# Patient Record
Sex: FEMALE | Race: WHITE | NOT HISPANIC OR LATINO | Employment: UNEMPLOYED | URBAN - METROPOLITAN AREA
[De-identification: names, ages, dates, MRNs, and addresses within clinical notes are randomized per-mention and may not be internally consistent; named-entity substitution may affect disease eponyms.]

---

## 2017-04-10 ENCOUNTER — ALLSCRIPTS OFFICE VISIT (OUTPATIENT)
Dept: OTHER | Facility: OTHER | Age: 57
End: 2017-04-10

## 2017-10-13 ENCOUNTER — HOSPITAL ENCOUNTER (EMERGENCY)
Facility: HOSPITAL | Age: 57
Discharge: HOME | End: 2017-10-13
Admitting: EMERGENCY MEDICINE
Payer: COMMERCIAL

## 2017-10-13 VITALS
RESPIRATION RATE: 18 BRPM | SYSTOLIC BLOOD PRESSURE: 127 MMHG | DIASTOLIC BLOOD PRESSURE: 88 MMHG | OXYGEN SATURATION: 99 % | HEART RATE: 69 BPM | WEIGHT: 245 LBS | TEMPERATURE: 98.4 F

## 2017-10-13 DIAGNOSIS — B02.9 SHINGLES OUTBREAK: Primary | ICD-10-CM

## 2017-10-13 PROCEDURE — 99283 EMERGENCY DEPT VISIT LOW MDM: CPT

## 2017-10-13 RX ORDER — MORPHINE SULFATE 15 MG/1
15 TABLET ORAL EVERY 6 HOURS PRN
Qty: 10 TABLET | Refills: 0 | Status: SHIPPED | OUTPATIENT
Start: 2017-10-13 | End: 2022-01-12

## 2017-10-13 RX ORDER — VALACYCLOVIR HYDROCHLORIDE 1 G/1
1000 TABLET, FILM COATED ORAL 3 TIMES DAILY
Qty: 21 TABLET | Refills: 0 | Status: SHIPPED | OUTPATIENT
Start: 2017-10-13 | End: 2022-01-12

## 2017-10-13 NOTE — DISCHARGE INSTRUCTIONS
Shingles   WHAT YOU NEED TO KNOW:   Shingles is a painful infection caused by the same virus that causes chickenpox (varicella-zoster virus)  After you get chickenpox, the virus stays in your body for several years without causing any symptoms  Shingles occurs when the virus becomes active again  Once active, the virus will travel along a nerve to your skin and cause a rash  DISCHARGE INSTRUCTIONS:   Return to the emergency department if:   · You have painful, red, warm skin around the blisters, or the blisters drain pus  · Your neck is stiff or you have trouble moving it  · You have trouble moving your arms, legs, or face  · You have a seizure  · You have weakness in an arm or leg  · You become confused, or have difficulty speaking  · You have dizziness, a severe headache, hearing or vision loss  Contact your healthcare provider if:   · You feel weak or have a headache  · You have a cough, chills, or a fever  · You have abdominal pain, nausea, or vomiting  · Your rash becomes more itchy or painful  · Your rash spreads to other parts of your body  · Your pain worsens and does not go away even after taking medicine  · You have questions or concerns about your condition or care  Medicines:   · Antiviral medicine  helps decrease symptoms and healing time  They may also decrease your risk of developing nerve pain  You will need to start taking them within 3 days of the start of symptoms to prevent nerve pain  · Pain medicine  may be prescribed or suggested by your healthcare provider  You may need NSAIDs, acetaminophen, or opioid medicine depending on how much pain you are in  Do not wait until the pain is severe before you take more pain medicine  · Topical anesthetics  are used to numb the skin and decrease pain  They can be a cream, gel, spray, or patch  · Anticonvulsants  decrease nerve pain and may help you sleep at night      · Antidepressants  may be used to decrease nerve pain  · Take your medicine as directed  Contact your healthcare provider if you think your medicine is not helping or if you have side effects  Tell him of her if you are allergic to any medicine  Keep a list of the medicines, vitamins, and herbs you take  Include the amounts, and when and why you take them  Bring the list or the pill bottles to follow-up visits  Carry your medicine list with you in case of an emergency  Follow up with your healthcare provider as directed:  Write down your questions so you remember to ask them during your visits  Self-care:  Keep your rash clean and dry  Cover your rash with a bandage or clothing  Do not use bandages that stick to your skin  The sticky part may irritate your skin and make your rash last longer  Prevent the spread of shingles: The virus can be passed to a person who has never had chickenpox  This person may get chickenpox, but not shingles  You may pass the virus to others as long as you have a rash  The virus is spread by direct contact with the fluid from the blisters  Usually, you cannot spread the virus once the blisters dry up  Prevent shingles or another shingles outbreak:  A vaccine may be given to help prevent shingles  Ask for more information about this vaccine  © 2017 [address] [employer] Information is for End User's use only and may not be sold, redistributed or otherwise used for commercial purposes  All illustrations and images included in CareNotes® are the copyrighted property of A D A M , Inc  or Rakesh Capone  The above information is an  only  It is not intended as medical advice for individual conditions or treatments  Talk to your doctor, nurse or pharmacist before following any medical regimen to see if it is safe and effective for you

## 2017-10-13 NOTE — ED PROVIDER NOTES
History  Chief Complaint   Patient presents with    Back Pain     pt  c/o back pain x 1 5 weeks  No recent trauma  Pt  also complains of rash on back, first noticed yesterday  This is a 61-year-old female patient who states that she has had left flank pain in her rib pain for approximately 1 and half weeks  She started with a blistered rash over this area over the last 2 days  She states this rash is a burning stinging type rash she has never had this before  No fever no chills no headache no blurred vision or double vision no cough congestion sore throat no nausea vomiting diarrhea abdominal pain no chest pain or shortness of breath  Nothing makes it better or worse she has taken nothing for the pain  She has been under lot of stress recently in nobody else has this rash  Based on the symptoms in the and the rash it does appear that she has a shingles outbreak this is her 1st outbreak        Back Pain       None       Past Medical History:   Diagnosis Date    Chronic pain     Hypertension        No past surgical history on file  No family history on file  I have reviewed and agree with the history as documented  Social History   Substance Use Topics    Smoking status: Never Smoker    Smokeless tobacco: Never Used    Alcohol use No        Review of Systems   Musculoskeletal: Positive for back pain  All other systems reviewed and are negative  Physical Exam  ED Triage Vitals [10/13/17 1643]   Temperature Pulse Respirations Blood Pressure SpO2   98 4 °F (36 9 °C) 69 18 127/88 99 %      Temp Source Heart Rate Source Patient Position - Orthostatic VS BP Location FiO2 (%)   Oral Monitor Sitting Left arm --      Pain Score       9           Physical Exam   Constitutional: She appears well-developed and well-nourished  HENT:   Head: Normocephalic and atraumatic     Right Ear: External ear normal    Left Ear: External ear normal    Nose: Nose normal    Mouth/Throat: Oropharynx is clear and moist    Eyes: Conjunctivae are normal  Pupils are equal, round, and reactive to light  Neck: Normal range of motion  Neck supple  Cardiovascular: Normal rate and regular rhythm  Pulmonary/Chest: Effort normal and breath sounds normal    Abdominal: Soft  Bowel sounds are normal  There is no tenderness  Neurological: She is alert  Skin: Skin is warm  Psychiatric: She has a normal mood and affect  Her behavior is normal    Nursing note and vitals reviewed  ED Medications  Medications - No data to display    Diagnostic Studies  Labs Reviewed - No data to display    No orders to display       Procedures  Procedures      Phone Contacts  ED Phone Contact    ED Course  ED Course                                MDM  CritCare Time    Disposition  Final diagnoses:   Shingles outbreak     ED Disposition     ED Disposition Condition Comment    Discharge  Abdirizak Chick discharge to home/self care  Condition at discharge: Good        Follow-up Information     Follow up With Specialties Details Why Contact Info    Susie Maguire MD Family Medicine Schedule an appointment as soon as possible for a visit  [address]  [address] [address]  [phone]          Patient's Medications   Discharge Prescriptions    MORPHINE (MSIR) 15 MG TABLET    Take 1 tablet by mouth every 6 (six) hours as needed for severe pain Max Daily Amount: 60 mg       Start Date: 10/13/2017End Date: --       Order Dose: 15 mg       Quantity: 10 tablet    Refills: 0    VALACYCLOVIR (VALTREX) 1,000 MG TABLET    Take 1 tablet by mouth 3 (three) times a day for 7 days       Start Date: 10/13/2017End Date: 10/20/2017       Order Dose: 1,000 mg       Quantity: 21 tablet    Refills: 0     No discharge procedures on file      ED Provider  Electronically Signed by       Felipa Granger PA-C  10/13/17 [address] Shant Cleary PA-C  10/13/17 [id number]

## 2017-10-13 NOTE — ED NOTES
Pt awake and alert, no distress noted, no other questions upon d/c     April ARNULFO Deluca, RN  10/13/17 [facility]

## 2017-10-15 ENCOUNTER — HOSPITAL ENCOUNTER (EMERGENCY)
Facility: HOSPITAL | Age: 57
Discharge: HOME | End: 2017-10-15
Attending: EMERGENCY MEDICINE | Admitting: EMERGENCY MEDICINE
Payer: COMMERCIAL

## 2017-10-15 VITALS
DIASTOLIC BLOOD PRESSURE: 85 MMHG | TEMPERATURE: 97.8 F | WEIGHT: 246.4 LBS | SYSTOLIC BLOOD PRESSURE: 144 MMHG | OXYGEN SATURATION: 99 % | RESPIRATION RATE: 16 BRPM | HEART RATE: 77 BPM

## 2017-10-15 DIAGNOSIS — T88.7XXA NON-DOSE-RELATED ADVERSE EFFECT OF MEDICATION, INITIAL ENCOUNTER: Primary | ICD-10-CM

## 2017-10-15 PROCEDURE — 99282 EMERGENCY DEPT VISIT SF MDM: CPT

## 2017-10-15 RX ORDER — OXYCODONE HYDROCHLORIDE 5 MG/1
5 CAPSULE ORAL EVERY 6 HOURS PRN
Qty: 20 CAPSULE | Refills: 0 | Status: SHIPPED | OUTPATIENT
Start: 2017-10-15 | End: 2022-01-12

## 2017-10-15 NOTE — ED NOTES
Pt's  standing outside of room, scowling at RN station  Pt's  came out asking when his wife will be seen by doctor  Made pt aware that was unknown  He asked if any pain meds could be given to wife, I stated ibuprofen or tylenol could be give w/o a doctors order, to which he decline     stated "this place has gone down hill!"       Rita Patel, RN  10/15/17 [address], W[place]  10/15/17 5766

## 2017-10-15 NOTE — DISCHARGE INSTRUCTIONS
Diagnosis rash- possible adverse medication reaction     - stop morphine-- for pain- can use oxycodone 1-2 tablets every 4-6 hrs as needed - can also try over the counter lidocaine patches over shingles area     - do not scratch areas- do not want a secondary bacterial infection     - continue valtrex    - please return to  The er fro any fevers-  Redness of whites of eyes/ lip cracking// any peeling of non shigles type rash or any new/ worsening/concerning symptoms to you

## 2018-01-11 NOTE — PROGRESS NOTES
Assessment    1  Osteoarthritis of knee (715 36) (M17 9)   2  Asthma (493 90) (J45 909)   3  Benign essential hypertension (401 1) (I10)   4  Depression with anxiety (300 4) (F41 8)   5  High risk medication use (V58 69) (Z79 899)   6  Impaired fasting glucose (790 21) (R73 01)   7  Seasonal allergies (477 9) (J30 2)   8  Acute bronchitis (466 0) (J20 9)   9  Breast calcification, left (793 89) (R92 1)    Plan  Acute bronchitis    · Azithromycin 250 MG Oral Tablet; TAKE 2 TABLETS ON DAY 1 THEN TAKE 1  TABLET A DAY FOR 4 DAYS   · * XR CHEST PA & LATERAL; Status:Active; Requested for:04Apr2016;    · Drink plenty of fluids ; Status:Complete;   Done: 96FRD3678  Asthma    · Advair Diskus 500-50 MCG/DOSE Inhalation Aerosol Powder Breath Activated;  INHALE 1 PUFF TWICE DAILY   · Zafirlukast 20 MG Oral Tablet; TAKE 1 TABLET TWICE DAILY  Asthma, Benign essential hypertension, Bipolar disorder, Breast calcification, left,  Depression with anxiety, Health Maintenance, High risk medication use, Impaired fasting  glucose, Obesity (BMI 30 0-34 9), Osteoarthritis of knee, Seasonal allergies    · 1 - Derian Mercer MD, Zay Oconnor  (Orthopedic Surgery) Physician Referral  Consult  Status: Active   Requested for: 96OMX0800  Care Summary provided  : Yes  Benign essential hypertension    · Triamterene-HCTZ 37 5-25 MG Oral Tablet; take 1 tablet by mouth every day   · Metoprolol Tartrate 50 MG Oral Tablet;  Take 1 tablet twice a day   · Continue with our present treatment plan ; Status:Complete;   Done: 06BAZ6380   · Eat a low fat and low cholesterol diet ; Status:Complete;   Done: 94XWT9782   · Keep a diary of when and what you eat ; Status:Complete;   Done: 63ULF2247   · Restrict the salt in your diet by avoiding highly salted foods ; Status:Complete;   Done:  70SET9139   · Restrict your sodium (salt) intake to 2 grams per day ; Status:Complete;   Done:  18ATA7603  Breast calcification, left    · MAMMO DIAGNOSTIC LEFT W CAD; Status:Hold For - Scheduling; Requested  for:04Apr2016;    · Follow-up visit in 1 week Evaluation and Treatment  Follow-up  Status: Hold For -  Scheduling  Requested for: 04Apr2016  Unlinked    · Diclofenac Sodium 75 MG Oral Tablet Delayed Release    Discussion/Summary    She has wheezing and rhonchi in the left lung advised to start antibiotic continue Advair and get a chest x-ray to rule out pneumonia,  She has bilateral arthritis, x-ray reviewed with her from last year and she has osteoarthritis,  Referral made to orthopedic,  She has hypertension, which is stable I rewrote her prescription,  Advised to have labs and then she should have follow-up next week for recheck her her bronchitis  Her labs reviewed which is only hemoglobin A1c 6 0 advised to try to lose weight eat low-calorie diet  She also wants to have mammogram done which is diagnostic for left breast which was advised 6 month ago  I reviewed her previous mammogram, and ordered a new prescription  Possible side effects of new medications were reviewed with the patient/guardian today  The treatment plan was reviewed with the patient/guardian  The patient/guardian understands and agrees with the treatment plan   The patient was counseled regarding diagnostic results, instructions for management, prognosis, impressions, risks and benefits of treatment options, importance of compliance with treatment  Chief Complaint  FOLLOW UP ON CHRONIC MEDICAL CONDITIONS, REVIEW LABS   ALSO COMPLAINS OF COUGH FOR 9 DAYS      History of Present Illness  She is here follow-up on hypertension and her chronic conditions and she says she needed refill on triamterene hydrochlorothiazide in the tablet form as that is cheaper,  sHe also has asthma, and use Advair regularly,  She complains of, cough for last one week and wheezing and has green phlegm and not been feeling well, she does not check her temperature but she feels feverish,  Her appetite is normal  She also complains of bilateral knee pain and she had x-ray last year which shows she has arthritis ,she wants to see a orthopedic,  She has constant discomfort more on walking, and this pain is there for years which is getting worse      Review of Systems    Constitutional: No fever, no chills, feels well, no tiredness, no recent weight gain or weight loss  Eyes: No complaints of eye pain, no red eyes, no eyesight problems, no discharge, no dry eyes, no itching of eyes  Cardiovascular: No complaints of slow heart rate, no fast heart rate, no chest pain, no palpitations, no leg claudication, no lower extremity edema  Respiratory: as noted in HPI  Gastrointestinal: No complaints of abdominal pain, no constipation, no nausea or vomiting, no diarrhea, no bloody stools  Musculoskeletal: No complaints of arthralgias, no myalgias, no joint swelling or stiffness, no limb pain or swelling  Integumentary: No complaints of skin rash or lesions, no itching, no skin wounds, no breast pain or lump  Neurological: No complaints of headache, no confusion, no convulsions, no numbness, no dizziness or fainting, no tingling, no limb weakness, no difficulty walking  Psychiatric: Not suicidal, no sleep disturbance, no anxiety or depression, no change in personality, no emotional problems  Endocrine: No complaints of proptosis, no hot flashes, no muscle weakness, no deepening of the voice, no feelings of weakness  Hematologic/Lymphatic: No complaints of swollen glands, no swollen glands in the neck, does not bleed easily, does not bruise easily  ROS reviewed  Active Problems    1  Asthma (493 90) (J45 909)   2  Benign essential hypertension (401 1) (I10)   3  Bipolar disorder (296 80) (F31 9)   4  Breast calcification, left (793 89) (R92 1)   5  Depression with anxiety (300 4) (F41 8)   6  High risk medication use (V58 69) (Z79 899)   7  Impaired fasting glucose (790 21) (R73 01)   8  Obesity (BMI 30 0-34 9) (278 00) (E66 9)   9  Osteoarthritis of knee (715 36) (M17 9)   10  Seasonal allergies (477 9) (J30 2)   11  Visit for screening mammogram (V76 12) (Z12 31)    Past Medical History    1  History of _ (278 0)    The active problems and past medical history were reviewed and updated today  Surgical History    1  History of Tubal Ligation    Family History    1  No pertinent family history    Social History    · Former smoker (F26 86) (F23 723)  The social history was reviewed and updated today  Current Meds   1  Advair Diskus 500-50 MCG/DOSE Inhalation Aerosol Powder Breath Activated; INHALE 1   PUFF TWICE DAILY; Therapy: 32ZYD7131 to (9872 9189)  Requested for: 48AMS7995; Last   Rx:55Jal6254 Ordered   2  ClonazePAM 0 5 MG Oral Tablet; Therapy: 35FOH1364 to (Last Daniele Pod)  Requested for: 21Jun2011 Ordered   3  Diclofenac Sodium 75 MG Oral Tablet Delayed Release; Therapy: 69CVC7263 to Recorded   4  Fluticasone Propionate 50 MCG/ACT Nasal Suspension; USE 1 SPRAY IN EACH   NOSTRIL TWICE DAILY; Therapy: 39QGJ0218 to ([facility])  Requested for: 30GJG4756; Last   Rx:25Jun2015 Ordered   5  LamoTRIgine 150 MG Oral Tablet; Therapy: 66OPW5466 to Recorded   6  Metoprolol Tartrate 50 MG Oral Tablet; TAKE 1 TABLET TWICE A DAY FOR BLOOD   PRESSURE; Therapy: 74DNN5313 to (Evaluate:41Qsa5921)  Requested for: 66LZC3864; Last   Rx:19Nov2015 Ordered   7  TraZODone HCl - 150 MG Oral Tablet; Therapy: 69RRP9504 to Recorded   8  TraZODone HCl - 50 MG Oral Tablet; Therapy: 07GBE7472 to Recorded   9  Triamterene-HCTZ 37 5-25 MG Oral Capsule; take 1 capsule daily; Therapy: 62YYD2405 to (DC[facility]afers)  Requested for: 01Apr2016; Last   Rx:30Mar2016; Status: ACTIVE - Renewal Denied Ordered   10  Venlafaxine HCl  MG Oral Capsule Extended Release 24 Hour; Therapy: 74QJS3038 to (Last Rx:99Gpn9627)  Requested for: 90Tvs5034 Ordered   11  Zafirlukast 20 MG Oral Tablet; TAKE 1 TABLET TWICE DAILY;     Therapy: 98ZOQ9195 to (Loreta Gomez)  Requested for: 01Apr2016; Last    Rx:30Mar2016; Status: ACTIVE - Renewal Denied Ordered   12  Zantac 150 MG Oral Tablet; Therapy: 45VSY2653 to Recorded    The medication list was reviewed and updated today  Allergies    1  ASPIRIN   2  Penicillins    Vitals  Vital Signs [Data Includes: Current Encounter]    Recorded: 04Apr2016 03:56PM   Temperature 97 2 F   Heart Rate 72   Respiration 16   Systolic 015   Diastolic 70   Height 5 ft 4 in   Weight 264 lb    BMI Calculated 45 32   BSA Calculated 2 21   O2 Saturation 92     Physical Exam    Constitutional   General appearance: No acute distress, well appearing and well nourished  Eyes   Conjunctiva and lids: No swelling, erythema or discharge  Ears, Nose, Mouth, and Throat   External inspection of ears and nose: Normal     Oropharynx: Normal with no erythema, edema, exudate or lesions  Pulmonary   Respiratory effort: No increased work of breathing or signs of respiratory distress  Wheezing and rhonchi in the left side  Cardiovascular   Palpation of heart: Normal PMI, no thrills  Auscultation of heart: Normal rate and rhythm, normal S1 and S2, without murmurs  Examination of extremities for edema and/or varicosities: Normal     Carotid pulses: Normal     Abdomen   Abdomen: Non-tender, no masses  Liver and spleen: No hepatomegaly or splenomegaly  Lymphatic   Palpation of lymph nodes in neck: No lymphadenopathy  Musculoskeletal   Gait and station: Normal     Inspection/palpation of joints, bones, and muscles: Normal     Skin   Skin and subcutaneous tissue: Normal without rashes or lesions  Neurologic   Cranial nerves: Cranial nerves 2-12 intact  Sensation: No sensory loss      Psychiatric   Orientation to person, place, and time: Normal     Mood and affect: Normal          Results/Data  Results   (1) HEMOGLOBIN A1C [id number] 07:38AM [facility] [place]   5 7-6 4% impaired fasting glucose  >=6 5% diagnosis of diabetes    Falsely low levels are seen in conditions linked to short RBC life span-  hemolytic anemia, and splenomegaly  Falsely elevated levels are seen in situations where there is an increased production of RBC- receipt of erythropoietin or blood transfusions  Adopted from ADA-Clinical Practice Recommendations     Test Name Result Flag Reference   HEMOGLOBIN A1C 6 0 % H 4 0-5 6   EST  AVG  GLUCOSE 126 mg/dl       * Knee Xray 3 View (non-injury) 42PWI3669 01:03PM [facility]     Test Name Result Flag Reference   XR Knee 3 View (Report)     [facility] [address];;[place];[address]   09/29/2015 1303   09/29/2015 1318   3 IMAGES     RIGHT KNEE     INDICATION- Anterior knee pain  Symptoms worse with walking  COMPARISON- None     VIEWS- 3& 4 images^ 4 images     FINDINGS-     There is no acute fracture or dislocation  There is no joint effusion  There is bicompartmental osteoarthritis affecting the medial   femorotibial joint space and patellofemoral joint  There is narrowing   of the medial joint space with marginal bone spur formation at the   femoral condyle  Patellofemoral joint also demonstrates some bone spur   formation at the margins  No lytic or blastic lesions are seen  Soft tissues are unremarkable  IMPRESSION-     Bicompartmental osteoarthritis affecting the medial femorotibial joint   space and patellofemoral joint         Transcribed on- [id number]     UMU Sewell MD   Reading Radiologist- UMU Vicente MD   Electronically Signed- UMU LINCOLN MD   Released Date Time- 09/29/15 1424   ------------------------------------------------------------------------------   82860^VIKKI BA   09247^VIKKI BA     * Knee Xray 3 View (non-injury) 54Bqn9080 01:02PM Ghada Milviaadamaris     Test Name Result Flag Reference   XR Knee 3 View (Report)     [facility] [address] [facility];;[place];[address]   09/29/2015 1309   09/29/2015 1316   2 IMAGES     LEFT KNEE     INDICATION- Anterior bilateral knee pain  Symptoms worse with walking     COMPARISON- None     VIEWS- 3& 4 images^ 4 images     FINDINGS-     There is no acute fracture or dislocation  There is no joint effusion  Bicompartmental osteoarthritis is noted medially at the femorotibial   joint and at the patellofemoral joint with joint narrowing and marginal   bone spur formation  There are some sclerosis of the articular surface   most evident at the femoral condyle and there may also be tiny   subcortical lucencies which could represent some developing geodes  No suspicious lytic or blastic bone lesions are seen  Soft tissues are unremarkable  IMPRESSION-     Bicompartmental osteoarthritis which is moderate at the medial   femorotibial joint space and probably mild to moderate at the   patellofemoral joint space  Transcribed on- [id number]     Mallie Prader, RAD MD   Reading Radiologist- UMU Vogel MD   Electronically Signed- UMU Vogel MD   Released Date Time- 09/29/15 1423   ------------------------------------------------------------------------------   [id number]^VIKKI KUNZ TREND   50691^VIKKI KUNZ TREND     Future Appointments    Date/Time Provider Specialty Site   04/12/2016 03:40 PM ARNULFO Espinosa   Family Medicine FAMILY [facility]   Electronically signed by : ARNULFO Macedo ; Apr 4 2016  5:15PM EST                       (Author)

## 2018-01-11 NOTE — RESULT NOTES
Message   Hyperlipidemia needs office visit to discuss     Verified Results  (1) CBC/PLT/DIFF [id number] 12:36PM Rafa Rummer     Test Name Result Flag Reference   WBC COUNT 7 76 Thousand/uL  4 31-10 16   RBC COUNT 4 75 Million/uL  3 81-5 12   HEMOGLOBIN 14 0 g/dL  11 5-15 4   HEMATOCRIT 41 9 %  34 8-46  1   MCV 88 fL  82-98   MCH 29 5 pg  26 8-34 3   MCHC 33 4 g/dL  31 4-37 4   RDW 14 3 %  11 6-15 1   MPV 9 3 fL  8 9-12 7   PLATELET COUNT 134 Thousands/uL  149-390   NEUTROPHILS RELATIVE PERCENT 56 %  43-75   LYMPHOCYTES RELATIVE PERCENT 32 %  14-44   MONOCYTES RELATIVE PERCENT 9 %  4-12   EOSINOPHILS RELATIVE PERCENT 2 %  0-6   BASOPHILS RELATIVE PERCENT 1 %  0-1   NEUTROPHILS ABSOLUTE COUNT 4 38 Thousands/?L  1 85-7 62   LYMPHOCYTES ABSOLUTE COUNT 2 50 Thousands/?L  0 60-4 47   MONOCYTES ABSOLUTE COUNT 0 68 Thousand/?L  0 17-1 22   EOSINOPHILS ABSOLUTE COUNT 0 14 Thousand/?L  0 00-0 61   BASOPHILS ABSOLUTE COUNT 0 06 Thousands/?L  0 00-0 10     (1) COMPREHENSIVE METABOLIC PANEL [id number] 99:23FD Rafa Rummer     Test Name Result Flag Reference   GLUCOSE,RANDM 113 mg/dL     If the patient is fasting, the ADA then defines impaired fasting glucose as > 100 mg/dL and diabetes as > or equal to 123 mg/dL     SODIUM 142 mmol/L  136-145   POTASSIUM 4 8 mmol/L  3 5-5 3   CHLORIDE 105 mmol/L  100-108   CARBON DIOXIDE 30 mmol/L  21-32   ANION GAP (CALC) 7 mmol/L  4-13   BLOOD UREA NITROGEN 17 mg/dL  5-25   CREATININE 0 89 mg/dL  0 60-1 30   Standardized to IDMS reference method   CALCIUM 9 6 mg/dL  8 3-10 1   BILI, TOTAL 0 40 mg/dL  0 20-1 00   ALK PHOSPHATAS 55 U/L     ALT (SGPT) 40 U/L  12-78   AST(SGOT) 23 U/L  5-45   ALBUMIN 3 8 g/dL  3 5-5 0   TOTAL PROTEIN 7 4 g/dL  6 4-8 2   eGFR Non-African American      >60 0 ml/min/1 73sq [facility] Disease Education Program recommendations are as follows:  GFR calculation is accurate only with a steady state creatinine  Chronic Kidney disease less than 60 ml/min/1 73 sq  meters  Kidney failure less than 15 ml/min/1 73 sq  meters  (1) LIPID PANEL, FASTING 28Jun2016 12:36PM [facility]     Test Name Result Flag Reference   CHOLESTEROL 216 mg/dL H    HDL,DIRECT 48 mg/dL  40-60   Specimen collection should occur prior to Metamizole administration due to the potential for falsely depressed results  LDL CHOLESTEROL CALCULATED 145 mg/dL H 0-100   Triglyceride:         Normal              <150 mg/dl       Borderline High    150-199 mg/dl       High               200-499 mg/dl       Very High          >499 mg/dl  Cholesterol:         Desirable        <200 mg/dl      Borderline High  200-239 mg/dl      High             >239 mg/dl  HDL Cholesterol:        High    >59 mg/dL      Low     <41 mg/dL  LDL CALCULATED:    This screening LDL is a calculated result  It does not have the accuracy of the Direct Measured LDL in the monitoring of patients with hyperlipidemia and/or statin therapy  Direct Measure LDL (GSW397) must be ordered separately in these patients  TRIGLYCERIDES 117 mg/dL  <=150   Specimen collection should occur prior to N-Acetylcysteine or Metamizole administration due to the potential for falsely depressed results

## 2018-01-12 VITALS
HEIGHT: 64 IN | RESPIRATION RATE: 16 BRPM | DIASTOLIC BLOOD PRESSURE: 78 MMHG | WEIGHT: 249 LBS | HEART RATE: 75 BPM | BODY MASS INDEX: 42.51 KG/M2 | SYSTOLIC BLOOD PRESSURE: 118 MMHG

## 2018-01-12 NOTE — RESULT NOTES
Verified Results  *US BREAST LEFT LIMITED (DIAGNOSTIC) [id number] 03:51PM Kayode Castillo Order Number: [id number]     Test Name Result Flag Reference   US BREAST LEFT LIMITED (Report)     Patient History:   Patient is postmenopausal    Family history of breast cancer in maternal aunt  Patient has never smoked  Patient's BMI is 46 1  Reason for exam: follow-up at short interval from prior study  Callback for asymmetric density in the upper outer left breast     Mammo Diagnostic Left W CAD: July 1, 2016 - Check In #: [de-identified]   CC, MLO, and ML view(s) were taken of the left breast      Technologist: FIDELINA Hicks (R)(M)   Prior study comparison: September 8, 2015, left breast unilateral   diagnostic mammogram performed at [address] [place] [address]  August 26, 2015, bilateral digital screening mammogram,    performed at [place] [address]  There are scattered fibroglandular densities  These images were    obtained using digital technique and with the assistance of    Computer Aided Detection  There are no dominant masses, foci of    architectural distortion or suspicious clusters of calcification    to suggest malignancy  The visualized skin appears normal  Stable   faint asymmetric density in the upper outer left breast      Targeted ultrasound demonstrates no evidence of suspicious    hypoechoic mass or architectural distortion to suggest    malignancy    US Breast Left Limited: July 1, 2016 - Check In #: [de-identified]   Technologist: RT Bubba (R), RDMS   IMPRESSION:    ASSESSMENT: BiRad:2 - Benign (Overall)     Recommendation:   Routine screening mammogram of both breasts in 3 months     Analyzed by CAD     Transcription Location: [address]   Signing Station: [id number]     Risk Value(s):   Tyrer-Cuzick 10 Year: 2 871%, Tyrer-Cuzick Lifetime: 7 851%,    Myriad Table: 1 5%, DARIN 5 Year: 1 1%, NCI Lifetime: 6 3%   Signed by:   Deena Oakley MD   7/1/16 MAMMO DIAGNOSTIC LEFT W CAD [id number] 03:34PM Adrian Castillo Na Order Number: [id number]     Test Name Result Flag Reference   MAMMO DIAGNOSTIC LEFT W CAD (Report)     Patient History:   Patient is postmenopausal    Family history of breast cancer in maternal aunt  Patient has never smoked  Patient's BMI is 46 1  Reason for exam: follow-up at short interval from prior study  Callback for asymmetric density in the upper outer left breast     Mammo Diagnostic Left W CAD: July 1, 2016 - Check In #: [de-identified]   CC, MLO, and ML view(s) were taken of the left breast      Technologist: FIDELINA Jennings (R)(M)   Prior study comparison: September 8, 2015, left breast unilateral   diagnostic mammogram performed at [address] [place] [address]  August 26, 2015, bilateral digital screening mammogram,    performed at [place] [address]  There are scattered fibroglandular densities  These images were    obtained using digital technique and with the assistance of    Computer Aided Detection  There are no dominant masses, foci of    architectural distortion or suspicious clusters of calcification    to suggest malignancy  The visualized skin appears normal  Stable   faint asymmetric density in the upper outer left breast      Targeted ultrasound demonstrates no evidence of suspicious    hypoechoic mass or architectural distortion to suggest    malignancy    US Breast Left Limited: July 1, 2016 - Check In #: [de-identified]   Technologist: RT Renae (R), MERRY   IMPRESSION:    ASSESSMENT: BiRad:2 - Benign (Overall)     Recommendation:   Routine screening mammogram of both breasts in 3 months     Analyzed by CAD     Transcription Location: [address]   Signing Station: [id number]     Risk Value(s):   Tyrer-Cuzick 10 Year: 2 871%, Tyrer-Cuzick Lifetime: 7 851%,    Myriad Table: 1 5%, DARIN 5 Year: 1 1%, NCI Lifetime: 6 3%   Signed by:   Lazara Godinez MD   7/1/16

## 2018-01-12 NOTE — RESULT NOTES
Verified Results  (1) HEMOGLOBIN A1C [id number] 07:38AM [facility]   5 7-6 4% impaired fasting glucose  >=6 5% diagnosis of diabetes    Falsely low levels are seen in conditions linked to short RBC life span-  hemolytic anemia, and splenomegaly  Falsely elevated levels are seen in situations where there is an increased production of RBC- receipt of erythropoietin or blood transfusions  Adopted from ADA-Clinical Practice Recommendations     Test Name Result Flag Reference   HEMOGLOBIN A1C 6 0 % H 4 0-5 6   EST  AVG   GLUCOSE 126 mg/dl

## 2018-01-16 NOTE — RESULT NOTES
Message   Normal chest x-ray     Verified Results  * XR CHEST PA & LATERAL 04Apr2016 05:00PM Helder Hathaway Order Number: [id number]     Test Name Result Flag Reference   XR CHEST PA & LATERAL (Report)     CHEST - DUAL ENERGY     INDICATION: Wheezing for 9 days  Asthma  Cough  Chest pain from coughing  Shortness of breath  COMPARISON: December 12, 2008     VIEWS: PA (including soft tissue/bone algorithms) and lateral projections; 4 images     FINDINGS:        Cardiomediastinal silhouette appears unremarkable  The lungs are clear  No pneumothorax or pleural effusion  Age-appropriate degenerative changes are noted in the spine  Osseous structures appear otherwise within normal limits  IMPRESSION:     No active pulmonary disease         Workstation performed: [id number]     Signed by:   Pepper Berger MD   4/5/16       Discussion/Summary   Normal chest x-ray

## 2018-01-17 NOTE — RESULT NOTES
Verified Results  (1) THIN PREP PAP WITH IMAGING [id number] 03:40PM Andrés Marquis Order Number: [id number]     Test Name Result Flag Reference   LAB AP CASE REPORT (Report)     Gynecologic Cytology Report            Case: [id number]                  Authorizing Provider: Cleo No MD  Collected:      09/28/2016 1540        First Screen:     JOSEF Evangelista    Received:      10/03/2016 9207        Specimen:  LIQUID-BASED PAP, SCREENING, Endocervical   HPV HIGH RISK RESULT (Report)     HPV, High Risk: HPV NEG, HPV16 NEG, HPV18 NEG      Other High Risk HPV Negative, HPV 16 Negative, HPV 18 Negative  HPV types: 16,18,31,33,35,39,45,51,52,56,58,59,66 and 68 DNA are undetectable or below the pre-set threshold  Roche?s FDA approved Bill 4800 is utilized with strict adherence to the ?s instruction  manual to test for the presence of High-Risk HPV DNA, as well as HPV 16 and HPV 18  This instrument  has been validated by our laboratory and/or by the   A negative result does not preclude the presence of HPV infection because results depend on adequate  specimen collection, absence of inhibitors and sufficient DNA to be detected  Additionally, HPV negative  results are not intended to prevent women from proceeding to colposcopy if clinically warranted  Positive HPV test results indicate the presence of any one or more of the high risk types, but since patients  are often co-infected with low-risk types it does not rule out the presence of low-risk types in patients  with mixed infections  LAB AP GYN PRIMARY INTERPRETATION      Negative for intraepithelial lesion or malignancy  Electronically signed by JOSEF Evangelista on 10/4/2016 at 3:07 PM   LAB AP GYN SPECIMEN ADEQUACY      Satisfactory for evaluation  Endocervical/transformation zone component present     LAB AP GYN ADDITIONAL INFORMATION (Report)     Hologic's FDA approved ,  and ThinPrep Imaging System are   utilized with strict adherence to the 's instruction manual to   prepare gynecologic and non-gynecologic cytology specimens for the   production of ThinPrep slides as well as for gynecologic ThinPrep imaging  These processes have been validated by our laboratory and/or by the     The Pap test is not a diagnostic procedure and should not be used as the   sole means to detect cervical cancer  It is only a screening procedure to   aid in the detection of cervical cancer and its precursors  Both   false-negative and false-positive results have been experienced  Your   patient's test result should be interpreted in this context together with   the history and clinical findings       (1) CHLAMYDIA/GC AMPLIFIED DNA, PCR [id number] 12:00AM [facility]     Test Name Result Flag Reference   CHLAMYDIA,AMPLIFIED DNA PROBE   C  trachomatis Amplified DNA Negative   C  trachomatis Amplified DNA Negative   N  GONORRHOEAE AMPLIFIED DNA   N  gonorrhoeae Amplified DNA Negative   N  gonorrhoeae Amplified DNA Negative

## 2020-04-27 ENCOUNTER — TELEPHONE (OUTPATIENT)
Dept: FAMILY MEDICINE CLINIC | Facility: CLINIC | Age: 60
End: 2020-04-27

## 2020-05-26 DIAGNOSIS — E78.5 DYSLIPIDEMIA: Primary | ICD-10-CM

## 2020-05-26 RX ORDER — ATORVASTATIN CALCIUM 20 MG/1
20 TABLET, FILM COATED ORAL DAILY
COMMUNITY
End: 2020-05-26 | Stop reason: SDUPTHER

## 2020-05-26 RX ORDER — ATORVASTATIN CALCIUM 20 MG/1
20 TABLET, FILM COATED ORAL DAILY
Qty: 30 TABLET | Refills: 6 | Status: SHIPPED | OUTPATIENT
Start: 2020-05-26 | End: 2021-01-06

## 2020-08-12 ENCOUNTER — TELEPHONE (OUTPATIENT)
Dept: FAMILY MEDICINE CLINIC | Facility: CLINIC | Age: 60
End: 2020-08-12

## 2020-08-13 DIAGNOSIS — I10 ESSENTIAL (PRIMARY) HYPERTENSION: Primary | ICD-10-CM

## 2020-08-13 RX ORDER — TRIAMTERENE AND HYDROCHLOROTHIAZIDE 37.5; 25 MG/1; MG/1
1 CAPSULE ORAL EVERY MORNING
COMMUNITY
End: 2020-08-13 | Stop reason: SDUPTHER

## 2020-08-13 RX ORDER — TRIAMTERENE AND HYDROCHLOROTHIAZIDE 37.5; 25 MG/1; MG/1
1 CAPSULE ORAL EVERY MORNING
Qty: 30 CAPSULE | Refills: 6 | Status: SHIPPED | OUTPATIENT
Start: 2020-08-13 | End: 2021-04-12 | Stop reason: SDUPTHER

## 2020-08-13 NOTE — TELEPHONE ENCOUNTER
Neeeds a new script w/refills for:    Triamterene 37 5mg/hydrochlorothiazide 25mg, 1x a day       Ph[facility] - [facility]     Patient ph # [phone]

## 2020-08-21 DIAGNOSIS — Z76.0 MEDICATION REFILL: Primary | ICD-10-CM

## 2020-08-21 RX ORDER — LORATADINE 10 MG/1
TABLET ORAL
Qty: 30 TABLET | Refills: 6 | Status: SHIPPED | OUTPATIENT
Start: 2020-08-21 | End: 2021-07-23

## 2020-08-28 ENCOUNTER — TELEPHONE (OUTPATIENT)
Dept: FAMILY MEDICINE CLINIC | Facility: CLINIC | Age: 60
End: 2020-08-28

## 2020-09-18 DIAGNOSIS — I10 ESSENTIAL (PRIMARY) HYPERTENSION: Primary | ICD-10-CM

## 2020-09-18 RX ORDER — METOPROLOL TARTRATE 50 MG/1
1 TABLET, FILM COATED ORAL 2 TIMES DAILY
COMMUNITY
Start: 2015-11-19 | End: 2020-09-18 | Stop reason: SDUPTHER

## 2020-09-18 RX ORDER — METOPROLOL TARTRATE 50 MG/1
50 TABLET, FILM COATED ORAL 2 TIMES DAILY
Qty: 180 TABLET | Refills: 2 | Status: SHIPPED | OUTPATIENT
Start: 2020-09-18 | End: 2022-01-12 | Stop reason: SDUPTHER

## 2020-09-18 NOTE — TELEPHONE ENCOUNTER
Pharmacy faxed a request for refill on metoprolol tartrate 50mg BID #[id number]    [facility]

## 2020-10-02 DIAGNOSIS — J45.40 MODERATE PERSISTENT ASTHMA WITHOUT COMPLICATION: Primary | ICD-10-CM

## 2020-10-05 ENCOUNTER — TELEPHONE (OUTPATIENT)
Dept: FAMILY MEDICINE CLINIC | Facility: CLINIC | Age: 60
End: 2020-10-05

## 2020-12-09 DIAGNOSIS — J45.40 MODERATE PERSISTENT ASTHMA WITHOUT COMPLICATION: Primary | ICD-10-CM

## 2020-12-10 ENCOUNTER — TELEPHONE (OUTPATIENT)
Dept: FAMILY MEDICINE CLINIC | Facility: CLINIC | Age: 60
End: 2020-12-10

## 2020-12-15 DIAGNOSIS — J45.909 ASTHMA DUE TO SEASONAL ALLERGIES: Primary | ICD-10-CM

## 2020-12-15 RX ORDER — MONTELUKAST SODIUM 10 MG/1
TABLET ORAL
Qty: 30 TABLET | Refills: 6 | Status: SHIPPED | OUTPATIENT
Start: 2020-12-15 | End: 2021-08-11

## 2021-01-06 DIAGNOSIS — E78.5 DYSLIPIDEMIA: ICD-10-CM

## 2021-01-06 RX ORDER — ATORVASTATIN CALCIUM 20 MG/1
TABLET, FILM COATED ORAL
Qty: 30 TABLET | Refills: 6 | Status: SHIPPED | OUTPATIENT
Start: 2021-01-06 | End: 2021-08-11

## 2021-04-12 DIAGNOSIS — I10 ESSENTIAL (PRIMARY) HYPERTENSION: ICD-10-CM

## 2021-04-12 RX ORDER — TRIAMTERENE AND HYDROCHLOROTHIAZIDE 37.5; 25 MG/1; MG/1
1 CAPSULE ORAL EVERY MORNING
Qty: 30 CAPSULE | Refills: 6 | Status: SHIPPED | OUTPATIENT
Start: 2021-04-12 | End: 2021-11-24

## 2021-07-23 DIAGNOSIS — Z76.0 MEDICATION REFILL: ICD-10-CM

## 2021-07-23 RX ORDER — LORATADINE 10 MG/1
TABLET ORAL
Qty: 30 TABLET | Refills: 6 | Status: SHIPPED | OUTPATIENT
Start: 2021-07-23 | End: 2022-03-17

## 2021-07-23 NOTE — TELEPHONE ENCOUNTER
Yours 1. I was told the name of the doctor(s) who took care of my child while in the hospital.    2. I have been told about any important findings on my child's physical exam and my child’s plan of care.    3. The doctor clearly explained my child's diagnosis and other possible diagnoses that were considered.    4. My child's doctor explained all the tests that were done and their results (if available). I understand that some of the test results may not be ready before we go home and I was told how I can get these results. I understand that a summary of my child's hospitalization and important test results will be shared with my child's outpatient doctor.    5. My child's doctor talked to me about what I need to do when we go home.    6. I understand what signs and symptoms to watch for. I understand what symptoms I would need to call my doctor for and/or return to the hospital.    7. I have the phone number to call the hospital for results and/or questions after I leave the hospital.

## 2021-08-11 DIAGNOSIS — J45.909 ASTHMA DUE TO SEASONAL ALLERGIES: ICD-10-CM

## 2021-08-11 DIAGNOSIS — E78.5 DYSLIPIDEMIA: ICD-10-CM

## 2021-08-11 RX ORDER — MONTELUKAST SODIUM 10 MG/1
TABLET ORAL
Qty: 30 TABLET | Refills: 6 | Status: SHIPPED | OUTPATIENT
Start: 2021-08-11 | End: 2022-01-12 | Stop reason: SDUPTHER

## 2021-08-11 RX ORDER — ATORVASTATIN CALCIUM 20 MG/1
TABLET, FILM COATED ORAL
Qty: 30 TABLET | Refills: 6 | Status: SHIPPED | OUTPATIENT
Start: 2021-08-11 | End: 2022-01-12 | Stop reason: SDUPTHER

## 2021-08-16 DIAGNOSIS — Z76.0 MEDICATION REFILL: Primary | ICD-10-CM

## 2021-08-16 RX ORDER — ALBUTEROL SULFATE 90 UG/1
AEROSOL, METERED RESPIRATORY (INHALATION)
Qty: 18 G | Refills: 5 | Status: SHIPPED | OUTPATIENT
Start: 2021-08-16 | End: 2022-01-12 | Stop reason: SDUPTHER

## 2021-12-08 ENCOUNTER — TELEPHONE (OUTPATIENT)
Dept: FAMILY MEDICINE CLINIC | Facility: CLINIC | Age: 61
End: 2021-12-08

## 2021-12-08 DIAGNOSIS — J45.909 MILD ASTHMA, UNSPECIFIED WHETHER COMPLICATED, UNSPECIFIED WHETHER PERSISTENT: Primary | ICD-10-CM

## 2021-12-08 RX ORDER — FLUTICASONE FUROATE AND VILANTEROL 100; 25 UG/1; UG/1
1 POWDER RESPIRATORY (INHALATION) DAILY
COMMUNITY
End: 2021-12-15

## 2021-12-15 ENCOUNTER — TELEPHONE (OUTPATIENT)
Dept: FAMILY MEDICINE CLINIC | Facility: CLINIC | Age: 61
End: 2021-12-15

## 2021-12-15 DIAGNOSIS — J44.9 CHRONIC OBSTRUCTIVE PULMONARY DISEASE, UNSPECIFIED COPD TYPE (HCC): Primary | ICD-10-CM

## 2022-01-12 ENCOUNTER — OFFICE VISIT (OUTPATIENT)
Dept: FAMILY MEDICINE CLINIC | Facility: CLINIC | Age: 62
End: 2022-01-12
Payer: COMMERCIAL

## 2022-01-12 VITALS
HEART RATE: 66 BPM | DIASTOLIC BLOOD PRESSURE: 88 MMHG | OXYGEN SATURATION: 98 % | RESPIRATION RATE: 16 BRPM | WEIGHT: 268 LBS | HEIGHT: 64 IN | TEMPERATURE: 97.1 F | SYSTOLIC BLOOD PRESSURE: 122 MMHG | BODY MASS INDEX: 45.75 KG/M2

## 2022-01-12 DIAGNOSIS — Z12.39 ENCOUNTER FOR SCREENING FOR MALIGNANT NEOPLASM OF BREAST, UNSPECIFIED SCREENING MODALITY: Primary | ICD-10-CM

## 2022-01-12 DIAGNOSIS — J44.9 CHRONIC OBSTRUCTIVE PULMONARY DISEASE, UNSPECIFIED COPD TYPE (HCC): ICD-10-CM

## 2022-01-12 DIAGNOSIS — Z00.00 ANNUAL PHYSICAL EXAM: ICD-10-CM

## 2022-01-12 DIAGNOSIS — Z23 IMMUNIZATION DUE: ICD-10-CM

## 2022-01-12 DIAGNOSIS — J45.40 MODERATE PERSISTENT ASTHMA WITHOUT COMPLICATION: ICD-10-CM

## 2022-01-12 DIAGNOSIS — J45.909 ASTHMA DUE TO SEASONAL ALLERGIES: ICD-10-CM

## 2022-01-12 DIAGNOSIS — I10 PRIMARY HYPERTENSION: ICD-10-CM

## 2022-01-12 DIAGNOSIS — Z76.0 MEDICATION REFILL: ICD-10-CM

## 2022-01-12 DIAGNOSIS — Z12.11 COLON CANCER SCREENING: ICD-10-CM

## 2022-01-12 DIAGNOSIS — F31.9 BIPOLAR 1 DISORDER (HCC): ICD-10-CM

## 2022-01-12 DIAGNOSIS — E66.01 CLASS 3 SEVERE OBESITY DUE TO EXCESS CALORIES WITH SERIOUS COMORBIDITY AND BODY MASS INDEX (BMI) OF 45.0 TO 49.9 IN ADULT (HCC): ICD-10-CM

## 2022-01-12 DIAGNOSIS — Z11.59 NEED FOR HEPATITIS C SCREENING TEST: ICD-10-CM

## 2022-01-12 DIAGNOSIS — E78.5 DYSLIPIDEMIA: ICD-10-CM

## 2022-01-12 DIAGNOSIS — I10 ESSENTIAL (PRIMARY) HYPERTENSION: ICD-10-CM

## 2022-01-12 PROBLEM — E66.813 CLASS 3 SEVERE OBESITY DUE TO EXCESS CALORIES WITH SERIOUS COMORBIDITY AND BODY MASS INDEX (BMI) OF 45.0 TO 49.9 IN ADULT (HCC): Status: ACTIVE | Noted: 2022-01-12

## 2022-01-12 PROCEDURE — 99396 PREV VISIT EST AGE 40-64: CPT | Performed by: FAMILY MEDICINE

## 2022-01-12 PROCEDURE — 1036F TOBACCO NON-USER: CPT | Performed by: FAMILY MEDICINE

## 2022-01-12 PROCEDURE — 3725F SCREEN DEPRESSION PERFORMED: CPT | Performed by: FAMILY MEDICINE

## 2022-01-12 PROCEDURE — 3008F BODY MASS INDEX DOCD: CPT | Performed by: FAMILY MEDICINE

## 2022-01-12 PROCEDURE — 90715 TDAP VACCINE 7 YRS/> IM: CPT | Performed by: FAMILY MEDICINE

## 2022-01-12 PROCEDURE — 90471 IMMUNIZATION ADMIN: CPT | Performed by: FAMILY MEDICINE

## 2022-01-12 RX ORDER — METOPROLOL TARTRATE 50 MG/1
50 TABLET, FILM COATED ORAL 2 TIMES DAILY
Qty: 180 TABLET | Refills: 2 | Status: SHIPPED | OUTPATIENT
Start: 2022-01-12

## 2022-01-12 RX ORDER — ALBUTEROL SULFATE 90 UG/1
2 AEROSOL, METERED RESPIRATORY (INHALATION) EVERY 4 HOURS PRN
Qty: 18 G | Refills: 5 | Status: SHIPPED | OUTPATIENT
Start: 2022-01-12

## 2022-01-12 RX ORDER — MONTELUKAST SODIUM 10 MG/1
10 TABLET ORAL DAILY
Qty: 90 TABLET | Refills: 3 | Status: SHIPPED | OUTPATIENT
Start: 2022-01-12

## 2022-01-12 RX ORDER — ATORVASTATIN CALCIUM 20 MG/1
20 TABLET, FILM COATED ORAL DAILY
Qty: 90 TABLET | Refills: 3 | Status: SHIPPED | OUTPATIENT
Start: 2022-01-12

## 2022-01-12 RX ORDER — TRIAMTERENE AND HYDROCHLOROTHIAZIDE 37.5; 25 MG/1; MG/1
1 CAPSULE ORAL DAILY
Qty: 90 CAPSULE | Refills: 3 | Status: SHIPPED | OUTPATIENT
Start: 2022-01-12

## 2022-01-12 NOTE — PROGRESS NOTES
Assessment/Plan:         Problem List Items Addressed This Visit        Respiratory    Moderate persistent asthma without complication       Well controlled on current therapy continue with current medications and will reassess next visit              Cardiovascular and Mediastinum    Primary hypertension     Ok today on meds cont present management follow up 4 mo            Other    Bipolar 1 disorder ([address] )    Annual physical exam     Check routine labs           Relevant Orders    CBC and differential    Comprehensive metabolic panel    Lipid panel    Urinalysis with microscopic    Dyslipidemia     On meds check labs         Class 3 severe obesity due to excess calories with serious comorbidity and body mass index (BMI) of 45 0 to 49 9 in adult [facility])      Other Visit Diagnoses     Encounter for screening for malignant neoplasm of breast, unspecified screening modality    -  Primary    Relevant Orders    Mammo screening bilateral w 3d & cad    Colon cancer screening        Relevant Orders    Ambulatory referral for colonoscopy    Immunization due        Relevant Orders    TDAP VACCINE GREATER THAN OR EQUAL TO 8YO IM            Subjective: pt here for annual physical and interval visit multiple medical problems     Patient ID: Judy Perez is a 59 y o  female  HPI    The following portions of the patient's history were reviewed and updated as appropriate:   Past Medical History:  She has a past medical history of Asthma, Chronic pain, and Hypertension  ,  _______________________________________________________________________  Medical Problems:  does not have any pertinent problems on file ,  _______________________________________________________________________  Past Surgical History:   has a past surgical history that includes Tubal ligation; Colonoscopy; and Back surgery  ,  _______________________________________________________________________  Family History:  family history includes Colon cancer in her father and mother; Diabetes in her mother; Hypertension in her mother and sister; Hypothyroidism in her maternal aunt; Lung cancer in her mother; Multiple sclerosis in her sister  ,  _______________________________________________________________________  Social History:   reports that she quit smoking about 34 years ago  Her smoking use included cigarettes  She has a 15 00 pack-year smoking history  She has never used smokeless tobacco  She reports that she does not drink alcohol and does not use drugs  ,  _______________________________________________________________________  Allergies:  is allergic to penicillins, amoxicillin, aspirin, ibuprofen, morphine, oxycodone, and valacyclovir     _______________________________________________________________________  Current Outpatient Medications   Medication Sig Dispense Refill    albuterol (PROVENTIL HFA,VENTOLIN HFA) 90 mcg/act inhaler INHALE 2 PUFFS BY MOUTH EVERY 4 HOURS AS NEEDED FOR SHORTNESS OF BREATH 18 g 5    atorvastatin (LIPITOR) 20 mg tablet TAKE 1 TABLET BY MOUTH EVERY DAY 30 tablet 6    fluticasone-salmeterol (Advair) 250-50 mcg/dose inhaler Inhale 1 puff 2 (two) times a day Rinse mouth after use  60 blister 6    loratadine (CLARITIN) 10 mg tablet TAKE 1 TABLET BY MOUTH EVERY DAY 30 tablet 6    metoprolol tartrate (LOPRESSOR) 50 mg tablet Take 1 tablet (50 mg total) by mouth 2 (two) times a day 180 tablet 2    montelukast (SINGULAIR) 10 mg tablet TAKE 1 TABLET BY MOUTH EVERY DAY 30 tablet 6    triamterene-hydrochlorothiazide (DYAZIDE) 37 5-25 mg per capsule TAKE 1 CAPSULE BY MOUTH EVERY DAY IN THE MORNING 30 capsule 0     No current facility-administered medications for this visit      _______________________________________________________________________  Review of Systems   Constitutional: Negative for appetite change, chills, fatigue and fever  Respiratory: Negative for cough, chest tightness and shortness of breath      Cardiovascular: Negative for chest pain, palpitations and leg swelling  Gastrointestinal: Negative for abdominal pain, constipation, diarrhea, nausea and vomiting  Genitourinary: Negative for difficulty urinating and frequency  Musculoskeletal: Negative for arthralgias, back pain and neck pain  Skin: Negative for rash  Neurological: Negative for dizziness, weakness, light-headedness, numbness and headaches  Hematological: Does not bruise/bleed easily  Psychiatric/Behavioral: Negative for dysphoric mood and sleep disturbance  The patient is not nervous/anxious  Objective:  Vitals:    01/12/22 1546   BP: 122/88   BP Location: Left arm   Patient Position: Sitting   Cuff Size: Standard   Pulse: 66   Resp: 16   Temp: (!) 97 1 °F (36 2 °C)   TempSrc: Temporal   SpO2: 98%   Weight: 122 kg (268 lb)   Height: 5' 4" (1 626 m)     Body mass index is 46 kg/m²  Physical Exam  Vitals reviewed  Constitutional:       General: She is not in acute distress  Appearance: Normal appearance  She is well-developed  She is obese  HENT:      Head: Normocephalic  Right Ear: Tympanic membrane, ear canal and external ear normal       Left Ear: Tympanic membrane, ear canal and external ear normal       Nose: Nose normal       Mouth/Throat:      Pharynx: No oropharyngeal exudate  Eyes:      General: Lids are normal       Extraocular Movements: Extraocular movements intact  Conjunctiva/sclera: Conjunctivae normal       Pupils: Pupils are equal, round, and reactive to light  Neck:      Thyroid: No thyromegaly  Vascular: No carotid bruit  Cardiovascular:      Rate and Rhythm: Normal rate and regular rhythm  Pulses: Normal pulses  Heart sounds: Normal heart sounds  No murmur heard  No friction rub  Pulmonary:      Effort: Pulmonary effort is normal  No respiratory distress  Breath sounds: Normal breath sounds  No stridor  No wheezing or rales     Chest:   Breasts: Breasts are symmetrical       Right: Normal  No swelling, bleeding, inverted nipple, mass, nipple discharge, skin change or tenderness  Left: Normal  No swelling, bleeding, inverted nipple, mass, nipple discharge, skin change or tenderness  Abdominal:      General: Bowel sounds are normal  There is no distension  Palpations: Abdomen is soft  There is no mass  Tenderness: There is no abdominal tenderness  There is no guarding  Hernia: No hernia is present  Musculoskeletal:         General: Normal range of motion  Cervical back: Full passive range of motion without pain, normal range of motion and neck supple  Lymphadenopathy:      Cervical: No cervical adenopathy  Skin:     General: Skin is warm and dry  Findings: No rash  Comments: Nl appearing moles   Neurological:      General: No focal deficit present  Mental Status: She is alert and oriented to person, place, and time  Mental status is at baseline  Cranial Nerves: No cranial nerve deficit  Sensory: No sensory deficit  Motor: No abnormal muscle tone  Coordination: Coordination normal       Gait: Gait normal       Deep Tendon Reflexes: Reflexes normal  Babinski sign absent on the right side  Psychiatric:         Mood and Affect: Mood normal          Speech: Speech normal          Behavior: Behavior normal          Thought Content: Thought content normal          Judgment: Judgment normal        BMI Counseling: Body mass index is 46 kg/m²  The BMI is above normal  Nutrition recommendations include 3-5 servings of fruits/vegetables daily, reducing fast food intake, consuming healthier snacks, decreasing soda and/or juice intake, moderation in carbohydrate intake and increasing intake of lean protein  Exercise recommendations include exercising 3-5 times per week and strength training exercises

## 2022-01-19 DIAGNOSIS — J45.40 MODERATE PERSISTENT ASTHMA WITHOUT COMPLICATION: Primary | ICD-10-CM

## 2022-01-19 RX ORDER — FLUTICASONE PROPIONATE 220 UG/1
1 AEROSOL, METERED RESPIRATORY (INHALATION) 2 TIMES DAILY
Qty: 12 G | Refills: 1 | Status: SHIPPED | OUTPATIENT
Start: 2022-01-19 | End: 2022-04-01

## 2022-01-19 NOTE — TELEPHONE ENCOUNTER
Patient says her plan will not cover the Wixela inhaler and wants to know if we could call in a replacement       [facility] [address]

## 2022-03-17 DIAGNOSIS — Z76.0 MEDICATION REFILL: ICD-10-CM

## 2022-03-17 RX ORDER — LORATADINE 10 MG/1
TABLET ORAL
Qty: 90 TABLET | Refills: 2 | Status: SHIPPED | OUTPATIENT
Start: 2022-03-17

## 2022-04-01 DIAGNOSIS — J45.40 MODERATE PERSISTENT ASTHMA WITHOUT COMPLICATION: ICD-10-CM

## 2022-04-01 RX ORDER — FLUTICASONE PROPIONATE 220 UG/1
AEROSOL, METERED RESPIRATORY (INHALATION)
Qty: 12 G | Refills: 1 | Status: SHIPPED | OUTPATIENT
Start: 2022-04-01 | End: 2022-06-22

## 2022-05-02 ENCOUNTER — VBI (OUTPATIENT)
Dept: ADMINISTRATIVE | Facility: OTHER | Age: 62
End: 2022-05-02

## 2022-05-05 ENCOUNTER — RA CDI HCC (OUTPATIENT)
Dept: OTHER | Facility: HOSPITAL | Age: 62
End: 2022-05-05

## 2022-05-05 NOTE — PROGRESS NOTES
[facility]  coding opportunities       Chart reviewed, no opportunity found: CHART REVIEWED, NO OPPORTUNITY FOUND        Patients Insurance        Commercial Insurance: Ninfa Crowder

## 2022-06-22 DIAGNOSIS — J45.40 MODERATE PERSISTENT ASTHMA WITHOUT COMPLICATION: ICD-10-CM

## 2022-06-22 RX ORDER — FLUTICASONE PROPIONATE 220 UG/1
AEROSOL, METERED RESPIRATORY (INHALATION)
Qty: 12 G | Refills: 6 | Status: SHIPPED | OUTPATIENT
Start: 2022-06-22

## 2022-09-01 ENCOUNTER — VBI (OUTPATIENT)
Dept: ADMINISTRATIVE | Facility: OTHER | Age: 62
End: 2022-09-01

## 2022-09-09 ENCOUNTER — TELEPHONE (OUTPATIENT)
Dept: PSYCHIATRY | Facility: CLINIC | Age: 62
End: 2022-09-09

## 2022-09-09 ENCOUNTER — TELEPHONE (OUTPATIENT)
Dept: FAMILY MEDICINE CLINIC | Facility: CLINIC | Age: 62
End: 2022-09-09

## 2022-09-09 NOTE — TELEPHONE ENCOUNTER
Pt called to be see as a new pt I did advise wait list and did add pt for talk therapy and med mgmt

## 2022-10-19 ENCOUNTER — TELEPHONE (OUTPATIENT)
Dept: FAMILY MEDICINE CLINIC | Facility: CLINIC | Age: 62
End: 2022-10-19

## 2022-10-19 NOTE — TELEPHONE ENCOUNTER
Only psychiatry gives meds  why is she no longer seeing ?and it will take at least 6 months to get another one  is she stable on this medication? What is her diagnosis?

## 2022-10-19 NOTE — TELEPHONE ENCOUNTER
There  is no evidence of this medication in her chart;   can check with her pharmacy ;is she having a bipolar event?   Should schedule visit with a

## 2022-10-19 NOTE — TELEPHONE ENCOUNTER
Patient is no longer seeing a therapist - she just stopped- no reason  Shes on it for Bipolar depression- been on this medication for a while without any complaints

## 2022-10-19 NOTE — TELEPHONE ENCOUNTER
Pt no longer is seeing her therapist  She is wondering if you can refill her medication until she get a new one         Lamotrigine 200mg

## 2022-11-11 DIAGNOSIS — Z76.0 MEDICATION REFILL: ICD-10-CM

## 2022-11-11 RX ORDER — ALBUTEROL SULFATE 90 UG/1
AEROSOL, METERED RESPIRATORY (INHALATION)
Qty: 18 G | Refills: 5 | Status: SHIPPED | OUTPATIENT
Start: 2022-11-11

## 2023-01-01 NOTE — ED PROVIDER NOTES
History  Chief Complaint   Patient presents with    Rash     Pt presents to ED from home after being dx w/ shingles here in ED two days ago  PT given morphine and valtrex, now pt has generalized rash  Pt believes rash is from one of those two meds  Pt (+) hx HTN, asthma  59 yr fermale with recent dx of shingles under left breast to back - placed on valtrex/ morphine-- presents with itchy rash  On bottuck/neck -- back-- no other new allergen contcts- no fever/systemci comps-- no mm invovlement        History provided by:  Patient   used: No    Rash       Prior to Admission Medications   Prescriptions Last Dose Informant Patient Reported? Taking? morphine (MSIR) 15 mg tablet   No No   Sig: Take 1 tablet by mouth every 6 (six) hours as needed for severe pain Max Daily Amount: 60 mg   valACYclovir (VALTREX) 1,000 mg tablet   No No   Sig: Take 1 tablet by mouth 3 (three) times a day for 7 days      Facility-Administered Medications: None       Past Medical History:   Diagnosis Date    Asthma     Chronic pain     Hypertension        History reviewed  No pertinent surgical history  History reviewed  No pertinent family history  I have reviewed and agree with the history as documented  Social History   Substance Use Topics    Smoking status: Former Smoker    Smokeless tobacco: Never Used    Alcohol use No        Review of Systems   Constitutional: Negative  HENT: Negative  Eyes: Negative  Respiratory: Negative  Cardiovascular: Negative  Gastrointestinal: Negative  Endocrine: Negative  Genitourinary: Negative  Musculoskeletal: Negative  Skin: Positive for rash  Negative for color change, pallor and wound  Allergic/Immunologic: Negative  Neurological: Negative  Hematological: Negative  Psychiatric/Behavioral: Negative          Physical Exam  ED Triage Vitals [10/15/17 1723]   Temperature Pulse Respirations Blood Pressure SpO2   97 8 °F (36 6 °C) 77 16 144/85 99 %      Temp Source Heart Rate Source Patient Position - Orthostatic VS BP Location FiO2 (%)   Oral Monitor Sitting Left arm --      Pain Score       No Pain           Physical Exam   Constitutional: She is oriented to person, place, and time  She appears well-developed and well-nourished  No distress  avss-- pulse ox 99 % on ra- intepretation is normal- no intervention    HENT:   Head: Normocephalic and atraumatic  Eyes: Conjunctivae and EOM are normal  Pupils are equal, round, and reactive to light  Right eye exhibits no discharge  Left eye exhibits no discharge  No scleral icterus  Mm pink   Neck: Normal range of motion  Neck supple  No JVD present  No tracheal deviation present  No thyromegaly present  Cardiovascular: Normal rate, regular rhythm, normal heart sounds and intact distal pulses  Exam reveals no gallop and no friction rub  No murmur heard  Pulmonary/Chest: Effort normal and breath sounds normal  No stridor  No respiratory distress  She has no wheezes  She has no rales  She exhibits no tenderness  Abdominal: Soft  Bowel sounds are normal  She exhibits no distension and no mass  There is no tenderness  There is no rebound and no guarding  No hernia  Musculoskeletal: Normal range of motion  She exhibits no edema, tenderness or deformity  Lymphadenopathy:     She has no cervical adenopathy  Neurological: She is alert and oriented to person, place, and time  No cranial nerve deficit or sensory deficit  She exhibits normal muscle tone  Coordination normal    Skin: Skin is warm  Capillary refill takes less than 2 seconds  Rash noted  She is not diaphoretic  There is erythema  No pallor  Pos patchy vesicualr rash under left breast to bck to midline-- pos areas of erythema to neck/ lower back/ buttock -- pos blanching- no plam/sole involvement   Psychiatric: She has a normal mood and affect   Her behavior is normal  Judgment and thought content normal    Nursing note and vitals reviewed  ED Medications  Medications - No data to display    Diagnostic Studies  Labs Reviewed - No data to display    No orders to display       Procedures  Procedures      Phone Contacts  ED Phone Contact    ED Course  ED Course                                MDM  CritCare Time    Disposition  Final diagnoses:   Non-dose-related adverse effect of medication, initial encounter     ED Disposition     ED Disposition Condition Comment    Discharge  Kayla Zamora discharge to home/self care  Condition at discharge: Good        Follow-up Information    None       Discharge Medication List as of 10/15/2017  8:02 PM      START taking these medications    Details   oxyCODONE (OXY-IR) 5 MG capsule Take 1 capsule by mouth every 6 (six) hours as needed for severe pain for up to 20 doses Max Daily Amount: 20 mg, Starting Sun 10/15/2017, Print         CONTINUE these medications which have NOT CHANGED    Details   morphine (MSIR) 15 mg tablet Take 1 tablet by mouth every 6 (six) hours as needed for severe pain Max Daily Amount: 60 mg, Starting Fri 10/13/2017, Print      valACYclovir (VALTREX) 1,000 mg tablet Take 1 tablet by mouth 3 (three) times a day for 7 days, Starting Fri 10/13/2017, Until Fri 10/20/2017, Print           No discharge procedures on file      ED Provider  Electronically Signed by       Kayla Yun MD  10/18/17 0003 (1) weak, irregular

## 2023-01-03 ENCOUNTER — HOSPITAL ENCOUNTER (EMERGENCY)
Facility: HOSPITAL | Age: 63
End: 2023-01-04
Attending: EMERGENCY MEDICINE

## 2023-01-03 DIAGNOSIS — R45.851 SUICIDAL IDEATIONS: Primary | ICD-10-CM

## 2023-01-03 LAB
ALBUMIN SERPL BCP-MCNC: 4.3 G/DL (ref 3.5–5)
ALP SERPL-CCNC: 74 U/L (ref 34–104)
ALT SERPL W P-5'-P-CCNC: 20 U/L (ref 7–52)
AMPHETAMINES SERPL QL SCN: NEGATIVE
ANION GAP SERPL CALCULATED.3IONS-SCNC: 10 MMOL/L (ref 4–13)
AST SERPL W P-5'-P-CCNC: 18 U/L (ref 13–39)
BACTERIA UR QL AUTO: ABNORMAL /HPF
BARBITURATES UR QL: NEGATIVE
BASOPHILS # BLD AUTO: 0.06 THOUSANDS/ÂΜL (ref 0–0.1)
BASOPHILS NFR BLD AUTO: 1 % (ref 0–1)
BENZODIAZ UR QL: NEGATIVE
BILIRUB SERPL-MCNC: 0.36 MG/DL (ref 0.2–1)
BILIRUB UR QL STRIP: NEGATIVE
BUN SERPL-MCNC: 25 MG/DL (ref 5–25)
CALCIUM SERPL-MCNC: 10.2 MG/DL (ref 8.4–10.2)
CAOX CRY URNS QL MICRO: ABNORMAL /HPF
CHLORIDE SERPL-SCNC: 105 MMOL/L (ref 96–108)
CLARITY UR: ABNORMAL
CO2 SERPL-SCNC: 24 MMOL/L (ref 21–32)
COCAINE UR QL: NEGATIVE
COLOR UR: YELLOW
CREAT SERPL-MCNC: 0.86 MG/DL (ref 0.6–1.3)
EOSINOPHIL # BLD AUTO: 0.18 THOUSAND/ÂΜL (ref 0–0.61)
EOSINOPHIL NFR BLD AUTO: 2 % (ref 0–6)
ERYTHROCYTE [DISTWIDTH] IN BLOOD BY AUTOMATED COUNT: 13.4 % (ref 11.6–15.1)
ETHANOL EXG-MCNC: 0 MG/DL
FLUAV RNA RESP QL NAA+PROBE: NEGATIVE
FLUBV RNA RESP QL NAA+PROBE: NEGATIVE
GFR SERPL CREATININE-BSD FRML MDRD: 71 ML/MIN/1.73SQ M
GLUCOSE SERPL-MCNC: 136 MG/DL (ref 65–140)
GLUCOSE UR STRIP-MCNC: NEGATIVE MG/DL
HCT VFR BLD AUTO: 44.9 % (ref 34.8–46.1)
HGB BLD-MCNC: 14.7 G/DL (ref 11.5–15.4)
HGB UR QL STRIP.AUTO: ABNORMAL
IMM GRANULOCYTES # BLD AUTO: 0.07 THOUSAND/UL (ref 0–0.2)
IMM GRANULOCYTES NFR BLD AUTO: 1 % (ref 0–2)
KETONES UR STRIP-MCNC: NEGATIVE MG/DL
LEUKOCYTE ESTERASE UR QL STRIP: NEGATIVE
LYMPHOCYTES # BLD AUTO: 2.97 THOUSANDS/ÂΜL (ref 0.6–4.47)
LYMPHOCYTES NFR BLD AUTO: 25 % (ref 14–44)
MCH RBC QN AUTO: 29.1 PG (ref 26.8–34.3)
MCHC RBC AUTO-ENTMCNC: 32.7 G/DL (ref 31.4–37.4)
MCV RBC AUTO: 89 FL (ref 82–98)
METHADONE UR QL: NEGATIVE
MONOCYTES # BLD AUTO: 0.89 THOUSAND/ÂΜL (ref 0.17–1.22)
MONOCYTES NFR BLD AUTO: 7 % (ref 4–12)
NEUTROPHILS # BLD AUTO: 7.95 THOUSANDS/ÂΜL (ref 1.85–7.62)
NEUTS SEG NFR BLD AUTO: 64 % (ref 43–75)
NITRITE UR QL STRIP: NEGATIVE
NON-SQ EPI CELLS URNS QL MICRO: ABNORMAL /HPF
NRBC BLD AUTO-RTO: 0 /100 WBCS
OPIATES UR QL SCN: NEGATIVE
OXYCODONE+OXYMORPHONE UR QL SCN: NEGATIVE
PCP UR QL: NEGATIVE
PH UR STRIP.AUTO: 5.5 [PH]
PLATELET # BLD AUTO: 298 THOUSANDS/UL (ref 149–390)
PMV BLD AUTO: 9.5 FL (ref 8.9–12.7)
POTASSIUM SERPL-SCNC: 3.5 MMOL/L (ref 3.5–5.3)
PROT SERPL-MCNC: 7.3 G/DL (ref 6.4–8.4)
PROT UR STRIP-MCNC: NEGATIVE MG/DL
RBC # BLD AUTO: 5.06 MILLION/UL (ref 3.81–5.12)
RBC #/AREA URNS AUTO: ABNORMAL /HPF
RSV RNA RESP QL NAA+PROBE: NEGATIVE
SARS-COV-2 RNA RESP QL NAA+PROBE: NEGATIVE
SODIUM SERPL-SCNC: 139 MMOL/L (ref 135–147)
SP GR UR STRIP.AUTO: >=1.03 (ref 1–1.03)
THC UR QL: NEGATIVE
TSH SERPL DL<=0.05 MIU/L-ACNC: 1.7 UIU/ML (ref 0.45–4.5)
UROBILINOGEN UR QL STRIP.AUTO: 0.2 E.U./DL
WBC # BLD AUTO: 12.12 THOUSAND/UL (ref 4.31–10.16)
WBC #/AREA URNS AUTO: ABNORMAL /HPF

## 2023-01-03 RX ORDER — ALBUTEROL SULFATE 90 UG/1
2 AEROSOL, METERED RESPIRATORY (INHALATION) EVERY 4 HOURS PRN
Status: DISCONTINUED | OUTPATIENT
Start: 2023-01-03 | End: 2023-01-04 | Stop reason: HOSPADM

## 2023-01-03 RX ORDER — METOPROLOL TARTRATE 50 MG/1
50 TABLET, FILM COATED ORAL EVERY 12 HOURS SCHEDULED
Status: DISCONTINUED | OUTPATIENT
Start: 2023-01-03 | End: 2023-01-04 | Stop reason: HOSPADM

## 2023-01-03 RX ORDER — LANOLIN ALCOHOL/MO/W.PET/CERES
6 CREAM (GRAM) TOPICAL
Status: DISCONTINUED | OUTPATIENT
Start: 2023-01-03 | End: 2023-01-04 | Stop reason: HOSPADM

## 2023-01-03 RX ORDER — DIPHENHYDRAMINE HCL 25 MG
50 TABLET ORAL ONCE
Status: COMPLETED | OUTPATIENT
Start: 2023-01-03 | End: 2023-01-03

## 2023-01-03 RX ORDER — FLUTICASONE PROPIONATE 110 UG/1
2 AEROSOL, METERED RESPIRATORY (INHALATION) EVERY 12 HOURS SCHEDULED
Status: DISCONTINUED | OUTPATIENT
Start: 2023-01-03 | End: 2023-01-04 | Stop reason: HOSPADM

## 2023-01-03 RX ORDER — LORATADINE 10 MG/1
10 TABLET ORAL DAILY
Status: DISCONTINUED | OUTPATIENT
Start: 2023-01-04 | End: 2023-01-04 | Stop reason: HOSPADM

## 2023-01-03 RX ORDER — TRIAMTERENE AND HYDROCHLOROTHIAZIDE 37.5; 25 MG/1; MG/1
1 TABLET ORAL DAILY
Status: DISCONTINUED | OUTPATIENT
Start: 2023-01-04 | End: 2023-01-04 | Stop reason: HOSPADM

## 2023-01-03 RX ORDER — ATORVASTATIN CALCIUM 20 MG/1
20 TABLET, FILM COATED ORAL
Status: DISCONTINUED | OUTPATIENT
Start: 2023-01-04 | End: 2023-01-04 | Stop reason: HOSPADM

## 2023-01-03 RX ADMIN — METOPROLOL TARTRATE 50 MG: 50 TABLET, FILM COATED ORAL at 21:12

## 2023-01-03 RX ADMIN — FLUTICASONE PROPIONATE 2 PUFF: 110 AEROSOL, METERED RESPIRATORY (INHALATION) at 21:12

## 2023-01-03 RX ADMIN — ALBUTEROL SULFATE 2 PUFF: 90 AEROSOL, METERED RESPIRATORY (INHALATION) at 21:27

## 2023-01-03 RX ADMIN — Medication 6 MG: at 22:42

## 2023-01-03 RX ADMIN — DIPHENHYDRAMINE HYDROCHLORIDE 50 MG: 25 TABLET ORAL at 23:42

## 2023-01-03 NOTE — ED PROVIDER NOTES
History  Chief Complaint   Patient presents with   • Psychiatric Evaluation     Pt presents via EMS stating "I don't want to be here anymore " Pt states she doesn't like her living situation, an increase in stressors at home and inability to access her psych medications  73-year-old female with history of bipolar disorder presents to the emergency department for a psychiatric evaluation  Per EMS the patient reportedly sent suicidal messages to her sister  On arrival the patient states that she does not want to be here anymore  Reports that she has had increased stress in her life that she believes is making her depression worse including a loved one with cancer  The patient has not been taking any of her psychiatric medications for a couple of years  The patient states that she does feel suicidal but does not have a plan  She states that she does not have any access to firearms  She denies HI and AVH  The patient would like to sign herself in for inpatient behavioral health treatment  She denies any other medical complaints at this time  Prior to Admission Medications   Prescriptions Last Dose Informant Patient Reported? Taking? Flovent  MCG/ACT inhaler   No Yes   Sig: INHALE 1 PUFF 2 TIMES A DAY RINSE MOUTH AFTER USE    albuterol (PROVENTIL HFA,VENTOLIN HFA) 90 mcg/act inhaler   No Yes   Sig: INHALE 2 PUFFS EVERY 4 HOURS AS NEEDED FOR SHORTNESS OF BREATH    atorvastatin (LIPITOR) 20 mg tablet 1/2/2023  No Yes   Sig: Take 1 tablet (20 mg total) by mouth daily   fluticasone-salmeterol (Advair) 250-50 mcg/dose inhaler Not Taking  No No   Sig: Inhale 1 puff 2 (two) times a day Rinse mouth after use     Patient not taking: Reported on 1/3/2023   loratadine (CLARITIN) 10 mg tablet   No Yes   Sig: TAKE 1 TABLET BY MOUTH EVERY DAY   metoprolol tartrate (LOPRESSOR) 50 mg tablet   No Yes   Sig: Take 1 tablet (50 mg total) by mouth 2 (two) times a day   montelukast (SINGULAIR) 10 mg tablet   No Yes   Sig: Take 1 tablet (10 mg total) by mouth daily   triamterene-hydrochlorothiazide (DYAZIDE) 37 5-25 mg per capsule   No Yes   Sig: Take 1 capsule by mouth daily      Facility-Administered Medications: None       Past Medical History:   Diagnosis Date   • Asthma    • Chronic pain    • Hypertension        Past Surgical History:   Procedure Laterality Date   • BACK SURGERY     • COLONOSCOPY     • TUBAL LIGATION         Family History   Problem Relation Age of Onset   • Diabetes Mother    • Hypertension Mother    • Lung cancer Mother    • Colon cancer Mother    • Colon cancer Father    • Hypertension Sister    • Multiple sclerosis Sister    • Hypothyroidism Maternal Aunt      I have reviewed and agree with the history as documented  E-Cigarette/Vaping   • E-Cigarette Use Never User      E-Cigarette/Vaping Substances   • Nicotine No    • THC No    • CBD No    • Flavoring No      Social History     Tobacco Use   • Smoking status: Former     Packs/day:  00     Years: 15 00     Pack years: 15 00     Types: Cigarettes     Quit date: 80     Years since quittin 0   • Smokeless tobacco: Never   Vaping Use   • Vaping Use: Never used   Substance Use Topics   • Alcohol use: No     Comment: hX:Occas  • Drug use: No       Review of Systems   Constitutional: Negative for chills and fever  HENT: Negative for ear pain and sore throat  Eyes: Negative for pain and visual disturbance  Respiratory: Negative for cough and shortness of breath  Cardiovascular: Negative for chest pain and palpitations  Gastrointestinal: Negative for abdominal pain and vomiting  Genitourinary: Negative for dysuria and hematuria  Musculoskeletal: Negative for arthralgias and back pain  Skin: Negative for color change and rash  Neurological: Negative for seizures and syncope  Psychiatric/Behavioral: Positive for dysphoric mood and suicidal ideas  All other systems reviewed and are negative        Physical Exam  Physical Exam  Vitals and nursing note reviewed  Constitutional:       General: She is not in acute distress  Appearance: She is well-developed  She is obese  HENT:      Head: Normocephalic and atraumatic  Eyes:      Conjunctiva/sclera: Conjunctivae normal    Cardiovascular:      Rate and Rhythm: Normal rate and regular rhythm  Heart sounds: No murmur heard  Pulmonary:      Effort: Pulmonary effort is normal  No respiratory distress  Breath sounds: Normal breath sounds  Abdominal:      Palpations: Abdomen is soft  Tenderness: There is no abdominal tenderness  Musculoskeletal:         General: No swelling  Cervical back: Neck supple  Skin:     General: Skin is warm and dry  Capillary Refill: Capillary refill takes less than 2 seconds  Neurological:      Mental Status: She is alert  Psychiatric:         Attention and Perception: She does not perceive auditory or visual hallucinations  Mood and Affect: Mood normal          Thought Content: Thought content includes suicidal ideation  Thought content does not include homicidal ideation  Thought content does not include homicidal or suicidal plan           Vital Signs  ED Triage Vitals   Temperature Pulse Respirations Blood Pressure SpO2   01/03/23 1735 01/03/23 1738 01/03/23 1735 01/03/23 1740 01/03/23 1735   99 2 °F (37 3 °C) (!) 115 22 165/99 100 %      Temp Source Heart Rate Source Patient Position - Orthostatic VS BP Location FiO2 (%)   01/03/23 1735 01/03/23 1735 01/04/23 0806 01/04/23 0806 --   Oral Monitor Sitting Right arm       Pain Score       --                  Vitals:    01/03/23 1738 01/03/23 1740 01/03/23 2112 01/04/23 0806   BP:  165/99 138/68 133/79   Pulse: (!) 115  88 70   Patient Position - Orthostatic VS:    Sitting         Visual Acuity      ED Medications  Medications   diphenhydrAMINE (BENADRYL) tablet 50 mg (50 mg Oral Given 1/3/23 1177)       Diagnostic Studies  Results Reviewed     Procedure Component Value Units Date/Time    Rapid drug screen, urine [[id number]]  (Normal) Collected: 01/03/23 1859    Lab Status: Final result Specimen: Urine, Clean Catch Updated: 01/03/23 1923     Amph/Meth UR Negative     Barbiturate Ur Negative     Benzodiazepine Urine Negative     Cocaine Urine Negative     Methadone Urine Negative     Opiate Urine Negative     PCP Ur Negative     THC Urine Negative     Oxycodone Urine Negative    Narrative:      FOR MEDICAL PURPOSES ONLY  IF CONFIRMATION NEEDED PLEASE CONTACT THE LAB WITHIN 5 DAYS      Drug Screen Cutoff Levels:  AMPHETAMINE/METHAMPHETAMINES  1000 ng/mL  BARBITURATES     200 ng/mL  BENZODIAZEPINES     200 ng/mL  COCAINE      300 ng/mL  METHADONE      300 ng/mL  OPIATES      300 ng/mL  PHENCYCLIDINE     25 ng/mL  THC       50 ng/mL  OXYCODONE      100 ng/mL    Urine Microscopic [[id number]]  (Abnormal) Collected: 01/03/23 1859    Lab Status: Final result Specimen: Urine, Clean Catch Updated: 01/03/23 1916     RBC, UA None Seen /hpf      WBC, UA None Seen /hpf      Epithelial Cells Occasional /hpf      Bacteria, UA Occasional /hpf      Ca Oxalate Joanne, UA Moderate /hpf     UA w Reflex to Microscopic w Reflex to Culture [[id number]]  (Abnormal) Collected: 01/03/23 1859    Lab Status: Final result Specimen: Urine, Clean Catch Updated: 01/03/23 1905     Color, UA Yellow     Clarity, UA Slightly Cloudy     Specific Gravity, UA >=1 030     pH, UA 5 5     Leukocytes, UA Negative     Nitrite, UA Negative     Protein, UA Negative mg/dl      Glucose, UA Negative mg/dl      Ketones, UA Negative mg/dl      Urobilinogen, UA 0 2 E U /dl      Bilirubin, UA Negative     Occult Blood, UA Trace-Intact    FLU/RSV/COVID - if FLU/RSV clinically relevant [[id number]]  (Normal) Collected: 01/03/23 1748    Lab Status: Final result Specimen: Nares from Nose Updated: 01/03/23 1830     SARS-CoV-2 Negative     INFLUENZA A PCR Negative     INFLUENZA B PCR Negative     RSV PCR Negative    Narrative:      FOR PEDIATRIC PATIENTS - copy/paste COVID Guidelines URL to browser: [email or web address]    SARS-CoV-2 assay is a Nucleic Acid Amplification assay intended for the  qualitative detection of nucleic acid from SARS-CoV-2 in nasopharyngeal  swabs  Results are for the presumptive identification of SARS-CoV-2 RNA  Positive results are indicative of infection with SARS-CoV-2, the virus  causing COVID-19, but do not rule out bacterial infection or co-infection  with other viruses  Laboratories within the [place] and its  territories are required to report all positive results to the appropriate  public health authorities  Negative results do not preclude SARS-CoV-2  infection and should not be used as the sole basis for treatment or other  patient management decisions  Negative results must be combined with  clinical observations, patient history, and epidemiological information  This test has not been FDA cleared or approved  This test has been authorized by FDA under an Emergency Use Authorization  (EUA)  This test is only authorized for the duration of time the  declaration that circumstances exist justifying the authorization of the  emergency use of an in vitro diagnostic tests for detection of SARS-CoV-2  virus and/or diagnosis of COVID-19 infection under section 564(b)(1) of  the Act, 21 U  S C  567MNR-3(V)(8), unless the authorization is terminated  or revoked sooner  The test has been validated but independent review by FDA  and CLIA is pending  Test performed using [employer] GeneXpert: This RT-PCR assay targets N2,  a region unique to SARS-CoV-2  A conserved region in the E-gene was chosen  for pan-Sarbecovirus detection which includes SARS-CoV-2  According to CMS-2020-01-R, this platform meets the definition of high-throughput technology      TSH, 3rd generation with Free T4 reflex [[id number]]  (Normal) Collected: 01/03/23 6714    Lab Status: Final result Specimen: Blood from Arm, Left Updated: 01/03/23 1828     TSH 3RD GENERATON 1 704 uIU/mL     Narrative:      Patients undergoing fluorescein dye angiography may retain small amounts of fluorescein in the body for 48-72 hours post procedure  Samples containing fluorescein can produce falsely depressed TSH values  If the patient had this procedure,a specimen should be resubmitted post fluorescein clearance        Comprehensive metabolic panel [[id number]] Collected: 01/03/23 1749    Lab Status: Final result Specimen: Blood from Arm, Left Updated: 01/03/23 1815     Sodium 139 mmol/L      Potassium 3 5 mmol/L      Chloride 105 mmol/L      CO2 24 mmol/L      ANION GAP 10 mmol/L      BUN 25 mg/dL      Creatinine 0 86 mg/dL      Glucose 136 mg/dL      Calcium 10 2 mg/dL      AST 18 U/L      ALT 20 U/L      Alkaline Phosphatase 74 U/L      Total Protein 7 3 g/dL      Albumin 4 3 g/dL      Total Bilirubin 0 36 mg/dL      eGFR 71 ml/min/1 73sq m     Narrative:      Josie guidelines for Chronic Kidney Disease (CKD):   •  Stage 1 with normal or high GFR (GFR > 90 mL/min/1 73 square meters)  •  Stage 2 Mild CKD (GFR = 60-89 mL/min/1 73 square meters)  •  Stage 3A Moderate CKD (GFR = 45-59 mL/min/1 73 square meters)  •  Stage 3B Moderate CKD (GFR = 30-44 mL/min/1 73 square meters)  •  Stage 4 Severe CKD (GFR = 15-29 mL/min/1 73 square meters)  •  Stage 5 End Stage CKD (GFR <15 mL/min/1 73 square meters)  Note: GFR calculation is accurate only with a steady state creatinine    CBC and differential [[id number]]  (Abnormal) Collected: 01/03/23 1749    Lab Status: Final result Specimen: Blood from Arm, Left Updated: 01/03/23 1754     WBC 12 12 Thousand/uL      RBC 5 06 Million/uL      Hemoglobin 14 7 g/dL      Hematocrit 44 9 %      MCV 89 fL      MCH 29 1 pg      MCHC 32 7 g/dL      RDW 13 4 %      MPV 9 5 fL      Platelets 236 Thousands/uL      nRBC 0 /100 WBCs      Neutrophils Relative 64 % Immat GRANS % 1 %      Lymphocytes Relative 25 %      Monocytes Relative 7 %      Eosinophils Relative 2 %      Basophils Relative 1 %      Neutrophils Absolute 7 95 Thousands/µL      Immature Grans Absolute 0 07 Thousand/uL      Lymphocytes Absolute 2 97 Thousands/µL      Monocytes Absolute 0 89 Thousand/µL      Eosinophils Absolute 0 18 Thousand/µL      Basophils Absolute 0 06 Thousands/µL     POCT alcohol breath test [[id number]]  (Normal) Resulted: 01/03/23 1744    Lab Status: Final result Updated: 01/03/23 1744     EXTBreath Alcohol 0 000                 No orders to display              Procedures  ECG 12 Lead Documentation Only    Date/Time: 1/3/2023 6:00 PM  Performed by: Reina Lamb MD  Authorized by: Reina Lamb MD     ECG reviewed by me, the ED Provider: yes    Patient location:  ED  Previous ECG:     Previous ECG:  Unavailable    Comparison to cardiac monitor: Yes    Interpretation:     Interpretation: normal    Rate:     ECG rate assessment: normal    Rhythm:     Rhythm: sinus rhythm    Ectopy:     Ectopy: none    QRS:     QRS axis:  Normal  Conduction:     Conduction: abnormal    ST segments:     ST segments:  Normal  T waves:     T waves: normal               ED Course  ED Course as of 01/04/23 1000 [facility] Jan 03, 2023   1900 Additional history provided by the patient's daughter Orlando Byers ([phone]) who reports that her mom lives with her and has been expressing suicidal ideation  States that she sent a message to the patient's sister expressing the same  States that she has also had erratic behavior and believes that she will harm herself if she is discharged  She will fill out a 302 if necessary     2801 The patient is medically cleared for crisis evaluation   2030 201 signed                               SBIRT 20yo+    Flowsheet Row Most Recent Value   SBIRT (23 yo +)    In order to provide better care to our patients, we are screening all of our patients for alcohol and drug use  Would it be okay to ask you these screening questions? No Filed at: 01/03/2023 1741                    Medical Decision Making  49-year-old female presented to the emergency department for a psychiatric evaluation  Patient was medically cleared and met criteria for inpatient behavioral health treatment  Patient voluntarily signed a 201 form  She was signed out to a different provider while she was pending placement  Home medications were ordered  Suicidal ideations: acute illness or injury  Amount and/or Complexity of Data Reviewed  Labs: ordered  ECG/medicine tests: ordered and independent interpretation performed  Risk  OTC drugs  Decision regarding hospitalization  Disposition  Final diagnoses:   Suicidal ideations     Time reflects when diagnosis was documented in both MDM as applicable and the Disposition within this note     Time User Action Codes Description Comment    1/3/2023  7:19 PM Damaris Rivera Add [Z86 [id number]] Suicidal ideations       ED Disposition     ED Disposition   Transfer to [address] [place] [address]   --    Date/Time   Tue Dale 3, 2023  7:19 PM    Comment   Judith Villar should be transferred out and has been medically cleared             MD Documentation    Grace Graham Most Recent Value   Patient Condition The patient has been stabilized such that within reasonable medical probability, no material deterioration of the patient condition or the condition of the unborn child(patricia) is likely to result from the transfer   Reason for Transfer Level of Care needed not available at this facility   Benefits of Transfer Specialized equipment and/or services available at the receiving facility (Include comment)________________________  [Psyc]   Risks of Transfer Potential for delay in receiving treatment, Potential deterioration of medical condition, Increased discomfort during transfer, Possible worsening of condition or death during transfer Accepting Physician Ning Stokes [address] Name, Ruben [address]    (Name & Tel number) [facility]   Sending MD [facility]   Provider Certification General risk, such as traffic hazards, adverse weather conditions, rough terrain or turbulence, possible failure of equipment (including vehicle or aircraft), or consequences of actions of persons outside the control of the transport personnel, Unanticipated needs of medical equipment and personnel during transport, Risk of worsening condition, The possibility of a transport vehicle being unavailable      RN Documentation    [address] Ning Gunderson Name, [facility] (Name & Tel number) [facility]      Follow-up Information    None         Discharge Medication List as of 1/4/2023 11:31 AM      CONTINUE these medications which have NOT CHANGED    Details   albuterol (PROVENTIL HFA,VENTOLIN HFA) 90 mcg/act inhaler INHALE 2 PUFFS EVERY 4 HOURS AS NEEDED FOR SHORTNESS OF BREATH , Normal      atorvastatin (LIPITOR) 20 mg tablet Take 1 tablet (20 mg total) by mouth daily, Starting Wed 1/12/2022, Normal      Flovent  MCG/ACT inhaler INHALE 1 PUFF 2 TIMES A DAY RINSE MOUTH AFTER USE , Normal      loratadine (CLARITIN) 10 mg tablet TAKE 1 TABLET BY MOUTH EVERY DAY, Normal      metoprolol tartrate (LOPRESSOR) 50 mg tablet Take 1 tablet (50 mg total) by mouth 2 (two) times a day, Starting Wed 1/12/2022, Normal      montelukast (SINGULAIR) 10 mg tablet Take 1 tablet (10 mg total) by mouth daily, Starting Wed 1/12/2022, Normal      triamterene-hydrochlorothiazide (DYAZIDE) 37 5-25 mg per capsule Take 1 capsule by mouth daily, Starting Wed 1/12/2022, Normal      fluticasone-salmeterol (Advair) 250-50 mcg/dose inhaler Inhale 1 puff 2 (two) times a day Rinse mouth after use , Starting Wed 1/12/2022, Normal             No discharge procedures on file      PDMP Review     None          ED Provider  Electronically Signed by           Tristan Dakin, MD  01/04/23 9477

## 2023-01-04 VITALS
TEMPERATURE: 99.2 F | OXYGEN SATURATION: 98 % | RESPIRATION RATE: 20 BRPM | SYSTOLIC BLOOD PRESSURE: 133 MMHG | DIASTOLIC BLOOD PRESSURE: 79 MMHG | HEART RATE: 70 BPM

## 2023-01-04 RX ORDER — MONTELUKAST SODIUM 10 MG/1
10 TABLET ORAL
Status: DISCONTINUED | OUTPATIENT
Start: 2023-01-04 | End: 2023-01-04 | Stop reason: HOSPADM

## 2023-01-04 RX ADMIN — LORATADINE 10 MG: 10 TABLET ORAL at 08:15

## 2023-01-04 RX ADMIN — MONTELUKAST 10 MG: 10 TABLET, FILM COATED ORAL at 09:34

## 2023-01-04 RX ADMIN — METOPROLOL TARTRATE 50 MG: 50 TABLET, FILM COATED ORAL at 08:15

## 2023-01-04 RX ADMIN — ALBUTEROL SULFATE 2 PUFF: 90 AEROSOL, METERED RESPIRATORY (INHALATION) at 08:14

## 2023-01-04 RX ADMIN — ATORVASTATIN CALCIUM 20 MG: 20 TABLET, FILM COATED ORAL at 09:34

## 2023-01-04 RX ADMIN — FLUTICASONE PROPIONATE 2 PUFF: 110 AEROSOL, METERED RESPIRATORY (INHALATION) at 08:14

## 2023-01-04 RX ADMIN — TRIAMTERENE AND HYDROCHLOROTHIAZIDE 1 TABLET: 37.5; 25 TABLET ORAL at 08:16

## 2023-01-04 NOTE — ED NOTES
Crisis updated pt on acceptance at [facility] and that transport would be in the morning  Pt in agreement

## 2023-01-04 NOTE — ED NOTES
Pt transported to [facility]  Changed into paper scrubs  Belongings bagged and given to CTS        Rasheeda Walker RN  01/04/23 4332

## 2023-01-04 NOTE — ED NOTES
Crisis intervention specialist (CIS) met with pt  CIS introduced self and explained role  Patient was alert, dysphoric, tearful, though cooperative  Update given - explained transfer to [facility] and the types of tx she will receive  Patient voiced support with transfer  Patient provided with [facility] behavioral health programming brochure  Patient provided CIS with a copy of [employer] card; copied and handed back to the patient  Patient stated this policy is through her ex  and is unsure if it covers mental health  Addressed and answered all questions  Patient signed EMTALA  CIS to complete insurance pre-cert and if necessary, COB

## 2023-01-04 NOTE — ED NOTES
Chart reviewed  CTS 11:00 PU to [facility]  CIS will complete pre cert and update facility

## 2023-01-04 NOTE — ED NOTES
[facility] contacted [facility] admissions, provided [place] with pre-cert/COB and also ETA       RN report is necessary prior to patient transfer to [phone]    Notified  Intake of external transfer

## 2023-01-04 NOTE — ED CARE HANDOFF
Emergency Department Sign Out Note        Sign out and transfer of care from Apoorva Lorenzo  See Separate Emergency Department note  The patient, Chris Sommers, was evaluated by the previous provider for psychiatric evaluation  Workup Completed:    Labs, crisis evaluation    ED Course / Workup Pending (followup): Case endorsed to me by Dr Apoorva Lorenzo pending disposition  No acute issues overnight  Patient resting comfortably  She has been accepted at an outside facility for transport at 11 AM today  Procedures  MDM        Disposition  Final diagnoses:   Suicidal ideations     Time reflects when diagnosis was documented in both MDM as applicable and the Disposition within this note     Time User Action Codes Description Comment    1/3/2023  7:19 PM Tom Fraga Add [Y33 767] Suicidal ideations       ED Disposition     ED Disposition   Transfer to [address] [place] [address]   --    Date/Time   Tue Dale 3, 2023  7:19 PM    Comment   Chris Sommers should be transferred out and has been medically cleared  MD Documentation    Tomy Branham Most Recent Value   Sending MD Dr Celine Richard    None       Patient's Medications   Discharge Prescriptions    No medications on file     No discharge procedures on file         ED Provider  Electronically Signed by     Yodit Tran MD  01/04/23 3682

## 2023-01-04 NOTE — EMTALA/ACUTE CARE TRANSFER
[facility] EMERGENCY DEPARTMENT  [address] [facility] [address]  Dept: [phone]      EMTALA TRANSFER CONSENT    NAME Michelle Mena                                         1960                              MRN [id number]    I have been informed of my rights regarding examination, treatment, and transfer   by Dr Ga Armstrong DO    Benefits: Specialized equipment and/or services available at the receiving facility (Include comment)________________________ (Psyc)    Risks: Potential for delay in receiving treatment, Potential deterioration of medical condition, Increased discomfort during transfer, Possible worsening of condition or death during transfer      Consent for Transfer:  I acknowledge that my medical condition has been evaluated and explained to me by the emergency department physician or other qualified medical person and/or my attending physician, who has recommended that I be transferred to the service of  Accepting Physician: Ivy Members at [address] Name, [address] : Mata Sheets  The above potential benefits of such transfer, the potential risks associated with such transfer, and the probable risks of not being transferred have been explained to me, and I fully understand them  The doctor has explained that, in my case, the benefits of transfer outweigh the risks  I agree to be transferred  I authorize the performance of emergency medical procedures and treatments upon me in both transit and upon arrival at the receiving facility  Additionally, I authorize the release of any and all medical records to the receiving facility and request they be transported with me, if possible  I understand that the safest mode of transportation during a medical emergency is an ambulance and that the Hospital advocates the use of this mode of transport   Risks of traveling to the receiving facility by car, including absence of medical control, life sustaining equipment, such as oxygen, and medical personnel has been explained to me and I fully understand them  (JEET CORRECT BOX BELOW)  [  ]  I consent to the stated transfer and to be transported by ambulance/helicopter  [  ]  I consent to the stated transfer, but refuse transportation by ambulance and accept full responsibility for my transportation by car  I understand the risks of non-ambulance transfers and I exonerate the Hospital and its staff from any deterioration in my condition that results from this refusal     X___________________________________________    DATE  23  TIME________  Signature of patient or legally responsible individual signing on patient behalf           RELATIONSHIP TO PATIENT_________________________          Provider Certification    NAME Sadiq Sanders                                         1960                              MRN [id number]    A medical screening exam was performed on the above named patient  Based on the examination:    Condition Necessitating Transfer The encounter diagnosis was Suicidal ideations      Patient Condition: The patient has been stabilized such that within reasonable medical probability, no material deterioration of the patient condition or the condition of the unborn child(patricia) is likely to result from the transfer    Reason for Transfer: Level of Care needed not available at this facility    Transfer Requirements: [address]   · Space available and qualified personnel available for treatment as acknowledged by [facility]  · Agreed to accept transfer and to provide appropriate medical treatment as acknowledged by       [facility]  · Appropriate medical records of the examination and treatment of the patient are provided at the time of transfer   [address] _______  · Transfer will be performed by qualified personnel from    and appropriate transfer equipment as required, including the use of necessary and appropriate life support measures  Provider Certification: I have examined the patient and explained the following risks and benefits of being transferred/refusing transfer to the patient/family:  General risk, such as traffic hazards, adverse weather conditions, rough terrain or turbulence, possible failure of equipment (including vehicle or aircraft), or consequences of actions of persons outside the control of the transport personnel, Unanticipated needs of medical equipment and personnel during transport, Risk of worsening condition, The possibility of a transport vehicle being unavailable      Based on these reasonable risks and benefits to the patient and/or the unborn child(patricia), and based upon the information available at the time of the patient’s examination, I certify that the medical benefits reasonably to be expected from the provision of appropriate medical treatments at another medical facility outweigh the increasing risks, if any, to the individual’s medical condition, and in the case of labor to the unborn child, from effecting the transfer      X____________________________________________ DATE 01/04/23        TIME_______      ORIGINAL - SEND TO MEDICAL RECORDS   COPY - SEND WITH PATIENT DURING TRANSFER

## 2023-01-04 NOTE — ED NOTES
Insurance Authorization for admission:   Phone call placed to [address] [place] [address]  Phone number: [phone]  Spoke to [address]      3 days approved (1/4/23 to 1/6/23)  Level of care: [facility]  Review on 1/6/23  Authorization # [id number] (Also the reference number)  Case assigned to Judson Yaritza  She can be reached at [phone] for continue review  EVS (Eligibility Verification System) called - [phone]  Automated system indicates: Eligible with MALA Serrano, managed by [facility]  COB completed by Adelso Solis with [facility] [phone]  Anna Marie Marx requesting a call upon admission and also discharge  -    Insurance Authorization for Transportation:    Not necessary with CTS

## 2023-01-04 NOTE — ED NOTES
Patient is accepted at [facility]  Patient is accepted by Dr Chelsi Lindo per [address]  [facility]#[id number]  UR: Rosalinda James  Phone number: [phone]    Transportation is arranged with TBD  Transportation is scheduled for TBD  Patient may go to the floor tomorrow 1/4/23 after 10:30am

## 2023-01-04 NOTE — ED NOTES
Crisis met with Pt who was extremely tearful and irritable  She presented in ED stating she doesn't "want to be here anymore "  Pt stated that she lives with her daughter and daughters partner who she states is not a nice person  Pt states her daughter does drugs and does not get along with pt  Pt states she has no one and feels all alone  She stated that it has been extremely stressful in the home  Pt states that for 4 years she has been trying to get a home so she can move out but no one is calling her  Pt states that she feels miserable and is diagnosed with bipolar but has not been on meds for months  Pt states she has increased depression and anxiety  She also stated that she has been on waiting lists for outpatient therapy and a psychiatrist for months  She also stated that [facility], but they will not take her back  Pt denies HI  Pt denies SI but nurse states Pt stated she wanted to Die  In addition, per Dr Charlie Omalley, pt's daughter spoke to Dr Royce Valencia that she was concerned and would 302 if Pt did not sign herself in due to Pt writing texts to kelly and other daughter that she wanted to commit suicide  Pt's daughter is Christopher [phone]  Pt  states that she is aggravated with her life  Pt states she feels hopeless and helpless  Pt does not have transportation  Pt is overweight and states that she only eats more and more when stressed  Pt was oriented X4, affect flat and tearful, speech soft, mood anxious and depressed  Pt denies hallucinations, judgement fair, insight fair, impulse control fair  Pt would like to sign a 201  Pt states that because she doesn't have transportation, partial program would be too far for her to get to  Reviewed 201 and rights with Pt  Pt & Dr  In agreement

## 2023-01-04 NOTE — ED NOTES
Bed Search:  Leoncio Martinez with Andreia Workman from answering service  Madelaine Cabrera answer when transferred to admissions  [facility] Leonel Sim no beds  Rigoberto jolly will review (201 & chart faxed)

## 2023-01-04 NOTE — ED NOTES
Patient sleeping soundly on stretcher, respirations even and unlabored, no obvious distress       Negra Lieberman RN  01/04/23 4818

## 2023-01-05 LAB
ATRIAL RATE: 108 BPM
P AXIS: 23 DEGREES
PR INTERVAL: 196 MS
QRS AXIS: -23 DEGREES
QRSD INTERVAL: 82 MS
QT INTERVAL: 336 MS
QTC INTERVAL: 450 MS
T WAVE AXIS: -21 DEGREES
VENTRICULAR RATE: 108 BPM

## 2023-01-16 ENCOUNTER — RA CDI HCC (OUTPATIENT)
Dept: OTHER | Facility: HOSPITAL | Age: 63
End: 2023-01-16

## 2023-01-16 NOTE — PROGRESS NOTES
[facility]  coding opportunities          Chart Reviewed number of suggestions sent to Provider: 3     Patients Insurance        Commercial Insurance: Ninfa [id number]     [id number]  [id number]  [id number]

## 2023-01-18 ENCOUNTER — HOSPITAL ENCOUNTER (EMERGENCY)
Facility: HOSPITAL | Age: 63
Discharge: HOME | End: 2023-01-18
Attending: EMERGENCY MEDICINE

## 2023-01-18 VITALS
SYSTOLIC BLOOD PRESSURE: 142 MMHG | OXYGEN SATURATION: 95 % | DIASTOLIC BLOOD PRESSURE: 88 MMHG | HEART RATE: 99 BPM | TEMPERATURE: 98.1 F | RESPIRATION RATE: 20 BRPM

## 2023-01-18 DIAGNOSIS — Z13.9 ENCOUNTER FOR MEDICAL SCREENING EXAMINATION: Primary | ICD-10-CM

## 2023-01-18 DIAGNOSIS — J45.909 ASTHMA DUE TO SEASONAL ALLERGIES: ICD-10-CM

## 2023-01-18 RX ORDER — HYDROCHLOROTHIAZIDE 25 MG/1
25 TABLET ORAL DAILY
Status: DISCONTINUED | OUTPATIENT
Start: 2023-01-18 | End: 2023-01-19 | Stop reason: HOSPADM

## 2023-01-18 RX ORDER — MONTELUKAST SODIUM 10 MG/1
TABLET ORAL
Qty: 90 TABLET | Refills: 3 | Status: SHIPPED | OUTPATIENT
Start: 2023-01-18

## 2023-01-18 RX ORDER — HYDROCHLOROTHIAZIDE 25 MG/1
25 TABLET ORAL DAILY
Status: DISCONTINUED | OUTPATIENT
Start: 2023-01-19 | End: 2023-01-18

## 2023-01-18 RX ORDER — ALBUTEROL SULFATE 90 UG/1
2 AEROSOL, METERED RESPIRATORY (INHALATION) ONCE
Status: COMPLETED | OUTPATIENT
Start: 2023-01-18 | End: 2023-01-18

## 2023-01-18 RX ORDER — METOPROLOL TARTRATE 50 MG/1
50 TABLET, FILM COATED ORAL ONCE
Status: COMPLETED | OUTPATIENT
Start: 2023-01-18 | End: 2023-01-18

## 2023-01-18 RX ADMIN — METOPROLOL TARTRATE 50 MG: 50 TABLET, FILM COATED ORAL at 23:21

## 2023-01-18 RX ADMIN — ALBUTEROL SULFATE 2 PUFF: 90 AEROSOL, METERED RESPIRATORY (INHALATION) at 23:22

## 2023-01-18 RX ADMIN — HYDROCHLOROTHIAZIDE 25 MG: 25 TABLET ORAL at 23:22

## 2023-01-19 NOTE — ED PROVIDER NOTES
History  Chief Complaint   Patient presents with   • Medical Problem     Pt was in a behavioral health facility and told to go to the homeless shelter  Pt left because she was not getting any of her medications  Pt is here to get her medication refilled      Patient is a 13-year-old female presenting to the emergency department requesting medications  Patient states she was unable to get [facility] today but has not taken any of her nighttime medications  She denies any headache, dizziness, tinnitus, vision change, nausea, vomiting, diarrhea, constipation, chest pain, shortness of breath, abdominal pain  Patient is just requesting a dose of her medications  Prior to Admission Medications   Prescriptions Last Dose Informant Patient Reported? Taking? Flovent  MCG/ACT inhaler   No No   Sig: INHALE 1 PUFF 2 TIMES A DAY RINSE MOUTH AFTER USE    albuterol (PROVENTIL HFA,VENTOLIN HFA) 90 mcg/act inhaler   No No   Sig: INHALE 2 PUFFS EVERY 4 HOURS AS NEEDED FOR SHORTNESS OF BREATH    atorvastatin (LIPITOR) 20 mg tablet   No No   Sig: Take 1 tablet (20 mg total) by mouth daily   fluticasone-salmeterol (Advair) 250-50 mcg/dose inhaler   No No   Sig: Inhale 1 puff 2 (two) times a day Rinse mouth after use     Patient not taking: Reported on 1/3/2023   loratadine (CLARITIN) 10 mg tablet   No No   Sig: TAKE 1 TABLET BY MOUTH EVERY DAY   metoprolol tartrate (LOPRESSOR) 50 mg tablet   No No   Sig: Take 1 tablet (50 mg total) by mouth 2 (two) times a day   montelukast (SINGULAIR) 10 mg tablet   No No   Sig: TAKE 1 TABLET BY MOUTH EVERY DAY   triamterene-hydrochlorothiazide (DYAZIDE) 37 5-25 mg per capsule   No No   Sig: Take 1 capsule by mouth daily      Facility-Administered Medications: None       Past Medical History:   Diagnosis Date   • Asthma    • Chronic pain    • Hypertension        Past Surgical History:   Procedure Laterality Date   • BACK SURGERY     • COLONOSCOPY     • TUBAL LIGATION         Family History Problem Relation Age of Onset   • Diabetes Mother    • Hypertension Mother    • Lung cancer Mother    • Colon cancer Mother    • Colon cancer Father    • Hypertension Sister    • Multiple sclerosis Sister    • Hypothyroidism Maternal Aunt      I have reviewed and agree with the history as documented  E-Cigarette/Vaping   • E-Cigarette Use Never User      E-Cigarette/Vaping Substances   • Nicotine No    • THC No    • CBD No    • Flavoring No      Social History     Tobacco Use   • Smoking status: Former     Packs/day: 1 00     Years: 15 00     Pack years: 15 00     Types: Cigarettes     Quit date: 80     Years since quittin 0   • Smokeless tobacco: Never   Vaping Use   • Vaping Use: Never used   Substance Use Topics   • Alcohol use: No     Comment: hX:Occas  • Drug use: No        Review of Systems   Constitutional: Negative for activity change, chills and fever  HENT: Negative for congestion, ear pain and sore throat  Eyes: Negative for pain and visual disturbance  Respiratory: Negative for cough, chest tightness and shortness of breath  Cardiovascular: Negative for chest pain and palpitations  Gastrointestinal: Negative for abdominal pain, constipation, diarrhea, nausea and vomiting  Genitourinary: Negative for dysuria and hematuria  Musculoskeletal: Negative for arthralgias and back pain  Skin: Negative for color change and rash  Neurological: Negative for dizziness, seizures, syncope, weakness, light-headedness, numbness and headaches  Psychiatric/Behavioral: Negative for agitation and behavioral problems  All other systems reviewed and are negative        Physical Exam  ED Triage Vitals   Temperature Pulse Respirations Blood Pressure SpO2   23   98 1 °F (36 7 °C) (!) 110 20 (!) 141/110 95 %      Temp Source Heart Rate Source Patient Position - Orthostatic VS BP Location FiO2 (%)   23 01/18/23 2124 01/18/23 2124 --   Oral Monitor Lying Right arm       Pain Score       --                    Orthostatic Vital Signs  Vitals:    01/18/23 2124 01/18/23 2218   BP: (!) 141/110 136/100   Pulse: (!) 110 89   Patient Position - Orthostatic VS: Lying        Physical Exam  Vitals and nursing note reviewed  Constitutional:       General: She is not in acute distress  Appearance: Normal appearance  She is well-developed  HENT:      Head: Normocephalic and atraumatic  Right Ear: External ear normal       Left Ear: External ear normal  There is no impacted cerumen  Nose: Nose normal       Mouth/Throat:      Mouth: Mucous membranes are moist    Eyes:      Extraocular Movements: Extraocular movements intact  Conjunctiva/sclera: Conjunctivae normal       Pupils: Pupils are equal, round, and reactive to light  Cardiovascular:      Rate and Rhythm: Normal rate and regular rhythm  Heart sounds: Normal heart sounds  No murmur heard  Pulmonary:      Effort: Pulmonary effort is normal  No respiratory distress  Breath sounds: Normal breath sounds  Abdominal:      General: Abdomen is flat  Palpations: Abdomen is soft  Tenderness: There is no abdominal tenderness  Musculoskeletal:         General: No swelling  Cervical back: Normal range of motion and neck supple  Skin:     General: Skin is warm and dry  Capillary Refill: Capillary refill takes less than 2 seconds  Neurological:      General: No focal deficit present  Mental Status: She is alert and oriented to person, place, and time     Psychiatric:         Mood and Affect: Mood normal          Behavior: Behavior normal          ED Medications  Medications   albuterol (PROVENTIL HFA,VENTOLIN HFA) inhaler 2 puff (has no administration in time range)   metoprolol tartrate (LOPRESSOR) tablet 50 mg (has no administration in time range)   hydrochlorothiazide (HYDRODIURIL) tablet 25 mg (has no administration in time range)       Diagnostic Studies  Results Reviewed     None                 No orders to display         Procedures  Procedures      ED Course  ED Course as of 01/18/23 2316 Wed Jan 18, 2023 2313 Blood Pressure: 136/100   2313 Temperature: 98 1 °F (36 7 °C)   2313 Temp Source: Oral   2313 Pulse: 89   2313 Respirations: 20   2313 SpO2: 95 %                                       Medical Decision Making  Patient is a 26-year-old homeless female presenting to the emergency department Requesting her medications  Patient states she was unable to get [facility] today because she did not have a ride she is requesting her nighttime medications  Provided patient with hydrochlorothiazide, metoprolol as well as albuterol inhaler  Patient was very thankful that we prescribed her medications  Patient states that she does have all of her prescriptions at [facility] but was unable to get there today  She came to the hospital for a dose of all of her medications  She denies any suicidal ideation any homicidal ideation any auditory visual hallucinations  Patient's heart sounds were regular rate and rhythm lungs were clear to auscultation bilaterally she had no focal neurologic deficits on exam   I advised her to go to [facility] tomorrow to  her medications and advised her to come back to the hospital she developed any new, worsening or concerning symptoms patient is aware she had no question concerns she stable for discharge  Encounter for medical screening examination: acute illness or injury  Risk  Prescription drug management              Disposition  Final diagnoses:   Encounter for medical screening examination     Time reflects when diagnosis was documented in both MDM as applicable and the Disposition within this note     Time User Action Codes Description Comment    1/18/2023 10:38 PM Anne-Marie Shepard Add [Z13 9] Encounter for medical screening examination       ED Disposition     ED Disposition   Discharge    Condition Stable    Date/Time   Wed Jan 18, 2023 10:38 PM    Comment   Magda Cristina discharge to home/self care  Follow-up Information     Follow up With Specialties Details Why Contact Info Additional Information    Ratna Lang MD Family Medicine Schedule an appointment as soon as possible for a visit  for follow up [address]  [facility] [address] Emergency Department Emergency Medicine Go to  As needed, If symptoms worsen [address] [id number]  [address] Emergency Department, [address], [address]          Patient's Medications   Discharge Prescriptions    No medications on file     No discharge procedures on file  PDMP Review     None           ED Provider  Attending physically available and evaluated Magda Cristina I managed the patient along with the ED Attending      Electronically Signed by         Muriel Dao DO  01/18/23 5194

## 2023-01-20 NOTE — ED ATTENDING ATTESTATION
1/18/2023  IKylee DO, saw and evaluated the patient  I have discussed the patient with the resident/non-physician practitioner and agree with the resident's/non-physician practitioner's findings, Plan of Care, and MDM as documented in the resident's/non-physician practitioner's note, except where noted  All available labs and Radiology studies were reviewed  I was present for key portions of any procedure(s) performed by the resident/non-physician practitioner and I was immediately available to provide assistance  At this point I agree with the current assessment done in the Emergency Department  I have conducted an independent evaluation of this patient a history and physical is as follows:    67-year-old female presents requesting medications  Patient was at behavioral health facility and told to go to a homeless shelter here  Patient states her medications were sent to [facility] pharmacy but she was unable to get them  Denies any physical complaints  Just requesting dose of her medications and shelter  On exam-no acute distress, heart regular, no respiratory distress, moves all extremities  Plan- give dose of medications and attempt to find shelter for patient as she is homeless      ED Course         Critical Care Time  Procedures

## 2023-01-31 ENCOUNTER — TELEPHONE (OUTPATIENT)
Dept: PSYCHIATRY | Facility: CLINIC | Age: 63
End: 2023-01-31

## 2023-01-31 NOTE — TELEPHONE ENCOUNTER
Writer contact pt in regards to a vm we received in which pt requested services  Writer informed pt that there is no opening available at this moment   Pt refused to be added to wait list

## 2023-02-01 ENCOUNTER — TELEPHONE (OUTPATIENT)
Dept: FAMILY MEDICINE CLINIC | Facility: CLINIC | Age: 63
End: 2023-02-01

## 2023-02-01 DIAGNOSIS — Z76.0 MEDICATION REFILL: ICD-10-CM

## 2023-02-01 DIAGNOSIS — I10 ESSENTIAL (PRIMARY) HYPERTENSION: ICD-10-CM

## 2023-02-01 RX ORDER — METOPROLOL TARTRATE 50 MG/1
TABLET, FILM COATED ORAL
Qty: 180 TABLET | Refills: 2 | Status: SHIPPED | OUTPATIENT
Start: 2023-02-01

## 2023-02-01 RX ORDER — LORATADINE 10 MG/1
TABLET ORAL
Qty: 90 TABLET | Refills: 2 | Status: SHIPPED | OUTPATIENT
Start: 2023-02-01

## 2023-02-14 DIAGNOSIS — J45.40 MODERATE PERSISTENT ASTHMA WITHOUT COMPLICATION: ICD-10-CM

## 2023-02-14 RX ORDER — FLUTICASONE PROPIONATE 220 UG/1
AEROSOL, METERED RESPIRATORY (INHALATION)
Qty: 12 G | Refills: 6 | Status: SHIPPED | OUTPATIENT
Start: 2023-02-14

## 2023-02-23 ENCOUNTER — OFFICE VISIT (OUTPATIENT)
Dept: FAMILY MEDICINE CLINIC | Facility: CLINIC | Age: 63
End: 2023-02-23

## 2023-02-23 VITALS
HEART RATE: 75 BPM | DIASTOLIC BLOOD PRESSURE: 86 MMHG | BODY MASS INDEX: 47.8 KG/M2 | WEIGHT: 280 LBS | OXYGEN SATURATION: 98 % | SYSTOLIC BLOOD PRESSURE: 124 MMHG | HEIGHT: 64 IN | TEMPERATURE: 98.6 F

## 2023-02-23 DIAGNOSIS — J44.9 CHRONIC OBSTRUCTIVE PULMONARY DISEASE, UNSPECIFIED COPD TYPE (HCC): ICD-10-CM

## 2023-02-23 DIAGNOSIS — F31.9 BIPOLAR 1 DISORDER (HCC): ICD-10-CM

## 2023-02-23 DIAGNOSIS — Z12.11 SCREENING FOR COLORECTAL CANCER: ICD-10-CM

## 2023-02-23 DIAGNOSIS — Z00.00 ANNUAL PHYSICAL EXAM: Primary | ICD-10-CM

## 2023-02-23 DIAGNOSIS — F20.0 PARANOID SCHIZOPHRENIA (HCC): ICD-10-CM

## 2023-02-23 DIAGNOSIS — Z12.12 SCREENING FOR COLORECTAL CANCER: ICD-10-CM

## 2023-02-23 DIAGNOSIS — E66.01 MORBID OBESITY WITH BMI OF 45.0-49.9, ADULT (HCC): ICD-10-CM

## 2023-02-23 DIAGNOSIS — J45.909 ASTHMA DUE TO SEASONAL ALLERGIES: ICD-10-CM

## 2023-02-23 DIAGNOSIS — Z12.4 SCREENING FOR CERVICAL CANCER: ICD-10-CM

## 2023-02-23 RX ORDER — CETIRIZINE HYDROCHLORIDE 10 MG/1
10 TABLET ORAL DAILY
Qty: 30 TABLET | Refills: 2 | Status: SHIPPED | OUTPATIENT
Start: 2023-02-23 | End: 2023-03-02

## 2023-02-23 RX ORDER — TRAZODONE HYDROCHLORIDE 100 MG/1
100 TABLET ORAL
Qty: 30 TABLET | Refills: 1 | Status: SHIPPED | OUTPATIENT
Start: 2023-02-23

## 2023-02-23 RX ORDER — MONTELUKAST SODIUM 10 MG/1
TABLET ORAL
COMMUNITY
End: 2023-02-23 | Stop reason: SDUPTHER

## 2023-02-23 RX ORDER — TRAZODONE HYDROCHLORIDE 100 MG/1
TABLET ORAL
COMMUNITY
Start: 2023-02-14 | End: 2023-02-23 | Stop reason: SDUPTHER

## 2023-02-23 RX ORDER — ZIPRASIDONE HYDROCHLORIDE 40 MG/1
40 CAPSULE ORAL 2 TIMES DAILY WITH MEALS
Qty: 60 CAPSULE | Refills: 1 | Status: SHIPPED | OUTPATIENT
Start: 2023-02-23

## 2023-02-23 RX ORDER — VENLAFAXINE HYDROCHLORIDE 150 MG/1
150 CAPSULE, EXTENDED RELEASE ORAL
Qty: 30 CAPSULE | Refills: 1 | Status: SHIPPED | OUTPATIENT
Start: 2023-02-23 | End: 2023-04-24

## 2023-02-23 RX ORDER — VENLAFAXINE HYDROCHLORIDE 150 MG/1
CAPSULE, EXTENDED RELEASE ORAL
COMMUNITY
Start: 2023-02-07 | End: 2023-02-23 | Stop reason: SDUPTHER

## 2023-02-23 NOTE — PROGRESS NOTES
ADULT ANNUAL PHYSICAL  [address] FAMILY MEDICINE    NAME: Judith Villar  AGE: 57 y o  SEX: female  : 1960     DATE: 2023     Assessment and Plan:     Problem List Items Addressed This Visit        Respiratory    Chronic obstructive pulmonary disease, unspecified COPD type ([facility] )     Symptoms controlled on flovent and albuterol inhalers  Relevant Medications    cetirizine (ZyrTEC) 10 mg tablet    Asthma due to seasonal allergies     Symptoms controlled  Requested change of antihistamine to cetirizine as Claritin no longer effective  Relevant Medications    cetirizine (ZyrTEC) 10 mg tablet       Other    Bipolar 1 disorder (HCC)     Symptoms stable, was discharged from [facility] and will be foll with psychiatrist Carrie Galicia  Continue medications Venlafaxine 150 mg, Trazodone 100 mg and ziprasidone 40 mg BID  Relevant Medications    venlafaxine (EFFEXOR-XR) 150 mg 24 hr capsule    traZODone (DESYREL) 100 mg tablet    ziprasidone (GEODON) 40 mg capsule    Annual physical exam - Primary     CPE done  Routine labs current  [place] rectal and cervical cancer screening ordered, she is scheduled for her mammogram 23  Refused flu and pneumonia vaccine  Discussed healthy food choices and regular exercise  Morbid obesity with BMI of 45 0-49 9, adult ([facility] )    Paranoid schizophrenia ([facility] )     S/p 2 weeks inpatient tx at [facility]  Reports stable symptoms and has appointment scheduled with psychiatrist Carrie Galicia for 5/3/23  Will continue current medications Venlafaxine 150 mg qd, ziprasidone 40 mg BID and trazodone 100 mg at HS            Relevant Medications    venlafaxine (EFFEXOR-XR) 150 mg 24 hr capsule    traZODone (DESYREL) 100 mg tablet    ziprasidone (GEODON) 40 mg capsule   Other Visit Diagnoses     Screening for cervical cancer        Relevant Orders    Ambulatory referral to Obstetrics / Gynecology    Screening for colorectal cancer        Relevant Orders    Ambulatory referral for Colonoscopy          Immunizations and preventive care screenings were discussed with patient today  Appropriate education was printed on patient's after visit summary  Counseling:  Injury prevention: discussed safety/seat belts, safety helmets, smoke detectors, carbon dioxide detectors, and smoking near bedding or upholstery  · Exercise: the importance of regular exercise/physical activity was discussed  Recommend exercise 3-5 times per week for at least 30 minutes  BMI Counseling: Body mass index is 48 06 kg/m²  The BMI is above normal  Nutrition recommendations include decreasing portion sizes, decreasing fast food intake and consuming healthier snacks  Exercise recommendations include exercising 3-5 times per week  Rationale for BMI follow-up plan is due to patient being overweight or obese  Return in about 6 months (around 8/23/2023), or if symptoms worsen or fail to improve, for Interval      Chief Complaint:     Chief Complaint   Patient presents with   • Medication Refill      History of Present Illness:     Adult Annual Physical   Patient here for a comprehensive physical exam  The patient reports problems - Need refills for her psychiatric medication  She was recently discharged from [facility] where she was undergoing inpatient tx for two weeks  She is scheduled to f/u with psychiatrist Dr Meghan Mccauley with [facility] to manage her medications however her appointment is scheduled for 5/3/23 and needs medication refills  Diet and Physical Activity  · Diet/Nutrition: poor diet, frequent junk food and limited fruits/vegetables  · Exercise: no formal exercise  Depression Screening  PHQ-2/9 Depression Screening         General Health  · Sleep: sleeps poorly and gets 4-6 hours of sleep on average     · Hearing: normal - bilateral   · Vision: no vision problems, most recent eye exam >1 year ago and wears glasses  · Dental: regular dental visits and brushes teeth twice daily  /GYN Health  · Patient is: postmenopausal  · Last menstrual period: menopause  · Contraceptive method: none  Review of Systems:     Review of Systems   Constitutional: Negative for activity change, appetite change, chills, fatigue, fever and unexpected weight change  HENT: Negative for congestion, drooling, ear discharge, ear pain, facial swelling, postnasal drip, rhinorrhea, sinus pressure, sore throat, trouble swallowing and voice change  Eyes: Negative for photophobia, pain, discharge and visual disturbance  Respiratory: Negative for cough, chest tightness, shortness of breath and wheezing  Cardiovascular: Positive for leg swelling  Negative for chest pain and palpitations  Gastrointestinal: Negative for abdominal distention, abdominal pain, constipation, diarrhea, nausea and vomiting  Endocrine: Negative for cold intolerance, polyphagia and polyuria  Genitourinary: Negative for difficulty urinating, dysuria, flank pain, hematuria, pelvic pain and vaginal pain  Musculoskeletal: Positive for arthralgias  Negative for back pain, gait problem, neck pain and neck stiffness  Skin: Negative for color change, rash and wound  Allergic/Immunologic: Positive for environmental allergies  Negative for immunocompromised state  Neurological: Negative for dizziness, seizures, syncope, facial asymmetry and light-headedness  Hematological: Negative for adenopathy  Psychiatric/Behavioral: Positive for sleep disturbance  Negative for agitation, behavioral problems, confusion, decreased concentration, dysphoric mood, hallucinations, self-injury and suicidal ideas  The patient is not nervous/anxious and is not hyperactive  All other systems reviewed and are negative       Past Medical History:     Past Medical History:   Diagnosis Date   • Asthma    • Chronic pain    • Hypertension       Past Surgical History:     Past Surgical History:   Procedure Laterality Date   • BACK SURGERY     • COLONOSCOPY     • TUBAL LIGATION        Social History:     Social History     Socioeconomic History   • Marital status: /Civil Union     Spouse name: None   • Number of children: None   • Years of education: None   • Highest education level: None   Occupational History   • None   Tobacco Use   • Smoking status: Former     Packs/day: 1 00     Years: 15 00     Pack years: 15 00     Types: Cigarettes     Quit date:      Years since quittin 1   • Smokeless tobacco: Never   Vaping Use   • Vaping Use: Never used   Substance and Sexual Activity   • Alcohol use: No     Comment: hX:Occas      • Drug use: No   • Sexual activity: Not Currently   Other Topics Concern   • None   Social History Narrative    Most recent tobacco use screenin2018    Alcohol intake: Occasional    Last modified by [id number]    2019,      Social Determinants of Health     Financial Resource Strain: Not on file   Food Insecurity: Not on file   Transportation Needs: Not on file   Physical Activity: Not on file   Stress: Not on file   Social Connections: Not on file   Intimate Partner Violence: Not on file   Housing Stability: Not on file      Family History:     Family History   Problem Relation Age of Onset   • Diabetes Mother    • Hypertension Mother    • Lung cancer Mother    • Colon cancer Mother    • Colon cancer Father    • Hypertension Sister    • Multiple sclerosis Sister    • Hypothyroidism Maternal Aunt       Current Medications:     Current Outpatient Medications   Medication Sig Dispense Refill   • albuterol (PROVENTIL HFA,VENTOLIN HFA) 90 mcg/act inhaler INHALE 2 PUFFS EVERY 4 HOURS AS NEEDED FOR SHORTNESS OF BREATH  18 g 5   • atorvastatin (LIPITOR) 20 mg tablet Take 1 tablet (20 mg total) by mouth daily 90 tablet 3   • cetirizine (ZyrTEC) 10 mg tablet Take 1 tablet (10 mg total) by mouth daily 30 tablet 2   • Flovent  MCG/ACT inhaler INHALE 1 PUFF 2 TIMES A DAY RINSE MOUTH AFTER USE  12 g 6   • metoprolol tartrate (LOPRESSOR) 50 mg tablet TAKE 1 TABLET BY MOUTH TWICE A  tablet 2   • montelukast (SINGULAIR) 10 mg tablet TAKE 1 TABLET BY MOUTH EVERY DAY 90 tablet 3   • traZODone (DESYREL) 100 mg tablet Take 1 tablet (100 mg total) by mouth daily at bedtime 30 tablet 1   • triamterene-hydrochlorothiazide (DYAZIDE) 37 5-25 mg per capsule Take 1 capsule by mouth daily 90 capsule 3   • venlafaxine (EFFEXOR-XR) 150 mg 24 hr capsule Take 1 capsule (150 mg total) by mouth daily with breakfast 30 capsule 1   • ziprasidone (GEODON) 40 mg capsule Take 1 capsule (40 mg total) by mouth 2 (two) times a day with meals 60 capsule 1     No current facility-administered medications for this visit  Allergies: Allergies   Allergen Reactions   • Penicillins Hives     Reaction Date: [id number]; Annotation - [id number]: meena rn   • Amoxicillin Rash   • Aspirin Rash   • Ibuprofen Rash   • Morphine Rash   • Oxycodone Rash   • Valacyclovir Rash      Physical Exam:     /86 (BP Location: Left arm, Patient Position: Sitting, Cuff Size: Standard)   Pulse 75   Temp 98 6 °F (37 °C) (Tympanic)   Ht 5' 4" (1 626 m)   Wt 127 kg (280 lb)   SpO2 98%   BMI 48 06 kg/m²     Physical Exam  Vitals and nursing note reviewed  Constitutional:       General: She is not in acute distress  Appearance: Normal appearance  She is obese  She is not ill-appearing, toxic-appearing or diaphoretic  HENT:      Head: Normocephalic and atraumatic  Right Ear: Tympanic membrane, ear canal and external ear normal  There is no impacted cerumen  Left Ear: Tympanic membrane, ear canal and external ear normal  There is no impacted cerumen  Nose: Nose normal  No congestion or rhinorrhea        Mouth/Throat:      Mouth: Mucous membranes are moist       Pharynx: No oropharyngeal exudate or posterior oropharyngeal erythema  Eyes:      General: No scleral icterus  Right eye: No discharge  Left eye: No discharge  Extraocular Movements: Extraocular movements intact  Conjunctiva/sclera: Conjunctivae normal       Pupils: Pupils are equal, round, and reactive to light  Neck:      Vascular: No carotid bruit  Cardiovascular:      Rate and Rhythm: Normal rate and regular rhythm  Pulses: Normal pulses  Heart sounds: Normal heart sounds  No murmur heard  Pulmonary:      Effort: Pulmonary effort is normal  No respiratory distress  Breath sounds: Normal breath sounds  No stridor  No wheezing, rhonchi or rales  Chest:      Chest wall: No tenderness  Abdominal:      General: Bowel sounds are normal  There is no distension  Palpations: Abdomen is soft  There is no mass  Tenderness: There is no abdominal tenderness  There is no right CVA tenderness, left CVA tenderness, guarding or rebound  Hernia: No hernia is present  Musculoskeletal:         General: No swelling, tenderness, deformity or signs of injury  Cervical back: Normal range of motion and neck supple  No rigidity or tenderness  Right knee: Crepitus present  Decreased range of motion  Left knee: Crepitus present  Decreased range of motion  Right lower leg: No deformity or tenderness  1+ Edema (+1 non-pitting) present  Left lower leg: No deformity or tenderness  1+ Edema (+1 non-pittting) present  Lymphadenopathy:      Cervical: No cervical adenopathy  Skin:     General: Skin is warm  Capillary Refill: Capillary refill takes less than 2 seconds  Coloration: Skin is not jaundiced  Findings: No bruising, erythema, lesion or rash  Neurological:      General: No focal deficit present  Mental Status: She is alert and oriented to person, place, and time  Cranial Nerves: No cranial nerve deficit  Sensory: No sensory deficit  Motor: No weakness  Coordination: Coordination normal       Gait: Gait normal       Deep Tendon Reflexes: Reflexes normal    Psychiatric:         Attention and Perception: Attention and perception normal  She is attentive  She does not perceive auditory or visual hallucinations  Mood and Affect: Mood and affect normal  Mood is not anxious, depressed or elated  Affect is not labile, blunt, flat, angry, tearful or inappropriate  Speech: Speech normal  She is communicative  Speech is not rapid and pressured, delayed, slurred or tangential          Behavior: Behavior normal  Behavior is not agitated, slowed or hyperactive  Behavior is cooperative  Thought Content: Thought content normal  Thought content is not paranoid or delusional  Thought content does not include homicidal or suicidal ideation  Thought content does not include homicidal or suicidal plan  Cognition and Memory: Cognition and memory normal  Cognition is not impaired  Judgment: Judgment normal  Judgment is not impulsive or inappropriate            JEB Connor  [facility]

## 2023-02-23 NOTE — ASSESSMENT & PLAN NOTE
CPE done  Routine labs current  [place] rectal and cervical cancer screening ordered, she is scheduled for her mammogram 03/14/23  Refused flu and pneumonia vaccine  Discussed healthy food choices and regular exercise

## 2023-02-24 NOTE — ASSESSMENT & PLAN NOTE
Symptoms controlled  Requested change of antihistamine to cetirizine as Claritin no longer effective

## 2023-02-24 NOTE — ASSESSMENT & PLAN NOTE
S/p 2 weeks inpatient tx at [facility]  Reports stable symptoms and has appointment scheduled with psychiatrist Govind Rutherford for 5/3/23  Will continue current medications Venlafaxine 150 mg qd, ziprasidone 40 mg BID and trazodone 100 mg at HS

## 2023-02-24 NOTE — ASSESSMENT & PLAN NOTE
Symptoms stable, was discharged from [facility] 2/18/23 and will be foll with psychiatrist [place]  Continue medications Venlafaxine 150 mg, Trazodone 100 mg and ziprasidone 40 mg BID

## 2023-03-02 ENCOUNTER — TELEPHONE (OUTPATIENT)
Dept: FAMILY MEDICINE CLINIC | Facility: CLINIC | Age: 63
End: 2023-03-02

## 2023-03-02 DIAGNOSIS — J30.1 ALLERGIC RHINITIS DUE TO POLLEN, UNSPECIFIED SEASONALITY: Primary | ICD-10-CM

## 2023-03-02 DIAGNOSIS — J45.909 ASTHMA DUE TO SEASONAL ALLERGIES: ICD-10-CM

## 2023-03-02 RX ORDER — FEXOFENADINE HCL 180 MG/1
180 TABLET ORAL DAILY
Qty: 90 TABLET | Refills: 3 | Status: SHIPPED | OUTPATIENT
Start: 2023-03-02

## 2023-03-02 NOTE — TELEPHONE ENCOUNTER
Gagandeep Ponce called wants to know if you can call something in for her allergies    S/S: Runny nose, itchy eyes, watery, and cough    Says she's taking the Singulair, but needs something else    [facility] on [address]

## 2023-03-02 NOTE — TELEPHONE ENCOUNTER
Medication sent to pharmacy  Left message for pt because when she was seen 1 week ago NP changed her claritan to citrazine because it was not working  Called patient back to let her know she cant take both citrazine and allegra   She needs to decide which one she wants to take with the [facility]

## 2023-03-06 ENCOUNTER — TELEPHONE (OUTPATIENT)
Dept: OTHER | Facility: OTHER | Age: 63
End: 2023-03-06

## 2023-03-06 ENCOUNTER — OFFICE VISIT (OUTPATIENT)
Dept: FAMILY MEDICINE CLINIC | Facility: CLINIC | Age: 63
End: 2023-03-06

## 2023-03-06 VITALS
DIASTOLIC BLOOD PRESSURE: 84 MMHG | HEIGHT: 64 IN | TEMPERATURE: 97.7 F | BODY MASS INDEX: 47.63 KG/M2 | SYSTOLIC BLOOD PRESSURE: 128 MMHG | OXYGEN SATURATION: 97 % | WEIGHT: 279 LBS | HEART RATE: 68 BPM

## 2023-03-06 DIAGNOSIS — J06.9 URI WITH COUGH AND CONGESTION: Primary | ICD-10-CM

## 2023-03-06 DIAGNOSIS — J44.9 CHRONIC OBSTRUCTIVE PULMONARY DISEASE, UNSPECIFIED COPD TYPE (HCC): ICD-10-CM

## 2023-03-06 LAB
SARS-COV-2 AG UPPER RESP QL IA: NEGATIVE
VALID CONTROL: NORMAL

## 2023-03-06 RX ORDER — DEXTROMETHORPHAN HYDROBROMIDE AND PROMETHAZINE HYDROCHLORIDE 15; 6.25 MG/5ML; MG/5ML
5 SOLUTION ORAL 4 TIMES DAILY PRN
Qty: 118 ML | Refills: 1 | Status: SHIPPED | OUTPATIENT
Start: 2023-03-06

## 2023-03-06 RX ORDER — PREDNISONE 20 MG/1
40 TABLET ORAL DAILY
Qty: 10 TABLET | Refills: 0 | Status: SHIPPED | OUTPATIENT
Start: 2023-03-06 | End: 2023-03-11

## 2023-03-06 RX ORDER — AZITHROMYCIN 250 MG/1
TABLET, FILM COATED ORAL
Qty: 6 TABLET | Refills: 0 | Status: SHIPPED | OUTPATIENT
Start: 2023-03-06 | End: 2023-03-11

## 2023-03-06 NOTE — TELEPHONE ENCOUNTER
The patient called informing that she needs pre-authorization for one of the antibiotics that was prescribed for her congestion  The patient reports that she does not know the name of the medication that she needs authorization for  Please contact patient

## 2023-03-06 NOTE — ASSESSMENT & PLAN NOTE
Currently in exacerbation secondary to URI  Start  Azithromycin and prednisone as prescribed  Continue albuterol and flovent inhalers and f/u for worsening sxs

## 2023-03-06 NOTE — ASSESSMENT & PLAN NOTE
Negative COVID test sxs 8 days not improving  Start z-richie, prednisone 40 mgx 5 days and promethazine Dm as prescribed  F/u for worsening sxs

## 2023-03-06 NOTE — PROGRESS NOTES
Name: Apple Parish      : 1960      MRN: [id number]  Encounter Provider: JEB Gallardo  Encounter Date: 3/6/2023   Encounter department: [facility]    Assessment & Plan     1  URI with cough and congestion  Assessment & Plan:  Negative COVID test sxs 8 days not improving  Start z-richie, prednisone 40 mgx 5 days and promethazine Dm as prescribed  F/u for worsening sxs  Orders:  -     POCT Rapid Covid Ag  -     azithromycin (Zithromax) 250 mg tablet; Take 2 tablets (500 mg total) by mouth daily for 1 day, THEN 1 tablet (250 mg total) daily for 4 days  -     predniSONE 20 mg tablet; Take 2 tablets (40 mg total) by mouth daily for 5 days  -     Promethazine-DM (PHENERGAN-DM) 6 25-15 mg/5 mL oral syrup; Take 5 mL by mouth 4 (four) times a day as needed for cough    2  Chronic obstructive pulmonary disease, unspecified COPD type ([facility]  Assessment & Plan:  Currently in exacerbation secondary to URI  Start  Azithromycin and prednisone as prescribed  Continue albuterol and flovent inhalers and f/u for worsening sxs  Subjective      Have had sympoms for 8 days, COVID test negative  She has wheezing, SOB, productive cough, headache, sore throat, chest and nasal congestion  She has been using her albuterol and flovent inhalers as well as allegra and montelukast without improvement  Review of Systems   Constitutional: Positive for fatigue  Negative for chills and fever  HENT: Positive for congestion, sinus pressure, sinus pain and sore throat  Negative for ear pain  Respiratory: Positive for cough, chest tightness, shortness of breath and wheezing  Cardiovascular: Negative for chest pain and palpitations  Gastrointestinal: Negative for abdominal distention  Genitourinary: Negative for difficulty urinating  Musculoskeletal: Positive for myalgias  Allergic/Immunologic: Positive for environmental allergies     Neurological: Positive for headaches  Negative for light-headedness  Current Outpatient Medications on File Prior to Visit   Medication Sig   • albuterol (PROVENTIL HFA,VENTOLIN HFA) 90 mcg/act inhaler INHALE 2 PUFFS EVERY 4 HOURS AS NEEDED FOR SHORTNESS OF BREATH  • atorvastatin (LIPITOR) 20 mg tablet Take 1 tablet (20 mg total) by mouth daily   • fexofenadine (ALLEGRA) 180 MG tablet Take 1 tablet (180 mg total) by mouth daily   • Flovent  MCG/ACT inhaler INHALE 1 PUFF 2 TIMES A DAY RINSE MOUTH AFTER USE  • metoprolol tartrate (LOPRESSOR) 50 mg tablet TAKE 1 TABLET BY MOUTH TWICE A DAY   • montelukast (SINGULAIR) 10 mg tablet TAKE 1 TABLET BY MOUTH EVERY DAY   • traZODone (DESYREL) 100 mg tablet Take 1 tablet (100 mg total) by mouth daily at bedtime   • triamterene-hydrochlorothiazide (DYAZIDE) 37 5-25 mg per capsule Take 1 capsule by mouth daily   • venlafaxine (EFFEXOR-XR) 150 mg 24 hr capsule Take 1 capsule (150 mg total) by mouth daily with breakfast   • ziprasidone (GEODON) 40 mg capsule Take 1 capsule (40 mg total) by mouth 2 (two) times a day with meals (Patient not taking: Reported on 3/6/2023)       Objective     /84 (BP Location: Right arm, Patient Position: Sitting, Cuff Size: Standard)   Pulse 68   Temp 97 7 °F (36 5 °C) (Tympanic)   Ht 5' 4" (1 626 m)   Wt 127 kg (279 lb)   SpO2 97%   BMI 47 89 kg/m²     Physical Exam  Vitals and nursing note reviewed  Constitutional:       General: She is not in acute distress  Appearance: Normal appearance  She is well-developed  She is obese  She is not ill-appearing, toxic-appearing or diaphoretic  HENT:      Head: Normocephalic and atraumatic  Right Ear: Tympanic membrane, ear canal and external ear normal  There is no impacted cerumen  Left Ear: Tympanic membrane, ear canal and external ear normal  There is no impacted cerumen  Nose: Nose normal  No congestion or rhinorrhea        Mouth/Throat:      Mouth: Mucous membranes are moist  Pharynx: No oropharyngeal exudate or posterior oropharyngeal erythema  Eyes:      General:         Right eye: No discharge  Left eye: No discharge  Extraocular Movements: Extraocular movements intact  Conjunctiva/sclera: Conjunctivae normal       Pupils: Pupils are equal, round, and reactive to light  Neck:      Vascular: No carotid bruit  Cardiovascular:      Rate and Rhythm: Normal rate and regular rhythm  Pulses: Normal pulses  Heart sounds: Normal heart sounds  No murmur heard  Pulmonary:      Effort: Pulmonary effort is normal  No accessory muscle usage or respiratory distress  Breath sounds: No stridor  Wheezing present  No decreased breath sounds or rales  Chest:      Chest wall: No tenderness  Abdominal:      General: Bowel sounds are normal  There is no distension  Palpations: Abdomen is soft  There is no mass  Tenderness: There is no abdominal tenderness  There is no right CVA tenderness or left CVA tenderness  Musculoskeletal:         General: No swelling or tenderness  Normal range of motion  Cervical back: Normal range of motion  No rigidity or tenderness  Right lower leg: No edema  Left lower leg: No edema  Lymphadenopathy:      Cervical: No cervical adenopathy  Skin:     General: Skin is warm  Capillary Refill: Capillary refill takes less than 2 seconds  Coloration: Skin is not jaundiced  Findings: No bruising, erythema or rash  Neurological:      General: No focal deficit present  Mental Status: She is alert and oriented to person, place, and time  Cranial Nerves: No cranial nerve deficit  Sensory: No sensory deficit  Coordination: Coordination normal    Psychiatric:         Mood and Affect: Mood normal  Mood is not anxious  Behavior: Behavior normal  Behavior is not agitated  Thought Content:  Thought content normal        JEB Jordan

## 2023-03-07 ENCOUNTER — TELEPHONE (OUTPATIENT)
Dept: FAMILY MEDICINE CLINIC | Facility: CLINIC | Age: 63
End: 2023-03-07

## 2023-03-07 NOTE — TELEPHONE ENCOUNTER
Elvira Graham called was seen yesterday and was prescribed:    azithromycin (Zithromax) 250 mg tablet Take 2 tablets (500 mg total) by mouth daily for 1 day, THEN 1 tablet (250 mg total) daily for 4 days       Elvira Graham says she was unable to  med due to needing a prior auth and says she needs her med to start feeling better    Please call Elvira Graham @ # [phone]

## 2023-03-07 NOTE — TELEPHONE ENCOUNTER
Spoke to pharmacy  It was not for a prior auth it was an interaction with Trazodone   [facility]

## 2023-03-13 ENCOUNTER — TELEPHONE (OUTPATIENT)
Dept: FAMILY MEDICINE CLINIC | Facility: CLINIC | Age: 63
End: 2023-03-13

## 2023-03-13 NOTE — TELEPHONE ENCOUNTER
Praveen Walker said that Allegra isn't helping her  She wants to know if she could get it changed to Zyrtec

## 2023-03-14 DIAGNOSIS — Z76.0 MEDICATION REFILL: ICD-10-CM

## 2023-03-14 DIAGNOSIS — J06.9 URI WITH COUGH AND CONGESTION: Primary | ICD-10-CM

## 2023-03-14 RX ORDER — CETIRIZINE HYDROCHLORIDE 10 MG/1
10 TABLET ORAL DAILY
Qty: 30 TABLET | Refills: 2 | Status: SHIPPED | OUTPATIENT
Start: 2023-03-14

## 2023-03-21 DIAGNOSIS — I10 ESSENTIAL (PRIMARY) HYPERTENSION: ICD-10-CM

## 2023-03-21 RX ORDER — TRIAMTERENE AND HYDROCHLOROTHIAZIDE 37.5; 25 MG/1; MG/1
CAPSULE ORAL
Qty: 90 CAPSULE | Refills: 3 | Status: SHIPPED | OUTPATIENT
Start: 2023-03-21

## 2023-03-24 ENCOUNTER — HOSPITAL ENCOUNTER (EMERGENCY)
Facility: HOSPITAL | Age: 63
Discharge: HOME | End: 2023-03-24
Attending: EMERGENCY MEDICINE

## 2023-03-24 ENCOUNTER — APPOINTMENT (EMERGENCY)
Dept: CT IMAGING | Facility: HOSPITAL | Age: 63
End: 2023-03-24

## 2023-03-24 VITALS
SYSTOLIC BLOOD PRESSURE: 127 MMHG | DIASTOLIC BLOOD PRESSURE: 86 MMHG | BODY MASS INDEX: 47.84 KG/M2 | HEART RATE: 73 BPM | OXYGEN SATURATION: 97 % | HEIGHT: 63 IN | WEIGHT: 270 LBS | RESPIRATION RATE: 18 BRPM | TEMPERATURE: 97.1 F

## 2023-03-24 DIAGNOSIS — S22.39XA RIB FRACTURE: Primary | ICD-10-CM

## 2023-03-24 RX ORDER — LIDOCAINE 50 MG/G
1 PATCH TOPICAL ONCE
Status: DISCONTINUED | OUTPATIENT
Start: 2023-03-24 | End: 2023-03-24 | Stop reason: HOSPADM

## 2023-03-24 RX ORDER — METHOCARBAMOL 500 MG/1
500 TABLET, FILM COATED ORAL 2 TIMES DAILY
Qty: 20 TABLET | Refills: 0 | Status: SHIPPED | OUTPATIENT
Start: 2023-03-24

## 2023-03-24 RX ORDER — LIDOCAINE 50 MG/G
1 PATCH TOPICAL DAILY
Qty: 10 PATCH | Refills: 0 | Status: SHIPPED | OUTPATIENT
Start: 2023-03-24

## 2023-03-24 RX ADMIN — LIDOCAINE 5% 1 PATCH: 700 PATCH TOPICAL at 14:28

## 2023-03-24 NOTE — Clinical Note
Shannan Hernandez was seen and treated in our emergency department on 3/24/2023  Diagnosis: Rib fracture    [place]    She may return on this date:     No physical labor until pain from rib fracture resolves which may take 2+ weeks     If you have any questions or concerns, please don't hesitate to call        Jeff Solorio MD    ______________________________           _______________          _______________  Hospital Representative                              Date                                Time

## 2023-03-24 NOTE — ED TRIAGE NOTES
"Via WR w/complaint of right sided rib pain x2 weeks; took one dose of Aleve today w/no relief; states was diagnosed with an URI \"and ever since I have been coughing and my ribs hurt\"; denies fever, nausea, vomiting and/or diarrhea; denies recent accident, injury, trauma and/or falls  "

## 2023-03-24 NOTE — ED PROVIDER NOTES
History  Chief Complaint   Patient presents with   • Rib Pain     76-year-old female history of asthma, hypertension, bipolar disorder, presents with 2 weeks of right posterior rib pain after an illness with frequent coughing  Patient states that due to her persistent cough she was at her primary care doctor who prescribed a Z-Bowen  She has improved since then and states that she only occasionally coughs now and is not having fevers or sputum but has severe focal pain to her right posterior ribs worse with coughing or moving in certain ways  She has been taking Aleve at home without relief and has allergies to other over-the-counter pain medicines  She denies shortness of breath  Prior to Admission Medications   Prescriptions Last Dose Informant Patient Reported? Taking? Flovent  MCG/ACT inhaler   No No   Sig: INHALE 1 PUFF 2 TIMES A DAY RINSE MOUTH AFTER USE     Promethazine-DM (PHENERGAN-DM) 6 25-15 mg/5 mL oral syrup   No No   Sig: Take 5 mL by mouth 4 (four) times a day as needed for cough   albuterol (PROVENTIL HFA,VENTOLIN HFA) 90 mcg/act inhaler   No No   Sig: INHALE 2 PUFFS EVERY 4 HOURS AS NEEDED FOR SHORTNESS OF BREATH    atorvastatin (LIPITOR) 20 mg tablet   No No   Sig: Take 1 tablet (20 mg total) by mouth daily   cetirizine (ZyrTEC) 10 mg tablet   No No   Sig: Take 1 tablet (10 mg total) by mouth daily   metoprolol tartrate (LOPRESSOR) 50 mg tablet   No No   Sig: TAKE 1 TABLET BY MOUTH TWICE A DAY   montelukast (SINGULAIR) 10 mg tablet   No No   Sig: TAKE 1 TABLET BY MOUTH EVERY DAY   traZODone (DESYREL) 100 mg tablet   No No   Sig: Take 1 tablet (100 mg total) by mouth daily at bedtime   triamterene-hydrochlorothiazide (DYAZIDE) 37 5-25 mg per capsule   No No   Sig: TAKE 1 CAPSULE BY MOUTH EVERY DAY   venlafaxine (EFFEXOR-XR) 150 mg 24 hr capsule   No No   Sig: Take 1 capsule (150 mg total) by mouth daily with breakfast   ziprasidone (GEODON) 40 mg capsule   No No   Sig: Take 1 capsule (40 mg total) by mouth 2 (two) times a day with meals   Patient not taking: Reported on 3/6/2023      Facility-Administered Medications: None       Past Medical History:   Diagnosis Date   • Asthma    • Chronic pain    • Hypertension        Past Surgical History:   Procedure Laterality Date   • BACK SURGERY     • COLONOSCOPY     • TUBAL LIGATION         Family History   Problem Relation Age of Onset   • Diabetes Mother    • Hypertension Mother    • Lung cancer Mother    • Colon cancer Mother    • Colon cancer Father    • Hypertension Sister    • Multiple sclerosis Sister    • Hypothyroidism Maternal Aunt      I have reviewed and agree with the history as documented  E-Cigarette/Vaping   • E-Cigarette Use Never User      E-Cigarette/Vaping Substances   • Nicotine No    • THC No    • CBD No    • Flavoring No      Social History     Tobacco Use   • Smoking status: Former     Packs/day:  00     Years: 15 00     Pack years: 15      Types: Cigarettes     Quit date: 80     Years since quittin 2   • Smokeless tobacco: Never   Vaping Use   • Vaping Use: Never used   Substance Use Topics   • Alcohol use: No     Comment: hX:Occas  • Drug use: No       Review of Systems   Constitutional: Negative for chills and fever  Respiratory: Positive for cough  Negative for shortness of breath  Cardiovascular: Negative for chest pain  Gastrointestinal: Negative for abdominal pain  Physical Exam  Physical Exam  Constitutional:       General: She is not in acute distress  Appearance: Normal appearance  HENT:      Head: Normocephalic  Mouth/Throat:      Mouth: Mucous membranes are moist    Eyes:      Conjunctiva/sclera: Conjunctivae normal    Cardiovascular:      Rate and Rhythm: Normal rate and regular rhythm  Heart sounds: Normal heart sounds  Pulmonary:      Effort: Pulmonary effort is normal       Breath sounds: Normal breath sounds  No wheezing or rales        Comments: Focal tenderness to the right posterior lower ribs without overlying skin changes  Musculoskeletal:         General: Normal range of motion  Cervical back: Normal range of motion  Skin:     General: Skin is warm and dry  Neurological:      General: No focal deficit present  Mental Status: She is alert and oriented to person, place, and time  Psychiatric:         Mood and Affect: Mood normal          Behavior: Behavior normal          Vital Signs  ED Triage Vitals [03/24/23 1204]   Temperature Pulse Respirations Blood Pressure SpO2   (!) 97 1 °F (36 2 °C) 73 18 127/86 97 %      Temp Source Heart Rate Source Patient Position - Orthostatic VS BP Location FiO2 (%)   Oral Monitor Sitting Left arm --      Pain Score       10 - Worst Possible Pain           Vitals:    03/24/23 1204   BP: 127/86   Pulse: 73   Patient Position - Orthostatic VS: Sitting         Visual Acuity      ED Medications  Medications - No data to display    Diagnostic Studies  Results Reviewed     None                 CT chest without contrast   Final Result by Jerry Cunningham MD (03/24 0401)      Nondisplaced fracture of the posterior right 10th rib  No hemothorax or pneumothorax  Minimal ectasia of the ascending aorta at 4 0 cm  Recommend follow-up with a chest CT with no contrast in one year  Pulmonary artery enlargement which can be seen with pulmonary hypertension or can be a normal variant  The study was marked in [facility] for immediate notification and follow-up  Workstation performed: [id number]                    Procedures  Procedures         ED Course       38-year-old female with rib pain after respiratory illness and cough  Patient has focal right posterior rib tenderness on exam   Normal lung sounds  No longer has a complaint of any ongoing infectious symptoms  CT without contrast of the chest was obtained showing an isolated nondisplaced fracture of the right posterior 10th rib    Patient is allergic to oxycodone so we will prescribe Robaxin and lidocaine patches  We will also provide incentive spirometer  Patient counseled on pneumonia symptoms to watch out for  Stable for discharge  MDM    Disposition  Final diagnoses:   Rib fracture     Time reflects when diagnosis was documented in both MDM as applicable and the Disposition within this note     Time User Action Codes Description Comment    3/24/2023  2:04 PM Cipriano Miller Rib fracture       ED Disposition     ED Disposition   Discharge    Condition   Stable    Date/Time   Fri Mar 24, 2023  2:04 PM    [address] discharge to home/self care                 Follow-up Information     Follow up With Specialties Details Why Contact Info    Emy Rudolph MD Family Medicine Call  As needed [address]  [address] [id number] [phone]            Discharge Medication List as of 3/24/2023  2:27 PM      START taking these medications    Details   lidocaine (Lidoderm) 5 % Apply 1 patch topically over 12 hours daily Remove & Discard patch within 12 hours or as directed by MD, Starting Fri 3/24/2023, Normal      methocarbamol (ROBAXIN) 500 mg tablet Take 1 tablet (500 mg total) by mouth 2 (two) times a day, Starting Fri 3/24/2023, Normal         CONTINUE these medications which have NOT CHANGED    Details   albuterol (PROVENTIL HFA,VENTOLIN HFA) 90 mcg/act inhaler INHALE 2 PUFFS EVERY 4 HOURS AS NEEDED FOR SHORTNESS OF BREATH , Normal      atorvastatin (LIPITOR) 20 mg tablet Take 1 tablet (20 mg total) by mouth daily, Starting Wed 1/12/2022, Normal      cetirizine (ZyrTEC) 10 mg tablet Take 1 tablet (10 mg total) by mouth daily, Starting Tue 3/14/2023, Normal      Flovent  MCG/ACT inhaler INHALE 1 PUFF 2 TIMES A DAY RINSE MOUTH AFTER USE , Normal      metoprolol tartrate (LOPRESSOR) 50 mg tablet TAKE 1 TABLET BY MOUTH TWICE A DAY, Normal      montelukast (SINGULAIR) 10 mg tablet TAKE 1 TABLET BY MOUTH EVERY DAY, Normal      Promethazine-DM (PHENERGAN-DM) 6 25-15 mg/5 mL oral syrup Take 5 mL by mouth 4 (four) times a day as needed for cough, Starting Mon 3/6/2023, Normal      traZODone (DESYREL) 100 mg tablet Take 1 tablet (100 mg total) by mouth daily at bedtime, Starting Thu 2/23/2023, Normal      triamterene-hydrochlorothiazide (DYAZIDE) 37 5-25 mg per capsule TAKE 1 CAPSULE BY MOUTH EVERY DAY, Normal      venlafaxine (EFFEXOR-XR) 150 mg 24 hr capsule Take 1 capsule (150 mg total) by mouth daily with breakfast, Starting Thu 2/23/2023, Until Mon 4/24/2023, Normal      ziprasidone (GEODON) 40 mg capsule Take 1 capsule (40 mg total) by mouth 2 (two) times a day with meals, Starting Thu 2/23/2023, Normal             No discharge procedures on file      PDMP Review     None          ED Provider  Electronically Signed by           Charito Thomas MD  03/24/23 5150

## 2023-03-27 DIAGNOSIS — F31.9 BIPOLAR 1 DISORDER (HCC): ICD-10-CM

## 2023-03-27 RX ORDER — VENLAFAXINE HYDROCHLORIDE 150 MG/1
CAPSULE, EXTENDED RELEASE ORAL
Qty: 30 CAPSULE | Refills: 1 | Status: SHIPPED | OUTPATIENT
Start: 2023-03-27

## 2023-04-03 DIAGNOSIS — E78.5 DYSLIPIDEMIA: ICD-10-CM

## 2023-04-03 RX ORDER — ATORVASTATIN CALCIUM 20 MG/1
TABLET, FILM COATED ORAL
Qty: 90 TABLET | Refills: 3 | Status: SHIPPED | OUTPATIENT
Start: 2023-04-03

## 2023-04-06 DIAGNOSIS — F31.9 BIPOLAR 1 DISORDER (HCC): ICD-10-CM

## 2023-04-06 RX ORDER — TRAZODONE HYDROCHLORIDE 100 MG/1
TABLET ORAL
Qty: 90 TABLET | Refills: 1 | Status: SHIPPED | OUTPATIENT
Start: 2023-04-06

## 2023-05-05 ENCOUNTER — TELEPHONE (OUTPATIENT)
Dept: FAMILY MEDICINE CLINIC | Facility: CLINIC | Age: 63
End: 2023-05-05

## 2023-05-05 PROBLEM — J06.9 URI WITH COUGH AND CONGESTION: Status: RESOLVED | Noted: 2023-03-06 | Resolved: 2023-05-05

## 2023-05-09 DIAGNOSIS — F31.9 BIPOLAR 1 DISORDER (HCC): ICD-10-CM

## 2023-05-09 RX ORDER — VENLAFAXINE HYDROCHLORIDE 150 MG/1
CAPSULE, EXTENDED RELEASE ORAL
Qty: 90 CAPSULE | Refills: 1 | Status: SHIPPED | OUTPATIENT
Start: 2023-05-09

## 2023-05-17 ENCOUNTER — TELEPHONE (OUTPATIENT)
Dept: FAMILY MEDICINE CLINIC | Facility: CLINIC | Age: 63
End: 2023-05-17

## 2023-10-21 DIAGNOSIS — F31.9 BIPOLAR 1 DISORDER (HCC): ICD-10-CM

## 2023-10-23 RX ORDER — TRAZODONE HYDROCHLORIDE 100 MG/1
100 TABLET ORAL
Qty: 90 TABLET | Refills: 0 | Status: SHIPPED | OUTPATIENT
Start: 2023-10-23

## 2023-11-15 DIAGNOSIS — I10 ESSENTIAL (PRIMARY) HYPERTENSION: ICD-10-CM

## 2023-11-15 RX ORDER — METOPROLOL TARTRATE 50 MG/1
TABLET, FILM COATED ORAL
Qty: 180 TABLET | Refills: 1 | Status: SHIPPED | OUTPATIENT
Start: 2023-11-15

## 2023-11-22 DIAGNOSIS — F31.9 BIPOLAR 1 DISORDER (HCC): ICD-10-CM

## 2023-11-22 DIAGNOSIS — Z76.0 MEDICATION REFILL: ICD-10-CM

## 2023-11-24 RX ORDER — ALBUTEROL SULFATE 90 UG/1
2 AEROSOL, METERED RESPIRATORY (INHALATION) EVERY 4 HOURS PRN
Qty: 18 G | Refills: 5 | Status: SHIPPED | OUTPATIENT
Start: 2023-11-24

## 2023-11-24 RX ORDER — TRAZODONE HYDROCHLORIDE 100 MG/1
100 TABLET ORAL
Qty: 90 TABLET | Refills: 0 | Status: SHIPPED | OUTPATIENT
Start: 2023-11-24

## 2023-12-21 DIAGNOSIS — I10 ESSENTIAL (PRIMARY) HYPERTENSION: ICD-10-CM

## 2023-12-21 RX ORDER — TRIAMTERENE AND HYDROCHLOROTHIAZIDE 37.5; 25 MG/1; MG/1
CAPSULE ORAL
Qty: 30 CAPSULE | Refills: 0 | Status: SHIPPED | OUTPATIENT
Start: 2023-12-21

## 2023-12-21 NOTE — TELEPHONE ENCOUNTER
I TRIED to leave message on patients VM,  (to tell her she is due for appt) however recording states the call could not be completed.

## 2023-12-23 DIAGNOSIS — J45.909 ASTHMA DUE TO SEASONAL ALLERGIES: ICD-10-CM

## 2023-12-26 DIAGNOSIS — J45.40 MODERATE PERSISTENT ASTHMA WITHOUT COMPLICATION: ICD-10-CM

## 2023-12-26 RX ORDER — MONTELUKAST SODIUM 10 MG/1
TABLET ORAL
Qty: 90 TABLET | Refills: 1 | Status: SHIPPED | OUTPATIENT
Start: 2023-12-26

## 2023-12-27 RX ORDER — FLUTICASONE PROPIONATE 220 UG/1
AEROSOL, METERED RESPIRATORY (INHALATION)
Qty: 12 G | Refills: 1 | Status: SHIPPED | OUTPATIENT
Start: 2023-12-27

## 2024-01-18 DIAGNOSIS — I10 ESSENTIAL (PRIMARY) HYPERTENSION: ICD-10-CM

## 2024-01-19 RX ORDER — TRIAMTERENE AND HYDROCHLOROTHIAZIDE 37.5; 25 MG/1; MG/1
CAPSULE ORAL
Qty: 90 CAPSULE | Refills: 1 | Status: SHIPPED | OUTPATIENT
Start: 2024-01-19

## 2024-02-02 DIAGNOSIS — I10 ESSENTIAL (PRIMARY) HYPERTENSION: ICD-10-CM

## 2024-02-02 RX ORDER — METOPROLOL TARTRATE 50 MG/1
TABLET, FILM COATED ORAL
Qty: 30 TABLET | Refills: 0 | Status: SHIPPED | OUTPATIENT
Start: 2024-02-02

## 2024-02-05 NOTE — TELEPHONE ENCOUNTER
I called patients phone number ([phone]), but got a recording that phone is not in service.  Maybe a letter can be sent?

## 2024-02-16 DIAGNOSIS — I10 ESSENTIAL (PRIMARY) HYPERTENSION: ICD-10-CM

## 2024-02-16 RX ORDER — METOPROLOL TARTRATE 50 MG/1
TABLET, FILM COATED ORAL
Qty: 180 TABLET | Refills: 1 | Status: SHIPPED | OUTPATIENT
Start: 2024-02-16

## 2024-03-05 NOTE — TELEPHONE ENCOUNTER
Marjorie Lr is just calling with an FYI:  Said she will no longer be needing you to order Venlafaxine & Trazodone for her as she is now seeing a psychiatrist 
Detail Level: Detailed
Quality 110: Preventive Care And Screening: Influenza Immunization: Influenza Immunization previously received during influenza season

## 2024-03-07 DIAGNOSIS — J45.40 MODERATE PERSISTENT ASTHMA WITHOUT COMPLICATION: ICD-10-CM

## 2024-03-12 DIAGNOSIS — J45.40 MODERATE PERSISTENT ASTHMA WITHOUT COMPLICATION: ICD-10-CM

## 2024-03-12 RX ORDER — FLUTICASONE PROPIONATE 220 UG/1
AEROSOL, METERED RESPIRATORY (INHALATION)
Qty: 12 G | Refills: 1 | Status: SHIPPED | OUTPATIENT
Start: 2024-03-12 | End: 2024-03-12

## 2024-03-12 RX ORDER — FLUTICASONE PROPIONATE 220 UG/1
AEROSOL, METERED RESPIRATORY (INHALATION)
Qty: 12 G | Refills: 1 | Status: SHIPPED | OUTPATIENT
Start: 2024-03-12

## 2024-03-12 NOTE — TELEPHONE ENCOUNTER
"Letter was sent to patient to please call our office to schedule an appt.  (Last seen 3/6/23).  Phone is \"not in service\"  "

## 2024-03-22 DIAGNOSIS — E78.5 DYSLIPIDEMIA: ICD-10-CM

## 2024-03-22 RX ORDER — ATORVASTATIN CALCIUM 20 MG/1
TABLET, FILM COATED ORAL
Qty: 30 TABLET | Refills: 0 | Status: SHIPPED | OUTPATIENT
Start: 2024-03-22

## 2024-04-05 DIAGNOSIS — E78.5 DYSLIPIDEMIA: ICD-10-CM

## 2024-04-08 RX ORDER — ATORVASTATIN CALCIUM 20 MG/1
TABLET, FILM COATED ORAL
Qty: 90 TABLET | Refills: 1 | Status: SHIPPED | OUTPATIENT
Start: 2024-04-08

## 2024-04-22 DIAGNOSIS — J45.41 MODERATE PERSISTENT ASTHMA WITH ACUTE EXACERBATION: Primary | ICD-10-CM

## 2024-04-22 RX ORDER — FLUTICASONE PROPIONATE AND SALMETEROL 250; 50 UG/1; UG/1
1 POWDER RESPIRATORY (INHALATION) 2 TIMES DAILY
Qty: 60 BLISTER | Refills: 3 | Status: SHIPPED | OUTPATIENT
Start: 2024-04-22 | End: 2024-05-01

## 2024-04-23 ENCOUNTER — TELEPHONE (OUTPATIENT)
Age: 64
End: 2024-04-23

## 2024-04-23 ENCOUNTER — RA CDI HCC (OUTPATIENT)
Dept: OTHER | Facility: HOSPITAL | Age: 64
End: 2024-04-23

## 2024-04-23 NOTE — TELEPHONE ENCOUNTER
MD ordered Advair but [facility] said insurance will not cover the Advair. Please have provider order another inhaler.  Any questions please call patient [phone].

## 2024-04-23 NOTE — PROGRESS NOTES
HCC coding opportunities          Chart Reviewed number of suggestions sent to Provider: 1  J45.40     Patients Insurance        Commercial Insurance: [employer]

## 2024-04-24 DIAGNOSIS — J45.41 MODERATE PERSISTENT ASTHMA WITH ACUTE EXACERBATION: Primary | ICD-10-CM

## 2024-04-24 RX ORDER — FLUTICASONE FUROATE 100 UG/1
1 POWDER RESPIRATORY (INHALATION) DAILY
Qty: 90 BLISTER | Refills: 0 | Status: SHIPPED | OUTPATIENT
Start: 2024-04-24 | End: 2024-07-23

## 2024-04-24 NOTE — TELEPHONE ENCOUNTER
Pt was calling back with information with the inhaler her insurance does cover. Pt stated that they cover Arnuity Ellipta inhaler.

## 2024-04-24 NOTE — TELEPHONE ENCOUNTER
Patient called back with the status of this medication and I gave her the name of medication that Dr. Balbuena suggested.  Breo 100.  I advised patient to call her insurance company and ask if the Breo 100 is covered or another comparable med.  Patient understood but is very frustrated.

## 2024-05-01 ENCOUNTER — OFFICE VISIT (OUTPATIENT)
Dept: FAMILY MEDICINE CLINIC | Facility: CLINIC | Age: 64
End: 2024-05-01
Payer: MEDICARE

## 2024-05-01 VITALS
HEART RATE: 74 BPM | SYSTOLIC BLOOD PRESSURE: 122 MMHG | RESPIRATION RATE: 16 BRPM | WEIGHT: 249 LBS | DIASTOLIC BLOOD PRESSURE: 82 MMHG | BODY MASS INDEX: 44.12 KG/M2 | HEIGHT: 63 IN | OXYGEN SATURATION: 94 % | TEMPERATURE: 97 F

## 2024-05-01 DIAGNOSIS — E78.5 DYSLIPIDEMIA: ICD-10-CM

## 2024-05-01 DIAGNOSIS — J44.9 CHRONIC OBSTRUCTIVE PULMONARY DISEASE, UNSPECIFIED COPD TYPE (HCC): ICD-10-CM

## 2024-05-01 DIAGNOSIS — N39.41 URGE URINARY INCONTINENCE: ICD-10-CM

## 2024-05-01 DIAGNOSIS — Q25.40 ABNORMALITY OF ASCENDING AORTA: ICD-10-CM

## 2024-05-01 DIAGNOSIS — N39.3 FEMALE STRESS INCONTINENCE: ICD-10-CM

## 2024-05-01 DIAGNOSIS — I77.819 WIDENING AORTA (HCC): Primary | ICD-10-CM

## 2024-05-01 DIAGNOSIS — R05.3 PERSISTENT COUGH: ICD-10-CM

## 2024-05-01 DIAGNOSIS — R68.89 EXERCISE INTOLERANCE: ICD-10-CM

## 2024-05-01 DIAGNOSIS — N39.41 URGE INCONTINENCE OF URINE: ICD-10-CM

## 2024-05-01 DIAGNOSIS — E66.01 MORBID OBESITY WITH BMI OF 45.0-49.9, ADULT (HCC): ICD-10-CM

## 2024-05-01 DIAGNOSIS — Z78.0 POSTMENOPAUSAL: ICD-10-CM

## 2024-05-01 DIAGNOSIS — Z00.00 MEDICARE ANNUAL WELLNESS VISIT, INITIAL: ICD-10-CM

## 2024-05-01 DIAGNOSIS — F20.0 PARANOID SCHIZOPHRENIA (HCC): ICD-10-CM

## 2024-05-01 DIAGNOSIS — Z82.49 FAMILY HISTORY OF CORONARY ARTERY BYPASS GRAFT SURGERY: ICD-10-CM

## 2024-05-01 DIAGNOSIS — Z12.11 SCREENING FOR MALIGNANT NEOPLASM OF COLON: ICD-10-CM

## 2024-05-01 DIAGNOSIS — J45.40 MODERATE PERSISTENT ASTHMA WITHOUT COMPLICATION: ICD-10-CM

## 2024-05-01 DIAGNOSIS — Z23 ENCOUNTER FOR IMMUNIZATION: ICD-10-CM

## 2024-05-01 DIAGNOSIS — Z12.31 ENCOUNTER FOR SCREENING MAMMOGRAM FOR MALIGNANT NEOPLASM OF BREAST: ICD-10-CM

## 2024-05-01 DIAGNOSIS — I10 PRIMARY HYPERTENSION: ICD-10-CM

## 2024-05-01 DIAGNOSIS — Z11.59 NEED FOR HEPATITIS C SCREENING TEST: ICD-10-CM

## 2024-05-01 PROBLEM — J30.9 ALLERGIC RHINITIS: Status: ACTIVE | Noted: 2024-05-01

## 2024-05-01 PROCEDURE — G0438 PPPS, INITIAL VISIT: HCPCS | Performed by: FAMILY MEDICINE

## 2024-05-01 PROCEDURE — G0009 ADMIN PNEUMOCOCCAL VACCINE: HCPCS | Performed by: FAMILY MEDICINE

## 2024-05-01 PROCEDURE — 90677 PCV20 VACCINE IM: CPT | Performed by: FAMILY MEDICINE

## 2024-05-01 PROCEDURE — 1123F ACP DISCUSS/DSCN MKR DOCD: CPT | Performed by: FAMILY MEDICINE

## 2024-05-01 PROCEDURE — 99215 OFFICE O/P EST HI 40 MIN: CPT | Performed by: FAMILY MEDICINE

## 2024-05-01 RX ORDER — OXYCODONE HYDROCHLORIDE 5 MG/1
TABLET ORAL
COMMUNITY
End: 2024-05-01

## 2024-05-01 RX ORDER — OXYBUTYNIN CHLORIDE 5 MG/1
5 TABLET, EXTENDED RELEASE ORAL DAILY
Qty: 30 TABLET | Refills: 3 | Status: SHIPPED | OUTPATIENT
Start: 2024-05-01

## 2024-05-01 RX ORDER — LAMOTRIGINE 100 MG/1
100 TABLET ORAL EVERY MORNING
COMMUNITY
Start: 2024-04-09

## 2024-05-01 NOTE — ASSESSMENT & PLAN NOTE
Pt had CT chest due for widening aorta will look at lungs as well p to see pulmonary to differentiate COPD asthma with PFTS

## 2024-05-01 NOTE — PATIENT INSTRUCTIONS
Medicare Preventive Visit Patient Instructions  Thank you for completing your Welcome to Medicare Visit or Medicare Annual Wellness Visit today. Your next wellness visit will be due in one year (5/2/2025).  The screening/preventive services that you may require over the next 5-10 years are detailed below. Some tests may not apply to you based off risk factors and/or age. Screening tests ordered at today's visit but not completed yet may show as past due. Also, please note that scanned in results may not display below.  Preventive Screenings:  Service Recommendations Previous Testing/Comments   Colorectal Cancer Screening  * Colonoscopy    * Fecal Occult Blood Test (FOBT)/Fecal Immunochemical Test (FIT)  * Fecal DNA/Cologuard Test  * Flexible Sigmoidoscopy Age: 45-75 years old   Colonoscopy: every 10 years (may be performed more frequently if at higher risk)  OR  FOBT/FIT: every 1 year  OR  Cologuard: every 3 years  OR  Sigmoidoscopy: every 5 years  Screening may be recommended earlier than age 45 if at higher risk for colorectal cancer. Also, an individualized decision between you and your healthcare provider will decide whether screening between the ages of 76-85 would be appropriate. Colonoscopy: Not on file  FOBT/FIT: Not on file  Cologuard: Not on file  Sigmoidoscopy: Not on file    Risks and Benefits Discussed  Due for Colonoscopy - Low Risk     Breast Cancer Screening Age: 40+ years old  Frequency: every 1-2 years  Not required if history of left and right mastectomy Mammogram: 07/01/2016    Risks and Benefits Discussed  Due for Mammogram   Cervical Cancer Screening Between the ages of 21-29, pap smear recommended once every 3 years.   Between the ages of 30-65, can perform pap smear with HPV co-testing every 5 years.   Recommendations may differ for women with a history of total hysterectomy, cervical cancer, or abnormal pap smears in past. Pap Smear: 09/28/2016    Screening Not Indicated  Patient Declines    Hepatitis C Screening Once for adults born between 1945 and 1965  More frequently in patients at high risk for Hepatitis C Hep C Antibody: Not on file    Risks and Benefits Discussed  Due for Hepatitis C screening   Diabetes Screening 1-2 times per year if you're at risk for diabetes or have pre-diabetes Fasting glucose: No results in last 5 years (No results in last 5 years)  A1C: No results in last 5 years (No results in last 5 years)  Risks and Benefits Discussed  Due for Blood Glucose   Cholesterol Screening Once every 5 years if you don't have a lipid disorder. May order more often based on risk factors. Lipid panel: Not on file    Risks and Benefits Discussed  Due for Lipid Panel     Other Preventive Screenings Covered by Medicare:  Abdominal Aortic Aneurysm (AAA) Screening: covered once if your at risk. You're considered to be at risk if you have a family history of AAA.  Lung Cancer Screening: covers low dose CT scan once per year if you meet all of the following conditions: (1) Age 55-77; (2) No signs or symptoms of lung cancer; (3) Current smoker or have quit smoking within the last 15 years; (4) You have a tobacco smoking history of at least 20 pack years (packs per day multiplied by number of years you smoked); (5) You get a written order from a healthcare provider.  Glaucoma Screening: covered annually if you're considered high risk: (1) You have diabetes OR (2) Family history of glaucoma OR (3)  aged 50 and older OR (4)  American aged 65 and older  Osteoporosis Screening: covered every 2 years if you meet one of the following conditions: (1) You're estrogen deficient and at risk for osteoporosis based off medical history and other findings; (2) Have a vertebral abnormality; (3) On glucocorticoid therapy for more than 3 months; (4) Have primary hyperparathyroidism; (5) On osteoporosis medications and need to assess response to drug therapy.   Last bone density test (DXA Scan):  Not on file.  HIV Screening: covered annually if you're between the age of 15-65. Also covered annually if you are younger than 15 and older than 65 with risk factors for HIV infection. For pregnant patients, it is covered up to 3 times per pregnancy.    Immunizations:  Immunization Recommendations   Influenza Vaccine Annual influenza vaccination during flu season is recommended for all persons aged >= 6 months who do not have contraindications   Pneumococcal Vaccine   * Pneumococcal conjugate vaccine = PCV13 (Prevnar 13), PCV15 (Vaxneuvance), PCV20 (Prevnar 20)  * Pneumococcal polysaccharide vaccine = PPSV23 (Pneumovax) Adults 19-63 yo with certain risk factors or if 65+ yo  If never received any pneumonia vaccine: recommend Prevnar 20 (PCV20)  Give PCV20 if previously received 1 dose of PCV13 or PPSV23   Hepatitis B Vaccine 3 dose series if at intermediate or high risk (ex: diabetes, end stage renal disease, liver disease)   Respiratory syncytial virus (RSV) Vaccine - COVERED BY MEDICARE PART D  * RSVPreF3 (Arexvy) CDC recommends that adults 60 years of age and older may receive a single dose of RSV vaccine using shared clinical decision-making (SCDM)   Tetanus (Td) Vaccine - COST NOT COVERED BY MEDICARE PART B Following completion of primary series, a booster dose should be given every 10 years to maintain immunity against tetanus. Td may also be given as tetanus wound prophylaxis.   Tdap Vaccine - COST NOT COVERED BY MEDICARE PART B Recommended at least once for all adults. For pregnant patients, recommended with each pregnancy.   Shingles Vaccine (Shingrix) - COST NOT COVERED BY MEDICARE PART B  2 shot series recommended in those 19 years and older who have or will have weakened immune systems or those 50 years and older     Health Maintenance Due:      Topic Date Due   • Hepatitis C Screening  Never done   • Colorectal Cancer Screening  Never done   • Breast Cancer Screening: Mammogram  09/08/2016      Immunizations Due:      Topic Date Due   • Pneumococcal Vaccine: 65+ Years (1 of 2 - PCV) Never done   • COVID-19 Vaccine (1 - 2023-24 season) Never done     Advance Directives   What are advance directives?  Advance directives are legal documents that state your wishes and plans for medical care. These plans are made ahead of time in case you lose your ability to make decisions for yourself. Advance directives can apply to any medical decision, such as the treatments you want, and if you want to donate organs.   What are the types of advance directives?  There are many types of advance directives, and each state has rules about how to use them. You may choose a combination of any of the following:  Living will:  This is a written record of the treatment you want. You can also choose which treatments you do not want, which to limit, and which to stop at a certain time. This includes surgery, medicine, IV fluid, and tube feedings.   Durable power of  for healthcare (DPAHC):  This is a written record that states who you want to make healthcare choices for you when you are unable to make them for yourself. This person, called a proxy, is usually a family member or a friend. You may choose more than 1 proxy.  Do not resuscitate (DNR) order:  A DNR order is used in case your heart stops beating or you stop breathing. It is a request not to have certain forms of treatment, such as CPR. A DNR order may be included in other types of advance directives.  Medical directive:  This covers the care that you want if you are in a coma, near death, or unable to make decisions for yourself. You can list the treatments you want for each condition. Treatment may include pain medicine, surgery, blood transfusions, dialysis, IV or tube feedings, and a ventilator (breathing machine).  Values history:  This document has questions about your views, beliefs, and how you feel and think about life. This information can help others  choose the care that you would choose.  Why are advance directives important?  An advance directive helps you control your care. Although spoken wishes may be used, it is better to have your wishes written down. Spoken wishes can be misunderstood, or not followed. Treatments may be given even if you do not want them. An advance directive may make it easier for your family to make difficult choices about your care.   Urinary Incontinence   Urinary incontinence (UI)  is when you lose control of your bladder. UI develops because your bladder cannot store or empty urine properly. The 3 most common types of UI are stress incontinence, urge incontinence, or both.  Medicines:   May be given to help strengthen your bladder control. Report any side effects of medication to your healthcare provider.  Do pelvic muscle exercises often:  Your pelvic muscles help you stop urinating. Squeeze these muscles tight for 5 seconds, then relax for 5 seconds. Gradually work up to squeezing for 10 seconds. Do 3 sets of 15 repetitions a day, or as directed. This will help strengthen your pelvic muscles and improve bladder control.  Train your bladder:  Go to the bathroom at set times, such as every 2 hours, even if you do not feel the urge to go. You can also try to hold your urine when you feel the urge to go. For example, hold your urine for 5 minutes when you feel the urge to go. As that becomes easier, hold your urine for 10 minutes.   Self-care:   Keep a UI record.  Write down how often you leak urine and how much you leak. Make a note of what you were doing when you leaked urine.  Drink liquids as directed. You may need to limit the amount of liquid you drink to help control your urine leakage. Do not drink any liquid right before you go to bed. Limit or do not have drinks that contain caffeine or alcohol.   Prevent constipation.  Eat a variety of high-fiber foods. Good examples are high-fiber cereals, beans, vegetables, and  whole-grain breads. Walking is the best way to trigger your intestines to have a bowel movement.  Exercise regularly and maintain a healthy weight.  Weight loss and exercise will decrease pressure on your bladder and help you control your leakage.   Use a catheter as directed  to help empty your bladder. A catheter is a tiny, plastic tube that is put into your bladder to drain your urine.   Go to behavior therapy as directed.  Behavior therapy may be used to help you learn to control your urge to urinate.    Weight Management   Why it is important to manage your weight:  Being overweight increases your risk of health conditions such as heart disease, high blood pressure, type 2 diabetes, and certain types of cancer. It can also increase your risk for osteoarthritis, sleep apnea, and other respiratory problems. Aim for a slow, steady weight loss. Even a small amount of weight loss can lower your risk of health problems.  How to lose weight safely:  A safe and healthy way to lose weight is to eat fewer calories and get regular exercise. You can lose up about 1 pound a week by decreasing the number of calories you eat by 500 calories each day.   Healthy meal plan for weight management:  A healthy meal plan includes a variety of foods, contains fewer calories, and helps you stay healthy. A healthy meal plan includes the following:  Eat whole-grain foods more often.  A healthy meal plan should contain fiber. Fiber is the part of grains, fruits, and vegetables that is not broken down by your body. Whole-grain foods are healthy and provide extra fiber in your diet. Some examples of whole-grain foods are whole-wheat breads and pastas, oatmeal, brown rice, and bulgur.  Eat a variety of vegetables every day.  Include dark, leafy greens such as spinach, kale, luciana greens, and mustard greens. Eat yellow and orange vegetables such as carrots, sweet potatoes, and winter squash.   Eat a variety of fruits every day.  Choose  fresh or canned fruit (canned in its own juice or light syrup) instead of juice. Fruit juice has very little or no fiber.  Eat low-fat dairy foods.  Drink fat-free (skim) milk or 1% milk. Eat fat-free yogurt and low-fat cottage cheese. Try low-fat cheeses such as mozzarella and other reduced-fat cheeses.  Choose meat and other protein foods that are low in fat.  Choose beans or other legumes such as split peas or lentils. Choose fish, skinless poultry (chicken or turkey), or lean cuts of red meat (beef or pork). Before you cook meat or poultry, cut off any visible fat.   Use less fat and oil.  Try baking foods instead of frying them. Add less fat, such as margarine, sour cream, regular salad dressing and mayonnaise to foods. Eat fewer high-fat foods. Some examples of high-fat foods include french fries, doughnuts, ice cream, and cakes.  Eat fewer sweets.  Limit foods and drinks that are high in sugar. This includes candy, cookies, regular soda, and sweetened drinks.  Exercise:  Exercise at least 30 minutes per day on most days of the week. Some examples of exercise include walking, biking, dancing, and swimming. You can also fit in more physical activity by taking the stairs instead of the elevator or parking farther away from stores. Ask your healthcare provider about the best exercise plan for you.      © Copyright [employer] 2018 Information is for End User's use only and may not be sold, redistributed or otherwise used for commercial purposes. All illustrations and images included in CareNotes® are the copyrighted property of [employer] or [employer]       0 = independent

## 2024-05-01 NOTE — PROGRESS NOTES
Assessment and Plan:     Problem List Items Addressed This Visit        Cardiovascular and Mediastinum    Primary hypertension     Well controlled on current therapy continue with current medications and will reassess next visit           Relevant Orders    Basic metabolic panel    Abnormality of ascending aorta     Ectasia due for repeat CT chest            Respiratory    Moderate persistent asthma without complication     Pt still with chronic cough on singulair  will send to  Pulmonary          Relevant Orders    Ambulatory Referral to Pulmonology    Chronic obstructive pulmonary disease, unspecified COPD type ([facility])     14 pack yrs quit 30 yrs ago  may need anoro will get pulmonology opinion         Relevant Orders    Ambulatory Referral to Pulmonology       Urinary    Urge incontinence of urine     Trial of ditropan xl 5mg po qd           Relevant Medications    oxybutynin (DITROPAN-XL) 5 mg 24 hr tablet    Urge urinary incontinence     Ditropan XL         Female stress incontinence     Kegel exercises            Behavioral Health    Paranoid schizophrenia ([facility])     Followed by psychiatry            Obstetrics/Gynecology    Postmenopausal     Check dexa         Relevant Orders    DXA bone density spine hip and pelvis    Encounter for screening mammogram for malignant neoplasm of breast    Relevant Orders    Mammo screening bilateral w 3d & cad       Other    Dyslipidemia     Needs lipids           Relevant Orders    Lipid panel    Morbid obesity with BMI of 45.0-49.9, adult ([facility])     Discussion on diet and exercise guidelines for weight loss and  health reviewed with pt            Screening for malignant neoplasm of colon     Needs colonoscopy         Relevant Orders    Ambulatory Referral to Gastroenterology    Medicare annual wellness visit, initial     Reviewed diagnostics ordered          Persistent cough     Pt had CT chest due for widening aorta will look at lungs as well p to see pulmonary to  differentiate COPD asthma with PFTS         Exercise intolerance     No chest pain worsening exercise likely due to COPD however family hx of heart disease CAD with CABG x 3  mother age 50's will cardiology evaluation         Relevant Orders    Ambulatory Referral to Cardiology    Family history of coronary artery bypass graft surgery     Mother had disease age 50's will have pt get cardiac eval;          Relevant Orders    Ambulatory Referral to Cardiology   Other Visit Diagnoses     Widening aorta (HCC)    -  Primary    CT chest    Relevant Orders    CT chest wo contrast    Encounter for immunization        Relevant Orders    Pneumococcal Conjugate Vaccine 20-valent (Pcv20)    Need for hepatitis C screening test        Relevant Orders    Hepatitis C Antibody           Preventive health issues were discussed with patient, and age appropriate screening tests were ordered as noted in patient's After Visit Summary.  Personalized health advice and appropriate referrals for health education or preventive services given if needed, as noted in patient's After Visit Summary.     History of Present Illness:     Patient presents for a Medicare Wellness Visit    Shortness of Breath  Associated symptoms include coughing.   Fever  Associated symptoms include coughing.   Cough  Associated symptoms include shortness of breath.      Patient Care Team:  Meenakshi Balbuena MD as PCP - General (Family Medicine)  Meenakshi Balbuena MD as PCP - PCP-[facility] ([facility])  Meenakshi Balbuena MD as PCP - PCP-[employer] ([facility])  Terrence Fuentes MD     Review of Systems:     Review of Systems   Respiratory:  Positive for cough and shortness of breath.         Problem List:     Patient Active Problem List   Diagnosis   • Bipolar 1 disorder (HCC)   • Primary hypertension   • Dyslipidemia   • Moderate persistent asthma without complication   • Morbid obesity with BMI of 45.0-49.9, adult (HCC)   • Paranoid schizophrenia (HCC)   • Chronic  obstructive pulmonary disease, unspecified COPD type (HCC)   • Asthma due to seasonal allergies   • Abnormality of ascending aorta   • Postmenopausal   • Encounter for screening mammogram for malignant neoplasm of breast   • Screening for malignant neoplasm of colon   • Medicare annual wellness visit, initial   • Allergic rhinitis   • Persistent cough   • Urge incontinence of urine   • Exercise intolerance   • Family history of coronary artery bypass graft surgery   • Urge urinary incontinence   • Female stress incontinence      Past Medical and Surgical History:     Past Medical History:   Diagnosis Date   • Asthma    • Chronic pain    • Hypertension      Past Surgical History:   Procedure Laterality Date   • BACK SURGERY     • COLONOSCOPY     • TUBAL LIGATION        Family History:     Family History   Problem Relation Age of Onset   • Diabetes Mother    • Hypertension Mother    • Lung cancer Mother    • Colon cancer Mother    • Colon cancer Father    • Hypertension Sister    • Multiple sclerosis Sister    • Hypothyroidism Maternal Aunt       Social History:     Social History     Socioeconomic History   • Marital status: /Civil Union     Spouse name: None   • Number of children: None   • Years of education: None   • Highest education level: None   Occupational History   • None   Tobacco Use   • Smoking status: Former     Current packs/day: 0.00     Average packs/day: 1 pack/day for 15.0 years (15.0 ttl pk-yrs)     Types: Cigarettes     Start date:      Quit date:      Years since quittin.3   • Smokeless tobacco: Never   Vaping Use   • Vaping status: Never Used   Substance and Sexual Activity   • Alcohol use: No     Comment: hX:Occas.    • Drug use: No   • Sexual activity: Not Currently   Other Topics Concern   • None   Social History Narrative    Most recent tobacco use screenin2018    Alcohol intake: Occasional    Last modified by [id number]    2019,      Social  Determinants of Health     Financial Resource Strain: Not on file   Food Insecurity: Not on file   Transportation Needs: Not on file   Physical Activity: Not on file   Stress: Not on file   Social Connections: Unknown (1/6/2023)    Received from [facility]    Social Connection and Isolation Panel [NHANES]    • Frequency of Communication with Friends and Family: Patient declined    • Frequency of Social Gatherings with Friends and Family: Patient declined    • Attends Orthodoxy Services: Patient declined    • Active Member of Clubs or Organizations: Patient declined    • Attends Club or Organization Meetings: Patient declined    • Marital Status:    Intimate Partner Violence: Unknown (1/3/2023)    Received from [facility]    Intimate Partner Violence    • Within the last year, have you been afraid of your partner, ex-partner or family member?: Not on file    • Within the last year, have you been humiliated or emotionally abused in other ways by your partner, ex-partner or family member?: Not on file    • Within the last year, have you been kicked, hit, slapped, or otherwise physically hurt by your partner, ex-partner or family member?: Not on file    • Within the last year, have you been raped or forced to have any kind of sexual activity by your partner, ex-partner or family member?: Not on file   Housing Stability: Not on file      Medications and Allergies:     Current Outpatient Medications   Medication Sig Dispense Refill   • albuterol (PROVENTIL HFA,VENTOLIN HFA) 90 mcg/act inhaler INHALE 2 PUFFS BY MOUTH EVERY 4 HOURS AS NEEDED FOR SHORTNESS OF BREATH 18 g 5   • atorvastatin (LIPITOR) 20 mg tablet TAKE 1 TABLET BY MOUTH EVERY DAY 90 tablet 1   • fluticasone (Arnuity Ellipta) 100 MCG/ACT AEPB inhaler Inhale 1 puff daily Rinse mouth after use. 90 blister 0   • lamoTRIgine (LaMICtal) 100 mg tablet Take 100 mg by mouth every morning     • metoprolol tartrate (LOPRESSOR) 50 mg  tablet TAKE 1 TABLET BY MOUTH TWICE A DAY (INS 30 DAY LIMIT) 180 tablet 1   • montelukast (SINGULAIR) 10 mg tablet TAKE 1 TABLET BY MOUTH EVERY DAY 90 tablet 1   • oxybutynin (DITROPAN-XL) 5 mg 24 hr tablet Take 1 tablet (5 mg total) by mouth daily 30 tablet 3   • traZODone (DESYREL) 100 mg tablet TAKE 1 TABLET BY MOUTH EVERYDAY AT BEDTIME (Patient taking differently: Take 200 mg by mouth daily at bedtime) 90 tablet 0   • triamterene-hydrochlorothiazide (DYAZIDE) 37.5-25 mg per capsule TAKE 1 CAPSULE BY MOUTH EVERY DAY 90 capsule 1   • venlafaxine (EFFEXOR-XR) 150 mg 24 hr capsule TAKE 1 CAPSULE BY MOUTH DAILY WITH BREAKFAST. (Patient taking differently: 220mg in am) 30 capsule 0   • cetirizine (ZyrTEC) 10 mg tablet Take 1 tablet (10 mg total) by mouth daily (Patient not taking: Reported on 5/1/2024) 30 tablet 2     No current facility-administered medications for this visit.     Allergies   Allergen Reactions   • Penicillins Hives     Reaction Date: 24May2005; Annotation - 04Sep2012: meena rn   • Amoxicillin Rash   • Aspirin Rash   • Ibuprofen Rash   • Morphine Rash   • Oxycodone Rash   • Valacyclovir Rash      Immunizations:     Immunization History   Administered Date(s) Administered   • Tdap 01/12/2022      Health Maintenance:         Topic Date Due   • Hepatitis C Screening  Never done   • Colorectal Cancer Screening  Never done   • Breast Cancer Screening: Mammogram  09/08/2016         Topic Date Due   • Pneumococcal Vaccine: 65+ Years (1 of 2 - PCV) Never done   • COVID-19 Vaccine (1 - 2023-24 season) Never done      Medicare Screening Tests and Risk Assessments:     Ngozi is here for her Initial Wellness visit.     Health Risk Assessment:   Patient rates overall health as good. Patient feels that their physical health rating is same. Patient is very satisfied with their life. Eyesight was rated as same. Hearing was rated as same. Patient feels that their emotional and mental health rating is same. Patients  states they are never, rarely angry. Patient states they are always unusually tired/fatigued. Pain experienced in the last 7 days has been some. Patient's pain rating has been 6/10. Patient states that she has experienced no weight loss or gain in last 6 months.     Depression Screening:   PHQ-2 Score: 0      Fall Risk Screening:   In the past year, patient has experienced: no history of falling in past year      Urinary Incontinence Screening:   Patient has leaked urine accidently in the last six months.     Home Safety:  Patient does not have trouble with stairs inside or outside of their home. Patient has working smoke alarms and has working carbon monoxide detector. Home safety hazards include: none.     Nutrition:   Current diet is Regular and Limited junk food.     Medications:   Patient is currently taking over-the-counter supplements. OTC medications include: see medication list. Patient is able to manage medications.     Activities of Daily Living (ADLs)/Instrumental Activities of Daily Living (IADLs):   Walk and transfer into and out of bed and chair?: Yes  Dress and groom yourself?: Yes    Bathe or shower yourself?: Yes    Feed yourself? Yes  Do your laundry/housekeeping?: Yes  Manage your money, pay your bills and track your expenses?: Yes  Make your own meals?: Yes    Do your own shopping?: Yes    Previous Hospitalizations:   Any hospitalizations or ED visits within the last 12 months?: No      Advance Care Planning:   Living will: No    Durable POA for healthcare: No    Advanced directive: No      Cognitive Screening:   Provider or family/friend/caregiver concerned regarding cognition?: No    PREVENTIVE SCREENINGS      Cardiovascular Screening:    General: Risks and Benefits Discussed    Due for: Lipid Panel      Diabetes Screening:     General: Risks and Benefits Discussed    Due for: Blood Glucose      Colorectal Cancer Screening:     General: Risks and Benefits Discussed    Due for: Colonoscopy -  "Low Risk      Breast Cancer Screening:     General: Risks and Benefits Discussed    Due for: Mammogram        Cervical Cancer Screening:    General: Screening Not Indicated and Patient Declines      Osteoporosis Screening:    General: Risks and Benefits Discussed    Due for: DXA Axial      Abdominal Aortic Aneurysm (AAA) Screening:        General: Screening Not Indicated      Lung Cancer Screening:     General: Screening Not Indicated      Hepatitis C Screening:    General: Risks and Benefits Discussed    Hep C Screening Accepted: Yes      Screening, Brief Intervention, and Referral to Treatment (SBIRT)    Screening      AUDIT-C Screenin) How often did you have a drink containing alcohol in the past year? never  2) How many drinks did you have on a typical day when you were drinking in the past year? 0  3) How often did you have 6 or more drinks on one occasion in the past year? never    AUDIT-C Score: 0  Interpretation: Score 0-2 (female): Negative screen for alcohol misuse    Single Item Drug Screening:  How often have you used an illegal drug (including marijuana) or a prescription medication for non-medical reasons in the past year? never    Single Item Drug Screen Score: 0  Interpretation: Negative screen for possible drug use disorder    No results found.     Physical Exam:     /82 (BP Location: Left arm, Patient Position: Sitting, Cuff Size: Large)   Pulse 74   Temp (!) 97 °F (36.1 °C) (Tympanic)   Resp 16   Ht 5' 3\" (1.6 m)   Wt 113 kg (249 lb)   SpO2 94%   BMI 44.11 kg/m²     Physical Exam     Meenakshi Balbuena MD  "

## 2024-05-01 NOTE — PROGRESS NOTES
Name: Ngozi Beard      : 1960      MRN: [id number]  Encounter Provider: Meenakshi Balbuena MD  Encounter Date: 2024   Encounter department: [facility] MEDICINE    Assessment & Plan     1. Widening aorta (HCC)  Comments:  CT chest  Orders:  -     CT chest wo contrast; Future; Expected date: 2024    2. Encounter for immunization  -     Pneumococcal Conjugate Vaccine 20-valent (Pcv20)    3. Moderate persistent asthma without complication  Assessment & Plan:  Pt still with chronic cough on singulair  will send to  Pulmonary     Orders:  -     Ambulatory Referral to Pulmonology; Future    4. Chronic obstructive pulmonary disease, unspecified COPD type (HCC)  Assessment & Plan:  14 pack yrs quit 30 yrs ago  may need anoro will get pulmonology opinion    Orders:  -     Ambulatory Referral to Pulmonology; Future    5. Dyslipidemia  Assessment & Plan:  Needs lipids      Orders:  -     Lipid panel; Future; Expected date: 2024    6. Primary hypertension  Assessment & Plan:  Well controlled on current therapy continue with current medications and will reassess next visit      Orders:  -     Basic metabolic panel; Future    7. Morbid obesity with BMI of 45.0-49.9, adult (HCC)  Assessment & Plan:  Discussion on diet and exercise guidelines for weight loss and  health reviewed with pt         8. Abnormality of ascending aorta  Assessment & Plan:  Ectasia due for repeat CT chest      9. Paranoid schizophrenia (HCC)  Assessment & Plan:  Followed by psychiatry      10. Need for hepatitis C screening test  -     Hepatitis C Antibody; Future    11. Postmenopausal  Assessment & Plan:  Check dexa    Orders:  -     DXA bone density spine hip and pelvis; Future; Expected date: 2024    12. Encounter for screening mammogram for malignant neoplasm of breast  -     Mammo screening bilateral w 3d & cad; Future    13. Screening for malignant neoplasm of colon  Assessment & Plan:  Needs  colonoscopy    Orders:  -     Ambulatory Referral to Gastroenterology; Future    14. Medicare annual wellness visit, initial  Assessment & Plan:  Reviewed diagnostics ordered       15. Persistent cough  Assessment & Plan:  Pt had CT chest due for widening aorta will look at lungs as well p to see pulmonary to differentiate COPD asthma with PFTS      16. Female stress incontinence  Assessment & Plan:  Kegel exercises      17. Urge urinary incontinence  Assessment & Plan:  Ditropan XL      18. Urge incontinence of urine  Assessment & Plan:  Trial of ditropan xl 5mg po qd      Orders:  -     oxybutynin (DITROPAN-XL) 5 mg 24 hr tablet; Take 1 tablet (5 mg total) by mouth daily    19. Exercise intolerance  Assessment & Plan:  No chest pain worsening exercise likely due to COPD however family hx of heart disease CAD with CABG x 3  mother age 50's will cardiology evaluation    Orders:  -     Ambulatory Referral to Cardiology; Future    20. Family history of coronary artery bypass graft surgery  Assessment & Plan:  Mother had disease age 50's will have pt get cardiac eval;     Orders:  -     Ambulatory Referral to Cardiology; Future           Subjective      Last saw pt 1/22 Pt is here for interval visit and evaluation of multiple medical problems, review of medications, labs, Health Maintenance and any recent specialty consults medicare wellness      Shortness of Breath  Associated symptoms include coughing and wheezing. Pertinent negatives include no chest pain, dizziness, fatigue, leg swelling or palpitations.   Fever  Associated symptoms include coughing. Pertinent negatives include no abdominal pain, arthralgias, chest pain, chills, fatigue, fever, headaches, nausea, neck pain, numbness, rash, vomiting or weakness.   Cough  Associated symptoms include shortness of breath (walking around/steps) and wheezing. Pertinent negatives include no chest pain, chills, fever, headaches or rash.     Review of Systems    Constitutional:  Negative for appetite change, chills, fatigue and fever.   Respiratory:  Positive for cough, shortness of breath (walking around/steps) and wheezing. Negative for chest tightness.    Cardiovascular:  Negative for chest pain, palpitations and leg swelling.   Gastrointestinal:  Negative for abdominal pain, constipation, diarrhea, nausea and vomiting.   Genitourinary:  Positive for enuresis (urge incontinence). Negative for difficulty urinating and frequency.   Musculoskeletal:  Negative for arthralgias, back pain, gait problem and neck pain.   Skin:  Negative for rash.   Neurological:  Negative for dizziness, weakness, light-headedness, numbness and headaches.   Hematological:  Does not bruise/bleed easily.   Psychiatric/Behavioral:  Negative for dysphoric mood and sleep disturbance. The patient is not nervous/anxious.        Current Outpatient Medications on File Prior to Visit   Medication Sig   • albuterol (PROVENTIL HFA,VENTOLIN HFA) 90 mcg/act inhaler INHALE 2 PUFFS BY MOUTH EVERY 4 HOURS AS NEEDED FOR SHORTNESS OF BREATH   • atorvastatin (LIPITOR) 20 mg tablet TAKE 1 TABLET BY MOUTH EVERY DAY   • fluticasone (Arnuity Ellipta) 100 MCG/ACT AEPB inhaler Inhale 1 puff daily Rinse mouth after use.   • lamoTRIgine (LaMICtal) 100 mg tablet Take 100 mg by mouth every morning   • metoprolol tartrate (LOPRESSOR) 50 mg tablet TAKE 1 TABLET BY MOUTH TWICE A DAY (INS 30 DAY LIMIT)   • montelukast (SINGULAIR) 10 mg tablet TAKE 1 TABLET BY MOUTH EVERY DAY   • traZODone (DESYREL) 100 mg tablet TAKE 1 TABLET BY MOUTH EVERYDAY AT BEDTIME (Patient taking differently: Take 200 mg by mouth daily at bedtime)   • triamterene-hydrochlorothiazide (DYAZIDE) 37.5-25 mg per capsule TAKE 1 CAPSULE BY MOUTH EVERY DAY   • venlafaxine (EFFEXOR-XR) 150 mg 24 hr capsule TAKE 1 CAPSULE BY MOUTH DAILY WITH BREAKFAST. (Patient taking differently: 220mg in am)   • cetirizine (ZyrTEC) 10 mg tablet Take 1 tablet (10 mg total) by  "mouth daily (Patient not taking: Reported on 5/1/2024)   • [DISCONTINUED] Fluticasone-Salmeterol (Advair Diskus) 250-50 mcg/dose inhaler Inhale 1 puff 2 (two) times a day Rinse mouth after use. (Patient not taking: Reported on 5/1/2024)   • [DISCONTINUED] lidocaine (Lidoderm) 5 % Apply 1 patch topically over 12 hours daily Remove & Discard patch within 12 hours or as directed by MD (Patient not taking: Reported on 5/1/2024)   • [DISCONTINUED] methocarbamol (ROBAXIN) 500 mg tablet Take 1 tablet (500 mg total) by mouth 2 (two) times a day (Patient not taking: Reported on 5/1/2024)   • [DISCONTINUED] oxyCODONE (ROXICODONE) 5 immediate release tablet  (Patient not taking: Reported on 5/1/2024)   • [DISCONTINUED] Promethazine-DM (PHENERGAN-DM) 6.25-15 mg/5 mL oral syrup Take 5 mL by mouth 4 (four) times a day as needed for cough (Patient not taking: Reported on 5/1/2024)   • [DISCONTINUED] ziprasidone (GEODON) 40 mg capsule Take 1 capsule (40 mg total) by mouth 2 (two) times a day with meals (Patient not taking: Reported on 3/6/2023)       Objective     /82 (BP Location: Left arm, Patient Position: Sitting, Cuff Size: Large)   Pulse 74   Temp (!) 97 °F (36.1 °C) (Tympanic)   Resp 16   Ht 5' 3\" (1.6 m)   Wt 113 kg (249 lb)   SpO2 94%   BMI 44.11 kg/m²     Physical Exam  Vitals reviewed.   Constitutional:       General: She is not in acute distress.     Appearance: Normal appearance. She is well-developed. She is obese. She is not ill-appearing.   HENT:      Mouth/Throat:      Mouth: Mucous membranes are moist.   Eyes:      Extraocular Movements: Extraocular movements intact.      Conjunctiva/sclera: Conjunctivae normal.      Pupils: Pupils are equal, round, and reactive to light.   Neck:      Thyroid: No thyromegaly.      Vascular: No carotid bruit.   Cardiovascular:      Rate and Rhythm: Normal rate and regular rhythm.      Pulses: Normal pulses.      Heart sounds: Normal heart sounds. No murmur " heard.  Pulmonary:      Effort: Pulmonary effort is normal. No respiratory distress.      Breath sounds: Normal breath sounds.   Chest:      Chest wall: No tenderness.   Abdominal:      General: Bowel sounds are normal. There is no distension.      Palpations: Abdomen is soft.      Tenderness: There is no abdominal tenderness.   Musculoskeletal:      Cervical back: Normal range of motion and neck supple.   Lymphadenopathy:      Cervical: No cervical adenopathy.   Skin:     General: Skin is warm and dry.   Neurological:      General: No focal deficit present.      Mental Status: She is alert and oriented to person, place, and time. Mental status is at baseline.      Cranial Nerves: No cranial nerve deficit.      Deep Tendon Reflexes: Reflexes normal.   Psychiatric:         Mood and Affect: Mood normal.         Behavior: Behavior normal.       Meenakshi Balbuena MD

## 2024-05-01 NOTE — ASSESSMENT & PLAN NOTE
No chest pain worsening exercise likely due to COPD however family hx of heart disease CAD with CABG x 3  mother age 50's will cardiology evaluation

## 2024-05-02 ENCOUNTER — TELEPHONE (OUTPATIENT)
Age: 64
End: 2024-05-02

## 2024-05-09 ENCOUNTER — APPOINTMENT (OUTPATIENT)
Dept: LAB | Facility: HOSPITAL | Age: 64
End: 2024-05-09
Payer: MEDICARE

## 2024-05-09 DIAGNOSIS — I10 PRIMARY HYPERTENSION: ICD-10-CM

## 2024-05-09 DIAGNOSIS — Z11.59 NEED FOR HEPATITIS C SCREENING TEST: ICD-10-CM

## 2024-05-09 DIAGNOSIS — E78.5 DYSLIPIDEMIA: ICD-10-CM

## 2024-05-09 LAB
ANION GAP SERPL CALCULATED.3IONS-SCNC: 14 MMOL/L (ref 4–13)
BUN SERPL-MCNC: 24 MG/DL (ref 5–25)
CALCIUM SERPL-MCNC: 10.5 MG/DL (ref 8.4–10.2)
CHLORIDE SERPL-SCNC: 102 MMOL/L (ref 96–108)
CHOLEST SERPL-MCNC: 136 MG/DL
CO2 SERPL-SCNC: 27 MMOL/L (ref 21–32)
CREAT SERPL-MCNC: 0.87 MG/DL (ref 0.6–1.3)
GFR SERPL CREATININE-BSD FRML MDRD: 69 ML/MIN/1.73SQ M
GLUCOSE P FAST SERPL-MCNC: 140 MG/DL (ref 65–99)
HCV AB SER QL: NORMAL
HDLC SERPL-MCNC: 31 MG/DL
LDLC SERPL CALC-MCNC: 79 MG/DL (ref 0–100)
NONHDLC SERPL-MCNC: 105 MG/DL
POTASSIUM SERPL-SCNC: 3.8 MMOL/L (ref 3.5–5.3)
SODIUM SERPL-SCNC: 143 MMOL/L (ref 135–147)
TRIGL SERPL-MCNC: 129 MG/DL

## 2024-05-09 PROCEDURE — 86803 HEPATITIS C AB TEST: CPT

## 2024-05-09 PROCEDURE — 36415 COLL VENOUS BLD VENIPUNCTURE: CPT

## 2024-05-09 PROCEDURE — 80061 LIPID PANEL: CPT

## 2024-05-09 PROCEDURE — 80048 BASIC METABOLIC PNL TOTAL CA: CPT

## 2024-05-13 ENCOUNTER — TELEPHONE (OUTPATIENT)
Age: 64
End: 2024-05-13

## 2024-05-13 DIAGNOSIS — R73.09 ABNORMAL GLUCOSE: ICD-10-CM

## 2024-05-13 DIAGNOSIS — E83.52 HIGH CALCIUM LEVELS: Primary | ICD-10-CM

## 2024-05-13 NOTE — TELEPHONE ENCOUNTER
Please order new labs.  Amaya Laureano  Patient was told that Dr. Barbosa wants to order new labs.

## 2024-05-16 ENCOUNTER — HOSPITAL ENCOUNTER (OUTPATIENT)
Dept: CT IMAGING | Facility: HOSPITAL | Age: 64
End: 2024-05-16
Attending: FAMILY MEDICINE
Payer: COMMERCIAL

## 2024-05-16 ENCOUNTER — TELEPHONE (OUTPATIENT)
Age: 64
End: 2024-05-16

## 2024-05-16 DIAGNOSIS — I77.819 WIDENING AORTA (HCC): ICD-10-CM

## 2024-05-16 PROCEDURE — 71250 CT THORAX DX C-: CPT

## 2024-05-17 ENCOUNTER — TELEPHONE (OUTPATIENT)
Dept: PSYCHIATRY | Facility: CLINIC | Age: 64
End: 2024-05-17

## 2024-05-20 ENCOUNTER — TELEPHONE (OUTPATIENT)
Age: 64
End: 2024-05-20

## 2024-05-20 NOTE — TELEPHONE ENCOUNTER
I referred pt to pulmonology can start anoro inhaler once daily and can call in benzonatate 200mg po tid #30 for cough

## 2024-05-20 NOTE — TELEPHONE ENCOUNTER
"Patient calling for the results of her CT of Chest.  Patient went on 5/16.  Chart states that \"exam ended\" and no results are in chart as of now.  Patient would like a call when results are in as she stated that she is still coughing and SOB on exertion.  She stated that at her OV on 5/1 with Dr. Balbuena they discussed possibly putting her on a steroid and cough syrup.    Please call patient  "

## 2024-05-21 DIAGNOSIS — R05.3 CHRONIC COUGH: Primary | ICD-10-CM

## 2024-05-21 RX ORDER — BENZONATATE 200 MG/1
200 CAPSULE ORAL 3 TIMES DAILY PRN
Qty: 30 CAPSULE | Refills: 0 | Status: SHIPPED | OUTPATIENT
Start: 2024-05-21

## 2024-05-21 NOTE — TELEPHONE ENCOUNTER
Pt called to check on status of her cough medicine Rx.  She had trouble sleeping last night because of the cough.  Please call pt when Rx has been sent.

## 2024-05-24 DIAGNOSIS — N39.41 URGE INCONTINENCE OF URINE: ICD-10-CM

## 2024-05-25 RX ORDER — OXYBUTYNIN CHLORIDE 5 MG/1
5 TABLET, EXTENDED RELEASE ORAL DAILY
Qty: 90 TABLET | Refills: 1 | Status: SHIPPED | OUTPATIENT
Start: 2024-05-25

## 2024-05-31 PROBLEM — Z00.00 MEDICARE ANNUAL WELLNESS VISIT, INITIAL: Status: RESOLVED | Noted: 2024-05-01 | Resolved: 2024-05-31

## 2024-05-31 PROBLEM — Z12.11 SCREENING FOR MALIGNANT NEOPLASM OF COLON: Status: RESOLVED | Noted: 2024-05-01 | Resolved: 2024-05-31

## 2024-06-10 ENCOUNTER — NURSE TRIAGE (OUTPATIENT)
Age: 64
End: 2024-06-10

## 2024-06-10 NOTE — TELEPHONE ENCOUNTER
"Patient calls in today with SOB on exertion and some wheezing.  Patient feels that inhalers are not working well and would like Dr. Balbuena to consider something that might work better.  Offered patient office appointment.  Deferred for now.  Would like to know if something can be called in to the pharmacy.  Please advise.     Reason for Disposition   Hoarseness persists > 2 weeks    Answer Assessment - Initial Assessment Questions  1. DESCRIPTION: \"Describe your voice.\"      SOB with exertion  2. ONSET: \"When did the hoarseness begin?\"      Some wheezing   3. COUGH: \"Is there a cough?\" If Yes, ask: \"How bad?\"      Yes, dry cough  4. FEVER: \"Do you have a fever?\" If Yes, ask: \"What is your temperature, how was it measured, and when did it start?\"      Denies  5. RESPIRATORY STATUS: \"Describe your breathing.\"       SOB with exertion  6. ALLERGIES: \"Any allergy symptoms?\" If Yes, ask: \"What are they?\"      COPD, asthma, seasonal allergies  7. IRRITANTS: \"Do you smoke?\" \"Have you been exposed to any irritating fumes?\"      Denies  8. CAUSE: \"What do you think is causing the hoarseness?\"      Allergies  9. OTHER SYMPTOMS: \"Do you have any other symptoms?\" (e.g., sore throat, swelling, foreign body, rash)      Denies  10. PREGNANCY: \"Is there any chance you are pregnant?\" \"When was your last menstrual period?\"        No    Protocols used: Hoarseness-ADULT-OH    "

## 2024-06-10 NOTE — TELEPHONE ENCOUNTER
Offered patient several appointments or urgent care.  She said she wants to see Dr. Balbuena, I offered slots with her, she said no she has to check with her ride.  Said she is fine.

## 2024-06-12 NOTE — TELEPHONE ENCOUNTER
Contacted patient off of NON-REFERRAL wait list to verify needs of services in attempts to update list with patient preferences. spoke with patient whom stated they are no longer interested in services

## 2024-06-26 ENCOUNTER — CONSULT (OUTPATIENT)
Dept: PULMONOLOGY | Facility: CLINIC | Age: 64
End: 2024-06-26
Payer: MEDICARE

## 2024-06-26 VITALS
TEMPERATURE: 97.7 F | DIASTOLIC BLOOD PRESSURE: 80 MMHG | WEIGHT: 241 LBS | BODY MASS INDEX: 42.7 KG/M2 | HEIGHT: 63 IN | SYSTOLIC BLOOD PRESSURE: 114 MMHG | HEART RATE: 60 BPM | OXYGEN SATURATION: 98 %

## 2024-06-26 DIAGNOSIS — J44.9 CHRONIC OBSTRUCTIVE PULMONARY DISEASE, UNSPECIFIED COPD TYPE (HCC): ICD-10-CM

## 2024-06-26 DIAGNOSIS — J45.41 MODERATE PERSISTENT ASTHMA WITH ACUTE EXACERBATION: ICD-10-CM

## 2024-06-26 DIAGNOSIS — J45.40 MODERATE PERSISTENT ASTHMA WITHOUT COMPLICATION: ICD-10-CM

## 2024-06-26 DIAGNOSIS — R93.89 ABNORMAL CT OF THE CHEST: Primary | ICD-10-CM

## 2024-06-26 PROCEDURE — 99204 OFFICE O/P NEW MOD 45 MIN: CPT | Performed by: INTERNAL MEDICINE

## 2024-06-26 RX ORDER — FLUTICASONE FUROATE 100 UG/1
1 POWDER RESPIRATORY (INHALATION) DAILY
Qty: 30 BLISTER | Refills: 6 | Status: SHIPPED | OUTPATIENT
Start: 2024-06-26 | End: 2024-07-01

## 2024-06-26 NOTE — ASSESSMENT & PLAN NOTE
Abnormal CT of the chest has developed since March 2023 when the patient had a normal study except for enlarged aorta.  Study demonstrates multiple bilateral pulmonary nodules which are quite small.  They is not very likely that this represents an infection.  This does not seem to be a miliary pattern such as tuberculosis.  Possibilities would include sarcoidosis, hypersensitivity pneumonitis, cryptogenic organizing pneumonitis or DIPNECH, abnormal growth of neuroendocrine cells in the lung tissue. I have sent off screening laboratory studies.  If there is persistent hypercalcemia that might lend us to pursue a diagnosis of sarcoidosis.  I think it is not very likely that I will make a specific diagnosis based on laboratory studies.  I would consider referring this patient for bronchoscopy with tissue sampling.  
Skin normal color for race, warm, dry and intact. No evidence of rash.

## 2024-06-26 NOTE — PROGRESS NOTES
Assessment/Plan:    Abnormal CT of the chest  Abnormal CT of the chest has developed since March 2023 when the patient had a normal study except for enlarged aorta.  Study demonstrates multiple bilateral pulmonary nodules which are quite small.  They is not very likely that this represents an infection.  This does not seem to be a miliary pattern such as tuberculosis.  Possibilities would include sarcoidosis, hypersensitivity pneumonitis, cryptogenic organizing pneumonitis or DIPNECH, abnormal growth of neuroendocrine cells in the lung tissue. I have sent off screening laboratory studies.  If there is persistent hypercalcemia that might lend us to pursue a diagnosis of sarcoidosis.  I think it is not very likely that I will make a specific diagnosis based on laboratory studies.  I would consider referring this patient for bronchoscopy with tissue sampling.     Diagnoses and all orders for this visit:    Abnormal CT of the chest  -     Comprehensive metabolic panel; Future  -     CBC and differential; Future  -     Sedimentation rate, automated; Future  -     Hypersensitivity Pneumonitis Profile; Future  -     Complete PFT with post bronchodilator; Future  -     Quantiferon TB Gold Plus Assay; Future    Moderate persistent asthma without complication  -     Ambulatory Referral to Pulmonology  -     Complete PFT with post bronchodilator; Future    Chronic obstructive pulmonary disease, unspecified COPD type (HCC)  -     Ambulatory Referral to Pulmonology          Subjective:      Patient ID: Ngozi Beard is a 64 y.o. female.    This patient is referred by Dr. Meenakshi Balbuena for history of asthma and now abnormal CT scan of chest.  This patient has had asthma since age 28.  She actually stopped smoking at that time because of respiratory symptoms.  I gather the asthma has been fairly calm until the last several months and she has had increasing cough and dyspnea.  Not much sputum to speak of.  Think she hears some  wheezing.  She has had no recent travel.  No known exposure to tuberculosis.  No recent symptoms of infection is apparent.  No likely exposure to histoplasmosis.  No exposure to HIV infection.  No exposure to common antigens associated with hypersensitivity.  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  No other occupational history seems relevant here.  No likely pneumoconiosis by history.  Laboratory testing noted that there was a mild hypercalcemia on the last BMP.  No family history of lung disease.  Stopped smoking at age 28.  No history of connective tissue disorders.  See extensive review of systems.    Asthma  She complains of cough, shortness of breath and wheezing. Pertinent negatives include no chest pain, fever, myalgias or rhinorrhea. Her past medical history is significant for asthma.   Shortness of Breath  Associated symptoms include coughing and wheezing. Pertinent negatives include no chest pain, leg swelling, palpitations or rhinorrhea. Her past medical history is significant for asthma.       The following portions of the patient's history were reviewed and updated as appropriate: allergies, current medications, past family history, past medical history, past social history, past surgical history and problem list.    Review of Systems   Constitutional:  Negative for activity change, fever and unexpected weight change.   HENT:  Positive for sinus pressure. Negative for congestion and rhinorrhea.    Eyes:  Negative for pain, redness, itching and visual disturbance.   Respiratory:  Positive for cough, shortness of breath and wheezing.    Cardiovascular:  Negative for chest pain, palpitations and leg swelling.   Gastrointestinal:  Negative for abdominal pain.   Endocrine: Negative for cold intolerance and heat intolerance.   Genitourinary:  Negative for difficulty urinating.   Musculoskeletal:  Positive for arthralgias. Negative for myalgias.  "       Both knees   Skin: Negative.    Allergic/Immunologic: Positive for environmental allergies.        Patient states perennial allergies   Neurological: Negative.    Hematological:  Negative for adenopathy.         Objective:      /80 (BP Location: Left arm, Patient Position: Sitting, Cuff Size: Standard)   Pulse 60   Temp 97.7 °F (36.5 °C) (Tympanic)   Ht 5' 3\" (1.6 m)   Wt 109 kg (241 lb)   SpO2 98%   BMI 42.69 kg/m²          Physical Exam  Vitals reviewed.   Constitutional:       General: She is not in acute distress.     Appearance: She is obese. She is not ill-appearing.   Eyes:      General: No scleral icterus.     Conjunctiva/sclera: Conjunctivae normal.   Neck:      Vascular: No JVD.   Cardiovascular:      Rate and Rhythm: Normal rate and regular rhythm.      Pulses:           Radial pulses are 2+ on the right side and 2+ on the left side.      Heart sounds: Normal heart sounds. No murmur heard.  Pulmonary:      Effort: Pulmonary effort is normal.      Breath sounds: Normal breath sounds. No stridor. No wheezing or rhonchi.   Abdominal:      General: Abdomen is flat. There is no distension.      Palpations: Abdomen is soft. There is no hepatomegaly or splenomegaly.   Musculoskeletal:         General: No swelling.      Cervical back: No rigidity or tenderness.      Right lower leg: No edema.      Left lower leg: No edema.   Lymphadenopathy:      Cervical: No cervical adenopathy.   Skin:     General: Skin is warm and dry.   Neurological:      Mental Status: She is alert and oriented to person, place, and time.   Psychiatric:         Behavior: Behavior normal.         "

## 2024-07-01 ENCOUNTER — TELEPHONE (OUTPATIENT)
Age: 64
End: 2024-07-01

## 2024-07-01 ENCOUNTER — OFFICE VISIT (OUTPATIENT)
Dept: FAMILY MEDICINE CLINIC | Facility: CLINIC | Age: 64
End: 2024-07-01
Payer: MEDICARE

## 2024-07-01 VITALS
OXYGEN SATURATION: 96 % | TEMPERATURE: 98.2 F | SYSTOLIC BLOOD PRESSURE: 124 MMHG | HEART RATE: 62 BPM | WEIGHT: 244.4 LBS | RESPIRATION RATE: 18 BRPM | DIASTOLIC BLOOD PRESSURE: 80 MMHG | HEIGHT: 63 IN | BODY MASS INDEX: 43.3 KG/M2

## 2024-07-01 DIAGNOSIS — J45.40 MODERATE PERSISTENT ASTHMA WITHOUT COMPLICATION: ICD-10-CM

## 2024-07-01 DIAGNOSIS — J44.9 CHRONIC OBSTRUCTIVE PULMONARY DISEASE, UNSPECIFIED COPD TYPE (HCC): Primary | ICD-10-CM

## 2024-07-01 PROCEDURE — G2211 COMPLEX E/M VISIT ADD ON: HCPCS | Performed by: FAMILY MEDICINE

## 2024-07-01 PROCEDURE — 99214 OFFICE O/P EST MOD 30 MIN: CPT | Performed by: FAMILY MEDICINE

## 2024-07-01 RX ORDER — FLUTICASONE PROPIONATE AND SALMETEROL 50; 500 UG/1; UG/1
1 POWDER RESPIRATORY (INHALATION) 2 TIMES DAILY
Qty: 60 BLISTER | Refills: 2 | Status: SHIPPED | OUTPATIENT
Start: 2024-07-01 | End: 2024-07-01

## 2024-07-01 RX ORDER — FLUTICASONE PROPIONATE AND SALMETEROL XINAFOATE 230; 21 UG/1; UG/1
2 AEROSOL, METERED RESPIRATORY (INHALATION) 2 TIMES DAILY
Qty: 12 G | Refills: 0 | Status: SHIPPED | OUTPATIENT
Start: 2024-07-01

## 2024-07-01 NOTE — TELEPHONE ENCOUNTER
Call from patient advising the advair that was called in for her is not covered under her insurance. The pharmacy advised her to have a script for Advair HSA called as they have that in stock and it will be covered. [id number] - [place] - [address]     Please change script. Thanks

## 2024-07-01 NOTE — PROGRESS NOTES
Subjective:      Patient ID: Ngozi Beard is a 64 y.o. female.    States she is on Arnuity and it is not helping  She has tried Advair in the past and it helped her the most  She has both COPD and asthma    Shortness of Breath  Associated symptoms include coughing and wheezing. Pertinent negatives include no chest pain, fatigue, leg swelling, palpitations, rhinorrhea or sore throat.   Cough  Associated symptoms include shortness of breath and wheezing. Pertinent negatives include no chest pain, eye redness, fever, headaches, myalgias, rash, rhinorrhea or sore throat.       Past Medical History:   Diagnosis Date    Asthma     Chronic pain     Hypertension        Family History   Problem Relation Age of Onset    Diabetes Mother     Hypertension Mother     Lung cancer Mother     Colon cancer Mother     Colon cancer Father     Hypertension Sister     Multiple sclerosis Sister     Hypothyroidism Maternal Aunt        Past Surgical History:   Procedure Laterality Date    BACK SURGERY      COLONOSCOPY      TUBAL LIGATION          reports that she quit smoking about 36 years ago. Her smoking use included cigarettes. She started smoking about 51 years ago. She has a 15 pack-year smoking history. She has never used smokeless tobacco. She reports that she does not drink alcohol and does not use drugs.      Current Outpatient Medications:     Advair -21 MCG/ACT inhaler, Inhale 2 puffs 2 (two) times a day Rinse mouth after use., Disp: 12 g, Rfl: 0    albuterol (PROVENTIL HFA,VENTOLIN HFA) 90 mcg/act inhaler, INHALE 2 PUFFS BY MOUTH EVERY 4 HOURS AS NEEDED FOR SHORTNESS OF BREATH, Disp: 18 g, Rfl: 5    atorvastatin (LIPITOR) 20 mg tablet, TAKE 1 TABLET BY MOUTH EVERY DAY, Disp: 90 tablet, Rfl: 1    lamoTRIgine (LaMICtal) 100 mg tablet, Take 100 mg by mouth every morning, Disp: , Rfl:     metoprolol tartrate (LOPRESSOR) 50 mg tablet, TAKE 1 TABLET BY MOUTH TWICE A DAY (INS 30 DAY LIMIT), Disp: 180 tablet, Rfl: 1     montelukast (SINGULAIR) 10 mg tablet, TAKE 1 TABLET BY MOUTH EVERY DAY, Disp: 90 tablet, Rfl: 1    triamterene-hydrochlorothiazide (DYAZIDE) 37.5-25 mg per capsule, TAKE 1 CAPSULE BY MOUTH EVERY DAY, Disp: 90 capsule, Rfl: 1    venlafaxine (EFFEXOR-XR) 150 mg 24 hr capsule, TAKE 1 CAPSULE BY MOUTH DAILY WITH BREAKFAST., Disp: 30 capsule, Rfl: 0    benzonatate (TESSALON) 200 MG capsule, Take 1 capsule (200 mg total) by mouth 3 (three) times a day as needed for cough (Patient not taking: Reported on 6/26/2024), Disp: 30 capsule, Rfl: 0    cetirizine (ZyrTEC) 10 mg tablet, Take 1 tablet (10 mg total) by mouth daily (Patient not taking: Reported on 5/1/2024), Disp: 30 tablet, Rfl: 2    oxybutynin (DITROPAN-XL) 5 mg 24 hr tablet, TAKE 1 TABLET (5 MG TOTAL) BY MOUTH DAILY. (Patient not taking: Reported on 6/26/2024), Disp: 90 tablet, Rfl: 1    The following portions of the patient's history were reviewed and updated as appropriate: allergies, current medications, past family history, past medical history, past social history, past surgical history and problem list.    Review of Systems   Constitutional:  Negative for fatigue and fever.   HENT:  Negative for congestion, facial swelling, mouth sores, rhinorrhea, sore throat and trouble swallowing.    Eyes:  Negative for pain and redness.   Respiratory:  Positive for cough, shortness of breath and wheezing.    Cardiovascular:  Negative for chest pain, palpitations and leg swelling.   Gastrointestinal:  Negative for abdominal pain, blood in stool, constipation, diarrhea and nausea.   Genitourinary:  Negative for dysuria, hematuria and urgency.   Musculoskeletal:  Negative for arthralgias, back pain and myalgias.   Skin:  Negative for rash and wound.   Neurological:  Negative for seizures, syncope and headaches.   Hematological:  Negative for adenopathy.   Psychiatric/Behavioral:  Negative for agitation and behavioral problems.          PHQ-2/9 Depression Screening    Little  "interest or pleasure in doing things: 0 - not at all  Feeling down, depressed, or hopeless: 0 - not at all  PHQ-2 Score: 0  PHQ-2 Interpretation: Negative depression screen             Objective:    /80 (BP Location: Left arm, Patient Position: Sitting, Cuff Size: Large)   Pulse 62   Temp 98.2 °F (36.8 °C) (Tympanic)   Resp 18   Ht 5' 3\" (1.6 m)   Wt 111 kg (244 lb 6.4 oz)   SpO2 96%   BMI 43.29 kg/m²      Physical Exam  Vitals and nursing note reviewed.   Constitutional:       Appearance: Normal appearance. She is well-developed. She is not ill-appearing.   HENT:      Head: Normocephalic and atraumatic.      Right Ear: External ear normal.      Left Ear: External ear normal.      Nose: Nose normal.      Mouth/Throat:      Mouth: Mucous membranes are moist.      Pharynx: No oropharyngeal exudate or posterior oropharyngeal erythema.   Eyes:      General: No scleral icterus.        Right eye: No discharge.         Left eye: No discharge.      Conjunctiva/sclera: Conjunctivae normal.   Cardiovascular:      Rate and Rhythm: Normal rate.      Heart sounds: No murmur heard.     No gallop.   Pulmonary:      Effort: Pulmonary effort is normal. No respiratory distress.      Breath sounds: No stridor. Examination of the right-upper field reveals decreased breath sounds. Examination of the left-upper field reveals decreased breath sounds. Decreased breath sounds present. No wheezing, rhonchi or rales.   Abdominal:      Palpations: Abdomen is soft.      Tenderness: There is no abdominal tenderness.   Musculoskeletal:         General: No tenderness or deformity.   Skin:     Findings: No erythema or rash.   Neurological:      Mental Status: She is alert. Mental status is at baseline.   Psychiatric:         Behavior: Behavior normal.         Judgment: Judgment normal.           Recent Results (from the past 8736 hour(s))   Lipid panel    Collection Time: 05/09/24 10:36 AM   Result Value Ref Range    Cholesterol 136 " See Comment mg/dL    Triglycerides 129 See Comment mg/dL    HDL, Direct 31 (L) >=50 mg/dL    LDL Calculated 79 0 - 100 mg/dL    Non-HDL-Chol (CHOL-HDL) 105 mg/dl   Hepatitis C Antibody    Collection Time: 05/09/24 10:36 AM   Result Value Ref Range    Hepatitis C Ab Non-reactive Non-Reactive   Basic metabolic panel    Collection Time: 05/09/24 10:36 AM   Result Value Ref Range    Sodium 143 135 - 147 mmol/L    Potassium 3.8 3.5 - 5.3 mmol/L    Chloride 102 96 - 108 mmol/L    CO2 27 21 - 32 mmol/L    ANION GAP 14 (H) 4 - 13 mmol/L    BUN 24 5 - 25 mg/dL    Creatinine 0.87 0.60 - 1.30 mg/dL    Glucose, Fasting 140 (H) 65 - 99 mg/dL    Calcium 10.5 (H) 8.4 - 10.2 mg/dL    eGFR 69 ml/min/1.73sq m       Laboratory Results: I have personally reviewed the pertinent laboratory results/reports     Radiology/Other Diagnostic Testing Results: I have personally reviewed pertinent reports.      CT chest wo contrast    Result Date: 5/28/2024  CT CHEST WITHOUT IV CONTRAST INDICATION: I77.819: Aortic ectasia, unspecified site. COMPARISON: CT chest March 24, 2023 TECHNIQUE: CT examination of the chest was performed without intravenous contrast. Multiplanar 2D reformatted images were created from the source data. This examination, like all CT scans performed in the [facility], was performed utilizing techniques to minimize radiation dose exposure, including the use of iterative reconstruction and automated exposure control. Radiation dose length product (DLP) for this visit: 645.95 mGy-cm FINDINGS: LUNGS: New diffuse bilateral, predominantly centrilobular, nodules. The nodules are more numerous on the left than on the right. The majority of the nodules are similar in size and appearance, a few of the nodules are slightly larger measuring 0.5 cm in the left lower lobe (series 303, image 80) and 0.6 cm left upper lobe (series 303, image 41) PLEURA: Unremarkable. HEART/GREAT VESSELS: Normal heart size. Unchanged fusiform  "ectasia of the ascending thoracic aorta measuring up to 40 mm. Recommendation is for follow-up low radiation dose chest CT in one year. MEDIASTINUM AND MENA: No hilar retraction. No calcified lymph nodes. No lymphadenopathy. CHEST WALL AND LOWER NECK: Unremarkable. VISUALIZED STRUCTURES IN THE UPPER ABDOMEN: Cholelithiasis without acute cholecystitis. OSSEOUS STRUCTURES: No acute fracture or destructive osseous lesion.     1.  Unchanged fusiform ectasia of the ascending thoracic aorta measuring up to 40 mm. Recommendation is for follow-up low radiation dose chest CT in one year. 2.  New diffuse pulmonary nodules. Differential considerations include mycobacterial infection and hypersensitivity pneumonitis. Pulmonary consultation is advised. The study was marked in EPIC for significant notification. Workstation performed: [id number]        Assessment/Plan:  1. Chronic obstructive pulmonary disease, unspecified COPD type (HCC)  -     Advair -21 MCG/ACT inhaler; Inhale 2 puffs 2 (two) times a day Rinse mouth after use.  2. Moderate persistent asthma without complication      Discontinue arnuity, start advair, diskus was not available , sent HFA.  Rinse mouth after each use  Discussed will benefit from Trelegy the most.  She refused for now.  Albuterol prn for SOB or wheezing             Read package inserts for all medications before starting a new medications, call me if you have any questions.    Patient was given opportunity to ask questions and all questions were answered.    Disclaimer: Portions of the record may have been created with voice recognition software. Occasional wrong word or \"sound a like\" substitutions may have occurred due to the inherent limitations of voice recognition software. Read the chart carefully and recognize, using context, where substitutions have occurred. I have used the Epic copy/forward function to compose this note. I have reviewed my current note to ensure it reflects the " current patient status, exam, assessment and plan.

## 2024-07-09 ENCOUNTER — APPOINTMENT (OUTPATIENT)
Dept: LAB | Facility: HOSPITAL | Age: 64
DRG: 871 | End: 2024-07-09
Payer: MEDICARE

## 2024-07-09 DIAGNOSIS — R73.09 ABNORMAL GLUCOSE: ICD-10-CM

## 2024-07-09 DIAGNOSIS — E83.52 HIGH CALCIUM LEVELS: ICD-10-CM

## 2024-07-09 DIAGNOSIS — R93.89 ABNORMAL CT OF THE CHEST: ICD-10-CM

## 2024-07-09 LAB
ALBUMIN SERPL BCG-MCNC: 3.7 G/DL (ref 3.5–5)
ALP SERPL-CCNC: 77 U/L (ref 34–104)
ALT SERPL W P-5'-P-CCNC: 10 U/L (ref 7–52)
ANION GAP SERPL CALCULATED.3IONS-SCNC: 13 MMOL/L (ref 4–13)
AST SERPL W P-5'-P-CCNC: 15 U/L (ref 13–39)
BASOPHILS # BLD AUTO: 0.05 THOUSANDS/ÂΜL (ref 0–0.1)
BASOPHILS NFR BLD AUTO: 0 % (ref 0–1)
BILIRUB SERPL-MCNC: 0.52 MG/DL (ref 0.2–1)
BUN SERPL-MCNC: 61 MG/DL (ref 5–25)
CA-I BLD-SCNC: 1.2 MMOL/L (ref 1.12–1.32)
CALCIUM SERPL-MCNC: 9.9 MG/DL (ref 8.4–10.2)
CHLORIDE SERPL-SCNC: 105 MMOL/L (ref 96–108)
CO2 SERPL-SCNC: 23 MMOL/L (ref 21–32)
CREAT SERPL-MCNC: 4.58 MG/DL (ref 0.6–1.3)
EOSINOPHIL # BLD AUTO: 0.17 THOUSAND/ÂΜL (ref 0–0.61)
EOSINOPHIL NFR BLD AUTO: 2 % (ref 0–6)
ERYTHROCYTE [DISTWIDTH] IN BLOOD BY AUTOMATED COUNT: 14.9 % (ref 11.6–15.1)
ERYTHROCYTE [SEDIMENTATION RATE] IN BLOOD: 61 MM/HOUR (ref 0–29)
GFR SERPL CREATININE-BSD FRML MDRD: 9 ML/MIN/1.73SQ M
GLUCOSE P FAST SERPL-MCNC: 101 MG/DL (ref 65–99)
HCT VFR BLD AUTO: 35.6 % (ref 34.8–46.1)
HGB BLD-MCNC: 11.3 G/DL (ref 11.5–15.4)
IMM GRANULOCYTES # BLD AUTO: 0.04 THOUSAND/UL (ref 0–0.2)
IMM GRANULOCYTES NFR BLD AUTO: 0 % (ref 0–2)
LYMPHOCYTES # BLD AUTO: 1.75 THOUSANDS/ÂΜL (ref 0.6–4.47)
LYMPHOCYTES NFR BLD AUTO: 16 % (ref 14–44)
MCH RBC QN AUTO: 27.4 PG (ref 26.8–34.3)
MCHC RBC AUTO-ENTMCNC: 31.7 G/DL (ref 31.4–37.4)
MCV RBC AUTO: 86 FL (ref 82–98)
MONOCYTES # BLD AUTO: 0.82 THOUSAND/ÂΜL (ref 0.17–1.22)
MONOCYTES NFR BLD AUTO: 7 % (ref 4–12)
NEUTROPHILS # BLD AUTO: 8.42 THOUSANDS/ÂΜL (ref 1.85–7.62)
NEUTS SEG NFR BLD AUTO: 75 % (ref 43–75)
NRBC BLD AUTO-RTO: 0 /100 WBCS
PLATELET # BLD AUTO: 298 THOUSANDS/UL (ref 149–390)
PMV BLD AUTO: 10.6 FL (ref 8.9–12.7)
POTASSIUM SERPL-SCNC: 4 MMOL/L (ref 3.5–5.3)
PROT SERPL-MCNC: 7.1 G/DL (ref 6.4–8.4)
RBC # BLD AUTO: 4.13 MILLION/UL (ref 3.81–5.12)
SODIUM SERPL-SCNC: 141 MMOL/L (ref 135–147)
WBC # BLD AUTO: 11.25 THOUSAND/UL (ref 4.31–10.16)

## 2024-07-09 PROCEDURE — 85025 COMPLETE CBC W/AUTO DIFF WBC: CPT

## 2024-07-09 PROCEDURE — 36415 COLL VENOUS BLD VENIPUNCTURE: CPT

## 2024-07-09 PROCEDURE — 86671 FUNGUS NES ANTIBODY: CPT

## 2024-07-09 PROCEDURE — 86331 IMMUNODIFFUSION OUCHTERLONY: CPT

## 2024-07-09 PROCEDURE — 86480 TB TEST CELL IMMUN MEASURE: CPT

## 2024-07-09 PROCEDURE — 83036 HEMOGLOBIN GLYCOSYLATED A1C: CPT

## 2024-07-09 PROCEDURE — 82330 ASSAY OF CALCIUM: CPT

## 2024-07-09 PROCEDURE — 80053 COMPREHEN METABOLIC PANEL: CPT

## 2024-07-09 PROCEDURE — 86606 ASPERGILLUS ANTIBODY: CPT

## 2024-07-09 PROCEDURE — 85652 RBC SED RATE AUTOMATED: CPT

## 2024-07-09 PROCEDURE — 86602 ANTINOMYCES ANTIBODY: CPT

## 2024-07-10 ENCOUNTER — TELEPHONE (OUTPATIENT)
Dept: PULMONOLOGY | Facility: CLINIC | Age: 64
End: 2024-07-10

## 2024-07-10 ENCOUNTER — APPOINTMENT (EMERGENCY)
Dept: CT IMAGING | Facility: HOSPITAL | Age: 64
DRG: 871 | End: 2024-07-10
Payer: MEDICARE

## 2024-07-10 ENCOUNTER — APPOINTMENT (EMERGENCY)
Dept: RADIOLOGY | Facility: HOSPITAL | Age: 64
DRG: 871 | End: 2024-07-10
Payer: MEDICARE

## 2024-07-10 ENCOUNTER — HOSPITAL ENCOUNTER (INPATIENT)
Facility: HOSPITAL | Age: 64
LOS: 2 days | DRG: 871 | End: 2024-07-12
Attending: EMERGENCY MEDICINE | Admitting: INTERNAL MEDICINE
Payer: MEDICARE

## 2024-07-10 ENCOUNTER — TELEPHONE (OUTPATIENT)
Age: 64
End: 2024-07-10

## 2024-07-10 DIAGNOSIS — R93.89 ABNORMAL CT OF THE CHEST: ICD-10-CM

## 2024-07-10 DIAGNOSIS — N17.9 ACUTE KIDNEY INJURY (HCC): Primary | ICD-10-CM

## 2024-07-10 DIAGNOSIS — N17.9 ACUTE RENAL FAILURE, UNSPECIFIED ACUTE RENAL FAILURE TYPE (HCC): ICD-10-CM

## 2024-07-10 DIAGNOSIS — R31.9 HEMATURIA: ICD-10-CM

## 2024-07-10 DIAGNOSIS — N17.9 AKI (ACUTE KIDNEY INJURY) (HCC): ICD-10-CM

## 2024-07-10 PROBLEM — A41.9 SEPSIS (HCC): Status: ACTIVE | Noted: 2024-07-10

## 2024-07-10 LAB
2HR DELTA HS TROPONIN: 0 NG/L
ALBUMIN SERPL BCG-MCNC: 3.6 G/DL (ref 3.5–5)
ALP SERPL-CCNC: 72 U/L (ref 34–104)
ALT SERPL W P-5'-P-CCNC: 11 U/L (ref 7–52)
ANION GAP SERPL CALCULATED.3IONS-SCNC: 14 MMOL/L (ref 4–13)
AST SERPL W P-5'-P-CCNC: 13 U/L (ref 13–39)
BACTERIA UR QL AUTO: ABNORMAL /HPF
BASOPHILS # BLD AUTO: 0.07 THOUSANDS/ÂΜL (ref 0–0.1)
BASOPHILS NFR BLD AUTO: 1 % (ref 0–1)
BILIRUB SERPL-MCNC: 0.27 MG/DL (ref 0.2–1)
BILIRUB UR QL STRIP: NEGATIVE
BUN SERPL-MCNC: 74 MG/DL (ref 5–25)
CALCIUM SERPL-MCNC: 9.4 MG/DL (ref 8.4–10.2)
CARDIAC TROPONIN I PNL SERPL HS: 8 NG/L
CARDIAC TROPONIN I PNL SERPL HS: 8 NG/L
CHLORIDE SERPL-SCNC: 106 MMOL/L (ref 96–108)
CK SERPL-CCNC: 146 U/L (ref 26–192)
CLARITY UR: ABNORMAL
CO2 SERPL-SCNC: 20 MMOL/L (ref 21–32)
COLOR UR: ABNORMAL
CREAT SERPL-MCNC: 5.74 MG/DL (ref 0.6–1.3)
EOSINOPHIL # BLD AUTO: 0.26 THOUSAND/ÂΜL (ref 0–0.61)
EOSINOPHIL NFR BLD AUTO: 2 % (ref 0–6)
ERYTHROCYTE [DISTWIDTH] IN BLOOD BY AUTOMATED COUNT: 15.2 % (ref 11.6–15.1)
EST. AVERAGE GLUCOSE BLD GHB EST-MCNC: 143 MG/DL
GAMMA INTERFERON BACKGROUND BLD IA-ACNC: 0 IU/ML
GFR SERPL CREATININE-BSD FRML MDRD: 7 ML/MIN/1.73SQ M
GLUCOSE SERPL-MCNC: 120 MG/DL (ref 65–140)
GLUCOSE UR STRIP-MCNC: NEGATIVE MG/DL
HBA1C MFR BLD: 6.6 %
HCT VFR BLD AUTO: 34 % (ref 34.8–46.1)
HGB BLD-MCNC: 10.8 G/DL (ref 11.5–15.4)
HGB UR QL STRIP.AUTO: ABNORMAL
IMM GRANULOCYTES # BLD AUTO: 0.09 THOUSAND/UL (ref 0–0.2)
IMM GRANULOCYTES NFR BLD AUTO: 1 % (ref 0–2)
KETONES UR STRIP-MCNC: NEGATIVE MG/DL
LACTATE SERPL-SCNC: 0.9 MMOL/L (ref 0.5–2)
LEUKOCYTE ESTERASE UR QL STRIP: NEGATIVE
LYMPHOCYTES # BLD AUTO: 2 THOUSANDS/ÂΜL (ref 0.6–4.47)
LYMPHOCYTES NFR BLD AUTO: 14 % (ref 14–44)
M TB IFN-G BLD-IMP: NEGATIVE
M TB IFN-G CD4+ BCKGRND COR BLD-ACNC: 0.01 IU/ML
M TB IFN-G CD4+ BCKGRND COR BLD-ACNC: 0.01 IU/ML
MCH RBC QN AUTO: 27.3 PG (ref 26.8–34.3)
MCHC RBC AUTO-ENTMCNC: 31.8 G/DL (ref 31.4–37.4)
MCV RBC AUTO: 86 FL (ref 82–98)
MITOGEN IGNF BCKGRD COR BLD-ACNC: 4.75 IU/ML
MONOCYTES # BLD AUTO: 1.3 THOUSAND/ÂΜL (ref 0.17–1.22)
MONOCYTES NFR BLD AUTO: 9 % (ref 4–12)
NEUTROPHILS # BLD AUTO: 10.84 THOUSANDS/ÂΜL (ref 1.85–7.62)
NEUTS SEG NFR BLD AUTO: 73 % (ref 43–75)
NITRITE UR QL STRIP: NEGATIVE
NON-SQ EPI CELLS URNS QL MICRO: ABNORMAL /HPF
NRBC BLD AUTO-RTO: 0 /100 WBCS
PH UR STRIP.AUTO: 6.5 [PH]
PLATELET # BLD AUTO: 294 THOUSANDS/UL (ref 149–390)
PMV BLD AUTO: 10.4 FL (ref 8.9–12.7)
POTASSIUM SERPL-SCNC: 4 MMOL/L (ref 3.5–5.3)
PROT SERPL-MCNC: 6.8 G/DL (ref 6.4–8.4)
PROT UR STRIP-MCNC: ABNORMAL MG/DL
RBC # BLD AUTO: 3.95 MILLION/UL (ref 3.81–5.12)
RBC #/AREA URNS AUTO: ABNORMAL /HPF
SODIUM SERPL-SCNC: 140 MMOL/L (ref 135–147)
SP GR UR STRIP.AUTO: 1.02 (ref 1–1.03)
UROBILINOGEN UR QL STRIP.AUTO: 0.2 E.U./DL
WBC # BLD AUTO: 14.56 THOUSAND/UL (ref 4.31–10.16)
WBC #/AREA URNS AUTO: ABNORMAL /HPF

## 2024-07-10 PROCEDURE — 86225 DNA ANTIBODY NATIVE: CPT | Performed by: INTERNAL MEDICINE

## 2024-07-10 PROCEDURE — 71045 X-RAY EXAM CHEST 1 VIEW: CPT

## 2024-07-10 PROCEDURE — 86063 ANTISTREPTOLYSIN O SCREEN: CPT | Performed by: INTERNAL MEDICINE

## 2024-07-10 PROCEDURE — 85025 COMPLETE CBC W/AUTO DIFF WBC: CPT | Performed by: PHYSICIAN ASSISTANT

## 2024-07-10 PROCEDURE — 81003 URINALYSIS AUTO W/O SCOPE: CPT | Performed by: PHYSICIAN ASSISTANT

## 2024-07-10 PROCEDURE — 96361 HYDRATE IV INFUSION ADD-ON: CPT

## 2024-07-10 PROCEDURE — 81001 URINALYSIS AUTO W/SCOPE: CPT | Performed by: PHYSICIAN ASSISTANT

## 2024-07-10 PROCEDURE — 86038 ANTINUCLEAR ANTIBODIES: CPT | Performed by: INTERNAL MEDICINE

## 2024-07-10 PROCEDURE — 94664 DEMO&/EVAL PT USE INHALER: CPT

## 2024-07-10 PROCEDURE — 82550 ASSAY OF CK (CPK): CPT | Performed by: PHYSICIAN ASSISTANT

## 2024-07-10 PROCEDURE — 86160 COMPLEMENT ANTIGEN: CPT | Performed by: INTERNAL MEDICINE

## 2024-07-10 PROCEDURE — 87040 BLOOD CULTURE FOR BACTERIA: CPT | Performed by: PHYSICIAN ASSISTANT

## 2024-07-10 PROCEDURE — 80053 COMPREHEN METABOLIC PANEL: CPT | Performed by: PHYSICIAN ASSISTANT

## 2024-07-10 PROCEDURE — 83605 ASSAY OF LACTIC ACID: CPT | Performed by: PHYSICIAN ASSISTANT

## 2024-07-10 PROCEDURE — 96365 THER/PROPH/DIAG IV INF INIT: CPT

## 2024-07-10 PROCEDURE — 99285 EMERGENCY DEPT VISIT HI MDM: CPT | Performed by: PHYSICIAN ASSISTANT

## 2024-07-10 PROCEDURE — 84156 ASSAY OF PROTEIN URINE: CPT | Performed by: INTERNAL MEDICINE

## 2024-07-10 PROCEDURE — 36415 COLL VENOUS BLD VENIPUNCTURE: CPT | Performed by: PHYSICIAN ASSISTANT

## 2024-07-10 PROCEDURE — 99223 1ST HOSP IP/OBS HIGH 75: CPT | Performed by: INTERNAL MEDICINE

## 2024-07-10 PROCEDURE — 87154 CUL TYP ID BLD PTHGN 6+ TRGT: CPT | Performed by: PHYSICIAN ASSISTANT

## 2024-07-10 PROCEDURE — 83520 IMMUNOASSAY QUANT NOS NONAB: CPT | Performed by: EMERGENCY MEDICINE

## 2024-07-10 PROCEDURE — 94760 N-INVAS EAR/PLS OXIMETRY 1: CPT

## 2024-07-10 PROCEDURE — 93005 ELECTROCARDIOGRAM TRACING: CPT

## 2024-07-10 PROCEDURE — 82570 ASSAY OF URINE CREATININE: CPT | Performed by: INTERNAL MEDICINE

## 2024-07-10 PROCEDURE — 86037 ANCA TITER EACH ANTIBODY: CPT | Performed by: EMERGENCY MEDICINE

## 2024-07-10 PROCEDURE — 96366 THER/PROPH/DIAG IV INF ADDON: CPT

## 2024-07-10 PROCEDURE — 99285 EMERGENCY DEPT VISIT HI MDM: CPT

## 2024-07-10 PROCEDURE — 74176 CT ABD & PELVIS W/O CONTRAST: CPT

## 2024-07-10 PROCEDURE — 84484 ASSAY OF TROPONIN QUANT: CPT | Performed by: PHYSICIAN ASSISTANT

## 2024-07-10 RX ORDER — ACETAMINOPHEN 325 MG/1
650 TABLET ORAL EVERY 6 HOURS PRN
Status: DISCONTINUED | OUTPATIENT
Start: 2024-07-10 | End: 2024-07-12 | Stop reason: HOSPADM

## 2024-07-10 RX ORDER — METOPROLOL TARTRATE 50 MG/1
50 TABLET, FILM COATED ORAL EVERY 12 HOURS SCHEDULED
Status: DISCONTINUED | OUTPATIENT
Start: 2024-07-10 | End: 2024-07-11

## 2024-07-10 RX ORDER — CEFTRIAXONE 2 G/50ML
2000 INJECTION, SOLUTION INTRAVENOUS EVERY 24 HOURS
Status: DISCONTINUED | OUTPATIENT
Start: 2024-07-11 | End: 2024-07-11

## 2024-07-10 RX ORDER — HYDRALAZINE HYDROCHLORIDE 20 MG/ML
10 INJECTION INTRAMUSCULAR; INTRAVENOUS EVERY 6 HOURS PRN
Status: DISCONTINUED | OUTPATIENT
Start: 2024-07-10 | End: 2024-07-12 | Stop reason: HOSPADM

## 2024-07-10 RX ORDER — SODIUM CHLORIDE, SODIUM GLUCONATE, SODIUM ACETATE, POTASSIUM CHLORIDE, MAGNESIUM CHLORIDE, SODIUM PHOSPHATE, DIBASIC, AND POTASSIUM PHOSPHATE .53; .5; .37; .037; .03; .012; .00082 G/100ML; G/100ML; G/100ML; G/100ML; G/100ML; G/100ML; G/100ML
75 INJECTION, SOLUTION INTRAVENOUS CONTINUOUS
Status: DISCONTINUED | OUTPATIENT
Start: 2024-07-10 | End: 2024-07-12 | Stop reason: HOSPADM

## 2024-07-10 RX ORDER — HEPARIN SODIUM 5000 [USP'U]/ML
7500 INJECTION, SOLUTION INTRAVENOUS; SUBCUTANEOUS EVERY 8 HOURS SCHEDULED
Status: DISCONTINUED | OUTPATIENT
Start: 2024-07-10 | End: 2024-07-12 | Stop reason: HOSPADM

## 2024-07-10 RX ORDER — LAMOTRIGINE 100 MG/1
100 TABLET ORAL EVERY MORNING
Status: DISCONTINUED | OUTPATIENT
Start: 2024-07-11 | End: 2024-07-12 | Stop reason: HOSPADM

## 2024-07-10 RX ORDER — LAMOTRIGINE 100 MG/1
100 TABLET ORAL ONCE
Status: COMPLETED | OUTPATIENT
Start: 2024-07-10 | End: 2024-07-10

## 2024-07-10 RX ORDER — TRAZODONE HYDROCHLORIDE 100 MG/1
200 TABLET ORAL ONCE
Status: COMPLETED | OUTPATIENT
Start: 2024-07-10 | End: 2024-07-10

## 2024-07-10 RX ORDER — CEFTRIAXONE 1 G/50ML
1000 INJECTION, SOLUTION INTRAVENOUS ONCE
Status: COMPLETED | OUTPATIENT
Start: 2024-07-10 | End: 2024-07-10

## 2024-07-10 RX ORDER — TRAZODONE HYDROCHLORIDE 100 MG/1
200 TABLET ORAL
Status: ON HOLD | COMMUNITY

## 2024-07-10 RX ORDER — ATORVASTATIN CALCIUM 20 MG/1
20 TABLET, FILM COATED ORAL DAILY
Status: DISCONTINUED | OUTPATIENT
Start: 2024-07-11 | End: 2024-07-12 | Stop reason: HOSPADM

## 2024-07-10 RX ORDER — ALBUTEROL SULFATE 90 UG/1
2 AEROSOL, METERED RESPIRATORY (INHALATION) EVERY 4 HOURS PRN
Status: DISCONTINUED | OUTPATIENT
Start: 2024-07-10 | End: 2024-07-12 | Stop reason: HOSPADM

## 2024-07-10 RX ORDER — FLUTICASONE FUROATE AND VILANTEROL 200; 25 UG/1; UG/1
1 POWDER RESPIRATORY (INHALATION) DAILY
Status: DISCONTINUED | OUTPATIENT
Start: 2024-07-11 | End: 2024-07-12

## 2024-07-10 RX ORDER — TRAZODONE HYDROCHLORIDE 100 MG/1
200 TABLET ORAL
Status: DISCONTINUED | OUTPATIENT
Start: 2024-07-11 | End: 2024-07-12 | Stop reason: HOSPADM

## 2024-07-10 RX ORDER — VENLAFAXINE HYDROCHLORIDE 150 MG/1
150 CAPSULE, EXTENDED RELEASE ORAL DAILY
Status: DISCONTINUED | OUTPATIENT
Start: 2024-07-11 | End: 2024-07-12 | Stop reason: HOSPADM

## 2024-07-10 RX ORDER — MONTELUKAST SODIUM 10 MG/1
10 TABLET ORAL DAILY
Status: DISCONTINUED | OUTPATIENT
Start: 2024-07-11 | End: 2024-07-12 | Stop reason: HOSPADM

## 2024-07-10 RX ADMIN — LAMOTRIGINE 100 MG: 100 TABLET ORAL at 23:01

## 2024-07-10 RX ADMIN — CEFTRIAXONE 1000 MG: 1 INJECTION, SOLUTION INTRAVENOUS at 19:15

## 2024-07-10 RX ADMIN — TRAZODONE HYDROCHLORIDE 200 MG: 100 TABLET ORAL at 23:01

## 2024-07-10 RX ADMIN — SODIUM CHLORIDE 1000 ML: 0.9 INJECTION, SOLUTION INTRAVENOUS at 17:50

## 2024-07-10 NOTE — TELEPHONE ENCOUNTER
This patient had laboratory testing done just yesterday and follow-up for our office visit last week where she has some unknown lung disease.  Note that her kidney function has deteriorated quite seriously and her creatinine which was normal on May 9 is now 4.58 with a GFR of only 9.  This makes me wonder if there is some sort of pulmonary and renal disease such as ANCA associated vasculitis.  Patient does not indicate to me that she has become profoundly dehydrated from vomiting or diarrhea.  I advised her to go to the hospital because I think she will need intensive diagnosis and potential treatment.

## 2024-07-10 NOTE — SEPSIS NOTE
"  Sepsis Note   Ngozi Beard 64 y.o. female MRN: [id number]  Unit/Bed#: ED 15 Encounter: [id number]       Initial Sepsis Screening       Row Name 07/10/24 1834                Is the patient's history suggestive of a new or worsening infection? Yes (Proceed)  -SD        Suspected source of infection suspect infection, source unknown  -SD        Indicate SIRS criteria Tachypnea > 20 resp per min;Leukocytosis (WBC > 52920 IJL) OR Leukopenia (WBC <4000 IJL) OR Bandemia (WBC >10% bands)  -SD        Are two or more of the above signs & symptoms of infection both present and new to the patient? Yes (Proceed)  -SD        Assess for evidence of organ dysfunction: Are any of the below criteria present within 6 hours of suspected infection and SIRS criteria that are NOT considered to be chronic conditions? Creatinine > 2.0 AND > 0.5 above baseline  -SD        Date of presentation of severe sepsis 07/10/24  -SD        Time of presentation of severe sepsis 1815  -SD        Sepsis Note: Click \"NEXT\" below (NOT \"close\") to generate sepsis note based on above information. --                  User Key  (r) = Recorded By, (t) = Taken By, (c) = Cosigned By      Initials Name Provider Type    ERNESTINA Sears PA-C Physician Assistant                        There is no height or weight on file to calculate BMI.  Wt Readings from Last 1 Encounters:   07/01/24 111 kg (244 lb 6.4 oz)        Ideal body weight: 52.4 kg (115 lb 8.3 oz)  Adjusted ideal body weight: 75.8 kg (167 lb 1.2 oz)    "

## 2024-07-10 NOTE — ED PROVIDER NOTES
History  Chief Complaint   Patient presents with    Abnormal Lab     Pt reports provider calling due to abnormal kidney lab that was drawn yesterday. Pt reports no symptoms.      This is a 64-year-old female with past medical history significant for hypertension presenting to the emergency department today for elevated creatinine levels.  The patient was sent in by her pulmonologist for a creatinine level that has quadrupled.  The patient notes fatigue and generalized chest pain for numerous days but otherwise is without complaints.  She denies any shortness of breath.  Denies any nausea, vomiting, diarrhea, constipation, or abdominal pain.  She has no dysuria, gross hematuria, or foul-smelling urine.  She denies any flank pain.  She notes normal urine output.  She does not take NSAIDs.  No other complaints at this time.      History provided by:  Patient   used: No    Evaluation of Abnormal Diagnostic Test  Time since result:  One day  Patient referred by:  Specialist  Resulting agency:  [facility]  Result type: chemistry    Chemistry:     Creatinine:  High      Prior to Admission Medications   Prescriptions Last Dose Informant Patient Reported? Taking?   Advair -21 MCG/ACT inhaler   No Yes   Sig: Inhale 2 puffs 2 (two) times a day Rinse mouth after use.   albuterol (PROVENTIL HFA,VENTOLIN HFA) 90 mcg/act inhaler   No Yes   Sig: INHALE 2 PUFFS BY MOUTH EVERY 4 HOURS AS NEEDED FOR SHORTNESS OF BREATH   atorvastatin (LIPITOR) 20 mg tablet   No Yes   Sig: TAKE 1 TABLET BY MOUTH EVERY DAY   benzonatate (TESSALON) 200 MG capsule Not Taking  No No   Sig: Take 1 capsule (200 mg total) by mouth 3 (three) times a day as needed for cough   Patient not taking: Reported on 6/26/2024   cetirizine (ZyrTEC) 10 mg tablet Not Taking  No No   Sig: Take 1 tablet (10 mg total) by mouth daily   Patient not taking: Reported on 5/1/2024   lamoTRIgine (LaMICtal) 100 mg tablet 7/10/2024  Yes Yes   Sig: Take 100 mg  by mouth every morning   metoprolol tartrate (LOPRESSOR) 50 mg tablet   No Yes   Sig: TAKE 1 TABLET BY MOUTH TWICE A DAY (INS 30 DAY LIMIT)   montelukast (SINGULAIR) 10 mg tablet   No Yes   Sig: TAKE 1 TABLET BY MOUTH EVERY DAY   oxybutynin (DITROPAN-XL) 5 mg 24 hr tablet Not Taking  No No   Sig: TAKE 1 TABLET (5 MG TOTAL) BY MOUTH DAILY.   Patient not taking: Reported on 2024   traZODone (DESYREL) 100 mg tablet   Yes Yes   Sig: Take 200 mg by mouth daily at bedtime   triamterene-hydrochlorothiazide (DYAZIDE) 37.5-25 mg per capsule   No Yes   Sig: TAKE 1 CAPSULE BY MOUTH EVERY DAY   venlafaxine (EFFEXOR-XR) 150 mg 24 hr capsule   No Yes   Sig: TAKE 1 CAPSULE BY MOUTH DAILY WITH BREAKFAST.      Facility-Administered Medications: None       Past Medical History:   Diagnosis Date    Asthma     Chronic pain     Hypertension        Past Surgical History:   Procedure Laterality Date    BACK SURGERY      COLONOSCOPY      TUBAL LIGATION         Family History   Problem Relation Age of Onset    Diabetes Mother     Hypertension Mother     Lung cancer Mother     Colon cancer Mother     Colon cancer Father     Hypertension Sister     Multiple sclerosis Sister     Hypothyroidism Maternal Aunt      I have reviewed and agree with the history as documented.    E-Cigarette/Vaping    E-Cigarette Use Never User      E-Cigarette/Vaping Substances    Nicotine No     THC No     CBD No     Flavoring No      Social History     Tobacco Use    Smoking status: Former     Current packs/day: 0.00     Average packs/day: 1 pack/day for 15.0 years (15.0 ttl pk-yrs)     Types: Cigarettes     Start date:      Quit date:      Years since quittin.5    Smokeless tobacco: Never   Vaping Use    Vaping status: Never Used   Substance Use Topics    Alcohol use: No     Comment: hX:Occas.     Drug use: No       Review of Systems   Constitutional:  Positive for fatigue. Negative for appetite change, chills, diaphoresis and fever.   Eyes:   Negative for visual disturbance.   Respiratory:  Negative for cough, chest tightness, shortness of breath and wheezing.    Cardiovascular:  Positive for chest pain. Negative for palpitations and leg swelling.   Gastrointestinal:  Negative for abdominal pain, constipation, diarrhea, nausea and vomiting.   Musculoskeletal:  Negative for neck pain and neck stiffness.   Skin:  Negative for rash and wound.   Neurological:  Negative for dizziness, seizures, syncope, weakness, light-headedness, numbness and headaches.   Psychiatric/Behavioral:  Negative for confusion.    All other systems reviewed and are negative.      Physical Exam  Physical Exam  Vitals and nursing note reviewed.   Constitutional:       General: She is not in acute distress.     Appearance: Normal appearance. She is normal weight. She is not ill-appearing, toxic-appearing or diaphoretic.   HENT:      Head: Normocephalic and atraumatic.      Nose: Nose normal. No congestion or rhinorrhea.      Mouth/Throat:      Mouth: Mucous membranes are moist.      Pharynx: No oropharyngeal exudate or posterior oropharyngeal erythema.   Eyes:      General: No scleral icterus.        Right eye: No discharge.         Left eye: No discharge.      Extraocular Movements: Extraocular movements intact.      Pupils: Pupils are equal, round, and reactive to light.   Cardiovascular:      Rate and Rhythm: Normal rate and regular rhythm.      Pulses: Normal pulses.      Heart sounds: Normal heart sounds. No murmur heard.     No friction rub. No gallop.   Pulmonary:      Effort: Pulmonary effort is normal. No respiratory distress.      Breath sounds: Normal breath sounds. No stridor. No wheezing, rhonchi or rales.   Chest:      Chest wall: No tenderness.   Abdominal:      General: Abdomen is flat. There is no distension.      Palpations: Abdomen is soft.      Tenderness: There is no abdominal tenderness. There is no right CVA tenderness, left CVA tenderness, guarding or rebound.    Musculoskeletal:         General: Normal range of motion.      Cervical back: Normal range of motion. No tenderness.      Right lower leg: No edema.      Left lower leg: No edema.   Skin:     General: Skin is warm and dry.      Capillary Refill: Capillary refill takes less than 2 seconds.      Coloration: Skin is not jaundiced or pale.   Neurological:      General: No focal deficit present.      Mental Status: She is alert and oriented to person, place, and time. Mental status is at baseline.   Psychiatric:         Mood and Affect: Mood normal.         Behavior: Behavior normal.         Vital Signs  ED Triage Vitals   Temperature Pulse Respirations Blood Pressure SpO2   07/10/24 1735 07/10/24 1735 07/10/24 1735 07/10/24 1735 07/10/24 1735   99.4 °F (37.4 °C) 79 (!) 26 (!) 186/100 92 %      Temp Source Heart Rate Source Patient Position - Orthostatic VS BP Location FiO2 (%)   07/10/24 1735 07/10/24 1735 07/10/24 1735 07/10/24 1735 --   Oral Monitor Lying Left arm       Pain Score       07/10/24 2334       No Pain           Vitals:    07/10/24 1735 07/10/24 1815 07/10/24 2057 07/10/24 2334   BP: (!) 186/100 162/75 151/72 164/67   Pulse: 79 85 93 79   Patient Position - Orthostatic VS: Lying Sitting Sitting Lying         Visual Acuity      ED Medications  Medications   fluticasone-vilanterol 200-25 mcg/actuation 1 puff (has no administration in time range)   albuterol (PROVENTIL HFA,VENTOLIN HFA) inhaler 2 puff (has no administration in time range)   atorvastatin (LIPITOR) tablet 20 mg (has no administration in time range)   lamoTRIgine (LaMICtal) tablet 100 mg (has no administration in time range)   montelukast (SINGULAIR) tablet 10 mg (has no administration in time range)   traZODone (DESYREL) tablet 200 mg (has no administration in time range)   venlafaxine (EFFEXOR-XR) 24 hr capsule 150 mg (has no administration in time range)   multi-electrolyte (PLASMALYTE-A/ISOLYTE-S PH 7.4) IV solution (100 mL/hr Intravenous  New Bag 7/11/24 0026)   acetaminophen (TYLENOL) tablet 650 mg (has no administration in time range)   heparin (porcine) subcutaneous injection 7,500 Units (7,500 Units Subcutaneous Given 7/11/24 0027)   cefTRIAXone (ROCEPHIN) IVPB (premix in dextrose) 2,000 mg 50 mL (has no administration in time range)   hydrALAZINE (APRESOLINE) injection 10 mg (has no administration in time range)   metoprolol tartrate (LOPRESSOR) tablet 50 mg (50 mg Oral Given 7/11/24 0631)   dextromethorphan-guaiFENesin (ROBITUSSIN DM) oral syrup 10 mL (10 mL Oral Given 7/11/24 0105)   sodium chloride 0.9 % bolus 1,000 mL (0 mL Intravenous Stopped 7/11/24 0025)   cefTRIAXone (ROCEPHIN) IVPB (premix in dextrose) 1,000 mg 50 mL (0 mg Intravenous Stopped 7/10/24 2100)   lamoTRIgine (LaMICtal) tablet 100 mg (100 mg Oral Given 7/10/24 2301)   traZODone (DESYREL) tablet 200 mg (200 mg Oral Given 7/10/24 2301)       Diagnostic Studies  Results Reviewed       Procedure Component Value Units Date/Time    Comprehensive metabolic panel [[id number]]  (Abnormal) Collected: 07/11/24 0459    Lab Status: Final result Specimen: Blood from Arm, Left Updated: 07/11/24 0601     Sodium 140 mmol/L      Potassium 4.1 mmol/L      Chloride 106 mmol/L      CO2 21 mmol/L      ANION GAP 13 mmol/L      BUN 73 mg/dL      Creatinine 6.02 mg/dL      Glucose 97 mg/dL      Calcium 9.0 mg/dL      Corrected Calcium 9.6 mg/dL      AST 11 U/L      ALT 9 U/L      Alkaline Phosphatase 58 U/L      Total Protein 6.1 g/dL      Albumin 3.2 g/dL      Total Bilirubin 0.40 mg/dL      eGFR 6 ml/min/1.73sq m     Narrative:      National Kid[employer] Disease Foundation guidelines for Chronic Kidney Disease (CKD):     Stage 1 with normal or high GFR (GFR > 90 mL/min/1.73 square meters)    Stage 2 Mild CKD (GFR = 60-89 mL/min/1.73 square meters)    Stage 3A Moderate CKD (GFR = 45-59 mL/min/1.73 square meters)    Stage 3B Moderate CKD (GFR = 30-44 mL/min/1.73 square meters)    Stage 4 Severe CKD (GFR =  15-29 mL/min/1.73 square meters)    Stage 5 End Stage CKD (GFR <15 mL/min/1.73 square meters)  Note: GFR calculation is accurate only with a steady state creatinine    Magnesium [[id number]]  (Normal) Collected: 07/11/24 0459    Lab Status: Final result Specimen: Blood from Arm, Left Updated: 07/11/24 0601     Magnesium 2.2 mg/dL     Phosphorus [[id number]]  (Abnormal) Collected: 07/11/24 0459    Lab Status: Final result Specimen: Blood from Arm, Left Updated: 07/11/24 0601     Phosphorus 5.8 mg/dL     Procalcitonin, Next Day AM Collection [[id number]]  (Abnormal) Collected: 07/11/24 0459    Lab Status: Final result Specimen: Blood from Arm, Left Updated: 07/11/24 0543     Procalcitonin 0.30 ng/ml     CBC and differential [[id number]]  (Abnormal) Collected: 07/11/24 0459    Lab Status: Final result Specimen: Blood from Arm, Left Updated: 07/11/24 0514     WBC 12.33 Thousand/uL      RBC 3.49 Million/uL      Hemoglobin 9.6 g/dL      Hematocrit 30.1 %      MCV 86 fL      MCH 27.5 pg      MCHC 31.9 g/dL      RDW 15.2 %      MPV 9.9 fL      Platelets 244 Thousands/uL      nRBC 0 /100 WBCs      Segmented % 74 %      Immature Grans % 1 %      Lymphocytes % 13 %      Monocytes % 10 %      Eosinophils Relative 2 %      Basophils Relative 0 %      Absolute Neutrophils 9.16 Thousands/µL      Absolute Immature Grans 0.06 Thousand/uL      Absolute Lymphocytes 1.64 Thousands/µL      Absolute Monocytes 1.25 Thousand/µL      Eosinophils Absolute 0.18 Thousand/µL      Basophils Absolute 0.04 Thousands/µL     Procalcitonin [[id number]]  (Abnormal) Collected: 07/11/24 0025    Lab Status: Final result Specimen: Blood from Arm, Right Updated: 07/11/24 0104     Procalcitonin 0.27 ng/ml     Platelet count [[id number]]  (Normal) Collected: 07/11/24 0025    Lab Status: Final result Specimen: Blood from Arm, Right Updated: 07/11/24 0038     Platelets 241 Thousands/uL      MPV 9.9 fL     Sputum culture and Gram stain [[id number]] Collected: 07/11/24  0011    Lab Status: In process Specimen: Expectorated Sputum Updated: 07/11/24 0037    Blood culture [[id number]] Collected: 07/10/24 1914    Lab Status: Preliminary result Specimen: Blood from Arm, Left Updated: 07/11/24 0001     Blood Culture Received in Microbiology Lab. Culture in Progress.    Blood culture [[id number]] Collected: 07/10/24 1914    Lab Status: Preliminary result Specimen: Blood from Arm, Right Updated: 07/11/24 0001     Blood Culture Received in Microbiology Lab. Culture in Progress.    Glomerular basement membrane antibodies [[id number]] Collected: 07/10/24 2307    Lab Status: In process Specimen: Blood from Arm, Left Updated: 07/10/24 2311    C4 complement [[id number]] Collected: 07/10/24 2307    Lab Status: In process Specimen: Blood from Arm, Left Updated: 07/10/24 2311    C3 complement [[id number]] Collected: 07/10/24 2307    Lab Status: In process Specimen: Blood from Arm, Left Updated: 07/10/24 2311    dsDNA Antibody by IFA, Crithidia luciliae, with Reflex to Titer [[id number]] Collected: 07/10/24 2307    Lab Status: In process Specimen: Blood from Arm, Left Updated: 07/10/24 2311    AURA Screen w/ Reflex to Titer/Pattern [[id number]] Collected: 07/10/24 2307    Lab Status: In process Specimen: Blood from Arm, Left Updated: 07/10/24 2311    Anti-neutrophilic cytoplasmic antibody [[id number]] Collected: 07/10/24 2307    Lab Status: In process Specimen: Blood from Arm, Left Updated: 07/10/24 2311    Antistreptolysin O screen [[id number]] Collected: 07/10/24 2309    Lab Status: In process Specimen: Blood from Arm, Left Updated: 07/10/24 2311    HS Troponin I 2hr [[id number]]  (Normal) Collected: 07/10/24 2022    Lab Status: Final result Specimen: Blood from Arm, Right Updated: 07/10/24 2103     hs TnI 2hr 8 ng/L      Delta 2hr hsTnI 0 ng/L     Lactic acid, plasma (w/reflex if result > 2.0) [[id number]]  (Normal) Collected: 07/10/24 2022    Lab Status: Final result Specimen: Blood from Arm, Right Updated:  07/10/24 2051     LACTIC ACID 0.9 mmol/L     Narrative:      Result may be elevated if tourniquet was used during collection.    CK [[id number]]  (Normal) Collected: 07/10/24 1742    Lab Status: Final result Specimen: Blood from Arm, Right Updated: 07/10/24 1949     Total  U/L     HS Troponin I 4hr [[id number]]     Lab Status: No result Specimen: Blood     Urine Microscopic [[id number]]  (Abnormal) Collected: 07/10/24 1748    Lab Status: Final result Specimen: Urine, Clean Catch Updated: 07/10/24 1824     RBC, UA Innumerable /hpf      WBC, UA 2-4 /hpf      Epithelial Cells Occasional /hpf      Bacteria, UA Moderate /hpf     HS Troponin 0hr (reflex protocol) [[id number]]  (Normal) Collected: 07/10/24 1742    Lab Status: Final result Specimen: Blood from Arm, Right Updated: 07/10/24 1820     hs TnI 0hr 8 ng/L     Comprehensive metabolic panel [[id number]]  (Abnormal) Collected: 07/10/24 1742    Lab Status: Final result Specimen: Blood from Arm, Right Updated: 07/10/24 1814     Sodium 140 mmol/L      Potassium 4.0 mmol/L      Chloride 106 mmol/L      CO2 20 mmol/L      ANION GAP 14 mmol/L      BUN 74 mg/dL      Creatinine 5.74 mg/dL      Glucose 120 mg/dL      Calcium 9.4 mg/dL      AST 13 U/L      ALT 11 U/L      Alkaline Phosphatase 72 U/L      Total Protein 6.8 g/dL      Albumin 3.6 g/dL      Total Bilirubin 0.27 mg/dL      eGFR 7 ml/min/1.73sq m     Narrative:      National Kidney Disease Foundation guidelines for Chronic Kidney Disease (CKD):     Stage 1 with normal or high GFR (GFR > 90 mL/min/1.73 square meters)    Stage 2 Mild CKD (GFR = 60-89 mL/min/1.73 square meters)    Stage 3A Moderate CKD (GFR = 45-59 mL/min/1.73 square meters)    Stage 3B Moderate CKD (GFR = 30-44 mL/min/1.73 square meters)    Stage 4 Severe CKD (GFR = 15-29 mL/min/1.73 square meters)    Stage 5 End Stage CKD (GFR <15 mL/min/1.73 square meters)  Note: GFR calculation is accurate only with a steady state creatinine    UA w Reflex to  Microscopic w Reflex to Culture [[id number]]  (Abnormal) Collected: 07/10/24 1748    Lab Status: Final result Specimen: Urine, Clean Catch Updated: 07/10/24 1803     Color, UA Red     Clarity, UA Slightly Cloudy     Specific Gravity, UA 1.020     pH, UA 6.5     Leukocytes, UA Negative     Nitrite, UA Negative     Protein, UA 3+ mg/dl      Glucose, UA Negative mg/dl      Ketones, UA Negative mg/dl      Urobilinogen, UA 0.2 E.U./dl      Bilirubin, UA Negative     Occult Blood, UA 3+    CBC and differential [[id number]]  (Abnormal) Collected: 07/10/24 1742    Lab Status: Final result Specimen: Blood from Arm, Right Updated: 07/10/24 1756     WBC 14.56 Thousand/uL      RBC 3.95 Million/uL      Hemoglobin 10.8 g/dL      Hematocrit 34.0 %      MCV 86 fL      MCH 27.3 pg      MCHC 31.8 g/dL      RDW 15.2 %      MPV 10.4 fL      Platelets 294 Thousands/uL      nRBC 0 /100 WBCs      Segmented % 73 %      Immature Grans % 1 %      Lymphocytes % 14 %      Monocytes % 9 %      Eosinophils Relative 2 %      Basophils Relative 1 %      Absolute Neutrophils 10.84 Thousands/µL      Absolute Immature Grans 0.09 Thousand/uL      Absolute Lymphocytes 2.00 Thousands/µL      Absolute Monocytes 1.30 Thousand/µL      Eosinophils Absolute 0.26 Thousand/µL      Basophils Absolute 0.07 Thousands/µL                    CT abdomen pelvis wo contrast   Final Result by Gulshan Loyola DO (07/10 2138)      No hydronephrosis, however there is apparent 3 mm stone near or questionably within the distal right ureter (axial image 142). Given the lack of hydronephrosis this could either reflect adjacent calcification or nonobstructing stone. Correlate clinically    and with urinalysis.      Lesions in both kidneys as described above. Recommend dedicated renal ultrasound on nonemergent basis.      Redemonstration of multiple diffuse scattered nodules, less pronounced than on 5/16/2024 suggestive of mycobacterial infection.      There is questionable  asymmetric left fullness at the rectum which could be due to nondistention and not well evaluated without enteric and IV contrast. Correlate with recent colonoscopy. If there is no history of colonoscopy, one may be considered.      The tip of the appendix measures 12 mm and contains hyperdense material which could be secondary to stones or trapped enteric contrast material. No periappendiceal inflammatory changes are noted at this time.      Other findings as detailed above.      Workstation performed: [id number]         XR chest 1 view portable    (Results Pending)   US kidney and bladder    (Results Pending)              Procedures  ECG 12 Lead Documentation Only    Date/Time: 7/10/2024 5:54 PM    Performed by: Niraj Sears PA-C  Authorized by: Niraj Sears PA-C    Indications / Diagnosis:  Chest Pain  Previous ECG:     Previous ECG:  Compared to current    Comparison ECG info:  1/3/2023    Similarity:  Changes noted  Interpretation:     Interpretation: abnormal    Rate:     ECG rate:  78    ECG rate assessment: normal    Rhythm:     Rhythm: sinus rhythm    Ectopy:     Ectopy: PAC    QRS:     QRS axis:  Normal  Comments:      Normal sinus rhythm with a rate of 78.  PAC noted.  LBBB. No STEMI.           ED Course  ED Course as of 07/11/24 0844   Wed Jul 10, 2024   2009 Signed out to Dr. Miller pending CT imaging and lactic results. Recommend admission for ANGELICA.   2011 The 30ml/kg fluid bolus was not given to the patient despite hypotension and/or significantly elevated lactate of = 4 and/or presence of septic shock due to: Renal Failure. The patient will be administered 1L of crystalloid fluid instead. Orders for this have been placed in [facility]. The patient may receive additional colloid or crystalloid fluids thereafter based on clinical condition.     Niraj Sears PA-C                                Initial Sepsis Screening       Row Name 07/10/24 1834              "   Is the patient's history suggestive of a new or worsening infection? Yes (Proceed)  -SD        Suspected source of infection suspect infection, source unknown  -SD        Indicate SIRS criteria Tachypnea > 20 resp per min;Leukocytosis (WBC > 50888 IJL) OR Leukopenia (WBC <4000 IJL) OR Bandemia (WBC >10% bands)  -SD        Are two or more of the above signs & symptoms of infection both present and new to the patient? Yes (Proceed)  -SD        Assess for evidence of organ dysfunction: Are any of the below criteria present within 6 hours of suspected infection and SIRS criteria that are NOT considered to be chronic conditions? Creatinine > 2.0 AND > 0.5 above baseline  -SD        Date of presentation of severe sepsis 07/10/24  -SD        Time of presentation of severe sepsis 1815  -SD        Sepsis Note: Click \"NEXT\" below (NOT \"close\") to generate sepsis note based on above information. --                  User Key  (r) = Recorded By, (t) = Taken By, (c) = Cosigned By      Initials Name Provider Type    ERNESTINA Sears PA-C Physician Assistant                   Initial Sepsis Screening       Row Name 07/10/24 1834                Is the patient's history suggestive of a new or worsening infection? Yes (Proceed)  -SD        Suspected source of infection suspect infection, source unknown  -SD        Indicate SIRS criteria Tachypnea > 20 resp per min;Leukocytosis (WBC > 03896 IJL) OR Leukopenia (WBC <4000 IJL) OR Bandemia (WBC >10% bands)  -SD        Are two or more of the above signs & symptoms of infection both present and new to the patient? Yes (Proceed)  -SD        Assess for evidence of organ dysfunction: Are any of the below criteria present within 6 hours of suspected infection and SIRS criteria that are NOT considered to be chronic conditions? Creatinine > 2.0 AND > 0.5 above baseline  -SD        Date of presentation of severe sepsis 07/10/24  -SD        Time of presentation of severe sepsis " "1815  -SD        Sepsis Note: Click \"NEXT\" below (NOT \"close\") to generate sepsis note based on above information. --                  User Key  (r) = Recorded By, (t) = Taken By, (c) = Cosigned By      Initials Name Provider Type    ERNESTINA Sears PA-C Physician Assistant                    SBIRT 22yo+      Flowsheet Row Most Recent Value   Initial Alcohol Screen: US AUDIT-C     1. How often do you have a drink containing alcohol? 0 Filed at: 07/10/2024 1756   2. How many drinks containing alcohol do you have on a typical day you are drinking?  0 Filed at: 07/10/2024 1756   3a. Male UNDER 65: How often do you have five or more drinks on one occasion? 0 Filed at: 07/10/2024 1756   3b. FEMALE Any Age, or MALE 65+: How often do you have 4 or more drinks on one occassion? 0 Filed at: 07/10/2024 1756   Audit-C Score 0 Filed at: 07/10/2024 1756   MIKEY: How many times in the past year have you...    Used an illegal drug or used a prescription medication for non-medical reasons? Never Filed at: 07/10/2024 1756                      Medical Decision Making  64-year-old female presenting to the ER today for elevation to creatinine level.  Also complains of chest pain.  Vital signs are stable.  Afebrile.  Physical examination is reassuring.  EKG shows normal sinus rhythm with a rate of 78.  PAC noted.  New left bundle branch block noted from prior EKG in 2023.  Creatinine is greater than 5 from baseline of 0.8.  Initial troponin is 8.  Chest x-ray is without acute cardiopulmonary disease on my independent interpretation.  Based upon elevation to respirations and leukocytosis in the setting of ANGELICA, the patient meets severe sepsis criteria.  Intravenous fluids were ordered for the patient but sepsis fluid exclusion protocol was utilized.  She was dosed with Rocephin while here in the emergency department.  She had hematuria on urinalysis and therefore CT abdomen and pelvis was obtained.  At this time, the " "patient was signed out to Dr. Miller pending CT imaging results.  The patient and/or patient's proxy verify their understanding and agree to the plan at this time.  All questions answered to the patient and/or their proxy's satisfaction.  All labs reviewed and utilized in the medical decision making process (if labs were ordered).  Portions of the record may have been created with voice recognition software.  Occasional wrong word or \"sound a like\" substitutions may have occurred due to the inherent limitations of voice recognition software.  Read the chart carefully and recognize, using context, where substitutions have occurred.    I reviewed prior notes.  I reviewed prior labs.    Problems Addressed:  Acute kidney injury (HCC): undiagnosed new problem with uncertain prognosis  Hematuria: undiagnosed new problem with uncertain prognosis    Amount and/or Complexity of Data Reviewed  External Data Reviewed: labs and notes.  Labs: ordered. Decision-making details documented in ED Course.  Radiology: ordered and independent interpretation performed. Decision-making details documented in ED Course.     Details: CXR  ECG/medicine tests: ordered and independent interpretation performed. Decision-making details documented in ED Course.  Discussion of management or test interpretation with external provider(s): Dr. Miller    Risk  Prescription drug management.  Decision regarding hospitalization.                 Disposition  Final diagnoses:   Acute kidney injury (HCC)   Hematuria     Time reflects when diagnosis was documented in both MDM as applicable and the Disposition within this note       Time User Action Codes Description Comment    7/10/2024 10:49 PM Rhonda Miller Add [N17.9] Acute kidney injury (HCC)     7/10/2024 10:49 PM Rhonda Miller Add [R31.9] Hematuria     7/10/2024 11:07 PM Nathanael Chaudhry Add [N17.9] ANGELICA (acute kidney injury) (HCC)     7/10/2024 11:07 PM Nathanael Chaudhry Add [R93.89] Abnormal CT of the " chest           ED Disposition       ED Disposition   Admit    Condition   Stable    Date/Time   Wed Jul 10, 2024 9492    Comment   Case was discussed with Dr. Chaudhry and the patient's admission status was agreed to be Admission Status: inpatient status to the service of Dr. Chaudhry .               Follow-up Information    None         Patient's Medications   Discharge Prescriptions    No medications on file       No discharge procedures on file.    PDMP Review         Value Time User    PDMP Reviewed  Yes 7/10/2024 11:06 PM Nathanael Chaudhry MD            ED Provider  Electronically Signed by             iNraj Sears PA-C  07/11/24 0844

## 2024-07-10 NOTE — TELEPHONE ENCOUNTER
Patient returning call to office.    
FAMILY HISTORY:  No pertinent family history in first degree relatives

## 2024-07-11 ENCOUNTER — APPOINTMENT (INPATIENT)
Dept: ULTRASOUND IMAGING | Facility: HOSPITAL | Age: 64
DRG: 871 | End: 2024-07-11
Payer: MEDICARE

## 2024-07-11 ENCOUNTER — APPOINTMENT (INPATIENT)
Dept: CT IMAGING | Facility: HOSPITAL | Age: 64
DRG: 871 | End: 2024-07-11
Payer: MEDICARE

## 2024-07-11 LAB
ALBUMIN SERPL BCG-MCNC: 3.2 G/DL (ref 3.5–5)
ALP SERPL-CCNC: 58 U/L (ref 34–104)
ALT SERPL W P-5'-P-CCNC: 9 U/L (ref 7–52)
ANA SER QL IA: NEGATIVE
ANION GAP SERPL CALCULATED.3IONS-SCNC: 13 MMOL/L (ref 4–13)
ASO AB TITR SER LA: NORMAL {TITER}
AST SERPL W P-5'-P-CCNC: 11 U/L (ref 13–39)
ATRIAL RATE: 72 BPM
BASOPHILS # BLD AUTO: 0.04 THOUSANDS/ÂΜL (ref 0–0.1)
BASOPHILS NFR BLD AUTO: 0 % (ref 0–1)
BILIRUB SERPL-MCNC: 0.4 MG/DL (ref 0.2–1)
BUN SERPL-MCNC: 73 MG/DL (ref 5–25)
C3 SERPL-MCNC: 114 MG/DL (ref 87–200)
C4 SERPL-MCNC: 36 MG/DL (ref 19–52)
CALCIUM ALBUM COR SERPL-MCNC: 9.6 MG/DL (ref 8.3–10.1)
CALCIUM SERPL-MCNC: 9 MG/DL (ref 8.4–10.2)
CHLORIDE SERPL-SCNC: 106 MMOL/L (ref 96–108)
CO2 SERPL-SCNC: 21 MMOL/L (ref 21–32)
CREAT SERPL-MCNC: 6.02 MG/DL (ref 0.6–1.3)
CREAT UR-MCNC: 51.5 MG/DL
EOSINOPHIL # BLD AUTO: 0.18 THOUSAND/ÂΜL (ref 0–0.61)
EOSINOPHIL NFR BLD AUTO: 2 % (ref 0–6)
ERYTHROCYTE [DISTWIDTH] IN BLOOD BY AUTOMATED COUNT: 15.2 % (ref 11.6–15.1)
FERRITIN SERPL-MCNC: 103 NG/ML (ref 11–307)
GFR SERPL CREATININE-BSD FRML MDRD: 6 ML/MIN/1.73SQ M
GLUCOSE SERPL-MCNC: 97 MG/DL (ref 65–140)
HCT VFR BLD AUTO: 30.1 % (ref 34.8–46.1)
HGB BLD-MCNC: 9.6 G/DL (ref 11.5–15.4)
IMM GRANULOCYTES # BLD AUTO: 0.06 THOUSAND/UL (ref 0–0.2)
IMM GRANULOCYTES NFR BLD AUTO: 1 % (ref 0–2)
IRON SATN MFR SERPL: 11 % (ref 15–50)
IRON SERPL-MCNC: 24 UG/DL (ref 50–212)
LYMPHOCYTES # BLD AUTO: 1.64 THOUSANDS/ÂΜL (ref 0.6–4.47)
LYMPHOCYTES NFR BLD AUTO: 13 % (ref 14–44)
MAGNESIUM SERPL-MCNC: 2.2 MG/DL (ref 1.9–2.7)
MCH RBC QN AUTO: 27.5 PG (ref 26.8–34.3)
MCHC RBC AUTO-ENTMCNC: 31.9 G/DL (ref 31.4–37.4)
MCV RBC AUTO: 86 FL (ref 82–98)
MONOCYTES # BLD AUTO: 1.25 THOUSAND/ÂΜL (ref 0.17–1.22)
MONOCYTES NFR BLD AUTO: 10 % (ref 4–12)
NEUTROPHILS # BLD AUTO: 9.16 THOUSANDS/ÂΜL (ref 1.85–7.62)
NEUTS SEG NFR BLD AUTO: 74 % (ref 43–75)
NRBC BLD AUTO-RTO: 0 /100 WBCS
P AXIS: 47 DEGREES
PHOSPHATE SERPL-MCNC: 5.8 MG/DL (ref 2.3–4.1)
PLATELET # BLD AUTO: 241 THOUSANDS/UL (ref 149–390)
PLATELET # BLD AUTO: 244 THOUSANDS/UL (ref 149–390)
PMV BLD AUTO: 9.9 FL (ref 8.9–12.7)
PMV BLD AUTO: 9.9 FL (ref 8.9–12.7)
POTASSIUM SERPL-SCNC: 4.1 MMOL/L (ref 3.5–5.3)
PR INTERVAL: 200 MS
PROCALCITONIN SERPL-MCNC: 0.27 NG/ML
PROCALCITONIN SERPL-MCNC: 0.3 NG/ML
PROT SERPL-MCNC: 6.1 G/DL (ref 6.4–8.4)
PROT UR-MCNC: 301 MG/DL
PROT/CREAT UR: 5.84 MG/G{CREAT} (ref 0–0.1)
QRS AXIS: 73 DEGREES
QRSD INTERVAL: 142 MS
QT INTERVAL: 426 MS
QTC INTERVAL: 466 MS
RBC # BLD AUTO: 3.49 MILLION/UL (ref 3.81–5.12)
SODIUM SERPL-SCNC: 140 MMOL/L (ref 135–147)
T WAVE AXIS: 0 DEGREES
TIBC SERPL-MCNC: 210 UG/DL (ref 250–450)
UIBC SERPL-MCNC: 186 UG/DL (ref 155–355)
VENTRICULAR RATE: 72 BPM
WBC # BLD AUTO: 12.33 THOUSAND/UL (ref 4.31–10.16)

## 2024-07-11 PROCEDURE — 36415 COLL VENOUS BLD VENIPUNCTURE: CPT | Performed by: INTERNAL MEDICINE

## 2024-07-11 PROCEDURE — 82728 ASSAY OF FERRITIN: CPT | Performed by: INTERNAL MEDICINE

## 2024-07-11 PROCEDURE — 99284 EMERGENCY DEPT VISIT MOD MDM: CPT | Performed by: INTERNAL MEDICINE

## 2024-07-11 PROCEDURE — 99232 SBSQ HOSP IP/OBS MODERATE 35: CPT | Performed by: INTERNAL MEDICINE

## 2024-07-11 PROCEDURE — 93010 ELECTROCARDIOGRAM REPORT: CPT | Performed by: INTERNAL MEDICINE

## 2024-07-11 PROCEDURE — 80053 COMPREHEN METABOLIC PANEL: CPT | Performed by: INTERNAL MEDICINE

## 2024-07-11 PROCEDURE — 84145 PROCALCITONIN (PCT): CPT | Performed by: INTERNAL MEDICINE

## 2024-07-11 PROCEDURE — 76775 US EXAM ABDO BACK WALL LIM: CPT

## 2024-07-11 PROCEDURE — 93005 ELECTROCARDIOGRAM TRACING: CPT

## 2024-07-11 PROCEDURE — 71250 CT THORAX DX C-: CPT

## 2024-07-11 PROCEDURE — 83540 ASSAY OF IRON: CPT | Performed by: INTERNAL MEDICINE

## 2024-07-11 PROCEDURE — 85025 COMPLETE CBC W/AUTO DIFF WBC: CPT | Performed by: INTERNAL MEDICINE

## 2024-07-11 PROCEDURE — 87205 SMEAR GRAM STAIN: CPT | Performed by: INTERNAL MEDICINE

## 2024-07-11 PROCEDURE — 83735 ASSAY OF MAGNESIUM: CPT | Performed by: INTERNAL MEDICINE

## 2024-07-11 PROCEDURE — 85049 AUTOMATED PLATELET COUNT: CPT | Performed by: INTERNAL MEDICINE

## 2024-07-11 PROCEDURE — 83550 IRON BINDING TEST: CPT | Performed by: INTERNAL MEDICINE

## 2024-07-11 PROCEDURE — 84100 ASSAY OF PHOSPHORUS: CPT | Performed by: INTERNAL MEDICINE

## 2024-07-11 PROCEDURE — 87070 CULTURE OTHR SPECIMN AEROBIC: CPT | Performed by: INTERNAL MEDICINE

## 2024-07-11 RX ORDER — MONTELUKAST SODIUM 10 MG/1
10 TABLET ORAL DAILY
Status: CANCELLED | OUTPATIENT
Start: 2024-07-12

## 2024-07-11 RX ORDER — HYDRALAZINE HYDROCHLORIDE 20 MG/ML
10 INJECTION INTRAMUSCULAR; INTRAVENOUS EVERY 6 HOURS PRN
Status: CANCELLED | OUTPATIENT
Start: 2024-07-11

## 2024-07-11 RX ORDER — VENLAFAXINE HYDROCHLORIDE 37.5 MG/1
150 CAPSULE, EXTENDED RELEASE ORAL DAILY
Status: CANCELLED | OUTPATIENT
Start: 2024-07-11

## 2024-07-11 RX ORDER — TRAZODONE HYDROCHLORIDE 100 MG/1
200 TABLET ORAL
Status: CANCELLED | OUTPATIENT
Start: 2024-07-11

## 2024-07-11 RX ORDER — LAMOTRIGINE 100 MG/1
100 TABLET ORAL EVERY MORNING
Status: CANCELLED | OUTPATIENT
Start: 2024-07-12

## 2024-07-11 RX ORDER — BENZONATATE 100 MG/1
200 CAPSULE ORAL 3 TIMES DAILY PRN
Status: CANCELLED | OUTPATIENT
Start: 2024-07-11

## 2024-07-11 RX ORDER — MONTELUKAST SODIUM 10 MG/1
10 TABLET ORAL DAILY
Status: CANCELLED | OUTPATIENT
Start: 2024-07-11

## 2024-07-11 RX ORDER — METOPROLOL TARTRATE 50 MG/1
50 TABLET, FILM COATED ORAL EVERY 12 HOURS SCHEDULED
Status: CANCELLED | OUTPATIENT
Start: 2024-07-11

## 2024-07-11 RX ORDER — SODIUM CHLORIDE, SODIUM GLUCONATE, SODIUM ACETATE, POTASSIUM CHLORIDE, MAGNESIUM CHLORIDE, SODIUM PHOSPHATE, DIBASIC, AND POTASSIUM PHOSPHATE .53; .5; .37; .037; .03; .012; .00082 G/100ML; G/100ML; G/100ML; G/100ML; G/100ML; G/100ML; G/100ML
75 INJECTION, SOLUTION INTRAVENOUS CONTINUOUS
Status: CANCELLED | OUTPATIENT
Start: 2024-07-11

## 2024-07-11 RX ORDER — VENLAFAXINE HYDROCHLORIDE 37.5 MG/1
150 CAPSULE, EXTENDED RELEASE ORAL DAILY
Status: CANCELLED | OUTPATIENT
Start: 2024-07-12

## 2024-07-11 RX ORDER — FLUTICASONE FUROATE AND VILANTEROL 200; 25 UG/1; UG/1
1 POWDER RESPIRATORY (INHALATION)
Status: CANCELLED | OUTPATIENT
Start: 2024-07-11

## 2024-07-11 RX ORDER — LORATADINE 10 MG/1
10 TABLET ORAL DAILY
Status: CANCELLED | OUTPATIENT
Start: 2024-07-11

## 2024-07-11 RX ORDER — HEPARIN SODIUM 5000 [USP'U]/ML
7500 INJECTION, SOLUTION INTRAVENOUS; SUBCUTANEOUS EVERY 8 HOURS SCHEDULED
Status: CANCELLED | OUTPATIENT
Start: 2024-07-11

## 2024-07-11 RX ORDER — ALBUTEROL SULFATE 90 UG/1
2 AEROSOL, METERED RESPIRATORY (INHALATION) EVERY 4 HOURS PRN
Status: CANCELLED | OUTPATIENT
Start: 2024-07-11

## 2024-07-11 RX ORDER — METOPROLOL TARTRATE 50 MG/1
50 TABLET, FILM COATED ORAL EVERY 12 HOURS SCHEDULED
Status: DISCONTINUED | OUTPATIENT
Start: 2024-07-11 | End: 2024-07-12 | Stop reason: HOSPADM

## 2024-07-11 RX ORDER — LAMOTRIGINE 100 MG/1
100 TABLET ORAL EVERY MORNING
Status: CANCELLED | OUTPATIENT
Start: 2024-07-11

## 2024-07-11 RX ORDER — FLUTICASONE FUROATE AND VILANTEROL 200; 25 UG/1; UG/1
1 POWDER RESPIRATORY (INHALATION) DAILY
Status: CANCELLED | OUTPATIENT
Start: 2024-07-12

## 2024-07-11 RX ORDER — ATORVASTATIN CALCIUM 20 MG/1
20 TABLET, FILM COATED ORAL DAILY
Status: CANCELLED | OUTPATIENT
Start: 2024-07-11

## 2024-07-11 RX ORDER — OXYBUTYNIN CHLORIDE 5 MG/1
5 TABLET, EXTENDED RELEASE ORAL DAILY
Status: CANCELLED | OUTPATIENT
Start: 2024-07-11

## 2024-07-11 RX ORDER — GUAIFENESIN/DEXTROMETHORPHAN 100-10MG/5
10 SYRUP ORAL EVERY 4 HOURS PRN
Status: DISCONTINUED | OUTPATIENT
Start: 2024-07-11 | End: 2024-07-12 | Stop reason: HOSPADM

## 2024-07-11 RX ORDER — ACETAMINOPHEN 325 MG/1
650 TABLET ORAL EVERY 6 HOURS PRN
Status: CANCELLED | OUTPATIENT
Start: 2024-07-11

## 2024-07-11 RX ORDER — CEFTRIAXONE 2 G/50ML
2000 INJECTION, SOLUTION INTRAVENOUS EVERY 24 HOURS
Status: CANCELLED | OUTPATIENT
Start: 2024-07-11

## 2024-07-11 RX ORDER — ATORVASTATIN CALCIUM 20 MG/1
20 TABLET, FILM COATED ORAL DAILY
Status: CANCELLED | OUTPATIENT
Start: 2024-07-12

## 2024-07-11 RX ORDER — GUAIFENESIN/DEXTROMETHORPHAN 100-10MG/5
10 SYRUP ORAL EVERY 4 HOURS PRN
Status: CANCELLED | OUTPATIENT
Start: 2024-07-11

## 2024-07-11 RX ORDER — CEFTRIAXONE 2 G/50ML
2000 INJECTION, SOLUTION INTRAVENOUS EVERY 24 HOURS
Status: DISCONTINUED | OUTPATIENT
Start: 2024-07-11 | End: 2024-07-12 | Stop reason: HOSPADM

## 2024-07-11 RX ADMIN — HEPARIN SODIUM 7500 UNITS: 5000 INJECTION, SOLUTION INTRAVENOUS; SUBCUTANEOUS at 17:49

## 2024-07-11 RX ADMIN — VENLAFAXINE HYDROCHLORIDE 150 MG: 150 CAPSULE, EXTENDED RELEASE ORAL at 10:19

## 2024-07-11 RX ADMIN — FLUTICASONE FUROATE AND VILANTEROL TRIFENATATE 1 PUFF: 200; 25 POWDER RESPIRATORY (INHALATION) at 09:13

## 2024-07-11 RX ADMIN — MONTELUKAST 10 MG: 10 TABLET, FILM COATED ORAL at 09:11

## 2024-07-11 RX ADMIN — ATORVASTATIN CALCIUM 20 MG: 20 TABLET, FILM COATED ORAL at 09:11

## 2024-07-11 RX ADMIN — METOPROLOL TARTRATE 50 MG: 50 TABLET, FILM COATED ORAL at 06:31

## 2024-07-11 RX ADMIN — LAMOTRIGINE 100 MG: 100 TABLET ORAL at 09:11

## 2024-07-11 RX ADMIN — METOPROLOL TARTRATE 50 MG: 50 TABLET, FILM COATED ORAL at 17:38

## 2024-07-11 RX ADMIN — HEPARIN SODIUM 7500 UNITS: 5000 INJECTION, SOLUTION INTRAVENOUS; SUBCUTANEOUS at 00:27

## 2024-07-11 RX ADMIN — ALBUTEROL SULFATE 2 PUFF: 108 AEROSOL, METERED RESPIRATORY (INHALATION) at 17:47

## 2024-07-11 RX ADMIN — SODIUM CHLORIDE, SODIUM GLUCONATE, SODIUM ACETATE, POTASSIUM CHLORIDE, MAGNESIUM CHLORIDE, SODIUM PHOSPHATE, DIBASIC, AND POTASSIUM PHOSPHATE 100 ML/HR: .53; .5; .37; .037; .03; .012; .00082 INJECTION, SOLUTION INTRAVENOUS at 00:26

## 2024-07-11 RX ADMIN — GUAIFENESIN AND DEXTROMETHORPHAN 10 ML: 100; 10 SYRUP ORAL at 01:05

## 2024-07-11 RX ADMIN — CEFTRIAXONE 2000 MG: 2 INJECTION, SOLUTION INTRAVENOUS at 22:07

## 2024-07-11 RX ADMIN — TRAZODONE HYDROCHLORIDE 200 MG: 100 TABLET ORAL at 22:05

## 2024-07-11 RX ADMIN — HEPARIN SODIUM 7500 UNITS: 5000 INJECTION, SOLUTION INTRAVENOUS; SUBCUTANEOUS at 09:09

## 2024-07-11 NOTE — ED CARE HANDOFF
Emergency Department Sign Out Note        Sign out and transfer of care from Niraj Sears. See Separate Emergency Department note.     The patient, Ngozi Beard, was evaluated by the previous provider for Angelica.    Workup Completed:  Repeat labs    ED Course / Workup Pending (followup):  CT CAP pending prior to admission    CT with multiple findings, previously noted pulmonary nodules concerning for mycobacterial infection versus other pulmonary pathology.  Pulmonology had previously commented on this problem stating that they did not feel it was consistent with her tuberculosis and were concerned regarding sarcoid.  Patient also with some renal calcifications of unknown significance and a nonobstructing distal ureteral stone.  UA shows hematuria.    I discussed the patient with nephrology.  They recommend that it is okay to admit her to [facility] but request that I order multiple serologies to evaluate for possible vasculitides which could be causing her ANGELICA and hematuria.  Serologies were sent.  Will admit to medicine for further management.                                 Procedures  Medical Decision Making  Amount and/or Complexity of Data Reviewed  Labs: ordered.  Radiology: ordered.    Risk  Prescription drug management.  Decision regarding hospitalization.            Disposition  Final diagnoses:   Acute kidney injury (HCC)   Hematuria     Time reflects when diagnosis was documented in both MDM as applicable and the Disposition within this note       Time User Action Codes Description Comment    7/10/2024 10:49 PM Rhonda Miller Add [N17.9] Acute kidney injury (HCC)     7/10/2024 10:49 PM Rhonda Miller Add [R31.9] Hematuria     7/10/2024 11:07 PM Nathanael Chaudhry Add [N17.9] ANGELICA (acute kidney injury) (HCC)     7/10/2024 11:07 PM Nathanael Chaudhry Add [R93.89] Abnormal CT of the chest           ED Disposition       ED Disposition   Admit    Condition   Stable    Date/Time   Wed Jul 10, 2024 10:49  PM    Comment   Case was discussed with Dr. Chaudhry and the patient's admission status was agreed to be Admission Status: inpatient status to the service of Dr. Chaudhry .               Follow-up Information    None       Patient's Medications   Discharge Prescriptions    No medications on file     No discharge procedures on file.       ED Provider  Electronically Signed by     Rhonda Miller MD  07/11/24 0025

## 2024-07-11 NOTE — RESPIRATORY THERAPY NOTE
"RT Protocol Note  Ngozi Beard 64 y.o. female MRN: [id number]  Unit/Bed#: ED 15 Encounter: [id number]    Assessment    Principal Problem:    Sepsis ([facility])  Active Problems:    Bipolar 1 disorder ([facility])    Primary hypertension    Dyslipidemia    Morbid obesity with BMI of 45.0-49.9, adult ([facility])    Abnormal CT of the chest    ANGELICA (acute kidney injury) ([facility])      Home Pulmonary Medications:  Advair HFA BID  Albuteral HFA Q4prn       Past Medical History:   Diagnosis Date    Asthma     Chronic pain     Hypertension                     Objective    Physical Exam:   Assessment Type: Assess only  General Appearance: Alert, Awake  Respiratory Pattern: Normal  Chest Assessment: Chest expansion symmetrical  Bilateral Breath Sounds: Diminished  Cough: None    Vitals:  Blood pressure 164/67, pulse 79, temperature 99.4 °F (37.4 °C), temperature source Oral, resp. rate 20, height 5' 3\" (1.6 m), weight 109 kg (241 lb), SpO2 93%.          Imaging and other studies: I have personally reviewed pertinent reports.            Plan    Respiratory Plan: Home Bronchodilator Patient pathway        Resp Comments: Pt assessed for Respiratory Protocol. BS diminished, SPO2 93% on room air. Continue with pt's home bronchodilator as ordered.   "

## 2024-07-11 NOTE — ASSESSMENT & PLAN NOTE
Patient noted to have multiple diffuse scattered pulmonary nodules on CT  Quantiferon gold negative 7/9/24  On Ceftriaxone  Pulmonology input appreciated  See above

## 2024-07-11 NOTE — ASSESSMENT & PLAN NOTE
"Patient admitted with acute kidney injury with creatinine of 5.74  Patient creatinine yesterday was 4.58  CT abdomen pelvis showed-\"No hydronephrosis, however there is apparent 3 mm stone near or questionably within the distal right ureter (axial image 142). Given the lack of hydronephrosis this could either reflect adjacent calcification or nonobstructing stone. Correlate clinically   and with urinalysis.Lesions in both kidneys as described above. Recommend dedicated renal ultrasound on nonemergent basis.There is questionable asymmetric left fullness at the rectum which could be due to nondistention and not well evaluated without enteric and IV contrast. Correlate with recent colonoscopy. If there is no history of colonoscopy, one may be considered.The tip of the appendix measures 12 mm and contains hyperdense material which could be secondary to stones or trapped enteric contrast material. No periappendiceal inflammatory changes are noted at this time.\"     ER discussed with nephrologist on-call who recommended serology workup including AURA, complement levels, GBM antibodies, ds DNA Ab was ordered by ER  Continue IV fluids  Avoid hypotension/nephrotoxic medication  Hold Dyazide  Renal ultrasound  Nephrology consult  Trend BMP  "

## 2024-07-11 NOTE — ASSESSMENT & PLAN NOTE
Patient admitted with acute renal failure with creatinine of 5.74  CT A/P: No hydronephrosis, however there is apparent 3 mm stone near or questionably within the distal right ureter (axial image 142). Given the lack of hydronephrosis this could either reflect adjacent calcification or nonobstructing stone. Correlate clinically and with urinalysis.Lesions in both kidneys as described above.  Creatinine worsening today. Cr 6.02, GFR 6  Patient reports that she is still producing urine  US kidney and bladder: No hydronephrosis. 4 mm nonobstructing left intrarenal calculus.  Hold home dyazide  Continue crystalloid fluids and Isolyte 75ml/hr   Follow up renal serologies  No urgent need for renal replacement therapy  Monitor BMP  Requires transfer to [facility] for further work up and IR for renal biopsy. No beds available at this time.  Consult to Nephrology, recommendations are appreciated

## 2024-07-11 NOTE — ASSESSMENT & PLAN NOTE
Patient noted to have multiple diffuse scattered pulmonary nodules on CT  Pulmonary consult  On Rocephin  See above

## 2024-07-11 NOTE — ED NOTES
Robitussin DM ineffective for cough. Pt repositioned to a fully upright position. Cough reduction decreased shortly after.      Elise Costello RN  07/11/24 0154

## 2024-07-11 NOTE — ED NOTES
Per maritza moran PA-C rocephin was re timed to be closer to midnight based on the patients last dose.      Vj Madden RN  07/11/24 8232

## 2024-07-11 NOTE — CONSULTS
NEPHROLOGY HOSPITAL CONSULTATION   Ngozi Beard 64 y.o. female MRN: [id number]  Unit/Bed#: ED 15 Encounter: [id number]    ASSESSMENT and PLAN:    64-year-old female with a history of asthma, and hypertension who was told to come to the hospital by her pulmonologist for acute kidney injury.  Nephrology consult for abnormal urine analysis and ANGELICA.    Acute Kidney Injury  -- Present on admission (POA) ; ANGELICA Stage III ;  admission creatinine: 4.58 mg/dL  --Baseline creatinine is less than 1 mg/dL.  And her creatinine was 0.8 mg/dL on May 2024  -- Urinalysis: 3+ blood.  3+ protein.  Innumerable red blood cells.  2-4 WBCs.  Moderate bacteria  -- Imaging: Renal ultrasound shows the right kidney 13.2 cm.  Left kidney 12.5 cm.  1.8 cm simple cyst on the left kidney.  No evidence of hydronephrosis.  4 mm nonobstructing left renal calculus.  CT scan without contrast showed no hydronephrosis.  Lesions in both kidneys were described ultrasound reviewed.  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  Acid-base status volume status and electrolytes are stable.  Does not have any overt uremic symptoms.  -- Plan:   Not in steady state, check a urine protein creatinine ratio  Check phospholipase A2 receptor antibodies  Discussed with the primary team Dr. Hernandez  No urgent indication for renal replacement therapy  Recommend transfer to [facility]  Will need a kidney biopsy  Will need pulmonary consultation  Continue balance crystalloid fluids with Isolyte  Follow-up pending serologies  Hold triamterene-hydrochlorothiazide    Pulmonary nodule  --QuantiFERON  TB Gold test was negative  -- CT can appear suggestive of mycobacterial infection  -- Pulmonary consulted  -- Having a nonproductive cough  -- Follow-up ANCA titer and anti-GBM    Anemia  -- Hemoglobin has been slowly drifting down  -- Platelets are normal  -- Worsening renal function  -- Check serum protein electrophoresis free light chain assay ratio and immunofixation  -- Check hemolysis smear  -- Check iron studies    Hypertension  -- Does not examine volume overloaded  -- Hold triamterene-hydrochlorothiazide  -- Start as needed IV hydralazine if needed  -- Fluids but reduce the rate  -- Continue metoprolol    Cough/abnormal chest CT  -- Recently establish care with pulmonary  -- Non-smoker  -- Underlying moderate persistent asthma  -- QuantiFERON-TB gold test was negative  -- CT of the chest showed multiple bilateral pulmonary nodules      SUMMARY OF RECOMMENDATIONS:    Please see above    HISTORY OF PRESENT ILLNESS:  Requesting Physician: Tobi Hernandez DO  Reason for Consult: Acute kidney injury    Ngozi Beard is a 64 y.o. female who was admitted to [facility] after presenting with abnormal blood work showing worsening renal function. A renal consultation is requested today for assistance in the management of acute kidney injury.  Patient was sent in by pulmonary due to worsening renal function.  Renal function has been normal back in May and her baseline is usually normal.  Patient reports having a cough for the past 6 months and injuries to the ribs related to the cough.  On the CT scan she was found to have pulmonary bilateral nodules.  Outpatient blood work revealed a creatinine of 4.5 and she was subsequent sent to the hospital.  Her urinalysis is quite active with a lot of microscopic hematuria.  She is a non-smoker.  She reports no family history of kidney disease.  No swelling of the legs.  Reports pink urine for the last few days.  Good oral intake no nausea no vomiting.    PAST  MEDICAL HISTORY:  Past Medical History:   Diagnosis Date    Asthma     Chronic pain     Hypertension        PAST SURGICAL HISTORY:  Past Surgical History:   Procedure Laterality Date    BACK SURGERY      COLONOSCOPY      TUBAL LIGATION         ALLERGIES:  Allergies   Allergen Reactions    Penicillins Hives     Reaction Date: 2005; Annotation - 2012: meena mcdonald    Amoxicillin Rash    Aspirin Rash    Ibuprofen Rash    Morphine Rash    Oxycodone Rash    Valacyclovir Rash       SOCIAL HISTORY:  Social History     Substance and Sexual Activity   Alcohol Use No    Comment: hX:Occas.      Social History     Substance and Sexual Activity   Drug Use No     Social History     Tobacco Use   Smoking Status Former    Current packs/day: 0.00    Average packs/day: 1 pack/day for 15.0 years (15.0 ttl pk-yrs)    Types: Cigarettes    Start date:     Quit date:     Years since quittin.5   Smokeless Tobacco Never       FAMILY HISTORY:  Family History   Problem Relation Age of Onset    Diabetes Mother     Hypertension Mother     Lung cancer Mother     Colon cancer Mother     Colon cancer Father     Hypertension Sister     Multiple sclerosis Sister     Hypothyroidism Maternal Aunt        MEDICATIONS:    Current Facility-Administered Medications:     acetaminophen (TYLENOL) tablet 650 mg, 650 mg, Oral, Q6H PRN, Nathanael Chaudhry MD    albuterol (PROVENTIL HFA,VENTOLIN HFA) inhaler 2 puff, 2 puff, Inhalation, Q4H PRN, Nathanael Chaudhry MD    atorvastatin (LIPITOR) tablet 20 mg, 20 mg, Oral, Daily, Nathanael Chaudhry MD, 20 mg at 24 0911    cefTRIAXone (ROCEPHIN) IVPB (premix in dextrose) 2,000 mg 50 mL, 2,000 mg, Intravenous, Q24H, Nathanael Chaudhry MD    dextromethorphan-guaiFENesin (ROBITUSSIN DM) oral syrup 10 mL, 10 mL, Oral, Q4H PRN, Nathanael Chaudhry MD, 10 mL at 24 0105    fluticasone-vilanterol 200-25 mcg/actuation 1 puff, 1 puff, Inhalation, Daily, Nathanael Chaudhry MD, 1 puff at 24 0913     heparin (porcine) subcutaneous injection 7,500 Units, 7,500 Units, Subcutaneous, Q8H JACK, 7,500 Units at 07/11/24 0909 **AND** [COMPLETED] Platelet count, , , Once, Nathanael Chaudhry MD    hydrALAZINE (APRESOLINE) injection 10 mg, 10 mg, Intravenous, Q6H PRN, Nathanael Chaudhry MD    lamoTRIgine (LaMICtal) tablet 100 mg, 100 mg, Oral, QAM, Nathanael Chaudhry MD, 100 mg at 07/11/24 0911    metoprolol tartrate (LOPRESSOR) tablet 50 mg, 50 mg, Oral, Q12H JACK, Nathanael Chaudhry MD, 50 mg at 07/11/24 0631    montelukast (SINGULAIR) tablet 10 mg, 10 mg, Oral, Daily, Nathanael Chaudhry MD, 10 mg at 07/11/24 0911    multi-electrolyte (PLASMALYTE-A/ISOLYTE-S PH 7.4) IV solution, 100 mL/hr, Intravenous, Continuous, Nathanael Chaudhry MD, Last Rate: 100 mL/hr at 07/11/24 0026, 100 mL/hr at 07/11/24 0026    traZODone (DESYREL) tablet 200 mg, 200 mg, Oral, HS, Nathanael Chaudhry MD    venlafaxine (EFFEXOR-XR) 24 hr capsule 150 mg, 150 mg, Oral, Daily, Nathanael Chaudhry MD, 150 mg at 07/11/24 1019    Current Outpatient Medications:     Advair -21 MCG/ACT inhaler, Inhale 2 puffs 2 (two) times a day Rinse mouth after use., Disp: 12 g, Rfl: 0    albuterol (PROVENTIL HFA,VENTOLIN HFA) 90 mcg/act inhaler, INHALE 2 PUFFS BY MOUTH EVERY 4 HOURS AS NEEDED FOR SHORTNESS OF BREATH, Disp: 18 g, Rfl: 5    atorvastatin (LIPITOR) 20 mg tablet, TAKE 1 TABLET BY MOUTH EVERY DAY, Disp: 90 tablet, Rfl: 1    lamoTRIgine (LaMICtal) 100 mg tablet, Take 100 mg by mouth every morning, Disp: , Rfl:     metoprolol tartrate (LOPRESSOR) 50 mg tablet, TAKE 1 TABLET BY MOUTH TWICE A DAY (INS 30 DAY LIMIT), Disp: 180 tablet, Rfl: 1    montelukast (SINGULAIR) 10 mg tablet, TAKE 1 TABLET BY MOUTH EVERY DAY, Disp: 90 tablet, Rfl: 1    traZODone (DESYREL) 100 mg tablet, Take 200 mg by mouth daily at bedtime, Disp: , Rfl:     triamterene-hydrochlorothiazide (DYAZIDE) 37.5-25 mg per capsule, TAKE 1 CAPSULE BY MOUTH EVERY DAY, Disp: 90 capsule, Rfl: 1     "venlafaxine (EFFEXOR-XR) 150 mg 24 hr capsule, TAKE 1 CAPSULE BY MOUTH DAILY WITH BREAKFAST., Disp: 30 capsule, Rfl: 0    benzonatate (TESSALON) 200 MG capsule, Take 1 capsule (200 mg total) by mouth 3 (three) times a day as needed for cough (Patient not taking: Reported on 6/26/2024), Disp: 30 capsule, Rfl: 0    cetirizine (ZyrTEC) 10 mg tablet, Take 1 tablet (10 mg total) by mouth daily (Patient not taking: Reported on 5/1/2024), Disp: 30 tablet, Rfl: 2    oxybutynin (DITROPAN-XL) 5 mg 24 hr tablet, TAKE 1 TABLET (5 MG TOTAL) BY MOUTH DAILY. (Patient not taking: Reported on 6/26/2024), Disp: 90 tablet, Rfl: 1    REVIEW OF SYSTEMS:  Constitutional: Negative for fatigue, anorexia, fever, chills, diaphoresis  HENT: Negative for postnasal drip  Eyes: Negative for visual disturbance.   Respiratory: Negative for cough, shortness of breath and wheezing.   Cardiovascular: Negative for chest pain, palpitations and leg swelling.   Gastrointestinal: Negative for abdominal pain, constipation, diarrhea, nausea and vomiting.   Genitourinary: No dysuria, hematuria  Endocrine: Negative for polyuria.   Musculoskeletal: Negative for arthralgias, back pain and joint swelling.   Skin: Negative for rash.   Neurological: Negative for focal weakness, headaches, dizziness.  Hematological: Negative for easy bruising or bleeding.  Psychiatric/Behavioral: Negative for confusion and sleep disturbance.   All the systems were reviewed and were negative except as documented on the HPI.    PHYSICAL EXAM:  Current Weight: Weight - Scale: 109 kg (241 lb)  First Weight: Weight - Scale: 109 kg (241 lb)  Vitals:    07/10/24 1815 07/10/24 2057 07/10/24 2334 07/10/24 2347   BP: 162/75 151/72 164/67    BP Location: Left arm Left arm Left arm    Pulse: 85 93 79    Resp: (!) 24 20 20 20   Temp:       TempSrc:       SpO2: 94% 93% 94% 93%   Weight:   109 kg (241 lb)    Height:   5' 3\" (1.6 m)        Intake/Output Summary (Last 24 hours) at 7/11/2024 " 1122  Last data filed at 7/11/2024 0025  Gross per 24 hour   Intake 1050 ml   Output --   Net 1050 ml     Physical Exam  Vitals and nursing note reviewed.   Constitutional:       General: She is not in acute distress.     Appearance: She is well-developed. She is obese.   HENT:      Head: Normocephalic and atraumatic.   Eyes:      General: No scleral icterus.     Conjunctiva/sclera: Conjunctivae normal.      Pupils: Pupils are equal, round, and reactive to light.   Cardiovascular:      Rate and Rhythm: Normal rate and regular rhythm.      Heart sounds: S1 normal and S2 normal. No murmur heard.     No friction rub. No gallop.   Pulmonary:      Effort: Pulmonary effort is normal. No respiratory distress.      Breath sounds: Normal breath sounds. No wheezing or rales.   Abdominal:      General: Bowel sounds are normal.      Palpations: Abdomen is soft.      Tenderness: There is no abdominal tenderness. There is no rebound.   Musculoskeletal:         General: Normal range of motion.      Cervical back: Normal range of motion and neck supple.   Skin:     Findings: No rash.   Neurological:      Mental Status: She is alert and oriented to person, place, and time.   Psychiatric:         Behavior: Behavior normal.           Invasive Devices:      Lab Results:   Results from last 7 days   Lab Units 07/11/24  0459 07/11/24  0025 07/10/24  1742 07/09/24  1400   WBC Thousand/uL 12.33*  --  14.56* 11.25*   HEMOGLOBIN g/dL 9.6*  --  10.8* 11.3*   HEMATOCRIT % 30.1*  --  34.0* 35.6   PLATELETS Thousands/uL 244 241 294 298   POTASSIUM mmol/L 4.1  --  4.0 4.0   CHLORIDE mmol/L 106  --  106 105   CO2 mmol/L 21  --  20* 23   BUN mg/dL 73*  --  74* 61*   CREATININE mg/dL 6.02*  --  5.74* 4.58*   CALCIUM mg/dL 9.0  --  9.4 9.9   MAGNESIUM mg/dL 2.2  --   --   --    PHOSPHORUS mg/dL 5.8*  --   --   --    ALK PHOS U/L 58  --  72 77   ALT U/L 9  --  11 10   AST U/L 11*  --  13 15

## 2024-07-11 NOTE — ASSESSMENT & PLAN NOTE
"Patient admitted with sepsis likely secondary to pneumonitis as evidenced by tachycardia, tachypnea and leukocytosis  Lactate is 0.9  Trend procalcitonin  CT showed \"Redemonstration of multiple diffuse scattered nodules, less pronounced than on 5/16/2024 suggestive of mycobacterial infection.\"   Sputum culture.  Follow-up blood culture results  Continue on Rocephin  Pulmonary consult possible bronchoscopy  IV fluids  Trend WBC and fever curve    "

## 2024-07-11 NOTE — PROGRESS NOTES
"[facility]  Progress Note  Name: Ngozi Beard I  MRN: [id number]  Unit/Bed#: ED 06 I Date of Admission: 7/10/2024   Date of Service: 7/11/2024 I Hospital Day: 1    Assessment & Plan   * Acute renal failure (ARF) (Formerly [facility])  Assessment & Plan  Patient admitted with acute renal failure with creatinine of 5.74  CT A/P: No hydronephrosis, however there is apparent 3 mm stone near or questionably within the distal right ureter (axial image 142). Given the lack of hydronephrosis this could either reflect adjacent calcification or nonobstructing stone. Correlate clinically and with urinalysis.Lesions in both kidneys as described above.  Creatinine worsening today. Cr 6.02, GFR 6  Patient reports that she is still producing urine  US kidney and bladder: No hydronephrosis. 4 mm nonobstructing left intrarenal calculus.  Hold home dyazide  Continue crystalloid fluids and Isolyte 75ml/hr   Follow up renal serologies  No urgent need for renal replacement therapy  Monitor BMP  Requires transfer to [facility] for further work up and IR for renal biopsy. No beds available at this time.  Consult to Nephrology, recommendations are appreciated      Sepsis (Formerly [facility])  Assessment & Plan  Patient admitted with sepsis likely secondary to pneumonitis as evidenced by tachycardia, tachypnea and leukocytosis  CT showed \"Redemonstration of multiple diffuse scattered nodules, less pronounced than on 5/16/2024 suggestive of mycobacterial infection.\"   Lactic acid WNL  Procalcitonin: 0.27->0.30  Sputum culture +2 gram negative rods, +1 gram positive cocci  BC x2 pending  Continue IV ceftriaxone (D2)  Trend WBC and fever curve  Pulmonology consulted, recommendations are appreciated      Abnormal CT of the chest  Assessment & Plan  Patient noted to have multiple diffuse scattered pulmonary nodules on CT  Quantiferon gold negative 7/9/24  On Ceftriaxone  Pulmonology input appreciated  See above    Morbid obesity with BMI of " 45.0-49.9, adult ([facility])  Assessment & Plan  Encourage weight loss and lifestyle modification     Dyslipidemia  Assessment & Plan  Continue on statin    Primary hypertension  Assessment & Plan  Continue on Lopressor  Hold Dyazide  Hydralazine as needed  Monitor vitals as per protocol    Bipolar 1 disorder (HCC)  Assessment & Plan  Mood stable  Continue on Effexor, trazodone and Lamictal           VTE Pharmacologic Prophylaxis: VTE Score: 6 High Risk (Score >/= 5) - Pharmacological DVT Prophylaxis Ordered: heparin. Sequential Compression Devices Ordered.    Mobility:      Not obtained     Patient Centered Rounds: I performed bedside rounds with nursing staff today.   Discussions with Specialists or Other Care Team Provider: Nursing, Nephrology, PACS    Education and Discussions with Family / Patient: Updated  (sister) via phone.    Total Time Spent on Date of Encounter in care of patient: 45 mins. This time was spent on one or more of the following: performing physical exam; counseling and coordination of care; obtaining or reviewing history; documenting in the medical record; reviewing/ordering tests, medications or procedures; communicating with other healthcare professionals and discussing with patient's family/caregivers.    Current Length of Stay: 1 day(s)  Current Patient Status: Inpatient   Certification Statement: The patient will continue to require additional inpatient hospital stay due to acute renal failure requiring transfer to [facility]  Discharge Plan:  TBD pending bed placement at [facility]    Code Status: Level 1 - Full Code    Subjective:   Patient reports feeling fatigued this morning. States she has a productive cough but no shortness of breath. Still reports that she is producing urine.     Objective:     Vitals:   Temp (24hrs), Av.4 °F (37.4 °C), Min:99.4 °F (37.4 °C), Max:99.4 °F (37.4 °C)    Temp:  [99.4 °F (37.4 °C)] 99.4 °F (37.4 °C)  HR:  [79-94] 94  Resp:   [20-26] 24  BP: (116-186)/() 116/58  SpO2:  [92 %-95 %] 95 %  Body mass index is 42.69 kg/m².     Input and Output Summary (last 24 hours):     Intake/Output Summary (Last 24 hours) at 7/11/2024 1347  Last data filed at 7/11/2024 0025  Gross per 24 hour   Intake 1050 ml   Output --   Net 1050 ml       Physical Exam:   Physical Exam  Vitals and nursing note reviewed.   Constitutional:       General: She is not in acute distress.     Appearance: She is well-developed. She is obese.   HENT:      Head: Normocephalic and atraumatic.   Eyes:      Conjunctiva/sclera: Conjunctivae normal.   Cardiovascular:      Rate and Rhythm: Normal rate and regular rhythm.      Heart sounds: No murmur heard.  Pulmonary:      Effort: Pulmonary effort is normal. No respiratory distress.      Breath sounds: Rales present.   Abdominal:      Palpations: Abdomen is soft.      Tenderness: There is no abdominal tenderness.   Musculoskeletal:         General: No swelling.      Cervical back: Neck supple.      Right lower leg: No edema.      Left lower leg: No edema.   Skin:     General: Skin is warm and dry.      Capillary Refill: Capillary refill takes less than 2 seconds.   Neurological:      Mental Status: She is alert.   Psychiatric:         Mood and Affect: Mood normal.          Additional Data:     Labs:  Results from last 7 days   Lab Units 07/11/24  0459   WBC Thousand/uL 12.33*   HEMOGLOBIN g/dL 9.6*   HEMATOCRIT % 30.1*   PLATELETS Thousands/uL 244   SEGS PCT % 74   LYMPHO PCT % 13*   MONO PCT % 10   EOS PCT % 2     Results from last 7 days   Lab Units 07/11/24  0459   SODIUM mmol/L 140   POTASSIUM mmol/L 4.1   CHLORIDE mmol/L 106   CO2 mmol/L 21   BUN mg/dL 73*   CREATININE mg/dL 6.02*   ANION GAP mmol/L 13   CALCIUM mg/dL 9.0   ALBUMIN g/dL 3.2*   TOTAL BILIRUBIN mg/dL 0.40   ALK PHOS U/L 58   ALT U/L 9   AST U/L 11*   GLUCOSE RANDOM mg/dL 97             Results from last 7 days   Lab Units 07/09/24  1400   HEMOGLOBIN A1C % 6.6*      Results from last 7 days   Lab Units 07/11/24  0459 07/11/24  0025 07/10/24  2022   LACTIC ACID mmol/L  --   --  0.9   PROCALCITONIN ng/ml 0.30* 0.27*  --        Lines/Drains:  Invasive Devices       Peripheral Intravenous Line  Duration             Peripheral IV 07/10/24 Dorsal (posterior);Right Forearm <1 day                      Telemetry:  Telemetry Orders (From admission, onward)               24 Hour Telemetry Monitoring  Continuous x 24 Hours (Telem)        Question:  Reason for 24 Hour Telemetry  Answer:  Metabolic/electrolyte disturbance with high probability of dysrhythmia. K level <3 or >6 OR KCL infusion >10mEq/hr                     Telemetry Reviewed: Normal Sinus Rhythm  Indication for Continued Telemetry Use: No indication for continued use. Will discontinue.              Imaging: Reviewed radiology reports from this admission including: ultrasound(s)    Recent Cultures (last 7 days):   Results from last 7 days   Lab Units 07/11/24  0011 07/10/24  1914   BLOOD CULTURE   --  Received in Microbiology Lab. Culture in Progress.  Received in Microbiology Lab. Culture in Progress.   GRAM STAIN RESULT  1+ Polys*  2+ Gram negative rods*  1+ Gram positive cocci in clusters*  1+ Epithelial Cells*  --        Last 24 Hours Medication List:   Current Facility-Administered Medications   Medication Dose Route Frequency Provider Last Rate    acetaminophen  650 mg Oral Q6H PRN Nathanael Chaudhry MD      albuterol  2 puff Inhalation Q4H PRN Nathanael Chaudhry MD      atorvastatin  20 mg Oral Daily Nathanael Chaudhry MD      cefTRIAXone  2,000 mg Intravenous Q24H Nathanael Chaudhry MD      dextromethorphan-guaiFENesin  10 mL Oral Q4H PRN Nathanael Chaudhry MD      fluticasone-vilanterol  1 puff Inhalation Daily Nathanael Chaudhry MD      heparin (porcine)  7,500 Units Subcutaneous Q8H JACK Nathanael Chaudhry MD      hydrALAZINE  10 mg Intravenous Q6H PRN Nathanael Chaudhry MD      lamoTRIgine  100 mg Oral QAM Nathanael BYERS  MD Richa      metoprolol tartrate  50 mg Oral Q12H JACK Nathanael Chaudhry MD      montelukast  10 mg Oral Daily Nathanael Chaudhry MD      multi-electrolyte  75 mL/hr Intravenous Continuous Ata Burns MD 75 mL/hr (07/11/24 1140)    traZODone  200 mg Oral HS Nathanael Chaudhry MD      venlafaxine  150 mg Oral Daily Nathanael Chaudhry MD          Today, Patient Was Seen By: Tram Palacios PA-C    **Please Note: This note may have been constructed using a voice recognition system.**

## 2024-07-11 NOTE — H&P
"[facility]  H&P  Name: Ngozi Beard 64 y.o. female I MRN: [id number]  Unit/Bed#: ED 15 I Date of Admission: 7/10/2024   Date of Service: 7/10/2024 I Hospital Day: 0      Assessment & Plan   * Sepsis ([facility])  Assessment & Plan  Patient admitted with sepsis likely secondary to pneumonitis as evidenced by tachycardia, tachypnea and leukocytosis  Lactate is 0.9  Trend procalcitonin  CT showed \"Redemonstration of multiple diffuse scattered nodules, less pronounced than on 5/16/2024 suggestive of mycobacterial infection.\"   Sputum culture.  Follow-up blood culture results  Continue on Rocephin  Pulmonary consult possible bronchoscopy  IV fluids  Trend WBC and fever curve      ANGELICA (acute kidney injury) ([facility])  Assessment & Plan  Patient admitted with acute kidney injury with creatinine of 5.74  Patient creatinine yesterday was 4.58  CT abdomen pelvis showed-\"No hydronephrosis, however there is apparent 3 mm stone near or questionably within the distal right ureter (axial image 142). Given the lack of hydronephrosis this could either reflect adjacent calcification or nonobstructing stone. Correlate clinically   and with urinalysis.Lesions in both kidneys as described above. Recommend dedicated renal ultrasound on nonemergent basis.There is questionable asymmetric left fullness at the rectum which could be due to nondistention and not well evaluated without enteric and IV contrast. Correlate with recent colonoscopy. If there is no history of colonoscopy, one may be considered.The tip of the appendix measures 12 mm and contains hyperdense material which could be secondary to stones or trapped enteric contrast material. No periappendiceal inflammatory changes are noted at this time.\"     ER discussed with nephrologist on-call who recommended serology workup including AURA, complement levels, GBM antibodies, ds DNA Ab was ordered by ER  Continue IV fluids  Avoid hypotension/nephrotoxic medication  Hold " Dyazide  Renal ultrasound  Nephrology consult  Trend BMP    Abnormal CT of the chest  Assessment & Plan  Patient noted to have multiple diffuse scattered pulmonary nodules on CT  Pulmonary consult  On Rocephin  See above    Morbid obesity with BMI of 45.0-49.9, adult (HCC)  Assessment & Plan  Encourage weight loss and lifestyle modification     Dyslipidemia  Assessment & Plan  Continue on statin    Primary hypertension  Assessment & Plan  Continue on Lopressor  Hold Dyazide  Hydralazine as needed  Monitor vitals as per protocol    Bipolar 1 disorder (HCC)  Assessment & Plan  Continue on Effexor, trazodone and Lamictal        Chief Complaint   Patient presents with    Abnormal Lab     Pt reports provider calling due to abnormal kidney lab that was drawn yesterday. Pt reports no symptoms.         HPI:  Ngozi Beard is a 64 y.o. female with past medical history of hypertension, hyperlipidemia, bipolar disorder who presented to emergency department with abnormal blood work.  Patient was sent in by pulmonologist for elevated creatinine levels.  Patient had blood work done yesterday which showed creatinine of 4.5 patient was referred to the ER.  In ER patient creatinine was found to be 5.7.  Patient was recently referred to pulmonologist for abnormal CAT scan showing multiple pulmonary nodules.  Patient denies any complaints except for fatigue and tiredness for few days.  Patient denies any nausea, vomiting, diarrhea, abdominal pain, urinary complaints, flank pain.  Denies any use of NSAIDs.  States she has normal urine output.  Denies any chest pain, shortness of breath, fever or chills or weight loss.    Historical Information   Past Medical History:   Diagnosis Date    Asthma     Chronic pain     Hypertension      Past Surgical History:   Procedure Laterality Date    BACK SURGERY      COLONOSCOPY      TUBAL LIGATION       Social History   Social History     Substance and Sexual Activity   Alcohol Use No     Comment: hX:Occas.      Social History     Substance and Sexual Activity   Drug Use No     Social History     Tobacco Use   Smoking Status Former    Current packs/day: 0.00    Average packs/day: 1 pack/day for 15.0 years (15.0 ttl pk-yrs)    Types: Cigarettes    Start date:     Quit date:     Years since quittin.5   Smokeless Tobacco Never     Family History   Problem Relation Age of Onset    Diabetes Mother     Hypertension Mother     Lung cancer Mother     Colon cancer Mother     Colon cancer Father     Hypertension Sister     Multiple sclerosis Sister     Hypothyroidism Maternal Aunt        Meds/Allergies   Allergies   Allergen Reactions    Penicillins Hives     Reaction Date: 2005; Annotation - [id number]: meena mcdonald    Amoxicillin Rash    Aspirin Rash    Ibuprofen Rash    Morphine Rash    Oxycodone Rash    Valacyclovir Rash       Meds:    Current Facility-Administered Medications:     lamoTRIgine (LaMICtal) tablet 100 mg, 100 mg, Oral, Once, Rhonda Miller MD    traZODone (DESYREL) tablet 200 mg, 200 mg, Oral, Once, Rhonda Miller MD    Current Outpatient Medications:     Advair -21 MCG/ACT inhaler, Inhale 2 puffs 2 (two) times a day Rinse mouth after use., Disp: 12 g, Rfl: 0    albuterol (PROVENTIL HFA,VENTOLIN HFA) 90 mcg/act inhaler, INHALE 2 PUFFS BY MOUTH EVERY 4 HOURS AS NEEDED FOR SHORTNESS OF BREATH, Disp: 18 g, Rfl: 5    atorvastatin (LIPITOR) 20 mg tablet, TAKE 1 TABLET BY MOUTH EVERY DAY, Disp: 90 tablet, Rfl: 1    lamoTRIgine (LaMICtal) 100 mg tablet, Take 100 mg by mouth every morning, Disp: , Rfl:     metoprolol tartrate (LOPRESSOR) 50 mg tablet, TAKE 1 TABLET BY MOUTH TWICE A DAY (INS 30 DAY LIMIT), Disp: 180 tablet, Rfl: 1    montelukast (SINGULAIR) 10 mg tablet, TAKE 1 TABLET BY MOUTH EVERY DAY, Disp: 90 tablet, Rfl: 1    traZODone (DESYREL) 100 mg tablet, Take 200 mg by mouth daily at bedtime, Disp: , Rfl:     triamterene-hydrochlorothiazide (DYAZIDE) 37.5-25 mg  per capsule, TAKE 1 CAPSULE BY MOUTH EVERY DAY, Disp: 90 capsule, Rfl: 1    venlafaxine (EFFEXOR-XR) 150 mg 24 hr capsule, TAKE 1 CAPSULE BY MOUTH DAILY WITH BREAKFAST., Disp: 30 capsule, Rfl: 0    benzonatate (TESSALON) 200 MG capsule, Take 1 capsule (200 mg total) by mouth 3 (three) times a day as needed for cough (Patient not taking: Reported on 6/26/2024), Disp: 30 capsule, Rfl: 0    cetirizine (ZyrTEC) 10 mg tablet, Take 1 tablet (10 mg total) by mouth daily (Patient not taking: Reported on 5/1/2024), Disp: 30 tablet, Rfl: 2    oxybutynin (DITROPAN-XL) 5 mg 24 hr tablet, TAKE 1 TABLET (5 MG TOTAL) BY MOUTH DAILY. (Patient not taking: Reported on 6/26/2024), Disp: 90 tablet, Rfl: 1    Not in a hospital admission.      Review of Systems   Constitutional:  Positive for activity change and fatigue.   HENT: Negative.     Eyes: Negative.    Respiratory: Negative.     Cardiovascular: Negative.    Gastrointestinal: Negative.    Endocrine: Negative.    Genitourinary: Negative.    Musculoskeletal: Negative.    Skin: Negative.    Allergic/Immunologic: Negative.    Neurological: Negative.    Hematological: Negative.    Psychiatric/Behavioral: Negative.         Current Vitals:   Blood Pressure: 151/72 (07/10/24 2057)  Pulse: 93 (07/10/24 2057)  Temperature: 99.4 °F (37.4 °C) (07/10/24 1735)  Temp Source: Oral (07/10/24 1735)  Respirations: 20 (07/10/24 2057)  SpO2: 93 % (07/10/24 2057)  SPO2 RA Rest      Flowsheet Row ED to Hosp-Admission (Current) from 7/10/2024 in [facility] Emergency Department   SpO2 93 %   SpO2 Activity At Rest   O2 Device None (Room air)   O2 Flow Rate --            Intake/Output Summary (Last 24 hours) at 7/10/2024 2126  Last data filed at 7/10/2024 2100  Gross per 24 hour   Intake 50 ml   Output --   Net 50 ml     There is no height or weight on file to calculate BMI.     Physical Exam  Vitals and nursing note reviewed.   Constitutional:       General: She is not in acute distress.      Appearance: She is well-developed.   HENT:      Head: Normocephalic and atraumatic.      Nose: Nose normal.   Eyes:      General: No scleral icterus.     Conjunctiva/sclera: Conjunctivae normal.      Pupils: Pupils are equal, round, and reactive to light.   Neck:      Thyroid: No thyromegaly.      Vascular: No JVD.      Trachea: No tracheal deviation.   Cardiovascular:      Rate and Rhythm: Normal rate and regular rhythm.      Heart sounds: Normal heart sounds.   Pulmonary:      Effort: Pulmonary effort is normal. No respiratory distress.      Breath sounds: Normal breath sounds. No wheezing or rales.   Chest:      Chest wall: No tenderness.   Abdominal:      General: Bowel sounds are normal. There is no distension.      Palpations: Abdomen is soft. There is no mass.      Tenderness: There is no abdominal tenderness. There is no guarding or rebound.   Musculoskeletal:         General: No tenderness or deformity. Normal range of motion.      Cervical back: Normal range of motion and neck supple.   Skin:     General: Skin is warm.      Coloration: Skin is not pale.      Findings: No erythema or rash.   Neurological:      General: No focal deficit present.      Mental Status: She is alert and oriented to person, place, and time. Mental status is at baseline.      Cranial Nerves: No cranial nerve deficit.      Coordination: Coordination normal.   Psychiatric:         Behavior: Behavior normal.         Thought Content: Thought content normal.         Judgment: Judgment normal.         Lab Results:   CBC:   Lab Results   Component Value Date    WBC 14.56 (H) 07/10/2024    HGB 10.8 (L) 07/10/2024    HCT 34.0 (L) 07/10/2024    MCV 86 07/10/2024     07/10/2024    RBC 3.95 07/10/2024    MCH 27.3 07/10/2024    MCHC 31.8 07/10/2024    RDW 15.2 (H) 07/10/2024    MPV 10.4 07/10/2024    NRBC 0 07/10/2024     CMP:  Lab Results   Component Value Date     07/10/2024    CO2 20 (L) 07/10/2024    BUN 74 (H) 07/10/2024     "CREATININE 5.74 (H) 07/10/2024    CALCIUM 9.4 07/10/2024    AST 13 07/10/2024    ALT 11 07/10/2024    ALKPHOS 72 07/10/2024    EGFR 7 07/10/2024     Lab Results   Component Value Date    CKTOTAL 146 07/10/2024     Coagulation: No results found for: \"PT\", \"INR\", \"APTT\" Urinalysis:  Lab Results   Component Value Date    COLORU Red (A) 07/10/2024    CLARITYU Slightly Cloudy (A) 07/10/2024    SPECGRAV 1.020 07/10/2024    PHUR 6.5 07/10/2024    LEUKOCYTESUR Negative 07/10/2024    NITRITE Negative 07/10/2024    GLUCOSEU Negative 07/10/2024    KETONESU Negative 07/10/2024    BILIRUBINUR Negative 07/10/2024    BLOODU 3+ (A) 07/10/2024      Amylase: No results found for: \"AMYLASE\"  Lipase: No results found for: \"LIPASE\"     Imaging: CT abdomen pelvis wo contrast    Result Date: 7/10/2024  Narrative: CT ABDOMEN AND PELVIS WITHOUT IV CONTRAST INDICATION: joshua; hematuria. COMPARISON: CT dated 5/16/2024 TECHNIQUE: CT examination of the abdomen and pelvis was performed without intravenous contrast. Multiplanar 2D reformatted images were created from the source data. This examination, like all CT scans performed in the [facility], was performed utilizing techniques to minimize radiation dose exposure, including the use of iterative reconstruction and automated exposure control. Radiation dose length product (DLP) for this visit: 1361.9 mGy-cm Enteric Contrast: Not administered. FINDINGS: ABDOMEN LOWER CHEST: Redemonstration of multiple diffuse scattered nodules, less pronounced than on 5/16/2024 suggestive of mycobacterial infection. No consolidative airspace opacity. No pleural effusion. LIVER/BILIARY TREE: Unremarkable. GALLBLADDER: Cholelithiasis without findings of acute cholecystitis. SPLEEN: Unremarkable. PANCREAS: Atrophy of the pancreas ADRENAL GLANDS: Unremarkable. KIDNEYS/URETERS: Kidneys are normal for size. Linear cortical calcifications at the left renal upper pole 4 mm nonobstructing calculus at the " left renal upper pole. There are at least 3 lesions in the kidneys as described below: *12 mm hyperdense lesion at the right renal lower pole (axial image 107). *Exophytic 16 mm lesion at the left renal midpole (axial image 76). *13 mm cyst at the left renal midpole (axial image 84). No hydronephrosis, however there is apparent 3 mm stone near or questionably within the distal right ureter (axial image 142). Given the lack of hydronephrosis this could either reflect adjacent calcification or nonobstructing stone. Correlate clinically  and with urinalysis. STOMACH AND BOWEL: Small hiatal hernia. No dilated loops of small bowel to suggest mechanical obstruction. There is a 19 mm duodenal diverticulum. There is questionable asymmetric left fullness at the rectum which could be due to nondistention and not well evaluated without enteric and IV contrast. Correlate with recent colonoscopy. If there is no history of colonoscopy, one may be considered. No evidence for colitis. APPENDIX: The tip of the appendix measures 12 mm and contains hyperdense material which could be secondary to stones or trapped enteric contrast material. No periappendiceal inflammatory changes are noted at this time. ABDOMINOPELVIC CAVITY: No free air. No large volume ascites. No lymphadenopathy. VESSELS: Atherosclerosis without abdominal aortic aneurysm. PELVIS REPRODUCTIVE ORGANS: Presumed left calcified uterine fibroids are noted. URINARY BLADDER: Unremarkable. ABDOMINAL WALL/INGUINAL REGIONS: Unremarkable. BONES: Rupert and screw fixation at L4 and L5 with grade 1 anterolisthesis of L4 on L5. Prosthetic disc is noted at L4/L5. Moderate disc space loss, vacuum phenomenon, and endplate sclerosis at L2/L3.     Impression: No hydronephrosis, however there is apparent 3 mm stone near or questionably within the distal right ureter (axial image 142). Given the lack of hydronephrosis this could either reflect adjacent calcification or nonobstructing stone.  "Correlate clinically  and with urinalysis. Lesions in both kidneys as described above. Recommend dedicated renal ultrasound on nonemergent basis. Redemonstration of multiple diffuse scattered nodules, less pronounced than on 5/16/2024 suggestive of mycobacterial infection. There is questionable asymmetric left fullness at the rectum which could be due to nondistention and not well evaluated without enteric and IV contrast. Correlate with recent colonoscopy. If there is no history of colonoscopy, one may be considered. The tip of the appendix measures 12 mm and contains hyperdense material which could be secondary to stones or trapped enteric contrast material. No periappendiceal inflammatory changes are noted at this time. Other findings as detailed above. Workstation performed: [id number]     EKG, Pathology, and Other Studies: I have personally reviewed the results.  VTE Pharmacologic Prophylaxis: Heparin  VTE Mechanical Prophylaxis: sequential compression device    Code Status: Level 1 - Full Code    Anticipated Length of Stay:  Patient will be admitted on an Inpatient basis with an anticipated length of stay of greater than 2 midnights.     Counseling / Coordination of Care  Total floor / unit time spent today 75 minutes.  Greater than 50% of total time was spent with the patient and / or family counseling and / or coordination of care.     \"This note has been constructed using a voice recognition system\"      Nathanael Chaudhry MD  7/10/2024, 10:56 PM            "

## 2024-07-11 NOTE — ASSESSMENT & PLAN NOTE
"Patient admitted with sepsis likely secondary to pneumonitis as evidenced by tachycardia, tachypnea and leukocytosis  CT showed \"Redemonstration of multiple diffuse scattered nodules, less pronounced than on 5/16/2024 suggestive of mycobacterial infection.\"   Lactic acid WNL  Procalcitonin: 0.27->0.30  Sputum culture +2 gram negative rods, +1 gram positive cocci  BC x2 pending  Continue IV ceftriaxone (D2)  Trend WBC and fever curve  Pulmonology consulted, recommendations are appreciated    "

## 2024-07-11 NOTE — TREATMENT PLAN
Renal on call note    Was notified of pt admission. ANGELICA, new onset. Reviewed case with ER Attending. Unclear etiology of CT findings. Recommended serological workup to be initiated. Pt may ultimately require renal biopsy. Formal consult to follow in AM.

## 2024-07-11 NOTE — EMTALA/ACUTE CARE TRANSFER
[facility] EMERGENCY DEPARTMENT  [address] [place] [id number]  Dept: [phone]      EMTALA TRANSFER CONSENT    NAME Ngozi Beard                                         1960                              MRN [id number]    I have been informed of my rights regarding examination, treatment, and transfer   by Dr. Tobi Hernandez,     Benefits:   Resources for further work up of acute renal failure    Risks:   NA      Consent for Transfer:  I acknowledge that my medical condition has been evaluated and explained to me by the emergency department physician or other qualified medical person and/or my attending physician, who has recommended that I be transferred to the service of  [facility]  at [facility]  . The above potential benefits of such transfer, the potential risks associated with such transfer, and the probable risks of not being transferred have been explained to me, and I fully understand them.  The doctor has explained that, in my case, the benefits of transfer outweigh the risks.  I agree to be transferred.    I authorize the performance of emergency medical procedures and treatments upon me in both transit and upon arrival at the receiving facility.  Additionally, I authorize the release of any and all medical records to the receiving facility and request they be transported with me, if possible.  I understand that the safest mode of transportation during a medical emergency is an ambulance and that the Hospital advocates the use of this mode of transport. Risks of traveling to the receiving facility by car, including absence of medical control, life sustaining equipment, such as oxygen, and medical personnel has been explained to me and I fully understand them.    (JEET CORRECT BOX BELOW)  [  ]  I consent to the stated transfer and to be transported by ambulance/helicopter.  [  ]  I consent to the stated transfer, but refuse transportation by ambulance and accept  full responsibility for my transportation by car.  I understand the risks of non-ambulance transfers and I exonerate the Hospital and its staff from any deterioration in my condition that results from this refusal.    X___________________________________________    DATE  24  TIME________  Signature of patient or legally responsible individual signing on patient behalf           RELATIONSHIP TO PATIENT_________________________          Provider Certification    NAME Ngozi Beard                                         1960                              MRN [id number]    A medical screening exam was performed on the above named patient.  Based on the examination:    Condition Necessitating Transfer The primary encounter diagnosis was Acute kidney injury (HCC). Diagnoses of Hematuria, ANGELICA (acute kidney injury) (HCC), and Abnormal CT of the chest were also pertinent to this visit.    Patient Condition:   Stable    Reason for Transfer:   Acute renal failure    Transfer Requirements: Facility   [facility]  Space available and qualified personnel available for treatment as acknowledged by    Agreed to accept transfer and to provide appropriate medical treatment as acknowledged by          Appropriate medical records of the examination and treatment of the patient are provided at the time of transfer   STAFF INITIAL WHEN COMPLETED _______  Transfer will be performed by qualified personnel from [facility]   and appropriate transfer equipment as required, including the use of necessary and appropriate life support measures.    Provider Certification: I have examined the patient and explained the following risks and benefits of being transferred/refusing transfer to the patient/family:         Based on these reasonable risks and benefits to the patient and/or the unborn child(patricia), and based upon the information available at the time of the patient’s examination, I certify that the medical benefits  reasonably to be expected from the provision of appropriate medical treatments at another medical facility outweigh the increasing risks, if any, to the individual’s medical condition, and in the case of labor to the unborn child, from effecting the transfer.    X____________________________________________ DATE 07/11/24        TIME_______      ORIGINAL - SEND TO MEDICAL RECORDS   COPY - SEND WITH PATIENT DURING TRANSFER

## 2024-07-12 ENCOUNTER — HOSPITAL ENCOUNTER (INPATIENT)
Facility: HOSPITAL | Age: 64
LOS: 23 days | Discharge: HOME | DRG: 673 | End: 2024-08-04
Attending: HOSPITALIST | Admitting: HOSPITALIST
Payer: MEDICARE

## 2024-07-12 VITALS
BODY MASS INDEX: 42.7 KG/M2 | HEART RATE: 72 BPM | WEIGHT: 241 LBS | TEMPERATURE: 99.4 F | OXYGEN SATURATION: 96 % | HEIGHT: 63 IN | SYSTOLIC BLOOD PRESSURE: 127 MMHG | DIASTOLIC BLOOD PRESSURE: 59 MMHG | RESPIRATION RATE: 24 BRPM

## 2024-07-12 DIAGNOSIS — D63.1 ANEMIA DUE TO CHRONIC KIDNEY DISEASE, ON CHRONIC DIALYSIS (HCC): ICD-10-CM

## 2024-07-12 DIAGNOSIS — K59.00 CONSTIPATION: ICD-10-CM

## 2024-07-12 DIAGNOSIS — R21 RASH: ICD-10-CM

## 2024-07-12 DIAGNOSIS — N01.9 RAPIDLY PROGRESSIVE GLOMERULONEPHRITIS WITH ANTI-GBM ANTIBODIES: Primary | ICD-10-CM

## 2024-07-12 DIAGNOSIS — R93.89 ABNORMAL CT OF THE CHEST: ICD-10-CM

## 2024-07-12 DIAGNOSIS — N17.9 ACUTE RENAL FAILURE, UNSPECIFIED ACUTE RENAL FAILURE TYPE (HCC): ICD-10-CM

## 2024-07-12 DIAGNOSIS — N17.9 ACUTE RENAL FAILURE (ARF) (HCC): ICD-10-CM

## 2024-07-12 DIAGNOSIS — R93.89 ABNORMAL CHEST CT: ICD-10-CM

## 2024-07-12 DIAGNOSIS — N18.6 ANEMIA DUE TO CHRONIC KIDNEY DISEASE, ON CHRONIC DIALYSIS (HCC): ICD-10-CM

## 2024-07-12 DIAGNOSIS — N17.9 AKI (ACUTE KIDNEY INJURY) (HCC): ICD-10-CM

## 2024-07-12 DIAGNOSIS — N17.9 ACUTE KIDNEY INJURY (HCC): ICD-10-CM

## 2024-07-12 DIAGNOSIS — Z99.2 ANEMIA DUE TO CHRONIC KIDNEY DISEASE, ON CHRONIC DIALYSIS (HCC): ICD-10-CM

## 2024-07-12 LAB
ANION GAP SERPL CALCULATED.3IONS-SCNC: 15 MMOL/L (ref 4–13)
ATRIAL RATE: 78 BPM
BLD SMEAR INTERP: NORMAL
BUN SERPL-MCNC: 81 MG/DL (ref 5–25)
CALCIUM SERPL-MCNC: 9.4 MG/DL (ref 8.4–10.2)
CHLORIDE SERPL-SCNC: 106 MMOL/L (ref 96–108)
CO2 SERPL-SCNC: 19 MMOL/L (ref 21–32)
CREAT SERPL-MCNC: 7.15 MG/DL (ref 0.6–1.3)
CRP SERPL QL: 143.7 MG/L
ERYTHROCYTE [DISTWIDTH] IN BLOOD BY AUTOMATED COUNT: 15.1 % (ref 11.6–15.1)
GFR SERPL CREATININE-BSD FRML MDRD: 5 ML/MIN/1.73SQ M
GLUCOSE SERPL-MCNC: 90 MG/DL (ref 65–140)
HCT VFR BLD AUTO: 29.1 % (ref 34.8–46.1)
HGB BLD-MCNC: 9.4 G/DL (ref 11.5–15.4)
HIV 1+2 AB+HIV1 P24 AG SERPL QL IA: NORMAL
HIV 2 AB SERPL QL IA: NORMAL
HIV1 AB SERPL QL IA: NORMAL
HIV1 P24 AG SERPL QL IA: NORMAL
MCH RBC QN AUTO: 27.8 PG (ref 26.8–34.3)
MCHC RBC AUTO-ENTMCNC: 32.3 G/DL (ref 31.4–37.4)
MCV RBC AUTO: 86 FL (ref 82–98)
P AXIS: 30 DEGREES
PLATELET # BLD AUTO: 229 THOUSANDS/UL (ref 149–390)
PMV BLD AUTO: 10 FL (ref 8.9–12.7)
POTASSIUM SERPL-SCNC: 4 MMOL/L (ref 3.5–5.3)
PR INTERVAL: 220 MS
PROCALCITONIN SERPL-MCNC: 0.38 NG/ML
QRS AXIS: 90 DEGREES
QRSD INTERVAL: 136 MS
QT INTERVAL: 406 MS
QTC INTERVAL: 462 MS
RBC # BLD AUTO: 3.38 MILLION/UL (ref 3.81–5.12)
SODIUM SERPL-SCNC: 140 MMOL/L (ref 135–147)
T WAVE AXIS: -14 DEGREES
VENTRICULAR RATE: 78 BPM
WBC # BLD AUTO: 10.22 THOUSAND/UL (ref 4.31–10.16)

## 2024-07-12 PROCEDURE — 36415 COLL VENOUS BLD VENIPUNCTURE: CPT | Performed by: INTERNAL MEDICINE

## 2024-07-12 PROCEDURE — 86140 C-REACTIVE PROTEIN: CPT | Performed by: NURSE PRACTITIONER

## 2024-07-12 PROCEDURE — NC001 PR NO CHARGE: Performed by: INTERNAL MEDICINE

## 2024-07-12 PROCEDURE — 94664 DEMO&/EVAL PT USE INHALER: CPT

## 2024-07-12 PROCEDURE — 87389 HIV-1 AG W/HIV-1&-2 AB AG IA: CPT | Performed by: INTERNAL MEDICINE

## 2024-07-12 PROCEDURE — 99223 1ST HOSP IP/OBS HIGH 75: CPT | Performed by: HOSPITALIST

## 2024-07-12 PROCEDURE — 84145 PROCALCITONIN (PCT): CPT | Performed by: INTERNAL MEDICINE

## 2024-07-12 PROCEDURE — 83516 IMMUNOASSAY NONANTIBODY: CPT | Performed by: INTERNAL MEDICINE

## 2024-07-12 PROCEDURE — 80048 BASIC METABOLIC PNL TOTAL CA: CPT | Performed by: INTERNAL MEDICINE

## 2024-07-12 PROCEDURE — 99233 SBSQ HOSP IP/OBS HIGH 50: CPT | Performed by: INTERNAL MEDICINE

## 2024-07-12 PROCEDURE — 84165 PROTEIN E-PHORESIS SERUM: CPT | Performed by: INTERNAL MEDICINE

## 2024-07-12 PROCEDURE — 86334 IMMUNOFIX E-PHORESIS SERUM: CPT | Performed by: INTERNAL MEDICINE

## 2024-07-12 PROCEDURE — 99223 1ST HOSP IP/OBS HIGH 75: CPT | Performed by: INTERNAL MEDICINE

## 2024-07-12 PROCEDURE — 93010 ELECTROCARDIOGRAM REPORT: CPT | Performed by: INTERNAL MEDICINE

## 2024-07-12 PROCEDURE — 83521 IG LIGHT CHAINS FREE EACH: CPT | Performed by: INTERNAL MEDICINE

## 2024-07-12 PROCEDURE — 85027 COMPLETE CBC AUTOMATED: CPT | Performed by: INTERNAL MEDICINE

## 2024-07-12 PROCEDURE — NC001 PR NO CHARGE: Performed by: STUDENT IN AN ORGANIZED HEALTH CARE EDUCATION/TRAINING PROGRAM

## 2024-07-12 RX ORDER — VENLAFAXINE HYDROCHLORIDE 150 MG/1
150 CAPSULE, EXTENDED RELEASE ORAL DAILY
Status: DISCONTINUED | OUTPATIENT
Start: 2024-07-13 | End: 2024-08-04 | Stop reason: HOSPADM

## 2024-07-12 RX ORDER — ATORVASTATIN CALCIUM 20 MG/1
20 TABLET, FILM COATED ORAL DAILY
Status: DISCONTINUED | OUTPATIENT
Start: 2024-07-13 | End: 2024-07-12

## 2024-07-12 RX ORDER — LORATADINE 10 MG/1
10 TABLET ORAL DAILY
Status: DISCONTINUED | OUTPATIENT
Start: 2024-07-12 | End: 2024-08-04 | Stop reason: HOSPADM

## 2024-07-12 RX ORDER — HYDRALAZINE HYDROCHLORIDE 20 MG/ML
10 INJECTION INTRAMUSCULAR; INTRAVENOUS EVERY 6 HOURS PRN
Status: DISCONTINUED | OUTPATIENT
Start: 2024-07-12 | End: 2024-07-17

## 2024-07-12 RX ORDER — METOPROLOL TARTRATE 50 MG/1
50 TABLET, FILM COATED ORAL EVERY 12 HOURS SCHEDULED
Status: DISCONTINUED | OUTPATIENT
Start: 2024-07-12 | End: 2024-07-12

## 2024-07-12 RX ORDER — LEVALBUTEROL INHALATION SOLUTION 1.25 MG/3ML
1.25 SOLUTION RESPIRATORY (INHALATION)
Status: DISCONTINUED | OUTPATIENT
Start: 2024-07-12 | End: 2024-07-12 | Stop reason: HOSPADM

## 2024-07-12 RX ORDER — ACETAMINOPHEN 325 MG/1
650 TABLET ORAL EVERY 6 HOURS PRN
Status: DISCONTINUED | OUTPATIENT
Start: 2024-07-12 | End: 2024-08-04 | Stop reason: HOSPADM

## 2024-07-12 RX ORDER — MONTELUKAST SODIUM 10 MG/1
10 TABLET ORAL DAILY
Status: DISCONTINUED | OUTPATIENT
Start: 2024-07-12 | End: 2024-08-04 | Stop reason: HOSPADM

## 2024-07-12 RX ORDER — ALBUTEROL SULFATE 90 UG/1
2 AEROSOL, METERED RESPIRATORY (INHALATION) EVERY 4 HOURS PRN
Status: DISCONTINUED | OUTPATIENT
Start: 2024-07-12 | End: 2024-08-04 | Stop reason: HOSPADM

## 2024-07-12 RX ORDER — ALBUTEROL SULFATE 90 UG/1
2 AEROSOL, METERED RESPIRATORY (INHALATION) EVERY 4 HOURS PRN
Status: DISCONTINUED | OUTPATIENT
Start: 2024-07-12 | End: 2024-07-12

## 2024-07-12 RX ORDER — HEPARIN SODIUM 5000 [USP'U]/ML
7500 INJECTION, SOLUTION INTRAVENOUS; SUBCUTANEOUS EVERY 8 HOURS SCHEDULED
Status: DISPENSED | OUTPATIENT
Start: 2024-07-12 | End: 2024-07-15

## 2024-07-12 RX ORDER — ATORVASTATIN CALCIUM 20 MG/1
20 TABLET, FILM COATED ORAL DAILY
Status: DISCONTINUED | OUTPATIENT
Start: 2024-07-12 | End: 2024-08-04 | Stop reason: HOSPADM

## 2024-07-12 RX ORDER — METOPROLOL TARTRATE 50 MG/1
50 TABLET, FILM COATED ORAL EVERY 12 HOURS SCHEDULED
Status: DISCONTINUED | OUTPATIENT
Start: 2024-07-12 | End: 2024-07-17

## 2024-07-12 RX ORDER — IRON POLYSACCHARIDE COMPLEX 150 MG
150 CAPSULE ORAL DAILY
Status: DISCONTINUED | OUTPATIENT
Start: 2024-07-12 | End: 2024-08-04 | Stop reason: HOSPADM

## 2024-07-12 RX ORDER — LAMOTRIGINE 100 MG/1
100 TABLET ORAL EVERY MORNING
Status: DISCONTINUED | OUTPATIENT
Start: 2024-07-12 | End: 2024-07-30

## 2024-07-12 RX ORDER — MONTELUKAST SODIUM 10 MG/1
10 TABLET ORAL DAILY
Status: DISCONTINUED | OUTPATIENT
Start: 2024-07-13 | End: 2024-07-12

## 2024-07-12 RX ORDER — VENLAFAXINE HYDROCHLORIDE 150 MG/1
150 CAPSULE, EXTENDED RELEASE ORAL DAILY
Status: DISCONTINUED | OUTPATIENT
Start: 2024-07-12 | End: 2024-07-12

## 2024-07-12 RX ORDER — GUAIFENESIN/DEXTROMETHORPHAN 100-10MG/5
10 SYRUP ORAL EVERY 4 HOURS PRN
Status: DISCONTINUED | OUTPATIENT
Start: 2024-07-12 | End: 2024-07-29

## 2024-07-12 RX ORDER — BUDESONIDE 0.5 MG/2ML
0.5 INHALANT ORAL
Status: DISCONTINUED | OUTPATIENT
Start: 2024-07-12 | End: 2024-07-12 | Stop reason: HOSPADM

## 2024-07-12 RX ORDER — OXYBUTYNIN CHLORIDE 5 MG/1
5 TABLET, EXTENDED RELEASE ORAL DAILY
Status: DISCONTINUED | OUTPATIENT
Start: 2024-07-12 | End: 2024-08-04 | Stop reason: HOSPADM

## 2024-07-12 RX ORDER — TRAZODONE HYDROCHLORIDE 100 MG/1
200 TABLET ORAL
Status: DISCONTINUED | OUTPATIENT
Start: 2024-07-12 | End: 2024-07-12

## 2024-07-12 RX ORDER — SODIUM CHLORIDE, SODIUM GLUCONATE, SODIUM ACETATE, POTASSIUM CHLORIDE, MAGNESIUM CHLORIDE, SODIUM PHOSPHATE, DIBASIC, AND POTASSIUM PHOSPHATE .53; .5; .37; .037; .03; .012; .00082 G/100ML; G/100ML; G/100ML; G/100ML; G/100ML; G/100ML; G/100ML
75 INJECTION, SOLUTION INTRAVENOUS CONTINUOUS
Status: DISCONTINUED | OUTPATIENT
Start: 2024-07-12 | End: 2024-07-12

## 2024-07-12 RX ORDER — BENZONATATE 100 MG/1
200 CAPSULE ORAL 3 TIMES DAILY PRN
Status: DISCONTINUED | OUTPATIENT
Start: 2024-07-12 | End: 2024-07-29

## 2024-07-12 RX ORDER — FLUTICASONE FUROATE AND VILANTEROL 200; 25 UG/1; UG/1
1 POWDER RESPIRATORY (INHALATION)
Status: DISCONTINUED | OUTPATIENT
Start: 2024-07-12 | End: 2024-08-04 | Stop reason: HOSPADM

## 2024-07-12 RX ORDER — LAMOTRIGINE 100 MG/1
100 TABLET ORAL EVERY MORNING
Status: DISCONTINUED | OUTPATIENT
Start: 2024-07-13 | End: 2024-07-12

## 2024-07-12 RX ORDER — TRAZODONE HYDROCHLORIDE 100 MG/1
200 TABLET ORAL
Status: DISCONTINUED | OUTPATIENT
Start: 2024-07-12 | End: 2024-08-04 | Stop reason: HOSPADM

## 2024-07-12 RX ADMIN — HEPARIN SODIUM 7500 UNITS: 5000 INJECTION, SOLUTION INTRAVENOUS; SUBCUTANEOUS at 01:52

## 2024-07-12 RX ADMIN — METOPROLOL TARTRATE 50 MG: 50 TABLET, FILM COATED ORAL at 17:31

## 2024-07-12 RX ADMIN — FLUTICASONE FUROATE AND VILANTEROL TRIFENATATE 1 PUFF: 200; 25 POWDER RESPIRATORY (INHALATION) at 08:30

## 2024-07-12 RX ADMIN — HEPARIN SODIUM 7500 UNITS: 5000 INJECTION, SOLUTION INTRAVENOUS; SUBCUTANEOUS at 08:30

## 2024-07-12 RX ADMIN — MONTELUKAST 10 MG: 10 TABLET, FILM COATED ORAL at 12:42

## 2024-07-12 RX ADMIN — ALBUTEROL SULFATE 2 PUFF: 108 AEROSOL, METERED RESPIRATORY (INHALATION) at 09:05

## 2024-07-12 RX ADMIN — MONTELUKAST 10 MG: 10 TABLET, FILM COATED ORAL at 08:29

## 2024-07-12 RX ADMIN — SODIUM CHLORIDE 500 MG: 0.9 INJECTION, SOLUTION INTRAVENOUS at 14:56

## 2024-07-12 RX ADMIN — TRAZODONE HYDROCHLORIDE 200 MG: 100 TABLET ORAL at 21:16

## 2024-07-12 RX ADMIN — CEFTRIAXONE SODIUM 1000 MG: 10 INJECTION, POWDER, FOR SOLUTION INTRAVENOUS at 12:44

## 2024-07-12 RX ADMIN — LORATADINE 10 MG: 10 TABLET ORAL at 12:41

## 2024-07-12 RX ADMIN — METOPROLOL TARTRATE 50 MG: 50 TABLET, FILM COATED ORAL at 06:08

## 2024-07-12 RX ADMIN — ATORVASTATIN CALCIUM 20 MG: 20 TABLET, FILM COATED ORAL at 08:30

## 2024-07-12 RX ADMIN — HEPARIN SODIUM 7500 UNITS: 5000 INJECTION INTRAVENOUS; SUBCUTANEOUS at 21:16

## 2024-07-12 RX ADMIN — LAMOTRIGINE 100 MG: 100 TABLET ORAL at 12:41

## 2024-07-12 RX ADMIN — LAMOTRIGINE 100 MG: 100 TABLET ORAL at 08:29

## 2024-07-12 RX ADMIN — POLYSACCHARIDE-IRON COMPLEX 150 MG: 150 CAPSULE ORAL at 14:56

## 2024-07-12 RX ADMIN — FLUTICASONE FUROATE AND VILANTEROL TRIFENATATE 1 PUFF: 200; 25 POWDER RESPIRATORY (INHALATION) at 12:43

## 2024-07-12 RX ADMIN — ATORVASTATIN CALCIUM 20 MG: 20 TABLET, FILM COATED ORAL at 12:42

## 2024-07-12 RX ADMIN — VENLAFAXINE HYDROCHLORIDE 150 MG: 150 CAPSULE, EXTENDED RELEASE ORAL at 08:29

## 2024-07-12 RX ADMIN — HEPARIN SODIUM 7500 UNITS: 5000 INJECTION INTRAVENOUS; SUBCUTANEOUS at 14:56

## 2024-07-12 NOTE — H&P
[facility]  H&P  Name: Ngozi Beard 64 y.o. female I MRN: [id number]  Unit/Bed#: S -01 I Date of Admission: 7/12/2024   Date of Service: 7/12/2024 I Hospital Day: 0      Assessment & Plan   * Acute renal failure (ARF) (Formerly [facility])  Assessment & Plan  Patient admitted with acute renal failure with creatinine of 5.74 (baseline .8)  Patient had BUN 73 and GFR of 6  CT A/P: No hydronephrosis, however there is apparent 3 mm stone near or questionably within the distal right ureter (axial image 142). Given the lack of hydronephrosis this could either reflect adjacent calcification or nonobstructing stone.   Creatinine worsening today. Cr 7.15 from 6.02   Patient reports that she is still producing urine\  US kidney and bladder: No hydronephrosis. 4 mm nonobstructing left intrarenal calculus  Urinalysis: 3+ blood.  3+ protein.  Innumerable red blood cells.  2-4 WBCs.  Moderate bacteria   AURA, C3, and C4 normal    Plan  Renal biopsy   Hold home dyazide  Continue crystalloid fluids and Isolyte 75ml/hr   Follow up renal serologies  Consult to Nephrology    Sepsis (Former[facility])  Assessment & Plan  Patient admitted with sepsis likely secondary to pneumonitis as evidenced by tachycardia, tachypnea and leukocytosis  CT: redemonstration of multiple diffuse scattered nodules, less pronounced than on 5/16/2024 suggestive of mycobacterial infection  Lactic acid normal, Procalcitonin: 0.27->0.30, WBC 10.22  Sputum culture +2 gram negative rods, +1 gram positive cocci  BC: staph epidermis detected  Ceftriaxone start 7/10      Plan  Continue IV ceftriaxone (D3)  Trend WBC and fever curve    Abnormal CT of the chest  Assessment & Plan  CT chest: Re demonstration of multiple diffuse scattered nodules  Etiology unclear: HP vs sarcoid vs  vs MAC vs  atypical  CRP- 143.7   Sed-61,      negative: QuantiFERON      pending: HP/ANCA  Procal: 0.27--0.30--0.38  Day # 3-ceftriaxone  Possible bronchoscopy on  Monday        Persistent cough  Assessment & Plan  Patient endorses worsening shortness of breath and difficulty breathing  Patient currently on room air    Plan  Patient noted to have multiple diffuse scattered pulmonary nodules on CT  Quantiferon gold negative 7/9/24  On Ceftriaxone day 3  Bronchoscopy on Monday  Possible initiation of steroids    Abnormality of ascending aorta  Assessment & Plan  CT chest: Unchanged fusiform ectasia of the ascending thoracic aorta measuring up to 40 mm     Plan  Recommendation for follow-up low radiation dose chest CT in one year    Chronic obstructive pulmonary disease, unspecified COPD type (HCC)  Assessment & Plan  Albuterol inhaler PRN  Continue benzonatate  Continue fluticasone-vilanterol  Continue loratadine 10 mg  Robitussin PRN    Dyslipidemia  Assessment & Plan  Continue on atorvastatin 20 Mg    Primary hypertension  Assessment & Plan  Continue on Lopressor  Hold Dyazide  Hydralazine PRN  Monitor vitals as per protocol    Bipolar 1 disorder (HCC)  Assessment & Plan  Plan  Continue on Effexor, trazodone and Lamictal           VTE Pharmacologic Prophylaxis: VTE Score: 4 Moderate Risk (Score 3-4) - Pharmacological DVT Prophylaxis Ordered: heparin.  Code Status: Level 1 - Full Code   Discussion with family: Attempted to update  (sister) via phone. Unable to contact.    Anticipated Length of Stay: Patient will be admitted on an inpatient basis with an anticipated length of stay of greater than 2 midnights secondary to management of ARF.    Total Time Spent on Date of Encounter in care of patient: 45 mins. This time was spent on one or more of the following: performing physical exam; counseling and coordination of care; obtaining or reviewing history; documenting in the medical record; reviewing/ordering tests, medications or procedures; communicating with other healthcare professionals and discussing with patient's family/caregivers.    Chief Complaint: Abnormal  labs    History of Present Illness:  Ngozi Beard is a 64 y.o. female with a PMH of COPD, hypertension bipolar who presented due to abnormal labs and was admitted to [facility].  Patient lab work showed elevated creatinine levels, 4 times her baseline.  Patient underwent imaging CT chest showed multiple pulmonary nodules.  Patient only initially complained of shortness of breath with no other issues.  Nephrology and pulmonology was consulted.  Multiple autoimmune labs were ordered for the patient.  Patient also met the sepsis criteria and was started on ceftriaxone.  Patient had a elevated lactate level, procalcitonin, and positive blood culture and sputum culture.  Patient has been transferred to [facility] for further workup including renal biopsy.  Patient will also require bronchoscopy.  Patient is currently being admitted to [facility] for management of her acute renal failure.    Review of Systems:  Review of Systems    Past Medical and Surgical History:   Past Medical History:   Diagnosis Date    Asthma     Chronic pain     Hypertension        Past Surgical History:   Procedure Laterality Date    BACK SURGERY      COLONOSCOPY      TUBAL LIGATION         Meds/Allergies:  Prior to Admission medications    Medication Sig Start Date End Date Taking? Authorizing Provider   Advair -21 MCG/ACT inhaler Inhale 2 puffs 2 (two) times a day Rinse mouth after use. 7/1/24   Radha Bhatt MD   albuterol (PROVENTIL HFA,VENTOLIN HFA) 90 mcg/act inhaler INHALE 2 PUFFS BY MOUTH EVERY 4 HOURS AS NEEDED FOR SHORTNESS OF BREATH 11/24/23   Meenakshi Balbuena MD   atorvastatin (LIPITOR) 20 mg tablet TAKE 1 TABLET BY MOUTH EVERY DAY 4/8/24   Meenakshi Balbuena MD   benzonatate (TESSALON) 200 MG capsule Take 1 capsule (200 mg total) by mouth 3 (three) times a day as needed for cough  Patient not taking: Reported on 6/26/2024 5/21/24   Meenakshi Balbuena MD   cetirizine (ZyrTEC) 10 mg tablet Take 1 tablet (10 mg total) by  mouth daily  Patient not taking: Reported on 2024 3/14/23   Meenakshi Balbuena MD   lamoTRIgine (LaMICtal) 100 mg tablet Take 100 mg by mouth every morning 24   Historical Provider, MD   metoprolol tartrate (LOPRESSOR) 50 mg tablet TAKE 1 TABLET BY MOUTH TWICE A DAY (INS 30 DAY LIMIT) 24   Meenakshi Balbuena MD   montelukast (SINGULAIR) 10 mg tablet TAKE 1 TABLET BY MOUTH EVERY DAY 23   Meenakshi Balbuena MD   oxybutynin (DITROPAN-XL) 5 mg 24 hr tablet TAKE 1 TABLET (5 MG TOTAL) BY MOUTH DAILY.  Patient not taking: Reported on 2024   Meenakshi Balbuena MD   traZODone (DESYREL) 100 mg tablet Take 200 mg by mouth daily at bedtime    Historical Provider, MD   venlafaxine (EFFEXOR-XR) 150 mg 24 hr capsule TAKE 1 CAPSULE BY MOUTH DAILY WITH BREAKFAST. 23   Meenakshi Balbuena MD   triamterene-hydrochlorothiazide (DYAZIDE) 37.5-25 mg per capsule TAKE 1 CAPSULE BY MOUTH EVERY DAY 24  Meenakshi Balbuena MD     I have reviewed home medications with patient personally.    Allergies:   Allergies   Allergen Reactions    Penicillins Hives     Reaction Date: 2005;  - 2012: meena rn    Amoxicillin Rash    Aspirin Rash    Ibuprofen Rash    Morphine Rash    Oxycodone Rash    Valacyclovir Rash       Social History:  Marital Status: /Civil Union   Occupation: N/A  Patient Pre-hospital Living Situation: Home  Patient Pre-hospital Level of Mobility: walks  Patient Pre-hospital Diet Restrictions: N/A  Substance Use History:   Social History     Substance and Sexual Activity   Alcohol Use No    Comment: hX:Occas.      Social History     Tobacco Use   Smoking Status Former    Current packs/day: 0.00    Average packs/day: 1 pack/day for 15.0 years (15.0 ttl pk-yrs)    Types: Cigarettes    Start date:     Quit date:     Years since quittin.5   Smokeless Tobacco Never     Social History     Substance and Sexual Activity   Drug Use No       Family History:  Family History   Problem  Relation Age of Onset    Diabetes Mother     Hypertension Mother     Lung cancer Mother     Colon cancer Mother     Colon cancer Father     Hypertension Sister     Multiple sclerosis Sister     Hypothyroidism Maternal Aunt        Physical Exam:     Vitals:   Blood Pressure: 102/78 (07/12/24 1043)  Pulse: 95 (07/12/24 1123)  Respirations: 16 (07/12/24 1123)  SpO2: 96 % (07/12/24 1123)     Physical Exam  Constitutional:       Appearance: Normal appearance.   HENT:      Head: Normocephalic and atraumatic.      Right Ear: External ear normal.      Left Ear: External ear normal.      Nose: Nose normal.      Mouth/Throat:      Pharynx: Oropharynx is clear.   Eyes:      Extraocular Movements: Extraocular movements intact.      Conjunctiva/sclera: Conjunctivae normal.      Pupils: Pupils are equal, round, and reactive to light.   Cardiovascular:      Rate and Rhythm: Normal rate and regular rhythm.      Pulses: Normal pulses.      Heart sounds: Normal heart sounds.   Pulmonary:      Effort: Pulmonary effort is normal. No respiratory distress.      Breath sounds: Normal breath sounds. No wheezing, rhonchi or rales.   Abdominal:      General: Bowel sounds are normal.      Palpations: Abdomen is soft.   Musculoskeletal:         General: Normal range of motion.   Skin:     General: Skin is warm.      Capillary Refill: Capillary refill takes less than 2 seconds.   Neurological:      General: No focal deficit present.      Mental Status: She is alert and oriented to person, place, and time.          Additional Data:     Lab Results:  Results from last 7 days   Lab Units 07/12/24  0621 07/11/24  0459   WBC Thousand/uL 10.22* 12.33*   HEMOGLOBIN g/dL 9.4* 9.6*   HEMATOCRIT % 29.1* 30.1*   PLATELETS Thousands/uL 229 244   SEGS PCT %  --  74   LYMPHO PCT %  --  13*   MONO PCT %  --  10   EOS PCT %  --  2     Results from last 7 days   Lab Units 07/12/24  0621 07/11/24  0459   SODIUM mmol/L 140 140   POTASSIUM mmol/L 4.0 4.1    CHLORIDE mmol/L 106 106   CO2 mmol/L 19* 21   BUN mg/dL 81* 73*   CREATININE mg/dL 7.15* 6.02*   ANION GAP mmol/L 15* 13   CALCIUM mg/dL 9.4 9.0   ALBUMIN g/dL  --  3.2*   TOTAL BILIRUBIN mg/dL  --  0.40   ALK PHOS U/L  --  58   ALT U/L  --  9   AST U/L  --  11*   GLUCOSE RANDOM mg/dL 90 97             Lab Results   Component Value Date    HGBA1C 6.6 (H) 07/09/2024    HGBA1C 5.9 11/26/2016    HGBA1C 6.0 (H) 01/18/2016     Results from last 7 days   Lab Units 07/12/24  0621 07/11/24  0459 07/11/24  0025 07/10/24  2022   LACTIC ACID mmol/L  --   --   --  0.9   PROCALCITONIN ng/ml 0.38* 0.30* 0.27*  --        Lines/Drains:  Invasive Devices       Peripheral Intravenous Line  Duration             Peripheral IV 07/10/24 Dorsal (posterior);Right Forearm 1 day                        Imaging: Reviewed radiology reports from this admission including: chest CT scan  No orders to display       EKG and Other Studies Reviewed on Admission:   EKG: NSR. HR 90.    ** Please Note: This note has been constructed using a voice recognition system. **

## 2024-07-12 NOTE — RESPIRATORY THERAPY NOTE
RT Protocol Note  Ngozi Beard 64 y.o. female MRN: [id number]  Unit/Bed#: S -01 Encounter: [id number]    Assessment    Active Problems:    Bipolar 1 disorder (HCC)    Primary hypertension    Dyslipidemia    Chronic obstructive pulmonary disease, unspecified COPD type (HCC)    Abnormality of ascending aorta    Persistent cough    Abnormal CT of the chest    Acute renal failure (ARF) (HCC)    Sepsis (HCC)      Home Pulmonary Medications: Advair HFA, Albuterol  Home Devices/Therapy:  (Advair HFA, Albuterol)    Past Medical History:   Diagnosis Date    Asthma     Chronic pain     Hypertension      Social History     Socioeconomic History    Marital status: /Civil Union     Spouse name: Not on file    Number of children: Not on file    Years of education: Not on file    Highest education level: Not on file   Occupational History    Not on file   Tobacco Use    Smoking status: Former     Current packs/day: 0.00     Average packs/day: 1 pack/day for 15.0 years (15.0 ttl pk-yrs)     Types: Cigarettes     Start date:      Quit date:      Years since quittin.5    Smokeless tobacco: Never   Vaping Use    Vaping status: Never Used   Substance and Sexual Activity    Alcohol use: No     Comment: hX:Occas.     Drug use: No    Sexual activity: Not Currently   Other Topics Concern    Not on file   Social History Narrative    Most recent tobacco use screenin2018    Alcohol intake: Occasional    Last modified by [id number]    2019,      Social Determinants of Health     Financial Resource Strain: Not on file   Food Insecurity: Not on file   Transportation Needs: Not on file   Physical Activity: Not on file   Stress: Not on file   Social Connections: Unknown (2023)    [facility]    Social Connection and Isolation Panel [NHANES]     Frequency of Communication with Friends and Family: Patient declined     Frequency of Social  Gatherings with Friends and Family: Patient declined     Attends Temple Services: Patient declined     Active Member of Clubs or Organizations: Patient declined     Attends Club or Organization Meetings: Patient declined     Marital Status:    Intimate Partner Violence: Unknown (1/3/2023)    [facility]    Intimate Partner Violence     Within the last year, have you been afraid of your partner, ex-partner or family member?: Not on file     Within the last year, have you been humiliated or emotionally abused in other ways by your partner, ex-partner or family member?: Not on file     Within the last year, have you been kicked, hit, slapped, or otherwise physically hurt by your partner, ex-partner or family member?: Not on file     Within the last year, have you been raped or forced to have any kind of sexual activity by your partner, ex-partner or family member?: Not on file   Housing Stability: Not on file       Subjective         Objective    Physical Exam:   Assessment Type: Assess only  General Appearance: Alert, Awake  Respiratory Pattern: Normal  Chest Assessment: Chest expansion symmetrical  Bilateral Breath Sounds: Clear  Cough: Strong, Dry (Pt mentioned at night time it is worse)  O2 Device: RA    Vitals:  Blood pressure 102/78, pulse (P) 95, resp. rate (P) 16, SpO2 (P) 96%.          Imaging and other studies: I have personally reviewed pertinent reports.      O2 Device: RA     Plan    Respiratory Plan: No distress/Pulmonary history        Resp Comments: (P) Pt have uses Advair HFA twice and albuteol PRN. Pt have Dyspnea at exertion. Ex- Smoker. Pt have no Pulmonary Distress at this time. Pt Breath Sounds clear at this time

## 2024-07-12 NOTE — ED NOTES
Pt refusing to have any leads or wires on her at this time. Pt instructed on importance of keeping on.      Tami Dunn RN  07/12/24 0053

## 2024-07-12 NOTE — ASSESSMENT & PLAN NOTE
Patient admitted with sepsis likely secondary to pneumonitis as evidenced by tachycardia, tachypnea and leukocytosis  CT: redemonstration of multiple diffuse scattered nodules, less pronounced than on 5/16/2024 suggestive of mycobacterial infection  Lactic acid normal, Procalcitonin: 0.27->0.30, WBC 10.22  Sputum culture +2 gram negative rods, +1 gram positive cocci  BC: staph epidermis detected  Ceftriaxone start 7/10      Plan  Continue IV ceftriaxone (D3)  Trend WBC and fever curve

## 2024-07-12 NOTE — ASSESSMENT & PLAN NOTE
CT chest: Unchanged fusiform ectasia of the ascending thoracic aorta measuring up to 40 mm     Plan  Recommendation for follow-up low radiation dose chest CT in one year   H/O:

## 2024-07-12 NOTE — ASSESSMENT & PLAN NOTE
CT chest: Re demonstration of multiple diffuse scattered nodules  Etiology unclear: HP vs sarcoid vs  vs MAC vs  atypical  CRP- 143.7   Sed-61,      negative: QuantiFERON      pending: HP/ANCA  Procal: 0.27--0.30--0.38  Day # 3-ceftriaxone  Possible bronchoscopy on Monday

## 2024-07-12 NOTE — PROGRESS NOTES
NEPHROLOGY PROGRESS NOTE   Ngozi Beard 64 y.o. female MRN: [id number]  Unit/Bed#: S -01 Encounter: [id number]  Reason for Consult: ANGELICA      SUMMARY:    64-year-old female with a history of asthma, and hypertension who was told to come to the hospital by her pulmonologist for acute kidney injury.  Nephrology consult for abnormal urine analysis and ANGELICA.    Patient transferred from [facility] to [facility]    ASSESSMENT and PLAN:    Acute Kidney Injury  -- Present on admission (POA) ; ANGELICA Stage III ;  admission creatinine: 4.58 mg/dL  --Baseline creatinine is less than 1 mg/dL.  And her creatinine was 0.8 mg/dL on May 2024  -- Urinalysis: 3+ blood.  3+ protein.  Innumerable red blood cells.  2-4 WBCs.  Moderate bacteria  -- Imaging: Renal ultrasound shows the right kidney 13.2 cm.  Left kidney 12.5 cm.  1.8 cm simple cyst on the left kidney.  No evidence of hydronephrosis.  4 mm nonobstructing left renal calculus.  CT scan without contrast showed no hydronephrosis.  Lesions in both kidneys were described ultrasound reviewed.  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  Nonoliguric.  Stable electrolytes and acid-base status.  No overt uremic symptoms.  -- Plan:   Consult IR for renal biopsy  Follow-up pending serologies  Stephanie renal biopsy forms completed by me.  Form in the chart and also scanned into the media  No urgent indication for renal replacement  therapy  Recommend transfer to [facility]  Can discontinue fluids at this time  Daily BMP  Hold triamterene-hydrochlorothiazide  Will empirically cover with steroids Solu-Medrol 500 mg today and 250 mg IV x 2 doses tomorrow     Pulmonary nodule  --QuantiFERON TB Gold test was negative  -- CT can appear suggestive of mycobacterial infection  --Management as per pulmonary  -- Having a nonproductive cough  -- Follow-up ANCA titer and anti-GBM     Anemia  -- Hemoglobin has been slowly drifting down  -- Platelets are normal  -- Worsening renal function  -- Follow-up serum protein electrophoresis free light chain assay ratio and immunofixation  --Hemolysis smear negative for schistocytes  --Iron saturation 11, iron 24, TIBC 210, ferritin 103 appears to be consistent with iron deficiency.  Will avoid IV iron at this time due to active infection and being treated with antibiotics  --Start Niferex     Hypertension  -- Does not examine volume overloaded  -- Hold triamterene-hydrochlorothiazide  -- Start as needed IV hydralazine if needed  -- Fluids but reduce the rate  -- Continue metoprolol     Cough/abnormal chest CT  -- Recently establish care with pulmonary  -- Non-smoker  -- Underlying moderate persistent asthma  -- QuantiFERON-TB gold test was negative  -- CT of the chest showed multiple bilateral pulmonary nodules    Hyperphosphatemia  -- In the setting of acute kidney injury  -- Trend phosphorus levels  -- Low phosphorus diet    Low bicarbonate level  -- Now with a mild anion gap  -- Will discontinue fluids  -- Check venous blood gas tomorrow  -- Worsening renal function  -- If bicarbonate continues to be low start oral sodium bicarbonate tomorrow      SUBJECTIVE / INTERVAL HISTORY:    No chest pain or shortness of breath.  Ate breakfast tolerating oral intake well.  No shortness of breath    OBJECTIVE:  Current Weight:    Vitals:    07/12/24 1043 07/12/24 1123   BP: 102/78    Pulse: 92 95   Resp: 17  16   SpO2: 95% 96%     No intake or output data in the 24 hours ending 07/12/24 1306    Review of Systems:    Constitutional: Negative for chills and fever.   HENT: Negative for ear pain and sore throat.    Eyes: Negative for pain and visual disturbance.   Respiratory: Negative for cough and shortness of breath.    Cardiovascular: Negative for chest pain and palpitations.   Gastrointestinal: Negative for abdominal pain and vomiting.   Genitourinary: Negative for dysuria and hematuria.   Musculoskeletal: Negative for arthralgias and back pain.   Skin: Negative for color change and rash.   Neurological: Negative for seizures and syncope.   12 point ROS has been reviewed.    Physical Exam  Vitals and nursing note reviewed.   Constitutional:       General: She is not in acute distress.     Appearance: She is well-developed. She is obese.   HENT:      Head: Normocephalic and atraumatic.   Eyes:      General: No scleral icterus.     Conjunctiva/sclera: Conjunctivae normal.      Pupils: Pupils are equal, round, and reactive to light.   Cardiovascular:      Rate and Rhythm: Normal rate and regular rhythm.      Heart sounds: S1 normal and S2 normal. No murmur heard.     No friction rub. No gallop.   Pulmonary:      Effort: Pulmonary effort is normal. No respiratory distress.      Breath sounds: Normal breath sounds. No wheezing or rales.   Abdominal:      General: Bowel sounds are normal.      Palpations: Abdomen is soft.      Tenderness: There is no abdominal tenderness. There is no rebound.   Musculoskeletal:         General: Normal range of motion.      Cervical back: Normal range of motion and neck supple.   Skin:     Findings: No rash.   Neurological:      Mental Status: She is alert and oriented to person, place, and time.   Psychiatric:         Behavior: Behavior normal.         Medications:    Current Facility-Administered Medications:     acetaminophen (TYLENOL) tablet 650 mg, 650 mg, Oral, Q6H PRN, Tram Palacios  DARRICK    albuterol (PROVENTIL HFA,VENTOLIN HFA) inhaler 2 puff, 2 puff, Inhalation, Q4H PRN, Tram Palacios PA-C    atorvastatin (LIPITOR) tablet 20 mg, 20 mg, Oral, Daily, Tarm Palacios PA-C, 20 mg at 07/12/24 1242    benzonatate (TESSALON PERLES) capsule 200 mg, 200 mg, Oral, TID PRN, Tram Palacios PA-C    ceftriaxone (ROCEPHIN) 1 g/50 mL in dextrose IVPB, 1,000 mg, Intravenous, Q24H, Bradley Rueda MD, Last Rate: 100 mL/hr at 07/12/24 1244, 1,000 mg at 07/12/24 1244    dextromethorphan-guaiFENesin (ROBITUSSIN DM) oral syrup 10 mL, 10 mL, Oral, Q4H PRN, Tram Palacios PA-C    fluticasone-vilanterol 200-25 mcg/actuation 1 puff, 1 puff, Inhalation, Daily, Tram Palacios PA-C, 1 puff at 07/12/24 1243    heparin (porcine) subcutaneous injection 7,500 Units, 7,500 Units, Subcutaneous, Q8H JACK, Tram Palacios PA-C    hydrALAZINE (APRESOLINE) injection 10 mg, 10 mg, Intravenous, Q6H PRN, Tram Palacios PA-C    lamoTRIgine (LaMICtal) tablet 100 mg, 100 mg, Oral, QAM, Tram Palacios PA-C, 100 mg at 07/12/24 1241    loratadine (CLARITIN) tablet 10 mg, 10 mg, Oral, Daily, Tram Palacios PA-C, 10 mg at 07/12/24 1241    metoprolol tartrate (LOPRESSOR) tablet 50 mg, 50 mg, Oral, Q12H JACK, Tram Palacios PA-C    montelukast (SINGULAIR) tablet 10 mg, 10 mg, Oral, Daily, Tram Palacios PA-C, 10 mg at 07/12/24 1242    multi-electrolyte (PLASMALYTE-A/ISOLYTE-S PH 7.4) IV solution, 75 mL/hr, Intravenous, Continuous, Tram Palacios PA-C, Held at 07/12/24 1246    oxybutynin (DITROPAN-XL) 24 hr tablet 5 mg, 5 mg, Oral, Daily, Tram Palacios PA-C    traZODone (DESYREL) tablet 200 mg, 200 mg, Oral, HS, Tram Palacios PA-C    [START ON 7/13/2024] venlafaxine (EFFEXOR-XR) 24 hr capsule 150 mg, 150 mg, Oral, Daily, Tram Palacios PA-C    Laboratory Results:  Results from last 7 days   Lab Units 07/12/24  0621 07/11/24  0459 07/11/24  0025 07/10/24  1742 07/09/24  1400   WBC Thousand/uL 10.22* 12.33*  --  14.56* 11.25*   HEMOGLOBIN g/dL 9.4* 9.6*  --   10.8* 11.3*   HEMATOCRIT % 29.1* 30.1*  --  34.0* 35.6   PLATELETS Thousands/uL 229 244 241 294 298   POTASSIUM mmol/L 4.0 4.1  --  4.0 4.0   CHLORIDE mmol/L 106 106  --  106 105   CO2 mmol/L 19* 21  --  20* 23   BUN mg/dL 81* 73*  --  74* 61*   CREATININE mg/dL 7.15* 6.02*  --  5.74* 4.58*   CALCIUM mg/dL 9.4 9.0  --  9.4 9.9   MAGNESIUM mg/dL  --  2.2  --   --   --    PHOSPHORUS mg/dL  --  5.8*  --   --   --        PLEASE NOTE:  This encounter was completed utilizing the [employer]/Fluency Direct Speech Voice Recognition Software. Grammatical errors, random word insertions, pronoun errors and incomplete sentences are occasional consequences of the system due to software limitations, ambient noise and hardware issues.These may be missed by proof reading prior to affixing electronic signature. Any questions or concerns about the content, text or information contained within the body of this dictation should be directly addressed to the physician for clarification. Please do not hesitate to call me directly if you have any any questions or concerns.

## 2024-07-12 NOTE — PLAN OF CARE
Problem: PAIN - ADULT  Goal: Verbalizes/displays adequate comfort level or baseline comfort level  Description: Interventions:  - Encourage patient to monitor pain and request assistance  - Assess pain using appropriate pain scale  - Administer analgesics based on type and severity of pain and evaluate response  - Implement non-pharmacological measures as appropriate and evaluate response  - Consider cultural and social influences on pain and pain management  - Notify physician/advanced practitioner if interventions unsuccessful or patient reports new pain  7/12/2024 1740 by Lois Rider  Outcome: Progressing  7/12/2024 1740 by Lois Rider  Outcome: Progressing     Problem: INFECTION - ADULT  Goal: Absence or prevention of progression during hospitalization  Description: INTERVENTIONS:  - Assess and monitor for signs and symptoms of infection  - Monitor lab/diagnostic results  - Monitor all insertion sites, i.e. indwelling lines, tubes, and drains  - Monitor endotracheal if appropriate and nasal secretions for changes in amount and color  - [place] appropriate cooling/warming therapies per order  - Administer medications as ordered  - Instruct and encourage patient and family to use good hand hygiene technique  - Identify and instruct in appropriate isolation precautions for identified infection/condition  7/12/2024 1740 by Lois Rider  Outcome: Progressing  7/12/2024 1740 by Lois Rider  Outcome: Progressing  Goal: Absence of fever/infection during neutropenic period  Description: INTERVENTIONS:  - Monitor WBC    7/12/2024 1740 by Lois Rider  Outcome: Progressing  7/12/2024 1740 by Lois Rider  Outcome: Progressing     Problem: SAFETY ADULT  Goal: Patient will remain free of falls  Description: INTERVENTIONS:  - Educate patient/family on patient safety including physical limitations  - Instruct patient to call for assistance with activity   - Consult OT/PT to assist with  strengthening/mobility   - Keep Call bell within reach  - Keep bed low and locked with side rails adjusted as appropriate  - Keep care items and personal belongings within reach  - Initiate and maintain comfort rounds  - Make Fall Risk Sign visible to staff  - Offer Toileting every 2 Hours, in advance of need  - Initiate/Maintain alarm  - Obtain necessary fall risk management equipment:   - Apply yellow socks and bracelet for high fall risk patients  - Consider moving patient to room near nurses station  7/12/2024 1740 by Lois Rider  Outcome: Progressing  7/12/2024 1740 by Lois Rider  Outcome: Progressing  Goal: Maintain or return to baseline ADL function  Description: INTERVENTIONS:  -  Assess patient's ability to carry out ADLs; assess patient's baseline for ADL function and identify physical deficits which impact ability to perform ADLs (bathing, care of mouth/teeth, toileting, grooming, dressing, etc.)  - Assess/evaluate cause of self-care deficits   - Assess range of motion  - Assess patient's mobility; develop plan if impaired  - Assess patient's need for assistive devices and provide as appropriate  - Encourage maximum independence but intervene and supervise when necessary  - Involve family in performance of ADLs  - Assess for home care needs following discharge   - Consider OT consult to assist with ADL evaluation and planning for discharge  - Provide patient education as appropriate  7/12/2024 1740 by Lois Rider  Outcome: Progressing  7/12/2024 1740 by Lois Rider  Outcome: Progressing  Goal: Maintains/Returns to pre admission functional level  Description: INTERVENTIONS:  - Perform AM-PAC 6 Click Basic Mobility/ Daily Activity assessment daily.  - Set and communicate daily mobility goal to care team and patient/family/caregiver.   - Collaborate with rehabilitation services on mobility goals if consulted  - Perform Range of Motion 3 times a day.  - Reposition patient every 2  hours.  - Dangle patient 3 times a day  - Stand patient 3 times a day  - Ambulate patient 3 times a day  - Out of bed to chair 3 times a day   - Out of bed for meals 3 times a day  - Out of bed for toileting  - Record patient progress and toleration of activity level   7/12/2024 1740 by Lois Rider  Outcome: Progressing  7/12/2024 1740 by Lois Rider  Outcome: Progressing     Problem: DISCHARGE PLANNING  Goal: Discharge to home or other facility with appropriate resources  Description: INTERVENTIONS:  - Identify barriers to discharge w/patient and caregiver  - Arrange for needed discharge resources and transportation as appropriate  - Identify discharge learning needs (meds, wound care, etc.)  - Arrange for interpretive services to assist at discharge as needed  - Refer to Case Management Department for coordinating discharge planning if the patient needs post-hospital services based on physician/advanced practitioner order or complex needs related to functional status, cognitive ability, or social support system  7/12/2024 1740 by Lois Rider  Outcome: Progressing  7/12/2024 1740 by Lois Rider  Outcome: Progressing     Problem: Knowledge Deficit  Goal: Patient/family/caregiver demonstrates understanding of disease process, treatment plan, medications, and discharge instructions  Description: Complete learning assessment and assess knowledge base.  Interventions:  - Provide teaching at level of understanding  - Provide teaching via preferred learning methods  7/12/2024 1740 by Lois Rider  Outcome: Progressing  7/12/2024 1740 by Lois Rider  Outcome: Progressing

## 2024-07-12 NOTE — CONSULTS
e-Consult (IPC)  - Interventional Radiology  Ngozi Beard 64 y.o. female MRN: [id number]  Unit/Bed#: S -01 Encounter: [id number]          Interventional Radiology has been consulted to evaluate Ngozi Beard    We were consulted by Nephrology (duplicate order by [facility]) concerning this patient with acute kidney injury of uncertain etiology.  Random kidney biopsy (Arkana kit) during this admission has been requested for further evaluation.    Inpatient Consult to IR  Consult performed by: Mohamud Gonzalez MD  Consult ordered by: Ata Burns MD      Inpatient Consult to IR  Consult performed by: Mohamud Gonzalez MD  Consult ordered by: Tram Palaicos PA-C        07/12/24    Assessment/Recommendation:   Image-guided random kidney biopsy is appropriate; timing of biopsy will be pending IR availability.  We will tentatively attempt to accommodate Monday 7/15.    - Please make NPO at midnight Sunday night in anticipation of possible Monday biopsy under sedation.   - Please obtain PT/INR for review  - Platelet count 229, within appropriate range for biopsy.  - Hold AM anticoagulation for biopsy (I have held the 7/15/24 0600 dose of prophylactic heparin that is currently ordered.  If orders are changed, please ensure that a hold is placed on the new orders).  - Formal informed consent will be obtained immediately prior to procedure.  - Please optimize blood pressure prior to procedure.  SBP >140 mmHg is associated with increased risk of periprocedural bleeding.    11-20 minutes, >50% of the total time devoted to medical consultative verbal/EMR discussion between providers. Written report will be generated in the EMR.     Thank you for allowing Interventional Radiology to participate in the care of Ngozi Beard. Please don't hesitate to call or TigerText us with any questions.     Mohamud Gonzalez MD

## 2024-07-12 NOTE — CONSULTS
Please see full consult from [facility] from 7/11/2024.  Nephrology will continue to follow on transfer today to accelerate.  Please see progress note.

## 2024-07-12 NOTE — CONSULTS
"Consultation - Pulmonary Medicine   Ngozi Beard 64 y.o. female MRN: [id number]  Unit/Bed#: ED 06 Encounter: [id number]      Assessment/Plan:    1.  Acute pulmonary insufficiency likely multifaceted as listed below        -Currently on room air 94%, does not wear home O2        -Will continue sats greater than 88%        -Pulmonary toileting: Deep breathing cough out of bed as tolerated incentive spirometry    2.  Abnormal chest CT       -Extensive diffuse pulmonary nodules       -Etiology unclear: HP vs sarcoid vs  vs MAC vs  atypical       -CRP- 143.7   Sed-61,      negative: QuantiFERON      pending: HP/ANCA       -Procal: 0.27--0.30--0.38       -Day # 2-ceftriaxone       -Ideally it would be good to get bronchoscopy prior to initiating therapy however given this would likely not occur over the weekend would favor starting steroid therapy-will discuss with attending on actual dosing    3.  Moderate persistent asthma with unlikely acute exacerbation        -Eos- 180--170--260--180        -Inpatient: Budesonide twice daily, Xopenex/Atrovent 3 times daily        -Home regimen:Advair -21 MCG 2 puffs twice daily, Proventil 2 puffs every 6 as needed    4.  ANGELICA       -Etiology unclear       -Cr- 4.58--5.74--6.02--7.15       -Will need kidney biopsy       -Patient being transferred to [facility]        History of Present Illness   Physician Requesting Consult: Tobi Hernandez DO  Reason for Consult / Principal Problem: Abnormal chest CT  Hx and PE limited by: Nothing  Chief Complaint: \" I have been feeling more short of breath\"  HPI: Ngozi Beard is a 64 y.o.  female who presented to [facility] with abnormal labs.  Patient has past medical history of hypertension, asthma and seasonal allergies.  Patient presented to [place] emergency department with elevated creatinine levels after routine blood work was ordered by pulmonary.  On ED admission patient received fluid boluses along with " antibiotics.     Pulmonary is consulted for abnormal chest CT.  Today patient reports that she moved to an apartment approximately 1 year ago prior to that she was living in a shelter.  Patient reports that over the last 6 months her shortness of breath has increased in severity.  Patient reports that she has recently been transition to Advair HFA however her shortness of breath persists.  Patient reports she does not have a nebulizer.  Patient currently denying any fevers, chills, night sweats, pleuritic chest pain, or palpitations.     Standpoint, patient follows with Dr. Rojas for her moderate persistent asthma.  Patient reports an approximate 1 pack/day 15-year smoking history.  Patient reports she quit smoking at the age of 28 when she was diagnosed with asthma.  Patient is never required intubation for her asthma.  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  Patient is currently maintained on Advair -21 MCG 2 puffs twice daily, Proventil 2 puffs every 6 as needed.  Patient does not require oxygen therapy.  Patient denies having any pets.  Patient reports some seasonal allergies in which she is maintained on Singulair and Zyrtec.  Patient denies any symptoms of postnasal drip or GERD.  Patient does report snoring possible ROLANDO.  Patient denies any recent acute exposures to dust, mold, spices, or silica.    Inpatient consult to Pulmonology  Consult performed by: JEB Isabel  Consult ordered by: Nathanael Chaudhry MD          Review of Systems   Constitutional:  Negative for chills and fever.   HENT:  Negative for ear pain and sore throat.    Eyes:  Negative for pain and visual disturbance.   Respiratory:  Positive for shortness of breath. Negative for apnea, cough, choking, chest tightness, wheezing and stridor.    Cardiovascular:  Negative for chest pain and palpitations.   Gastrointestinal:  Negative for abdominal pain and vomiting.   Genitourinary:   "Negative for dysuria and hematuria.   Musculoskeletal:  Negative for arthralgias and back pain.   Skin:  Negative for color change and rash.   Neurological:  Negative for seizures and syncope.   Psychiatric/Behavioral:  Negative for agitation and behavioral problems.    All other systems reviewed and are negative.      Historical Information   Past Medical History:   Diagnosis Date    Asthma     Chronic pain     Hypertension      Past Surgical History:   Procedure Laterality Date    BACK SURGERY      COLONOSCOPY      TUBAL LIGATION       Social History   Social History     Substance and Sexual Activity   Alcohol Use No    Comment: hX:Occas.      Social History     Substance and Sexual Activity   Drug Use No     Social History     Tobacco Use   Smoking Status Former    Current packs/day: 0.00    Average packs/day: 1 pack/day for 15.0 years (15.0 ttl pk-yrs)    Types: Cigarettes    Start date:     Quit date:     Years since quittin.5   Smokeless Tobacco Never     E-Cigarette/Vaping    E-Cigarette Use Never User      E-Cigarette/Vaping Substances    Nicotine No     THC No     CBD No     Flavoring No      Occupational History: na    Family History:   Family History   Problem Relation Age of Onset    Diabetes Mother     Hypertension Mother     Lung cancer Mother     Colon cancer Mother     Colon cancer Father     Hypertension Sister     Multiple sclerosis Sister     Hypothyroidism Maternal Aunt        Meds/Allergies   pertinent pulmonary meds have been reviewed    Allergies   Allergen Reactions    Penicillins Hives     Reaction Date: 2005; Annotation - [id number]: meena mcdonald    Amoxicillin Rash    Aspirin Rash    Ibuprofen Rash    Morphine Rash    Oxycodone Rash    Valacyclovir Rash       Objective   Vitals: Blood pressure 127/59, pulse 72, temperature 99.4 °F (37.4 °C), temperature source Oral, resp. rate (!) 24, height 5' 3\" (1.6 m), weight 109 kg (241 lb), SpO2 96%.,Body mass index is 42.69 kg/m².  No " "intake or output data in the 24 hours ending 07/12/24 0859  Invasive Devices       Peripheral Intravenous Line  Duration             Peripheral IV 07/10/24 Dorsal (posterior);Right Forearm 1 day                    Physical Exam  Constitutional:       General: She is not in acute distress.     Appearance: Normal appearance. She is normal weight. She is not ill-appearing.   HENT:      Head: Normocephalic and atraumatic.      Nose: Nose normal. No congestion or rhinorrhea.      Mouth/Throat:      Mouth: Mucous membranes are dry.      Pharynx: Oropharynx is clear. No oropharyngeal exudate or posterior oropharyngeal erythema.   Cardiovascular:      Rate and Rhythm: Normal rate and regular rhythm.      Pulses: Normal pulses.      Heart sounds: Normal heart sounds. No murmur heard.     No friction rub. No gallop.   Pulmonary:      Effort: Pulmonary effort is normal. No respiratory distress.      Breath sounds: No stridor. No wheezing, rhonchi or rales.      Comments: Overall clear breath sounds  Chest:      Chest wall: No tenderness.   Abdominal:      General: Abdomen is flat. Bowel sounds are normal. There is no distension.      Palpations: Abdomen is soft. There is no mass.   Musculoskeletal:         General: No swelling or tenderness. Normal range of motion.      Cervical back: Normal range of motion. No rigidity or tenderness.   Skin:     General: Skin is warm and dry.      Coloration: Skin is not jaundiced or pale.   Neurological:      General: No focal deficit present.      Mental Status: She is alert and oriented to person, place, and time. Mental status is at baseline.   Psychiatric:         Mood and Affect: Mood normal.         Behavior: Behavior normal.         Lab Results: I have personally reviewed pertinent lab results., ABG: No results found for: \"PHART\", \"DLP0XIQ\", \"PO2ART\", \"MWB4LBY\", \"T3EEDQTS\", \"BEART\", \"SOURCE\", BNP: No results found for: \"BNP\", CBC:   Lab Results   Component Value Date    WBC 10.22 (H) " "07/12/2024    HGB 9.4 (L) 07/12/2024    HCT 29.1 (L) 07/12/2024    MCV 86 07/12/2024     07/12/2024    RBC 3.38 (L) 07/12/2024    MCH 27.8 07/12/2024    MCHC 32.3 07/12/2024    RDW 15.1 07/12/2024    MPV 10.0 07/12/2024   , CMP:   Lab Results   Component Value Date    SODIUM 140 07/12/2024    K 4.0 07/12/2024     07/12/2024    CO2 19 (L) 07/12/2024    BUN 81 (H) 07/12/2024    CREATININE 7.15 (H) 07/12/2024    CALCIUM 9.4 07/12/2024    EGFR 5 07/12/2024   , PT/INR: No results found for: \"PT\", \"INR\"        Imaging Studies: I have personally reviewed pertinent films in PACS     Chest CT scattered bilateral pulmonary nodules    EKG, Pathology, and Other Studies: I have personally reviewed pertinent films in PACS     EKG sinus rhythm    Pulmonary Results (PFTs, PSG): I have personally reviewed pertinent films in PACS     No PFTs    Code Status: Level 1 - Full Code    Portions of the record may have been created with voice recognition software.  Occasional wrong word or \"sound a like\" substitutions may have occurred due to the inherent limitations of voice recognition software.  Read the chart carefully and recognize, using context, where substitutions have occurred.  "

## 2024-07-12 NOTE — ASSESSMENT & PLAN NOTE
Albuterol inhaler PRN  Continue benzonatate  Continue fluticasone-vilanterol  Continue loratadine 10 mg  Robitussin PRN

## 2024-07-12 NOTE — ASSESSMENT & PLAN NOTE
Patient endorses worsening shortness of breath and difficulty breathing  Patient currently on room air    Plan  Patient noted to have multiple diffuse scattered pulmonary nodules on CT  Quantiferon gold negative 7/9/24  On Ceftriaxone day 3  Bronchoscopy on Monday  Possible initiation of steroids

## 2024-07-12 NOTE — ASSESSMENT & PLAN NOTE
Patient admitted with acute renal failure with creatinine of 5.74 (baseline .8)  Patient had BUN 73 and GFR of 6  CT A/P: No hydronephrosis, however there is apparent 3 mm stone near or questionably within the distal right ureter (axial image 142). Given the lack of hydronephrosis this could either reflect adjacent calcification or nonobstructing stone.   Creatinine worsening today. Cr 7.15 from 6.02   Patient reports that she is still producing urine\  US kidney and bladder: No hydronephrosis. 4 mm nonobstructing left intrarenal calculus  Urinalysis: 3+ blood.  3+ protein.  Innumerable red blood cells.  2-4 WBCs.  Moderate bacteria   AURA, C3, and C4 normal    Plan  Renal biopsy   Hold home dyazide  Continue crystalloid fluids and Isolyte 75ml/hr   Follow up renal serologies  Consult to Nephrology

## 2024-07-13 LAB
ALBUMIN SERPL BCG-MCNC: 3.3 G/DL (ref 3.5–5)
ANION GAP SERPL CALCULATED.3IONS-SCNC: 14 MMOL/L (ref 4–13)
BACTERIA BLD CULT: ABNORMAL
BACTERIA SPT RESP CULT: ABNORMAL
BASE EX.OXY STD BLDV CALC-SCNC: 61.3 % (ref 60–80)
BASE EXCESS BLDV CALC-SCNC: -7.3 MMOL/L
BUN SERPL-MCNC: 98 MG/DL (ref 5–25)
CALCIUM ALBUM COR SERPL-MCNC: 10.4 MG/DL (ref 8.3–10.1)
CALCIUM SERPL-MCNC: 9.8 MG/DL (ref 8.4–10.2)
CHLORIDE SERPL-SCNC: 105 MMOL/L (ref 96–108)
CO2 SERPL-SCNC: 19 MMOL/L (ref 21–32)
CREAT SERPL-MCNC: 8.34 MG/DL (ref 0.6–1.3)
DSDNA AB SER QL CLIF: NEGATIVE
GFR SERPL CREATININE-BSD FRML MDRD: 4 ML/MIN/1.73SQ M
GLUCOSE SERPL-MCNC: 168 MG/DL (ref 65–140)
GRAM STN SPEC: ABNORMAL
HCO3 BLDV-SCNC: 18 MMOL/L (ref 24–30)
INR PPP: 0.98 (ref 0.84–1.19)
KAPPA LC FREE SER-MCNC: 152.5 MG/L (ref 3.3–19.4)
KAPPA LC FREE/LAMBDA FREE SER: 1.75 {RATIO} (ref 0.26–1.65)
LAMBDA LC FREE SERPL-MCNC: 87.1 MG/L (ref 5.7–26.3)
O2 CT BLDV-SCNC: 9.6 ML/DL
PCO2 BLDV: 36 MM HG (ref 42–50)
PH BLDV: 7.32 [PH] (ref 7.3–7.4)
PHOSPHATE SERPL-MCNC: 8.5 MG/DL (ref 2.3–4.1)
PO2 BLDV: 35.7 MM HG (ref 35–45)
POTASSIUM SERPL-SCNC: 4.9 MMOL/L (ref 3.5–5.3)
PROCALCITONIN SERPL-MCNC: 0.4 NG/ML
PROTHROMBIN TIME: 13.6 SECONDS (ref 11.6–14.5)
S EPIDERMIDIS DNA BLD POS QL NAA+NON-PRB: DETECTED
SODIUM SERPL-SCNC: 138 MMOL/L (ref 135–147)

## 2024-07-13 PROCEDURE — 87340 HEPATITIS B SURFACE AG IA: CPT | Performed by: INTERNAL MEDICINE

## 2024-07-13 PROCEDURE — 99232 SBSQ HOSP IP/OBS MODERATE 35: CPT | Performed by: INTERNAL MEDICINE

## 2024-07-13 PROCEDURE — 86704 HEP B CORE ANTIBODY TOTAL: CPT | Performed by: INTERNAL MEDICINE

## 2024-07-13 PROCEDURE — 86706 HEP B SURFACE ANTIBODY: CPT | Performed by: INTERNAL MEDICINE

## 2024-07-13 PROCEDURE — 99233 SBSQ HOSP IP/OBS HIGH 50: CPT | Performed by: INTERNAL MEDICINE

## 2024-07-13 PROCEDURE — 86803 HEPATITIS C AB TEST: CPT | Performed by: INTERNAL MEDICINE

## 2024-07-13 PROCEDURE — 84145 PROCALCITONIN (PCT): CPT

## 2024-07-13 PROCEDURE — 85610 PROTHROMBIN TIME: CPT | Performed by: STUDENT IN AN ORGANIZED HEALTH CARE EDUCATION/TRAINING PROGRAM

## 2024-07-13 PROCEDURE — 80069 RENAL FUNCTION PANEL: CPT | Performed by: INTERNAL MEDICINE

## 2024-07-13 PROCEDURE — 82805 BLOOD GASES W/O2 SATURATION: CPT | Performed by: INTERNAL MEDICINE

## 2024-07-13 PROCEDURE — 86705 HEP B CORE ANTIBODY IGM: CPT | Performed by: INTERNAL MEDICINE

## 2024-07-13 RX ORDER — SODIUM BICARBONATE 650 MG/1
650 TABLET ORAL
Status: DISCONTINUED | OUTPATIENT
Start: 2024-07-13 | End: 2024-07-14

## 2024-07-13 RX ADMIN — ALBUTEROL SULFATE 2 PUFF: 108 AEROSOL, METERED RESPIRATORY (INHALATION) at 12:12

## 2024-07-13 RX ADMIN — SODIUM CHLORIDE 250 MG: 0.9 INJECTION, SOLUTION INTRAVENOUS at 08:40

## 2024-07-13 RX ADMIN — HEPARIN SODIUM 7500 UNITS: 5000 INJECTION INTRAVENOUS; SUBCUTANEOUS at 13:44

## 2024-07-13 RX ADMIN — CEFTRIAXONE SODIUM 1000 MG: 10 INJECTION, POWDER, FOR SOLUTION INTRAVENOUS at 15:34

## 2024-07-13 RX ADMIN — SODIUM BICARBONATE 650 MG: 650 TABLET ORAL at 17:35

## 2024-07-13 RX ADMIN — TRAZODONE HYDROCHLORIDE 200 MG: 100 TABLET ORAL at 21:08

## 2024-07-13 RX ADMIN — VENLAFAXINE HYDROCHLORIDE 150 MG: 150 CAPSULE, EXTENDED RELEASE ORAL at 08:39

## 2024-07-13 RX ADMIN — ACETAMINOPHEN 650 MG: 325 TABLET, FILM COATED ORAL at 17:34

## 2024-07-13 RX ADMIN — HEPARIN SODIUM 7500 UNITS: 5000 INJECTION INTRAVENOUS; SUBCUTANEOUS at 21:08

## 2024-07-13 RX ADMIN — FLUTICASONE FUROATE AND VILANTEROL TRIFENATATE 1 PUFF: 200; 25 POWDER RESPIRATORY (INHALATION) at 07:03

## 2024-07-13 RX ADMIN — POLYSACCHARIDE-IRON COMPLEX 150 MG: 150 CAPSULE ORAL at 08:40

## 2024-07-13 RX ADMIN — LORATADINE 10 MG: 10 TABLET ORAL at 08:40

## 2024-07-13 RX ADMIN — METOPROLOL TARTRATE 50 MG: 50 TABLET, FILM COATED ORAL at 06:00

## 2024-07-13 RX ADMIN — OXYBUTYNIN CHLORIDE 5 MG: 5 TABLET, EXTENDED RELEASE ORAL at 08:39

## 2024-07-13 RX ADMIN — SODIUM BICARBONATE 650 MG: 650 TABLET ORAL at 11:23

## 2024-07-13 RX ADMIN — MONTELUKAST 10 MG: 10 TABLET, FILM COATED ORAL at 08:39

## 2024-07-13 RX ADMIN — LAMOTRIGINE 100 MG: 100 TABLET ORAL at 08:40

## 2024-07-13 RX ADMIN — HEPARIN SODIUM 7500 UNITS: 5000 INJECTION INTRAVENOUS; SUBCUTANEOUS at 06:01

## 2024-07-13 RX ADMIN — METOPROLOL TARTRATE 50 MG: 50 TABLET, FILM COATED ORAL at 17:35

## 2024-07-13 RX ADMIN — ALBUTEROL SULFATE 2 PUFF: 108 AEROSOL, METERED RESPIRATORY (INHALATION) at 07:03

## 2024-07-13 RX ADMIN — ATORVASTATIN CALCIUM 20 MG: 20 TABLET, FILM COATED ORAL at 08:40

## 2024-07-13 NOTE — PLAN OF CARE
Problem: SAFETY ADULT  Goal: Patient will remain free of falls  Description: INTERVENTIONS:  - Educate patient/family on patient safety including physical limitations  - Instruct patient to call for assistance with activity   - Consult OT/PT to assist with strengthening/mobility   - Keep Call bell within reach  - Keep bed low and locked with side rails adjusted as appropriate  - Keep care items and personal belongings within reach  - Initiate and maintain comfort rounds  - Make Fall Risk Sign visible to staff  - Apply yellow socks and bracelet for high fall risk patients  - Consider moving patient to room near nurses station  Outcome: Progressing     Problem: INFECTION - ADULT  Goal: Absence or prevention of progression during hospitalization  Description: INTERVENTIONS:  - Assess and monitor for signs and symptoms of infection  - Monitor lab/diagnostic results  - Monitor all insertion sites, i.e. indwelling lines, tubes, and drains  - Monitor endotracheal if appropriate and nasal secretions for changes in amount and color  - [place] appropriate cooling/warming therapies per order  - Administer medications as ordered  - Instruct and encourage patient and family to use good hand hygiene technique  - Identify and instruct in appropriate isolation precautions for identified infection/condition  Outcome: Progressing

## 2024-07-13 NOTE — ASSESSMENT & PLAN NOTE
CT chest: Re demonstration of multiple diffuse scattered nodules  Etiology unclear: HP vs sarcoid vs  vs MAC vs  atypical  CRP- 143.7   Sed-61, negative: QuantiFERON  pending: HP/ANCA  Procal: 0.27--0.30--0.38    Plan:  Ceftriaxone day 4   Possible bronchoscopy on Monday

## 2024-07-13 NOTE — CASE MANAGEMENT
Case Management Progress Note    Patient name Ngozi Beard  Location S /S -01 MRN [id number]  : 1960 Date 2024       LOS (days): 1  Geometric Mean LOS (GMLOS) (days): 5.1  Days to GMLOS:3.9        OBJECTIVE:        Current admission status: Inpatient  Preferred Pharmacy:   [facility]/pharmacy #[id number] [place] [id number] [address] [address]  Phone: [phone] Fax: [phone]    [facility] - [address] [address]  Phone: [phone] Fax: [phone]    Primary Care Provider: Meenakshi Balbuena MD    Primary Insurance: MEDICARE  Secondary Insurance: [facility]    PROGRESS NOTE:  CM met with Pt with an introduction and explanation of role. CM provided the Pt with her request for  information on advance directive.

## 2024-07-13 NOTE — PROGRESS NOTES
NEPHROLOGY PROGRESS NOTE   Ngozi Beard 64 y.o. female MRN: [id number]  Unit/Bed#: S -01 Encounter: [id number]    ASSESSMENT & PLAN:   64-year-old female with a history of asthma, and hypertension who was told to come to the hospital by her pulmonologist for acute kidney injury.  Nephrology consult for abnormal urine analysis and ANGELICA.   Patient transferred from [facility] to [facility]    Acute kidney injury, POA  -Baseline creatinine: 0.8 mg/dl, was at this range in May 2024  -Admission creatinine: 4.58 mg/dl  - Work up:   UA with microscopy: 3+ blood, 3+ protein numerous RBCs, 3-4 WBC  Imaging: Right kidney 13.2 left kidney 12.5 cm in size, no hydronephrosis on kidney ultrasound  Serologies: C3-C4 normal, AURA negative, anti-double-stranded DNA negative, hepatitis C antibody negative, QuantiFERON gold test is negative.  Hemolysis smear was negative for schistocytes.  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  -Hospital Course: Renal function worsening during the hospital stay but no uremic symptoms  -Plan:   Renal function worsened to creatinine 8.34 mg/dL, electrolytes are stable and no uremic symptoms.  No indication for renal replacement therapy at this time.  Follow-up pending serologies  Plan for kidney biopsy coming Monday and [facility] biopsy form was filled by Dr. Burns.  Continue to hold diuretics  Continue empiric Solu-Medrol as ordered, was started on 7/12, will need to start on prednisone 60 mg daily on Monday  Avoid nephrotoxins and dose all medications per EGFR.    Avoid hypotension.                                            Metabolic acidosis: Bicarb level 19 with normal anion gap.  VBG reviewed, bicarb level is low and pH was 7.3  indicating metabolic acidosis.  Started on sodium bicarbonate tablet 650 mg twice daily    Hyperphosphatemia: Phosphorus level 8.5, level trending up in the setting of acute kidney injury recommend low phosphorus diet  - if level remains elevated may need to add binders, would consider none calcium based binder as calcium level is also elevated with corrected calcium 10.4.  Avoid any calcium supplementation    Anemia, iron deficiency:   -Hemoglobin 9.4 g/dL with iron saturation of 11%.  Follow-up SPEP UPEP light chain ratio  -Continue oral iron supplementation    Nephrotic range proteinuria: UPC ratio was 5.8 g, plan for kidney biopsy follow-up serologies    Primary hypertension: Blood pressure stable continue to hold triamterene HCTZ.  Avoid hypotension.  Continue metoprolol at current dose    Pulmonary nodule, QuantiFERON gold test was negative.  Follow-up pulmonary recommendations  -Follow-up ANCA titer and anti-GBM antibody    Discussed with primary team regarding plan to start on sodium bicarbonate tablet and plan for kidney biopsy on Monday and agreed with the plan    SUBJECTIVE:  No new complaints, no nausea vomiting    OBJECTIVE:  Current Weight:    Vitals:    07/13/24 0744   BP: 133/72   Pulse: 62   Resp: 16   Temp: 97.7 °F (36.5 °C)   SpO2: 94%       Intake/Output Summary (Last 24 hours) at 7/13/2024 1055  Last data filed at 7/13/2024 0744  Gross per 24 hour   Intake 600 ml   Output 100 ml   Net 500 ml       Physical Exam  General:  Ill looking, awake.  Eyes: Conjunctivae pink,  Sclera anicteric  ENT: lips and mucous membranes moist  Neck: supple   Chest: Clear to Auscultation both lungs,  no crackles, ronchus or wheezing.  CVS: S1 & S2 present, normal rate, regular rhythm, no murmur.  Abdomen: soft, non-tender, non-distended, Bowel sounds normoactive  Extremities: no edema of  legs  Skin: no rash  Neuro: awake, alert, oriented  Psych: Mood and affect appropriate      Medications:    Current  Facility-Administered Medications:     acetaminophen (TYLENOL) tablet 650 mg, 650 mg, Oral, Q6H PRN, Tram Palacios PA-C    albuterol (PROVENTIL HFA,VENTOLIN HFA) inhaler 2 puff, 2 puff, Inhalation, Q4H PRN, Tram Palacios PA-C, 2 puff at 07/13/24 0703    atorvastatin (LIPITOR) tablet 20 mg, 20 mg, Oral, Daily, Tram Palacios PA-C, 20 mg at 07/13/24 0840    benzonatate (TESSALON PERLES) capsule 200 mg, 200 mg, Oral, TID PRN, Tram Palacios PA-C    ceftriaxone (ROCEPHIN) 1 g/50 mL in dextrose IVPB, 1,000 mg, Intravenous, Q24H, Bradley Rueda MD, Last Rate: 100 mL/hr at 07/12/24 1244, 1,000 mg at 07/12/24 1244    dextromethorphan-guaiFENesin (ROBITUSSIN DM) oral syrup 10 mL, 10 mL, Oral, Q4H PRN, Tram Palacios PA-C    fluticasone-vilanterol 200-25 mcg/actuation 1 puff, 1 puff, Inhalation, Daily, Tram Palacios PA-C, 1 puff at 07/13/24 0703    heparin (porcine) subcutaneous injection 7,500 Units, 7,500 Units, Subcutaneous, Q8H JACK, Tram Palacios PA-C, 7,500 Units at 07/13/24 0601    hydrALAZINE (APRESOLINE) injection 10 mg, 10 mg, Intravenous, Q6H PRN, Tram Palacios PA-C    iron polysaccharides (FERREX) capsule 150 mg, 150 mg, Oral, Daily, Ata Burns MD, 150 mg at 07/13/24 0840    lamoTRIgine (LaMICtal) tablet 100 mg, 100 mg, Oral, QAM, Tram Palacios PA-C, 100 mg at 07/13/24 0840    loratadine (CLARITIN) tablet 10 mg, 10 mg, Oral, Daily, Tram Palacios PA-C, 10 mg at 07/13/24 0840    methylPREDNISolone sodium succinate (Solu-MEDROL) 250 mg in sodium chloride 0.9 % 250 mL IVPB, 250 mg, Intravenous, Daily, Ata Burns MD, Last Rate: 250 mL/hr at 07/13/24 0840, 250 mg at 07/13/24 0840    metoprolol tartrate (LOPRESSOR) tablet 50 mg, 50 mg, Oral, Q12H JACK, Tram Palacios PA-C, 50 mg at 07/13/24 0600    montelukast (SINGULAIR) tablet 10 mg, 10 mg, Oral, Daily, Tram Palacios PA-C, 10 mg at 07/13/24 0839    oxybutynin (DITROPAN-XL) 24 hr tablet 5 mg, 5 mg, Oral, Daily, Tram Palacios PA-C, 5 mg at 07/13/24 0839    traZODone  "(DESYREL) tablet 200 mg, 200 mg, Oral, HS, Tarm Palacios PA-C, 200 mg at 07/12/24 2116    venlafaxine (EFFEXOR-XR) 24 hr capsule 150 mg, 150 mg, Oral, Daily, Tram Palacios PA-C, 150 mg at 07/13/24 0839    Invasive Devices:        Lab Results:   Results from last 7 days   Lab Units 07/13/24  0620 07/12/24  0621 07/11/24  0459 07/11/24  0025 07/10/24  1742 07/09/24  1400   WBC Thousand/uL  --  10.22* 12.33*  --  14.56* 11.25*   HEMOGLOBIN g/dL  --  9.4* 9.6*  --  10.8* 11.3*   HEMATOCRIT %  --  29.1* 30.1*  --  34.0* 35.6   PLATELETS Thousands/uL  --  229 244 241 294 298   POTASSIUM mmol/L 4.9 4.0 4.1  --  4.0 4.0   CHLORIDE mmol/L 105 106 106  --  106 105   CO2 mmol/L 19* 19* 21  --  20* 23   BUN mg/dL 98* 81* 73*  --  74* 61*   CREATININE mg/dL 8.34* 7.15* 6.02*  --  5.74* 4.58*   CALCIUM mg/dL 9.8 9.4 9.0  --  9.4 9.9   MAGNESIUM mg/dL  --   --  2.2  --   --   --    PHOSPHORUS mg/dL 8.5*  --  5.8*  --   --   --    ALK PHOS U/L  --   --  58  --  72 77   ALT U/L  --   --  9  --  11 10   AST U/L  --   --  11*  --  13 15       Previous work up:         Portions of the record may have been created with voice recognition software. Occasional wrong word or \"sound a like\" substitutions may have occurred due to the inherent limitations of voice recognition software. Read the chart carefully and recognize, using context, where substitutions have occurred.If you have any questions, please contact the dictating provider.    "

## 2024-07-13 NOTE — ASSESSMENT & PLAN NOTE
Patient admitted with acute renal failure with creatinine of 5.74 (baseline .8)  Patient had BUN 73 and GFR of 6  CT A/P: No hydronephrosis, however there is apparent 3 mm stone near or questionably within the distal right ureter (axial image 142). Given the lack of hydronephrosis this could either reflect adjacent calcification or nonobstructing stone.   Creatinine worsening today. Cr 7.15 from 6.02   Patient reports that she is still producing urine  US kidney and bladder: No hydronephrosis. 4 mm nonobstructing left intrarenal calculus  Urinalysis: 3+ blood.  3+ protein.  Innumerable red blood cells.  2-4 WBCs.  Moderate bacteria   AURA, C3, and C4 normal  Ig Kappa Free Light Chain:152.5   Ig Lambda Free Light Chain 87.1   Kappa/Lambda FluidC Ratio 1.75  IR Renal biopsy scheduled for Monday, 7/15/2024    Plan  Nephrology on board  Hold home dyazide  F/u:Hepatitis panel, protein electrophoresis, phospholipase A2 receptor antibodies, ANCA, glomerular basement membrane antibodies, hypersensitivity pneumonitis profile

## 2024-07-13 NOTE — ASSESSMENT & PLAN NOTE
Patient endorses worsening shortness of breath and difficulty breathing  Patient noted to have multiple diffuse scattered pulmonary nodules on CT  Quantiferon gold negative 7/9/24  Patient currently on room air    Plan  Ceftriaxone day 4  Tessalon Perles as needed

## 2024-07-13 NOTE — PROGRESS NOTES
[facility]  Progress Note  Name: Ngozi Beard I  MRN: [id number]  Unit/Bed#: S -01 I Date of Admission: 7/12/2024   Date of Service: 7/13/2024 I Hospital Day: 1    Assessment & Plan   * Acute renal failure (ARF) ([facility])  Assessment & Plan  Patient admitted with acute renal failure with creatinine of 5.74 (baseline .8)  Patient had BUN 73 and GFR of 6  CT A/P: No hydronephrosis, however there is apparent 3 mm stone near or questionably within the distal right ureter (axial image 142). Given the lack of hydronephrosis this could either reflect adjacent calcification or nonobstructing stone.   Creatinine worsening today. Cr 7.15 from 6.02   Patient reports that she is still producing urine  US kidney and bladder: No hydronephrosis. 4 mm nonobstructing left intrarenal calculus  Urinalysis: 3+ blood.  3+ protein.  Innumerable red blood cells.  2-4 WBCs.  Moderate bacteria   AURA, C3, and C4 normal  Ig Kappa Free Light Chain:152.5   Ig Lambda Free Light Chain 87.1   Kappa/Lambda FluidC Ratio 1.75  IR Renal biopsy scheduled for Monday, 7/15/2024    Plan  Nephrology on board  Hold home dyazide  F/u:Hepatitis panel, protein electrophoresis, phospholipase A2 receptor antibodies, ANCA, glomerular basement membrane antibodies, hypersensitivity pneumonitis profile    Sepsis ([facility])  Assessment & Plan    WBC   Date Value Ref Range Status   07/12/2024 10.22 (H) 4.31 - 10.16 Thousand/uL Final     Patient admitted with sepsis likely secondary to pneumonitis as evidenced by tachycardia, tachypnea and leukocytosis  CT: redemonstration of multiple diffuse scattered nodules, less pronounced than on 5/16/2024 suggestive of mycobacterial infection  Lactic acid normal, Procalcitonin: 0.27->0.30, WBC 10.22  Sputum culture +2 gram negative rods, +1 gram positive cocci  BC: staph epidermis detected  Ceftriaxone start 7/10      Plan  Continue IV ceftriaxone (D3)  Trend WBC and fever curve    Persistent  cough  Assessment & Plan  Patient endorses worsening shortness of breath and difficulty breathing  Patient noted to have multiple diffuse scattered pulmonary nodules on CT  Quantiferon gold negative 7/9/24  Patient currently on room air    Plan  Ceftriaxone day 4  Tessalon Perles as needed    Abnormal CT of the chest  Assessment & Plan  CT chest: Re demonstration of multiple diffuse scattered nodules  Etiology unclear: HP vs sarcoid vs  vs MAC vs  atypical  CRP- 143.7   Sed-61, negative: QuantiFERON  pending: HP/ANCA  Procal: 0.27--0.30--0.38    Plan:  Ceftriaxone day 4   Possible bronchoscopy on Monday        Chronic obstructive pulmonary disease, unspecified COPD type (HCC)  Assessment & Plan  Albuterol inhaler PRN  Continue benzonatate  Continue fluticasone-vilanterol  Continue loratadine 10 mg  Robitussin PRN    Abnormality of ascending aorta  Assessment & Plan  CT chest: Unchanged fusiform ectasia of the ascending thoracic aorta measuring up to 40 mm     Plan  Recommendation for follow-up low radiation dose chest CT in one year    Bipolar 1 disorder (HCC)  Assessment & Plan  Plan  Continue on Effexor, trazodone and Lamictal    Primary hypertension  Assessment & Plan  Continue on Lopressor  Hold Dyazide  Hydralazine PRN  Monitor vitals as per protocol    Dyslipidemia  Assessment & Plan  Continue on atorvastatin 20 Mg         VTE Pharmacologic Prophylaxis: VTE Score: 4 Moderate Risk (Score 3-4) - Pharmacological DVT Prophylaxis Ordered: heparin.    Mobility:   Basic Mobility Inpatient Raw Score: 24  JH-HLM Goal: 8: Walk 250 feet or more  JH-HLM Achieved: 8: Walk 250 feet ot more  JH-HLM Goal NOT achieved. Continue with multidisciplinary rounding and encourage appropriate mobility to improve upon JH-HLM goals.    Patient Centered Rounds: I performed bedside rounds with nursing staff today.   Discussions with Specialists or Other Care Team Provider: Nephrology, IR    Education and Discussions with Family /  Patient:  Will update family.       Current Length of Stay: 1 day(s)  Current Patient Status: Inpatient   Discharge Plan: Anticipate discharge in >72 hrs to home.    Code Status: Level 1 - Full Code    Subjective:   Patient seen and examined at bedside.  She reports continue productive cough with green sputum x 6 months and request medication for her allergies. But otherwise without complaints at this time.  Current findings, lab work and management plan were discussed with the patient.  She understands and agrees.    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    Objective:     Vitals:   Temp (24hrs), Av.6 °F (36.4 °C), Min:97.4 °F (36.3 °C), Max:97.7 °F (36.5 °C)    Temp:  [97.4 °F (36.3 °C)-97.7 °F (36.5 °C)] 97.6 °F (36.4 °C)  HR:  [60-68] 66  Resp:  [16-18] 18  BP: (121-133)/(64-75) 132/72  SpO2:  [93 %-97 %] 97 %  There is no height or weight on file to calculate BMI.     Input and Output Summary (last 24 hours):     Intake/Output Summary (Last 24 hours) at 2024 2244  Last data filed at 2024 0744  Gross per 24 hour   Intake 480 ml   Output 100 ml   Net 380 ml       Physical Exam:   Physical Exam  Vitals and nursing note reviewed.   Constitutional:       Appearance: Normal appearance.   HENT:      Head: Normocephalic and atraumatic.      Mouth/Throat:      Mouth: Mucous membranes are moist.   Eyes:      Extraocular Movements: Extraocular movements intact.      Conjunctiva/sclera: Conjunctivae normal.      Pupils: Pupils are equal, round, and reactive to light.   Cardiovascular:      Rate and Rhythm: Normal rate and regular rhythm.   Pulmonary:      Effort: Pulmonary effort is normal. No respiratory distress.      Breath sounds: Normal breath sounds.   Abdominal:      General: Bowel sounds are normal. There is no distension.      Palpations: Abdomen is soft.      Tenderness: There is no abdominal tenderness. There is no guarding.   Musculoskeletal:      Right lower leg: Edema  present.      Left lower leg: Edema present.   Skin:     General: Skin is warm and dry.   Neurological:      General: No focal deficit present.      Mental Status: She is alert. Mental status is at baseline.   Psychiatric:         Mood and Affect: Mood normal.        Additional Data:     Labs:  Results from last 7 days   Lab Units 07/12/24  0621 07/11/24  0459   WBC Thousand/uL 10.22* 12.33*   HEMOGLOBIN g/dL 9.4* 9.6*   HEMATOCRIT % 29.1* 30.1*   PLATELETS Thousands/uL 229 244   SEGS PCT %  --  74   LYMPHO PCT %  --  13*   MONO PCT %  --  10   EOS PCT %  --  2     Results from last 7 days   Lab Units 07/13/24  0620 07/12/24  0621 07/11/24  0459   SODIUM mmol/L 138   < > 140   POTASSIUM mmol/L 4.9   < > 4.1   CHLORIDE mmol/L 105   < > 106   CO2 mmol/L 19*   < > 21   BUN mg/dL 98*   < > 73*   CREATININE mg/dL 8.34*   < > 6.02*   ANION GAP mmol/L 14*   < > 13   CALCIUM mg/dL 9.8   < > 9.0   ALBUMIN g/dL 3.3*  --  3.2*   TOTAL BILIRUBIN mg/dL  --   --  0.40   ALK PHOS U/L  --   --  58   ALT U/L  --   --  9   AST U/L  --   --  11*   GLUCOSE RANDOM mg/dL 168*   < > 97    < > = values in this interval not displayed.     Results from last 7 days   Lab Units 07/13/24  0620   INR  0.98         Results from last 7 days   Lab Units 07/09/24  1400   HEMOGLOBIN A1C % 6.6*     Results from last 7 days   Lab Units 07/13/24  0620 07/12/24  0621 07/11/24  0459 07/11/24  0025 07/10/24  2022   LACTIC ACID mmol/L  --   --   --   --  0.9   PROCALCITONIN ng/ml 0.40* 0.38* 0.30* 0.27*  --        Lines/Drains:  Invasive Devices       Peripheral Intravenous Line  Duration             Peripheral IV 07/12/24 Left Antecubital 1 day                    Imaging: Reviewed radiology reports from this admission including: chest xray, chest CT scan, abdominal/pelvic CT, and ultrasound kidney and bladder    Recent Cultures (last 7 days):   Results from last 7 days   Lab Units 07/11/24  0011 07/10/24  1914   BLOOD CULTURE   --  Staphylococcus  epidermidis*  No Growth at 48 hrs.   SPUTUM CULTURE  4+ Growth of  --    GRAM STAIN RESULT  1+ Polys*  2+ Gram negative rods*  1+ Gram positive cocci in clusters*  1+ Epithelial Cells* Gram positive cocci in clusters*       Last 24 Hours Medication List:   Current Facility-Administered Medications   Medication Dose Route Frequency Provider Last Rate    acetaminophen  650 mg Oral Q6H PRN Tram Palacios PA-C      albuterol  2 puff Inhalation Q4H PRN Tram Palacios PA-C      atorvastatin  20 mg Oral Daily Tram Palacios PA-C      benzonatate  200 mg Oral TID PRN Tram Palacios PA-C      cefTRIAXone  1,000 mg Intravenous Q24H Yasmine Meadows MD 1,000 mg (07/13/24 1534)    dextromethorphan-guaiFENesin  10 mL Oral Q4H PRN Tram Palacios PA-C      fluticasone-vilanterol  1 puff Inhalation Daily Tram Palacios PA-C      heparin (porcine)  7,500 Units Subcutaneous Q8H [facility] Tram Palacios PA-C      hydrALAZINE  10 mg Intravenous Q6H PRN Tram Palacios PA-C      iron polysaccharides  150 mg Oral Daily Ata Burns MD      lamoTRIgine  100 mg Oral QA Tram Palacios PA-C      loratadine  10 mg Oral Daily Tram Palacios PA-C      methylPREDNISolone sodium succinate  250 mg Intravenous Daily Ata Burns  mg (07/13/24 0840)    metoprolol tartrate  50 mg Oral Q12H [facility] Tram Palacios PA-C      montelukast  10 mg Oral Daily Tram Palacios PA-C      oxybutynin  5 mg Oral Daily Tram Palacios PA-C      sodium bicarbonate  650 mg Oral BID after meals Horacio Kim MD      traZODone  200 mg Oral HS Tram Palacios PA-C      venlafaxine  150 mg Oral Daily Tram Palacios PA-C          Today, Patient Was Seen By: Jessica Beckford MD    **Please Note: This note may have been constructed using a voice recognition system.**

## 2024-07-13 NOTE — PLAN OF CARE
Problem: METABOLIC, FLUID AND ELECTROLYTES - ADULT  Goal: Electrolytes maintained within normal limits  Description: INTERVENTIONS:  - Monitor labs and assess patient for signs and symptoms of electrolyte imbalances  - Administer electrolyte replacement as ordered  - Monitor response to electrolyte replacements, including repeat lab results as appropriate  - Instruct patient on fluid and nutrition as appropriate  Outcome: Progressing  Goal: Fluid balance maintained  Description: INTERVENTIONS:  - Monitor labs   - Monitor I/O and WT  - Instruct patient on fluid and nutrition as appropriate  - Assess for signs & symptoms of volume excess or deficit  Outcome: Progressing     Problem: SAFETY ADULT  Goal: Maintain or return to baseline ADL function  Description: INTERVENTIONS:  -  Assess patient's ability to carry out ADLs; assess patient's baseline for ADL function and identify physical deficits which impact ability to perform ADLs (bathing, care of mouth/teeth, toileting, grooming, dressing, etc.)  - Assess/evaluate cause of self-care deficits   - Assess range of motion  - Assess patient's mobility; develop plan if impaired  - Assess patient's need for assistive devices and provide as appropriate  - Encourage maximum independence but intervene and supervise when necessary  - Involve family in performance of ADLs  - Assess for home care needs following discharge   - Consider OT consult to assist with ADL evaluation and planning for discharge  - Provide patient education as appropriate  Outcome: Progressing     Problem: Knowledge Deficit  Goal: Patient/family/caregiver demonstrates understanding of disease process, treatment plan, medications, and discharge instructions  Description: Complete learning assessment and assess knowledge base.  Interventions:  - Provide teaching at level of understanding  - Provide teaching via preferred learning methods  Outcome: Progressing

## 2024-07-13 NOTE — PLAN OF CARE
Problem: SAFETY ADULT  Goal: Maintain or return to baseline ADL function  Description: INTERVENTIONS:  -  Assess patient's ability to carry out ADLs; assess patient's baseline for ADL function and identify physical deficits which impact ability to perform ADLs (bathing, care of mouth/teeth, toileting, grooming, dressing, etc.)  - Assess/evaluate cause of self-care deficits   - Assess range of motion  - Assess patient's mobility; develop plan if impaired  - Assess patient's need for assistive devices and provide as appropriate  - Encourage maximum independence but intervene and supervise when necessary  - Involve family in performance of ADLs  - Assess for home care needs following discharge   - Consider OT consult to assist with ADL evaluation and planning for discharge  - Provide patient education as appropriate  Outcome: Progressing     Problem: INFECTION - ADULT  Goal: Absence of fever/infection during neutropenic period  Description: INTERVENTIONS:  - Monitor WBC    Outcome: Progressing     Problem: METABOLIC, FLUID AND ELECTROLYTES - ADULT  Goal: Electrolytes maintained within normal limits  Description: INTERVENTIONS:  - Monitor labs and assess patient for signs and symptoms of electrolyte imbalances  - Administer electrolyte replacement as ordered  - Monitor response to electrolyte replacements, including repeat lab results as appropriate  - Instruct patient on fluid and nutrition as appropriate  Outcome: Progressing     Problem: Knowledge Deficit  Goal: Patient/family/caregiver demonstrates understanding of disease process, treatment plan, medications, and discharge instructions  Description: Complete learning assessment and assess knowledge base.  Interventions:  - Provide teaching at level of understanding  - Provide teaching via preferred learning methods  Outcome: Progressing

## 2024-07-13 NOTE — ASSESSMENT & PLAN NOTE
WBC   Date Value Ref Range Status   07/12/2024 10.22 (H) 4.31 - 10.16 Thousand/uL Final     Patient admitted with sepsis likely secondary to pneumonitis as evidenced by tachycardia, tachypnea and leukocytosis  CT: redemonstration of multiple diffuse scattered nodules, less pronounced than on 5/16/2024 suggestive of mycobacterial infection  Lactic acid normal, Procalcitonin: 0.27->0.30, WBC 10.22  Sputum culture +2 gram negative rods, +1 gram positive cocci  BC: staph epidermis detected  Ceftriaxone start 7/10      Plan  Continue IV ceftriaxone (D3)  Trend WBC and fever curve

## 2024-07-13 NOTE — ASSESSMENT & PLAN NOTE
CT chest: Unchanged fusiform ectasia of the ascending thoracic aorta measuring up to 40 mm     Plan  Recommendation for follow-up low radiation dose chest CT in one year

## 2024-07-14 PROBLEM — A41.9 SEPSIS (HCC): Status: RESOLVED | Noted: 2024-07-10 | Resolved: 2024-07-14

## 2024-07-14 LAB
ANION GAP SERPL CALCULATED.3IONS-SCNC: 14 MMOL/L (ref 4–13)
ASPERGILLUS FUMAGATUS IGG: NEGATIVE
AUREOBASIDIUM PULLULANS IGG: NEGATIVE
BACTERIA BLD CULT: NORMAL
BUN SERPL-MCNC: 117 MG/DL (ref 5–25)
CALCIUM SERPL-MCNC: 9.4 MG/DL (ref 8.4–10.2)
CHLORIDE SERPL-SCNC: 104 MMOL/L (ref 96–108)
CO2 SERPL-SCNC: 19 MMOL/L (ref 21–32)
CREAT SERPL-MCNC: 9.89 MG/DL (ref 0.6–1.3)
ERYTHROCYTE [DISTWIDTH] IN BLOOD BY AUTOMATED COUNT: 14.9 % (ref 11.6–15.1)
GFR SERPL CREATININE-BSD FRML MDRD: 3 ML/MIN/1.73SQ M
GLUCOSE SERPL-MCNC: 168 MG/DL (ref 65–140)
HBV CORE AB SER QL: NORMAL
HBV CORE IGM SER QL: NORMAL
HBV SURFACE AB SER-ACNC: <3 MIU/ML
HBV SURFACE AG SER QL: NORMAL
HCT VFR BLD AUTO: 29.3 % (ref 34.8–46.1)
HCV AB SER QL: NORMAL
HGB BLD-MCNC: 9.1 G/DL (ref 11.5–15.4)
LACEYELLA SACCHARI AB SER QL: NEGATIVE
MCH RBC QN AUTO: 27.1 PG (ref 26.8–34.3)
MCHC RBC AUTO-ENTMCNC: 31.1 G/DL (ref 31.4–37.4)
MCV RBC AUTO: 87 FL (ref 82–98)
PIGEON SERUM IGG: NEGATIVE
PLATELET # BLD AUTO: 280 THOUSANDS/UL (ref 149–390)
PMV BLD AUTO: 10.8 FL (ref 8.9–12.7)
POTASSIUM SERPL-SCNC: 5.1 MMOL/L (ref 3.5–5.3)
RBC # BLD AUTO: 3.36 MILLION/UL (ref 3.81–5.12)
S RECTIVIRGULA AB SER QL ID: NEGATIVE
SODIUM SERPL-SCNC: 137 MMOL/L (ref 135–147)
T VULGARIS AB SER QL ID: NEGATIVE
WBC # BLD AUTO: 14.12 THOUSAND/UL (ref 4.31–10.16)

## 2024-07-14 PROCEDURE — 99232 SBSQ HOSP IP/OBS MODERATE 35: CPT | Performed by: INTERNAL MEDICINE

## 2024-07-14 PROCEDURE — 80048 BASIC METABOLIC PNL TOTAL CA: CPT | Performed by: INTERNAL MEDICINE

## 2024-07-14 PROCEDURE — 99233 SBSQ HOSP IP/OBS HIGH 50: CPT | Performed by: INTERNAL MEDICINE

## 2024-07-14 PROCEDURE — 85027 COMPLETE CBC AUTOMATED: CPT

## 2024-07-14 RX ORDER — PANTOPRAZOLE SODIUM 40 MG/1
40 TABLET, DELAYED RELEASE ORAL
Status: DISCONTINUED | OUTPATIENT
Start: 2024-07-15 | End: 2024-07-14

## 2024-07-14 RX ORDER — SODIUM BICARBONATE 650 MG/1
1300 TABLET ORAL
Status: DISCONTINUED | OUTPATIENT
Start: 2024-07-14 | End: 2024-07-15

## 2024-07-14 RX ORDER — PANTOPRAZOLE SODIUM 40 MG/1
40 TABLET, DELAYED RELEASE ORAL
Status: DISCONTINUED | OUTPATIENT
Start: 2024-07-14 | End: 2024-08-04 | Stop reason: HOSPADM

## 2024-07-14 RX ADMIN — ATORVASTATIN CALCIUM 20 MG: 20 TABLET, FILM COATED ORAL at 08:23

## 2024-07-14 RX ADMIN — HEPARIN SODIUM 7500 UNITS: 5000 INJECTION INTRAVENOUS; SUBCUTANEOUS at 05:15

## 2024-07-14 RX ADMIN — SODIUM CHLORIDE 250 MG: 0.9 INJECTION, SOLUTION INTRAVENOUS at 08:23

## 2024-07-14 RX ADMIN — LAMOTRIGINE 100 MG: 100 TABLET ORAL at 08:24

## 2024-07-14 RX ADMIN — TRAZODONE HYDROCHLORIDE 200 MG: 100 TABLET ORAL at 21:23

## 2024-07-14 RX ADMIN — HEPARIN SODIUM 7500 UNITS: 5000 INJECTION INTRAVENOUS; SUBCUTANEOUS at 21:23

## 2024-07-14 RX ADMIN — METOPROLOL TARTRATE 50 MG: 50 TABLET, FILM COATED ORAL at 16:31

## 2024-07-14 RX ADMIN — OXYBUTYNIN CHLORIDE 5 MG: 5 TABLET, EXTENDED RELEASE ORAL at 08:23

## 2024-07-14 RX ADMIN — ACETAMINOPHEN 650 MG: 325 TABLET, FILM COATED ORAL at 08:29

## 2024-07-14 RX ADMIN — CEFTRIAXONE SODIUM 1000 MG: 10 INJECTION, POWDER, FOR SOLUTION INTRAVENOUS at 11:57

## 2024-07-14 RX ADMIN — MONTELUKAST 10 MG: 10 TABLET, FILM COATED ORAL at 08:23

## 2024-07-14 RX ADMIN — FLUTICASONE FUROATE AND VILANTEROL TRIFENATATE 1 PUFF: 200; 25 POWDER RESPIRATORY (INHALATION) at 08:24

## 2024-07-14 RX ADMIN — SODIUM BICARBONATE 650 MG: 650 TABLET ORAL at 08:24

## 2024-07-14 RX ADMIN — LORATADINE 10 MG: 10 TABLET ORAL at 08:24

## 2024-07-14 RX ADMIN — ALBUTEROL SULFATE 2 PUFF: 108 AEROSOL, METERED RESPIRATORY (INHALATION) at 08:24

## 2024-07-14 RX ADMIN — SODIUM BICARBONATE 1300 MG: 650 TABLET ORAL at 16:30

## 2024-07-14 RX ADMIN — PANTOPRAZOLE SODIUM 40 MG: 40 TABLET, DELAYED RELEASE ORAL at 16:31

## 2024-07-14 RX ADMIN — POLYSACCHARIDE-IRON COMPLEX 150 MG: 150 CAPSULE ORAL at 08:24

## 2024-07-14 RX ADMIN — HEPARIN SODIUM 7500 UNITS: 5000 INJECTION INTRAVENOUS; SUBCUTANEOUS at 14:05

## 2024-07-14 RX ADMIN — VENLAFAXINE HYDROCHLORIDE 150 MG: 150 CAPSULE, EXTENDED RELEASE ORAL at 08:23

## 2024-07-14 NOTE — PROGRESS NOTES
[facility]  Progress Note  Name: Ngozi Beard I  MRN: [id number]  Unit/Bed#: S -01 I Date of Admission: 7/12/2024   Date of Service: 7/14/2024 I Hospital Day: 2    Assessment & Plan   * Acute renal failure (ARF) (HCC)  Assessment & Plan  Patient admitted with acute renal failure with creatinine of 5.74 (baseline .8)  Patient had BUN 73 and GFR of 6  CT A/P: No hydronephrosis, however there is apparent 3 mm stone near or questionably within the distal right ureter (axial image 142). Given the lack of hydronephrosis this could either reflect adjacent calcification or nonobstructing stone.   Creatinine worsening today. Cr 7.15 from 6.02   Patient reports that she is still producing urine  US kidney and bladder: No hydronephrosis. 4 mm nonobstructing left intrarenal calculus  Urinalysis: 3+ blood.  3+ protein.  Innumerable red blood cells.  2-4 WBCs.  Moderate bacteria   AURA, C3, and C4 normal  Ig Kappa Free Light Chain:152.5   Ig Lambda Free Light Chain 87.1   Kappa/Lambda FluidC Ratio 1.75  IR Renal biopsy scheduled for Tue- 7/16/2024  Bronchoscopy on Monday 7/15.    Plan  Nephrology on board  Hold home dyazide  F/u:Hepatitis panel, protein electrophoresis, phospholipase A2 receptor antibodies, ANCA, glomerular basement membrane antibodies, hypersensitivity pneumonitis profile  NPO after midnight and Hold Heparin SQ on 7/15 and 7/16.    Abnormal CT of the chest  Assessment & Plan  CT chest: Re demonstration of multiple diffuse scattered nodules  Etiology unclear: HP vs sarcoid vs  vs MAC vs  atypical  CRP- 143.7   Sed-61, negative: QuantiFERON  pending: HP/ANCA  Procal: 0.27--0.30--0.38    Plan:  Bronchoscopy on Monday  NPO order in.  Hold Heparin SQ tomorrow AM    Persistent cough  Assessment & Plan  Patient endorses worsening shortness of breath and difficulty breathing  Patient noted to have multiple diffuse scattered pulmonary nodules on CT  Quantiferon gold negative  24  Patient currently on room air    Plan  Completed Ceftriaxone x 5 days.  Tessalon Perles as needed    Abnormality of ascending aorta  Assessment & Plan  CT chest: Unchanged fusiform ectasia of the ascending thoracic aorta measuring up to 40 mm     Plan  Recommendation for follow-up low radiation dose chest CT in one year    Chronic obstructive pulmonary disease, unspecified COPD type (HCC)  Assessment & Plan  Albuterol inhaler PRN  Continue benzonatate  Continue fluticasone-vilanterol  Continue loratadine 10 mg  Robitussin PRN    Dyslipidemia  Assessment & Plan  Continue on atorvastatin 20 Mg    Primary hypertension  Assessment & Plan  Continue on Lopressor  Hold Dyazide  Hydralazine PRN  Monitor vitals as per protocol    Bipolar 1 disorder (HCC)  Assessment & Plan  Plan  Continue on Effexor, trazodone and Lamictal           VTE Pharmacologic Prophylaxis: VTE Score: 4 Moderate Risk (Score 3-4) - Pharmacological DVT Prophylaxis Ordered: heparin.    Mobility:   Basic Mobility Inpatient Raw Score: 24  JH-HLM Goal: 8: Walk 250 feet or more  JH-HLM Achieved: 8: Walk 250 feet ot more  JH-HLM Goal achieved. Continue to encourage appropriate mobility.    Patient Centered Rounds: I performed bedside rounds with nursing staff today.  Discussions with Specialists or Other Care Team Provider: Nephro    Education and Discussions with Family / Patient: Patient declined call to .     Current Length of Stay: 2 day(s)  Current Patient Status: Inpatient   Discharge Plan: Anticipate discharge in 24-48 hrs to home.    Code Status: Level 1 - Full Code    Subjective:   Patient was seen and examined at bedside this morning.  OOA x 4, NAD.  Denied any acute complaints to me.    Objective:     Vitals:   Temp (24hrs), Av.5 °F (36.4 °C), Min:97.3 °F (36.3 °C), Max:97.6 °F (36.4 °C)    Temp:  [97.3 °F (36.3 °C)-97.6 °F (36.4 °C)] 97.6 °F (36.4 °C)  HR:  [59-68] 60  Resp:  [15-18] 18  BP: (109-138)/(58-81) 138/81  SpO2:   [92 %-97 %] 96 %  There is no height or weight on file to calculate BMI.     Input and Output Summary (last 24 hours):     Intake/Output Summary (Last 24 hours) at 7/14/2024 1659  Last data filed at 7/14/2024 0829  Gross per 24 hour   Intake 480 ml   Output 200 ml   Net 280 ml       Physical Exam:   Physical Exam  Vitals and nursing note reviewed.   Constitutional:       Appearance: Normal appearance.   HENT:      Head: Normocephalic and atraumatic.      Mouth/Throat:      Mouth: Mucous membranes are moist.   Eyes:      Extraocular Movements: Extraocular movements intact.      Conjunctiva/sclera: Conjunctivae normal.      Pupils: Pupils are equal, round, and reactive to light.   Cardiovascular:      Rate and Rhythm: Normal rate and regular rhythm.   Pulmonary:      Effort: Pulmonary effort is normal. No respiratory distress.      Breath sounds: Normal breath sounds. No wheezing or rhonchi.   Abdominal:      General: Bowel sounds are normal. There is no distension.      Palpations: Abdomen is soft.      Tenderness: There is no abdominal tenderness. There is no guarding.   Musculoskeletal:         General: No tenderness.      Right lower leg: No edema.      Left lower leg: No edema.   Skin:     General: Skin is warm and dry.   Neurological:      General: No focal deficit present.      Mental Status: She is alert and oriented to person, place, and time. Mental status is at baseline.   Psychiatric:         Mood and Affect: Mood normal.          Additional Data:     Labs:  Results from last 7 days   Lab Units 07/14/24 0441 07/12/24  0621 07/11/24  0459   WBC Thousand/uL 14.12*   < > 12.33*   HEMOGLOBIN g/dL 9.1*   < > 9.6*   HEMATOCRIT % 29.3*   < > 30.1*   PLATELETS Thousands/uL 280   < > 244   SEGS PCT %  --   --  74   LYMPHO PCT %  --   --  13*   MONO PCT %  --   --  10   EOS PCT %  --   --  2    < > = values in this interval not displayed.     Results from last 7 days   Lab Units 07/14/24 0441 07/13/24  0620  07/12/24  0621 07/11/24  0459   SODIUM mmol/L 137 138   < > 140   POTASSIUM mmol/L 5.1 4.9   < > 4.1   CHLORIDE mmol/L 104 105   < > 106   CO2 mmol/L 19* 19*   < > 21   BUN mg/dL 117* 98*   < > 73*   CREATININE mg/dL 9.89* 8.34*   < > 6.02*   ANION GAP mmol/L 14* 14*   < > 13   CALCIUM mg/dL 9.4 9.8   < > 9.0   ALBUMIN g/dL  --  3.3*  --  3.2*   TOTAL BILIRUBIN mg/dL  --   --   --  0.40   ALK PHOS U/L  --   --   --  58   ALT U/L  --   --   --  9   AST U/L  --   --   --  11*   GLUCOSE RANDOM mg/dL 168* 168*   < > 97    < > = values in this interval not displayed.     Results from last 7 days   Lab Units 07/13/24  0620   INR  0.98         Results from last 7 days   Lab Units 07/09/24  1400   HEMOGLOBIN A1C % 6.6*     Results from last 7 days   Lab Units 07/13/24  0620 07/12/24  0621 07/11/24  0459 07/11/24  0025 07/10/24  2022   LACTIC ACID mmol/L  --   --   --   --  0.9   PROCALCITONIN ng/ml 0.40* 0.38* 0.30* 0.27*  --        Lines/Drains:  Invasive Devices       Peripheral Intravenous Line  Duration             Peripheral IV 07/12/24 Left Antecubital 2 days                          Imaging: Reviewed radiology reports from this admission including:    IR biopsy kidney random    (Results Pending)       Recent Cultures (last 7 days):   Results from last 7 days   Lab Units 07/11/24  0011 07/10/24  1914   BLOOD CULTURE   --  No Growth at 72 hrs.  Staphylococcus epidermidis*   SPUTUM CULTURE  4+ Growth of  --    GRAM STAIN RESULT  1+ Polys*  2+ Gram negative rods*  1+ Gram positive cocci in clusters*  1+ Epithelial Cells* Gram positive cocci in clusters*       Last 24 Hours Medication List:   Current Facility-Administered Medications   Medication Dose Route Frequency Provider Last Rate    acetaminophen  650 mg Oral Q6H PRN Tram Palacios PA-C      albuterol  2 puff Inhalation Q4H PRN Tram Palacios PA-C      atorvastatin  20 mg Oral Daily Tram Palacios PA-C      benzonatate  200 mg Oral TID PRN Tram Palacios PA-C       dextromethorphan-guaiFENesin  10 mL Oral Q4H PRN Tram Palacios PA-C      fluticasone-vilanterol  1 puff Inhalation Daily Tram Palacios PA-C      heparin (porcine)  7,500 Units Subcutaneous Q8H [facility] Ronny Webster MD      hydrALAZINE  10 mg Intravenous Q6H PRN Tram Palacios PA-C      iron polysaccharides  150 mg Oral Daily Ata Burns MD      lamoTRIgine  100 mg Oral QAM Tram Palacios PA-C      loratadine  10 mg Oral Daily Tram Palacios PA-C      metoprolol tartrate  50 mg Oral Q12H [facility] Tram Palacios PA-C      montelukast  10 mg Oral Daily Tram Palacios PA-C      oxybutynin  5 mg Oral Daily Tarm Palacios PA-C      pantoprazole  40 mg Oral Daily Before Breakfast Ronny Webster MD      sodium bicarbonate  1,300 mg Oral BID after meals Horacio Kim MD      traZODone  200 mg Oral HS Tram Palacios PA-C      venlafaxine  150 mg Oral Daily Tram Palacios PA-C          Today, Patient Was Seen By: Ronny Webster MD    **Please Note: This note may have been constructed using a voice recognition system.**

## 2024-07-14 NOTE — ASSESSMENT & PLAN NOTE
WBC   Date Value Ref Range Status   07/14/2024 14.12 (H) 4.31 - 10.16 Thousand/uL Final     Patient admitted with sepsis likely secondary to pneumonitis as evidenced by tachycardia, tachypnea and leukocytosis  CT: redemonstration of multiple diffuse scattered nodules, less pronounced than on 5/16/2024 suggestive of mycobacterial infection  Lactic acid normal, Procalcitonin: 0.27->0.30, WBC 10.22  Sputum culture +2 gram negative rods, +1 gram positive cocci  BC: 1/2 staph epidermis detected-contaminated  Completed Ceftriaxone x 5 days  Trend WBC and fever curve

## 2024-07-14 NOTE — ASSESSMENT & PLAN NOTE
CT chest: Re demonstration of multiple diffuse scattered nodules  Etiology unclear: HP vs sarcoid vs  vs MAC vs  atypical  CRP- 143.7   Sed-61, negative: QuantiFERON  pending: HP/ANCA  Procal: 0.27--0.30--0.38    Plan:  Possible bronchoscopy on Monday

## 2024-07-14 NOTE — QUICK NOTE
Patient will undergo bronchoscopy tomorrow; this could not be piggybacked on the upcoming renal biopsy.    NPO p MN order placed, hold DVT ppx in the AM.  Communicated with primary team.

## 2024-07-14 NOTE — ASSESSMENT & PLAN NOTE
Patient has PFTs ordered but they have not been completed.  Home medication regime Advair -21 mcg 2 puff twice daily, Proventil 2 puffs every 6 hours as needed    Plan:    Albuterol inhaler PRN  Continue benzonatate  Continue fluticasone-vilanterol  Continue loratadine 10 mg  Robitussin PRN

## 2024-07-14 NOTE — PROGRESS NOTES
NEPHROLOGY PROGRESS NOTE   Ngozi Beard 64 y.o. female MRN: [id number]  Unit/Bed#: S -01 Encounter: [id number]    ASSESSMENT & PLAN:   64-year-old female with a history of asthma, and hypertension who was told to come to the hospital by her pulmonologist for acute kidney injury.  Nephrology consult for abnormal urine analysis and ANGELICA.   Patient transferred from [facility] to [facility]    Acute kidney injury, POA  -Baseline creatinine: 0.8 mg/dl, was at this range in May 2024  -Admission creatinine: 4.58 mg/dl  - Work up:   UA with microscopy: 3+ blood, 3+ protein numerous RBCs, 3-4 WBC  Imaging: Right kidney 13.2 left kidney 12.5 cm in size, no hydronephrosis on kidney ultrasound  Serologies: C3-C4 normal, AURA negative, anti-double-stranded DNA negative, hepatitis C antibody negative, QuantiFERON gold test is negative.  Hemolysis smear was negative for schistocytes.  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  -Hospital Course: Renal function worsening during the hospital stay but no uremic symptoms  -Plan:   Renal function further worsened to creatinine 9.89 mg/dL today, poor urine output but still no uremic symptoms, no indication for renal replacement therapy today.  Continue to monitor renal function.  Follow-up pending serologies  [facility] kidney [facility] coming Monday [facility]   Continue to hold diuretics  Continue empiric Solu-Medrol as ordered, was started on 7/12, will need to start on prednisone 60 mg daily on Monday or if ANCA panel comes back positive, may need to give more IV Solu-Medrol  Avoid nephrotoxins and dose all medications per EGFR.    Avoid hypotension.                                             Metabolic acidosis: Bicarb level 19 with normal anion gap.  VBG reviewed, bicarb level is low and pH was 7.3 indicating metabolic acidosis.  Started on sodium bicarbonate tablet 650 mg twice daily on 7/13, increased dose to 1300 mg 3 times daily    Hyperphosphatemia: Phosphorus level 8.5, level trending up in the setting of acute kidney injury recommend low phosphorus diet, orders placed for low phosphorus diet  - if level remains elevated may need to add binders, would consider none calcium based binder as calcium level is also elevated on correction for albumin    Anemia, iron deficiency:   -Hemoglobin 9.1 g/dL with iron saturation of 11%.  Follow-up SPEP UPEP light chain ratio  -Continue oral iron supplementation    Nephrotic range proteinuria: UPC ratio was 5.8 g, plan for kidney biopsy follow-up serologies    Primary hypertension: Blood pressure  stable and is at goal.  continue to hold triamterene HCTZ.  Avoid hypotension.  Continue metoprolol at current dose    Pulmonary nodule, QuantiFERON gold test was negative.  Follow-up pulmonary recommendations  -Follow-up ANCA titer and anti-GBM antibody    Discussed with primary team regarding plan for kidney biopsy on Monday and to continue to monitor renal function and agreed with the plan    SUBJECTIVE:  No new complaints.  No chest pain or shortness of breath, no nausea vomiting    OBJECTIVE:  Current Weight:    Vitals:    07/14/24 0810   BP: 134/78   Pulse: 66   Resp: 15   Temp: 97.6 °F (36.4 °C)   SpO2: 94%       Intake/Output Summary (Last 24 hours) at 7/14/2024 0950  Last data filed at 7/14/2024 0812  Gross per 24 hour   Intake --   Output 200 ml   Net -200 ml       Physical Exam  General:  Ill looking, awake.  Eyes: Conjunctivae pink,  Sclera anicteric  ENT: lips and mucous membranes moist  Neck: supple   Chest: Clear to Auscultation both lungs,  no crackles, ronchus or wheezing.  CVS: S1 & S2 present, normal rate, regular rhythm, no murmur.  Abdomen:  soft, non-tender, non-distended, Bowel sounds normoactive  Extremities: no edema of  legs  Skin: no rash  Neuro: awake, alert, oriented x 3   Psych: Mood and affect appropriate    Medications:    Current Facility-Administered Medications:     acetaminophen (TYLENOL) tablet 650 mg, 650 mg, Oral, Q6H PRN, Tram Palacios PA-C, 650 mg at 07/14/24 0829    albuterol (PROVENTIL HFA,VENTOLIN HFA) inhaler 2 puff, 2 puff, Inhalation, Q4H PRN, Tram Palacios PA-C, 2 puff at 07/14/24 0824    atorvastatin (LIPITOR) tablet 20 mg, 20 mg, Oral, Daily, Tram Palacios PA-C, 20 mg at 07/14/24 0823    benzonatate (TESSALON PERLES) capsule 200 mg, 200 mg, Oral, TID PRN, Tram Palacios PA-C    ceftriaxone (ROCEPHIN) 1 g/50 mL in dextrose IVPB, 1,000 mg, Intravenous, Q24H, Yasmine Meadows MD, Last Rate: 100 mL/hr at 07/13/24 1534, 1,000 mg at 07/13/24 1534    dextromethorphan-guaiFENesin (ROBITUSSIN DM) oral syrup 10 mL, 10 mL, Oral, Q4H PRN, Tram Palacios PA-C    fluticasone-vilanterol 200-25 mcg/actuation 1 puff, 1 puff, Inhalation, Daily, Tram Palacios PA-C, 1 puff at 07/14/24 0824    heparin (porcine) subcutaneous injection 7,500 Units, 7,500 Units, Subcutaneous, Q8H JACK, Tram Palacios PA-C, 7,500 Units at 07/14/24 0515    hydrALAZINE (APRESOLINE) injection 10 mg, 10 mg, Intravenous, Q6H PRN, Tram Palacios PA-C    iron polysaccharides (FERREX) capsule 150 mg, 150 mg, Oral, Daily, Ata Burns MD, 150 mg at 07/14/24 0824    lamoTRIgine (LaMICtal) tablet 100 mg, 100 mg, Oral, QAM, Tram Palacios PA-C, 100 mg at 07/14/24 0824    loratadine (CLARITIN) tablet 10 mg, 10 mg, Oral, Daily, Tram Palacios PA-C, 10 mg at 07/14/24 0824    metoprolol tartrate (LOPRESSOR) tablet 50 mg, 50 mg, Oral, Q12H JACK, Tram Palacios PA-C, 50 mg at 07/13/24 1735    montelukast (SINGULAIR) tablet 10 mg, 10 mg, Oral, Daily, Tram Palacios PA-C, 10 mg at 07/14/24 0823    oxybutynin (DITROPAN-XL) 24 hr tablet 5 mg, 5 mg, Oral, Daily, Tram Palacios PA-C, 5 mg at 07/14/24  "0823    sodium bicarbonate tablet 650 mg, 650 mg, Oral, BID after meals, Horacio Kim MD, 650 mg at 07/14/24 0824    traZODone (DESYREL) tablet 200 mg, 200 mg, Oral, HS, Tram Palacios PA-C, 200 mg at 07/13/24 2108    venlafaxine (EFFEXOR-XR) 24 hr capsule 150 mg, 150 mg, Oral, Daily, Tram Palacios PA-C, 150 mg at 07/14/24 0823    Invasive Devices:        Lab Results:   Results from last 7 days   Lab Units 07/14/24  0441 07/13/24  0620 07/12/24  0621 07/11/24  0459 07/11/24  0025 07/10/24  1742 07/09/24  1400   WBC Thousand/uL 14.12*  --  10.22* 12.33*  --  14.56* 11.25*   HEMOGLOBIN g/dL 9.1*  --  9.4* 9.6*  --  10.8* 11.3*   HEMATOCRIT % 29.3*  --  29.1* 30.1*  --  34.0* 35.6   PLATELETS Thousands/uL 280  --  229 244   < > 294 298   POTASSIUM mmol/L 5.1 4.9 4.0 4.1  --  4.0 4.0   CHLORIDE mmol/L 104 105 106 106  --  106 105   CO2 mmol/L 19* 19* 19* 21  --  20* 23   BUN mg/dL 117* 98* 81* 73*  --  74* 61*   CREATININE mg/dL 9.89* 8.34* 7.15* 6.02*  --  5.74* 4.58*   CALCIUM mg/dL 9.4 9.8 9.4 9.0  --  9.4 9.9   MAGNESIUM mg/dL  --   --   --  2.2  --   --   --    PHOSPHORUS mg/dL  --  8.5*  --  5.8*  --   --   --    ALK PHOS U/L  --   --   --  58  --  72 77   ALT U/L  --   --   --  9  --  11 10   AST U/L  --   --   --  11*  --  13 15    < > = values in this interval not displayed.       Previous work up:         Portions of the record may have been created with voice recognition software. Occasional wrong word or \"sound a like\" substitutions may have occurred due to the inherent limitations of voice recognition software. Read the chart carefully and recognize, using context, where substitutions have occurred.If you have any questions, please contact the dictating provider.    "

## 2024-07-14 NOTE — ASSESSMENT & PLAN NOTE
CT chest: Re demonstration of multiple diffuse scattered nodules  Etiology unclear: HP vs sarcoid vs  vs MAC vs  atypical  CRP- 143.7   Sed-61, negative: QuantiFERON  pending: HP/ANCA  Procal: 0.27--0.30--0.38--0.40  7/17 Renal biopsy and bronchoscopy performed. Results pending.     Plan:  Follow-up renal biopsy and bronchoscopy results   Hafsa Morgan has lab appointment on 10/07/22  no orders placed, please place orders.

## 2024-07-14 NOTE — ASSESSMENT & PLAN NOTE
Patient admitted with acute renal failure with creatinine of 5.74 (baseline .8)  Patient had BUN 73 and GFR of 6  CT A/P: No hydronephrosis, however there is apparent 3 mm stone near or questionably within the distal right ureter (axial image 142). Given the lack of hydronephrosis this could either reflect adjacent calcification or nonobstructing stone.   Creatinine worsening today. Cr 7.15 from 6.02   Patient reports that she is still producing urine  US kidney and bladder: No hydronephrosis. 4 mm nonobstructing left intrarenal calculus  Urinalysis: 3+ blood.  3+ protein.  Innumerable red blood cells.  2-4 WBCs.  Moderate bacteria   AURA, C3, and C4 normal  Ig Kappa Free Light Chain:152.5   Ig Lambda Free Light Chain 87.1   Kappa/Lambda FluidC Ratio 1.75  Hepatitis panel nonreactive  HIV nonreactive  QuantiFERON gold negative  protein electrophoresis see labs  hypersensitivity pneumonitis profile negative  GBM antibodies: elevated >8  phospholipase A2 receptor Ab: Negative  ANCA: negative  7/16 Temporary R IJ dialysis catheter placed  7/17 Renal biopsy and bronchoscopy performed. Results pending.     Plan  Nephrology on board  Sevelamer   torsemide daily  Continue vit D/calcium   Per pulmonology, once AFB smears are negative, patient can begin pulsed dose steroids with 1 g Solu-Medrol for 2 days then transition back to prednisone 60 mg daily.   Monitor blood glucose levels  IR to place a permanent hemodialysis catheter tomorrow 7/19  Follow-up biopsy and bronchoscopy results  HD today  Hold home dyazide  D/c telemetry

## 2024-07-14 NOTE — ASSESSMENT & PLAN NOTE
Patient endorses worsening shortness of breath and difficulty breathing  Patient noted to have multiple diffuse scattered pulmonary nodules on CT  Quantiferon gold negative 7/9/24  Patient currently on room air    Plan  Completed Ceftriaxone x 5 days.  Tessalon Perles as needed

## 2024-07-14 NOTE — ASSESSMENT & PLAN NOTE
Patient admitted with acute renal failure with creatinine of 5.74 (baseline .8)  Patient had BUN 73 and GFR of 6  CT A/P: No hydronephrosis, however there is apparent 3 mm stone near or questionably within the distal right ureter (axial image 142). Given the lack of hydronephrosis this could either reflect adjacent calcification or nonobstructing stone.   Creatinine worsening today. Cr 7.15 from 6.02   Patient reports that she is still producing urine  US kidney and bladder: No hydronephrosis. 4 mm nonobstructing left intrarenal calculus  Urinalysis: 3+ blood.  3+ protein.  Innumerable red blood cells.  2-4 WBCs.  Moderate bacteria   AURA, C3, and C4 normal  Ig Kappa Free Light Chain:152.5   Ig Lambda Free Light Chain 87.1   Kappa/Lambda FluidC Ratio 1.75  IR Renal biopsy scheduled for Monday- 7/15/2024    Plan  Nephrology on board  Hold home dyazide  F/u:Hepatitis panel, protein electrophoresis, phospholipase A2 receptor antibodies, ANCA, glomerular basement membrane antibodies, hypersensitivity pneumonitis profile  NPO order in for biopsy.  Hold Heparin SQ on 7/15.

## 2024-07-14 NOTE — PLAN OF CARE
Problem: PAIN - ADULT  Goal: Verbalizes/displays adequate comfort level or baseline comfort level  Description: Interventions:  - Encourage patient to monitor pain and request assistance  - Assess pain using appropriate pain scale  - Administer analgesics based on type and severity of pain and evaluate response  - Implement non-pharmacological measures as appropriate and evaluate response  - Consider cultural and social influences on pain and pain management  - Notify physician/advanced practitioner if interventions unsuccessful or patient reports new pain  Outcome: Progressing     Problem: INFECTION - ADULT  Goal: Absence of fever/infection during neutropenic period  Description: INTERVENTIONS:  - Monitor WBC    Outcome: Progressing     Problem: DISCHARGE PLANNING  Goal: Discharge to home or other facility with appropriate resources  Description: INTERVENTIONS:  - Identify barriers to discharge w/patient and caregiver  - Arrange for needed discharge resources and transportation as appropriate  - Identify discharge learning needs (meds, wound care, etc.)  - Arrange for interpretive services to assist at discharge as needed  - Refer to Case Management Department for coordinating discharge planning if the patient needs post-hospital services based on physician/advanced practitioner order or complex needs related to functional status, cognitive ability, or social support system  Outcome: Progressing     Problem: Knowledge Deficit  Goal: Patient/family/caregiver demonstrates understanding of disease process, treatment plan, medications, and discharge instructions  Description: Complete learning assessment and assess knowledge base.  Interventions:  - Provide teaching at level of understanding  - Provide teaching via preferred learning methods  Outcome: Progressing     Problem: METABOLIC, FLUID AND ELECTROLYTES - ADULT  Goal: Electrolytes maintained within normal limits  Description: INTERVENTIONS:  - Monitor labs and  assess patient for signs and symptoms of electrolyte imbalances  - Administer electrolyte replacement as ordered  - Monitor response to electrolyte replacements, including repeat lab results as appropriate  - Instruct patient on fluid and nutrition as appropriate  Outcome: Progressing

## 2024-07-15 ENCOUNTER — PREP FOR PROCEDURE (OUTPATIENT)
Dept: PULMONOLOGY | Facility: CLINIC | Age: 64
End: 2024-07-15

## 2024-07-15 ENCOUNTER — APPOINTMENT (INPATIENT)
Dept: GASTROENTEROLOGY | Facility: HOSPITAL | Age: 64
DRG: 673 | End: 2024-07-15
Attending: INTERNAL MEDICINE
Payer: MEDICARE

## 2024-07-15 ENCOUNTER — APPOINTMENT (INPATIENT)
Dept: RADIOLOGY | Facility: HOSPITAL | Age: 64
DRG: 673 | End: 2024-07-15
Attending: RADIOLOGY
Payer: MEDICARE

## 2024-07-15 DIAGNOSIS — R93.89 ABNORMAL CT OF THE CHEST: Primary | ICD-10-CM

## 2024-07-15 LAB
ALBUMIN SERPL ELPH-MCNC: 2.89 G/DL (ref 3.2–5.1)
ALBUMIN SERPL ELPH-MCNC: 46.6 % (ref 48–70)
ALPHA1 GLOB SERPL ELPH-MCNC: 0.54 G/DL (ref 0.15–0.47)
ALPHA1 GLOB SERPL ELPH-MCNC: 8.7 % (ref 1.8–7)
ALPHA2 GLOB SERPL ELPH-MCNC: 1.05 G/DL (ref 0.42–1.04)
ALPHA2 GLOB SERPL ELPH-MCNC: 17 % (ref 5.9–14.9)
ANION GAP SERPL CALCULATED.3IONS-SCNC: 16 MMOL/L (ref 4–13)
BACTERIA BLD CULT: NORMAL
BETA GLOB ABNORMAL SERPL ELPH-MCNC: 0.41 G/DL (ref 0.31–0.57)
BETA1 GLOB SERPL ELPH-MCNC: 6.6 % (ref 4.7–7.7)
BETA2 GLOB SERPL ELPH-MCNC: 6.2 % (ref 3.1–7.9)
BETA2+GAMMA GLOB SERPL ELPH-MCNC: 0.38 G/DL (ref 0.2–0.58)
BUN SERPL-MCNC: 135 MG/DL (ref 5–25)
CALCIUM SERPL-MCNC: 9.3 MG/DL (ref 8.4–10.2)
CHLORIDE SERPL-SCNC: 103 MMOL/L (ref 96–108)
CO2 SERPL-SCNC: 18 MMOL/L (ref 21–32)
CREAT SERPL-MCNC: 10.82 MG/DL (ref 0.6–1.3)
ERYTHROCYTE [DISTWIDTH] IN BLOOD BY AUTOMATED COUNT: 15 % (ref 11.6–15.1)
GAMMA GLOB ABNORMAL SERPL ELPH-MCNC: 0.92 G/DL (ref 0.4–1.66)
GAMMA GLOB SERPL ELPH-MCNC: 14.9 % (ref 6.9–22.3)
GFR SERPL CREATININE-BSD FRML MDRD: 3 ML/MIN/1.73SQ M
GLUCOSE SERPL-MCNC: 150 MG/DL (ref 65–140)
HCT VFR BLD AUTO: 28.8 % (ref 34.8–46.1)
HGB BLD-MCNC: 9.1 G/DL (ref 11.5–15.4)
IGG/ALB SER: 0.87 {RATIO} (ref 1.1–1.8)
INTERPRETATION UR IFE-IMP: NORMAL
MCH RBC QN AUTO: 27.4 PG (ref 26.8–34.3)
MCHC RBC AUTO-ENTMCNC: 31.6 G/DL (ref 31.4–37.4)
MCV RBC AUTO: 87 FL (ref 82–98)
PLATELET # BLD AUTO: 296 THOUSANDS/UL (ref 149–390)
PMV BLD AUTO: 10.7 FL (ref 8.9–12.7)
POTASSIUM SERPL-SCNC: 5 MMOL/L (ref 3.5–5.3)
PROT PATTERN SERPL ELPH-IMP: ABNORMAL
PROT SERPL-MCNC: 6.2 G/DL (ref 6.4–8.2)
RBC # BLD AUTO: 3.32 MILLION/UL (ref 3.81–5.12)
SODIUM SERPL-SCNC: 137 MMOL/L (ref 135–147)
WBC # BLD AUTO: 14.24 THOUSAND/UL (ref 4.31–10.16)

## 2024-07-15 PROCEDURE — 86334 IMMUNOFIX E-PHORESIS SERUM: CPT | Performed by: PATHOLOGY

## 2024-07-15 PROCEDURE — C1752 CATH,HEMODIALYSIS,SHORT-TERM: HCPCS

## 2024-07-15 PROCEDURE — 36556 INSERT NON-TUNNEL CV CATH: CPT

## 2024-07-15 PROCEDURE — 99232 SBSQ HOSP IP/OBS MODERATE 35: CPT | Performed by: INTERNAL MEDICINE

## 2024-07-15 PROCEDURE — 02HV33Z INSERTION OF INFUSION DEVICE INTO SUPERIOR VENA CAVA, PERCUTANEOUS APPROACH: ICD-10-PCS | Performed by: RADIOLOGY

## 2024-07-15 PROCEDURE — 77001 FLUOROGUIDE FOR VEIN DEVICE: CPT

## 2024-07-15 PROCEDURE — 80048 BASIC METABOLIC PNL TOTAL CA: CPT | Performed by: INTERNAL MEDICINE

## 2024-07-15 PROCEDURE — 99233 SBSQ HOSP IP/OBS HIGH 50: CPT | Performed by: INTERNAL MEDICINE

## 2024-07-15 PROCEDURE — NC001 PR NO CHARGE: Performed by: RADIOLOGY

## 2024-07-15 PROCEDURE — 85027 COMPLETE CBC AUTOMATED: CPT

## 2024-07-15 PROCEDURE — 77001 FLUOROGUIDE FOR VEIN DEVICE: CPT | Performed by: RADIOLOGY

## 2024-07-15 PROCEDURE — 84165 PROTEIN E-PHORESIS SERUM: CPT | Performed by: PATHOLOGY

## 2024-07-15 PROCEDURE — 76937 US GUIDE VASCULAR ACCESS: CPT

## 2024-07-15 PROCEDURE — 76937 US GUIDE VASCULAR ACCESS: CPT | Performed by: RADIOLOGY

## 2024-07-15 PROCEDURE — 36556 INSERT NON-TUNNEL CV CATH: CPT | Performed by: RADIOLOGY

## 2024-07-15 RX ORDER — LIDOCAINE WITH 8.4% SOD BICARB 0.9%(10ML)
SYRINGE (ML) INJECTION AS NEEDED
Status: COMPLETED | OUTPATIENT
Start: 2024-07-15 | End: 2024-07-15

## 2024-07-15 RX ORDER — SODIUM BICARBONATE 650 MG/1
1300 TABLET ORAL
Status: DISCONTINUED | OUTPATIENT
Start: 2024-07-15 | End: 2024-07-17

## 2024-07-15 RX ORDER — PREDNISONE 20 MG/1
60 TABLET ORAL DAILY
Status: DISCONTINUED | OUTPATIENT
Start: 2024-07-15 | End: 2024-07-18

## 2024-07-15 RX ORDER — LANOLIN ALCOHOL/MO/W.PET/CERES
1 CREAM (GRAM) TOPICAL
Status: DISCONTINUED | OUTPATIENT
Start: 2024-07-15 | End: 2024-07-26

## 2024-07-15 RX ORDER — HEPARIN SODIUM 5000 [USP'U]/ML
7500 INJECTION, SOLUTION INTRAVENOUS; SUBCUTANEOUS EVERY 8 HOURS SCHEDULED
Status: DISCONTINUED | OUTPATIENT
Start: 2024-07-15 | End: 2024-07-17

## 2024-07-15 RX ORDER — FAMOTIDINE 20 MG/1
10 TABLET, FILM COATED ORAL DAILY PRN
Status: DISCONTINUED | OUTPATIENT
Start: 2024-07-15 | End: 2024-08-04 | Stop reason: HOSPADM

## 2024-07-15 RX ORDER — SEVELAMER HYDROCHLORIDE 800 MG/1
800 TABLET, FILM COATED ORAL
Status: DISCONTINUED | OUTPATIENT
Start: 2024-07-15 | End: 2024-07-16

## 2024-07-15 RX ADMIN — Medication 10 ML: at 16:07

## 2024-07-15 RX ADMIN — LORATADINE 10 MG: 10 TABLET ORAL at 08:30

## 2024-07-15 RX ADMIN — MONTELUKAST 10 MG: 10 TABLET, FILM COATED ORAL at 08:31

## 2024-07-15 RX ADMIN — POLYSACCHARIDE-IRON COMPLEX 150 MG: 150 CAPSULE ORAL at 08:30

## 2024-07-15 RX ADMIN — SEVELAMER HYDROCHLORIDE 800 MG: 800 TABLET ORAL at 10:19

## 2024-07-15 RX ADMIN — Medication 1 TABLET: at 10:19

## 2024-07-15 RX ADMIN — SEVELAMER HYDROCHLORIDE 800 MG: 800 TABLET ORAL at 17:06

## 2024-07-15 RX ADMIN — SEVELAMER HYDROCHLORIDE 800 MG: 800 TABLET ORAL at 13:44

## 2024-07-15 RX ADMIN — HEPARIN SODIUM 7500 UNITS: 5000 INJECTION INTRAVENOUS; SUBCUTANEOUS at 21:26

## 2024-07-15 RX ADMIN — FLUTICASONE FUROATE AND VILANTEROL TRIFENATATE 1 PUFF: 200; 25 POWDER RESPIRATORY (INHALATION) at 08:35

## 2024-07-15 RX ADMIN — SODIUM BICARBONATE 1300 MG: 650 TABLET ORAL at 12:17

## 2024-07-15 RX ADMIN — TRAZODONE HYDROCHLORIDE 200 MG: 100 TABLET ORAL at 21:26

## 2024-07-15 RX ADMIN — FAMOTIDINE 10 MG: 20 TABLET ORAL at 21:26

## 2024-07-15 RX ADMIN — PANTOPRAZOLE SODIUM 40 MG: 40 TABLET, DELAYED RELEASE ORAL at 06:27

## 2024-07-15 RX ADMIN — SODIUM BICARBONATE 1300 MG: 650 TABLET ORAL at 17:13

## 2024-07-15 RX ADMIN — PREDNISONE 60 MG: 20 TABLET ORAL at 10:19

## 2024-07-15 RX ADMIN — ATORVASTATIN CALCIUM 20 MG: 20 TABLET, FILM COATED ORAL at 08:31

## 2024-07-15 RX ADMIN — SODIUM BICARBONATE 1300 MG: 650 TABLET ORAL at 08:31

## 2024-07-15 RX ADMIN — HEPARIN SODIUM 7500 UNITS: 5000 INJECTION INTRAVENOUS; SUBCUTANEOUS at 17:07

## 2024-07-15 RX ADMIN — METOPROLOL TARTRATE 50 MG: 50 TABLET, FILM COATED ORAL at 17:06

## 2024-07-15 NOTE — PROGRESS NOTES
NEPHROLOGY HOSPITAL PROGRESS NOTE   Ngozi Beard 64 y.o. female MRN: [id number]  Unit/Bed#: S -01 Encounter: [id number]  Reason for Consult: ANGELICA    ASSESSMENT and PLAN:    64-year-old female with a past medical history of asthma, hypertension, bipolar disorder who initially presented at the request of her pulmonologist due to acute kidney injury.  Nephrology on board for acute kidney injury    1-acute kidney injury-    - Prior baseline creatinine less than 1 mg/dL was 0.8 mg/dL on May 2024  - Admission creatinine on 7/10 was 4.6 mg/dL  - Urinalysis with 3+ blood, 3+ protein, innumerable RBC, 2-4 WBC, moderate bacteria  - Renal imaging with renal ultrasound right kidney 13.2 cm, left kidney 12.5 cm, 1.8 cm simple cyst on left kidney without evidence of hydronephrosis and 4 mm nonobstructing left renal calculus  - Initial CT scan without contrast without hydronephrosis, lesions in both kidneys    Serological workup  - C3, C4-normal  - Hep C Ab neg  - hep panel non reactive  - HIV -nonreactive  - ASO- negative  - AURA-negative  - Anti-double-stranded DNA-negative  - ANCA-  - Anti-GBM-  - QuantiFERON gold negative  - PLA2R-  -  - elevated  - SPEP-  - UPEP-  - Free light chain-1.75 (152/87.1)  - UPCR 5.8 g/g  - Hemolysis smear-negative  - Iron panel-iron saturation of 11%.    Etiology-unclear but there is concern for RPGN    Course of stay-    - 7/11-creatinine rising to 6.  Was transferred to [facility] as patient ultimately will need renal biopsy and pulmonary evaluation.  HCTZ and triamterene were held on admission  - 7/12-creatinine rising to 7.1 mg/dL. Dr Burns Filled out biopsy form and patient was transferred to [facility] in anticipation of renal biopsy.  Empirically started on Solu-Medrol 500 mg  - 7/13-Solu-Medrol for dose #2.  Started on sodium bicarbonate tablets.  - 7/14-creatinine rising to 9.9 mg/dL. Received solumederol 250 mg (third dose of IV steroid (500 mg, 250 mg,  "250 mg)  - 7/15 - creat rising 10.8 mg/dL. BUN rising. On pred 60 mg daily. Pending bronch    Plan:  - increase bicarb tabs to TID  - add sevelamer  - low K diet when eating  - start prednisone 60 mg daily   - may need IV steroids again  - Low potassium diet when eating  - start triston/vit D  - cont PPI  - may need PCP proph if requires higher dose pred   - labwork in AM  - plan for renal biopsy wed. I believe pt is too high risk for uremic bleeding and originally biopsy was scheduled for Tuesday. We will plan for dialysis first treatment tomorrow 7/16 after temp HD catheter and then renal biopsy Wednesday  - will need DDVAP 20 mcg one time IV 30 minutes pre renal biopsy  - Will need CBC 4 hours after biopsy  - I have reached out to primary team resident and discussed case and we are in agreement with renal plan for biopsy planning  - have reached out to primary team IR Atttending this AM. Biopsy planned for Tuesday per IR. Updated IR On plans  - discussed case with pulmonary AP - pending bronch today  - Reviewed case with the patient and she understands the concerns as noted above.  She is agreeable to initiate dialysis and have a temporary dialysis catheter placed.  She states she will sign the consent once we are ready to start dialysis.  - I have informed inpatient HD unit of potential plans for dialysis tomorrow    Portions of the record may have been created with voice recognition software. Occasional wrong word or \"sound a like\" substitutions may have occurred due to the inherent limitations of voice recognition software. Read the chart carefully and recognize, using context, where substitutions have occurred.If you have any questions, please contact the dictating provider.      2-electrolytes-    - Hyperphosphatemia likely in the setting of acute kidney injury. Start sevelamer    - Borderline hyperkalemia-low potassium diet when eating    3-acid/base-acidosis.  Initial intravenous fluids were held.  Initiated " on sodium bicarbonate tablets 7/13.  And further increased on 7/14.    - 7/15 - increase bicarb tabs to TID    4-pulmonary nodules-    - Outpatient was following with pulmonary team closely  - CT scan suggestive of mycobacterial infection  - QuantiFERON gold negative  - Nonproductive cough  - Further workup in progress by pulmonary team  - Unclear if related to pulmonary renal syndrome    5-anemia-    - Serologies as above  - Iron deficient but holding IV iron as the patient was being evaluated for possible infectious etiologies  - on oral iron    6-hypertension-    - Initially holding triamterene-HCTZ  - on metoprolol    7-azotemia - BUN rising in setting of ANGELICA. Also received steroids which is contributing.    SUBJECTIVE / 24H INTERVAL HISTORY:  -141 syst. Afebrile. On RA.  cc.     OBJECTIVE:  Current Weight:    Vitals:    07/14/24 2318 07/15/24 0627 07/15/24 0628 07/15/24 0705   BP: 133/73 141/82 141/82 138/86   Pulse: (!) 44 (!) 44 (!) 44 59   Resp: 18   15   Temp:    97.5 °F (36.4 °C)   TempSrc:       SpO2: 93%  93% 96%       Intake/Output Summary (Last 24 hours) at 7/15/2024 0834  Last data filed at 7/14/2024 2128  Gross per 24 hour   Intake --   Output 200 ml   Net -200 ml     General: NAD  Skin: no rash  Eyes: anicteric sclera  ENT: moist mucous membrane  Neck: supple  Chest: CTA b/l, no ronchii, no wheeze, no rubs, no rales  CVS: s1s2, no murmur, no gallop, no rub  Abdomen: soft, nontender, nl sounds  Extremities: Trace edema LE b/l, no asterixis  : no sherwood  Neuro: AAOX3  Psych: normal affect    Medications:    Current Facility-Administered Medications:     acetaminophen (TYLENOL) tablet 650 mg, 650 mg, Oral, Q6H PRN, Tram Palacios PA-C, 650 mg at 07/14/24 0829    albuterol (PROVENTIL HFA,VENTOLIN HFA) inhaler 2 puff, 2 puff, Inhalation, Q4H PRN, Tram Palacios PA-C, 2 puff at 07/14/24 0824    atorvastatin (LIPITOR) tablet 20 mg, 20 mg, Oral, Daily, Tram Palacios PA-C, 20 mg at 07/15/24  0831    benzonatate (TESSALON PERLES) capsule 200 mg, 200 mg, Oral, TID PRN, Tram Palacios PA-C    dextromethorphan-guaiFENesin (ROBITUSSIN DM) oral syrup 10 mL, 10 mL, Oral, Q4H PRN, Tram Palacios PA-C    fluticasone-vilanterol 200-25 mcg/actuation 1 puff, 1 puff, Inhalation, Daily, Tram Palacios PA-C, 1 puff at 07/14/24 0824    hydrALAZINE (APRESOLINE) injection 10 mg, 10 mg, Intravenous, Q6H PRN, Tram Palacios PA-C    iron polysaccharides (FERREX) capsule 150 mg, 150 mg, Oral, Daily, Ata Burns MD, 150 mg at 07/15/24 0830    lamoTRIgine (LaMICtal) tablet 100 mg, 100 mg, Oral, QAM, Tram Palacios PA-C, 100 mg at 07/14/24 0824    loratadine (CLARITIN) tablet 10 mg, 10 mg, Oral, Daily, Tram Palacios PA-C, 10 mg at 07/15/24 0830    metoprolol tartrate (LOPRESSOR) tablet 50 mg, 50 mg, Oral, Q12H JACK, Tram Palacios PA-C, 50 mg at 07/14/24 1631    montelukast (SINGULAIR) tablet 10 mg, 10 mg, Oral, Daily, Tram Palacios PA-C, 10 mg at 07/15/24 0831    oxybutynin (DITROPAN-XL) 24 hr tablet 5 mg, 5 mg, Oral, Daily, Tram Palacios PA-C, 5 mg at 07/14/24 0823    pantoprazole (PROTONIX) EC tablet 40 mg, 40 mg, Oral, Daily Before Breakfast, Ronny Webster MD, 40 mg at 07/15/24 0627    sodium bicarbonate tablet 1,300 mg, 1,300 mg, Oral, BID after meals, Horacio Kim MD, 1,300 mg at 07/15/24 0831    traZODone (DESYREL) tablet 200 mg, 200 mg, Oral, HS, Tram Palacios PA-C, 200 mg at 07/14/24 2123    venlafaxine (EFFEXOR-XR) 24 hr capsule 150 mg, 150 mg, Oral, Daily, Tram Palacios PA-C, 150 mg at 07/14/24 0823    Laboratory Results:  Results from last 7 days   Lab Units 07/15/24  0431 07/14/24  0441 07/13/24  0620 07/12/24  0621 07/11/24  0459 07/11/24  0025 07/10/24  1742 07/09/24  1400   WBC Thousand/uL 14.24* 14.12*  --  10.22* 12.33*  --  14.56* 11.25*   HEMOGLOBIN g/dL 9.1* 9.1*  --  9.4* 9.6*  --  10.8* 11.3*   HEMATOCRIT % 28.8* 29.3*  --  29.1* 30.1*  --  34.0* 35.6   PLATELETS Thousands/uL 296 280  --  229 244 241 294 298    POTASSIUM mmol/L 5.0 5.1 4.9 4.0 4.1  --  4.0 4.0   CHLORIDE mmol/L 103 104 105 106 106  --  106 105   CO2 mmol/L 18* 19* 19* 19* 21  --  20* 23   BUN mg/dL 135* 117* 98* 81* 73*  --  74* 61*   CREATININE mg/dL 10.82* 9.89* 8.34* 7.15* 6.02*  --  5.74* 4.58*   CALCIUM mg/dL 9.3 9.4 9.8 9.4 9.0  --  9.4 9.9   MAGNESIUM mg/dL  --   --   --   --  2.2  --   --   --    PHOSPHORUS mg/dL  --   --  8.5*  --  5.8*  --   --   --

## 2024-07-15 NOTE — PLAN OF CARE
Problem: METABOLIC, FLUID AND ELECTROLYTES - ADULT  Goal: Electrolytes maintained within normal limits  Description: INTERVENTIONS:  - Monitor labs and assess patient for signs and symptoms of electrolyte imbalances  - Administer electrolyte replacement as ordered  - Monitor response to electrolyte replacements, including repeat lab results as appropriate  - Instruct patient on fluid and nutrition as appropriate  Outcome: Progressing  Goal: Fluid balance maintained  Description: INTERVENTIONS:  - Monitor labs   - Monitor I/O and WT  - Instruct patient on fluid and nutrition as appropriate  - Assess for signs & symptoms of volume excess or deficit  Outcome: Progressing       Problem: Knowledge Deficit  Goal: Patient/family/caregiver demonstrates understanding of disease process, treatment plan, medications, and discharge instructions  Description: Complete learning assessment and assess knowledge base.  Interventions:  - Provide teaching at level of understanding  - Provide teaching via preferred learning methods  Outcome: Progressing     Problem: SAFETY ADULT  Goal: Maintain or return to baseline ADL function  Description: INTERVENTIONS:  -  Assess patient's ability to carry out ADLs; assess patient's baseline for ADL function and identify physical deficits which impact ability to perform ADLs (bathing, care of mouth/teeth, toileting, grooming, dressing, etc.)  - Assess/evaluate cause of self-care deficits   - Assess range of motion  - Assess patient's mobility; develop plan if impaired  - Assess patient's need for assistive devices and provide as appropriate  - Encourage maximum independence but intervene and supervise when necessary  - Involve family in performance of ADLs  - Assess for home care needs following discharge   - Consider OT consult to assist with ADL evaluation and planning for discharge  - Provide patient education as appropriate  Outcome: Progressing

## 2024-07-15 NOTE — TELEMEDICINE
e-Consult (IPC)  - Interventional Radiology  Ngozi Beard 64 y.o. female MRN: [id number]  Unit/Bed#: S -01 Encounter: [id number]          Interventional Radiology has been consulted to evaluate Ngozi Beard      Inpatient Consult to IR  Consult performed by: Graciela Bettencourt MD  Consult ordered by: Vane Estrada MD        07/15/24    Assessment/Recommendation:   Patient presented with ANGELICA.    Random kidney biopsy was initially scheduled for Tuesday, 7/16/2024.  Upon my nephrology colleague's request, the kidney biopsy procedure will be rescheduled to Wednesday, 7/17/2024 and we will place a temporary dialysis catheter to dialyze her prior to the kidney biopsy to decrease bleeding risk.    NPO order placed for wed.      11-20 minutes, >50% of the total time devoted to medical consultative verbal/EMR discussion between providers. Written report will be generated in the EMR.     Thank you for allowing Interventional Radiology to participate in the care of Ngozi Beard. Please don't hesitate to call or TigerText us with any questions.     Graciela Bettencourt MD

## 2024-07-15 NOTE — ASSESSMENT & PLAN NOTE
WBC   Date Value Ref Range Status   07/18/2024 12.75 (H) 4.31 - 10.16 Thousand/uL Final     Patient admitted with sepsis likely secondary to pneumonitis as evidenced by tachycardia, tachypnea and leukocytosis  CT: redemonstration of multiple diffuse scattered nodules, less pronounced than on 5/16/2024 suggestive of mycobacterial infection  QuantiFERON gold negative   Lactic acid normal, Procalcitonin trending up: 0.27 > 0.30 > .38 > .40  Sputum culture +2 gram negative rods, +1 gram positive cocci  BC: 1/2 staph epidermis detected-contaminated  Leukocytosis likely due to steroid use    Plan:  Completed Ceftriaxone x 5 days

## 2024-07-15 NOTE — BRIEF OP NOTE (RAD/CATH)
INTERVENTIONAL RADIOLOGY PROCEDURE NOTE    Date: 7/15/2024    Procedure: IR TEMPORARY DIALYSIS CATHETER PLACEMENT     Preoperative diagnosis:   1. Acute renal failure (ARF) (HCC)    2. Abnormal CT of the chest    3. Acute kidney injury (HCC)    4. Acute renal failure, unspecified acute renal failure type (HCC)    5. Abnormal chest CT    6. ANGELICA (acute kidney injury) (HCC)         Postoperative diagnosis: Same.    Surgeon: Graciela Bettencourt MD     Assistant: None. No qualified resident was available.    Blood loss: Minimal    Specimens: None    Findings: Successful right IJ nontunneled dialysis catheter placement.    Complications: None immediate.    Anesthesia: local

## 2024-07-15 NOTE — PROGRESS NOTES
[facility]  Progress Note  Name: Ngozi Beard I  MRN: [id number]  Unit/Bed#: S -01 I Date of Admission: 7/12/2024   Date of Service: 7/15/2024 I Hospital Day: 3    Assessment & Plan   * Acute renal failure (ARF) (HCC)  Assessment & Plan  Patient admitted with acute renal failure with creatinine of 5.74 (baseline .8)  Patient had BUN 73 and GFR of 6  CT A/P: No hydronephrosis, however there is apparent 3 mm stone near or questionably within the distal right ureter (axial image 142). Given the lack of hydronephrosis this could either reflect adjacent calcification or nonobstructing stone.   Creatinine worsening today. Cr 7.15 from 6.02   Patient reports that she is still producing urine  US kidney and bladder: No hydronephrosis. 4 mm nonobstructing left intrarenal calculus  Urinalysis: 3+ blood.  3+ protein.  Innumerable red blood cells.  2-4 WBCs.  Moderate bacteria   AURA, C3, and C4 normal  Ig Kappa Free Light Chain:152.5   Ig Lambda Free Light Chain 87.1   Kappa/Lambda FluidC Ratio 1.75  Hepatitis panel nonreactive  HIV nonreactive  QuantiFERON gold negative  protein electrophoresis see labs  hypersensitivity pneumonitis profile negative  IR Renal biopsy scheduled for Wednesday- 7/17/2024  Bronchoscopy on Monday 7/15.      Plan  Nephrology on board   Increase bicarb tabs to TID  Start sevelamer  Start prednisone 60 mg daily   Start vit D/calcium   DDVAP 20 mcg one time IV 30 minutes pre renal biopsy Wednesday  NPO today for bronchoscopy  Hold Heparin SQ on 7/15 and 7/16.  Today patient is having a temporary dialysis catheter placed per IR.  Possible plans for dialysis tomorrow.  Repeat CBC 4 hours post biopsy  Hold home dyazide  F/u: phospholipase A2 receptor antibodies, ANCA, glomerular basement membrane antibodies    Sepsis (HCC)-resolved as of 7/14/2024  Assessment & Plan    WBC   Date Value Ref Range Status   07/15/2024 14.24 (H) 4.31 - 10.16 Thousand/uL Final       Patient  admitted with sepsis likely secondary to pneumonitis as evidenced by tachycardia, tachypnea and leukocytosis  CT: redemonstration of multiple diffuse scattered nodules, less pronounced than on 5/16/2024 suggestive of mycobacterial infection  QuantiFERON gold negative   Lactic acid normal, Procalcitonin trending up: 0.27 > 0.30 > .38 > .40, WBC trending up  Sputum culture +2 gram negative rods, +1 gram positive cocci  BC: 1/2 staph epidermis detected-contaminated    Plan:  Completed Ceftriaxone x 5 days  Trend WBC and fever curve    Persistent cough  Assessment & Plan  Patient endorses worsening shortness of breath and difficulty breathing  Patient noted to have multiple diffuse scattered pulmonary nodules on CT  Quantiferon gold negative 7/9/24  Patient currently on room air    Plan  Completed Ceftriaxone x 5 days.  Tessalon Perles as needed    Abnormal CT of the chest  Assessment & Plan  CT chest: Re demonstration of multiple diffuse scattered nodules  Etiology unclear: HP vs sarcoid vs  vs MAC vs  atypical  CRP- 143.7   Sed-61, negative: QuantiFERON  pending: HP/ANCA  Procal: 0.27--0.30--0.38--0.40      Plan:  Bronchoscopy today 7/15  NPO order in.  Hold Heparin SQ this AM    Chronic obstructive pulmonary disease, unspecified COPD type (HCC)  Assessment & Plan  Albuterol inhaler PRN  Continue benzonatate  Continue fluticasone-vilanterol  Continue loratadine 10 mg  Robitussin PRN    Abnormality of ascending aorta  Assessment & Plan  CT chest: Unchanged fusiform ectasia of the ascending thoracic aorta measuring up to 40 mm     Plan  Recommendation for follow-up low radiation dose chest CT in one year    Bipolar 1 disorder (HCC)  Assessment & Plan  Plan  Continue on Effexor, trazodone and Lamictal    Primary hypertension  Assessment & Plan  Continue on Lopressor  Hold Dyazide  Hydralazine PRN  Monitor vitals as per protocol    Dyslipidemia  Assessment & Plan  Continue on atorvastatin 20 Mg         VTE  Pharmacologic Prophylaxis: VTE Score: 4 Moderate Risk (Score 3-4) - Pharmacological DVT Prophylaxis Ordered: heparin.    Mobility:   Basic Mobility Inpatient Raw Score: 24  JH-HLM Goal: 8: Walk 250 feet or more  JH-HLM Achieved: 8: Walk 250 feet ot more  JH-HLM Goal NOT achieved. Continue with multidisciplinary rounding and encourage appropriate mobility to improve upon JH-HLM goals.    Patient Centered Rounds: I performed bedside rounds with nursing staff today.   Discussions with Specialists or Other Care Team Provider: Nephrology, IR    Education and Discussions with Family / Patient:   .     Current Length of Stay: 3 day(s)  Current Patient Status: Inpatient   Discharge Plan: Anticipate discharge in >72 hrs to home.    Code Status: Level 1 - Full Code    Subjective:   Patient seen and examined at bedside.  Overnight, it was noted that the patient was drinking water from the sink. Patient was reminded that she is n.p.o. and discourage from doing this. Patient reports continued shortness of breath and cough stating it remains unchanged. She is n.p.o. for bronchoscopy per pulmonology today.  She is also scheduled for placement of a temporary cath per IR for dialysis. Patient informed of plan. She understands and agrees.    Objective:     Vitals:   Temp (24hrs), Av.2 °F (36.2 °C), Min:96.9 °F (36.1 °C), Max:97.5 °F (36.4 °C)    Temp:  [96.9 °F (36.1 °C)-97.5 °F (36.4 °C)] 96.9 °F (36.1 °C)  HR:  [44-62] 54  Resp:  [15-18] 18  BP: (128-141)/(73-86) 128/76  SpO2:  [93 %-96 %] 95 %  There is no height or weight on file to calculate BMI.     Input and Output Summary (last 24 hours):     Intake/Output Summary (Last 24 hours) at 7/15/2024 1551  Last data filed at 7/15/2024 0700  Gross per 24 hour   Intake 0 ml   Output 200 ml   Net -200 ml       Physical Exam:   Physical Exam  Vitals and nursing note reviewed.   Constitutional:       Appearance: Normal appearance.   HENT:      Head: Normocephalic and atraumatic.       Mouth/Throat:      Mouth: Mucous membranes are moist.   Eyes:      Extraocular Movements: Extraocular movements intact.      Conjunctiva/sclera: Conjunctivae normal.      Pupils: Pupils are equal, round, and reactive to light.   Cardiovascular:      Rate and Rhythm: Normal rate and regular rhythm.   Pulmonary:      Effort: Pulmonary effort is normal. No respiratory distress.      Breath sounds: Normal breath sounds.   Abdominal:      General: Bowel sounds are normal. There is no distension.      Palpations: Abdomen is soft.      Tenderness: There is no abdominal tenderness. There is no guarding.   Musculoskeletal:      Right lower leg: Edema present.      Left lower leg: Edema present.      Comments: Trace edema noted to BLE   Skin:     General: Skin is warm and dry.   Neurological:      General: No focal deficit present.      Mental Status: She is alert. Mental status is at baseline.   Psychiatric:         Mood and Affect: Mood normal.        Additional Data:     Labs:  Results from last 7 days   Lab Units 07/15/24  0431 07/12/24  0621 07/11/24  0459   WBC Thousand/uL 14.24*   < > 12.33*   HEMOGLOBIN g/dL 9.1*   < > 9.6*   HEMATOCRIT % 28.8*   < > 30.1*   PLATELETS Thousands/uL 296   < > 244   SEGS PCT %  --   --  74   LYMPHO PCT %  --   --  13*   MONO PCT %  --   --  10   EOS PCT %  --   --  2    < > = values in this interval not displayed.     Results from last 7 days   Lab Units 07/15/24  0431 07/14/24  0441 07/13/24  0620 07/12/24  0621 07/11/24  0459   SODIUM mmol/L 137   < > 138   < > 140   POTASSIUM mmol/L 5.0   < > 4.9   < > 4.1   CHLORIDE mmol/L 103   < > 105   < > 106   CO2 mmol/L 18*   < > 19*   < > 21   BUN mg/dL 135*   < > 98*   < > 73*   CREATININE mg/dL 10.82*   < > 8.34*   < > 6.02*   ANION GAP mmol/L 16*   < > 14*   < > 13   CALCIUM mg/dL 9.3   < > 9.8   < > 9.0   ALBUMIN g/dL  --   --  3.3*  --  3.2*   TOTAL BILIRUBIN mg/dL  --   --   --   --  0.40   ALK PHOS U/L  --   --   --   --  58   ALT U/L   --   --   --   --  9   AST U/L  --   --   --   --  11*   GLUCOSE RANDOM mg/dL 150*   < > 168*   < > 97    < > = values in this interval not displayed.     Results from last 7 days   Lab Units 07/13/24  0620   INR  0.98         Results from last 7 days   Lab Units 07/09/24  1400   HEMOGLOBIN A1C % 6.6*     Results from last 7 days   Lab Units 07/13/24  0620 07/12/24  0621 07/11/24  0459 07/11/24  0025 07/10/24  2022   LACTIC ACID mmol/L  --   --   --   --  0.9   PROCALCITONIN ng/ml 0.40* 0.38* 0.30* 0.27*  --        Lines/Drains:  Invasive Devices       Peripheral Intravenous Line  Duration             Peripheral IV 07/12/24 Left Antecubital 3 days                    Imaging: Reviewed radiology reports from this admission including: chest xray, chest CT scan, abdominal/pelvic CT, and ultrasound kidney and bladder    Recent Cultures (last 7 days):   Results from last 7 days   Lab Units 07/11/24  0011 07/10/24  1914   BLOOD CULTURE   --  No Growth After 4 Days.  Staphylococcus epidermidis*   SPUTUM CULTURE  4+ Growth of  --    GRAM STAIN RESULT  1+ Polys*  2+ Gram negative rods*  1+ Gram positive cocci in clusters*  1+ Epithelial Cells* Gram positive cocci in clusters*       Last 24 Hours Medication List:   Current Facility-Administered Medications   Medication Dose Route Frequency Provider Last Rate    acetaminophen  650 mg Oral Q6H PRN Tram Palacios PA-C      albuterol  2 puff Inhalation Q4H PRN Tram Palacios PA-C      atorvastatin  20 mg Oral Daily Tram Palacios PA-C      benzonatate  200 mg Oral TID PRN Tram Palacios PA-C      calcium carbonate-vitamin D  1 tablet Oral Daily With Breakfast Vane Estrada MD      dextromethorphan-guaiFENesin  10 mL Oral Q4H PRN Tram Palacios PA-C      fluticasone-vilanterol  1 puff Inhalation Daily Tram Palacios PA-C      hydrALAZINE  10 mg Intravenous Q6H PRN Tram Palacios PA-C      iron polysaccharides  150 mg Oral Daily Ata Burns MD      lamoTRIgine  100 mg Oral QAM  Tram Palacios PA-C      loratadine  10 mg Oral Daily Tram Palacios PA-C      metoprolol tartrate  50 mg Oral Q12H [facility] Tram Palacios PA-C      montelukast  10 mg Oral Daily Tram Palacios PA-C      oxybutynin  5 mg Oral Daily Tram Palacios PA-C      pantoprazole  40 mg Oral Daily Before Breakfast Ronny Webster MD      predniSONE  60 mg Oral Daily Vane Estrada MD      sevelamer  800 mg Oral TID With Meals Vane Estrada MD      sodium bicarbonate  1,300 mg Oral TID after meals Vane Estrada MD      traZODone  200 mg Oral HS Tram Palacios PA-C      venlafaxine  150 mg Oral Daily Tram Palacios PA-C          Today, Patient Was Seen By: Jessica Beckford MD    **Please Note: This note may have been constructed using a voice recognition system.**

## 2024-07-15 NOTE — PROGRESS NOTES
Progress Note - Pulmonary   Ngozi Beard 64 y.o. female MRN: [id number]  Unit/Bed#: S -01 Encounter: [id number]    Assessment/Plan:    Acute pulmonary insufficiency  -96% on room air, patient does not wear any supplemental O2 at home  -Continue to maintain saturation greater than 89%  -Pulmonary hygiene: Deep breathing with cough, OOB as tolerated, incentive spirometry    Abnormal CT chest  -Seen in office by Dr. Rojas 06/26/2024 for consult preliminary lab work showed acute renal failure and patient was sent to the hospital  -CT chest 07/11/2024 shows unchanged extent of diffuse pulmonary nodularity  -Etiology unclear.  Rule out atypical infection versus developing pulmonary renal syndrome  -WBC 10.22-14.12-14.24  -Procalcitonin 0.30-0.38-0.40  -CRP-143.7, sed rate-61, QuantiFERON negative, HP/ANCA pending  -Ceftriaxone day #6  -Bronchoscopy scheduled for today at 3:00    Moderate persistent asthma does not appear in exacerbation  -PFTs ordered but not completed yet  -Home medication regime Advair -21 mcg 2 puffs twice daily, Proventil 2 puffs every 6 hours as needed    Acute renal failure  -Unknown etiology  -Creatinine 7.15-8.34-9.89-10.82  -Will start dialysis tomorrow  -Renal biopsy scheduled for Wednesday  -Renal following      Chief Complaint:    I feel overwhelmed    Subjective:    Patient seen and examined at bedside.  Found resting comfortably in bed.  Does not appear in any respiratory distress.  Endorses feeling overwhelmed as she just spoke with nephrology and will start dialysis tomorrow patient denies any fever chills night sweats chest pain or hemoptysis.  Is agreeable for bronchoscopy later on today.  Has remained n.p.o. since midnight    Objective:    Vitals: Blood pressure 138/86, pulse 59, temperature 97.5 °F (36.4 °C), resp. rate 15, SpO2 96%.RA,There is no height or weight on file to calculate BMI.      Intake/Output Summary (Last 24 hours) at 7/15/2024 0914  Last data filed  "at 7/14/2024 2128  Gross per 24 hour   Intake --   Output 200 ml   Net -200 ml       Invasive Devices       Peripheral Intravenous Line  Duration             Peripheral IV 07/12/24 Left Antecubital 2 days                    Physical Exam:     Physical Exam  Vitals reviewed.   Constitutional:       General: She is not in acute distress.     Appearance: She is obese. She is not ill-appearing.   HENT:      Head: Normocephalic and atraumatic.      Right Ear: External ear normal.      Left Ear: External ear normal.      Nose: Nose normal.      Mouth/Throat:      Mouth: Mucous membranes are moist.   Eyes:      Extraocular Movements: Extraocular movements intact.      Pupils: Pupils are equal, round, and reactive to light.   Cardiovascular:      Rate and Rhythm: Normal rate and regular rhythm.      Pulses: Normal pulses.   Pulmonary:      Effort: Pulmonary effort is normal.      Breath sounds: Normal breath sounds.   Abdominal:      General: Bowel sounds are normal.   Musculoskeletal:         General: Normal range of motion.      Cervical back: Normal range of motion and neck supple.      Right lower leg: Edema present.      Left lower leg: Edema present.      Comments: Trace edema in bilateral lower ext   Neurological:      Mental Status: She is oriented to person, place, and time.   Psychiatric:         Mood and Affect: Mood normal.         Behavior: Behavior normal.         Thought Content: Thought content normal.         Judgment: Judgment normal.         Labs: I have personally reviewed pertinent lab results., ABG: No results found for: \"PHART\", \"PLK2ZYN\", \"PO2ART\", \"JZU4YBB\", \"M1WNESYZ\", \"BEART\", \"SOURCE\", BNP: No results found for: \"BNP\", CBC:   Lab Results   Component Value Date    WBC 14.24 (H) 07/15/2024    HGB 9.1 (L) 07/15/2024    HCT 28.8 (L) 07/15/2024    MCV 87 07/15/2024     07/15/2024    RBC 3.32 (L) 07/15/2024    MCH 27.4 07/15/2024    MCHC 31.6 07/15/2024    RDW 15.0 07/15/2024    MPV 10.7 " 07/15/2024   , CMP:   Lab Results   Component Value Date    SODIUM 137 07/15/2024    K 5.0 07/15/2024     07/15/2024    CO2 18 (L) 07/15/2024     (H) 07/15/2024    CREATININE 10.82 (H) 07/15/2024    CALCIUM 9.3 07/15/2024    EGFR 3 07/15/2024       Imaging and other studies: I have personally reviewed pertinent reports.    CT chest 07/11/2024 unchanged extent of diffuse pulmonary nodularity though decreased in size density and conspicuity of the nodules.  Findings are nonspecific and assessment environmental exposures recommended.  Unchanged fusiform ectasia of the ascending thoracic aorta measuring up to 40 mm recommendation is for follow-up low radiation dose CT chest in 1 year

## 2024-07-15 NOTE — NURSING NOTE
Went in at shift change to check on patient. Patient asked for water and was informed that she cannot have anything to eat or drink since she in NPO. Patient said she is allowed to have water and will just go into the bathroom and get it from the sink herself. Patient proceeded to do so. Patient was educated that this may affect her getting her procedures done and she said she would continue to go into the bathroom to get water as she wants. Patient was educated on the risks and SLIM was notified. Will continue to monitor patient.

## 2024-07-15 NOTE — PLAN OF CARE
Problem: PAIN - ADULT  Goal: Verbalizes/displays adequate comfort level or baseline comfort level  Description: Interventions:  - Encourage patient to monitor pain and request assistance  - Assess pain using appropriate pain scale  - Administer analgesics based on type and severity of pain and evaluate response  - Implement non-pharmacological measures as appropriate and evaluate response  - Consider cultural and social influences on pain and pain management  - Notify physician/advanced practitioner if interventions unsuccessful or patient reports new pain  Outcome: Progressing     Problem: INFECTION - ADULT  Goal: Absence or prevention of progression during hospitalization  Description: INTERVENTIONS:  - Assess and monitor for signs and symptoms of infection  - Monitor lab/diagnostic results  - Monitor all insertion sites, i.e. indwelling lines, tubes, and drains  - Monitor endotracheal if appropriate and nasal secretions for changes in amount and color  - [place] appropriate cooling/warming therapies per order  - Administer medications as ordered  - Instruct and encourage patient and family to use good hand hygiene technique  - Identify and instruct in appropriate isolation precautions for identified infection/condition  Outcome: Progressing  Goal: Absence of fever/infection during neutropenic period  Description: INTERVENTIONS:  - Monitor WBC    Outcome: Progressing     Problem: SAFETY ADULT  Goal: Patient will remain free of falls  Description: INTERVENTIONS:  - Educate patient/family on patient safety including physical limitations  - Instruct patient to call for assistance with activity   - Consult OT/PT to assist with strengthening/mobility   - Keep Call bell within reach  - Keep bed low and locked with side rails adjusted as appropriate  - Keep care items and personal belongings within reach  - Initiate and maintain comfort rounds  - Make Fall Risk Sign visible to staff  - Offer Toileting every 2 Hours,  in advance of need  - Initiate/Maintain alarm  - Obtain necessary fall risk management equipment:   - Apply yellow socks and bracelet for high fall risk patients  - Consider moving patient to room near nurses station  Outcome: Progressing  Goal: Maintain or return to baseline ADL function  Description: INTERVENTIONS:  -  Assess patient's ability to carry out ADLs; assess patient's baseline for ADL function and identify physical deficits which impact ability to perform ADLs (bathing, care of mouth/teeth, toileting, grooming, dressing, etc.)  - Assess/evaluate cause of self-care deficits   - Assess range of motion  - Assess patient's mobility; develop plan if impaired  - Assess patient's need for assistive devices and provide as appropriate  - Encourage maximum independence but intervene and supervise when necessary  - Involve family in performance of ADLs  - Assess for home care needs following discharge   - Consider OT consult to assist with ADL evaluation and planning for discharge  - Provide patient education as appropriate  Outcome: Progressing  Goal: Maintains/Returns to pre admission functional level  Description: INTERVENTIONS:  - Perform AM-PAC 6 Click Basic Mobility/ Daily Activity assessment daily.  - Set and communicate daily mobility goal to care team and patient/family/caregiver.   - Collaborate with rehabilitation services on mobility goals if consulted  - Perform Range of Motion 3 times a day.  - Reposition patient every 2 hours.  - Dangle patient 3 times a day  - Stand patient 3 times a day  - Ambulate patient 3 times a day  - Out of bed to chair 3 times a day   - Out of bed for meals 3 times a day  - Out of bed for toileting  - Record patient progress and toleration of activity level   Outcome: Progressing     Problem: DISCHARGE PLANNING  Goal: Discharge to home or other facility with appropriate resources  Description: INTERVENTIONS:  - Identify barriers to discharge w/patient and caregiver  -  Arrange for needed discharge resources and transportation as appropriate  - Identify discharge learning needs (meds, wound care, etc.)  - Arrange for interpretive services to assist at discharge as needed  - Refer to Case Management Department for coordinating discharge planning if the patient needs post-hospital services based on physician/advanced practitioner order or complex needs related to functional status, cognitive ability, or social support system  Outcome: Progressing     Problem: Knowledge Deficit  Goal: Patient/family/caregiver demonstrates understanding of disease process, treatment plan, medications, and discharge instructions  Description: Complete learning assessment and assess knowledge base.  Interventions:  - Provide teaching at level of understanding  - Provide teaching via preferred learning methods  Outcome: Progressing     Problem: METABOLIC, FLUID AND ELECTROLYTES - ADULT  Goal: Electrolytes maintained within normal limits  Description: INTERVENTIONS:  - Monitor labs and assess patient for signs and symptoms of electrolyte imbalances  - Administer electrolyte replacement as ordered  - Monitor response to electrolyte replacements, including repeat lab results as appropriate  - Instruct patient on fluid and nutrition as appropriate  Outcome: Progressing  Goal: Fluid balance maintained  Description: INTERVENTIONS:  - Monitor labs   - Monitor I/O and WT  - Instruct patient on fluid and nutrition as appropriate  - Assess for signs & symptoms of volume excess or deficit  Outcome: Progressing

## 2024-07-16 ENCOUNTER — APPOINTMENT (INPATIENT)
Dept: DIALYSIS | Facility: HOSPITAL | Age: 64
DRG: 673 | End: 2024-07-16
Payer: MEDICARE

## 2024-07-16 PROBLEM — J45.40 MODERATE PERSISTENT ASTHMA: Status: ACTIVE | Noted: 2023-02-23

## 2024-07-16 LAB
ANION GAP SERPL CALCULATED.3IONS-SCNC: 19 MMOL/L (ref 4–13)
BUN SERPL-MCNC: 154 MG/DL (ref 5–25)
CALCIUM SERPL-MCNC: 9.8 MG/DL (ref 8.4–10.2)
CHLORIDE SERPL-SCNC: 102 MMOL/L (ref 96–108)
CO2 SERPL-SCNC: 15 MMOL/L (ref 21–32)
CREAT SERPL-MCNC: 11.51 MG/DL (ref 0.6–1.3)
ERYTHROCYTE [DISTWIDTH] IN BLOOD BY AUTOMATED COUNT: 15 % (ref 11.6–15.1)
GBM AB SER IA-ACNC: >8 UNITS (ref 0–0.9)
GFR SERPL CREATININE-BSD FRML MDRD: 3 ML/MIN/1.73SQ M
GLUCOSE SERPL-MCNC: 127 MG/DL (ref 65–140)
HCT VFR BLD AUTO: 30 % (ref 34.8–46.1)
HGB BLD-MCNC: 9.4 G/DL (ref 11.5–15.4)
MAGNESIUM SERPL-MCNC: 2.7 MG/DL (ref 1.9–2.7)
MCH RBC QN AUTO: 27.2 PG (ref 26.8–34.3)
MCHC RBC AUTO-ENTMCNC: 31.3 G/DL (ref 31.4–37.4)
MCV RBC AUTO: 87 FL (ref 82–98)
PHOSPHATE SERPL-MCNC: 11 MG/DL (ref 2.3–4.1)
PLA2R IGG SER IA-ACNC: <1.8 RU/ML (ref 0–19.9)
PLATELET # BLD AUTO: 289 THOUSANDS/UL (ref 149–390)
PMV BLD AUTO: 10.4 FL (ref 8.9–12.7)
POTASSIUM SERPL-SCNC: 5.3 MMOL/L (ref 3.5–5.3)
RBC # BLD AUTO: 3.45 MILLION/UL (ref 3.81–5.12)
SODIUM SERPL-SCNC: 136 MMOL/L (ref 135–147)
WBC # BLD AUTO: 11.63 THOUSAND/UL (ref 4.31–10.16)

## 2024-07-16 PROCEDURE — 84100 ASSAY OF PHOSPHORUS: CPT | Performed by: INTERNAL MEDICINE

## 2024-07-16 PROCEDURE — 5A1D70Z PERFORMANCE OF URINARY FILTRATION, INTERMITTENT, LESS THAN 6 HOURS PER DAY: ICD-10-PCS | Performed by: INTERNAL MEDICINE

## 2024-07-16 PROCEDURE — 99232 SBSQ HOSP IP/OBS MODERATE 35: CPT | Performed by: INTERNAL MEDICINE

## 2024-07-16 PROCEDURE — 83735 ASSAY OF MAGNESIUM: CPT | Performed by: INTERNAL MEDICINE

## 2024-07-16 PROCEDURE — 90935 HEMODIALYSIS ONE EVALUATION: CPT | Performed by: INTERNAL MEDICINE

## 2024-07-16 PROCEDURE — 80048 BASIC METABOLIC PNL TOTAL CA: CPT | Performed by: INTERNAL MEDICINE

## 2024-07-16 PROCEDURE — 85027 COMPLETE CBC AUTOMATED: CPT | Performed by: INTERNAL MEDICINE

## 2024-07-16 RX ORDER — SEVELAMER HYDROCHLORIDE 800 MG/1
1600 TABLET, FILM COATED ORAL
Status: DISCONTINUED | OUTPATIENT
Start: 2024-07-16 | End: 2024-08-04 | Stop reason: HOSPADM

## 2024-07-16 RX ORDER — TORSEMIDE 20 MG/1
40 TABLET ORAL DAILY
Status: DISCONTINUED | OUTPATIENT
Start: 2024-07-16 | End: 2024-07-22

## 2024-07-16 RX ADMIN — ATORVASTATIN CALCIUM 20 MG: 20 TABLET, FILM COATED ORAL at 10:48

## 2024-07-16 RX ADMIN — METOPROLOL TARTRATE 50 MG: 50 TABLET, FILM COATED ORAL at 16:37

## 2024-07-16 RX ADMIN — MONTELUKAST 10 MG: 10 TABLET, FILM COATED ORAL at 10:45

## 2024-07-16 RX ADMIN — PANTOPRAZOLE SODIUM 40 MG: 40 TABLET, DELAYED RELEASE ORAL at 10:53

## 2024-07-16 RX ADMIN — METOPROLOL TARTRATE 50 MG: 50 TABLET, FILM COATED ORAL at 05:37

## 2024-07-16 RX ADMIN — SEVELAMER HYDROCHLORIDE 1600 MG: 800 TABLET ORAL at 16:36

## 2024-07-16 RX ADMIN — PREDNISONE 60 MG: 20 TABLET ORAL at 10:47

## 2024-07-16 RX ADMIN — HEPARIN SODIUM 7500 UNITS: 5000 INJECTION INTRAVENOUS; SUBCUTANEOUS at 14:12

## 2024-07-16 RX ADMIN — VENLAFAXINE HYDROCHLORIDE 150 MG: 150 CAPSULE, EXTENDED RELEASE ORAL at 12:33

## 2024-07-16 RX ADMIN — SODIUM BICARBONATE 1300 MG: 650 TABLET ORAL at 14:12

## 2024-07-16 RX ADMIN — FLUTICASONE FUROATE AND VILANTEROL TRIFENATATE 1 PUFF: 200; 25 POWDER RESPIRATORY (INHALATION) at 10:50

## 2024-07-16 RX ADMIN — POLYSACCHARIDE-IRON COMPLEX 150 MG: 150 CAPSULE ORAL at 10:48

## 2024-07-16 RX ADMIN — TORSEMIDE 40 MG: 20 TABLET ORAL at 12:33

## 2024-07-16 RX ADMIN — FAMOTIDINE 10 MG: 20 TABLET ORAL at 12:37

## 2024-07-16 RX ADMIN — SODIUM BICARBONATE 1300 MG: 650 TABLET ORAL at 16:37

## 2024-07-16 RX ADMIN — SODIUM BICARBONATE 1300 MG: 650 TABLET ORAL at 10:46

## 2024-07-16 RX ADMIN — HEPARIN SODIUM 7500 UNITS: 5000 INJECTION INTRAVENOUS; SUBCUTANEOUS at 05:37

## 2024-07-16 RX ADMIN — LORATADINE 10 MG: 10 TABLET ORAL at 10:48

## 2024-07-16 RX ADMIN — ACETAMINOPHEN 650 MG: 325 TABLET, FILM COATED ORAL at 10:45

## 2024-07-16 RX ADMIN — TRAZODONE HYDROCHLORIDE 200 MG: 100 TABLET ORAL at 21:26

## 2024-07-16 RX ADMIN — HEPARIN SODIUM 7500 UNITS: 5000 INJECTION INTRAVENOUS; SUBCUTANEOUS at 21:26

## 2024-07-16 RX ADMIN — SEVELAMER HYDROCHLORIDE 1600 MG: 800 TABLET ORAL at 12:33

## 2024-07-16 RX ADMIN — PANTOPRAZOLE SODIUM 40 MG: 40 TABLET, DELAYED RELEASE ORAL at 05:37

## 2024-07-16 RX ADMIN — LAMOTRIGINE 100 MG: 100 TABLET ORAL at 10:46

## 2024-07-16 RX ADMIN — Medication 1 TABLET: at 10:47

## 2024-07-16 NOTE — PROGRESS NOTES
[facility]  Progress Note  Name: Ngozi Beard I  MRN: [id number]  Unit/Bed#: S -01 I Date of Admission: 7/12/2024   Date of Service: 7/16/2024 I Hospital Day: 4    Assessment & Plan   * Acute renal failure (ARF) (HCC)  Assessment & Plan  Patient admitted with acute renal failure with creatinine of 5.74 (baseline .8)  Patient had BUN 73 and GFR of 6  CT A/P: No hydronephrosis, however there is apparent 3 mm stone near or questionably within the distal right ureter (axial image 142). Given the lack of hydronephrosis this could either reflect adjacent calcification or nonobstructing stone.   Creatinine worsening today. Cr 7.15 from 6.02   Patient reports that she is still producing urine  US kidney and bladder: No hydronephrosis. 4 mm nonobstructing left intrarenal calculus  Urinalysis: 3+ blood.  3+ protein.  Innumerable red blood cells.  2-4 WBCs.  Moderate bacteria   AURA, C3, and C4 normal  Ig Kappa Free Light Chain:152.5   Ig Lambda Free Light Chain 87.1   Kappa/Lambda FluidC Ratio 1.75  Hepatitis panel nonreactive  HIV nonreactive  QuantiFERON gold negative  protein electrophoresis see labs  hypersensitivity pneumonitis profile negative  GBM antibodies: elevated >8  phospholipase A2 receptor Ab: Negative  7/16 Temporary R IJ dialysis catheter placed  IR Renal biopsy scheduled for Wednesday- 7/17/2024 2pm   Bronchoscopy tentatively planned for 7/17 or 7/18    Plan  Nephrology on board   Increase bicarb tabs to TID  Increase sevelamer  Start torsemide daily  Continue prednisone 60 mg daily   Code continue vit D/calcium   DDVAP 20 mcg one time IV 30 minutes pre renal biopsy Wednesday  First dialysis session today  NPO at midnight for renal biopsy tomorrow  Hold Heparin SQ on tomorrow 7/17  Repeat CBC 4 hours post biopsy  Hold home dyazide  F/u:  ANCA    Sepsis (HCC)-resolved as of 7/14/2024  Assessment & Plan    WBC   Date Value Ref Range Status   07/16/2024 11.63 (H) 4.31 -  10.16 Thousand/uL Final       Patient admitted with sepsis likely secondary to pneumonitis as evidenced by tachycardia, tachypnea and leukocytosis  CT: redemonstration of multiple diffuse scattered nodules, less pronounced than on 5/16/2024 suggestive of mycobacterial infection  QuantiFERON gold negative   Lactic acid normal, Procalcitonin trending up: 0.27 > 0.30 > .38 > .40, WBC still elevated, but downtrending  Sputum culture +2 gram negative rods, +1 gram positive cocci  BC: 1/2 staph epidermis detected-contaminated    Plan:  Completed Ceftriaxone x 5 days  Trend WBC and fever curve    Persistent cough  Assessment & Plan  Patient endorses worsening shortness of breath and difficulty breathing  Patient noted to have multiple diffuse scattered pulmonary nodules on CT  Quantiferon gold negative 7/9/24  Patient currently on room air    Plan  Completed Ceftriaxone x 5 days.  Tessalon Perles as needed    Abnormal CT of the chest  Assessment & Plan  CT chest: Re demonstration of multiple diffuse scattered nodules  Etiology unclear: HP vs sarcoid vs  vs MAC vs  atypical  CRP- 143.7   Sed-61, negative: QuantiFERON  pending: HP/ANCA  Procal: 0.27--0.30--0.38--0.40      Plan:  Bronchoscopy 7/17 or 7/18  NPO order in  Hold Heparin SQ tomorrow    Moderate persistent asthma w/out acute exacerbation  Assessment & Plan  Patient has PFTs ordered but they have not been completed.  Home medication regime Advair -21 mcg 2 puff twice daily, Proventil 2 puffs every 6 hours as needed    Plan:    Albuterol inhaler PRN  Continue benzonatate  Continue fluticasone-vilanterol  Continue loratadine 10 mg  Robitussin PRN    Abnormality of ascending aorta  Assessment & Plan  CT chest: Unchanged fusiform ectasia of the ascending thoracic aorta measuring up to 40 mm     Plan  Recommendation for follow-up low radiation dose chest CT in one year    Bipolar 1 disorder (HCC)  Assessment & Plan  Plan  Continue on Effexor, trazodone and  Lamictal    Primary hypertension  Assessment & Plan  Continue on Lopressor  Hold Dyazide  Hydralazine PRN  Monitor vitals as per protocol    Dyslipidemia  Assessment & Plan  Continue on atorvastatin 20 Mg         VTE Pharmacologic Prophylaxis: VTE Score: 4 Moderate Risk (Score 3-4) - Pharmacological DVT Prophylaxis Ordered: heparin.    Mobility:   Basic Mobility Inpatient Raw Score: 24  JH-HLM Goal: 8: Walk 250 feet or more  JH-HLM Achieved: 7: Walk 25 feet or more  JH-HLM Goal NOT achieved. Continue with multidisciplinary rounding and encourage appropriate mobility to improve upon JH-HLM goals.    Patient Centered Rounds: I performed bedside rounds with nursing staff today.   Discussions with Specialists or Other Care Team Provider: Nephrology, IR    Education and Discussions with Family / Patient: Attempted to update  (sister) via phone. Left voicemail.     Current Length of Stay: 4 day(s)  Current Patient Status: Inpatient   Discharge Plan: Anticipate discharge in >72 hrs to home.    Code Status: Level 1 - Full Code    Subjective:   Patient seen and examined at bedside. Patient's bronchoscopy was not completed yesterday due to the risk of uremic bleeding. It was decided to have a temporary dialysis catheter placed via IR yesterday. Patient's first hemodialysis session was this morning. Upon entering the room, patient was using 2 L of O2 via NC receiving dialysis. Patient reports continued shortness of breath and cough as well as feeling tired and cold from dialysis.  Patient was informed that this is normal experience during dialysis. Informed patient of plan for renal biopsy tomorrow and rescheduling of her bronchoscopy. She understands and agrees with current plan.    Was informed by nursing that patient was refusing telemetry.     Objective:     Vitals:   Temp (24hrs), Av.5 °F (36.4 °C), Min:97.4 °F (36.3 °C), Max:97.7 °F (36.5 °C)    Temp:  [97.4 °F (36.3 °C)-97.7 °F (36.5 °C)] 97.7 °F (36.5  °C)  HR:  [41-68] 46  Resp:  [14-18] 18  BP: (136-153)/(75-95) 140/75  SpO2:  [90 %-98 %] 90 %  Body mass index is 46.43 kg/m².     Input and Output Summary (last 24 hours):     Intake/Output Summary (Last 24 hours) at 7/16/2024 1513  Last data filed at 7/16/2024 1025  Gross per 24 hour   Intake 1220 ml   Output 500 ml   Net 720 ml       Physical Exam:   Physical Exam  Vitals and nursing note reviewed.   Constitutional:       Appearance: Normal appearance.   HENT:      Head: Normocephalic and atraumatic.      Mouth/Throat:      Mouth: Mucous membranes are moist.   Eyes:      Extraocular Movements: Extraocular movements intact.      Conjunctiva/sclera: Conjunctivae normal.      Pupils: Pupils are equal, round, and reactive to light.   Cardiovascular:      Rate and Rhythm: Normal rate and regular rhythm.   Pulmonary:      Effort: Pulmonary effort is normal. No respiratory distress.      Breath sounds: Normal breath sounds.   Abdominal:      General: Bowel sounds are normal. There is no distension.      Palpations: Abdomen is soft.      Tenderness: There is no abdominal tenderness. There is no guarding.   Musculoskeletal:      Right lower leg: Edema present.      Left lower leg: Edema present.      Comments: Trace edema noted to BLE   Skin:     General: Skin is warm and dry.   Neurological:      General: No focal deficit present.      Mental Status: She is alert. Mental status is at baseline.   Psychiatric:         Mood and Affect: Mood normal.        Additional Data:     Labs:  Results from last 7 days   Lab Units 07/16/24  0643 07/12/24  0621 07/11/24  0459   WBC Thousand/uL 11.63*   < > 12.33*   HEMOGLOBIN g/dL 9.4*   < > 9.6*   HEMATOCRIT % 30.0*   < > 30.1*   PLATELETS Thousands/uL 289   < > 244   SEGS PCT %  --   --  74   LYMPHO PCT %  --   --  13*   MONO PCT %  --   --  10   EOS PCT %  --   --  2    < > = values in this interval not displayed.     Results from last 7 days   Lab Units 07/16/24  0643  07/14/24  0441 07/13/24  0620 07/12/24  0621 07/11/24  0459   SODIUM mmol/L 136   < > 138   < > 140   POTASSIUM mmol/L 5.3   < > 4.9   < > 4.1   CHLORIDE mmol/L 102   < > 105   < > 106   CO2 mmol/L 15*   < > 19*   < > 21   BUN mg/dL 154*   < > 98*   < > 73*   CREATININE mg/dL 11.51*   < > 8.34*   < > 6.02*   ANION GAP mmol/L 19*   < > 14*   < > 13   CALCIUM mg/dL 9.8   < > 9.8   < > 9.0   ALBUMIN g/dL  --   --  3.3*  --  3.2*   TOTAL BILIRUBIN mg/dL  --   --   --   --  0.40   ALK PHOS U/L  --   --   --   --  58   ALT U/L  --   --   --   --  9   AST U/L  --   --   --   --  11*   GLUCOSE RANDOM mg/dL 127   < > 168*   < > 97    < > = values in this interval not displayed.     Results from last 7 days   Lab Units 07/13/24  0620   INR  0.98               Results from last 7 days   Lab Units 07/13/24  0620 07/12/24  0621 07/11/24  0459 07/11/24 0025 07/10/24  2022   LACTIC ACID mmol/L  --   --   --   --  0.9   PROCALCITONIN ng/ml 0.40* 0.38* 0.30* 0.27*  --        Lines/Drains:  Invasive Devices       Peripheral Intravenous Line  Duration             Peripheral IV 07/16/24 Proximal;Right;Ventral (anterior) Forearm <1 day              Hemodialysis Catheter  Duration             HD Temporary Double Catheter <1 day                    Imaging: Reviewed radiology reports from this admission including: chest xray, chest CT scan, abdominal/pelvic CT, and ultrasound kidney and bladder    Recent Cultures (last 7 days):   Results from last 7 days   Lab Units 07/11/24  0011 07/10/24  1914   BLOOD CULTURE   --  No Growth After 5 Days.  Staphylococcus epidermidis*   SPUTUM CULTURE  4+ Growth of  --    GRAM STAIN RESULT  1+ Polys*  2+ Gram negative rods*  1+ Gram positive cocci in clusters*  1+ Epithelial Cells* Gram positive cocci in clusters*       Last 24 Hours Medication List:   Current Facility-Administered Medications   Medication Dose Route Frequency Provider Last Rate    acetaminophen  650 mg Oral Q6H PRN Tram Palacios,  PA-HAYLEY      albuterol  2 puff Inhalation Q4H PRN Tram Palacios PA-C      atorvastatin  20 mg Oral Daily Tram Palacios PA-C      benzonatate  200 mg Oral TID PRN Tram Palacios PA-C      calcium carbonate-vitamin D  1 tablet Oral Daily With Breakfast Vane Estrada MD      dextromethorphan-guaiFENesin  10 mL Oral Q4H PRN Tram Palacios PA-C      famotidine  10 mg Oral Daily PRN Daya May DO      fluticasone-vilanterol  1 puff Inhalation Daily Tram Palacios PA-C      heparin (porcine)  7,500 Units Subcutaneous Q8H JACK Yasmine Meadows MD      hydrALAZINE  10 mg Intravenous Q6H PRN Tram Palacios PA-C      iron polysaccharides  150 mg Oral Daily Ata Burns MD      lamoTRIgine  100 mg Oral QAM Tram Palacios PA-C      loratadine  10 mg Oral Daily Tram Palacios PA-C      metoprolol tartrate  50 mg Oral Q12H JACK Tram Palacios PA-C      montelukast  10 mg Oral Daily Tram Palacios PA-C      oxybutynin  5 mg Oral Daily Tram Palacios PA-C      pantoprazole  40 mg Oral Daily Before Breakfast Ronny Webster MD      predniSONE  60 mg Oral Daily Vane Estrada MD      sevelamer  1,600 mg Oral TID With Meals Vane Estrada MD      sodium bicarbonate  1,300 mg Oral TID after meals Vane Estrada MD      torsemide  40 mg Oral Daily Vane Estrada MD      traZODone  200 mg Oral HS Tram Palacios PA-C      venlafaxine  150 mg Oral Daily Tram Palacios PA-C          Today, Patient Was Seen By: Jessica Beckford MD    **Please Note: This note may have been constructed using a voice recognition system.**

## 2024-07-16 NOTE — PROGRESS NOTES
Progress Note - Pulmonary   Ngozi Beard 64 y.o. female MRN: [id number]  Unit/Bed#: S -01 Encounter: [id number]    Assessment/Plan:    Acute pulmonary insufficiency  -98% on room air, patient does not wear any supplemental O2 at home  -Continue to maintain saturation greater than 89%  -Pulmonary hygiene: Deep breathing with cough, OOB as tolerated, incentive spirometry     Abnormal CT chest  -Seen in office by Dr. Rojas 06/26/2024 for consult preliminary lab work showed acute renal failure and patient was sent to the hospital  -CT chest 07/11/2024 shows unchanged extent of diffuse pulmonary nodularity  -Etiology unclear.  Rule out atypical infection versus developing pulmonary renal syndrome  -WBC 14.12-14.24-11.63  -Procalcitonin 0.30-0.38-0.40  -CRP-143.7, sed rate-61, QuantiFERON negative, HP/ANCA pending  -Ceftriaxone 5-day course completed  -Bronchoscopy on hold due to significant uremia, will complete bronchoscopy after few days of dialysis  -Will consider bronchoscopy tomorrow or Thursday    Moderate persistent asthma w/out acute exacerbation  -PFTs ordered but not completed  -Home medication regime Advair -21 mcg 2 puff twice daily, Proventil 2 puffs every 6 hours as needed    Acute renal failure  -Unknown etiology  -Creatinine 7.15-8.34-9.89-10.82-11.51  -First dialysis treatment completed today, patient tolerated well  -Renal biopsy scheduled for Wednesday  -Renal following      Chief Complaint:    I am tired    Subjective:    Patient seen and examined at bedside.  Found resting comfortably in bed.  First dialysis treatment just completed.  Patient tolerated well.  No overnight events reported.  Patient denies any fever chills night sweats chest pain or hemoptysis    Objective:    Vitals: Blood pressure 136/88, pulse (!) 49, temperature (!) 97.4 °F (36.3 °C), temperature source Oral, resp. rate 14, weight 119 kg (262 lb 2 oz), SpO2 98%.RA,Body mass index is 46.43  kg/m².      Intake/Output Summary (Last 24 hours) at 7/16/2024 1313  Last data filed at 7/16/2024 1025  Gross per 24 hour   Intake 1220 ml   Output 500 ml   Net 720 ml       Invasive Devices       Peripheral Intravenous Line  Duration             Peripheral IV 07/16/24 Proximal;Right;Ventral (anterior) Forearm <1 day              Hemodialysis Catheter  Duration             HD Temporary Double Catheter <1 day                    Physical Exam:     Physical Exam  Vitals reviewed.   Constitutional:       General: She is not in acute distress.     Appearance: She is obese. She is not ill-appearing.   HENT:      Head: Normocephalic and atraumatic.      Right Ear: External ear normal.      Left Ear: External ear normal.      Nose: Nose normal.      Mouth/Throat:      Mouth: Mucous membranes are moist.      Pharynx: Oropharynx is clear.   Eyes:      Extraocular Movements: Extraocular movements intact.      Pupils: Pupils are equal, round, and reactive to light.   Cardiovascular:      Rate and Rhythm: Normal rate and regular rhythm.      Pulses: Normal pulses.      Heart sounds: Normal heart sounds.   Pulmonary:      Effort: Pulmonary effort is normal.      Breath sounds: Normal breath sounds. No wheezing or rhonchi.   Abdominal:      General: Bowel sounds are normal.      Tenderness: There is no abdominal tenderness.   Musculoskeletal:         General: Normal range of motion.      Cervical back: Normal range of motion and neck supple.      Right lower leg: Edema present.      Left lower leg: Edema present.      Comments: +1 bilateral lower extremity edema   Skin:     General: Skin is warm and dry.   Neurological:      Mental Status: She is alert and oriented to person, place, and time.   Psychiatric:         Mood and Affect: Mood normal.         Behavior: Behavior normal.         Thought Content: Thought content normal.         Judgment: Judgment normal.         Labs: I have personally reviewed pertinent lab results., ABG:  "No results found for: \"PHART\", \"CNB4TRO\", \"PO2ART\", \"VKA1ZMZ\", \"Y4DLCYSC\", \"BEART\", \"SOURCE\", BNP: No results found for: \"BNP\", CBC:   Lab Results   Component Value Date    WBC 11.63 (H) 07/16/2024    HGB 9.4 (L) 07/16/2024    HCT 30.0 (L) 07/16/2024    MCV 87 07/16/2024     07/16/2024    RBC 3.45 (L) 07/16/2024    MCH 27.2 07/16/2024    MCHC 31.3 (L) 07/16/2024    RDW 15.0 07/16/2024    MPV 10.4 07/16/2024   , CMP:   Lab Results   Component Value Date    SODIUM 136 07/16/2024    K 5.3 07/16/2024     07/16/2024    CO2 15 (L) 07/16/2024     (H) 07/16/2024    CREATININE 11.51 (H) 07/16/2024    CALCIUM 9.8 07/16/2024    EGFR 3 07/16/2024         Imaging and other studies: I have personally reviewed pertinent reports.    CT chest 07/11/2024 1.  Unchanged extent of the diffuse pulmonary nodularity, though decreased size, density and conspicuity of the nodules. Findings are nonspecific, and assessment for environmental exposures is recommended.  2.  Unchanged fusiform ectasia of the ascending thoracic aorta measuring up to 40 mm. Recommendation is for follow-up low radiation dose chest CT in one year.  "

## 2024-07-16 NOTE — PLAN OF CARE
Problem: PAIN - ADULT  Goal: Verbalizes/displays adequate comfort level or baseline comfort level  Description: Interventions:  - Encourage patient to monitor pain and request assistance  - Assess pain using appropriate pain scale  - Administer analgesics based on type and severity of pain and evaluate response  - Implement non-pharmacological measures as appropriate and evaluate response  - Consider cultural and social influences on pain and pain management  - Notify physician/advanced practitioner if interventions unsuccessful or patient reports new pain  Outcome: Progressing     Problem: INFECTION - ADULT  Goal: Absence or prevention of progression during hospitalization  Description: INTERVENTIONS:  - Assess and monitor for signs and symptoms of infection  - Monitor lab/diagnostic results  - Monitor all insertion sites, i.e. indwelling lines, tubes, and drains  - Monitor endotracheal if appropriate and nasal secretions for changes in amount and color  - [place] appropriate cooling/warming therapies per order  - Administer medications as ordered  - Instruct and encourage patient and family to use good hand hygiene technique  - Identify and instruct in appropriate isolation precautions for identified infection/condition  Outcome: Progressing  Goal: Absence of fever/infection during neutropenic period  Description: INTERVENTIONS:  - Monitor WBC    Outcome: Progressing     Problem: SAFETY ADULT  Goal: Patient will remain free of falls  Description: INTERVENTIONS:  - Educate patient/family on patient safety including physical limitations  - Instruct patient to call for assistance with activity   - Consult OT/PT to assist with strengthening/mobility   - Keep Call bell within reach  - Keep bed low and locked with side rails adjusted as appropriate  - Keep care items and personal belongings within reach  - Initiate and maintain comfort rounds  - Make Fall Risk Sign visible to staff  - Offer Toileting every 2 Hours,  in advance of need  - Initiate/Maintain alarm  - Obtain necessary fall risk management equipment:   - Apply yellow socks and bracelet for high fall risk patients  - Consider moving patient to room near nurses station  Outcome: Progressing  Goal: Maintain or return to baseline ADL function  Description: INTERVENTIONS:  -  Assess patient's ability to carry out ADLs; assess patient's baseline for ADL function and identify physical deficits which impact ability to perform ADLs (bathing, care of mouth/teeth, toileting, grooming, dressing, etc.)  - Assess/evaluate cause of self-care deficits   - Assess range of motion  - Assess patient's mobility; develop plan if impaired  - Assess patient's need for assistive devices and provide as appropriate  - Encourage maximum independence but intervene and supervise when necessary  - Involve family in performance of ADLs  - Assess for home care needs following discharge   - Consider OT consult to assist with ADL evaluation and planning for discharge  - Provide patient education as appropriate  Outcome: Progressing  Goal: Maintains/Returns to pre admission functional level  Description: INTERVENTIONS:  - Perform AM-PAC 6 Click Basic Mobility/ Daily Activity assessment daily.  - Set and communicate daily mobility goal to care team and patient/family/caregiver.   - Collaborate with rehabilitation services on mobility goals if consulted  - Perform Range of Motion 2 times a day.  - Reposition patient every 2 hours.  - Dangle patient 3 times a day  - Stand patient 3 times a day  - Ambulate patient 3 times a day  - Out of bed to chair 3 times a day   - Out of bed for meals 3 times a day  - Out of bed for toileting  - Record patient progress and toleration of activity level   Outcome: Progressing     Problem: DISCHARGE PLANNING  Goal: Discharge to home or other facility with appropriate resources  Description: INTERVENTIONS:  - Identify barriers to discharge w/patient and caregiver  -  Arrange for needed discharge resources and transportation as appropriate  - Identify discharge learning needs (meds, wound care, etc.)  - Arrange for interpretive services to assist at discharge as needed  - Refer to Case Management Department for coordinating discharge planning if the patient needs post-hospital services based on physician/advanced practitioner order or complex needs related to functional status, cognitive ability, or social support system  Outcome: Progressing

## 2024-07-16 NOTE — PROGRESS NOTES
NEPHROLOGY HOSPITAL PROGRESS NOTE   Ngozi Beard 64 y.o. female MRN: [id number]  Unit/Bed#: S -01 Encounter: [id number]  Reason for Consult: ANGELICA    ASSESSMENT and PLAN:    64-year-old female with a past medical history of asthma, hypertension, bipolar disorder who initially presented at the request of her pulmonologist due to acute kidney injury.  Nephrology on board for acute kidney injury     1-acute kidney injury-     - Prior baseline creatinine less than 1 mg/dL was 0.8 mg/dL on May 2024  - Admission creatinine on 7/10 was 4.6 mg/dL  - Urinalysis with 3+ blood, 3+ protein, innumerable RBC, 2-4 WBC, moderate bacteria  - Renal imaging with renal ultrasound right kidney 13.2 cm, left kidney 12.5 cm, 1.8 cm simple cyst on left kidney without evidence of hydronephrosis and 4 mm nonobstructing left renal calculus  - Initial CT scan without contrast without hydronephrosis, lesions in both kidneys     Serological workup  - C3, C4-normal  - Hep C Ab neg  - hep panel non reactive  - HIV -nonreactive  - ASO- negative  - AURA-negative  - Anti-double-stranded DNA-negative  - ANCA-  - Anti-GBM-positive anti GBM  - QuantiFERON gold negative  - [id number]-pscfivxd.  Less than 1.8  -  - elevated  - SPEP-immuno fixation without monoclonal band  - UPEP-  - Free light chain-1.75 (152/87.1)  - UPCR 5.8 g/g  - Hemolysis smear-negative  - Iron panel-iron saturation of 11%.     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  Was transferred to [facility] as patient ultimately will need renal biopsy and pulmonary evaluation.  HCTZ and triamterene were held on admission  - 7/12-creatinine rising to 7.1 mg/dL. Dr Burns Filled out biopsy form and patient was transferred to [facility] in anticipation of renal biopsy.  Empirically started on  Solu-Medrol 500 mg  - 7/13-Solu-Medrol for dose #2.  Started on sodium bicarbonate tablets.  - 7/14-creatinine rising to 9.9 mg/dL. Received solumederol 250 mg (third dose of IV steroid (500 mg, 250 mg, 250 mg)  - 7/15 - creat rising 10.8 mg/dL. BUN rising. On pred 60 mg daily. Pending bronch.  Bronchoscopy was held due to risk of bleeding  - 7/16-treatment #1 for dialysis.  Hyperkalemia, starting dialysis.  Acidosis, starting dialysis and continue bicarbonate tablets today.  Hyperphosphatemia.  Increasing sevelamer.     Plan:  - Currently with temporary dialysis catheter.  If dialysis becomes prolonged, will need conversion to tunneled dialysis catheter.  - Placed on telemetry for dialysis initiation treatment today and tomorrow  - Increase sevelamer  - Start torsemide once daily  - Check daily weight  - Continue bicarbonate tablets today and likely hold later this week as the patient is now  on dialysis  - Low potassium diet when eating  - Continue oral prednisone for now  - Continue calcium and vitamin D  - Continue PPI  - Eventually will need PCP prophylaxis but depending on prednisone dosing  - Potential renal biopsy 7/17 at 2 PM  - Treatment #1 for dialysis today-blood flow rate 200, dialysate flow rate 300, access, temporary dialysis catheter, no ultrafiltration, 2-hour treatment  - Treatment #2 for dialysis planned for tomorrow morning prebiopsy  - Plan for DDAVP tomorrow 20 mcg 1 time at 1:30 PM.  Will be ordered tomorrow once biopsy plans are finalized  - Reviewed case with primary team resident earlier today and we are in agreement with dialysis being initiated  - Reviewed case with primary team attending today  - Reviewed case with IR attending and plan for biopsy tomorrow at 2 PM  - Will need CBC ordered 4 hours after biopsy  - Noted bronchoscopy held for now until patient is adequately cleared from a uremic standpoint  - Consent obtained from the patient for dialysis and placed in chart  - update - was  "notified by primary team resident that antiGBM has returned positive. I reviewed with primary team, and pulm team. Pt has received 1 gm solumed so far. Pulm will plan for bronch tomorrow after HD and lavage to rule out infection and then plan will be to pulse further after per review with pulm attending. I have asked primary team to begin to review case with Rheumatology also.      Portions of the record may have been created with voice recognition software. Occasional wrong word or \"sound a like\" substitutions may have occurred due to the inherent limitations of voice recognition software. Read the chart carefully and recognize, using context, where substitutions have occurred.If you have any questions, please contact the dictating provider.        2-electrolytes-     - Hyperphosphatemia likely in the setting of acute kidney injury.  Started sevelamer July 15.  Increase July 16     - Borderline hyperkalemia-low potassium diet when eating and initiating dialysis.  Also treatment of acidosis as above     3-acid/base-acidosis.  Initial intravenous fluids were held.  Initiated on sodium bicarbonate tablets 7/13.  And further increased on 7/14.  And starting dialysis 7/16     4-pulmonary nodules-     - Outpatient was following with pulmonary team closely  - CT scan suggestive of mycobacterial infection  - QuantiFERON gold negative  - Nonproductive cough  - Further workup in progress by pulmonary team  - Unclear if related to pulmonary renal syndrome  - Pending bronchoscopy     5-anemia-     - Serologies as above  - Iron deficient but holding IV iron as the patient was being evaluated for possible infectious etiologies  - on oral iron     6-hypertension-     - Initially holding triamterene-HCTZ  - on metoprolol  - Start torsemide     7-azotemia - BUN rising in setting of ANGELICA. Also received steroids which is contributing.    8-volume-start torsemide.  To attempt to promote nonoliguric state and for diuresis    SUBJECTIVE " / 24H INTERVAL HISTORY:  Blood pressure is 1 40-1 50 systolic.  Afebrile.  Bradycardic unchanged.  On room air.  Seen on dialysis    OBJECTIVE:  Current Weight:    Vitals:    07/16/24 0714 07/16/24 0825 07/16/24 0830 07/16/24 0900   BP: 145/86 153/90 148/95 141/78   BP Location:  Right arm     Pulse: 56 (!) 52 (!) 48 (!) 44   Resp: 16 16 14 (!) 0   Temp: (!) 97.4 °F (36.3 °C) (!) 97.4 °F (36.3 °C)     TempSrc:  Oral     SpO2: 91% 97% 97% 97%       Intake/Output Summary (Last 24 hours) at 7/16/2024 0912  Last data filed at 7/16/2024 0825  Gross per 24 hour   Intake 920 ml   Output --   Net 920 ml     Patient seen on dialysis.  Abdomen is not distended.  Trace visualized edema lower extremities.  Alert and oriented x 3.  Is not in respiratory distress.    Medications:    Current Facility-Administered Medications:     acetaminophen (TYLENOL) tablet 650 mg, 650 mg, Oral, Q6H PRN, Tram Palacios PA-C, 650 mg at 07/14/24 0829    albuterol (PROVENTIL HFA,VENTOLIN HFA) inhaler 2 puff, 2 puff, Inhalation, Q4H PRN, Tram Palacios PA-C, 2 puff at 07/14/24 0824    atorvastatin (LIPITOR) tablet 20 mg, 20 mg, Oral, Daily, Tram Palacios PA-C, 20 mg at 07/15/24 0831    benzonatate (TESSALON PERLES) capsule 200 mg, 200 mg, Oral, TID PRN, Tram Palacios PA-C    calcium carbonate-vitamin D 500 mg-5 mcg tablet 1 tablet, 1 tablet, Oral, Daily With Breakfast, Vane Estrada MD, 1 tablet at 07/15/24 1019    dextromethorphan-guaiFENesin (ROBITUSSIN DM) oral syrup 10 mL, 10 mL, Oral, Q4H PRN, Tram Palacios PA-C    famotidine (PEPCID) tablet 10 mg, 10 mg, Oral, Daily PRN, Daya Upasani, DO, 10 mg at 07/15/24 2126    fluticasone-vilanterol 200-25 mcg/actuation 1 puff, 1 puff, Inhalation, Daily, Tram Palacios PA-C, 1 puff at 07/15/24 0835    heparin (porcine) subcutaneous injection 7,500 Units, 7,500 Units, Subcutaneous, Q8H [facility], Yasmine Meadows MD, 7,500 Units at 07/16/24 0537    hydrALAZINE (APRESOLINE) injection 10 mg, 10 mg, Intravenous,  Q6H PRN, rTam Palacios PA-C    iron polysaccharides (FERREX) capsule 150 mg, 150 mg, Oral, Daily, Ata Burns MD, 150 mg at 07/15/24 0830    lamoTRIgine (LaMICtal) tablet 100 mg, 100 mg, Oral, QAM, Tram Palacios PA-C, 100 mg at 07/14/24 0824    loratadine (CLARITIN) tablet 10 mg, 10 mg, Oral, Daily, Tram Palacios PA-C, 10 mg at 07/15/24 0830    metoprolol tartrate (LOPRESSOR) tablet 50 mg, 50 mg, Oral, Q12H JACK, Tram Palacios PA-C, 50 mg at 07/16/24 0537    montelukast (SINGULAIR) tablet 10 mg, 10 mg, Oral, Daily, Tram Palacios PA-C, 10 mg at 07/15/24 0831    oxybutynin (DITROPAN-XL) 24 hr tablet 5 mg, 5 mg, Oral, Daily, Tram Palacios PA-C, 5 mg at 07/14/24 0823    pantoprazole (PROTONIX) EC tablet 40 mg, 40 mg, Oral, Daily Before Breakfast, Ronny Webster MD, 40 mg at 07/16/24 0537    predniSONE tablet 60 mg, 60 mg, Oral, Daily, Vane Estrada MD, 60 mg at 07/15/24 1019    sevelamer (RENAGEL) tablet 1,600 mg, 1,600 mg, Oral, TID With Meals, Vane Estrada MD    sodium bicarbonate tablet 1,300 mg, 1,300 mg, Oral, TID after meals, Vane Estrada MD, 1,300 mg at 07/15/24 1713    traZODone (DESYREL) tablet 200 mg, 200 mg, Oral, HS, Tram Palacios PA-C, 200 mg at 07/15/24 2126    venlafaxine (EFFEXOR-XR) 24 hr capsule 150 mg, 150 mg, Oral, Daily, Tram Palacios PA-C, 150 mg at 07/14/24 0823    Laboratory Results:  Results from last 7 days   Lab Units 07/16/24  0643 07/15/24  0431 07/14/24  0441 07/13/24  0620 07/12/24  0621 07/11/24  0459 07/11/24  0025 07/10/24  1742 07/09/24  1400   WBC Thousand/uL 11.63* 14.24* 14.12*  --  10.22* 12.33*  --  14.56* 11.25*   HEMOGLOBIN g/dL 9.4* 9.1* 9.1*  --  9.4* 9.6*  --  10.8* 11.3*   HEMATOCRIT % 30.0* 28.8* 29.3*  --  29.1* 30.1*  --  34.0* 35.6   PLATELETS Thousands/uL 289 296 280  --  229 244 241 294 298   POTASSIUM mmol/L 5.3 5.0 5.1 4.9 4.0 4.1  --  4.0 4.0   CHLORIDE mmol/L 102 103 104 105 106 106  --  106 105   CO2 mmol/L 15* 18* 19* 19* 19* 21  --  20* 23   BUN  mg/dL 154* 135* 117* 98* 81* 73*  --  74* 61*   CREATININE mg/dL 11.51* 10.82* 9.89* 8.34* 7.15* 6.02*  --  5.74* 4.58*   CALCIUM mg/dL 9.8 9.3 9.4 9.8 9.4 9.0  --  9.4 9.9   MAGNESIUM mg/dL 2.7  --   --   --   --  2.2  --   --   --    PHOSPHORUS mg/dL 11.0*  --   --  8.5*  --  5.8*  --   --   --

## 2024-07-16 NOTE — PLAN OF CARE
Post-Dialysis RN Treatment Note    Blood Pressure:  Pre 153/90 mm/Hg  Post 136/88 mmHg   EDW  TBD kg    Weight:  Pre 120.1 kg   Post 120.1 kg   Mode of weight measurement: Bed Scale   Volume Removed  0 ml    Treatment duration 120 minutes    NS given  No    Treatment shortened? No   Medications given during Rx Not Applicable   Estimated Kt/V  Not Applicable   Access type: Temporary HD catheter   Access Issues: No    Report called to primary nurse   Yes/    Lois Rider RN              Even run, 2 hrs, 2K bath  Problem: METABOLIC, FLUID AND ELECTROLYTES - ADULT  Goal: Electrolytes maintained within normal limits  Description: INTERVENTIONS:  - Monitor labs and assess patient for signs and symptoms of electrolyte imbalances  - Administer electrolyte replacement as ordered  - Monitor response to electrolyte replacements, including repeat lab results as appropriate  - Instruct patient on fluid and nutrition as appropriate  Outcome: Progressing  Goal: Fluid balance maintained  Description: INTERVENTIONS:  - Monitor labs   - Monitor I/O and WT  - Instruct patient on fluid and nutrition as appropriate  - Assess for signs & symptoms of volume excess or deficit  Outcome: Progressing

## 2024-07-16 NOTE — CASE MANAGEMENT
Case Management Assessment & Discharge Planning Note    Patient name Ngozi Beard  Location S /S -01 MRN [id number]  : 1960 Date 2024       Current Admission Date: 2024  Current Admission Diagnosis:Acute renal failure (ARF) ([facility])   Patient Active Problem List    Diagnosis Date Noted Date Diagnosed    Acute renal failure (ARF) ([facility]) 07/10/2024     Abnormal CT of the chest 2024     Abnormality of ascending aorta 2024     Postmenopausal 2024     Encounter for screening mammogram for malignant neoplasm of breast 2024     Allergic rhinitis 2024     Persistent cough 2024     Urge incontinence of urine 2024     Exercise intolerance 2024     Family history of coronary artery bypass graft surgery 2024     Urge urinary incontinence 2024     Female stress incontinence 2024     Paranoid schizophrenia ([facility]) 2023     Moderate persistent asthma w/out acute exacerbation 2023     Asthma due to seasonal allergies 2023     Bipolar 1 disorder ([facility]) 2022     Primary hypertension 2022     Dyslipidemia 2022     Morbid obesity with BMI of 45.0-49.9, adult ([facility]) 2022       LOS (days): 4  Geometric Mean LOS (GMLOS) (days): 5.1  Days to GMLOS:0.9     OBJECTIVE:    Risk of Unplanned Readmission Score: 17.77         Current admission status: Inpatient       Preferred Pharmacy:   [id number] [address] [address]  Phone: [phone] Fax: [phone]    [facility] - [place], [place] - [id number] [address] [address]  Phone: [phone] Fax: [phone]    Primary Care Provider: Meenakshi Balbuena MD    Primary Insurance: MEDICARE  Secondary [facility] [employer] [facility]    ASSESSMENT:  Active Health Care Proxies    There are no active Health  Care Proxies on file.                 Readmission Root Cause  30 Day Readmission: No    Patient Information  Admitted from:: Home  Mental Status: Alert  During Assessment patient was accompanied by: Not accompanied during assessment  Assessment information provided by:: Patient  Primary Caregiver: Self  Support Systems: Self, Friend  County of Residence: [place]  What city do you live in?: [place]  Home entry access options. Select all that apply.: Stairs  Number of steps to enter home.: 3  Do the steps have railings?: Yes  Type of Current Residence: Apartment  Floor Level: 1  Upon entering residence, is there a bedroom on the main floor (no further steps)?: Yes  Upon entering residence, is there a bathroom on the main floor (no further steps)?: Yes  Living Arrangements: Lives w/ Friend  Is patient a ?: No    Activities of Daily Living Prior to Admission  Functional Status: Independent  Completes ADLs independently?: Yes  Ambulates independently?: Yes  Does patient use assisted devices?: Yes  Assisted Devices (DME) used: Straight Cane  Does patient currently own DME?: Yes  What DME does the patient currently own?: Straight Cane  Does patient have a history of Outpatient Therapy (PT/OT)?: No  Does the patient have a history of Short-Term Rehab?: No  Does patient have a history of HHC?: No  Does patient currently have HHC?: No         Patient Information Continued  Income Source: SSI/SSD  Does patient have prescription coverage?: Yes  Does patient receive dialysis treatments?: No  Does patient have a history of substance abuse?: No  Does patient have a history of Mental Health Diagnosis?: Yes (Bipolar Dx)  Is patient receiving treatment for mental health?: Yes  Has patient received inpatient treatment related to mental health in the last 2 years?: No         Means of Transportation  Means of Transport to Appts:: Danni Baca      Social Determinants of Health (SDOH)      Flowsheet Row Most Recent Value    Housing Stability    In the last 12 months, was there a time when you were not able to pay the mortgage or rent on time? N   In the past 12 months, how many times have you moved where you were living? 0   At any time in the past 12 months, were you homeless or living in a shelter (including now)? N   Transportation Needs    In the past 12 months, has lack of transportation kept you from medical appointments or from getting medications? no   In the past 12 months, has lack of transportation kept you from meetings, work, or from getting things needed for daily living? No   Food Insecurity    Within the past 12 months, you worried that your food would run out before you got the money to buy more. Never true   Within the past 12 months, the food you bought just didn't last and you didn't have money to get more. Never true   Utilities    In the past 12 months has the electric, gas, oil, or water company threatened to shut off services in your home? No            DISCHARGE DETAILS:    Discharge planning discussed with:: Patient  Freedom of Choice: Yes  Comments - Freedom of Choice: Discussed possible need for HD at DC  CM contacted family/caregiver?: No- see comments  Were Treatment Team discharge recommendations reviewed with patient/caregiver?: Yes  Did patient/caregiver verbalize understanding of patient care needs?: Yes  Were patient/caregiver advised of the risks associated with not following Treatment Team discharge recommendations?: Yes    Contacts  Patient Contacts: Patient  Relationship to Patient:: Other (Comment) (Self)  Contact Method: In Person  Reason/Outcome: Discharge Planning, Continuity of Care    Requested Home Health Care         Is the patient interested in HHC at discharge?: No    DME Referral Provided  Referral made for DME?: No    Other Referral/Resources/Interventions Provided:  Interventions: Dialysis         Treatment Team Recommendation: Home  Discharge Destination Plan:: Home                                             CM met with patient at bedside.  CM name and role reviewed.  CM assessment completed and charted above.    No CM DC needs identified at this time however CM did discuss with patient the possible need for HD at DC.  If patient needs HD at DC preference would be for [facility].  Patient does not have a preference on days of the week  MWF or TTS but would like 3rd shift.  CM explained that this would be documented if needed.    CM reviewed discharge planning process including the following: identifying caregivers at home, preference for d/c planning needs, [facility] Meds to Bed program, availability of treatment team to discuss questions or concerns patient and/or family may have regarding diagnosis, plan of care, old or new medications and discharge planning.  CM will continue to follow for care coordination and update assessment as necessary.

## 2024-07-17 ENCOUNTER — ANESTHESIA EVENT (OUTPATIENT)
Dept: ANESTHESIOLOGY | Facility: HOSPITAL | Age: 64
End: 2024-07-17

## 2024-07-17 ENCOUNTER — APPOINTMENT (INPATIENT)
Dept: GASTROENTEROLOGY | Facility: HOSPITAL | Age: 64
DRG: 673 | End: 2024-07-17
Attending: INTERNAL MEDICINE
Payer: MEDICARE

## 2024-07-17 ENCOUNTER — APPOINTMENT (INPATIENT)
Dept: DIALYSIS | Facility: HOSPITAL | Age: 64
DRG: 673 | End: 2024-07-17
Payer: MEDICARE

## 2024-07-17 ENCOUNTER — ANESTHESIA EVENT (INPATIENT)
Dept: GASTROENTEROLOGY | Facility: HOSPITAL | Age: 64
End: 2024-07-17
Payer: MEDICARE

## 2024-07-17 ENCOUNTER — ANESTHESIA (INPATIENT)
Dept: GASTROENTEROLOGY | Facility: HOSPITAL | Age: 64
End: 2024-07-17
Payer: MEDICARE

## 2024-07-17 ENCOUNTER — ANESTHESIA (OUTPATIENT)
Dept: ANESTHESIOLOGY | Facility: HOSPITAL | Age: 64
End: 2024-07-17

## 2024-07-17 ENCOUNTER — APPOINTMENT (INPATIENT)
Dept: CT IMAGING | Facility: HOSPITAL | Age: 64
DRG: 673 | End: 2024-07-17
Attending: STUDENT IN AN ORGANIZED HEALTH CARE EDUCATION/TRAINING PROGRAM
Payer: MEDICARE

## 2024-07-17 LAB
ANION GAP SERPL CALCULATED.3IONS-SCNC: 14 MMOL/L (ref 4–13)
BUN SERPL-MCNC: 126 MG/DL (ref 5–25)
C-ANCA TITR SER IF: NORMAL TITER
CALCIUM SERPL-MCNC: 9.3 MG/DL (ref 8.4–10.2)
CHLORIDE SERPL-SCNC: 102 MMOL/L (ref 96–108)
CO2 SERPL-SCNC: 21 MMOL/L (ref 21–32)
CREAT SERPL-MCNC: 10.37 MG/DL (ref 0.6–1.3)
ERYTHROCYTE [DISTWIDTH] IN BLOOD BY AUTOMATED COUNT: 14.8 % (ref 11.6–15.1)
ERYTHROCYTE [DISTWIDTH] IN BLOOD BY AUTOMATED COUNT: 15 % (ref 11.6–15.1)
GFR SERPL CREATININE-BSD FRML MDRD: 3 ML/MIN/1.73SQ M
GLUCOSE SERPL-MCNC: 132 MG/DL (ref 65–140)
HCT VFR BLD AUTO: 28.8 % (ref 34.8–46.1)
HCT VFR BLD AUTO: 30.1 % (ref 34.8–46.1)
HGB BLD-MCNC: 9.4 G/DL (ref 11.5–15.4)
HGB BLD-MCNC: 9.6 G/DL (ref 11.5–15.4)
LYMPHOCYTES NFR BLD AUTO: 25 %
LYMPHOCYTES NFR BLD AUTO: 4 %
MACROPHAGES NFR FLD: 50 %
MACROPHAGES NFR FLD: 68 %
MAGNESIUM SERPL-MCNC: 2.5 MG/DL (ref 1.9–2.7)
MCH RBC QN AUTO: 27.2 PG (ref 26.8–34.3)
MCH RBC QN AUTO: 27.8 PG (ref 26.8–34.3)
MCHC RBC AUTO-ENTMCNC: 31.9 G/DL (ref 31.4–37.4)
MCHC RBC AUTO-ENTMCNC: 32.6 G/DL (ref 31.4–37.4)
MCV RBC AUTO: 85 FL (ref 82–98)
MCV RBC AUTO: 85 FL (ref 82–98)
MYELOPEROXIDASE AB SER IA-ACNC: <0.2 UNITS (ref 0–0.9)
NEUTS SEG NFR BLD AUTO: 25 %
NEUTS SEG NFR BLD AUTO: 28 %
P-ANCA ATYPICAL TITR SER IF: NORMAL TITER
P-ANCA TITR SER IF: NORMAL TITER
PATH REV: NO
PHOSPHATE SERPL-MCNC: 9.4 MG/DL (ref 2.3–4.1)
PLATELET # BLD AUTO: 250 THOUSANDS/UL (ref 149–390)
PLATELET # BLD AUTO: 283 THOUSANDS/UL (ref 149–390)
PMV BLD AUTO: 10.6 FL (ref 8.9–12.7)
PMV BLD AUTO: 9.9 FL (ref 8.9–12.7)
POTASSIUM SERPL-SCNC: 5 MMOL/L (ref 3.5–5.3)
PROTEINASE3 AB SER IA-ACNC: <0.2 UNITS (ref 0–0.9)
RBC # BLD AUTO: 3.38 MILLION/UL (ref 3.81–5.12)
RBC # BLD AUTO: 3.53 MILLION/UL (ref 3.81–5.12)
SODIUM SERPL-SCNC: 137 MMOL/L (ref 135–147)
TOTAL CELLS COUNTED SPEC: 100
TOTAL CELLS COUNTED SPEC: 100
WBC # BLD AUTO: 10.69 THOUSAND/UL (ref 4.31–10.16)
WBC # BLD AUTO: 11.53 THOUSAND/UL (ref 4.31–10.16)

## 2024-07-17 PROCEDURE — 88348 ELECTRON MICROSCOPY DX: CPT | Performed by: INTERNAL MEDICINE

## 2024-07-17 PROCEDURE — 5A1D70Z PERFORMANCE OF URINARY FILTRATION, INTERMITTENT, LESS THAN 6 HOURS PER DAY: ICD-10-PCS | Performed by: INTERNAL MEDICINE

## 2024-07-17 PROCEDURE — 87206 SMEAR FLUORESCENT/ACID STAI: CPT | Performed by: INTERNAL MEDICINE

## 2024-07-17 PROCEDURE — 0B938ZZ DRAINAGE OF RIGHT MAIN BRONCHUS, VIA NATURAL OR ARTIFICIAL OPENING ENDOSCOPIC: ICD-10-PCS | Performed by: INTERNAL MEDICINE

## 2024-07-17 PROCEDURE — 31645 BRNCHSC W/THER ASPIR 1ST: CPT | Performed by: INTERNAL MEDICINE

## 2024-07-17 PROCEDURE — 88300 SURGICAL PATH GROSS: CPT | Performed by: STUDENT IN AN ORGANIZED HEALTH CARE EDUCATION/TRAINING PROGRAM

## 2024-07-17 PROCEDURE — 87205 SMEAR GRAM STAIN: CPT | Performed by: INTERNAL MEDICINE

## 2024-07-17 PROCEDURE — 99232 SBSQ HOSP IP/OBS MODERATE 35: CPT | Performed by: INTERNAL MEDICINE

## 2024-07-17 PROCEDURE — 88305 TISSUE EXAM BY PATHOLOGIST: CPT | Performed by: INTERNAL MEDICINE

## 2024-07-17 PROCEDURE — 0B978ZZ DRAINAGE OF LEFT MAIN BRONCHUS, VIA NATURAL OR ARTIFICIAL OPENING ENDOSCOPIC: ICD-10-PCS | Performed by: INTERNAL MEDICINE

## 2024-07-17 PROCEDURE — 88313 SPECIAL STAINS GROUP 2: CPT | Performed by: INTERNAL MEDICINE

## 2024-07-17 PROCEDURE — 87252 VIRUS INOCULATION TISSUE: CPT | Performed by: INTERNAL MEDICINE

## 2024-07-17 PROCEDURE — 87070 CULTURE OTHR SPECIMN AEROBIC: CPT | Performed by: INTERNAL MEDICINE

## 2024-07-17 PROCEDURE — 31624 DX BRONCHOSCOPE/LAVAGE: CPT | Performed by: INTERNAL MEDICINE

## 2024-07-17 PROCEDURE — 89051 BODY FLUID CELL COUNT: CPT | Performed by: INTERNAL MEDICINE

## 2024-07-17 PROCEDURE — 88329 PATH CONSLTJ DRG SURG: CPT | Performed by: STUDENT IN AN ORGANIZED HEALTH CARE EDUCATION/TRAINING PROGRAM

## 2024-07-17 PROCEDURE — 88346 IMFLUOR 1ST 1ANTB STAIN PX: CPT | Performed by: INTERNAL MEDICINE

## 2024-07-17 PROCEDURE — 0B958ZX DRAINAGE OF RIGHT MIDDLE LOBE BRONCHUS, VIA NATURAL OR ARTIFICIAL OPENING ENDOSCOPIC, DIAGNOSTIC: ICD-10-PCS | Performed by: INTERNAL MEDICINE

## 2024-07-17 PROCEDURE — 83735 ASSAY OF MAGNESIUM: CPT | Performed by: INTERNAL MEDICINE

## 2024-07-17 PROCEDURE — 87102 FUNGUS ISOLATION CULTURE: CPT | Performed by: INTERNAL MEDICINE

## 2024-07-17 PROCEDURE — 0TB03ZX EXCISION OF RIGHT KIDNEY, PERCUTANEOUS APPROACH, DIAGNOSTIC: ICD-10-PCS | Performed by: RADIOLOGY

## 2024-07-17 PROCEDURE — 77012 CT SCAN FOR NEEDLE BIOPSY: CPT | Performed by: RADIOLOGY

## 2024-07-17 PROCEDURE — 88350 IMFLUOR EA ADDL 1ANTB STN PX: CPT | Performed by: INTERNAL MEDICINE

## 2024-07-17 PROCEDURE — 87116 MYCOBACTERIA CULTURE: CPT | Performed by: INTERNAL MEDICINE

## 2024-07-17 PROCEDURE — 0B998ZX DRAINAGE OF LINGULA BRONCHUS, VIA NATURAL OR ARTIFICIAL OPENING ENDOSCOPIC, DIAGNOSTIC: ICD-10-PCS | Performed by: INTERNAL MEDICINE

## 2024-07-17 PROCEDURE — 88112 CYTOPATH CELL ENHANCE TECH: CPT | Performed by: STUDENT IN AN ORGANIZED HEALTH CARE EDUCATION/TRAINING PROGRAM

## 2024-07-17 PROCEDURE — 50200 RENAL BIOPSY PERQ: CPT | Performed by: RADIOLOGY

## 2024-07-17 PROCEDURE — 80048 BASIC METABOLIC PNL TOTAL CA: CPT | Performed by: INTERNAL MEDICINE

## 2024-07-17 PROCEDURE — 85027 COMPLETE CBC AUTOMATED: CPT | Performed by: INTERNAL MEDICINE

## 2024-07-17 PROCEDURE — 87798 DETECT AGENT NOS DNA AMP: CPT | Performed by: INTERNAL MEDICINE

## 2024-07-17 PROCEDURE — 84100 ASSAY OF PHOSPHORUS: CPT | Performed by: INTERNAL MEDICINE

## 2024-07-17 RX ORDER — LIDOCAINE HYDROCHLORIDE 20 MG/ML
INJECTION, SOLUTION EPIDURAL; INFILTRATION; INTRACAUDAL; PERINEURAL AS NEEDED
Status: DISCONTINUED | OUTPATIENT
Start: 2024-07-17 | End: 2024-07-17

## 2024-07-17 RX ORDER — DEXAMETHASONE SODIUM PHOSPHATE 10 MG/ML
INJECTION, SOLUTION INTRAMUSCULAR; INTRAVENOUS AS NEEDED
Status: DISCONTINUED | OUTPATIENT
Start: 2024-07-17 | End: 2024-07-17

## 2024-07-17 RX ORDER — LIDOCAINE WITH 8.4% SOD BICARB 0.9%(10ML)
SYRINGE (ML) INJECTION AS NEEDED
Status: COMPLETED | OUTPATIENT
Start: 2024-07-17 | End: 2024-07-17

## 2024-07-17 RX ORDER — FENTANYL CITRATE 50 UG/ML
INJECTION, SOLUTION INTRAMUSCULAR; INTRAVENOUS AS NEEDED
Status: DISCONTINUED | OUTPATIENT
Start: 2024-07-17 | End: 2024-07-17

## 2024-07-17 RX ORDER — HYDRALAZINE HYDROCHLORIDE 20 MG/ML
INJECTION INTRAMUSCULAR; INTRAVENOUS AS NEEDED
Status: COMPLETED | OUTPATIENT
Start: 2024-07-17 | End: 2024-07-17

## 2024-07-17 RX ORDER — PROPOFOL 10 MG/ML
INJECTION, EMULSION INTRAVENOUS AS NEEDED
Status: DISCONTINUED | OUTPATIENT
Start: 2024-07-17 | End: 2024-07-17

## 2024-07-17 RX ORDER — SODIUM CHLORIDE, SODIUM LACTATE, POTASSIUM CHLORIDE, CALCIUM CHLORIDE 600; 310; 30; 20 MG/100ML; MG/100ML; MG/100ML; MG/100ML
INJECTION, SOLUTION INTRAVENOUS CONTINUOUS PRN
Status: DISCONTINUED | OUTPATIENT
Start: 2024-07-17 | End: 2024-07-17

## 2024-07-17 RX ORDER — ONDANSETRON 2 MG/ML
INJECTION INTRAMUSCULAR; INTRAVENOUS AS NEEDED
Status: DISCONTINUED | OUTPATIENT
Start: 2024-07-17 | End: 2024-07-17

## 2024-07-17 RX ORDER — GLYCOPYRROLATE 0.2 MG/ML
INJECTION INTRAMUSCULAR; INTRAVENOUS AS NEEDED
Status: DISCONTINUED | OUTPATIENT
Start: 2024-07-17 | End: 2024-07-17

## 2024-07-17 RX ORDER — HYDRALAZINE HYDROCHLORIDE 20 MG/ML
10 INJECTION INTRAMUSCULAR; INTRAVENOUS EVERY 6 HOURS PRN
Status: DISCONTINUED | OUTPATIENT
Start: 2024-07-17 | End: 2024-07-18

## 2024-07-17 RX ORDER — MIDAZOLAM HYDROCHLORIDE 2 MG/2ML
INJECTION, SOLUTION INTRAMUSCULAR; INTRAVENOUS AS NEEDED
Status: COMPLETED | OUTPATIENT
Start: 2024-07-17 | End: 2024-07-17

## 2024-07-17 RX ORDER — ALBUTEROL SULFATE 2.5 MG/3ML
2.5 SOLUTION RESPIRATORY (INHALATION) ONCE AS NEEDED
Status: DISCONTINUED | OUTPATIENT
Start: 2024-07-17 | End: 2024-07-19

## 2024-07-17 RX ORDER — ONDANSETRON 2 MG/ML
4 INJECTION INTRAMUSCULAR; INTRAVENOUS ONCE AS NEEDED
Status: DISCONTINUED | OUTPATIENT
Start: 2024-07-17 | End: 2024-07-19

## 2024-07-17 RX ORDER — FENTANYL CITRATE 50 UG/ML
INJECTION, SOLUTION INTRAMUSCULAR; INTRAVENOUS AS NEEDED
Status: COMPLETED | OUTPATIENT
Start: 2024-07-17 | End: 2024-07-17

## 2024-07-17 RX ADMIN — FENTANYL CITRATE 50 MCG: 50 INJECTION INTRAMUSCULAR; INTRAVENOUS at 12:36

## 2024-07-17 RX ADMIN — DEXAMETHASONE SODIUM PHOSPHATE 10 MG: 10 INJECTION, SOLUTION INTRAMUSCULAR; INTRAVENOUS at 12:38

## 2024-07-17 RX ADMIN — HYDRALAZINE HYDROCHLORIDE 10 MG: 20 INJECTION, SOLUTION INTRAMUSCULAR; INTRAVENOUS at 14:59

## 2024-07-17 RX ADMIN — PANTOPRAZOLE SODIUM 40 MG: 40 TABLET, DELAYED RELEASE ORAL at 05:57

## 2024-07-17 RX ADMIN — PROPOFOL 150 MG: 10 INJECTION, EMULSION INTRAVENOUS at 12:31

## 2024-07-17 RX ADMIN — PROPOFOL 50 MG: 10 INJECTION, EMULSION INTRAVENOUS at 12:32

## 2024-07-17 RX ADMIN — SODIUM CHLORIDE, SODIUM LACTATE, POTASSIUM CHLORIDE, AND CALCIUM CHLORIDE: .6; .31; .03; .02 INJECTION, SOLUTION INTRAVENOUS at 12:27

## 2024-07-17 RX ADMIN — TRAZODONE HYDROCHLORIDE 200 MG: 100 TABLET ORAL at 21:00

## 2024-07-17 RX ADMIN — MIDAZOLAM 0.5 MG: 1 INJECTION INTRAMUSCULAR; INTRAVENOUS at 14:55

## 2024-07-17 RX ADMIN — ONDANSETRON 4 MG: 2 INJECTION INTRAMUSCULAR; INTRAVENOUS at 12:41

## 2024-07-17 RX ADMIN — MIDAZOLAM 0.5 MG: 1 INJECTION INTRAMUSCULAR; INTRAVENOUS at 14:38

## 2024-07-17 RX ADMIN — LAMOTRIGINE 100 MG: 100 TABLET ORAL at 16:57

## 2024-07-17 RX ADMIN — HYDRALAZINE HYDROCHLORIDE 10 MG: 20 INJECTION, SOLUTION INTRAMUSCULAR; INTRAVENOUS at 14:29

## 2024-07-17 RX ADMIN — FENTANYL CITRATE 25 MCG: 50 INJECTION INTRAMUSCULAR; INTRAVENOUS at 14:23

## 2024-07-17 RX ADMIN — PROPOFOL 80 MCG/KG/MIN: 10 INJECTION, EMULSION INTRAVENOUS at 12:33

## 2024-07-17 RX ADMIN — FENTANYL CITRATE 25 MCG: 50 INJECTION INTRAMUSCULAR; INTRAVENOUS at 14:55

## 2024-07-17 RX ADMIN — SEVELAMER HYDROCHLORIDE 1600 MG: 800 TABLET ORAL at 16:33

## 2024-07-17 RX ADMIN — FENTANYL CITRATE 25 MCG: 50 INJECTION INTRAMUSCULAR; INTRAVENOUS at 14:38

## 2024-07-17 RX ADMIN — Medication 10 ML: at 14:52

## 2024-07-17 RX ADMIN — ALBUTEROL SULFATE 2 PUFF: 108 AEROSOL, METERED RESPIRATORY (INHALATION) at 16:57

## 2024-07-17 RX ADMIN — HEPARIN SODIUM 7500 UNITS: 5000 INJECTION INTRAVENOUS; SUBCUTANEOUS at 05:56

## 2024-07-17 RX ADMIN — MIDAZOLAM 1 MG: 1 INJECTION INTRAMUSCULAR; INTRAVENOUS at 14:23

## 2024-07-17 RX ADMIN — DESMOPRESSIN ACETATE 20 MCG: 4 INJECTION INTRAVENOUS; SUBCUTANEOUS at 11:45

## 2024-07-17 RX ADMIN — METOPROLOL TARTRATE 50 MG: 50 TABLET, FILM COATED ORAL at 05:57

## 2024-07-17 RX ADMIN — PREDNISONE 60 MG: 20 TABLET ORAL at 16:32

## 2024-07-17 RX ADMIN — FLUTICASONE FUROATE AND VILANTEROL TRIFENATATE 1 PUFF: 200; 25 POWDER RESPIRATORY (INHALATION) at 16:31

## 2024-07-17 RX ADMIN — GLYCOPYRROLATE 0.4 MG: 0.2 INJECTION INTRAMUSCULAR; INTRAVENOUS at 12:35

## 2024-07-17 RX ADMIN — LIDOCAINE HYDROCHLORIDE 80 MG: 20 INJECTION, SOLUTION EPIDURAL; INFILTRATION; INTRACAUDAL at 12:31

## 2024-07-17 NOTE — BRIEF OP NOTE (RAD/CATH)
INTERVENTIONAL RADIOLOGY PROCEDURE NOTE    Date: 7/17/2024    Procedure:   Procedure Summary       Date:  Room / Location:     Anesthesia Start:  Anesthesia Stop:     Procedure:  Diagnosis:     Scheduled Providers:  Responsible Provider:     Anesthesia Type: Not recorded ASA Status: Not recorded            Preoperative diagnosis:   1. Acute renal failure (ARF) (HCC)    2. Abnormal CT of the chest    3. Acute kidney injury (HCC)    4. Acute renal failure, unspecified acute renal failure type (HCC)    5. Abnormal chest CT    6. ANGELICA (acute kidney injury) (HCC)         Postoperative diagnosis: Same.    Surgeon: Sandoval Castro MD     Assistant: None. No qualified resident was available.    Blood loss: Minimal    Specimens: 2, 18 G cores     Findings: Non-target renal biopsy, D-stat used post procedure.    Complications: None immediate.    Anesthesia: conscious sedation

## 2024-07-17 NOTE — PROGRESS NOTES
[facility]  Progress Note  Name: Ngozi Beard I  MRN: [id number]  Unit/Bed#: S -01 I Date of Admission: 7/12/2024   Date of Service: 7/17/2024 I Hospital Day: 5    Assessment & Plan   * Acute renal failure (ARF) ([facility])  Assessment & Plan  Patient admitted with acute renal failure with creatinine of 5.74 (baseline .8)  Patient had BUN 73 and GFR of 6  CT A/P: No hydronephrosis, however there is apparent 3 mm stone near or questionably within the distal right ureter (axial image 142). Given the lack of hydronephrosis this could either reflect adjacent calcification or nonobstructing stone.   Creatinine worsening today. Cr 7.15 from 6.02   Patient reports that she is still producing urine  US kidney and bladder: No hydronephrosis. 4 mm nonobstructing left intrarenal calculus  Urinalysis: 3+ blood.  3+ protein.  Innumerable red blood cells.  2-4 WBCs.  Moderate bacteria   AURA, C3, and C4 normal  Ig Kappa Free Light Chain:152.5   Ig Lambda Free Light Chain 87.1   Kappa/Lambda FluidC Ratio 1.75  Hepatitis panel nonreactive  HIV nonreactive  QuantiFERON gold negative  protein electrophoresis see labs  hypersensitivity pneumonitis profile negative  GBM antibodies: elevated >8  phospholipase A2 receptor Ab: Negative  ANCA: negative  7/16 Temporary R IJ dialysis catheter placed    Plan  Nephrology on board   Hold bicarb tabs to TID  Sevelamer   torsemide daily  Continue prednisone 60 mg daily   Code continue vit D/calcium   DDVAP 20 mcg one time IV 30 minutes prior to renal biopsy   NPO for renal biopsy and bronchoscopy today 7/17  Hold Heparin SQ   Repeat CBC 4 hours post biopsy  HD today  Monitor on telemetry  Hold home dyazide    Persistent cough  Assessment & Plan  Patient endorses worsening shortness of breath and difficulty breathing  Patient noted to have multiple diffuse scattered pulmonary nodules on CT  Quantiferon gold negative 7/9/24  Patient currently on room  air    Plan  Completed Ceftriaxone x 5 days.  Tessalon Perles as needed    Abnormal CT of the chest  Assessment & Plan  CT chest: Re demonstration of multiple diffuse scattered nodules  Etiology unclear: HP vs sarcoid vs  vs MAC vs  atypical  CRP- 143.7   Sed-61, negative: QuantiFERON  pending: HP/ANCA  Procal: 0.27--0.30--0.38--0.40      Plan:  NPO for bronchoscopy and renal biopsy today 7/17  Hold Heparin    Moderate persistent asthma w/out acute exacerbation  Assessment & Plan  Patient has PFTs ordered but they have not been completed.  Home medication regime Advair -21 mcg 2 puff twice daily, Proventil 2 puffs every 6 hours as needed    Plan:    Albuterol inhaler PRN  Continue benzonatate  Continue fluticasone-vilanterol  Continue loratadine 10 mg  Robitussin PRN    Abnormality of ascending aorta  Assessment & Plan  CT chest: Unchanged fusiform ectasia of the ascending thoracic aorta measuring up to 40 mm     Plan  Recommendation for follow-up low radiation dose chest CT in one year    Bipolar 1 disorder (HCC)  Assessment & Plan  Plan  Continue on Effexor, trazodone and Lamictal    Primary hypertension  Assessment & Plan  Hold Lopressor due to bradycardia  Hold Dyazide  Hydralazine PRN  Monitor vitals as per protocol    Dyslipidemia  Assessment & Plan  Continue on atorvastatin 20 Mg    Sepsis (HCC)-resolved as of 7/14/2024  Assessment & Plan    WBC   Date Value Ref Range Status   07/17/2024 11.53 (H) 4.31 - 10.16 Thousand/uL Final     Patient admitted with sepsis likely secondary to pneumonitis as evidenced by tachycardia, tachypnea and leukocytosis  CT: redemonstration of multiple diffuse scattered nodules, less pronounced than on 5/16/2024 suggestive of mycobacterial infection  QuantiFERON gold negative   Lactic acid normal, Procalcitonin trending up: 0.27 > 0.30 > .38 > .40  Sputum culture +2 gram negative rods, +1 gram positive cocci  BC: 1/2 staph epidermis detected-contaminated  Leukocytosis  likely due to steroid use    Plan:  Completed Ceftriaxone x 5 days         VTE Pharmacologic Prophylaxis: VTE Score: 4 Moderate Risk (Score 3-4) - Pharmacological DVT Prophylaxis Ordered: heparin.    Mobility:   Basic Mobility Inpatient Raw Score: 24  JH-HLM Goal: 8: Walk 250 feet or more  JH-HLM Achieved: 7: Walk 25 feet or more  JH-HLM Goal NOT achieved. Continue with multidisciplinary rounding and encourage appropriate mobility to improve upon JH-HLM goals.    Patient Centered Rounds: I performed bedside rounds with nursing staff today.   Discussions with Specialists or Other Care Team Provider: Nephrology, IR    Education and Discussions with Family / Patient: Attempted to update  (sister) via phone. Left voicemail.     Current Length of Stay: 5 day(s)  Current Patient Status: Inpatient   Discharge Plan: Anticipate discharge in >72 hrs to home.    Code Status: Level 1 - Full Code    Subjective:   Patient seen and examined at bedside. She reports unchanged shortness of breath and cough as well as feeling tired.  Patient being dialyzed today. She is scheduled for bronchoscopy and renal biopsy today.  Discussed plan with the patient.  She understands and agrees. Saturating well on room air    Objective:     Vitals:   Temp (24hrs), Av.5 °F (36.4 °C), Min:96.4 °F (35.8 °C), Max:98.3 °F (36.8 °C)    Temp:  [96.4 °F (35.8 °C)-98.3 °F (36.8 °C)] 97 °F (36.1 °C)  HR:  [47-89] 74  Resp:  [16-35] 24  BP: (118-167)/(65-97) 120/74  SpO2:  [92 %-100 %] 99 %  Body mass index is 45.11 kg/m².     Input and Output Summary (last 24 hours):     Intake/Output Summary (Last 24 hours) at 2024 1512  Last data filed at 2024 1248  Gross per 24 hour   Intake 700 ml   Output 589 ml   Net 111 ml       Physical Exam:   Physical Exam  Vitals and nursing note reviewed.   Constitutional:       Appearance: Normal appearance.   HENT:      Head: Normocephalic and atraumatic.      Mouth/Throat:      Mouth: Mucous  membranes are moist.   Eyes:      Extraocular Movements: Extraocular movements intact.      Conjunctiva/sclera: Conjunctivae normal.      Pupils: Pupils are equal, round, and reactive to light.   Cardiovascular:      Rate and Rhythm: Normal rate and regular rhythm.   Pulmonary:      Effort: Pulmonary effort is normal. No respiratory distress.      Breath sounds: Normal breath sounds.   Abdominal:      General: Bowel sounds are normal. There is no distension.      Palpations: Abdomen is soft.      Tenderness: There is no abdominal tenderness. There is no guarding.   Musculoskeletal:      Right lower leg: Edema present.      Left lower leg: Edema present.      Comments: Trace edema noted to BLE   Skin:     General: Skin is warm and dry.   Neurological:      General: No focal deficit present.      Mental Status: She is alert. Mental status is at baseline.   Psychiatric:         Mood and Affect: Mood normal.        Additional Data:     Labs:  Results from last 7 days   Lab Units 07/17/24 0447 07/12/24 0621 07/11/24  0459   WBC Thousand/uL 11.53*   < > 12.33*   HEMOGLOBIN g/dL 9.6*   < > 9.6*   HEMATOCRIT % 30.1*   < > 30.1*   PLATELETS Thousands/uL 283   < > 244   SEGS PCT %  --   --  74   LYMPHO PCT %  --   --  13*   MONO PCT %  --   --  10   EOS PCT %  --   --  2    < > = values in this interval not displayed.     Results from last 7 days   Lab Units 07/17/24 0447 07/14/24 0441 07/13/24  0620 07/12/24 0621 07/11/24  0459   SODIUM mmol/L 137   < > 138   < > 140   POTASSIUM mmol/L 5.0   < > 4.9   < > 4.1   CHLORIDE mmol/L 102   < > 105   < > 106   CO2 mmol/L 21   < > 19*   < > 21   BUN mg/dL 126*   < > 98*   < > 73*   CREATININE mg/dL 10.37*   < > 8.34*   < > 6.02*   ANION GAP mmol/L 14*   < > 14*   < > 13   CALCIUM mg/dL 9.3   < > 9.8   < > 9.0   ALBUMIN g/dL  --   --  3.3*  --  3.2*   TOTAL BILIRUBIN mg/dL  --   --   --   --  0.40   ALK PHOS U/L  --   --   --   --  58   ALT U/L  --   --   --   --  9   AST U/L  --    --   --   --  11*   GLUCOSE RANDOM mg/dL 132   < > 168*   < > 97    < > = values in this interval not displayed.     Results from last 7 days   Lab Units 07/13/24  0620   INR  0.98               Results from last 7 days   Lab Units 07/13/24  0620 07/12/24  0621 07/11/24  0459 07/11/24  0025 07/10/24  2022   LACTIC ACID mmol/L  --   --   --   --  0.9   PROCALCITONIN ng/ml 0.40* 0.38* 0.30* 0.27*  --        Lines/Drains:  Invasive Devices       Peripheral Intravenous Line  Duration             Peripheral IV 07/17/24 Left;Ventral (anterior) Forearm <1 day              Hemodialysis Catheter  Duration             HD Temporary Double Catheter 1 day                    Imaging: Reviewed radiology reports from this admission including: chest xray, chest CT scan, abdominal/pelvic CT, and ultrasound kidney and bladder    Recent Cultures (last 7 days):   Results from last 7 days   Lab Units 07/11/24  0011 07/10/24  1914   BLOOD CULTURE   --  No Growth After 5 Days.  Staphylococcus epidermidis*   SPUTUM CULTURE  4+ Growth of  --    GRAM STAIN RESULT  1+ Polys*  2+ Gram negative rods*  1+ Gram positive cocci in clusters*  1+ Epithelial Cells* Gram positive cocci in clusters*       Last 24 Hours Medication List:   Current Facility-Administered Medications   Medication Dose Route Frequency Provider Last Rate    acetaminophen  650 mg Oral Q6H PRN Tram Palacios PA-C      albuterol  2 puff Inhalation Q4H PRN Tram Palacios PA-C      albuterol  2.5 mg Nebulization Once PRN Mayte Salmon CRNA      atorvastatin  20 mg Oral Daily Tram Palacios PA-C      benzonatate  200 mg Oral TID PRN Tram Palacios PA-C      calcium carbonate-vitamin D  1 tablet Oral Daily With Breakfast Vane Estrada MD      dextromethorphan-guaiFENesin  10 mL Oral Q4H PRN Tram Palacios PA-C      famotidine  10 mg Oral Daily PRN Daya May DO      fentaNYL   Intravenous PRN Sandoval Castro MD      fluticasone-vilanterol  1 puff Inhalation  Daily Tram Palacios PA-C      hydrALAZINE  10 mg Intravenous Q6H PRN Vane Estrada MD      hydrALAZINE    PRN Sandoval Castro MD      iron polysaccharides  150 mg Oral Daily Ata Burns MD      lamoTRIgine  100 mg Oral QAM Tram Palacios PA-C      lidocaine 1% buffered   Infiltration PRN Sandoval Castro MD      loratadine  10 mg Oral Daily Tram Palacios PA-C      midazolam    PRN Sandoval Castro MD      montelukast  10 mg Oral Daily Tram Palacios PA-C      ondansetron  4 mg Intravenous Once PRN Mayte Salmon CRNA      oxybutynin  5 mg Oral Daily Tram Palacios PA-C      pantoprazole  40 mg Oral Daily Before Breakfast Ronny Webster MD      predniSONE  60 mg Oral Daily Vane Estrada MD      sevelamer  1,600 mg Oral TID With Meals Vane Estrada MD      torsemide  40 mg Oral Daily Vane Estrada MD      traZODone  200 mg Oral HS Tram Palacios PA-C      venlafaxine  150 mg Oral Daily Tram Palacios PA-C          Today, Patient Was Seen By: Jessica Beckford MD    **Please Note: This note may have been constructed using a voice recognition system.**

## 2024-07-17 NOTE — PROGRESS NOTES
NEPHROLOGY HOSPITAL PROGRESS NOTE   Ngozi Beard 64 y.o. female MRN: [id number]  Unit/Bed#: S -01 Encounter: [id number]  Reason for Consult: ANGELICA    ASSESSMENT and PLAN:    64-year-old female with a past medical history of asthma, hypertension, bipolar disorder who initially presented at the request of her pulmonologist due to acute kidney injury.  Nephrology on board for acute kidney injury     1-acute kidney injury-concern for autoimmune disorder     - Prior baseline creatinine less than 1 mg/dL was 0.8 mg/dL on May 2024  - Admission creatinine on 7/10 was 4.6 mg/dL  - Urinalysis with 3+ blood, 3+ protein, innumerable RBC, 2-4 WBC, moderate bacteria  - Renal imaging with renal ultrasound right kidney 13.2 cm, left kidney 12.5 cm, 1.8 cm simple cyst on left kidney without evidence of hydronephrosis and 4 mm nonobstructing left renal calculus  - Initial CT scan without contrast without hydronephrosis, lesions in both kidneys     Serological workup  - C3, C4-normal  - Hep C Ab neg  - hep panel non reactive  - HIV -nonreactive  - ASO- negative  - AURA-negative  - Anti-double-stranded DNA-negative  - ANCA-  - Anti-GBM-positive anti GBM > 8  - QuantiFERON gold negative  - [id number]-bdulycen.  Less than 1.8  -  - elevated  - SPEP-immuno fixation without monoclonal band  - UPEP-  - Free light chain-1.75 (152/87.1)  - UPCR 5.8 g/g  - Hemolysis smear-negative  - Iron panel-iron saturation of 11%.     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  Was transferred to [facility] as patient ultimately will need renal biopsy and pulmonary evaluation.  HCTZ and triamterene were held on admission  - 7/12-creatinine rising to 7.1 mg/dL. Dr Burns Filled out biopsy form and patient was transferred to [facility] in anticipation of  "renal biopsy.  Empirically started on Solu-Medrol 500 mg  - 7/13-Solu-Medrol for dose #2.  Started on sodium bicarbonate tablets.  - 7/14-creatinine rising to 9.9 mg/dL. Received solumederol 250 mg (third dose of IV steroid (500 mg, 250 mg, 250 mg)  - 7/15 - creat rising 10.8 mg/dL. BUN rising. On pred 60 mg daily. Pending bronch.  Bronchoscopy was held due to risk of bleeding  - 7/16-treatment #1 for dialysis.  Hyperkalemia, starting dialysis.  Acidosis, starting dialysis and continue bicarbonate tablets today.  Hyperphosphatemia.  Increasing sevelamer.  - 7/17 - treatment #2 for dialysis     Plan:  - Currently with temporary dialysis catheter.  If dialysis becomes prolonged, will need conversion to tunneled dialysis catheter.  - tele while HD today  - renal biopsy today and bronch today  - DDAVP prior to procedure - ordered and reviewed with RN team timing before procedure  - HD tomorrow and then likely TTS  - once cleared by Pulm team will plan for solumederol 1 gm for two more doses and then final plans once biopsy results return  - cont oral pred for now  - Check daily weight  - hold bicarb tabs  - CBC ordered post biopsy and reviewed with primary team resident for call back if needed  - reviewed case with IR Attending and we are in agreement for biopsy today  - reviewed with primary team Attending and primary team resident and we are in agreement for renal plan as above including dialysis and rest of plan as outlined in this note  - will eventually need PCP proph  - Review case with pulmonary team and IR team and coordinated with teams regarding DDAVP with nursing team prior to bronchoscopy and then patient will go directly from bronchoscopy to biopsy for kidney.     Portions of the record may have been created with voice recognition software. Occasional wrong word or \"sound a like\" substitutions may have occurred due to the inherent limitations of voice recognition software. Read the chart carefully and " recognize, using context, where substitutions have occurred.If you have any questions, please contact the dictating provider.        2-electrolytes-     - Hyperphosphatemia likely in the setting of acute kidney injury.  Started sevelamer July 15.  Increase July 16 and phos improving 7/17. Cont to trend     - Borderline hyperkalemia-low potassium diet when eating and initiating dialysis.  Also treatment of acidosis as above. Improving 7/17. Cont to trend and HD as above     3-acid/base-acidosis.  Initial intravenous fluids were held.  Initiated on sodium bicarbonate tablets 7/13.  And further increased on 7/14.  And starting dialysis 7/16 and improving bicarb 7/17, hold bicarb tabs     4-pulmonary nodules-     - Outpatient was following with pulmonary team closely  - CT scan suggestive of mycobacterial infection  - QuantiFERON gold negative  - Nonproductive cough  - Further workup in progress by pulmonary team  - Unclear if related to pulmonary renal syndrome  - Pending bronchoscopy 7/17     5-anemia-     - Serologies as above  - Iron deficient but holding IV iron as the patient was being evaluated for possible infectious etiologies  - on oral iron     6-hypertension-     - Initially holding triamterene-HCTZ  - on metoprolol  - Start torsemide 7/16  - BP appropriate 7/17 but if rises post biopsy there is hydralazine prn     7-azotemia - BUN rising in setting of ANGELICA. Also received steroids which is contributing. Improving with clearance     8-volume-start torsemide.  To attempt to promote nonoliguric state and for diuresis. Euvolemic 7/17    9 - anti GBM positive Ab    - received 1 gm solumederol thus far. Awaiting bronch and then will repeat doses for 2 gm total more once cleared by pulm team  - on pred oral daily for now  - cont triston/vit D  - cont PPI  - will need PCP proph eventually  - renal biopsy pending 7/17    SUBJECTIVE / 24H INTERVAL HISTORY:  Pt with cough, non bloody unchanged. No other complaints.      OBJECTIVE:  Current Weight: Weight - Scale: 115 kg (254 lb 9.6 oz)  Vitals:    07/17/24 0557 07/17/24 0558 07/17/24 0600 07/17/24 0730   BP: 148/85 148/85  148/85   BP Location:       Pulse: (!) 52 (!) 52  57   Resp:    21   Temp:    98.3 °F (36.8 °C)   TempSrc:       SpO2:  95%  95%   Weight:   115 kg (254 lb 9.6 oz)        Intake/Output Summary (Last 24 hours) at 7/17/2024 0904  Last data filed at 7/17/2024 0730  Gross per 24 hour   Intake 300 ml   Output 500 ml   Net -200 ml     General: NAD  Skin: no rash  Eyes: anicteric sclera  ENT: moist mucous membrane  Neck: supple  Chest: CTA b/l, no ronchii, no wheeze, no rubs, no sig rales  CVS: s1s2, no murmur, no gallop, no rub  Abdomen: soft, nontender, nl sounds  Extremities: trace edema LE b/l, R IJ temp HD cath with dried blood noted  : no sherwood  Neuro: AAOX3  Psych: normal affect    Medications:    Current Facility-Administered Medications:     acetaminophen (TYLENOL) tablet 650 mg, 650 mg, Oral, Q6H PRN, Tram Palacios PA-C, 650 mg at 07/16/24 1045    albuterol (PROVENTIL HFA,VENTOLIN HFA) inhaler 2 puff, 2 puff, Inhalation, Q4H PRN, Tram Palacios PA-C, 2 puff at 07/14/24 0824    atorvastatin (LIPITOR) tablet 20 mg, 20 mg, Oral, Daily, Tram Palacios PA-C, 20 mg at 07/16/24 1048    benzonatate (TESSALON PERLES) capsule 200 mg, 200 mg, Oral, TID PRN, Tram Palacios PA-C    calcium carbonate-vitamin D 500 mg-5 mcg tablet 1 tablet, 1 tablet, Oral, Daily With Breakfast, Vane Estrada MD, 1 tablet at 07/16/24 1047    desmopressin (DDAVP) 20 mcg in sodium chloride 0.9 % 50 mL IVPB, 20 mcg, Intravenous, Once, Vane Estrada MD    dextromethorphan-guaiFENesin (ROBITUSSIN DM) oral syrup 10 mL, 10 mL, Oral, Q4H PRN, Tram Palacios PA-C    famotidine (PEPCID) tablet 10 mg, 10 mg, Oral, Daily PRN, Daya May DO, 10 mg at 07/16/24 1237    fluticasone-vilanterol 200-25 mcg/actuation 1 puff, 1 puff, Inhalation, Daily, Tram Palacios PA-C, 1 puff at 07/16/24  1050    hydrALAZINE (APRESOLINE) injection 10 mg, 10 mg, Intravenous, Q6H PRN, Tram Palacios PA-C    iron polysaccharides (FERREX) capsule 150 mg, 150 mg, Oral, Daily, Ata Burns MD, 150 mg at 07/16/24 1048    lamoTRIgine (LaMICtal) tablet 100 mg, 100 mg, Oral, QAM, Tram Palacios PA-C, 100 mg at 07/16/24 1046    loratadine (CLARITIN) tablet 10 mg, 10 mg, Oral, Daily, Tram Palacios PA-C, 10 mg at 07/16/24 1048    metoprolol tartrate (LOPRESSOR) tablet 50 mg, 50 mg, Oral, Q12H JACK, Tram Palacios PA-C, 50 mg at 07/17/24 0557    montelukast (SINGULAIR) tablet 10 mg, 10 mg, Oral, Daily, Tram Palacios PA-C, 10 mg at 07/16/24 1045    oxybutynin (DITROPAN-XL) 24 hr tablet 5 mg, 5 mg, Oral, Daily, Tram Palacios PA-C, 5 mg at 07/14/24 0823    pantoprazole (PROTONIX) EC tablet 40 mg, 40 mg, Oral, Daily Before Breakfast, Ronny Webster MD, 40 mg at 07/17/24 0557    predniSONE tablet 60 mg, 60 mg, Oral, Daily, Vane Estrada MD, 60 mg at 07/16/24 1047    sevelamer (RENAGEL) tablet 1,600 mg, 1,600 mg, Oral, TID With Meals, Vane Estrada MD, 1,600 mg at 07/16/24 1636    torsemide (DEMADEX) tablet 40 mg, 40 mg, Oral, Daily, Vane Estrada MD, 40 mg at 07/16/24 1233    traZODone (DESYREL) tablet 200 mg, 200 mg, Oral, HS, Tram Palacios PA-C, 200 mg at 07/16/24 2126    venlafaxine (EFFEXOR-XR) 24 hr capsule 150 mg, 150 mg, Oral, Daily, Tram Palacios PA-C, 150 mg at 07/16/24 1233    Laboratory Results:  Results from last 7 days   Lab Units 07/17/24  0447 07/16/24  0643 07/15/24  0431 07/14/24  0441 07/13/24  0620 07/12/24  0621 07/11/24  0459 07/11/24  0025 07/10/24  1742   WBC Thousand/uL 11.53* 11.63* 14.24* 14.12*  --  10.22* 12.33*  --  14.56*   HEMOGLOBIN g/dL 9.6* 9.4* 9.1* 9.1*  --  9.4* 9.6*  --  10.8*   HEMATOCRIT % 30.1* 30.0* 28.8* 29.3*  --  29.1* 30.1*  --  34.0*   PLATELETS Thousands/uL 283 289 296 280  --  229 244 241 294   POTASSIUM mmol/L 5.0 5.3 5.0 5.1 4.9 4.0 4.1  --  4.0   CHLORIDE mmol/L 102 102 103  104 105 106 106  --  106   CO2 mmol/L 21 15* 18* 19* 19* 19* 21  --  20*   BUN mg/dL 126* 154* 135* 117* 98* 81* 73*  --  74*   CREATININE mg/dL 10.37* 11.51* 10.82* 9.89* 8.34* 7.15* 6.02*  --  5.74*   CALCIUM mg/dL 9.3 9.8 9.3 9.4 9.8 9.4 9.0  --  9.4   MAGNESIUM mg/dL 2.5 2.7  --   --   --   --  2.2  --   --    PHOSPHORUS mg/dL 9.4* 11.0*  --   --  8.5*  --  5.8*  --   --

## 2024-07-17 NOTE — PLAN OF CARE
TX plan reviewed with Dr ENOC Estrada and he is agreeable to the following: Goal is to Run Even on a 2 K+ bath fo a morning serum K+ of 5.0, 2 HR and 30 min TX. Pt ry of TX 5 mins early to go for bronchoscopy. Pt felt dizzy just prior to reinfusion; pt felt better after blood returned; Dr ENOC Estrada is aware.     Problem: METABOLIC, FLUID AND ELECTROLYTES - ADULT  Goal: Electrolytes maintained within normal limits  Description: INTERVENTIONS:  - Monitor labs and assess patient for signs and symptoms of electrolyte imbalances  - Administer electrolyte replacement as ordered  - Monitor response to electrolyte replacements, including repeat lab results as appropriate  - Instruct patient on fluid and nutrition as appropriate  Outcome: Progressing  Goal: Fluid balance maintained  Description: INTERVENTIONS:  - Monitor labs   - Monitor I/O and WT  - Instruct patient on fluid and nutrition as appropriate  - Assess for signs & symptoms of volume excess or deficit  Outcome: Progressing     Post-Dialysis RN Treatment Note    Blood Pressure:  Pre 155/84 mm/Hg  Post 136/86 mmHg   EDW  TBD kg    Weight:  Pre 115.5 kg   Post 115.5 kg   Mode of weight measurement: Standing Scale   Volume Removed  0 ml    Treatment duration 145 minutes    NS given  No    Treatment shortened? Yes, describe: by 5 mins    Medications given during Rx None Reported   Estimated Kt/V  None Reported   Access type: Temporary HD catheter   Access Issues: No    Report called to primary nurse   Yes,    Taniya Renee

## 2024-07-17 NOTE — ANESTHESIA PREPROCEDURE EVALUATION
"Procedure:  BRONCHOSCOPY  Chest CT with nodular disease throughout both lungs, not a classic appearance for diffuse alveolar hemorrhage  Acute kidney injury with concerns for pulmonary renal syndrome/Goodpasture's given GBM positivity    Relevant Problems   CARDIO   (+) Abnormality of ascending aorta   (+) Primary hypertension      /RENAL   (+) Acute renal failure (ARF) (HCC)      NEURO/PSYCH   (+) Paranoid schizophrenia (HCC)      PULMONARY   (+) Asthma due to seasonal allergies   (+) Moderate persistent asthma w/out acute exacerbation        Prev easy intubation    Recent labs personally reviewed:  Lab Results   Component Value Date    WBC 11.53 (H) 07/17/2024    HGB 9.6 (L) 07/17/2024     07/17/2024     Lab Results   Component Value Date    K 5.0 07/17/2024     (H) 07/17/2024    CREATININE 10.37 (H) 07/17/2024     No results found for: \"PTT\"   Lab Results   Component Value Date    INR 0.98 07/13/2024       Lab Results   Component Value Date    HGBA1C 6.6 (H) 07/09/2024             Physical Exam    Airway    Mallampati score: II  TM Distance: >3 FB  Neck ROM: full     Dental   No notable dental hx     Cardiovascular      Pulmonary      Other Findings  post-pubertal.      Anesthesia Plan  ASA Score- 3     Anesthesia Type- general with ASA Monitors.         Additional Monitors:     Airway Plan: LMA.           Plan Factors-    Chart reviewed.  Imaging results reviewed. Existing labs reviewed. Patient summary reviewed.          Obstructive sleep apnea risk education given perioperatively.        Induction- intravenous.    Postoperative Plan-     Perioperative Resuscitation Plan - Level 1 - Full Code.       Informed Consent- Anesthetic plan and risks discussed with patient.  I personally reviewed this patient with the CRNA. Discussed and agreed on the Anesthesia Plan with the CRNA..        "

## 2024-07-17 NOTE — PROGRESS NOTES
Progress Note - Pulmonary   Ngozi Beard 64 y.o. female MRN: [id number]  Unit/Bed#: S -01 Encounter: [id number]    Assessment/Plan:    Acute pulmonary insufficiency-resolved  -98% on room air, patient does not wear any supplemental O2 at home  -Continue to maintain saturation greater than 89%  -Pulmonary hygiene encouraged: Deep breathing with cough, OOB as tolerated, incentive spirometry    Abnormal CT chest  -Seen in office by Dr. Rojas 06/26/2024 for consult preliminary lab work showed acute renal failure and patient was sent to the hospital  -CT chest 07/11/2024 shows unchanged extent of diffuse pulmonary nodularity  -Etiology unclear.  Rule out atypical infection versus developing pulmonary renal syndrome  -WBC 14.24-11.63-11.53  -Procalcitonin 0.30-0.38-0.40  -CRP-143.7, sed rate-61, QuantiFERON negative, HP/ANCA pending  -Ceftriaxone 5-day course completed  -Bronchoscopy to be completed today after renal biopsy    Moderate persistent asthma without acute exacerbation  -PFTs ordered but not completed  -Home medication regime Advair -21 mcg 2 puff twice daily, Proventil 2 puffs every 6 hours as needed     Acute renal failure  -Unknown etiology  -Creatinine 9.89-10.82-11.51-10.37  -Second dialysis treatment started this morning  -Plan for renal biopsy later on today  -Renal following    Chief Complaint:    I guess I am okay    Subjective:    Patient seen and examined at bedside.  Found resting comfortably in bed.  Second dialysis treatment just starting.  No overnight events reported.  Patient denies any fever chills night sweats chest pain or hemoptysis    Objective:    Vitals: Blood pressure 160/91, pulse (!) 51, temperature 97.7 °F (36.5 °C), temperature source Oral, resp. rate 18, weight 115 kg (254 lb 10.1 oz), SpO2 95%.RA,Body mass index is 45.11 kg/m².      Intake/Output Summary (Last 24 hours) at 7/17/2024 1047  Last data filed at 7/17/2024 0905  Gross per 24 hour   Intake 200 ml  "  Output --   Net 200 ml       Invasive Devices       Peripheral Intravenous Line  Duration             Peripheral IV 07/16/24 Proximal;Right;Ventral (anterior) Forearm 1 day              Hemodialysis Catheter  Duration             HD Temporary Double Catheter 1 day                    Physical Exam:     Physical Exam  Vitals reviewed.   Constitutional:       General: She is not in acute distress.     Appearance: She is obese. She is not ill-appearing.   HENT:      Head: Normocephalic and atraumatic.      Mouth/Throat:      Mouth: Mucous membranes are moist.      Pharynx: Oropharynx is clear.   Eyes:      Extraocular Movements: Extraocular movements intact.      Pupils: Pupils are equal, round, and reactive to light.   Cardiovascular:      Rate and Rhythm: Normal rate and regular rhythm.      Pulses: Normal pulses.      Heart sounds: Normal heart sounds.   Pulmonary:      Effort: Pulmonary effort is normal.      Breath sounds: Normal breath sounds.   Abdominal:      General: Bowel sounds are normal.      Tenderness: There is no abdominal tenderness.   Musculoskeletal:         General: Normal range of motion.      Cervical back: Normal range of motion and neck supple.      Right lower leg: Edema present.      Left lower leg: Edema present.      Comments: Trace edema in bilateral lower extremities   Skin:     General: Skin is warm and dry.   Neurological:      Mental Status: She is alert and oriented to person, place, and time.   Psychiatric:         Mood and Affect: Mood normal.         Behavior: Behavior normal.         Thought Content: Thought content normal.         Judgment: Judgment normal.       Labs: I have personally reviewed pertinent lab results., ABG: No results found for: \"PHART\", \"DUJ3SZZ\", \"PO2ART\", \"XCW2FXI\", \"Y0VVMLCA\", \"BEART\", \"SOURCE\", BNP: No results found for: \"BNP\", CBC:   Lab Results   Component Value Date    WBC 11.53 (H) 07/17/2024    HGB 9.6 (L) 07/17/2024    HCT 30.1 (L) 07/17/2024    MCV 85 " 07/17/2024     07/17/2024    RBC 3.53 (L) 07/17/2024    MCH 27.2 07/17/2024    MCHC 31.9 07/17/2024    RDW 14.8 07/17/2024    MPV 10.6 07/17/2024   , CMP:   Lab Results   Component Value Date    SODIUM 137 07/17/2024    K 5.0 07/17/2024     07/17/2024    CO2 21 07/17/2024     (H) 07/17/2024    CREATININE 10.37 (H) 07/17/2024    CALCIUM 9.3 07/17/2024    EGFR 3 07/17/2024       Imaging and other studies: I have personally reviewed pertinent reports.    CT chest 07/11/2024 shows unchanged extent of diffuse pulmonary nodularity, though decreased in size, density and conspicuity of the nodules.  Findings are nonspecific and assessment for environmental exposures recommended.  Unchanged fusiform ectasia of the ascending thoracic aorta measuring up to 40 mm.  Recommendation is for follow-up low radiation dose CT in 1 year

## 2024-07-17 NOTE — PLAN OF CARE
Problem: PAIN - ADULT  Goal: Verbalizes/displays adequate comfort level or baseline comfort level  Description: Interventions:  - Encourage patient to monitor pain and request assistance  - Assess pain using appropriate pain scale  - Administer analgesics based on type and severity of pain and evaluate response  - Implement non-pharmacological measures as appropriate and evaluate response  - Consider cultural and social influences on pain and pain management  - Notify physician/advanced practitioner if interventions unsuccessful or patient reports new pain  Outcome: Progressing     Problem: INFECTION - ADULT  Goal: Absence or prevention of progression during hospitalization  Description: INTERVENTIONS:  - Assess and monitor for signs and symptoms of infection  - Monitor lab/diagnostic results  - Monitor all insertion sites, i.e. indwelling lines, tubes, and drains  - Monitor endotracheal if appropriate and nasal secretions for changes in amount and color  - [place] appropriate cooling/warming therapies per order  - Administer medications as ordered  - Instruct and encourage patient and family to use good hand hygiene technique  - Identify and instruct in appropriate isolation precautions for identified infection/condition  Outcome: Progressing  Goal: Absence of fever/infection during neutropenic period  Description: INTERVENTIONS:  - Monitor WBC    Outcome: Progressing     Problem: SAFETY ADULT  Goal: Patient will remain free of falls  Description: INTERVENTIONS:  - Educate patient/family on patient safety including physical limitations  - Instruct patient to call for assistance with activity   - Consult OT/PT to assist with strengthening/mobility   - Keep Call bell within reach  - Keep bed low and locked with side rails adjusted as appropriate  - Keep care items and personal belongings within reach  - Initiate and maintain comfort rounds  - Make Fall Risk Sign visible to staff  - Apply yellow socks and bracelet  for high fall risk patients  - Consider moving patient to room near nurses station  Outcome: Progressing  Goal: Maintain or return to baseline ADL function  Description: INTERVENTIONS:  -  Assess patient's ability to carry out ADLs; assess patient's baseline for ADL function and identify physical deficits which impact ability to perform ADLs (bathing, care of mouth/teeth, toileting, grooming, dressing, etc.)  - Assess/evaluate cause of self-care deficits   - Assess range of motion  - Assess patient's mobility; develop plan if impaired  - Assess patient's need for assistive devices and provide as appropriate  - Encourage maximum independence but intervene and supervise when necessary  - Involve family in performance of ADLs  - Assess for home care needs following discharge   - Consider OT consult to assist with ADL evaluation and planning for discharge  - Provide patient education as appropriate  Outcome: Progressing  Goal: Maintains/Returns to pre admission functional level  Description: INTERVENTIONS:  - Perform AM-PAC 6 Click Basic Mobility/ Daily Activity assessment daily.  - Set and communicate daily mobility goal to care team and patient/family/caregiver.   - Collaborate with rehabilitation services on mobility goals if consulted  - Perform Range of Motion 3 times a day.  - Reposition patient every 2 hours.  - Dangle patient 3 times a day  - Stand patient 3 times a day  - Ambulate patient 3 times a day  - Out of bed to chair 3 times a day   - Out of bed for meals 3 times a day  - Out of bed for toileting  - Record patient progress and toleration of activity level   Outcome: Progressing     Problem: DISCHARGE PLANNING  Goal: Discharge to home or other facility with appropriate resources  Description: INTERVENTIONS:  - Identify barriers to discharge w/patient and caregiver  - Arrange for needed discharge resources and transportation as appropriate  - Identify discharge learning needs (meds, wound care, etc.)  -  Arrange for interpretive services to assist at discharge as needed  - Refer to Case Management Department for coordinating discharge planning if the patient needs post-hospital services based on physician/advanced practitioner order or complex needs related to functional status, cognitive ability, or social support system  Outcome: Progressing

## 2024-07-17 NOTE — CASE MANAGEMENT
Case Management Progress Note    Patient name Ngozi Beard  Location S /S -01 MRN [id number]  : 1960 Date 2024       LOS (days): 5  Geometric Mean LOS (GMLOS) (days): 5.1  Days to GMLOS:0.1        OBJECTIVE:        Current admission status: Inpatient  Preferred Pharmacy:   [facility]/pharmacy #[id number] [place] [address] [address]  Phone: [phone] Fax: [phone]    [facility] - [address] [address]  Phone: [phone] Fax: [phone]    Primary Care Provider: Meenakshi Balbuena MD    Primary Insurance: MEDICARE  Secondary Insurance: [facility]    PROGRESS NOTE:    Cm advised patient will need HD upon discharge and needs it set up. CM submitted referral into [facility] to MANUEL Meredith. Cm awaiting response.

## 2024-07-17 NOTE — CASE MANAGEMENT
Case Management Progress Note    Patient name Ngozi Beard  Location S /S -01 MRN [id number]  : 1960 Date 2024       LOS (days): 5  Geometric Mean LOS (GMLOS) (days): 5.1  Days to GMLOS:0.1        OBJECTIVE:        Current admission status: Inpatient  Preferred Pharmacy:   [facility]/pharmacy #[id number] [place] [address] [address]  Phone: [phone] Fax: [phone]    [facility] - [address] [address]  Phone: [phone] Fax: [phone]    Primary Care Provider: Meenakshi Balbuena MD    Primary Insurance: MEDICARE  Secondary Insurance: [facility]    PROGRESS NOTE:    Cm received questions from Sidney Meredith regarding Patient.     Patient plans on taking Lanta Van to HD. She feels comfortable calling and setting up transport herself once she is provided dates and times of her HD. Cm to follow for discharge planning

## 2024-07-17 NOTE — SEDATION DOCUMENTATION
D Stat to biopsy tract.  Pt tolerated right kidney biopsy well.  Bedrest for 4 hours, SBP > 140.

## 2024-07-17 NOTE — ANESTHESIA POSTPROCEDURE EVALUATION
Post-Op Assessment Note    CV Status:  Stable  Pain Score: 0    Pain management: adequate       Mental Status:  Sleepy and arousable   Hydration Status:  Stable   PONV Controlled:  None   Airway Patency:  Patent     Post Op Vitals Reviewed: Yes    No anethesia notable event occurred.    Staff: CRNA               BP  132/77    Temp 98   Pulse 90   Resp 17   SpO2 98 6L FM

## 2024-07-17 NOTE — RESPIRATORY THERAPY NOTE
Assisted Dr. Braswell with bronch. Patient was given a 2% lido nebulizer pre procedure. Patient was intubated with LMA. 8 cc 1% lido was instilled into lungs. BAL of RML and Lingula was done. RML pushed 120 ML of saline and pulled back 60 ML. Lingula 120 ML saline pushed and pulled back 55 ML. All samples were labeled and walked to the lab. Patient tolerated procedure well. No issues at this time.

## 2024-07-18 ENCOUNTER — APPOINTMENT (INPATIENT)
Dept: DIALYSIS | Facility: HOSPITAL | Age: 64
DRG: 673 | End: 2024-07-18
Payer: MEDICARE

## 2024-07-18 LAB
ANION GAP SERPL CALCULATED.3IONS-SCNC: 13 MMOL/L (ref 4–13)
BUN SERPL-MCNC: 92 MG/DL (ref 5–25)
CALCIUM SERPL-MCNC: 9.4 MG/DL (ref 8.4–10.2)
CHLORIDE SERPL-SCNC: 100 MMOL/L (ref 96–108)
CO2 SERPL-SCNC: 22 MMOL/L (ref 21–32)
CREAT SERPL-MCNC: 7.88 MG/DL (ref 0.6–1.3)
ERYTHROCYTE [DISTWIDTH] IN BLOOD BY AUTOMATED COUNT: 15.1 % (ref 11.6–15.1)
GFR SERPL CREATININE-BSD FRML MDRD: 4 ML/MIN/1.73SQ M
GLUCOSE SERPL-MCNC: 170 MG/DL (ref 65–140)
GLUCOSE SERPL-MCNC: 191 MG/DL (ref 65–140)
GLUCOSE SERPL-MCNC: 211 MG/DL (ref 65–140)
HCT VFR BLD AUTO: 29.7 % (ref 34.8–46.1)
HGB BLD-MCNC: 9.5 G/DL (ref 11.5–15.4)
MAGNESIUM SERPL-MCNC: 2.3 MG/DL (ref 1.9–2.7)
MCH RBC QN AUTO: 27.3 PG (ref 26.8–34.3)
MCHC RBC AUTO-ENTMCNC: 32 G/DL (ref 31.4–37.4)
MCV RBC AUTO: 85 FL (ref 82–98)
PHOSPHATE SERPL-MCNC: 8.6 MG/DL (ref 2.3–4.1)
PLATELET # BLD AUTO: 264 THOUSANDS/UL (ref 149–390)
PMV BLD AUTO: 10.4 FL (ref 8.9–12.7)
POTASSIUM SERPL-SCNC: 5.4 MMOL/L (ref 3.5–5.3)
RBC # BLD AUTO: 3.48 MILLION/UL (ref 3.81–5.12)
RHODAMINE-AURAMINE STN SPEC: NORMAL
RHODAMINE-AURAMINE STN SPEC: NORMAL
SODIUM SERPL-SCNC: 135 MMOL/L (ref 135–147)
WBC # BLD AUTO: 12.75 THOUSAND/UL (ref 4.31–10.16)

## 2024-07-18 PROCEDURE — 93005 ELECTROCARDIOGRAM TRACING: CPT

## 2024-07-18 PROCEDURE — 84100 ASSAY OF PHOSPHORUS: CPT | Performed by: INTERNAL MEDICINE

## 2024-07-18 PROCEDURE — 82948 REAGENT STRIP/BLOOD GLUCOSE: CPT

## 2024-07-18 PROCEDURE — 99232 SBSQ HOSP IP/OBS MODERATE 35: CPT | Performed by: INTERNAL MEDICINE

## 2024-07-18 PROCEDURE — 80048 BASIC METABOLIC PNL TOTAL CA: CPT | Performed by: INTERNAL MEDICINE

## 2024-07-18 PROCEDURE — 83735 ASSAY OF MAGNESIUM: CPT | Performed by: INTERNAL MEDICINE

## 2024-07-18 PROCEDURE — 85027 COMPLETE CBC AUTOMATED: CPT | Performed by: INTERNAL MEDICINE

## 2024-07-18 PROCEDURE — NC001 PR NO CHARGE: Performed by: RADIOLOGY

## 2024-07-18 RX ORDER — INSULIN LISPRO 100 [IU]/ML
2-12 INJECTION, SOLUTION INTRAVENOUS; SUBCUTANEOUS
Status: DISCONTINUED | OUTPATIENT
Start: 2024-07-18 | End: 2024-08-04 | Stop reason: HOSPADM

## 2024-07-18 RX ORDER — HYDRALAZINE HYDROCHLORIDE 20 MG/ML
10 INJECTION INTRAMUSCULAR; INTRAVENOUS EVERY 6 HOURS PRN
Status: DISCONTINUED | OUTPATIENT
Start: 2024-07-18 | End: 2024-07-25

## 2024-07-18 RX ORDER — LANOLIN ALCOHOL/MO/W.PET/CERES
3 CREAM (GRAM) TOPICAL DAILY PRN
Status: DISCONTINUED | OUTPATIENT
Start: 2024-07-18 | End: 2024-08-04 | Stop reason: HOSPADM

## 2024-07-18 RX ADMIN — LORATADINE 10 MG: 10 TABLET ORAL at 09:02

## 2024-07-18 RX ADMIN — SEVELAMER HYDROCHLORIDE 1600 MG: 800 TABLET ORAL at 17:57

## 2024-07-18 RX ADMIN — FLUTICASONE FUROATE AND VILANTEROL TRIFENATATE 1 PUFF: 200; 25 POWDER RESPIRATORY (INHALATION) at 09:03

## 2024-07-18 RX ADMIN — TORSEMIDE 40 MG: 20 TABLET ORAL at 09:02

## 2024-07-18 RX ADMIN — SEVELAMER HYDROCHLORIDE 1600 MG: 800 TABLET ORAL at 09:02

## 2024-07-18 RX ADMIN — TRAZODONE HYDROCHLORIDE 200 MG: 100 TABLET ORAL at 21:39

## 2024-07-18 RX ADMIN — SEVELAMER HYDROCHLORIDE 1600 MG: 800 TABLET ORAL at 12:13

## 2024-07-18 RX ADMIN — ACETAMINOPHEN 650 MG: 325 TABLET, FILM COATED ORAL at 05:11

## 2024-07-18 RX ADMIN — INSULIN LISPRO 2 UNITS: 100 INJECTION, SOLUTION INTRAVENOUS; SUBCUTANEOUS at 17:58

## 2024-07-18 RX ADMIN — VENLAFAXINE HYDROCHLORIDE 150 MG: 150 CAPSULE, EXTENDED RELEASE ORAL at 09:03

## 2024-07-18 RX ADMIN — OXYBUTYNIN CHLORIDE 5 MG: 5 TABLET, EXTENDED RELEASE ORAL at 09:03

## 2024-07-18 RX ADMIN — SODIUM CHLORIDE 1000 MG: 0.9 INJECTION, SOLUTION INTRAVENOUS at 18:00

## 2024-07-18 RX ADMIN — PANTOPRAZOLE SODIUM 40 MG: 40 TABLET, DELAYED RELEASE ORAL at 05:12

## 2024-07-18 RX ADMIN — ATORVASTATIN CALCIUM 20 MG: 20 TABLET, FILM COATED ORAL at 09:03

## 2024-07-18 RX ADMIN — MONTELUKAST 10 MG: 10 TABLET, FILM COATED ORAL at 09:03

## 2024-07-18 RX ADMIN — LAMOTRIGINE 100 MG: 100 TABLET ORAL at 09:03

## 2024-07-18 RX ADMIN — ALBUTEROL SULFATE 2 PUFF: 108 AEROSOL, METERED RESPIRATORY (INHALATION) at 09:03

## 2024-07-18 RX ADMIN — Medication 1 TABLET: at 09:03

## 2024-07-18 RX ADMIN — PREDNISONE 60 MG: 20 TABLET ORAL at 09:02

## 2024-07-18 RX ADMIN — POLYSACCHARIDE-IRON COMPLEX 150 MG: 150 CAPSULE ORAL at 09:03

## 2024-07-18 NOTE — PLAN OF CARE
Problem: PAIN - ADULT  Goal: Verbalizes/displays adequate comfort level or baseline comfort level  Description: Interventions:  - Encourage patient to monitor pain and request assistance  - Assess pain using appropriate pain scale  - Administer analgesics based on type and severity of pain and evaluate response  - Implement non-pharmacological measures as appropriate and evaluate response  - Consider cultural and social influences on pain and pain management  - Notify physician/advanced practitioner if interventions unsuccessful or patient reports new pain  Outcome: Progressing     Problem: INFECTION - ADULT  Goal: Absence or prevention of progression during hospitalization  Description: INTERVENTIONS:  - Assess and monitor for signs and symptoms of infection  - Monitor lab/diagnostic results  - Monitor all insertion sites, i.e. indwelling lines, tubes, and drains  - Monitor endotracheal if appropriate and nasal secretions for changes in amount and color  - [place] appropriate cooling/warming therapies per order  - Administer medications as ordered  - Instruct and encourage patient and family to use good hand hygiene technique  - Identify and instruct in appropriate isolation precautions for identified infection/condition  Outcome: Progressing  Goal: Absence of fever/infection during neutropenic period  Description: INTERVENTIONS:  - Monitor WBC    Outcome: Progressing     Problem: SAFETY ADULT  Goal: Patient will remain free of falls  Description: INTERVENTIONS:  - Educate patient/family on patient safety including physical limitations  - Instruct patient to call for assistance with activity   - Consult OT/PT to assist with strengthening/mobility   - Keep Call bell within reach  - Keep bed low and locked with side rails adjusted as appropriate  - Keep care items and personal belongings within reach  - Initiate and maintain comfort rounds  - Make Fall Risk Sign visible to staff  - Apply yellow socks and bracelet  for high fall risk patients  - Consider moving patient to room near nurses station  Outcome: Progressing  Goal: Maintain or return to baseline ADL function  Description: INTERVENTIONS:  -  Assess patient's ability to carry out ADLs; assess patient's baseline for ADL function and identify physical deficits which impact ability to perform ADLs (bathing, care of mouth/teeth, toileting, grooming, dressing, etc.)  - Assess/evaluate cause of self-care deficits   - Assess range of motion  - Assess patient's mobility; develop plan if impaired  - Assess patient's need for assistive devices and provide as appropriate  - Encourage maximum independence but intervene and supervise when necessary  - Involve family in performance of ADLs  - Assess for home care needs following discharge   - Consider OT consult to assist with ADL evaluation and planning for discharge  - Provide patient education as appropriate  Outcome: Progressing  Goal: Maintains/Returns to pre admission functional level  Description: INTERVENTIONS:  - Perform AM-PAC 6 Click Basic Mobility/ Daily Activity assessment daily.  - Set and communicate daily mobility goal to care team and patient/family/caregiver.   - Collaborate with rehabilitation services on mobility goals if consulted  - Out of bed for toileting  - Record patient progress and toleration of activity level   Outcome: Progressing     Problem: DISCHARGE PLANNING  Goal: Discharge to home or other facility with appropriate resources  Description: INTERVENTIONS:  - Identify barriers to discharge w/patient and caregiver  - Arrange for needed discharge resources and transportation as appropriate  - Identify discharge learning needs (meds, wound care, etc.)  - Arrange for interpretive services to assist at discharge as needed  - Refer to Case Management Department for coordinating discharge planning if the patient needs post-hospital services based on physician/advanced practitioner order or complex needs  related to functional status, cognitive ability, or social support system  Outcome: Progressing     Problem: Knowledge Deficit  Goal: Patient/family/caregiver demonstrates understanding of disease process, treatment plan, medications, and discharge instructions  Description: Complete learning assessment and assess knowledge base.  Interventions:  - Provide teaching at level of understanding  - Provide teaching via preferred learning methods  Outcome: Progressing     Problem: METABOLIC, FLUID AND ELECTROLYTES - ADULT  Goal: Electrolytes maintained within normal limits  Description: INTERVENTIONS:  - Monitor labs and assess patient for signs and symptoms of electrolyte imbalances  - Administer electrolyte replacement as ordered  - Monitor response to electrolyte replacements, including repeat lab results as appropriate  - Instruct patient on fluid and nutrition as appropriate  Outcome: Progressing  Goal: Fluid balance maintained  Description: INTERVENTIONS:  - Monitor labs   - Monitor I/O and WT  - Instruct patient on fluid and nutrition as appropriate  - Assess for signs & symptoms of volume excess or deficit  Outcome: Progressing

## 2024-07-18 NOTE — PLAN OF CARE
Post-Dialysis RN Treatment Note    Blood Pressure:  Pre 157/92 mm/Hg  Post 161/92 mmHg   EDW  TBD kg    Weight:  Pre 115 kg   Post 114.5 kg   Mode of weight measurement: Bed Scale   Volume Removed  500 ml    Treatment duration 180 minutes    NS given  No    Treatment shortened? No   Medications given during Rx Not Applicable   Estimated Kt/V  1.1   Access type: Temporary HD catheter   Access Issues: No    Report called to primary nurse   Yes / Taniya Renee RN        500 ml fluid removal, 3 hrs, 2K bath  Problem: METABOLIC, FLUID AND ELECTROLYTES - ADULT  Goal: Electrolytes maintained within normal limits  Description: INTERVENTIONS:  - Monitor labs and assess patient for signs and symptoms of electrolyte imbalances  - Administer electrolyte replacement as ordered  - Monitor response to electrolyte replacements, including repeat lab results as appropriate  - Instruct patient on fluid and nutrition as appropriate  Outcome: Progressing  Goal: Fluid balance maintained  Description: INTERVENTIONS:  - Monitor labs   - Monitor I/O and WT  - Instruct patient on fluid and nutrition as appropriate  - Assess for signs & symptoms of volume excess or deficit  Outcome: Progressing

## 2024-07-18 NOTE — PROGRESS NOTES
[facility]  Progress Note  Name: Ngozi Beard I  MRN: [id number]  Unit/Bed#: S -01 I Date of Admission: 7/12/2024   Date of Service: 7/18/2024 I Hospital Day: 6    Assessment & Plan   * Acute renal failure (ARF) ([facility], located within [facility])  Assessment & Plan  Patient admitted with acute renal failure with creatinine of 5.74 (baseline .8)  Patient had BUN 73 and GFR of 6  CT A/P: No hydronephrosis, however there is apparent 3 mm stone near or questionably within the distal right ureter (axial image 142). Given the lack of hydronephrosis this could either reflect adjacent calcification or nonobstructing stone.   Creatinine worsening today. Cr 7.15 from 6.02   Patient reports that she is still producing urine  US kidney and bladder: No hydronephrosis. 4 mm nonobstructing left intrarenal calculus  Urinalysis: 3+ blood.  3+ protein.  Innumerable red blood cells.  2-4 WBCs.  Moderate bacteria   AURA, C3, and C4 normal  Ig Kappa Free Light Chain:152.5   Ig Lambda Free Light Chain 87.1   Kappa/Lambda FluidC Ratio 1.75  Hepatitis panel nonreactive  HIV nonreactive  QuantiFERON gold negative  protein electrophoresis see labs  hypersensitivity pneumonitis profile negative  GBM antibodies: elevated >8  phospholipase A2 receptor Ab: Negative  ANCA: negative  7/16 Temporary R IJ dialysis catheter placed  7/17 Renal biopsy and bronchoscopy performed. Results pending.     Plan  Nephrology on board  Sevelamer   torsemide daily  Continue vit D/calcium   Per pulmonology, once AFB smears are negative, patient can begin pulsed dose steroids with 1 g Solu-Medrol for 2 days then transition back to prednisone 60 mg daily.   Monitor blood glucose levels  IR to place a permanent hemodialysis catheter tomorrow 7/19  Follow-up biopsy and bronchoscopy results  HD today  Hold home dyazide  D/c telemetry    Persistent cough  Assessment & Plan  Patient endorses worsening shortness of breath and difficulty breathing  Patient noted to  have multiple diffuse scattered pulmonary nodules on CT  Quantiferon gold negative 7/9/24  Patient currently on room air    Plan  Completed Ceftriaxone x 5 days.  Tessalon Perles as needed    Abnormal CT of the chest  Assessment & Plan  CT chest: Re demonstration of multiple diffuse scattered nodules  Etiology unclear: HP vs sarcoid vs  vs MAC vs  atypical  CRP- 143.7   Sed-61, negative: QuantiFERON  pending: HP/ANCA  Procal: 0.27--0.30--0.38--0.40  7/17 Renal biopsy and bronchoscopy performed. Results pending.     Plan:  Follow-up renal biopsy and bronchoscopy results    Moderate persistent asthma w/out acute exacerbation  Assessment & Plan  Patient has PFTs ordered but they have not been completed.  Home medication regime Advair -21 mcg 2 puff twice daily, Proventil 2 puffs every 6 hours as needed    Plan:    Albuterol inhaler PRN  Continue benzonatate  Continue fluticasone-vilanterol  Continue loratadine 10 mg  Robitussin PRN    Abnormality of ascending aorta  Assessment & Plan  CT chest: Unchanged fusiform ectasia of the ascending thoracic aorta measuring up to 40 mm     Plan  Recommendation for follow-up low radiation dose chest CT in one year    Bipolar 1 disorder (HCC)  Assessment & Plan  Plan  Continue on Effexor, trazodone and Lamictal    Primary hypertension  Assessment & Plan  Hold Lopressor due to bradycardia  Hold Dyazide  Hydralazine PRN  Monitor vitals as per protocol    Dyslipidemia  Assessment & Plan  Continue on atorvastatin 20 Mg    Sepsis (HCC)-resolved as of 7/14/2024  Assessment & Plan    WBC   Date Value Ref Range Status   07/18/2024 12.75 (H) 4.31 - 10.16 Thousand/uL Final     Patient admitted with sepsis likely secondary to pneumonitis as evidenced by tachycardia, tachypnea and leukocytosis  CT: redemonstration of multiple diffuse scattered nodules, less pronounced than on 5/16/2024 suggestive of mycobacterial infection  QuantiFERON gold negative   Lactic acid normal, Procalcitonin  trending up: 0.27 > 0.30 > .38 > .40  Sputum culture +2 gram negative rods, +1 gram positive cocci  BC: 1/ staph epidermis detected-contaminated  Leukocytosis likely due to steroid use    Plan:  Completed Ceftriaxone x 5 days         VTE Pharmacologic Prophylaxis: VTE Score: 4 Moderate Risk (Score 3-4) - Pharmacological DVT Prophylaxis Ordered: heparin.    Mobility:   Basic Mobility Inpatient Raw Score: 24  JH-HLM Goal: 8: Walk 250 feet or more  JH-HLM Achieved: 6: Walk 10 steps or more  JH-HLM Goal NOT achieved. Continue with multidisciplinary rounding and encourage appropriate mobility to improve upon JH-HLM goals.    Patient Centered Rounds: I performed bedside rounds with nursing staff today.   Discussions with Specialists or Other Care Team Provider: Nephrology, IR, pulmonology    Education and Discussions with Family / Patient: Updated  (sister) via phone.    Current Length of Stay: 6 day(s)  Current Patient Status: Inpatient   Discharge Plan: Anticipate discharge in >72 hrs to home.    Code Status: Level 1 - Full Code    Subjective:   Patient seen and examined at bedside. She reports unchanged shortness of breath and cough as well as feeling tired. She does endorse pain to her distal left thigh when she ambulates. She first noticed this last night.  Denies any injury or falls. Patient is being dialyzed today.  She received her bronchoscopy and renal biopsy yesterday.  Results are pending.  Discussed plan with the patient.  She understands and agrees. Saturating well on room air.     Patient removed her telemetry monitor.  Unable to review for any events.     Objective:     Vitals:   Temp (24hrs), Av.9 °F (36.6 °C), Min:97.4 °F (36.3 °C), Max:98.3 °F (36.8 °C)    Temp:  [97.4 °F (36.3 °C)-98.3 °F (36.8 °C)] 98.2 °F (36.8 °C)  HR:  [43-82] 45  Resp:  [14-22] 14  BP: (113-177)/() 165/100  SpO2:  [90 %-96 %] 93 %  Body mass index is 45.11 kg/m².     Input and Output Summary (last 24  hours):     Intake/Output Summary (Last 24 hours) at 7/18/2024 1553  Last data filed at 7/18/2024 1410  Gross per 24 hour   Intake 1160 ml   Output --   Net 1160 ml       Physical Exam:   Physical Exam  Vitals and nursing note reviewed.   Constitutional:       Appearance: Normal appearance.   HENT:      Head: Normocephalic and atraumatic.      Mouth/Throat:      Mouth: Mucous membranes are moist.   Eyes:      Extraocular Movements: Extraocular movements intact.      Conjunctiva/sclera: Conjunctivae normal.      Pupils: Pupils are equal, round, and reactive to light.   Cardiovascular:      Rate and Rhythm: Normal rate and regular rhythm.   Pulmonary:      Effort: Pulmonary effort is normal. No respiratory distress.      Breath sounds: Normal breath sounds.   Abdominal:      General: Bowel sounds are normal. There is no distension.      Palpations: Abdomen is soft.      Tenderness: There is no abdominal tenderness. There is no guarding.   Musculoskeletal:      Right lower leg: Edema present.      Left lower leg: Edema present.      Comments: Trace edema noted to BLE  Reports pain to distal lateral left thigh. No tenderness or signs of injury appreciated.  Range of motion, sensation, and strength are intact.   Skin:     General: Skin is warm and dry.   Neurological:      General: No focal deficit present.      Mental Status: She is alert. Mental status is at baseline.   Psychiatric:         Mood and Affect: Mood normal.        Additional Data:     Labs:  Results from last 7 days   Lab Units 07/18/24  0440   WBC Thousand/uL 12.75*   HEMOGLOBIN g/dL 9.5*   HEMATOCRIT % 29.7*   PLATELETS Thousands/uL 264     Results from last 7 days   Lab Units 07/18/24  0440 07/14/24  0441 07/13/24  0620   SODIUM mmol/L 135   < > 138   POTASSIUM mmol/L 5.4*   < > 4.9   CHLORIDE mmol/L 100   < > 105   CO2 mmol/L 22   < > 19*   BUN mg/dL 92*   < > 98*   CREATININE mg/dL 7.88*   < > 8.34*   ANION GAP mmol/L 13   < > 14*   CALCIUM mg/dL 9.4    < > 9.8   ALBUMIN g/dL  --   --  3.3*   GLUCOSE RANDOM mg/dL 170*   < > 168*    < > = values in this interval not displayed.     Results from last 7 days   Lab Units 07/13/24  0620   INR  0.98               Results from last 7 days   Lab Units 07/13/24  0620 07/12/24  0621   PROCALCITONIN ng/ml 0.40* 0.38*       Lines/Drains:  Invasive Devices       Peripheral Intravenous Line  Duration             Peripheral IV 07/17/24 Left;Ventral (anterior) Forearm 1 day              Hemodialysis Catheter  Duration             HD Temporary Double Catheter 2 days                    Imaging: Reviewed radiology reports from this admission including: chest xray, chest CT scan, abdominal/pelvic CT, and ultrasound kidney and bladder  IR biopsy kidney random   Final Result by Sandoval Castro MD (07/18 1358)   Impression: Successful percutaneous nontarget renal biopsy as described.                  Workstation performed: [id number]         IR temporary dialysis catheter placement   Final Result by Graciela Bettencourt MD (07/15 1640)      Insertion of right-sided non-tunneled dual-lumen temporary dialysis catheter, with tip in the expected location of the cavoatrial junction.      Plan:      The catheter may be used immediately.      Workstation performed: [id number]         IR tunneled dialysis catheter placement    (Results Pending)            Recent Cultures (last 7 days):   Results from last 7 days   Lab Units 07/17/24  1258 07/17/24  1253   GRAM STAIN RESULT  1+ Polys  No bacteria seen 1+ Polys  No bacteria seen       Last 24 Hours Medication List:   Current Facility-Administered Medications   Medication Dose Route Frequency Provider Last Rate    acetaminophen  650 mg Oral Q6H PRN Tram Palacios PA-C      albuterol  2 puff Inhalation Q4H PRN Tram Palacios PA-C      albuterol  2.5 mg Nebulization Once PRN Mayte Salmon CRNA      atorvastatin  20 mg Oral Daily Tram Palacios PA-C      benzonatate  200 mg Oral TID PRN Tram  DARRICK Palacios      calcium carbonate-vitamin D  1 tablet Oral Daily With Breakfast Vane Estrada MD      dextromethorphan-guaiFENesin  10 mL Oral Q4H PRN Tram Palacios PA-C      famotidine  10 mg Oral Daily PRN Daya May DO      fluticasone-vilanterol  1 puff Inhalation Daily Tram Palacios PA-C      hydrALAZINE  10 mg Intravenous Q6H PRN Vane Estrada MD      insulin lispro  2-12 Units Subcutaneous TID AC Yasmine Meadows MD      iron polysaccharides  150 mg Oral Daily Ata Burns MD      lamoTRIgine  100 mg Oral QAM Tram Palacios PA-C      loratadine  10 mg Oral Daily Tram Palacios PA-C      methylPREDNISolone sodium succinate  1,000 mg Intravenous Daily Yasmine Meadows MD      montelukast  10 mg Oral Daily Tram Palacios PA-C      ondansetron  4 mg Intravenous Once PRN Mayte Salmon CRNA      oxybutynin  5 mg Oral Daily Tram Palacios PA-C      pantoprazole  40 mg Oral Daily Before Breakfast Ronny Webster MD      sevelamer  1,600 mg Oral TID With Meals Vane Estrada MD      torsemide  40 mg Oral Daily Vane Estrada MD      traZODone  200 mg Oral HS Tram Palacios PA-C      venlafaxine  150 mg Oral Daily Tram Palacios PA-C          Today, Patient Was Seen By: Jessica Beckford MD    **Please Note: This note may have been constructed using a voice recognition system.**

## 2024-07-18 NOTE — PLAN OF CARE
Problem: PAIN - ADULT  Goal: Verbalizes/displays adequate comfort level or baseline comfort level  Description: Interventions:  - Encourage patient to monitor pain and request assistance  - Assess pain using appropriate pain scale  - Administer analgesics based on type and severity of pain and evaluate response  - Implement non-pharmacological measures as appropriate and evaluate response  - Consider cultural and social influences on pain and pain management  - Notify physician/advanced practitioner if interventions unsuccessful or patient reports new pain  Outcome: Progressing     Problem: INFECTION - ADULT  Goal: Absence or prevention of progression during hospitalization  Description: INTERVENTIONS:  - Assess and monitor for signs and symptoms of infection  - Monitor lab/diagnostic results  - Monitor all insertion sites, i.e. indwelling lines, tubes, and drains  - Monitor endotracheal if appropriate and nasal secretions for changes in amount and color  - [place] appropriate cooling/warming therapies per order  - Administer medications as ordered  - Instruct and encourage patient and family to use good hand hygiene technique  - Identify and instruct in appropriate isolation precautions for identified infection/condition  Outcome: Progressing  Goal: Absence of fever/infection during neutropenic period  Description: INTERVENTIONS:  - Monitor WBC    Outcome: Progressing     Problem: SAFETY ADULT  Goal: Patient will remain free of falls  Description: INTERVENTIONS:  - Educate patient/family on patient safety including physical limitations  - Instruct patient to call for assistance with activity   - Consult OT/PT to assist with strengthening/mobility   - Keep Call bell within reach  - Keep bed low and locked with side rails adjusted as appropriate  - Keep care items and personal belongings within reach  - Initiate and maintain comfort rounds  - Make Fall Risk Sign visible to staff  - Offer Toileting every 2 Hours,  in advance of need  - Initiate/Maintain alarm  - Obtain necessary fall risk management equipment:   - Apply yellow socks and bracelet for high fall risk patients  - Consider moving patient to room near nurses station  Outcome: Progressing  Goal: Maintain or return to baseline ADL function  Description: INTERVENTIONS:  -  Assess patient's ability to carry out ADLs; assess patient's baseline for ADL function and identify physical deficits which impact ability to perform ADLs (bathing, care of mouth/teeth, toileting, grooming, dressing, etc.)  - Assess/evaluate cause of self-care deficits   - Assess range of motion  - Assess patient's mobility; develop plan if impaired  - Assess patient's need for assistive devices and provide as appropriate  - Encourage maximum independence but intervene and supervise when necessary  - Involve family in performance of ADLs  - Assess for home care needs following discharge   - Consider OT consult to assist with ADL evaluation and planning for discharge  - Provide patient education as appropriate  Outcome: Progressing  Goal: Maintains/Returns to pre admission functional level  Description: INTERVENTIONS:  - Perform AM-PAC 6 Click Basic Mobility/ Daily Activity assessment daily.  - Set and communicate daily mobility goal to care team and patient/family/caregiver.   - Collaborate with rehabilitation services on mobility goals if consulted  - Perform Range of Motion 3 times a day.  - Reposition patient every 2 hours.  - Dangle patient 3 times a day  - Stand patient 3 times a day  - Ambulate patient 3 times a day  - Out of bed to chair 3 times a day   - Out of bed for meals 3 times a day  - Out of bed for toileting  - Record patient progress and toleration of activity level   Outcome: Progressing     Problem: DISCHARGE PLANNING  Goal: Discharge to home or other facility with appropriate resources  Description: INTERVENTIONS:  - Identify barriers to discharge w/patient and caregiver  -  Arrange for needed discharge resources and transportation as appropriate  - Identify discharge learning needs (meds, wound care, etc.)  - Arrange for interpretive services to assist at discharge as needed  - Refer to Case Management Department for coordinating discharge planning if the patient needs post-hospital services based on physician/advanced practitioner order or complex needs related to functional status, cognitive ability, or social support system  Outcome: Progressing     Problem: Knowledge Deficit  Goal: Patient/family/caregiver demonstrates understanding of disease process, treatment plan, medications, and discharge instructions  Description: Complete learning assessment and assess knowledge base.  Interventions:  - Provide teaching at level of understanding  - Provide teaching via preferred learning methods  Outcome: Progressing     Problem: METABOLIC, FLUID AND ELECTROLYTES - ADULT  Goal: Electrolytes maintained within normal limits  Description: INTERVENTIONS:  - Monitor labs and assess patient for signs and symptoms of electrolyte imbalances  - Administer electrolyte replacement as ordered  - Monitor response to electrolyte replacements, including repeat lab results as appropriate  - Instruct patient on fluid and nutrition as appropriate  Outcome: Progressing  Goal: Fluid balance maintained  Description: INTERVENTIONS:  - Monitor labs   - Monitor I/O and WT  - Instruct patient on fluid and nutrition as appropriate  - Assess for signs & symptoms of volume excess or deficit  Outcome: Progressing

## 2024-07-18 NOTE — PROGRESS NOTES
Progress Note - Pulmonary   Ngozi Beard 64 y.o. female MRN: [id number]  Unit/Bed#: S -01 Encounter: [id number]    Assessment/Plan:    Acute pulmonary insufficiency-resolved  -98% on room air, patient does not wear any supplemental O2 at home  -Continue to maintain saturation greater than 89%  -Pulmonary hygiene encouraged: Deep breathing with cough, OOB as tolerated, incentive spirometry    Abnormal CT chest  -Seen in office by Dr. Rojas 06/26/2024 for consult preliminary lab work showed acute renal failure and patient was sent to the hospital  -CT chest 07/11/2024 shows unchanged extent of diffuse pulmonary nodularity  -Etiology unclear.  Rule out atypical infection versus developing pulmonary renal syndrome  -WBC 11.53-10.69-12.75  -Procalcitonin 0.30-0.38-0.40  -CRP-143.7, sed rate-61, QuantiFERON negative, HP/ANCA pending  -Ceftriaxone 5-day course completed  -Bronchoscopy completed 07/17/2024-culture showed no growth, BAL differential 28% neutrophil, 4% lymphocytes, 68% macrophage  -AFB, fungal culture, pneumocystis PCR, leukemia/lymphoma flow cytometry and viral culture pending  -If AFB is negative okay to Tx with pulse dose steroid    Moderate persistent asthma without acute exacerbation  -PFTs ordered but not completed  -Home medication regime Advair -21 mcg 2 puff twice daily, Proventil 2 puffs every 6 hours as needed      Acute renal failure  -Unknown etiology  -Creatinine 9.89-10.82-11.51-10.37  -Second dialysis treatment started this morning  -Renal biopsy completed results pending  -Renal following      Chief Complaint:    I just feel tired    Subjective:    Patient seen and examined at bedside.  Found resting comfortably in bed.  No overnight events reported.  Patient denies any fever chills night sweats chest pain or hemoptysis.  Patient endorses feeling sad.   passed away in March in which she is having a hard time lately dealing with the loss.    Objective:    Vitals:  Blood pressure 158/86, pulse 56, temperature 98.2 °F (36.8 °C), resp. rate 16, weight 115 kg (254 lb 10.1 oz), SpO2 93%.RA,Body mass index is 45.11 kg/m².      Intake/Output Summary (Last 24 hours) at 7/18/2024 0951  Last data filed at 7/17/2024 1907  Gross per 24 hour   Intake 1220 ml   Output 589 ml   Net 631 ml       Invasive Devices       Peripheral Intravenous Line  Duration             Peripheral IV 07/17/24 Left;Ventral (anterior) Forearm <1 day              Hemodialysis Catheter  Duration             HD Temporary Double Catheter 2 days                    Physical Exam:     Physical Exam  Constitutional:       General: She is not in acute distress.     Appearance: She is obese. She is not ill-appearing.   HENT:      Head: Normocephalic and atraumatic.      Right Ear: External ear normal.      Left Ear: External ear normal.      Nose: Nose normal.      Mouth/Throat:      Mouth: Mucous membranes are moist.      Pharynx: Oropharynx is clear.   Eyes:      Extraocular Movements: Extraocular movements intact.      Pupils: Pupils are equal, round, and reactive to light.   Cardiovascular:      Rate and Rhythm: Normal rate and regular rhythm.      Pulses: Normal pulses.      Heart sounds: Normal heart sounds.   Pulmonary:      Effort: Pulmonary effort is normal.      Breath sounds: Normal breath sounds. No wheezing or rhonchi.   Abdominal:      General: Bowel sounds are normal.      Tenderness: There is no abdominal tenderness.   Musculoskeletal:         General: Normal range of motion.      Cervical back: Normal range of motion and neck supple.      Right lower leg: No edema.      Left lower leg: No edema.   Skin:     General: Skin is warm and dry.   Neurological:      Mental Status: She is alert and oriented to person, place, and time.   Psychiatric:         Mood and Affect: Mood normal.         Behavior: Behavior normal.         Thought Content: Thought content normal.         Judgment: Judgment normal.  "        Labs: I have personally reviewed pertinent lab results., ABG: No results found for: \"PHART\", \"KEN1UPN\", \"PO2ART\", \"NND4EUO\", \"G2FYYYOI\", \"BEART\", \"SOURCE\", BNP: No results found for: \"BNP\", CBC:   Lab Results   Component Value Date    WBC 12.75 (H) 07/18/2024    HGB 9.5 (L) 07/18/2024    HCT 29.7 (L) 07/18/2024    MCV 85 07/18/2024     07/18/2024    RBC 3.48 (L) 07/18/2024    MCH 27.3 07/18/2024    MCHC 32.0 07/18/2024    RDW 15.1 07/18/2024    MPV 10.4 07/18/2024       Imaging and other studies: I have personally reviewed pertinent reports.    CT chest 07/11/2024 1.  Unchanged extent of the diffuse pulmonary nodularity, though decreased size, density and conspicuity of the nodules. Findings are nonspecific, and assessment for environmental exposures is recommended.  2.  Unchanged fusiform ectasia of the ascending thoracic aorta measuring up to 40 mm. Recommendation is for follow-up low radiation dose chest CT in one year.  "

## 2024-07-18 NOTE — PROGRESS NOTES
NEPHROLOGY HOSPITAL PROGRESS NOTE   Ngozi Beard 64 y.o. female MRN: [id number]  Unit/Bed#: S -01 Encounter: [id number]  Reason for Consult: ANGELICA requiring dialysis    ASSESSMENT and PLAN:    64-year-old female with a past medical history of asthma, hypertension, bipolar disorder who initially presented at the request of her pulmonologist due to acute kidney injury.  Nephrology on board for acute kidney injury     1-acute kidney injury-concern for autoimmune disorder     - Prior baseline creatinine less than 1 mg/dL was 0.8 mg/dL on May 2024  - Admission creatinine on 7/10 was 4.6 mg/dL  - Urinalysis with 3+ blood, 3+ protein, innumerable RBC, 2-4 WBC, moderate bacteria  - Renal imaging with renal ultrasound right kidney 13.2 cm, left kidney 12.5 cm, 1.8 cm simple cyst on left kidney without evidence of hydronephrosis and 4 mm nonobstructing left renal calculus  - Initial CT scan without contrast without hydronephrosis, lesions in both kidneys     Serological workup  - C3, C4-normal  - Hep C Ab neg  - hep panel non reactive  - HIV -nonreactive  - ASO- negative  - AURA-negative  - Anti-double-stranded DNA-negative  - ANCA-negative  - Anti-GBM-positive anti GBM > 8  - QuantiFERON gold negative  - [id number].  Less than 1.8  -  - elevated  - SPEP-immuno fixation without monoclonal band  - UPEP-  - Free light chain-1.75 (152/87.1)  - UPCR 5.8 g/g  - Hemolysis smear-negative  - Iron panel-iron saturation of 11%.     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  Was transferred to [facility] as patient ultimately will need renal biopsy and pulmonary evaluation.  HCTZ and triamterene were held on admission  - 7/12-creatinine rising to 7.1 mg/dL. Dr Burns Filled out biopsy form and patient was transferred to [place]  "campus in anticipation of renal biopsy.  Empirically started on Solu-Medrol 500 mg  - 7/13-Solu-Medrol for dose #2.  Started on sodium bicarbonate tablets.  - 7/14-creatinine rising to 9.9 mg/dL. Received solumederol 250 mg (third dose of IV steroid (500 mg, 250 mg, 250 mg)  - 7/15 - creat rising 10.8 mg/dL. BUN rising. On pred 60 mg daily. Pending bronch.  Bronchoscopy was held due to risk of bleeding  - 7/16-treatment #1 for dialysis.  Hyperkalemia, starting dialysis.  Acidosis, starting dialysis and continue bicarbonate tablets today.  Hyperphosphatemia.  Increasing sevelamer.  - 7/17 - treatment #2 for dialysis.  Patient completed bronchoscopy and renal biopsy.  - 7/18-dialysis treatment #3.  Continue low potassium diet.  Continue phosphorus binders.  Postbiopsy CBC appropriate.     Plan:  - Currently with temporary dialysis catheter.  If dialysis becomes prolonged, will need conversion to tunneled dialysis catheter.  Likely later this week or early next week  - Follow-up final renal biopsy  - Follow-up final bronchoscopy studies  - Once cleared from pulmonary standpoint, I have advised primary team to start Solu-Medrol 1 g once daily for 2 days and every time prednisone to start after this  - Trend lab work again in a.m.  - Low potassium diet  - Continue dialysis TTS schedule for now  - Continue torsemide  - Monitor for renal recovery.  Patient is starting to make more urine and monitor ins and outs  - Continue hydralazine as needed  - Reviewed case with primary team resident in person this morning and we are in agreement with renal plan for dialysis today and rest of renal plan  - I have asked our office to call [facility] to inform them of biopsy that needs an ASAP reading.  - Attempted to call the patient's sister per patient request to update.  No answer.     Portions of the record may have been created with voice recognition software. Occasional wrong word or \"sound a like\" substitutions may have " occurred due to the inherent limitations of voice recognition software. Read the chart carefully and recognize, using context, where substitutions have occurred.If you have any questions, please contact the dictating provider.        2-electrolytes-     - Hyperphosphatemia likely in the setting of acute kidney injury.  Started on sevelamer July 15.  Phosphorus slowly improving July 18.     - Borderline hyperkalemia-low potassium diet.  Dialysis today as borderline hyperkalemia and bicarbonate is appropriate 7/18     3-acid/base-acidosis.  Initial intravenous fluids were held.  Initiated on sodium bicarbonate tablets 7/13.  And further increased on 7/14.  And starting dialysis 7/16 and improving bicarb 7/17, hold bicarb tabs     4-pulmonary nodules-     - Outpatient was following with pulmonary team closely  - CT scan suggestive of mycobacterial infection  - QuantiFERON gold negative  - Nonproductive cough  - Further workup in progress by pulmonary team  - Unclear if related to pulmonary renal syndrome  - Completed 7/1701-[id number]-iy final results for pulmonary team bronchoscopy     5-anemia-     - Serologies as above  - Iron deficient but holding IV iron as the patient was being evaluated for possible infectious etiologies  - on oral iron     6-hypertension-     - Initially holding triamterene-HCTZ  - on metoprolol  - Start torsemide 7/16  - BP appropriate 7/17 but if rises post biopsy there is hydralazine prn     7-azotemia - BUN rising in setting of ANGELICA. Also received steroids which is contributing. Improving with clearance     8-volume-start torsemide.  To attempt to promote nonoliguric state and for diuresis. Euvolemic 7/17     9 - anti GBM positive Ab     - received 1 gm solumederol thus far. Awaiting bronch and then will repeat doses for 2 gm total more once cleared by pulm team  - on pred oral daily for now  - cont triston/vit D  - cont PPI  - will need PCP proph eventually  - renal biopsy pending 7/17    SUBJECTIVE  / 24H INTERVAL HISTORY:    Blood pressures 1 30-1 50 systolic.  Afebrile.  Urine output-patient states she made some urine overnight which was an improvement.  No shortness of breath.  Still with minor cough  OBJECTIVE:  Current Weight: Weight - Scale:  (pt unable to stand at this time)  Vitals:    07/17/24 2228 07/18/24 0231 07/18/24 0742 07/18/24 0901   BP: 134/87 133/85 158/69 158/86   BP Location:       Pulse: 61 66 65 56   Resp: 18 18 16    Temp: 98.3 °F (36.8 °C) 97.5 °F (36.4 °C) 98.2 °F (36.8 °C)    TempSrc:       SpO2: 91% 95% 96% 93%   Weight:           Intake/Output Summary (Last 24 hours) at 7/18/2024 0946  Last data filed at 7/17/2024 1907  Gross per 24 hour   Intake 1220 ml   Output 589 ml   Net 631 ml     General: NAD  Skin: no rash  Eyes: anicteric sclera  ENT: moist mucous membrane  Neck: supple  Chest: CTA b/l, no ronchii, no wheeze, no rubs, With the exception of fine lateral rales  CVS: s1s2, no murmur, no gallop, no rub  Abdomen: soft, nontender, nl sounds  Extremities: Trace edema LE b/l, nontunneled cath in the right IJ with dried blood  : no sherwood  Neuro: AAOX3  Psych: normal affect    Medications:    Current Facility-Administered Medications:     acetaminophen (TYLENOL) tablet 650 mg, 650 mg, Oral, Q6H PRN, Tram Palacios PA-C, 650 mg at 07/18/24 0511    albuterol (PROVENTIL HFA,VENTOLIN HFA) inhaler 2 puff, 2 puff, Inhalation, Q4H PRN, Tram Palacios PA-C, 2 puff at 07/18/24 0903    albuterol inhalation solution 2.5 mg, 2.5 mg, Nebulization, Once PRN, Mayte Salmon CRNA    atorvastatin (LIPITOR) tablet 20 mg, 20 mg, Oral, Daily, Tram Palacios PA-C, 20 mg at 07/18/24 0903    benzonatate (TESSALON PERLES) capsule 200 mg, 200 mg, Oral, TID PRN, Tram Palacios PA-C    calcium carbonate-vitamin D 500 mg-5 mcg tablet 1 tablet, 1 tablet, Oral, Daily With Breakfast, Vane Estrada MD, 1 tablet at 07/18/24 0903    dextromethorphan-guaiFENesin (ROBITUSSIN DM) oral syrup 10 mL, 10 mL, Oral,  Q4H PRN, Tram Palacios PA-C    famotidine (PEPCID) tablet 10 mg, 10 mg, Oral, Daily PRN, Daya May DO, 10 mg at 07/16/24 1237    fluticasone-vilanterol 200-25 mcg/actuation 1 puff, 1 puff, Inhalation, Daily, Tram Palacios PA-C, 1 puff at 07/18/24 0903    hydrALAZINE (APRESOLINE) injection 10 mg, 10 mg, Intravenous, Q6H PRN, Vane Estrada MD    iron polysaccharides (FERREX) capsule 150 mg, 150 mg, Oral, Daily, Ata Burns MD, 150 mg at 07/18/24 0903    lamoTRIgine (LaMICtal) tablet 100 mg, 100 mg, Oral, QAM, Tram Palacios PA-C, 100 mg at 07/18/24 0903    loratadine (CLARITIN) tablet 10 mg, 10 mg, Oral, Daily, Tram Palacios PA-C, 10 mg at 07/18/24 0902    montelukast (SINGULAIR) tablet 10 mg, 10 mg, Oral, Daily, Tram Palacios PA-C, 10 mg at 07/18/24 0903    ondansetron (ZOFRAN) injection 4 mg, 4 mg, Intravenous, Once PRN, Mayte Salmon CRNA    oxybutynin (DITROPAN-XL) 24 hr tablet 5 mg, 5 mg, Oral, Daily, Tram Palacios PA-C, 5 mg at 07/18/24 0903    pantoprazole (PROTONIX) EC tablet 40 mg, 40 mg, Oral, Daily Before Breakfast, Ronny Webster MD, 40 mg at 07/18/24 0512    predniSONE tablet 60 mg, 60 mg, Oral, Daily, Vane Estrada MD, 60 mg at 07/18/24 0902    sevelamer (RENAGEL) tablet 1,600 mg, 1,600 mg, Oral, TID With Meals, Vane Estrada MD, 1,600 mg at 07/18/24 0902    torsemide (DEMADEX) tablet 40 mg, 40 mg, Oral, Daily, Vane Estrada MD, 40 mg at 07/18/24 0902    traZODone (DESYREL) tablet 200 mg, 200 mg, Oral, HS, Tram Palacios PA-C, 200 mg at 07/17/24 2100    venlafaxine (EFFEXOR-XR) 24 hr capsule 150 mg, 150 mg, Oral, Daily, Tram Palacios PA-C, 150 mg at 07/18/24 0903    Laboratory Results:  Results from last 7 days   Lab Units 07/18/24  0440 07/17/24  2132 07/17/24  0447 07/16/24  0643 07/15/24  0431 07/14/24  0441 07/13/24  0620 07/12/24  0621   WBC Thousand/uL 12.75* 10.69* 11.53* 11.63* 14.24* 14.12*  --  10.22*   HEMOGLOBIN g/dL 9.5* 9.4* 9.6* 9.4* 9.1* 9.1*  --  9.4*    HEMATOCRIT % 29.7* 28.8* 30.1* 30.0* 28.8* 29.3*  --  29.1*   PLATELETS Thousands/uL 264 250 283 289 296 280  --  229   POTASSIUM mmol/L 5.4*  --  5.0 5.3 5.0 5.1 4.9 4.0   CHLORIDE mmol/L 100  --  102 102 103 104 105 106   CO2 mmol/L 22  --  21 15* 18* 19* 19* 19*   BUN mg/dL 92*  --  126* 154* 135* 117* 98* 81*   CREATININE mg/dL 7.88*  --  10.37* 11.51* 10.82* 9.89* 8.34* 7.15*   CALCIUM mg/dL 9.4  --  9.3 9.8 9.3 9.4 9.8 9.4   MAGNESIUM mg/dL 2.3  --  2.5 2.7  --   --   --   --    PHOSPHORUS mg/dL 8.6*  --  9.4* 11.0*  --   --  8.5*  --

## 2024-07-18 NOTE — TELEMEDICINE
e-Consult (IPC)  - Interventional Radiology  Ngozi Beard 64 y.o. female MRN: [id number]  Unit/Bed#: S -01 Encounter: [id number]          Interventional Radiology has been consulted to evaluate Ngozi Beard    We were consulted by nephrology concerning this patient with ANGELICA.    Inpatient Consult to IR  Consult performed by: Aleisha Gill MD  Consult ordered by: Vane Estarda MD        07/18/24    Assessment/Recommendation:   64-year-old female with no prior history of kidney disease presented this admission with hematuria, proteinuria, and decreased GFR/increased creatinine.  Patient had a renal core biopsy to further assess what is believed to be an autoimmune ANGELICA, however, there have been issues controlling her blood pressure.  Now, the patient will also require dialysis access.  GFR is 3, potassium is now 5.4.    Will plan to place the catheter tomorrow.    11-20 minutes, >50% of the total time devoted to medical consultative verbal/EMR discussion between providers. Written report will be generated in the EMR.     Thank you for allowing Interventional Radiology to participate in the care of Ngozi Beard. Please don't hesitate to call or TigerText us with any questions.     Aleisha Glil MD

## 2024-07-18 NOTE — CASE MANAGEMENT
Case Management Discharge Planning Note    Patient name Ngozi Beard  Location S /S -01 MRN [id number]  : 1960 Date 2024       Current Admission Date: 2024  Current Admission Diagnosis:Acute renal failure (ARF) ([facility])   Patient Active Problem List    Diagnosis Date Noted Date Diagnosed    Acute renal failure (ARF) ([facility]) 07/10/2024     Abnormal CT of the chest 2024     Abnormality of ascending aorta 2024     Postmenopausal 2024     Encounter for screening mammogram for malignant neoplasm of breast 2024     Allergic rhinitis 2024     Persistent cough 2024     Urge incontinence of urine 2024     Exercise intolerance 2024     Family history of coronary artery bypass graft surgery 2024     Urge urinary incontinence 2024     Female stress incontinence 2024     Paranoid schizophrenia ([facility]) 2023     Moderate persistent asthma w/out acute exacerbation 2023     Asthma due to seasonal allergies 2023     Bipolar 1 disorder ([facility]) 2022     Primary hypertension 2022     Dyslipidemia 2022     Morbid obesity with BMI of 45.0-49.9, adult ([facility]) 2022       LOS (days): 6  Geometric Mean LOS (GMLOS) (days): 5.1  Days to GMLOS:-0.9     OBJECTIVE:  Risk of Unplanned Readmission Score: 17.63         Current admission status: Inpatient   Preferred Pharmacy:   [facility]/pharmacy #[id number] [facility] [address] [address]  Phone: [phone] Fax: [phone]    [facility] - [place] [id number] [place] [id number] [address]  Phone: [phone] Fax: [phone]    Primary Care Provider: Meenakshi Balbuena MD    Primary Insurance: MEDICARE  Secondary Insurance: [facility]    DISCHARGE DETAILS:                CM advised that patient has a question for the CM team  -- CM spoke with patient at bedside to address same. Patient states she does not have any questions/concerns regarding her pending dialysis arrangements at this time, but wanted to pass along the phone # for her [facility] service ([phone]). She states Danni needs a 3-day notice of any transportation needs. CM reviewed that a discharge date is not yet known, however CM team will follow up tomorrow in hopes that a chair & discharge date is known before the weekend.    Patient inquired if CM can call the [facility] to coordinate the transports -- CM encouraged patient to call herself as she will need to feel comfortable coorindating [facility] for future appts. Patient verbalized understanding of same.    CM team will remain available and follow up as able.

## 2024-07-19 PROBLEM — N01.9: Status: ACTIVE | Noted: 2024-07-19

## 2024-07-19 LAB
ANION GAP SERPL CALCULATED.3IONS-SCNC: 12 MMOL/L (ref 4–13)
BACTERIA BRONCH AEROBE CULT: NO GROWTH
BACTERIA BRONCH AEROBE CULT: NORMAL
BUN SERPL-MCNC: 94 MG/DL (ref 5–25)
CALCIUM SERPL-MCNC: 9.6 MG/DL (ref 8.4–10.2)
CHLORIDE SERPL-SCNC: 101 MMOL/L (ref 96–108)
CO2 SERPL-SCNC: 21 MMOL/L (ref 21–32)
CREAT SERPL-MCNC: 8.65 MG/DL (ref 0.6–1.3)
ERYTHROCYTE [DISTWIDTH] IN BLOOD BY AUTOMATED COUNT: 14.9 % (ref 11.6–15.1)
FIBRINOGEN PPP-MCNC: 324 MG/DL (ref 207–520)
GFR SERPL CREATININE-BSD FRML MDRD: 4 ML/MIN/1.73SQ M
GLUCOSE SERPL-MCNC: 177 MG/DL (ref 65–140)
GLUCOSE SERPL-MCNC: 178 MG/DL (ref 65–140)
GLUCOSE SERPL-MCNC: 178 MG/DL (ref 65–140)
GLUCOSE SERPL-MCNC: 180 MG/DL (ref 65–140)
GLUCOSE SERPL-MCNC: 200 MG/DL (ref 65–140)
GRAM STN SPEC: NORMAL
HCT VFR BLD AUTO: 30.3 % (ref 34.8–46.1)
HGB BLD-MCNC: 9.9 G/DL (ref 11.5–15.4)
MAGNESIUM SERPL-MCNC: 2.3 MG/DL (ref 1.9–2.7)
MCH RBC QN AUTO: 27.4 PG (ref 26.8–34.3)
MCHC RBC AUTO-ENTMCNC: 32.7 G/DL (ref 31.4–37.4)
MCV RBC AUTO: 84 FL (ref 82–98)
PHOSPHATE SERPL-MCNC: 8.2 MG/DL (ref 2.3–4.1)
PLATELET # BLD AUTO: 256 THOUSANDS/UL (ref 149–390)
PMV BLD AUTO: 9.9 FL (ref 8.9–12.7)
POTASSIUM SERPL-SCNC: 5.1 MMOL/L (ref 3.5–5.3)
RBC # BLD AUTO: 3.61 MILLION/UL (ref 3.81–5.12)
SCAN RESULT: NORMAL
SODIUM SERPL-SCNC: 134 MMOL/L (ref 135–147)
WBC # BLD AUTO: 15.22 THOUSAND/UL (ref 4.31–10.16)

## 2024-07-19 PROCEDURE — 88112 CYTOPATH CELL ENHANCE TECH: CPT | Performed by: STUDENT IN AN ORGANIZED HEALTH CARE EDUCATION/TRAINING PROGRAM

## 2024-07-19 PROCEDURE — 82948 REAGENT STRIP/BLOOD GLUCOSE: CPT

## 2024-07-19 PROCEDURE — 80048 BASIC METABOLIC PNL TOTAL CA: CPT | Performed by: INTERNAL MEDICINE

## 2024-07-19 PROCEDURE — 83735 ASSAY OF MAGNESIUM: CPT | Performed by: INTERNAL MEDICINE

## 2024-07-19 PROCEDURE — 94760 N-INVAS EAR/PLS OXIMETRY 1: CPT

## 2024-07-19 PROCEDURE — 99232 SBSQ HOSP IP/OBS MODERATE 35: CPT | Performed by: INTERNAL MEDICINE

## 2024-07-19 PROCEDURE — 94664 DEMO&/EVAL PT USE INHALER: CPT

## 2024-07-19 PROCEDURE — 85384 FIBRINOGEN ACTIVITY: CPT | Performed by: INTERNAL MEDICINE

## 2024-07-19 PROCEDURE — 84100 ASSAY OF PHOSPHORUS: CPT | Performed by: INTERNAL MEDICINE

## 2024-07-19 PROCEDURE — 85027 COMPLETE CBC AUTOMATED: CPT | Performed by: INTERNAL MEDICINE

## 2024-07-19 PROCEDURE — 99223 1ST HOSP IP/OBS HIGH 75: CPT | Performed by: INTERNAL MEDICINE

## 2024-07-19 RX ORDER — CALCIUM GLUCONATE 20 MG/ML
1 INJECTION, SOLUTION INTRAVENOUS DAILY
Status: DISPENSED | OUTPATIENT
Start: 2024-07-20 | End: 2024-07-25

## 2024-07-19 RX ORDER — PREDNISONE 20 MG/1
60 TABLET ORAL DAILY
Status: DISCONTINUED | OUTPATIENT
Start: 2024-07-20 | End: 2024-07-24

## 2024-07-19 RX ORDER — ATOVAQUONE 750 MG/5ML
1500 SUSPENSION ORAL
Status: DISCONTINUED | OUTPATIENT
Start: 2024-07-20 | End: 2024-07-23

## 2024-07-19 RX ORDER — ALBUMIN, HUMAN INJ 5% 5 %
200 SOLUTION INTRAVENOUS DAILY
Status: DISPENSED | OUTPATIENT
Start: 2024-07-20 | End: 2024-07-24

## 2024-07-19 RX ORDER — ONDANSETRON 2 MG/ML
4 INJECTION INTRAMUSCULAR; INTRAVENOUS EVERY 6 HOURS PRN
Status: DISCONTINUED | OUTPATIENT
Start: 2024-07-19 | End: 2024-08-04 | Stop reason: HOSPADM

## 2024-07-19 RX ORDER — ALBUMIN, HUMAN INJ 5% 5 %
200 SOLUTION INTRAVENOUS DAILY
Status: DISCONTINUED | OUTPATIENT
Start: 2024-07-19 | End: 2024-07-19

## 2024-07-19 RX ADMIN — MONTELUKAST 10 MG: 10 TABLET, FILM COATED ORAL at 12:22

## 2024-07-19 RX ADMIN — PANTOPRAZOLE SODIUM 40 MG: 40 TABLET, DELAYED RELEASE ORAL at 06:16

## 2024-07-19 RX ADMIN — OXYBUTYNIN CHLORIDE 5 MG: 5 TABLET, EXTENDED RELEASE ORAL at 12:22

## 2024-07-19 RX ADMIN — POLYSACCHARIDE-IRON COMPLEX 150 MG: 150 CAPSULE ORAL at 12:20

## 2024-07-19 RX ADMIN — FAMOTIDINE 10 MG: 20 TABLET ORAL at 13:05

## 2024-07-19 RX ADMIN — SEVELAMER HYDROCHLORIDE 1600 MG: 800 TABLET ORAL at 16:34

## 2024-07-19 RX ADMIN — INSULIN LISPRO 2 UNITS: 100 INJECTION, SOLUTION INTRAVENOUS; SUBCUTANEOUS at 16:34

## 2024-07-19 RX ADMIN — ALBUTEROL SULFATE 2 PUFF: 108 AEROSOL, METERED RESPIRATORY (INHALATION) at 07:25

## 2024-07-19 RX ADMIN — INSULIN LISPRO 2 UNITS: 100 INJECTION, SOLUTION INTRAVENOUS; SUBCUTANEOUS at 09:02

## 2024-07-19 RX ADMIN — TRAZODONE HYDROCHLORIDE 200 MG: 100 TABLET ORAL at 21:57

## 2024-07-19 RX ADMIN — ATORVASTATIN CALCIUM 20 MG: 20 TABLET, FILM COATED ORAL at 12:19

## 2024-07-19 RX ADMIN — LAMOTRIGINE 100 MG: 100 TABLET ORAL at 12:21

## 2024-07-19 RX ADMIN — SODIUM CHLORIDE 1000 MG: 0.9 INJECTION, SOLUTION INTRAVENOUS at 09:00

## 2024-07-19 RX ADMIN — SEVELAMER HYDROCHLORIDE 1600 MG: 800 TABLET ORAL at 12:23

## 2024-07-19 RX ADMIN — LORATADINE 10 MG: 10 TABLET ORAL at 12:21

## 2024-07-19 RX ADMIN — FLUTICASONE FUROATE AND VILANTEROL TRIFENATATE 1 PUFF: 200; 25 POWDER RESPIRATORY (INHALATION) at 07:25

## 2024-07-19 RX ADMIN — INSULIN LISPRO 2 UNITS: 100 INJECTION, SOLUTION INTRAVENOUS; SUBCUTANEOUS at 12:26

## 2024-07-19 RX ADMIN — ONDANSETRON 4 MG: 2 INJECTION INTRAMUSCULAR; INTRAVENOUS at 13:14

## 2024-07-19 RX ADMIN — TORSEMIDE 40 MG: 20 TABLET ORAL at 12:23

## 2024-07-19 RX ADMIN — Medication 1 TABLET: at 12:19

## 2024-07-19 RX ADMIN — VENLAFAXINE HYDROCHLORIDE 150 MG: 150 CAPSULE, EXTENDED RELEASE ORAL at 12:25

## 2024-07-19 RX ADMIN — Medication 3 MG: at 21:58

## 2024-07-19 RX ADMIN — ACETAMINOPHEN 650 MG: 325 TABLET, FILM COATED ORAL at 13:02

## 2024-07-19 NOTE — PROGRESS NOTES
Patient:    MRN:  [id number]    Sola Request ID:  [id number]    Level of care reserved:  Dialysis    Partner Reserved:  [facility] ([id number]), [place] [id number]    Clinical needs requested:    Geography searched:  10 miles around [address]    Start of Service:    Pickup/appointment time:    Request sent:  10:17am EDT on 7/17/2024 by Sofi Benson    Partner reserved:  9:35am EDT on 7/19/2024 by Edson Longoria    Choice list shared:  9:35am EDT on 7/19/2024 by Edson Longoria

## 2024-07-19 NOTE — CASE MANAGEMENT
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     Abnormal CT of the chest 2024     Abnormality of ascending aorta 2024     Postmenopausal 2024     Encounter for screening mammogram for malignant neoplasm of breast 2024     Allergic rhinitis 2024     Persistent cough 2024     Urge incontinence of urine 2024     Exercise intolerance 2024     Family history of coronary artery bypass graft surgery 2024     Urge urinary incontinence 2024     Female stress incontinence 2024     Paranoid schizophrenia ([facility]) 2023     Moderate persistent asthma w/out acute exacerbation 2023     Asthma due to seasonal allergies 2023     Bipolar 1 disorder ([facility]) 2022     Primary hypertension 2022     Dyslipidemia 2022     Morbid obesity with BMI of 45.0-49.9, adult ([facility]) 2022       LOS (days): 7  Geometric Mean LOS (GMLOS) (days): 5.1  Days to GMLOS:-2.1     OBJECTIVE:  Risk of Unplanned Readmission Score: 17.75         Current admission status: Inpatient   Preferred Pharmacy:   [facility]/pharmacy #[id number] [facility] [address] [address]  Phone: [phone] Fax: [phone]    [facility] - [place], [place] - [facility]  [address] [address]  Phone: [phone] Fax: [phone]    Primary Care Provider: Meenakshi Balbuena MD    Primary Insurance: MEDICARE  Secondary Insurance: [facility]    DISCHARGE DETAILS:                          Patient's family provide a copy of patient's AD/LW.  This was copied and placed in medical records for scanning.    CM department will continue to follow to assist with discharge coordination.

## 2024-07-19 NOTE — QUICK NOTE
Texted by the nurse around 7:30 PM that the patient has chest pain with blood pressure 163/87.    Assessed patient at bedside.  She complained of substernal chest tightness exacerbated by deep breaths. No sweating, palpitations, or shortness of breath. She stated that she feels a little anxious.  She looked comfortable and chest physical exam was unremarkable.  Her blood pressure was stable but she was bradycardic in the 50s.    Plan:  EKG-sinus bradycardia with left bundle branch block. No ischemic changes.  Added hydralazine as needed for blood pressure > 160  Continue to hold her metoprolol given bradycardia.  I personally talked to the nurse about monitoring her blood sugar closely given that she received 1 g Solu-Medrol today.

## 2024-07-19 NOTE — ASSESSMENT & PLAN NOTE
Patient admitted with acute renal failure with creatinine of 5.74 (baseline .8)  Patient had BUN 73 and GFR of 6  CT A/P: No hydronephrosis, however there is apparent 3 mm stone near or questionably within the distal right ureter (axial image 142). Given the lack of hydronephrosis this could either reflect adjacent calcification or nonobstructing stone.   Creatinine worsening today. Cr 7.15 from 6.02   Patient reports that she is still producing urine  US kidney and bladder: No hydronephrosis. 4 mm nonobstructing left intrarenal calculus  Urinalysis: 3+ blood.  3+ protein.  Innumerable red blood cells.  2-4 WBCs.  Moderate bacteria   ARUA, C3, and C4 normal  Ig Kappa Free Light Chain:152.5   Ig Lambda Free Light Chain 87.1   Kappa/Lambda FluidC Ratio 1.75  Hepatitis panel nonreactive  HIV nonreactive  QuantiFERON gold negative  protein electrophoresis see labs  hypersensitivity pneumonitis profile negative  GBM antibodies: elevated >8  phospholipase A2 receptor Ab: Negative  ANCA: negative  7/16 Temporary R IJ dialysis catheter placed  7/17 Renal biopsy and bronchoscopy performed. Results pending.   7/18 AFB stain with no acid fast bacili seen. Pt received first dose of 1g solumedrol.   7/19 CM confirmed HD chair time for patient. Will be TTS 10:30 AM with start date Tuesday 7/23.     Plan  Nephrology onboard - dialysis initiated  Pulmonology onboard  Hematology onboard for plan for PLEX - First session initiated today  Continue Prednisone 60 mg daily   Monitor blood glucose levels  IR to place a permanent hemodialysis catheter Monday 7/22  Follow-up renal biopsy and bronchoscopy results  Hold home dyazide

## 2024-07-19 NOTE — CONSULTS
Consultation Note: Hematology:   Ngozi Beard 64 y.o. female MRN: [id number]  Unit/Bed#: S -01 Encounter: [id number]    Assessment and Plan:  1. 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 Reactive Leukocytosis in Setting of High-Dose Corticosteroids:  3. Normocytic Anemia with Evidence of Iron-Deficiency:   A. Need for Screening Colonoscopy:  Patient is a 64-year-old female, with no significant medical history; who presented to [facility] on July 10th, 2024, as an outpatient referral by her Pulmonologist due to laboratory-derangements, including acute kidney injury in the absence of underlying renal-insufficiency (Baseline Creatinine: 0.86 to 0.90). On admission, Creatinine: 5.74 (Peak Creatinine: 11.51). Urinalysis demonstrated innumerable RBC, Blood (3+), Protein (3+), WBC 2-4, and moderate bacteria. Dedicated renal-imaging and CT Abdomen and Pelvis were unremarkable. Extensive work-up has yielded finding of elevated ESR (ESR: 61), markedly elevated C-Reactive Protein (CRP: 143.7), and positive anti-GBM antibody (Anti-GBM Antibody: Greater than 8 Reference Range: 0.0-0.9 Units). Patient underwent percutaneous renal-biopsy on July 17th, 2024. Pathology is pending at the present time. In the interim, patient was initiated on renal-replacement therapy with dialysis on July 15th, 2024, per Nephrology recommendations. Given rapidly progressive glomerulonephritis secondary to presumed anti-GBM (Goodpasture) disease, patient was empirically-initiated on high-dose corticosteroids on July 12th, 2024. She is anticipating transition to Prednisone 60 mg Daily tomorrow, July 20th, 2024. Hematology was consulted for assistance with management of plasma-exchange in the setting of the above-mentioned. Recommend plasma-exchange with Albumin 5% 4,000 mL Daily, with plan for 5-days of PLEX until anti-GBM titers improve (Per Nephrology). Details  regarding the above-mentioned are included below. Discussed the above-mentioned, at length, with patient and family, who expressed agreement.   1. Obtain Fibrinogen today.   2. Nephrology transitioning from Methylprednisolone to Prednisone 60 mg Daily.   3. Contacted [facility] to coordinate for urgent plasma-exchange:    A. Plan to initiate urgent plasma-exchange tomorrow, July 20th, 2024:     1. Patient has a dual-lumen temporary HD-catheter in place for PLEX.     2. Monitor Fibrinogen daily, prior to plasma-exchange.     3. Ordered Albumin 5% 4,000 mL Daily to be replaced via PLEX.     4. Ordered Calcium Gluconate 1 Gram Daily to be administered with PLEX.     5. Plan for daily PLEX for a total of 5-days, until anti-GBM titers improve.      A. If additional PLEX is required, thereafter, will need to coordinate.   4. Nephrology following for additional management of rapidly-progressive GN.   5. No outpatient follow-up with Hematology required, at this time (Will continue to evaluate).   6. Patient will need outpatient evaluation for source of iron-deficiency (No prior colonoscopy).    Subjective:  History of Present Illness: Ngozi Beard is a 64-year-old female, with an established history of hypertension, moderate-persistent asthma, and unspecified chronic pain; who presented to [facility] on July 12th, 2024, as an outpatient referral by her Pulmonologist due to laboratory-derangements, including acute kidney injury. Historical information was obtained from patient and prior documentation.    Review of Systems:  All systems reviewed and negative except otherwise listed in the History of Present Illness.    Historical Information:  Past Medical History:   Diagnosis Date    Asthma     Chronic pain     Hypertension     Sepsis (HCC) 07/10/2024     Past Surgical History:   Procedure Laterality Date    BACK SURGERY      COLONOSCOPY      IR BIOPSY KIDNEY RANDOM   2024    IR TEMPORARY DIALYSIS CATHETER PLACEMENT  7/15/2024    TUBAL LIGATION       Social History:  Social History     Substance and Sexual Activity   Alcohol Use No    Comment: hX:Occas.      Social History     Substance and Sexual Activity   Drug Use No     Social History     Tobacco Use   Smoking Status Former    Current packs/day: 0.00    Average packs/day: 1 pack/day for 15.0 years (15.0 ttl pk-yrs)    Types: Cigarettes    Start date:     Quit date:     Years since quittin.5   Smokeless Tobacco Never     E-Cigarette/Vaping    E-Cigarette Use Never User      E-Cigarette/Vaping Substances    Nicotine No     THC No     CBD No     Flavoring No      Family History: Non-Contributory.    Medications and Allergies: All Medications Reviewed.    Objective:  Vitals:    24 1557   BP: (!) 177/97   Pulse: 60   Resp: 18   Temp: 98.4 °F (36.9 °C)   SpO2: 92%     Physical Exam:  General: Alert, and oriented; Pleasant, and conversational; Chronically-Ill Appearing  HEENT: Atraumatic, and normocephalic; PERRLA; EOMI; Moist mucosal membranes  Neck: Trachea midline; No neck masses, thyromegaly, or cervical lymphadenopathy; Right Internal Jugular Vein Temporary Hemodialysis-Catheter in Place  Cardiovascular: Regular rate and rhythm; No murmurs, rubs, or gallops appreciated; Trace Edema  Respiratory: Clear to auscultation bilaterally; Adequate aeration; No supplemental oxygen  Abdomen: Soft/Obese; Non-tender; Non-distended; No organomegaly; Bowel sounds present  Extremities: No obvious deformities; Trace Edema; Moves all  Neurologic: Appropriately alert, and oriented to Person, Place, and Time; No focal neurologic deficits    Lab Results: I have reviewed all pertinent labs.  Imaging: I have personally reviewed pertinent reports.    EKG, Pathology, and Other Studies: I have personally reviewed pertinent reports.      Patient was seen and discussed with attending physician, Boston Rodgers M.D.    Madonna Camacho,  BALJINDER.  Hematology-Oncology Fellow (PGY-5)

## 2024-07-19 NOTE — HOSPITAL COURSE
Ngozi Beard is a 64 y.o. female with a PMH of asthma, hypertension, bipolar who presented due to abnormal labs and was admitted to [facility].  Patient lab work showed elevated creatinine levels, 4 times her baseline.  Pr atient underwent imaging CT chest showed multiple pulmonary nodules.  Patient only initially complained of shortness of breath with no otheissues.  Nephrology and pulmonology was consulted.  Multiple autoimmune labs were ordered for the patient.  Patient also met the sepsis criteria and was started on ceftriaxone.  Patient had a elevated lactate level, procalcitonin, and positive blood culture and sputum culture.  Patient has been transferred to [facility] for further workup including renal biopsy.  Patient will also require bronchoscopy.  Patient is currently being admitted to [facility] for management of her acute renal failure.      7/15 placement of a temporary dialysis catheter  7/16 initiation of dialysis  7/17 Renal biopsy and bronchoscopy performed.   7/18 AFB stain with no acid fast bacili seen. Pt received first dose of 1g solumedrol.   7/19 CM confirmed HD chair time for patient. Will be TTS 10:30 AM with start date Tuesday 7/23.   7/20 started PLEX with 4 L of 5% albumin daily.  Hematology on board.  7/21 started cyclophosphamide  7/22 pulmonology signed off. R permacath placed by IR. 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 started Bactrim  7/27 nifedipine increased to 60 mg, began tapering steroid

## 2024-07-19 NOTE — PROGRESS NOTES
Progress Note - Pulmonary   Ngozi Beard 64 y.o. female MRN: [id number]  Unit/Bed#: S -01 Encounter: [id number]    Assessment/Plan:      Acute pulmonary insufficiency-resolved  -98% on room air, patient does not wear any supplemental O2 at home  -Continue to maintain saturation greater than 89%  -Pulmonary hygiene encouraged: Deep breathing with cough, OOB as tolerated, incentive spirometry     Abnormal CT chest  -Seen in office by Dr. Rojas 06/26/2024 for consult preliminary lab work showed acute renal failure and patient was sent to the hospital  -CT chest 07/11/2024 shows unchanged extent of diffuse pulmonary nodularity  -Etiology unclear.  Rule out atypical infection versus developing pulmonary renal syndrome  -WBC 11.53-10.69-12.75  -Procalcitonin 0.30-0.38-0.40  -CRP-143.7, sed rate-61, QuantiFERON negative, HP/ANCA pending  -Ceftriaxone 5-day course completed  -Bronchoscopy completed 07/17/2024-culture showed no growth, BAL differential 28% neutrophil, 4% lymphocytes, 68% macrophage  -fungal culture, pneumocystis PCR, leukemia/lymphoma flow cytometry and viral culture pending  -If AFB smear was negative- steroid therapy initiated yesterday as per renal     Moderate persistent asthma without acute exacerbation  -PFTs ordered but not completed  -Home medication regime Advair -21 mcg 2 puff twice daily, Proventil 2 puffs every 6 hours as needed      Acute renal failure  -Unknown etiology  -Creatinine 9.89-10.82-11.51-10.37-7.88-8.65  -started on dialysis this week, tolerating well  -Renal biopsy completed results pending  -Renal following    Chief Complaint:    I am exhausted    Subjective:    Complaints of chest pain last night, nausea and vomiting no ischemic changes on EKG around 4 am    Objective:    Vitals: Blood pressure 159/90, pulse 61, temperature 98.3 °F (36.8 °C), resp. rate 18, weight 120 kg (263 lb 14.3 oz), SpO2 95%.RA,Body mass index is 46.75 kg/m².      Intake/Output Summary  "(Last 24 hours) at 7/19/2024 0830  Last data filed at 7/18/2024 1710  Gross per 24 hour   Intake 740 ml   Output 1000 ml   Net -260 ml       Invasive Devices       Peripheral Intravenous Line  Duration             Peripheral IV 07/17/24 Left;Ventral (anterior) Forearm 1 day              Hemodialysis Catheter  Duration             HD Temporary Double Catheter 3 days                    Physical Exam:     Physical Exam  Vitals reviewed.   Constitutional:       General: She is not in acute distress.     Appearance: She is obese. She is not ill-appearing.   HENT:      Head: Normocephalic and atraumatic.      Right Ear: External ear normal.      Left Ear: External ear normal.      Nose: Nose normal.      Mouth/Throat:      Mouth: Mucous membranes are moist.      Pharynx: Oropharynx is clear.   Eyes:      Extraocular Movements: Extraocular movements intact.      Pupils: Pupils are equal, round, and reactive to light.   Cardiovascular:      Rate and Rhythm: Normal rate and regular rhythm.      Pulses: Normal pulses.      Heart sounds: Normal heart sounds.   Pulmonary:      Effort: Pulmonary effort is normal.      Breath sounds: Normal breath sounds.   Abdominal:      General: Bowel sounds are normal.      Tenderness: There is no abdominal tenderness.   Musculoskeletal:         General: Normal range of motion.      Cervical back: Normal range of motion and neck supple.      Right lower leg: No edema.      Left lower leg: No edema.   Skin:     General: Skin is warm and dry.   Neurological:      Mental Status: She is alert and oriented to person, place, and time.   Psychiatric:         Mood and Affect: Mood normal.         Behavior: Behavior normal.         Thought Content: Thought content normal.         Judgment: Judgment normal.         Labs: I have personally reviewed pertinent lab results., ABG: No results found for: \"PHART\", \"BWR7LGE\", \"PO2ART\", \"SCS8EOV\", \"P1BQTRNL\", \"BEART\", \"SOURCE\", BNP: No results found for: \"BNP\", " CBC:   Lab Results   Component Value Date    WBC 15.22 (H) 07/19/2024    HGB 9.9 (L) 07/19/2024    HCT 30.3 (L) 07/19/2024    MCV 84 07/19/2024     07/19/2024    RBC 3.61 (L) 07/19/2024    MCH 27.4 07/19/2024    MCHC 32.7 07/19/2024    RDW 14.9 07/19/2024    MPV 9.9 07/19/2024   , CMP:   Lab Results   Component Value Date    SODIUM 134 (L) 07/19/2024    K 5.1 07/19/2024     07/19/2024    CO2 21 07/19/2024    BUN 94 (H) 07/19/2024    CREATININE 8.65 (H) 07/19/2024    CALCIUM 9.6 07/19/2024    EGFR 4 07/19/2024           Imaging and other studies:

## 2024-07-19 NOTE — PROGRESS NOTES
[facility]  Progress Note  Name: Ngozi Beard I  MRN: [id number]  Unit/Bed#: S -01 I Date of Admission: 7/12/2024   Date of Service: 7/19/2024 I Hospital Day: 7    Assessment & Plan   * 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  Assessment & Plan  Patient admitted with acute renal failure with creatinine of 5.74 (baseline .8)  Patient had BUN 73 and GFR of 6  CT A/P: No hydronephrosis, however there is apparent 3 mm stone near or questionably within the distal right ureter (axial image 142). Given the lack of hydronephrosis this could either reflect adjacent calcification or nonobstructing stone.   Creatinine worsening today. Cr 7.15 from 6.02   Patient reports that she is still producing urine  US kidney and bladder: No hydronephrosis. 4 mm nonobstructing left intrarenal calculus  Urinalysis: 3+ blood.  3+ protein.  Innumerable red blood cells.  2-4 WBCs.  Moderate bacteria   AURA, C3, and C4 normal  Ig Kappa Free Light Chain:152.5   Ig Lambda Free Light Chain 87.1   Kappa/Lambda FluidC Ratio 1.75  Hepatitis panel nonreactive  HIV nonreactive  QuantiFERON gold negative  protein electrophoresis see labs  hypersensitivity pneumonitis profile negative  GBM antibodies: elevated >8  phospholipase A2 receptor Ab: Negative  ANCA: negative  7/16 Temporary R IJ dialysis catheter placed  7/17 Renal biopsy and bronchoscopy performed. Results pending.   7/18 AFB stain with no acid fast bacili seen. Pt received first dose of 1g solumedrol.   7/19 CM confirmed HD chair time for patient. Will be TTS 10:30 AM with start date Tuesday 7/23.     Plan  Nephrology onboard   Pulmonology onboard  Hematology onboard for plan for PLEX  Give 2nd dose of 1g solumedrol today with plan to transition back to prednisone 60 mg daily tomorrow 7/20.    Monitor blood glucose levels  IR to place a permanent hemodialysis catheter Monday 7/22  Follow-up biopsy and bronchoscopy results  Hold  home dyazide    Persistent cough  Assessment & Plan  Patient endorses worsening shortness of breath and difficulty breathing  Patient noted to have multiple diffuse scattered pulmonary nodules on CT  Quantiferon gold negative 7/9/24  Patient currently on room air    Plan  Completed Ceftriaxone x 5 days.  Tessalon Perles as needed    Abnormal CT of the chest  Assessment & Plan  CT chest: Re demonstration of multiple diffuse scattered nodules  Etiology unclear: HP vs sarcoid vs  vs MAC vs  atypical  CRP- 143.7   Sed-61, negative: QuantiFERON  pending: HP/ANCA  Procal: 0.27--0.30--0.38--0.40  7/17 Renal biopsy and bronchoscopy performed. Results pending.     Plan:  Follow-up renal biopsy and bronchoscopy results    Moderate persistent asthma w/out acute exacerbation  Assessment & Plan  Patient has PFTs ordered but they have not been completed.  Home medication regime Advair -21 mcg 2 puff twice daily, Proventil 2 puffs every 6 hours as needed    Plan:    Albuterol inhaler PRN  Continue benzonatate  Continue fluticasone-vilanterol  Continue loratadine 10 mg  Robitussin PRN    Abnormality of ascending aorta  Assessment & Plan  CT chest: Unchanged fusiform ectasia of the ascending thoracic aorta measuring up to 40 mm     Plan  Recommendation for follow-up low radiation dose chest CT in one year    Bipolar 1 disorder (HCC)  Assessment & Plan  Plan  Continue on Effexor, trazodone and Lamictal    Primary hypertension  Assessment & Plan  Hold Lopressor due to bradycardia  Hold Dyazide  Hydralazine PRN  Monitor vitals as per protocol    Dyslipidemia  Assessment & Plan  Continue on atorvastatin 20 Mg    Sepsis (HCC)-resolved as of 7/14/2024  Assessment & Plan    WBC   Date Value Ref Range Status   07/18/2024 12.75 (H) 4.31 - 10.16 Thousand/uL Final     Patient admitted with sepsis likely secondary to pneumonitis as evidenced by tachycardia, tachypnea and leukocytosis  CT: redemonstration of multiple diffuse scattered  nodules, less pronounced than on 2024 suggestive of mycobacterial infection  QuantiFERON gold negative   Lactic acid normal, Procalcitonin trending up: 0.27 > 0.30 > .38 > .40  Sputum culture +2 gram negative rods, +1 gram positive cocci  BC: 1/ staph epidermis detected-contaminated  Leukocytosis likely due to steroid use    Plan:  Completed Ceftriaxone x 5 days         VTE Pharmacologic Prophylaxis: VTE Score: 4 Moderate Risk (Score 3-4) - Pharmacological DVT Prophylaxis Ordered: heparin.    Mobility:   Basic Mobility Inpatient Raw Score: 24  JH-HLM Goal: 8: Walk 250 feet or more  JH-HLM Achieved: 2: Bed activities/Dependent transfer  JH-HLM Goal NOT achieved. Continue with multidisciplinary rounding and encourage appropriate mobility to improve upon JH-HLM goals.    Patient Centered Rounds: I performed bedside rounds with nursing staff today.   Discussions with Specialists or Other Care Team Provider: Nephrology, IR, pulmonology, Hematology    Education and Discussions with Family / Patient: Updated  (sister) via phone.    Current Length of Stay: 7 day(s)  Current Patient Status: Inpatient   Discharge Plan: Anticipate discharge in >72 hrs to home.    Code Status: Level 1 - Full Code    Subjective:   Patient seen and examined at bedside. Overnight, pt c/o CP and feeling anxious. /87 and EKG at that showed sinus annita with LBBB. No ischemic changes. Hydralazine was added as needed for blood pressure > 160. Symptoms likely due to high dose steroids. She also endorses 1 episode of emesis this AM as well as unchanged shortness of breath, cough, and feeling tired. Discussed current plan and management with the patient. She understands and agrees. Saturating well on room air.     Objective:     Vitals:   Temp (24hrs), Av.3 °F (36.8 °C), Min:97.7 °F (36.5 °C), Max:98.6 °F (37 °C)    Temp:  [97.7 °F (36.5 °C)-98.6 °F (37 °C)] 98.3 °F (36.8 °C)  HR:  [43-61] 61  Resp:  [12-22] 18  BP:  (144-177)/() 159/90  SpO2:  [90 %-95 %] 95 %  Body mass index is 46.75 kg/m².     Input and Output Summary (last 24 hours):     Intake/Output Summary (Last 24 hours) at 7/19/2024 1439  Last data filed at 7/19/2024 0726  Gross per 24 hour   Intake 300 ml   Output 1000 ml   Net -700 ml       Physical Exam:   Physical Exam  Vitals and nursing note reviewed.   Constitutional:       Appearance: Normal appearance.   HENT:      Head: Normocephalic and atraumatic.      Mouth/Throat:      Mouth: Mucous membranes are moist.   Eyes:      Extraocular Movements: Extraocular movements intact.      Conjunctiva/sclera: Conjunctivae normal.      Pupils: Pupils are equal, round, and reactive to light.   Cardiovascular:      Rate and Rhythm: Regular rhythm. Bradycardia present.   Pulmonary:      Effort: Pulmonary effort is normal. No respiratory distress.      Breath sounds: Normal breath sounds.   Abdominal:      General: Bowel sounds are normal. There is no distension.      Palpations: Abdomen is soft.      Tenderness: There is no abdominal tenderness. There is no guarding.   Musculoskeletal:      Right lower leg: Edema present.      Left lower leg: Edema present.      Comments: Trace edema noted to BLE  Bandage over R low back s/p renal biopsy not removed.   Skin:     General: Skin is warm and dry.   Neurological:      General: No focal deficit present.      Mental Status: She is alert. Mental status is at baseline.   Psychiatric:         Mood and Affect: Affect is flat.        Additional Data:     Labs:  Results from last 7 days   Lab Units 07/19/24  0400   WBC Thousand/uL 15.22*   HEMOGLOBIN g/dL 9.9*   HEMATOCRIT % 30.3*   PLATELETS Thousands/uL 256     Results from last 7 days   Lab Units 07/19/24  0400 07/14/24  0441 07/13/24  0620   SODIUM mmol/L 134*   < > 138   POTASSIUM mmol/L 5.1   < > 4.9   CHLORIDE mmol/L 101   < > 105   CO2 mmol/L 21   < > 19*   BUN mg/dL 94*   < > 98*   CREATININE mg/dL 8.65*   < > 8.34*   ANION  GAP mmol/L 12   < > 14*   CALCIUM mg/dL 9.6   < > 9.8   ALBUMIN g/dL  --   --  3.3*   GLUCOSE RANDOM mg/dL 200*   < > 168*    < > = values in this interval not displayed.     Results from last 7 days   Lab Units 07/13/24  0620   INR  0.98     Results from last 7 days   Lab Units 07/19/24  1128 07/19/24  0658 07/18/24  2051 07/18/24  1556   POC GLUCOSE mg/dl 177* 180* 211* 191*           Results from last 7 days   Lab Units 07/13/24  0620   PROCALCITONIN ng/ml 0.40*       Lines/Drains:  Invasive Devices       Peripheral Intravenous Line  Duration             Peripheral IV 07/17/24 Left;Ventral (anterior) Forearm 2 days              Hemodialysis Catheter  Duration             HD Temporary Double Catheter 3 days                    Imaging: Reviewed radiology reports from this admission including: chest xray, chest CT scan, abdominal/pelvic CT, and ultrasound kidney and bladder  IR biopsy kidney random   Final Result by Sandoval Castro MD (07/18 1358)   Impression: Successful percutaneous nontarget renal biopsy as described.                  Workstation performed: [id number]         IR temporary dialysis catheter placement   Final Result by Graciela Bettencourt MD (07/15 1640)      Insertion of right-sided non-tunneled dual-lumen temporary dialysis catheter, with tip in the expected location of the cavoatrial junction.      Plan:      The catheter may be used immediately.      Workstation performed: [id number]         IR tunneled dialysis catheter placement    (Results Pending)            Recent Cultures (last 7 days):   Results from last 7 days   Lab Units 07/17/24  1258 07/17/24  1253   GRAM STAIN RESULT  1+ Polys  No bacteria seen 1+ Polys  No bacteria seen       Last 24 Hours Medication List:   Current Facility-Administered Medications   Medication Dose Route Frequency Provider Last Rate    acetaminophen  650 mg Oral Q6H PRN Tram Palacios PA-C      Albumin 5%  200 g Intravenous Daily Madonna Camacho DO       albuterol  2 puff Inhalation Q4H PRN Tram Palacios PA-C      atorvastatin  20 mg Oral Daily Tram Palacios PA-C      [START ON 7/20/2024] atovaquone  1,500 mg Oral Daily With Breakfast Vane Estrada MD      benzonatate  200 mg Oral TID PRN Tram Palacios PA-C      calcium carbonate-vitamin D  1 tablet Oral Daily With Breakfast Vane Estrada MD      dextromethorphan-guaiFENesin  10 mL Oral Q4H PRN Tram Palacios PA-C      famotidine  10 mg Oral Daily PRN Daya May DO      fluticasone-vilanterol  1 puff Inhalation Daily Tram Palacios PA-C      hydrALAZINE  10 mg Intravenous Q6H PRN Yasmine Meadows MD      insulin lispro  2-12 Units Subcutaneous TID AC Yasmine Meadows MD      iron polysaccharides  150 mg Oral Daily Ata Burns MD      lamoTRIgine  100 mg Oral QAM Tram Palacios PA-C      loratadine  10 mg Oral Daily Tram Palacios PA-C      melatonin  3 mg Oral Daily PRN Yasmine Meadows MD      montelukast  10 mg Oral Daily Tram Palacios PA-C      ondansetron  4 mg Intravenous Q6H PRN Yasmine Meadows MD      oxybutynin  5 mg Oral Daily Tram Palacios PA-C      pantoprazole  40 mg Oral Daily Before Breakfast Ronny Webster MD      [START ON 7/20/2024] predniSONE  60 mg Oral Daily Vane Estrada MD      sevelamer  1,600 mg Oral TID With Meals Vane Estrada MD      torsemide  40 mg Oral Daily Vane Estrada MD      traZODone  200 mg Oral HS Tram Palacios PA-C      venlafaxine  150 mg Oral Daily Tram Palacios PA-C          Today, Patient Was Seen By: Jessica Beckford MD    **Please Note: This note may have been constructed using a voice recognition system.**

## 2024-07-19 NOTE — PLAN OF CARE
Problem: PAIN - ADULT  Goal: Verbalizes/displays adequate comfort level or baseline comfort level  Description: Interventions:  - Encourage patient to monitor pain and request assistance  - Assess pain using appropriate pain scale  - Administer analgesics based on type and severity of pain and evaluate response  - Implement non-pharmacological measures as appropriate and evaluate response  - Consider cultural and social influences on pain and pain management  - Notify physician/advanced practitioner if interventions unsuccessful or patient reports new pain  Outcome: Progressing     Problem: INFECTION - ADULT  Goal: Absence or prevention of progression during hospitalization  Description: INTERVENTIONS:  - Assess and monitor for signs and symptoms of infection  - Monitor lab/diagnostic results  - Monitor all insertion sites, i.e. indwelling lines, tubes, and drains  - Monitor endotracheal if appropriate and nasal secretions for changes in amount and color  - [place]cker[place] appropriate cooling/warming therapies per order  - Administer medications as ordered  - Instruct and encourage patient and family to use good hand hygiene technique  - Identify and instruct in appropriate isolation precautions for identified infection/condition  Outcome: Progressing  Goal: Absence of fever/infection during neutropenic period  Description: INTERVENTIONS:  - Monitor WBC    Outcome: Progressing     Problem: SAFETY ADULT  Goal: Patient will remain free of falls  Description: INTERVENTIONS:  - Educate patient/family on patient safety including physical limitations  - Instruct patient to call for assistance with activity   - Consult OT/PT to assist with strengthening/mobility   - Keep Call bell within reach  - Keep bed low and locked with side rails adjusted as appropriate  - Keep care items and personal belongings within reach  - Initiate and maintain comfort rounds  - Make Fall Risk Sign visible to staff  - Apply yellow socks and bracelet  for high fall risk patients  - Consider moving patient to room near nurses station  Outcome: Progressing  Goal: Maintain or return to baseline ADL function  Description: INTERVENTIONS:  -  Assess patient's ability to carry out ADLs; assess patient's baseline for ADL function and identify physical deficits which impact ability to perform ADLs (bathing, care of mouth/teeth, toileting, grooming, dressing, etc.)  - Assess/evaluate cause of self-care deficits   - Assess range of motion  - Assess patient's mobility; develop plan if impaired  - Assess patient's need for assistive devices and provide as appropriate  - Encourage maximum independence but intervene and supervise when necessary  - Involve family in performance of ADLs  - Assess for home care needs following discharge   - Consider OT consult to assist with ADL evaluation and planning for discharge  - Provide patient education as appropriate  Outcome: Progressing  Goal: Maintains/Returns to pre admission functional level  Description: INTERVENTIONS:  - Perform AM-PAC 6 Click Basic Mobility/ Daily Activity assessment daily.  - Set and communicate daily mobility goal to care team and patient/family/caregiver.   - Collaborate with rehabilitation services on mobility goals if consulted  - Out of bed for toileting  - Record patient progress and toleration of activity level   Outcome: Progressing     Problem: DISCHARGE PLANNING  Goal: Discharge to home or other facility with appropriate resources  Description: INTERVENTIONS:  - Identify barriers to discharge w/patient and caregiver  - Arrange for needed discharge resources and transportation as appropriate  - Identify discharge learning needs (meds, wound care, etc.)  - Arrange for interpretive services to assist at discharge as needed  - Refer to Case Management Department for coordinating discharge planning if the patient needs post-hospital services based on physician/advanced practitioner order or complex needs  related to functional status, cognitive ability, or social support system  Outcome: Progressing     Problem: Knowledge Deficit  Goal: Patient/family/caregiver demonstrates understanding of disease process, treatment plan, medications, and discharge instructions  Description: Complete learning assessment and assess knowledge base.  Interventions:  - Provide teaching at level of understanding  - Provide teaching via preferred learning methods  Outcome: Progressing     Problem: METABOLIC, FLUID AND ELECTROLYTES - ADULT  Goal: Electrolytes maintained within normal limits  Description: INTERVENTIONS:  - Monitor labs and assess patient for signs and symptoms of electrolyte imbalances  - Administer electrolyte replacement as ordered  - Monitor response to electrolyte replacements, including repeat lab results as appropriate  - Instruct patient on fluid and nutrition as appropriate  Outcome: Progressing  Goal: Fluid balance maintained  Description: INTERVENTIONS:  - Monitor labs   - Monitor I/O and WT  - Instruct patient on fluid and nutrition as appropriate  - Assess for signs & symptoms of volume excess or deficit  Outcome: Progressing

## 2024-07-19 NOTE — PROGRESS NOTES
NEPHROLOGY HOSPITAL PROGRESS NOTE   Ngozi Beard 64 y.o. female MRN: [id number]  Unit/Bed#: S -01 Encounter: [id number]  Reason for Consult: ANGELICA    ASSESSMENT and PLAN:  64-year-old female with history of asthma, hypertension, bipolar disorder who presented at the request of her pulmonologist due to acute kidney injury.  Nephrology consulted for acute kidney injury.    # ANGELICA  Baseline creatinine less than 1, was 0.8 on May 2024  Creatinine on admission 4.6  UA 3+ blood, 3+ protein, innumerable RBC, 2-4 WBC, moderate bacteria  Renal imaging with kidney ultrasound with right kidney 13.2 cm, left kidney 12.5 cm    Serological workup  - C3, C4-normal  - Hep C Ab neg  - hep panel non reactive  - HIV -nonreactive  - ASO- negative  - AURA-negative  - Anti-double-stranded DNA-negative  - ANCA-negative  - Anti-GBM-positive anti GBM > 8  - QuantiFERON gold negative  - QEO2C-awuyxysz.  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    Plan  Dialysis access: Nontunneled hemodialysis catheter placed 07/15 by IR  Dialysis initiation date 07/16  Status post 3 g Solu-Medrol (last dose 07/19)  Prednisone 60 mg daily since 07/20  PLEX to start today for 5 days  Renal biopsy completed, results pending  No evidence of renal recovery, dialysis today  Prophylaxis: Atovaquone 1500 mg daily, pantoprazole 40 mg daily, Os-Bennett D    # Hyperkalemia  Serum potassium 6.1, for dialysis today  Add Lokelma 10 g daily    # Metabolic acidosis  Serum bicarbonate 20  This will be corrected with dialysis today    # Hyperphosphatemia  Continue sevelamer with meals    # Pulmonary nodules  Outpatient was following with pulmonary team  closely  CT scan suggestive of mycobacterial infection  QuantiFERON gold negative  Further workup in progress by pulmonary team  Unclear if this is related to pulmonary renal syndrome  Follow-up final results from bronchoscopy    # Hypertension  Holding triamterene-HCTZ  Currently on metoprolol and torsemide, blood pressure acceptable    SUBJECTIVE / 24H INTERVAL HISTORY:  Denies dyspnea.  Denies leg swelling.  Denies hemoptysis.  Denies hematuria.    OBJECTIVE:  Current Weight: Weight - Scale: 120 kg (263 lb 14.3 oz)  Vitals:    07/19/24 0231 07/19/24 0600 07/19/24 0656 07/19/24 0726   BP: 161/92  159/90    BP Location:       Pulse: 57  61    Resp: 12  18    Temp: 98.5 °F (36.9 °C)  98.3 °F (36.8 °C)    TempSrc:       SpO2: 90%  93% 95%   Weight:  120 kg (263 lb 14.3 oz)         Intake/Output Summary (Last 24 hours) at 7/19/2024 1428  Last data filed at 7/19/2024 0726  Gross per 24 hour   Intake 300 ml   Output 1000 ml   Net -700 ml     Review of Systems   Constitutional:  Negative for chills and fever.   HENT:  Negative for ear pain and sore throat.    Eyes:  Negative for pain and visual disturbance.   Respiratory:  Negative for cough and shortness of breath.    Cardiovascular:  Negative for chest pain and palpitations.   Gastrointestinal:  Negative for abdominal pain and vomiting.   Genitourinary:  Negative for dysuria and hematuria.   Musculoskeletal:  Negative for arthralgias and back pain.   Skin:  Negative for color change and rash.   Neurological:  Negative for seizures and syncope.   All other systems reviewed and are negative.    Physical Exam  Vitals and nursing note reviewed.   Constitutional:       General: She is not in acute distress.     Appearance: She is well-developed.   HENT:      Head: Normocephalic and atraumatic.   Eyes:      Conjunctiva/sclera: Conjunctivae normal.   Cardiovascular:      Rate and Rhythm: Normal rate and regular rhythm.      Pulses: Normal pulses.      Heart sounds: Normal  heart sounds. No murmur heard.     Comments: Right IJ nontunneled dialysis catheter present  Pulmonary:      Effort: Pulmonary effort is normal. No respiratory distress.      Breath sounds: Normal breath sounds.   Abdominal:      Palpations: Abdomen is soft.      Tenderness: There is no abdominal tenderness.   Musculoskeletal:         General: No swelling.      Cervical back: Neck supple.      Right lower leg: No edema.      Left lower leg: No edema.   Skin:     General: Skin is warm and dry.      Capillary Refill: Capillary refill takes less than 2 seconds.   Neurological:      Mental Status: She is alert.   Psychiatric:         Mood and Affect: Mood normal.         Medications:    Current Facility-Administered Medications:     acetaminophen (TYLENOL) tablet 650 mg, 650 mg, Oral, Q6H PRN, Tram Palacios PA-C, 650 mg at 07/19/24 1302    albumin human (FLEXBUMIN) 5 % injection 200 g, 200 g, Intravenous, Daily, Madonna Camacho DO    albuterol (PROVENTIL HFA,VENTOLIN HFA) inhaler 2 puff, 2 puff, Inhalation, Q4H PRN, Tram Palacios PA-C, 2 puff at 07/19/24 0725    atorvastatin (LIPITOR) tablet 20 mg, 20 mg, Oral, Daily, Tram Palacios PA-C, 20 mg at 07/19/24 1219    [START ON 7/20/2024] atovaquone (MEPRON) oral suspension 1,500 mg, 1,500 mg, Oral, Daily With Breakfast, Vane Estrada MD    benzonatate (TESSALON PERLES) capsule 200 mg, 200 mg, Oral, TID PRN, Tram Palacios PA-C    calcium carbonate-vitamin D 500 mg-5 mcg tablet 1 tablet, 1 tablet, Oral, Daily With Breakfast, Vane Estrada MD, 1 tablet at 07/19/24 1219    dextromethorphan-guaiFENesin (ROBITUSSIN DM) oral syrup 10 mL, 10 mL, Oral, Q4H PRN, Tram Palacios PA-C    famotidine (PEPCID) tablet 10 mg, 10 mg, Oral, Daily PRN, Daya May DO, 10 mg at 07/19/24 1305    fluticasone-vilanterol 200-25 mcg/actuation 1 puff, 1 puff, Inhalation, Daily, Tram Palacios PA-C, 1 puff at 07/19/24 0759    hydrALAZINE (APRESOLINE) injection 10 mg, 10 mg, Intravenous,  Q6H PRN, Yasmine Meadows MD    insulin lispro (HumALOG/ADMELOG) 100 units/mL subcutaneous injection 2-12 Units, 2-12 Units, Subcutaneous, TID AC, 2 Units at 07/19/24 1226 **AND** Fingerstick Glucose (POCT), , , TID AC, Yasmine Meadows MD    iron polysaccharides (FERREX) capsule 150 mg, 150 mg, Oral, Daily, Ata Burns MD, 150 mg at 07/19/24 1220    lamoTRIgine (LaMICtal) tablet 100 mg, 100 mg, Oral, QAM, Tram Palacios PA-C, 100 mg at 07/19/24 1221    loratadine (CLARITIN) tablet 10 mg, 10 mg, Oral, Daily, Tram Palacios PA-C, 10 mg at 07/19/24 1221    melatonin tablet 3 mg, 3 mg, Oral, Daily PRN, Yasmine Meadows MD    montelukast (SINGULAIR) tablet 10 mg, 10 mg, Oral, Daily, Tram Palacios PA-C, 10 mg at 07/19/24 1222    ondansetron (ZOFRAN) injection 4 mg, 4 mg, Intravenous, Q6H PRN, Yasmine Meadows MD, 4 mg at 07/19/24 1314    oxybutynin (DITROPAN-XL) 24 hr tablet 5 mg, 5 mg, Oral, Daily, Tram Palacios PA-C, 5 mg at 07/19/24 1222    pantoprazole (PROTONIX) EC tablet 40 mg, 40 mg, Oral, Daily Before Breakfast, Ronny Webster MD, 40 mg at 07/19/24 0616    [START ON 7/20/2024] predniSONE tablet 60 mg, 60 mg, Oral, Daily, Vane Estrada MD    sevelamer (RENAGEL) tablet 1,600 mg, 1,600 mg, Oral, TID With Meals, Vane Estrada MD, 1,600 mg at 07/19/24 1223    torsemide (DEMADEX) tablet 40 mg, 40 mg, Oral, Daily, Vane Estrada MD, 40 mg at 07/19/24 1223    traZODone (DESYREL) tablet 200 mg, 200 mg, Oral, HS, Tram Palacios PA-C, 200 mg at 07/18/24 2139    venlafaxine (EFFEXOR-XR) 24 hr capsule 150 mg, 150 mg, Oral, Daily, Tram Palacios PA-C, 150 mg at 07/19/24 1225    Laboratory Results:  Results from last 7 days   Lab Units 07/19/24  0400 07/18/24  0440 07/17/24  2132 07/17/24  0447 07/16/24  0643 07/15/24  0431 07/14/24  0441 07/13/24  0620   WBC Thousand/uL 15.22* 12.75* 10.69* 11.53* 11.63* 14.24* 14.12*  --    HEMOGLOBIN g/dL 9.9* 9.5* 9.4* 9.6* 9.4* 9.1* 9.1*  --    HEMATOCRIT % 30.3* 29.7* 28.8* 30.1* 30.0* 28.8* 29.3*  " --    PLATELETS Thousands/uL 256 264 250 283 289 296 280  --    POTASSIUM mmol/L 5.1 5.4*  --  5.0 5.3 5.0 5.1 4.9   CHLORIDE mmol/L 101 100  --  102 102 103 104 105   CO2 mmol/L 21 22  --  21 15* 18* 19* 19*   BUN mg/dL 94* 92*  --  126* 154* 135* 117* 98*   CREATININE mg/dL 8.65* 7.88*  --  10.37* 11.51* 10.82* 9.89* 8.34*   CALCIUM mg/dL 9.6 9.4  --  9.3 9.8 9.3 9.4 9.8   MAGNESIUM mg/dL 2.3 2.3  --  2.5 2.7  --   --   --    PHOSPHORUS mg/dL 8.2* 8.6*  --  9.4* 11.0*  --   --  8.5*       Portions of the record may have been created with voice recognition software. Occasional wrong word or \"sound a like\" substitutions may have occurred due to the inherent limitations of voice recognition software. Read the chart carefully and recognize, using context, where substitutions have occurred. If you have any questions, please contact the dictating provider.    "

## 2024-07-19 NOTE — RESPIRATORY THERAPY NOTE
07/19/24 0726   Respiratory Assessment   Assessment Type Post-treatment   General Appearance Alert;Awake   Respiratory Pattern Normal   Chest Assessment Chest expansion symmetrical   Bilateral Breath Sounds Clear   Resp Comments MDI given. Patient on RA. Patient requested albuterol MDI as she takes it daily in the AM. Clear BS   Oxygen Therapy/Pulse Ox   O2 Device None (Room air)   SpO2 95 %   SpO2 Activity At Rest   $ Pulse Oximetry Spot Check Charge Completed

## 2024-07-19 NOTE — PROGRESS NOTES
NEPHROLOGY HOSPITAL PROGRESS NOTE   Ngozi Beard 64 y.o. female MRN: [id number]  Unit/Bed#: S -01 Encounter: [id number]  Reason for Consult: ANGELICA    ASSESSMENT and PLAN:    64-year-old female with a past medical history of asthma, hypertension, bipolar disorder who initially presented at the request of her pulmonologist due to acute kidney injury.  Nephrology on board for acute kidney injury     1-acute kidney injury-concern for autoimmune disorder     - Prior baseline creatinine less than 1 mg/dL was 0.8 mg/dL on May 2024  - Admission creatinine on 7/10 was 4.6 mg/dL  - Urinalysis with 3+ blood, 3+ protein, innumerable RBC, 2-4 WBC, moderate bacteria  - Renal imaging with renal ultrasound right kidney 13.2 cm, left kidney 12.5 cm, 1.8 cm simple cyst on left kidney without evidence of hydronephrosis and 4 mm nonobstructing left renal calculus  - Initial CT scan without contrast without hydronephrosis, lesions in both kidneys     Serological workup  - C3, C4-normal  - Hep C Ab neg  - hep panel non reactive  - HIV -nonreactive  - ASO- negative  - AURA-negative  - Anti-double-stranded DNA-negative  - ANCA-negative  - Anti-GBM-positive anti GBM > 8  - QuantiFERON gold negative  - [id number].  Less than 1.8  -  - elevated  - SPEP-immuno fixation without monoclonal band  - UPEP-  - Free light chain-1.75 (152/87.1)  - UPCR 5.8 g/g  - Hemolysis smear-negative  - Iron panel-iron saturation of 11%.  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  Was transferred to [facility] as patient ultimately will need renal biopsy and pulmonary evaluation.  HCTZ and triamterene were held on admission  - 7/12-creatinine rising to 7.1 mg/dL. Dr Burns Filled out biopsy form and patient was transferred to [facility] in anticipation of renal biopsy.  Empirically started on Solu-Medrol 500 mg  - 7/13-Solu-Medrol for dose #2.  Started on sodium bicarbonate tablets.  - 7/14-creatinine rising to 9.9 mg/dL. Received solumederol 250 mg (third dose of IV steroid (500 mg, 250 mg, 250 mg)  - 7/15 - creat rising 10.8 mg/dL. BUN rising. On pred 60 mg daily. Pending bronch.  Bronchoscopy was held due to risk of bleeding  - 7/16-treatment #1 for dialysis.  Hyperkalemia, starting dialysis.  Acidosis, starting dialysis and continue bicarbonate tablets today.  Hyperphosphatemia.  Increasing sevelamer.  - 7/17 - treatment #2 for dialysis.  Patient completed bronchoscopy and renal biopsy.  - 7/18-dialysis treatment #3.  Continue low potassium diet.  Continue phosphorus binders.  Postbiopsy CBC appropriate.  - 7/19 - plan for PLEX in progress. Path pending     Plan:  - Currently with temporary dialysis catheter.  plan for TDC Monday (spoke with IR, due to availability today of their schedule)  - Follow-up final renal biopsy - at [facility].  I called [facility].  And spoke with our pathology attending.  The biopsy will arrive at [facility] potentially on Saturday or Monday.  I asked Stephanie that this is an urgent read and we need to we can read if needed.  They did take my cell number and they will call me and keep me updated.  - Follow-up final bronchoscopy studies  - complete solumederol today  - restart  "prednisone tomorrow   - start atovaquone for PCP proph  - eventually pt needs shingrex vaccine updated (last shingles episode was about 4 years prior)  - will eventually need hep b vaccine series  - low K diet  - weekly AntiGBM titer  - if BP remains elevated, please start amlodipine (as long as no contraindication). Currently BP rise may be in setting of high dose steroids. Avoid any ACEi/ARB while pt is receiving PLEX  - cont torsemide  - Hematology consult for PLEX eval  - initiate PLEX planning to start today vs tomorrow depending on availability and start with 5 daily treatments initially and then potentially every other day until antiGBM titers improve  - potential cytoxan as early as next week  - reviewed case with Primary team Attending and Residents and we are in agreement with renal plan for HD tomorrow and rest of plan reviewed in detail as outlined above in person  - reviewed with IR team for timing of TDC conversion from temp cath - likely Monday  - reviewed with pt in detail regarding disease process, risks of progression to ESRD, risks of treatments including cytoxan and PLEX including but not limited to bleeding risks, immune suppression risks, infectious risks, death from any complications and pt understands and is agreeable to treatments  - reviewed with Hematology for partial FFP PLEX today rather than all albumin given recent renal biopsy.   - reviewed case with Pulmonary team regarding renal plan to initiate PLEX today  - will reach out to lab to attempt to quantify antiGBM levels- messaged and discussed. Per lab, only qualitative is run at [facility]  - called Stephanie to review this AM. Their office was not yet open     Portions of the record may have been created with voice recognition software. Occasional wrong word or \"sound a like\" substitutions may have occurred due to the inherent limitations of voice recognition software. Read the chart carefully and recognize, using context, where " substitutions have occurred.If you have any questions, please contact the dictating provider.        2-electrolytes-     - Hyperphosphatemia likely in the setting of acute kidney injury.  Started on sevelamer July 15.  Phosphorus slowly improving July 19.      - Borderline hyperkalemia-low potassium diet. Improved with dialysis. K improved 7/19.      3-acid/base-acidosis.  Initial intravenous fluids were held.  Initiated on sodium bicarbonate tablets 7/13.  And further increased on 7/14.  And starting dialysis 7/16 and improving bicarb 7/17, hold bicarb tabs. Bicarb appropriate 7/19/2024     4-pulmonary nodules-     - Outpatient was following with pulmonary team closely  - CT scan suggestive of mycobacterial infection  - QuantiFERON gold negative  - Nonproductive cough  - Further workup in progress by pulmonary team  - Unclear if related to pulmonary renal syndrome  - Completed 7/1733-ozheqa-dr final results for pulmonary team bronchoscopy     5-anemia-     - Serologies as above  - Iron deficient but holding IV iron as the patient was being evaluated for possible infectious etiologies  - on oral iron     6-hypertension-     - Initially holding triamterene-HCTZ  - on metoprolol  - Start torsemide 7/16  - BP appropriate 7/17 but if rises post biopsy there is hydralazine prn  - BP rising 7/18 with high dose steroid. Anticipate improvement as last dose today of solumed.      7-azotemia - BUN rising in setting of ANGELICA. Also received steroids which is contributing. Improving with clearance     8-volume-start torsemide.  To attempt to promote nonoliguric state and for diuresis. Euvolemic 7/17     9 - anti GBM positive Ab     - received 1 gm solumederol thus far. Awaiting bronch and then will repeat doses for 2 gm total more once cleared by pulm team  - on pred oral daily for now  - cont triston/vit D  - cont PPI  - start atovaquone  - will need PCP proph eventually  - renal biopsy pending 7/17  - see full discussion  above    SUBJECTIVE / 24H INTERVAL HISTORY:  Pt had nausea overnight. Chest pain last night. Currently all symptoms improved/resolved. Still with cough. No SOB.     OBJECTIVE:  Current Weight: Weight - Scale: 120 kg (263 lb 14.3 oz)  Vitals:    07/19/24 0231 07/19/24 0600 07/19/24 0656 07/19/24 0726   BP: 161/92  159/90    BP Location:       Pulse: 57  61    Resp: 12  18    Temp: 98.5 °F (36.9 °C)  98.3 °F (36.8 °C)    TempSrc:       SpO2: 90%  93% 95%   Weight:  120 kg (263 lb 14.3 oz)         Intake/Output Summary (Last 24 hours) at 7/19/2024 1105  Last data filed at 7/19/2024 0726  Gross per 24 hour   Intake 740 ml   Output 1000 ml   Net -260 ml     General: NAD  Skin: no rash  Eyes: anicteric sclera  ENT: moist mucous membrane  Neck: supple  Chest: CTA b/l, no ronchii, no wheeze, no rubs, no rales  CVS: s1s2, no murmur, no gallop, no rub  Abdomen: soft, nontender, nl sounds  Extremities: trace edema LE b/l, R IJ Temp HD catheter  : no sherwood  Neuro: AAOX3  Psych: normal affect    Medications:    Current Facility-Administered Medications:     acetaminophen (TYLENOL) tablet 650 mg, 650 mg, Oral, Q6H PRN, Tram Palacios PA-C, 650 mg at 07/18/24 0511    albuterol (PROVENTIL HFA,VENTOLIN HFA) inhaler 2 puff, 2 puff, Inhalation, Q4H PRN, Tram Palacios PA-C, 2 puff at 07/19/24 0725    atorvastatin (LIPITOR) tablet 20 mg, 20 mg, Oral, Daily, Tram Palacios PA-C, 20 mg at 07/18/24 0903    benzonatate (TESSALON PERLES) capsule 200 mg, 200 mg, Oral, TID PRN, Tram Palacios PA-C    calcium carbonate-vitamin D 500 mg-5 mcg tablet 1 tablet, 1 tablet, Oral, Daily With Breakfast, Vane Estrada MD, 1 tablet at 07/18/24 0903    dextromethorphan-guaiFENesin (ROBITUSSIN DM) oral syrup 10 mL, 10 mL, Oral, Q4H PRN, Tram Palacios PA-C    famotidine (PEPCID) tablet 10 mg, 10 mg, Oral, Daily PRN, Daya May DO, 10 mg at 07/16/24 1237    fluticasone-vilanterol 200-25 mcg/actuation 1 puff, 1 puff, Inhalation, Daily, Tram  DARRICK Palacios, 1 puff at 07/19/24 0725    hydrALAZINE (APRESOLINE) injection 10 mg, 10 mg, Intravenous, Q6H PRN, Yasmine Meadows MD    insulin lispro (HumALOG/ADMELOG) 100 units/mL subcutaneous injection 2-12 Units, 2-12 Units, Subcutaneous, TID AC, 2 Units at 07/19/24 0902 **AND** Fingerstick Glucose (POCT), , , TID AC, Yasmine Meadows MD    iron polysaccharides (FERREX) capsule 150 mg, 150 mg, Oral, Daily, Ata Burns MD, 150 mg at 07/18/24 0903    lamoTRIgine (LaMICtal) tablet 100 mg, 100 mg, Oral, QAM, Tram Palacios PA-C, 100 mg at 07/18/24 0903    loratadine (CLARITIN) tablet 10 mg, 10 mg, Oral, Daily, Tram Palacios PA-C, 10 mg at 07/18/24 0902    melatonin tablet 3 mg, 3 mg, Oral, Daily PRN, Yasmine Meadows MD    montelukast (SINGULAIR) tablet 10 mg, 10 mg, Oral, Daily, Tram Palacios PA-C, 10 mg at 07/18/24 0903    ondansetron (ZOFRAN) injection 4 mg, 4 mg, Intravenous, Once PRN, Mayte Salmon CRNA    oxybutynin (DITROPAN-XL) 24 hr tablet 5 mg, 5 mg, Oral, Daily, Tarm Palacios PA-C, 5 mg at 07/18/24 0903    pantoprazole (PROTONIX) EC tablet 40 mg, 40 mg, Oral, Daily Before Breakfast, Ronny Webster MD, 40 mg at 07/19/24 0616    sevelamer (RENAGEL) tablet 1,600 mg, 1,600 mg, Oral, TID With Meals, Vane Estrada MD, 1,600 mg at 07/18/24 1757    torsemide (DEMADEX) tablet 40 mg, 40 mg, Oral, Daily, Vane Estrada MD, 40 mg at 07/18/24 0902    traZODone (DESYREL) tablet 200 mg, 200 mg, Oral, HS, Tram Palacios PA-C, 200 mg at 07/18/24 2139    venlafaxine (EFFEXOR-XR) 24 hr capsule 150 mg, 150 mg, Oral, Daily, Tram Palacios PA-C, 150 mg at 07/18/24 0903    Laboratory Results:  Results from last 7 days   Lab Units 07/19/24  0400 07/18/24  0440 07/17/24  2132 07/17/24  0447 07/16/24  0643 07/15/24  0431 07/14/24  0441 07/13/24  0620   WBC Thousand/uL 15.22* 12.75* 10.69* 11.53* 11.63* 14.24* 14.12*  --    HEMOGLOBIN g/dL 9.9* 9.5* 9.4* 9.6* 9.4* 9.1* 9.1*  --    HEMATOCRIT % 30.3* 29.7* 28.8* 30.1* 30.0* 28.8* 29.3*  --     PLATELETS Thousands/uL 256 264 250 283 289 296 280  --    POTASSIUM mmol/L 5.1 5.4*  --  5.0 5.3 5.0 5.1 4.9   CHLORIDE mmol/L 101 100  --  102 102 103 104 105   CO2 mmol/L 21 22  --  21 15* 18* 19* 19*   BUN mg/dL 94* 92*  --  126* 154* 135* 117* 98*   CREATININE mg/dL 8.65* 7.88*  --  10.37* 11.51* 10.82* 9.89* 8.34*   CALCIUM mg/dL 9.6 9.4  --  9.3 9.8 9.3 9.4 9.8   MAGNESIUM mg/dL 2.3 2.3  --  2.5 2.7  --   --   --    PHOSPHORUS mg/dL 8.2* 8.6*  --  9.4* 11.0*  --   --  8.5*

## 2024-07-19 NOTE — CASE MANAGEMENT
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     Acute renal failure (ARF) ([facility]) 07/10/2024     Abnormal CT of the chest 2024     Abnormality of ascending aorta 2024     Postmenopausal 2024     Encounter for screening mammogram for malignant neoplasm of breast 2024     Allergic rhinitis 2024     Persistent cough 2024     Urge incontinence of urine 2024     Exercise intolerance 2024     Family history of coronary artery bypass graft surgery 2024     Urge urinary incontinence 2024     Female stress incontinence 2024     Paranoid schizophrenia ([facility]) 2023     Moderate persistent asthma w/out acute exacerbation 2023     Asthma due to seasonal allergies 2023     Bipolar 1 disorder ([facility]) 2022     Primary hypertension 2022     Dyslipidemia 2022     Morbid obesity with BMI of 45.0-49.9, adult ([facility]) 2022       LOS (days): 7  Geometric Mean LOS (GMLOS) (days): 5.1  Days to GMLOS:-1.9     OBJECTIVE:  Risk of Unplanned Readmission Score: 17.16         Current admission status: Inpatient   Preferred Pharmacy:   [facility] #[id number] [facility] [address] [address]  Phone: [phone] Fax: [phone]    [facility] - [place] [id number] [facility]  [address] [address]  Phone: [phone] Fax: [phone]    Primary Care Provider: Meenakshi Balbuena MD    Primary Insurance: MEDICARE  Secondary Insurance: [facility]    DISCHARGE  DETAILS:    Discharge planning discussed with:: Patient  Freedom of Choice: Yes  Comments - Freedom of Choice: Reviewed confirmed HD chair time  CM contacted family/caregiver?: No- see comments  Were Treatment Team discharge recommendations reviewed with patient/caregiver?: Yes  Did patient/caregiver verbalize understanding of patient care needs?: Yes  Were patient/caregiver advised of the risks associated with not following Treatment Team discharge recommendations?: Yes    Contacts  Patient Contacts: Patient  Relationship to Patient:: Other (Comment)  Contact Method: In Person  Reason/Outcome: Discharge Planning, Continuity of Care    Requested Home Health Care         Is the patient interested in HHC at discharge?: No    DME Referral Provided  Referral made for DME?: No    Other Referral/Resources/Interventions Provided:  Interventions: Dialysis         Treatment Team Recommendation: Home  Discharge Destination Plan:: Home  Transport at Discharge : Family                                         CM heard back from [facility] with confirmation for HD chair time for TTS 10:30 AM with SOC on Tuesday 7/23.      Confirmation letter was sent and placed in the label chart to be sent with patient at DC and a copy placed in Medical Records.    SLIM and nephro updated on the above.    Patient will remain through the weekend as she is scheduled for her perm cath placement on Monday with IR.    CM department will continue to follow to assist with discharge coordination.

## 2024-07-20 ENCOUNTER — APPOINTMENT (INPATIENT)
Dept: DIALYSIS | Facility: HOSPITAL | Age: 64
DRG: 673 | End: 2024-07-20
Payer: MEDICARE

## 2024-07-20 LAB
ALBUMIN SERPL BCG-MCNC: 3.3 G/DL (ref 3.5–5)
ALP SERPL-CCNC: 56 U/L (ref 34–104)
ALT SERPL W P-5'-P-CCNC: 9 U/L (ref 7–52)
ANION GAP SERPL CALCULATED.3IONS-SCNC: 14 MMOL/L (ref 4–13)
ANISOCYTOSIS BLD QL SMEAR: PRESENT
AST SERPL W P-5'-P-CCNC: 9 U/L (ref 13–39)
BASOPHILS # BLD MANUAL: 0 THOUSAND/UL (ref 0–0.1)
BASOPHILS NFR MAR MANUAL: 0 % (ref 0–1)
BILIRUB SERPL-MCNC: 0.37 MG/DL (ref 0.2–1)
BUN SERPL-MCNC: 122 MG/DL (ref 5–25)
CALCIUM ALBUM COR SERPL-MCNC: 10.3 MG/DL (ref 8.3–10.1)
CALCIUM SERPL-MCNC: 9.7 MG/DL (ref 8.4–10.2)
CHLORIDE SERPL-SCNC: 101 MMOL/L (ref 96–108)
CO2 SERPL-SCNC: 20 MMOL/L (ref 21–32)
CREAT SERPL-MCNC: 9.64 MG/DL (ref 0.6–1.3)
EOSINOPHIL # BLD MANUAL: 0 THOUSAND/UL (ref 0–0.4)
EOSINOPHIL NFR BLD MANUAL: 0 % (ref 0–6)
ERYTHROCYTE [DISTWIDTH] IN BLOOD BY AUTOMATED COUNT: 15.2 % (ref 11.6–15.1)
FIBRINOGEN PPP-MCNC: 301 MG/DL (ref 207–520)
GFR SERPL CREATININE-BSD FRML MDRD: 3 ML/MIN/1.73SQ M
GLUCOSE SERPL-MCNC: 150 MG/DL (ref 65–140)
GLUCOSE SERPL-MCNC: 170 MG/DL (ref 65–140)
GLUCOSE SERPL-MCNC: 193 MG/DL (ref 65–140)
GLUCOSE SERPL-MCNC: 204 MG/DL (ref 65–140)
HCT VFR BLD AUTO: 29.4 % (ref 34.8–46.1)
HGB BLD-MCNC: 9.3 G/DL (ref 11.5–15.4)
LYMPHOCYTES # BLD AUTO: 0.9 THOUSAND/UL (ref 0.6–4.47)
LYMPHOCYTES # BLD AUTO: 5 % (ref 14–44)
MAGNESIUM SERPL-MCNC: 2.5 MG/DL (ref 1.9–2.7)
MCH RBC QN AUTO: 27.5 PG (ref 26.8–34.3)
MCHC RBC AUTO-ENTMCNC: 31.6 G/DL (ref 31.4–37.4)
MCV RBC AUTO: 87 FL (ref 82–98)
METAMYELOCYTE ABSOLUTE CT: 0.36 THOUSAND/UL (ref 0–0.1)
METAMYELOCYTES NFR BLD MANUAL: 2 % (ref 0–1)
MONOCYTES # BLD AUTO: 0.72 THOUSAND/UL (ref 0–1.22)
MONOCYTES NFR BLD: 4 % (ref 4–12)
NEUTROPHILS # BLD MANUAL: 15.97 THOUSAND/UL (ref 1.85–7.62)
NEUTS BAND NFR BLD MANUAL: 8 % (ref 0–8)
NEUTS SEG NFR BLD AUTO: 81 % (ref 43–75)
PHOSPHATE SERPL-MCNC: 9.5 MG/DL (ref 2.3–4.1)
PLATELET # BLD AUTO: 228 THOUSANDS/UL (ref 149–390)
PLATELET BLD QL SMEAR: ADEQUATE
PMV BLD AUTO: 10.4 FL (ref 8.9–12.7)
POTASSIUM SERPL-SCNC: 6.1 MMOL/L (ref 3.5–5.3)
PROT SERPL-MCNC: 6.1 G/DL (ref 6.4–8.4)
RBC # BLD AUTO: 3.38 MILLION/UL (ref 3.81–5.12)
RBC MORPH BLD: PRESENT
SODIUM SERPL-SCNC: 135 MMOL/L (ref 135–147)
WBC # BLD AUTO: 17.94 THOUSAND/UL (ref 4.31–10.16)

## 2024-07-20 PROCEDURE — 99233 SBSQ HOSP IP/OBS HIGH 50: CPT | Performed by: INTERNAL MEDICINE

## 2024-07-20 PROCEDURE — 99232 SBSQ HOSP IP/OBS MODERATE 35: CPT | Performed by: INTERNAL MEDICINE

## 2024-07-20 PROCEDURE — 85007 BL SMEAR W/DIFF WBC COUNT: CPT | Performed by: INTERNAL MEDICINE

## 2024-07-20 PROCEDURE — 82948 REAGENT STRIP/BLOOD GLUCOSE: CPT

## 2024-07-20 PROCEDURE — 84100 ASSAY OF PHOSPHORUS: CPT | Performed by: INTERNAL MEDICINE

## 2024-07-20 PROCEDURE — 94760 N-INVAS EAR/PLS OXIMETRY 1: CPT

## 2024-07-20 PROCEDURE — 83735 ASSAY OF MAGNESIUM: CPT | Performed by: INTERNAL MEDICINE

## 2024-07-20 PROCEDURE — 85027 COMPLETE CBC AUTOMATED: CPT | Performed by: INTERNAL MEDICINE

## 2024-07-20 PROCEDURE — 93005 ELECTROCARDIOGRAM TRACING: CPT

## 2024-07-20 PROCEDURE — 99232 SBSQ HOSP IP/OBS MODERATE 35: CPT | Performed by: STUDENT IN AN ORGANIZED HEALTH CARE EDUCATION/TRAINING PROGRAM

## 2024-07-20 PROCEDURE — 80053 COMPREHEN METABOLIC PANEL: CPT | Performed by: INTERNAL MEDICINE

## 2024-07-20 PROCEDURE — 94644 CONT INHLJ TX 1ST HOUR: CPT

## 2024-07-20 PROCEDURE — 6A551Z3 PHERESIS OF PLASMA, MULTIPLE: ICD-10-PCS | Performed by: INTERNAL MEDICINE

## 2024-07-20 PROCEDURE — 85384 FIBRINOGEN ACTIVITY: CPT | Performed by: INTERNAL MEDICINE

## 2024-07-20 RX ORDER — SENNOSIDES 8.6 MG
1 TABLET ORAL
Status: DISCONTINUED | OUTPATIENT
Start: 2024-07-20 | End: 2024-07-25

## 2024-07-20 RX ORDER — DEXTROSE MONOHYDRATE 25 G/50ML
25 INJECTION, SOLUTION INTRAVENOUS ONCE
Status: COMPLETED | OUTPATIENT
Start: 2024-07-20 | End: 2024-07-20

## 2024-07-20 RX ORDER — HEPARIN SODIUM 5000 [USP'U]/ML
5000 INJECTION, SOLUTION INTRAVENOUS; SUBCUTANEOUS EVERY 8 HOURS SCHEDULED
Status: DISCONTINUED | OUTPATIENT
Start: 2024-07-20 | End: 2024-08-04 | Stop reason: HOSPADM

## 2024-07-20 RX ORDER — POLYETHYLENE GLYCOL 3350 17 G/17G
17 POWDER, FOR SOLUTION ORAL DAILY
Status: DISCONTINUED | OUTPATIENT
Start: 2024-07-20 | End: 2024-08-04 | Stop reason: HOSPADM

## 2024-07-20 RX ORDER — CALCIUM GLUCONATE 20 MG/ML
1 INJECTION, SOLUTION INTRAVENOUS ONCE
Status: DISCONTINUED | OUTPATIENT
Start: 2024-07-20 | End: 2024-07-20

## 2024-07-20 RX ADMIN — ATORVASTATIN CALCIUM 20 MG: 20 TABLET, FILM COATED ORAL at 09:33

## 2024-07-20 RX ADMIN — HEPARIN SODIUM 5000 UNITS: 5000 INJECTION INTRAVENOUS; SUBCUTANEOUS at 14:46

## 2024-07-20 RX ADMIN — SENNOSIDES 8.6 MG: 8.6 TABLET, FILM COATED ORAL at 21:31

## 2024-07-20 RX ADMIN — ALTEPLASE 2 MG: 2.2 INJECTION, POWDER, LYOPHILIZED, FOR SOLUTION INTRAVENOUS at 08:34

## 2024-07-20 RX ADMIN — POLYETHYLENE GLYCOL 3350 17 G: 17 POWDER, FOR SOLUTION ORAL at 09:33

## 2024-07-20 RX ADMIN — Medication 1 TABLET: at 09:40

## 2024-07-20 RX ADMIN — INSULIN LISPRO 2 UNITS: 100 INJECTION, SOLUTION INTRAVENOUS; SUBCUTANEOUS at 11:56

## 2024-07-20 RX ADMIN — ALBUTEROL SULFATE 2 PUFF: 108 AEROSOL, METERED RESPIRATORY (INHALATION) at 09:06

## 2024-07-20 RX ADMIN — CALCIUM GLUCONATE 1 G: 20 INJECTION, SOLUTION INTRAVENOUS at 09:00

## 2024-07-20 RX ADMIN — DEXTROSE MONOHYDRATE 25 ML: 25 INJECTION, SOLUTION INTRAVENOUS at 12:13

## 2024-07-20 RX ADMIN — FLUTICASONE FUROATE AND VILANTEROL TRIFENATATE 1 PUFF: 200; 25 POWDER RESPIRATORY (INHALATION) at 09:06

## 2024-07-20 RX ADMIN — LORATADINE 10 MG: 10 TABLET ORAL at 09:45

## 2024-07-20 RX ADMIN — PREDNISONE 60 MG: 20 TABLET ORAL at 09:33

## 2024-07-20 RX ADMIN — SEVELAMER HYDROCHLORIDE 1600 MG: 800 TABLET ORAL at 09:40

## 2024-07-20 RX ADMIN — MONTELUKAST 10 MG: 10 TABLET, FILM COATED ORAL at 09:33

## 2024-07-20 RX ADMIN — POLYSACCHARIDE-IRON COMPLEX 150 MG: 150 CAPSULE ORAL at 09:33

## 2024-07-20 RX ADMIN — LAMOTRIGINE 100 MG: 100 TABLET ORAL at 09:33

## 2024-07-20 RX ADMIN — OXYBUTYNIN CHLORIDE 5 MG: 5 TABLET, EXTENDED RELEASE ORAL at 09:33

## 2024-07-20 RX ADMIN — ALTEPLASE 2 MG: 2.2 INJECTION, POWDER, LYOPHILIZED, FOR SOLUTION INTRAVENOUS at 08:33

## 2024-07-20 RX ADMIN — ALBUMIN (HUMAN) 200 G: 12.5 INJECTION, SOLUTION INTRAVENOUS at 09:00

## 2024-07-20 RX ADMIN — INSULIN LISPRO 2 UNITS: 100 INJECTION, SOLUTION INTRAVENOUS; SUBCUTANEOUS at 16:21

## 2024-07-20 RX ADMIN — ALBUTEROL SULFATE 2 PUFF: 108 AEROSOL, METERED RESPIRATORY (INHALATION) at 19:53

## 2024-07-20 RX ADMIN — PANTOPRAZOLE SODIUM 40 MG: 40 TABLET, DELAYED RELEASE ORAL at 09:37

## 2024-07-20 RX ADMIN — SODIUM ZIRCONIUM CYCLOSILICATE 10 G: 10 POWDER, FOR SUSPENSION ORAL at 09:48

## 2024-07-20 RX ADMIN — SEVELAMER HYDROCHLORIDE 1600 MG: 800 TABLET ORAL at 11:55

## 2024-07-20 RX ADMIN — ALBUTEROL SULFATE 10 MG: 2.5 SOLUTION RESPIRATORY (INHALATION) at 08:59

## 2024-07-20 RX ADMIN — HYDRALAZINE HYDROCHLORIDE 10 MG: 20 INJECTION INTRAMUSCULAR; INTRAVENOUS at 21:31

## 2024-07-20 RX ADMIN — SEVELAMER HYDROCHLORIDE 1600 MG: 800 TABLET ORAL at 16:21

## 2024-07-20 RX ADMIN — VENLAFAXINE HYDROCHLORIDE 150 MG: 150 CAPSULE, EXTENDED RELEASE ORAL at 09:32

## 2024-07-20 RX ADMIN — TRAZODONE HYDROCHLORIDE 200 MG: 100 TABLET ORAL at 21:31

## 2024-07-20 RX ADMIN — HEPARIN SODIUM 5000 UNITS: 5000 INJECTION INTRAVENOUS; SUBCUTANEOUS at 21:32

## 2024-07-20 RX ADMIN — INSULIN HUMAN 10 UNITS: 100 INJECTION, SOLUTION PARENTERAL at 09:00

## 2024-07-20 NOTE — PLAN OF CARE
Target UF Goal  0.5  L as tolerated. Patient dialyzing for 3 hours on 2 K bath for serum K of  6.1  per protocol. Treatment plan reviewed with Melida.   Problem: METABOLIC, FLUID AND ELECTROLYTES - ADULT  Goal: Electrolytes maintained within normal limits  Description: INTERVENTIONS:  - Monitor labs and assess patient for signs and symptoms of electrolyte imbalances  - Administer electrolyte replacement as ordered  - Monitor response to electrolyte replacements, including repeat lab results as appropriate  - Instruct patient on fluid and nutrition as appropriate  Outcome: Progressing  Goal: Fluid balance maintained  Description: INTERVENTIONS:  - Monitor labs   - Monitor I/O and WT  - Instruct patient on fluid and nutrition as appropriate  - Assess for signs & symptoms of volume excess or deficit  Outcome: Progressing   Post-Dialysis RN Treatment Note    Blood Pressure:  Pre 145/79 mm/Hg  Post 156/82 mmHg   EDW  tbd kg    Weight:  Pre 120.6 kg   Post 120.1 kg   Mode of weight measurement: Standing Scale   Volume Removed  500 ml    Treatment duration 180 minutes    NS given  No    Treatment shortened? No   Medications given during Rx None Reported   Estimated Kt/V  None Reported   Access type: Temporary HD catheter   Access Issues: No    Report called to primary nurse   Yes Lashon Lynch LPN

## 2024-07-20 NOTE — PROGRESS NOTES
Progress Note - Pulmonary   Ngozi Beard 64 y.o. female MRN: [id number]  Unit/Bed#: S -01 Encounter: [id number]    Assessment & Plan:  He pulmonary insufficiency, resolved  Abnormal CT chest with diffuse nodularity  Acute renal failure  Moderate persistent asthma without exacerbation    Patient is on room air.  Maintain saturations greater than 89%.  Encourage good pulmonary hygiene  Status post bronchoscopy 7/17.  Culture showed no growth. BAL differential 28% neutrophil, 4% lymphocytes, 68% macrophage.  AFB negative thus far.  Additional studies are still pending.  Given the low concern for infection, steroids have been started.  She received 3 days of pulse dose steroids and currently on prednisone 60 mg  Nephrology following closely.  Patient receiving dialysis.  Will need repeat CT chest in 4 to 6 weeks.  If nodularity fails to improve or progresses, we will need to consider tissue biopsy of the lung either via transbronchial biopsy or VATS wedge resection  Call if any additional questions over the weekend    Subjective:   Patient denies SOB. No new complaints. Minimal cough.    Objective:     Vitals: Blood pressure (!) 177/96, pulse 58, temperature (!) 97.4 °F (36.3 °C), resp. rate 17, weight 120 kg (263 lb 12.8 oz), SpO2 95%.,Body mass index is 46.73 kg/m².      Intake/Output Summary (Last 24 hours) at 7/20/2024 1033  Last data filed at 7/20/2024 0844  Gross per 24 hour   Intake 360 ml   Output --   Net 360 ml       Invasive Devices       Peripheral Intravenous Line  Duration             Peripheral IV 07/17/24 Left;Ventral (anterior) Forearm 2 days              Hemodialysis Catheter  Duration             HD Temporary Double Catheter 4 days                    Physical Exam:   General appearance: Alert and awake, in no acute distress. Receiving dialysis  Head: Normocephalic, without obvious abnormality, atraumatic  Eyes: No scleral icterus   Lungs: Decreased breath sounds  Heart: Regular  rate  Abdomen:  No appreciable distension or tenderness  Extremities: No deformity  Skin: Warm and dry  Neurologic: No acute focal deficits are noted      Labs: I have personally reviewed pertinent lab results.  Imaging and other studies: I have personally reviewed pertinent reports.

## 2024-07-20 NOTE — PROGRESS NOTES
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 Given the lack of hydronephrosis this could either reflect adjacent calcification or nonobstructing stone.   Creatinine worsening today. Cr 7.15 from 6.02   Patient reports that she is still producing urine  US kidney and bladder: No hydronephrosis. 4 mm nonobstructing left intrarenal calculus  Urinalysis: 3+ blood.  3+ protein.  Innumerable red blood cells.  2-4 WBCs.  Moderate bacteria   AURA, C3, and C4 normal  Ig Kappa Free Light Chain:152.5   Ig Lambda Free Light Chain 87.1   Kappa/Lambda FluidC Ratio 1.75  Hepatitis panel nonreactive  HIV nonreactive  QuantiFERON gold negative  protein electrophoresis see labs  hypersensitivity pneumonitis profile negative  GBM antibodies: elevated >8  phospholipase A2 receptor Ab: Negative  ANCA: negative  7/16 Temporary R IJ dialysis catheter placed  7/17 Renal biopsy and bronchoscopy performed. Results pending.   7/18 AFB stain with no acid fast bacili seen. Pt received first dose of 1g solumedrol.   7/19 CM confirmed HD chair time for patient. Will be TTS 10:30 AM with start date Tuesday 7/23.     Plan  Nephrology onboard - Dialysis initiated  Pulmonology onboard  Hematology onboard for plan for PLEX - First session initiated today  Continue Prednisone 60 mg daily   Monitor blood glucose levels  IR to place a permanent hemodialysis catheter Monday 7/22  Follow-up renal biopsy   Hold home dyazide    Abnormal CT of the  chest  Assessment & Plan  CT chest: Re demonstration of multiple diffuse scattered nodules  Etiology unclear: HP vs sarcoid vs  vs MAC vs  atypical  CRP- 143.7   Sed-61, negative: QuantiFERON  pending: HP/ANCA  Procal: 0.27--0.30--0.38--0.40  7/17 Renal biopsy performed  7/16 Bronchoscopy performed, no evidence of alveolar hemorrhage    Plan:  Outpatient follow up     Persistent cough  Assessment & Plan  Patient endorses worsening shortness of breath and difficulty breathing  Patient noted to have multiple diffuse scattered pulmonary nodules on CT  Quantiferon gold negative 7/9/24  Patient currently on room air    Plan  Completed Ceftriaxone x 5 days.  Tessalon Perles as needed    Abnormality of ascending aorta  Assessment & Plan  CT chest: Unchanged fusiform ectasia of the ascending thoracic aorta measuring up to 40 mm     Plan  Recommendation for follow-up low radiation dose chest CT in one year    Moderate persistent asthma w/out acute exacerbation  Assessment & Plan  Patient has PFTs ordered but they have not been completed.  Home medication regime Advair -21 mcg 2 puff twice daily, Proventil 2 puffs every 6 hours as needed    Plan:    Albuterol inhaler PRN  Continue benzonatate  Continue fluticasone-vilanterol  Continue loratadine 10 mg  Robitussin PRN    Dyslipidemia  Assessment & Plan  Continue on atorvastatin 20 Mg    Primary hypertension  Assessment & Plan  Hold Lopressor due to bradycardia  Hold Dyazide  Hydralazine PRN  Monitor vitals as per protocol    Bipolar 1 disorder (HCC)  Assessment & Plan  Plan  Continue on Effexor, trazodone and Lamictal    Sepsis (HCC)-resolved as of 7/14/2024  Assessment & Plan    WBC   Date Value Ref Range Status   07/18/2024 12.75 (H) 4.31 - 10.16 Thousand/uL Final     Patient admitted with sepsis likely secondary to pneumonitis as evidenced by tachycardia, tachypnea and leukocytosis  CT: redemonstration of multiple diffuse scattered nodules, less pronounced than on  2024 suggestive of mycobacterial infection  QuantiFERON gold negative   Lactic acid normal, Procalcitonin trending up: 0.27 > 0.30 > .38 > .40  Sputum culture +2 gram negative rods, +1 gram positive cocci  BC:  staph epidermis detected-contaminated  Leukocytosis likely due to steroid use    Plan:  Completed Ceftriaxone x 5 days               VTE Pharmacologic Prophylaxis: VTE Score: 4 Moderate Risk (Score 3-4) - Pharmacological DVT Prophylaxis Ordered: heparin.    Mobility:   Basic Mobility Inpatient Raw Score: 24  JH-HLM Goal: 8: Walk 250 feet or more  JH-HLM Achieved: 6: Walk 10 steps or more  JH-HLM Goal achieved. Continue to encourage appropriate mobility.    Patient Centered Rounds: I performed bedside rounds with nursing staff today.  Discussions with Specialists or Other Care Team Provider: Nephrology, Med-Onc    Education and Discussions with Family / Patient: Updated  (sister) via phone.    Current Length of Stay: 8 day(s)  Current Patient Status: Inpatient   Discharge Plan: Anticipate discharge in 48-72 hrs to home.    Code Status: Level 1 - Full Code    Subjective:   No overnight events. Patient denies pain, shortness of breath, nausea, or abdominal discomfort.      Objective:     Vitals:   Temp (24hrs), Av.2 °F (36.8 °C), Min:97.4 °F (36.3 °C), Max:98.7 °F (37.1 °C)    Temp:  [97.4 °F (36.3 °C)-98.7 °F (37.1 °C)] 97.4 °F (36.3 °C)  HR:  [55-60] 58  Resp:  [17-18] 17  BP: (152-177)/(79-97) 177/96  SpO2:  [92 %-95 %] 95 %  Body mass index is 46.73 kg/m².     Input and Output Summary (last 24 hours):     Intake/Output Summary (Last 24 hours) at 2024 1430  Last data filed at 2024 1129  Gross per 24 hour   Intake 520 ml   Output --   Net 520 ml       Physical Exam:   Physical Exam  Vitals and nursing note reviewed.   Constitutional:       General: She is not in acute distress.     Appearance: She is well-developed.   HENT:      Head: Normocephalic and atraumatic.   Eyes:       Extraocular Movements: Extraocular movements intact.      Pupils: Pupils are equal, round, and reactive to light.   Cardiovascular:      Rate and Rhythm: Normal rate and regular rhythm.      Pulses: Normal pulses.      Heart sounds: No murmur heard.  Pulmonary:      Effort: Pulmonary effort is normal. No respiratory distress.      Breath sounds: Normal breath sounds.   Abdominal:      Palpations: Abdomen is soft.      Tenderness: There is no abdominal tenderness.   Musculoskeletal:         General: No swelling.      Cervical back: Neck supple.   Skin:     General: Skin is warm and dry.      Capillary Refill: Capillary refill takes less than 2 seconds.   Neurological:      General: No focal deficit present.      Mental Status: She is alert and oriented to person, place, and time.   Psychiatric:         Mood and Affect: Mood normal.         Behavior: Behavior normal.          Additional Data:     Labs:  Results from last 7 days   Lab Units 07/20/24  0606   WBC Thousand/uL 17.94*   HEMOGLOBIN g/dL 9.3*   HEMATOCRIT % 29.4*   PLATELETS Thousands/uL 228   BANDS PCT % 8   LYMPHO PCT % 5*   MONO PCT % 4   EOS PCT % 0     Results from last 7 days   Lab Units 07/20/24  0439   SODIUM mmol/L 135   POTASSIUM mmol/L 6.1*   CHLORIDE mmol/L 101   CO2 mmol/L 20*   BUN mg/dL 122*   CREATININE mg/dL 9.64*   ANION GAP mmol/L 14*   CALCIUM mg/dL 9.7   ALBUMIN g/dL 3.3*   TOTAL BILIRUBIN mg/dL 0.37   ALK PHOS U/L 56   ALT U/L 9   AST U/L 9*   GLUCOSE RANDOM mg/dL 170*         Results from last 7 days   Lab Units 07/20/24  1104 07/20/24  0706 07/19/24  2053 07/19/24  1555 07/19/24  1128 07/19/24  0658 07/18/24  2051 07/18/24  1556   POC GLUCOSE mg/dl 193* 150* 178* 178* 177* 180* 211* 191*               Lines/Drains:  Invasive Devices       Peripheral Intravenous Line  Duration             Peripheral IV 07/17/24 Left;Ventral (anterior) Forearm 3 days              Hemodialysis Catheter  Duration             HD Temporary Double Catheter  4 days                          Imaging: Reviewed radiology reports from this admission including: chest CT scan and abdominal/pelvic CT    Recent Cultures (last 7 days):   Results from last 7 days   Lab Units 07/17/24  1258 07/17/24  1253   GRAM STAIN RESULT  1+ Polys  No bacteria seen 1+ Polys  No bacteria seen       Last 24 Hours Medication List:   Current Facility-Administered Medications   Medication Dose Route Frequency Provider Last Rate    acetaminophen  650 mg Oral Q6H PRN Tram Palacios PA-C      Albumin 5%  200 g Intravenous Daily Madonna Doug, DO      albuterol  2 puff Inhalation Q4H PRN Tram Palacios PA-C      atorvastatin  20 mg Oral Daily Tram Palacios PA-C      atovaquone  1,500 mg Oral Daily With Breakfast Vane Estrada MD      benzonatate  200 mg Oral TID PRN Tram Palacios PA-C      calcium carbonate-vitamin D  1 tablet Oral Daily With Breakfast Vane Estrada MD      calcium gluconate  1 g Intravenous Daily Madonna Doug, DO 1 g (07/20/24 0900)    dextromethorphan-guaiFENesin  10 mL Oral Q4H PRN Tram Palacios PA-C      famotidine  10 mg Oral Daily PRN Daya May DO      fluticasone-vilanterol  1 puff Inhalation Daily Tram Palacios PA-C      hydrALAZINE  10 mg Intravenous Q6H PRN Yasmine Meadows MD      insulin lispro  2-12 Units Subcutaneous TID AC Yamsine Meadows MD      iron polysaccharides  150 mg Oral Daily Ata Burns MD      lamoTRIgine  100 mg Oral QAM Tram Palacios PA-C      loratadine  10 mg Oral Daily Tram Palacios PA-C      melatonin  3 mg Oral Daily PRN Yasmine Meadows MD      montelukast  10 mg Oral Daily Tram Palacios PA-C      ondansetron  4 mg Intravenous Q6H PRN Yasmine Meadows MD      oxybutynin  5 mg Oral Daily Tram Palacios PA-C      pantoprazole  40 mg Oral Daily Before Breakfast Ronny Webster MD      polyethylene glycol  17 g Oral Daily Xavier Medina MD      predniSONE  60 mg Oral Daily Vane Estrada MD      senna  1 tablet Oral HS MD erika Govea   1,600 mg Oral TID With Meals Vane Estrada MD      Sodium Zirconium Cyclosilicate  10 g Oral Daily Tirso Lindsey Montez MD      torsemide  40 mg Oral Daily Vane Estrada MD      traZODone  200 mg Oral HS Tram Palacios PA-C      venlafaxine  150 mg Oral Daily Tram Palacios PA-C          Today, Patient Was Seen By: Xavier Medina MD    **Please Note: This note may have been constructed using a voice recognition system.**

## 2024-07-20 NOTE — HEMODIALYSIS
@0750 Started hemodialysis with an issue with the access line even it was reversed due to sluggish blood return on the red access.Needed to return blood. Dr. Montez was made aware and ordered activase for both access catheter. @0840 activase indwelling on both access.

## 2024-07-20 NOTE — PROGRESS NOTES
Medical Oncology/Hematology Progress Note  Ngozi Beard, female, 64 y.o., 1960,  S /S -01, [id number]     Assessment and Plan    1. 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 On treatment with PLEX    Ms. Newton is a 64 year old female admitted originally for OP labs raising concern for acute renal failure. Workup has been concerning for anti-GBM disease. GBM antibody titer was noted to be elevated >8. S/p renal biopsy on 7/17, awaiting pathology results at this time.    PLEX D1 today. Patient already ordered for 4L of 5% albumin daily with each PLEX sessions. Tentatively planning for 5 days of PLEX as of now, but total duration TBD pending clinical course, lab parameters, and nephrology recs.    Hematology team will continue to follow for PLEX assistance, but specific management recommendations to be guided by nephrology team. [facility] RN had questions regarding it's ok to perform both PLEX and dialysis on same day. Case was discussed with nephrology team this morning; planning to proceed with PLEX in the morning and dialysis later in the day today. PLEX to continue tomorrow and no plans for dialysis for tomorrow as of now. I will call [place] [facility] to make sure that an RN from [facility] team is sent over daily for next 4 days to perform PLEX.     [facility] phone number is [phone] if the primary team or nephrology team needs to get in touch with them directly with any questions.       Outpatient follow up plan: No indications for hematology follow-up at this time as my team was primarily consulted by nephrology team for assistance with inpatient PLEX for RPGN indication    Communication with patient/family:  Patient was updated regarding above recommendations    Communication with team:  Primary team resident was updated regarding above recommendations    Case was discussed with hematology/oncology attending, Dr. Meier    Subjective:     Ms. Newton is a 64 year old female  admitted originally for OP labs raising concern for acute renal failure. Workup has been concerning for anti-GBM disease. S/p renal biopsy on 7/17.  Hematology team consulted for assistance for PLEX.    Today is D1 of PLEX. Patient denied any acute complaints when seen at bedside this morning.    Review of Systems:    Review of Systems   Constitutional:  Positive for fatigue. Negative for chills, fever and unexpected weight change.   HENT:  Negative for mouth sores, nosebleeds, sore throat, trouble swallowing and voice change.    Eyes:  Negative for visual disturbance.   Respiratory:  Negative for cough, chest tightness and shortness of breath.    Cardiovascular:  Negative for chest pain.   Gastrointestinal:  Positive for constipation (no BMs x 3 days). Negative for abdominal distention, abdominal pain, diarrhea, nausea and vomiting.   Genitourinary:  Negative for difficulty urinating, dysuria, frequency and urgency.   Musculoskeletal:  Negative for arthralgias and back pain.   Skin:  Negative for color change, rash and wound.   Neurological:  Negative for dizziness, tremors, seizures, facial asymmetry, speech difficulty and headaches.   Hematological:  Negative for adenopathy. Does not bruise/bleed easily.   Psychiatric/Behavioral:  Negative for agitation and confusion.      Oncology History:   Cancer Staging   No matching staging information was found for the patient.    Oncology History    No history exists.       Past Medical History:   Past Medical History:   Diagnosis Date    Asthma     Chronic pain     Hypertension     Sepsis (HCC) 07/10/2024       Past Surgical History:   Procedure Laterality Date    BACK SURGERY      COLONOSCOPY      IR BIOPSY KIDNEY RANDOM  7/17/2024    IR TEMPORARY DIALYSIS CATHETER PLACEMENT  7/15/2024    TUBAL LIGATION         Family History   Problem Relation Age of Onset    Diabetes Mother     Hypertension Mother     Lung cancer Mother     Colon cancer Mother     Colon cancer Father      Hypertension Sister     Multiple sclerosis Sister     Hypothyroidism Maternal Aunt        Social History     Socioeconomic History    Marital status: /Civil Union     Spouse name: Not on file    Number of children: Not on file    Years of education: Not on file    Highest education level: Not on file   Occupational History    Not on file   Tobacco Use    Smoking status: Former     Current packs/day: 0.00     Average packs/day: 1 pack/day for 15.0 years (15.0 ttl pk-yrs)     Types: Cigarettes     Start date:      Quit date:      Years since quittin.5    Smokeless tobacco: Never   Vaping Use    Vaping status: Never Used   Substance and Sexual Activity    Alcohol use: No     Comment: hX:Occas.     Drug use: No    Sexual activity: Not Currently   Other Topics Concern    Not on file   Social History Narrative    Most recent tobacco use screenin2018    Alcohol intake: Occasional    Last modified by [id number]    2019,      Social Determinants of Health     Financial Resource Strain: Not on file   Food Insecurity: No Food Insecurity (2024)    Hunger Vital Sign     Worried About Running Out of Food in the Last Year: Never true     Ran Out of Food in the Last Year: Never true   Transportation Needs: No Transportation Needs (2024)    PRAPARE - Transportation     Lack of Transportation (Medical): No     Lack of Transportation (Non-Medical): No   Physical Activity: Not on file   Stress: Not on file   Social Connections: Unknown (2023)    Received [facility], [facility]    Social Connection and Isolation Panel [NHANES]     Frequency of Communication with Friends and Family: Patient declined     Frequency of Social Gatherings with Friends and Family: Patient declined     Attends Rastafari Services: Patient declined     Active Member of Clubs or Organizations: Patient declined     Attends Club or Organization Meetings: Patient declined      Marital Status:    Intimate Partner Violence: Unknown (1/3/2023)    Received from [facility], [facility]    Intimate Partner Violence     Within the last year, have you been afraid of your partner, ex-partner or family member?: Not on file     Within the last year, have you been humiliated or emotionally abused in other ways by your partner, ex-partner or family member?: Not on file     Within the last year, have you been kicked, hit, slapped, or otherwise physically hurt by your partner, ex-partner or family member?: Not on file     Within the last year, have you been raped or forced to have any kind of sexual activity by your partner, ex-partner or family member?: Not on file   Housing Stability: Low Risk  (7/16/2024)    Housing Stability Vital Sign     Unable to Pay for Housing in the Last Year: No     Number of Times Moved in the Last Year: 0     Homeless in the Last Year: No         Current Facility-Administered Medications:     acetaminophen (TYLENOL) tablet 650 mg, 650 mg, Oral, Q6H PRN, Tram Palacios PA-C, 650 mg at 07/19/24 1302    albumin human (FLEXBUMIN) 5 % injection 200 g, 200 g, Intravenous, Daily, Madonna Camacho DO    albuterol (PROVENTIL HFA,VENTOLIN HFA) inhaler 2 puff, 2 puff, Inhalation, Q4H PRN, Tram Palacios PA-C, 2 puff at 07/20/24 0906    atorvastatin (LIPITOR) tablet 20 mg, 20 mg, Oral, Daily, Tram Palacios PA-C, 20 mg at 07/20/24 0933    atovaquone (MEPRON) oral suspension 1,500 mg, 1,500 mg, Oral, Daily With Breakfast, Vane Estrada MD    benzonatate (TESSALON PERLES) capsule 200 mg, 200 mg, Oral, TID PRN, Tram Palacios PA-C    calcium carbonate-vitamin D 500 mg-5 mcg tablet 1 tablet, 1 tablet, Oral, Daily With Breakfast, Vane Estrada MD, 1 tablet at 07/20/24 0940    calcium gluconate 1 g in sodium chloride 0.9% 50 mL (premix), 1 g, Intravenous, Daily, Madonna Camacho DO    dextromethorphan-guaiFENesin (ROBITUSSIN DM) oral syrup 10 mL, 10 mL,  Oral, Q4H PRN, Tram Palacios PA-C    dextrose 50 % IV solution 25 mL, 25 mL, Intravenous, Once, Xavier Medina MD    famotidine (PEPCID) tablet 10 mg, 10 mg, Oral, Daily PRN, Daya May, , 10 mg at 07/19/24 1305    fluticasone-vilanterol 200-25 mcg/actuation 1 puff, 1 puff, Inhalation, Daily, Tram Palacios PA-C, 1 puff at 07/20/24 0906    hydrALAZINE (APRESOLINE) injection 10 mg, 10 mg, Intravenous, Q6H PRN, Yasmine Meadows MD    insulin lispro (HumALOG/ADMELOG) 100 units/mL subcutaneous injection 2-12 Units, 2-12 Units, Subcutaneous, TID AC, 2 Units at 07/19/24 1634 **AND** Fingerstick Glucose (POCT), , , TID AC, Yasmine Meadows MD    insulin regular (HumuLIN R,NovoLIN R) IV syringe 10 Units, 10 Units, Intravenous, Once, Xavier Medina MD    iron polysaccharides (FERREX) capsule 150 mg, 150 mg, Oral, Daily, Ata Burns MD, 150 mg at 07/20/24 0933    lamoTRIgine (LaMICtal) tablet 100 mg, 100 mg, Oral, QAM, Tram Palacios PA-C, 100 mg at 07/20/24 0933    loratadine (CLARITIN) tablet 10 mg, 10 mg, Oral, Daily, Tram Palacios PA-C, 10 mg at 07/20/24 0945    melatonin tablet 3 mg, 3 mg, Oral, Daily PRN, Yasmine Meadows MD, 3 mg at 07/19/24 2158    montelukast (SINGULAIR) tablet 10 mg, 10 mg, Oral, Daily, Tram Palacios PA-C, 10 mg at 07/20/24 0933    ondansetron (ZOFRAN) injection 4 mg, 4 mg, Intravenous, Q6H PRN, Yasmine Meadows MD, 4 mg at 07/19/24 1314    oxybutynin (DITROPAN-XL) 24 hr tablet 5 mg, 5 mg, Oral, Daily, Tram Palacios PA-C, 5 mg at 07/20/24 0933    pantoprazole (PROTONIX) EC tablet 40 mg, 40 mg, Oral, Daily Before Breakfast, Ronny Webster MD, 40 mg at 07/20/24 0937    polyethylene glycol (MIRALAX) packet 17 g, 17 g, Oral, Daily, Xavier Medina MD, 17 g at 07/20/24 0933    predniSONE tablet 60 mg, 60 mg, Oral, Daily, Vane Estraad MD, 60 mg at 07/20/24 0933    senna (SENOKOT) tablet 8.6 mg, 1 tablet, Oral, HS, Xavier Medina MD    sevelamer (RENAGEL) tablet 1,600 mg, 1,600 mg, Oral, TID With Meals,  Vane Estrada MD, 1,600 mg at 07/20/24 0940    Sodium Zirconium Cyclosilicate (Lokelma) 10 g, 10 g, Oral, Daily, Tirso Montez MD, 10 g at 07/20/24 0948    torsemide (DEMADEX) tablet 40 mg, 40 mg, Oral, Daily, Vane Estrada MD, 40 mg at 07/19/24 1223    traZODone (DESYREL) tablet 200 mg, 200 mg, Oral, HS, Tram Palacios PA-C, 200 mg at 07/19/24 2157    venlafaxine (EFFEXOR-XR) 24 hr capsule 150 mg, 150 mg, Oral, Daily, Tram Palacios PA-C, 150 mg at 07/20/24 0932      Medications Prior to Admission:     Advair -21 MCG/ACT inhaler    albuterol (PROVENTIL HFA,VENTOLIN HFA) 90 mcg/act inhaler    atorvastatin (LIPITOR) 20 mg tablet    benzonatate (TESSALON) 200 MG capsule    cetirizine (ZyrTEC) 10 mg tablet    lamoTRIgine (LaMICtal) 100 mg tablet    metoprolol tartrate (LOPRESSOR) 50 mg tablet    montelukast (SINGULAIR) 10 mg tablet    oxybutynin (DITROPAN-XL) 5 mg 24 hr tablet    traZODone (DESYREL) 100 mg tablet    venlafaxine (EFFEXOR-XR) 150 mg 24 hr capsule    Allergies   Allergen Reactions    Penicillins Hives     Reaction Date: 24May2005; Annotation - 04Sep2012: meena mcdonald    Amoxicillin Rash    Aspirin Rash    Ibuprofen Rash    Morphine Rash    Oxycodone Rash    Valacyclovir Rash         Physical Exam:     BP (!) 177/96   Pulse 58   Temp (!) 97.4 °F (36.3 °C)   Resp 17   Wt 120 kg (263 lb 12.8 oz)   SpO2 95%   BMI 46.73 kg/m²     Physical Exam  Vitals reviewed.   Constitutional:       General: She is not in acute distress.     Appearance: She is obese. She is not ill-appearing, toxic-appearing or diaphoretic.   HENT:      Head: Normocephalic and atraumatic.      Mouth/Throat:      Mouth: Mucous membranes are moist.      Pharynx: No oropharyngeal exudate or posterior oropharyngeal erythema.   Eyes:      General: No scleral icterus.     Pupils: Pupils are equal, round, and reactive to light.   Neck:      Comments: Apheresis catheter present on right neck  Cardiovascular:      Rate and  Rhythm: Normal rate and regular rhythm.      Heart sounds: No murmur heard.     No gallop.   Pulmonary:      Effort: No respiratory distress.      Breath sounds: Normal breath sounds. No wheezing or rales.   Abdominal:      General: Bowel sounds are normal. There is no distension.      Palpations: Abdomen is soft. There is no mass.      Tenderness: There is no abdominal tenderness.   Musculoskeletal:         General: No tenderness.      Cervical back: Normal range of motion. No rigidity.      Right lower leg: Edema (nonpitting) present.      Left lower leg: Edema (nonpitting) present.   Lymphadenopathy:      Cervical: No cervical adenopathy.   Skin:     Coloration: Skin is not jaundiced or pale.      Findings: No bruising or erythema.   Neurological:      General: No focal deficit present.      Mental Status: She is alert and oriented to person, place, and time.         Recent Results (from the past 48 hour(s))   Fingerstick Glucose (POCT)    Collection Time: 07/18/24  3:56 PM   Result Value Ref Range    POC Glucose 191 (H) 65 - 140 mg/dl   Fingerstick Glucose (POCT)    Collection Time: 07/18/24  8:51 PM   Result Value Ref Range    POC Glucose 211 (H) 65 - 140 mg/dl   Basic metabolic panel    Collection Time: 07/19/24  4:00 AM   Result Value Ref Range    Sodium 134 (L) 135 - 147 mmol/L    Potassium 5.1 3.5 - 5.3 mmol/L    Chloride 101 96 - 108 mmol/L    CO2 21 21 - 32 mmol/L    ANION GAP 12 4 - 13 mmol/L    BUN 94 (H) 5 - 25 mg/dL    Creatinine 8.65 (H) 0.60 - 1.30 mg/dL    Glucose 200 (H) 65 - 140 mg/dL    Calcium 9.6 8.4 - 10.2 mg/dL    eGFR 4 ml/min/1.73sq m   CBC    Collection Time: 07/19/24  4:00 AM   Result Value Ref Range    WBC 15.22 (H) 4.31 - 10.16 Thousand/uL    RBC 3.61 (L) 3.81 - 5.12 Million/uL    Hemoglobin 9.9 (L) 11.5 - 15.4 g/dL    Hematocrit 30.3 (L) 34.8 - 46.1 %    MCV 84 82 - 98 fL    MCH 27.4 26.8 - 34.3 pg    MCHC 32.7 31.4 - 37.4 g/dL    RDW 14.9 11.6 - 15.1 %    Platelets 256 149 - 390  Thousands/uL    MPV 9.9 8.9 - 12.7 fL   Magnesium    Collection Time: 07/19/24  4:00 AM   Result Value Ref Range    Magnesium 2.3 1.9 - 2.7 mg/dL   Phosphorus    Collection Time: 07/19/24  4:00 AM   Result Value Ref Range    Phosphorus 8.2 (H) 2.3 - 4.1 mg/dL   Fingerstick Glucose (POCT)    Collection Time: 07/19/24  6:58 AM   Result Value Ref Range    POC Glucose 180 (H) 65 - 140 mg/dl   Fingerstick Glucose (POCT)    Collection Time: 07/19/24 11:28 AM   Result Value Ref Range    POC Glucose 177 (H) 65 - 140 mg/dl   Fibrinogen    Collection Time: 07/19/24 11:41 AM   Result Value Ref Range    Fibrinogen 324 207 - 520 mg/dL   Fingerstick Glucose (POCT)    Collection Time: 07/19/24  3:55 PM   Result Value Ref Range    POC Glucose 178 (H) 65 - 140 mg/dl   Fingerstick Glucose (POCT)    Collection Time: 07/19/24  8:53 PM   Result Value Ref Range    POC Glucose 178 (H) 65 - 140 mg/dl   Magnesium    Collection Time: 07/20/24  4:39 AM   Result Value Ref Range    Magnesium 2.5 1.9 - 2.7 mg/dL   Phosphorus    Collection Time: 07/20/24  4:39 AM   Result Value Ref Range    Phosphorus 9.5 (H) 2.3 - 4.1 mg/dL   Comprehensive metabolic panel    Collection Time: 07/20/24  4:39 AM   Result Value Ref Range    Sodium 135 135 - 147 mmol/L    Potassium 6.1 (H) 3.5 - 5.3 mmol/L    Chloride 101 96 - 108 mmol/L    CO2 20 (L) 21 - 32 mmol/L    ANION GAP 14 (H) 4 - 13 mmol/L     (H) 5 - 25 mg/dL    Creatinine 9.64 (H) 0.60 - 1.30 mg/dL    Glucose 170 (H) 65 - 140 mg/dL    Calcium 9.7 8.4 - 10.2 mg/dL    Corrected Calcium 10.3 (H) 8.3 - 10.1 mg/dL    AST 9 (L) 13 - 39 U/L    ALT 9 7 - 52 U/L    Alkaline Phosphatase 56 34 - 104 U/L    Total Protein 6.1 (L) 6.4 - 8.4 g/dL    Albumin 3.3 (L) 3.5 - 5.0 g/dL    Total Bilirubin 0.37 0.20 - 1.00 mg/dL    eGFR 3 ml/min/1.73sq m   CBC and differential    Collection Time: 07/20/24  6:06 AM   Result Value Ref Range    WBC 17.94 (H) 4.31 - 10.16 Thousand/uL    RBC 3.38 (L) 3.81 - 5.12 Million/uL     Hemoglobin 9.3 (L) 11.5 - 15.4 g/dL    Hematocrit 29.4 (L) 34.8 - 46.1 %    MCV 87 82 - 98 fL    MCH 27.5 26.8 - 34.3 pg    MCHC 31.6 31.4 - 37.4 g/dL    RDW 15.2 (H) 11.6 - 15.1 %    MPV 10.4 8.9 - 12.7 fL    Platelets 228 149 - 390 Thousands/uL   Fibrinogen    Collection Time: 07/20/24  6:06 AM   Result Value Ref Range    Fibrinogen 301 207 - 520 mg/dL   Manual Differential(PHLEBS Do Not Order)    Collection Time: 07/20/24  6:06 AM   Result Value Ref Range    Segmented % 81 (H) 43 - 75 %    Bands % 8 0 - 8 %    Lymphocytes % 5 (L) 14 - 44 %    Monocytes % 4 4 - 12 %    Eosinophils % 0 0 - 6 %    Basophils % 0 0 - 1 %    Metamyelocytes % 2 (H) 0 - 1 %    Absolute Neutrophils 15.97 (H) 1.85 - 7.62 Thousand/uL    Absolute Lymphocytes 0.90 0.60 - 4.47 Thousand/uL    Absolute Monocytes 0.72 0.00 - 1.22 Thousand/uL    Absolute Eosinophils 0.00 0.00 - 0.40 Thousand/uL    Absolute Basophils 0.00 0.00 - 0.10 Thousand/uL    Absolute Metamyelocytes 0.36 (H) 0.00 - 0.10 Thousand/uL    Total Counted      RBC Morphology Present     Platelet Estimate Adequate Adequate    Anisocytosis Present    Fingerstick Glucose (POCT)    Collection Time: 07/20/24  7:06 AM   Result Value Ref Range    POC Glucose 150 (H) 65 - 140 mg/dl       IR biopsy kidney random    Result Date: 7/18/2024  Narrative: CT-scan guided needle biopsy of right kidney History: 64-year-old female with acute kidney injury Radiation dose: 3568 mGy Conscious sedation time: 60 minutes Technique: This examination, like all CT scans performed in the [facility], was performed utilizing techniques to minimize radiation dose exposure, including the use of iterative reconstruction and automated exposure control. The patient was brought to the CT scanner and placed prone on the table. After axial images were obtained through the region of interest an area of the skin was then marked, prepped, and draped in usual sterile fashion. Lidocaine was administered to  the skin and a small skin incision was made. A 17-gauge cannula was advanced up into the peripheral inferior pole of the right kidney, an 18-gauge core biopsy specimen was obtained. The specimen was reviewed by pathology for adequacy. At the end of the procedure, D-Stat was used for hemostasis through the cannula as it was removed. The patient tolerated the procedure well and suffered no complications.     Impression: Impression: Successful percutaneous nontarget renal biopsy as described. Workstation performed: [id number]     Bronchoscopy    Result Date: 7/17/2024  Narrative: Table formatting from the original result was not included. [facility] [address] [facility] [address] [phone] DATE OF SERVICE: 7/17/24 PHYSICIAN(S): Attending: Shyla Coyle DO Fellow: No Staff Documented INDICATION: Acute renal failure (ARF) (HCC) POST-OP DIAGNOSIS: See the impression below. PREPROCEDURE: Standard airway preparation completed per respiratory therapy protocol.  Informed consent was obtained. Images reviewed prior to the procedure.  A Time Out was performed. No suspicion or identified risk for TB or other airborne infectious disease; bronchoscopy procedure being performed for diagnostic purposes. PROCEDURE: Bronchoscopy with BAL x 2 lobes DETAILS OF PROCEDURE: Patient was taken to the procedure room where a time out was performed to confirm correct patient and correct procedure. The patient underwent moderate sedation, which was administered by an anesthesia professional. The patient's blood pressure, heart rate, level of consciousness and oxygen were monitored throughout the procedure. The patient experienced no blood loss. The scope was introduced through the laryngeal mask airway. The procedure was not difficult. The patient tolerated the procedure well. There were no apparent adverse events. ANESTHESIA INFORMATION: ASA: III Anesthesia Type: Anesthesia type not filed in the  "log. FINDINGS: The left vocal cord, right vocal cord, upper trachea, middle trachea, lower trachea, main wendy, left main stem, ROGER, lingula, LLL, right main stem, RUL, bronchus intermedius, RML and RLL appeared normal. Moderate, thick and white secretions present in the left main stem and right main stem; secretions were easily removed by therapeutic aspiration and the airway was cleared Bronchoalveolar lavage was performed x2 in the right lateral segment (RB4) with 120 mL of saline instilled and a total return of 80 mL. The fluid appeared cloudy without suspected diffuse alveolar hemorrhage. Bronchoalveolar lavage was performed in the superior lingular segment (LB4). The fluid appeared cloudy without suspected diffuse alveolar hemorrhage. Bronchoscope was removed.  Patient was extubated by anesthesia and transferred to PACU in stable condition SPECIMENS: See orders      Impression: Abnormal chest CT Bronchoalveolar lavage performed in right middle lobe and lingula with cloudy return, no evidence alveolar hemorrhage RECOMMENDATION: Await cultures, cytology, cell count     IR temporary dialysis catheter placement    Result Date: 7/15/2024  Narrative: PROCEDURE: Non-tunneled central venous catheter placement PROCEDURE SUMMARY: 1.  Venous access with ultrasound guidance 2.  Non-tunneled central venous catheter insertion with fluoroscopic guidance INDICATION: \"ANGELICA, question of RPGN. plan to start HD 7/16, and then renal biopsy 7/17\". COMPLICATIONS: No immediate complications. ESTIMATED BLOOD LOSS (mL): Less than 10 CONTRAST: None RADIATION DOSE: Fluoroscopy time: 0.3 min Reference air kerma: 10 mGy ANESTHESIA/SEDATION: Level of anesthesia/sedation: No sedation Anesthesia/sedation administered by: Not applicable Total intra-service sedation time (minutes): 0 PROCEDURE DETAILS: Informed consent for the procedure including risks, benefits and alternatives was obtained and time-out was performed prior to the procedure. " The site was prepared and draped using all elements of maximal sterile barrier technique including sterile gloves, sterile gown, cap, mask, large sterile sheet, sterile ultrasound probe cover, hand hygiene and cutaneous antisepsis with 2% chlorhexidine. Local anesthesia was administered. The vessel was sonographically evaluated and determined to be patent. Real time ultrasound was used to visualize needle entry into the vessel and a permanent image was stored. Vein accessed: Internal jugular vein Access technique: Micropuncture set with 21 gauge needle The access site was dilated and the catheter was placed into the vein over a wire  under fluoroscopic guidance.  The catheter tip location was fluoroscopically verified and a permanent image was stored.. A sterile dressing was applied. Catheter placed: 14 Yakut nontunneled dialysis catheter Catheter size (Yakut): 14 Catheter length (cm): 15 Catheter flush: Normal saline Catheter securement technique: Non-absorbable suture FINDINGS: 1.  Ultrasound of the right  neck demonstrated widely patent right internal jugular vein. 2.  Completion radiograph demonstrated tip of the catheter overlying the right atrium, good in position.     Impression: Insertion of right-sided non-tunneled dual-lumen temporary dialysis catheter, with tip in the expected location of the cavoatrial junction. Plan: The catheter may be used immediately. Workstation performed: [id number]     CT chest wo contrast    Result Date: 7/11/2024  Narrative: CT CHEST WITHOUT IV CONTRAST INDICATION: Pulmonary nodules. COMPARISON: CT chest May 16, 2024. Correlation CT chest March 24, 2023 TECHNIQUE: CT examination of the chest was performed without intravenous contrast. Multiplanar 2D reformatted images were created from the source data. This examination, like all CT scans performed in the [facility], was performed utilizing techniques to minimize radiation dose exposure, including the use of  iterative reconstruction and automated exposure control. Radiation dose length product (DLP) for this visit: 545.9 mGy-cm FINDINGS: LUNGS: The extent of the diffuse nodularity is unchanged, though the nodules are overall decreased in size, density and conspicuity. No bronchiectasis. No honeycombing. No hilar retraction. PLEURA: Unremarkable. HEART/GREAT VESSELS: Normal heart size. Unchanged fusiform ectasia of the ascending thoracic aorta measuring up to 40 mm. Recommendation is for follow-up low radiation dose chest CT in one year. MEDIASTINUM AND MENA: No lymphadenopathy. No calcified lymph nodes. CHEST WALL AND LOWER NECK: Unremarkable. VISUALIZED STRUCTURES IN THE UPPER ABDOMEN: Cholelithiasis without acute cholecystitis OSSEOUS STRUCTURES: Chronic fractures of the right 10th rib. No acute fracture or destructive osseous lesion.     Impression: Since May 16, 2024: 1.  Unchanged extent of the diffuse pulmonary nodularity, though decreased size, density and conspicuity of the nodules. Findings are nonspecific, and assessment for environmental exposures is recommended. 2.  Unchanged fusiform ectasia of the ascending thoracic aorta measuring up to 40 mm. Recommendation is for follow-up low radiation dose chest CT in one year. Workstation performed: [id number]     XR chest 1 view portable    Result Date: 7/11/2024  Narrative: XR CHEST PORTABLE INDICATION: chest pain. COMPARISON: 7/22/2019 FINDINGS: Monitoring leads and clips project over the chest. Clear lungs. No pneumothorax or pleural effusion. Normal cardiomediastinal silhouette. Bones are unremarkable for age. Normal upper abdomen.     Impression: No acute cardiopulmonary disease. Workstation performed: [id number]     US kidney and bladder    Result Date: 7/11/2024  Narrative: RENAL ULTRASOUND INDICATION: ANGELICA. COMPARISON: None TECHNIQUE: Ultrasound of the retroperitoneum was performed with a curvilinear transducer utilizing volumetric sweeps and still imaging  techniques. FINDINGS: KIDNEYS: Within normal limits of size. Right kidney: 13.2 x 6.5 x 6.5 cm. Volume 292.1 mL Left kidney: 12.5 x 4.7 x 4.7 cm. Volume 145.1 mL Right kidney Unremarkable echogenicity and contour. No mass is identified. No hydronephrosis. No shadowing calculi. No perinephric fluid collections. Left kidney Unremarkable echogenicity and contour. 1.8 x 1.7 x 1.6 cm simple renal cyst in the interpolar region. No hydronephrosis. 4 mm nonobstructing intrarenal calculus in the upper pole. No perinephric fluid collections. URETERS: Nonvisualized. BLADDER: Mildly distended. No focal thickening or mass lesions.     Impression: No hydronephrosis. 4 mm nonobstructing left intrarenal calculus. Workstation performed: [id number]     CT abdomen pelvis wo contrast    Result Date: 7/10/2024  Narrative: CT ABDOMEN AND PELVIS WITHOUT IV CONTRAST INDICATION: joshua; hematuria. COMPARISON: CT dated 5/16/2024 TECHNIQUE: CT examination of the abdomen and pelvis was performed without intravenous contrast. Multiplanar 2D reformatted images were created from the source data. This examination, like all CT scans performed in the [facility], was performed utilizing techniques to minimize radiation dose exposure, including the use of iterative reconstruction and automated exposure control. Radiation dose length product (DLP) for this visit: 1361.9 mGy-cm Enteric Contrast: Not administered. FINDINGS: ABDOMEN LOWER CHEST: Redemonstration of multiple diffuse scattered nodules, less pronounced than on 5/16/2024 suggestive of mycobacterial infection. No consolidative airspace opacity. No pleural effusion. LIVER/BILIARY TREE: Unremarkable. GALLBLADDER: Cholelithiasis without findings of acute cholecystitis. SPLEEN: Unremarkable. PANCREAS: Atrophy of the pancreas ADRENAL GLANDS: Unremarkable. KIDNEYS/URETERS: Kidneys are normal for size. Linear cortical calcifications at the left renal upper pole 4 mm nonobstructing  calculus at the left renal upper pole. There are at least 3 lesions in the kidneys as described below: *12 mm hyperdense lesion at the right renal lower pole (axial image 107). *Exophytic 16 mm lesion at the left renal midpole (axial image 76). *13 mm cyst at the left renal midpole (axial image 84). No hydronephrosis, however there is apparent 3 mm stone near or questionably within the distal right ureter (axial image 142). Given the lack of hydronephrosis this could either reflect adjacent calcification or nonobstructing stone. Correlate clinically  and with urinalysis. STOMACH AND BOWEL: Small hiatal hernia. No dilated loops of small bowel to suggest mechanical obstruction. There is a 19 mm duodenal diverticulum. There is questionable asymmetric left fullness at the rectum which could be due to nondistention and not well evaluated without enteric and IV contrast. Correlate with recent colonoscopy. If there is no history of colonoscopy, one may be considered. No evidence for colitis. APPENDIX: The tip of the appendix measures 12 mm and contains hyperdense material which could be secondary to stones or trapped enteric contrast material. No periappendiceal inflammatory changes are noted at this time. ABDOMINOPELVIC CAVITY: No free air. No large volume ascites. No lymphadenopathy. VESSELS: Atherosclerosis without abdominal aortic aneurysm. PELVIS REPRODUCTIVE ORGANS: Presumed left calcified uterine fibroids are noted. URINARY BLADDER: Unremarkable. ABDOMINAL WALL/INGUINAL REGIONS: Unremarkable. BONES: Rupert and screw fixation at L4 and L5 with grade 1 anterolisthesis of L4 on L5. Prosthetic disc is noted at L4/L5. Moderate disc space loss, vacuum phenomenon, and endplate sclerosis at L2/L3.     Impression: No hydronephrosis, however there is apparent 3 mm stone near or questionably within the distal right ureter (axial image 142). Given the lack of hydronephrosis this could either reflect adjacent calcification or  nonobstructing stone. Correlate clinically  and with urinalysis. Lesions in both kidneys as described above. Recommend dedicated renal ultrasound on nonemergent basis. Redemonstration of multiple diffuse scattered nodules, less pronounced than on 5/16/2024 suggestive of mycobacterial infection. There is questionable asymmetric left fullness at the rectum which could be due to nondistention and not well evaluated without enteric and IV contrast. Correlate with recent colonoscopy. If there is no history of colonoscopy, one may be considered. The tip of the appendix measures 12 mm and contains hyperdense material which could be secondary to stones or trapped enteric contrast material. No periappendiceal inflammatory changes are noted at this time. Other findings as detailed above. Workstation performed: [id number]       Labs and pertinent reports reviewed.

## 2024-07-20 NOTE — PLAN OF CARE
Problem: PAIN - ADULT  Goal: Verbalizes/displays adequate comfort level or baseline comfort level  Description: Interventions:  - Encourage patient to monitor pain and request assistance  - Assess pain using appropriate pain scale  - Administer analgesics based on type and severity of pain and evaluate response  - Implement non-pharmacological measures as appropriate and evaluate response  - Consider cultural and social influences on pain and pain management  - Notify physician/advanced practitioner if interventions unsuccessful or patient reports new pain  Outcome: Progressing     Problem: INFECTION - ADULT  Goal: Absence or prevention of progression during hospitalization  Description: INTERVENTIONS:  - Assess and monitor for signs and symptoms of infection  - Monitor lab/diagnostic results  - Monitor all insertion sites, i.e. indwelling lines, tubes, and drains  - Monitor endotracheal if appropriate and nasal secretions for changes in amount and color  - [place] appropriate cooling/warming therapies per order  - Administer medications as ordered  - Instruct and encourage patient and family to use good hand hygiene technique  - Identify and instruct in appropriate isolation precautions for identified infection/condition  Outcome: Progressing  Goal: Absence of fever/infection during neutropenic period  Description: INTERVENTIONS:  - Monitor WBC    Outcome: Progressing     Problem: SAFETY ADULT  Goal: Patient will remain free of falls  Description: INTERVENTIONS:  - Educate patient/family on patient safety including physical limitations  - Instruct patient to call for assistance with activity   - Consult OT/PT to assist with strengthening/mobility   - Keep Call bell within reach  - Keep bed low and locked with side rails adjusted as appropriate  - Keep care items and personal belongings within reach  - Initiate and maintain comfort rounds  - Make Fall Risk Sign visible to staff  - Offer Toileting every  Hours,  in advance of need  - Initiate/Maintain alarm  - Obtain necessary fall risk management equipment:   - Apply yellow socks and bracelet for high fall risk patients  - Consider moving patient to room near nurses station  Outcome: Progressing  Goal: Maintain or return to baseline ADL function  Description: INTERVENTIONS:  -  Assess patient's ability to carry out ADLs; assess patient's baseline for ADL function and identify physical deficits which impact ability to perform ADLs (bathing, care of mouth/teeth, toileting, grooming, dressing, etc.)  - Assess/evaluate cause of self-care deficits   - Assess range of motion  - Assess patient's mobility; develop plan if impaired  - Assess patient's need for assistive devices and provide as appropriate  - Encourage maximum independence but intervene and supervise when necessary  - Involve family in performance of ADLs  - Assess for home care needs following discharge   - Consider OT consult to assist with ADL evaluation and planning for discharge  - Provide patient education as appropriate  Outcome: Progressing  Goal: Maintains/Returns to pre admission functional level  Description: INTERVENTIONS:  - Perform AM-PAC 6 Click Basic Mobility/ Daily Activity assessment daily.  - Set and communicate daily mobility goal to care team and patient/family/caregiver.   - Collaborate with rehabilitation services on mobility goals if consulted  - Perform Range of Motion  times a day.  - Reposition patient every  hours.  - Dangle patient  times a day  - Stand patient  times a day  - Ambulate patient  times a day  - Out of bed to chair  times a day   - Out of bed for meals  times a day  - Out of bed for toileting  - Record patient progress and toleration of activity level   Outcome: Progressing     Problem: DISCHARGE PLANNING  Goal: Discharge to home or other facility with appropriate resources  Description: INTERVENTIONS:  - Identify barriers to discharge w/patient and caregiver  - Arrange for  needed discharge resources and transportation as appropriate  - Identify discharge learning needs (meds, wound care, etc.)  - Arrange for interpretive services to assist at discharge as needed  - Refer to Case Management Department for coordinating discharge planning if the patient needs post-hospital services based on physician/advanced practitioner order or complex needs related to functional status, cognitive ability, or social support system  Outcome: Progressing     Problem: Knowledge Deficit  Goal: Patient/family/caregiver demonstrates understanding of disease process, treatment plan, medications, and discharge instructions  Description: Complete learning assessment and assess knowledge base.  Interventions:  - Provide teaching at level of understanding  - Provide teaching via preferred learning methods  Outcome: Progressing     Problem: METABOLIC, FLUID AND ELECTROLYTES - ADULT  Goal: Electrolytes maintained within normal limits  Description: INTERVENTIONS:  - Monitor labs and assess patient for signs and symptoms of electrolyte imbalances  - Administer electrolyte replacement as ordered  - Monitor response to electrolyte replacements, including repeat lab results as appropriate  - Instruct patient on fluid and nutrition as appropriate  Outcome: Progressing  Goal: Fluid balance maintained  Description: INTERVENTIONS:  - Monitor labs   - Monitor I/O and WT  - Instruct patient on fluid and nutrition as appropriate  - Assess for signs & symptoms of volume excess or deficit  Outcome: Progressing     Problem: Nutrition/Hydration-ADULT  Goal: Nutrient/Hydration intake appropriate for improving, restoring or maintaining nutritional needs  Description: Monitor and assess patient's nutrition/hydration status for malnutrition. Collaborate with interdisciplinary team and initiate plan and interventions as ordered.  Monitor patient's weight and dietary intake as ordered or per policy. Utilize nutrition screening tool and  intervene as necessary. Determine patient's food preferences and provide high-protein, high-caloric foods as appropriate.     INTERVENTIONS:  - Monitor oral intake, urinary output, labs, and treatment plans  - Assess nutrition and hydration status and recommend course of action  - Evaluate amount of meals eaten  - Assist patient with eating if necessary   - Allow adequate time for meals  - Recommend/ encourage appropriate diets, oral nutritional supplements, and vitamin/mineral supplements  - Order, calculate, and assess calorie counts as needed  - Recommend, monitor, and adjust tube feedings and TPN/PPN based on assessed needs  - Assess need for intravenous fluids  - Provide specific nutrition/hydration education as appropriate  - Include patient/family/caregiver in decisions related to nutrition  Outcome: Progressing

## 2024-07-21 LAB
ALBUMIN SERPL BCG-MCNC: 3.6 G/DL (ref 3.5–5)
ANION GAP SERPL CALCULATED.3IONS-SCNC: 11 MMOL/L (ref 4–13)
ANISOCYTOSIS BLD QL SMEAR: PRESENT
APTT PPP: 25 SECONDS (ref 23–37)
ATRIAL RATE: 51 BPM
ATRIAL RATE: 53 BPM
ATRIAL RATE: 56 BPM
BASOPHILS # BLD MANUAL: 0 THOUSAND/UL (ref 0–0.1)
BASOPHILS NFR MAR MANUAL: 0 % (ref 0–1)
BUN SERPL-MCNC: 82 MG/DL (ref 5–25)
CALCIUM SERPL-MCNC: 9.1 MG/DL (ref 8.4–10.2)
CHLORIDE SERPL-SCNC: 105 MMOL/L (ref 96–108)
CO2 SERPL-SCNC: 23 MMOL/L (ref 21–32)
CREAT SERPL-MCNC: 7.23 MG/DL (ref 0.6–1.3)
EOSINOPHIL # BLD MANUAL: 0 THOUSAND/UL (ref 0–0.4)
EOSINOPHIL NFR BLD MANUAL: 0 % (ref 0–6)
ERYTHROCYTE [DISTWIDTH] IN BLOOD BY AUTOMATED COUNT: 15.1 % (ref 11.6–15.1)
FIBRINOGEN PPP-MCNC: 161 MG/DL (ref 207–520)
FIBRINOGEN PPP-MCNC: 175 MG/DL (ref 207–520)
GFR SERPL CREATININE-BSD FRML MDRD: 5 ML/MIN/1.73SQ M
GLUCOSE SERPL-MCNC: 123 MG/DL (ref 65–140)
GLUCOSE SERPL-MCNC: 124 MG/DL (ref 65–140)
GLUCOSE SERPL-MCNC: 138 MG/DL (ref 65–140)
GLUCOSE SERPL-MCNC: 170 MG/DL (ref 65–140)
GLUCOSE SERPL-MCNC: 201 MG/DL (ref 65–140)
HCT VFR BLD AUTO: 27 % (ref 34.8–46.1)
HGB BLD-MCNC: 8.9 G/DL (ref 11.5–15.4)
INR PPP: 1.17 (ref 0.84–1.19)
LG PLATELETS BLD QL SMEAR: PRESENT
LYMPHOCYTES # BLD AUTO: 0.39 THOUSAND/UL (ref 0.6–4.47)
LYMPHOCYTES # BLD AUTO: 2 % (ref 14–44)
MCH RBC QN AUTO: 28.1 PG (ref 26.8–34.3)
MCHC RBC AUTO-ENTMCNC: 33 G/DL (ref 31.4–37.4)
MCV RBC AUTO: 85 FL (ref 82–98)
METAMYELOCYTE ABSOLUTE CT: 0.39 THOUSAND/UL (ref 0–0.1)
METAMYELOCYTES NFR BLD MANUAL: 2 % (ref 0–1)
MONOCYTES # BLD AUTO: 1.75 THOUSAND/UL (ref 0–1.22)
MONOCYTES NFR BLD: 9 % (ref 4–12)
NEUTROPHILS # BLD MANUAL: 16.89 THOUSAND/UL (ref 1.85–7.62)
NEUTS SEG NFR BLD AUTO: 87 % (ref 43–75)
P AXIS: 32 DEGREES
P AXIS: 44 DEGREES
P AXIS: 47 DEGREES
PHOSPHATE SERPL-MCNC: 7.6 MG/DL (ref 2.3–4.1)
PLATELET # BLD AUTO: 202 THOUSANDS/UL (ref 149–390)
PLATELET BLD QL SMEAR: ADEQUATE
PMV BLD AUTO: 10 FL (ref 8.9–12.7)
POTASSIUM SERPL-SCNC: 5.1 MMOL/L (ref 3.5–5.3)
PR INTERVAL: 182 MS
PR INTERVAL: 198 MS
PR INTERVAL: 198 MS
PROTHROMBIN TIME: 15.6 SECONDS (ref 11.6–14.5)
QRS AXIS: -13 DEGREES
QRS AXIS: 20 DEGREES
QRS AXIS: 21 DEGREES
QRSD INTERVAL: 134 MS
QRSD INTERVAL: 148 MS
QRSD INTERVAL: 150 MS
QT INTERVAL: 468 MS
QT INTERVAL: 484 MS
QT INTERVAL: 486 MS
QTC INTERVAL: 446 MS
QTC INTERVAL: 451 MS
QTC INTERVAL: 456 MS
RBC # BLD AUTO: 3.17 MILLION/UL (ref 3.81–5.12)
SODIUM SERPL-SCNC: 139 MMOL/L (ref 135–147)
T WAVE AXIS: 15 DEGREES
T WAVE AXIS: 28 DEGREES
T WAVE AXIS: 55 DEGREES
VENTRICULAR RATE: 51 BPM
VENTRICULAR RATE: 53 BPM
VENTRICULAR RATE: 56 BPM
WBC # BLD AUTO: 19.41 THOUSAND/UL (ref 4.31–10.16)

## 2024-07-21 PROCEDURE — 85007 BL SMEAR W/DIFF WBC COUNT: CPT | Performed by: INTERNAL MEDICINE

## 2024-07-21 PROCEDURE — 93010 ELECTROCARDIOGRAM REPORT: CPT | Performed by: INTERNAL MEDICINE

## 2024-07-21 PROCEDURE — 99233 SBSQ HOSP IP/OBS HIGH 50: CPT | Performed by: INTERNAL MEDICINE

## 2024-07-21 PROCEDURE — 99232 SBSQ HOSP IP/OBS MODERATE 35: CPT | Performed by: INTERNAL MEDICINE

## 2024-07-21 PROCEDURE — 99232 SBSQ HOSP IP/OBS MODERATE 35: CPT | Performed by: STUDENT IN AN ORGANIZED HEALTH CARE EDUCATION/TRAINING PROGRAM

## 2024-07-21 PROCEDURE — 85384 FIBRINOGEN ACTIVITY: CPT | Performed by: INTERNAL MEDICINE

## 2024-07-21 PROCEDURE — 85730 THROMBOPLASTIN TIME PARTIAL: CPT | Performed by: INTERNAL MEDICINE

## 2024-07-21 PROCEDURE — 85610 PROTHROMBIN TIME: CPT | Performed by: INTERNAL MEDICINE

## 2024-07-21 PROCEDURE — 82948 REAGENT STRIP/BLOOD GLUCOSE: CPT

## 2024-07-21 PROCEDURE — 85027 COMPLETE CBC AUTOMATED: CPT | Performed by: INTERNAL MEDICINE

## 2024-07-21 PROCEDURE — 94760 N-INVAS EAR/PLS OXIMETRY 1: CPT

## 2024-07-21 PROCEDURE — 94640 AIRWAY INHALATION TREATMENT: CPT

## 2024-07-21 PROCEDURE — 80069 RENAL FUNCTION PANEL: CPT | Performed by: INTERNAL MEDICINE

## 2024-07-21 RX ORDER — CYCLOPHOSPHAMIDE 50 MG/1
100 CAPSULE ORAL DAILY
Status: DISCONTINUED | OUTPATIENT
Start: 2024-07-21 | End: 2024-08-04 | Stop reason: HOSPADM

## 2024-07-21 RX ORDER — IPRATROPIUM BROMIDE AND ALBUTEROL SULFATE 2.5; .5 MG/3ML; MG/3ML
3 SOLUTION RESPIRATORY (INHALATION)
Status: DISCONTINUED | OUTPATIENT
Start: 2024-07-21 | End: 2024-08-03

## 2024-07-21 RX ADMIN — ATOVAQUONE 1500 MG: 750 SUSPENSION ORAL at 07:57

## 2024-07-21 RX ADMIN — ATORVASTATIN CALCIUM 20 MG: 20 TABLET, FILM COATED ORAL at 08:12

## 2024-07-21 RX ADMIN — ALBUMIN (HUMAN) 200 G: 12.5 INJECTION, SOLUTION INTRAVENOUS at 09:00

## 2024-07-21 RX ADMIN — MONTELUKAST 10 MG: 10 TABLET, FILM COATED ORAL at 08:13

## 2024-07-21 RX ADMIN — LORATADINE 10 MG: 10 TABLET ORAL at 08:13

## 2024-07-21 RX ADMIN — POLYETHYLENE GLYCOL 3350 17 G: 17 POWDER, FOR SOLUTION ORAL at 07:53

## 2024-07-21 RX ADMIN — SEVELAMER HYDROCHLORIDE 1600 MG: 800 TABLET ORAL at 12:03

## 2024-07-21 RX ADMIN — TORSEMIDE 40 MG: 20 TABLET ORAL at 07:55

## 2024-07-21 RX ADMIN — ONDANSETRON 4 MG: 2 INJECTION INTRAMUSCULAR; INTRAVENOUS at 18:40

## 2024-07-21 RX ADMIN — SEVELAMER HYDROCHLORIDE 1600 MG: 800 TABLET ORAL at 07:55

## 2024-07-21 RX ADMIN — OXYBUTYNIN CHLORIDE 5 MG: 5 TABLET, EXTENDED RELEASE ORAL at 07:55

## 2024-07-21 RX ADMIN — POLYSACCHARIDE-IRON COMPLEX 150 MG: 150 CAPSULE ORAL at 07:54

## 2024-07-21 RX ADMIN — SODIUM ZIRCONIUM CYCLOSILICATE 10 G: 10 POWDER, FOR SUSPENSION ORAL at 07:56

## 2024-07-21 RX ADMIN — HEPARIN SODIUM 5000 UNITS: 5000 INJECTION INTRAVENOUS; SUBCUTANEOUS at 21:12

## 2024-07-21 RX ADMIN — ATORVASTATIN CALCIUM 20 MG: 20 TABLET, FILM COATED ORAL at 07:55

## 2024-07-21 RX ADMIN — Medication 3 MG: at 21:11

## 2024-07-21 RX ADMIN — INSULIN LISPRO 2 UNITS: 100 INJECTION, SOLUTION INTRAVENOUS; SUBCUTANEOUS at 15:32

## 2024-07-21 RX ADMIN — LORATADINE 10 MG: 10 TABLET ORAL at 07:55

## 2024-07-21 RX ADMIN — MONTELUKAST 10 MG: 10 TABLET, FILM COATED ORAL at 07:54

## 2024-07-21 RX ADMIN — SENNOSIDES 8.6 MG: 8.6 TABLET, FILM COATED ORAL at 21:11

## 2024-07-21 RX ADMIN — OXYBUTYNIN CHLORIDE 5 MG: 5 TABLET, EXTENDED RELEASE ORAL at 08:14

## 2024-07-21 RX ADMIN — TRAZODONE HYDROCHLORIDE 200 MG: 100 TABLET ORAL at 21:13

## 2024-07-21 RX ADMIN — TORSEMIDE 40 MG: 20 TABLET ORAL at 08:14

## 2024-07-21 RX ADMIN — CALCIUM GLUCONATE 1 G: 20 INJECTION, SOLUTION INTRAVENOUS at 09:00

## 2024-07-21 RX ADMIN — POLYSACCHARIDE-IRON COMPLEX 150 MG: 150 CAPSULE ORAL at 08:13

## 2024-07-21 RX ADMIN — LAMOTRIGINE 100 MG: 100 TABLET ORAL at 07:53

## 2024-07-21 RX ADMIN — PREDNISONE 60 MG: 20 TABLET ORAL at 08:14

## 2024-07-21 RX ADMIN — IPRATROPIUM BROMIDE AND ALBUTEROL SULFATE 3 ML: 2.5; .5 SOLUTION RESPIRATORY (INHALATION) at 19:41

## 2024-07-21 RX ADMIN — SODIUM ZIRCONIUM CYCLOSILICATE 10 G: 10 POWDER, FOR SUSPENSION ORAL at 08:14

## 2024-07-21 RX ADMIN — Medication 1 TABLET: at 07:55

## 2024-07-21 RX ADMIN — SEVELAMER HYDROCHLORIDE 1600 MG: 800 TABLET ORAL at 15:32

## 2024-07-21 RX ADMIN — PANTOPRAZOLE SODIUM 40 MG: 40 TABLET, DELAYED RELEASE ORAL at 06:32

## 2024-07-21 RX ADMIN — FLUTICASONE FUROATE AND VILANTEROL TRIFENATATE 1 PUFF: 200; 25 POWDER RESPIRATORY (INHALATION) at 07:57

## 2024-07-21 RX ADMIN — HEPARIN SODIUM 5000 UNITS: 5000 INJECTION INTRAVENOUS; SUBCUTANEOUS at 13:55

## 2024-07-21 RX ADMIN — VENLAFAXINE HYDROCHLORIDE 150 MG: 150 CAPSULE, EXTENDED RELEASE ORAL at 08:15

## 2024-07-21 RX ADMIN — VENLAFAXINE HYDROCHLORIDE 150 MG: 150 CAPSULE, EXTENDED RELEASE ORAL at 07:55

## 2024-07-21 RX ADMIN — POLYETHYLENE GLYCOL 3350 17 G: 17 POWDER, FOR SOLUTION ORAL at 08:14

## 2024-07-21 RX ADMIN — SEVELAMER HYDROCHLORIDE 1600 MG: 800 TABLET ORAL at 15:25

## 2024-07-21 RX ADMIN — HEPARIN SODIUM 5000 UNITS: 5000 INJECTION INTRAVENOUS; SUBCUTANEOUS at 06:32

## 2024-07-21 RX ADMIN — ALBUTEROL SULFATE 2 PUFF: 108 AEROSOL, METERED RESPIRATORY (INHALATION) at 07:57

## 2024-07-21 RX ADMIN — CYCLOPHOSPHAMIDE 100 MG: 50 CAPSULE ORAL at 21:12

## 2024-07-21 RX ADMIN — PREDNISONE 60 MG: 20 TABLET ORAL at 07:56

## 2024-07-21 RX ADMIN — LAMOTRIGINE 100 MG: 100 TABLET ORAL at 08:13

## 2024-07-21 NOTE — PROGRESS NOTES
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 Given the lack of hydronephrosis this could either reflect adjacent calcification or nonobstructing stone.   Patient reports that she is still producing urine  US kidney and bladder: No hydronephrosis. 4 mm nonobstructing left intrarenal calculus  Urinalysis: 3+ blood.  3+ protein.  Innumerable red blood cells.  2-4 WBCs.  Moderate bacteria   AURA, C3, and C4 normal  Ig Kappa Free Light Chain:152.5   Ig Lambda Free Light Chain 87.1   Kappa/Lambda FluidC Ratio 1.75  Hepatitis panel nonreactive  HIV nonreactive  QuantiFERON gold negative  protein electrophoresis see labs  hypersensitivity pneumonitis profile negative  GBM antibodies: elevated >8  phospholipase A2 receptor Ab: Negative  ANCA: negative  7/16 Temporary R IJ dialysis catheter placed.  Dialysis initiated.  7/17 Renal biopsy and bronchoscopy performed. Results pending.   7/18 AFB stain with no acid fast bacili seen. Pt received first dose of 1g solumedrol.   7/19 CM confirmed HD chair time for patient. Will be TTS 10:30 AM with start date Tuesday 7/23.     Plan  Nephrology onboard - dialysis initiated  Pulmonology onboard  Hematology onboard for plan for PLEX - session 2/5  Continue Prednisone 60 mg daily   Oral cyclophosphamide 100 mg daily   Monitor blood glucose levels  IR to place a permanent hemodialysis catheter Monday 7/22  Follow-up renal biopsy and bronchoscopy results  Hold home dyazide    Persistent  cough  Assessment & Plan  Patient endorses worsening shortness of breath and difficulty breathing  Patient noted to have multiple diffuse scattered pulmonary nodules on CT  Quantiferon gold negative 7/9/24    Plan  Completed Ceftriaxone x 5 days.  Tessalon Perles as needed    Abnormal CT of the chest  Assessment & Plan  CT chest: Re demonstration of multiple diffuse scattered nodules  Etiology unclear: HP vs sarcoid vs  vs MAC vs atypical  CRP- 143.7   Sed-61, negative: QuantiFERON  Procal: 0.27--0.30--0.38--0.40  7/17 Renal biopsy and bronchoscopy performed. Results pending.     Plan:  Follow-up renal biopsy and bronchoscopy results  Per pulmonology recs, repeat CT chest in 4 to 6 weeks. If the nodularity fails to improve or progresses, we may need to reconsider tissue biopsy of the lung either via transbronchial biopsy or VATS wedge resection     Moderate persistent asthma w/out acute exacerbation  Assessment & Plan  Patient has PFTs ordered but they have not been completed.  Home medication regime Advair -21 mcg 2 puff twice daily, Proventil 2 puffs every 6 hours as needed      Plan:  Supplemental oxygen as needed  Albuterol inhaler PRN  Continue benzonatate  Continue fluticasone-vilanterol  Continue loratadine 10 mg  Robitussin PRN    Abnormality of ascending aorta  Assessment & Plan  CT chest: Unchanged fusiform ectasia of the ascending thoracic aorta measuring up to 40 mm     Plan  Recommendation for follow-up low radiation dose chest CT in one year    Bipolar 1 disorder (HCC)  Assessment & Plan  Plan  Continue on Effexor, trazodone and Lamictal    Primary hypertension  Assessment & Plan  Hold Lopressor due to bradycardia  Hold Dyazide  Hydralazine PRN  Monitor vitals as per protocol    Dyslipidemia  Assessment & Plan  Continue on atorvastatin 20 Mg    Sepsis (HCC)-resolved as of 7/14/2024  Assessment & Plan    WBC   Date Value Ref Range Status   07/21/2024 19.41 (H) 4.31 - 10.16 Thousand/uL Final      Patient admitted with sepsis likely secondary to pneumonitis as evidenced by tachycardia, tachypnea and leukocytosis  CT: redemonstration of multiple diffuse scattered nodules, less pronounced than on 5/16/2024 suggestive of mycobacterial infection  QuantiFERON gold negative   Lactic acid normal, Procalcitonin trending up: 0.27 > 0.30 > .38 > .40  Sputum culture +2 gram negative rods, +1 gram positive cocci  BC: 1/2 staph epidermis detected-contaminated  Leukocytosis likely due to steroid use    Plan:  Completed Ceftriaxone x 5 days         VTE Pharmacologic Prophylaxis: VTE Score: 4 Moderate Risk (Score 3-4) - Pharmacological DVT Prophylaxis Ordered: heparin.    Mobility:   Basic Mobility Inpatient Raw Score: 24  JH-HLM Goal: 8: Walk 250 feet or more  JH-HLM Achieved: 2: Bed activities/Dependent transfer  JH-HLM Goal NOT achieved. Continue with multidisciplinary rounding and encourage appropriate mobility to improve upon JH-HLM goals.    Patient Centered Rounds: I performed bedside rounds with nursing staff today.   Discussions with Specialists or Other Care Team Provider: Nephrology, IR, pulmonology, Hematology    Education and Discussions with Family / Patient: Attempted to update  (sister) via phone. Left voicemail.     Current Length of Stay: 9 day(s)  Current Patient Status: Inpatient   Discharge Plan: Anticipate discharge in >72 hrs to home.    Code Status: Level 1 - Full Code    Subjective:   Patient seen and examined at bedside. Overnight, pt c/o chest tightness and shortness of breath. She was given albuterol and started on supplemental oxygen 1.5 L with relief of symptoms. EKG was repeated.  No ischemic changes.  Blood pressure was elevated so PRN hydralazine was given. This AM patient endorses unchanged cough and improvement of shortness of breath. Patient weaned off oxygen. Discussed current plan and management with the patient. She understands and agrees.    Objective:     Vitals:    Temp (24hrs), Av.6 °F (36.4 °C), Min:97.6 °F (36.4 °C), Max:97.6 °F (36.4 °C)    Temp:  [97.6 °F (36.4 °C)] 97.6 °F (36.4 °C)  HR:  [56-72] 68  Resp:  [18-20] 19  BP: (138-172)/(66-86) 152/82  SpO2:  [93 %-97 %] 93 %  Body mass index is 46.82 kg/m².     Input and Output Summary (last 24 hours):     Intake/Output Summary (Last 24 hours) at 2024 1448  Last data filed at 2024 1800  Gross per 24 hour   Intake 500 ml   Output 1000 ml   Net -500 ml       Physical Exam:   Physical Exam  Vitals and nursing note reviewed.   Constitutional:       Appearance: Normal appearance.   HENT:      Head: Normocephalic and atraumatic.      Mouth/Throat:      Mouth: Mucous membranes are moist.   Eyes:      Extraocular Movements: Extraocular movements intact.      Conjunctiva/sclera: Conjunctivae normal.      Pupils: Pupils are equal, round, and reactive to light.   Cardiovascular:      Rate and Rhythm: Normal rate and regular rhythm.   Pulmonary:      Effort: Pulmonary effort is normal. No respiratory distress.      Breath sounds: Normal breath sounds.   Abdominal:      General: Bowel sounds are normal. There is no distension.      Palpations: Abdomen is soft.      Tenderness: There is no abdominal tenderness. There is no guarding.   Musculoskeletal:      Right lower leg: Edema present.      Left lower leg: Edema present.      Comments: Trace edema noted to BLE     Skin:     General: Skin is warm and dry.   Neurological:      General: No focal deficit present.      Mental Status: She is alert. Mental status is at baseline.        Additional Data:     Labs:  Results from last 7 days   Lab Units 24  0433 24  0606   WBC Thousand/uL 19.41* 17.94*   HEMOGLOBIN g/dL 8.9* 9.3*   HEMATOCRIT % 27.0* 29.4*   PLATELETS Thousands/uL 202 228   BANDS PCT %  --  8   LYMPHO PCT % 2* 5*   MONO PCT % 9 4   EOS PCT % 0 0     Results from last 7 days   Lab Units 24  0433 24  0439   SODIUM mmol/L 139 135   POTASSIUM  mmol/L 5.1 6.1*   CHLORIDE mmol/L 105 101   CO2 mmol/L 23 20*   BUN mg/dL 82* 122*   CREATININE mg/dL 7.23* 9.64*   ANION GAP mmol/L 11 14*   CALCIUM mg/dL 9.1 9.7   ALBUMIN g/dL 3.6 3.3*   TOTAL BILIRUBIN mg/dL  --  0.37   ALK PHOS U/L  --  56   ALT U/L  --  9   AST U/L  --  9*   GLUCOSE RANDOM mg/dL 138 170*     Results from last 7 days   Lab Units 07/21/24  0433   INR  1.17     Results from last 7 days   Lab Units 07/21/24  1114 07/21/24  0612 07/20/24  2046 07/20/24  1104 07/20/24  0706 07/19/24  2053 07/19/24  1555 07/19/24  1128 07/19/24  0658 07/18/24  2051 07/18/24  1556   POC GLUCOSE mg/dl 123 124 204* 193* 150* 178* 178* 177* 180* 211* 191*                   Lines/Drains:  Invasive Devices       Peripheral Intravenous Line  Duration             Peripheral IV 07/20/24 Right;Dorsal (posterior) Hand 1 day              Hemodialysis Catheter  Duration             HD Temporary Double Catheter 5 days                    Imaging: Reviewed radiology reports from this admission including: chest xray, chest CT scan, abdominal/pelvic CT, and ultrasound kidney and bladder  IR biopsy kidney random   Final Result by Sandoval Castro MD (07/18 1358)   Impression: Successful percutaneous nontarget renal biopsy as described.                  Workstation performed: [id number]         IR temporary dialysis catheter placement   Final Result by Graciela Bettencourt MD (07/15 1640)      Insertion of right-sided non-tunneled dual-lumen temporary dialysis catheter, with tip in the expected location of the cavoatrial junction.      Plan:      The catheter may be used immediately.      Workstation performed: [id number]         IR tunneled dialysis catheter placement    (Results Pending)            Recent Cultures (last 7 days):   Results from last 7 days   Lab Units 07/17/24  1258 07/17/24  1253   GRAM STAIN RESULT  1+ Polys  No bacteria seen 1+ Polys  No bacteria seen       Last 24 Hours Medication List:   Current  Facility-Administered Medications   Medication Dose Route Frequency Provider Last Rate    acetaminophen  650 mg Oral Q6H PRN Tram Palacios PA-C      Albumin 5%  200 g Intravenous Daily Madonna Doug, DO      albuterol  2 puff Inhalation Q4H PRN Tram Palacios PA-C      atorvastatin  20 mg Oral Daily Tram Palacios PA-C      atovaquone  1,500 mg Oral Daily With Breakfast Vane sEtrada MD      benzonatate  200 mg Oral TID PRN Tram Palacios PA-C      calcium carbonate-vitamin D  1 tablet Oral Daily With Breakfast Vane Estrada MD      calcium gluconate  1 g Intravenous Daily Madonna Camacho, DO 1 g (07/21/24 0900)    cyclophosphamide  100 mg Oral Daily Tirso Montez MD      dextromethorphan-guaiFENesin  10 mL Oral Q4H PRN Tram Palacios PA-C      famotidine  10 mg Oral Daily PRN Daya May DO      fluticasone-vilanterol  1 puff Inhalation Daily Tram Palacios PA-C      heparin (porcine)  5,000 Units Subcutaneous Q8H JACK Xavier Medina MD      hydrALAZINE  10 mg Intravenous Q6H PRN Yasmine Meadows MD      insulin lispro  2-12 Units Subcutaneous TID AC Yasmine Meadows MD      iron polysaccharides  150 mg Oral Daily Ata Burns MD      lamoTRIgine  100 mg Oral QAM Tram Palacios PA-C      loratadine  10 mg Oral Daily Tram Palacios PA-C      melatonin  3 mg Oral Daily PRN Yasmine Meadows MD      montelukast  10 mg Oral Daily Tram Palacios PA-C      ondansetron  4 mg Intravenous Q6H PRN Yasmine Meadows MD      oxybutynin  5 mg Oral Daily Tram Palacios PA-C      pantoprazole  40 mg Oral Daily Before Breakfast Ronny Webster MD      polyethylene glycol  17 g Oral Daily Xavier Medina MD      predniSONE  60 mg Oral Daily Vane Estrada MD      senna  1 tablet Oral HS Xavier Medina MD      sevelamer  1,600 mg Oral TID With Meals Vane Estrada MD      Sodium Zirconium Cyclosilicate  10 g Oral Daily Tirso Montez MD      torsemide  40 mg Oral Daily Vane Estrada MD      traZODone  200 mg Oral [facility]  DARRICK Palacios      venlafaxine  150 mg Oral Daily Tram Palacios PA-C          Today, Patient Was Seen By: Jessica Beckford MD    **Please Note: This note may have been constructed using a voice recognition system.**

## 2024-07-21 NOTE — ASSESSMENT & PLAN NOTE
Patient has PFTs ordered but they have not been completed.  Home medication regime Advair -21 mcg 2 puff twice daily, Proventil 2 puffs every 6 hours as needed  On 2 L via nasal cannula.  O2 sat 95 to 96%    Plan:  Supplemental oxygen as needed  DuoNebs every 6 hours, per pulmonology  Albuterol inhaler PRN  Continue benzonatate  Continue fluticasone-vilanterol  Continue loratadine 10 mg  Robitussin PRN

## 2024-07-21 NOTE — TREATMENT PLAN
Renal short note - I spoke with lab and they directed me to [phone] - to see about obtaining quant of anti GBM.     I called this number. They are not sure of how to obtain. But they will review with their director and call back tomorrow

## 2024-07-21 NOTE — PROGRESS NOTES
Pulmonary Progress Note   Ngozi Beard 64 y.o. female MRN: [id number]    Unit/Bed#: S -01 Encounter: [id number]          Subjective:   Patient seen and examined.  No significant events overnight.   Denies any hemoptysis nausea vomiting elevation    Patient states she has some shortness of breath which she feels like is her asthma acting up.  Her PTA inhaler includes Advair and rescue inhaler    Physical Exam:    GEN: alert and oriented x 3, pleasant and cooperative   HEENT:  Normocephalic, atraumatic, anicteric  NECK: No JVD  HEART: Rate, normal S1 and S2  LUNGS: Clear to auscultation bilaterally; no wheezes, rales, or rhonchi; respiration nonlabored   ABDOMEN:  Normoactive bowel sounds, soft, no tenderness, no distention  EXTREMITIES: peripheral pulses palpable; no edema  NEURO: no gross focal findings; cranial nerves grossly intact   SKIN:  Dry, intact, warm to touch    Vitals: Blood pressure 154/87, pulse 67, temperature 98.4 °F (36.9 °C), resp. rate 18, weight 120 kg (264 lb 4.8 oz), SpO2 96%., Body mass index is 46.82 kg/m².,   Orthostatic Blood Pressures      Flowsheet Row Most Recent Value   Blood Pressure 154/87 filed at 07/21/2024 1502   Patient Position - Orthostatic VS Lying filed at 07/20/2024 1800              Intake/Output Summary (Last 24 hours) at 7/21/2024 1651  Last data filed at 7/20/2024 1800  Gross per 24 hour   Intake 300 ml   Output 1000 ml   Net -700 ml       Labs & Results:      Results from last 7 days   Lab Units 07/21/24  0433 07/20/24  0439 07/19/24  0400   POTASSIUM mmol/L 5.1 6.1* 5.1   CO2 mmol/L 23 20* 21   CHLORIDE mmol/L 105 101 101   BUN mg/dL 82* 122* 94*   CREATININE mg/dL 7.23* 9.64* 8.65*     Results from last 7 days   Lab Units 07/21/24  0433 07/20/24  0606 07/19/24  0400   HEMOGLOBIN g/dL 8.9* 9.3* 9.9*   HEMATOCRIT % 27.0* 29.4* 30.3*   PLATELETS Thousands/uL 202 228 256       Imaging:  I have personally reviewed pertinent films in PACS    Micro: Reviewed      I  have performed an independent review and interpreted all relevant images in PACS, labs, pathology and external records relevant to patient care     Assessment:    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  Ideally would repeat this after her hospitalization is completed   ANGELICA with no baseline CKD, baseline creatinine 0.8, currently undergoing prednisone Plex therapy  Mild intermittent asthma on PTA ICS/LABA Advair and rescue inhaler, no recent flares.  Patient feels like she is somewhat short of breath today.  I discussed with her this may be from her Plex therapy as there is a 4 L infusion of albumin.    Plan:    Start DuoNebs 4 times daily  Repeat CT chest without contrast in 6 weeks  Appreciate nephrology assistance, currently on cyclophosphamide, atovaquone, high-dose steroids and torsemide  At this point pulmonary will sign off, please reach out if any issues    Luke Montez MD  Pulmonary and Critical Care Medicine

## 2024-07-21 NOTE — ASSESSMENT & PLAN NOTE
Patient endorses worsening shortness of breath and difficulty breathing  Patient noted to have multiple diffuse scattered pulmonary nodules on CT  Quantiferon gold negative 7/9/24    Plan  Completed Ceftriaxone x 5 days.  Tessalon Perles as needed

## 2024-07-21 NOTE — ASSESSMENT & PLAN NOTE
Hold Lopressor due to bradycardia  Hold Dyazide  Nifedipine 30 mg daily  Hydralazine PRN  Monitor vitals as per protocol

## 2024-07-21 NOTE — PROGRESS NOTES
Oncology Progress Note  Ngozi Beard 64 y.o. female MRN: [id number]  Unit/Bed#: S -01 Encounter: [id number]      Oncology History    No history exists.     Assessment and Plan :      1. 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 On treatment with PLEX     Ms. Newton is a 64 year old female admitted originally for OP labs raising concern for acute renal failure. Workup has been concerning for anti-GBM disease. GBM antibody titer was noted to be elevated >8. S/p renal biopsy on 7/17, awaiting pathology results at this time.     PLEX D2/5 today. Patient already ordered for 4L of 5% albumin daily with each PLEX sessions. T    Plan  for 5 days of PLEX as of now, but total duration TBD pending clinical course, lab parameters, and nephrology recs.     Hematology team will continue to follow for PLEX assistance, but specific management recommendations to be guided by nephrology team. [facility] regarding it's ok to perform both PLEX and dialysis on same day. Case was discussed with nephrology team this morning; planning to proceed with PLEX in the morning and dialysis later in the day today.     PLEX to continue tomorrow; 3/5 planned     Nephrology to determine r dialysis       [place] [facility] phone number is [phone] if the primary team or nephrology team needs to get in touch with them directly with any questions.         Outpatient follow up plan: No indications for hematology follow-up at this time as my team was primarily consulted by nephrology team for assistance with inpatient PLEX for RPGN indication      Subjective:    No issues, tolerating PLEX    Labs WBC 19 Hgb 8.9 plts 202 Cr 7.23 K 5.1 Na 139     Objective:      /82   Pulse 68   Temp 97.6 °F (36.4 °C)   Resp 19   Wt 120 kg (264 lb 4.8 oz)   SpO2 93%   BMI 46.82 kg/m²   General Appearance:    Alert, oriented        Eyes:    PERRL   Ears:    Normal external ear canals, both ears   Nose:   Nares normal, septum midline    Throat:   Mucosa moist. Pharynx without injection.    Neck:   Supple       Lungs:     Clear to auscultation bilaterally   Chest Wall:    No tenderness or deformity    Heart:    Regular rate and rhythm       Abdomen:     Soft, non-tender, bowel sounds +, no organomegaly           Extremities:   Extremities no cyanosis or edema       Skin:   no rash or icterus.    Lymph nodes:   Cervical, supraclavicular, and axillary nodes normal   Neurologic:   CNII-XII intact, normal strength, sensation and reflexes     throughout        Recent Results (from the past 48 hour(s))   Fingerstick Glucose (POCT)    Collection Time: 07/19/24 11:28 AM   Result Value Ref Range    POC Glucose 177 (H) 65 - 140 mg/dl   Fibrinogen    Collection Time: 07/19/24 11:41 AM   Result Value Ref Range    Fibrinogen 324 207 - 520 mg/dL   Fingerstick Glucose (POCT)    Collection Time: 07/19/24  3:55 PM   Result Value Ref Range    POC Glucose 178 (H) 65 - 140 mg/dl   Fingerstick Glucose (POCT)    Collection Time: 07/19/24  8:53 PM   Result Value Ref Range    POC Glucose 178 (H) 65 - 140 mg/dl   Magnesium    Collection Time: 07/20/24  4:39 AM   Result Value Ref Range    Magnesium 2.5 1.9 - 2.7 mg/dL   Phosphorus    Collection Time: 07/20/24  4:39 AM   Result Value Ref Range    Phosphorus 9.5 (H) 2.3 - 4.1 mg/dL   Comprehensive metabolic panel    Collection Time: 07/20/24  4:39 AM   Result Value Ref Range    Sodium 135 135 - 147 mmol/L    Potassium 6.1 (H) 3.5 - 5.3 mmol/L    Chloride 101 96 - 108 mmol/L    CO2 20 (L) 21 - 32 mmol/L    ANION GAP 14 (H) 4 - 13 mmol/L     (H) 5 - 25 mg/dL    Creatinine 9.64 (H) 0.60 - 1.30 mg/dL    Glucose 170 (H) 65 - 140 mg/dL    Calcium 9.7 8.4 - 10.2 mg/dL    Corrected Calcium 10.3 (H) 8.3 - 10.1 mg/dL    AST 9 (L) 13 - 39 U/L    ALT 9 7 - 52 U/L    Alkaline Phosphatase 56 34 - 104 U/L    Total Protein 6.1 (L) 6.4 - 8.4 g/dL    Albumin 3.3 (L) 3.5 - 5.0 g/dL    Total Bilirubin 0.37 0.20 - 1.00 mg/dL    eGFR 3  ml/min/1.73sq m   CBC and differential    Collection Time: 07/20/24  6:06 AM   Result Value Ref Range    WBC 17.94 (H) 4.31 - 10.16 Thousand/uL    RBC 3.38 (L) 3.81 - 5.12 Million/uL    Hemoglobin 9.3 (L) 11.5 - 15.4 g/dL    Hematocrit 29.4 (L) 34.8 - 46.1 %    MCV 87 82 - 98 fL    MCH 27.5 26.8 - 34.3 pg    MCHC 31.6 31.4 - 37.4 g/dL    RDW 15.2 (H) 11.6 - 15.1 %    MPV 10.4 8.9 - 12.7 fL    Platelets 228 149 - 390 Thousands/uL   Fibrinogen    Collection Time: 07/20/24  6:06 AM   Result Value Ref Range    Fibrinogen 301 207 - 520 mg/dL   Manual Differential(PHLEBS Do Not Order)    Collection Time: 07/20/24  6:06 AM   Result Value Ref Range    Segmented % 81 (H) 43 - 75 %    Bands % 8 0 - 8 %    Lymphocytes % 5 (L) 14 - 44 %    Monocytes % 4 4 - 12 %    Eosinophils % 0 0 - 6 %    Basophils % 0 0 - 1 %    Metamyelocytes % 2 (H) 0 - 1 %    Absolute Neutrophils 15.97 (H) 1.85 - 7.62 Thousand/uL    Absolute Lymphocytes 0.90 0.60 - 4.47 Thousand/uL    Absolute Monocytes 0.72 0.00 - 1.22 Thousand/uL    Absolute Eosinophils 0.00 0.00 - 0.40 Thousand/uL    Absolute Basophils 0.00 0.00 - 0.10 Thousand/uL    Absolute Metamyelocytes 0.36 (H) 0.00 - 0.10 Thousand/uL    Total Counted      RBC Morphology Present     Platelet Estimate Adequate Adequate    Anisocytosis Present    Fingerstick Glucose (POCT)    Collection Time: 07/20/24  7:06 AM   Result Value Ref Range    POC Glucose 150 (H) 65 - 140 mg/dl   Fingerstick Glucose (POCT)    Collection Time: 07/20/24 11:04 AM   Result Value Ref Range    POC Glucose 193 (H) 65 - 140 mg/dl   Fingerstick Glucose (POCT)    Collection Time: 07/20/24  8:46 PM   Result Value Ref Range    POC Glucose 204 (H) 65 - 140 mg/dl   Fibrinogen    Collection Time: 07/21/24  4:33 AM   Result Value Ref Range    Fibrinogen 161 (L) 207 - 520 mg/dL   Protime-INR    Collection Time: 07/21/24  4:33 AM   Result Value Ref Range    Protime 15.6 (H) 11.6 - 14.5 seconds    INR 1.17 0.84 - 1.19   APTT    Collection  Time: 07/21/24  4:33 AM   Result Value Ref Range    PTT 25 23 - 37 seconds   Renal function panel    Collection Time: 07/21/24  4:33 AM   Result Value Ref Range    Albumin 3.6 3.5 - 5.0 g/dL    Calcium 9.1 8.4 - 10.2 mg/dL    Phosphorus 7.6 (H) 2.3 - 4.1 mg/dL    Glucose 138 65 - 140 mg/dL    BUN 82 (H) 5 - 25 mg/dL    Creatinine 7.23 (H) 0.60 - 1.30 mg/dL    Sodium 139 135 - 147 mmol/L    Potassium 5.1 3.5 - 5.3 mmol/L    Chloride 105 96 - 108 mmol/L    CO2 23 21 - 32 mmol/L    ANION GAP 11 4 - 13 mmol/L    eGFR 5 ml/min/1.73sq m   CBC and differential    Collection Time: 07/21/24  4:33 AM   Result Value Ref Range    WBC 19.41 (H) 4.31 - 10.16 Thousand/uL    RBC 3.17 (L) 3.81 - 5.12 Million/uL    Hemoglobin 8.9 (L) 11.5 - 15.4 g/dL    Hematocrit 27.0 (L) 34.8 - 46.1 %    MCV 85 82 - 98 fL    MCH 28.1 26.8 - 34.3 pg    MCHC 33.0 31.4 - 37.4 g/dL    RDW 15.1 11.6 - 15.1 %    MPV 10.0 8.9 - 12.7 fL    Platelets 202 149 - 390 Thousands/uL   Manual Differential(PHLEBS Do Not Order)    Collection Time: 07/21/24  4:33 AM   Result Value Ref Range    Segmented % 87 (H) 43 - 75 %    Lymphocytes % 2 (L) 14 - 44 %    Monocytes % 9 4 - 12 %    Eosinophils % 0 0 - 6 %    Basophils % 0 0 - 1 %    Metamyelocytes % 2 (H) 0 - 1 %    Absolute Neutrophils 16.89 (H) 1.85 - 7.62 Thousand/uL    Absolute Lymphocytes 0.39 (L) 0.60 - 4.47 Thousand/uL    Absolute Monocytes 1.75 (H) 0.00 - 1.22 Thousand/uL    Absolute Eosinophils 0.00 0.00 - 0.40 Thousand/uL    Absolute Basophils 0.00 0.00 - 0.10 Thousand/uL    Absolute Metamyelocytes 0.39 (H) 0.00 - 0.10 Thousand/uL    Total Counted      Platelet Estimate Adequate Adequate    Large Platelet Present     Anisocytosis Present    Fingerstick Glucose (POCT)    Collection Time: 07/21/24  6:12 AM   Result Value Ref Range    POC Glucose 124 65 - 140 mg/dl   Fibrinogen    Collection Time: 07/21/24  6:39 AM   Result Value Ref Range    Fibrinogen 175 (L) 207 - 520 mg/dL         IR biopsy kidney  random    Result Date: 7/18/2024  Narrative: CT-scan guided needle biopsy of right kidney History: 64-year-old female with acute kidney injury Radiation dose: 3568 mGy Conscious sedation time: 60 minutes Technique: This examination, like all CT scans performed in the [facility], was performed utilizing techniques to minimize radiation dose exposure, including the use of iterative reconstruction and automated exposure control. The patient was brought to the CT scanner and placed prone on the table. After axial images were obtained through the region of interest an area of the skin was then marked, prepped, and draped in usual sterile fashion. Lidocaine was administered to the skin and a small skin incision was made. A 17-gauge cannula was advanced up into the peripheral inferior pole of the right kidney, an 18-gauge core biopsy specimen was obtained. The specimen was reviewed by pathology for adequacy. At the end of the procedure, D-Stat was used for hemostasis through the cannula as it was removed. The patient tolerated the procedure well and suffered no complications.     Impression: Impression: Successful percutaneous nontarget renal biopsy as described. Workstation performed: [id number]     Bronchoscopy    Result Date: 7/17/2024  Narrative: Table formatting from the original result was not included. [facility] [address] [facility] [id number] [phone] DATE OF SERVICE: 7/17/24 PHYSICIAN(S): Attending: Shyla Coyle DO Fellow: No Staff Documented INDICATION: Acute renal failure (ARF) (HCC) POST-OP DIAGNOSIS: See the impression below. PREPROCEDURE: Standard airway preparation completed per respiratory therapy protocol.  Informed consent was obtained. Images reviewed prior to the procedure.  A Time Out was performed. No suspicion or identified risk for TB or other airborne infectious disease; bronchoscopy procedure being performed for diagnostic  purposes. PROCEDURE: Bronchoscopy with BAL x 2 lobes DETAILS OF PROCEDURE: Patient was taken to the procedure room where a time out was performed to confirm correct patient and correct procedure. The patient underwent moderate sedation, which was administered by an anesthesia professional. The patient's blood pressure, heart rate, level of consciousness and oxygen were monitored throughout the procedure. The patient experienced no blood loss. The scope was introduced through the laryngeal mask airway. The procedure was not difficult. The patient tolerated the procedure well. There were no apparent adverse events. ANESTHESIA INFORMATION: ASA: III Anesthesia Type: Anesthesia type not filed in the log. FINDINGS: The left vocal cord, right vocal cord, upper trachea, middle trachea, lower trachea, main wendy, left main stem, ROGER, lingula, LLL, right main stem, RUL, bronchus intermedius, RML and RLL appeared normal. Moderate, thick and white secretions present in the left main stem and right main stem; secretions were easily removed by therapeutic aspiration and the airway was cleared Bronchoalveolar lavage was performed x2 in the right lateral segment (RB4) with 120 mL of saline instilled and a total return of 80 mL. The fluid appeared cloudy without suspected diffuse alveolar hemorrhage. Bronchoalveolar lavage was performed in the superior lingular segment (LB4). The fluid appeared cloudy without suspected diffuse alveolar hemorrhage. Bronchoscope was removed.  Patient was extubated by anesthesia and transferred to PACU in stable condition SPECIMENS: See orders      Impression: Abnormal chest CT Bronchoalveolar lavage performed in right middle lobe and lingula with cloudy return, no evidence alveolar hemorrhage RECOMMENDATION: Await cultures, cytology, cell count     IR temporary dialysis catheter placement    Result Date: 7/15/2024  Narrative: PROCEDURE: Non-tunneled central venous catheter placement PROCEDURE  "SUMMARY: 1.  Venous access with ultrasound guidance 2.  Non-tunneled central venous catheter insertion with fluoroscopic guidance INDICATION: \"ANGELICA, question of RPGN. plan to start HD 7/16, and then renal biopsy 7/17\". COMPLICATIONS: No immediate complications. ESTIMATED BLOOD LOSS (mL): Less than 10 CONTRAST: None RADIATION DOSE: Fluoroscopy time: 0.3 min Reference air kerma: 10 mGy ANESTHESIA/SEDATION: Level of anesthesia/sedation: No sedation Anesthesia/sedation administered by: Not applicable Total intra-service sedation time (minutes): 0 PROCEDURE DETAILS: Informed consent for the procedure including risks, benefits and alternatives was obtained and time-out was performed prior to the procedure. The site was prepared and draped using all elements of maximal sterile barrier technique including sterile gloves, sterile gown, cap, mask, large sterile sheet, sterile ultrasound probe cover, hand hygiene and cutaneous antisepsis with 2% chlorhexidine. Local anesthesia was administered. The vessel was sonographically evaluated and determined to be patent. Real time ultrasound was used to visualize needle entry into the vessel and a permanent image was stored. Vein accessed: Internal jugular vein Access technique: Micropuncture set with 21 gauge needle The access site was dilated and the catheter was placed into the vein over a wire  under fluoroscopic guidance.  The catheter tip location was fluoroscopically verified and a permanent image was stored.. A sterile dressing was applied. Catheter placed: 14 Barbadian nontunneled dialysis catheter Catheter size (Barbadian): 14 Catheter length (cm): 15 Catheter flush: Normal saline Catheter securement technique: Non-absorbable suture FINDINGS: 1.  Ultrasound of the right  neck demonstrated widely patent right internal jugular vein. 2.  Completion radiograph demonstrated tip of the catheter overlying the right atrium, good in position.     Impression: Insertion of right-sided " non-tunneled dual-lumen temporary dialysis catheter, with tip in the expected location of the cavoatrial junction. Plan: The catheter may be used immediately. Workstation performed: [id number]     CT chest wo contrast    Result Date: 7/11/2024  Narrative: CT CHEST WITHOUT IV CONTRAST INDICATION: Pulmonary nodules. COMPARISON: CT chest May 16, 2024. Correlation CT chest March 24, 2023 TECHNIQUE: CT examination of the chest was performed without intravenous contrast. Multiplanar 2D reformatted images were created from the source data. This examination, like all CT scans performed in the [facility], was performed utilizing techniques to minimize radiation dose exposure, including the use of iterative reconstruction and automated exposure control. Radiation dose length product (DLP) for this visit: 545.9 mGy-cm FINDINGS: LUNGS: The extent of the diffuse nodularity is unchanged, though the nodules are overall decreased in size, density and conspicuity. No bronchiectasis. No honeycombing. No hilar retraction. PLEURA: Unremarkable. HEART/GREAT VESSELS: Normal heart size. Unchanged fusiform ectasia of the ascending thoracic aorta measuring up to 40 mm. Recommendation is for follow-up low radiation dose chest CT in one year. MEDIASTINUM AND MENA: No lymphadenopathy. No calcified lymph nodes. CHEST WALL AND LOWER NECK: Unremarkable. VISUALIZED STRUCTURES IN THE UPPER ABDOMEN: Cholelithiasis without acute cholecystitis OSSEOUS STRUCTURES: Chronic fractures of the right 10th rib. No acute fracture or destructive osseous lesion.     Impression: Since May 16, 2024: 1.  Unchanged extent of the diffuse pulmonary nodularity, though decreased size, density and conspicuity of the nodules. Findings are nonspecific, and assessment for environmental exposures is recommended. 2.  Unchanged fusiform ectasia of the ascending thoracic aorta measuring up to 40 mm. Recommendation is for follow-up low radiation dose chest CT  in one year. Workstation performed: [id number]     XR chest 1 view portable    Result Date: 7/11/2024  Narrative: XR CHEST PORTABLE INDICATION: chest pain. COMPARISON: 7/22/2019 FINDINGS: Monitoring leads and clips project over the chest. Clear lungs. No pneumothorax or pleural effusion. Normal cardiomediastinal silhouette. Bones are unremarkable for age. Normal upper abdomen.     Impression: No acute cardiopulmonary disease. Workstation performed: [id number]     US kidney and bladder    Result Date: 7/11/2024  Narrative: RENAL ULTRASOUND INDICATION: ANGELICA. COMPARISON: None TECHNIQUE: Ultrasound of the retroperitoneum was performed with a curvilinear transducer utilizing volumetric sweeps and still imaging techniques. FINDINGS: KIDNEYS: Within normal limits of size. Right kidney: 13.2 x 6.5 x 6.5 cm. Volume 292.1 mL Left kidney: 12.5 x 4.7 x 4.7 cm. Volume 145.1 mL Right kidney Unremarkable echogenicity and contour. No mass is identified. No hydronephrosis. No shadowing calculi. No perinephric fluid collections. Left kidney Unremarkable echogenicity and contour. 1.8 x 1.7 x 1.6 cm simple renal cyst in the interpolar region. No hydronephrosis. 4 mm nonobstructing intrarenal calculus in the upper pole. No perinephric fluid collections. URETERS: Nonvisualized. BLADDER: Mildly distended. No focal thickening or mass lesions.     Impression: No hydronephrosis. 4 mm nonobstructing left intrarenal calculus. Workstation performed: [id number]     CT abdomen pelvis wo contrast    Result Date: 7/10/2024  Narrative: CT ABDOMEN AND PELVIS WITHOUT IV CONTRAST INDICATION: angelica; hematuria. COMPARISON: CT dated 5/16/2024 TECHNIQUE: CT examination of the abdomen and pelvis was performed without intravenous contrast. Multiplanar 2D reformatted images were created from the source data. This examination, like all CT scans performed in the [facility], was performed utilizing techniques to minimize radiation dose exposure,  including the use of iterative reconstruction and automated exposure control. Radiation dose length product (DLP) for this visit: 1361.9 mGy-cm Enteric Contrast: Not administered. FINDINGS: ABDOMEN LOWER CHEST: Redemonstration of multiple diffuse scattered nodules, less pronounced than on 5/16/2024 suggestive of mycobacterial infection. No consolidative airspace opacity. No pleural effusion. LIVER/BILIARY TREE: Unremarkable. GALLBLADDER: Cholelithiasis without findings of acute cholecystitis. SPLEEN: Unremarkable. PANCREAS: Atrophy of the pancreas ADRENAL GLANDS: Unremarkable. KIDNEYS/URETERS: Kidneys are normal for size. Linear cortical calcifications at the left renal upper pole 4 mm nonobstructing calculus at the left renal upper pole. There are at least 3 lesions in the kidneys as described below: *12 mm hyperdense lesion at the right renal lower pole (axial image 107). *Exophytic 16 mm lesion at the left renal midpole (axial image 76). *13 mm cyst at the left renal midpole (axial image 84). No hydronephrosis, however there is apparent 3 mm stone near or questionably within the distal right ureter (axial image 142). Given the lack of hydronephrosis this could either reflect adjacent calcification or nonobstructing stone. Correlate clinically  and with urinalysis. STOMACH AND BOWEL: Small hiatal hernia. No dilated loops of small bowel to suggest mechanical obstruction. There is a 19 mm duodenal diverticulum. There is questionable asymmetric left fullness at the rectum which could be due to nondistention and not well evaluated without enteric and IV contrast. Correlate with recent colonoscopy. If there is no history of colonoscopy, one may be considered. No evidence for colitis. APPENDIX: The tip of the appendix measures 12 mm and contains hyperdense material which could be secondary to stones or trapped enteric contrast material. No periappendiceal inflammatory changes are noted at this time. ABDOMINOPELVIC  CAVITY: No free air. No large volume ascites. No lymphadenopathy. VESSELS: Atherosclerosis without abdominal aortic aneurysm. PELVIS REPRODUCTIVE ORGANS: Presumed left calcified uterine fibroids are noted. URINARY BLADDER: Unremarkable. ABDOMINAL WALL/INGUINAL REGIONS: Unremarkable. BONES: Rupert and screw fixation at L4 and L5 with grade 1 anterolisthesis of L4 on L5. Prosthetic disc is noted at L4/L5. Moderate disc space loss, vacuum phenomenon, and endplate sclerosis at L2/L3.     Impression: No hydronephrosis, however there is apparent 3 mm stone near or questionably within the distal right ureter (axial image 142). Given the lack of hydronephrosis this could either reflect adjacent calcification or nonobstructing stone. Correlate clinically  and with urinalysis. Lesions in both kidneys as described above. Recommend dedicated renal ultrasound on nonemergent basis. Redemonstration of multiple diffuse scattered nodules, less pronounced than on 5/16/2024 suggestive of mycobacterial infection. There is questionable asymmetric left fullness at the rectum which could be due to nondistention and not well evaluated without enteric and IV contrast. Correlate with recent colonoscopy. If there is no history of colonoscopy, one may be considered. The tip of the appendix measures 12 mm and contains hyperdense material which could be secondary to stones or trapped enteric contrast material. No periappendiceal inflammatory changes are noted at this time. Other findings as detailed above. Workstation performed: [id number]             I spent 20 minutes in chart review, face to face counseling, coordination of care and documentation.

## 2024-07-21 NOTE — PROGRESS NOTES
NEPHROLOGY HOSPITAL PROGRESS NOTE   Ngozi Beard 64 y.o. female MRN: [id number]  Unit/Bed#: S -01 Encounter: [id number]  Reason for Consult: ANGELICA    ASSESSMENT and PLAN:  64-year-old female with history of asthma, hypertension, bipolar disorder who presented at the request of her pulmonologist due to acute kidney injury.  Nephrology consulted for acute kidney injury.    # ANGELICA  Baseline creatinine less than 1, was 0.8 on May 2024  Creatinine on admission 4.6  UA 3+ blood, 3+ protein, innumerable RBC, 2-4 WBC, moderate bacteria  Renal imaging with kidney ultrasound with right kidney 13.2 cm, left kidney 12.5 cm    Serological workup  - C3, C4-normal  - Hep C Ab neg  - hep panel non reactive  - HIV -nonreactive  - ASO- negative  - AURA-negative  - Anti-double-stranded DNA-negative  - ANCA-negative  - Anti-GBM-positive anti GBM > 8  - QuantiFERON gold negative  - DSU8A-ljuxtspb.  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 9 for light with 100% crescents. 6/8 gloms IF crescents with 100% activity 15/17 with diffuse disease.     Plan  Dialysis access: Nontunneled hemodialysis catheter placed 07/15 by IR  Dialysis initiation date 07/16  Status post 3 g Solu-Medrol (last dose 07/19)  Prednisone 60 mg daily since 07/20  PLEX started 07/20, session #2 today (appreciate help from hematology)   Oral cyclophosphamide 0.8 mg/kg daily (administered after PLEX), benefits and risks discussed with the patient, she is agreeable to proceed  Follow fibrinogen/coags daily, fibrinogen level today 175, will discuss need for FFP if fibrinogen  goes below 100  Prophylaxis: Atovaquone 1500 mg daily, pantoprazole 40 mg daily, Os-Bennett D    # Hyperkalemia  Resolved after HD on 07/20  Serum potassium level today 5.1  Continue Lokelma 10 g daily  Low potassium diet    # Metabolic acidosis  Resolved after HD on 07/20  Serum bicarbonate level today 23    # Hyperphosphatemia  Phosphorus level 7.6 improved from 9.5  Continue sevelamer with meals    # Pulmonary nodules  Outpatient was following with pulmonary team closely  CT scan suggestive of mycobacterial infection  QuantiFERON gold negative  Further workup in progress by pulmonary team  Unclear if this is related to pulmonary renal syndrome  Follow-up final results from bronchoscopy    # Hypertension  Holding triamterene-HCTZ  Currently on metoprolol and torsemide, blood pressure acceptable    Discussed with internal medicine team.  After discussion, we agreed that due to confirmation of anti-GBM disease on kidney biopsy, plan would be to initiate cyclophosphamide therapy in addition to plasmapheresis.    SUBJECTIVE / 24H INTERVAL HISTORY:  Denies dyspnea.  Denies leg swelling.  Denies hemoptysis.  Denies hematuria.    OBJECTIVE:  Current Weight: Weight - Scale: 120 kg (264 lb 4.8 oz)  Vitals:    07/20/24 2100 07/20/24 2247 07/21/24 0437 07/21/24 0551   BP: (!) 172/82 138/86     BP Location: Right arm Right arm     Pulse:       Resp:       Temp:       TempSrc:       SpO2:       Weight:   120 kg (264 lb 4.8 oz) 120 kg (264 lb 4.8 oz)       Intake/Output Summary (Last 24 hours) at 7/21/2024 0615  Last data filed at 7/20/2024 1800  Gross per 24 hour   Intake 1380 ml   Output 1000 ml   Net 380 ml     Review of Systems   Constitutional:  Negative for chills and fever.   HENT:  Negative for ear pain and sore throat.    Eyes:  Negative for pain and visual disturbance.   Respiratory:  Negative for cough and shortness of breath.    Cardiovascular:  Negative for chest pain and palpitations.   Gastrointestinal:  Negative for  abdominal pain and vomiting.   Genitourinary:  Negative for dysuria and hematuria.   Musculoskeletal:  Negative for arthralgias and back pain.   Skin:  Negative for color change and rash.   Neurological:  Negative for seizures and syncope.   All other systems reviewed and are negative.    Physical Exam  Vitals and nursing note reviewed.   Constitutional:       General: She is not in acute distress.     Appearance: She is well-developed.   HENT:      Head: Normocephalic and atraumatic.   Eyes:      Conjunctiva/sclera: Conjunctivae normal.   Cardiovascular:      Rate and Rhythm: Normal rate and regular rhythm.      Pulses: Normal pulses.      Heart sounds: Normal heart sounds. No murmur heard.     Comments: Right IJ nontunneled hemodialysis catheter  Pulmonary:      Effort: Pulmonary effort is normal. No respiratory distress.      Breath sounds: Normal breath sounds.   Abdominal:      Palpations: Abdomen is soft.      Tenderness: There is no abdominal tenderness.   Musculoskeletal:         General: No swelling.      Cervical back: Neck supple.      Right lower leg: No edema.      Left lower leg: No edema.   Skin:     General: Skin is warm and dry.      Capillary Refill: Capillary refill takes less than 2 seconds.   Neurological:      Mental Status: She is alert.   Psychiatric:         Mood and Affect: Mood normal.         Medications:    Current Facility-Administered Medications:     acetaminophen (TYLENOL) tablet 650 mg, 650 mg, Oral, Q6H PRN, Tram Palacios PA-C, 650 mg at 07/19/24 1302    albumin human (FLEXBUMIN) 5 % injection 200 g, 200 g, Intravenous, Daily, Madonna Camacho, , 200 g at 07/20/24 0900    albuterol (PROVENTIL HFA,VENTOLIN HFA) inhaler 2 puff, 2 puff, Inhalation, Q4H PRN, Tram Palacios PA-C, 2 puff at 07/20/24 1953    atorvastatin (LIPITOR) tablet 20 mg, 20 mg, Oral, Daily, Tram Palacios PA-C, 20 mg at 07/20/24 0933    atovaquone (MEPRON) oral suspension 1,500 mg, 1,500 mg, Oral, Daily With Breakfast,  Vane Estrada MD    benzonatate (TESSALON PERLES) capsule 200 mg, 200 mg, Oral, TID PRN, Tram Palacios PA-C    calcium carbonate-vitamin D 500 mg-5 mcg tablet 1 tablet, 1 tablet, Oral, Daily With Breakfast, Vane Estrada MD, 1 tablet at 07/20/24 0940    calcium gluconate 1 g in sodium chloride 0.9% 50 mL (premix), 1 g, Intravenous, Daily, Madonna Camacho DO, Last Rate: 100 mL/hr at 07/20/24 0900, 1 g at 07/20/24 0900    dextromethorphan-guaiFENesin (ROBITUSSIN DM) oral syrup 10 mL, 10 mL, Oral, Q4H PRN, Tram Palacios PA-C    famotidine (PEPCID) tablet 10 mg, 10 mg, Oral, Daily PRN, Daya May DO, 10 mg at 07/19/24 1305    fluticasone-vilanterol 200-25 mcg/actuation 1 puff, 1 puff, Inhalation, Daily, Tram Palacios PA-C, 1 puff at 07/20/24 0906    heparin (porcine) subcutaneous injection 5,000 Units, 5,000 Units, Subcutaneous, Q8H JACK, Xavier Medina MD, 5,000 Units at 07/20/24 2132    hydrALAZINE (APRESOLINE) injection 10 mg, 10 mg, Intravenous, Q6H PRN, Yasmine Meadows MD, 10 mg at 07/20/24 2131    insulin lispro (HumALOG/ADMELOG) 100 units/mL subcutaneous injection 2-12 Units, 2-12 Units, Subcutaneous, TID AC, 2 Units at 07/20/24 1621 **AND** Fingerstick Glucose (POCT), , , TID AC, Yasmine Meadows MD    iron polysaccharides (FERREX) capsule 150 mg, 150 mg, Oral, Daily, Ata Burns MD, 150 mg at 07/20/24 0933    lamoTRIgine (LaMICtal) tablet 100 mg, 100 mg, Oral, QAM, Tram Palacios PA-C, 100 mg at 07/20/24 0933    loratadine (CLARITIN) tablet 10 mg, 10 mg, Oral, Daily, Tram Palacios PA-C, 10 mg at 07/20/24 0945    melatonin tablet 3 mg, 3 mg, Oral, Daily PRN, Yasmine Meadows MD, 3 mg at 07/19/24 2158    montelukast (SINGULAIR) tablet 10 mg, 10 mg, Oral, Daily, Tram Palacios PA-C, 10 mg at 07/20/24 0933    ondansetron (ZOFRAN) injection 4 mg, 4 mg, Intravenous, Q6H PRN, Yasmine Meadows MD, 4 mg at 07/19/24 1314    oxybutynin (DITROPAN-XL) 24 hr tablet 5 mg, 5 mg, Oral, Daily, Tram Palacios PA-C, 5 mg at 07/20/24  0933    pantoprazole (PROTONIX) EC tablet 40 mg, 40 mg, Oral, Daily Before Breakfast, Ronny Webster MD, 40 mg at 07/20/24 0937    polyethylene glycol (MIRALAX) packet 17 g, 17 g, Oral, Daily, Xavier Medina MD, 17 g at 07/20/24 0933    predniSONE tablet 60 mg, 60 mg, Oral, Daily, Vane Estrada MD, 60 mg at 07/20/24 0933    senna (SENOKOT) tablet 8.6 mg, 1 tablet, Oral, HS, Xavier Medina MD, 8.6 mg at 07/20/24 2131    sevelamer (RENAGEL) tablet 1,600 mg, 1,600 mg, Oral, TID With Meals, Vane Estrada MD, 1,600 mg at 07/20/24 1621    Sodium Zirconium Cyclosilicate (Lokelma) 10 g, 10 g, Oral, Daily, Tirso Montez MD, 10 g at 07/20/24 0948    torsemide (DEMADEX) tablet 40 mg, 40 mg, Oral, Daily, Vane Estrada MD, 40 mg at 07/19/24 1223    traZODone (DESYREL) tablet 200 mg, 200 mg, Oral, HS, Tram Palacios PA-C, 200 mg at 07/20/24 2131    venlafaxine (EFFEXOR-XR) 24 hr capsule 150 mg, 150 mg, Oral, Daily, Tram Palacios PA-C, 150 mg at 07/20/24 0932    Laboratory Results:  Results from last 7 days   Lab Units 07/21/24  0433 07/20/24  0606 07/20/24  0439 07/19/24  0400 07/18/24  0440 07/17/24  2132 07/17/24  0447 07/16/24  0643 07/15/24  0431   WBC Thousand/uL 19.41* 17.94*  --  15.22* 12.75* 10.69* 11.53* 11.63* 14.24*   HEMOGLOBIN g/dL 8.9* 9.3*  --  9.9* 9.5* 9.4* 9.6* 9.4* 9.1*   HEMATOCRIT % 27.0* 29.4*  --  30.3* 29.7* 28.8* 30.1* 30.0* 28.8*   PLATELETS Thousands/uL 202 228  --  256 264 250 283 289 296   POTASSIUM mmol/L  --   --  6.1* 5.1 5.4*  --  5.0 5.3 5.0   CHLORIDE mmol/L  --   --  101 101 100  --  102 102 103   CO2 mmol/L  --   --  20* 21 22  --  21 15* 18*   BUN mg/dL  --   --  122* 94* 92*  --  126* 154* 135*   CREATININE mg/dL  --   --  9.64* 8.65* 7.88*  --  10.37* 11.51* 10.82*   CALCIUM mg/dL  --   --  9.7 9.6 9.4  --  9.3 9.8 9.3   MAGNESIUM mg/dL  --   --  2.5 2.3 2.3  --  2.5 2.7  --    PHOSPHORUS mg/dL  --   --  9.5* 8.2* 8.6*  --  9.4* 11.0*  --      Portions of the record  "may have been created with voice recognition software. Occasional wrong word or \"sound a like\" substitutions may have occurred due to the inherent limitations of voice recognition software. Read the chart carefully and recognize, using context, where substitutions have occurred. If you have any questions, please contact the dictating provider.    "

## 2024-07-21 NOTE — ASSESSMENT & PLAN NOTE
CT chest: Re demonstration of multiple diffuse scattered nodules  Etiology unclear: HP vs sarcoid vs  vs MAC vs atypical  CRP- 143.7   Sed-61, negative: QuantiFERON  Procal: 0.27--0.30--0.38--0.40  7/17 Renal biopsy and bronchoscopy performed. Results pending.     Plan:  Per pulmonology recs, repeat CT chest in 6 weeks. If the nodularity fails to improve or progresses, may need to reconsider tissue biopsy.

## 2024-07-21 NOTE — ASSESSMENT & PLAN NOTE
Patient admitted with acute renal failure with creatinine of 5.74 (baseline .8)  Patient had BUN 73 and GFR of 6  CT A/P: No hydronephrosis, however there is apparent 3 mm stone near or questionably within the distal right ureter (axial image 142). Given the lack of hydronephrosis this could either reflect adjacent calcification or nonobstructing stone.   Patient reports that she is still producing urine  US kidney and bladder: No hydronephrosis. 4 mm nonobstructing left intrarenal calculus  Urinalysis: 3+ blood.  3+ protein.  Innumerable red blood cells.  2-4 WBCs.  Moderate bacteria   AURA, C3, and C4 normal  Ig Kappa Free Light Chain:152.5   Ig Lambda Free Light Chain 87.1   Kappa/Lambda FluidC Ratio 1.75  Hepatitis panel nonreactive  HIV nonreactive  QuantiFERON gold negative  protein electrophoresis see labs  hypersensitivity pneumonitis profile negative  GBM antibodies: elevated >8  phospholipase A2 receptor Ab: Negative  ANCA: negative   7/17 Renal biopsy and bronchoscopy performed.  7/19 CM confirmed HD chair time for patient. Will be TTS 10:30 AM with start date Tuesday 7/23.   Bronchial lavage: Right- Negative for malignancy. Abundant pulmonary macrophages. Few benign bronchial cells. Left- Negative for malignancy, pulmonary macrophage, mixed inflammatory cells, Few benign bronchial cells  7/22 R permacath placed by IR. Renal bx: diffuse crescentic glomerulonephritis, Anti-GMB type      Plan  Nephrology onboard - dialysis initiated  Hematology onboard for PLEX  Continue Prednisone 60 mg daily then begin taper per nephrology  Cyclophosphamide 100 mg daily   Increase torsemide to 100 mg daily  Bactrim MWF  Monitor blood glucose levels  Hold home dyazide

## 2024-07-21 NOTE — ASSESSMENT & PLAN NOTE
WBC   Date Value Ref Range Status   07/24/2024 24.77 (H) 4.31 - 10.16 Thousand/uL Final     Patient admitted with sepsis likely secondary to pneumonitis as evidenced by tachycardia, tachypnea and leukocytosis  CT: redemonstration of multiple diffuse scattered nodules, less pronounced than on 5/16/2024 suggestive of mycobacterial infection  QuantiFERON gold negative   Lactic acid normal, Procalcitonin trending up: 0.27 > 0.30 > .38 > .40  Sputum culture +2 gram negative rods, +1 gram positive cocci  BC: 1/2 staph epidermis detected-contaminated  Leukocytosis likely due to steroid use    Plan:  Completed Ceftriaxone x 5 days

## 2024-07-22 ENCOUNTER — APPOINTMENT (INPATIENT)
Dept: RADIOLOGY | Facility: HOSPITAL | Age: 64
DRG: 673 | End: 2024-07-22
Attending: RADIOLOGY
Payer: MEDICARE

## 2024-07-22 ENCOUNTER — APPOINTMENT (INPATIENT)
Dept: DIALYSIS | Facility: HOSPITAL | Age: 64
DRG: 673 | End: 2024-07-22
Payer: MEDICARE

## 2024-07-22 LAB
ANION GAP SERPL CALCULATED.3IONS-SCNC: 13 MMOL/L (ref 4–13)
APTT PPP: 26 SECONDS (ref 23–37)
BUN SERPL-MCNC: 101 MG/DL (ref 5–25)
CALCIUM SERPL-MCNC: 9.3 MG/DL (ref 8.4–10.2)
CHLORIDE SERPL-SCNC: 107 MMOL/L (ref 96–108)
CO2 SERPL-SCNC: 20 MMOL/L (ref 21–32)
CREAT SERPL-MCNC: 8.53 MG/DL (ref 0.6–1.3)
ERYTHROCYTE [DISTWIDTH] IN BLOOD BY AUTOMATED COUNT: 15.6 % (ref 11.6–15.1)
FIBRINOGEN PPP-MCNC: 146 MG/DL (ref 207–520)
FUNGUS SPEC CULT: NORMAL
FUNGUS SPEC CULT: NORMAL
GFR SERPL CREATININE-BSD FRML MDRD: 4 ML/MIN/1.73SQ M
GLUCOSE SERPL-MCNC: 132 MG/DL (ref 65–140)
GLUCOSE SERPL-MCNC: 139 MG/DL (ref 65–140)
GLUCOSE SERPL-MCNC: 151 MG/DL (ref 65–140)
GLUCOSE SERPL-MCNC: 158 MG/DL (ref 65–140)
HCT VFR BLD AUTO: 28.4 % (ref 34.8–46.1)
HGB BLD-MCNC: 8.9 G/DL (ref 11.5–15.4)
INR PPP: 1.18 (ref 0.84–1.19)
MCH RBC QN AUTO: 27.6 PG (ref 26.8–34.3)
MCHC RBC AUTO-ENTMCNC: 31.3 G/DL (ref 31.4–37.4)
MCV RBC AUTO: 88 FL (ref 82–98)
PHOSPHATE SERPL-MCNC: 8.4 MG/DL (ref 2.3–4.1)
PLATELET # BLD AUTO: 179 THOUSANDS/UL (ref 149–390)
PMV BLD AUTO: 10.5 FL (ref 8.9–12.7)
PNEUMOCYSTIS PCR: NEGATIVE
POTASSIUM SERPL-SCNC: 5.5 MMOL/L (ref 3.5–5.3)
PROTHROMBIN TIME: 15.7 SECONDS (ref 11.6–14.5)
RBC # BLD AUTO: 3.22 MILLION/UL (ref 3.81–5.12)
SCAN RESULT: NORMAL
SODIUM SERPL-SCNC: 140 MMOL/L (ref 135–147)
WBC # BLD AUTO: 18.05 THOUSAND/UL (ref 4.31–10.16)

## 2024-07-22 PROCEDURE — 99232 SBSQ HOSP IP/OBS MODERATE 35: CPT | Performed by: STUDENT IN AN ORGANIZED HEALTH CARE EDUCATION/TRAINING PROGRAM

## 2024-07-22 PROCEDURE — 02PYX3Z REMOVAL OF INFUSION DEVICE FROM GREAT VESSEL, EXTERNAL APPROACH: ICD-10-PCS | Performed by: RADIOLOGY

## 2024-07-22 PROCEDURE — 83520 IMMUNOASSAY QUANT NOS NONAB: CPT | Performed by: INTERNAL MEDICINE

## 2024-07-22 PROCEDURE — 77001 FLUOROGUIDE FOR VEIN DEVICE: CPT

## 2024-07-22 PROCEDURE — 85730 THROMBOPLASTIN TIME PARTIAL: CPT | Performed by: INTERNAL MEDICINE

## 2024-07-22 PROCEDURE — 80048 BASIC METABOLIC PNL TOTAL CA: CPT

## 2024-07-22 PROCEDURE — C1769 GUIDE WIRE: HCPCS

## 2024-07-22 PROCEDURE — 85027 COMPLETE CBC AUTOMATED: CPT

## 2024-07-22 PROCEDURE — 0JH63XZ INSERTION OF TUNNELED VASCULAR ACCESS DEVICE INTO CHEST SUBCUTANEOUS TISSUE AND FASCIA, PERCUTANEOUS APPROACH: ICD-10-PCS | Performed by: RADIOLOGY

## 2024-07-22 PROCEDURE — 94760 N-INVAS EAR/PLS OXIMETRY 1: CPT

## 2024-07-22 PROCEDURE — 83520 IMMUNOASSAY QUANT NOS NONAB: CPT | Performed by: STUDENT IN AN ORGANIZED HEALTH CARE EDUCATION/TRAINING PROGRAM

## 2024-07-22 PROCEDURE — 82948 REAGENT STRIP/BLOOD GLUCOSE: CPT

## 2024-07-22 PROCEDURE — 77001 FLUOROGUIDE FOR VEIN DEVICE: CPT | Performed by: RADIOLOGY

## 2024-07-22 PROCEDURE — C1750 CATH, HEMODIALYSIS,LONG-TERM: HCPCS

## 2024-07-22 PROCEDURE — 99232 SBSQ HOSP IP/OBS MODERATE 35: CPT | Performed by: INTERNAL MEDICINE

## 2024-07-22 PROCEDURE — 94640 AIRWAY INHALATION TREATMENT: CPT

## 2024-07-22 PROCEDURE — 36558 INSERT TUNNELED CV CATH: CPT | Performed by: RADIOLOGY

## 2024-07-22 PROCEDURE — 02H633Z INSERTION OF INFUSION DEVICE INTO RIGHT ATRIUM, PERCUTANEOUS APPROACH: ICD-10-PCS | Performed by: RADIOLOGY

## 2024-07-22 PROCEDURE — 85610 PROTHROMBIN TIME: CPT | Performed by: INTERNAL MEDICINE

## 2024-07-22 PROCEDURE — 36558 INSERT TUNNELED CV CATH: CPT

## 2024-07-22 PROCEDURE — 84100 ASSAY OF PHOSPHORUS: CPT | Performed by: STUDENT IN AN ORGANIZED HEALTH CARE EDUCATION/TRAINING PROGRAM

## 2024-07-22 PROCEDURE — 85384 FIBRINOGEN ACTIVITY: CPT | Performed by: INTERNAL MEDICINE

## 2024-07-22 RX ORDER — TORSEMIDE 20 MG/1
80 TABLET ORAL DAILY
Status: DISCONTINUED | OUTPATIENT
Start: 2024-07-23 | End: 2024-07-24

## 2024-07-22 RX ORDER — MIDAZOLAM HYDROCHLORIDE 2 MG/2ML
INJECTION, SOLUTION INTRAMUSCULAR; INTRAVENOUS AS NEEDED
Status: COMPLETED | OUTPATIENT
Start: 2024-07-22 | End: 2024-07-22

## 2024-07-22 RX ORDER — FENTANYL CITRATE 50 UG/ML
INJECTION, SOLUTION INTRAMUSCULAR; INTRAVENOUS AS NEEDED
Status: COMPLETED | OUTPATIENT
Start: 2024-07-22 | End: 2024-07-22

## 2024-07-22 RX ADMIN — MIDAZOLAM 1 MG: 1 INJECTION INTRAMUSCULAR; INTRAVENOUS at 09:39

## 2024-07-22 RX ADMIN — TRAZODONE HYDROCHLORIDE 200 MG: 100 TABLET ORAL at 21:20

## 2024-07-22 RX ADMIN — HEPARIN SODIUM 5000 UNITS: 5000 INJECTION INTRAVENOUS; SUBCUTANEOUS at 05:57

## 2024-07-22 RX ADMIN — LAMOTRIGINE 100 MG: 100 TABLET ORAL at 08:07

## 2024-07-22 RX ADMIN — SENNOSIDES 8.6 MG: 8.6 TABLET, FILM COATED ORAL at 22:10

## 2024-07-22 RX ADMIN — POLYSACCHARIDE-IRON COMPLEX 150 MG: 150 CAPSULE ORAL at 08:07

## 2024-07-22 RX ADMIN — MONTELUKAST 10 MG: 10 TABLET, FILM COATED ORAL at 08:08

## 2024-07-22 RX ADMIN — POLYETHYLENE GLYCOL 3350 17 G: 17 POWDER, FOR SOLUTION ORAL at 08:08

## 2024-07-22 RX ADMIN — ATORVASTATIN CALCIUM 20 MG: 20 TABLET, FILM COATED ORAL at 08:07

## 2024-07-22 RX ADMIN — SEVELAMER HYDROCHLORIDE 1600 MG: 800 TABLET ORAL at 12:34

## 2024-07-22 RX ADMIN — SEVELAMER HYDROCHLORIDE 1600 MG: 800 TABLET ORAL at 16:09

## 2024-07-22 RX ADMIN — VANCOMYCIN HYDROCHLORIDE 1500 MG: 5 INJECTION, POWDER, LYOPHILIZED, FOR SOLUTION INTRAVENOUS at 09:37

## 2024-07-22 RX ADMIN — LORATADINE 10 MG: 10 TABLET ORAL at 08:08

## 2024-07-22 RX ADMIN — IPRATROPIUM BROMIDE AND ALBUTEROL SULFATE 3 ML: 2.5; .5 SOLUTION RESPIRATORY (INHALATION) at 02:15

## 2024-07-22 RX ADMIN — ALBUTEROL SULFATE 2 PUFF: 108 AEROSOL, METERED RESPIRATORY (INHALATION) at 10:15

## 2024-07-22 RX ADMIN — FENTANYL CITRATE 50 MCG: 50 INJECTION INTRAMUSCULAR; INTRAVENOUS at 09:39

## 2024-07-22 RX ADMIN — IPRATROPIUM BROMIDE AND ALBUTEROL SULFATE 3 ML: 2.5; .5 SOLUTION RESPIRATORY (INHALATION) at 21:04

## 2024-07-22 RX ADMIN — FENTANYL CITRATE 50 MCG: 50 INJECTION INTRAMUSCULAR; INTRAVENOUS at 09:27

## 2024-07-22 RX ADMIN — IPRATROPIUM BROMIDE AND ALBUTEROL SULFATE 3 ML: 2.5; .5 SOLUTION RESPIRATORY (INHALATION) at 07:03

## 2024-07-22 RX ADMIN — PREDNISONE 60 MG: 20 TABLET ORAL at 08:08

## 2024-07-22 RX ADMIN — PANTOPRAZOLE SODIUM 40 MG: 40 TABLET, DELAYED RELEASE ORAL at 05:57

## 2024-07-22 RX ADMIN — CYCLOPHOSPHAMIDE 100 MG: 50 CAPSULE ORAL at 21:21

## 2024-07-22 RX ADMIN — INSULIN LISPRO 2 UNITS: 100 INJECTION, SOLUTION INTRAVENOUS; SUBCUTANEOUS at 16:09

## 2024-07-22 RX ADMIN — FLUTICASONE FUROATE AND VILANTEROL TRIFENATATE 1 PUFF: 200; 25 POWDER RESPIRATORY (INHALATION) at 06:02

## 2024-07-22 RX ADMIN — Medication 10 ML: at 09:38

## 2024-07-22 RX ADMIN — SODIUM ZIRCONIUM CYCLOSILICATE 10 G: 10 POWDER, FOR SUSPENSION ORAL at 08:08

## 2024-07-22 RX ADMIN — VENLAFAXINE HYDROCHLORIDE 150 MG: 150 CAPSULE, EXTENDED RELEASE ORAL at 08:09

## 2024-07-22 RX ADMIN — MIDAZOLAM 1 MG: 1 INJECTION INTRAMUSCULAR; INTRAVENOUS at 09:27

## 2024-07-22 RX ADMIN — HEPARIN SODIUM 5000 UNITS: 5000 INJECTION INTRAVENOUS; SUBCUTANEOUS at 21:21

## 2024-07-22 RX ADMIN — IPRATROPIUM BROMIDE AND ALBUTEROL SULFATE 3 ML: 2.5; .5 SOLUTION RESPIRATORY (INHALATION) at 13:20

## 2024-07-22 NOTE — PROGRESS NOTES
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 Given the lack of hydronephrosis this could either reflect adjacent calcification or nonobstructing stone.   Patient reports that she is still producing urine  US kidney and bladder: No hydronephrosis. 4 mm nonobstructing left intrarenal calculus  Urinalysis: 3+ blood.  3+ protein.  Innumerable red blood cells.  2-4 WBCs.  Moderate bacteria   AURA, C3, and C4 normal  Ig Kappa Free Light Chain:152.5   Ig Lambda Free Light Chain 87.1   Kappa/Lambda FluidC Ratio 1.75  Hepatitis panel nonreactive  HIV nonreactive  QuantiFERON gold negative  protein electrophoresis see labs  hypersensitivity pneumonitis profile negative  GBM antibodies: elevated >8  phospholipase A2 receptor Ab: Negative  ANCA: negative  7/17 Renal biopsy and bronchoscopy performed. Results pending.   7/19 CM confirmed HD chair time for patient. Will be TTS 10:30 AM with start date Tuesday 7/23.     Plan  Nephrology onboard - dialysis initiated  Pulmonology signed off  Hematology onboard for plan for PLEX - session 3/5  Continue Prednisone 60 mg daily   Oral cyclophosphamide 100 mg daily   Atovaquone 1500 mg daily   Monitor blood glucose levels  NPO  IR to place a permanent hemodialysis catheter today 7/22  Follow-up renal biopsy and bronchoscopy results  Hold home dyazide    Persistent cough  Assessment & Plan  Patient endorses worsening shortness of breath and difficulty breathing  Patient noted to have  multiple diffuse scattered pulmonary nodules on CT  Quantiferon gold negative 7/9/24    Plan  Completed Ceftriaxone x 5 days.  Tessalon Perles as needed    Abnormal CT of the chest  Assessment & Plan  CT chest: Re demonstration of multiple diffuse scattered nodules  Etiology unclear: HP vs sarcoid vs  vs MAC vs atypical  CRP- 143.7   Sed-61, negative: QuantiFERON  Procal: 0.27--0.30--0.38--0.40  7/17 Renal biopsy and bronchoscopy performed. Results pending.     Plan:  Follow-up renal biopsy and bronchoscopy results  Per pulmonology recs, repeat CT chest in 6 weeks. If the nodularity fails to improve or progresses, may need to reconsider tissue biopsy.    Moderate persistent asthma w/out acute exacerbation  Assessment & Plan  Patient has PFTs ordered but they have not been completed.  Home medication regime Advair -21 mcg 2 puff twice daily, Proventil 2 puffs every 6 hours as needed  On 2 L via nasal cannula.  O2 sat 95 to 96%    Plan:  Supplemental oxygen as needed  DuoNebs every 6 hours, per pulmonology  Albuterol inhaler PRN  Continue benzonatate  Continue fluticasone-vilanterol  Continue loratadine 10 mg  Robitussin PRN    Abnormality of ascending aorta  Assessment & Plan  CT chest: Unchanged fusiform ectasia of the ascending thoracic aorta measuring up to 40 mm     Plan  Recommendation for follow-up low radiation dose chest CT in one year    Bipolar 1 disorder (HCC)  Assessment & Plan  Plan  Continue on Effexor, trazodone and Lamictal    Primary hypertension  Assessment & Plan  Hold Lopressor due to bradycardia  Hold Dyazide  Hydralazine PRN  Monitor vitals as per protocol    Dyslipidemia  Assessment & Plan  Continue on atorvastatin 20 Mg    Sepsis (HCC)-resolved as of 7/14/2024  Assessment & Plan    WBC   Date Value Ref Range Status   07/22/2024 18.05 (H) 4.31 - 10.16 Thousand/uL Final     Patient admitted with sepsis likely secondary to pneumonitis as evidenced by tachycardia, tachypnea and  leukocytosis  CT: redemonstration of multiple diffuse scattered nodules, less pronounced than on 2024 suggestive of mycobacterial infection  QuantiFERON gold negative   Lactic acid normal, Procalcitonin trending up: 0.27 > 0.30 > .38 > .40  Sputum culture +2 gram negative rods, +1 gram positive cocci  BC:  staph epidermis detected-contaminated  Leukocytosis likely due to steroid use    Plan:  Completed Ceftriaxone x 5 days         VTE Pharmacologic Prophylaxis: VTE Score: 4 Moderate Risk (Score 3-4) - Pharmacological DVT Prophylaxis Ordered: heparin.    Mobility:   Basic Mobility Inpatient Raw Score: 18  JH-HLM Goal: 6: Walk 10 steps or more  JH-HLM Achieved: 1: Laying in bed  JH-HLM Goal NOT achieved. Continue with multidisciplinary rounding and encourage appropriate mobility to improve upon JH-HLM goals.    Patient Centered Rounds: I performed bedside rounds with nursing staff today.   Discussions with Specialists or Other Care Team Provider: Nephrology, IR, pulmonology, Hematology    Education and Discussions with Family / Patient: Updated  (sister) via phone.    Current Length of Stay: 10 day(s)  Current Patient Status: Inpatient   Discharge Plan: Anticipate discharge in >72 hrs to home.    Code Status: Level 1 - Full Code    Subjective:   Patient seen and examined at bedside. No overnight events. States she feels short of breath, so she has been on 2L supplemental oxygen since last night. Cough remains unchanged. Pt NPO for permacath placement per IR today. HD today due to hyperkalemia. PLEX treatment 3 today. Discussed current plan and management with the patient. She understands and agrees.    Objective:     Vitals:   Temp (24hrs), Av.2 °F (36.8 °C), Min:97.6 °F (36.4 °C), Max:98.9 °F (37.2 °C)    Temp:  [97.6 °F (36.4 °C)-98.9 °F (37.2 °C)] 98.9 °F (37.2 °C)  HR:  [67-94] 94  Resp:  [17-18] 17  BP: (118-154)/(64-87) 139/75  SpO2:  [93 %-100 %] 96 %  Body mass index is 47.06 kg/m².      Input and Output Summary (last 24 hours):     Intake/Output Summary (Last 24 hours) at 7/22/2024 1405  Last data filed at 7/22/2024 0501  Gross per 24 hour   Intake --   Output 100 ml   Net -100 ml       Physical Exam:   Physical Exam  Vitals and nursing note reviewed.   Constitutional:       Appearance: Normal appearance.   HENT:      Head: Normocephalic and atraumatic.      Mouth/Throat:      Mouth: Mucous membranes are moist.   Eyes:      Extraocular Movements: Extraocular movements intact.      Conjunctiva/sclera: Conjunctivae normal.      Pupils: Pupils are equal, round, and reactive to light.   Cardiovascular:      Rate and Rhythm: Normal rate and regular rhythm.   Pulmonary:      Effort: Pulmonary effort is normal. No respiratory distress.      Breath sounds: Normal breath sounds.      Comments: On supplemental oxygen via nasal cannula  Abdominal:      General: Bowel sounds are normal. There is no distension.      Palpations: Abdomen is soft.      Tenderness: There is no abdominal tenderness. There is no guarding.   Musculoskeletal:      Right lower leg: Edema present.      Left lower leg: Edema present.      Comments: Trace edema noted to BLE     Skin:     General: Skin is warm and dry.   Neurological:      General: No focal deficit present.      Mental Status: She is alert. Mental status is at baseline.        Additional Data:     Labs:  Results from last 7 days   Lab Units 07/22/24  0429 07/21/24  0433 07/20/24  0606   WBC Thousand/uL 18.05* 19.41* 17.94*   HEMOGLOBIN g/dL 8.9* 8.9* 9.3*   HEMATOCRIT % 28.4* 27.0* 29.4*   PLATELETS Thousands/uL 179 202 228   BANDS PCT %  --   --  8   LYMPHO PCT %  --  2* 5*   MONO PCT %  --  9 4   EOS PCT %  --  0 0     Results from last 7 days   Lab Units 07/22/24  0429 07/21/24  0433 07/20/24  0439   SODIUM mmol/L 140 139 135   POTASSIUM mmol/L 5.5* 5.1 6.1*   CHLORIDE mmol/L 107 105 101   CO2 mmol/L 20* 23 20*   BUN mg/dL 101* 82* 122*   CREATININE mg/dL 8.53* 7.23*  9.64*   ANION GAP mmol/L 13 11 14*   CALCIUM mg/dL 9.3 9.1 9.7   ALBUMIN g/dL  --  3.6 3.3*   TOTAL BILIRUBIN mg/dL  --   --  0.37   ALK PHOS U/L  --   --  56   ALT U/L  --   --  9   AST U/L  --   --  9*   GLUCOSE RANDOM mg/dL 151* 138 170*     Results from last 7 days   Lab Units 07/22/24  0429   INR  1.18     Results from last 7 days   Lab Units 07/22/24  1051 07/22/24  0707 07/21/24  2101 07/21/24  1518 07/21/24  1114 07/21/24  0612 07/20/24  2046 07/20/24  1104 07/20/24  0706 07/19/24  2053 07/19/24  1555 07/19/24  1128   POC GLUCOSE mg/dl 132 139 201* 170* 123 124 204* 193* 150* 178* 178* 177*                   Lines/Drains:  Invasive Devices       Peripheral Intravenous Line  Duration             Peripheral IV 07/20/24 Right;Dorsal (posterior) Hand 2 days              Hemodialysis Catheter  Duration             HD Permanent Double Catheter <1 day                    Imaging: Reviewed radiology reports from this admission including: chest xray, chest CT scan, abdominal/pelvic CT, and ultrasound kidney and bladder  IR tunneled dialysis catheter placement   Final Result by Graciela Bettencourt MD (07/22 1018)      Conversion of right-sided internal jugular non-tunneled central venous catheter for a tunneled dialysis catheter, with tip in the expected location of the right atrium.      Plan:      The catheter may be used immediately.      Workstation performed: [id number]         IR biopsy kidney random   Final Result by Sandoval Castro MD (07/18 1358)   Impression: Successful percutaneous nontarget renal biopsy as described.                  Workstation performed: [id number]         IR temporary dialysis catheter placement   Final Result by Graciela Bettencourt MD (07/15 1640)      Insertion of right-sided non-tunneled dual-lumen temporary dialysis catheter, with tip in the expected location of the cavoatrial junction.      Plan:      The catheter may be used immediately.      Workstation performed: [id number]                   Recent Cultures (last 7 days):   Results from last 7 days   Lab Units 07/17/24  1258 07/17/24  1253   GRAM STAIN RESULT  1+ Polys  No bacteria seen 1+ Polys  No bacteria seen       Last 24 Hours Medication List:   Current Facility-Administered Medications   Medication Dose Route Frequency Provider Last Rate    acetaminophen  650 mg Oral Q6H PRN Tram Palacios PA-C      Albumin 5%  200 g Intravenous Daily Madonna Doug, DO Stopped (07/22/24 1011)    albuterol  2 puff Inhalation Q4H PRN Tram Palacios PA-C      atorvastatin  20 mg Oral Daily Tram Palacios PA-C      atovaquone  1,500 mg Oral Daily With Breakfast Vane Estrada MD      benzonatate  200 mg Oral TID PRN Tram Palacios PA-C      calcium carbonate-vitamin D  1 tablet Oral Daily With Breakfast Vane Estrada MD      calcium gluconate  1 g Intravenous Daily Madonna Camacho DO Stopped (07/22/24 1012)    cyclophosphamide  100 mg Oral Daily Tirsochanelle Montez MD      dextromethorphan-guaiFENesin  10 mL Oral Q4H PRN Tram Palacios PA-C      famotidine  10 mg Oral Daily PRN Daya May DO      fluticasone-vilanterol  1 puff Inhalation Daily Tram Palacios PA-C      heparin (porcine)  5,000 Units Subcutaneous Q8H [facility] Xavier Medina MD      hydrALAZINE  10 mg Intravenous Q6H PRN Yasmine Meadows MD      insulin lispro  2-12 Units Subcutaneous TID AC Yasmine Meadows MD      ipratropium-albuterol  3 mL Nebulization Q6H Luke Montez MD      iron polysaccharides  150 mg Oral Daily Ata Burns MD      lamoTRIgine  100 mg Oral QAM Tram Palacios PA-C      loratadine  10 mg Oral Daily Tram Palacios PA-C      melatonin  3 mg Oral Daily PRN Yasmine Meadows MD      montelukast  10 mg Oral Daily Tram Palacios PA-C      ondansetron  4 mg Intravenous Q6H PRN Yasmine Meadows MD      oxybutynin  5 mg Oral Daily Tram Palacios PA-C      pantoprazole  40 mg Oral Daily Before Breakfast Ronny Webster MD      polyethylene glycol  17 g Oral Daily Xavier Medina MD      predniSONE  60 mg  Oral Daily Vane Estrada MD      senna  1 tablet Oral HS Xavier Medina MD      sevelamer  1,600 mg Oral TID With Meals Vane Estrada MD      Sodium Zirconium Cyclosilicate  10 g Oral Daily Tirso Montez MD      [START ON 7/23/2024] torsemide  80 mg Oral Daily Joselyn Reyes Bahamonde, MD      traZODone  200 mg Oral HS Tram Palacios PA-C      venlafaxine  150 mg Oral Daily Tram Palacios PA-C          Today, Patient Was Seen By: Jessica Beckford MD    **Please Note: This note may have been constructed using a voice recognition system.**

## 2024-07-22 NOTE — SEDATION DOCUMENTATION
Temp HD cath converted to a permacath by Dr. Bettencourt. G dressing to site. Both lumens flushed, blood return noted, and capped. Patient tolerated well.

## 2024-07-22 NOTE — PLAN OF CARE
Problem: PAIN - ADULT  Goal: Verbalizes/displays adequate comfort level or baseline comfort level  Description: Interventions:  - Encourage patient to monitor pain and request assistance  - Assess pain using appropriate pain scale  - Administer analgesics based on type and severity of pain and evaluate response  - Implement non-pharmacological measures as appropriate and evaluate response  - Consider cultural and social influences on pain and pain management  - Notify physician/advanced practitioner if interventions unsuccessful or patient reports new pain  Outcome: Progressing     Problem: INFECTION - ADULT  Goal: Absence or prevention of progression during hospitalization  Description: INTERVENTIONS:  - Assess and monitor for signs and symptoms of infection  - Monitor lab/diagnostic results  - Monitor all insertion sites, i.e. indwelling lines, tubes, and drains  - Monitor endotracheal if appropriate and nasal secretions for changes in amount and color  - [place] appropriate cooling/warming therapies per order  - Administer medications as ordered  - Instruct and encourage patient and family to use good hand hygiene technique  - Identify and instruct in appropriate isolation precautions for identified infection/condition  Outcome: Progressing  Goal: Absence of fever/infection during neutropenic period  Description: INTERVENTIONS:  - Monitor WBC    Outcome: Progressing     Problem: SAFETY ADULT  Goal: Patient will remain free of falls  Description: INTERVENTIONS:  - Educate patient/family on patient safety including physical limitations  - Instruct patient to call for assistance with activity   - Consult OT/PT to assist with strengthening/mobility   - Keep Call bell within reach  - Keep bed low and locked with side rails adjusted as appropriate  - Keep care items and personal belongings within reach  - Initiate and maintain comfort rounds  - Make Fall Risk Sign visible to staff  - Apply yellow socks and bracelet  for high fall risk patients  - Consider moving patient to room near nurses station  Outcome: Progressing  Goal: Maintain or return to baseline ADL function  Description: INTERVENTIONS:  -  Assess patient's ability to carry out ADLs; assess patient's baseline for ADL function and identify physical deficits which impact ability to perform ADLs (bathing, care of mouth/teeth, toileting, grooming, dressing, etc.)  - Assess/evaluate cause of self-care deficits   - Assess range of motion  - Assess patient's mobility; develop plan if impaired  - Assess patient's need for assistive devices and provide as appropriate  - Encourage maximum independence but intervene and supervise when necessary  - Involve family in performance of ADLs  - Assess for home care needs following discharge   - Consider OT consult to assist with ADL evaluation and planning for discharge  - Provide patient education as appropriate  Outcome: Progressing  Goal: Maintains/Returns to pre admission functional level  Description: INTERVENTIONS:  - Perform AM-PAC 6 Click Basic Mobility/ Daily Activity assessment daily.  - Set and communicate daily mobility goal to care team and patient/family/caregiver.   - Collaborate with rehabilitation services on mobility goals if consulted  - Out of bed for toileting  - Record patient progress and toleration of activity level   Outcome: Progressing     Problem: DISCHARGE PLANNING  Goal: Discharge to home or other facility with appropriate resources  Description: INTERVENTIONS:  - Identify barriers to discharge w/patient and caregiver  - Arrange for needed discharge resources and transportation as appropriate  - Identify discharge learning needs (meds, wound care, etc.)  - Arrange for interpretive services to assist at discharge as needed  - Refer to Case Management Department for coordinating discharge planning if the patient needs post-hospital services based on physician/advanced practitioner order or complex needs  related to functional status, cognitive ability, or social support system  Outcome: Progressing     Problem: METABOLIC, FLUID AND ELECTROLYTES - ADULT  Goal: Electrolytes maintained within normal limits  Description: INTERVENTIONS:  - Monitor labs and assess patient for signs and symptoms of electrolyte imbalances  - Administer electrolyte replacement as ordered  - Monitor response to electrolyte replacements, including repeat lab results as appropriate  - Instruct patient on fluid and nutrition as appropriate  Outcome: Progressing  Goal: Fluid balance maintained  Description: INTERVENTIONS:  - Monitor labs   - Monitor I/O and WT  - Instruct patient on fluid and nutrition as appropriate  - Assess for signs & symptoms of volume excess or deficit  Outcome: Progressing     Problem: Knowledge Deficit  Goal: Patient/family/caregiver demonstrates understanding of disease process, treatment plan, medications, and discharge instructions  Description: Complete learning assessment and assess knowledge base.  Interventions:  - Provide teaching at level of understanding  - Provide teaching via preferred learning methods  Outcome: Progressing     Problem: Nutrition/Hydration-ADULT  Goal: Nutrient/Hydration intake appropriate for improving, restoring or maintaining nutritional needs  Description: Monitor and assess patient's nutrition/hydration status for malnutrition. Collaborate with interdisciplinary team and initiate plan and interventions as ordered.  Monitor patient's weight and dietary intake as ordered or per policy. Utilize nutrition screening tool and intervene as necessary. Determine patient's food preferences and provide high-protein, high-caloric foods as appropriate.     INTERVENTIONS:  - Monitor oral intake, urinary output, labs, and treatment plans  - Assess nutrition and hydration status and recommend course of action  - Evaluate amount of meals eaten  - Assist patient with eating if necessary   - Allow adequate  time for meals  - Recommend/ encourage appropriate diets, oral nutritional supplements, and vitamin/mineral supplements  - Order, calculate, and assess calorie counts as needed  - Recommend, monitor, and adjust tube feedings and TPN/PPN based on assessed needs  - Assess need for intravenous fluids  - Provide specific nutrition/hydration education as appropriate  - Include patient/family/caregiver in decisions related to nutrition  Outcome: Progressing     Problem: Prexisting or High Potential for Compromised Skin Integrity  Goal: Skin integrity is maintained or improved  Description: INTERVENTIONS:  - Identify patients at risk for skin breakdown  - Assess and monitor skin integrity  - Assess and monitor nutrition and hydration status  - Monitor labs   - Assess for incontinence   - Turn and reposition patient  - Assist with mobility/ambulation  - Relieve pressure over bony prominences  - Avoid friction and shearing  - Provide appropriate hygiene as needed including keeping skin clean and dry  - Evaluate need for skin moisturizer/barrier cream  - Collaborate with interdisciplinary team   - Patient/family teaching  - Consider wound care consult   Outcome: Progressing

## 2024-07-22 NOTE — PROGRESS NOTES
NEPHROLOGY HOSPITAL PROGRESS NOTE   Ngozi Beard 64 y.o. female MRN: [id number]  Unit/Bed#: S -01 Encounter: [id number]  Reason for Consult: Acute kidney injury    ASSESSMENT and PLAN:  64-year-old female with a history of hypertension, asthma, bipolar disorder who presented with ANGELICA.  Patient was sent in by her pulmonary specialist.  Nephrology consulted for management.    Acute kidney injury (POA):  Baseline creatinine 0.8 to 0.9 as of May 2024.  Creatinine 4.6 on admission.  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  On workup ANCA negative.  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 6/8 gloms IF crescents with 100% activity 15/17 with diffuse disease   Urinalysis with 3+ blood, 3+ protein, innumerable RBCs, 2-4 WBCs, moderate bacteria  Status post bronchoscopy.  May need lung biopsy  CT of the chest with nodularity.  According to pulmonary atypical for GBM.  Renal ultrasound: Kidneys normal size and symmetric.  No hydronephrosis.  Nonobstructing left renal calculus.  GBM antibodies elevated greater than 8.  Repeat pending from 7/22   elevated  SPEP negative  KL ratio elevated 1.75  Negative: ANCA, AURA, C3 and C4, HIV, hepatitis panel, QuantiFERON gold, PLA2R, ASO, dsDNA  Management:   Started on hemodialysis 7/16.  Status post 4 treatments  On oral cyclophosphamide 100 mg daily and prednisone 60 mg daily.    PLEX started on 7/20.  Status post 2 sessions of plasmapheresis with 5 treatments planned.    Torsemide 80 mg daily  Atovaquone: 1500 mg daily  Plan:   Creatinine increasing between treatments.  Potassium elevated.  Plan on hemodialysis treatment this afternoon.    Access:  Temporary dialysis catheter converted to PermCath on 7/22.    Hyperkalemia:  Plan on hemodialysis treatment this afternoon.  Adjust K bath.  Also on a daily dose of Lokelma.    Sepsis:  Likely due to pneumonitis  Diffuse scattered nodules on CT  Sputum culture: 2+  gram-negative rods, 1+ gram-positive cocci in clusters  Bronchial culture: No bacteria  AFB negative  QuantiFERON gold negative  Completed ceftriaxone    Hypertension:  Lopressor on hold due to bradycardia  On torsemide 80 mg daily  Blood pressure acceptable.  Avoid hypotension    Asthma:   Pulmonary following  Requiring intermittent nasal cannula oxygen support    Anemia:  Ferritin 103, iron saturation 11%  On oral iron supplement  Hold off on IV iron due to sepsis    CKD-MBD:  Continue sevelamer for control of hyperphosphatemia    Bipolar disorder: Medications continued.  Management per primary team    PLAN SUMMARY:  Plan for 5 Plex treatments  Hemodialysis continued  Adjust K bath on dialysis today.  2K bath for control of hyperkalemia    SUBJECTIVE / 24H INTERVAL HISTORY:  Feeling tired.  Also having some shortness of breath requiring oxygen.  Still urinating a small amount.    OBJECTIVE:  Current Weight: Weight - Scale: 121 kg (265 lb 10.5 oz)  Vitals:    07/22/24 0216 07/22/24 0600 07/22/24 0703 07/22/24 0713   BP:    153/81   BP Location:    Left arm   Pulse:    70   Resp:    17   Temp:    98.3 °F (36.8 °C)   TempSrc:    Oral   SpO2: 94%  95% 97%   Weight:  121 kg (265 lb 10.5 oz)         Intake/Output Summary (Last 24 hours) at 7/22/2024 0910  Last data filed at 7/22/2024 0501  Gross per 24 hour   Intake --   Output 100 ml   Net -100 ml     General: NAD, ill-appearing  Skin: no rash  Eyes: anicteric sclera  ENT: moist mucous membrane  Neck: supple  Chest: Equal breath sounds, clear.  No wheezes rhonchi or rales appreciated.  On nasal cannula oxygen.  Just received inhaler for asthma  CVS: s1s2, no murmur, no gallop, no rub.  Normal rate regular rhythm  Abdomen: soft, nontender, nl sounds  Extremities: ++ edema LE b/l  : no sherwood.  Reports urinating a little bit  Neuro: AAOX3  Psych: normal affect   Medications:    Current Facility-Administered Medications:     acetaminophen (TYLENOL) tablet 650 mg, 650 mg,  Oral, Q6H PRN, Tram Palacios PA-C, 650 mg at 07/19/24 1302    albumin human (FLEXBUMIN) 5 % injection 200 g, 200 g, Intravenous, Daily, Madonna Camacho DO, 200 g at 07/21/24 0900    albuterol (PROVENTIL HFA,VENTOLIN HFA) inhaler 2 puff, 2 puff, Inhalation, Q4H PRN, Tram Palacios PA-C, 2 puff at 07/21/24 0757    atorvastatin (LIPITOR) tablet 20 mg, 20 mg, Oral, Daily, Tram Palacios PA-C, 20 mg at 07/21/24 0812    atovaquone (MEPRON) oral suspension 1,500 mg, 1,500 mg, Oral, Daily With Breakfast, Vane Estrada MD, 1,500 mg at 07/21/24 0757    benzonatate (TESSALON PERLES) capsule 200 mg, 200 mg, Oral, TID PRN, Tram Palacios PA-C    calcium carbonate-vitamin D 500 mg-5 mcg tablet 1 tablet, 1 tablet, Oral, Daily With Breakfast, Vane Estrada MD, 1 tablet at 07/21/24 0755    calcium gluconate 1 g in sodium chloride 0.9% 50 mL (premix), 1 g, Intravenous, Daily, Madonna Camacho DO, Last Rate: 100 mL/hr at 07/21/24 0900, 1 g at 07/21/24 0900    cyclophosphamide (CYTOXAN) capsule 100 mg, 100 mg, Oral, Daily, Tirso Montez MD, 100 mg at 07/21/24 2112    dextromethorphan-guaiFENesin (ROBITUSSIN DM) oral syrup 10 mL, 10 mL, Oral, Q4H PRN, Tram Palacios PA-C    famotidine (PEPCID) tablet 10 mg, 10 mg, Oral, Daily PRN, Daya May DO, 10 mg at 07/19/24 1305    fluticasone-vilanterol 200-25 mcg/actuation 1 puff, 1 puff, Inhalation, Daily, Tram Palacios PA-C, 1 puff at 07/22/24 0602    heparin (porcine) subcutaneous injection 5,000 Units, 5,000 Units, Subcutaneous, Q8H JACK, Xavier Medina MD, 5,000 Units at 07/22/24 0557    hydrALAZINE (APRESOLINE) injection 10 mg, 10 mg, Intravenous, Q6H PRN, Yasmine Meadows MD, 10 mg at 07/20/24 2131    insulin lispro (HumALOG/ADMELOG) 100 units/mL subcutaneous injection 2-12 Units, 2-12 Units, Subcutaneous, TID AC, 2 Units at 07/21/24 1532 **AND** Fingerstick Glucose (POCT), , , TID AC, Yasmine Meadows MD    ipratropium-albuterol (DUO-NEB) 0.5-2.5 mg/3 mL inhalation solution 3 mL,  3 mL, Nebulization, Q6H, Luke Montez MD, 3 mL at 07/22/24 0703    iron polysaccharides (FERREX) capsule 150 mg, 150 mg, Oral, Daily, Ata Burns MD, 150 mg at 07/21/24 0813    lamoTRIgine (LaMICtal) tablet 100 mg, 100 mg, Oral, QAM, Tram Palacios PA-C, 100 mg at 07/21/24 0813    loratadine (CLARITIN) tablet 10 mg, 10 mg, Oral, Daily, Tram Palacios PA-C, 10 mg at 07/21/24 0813    melatonin tablet 3 mg, 3 mg, Oral, Daily PRN, Yasmine Meadows MD, 3 mg at 07/21/24 2111    montelukast (SINGULAIR) tablet 10 mg, 10 mg, Oral, Daily, Tram Palacios PA-C, 10 mg at 07/21/24 0813    ondansetron (ZOFRAN) injection 4 mg, 4 mg, Intravenous, Q6H PRN, Yasmine Meadows MD, 4 mg at 07/21/24 1840    oxybutynin (DITROPAN-XL) 24 hr tablet 5 mg, 5 mg, Oral, Daily, Tram Palacios PA-C, 5 mg at 07/21/24 0814    pantoprazole (PROTONIX) EC tablet 40 mg, 40 mg, Oral, Daily Before Breakfast, Ronny Webster MD, 40 mg at 07/22/24 0557    polyethylene glycol (MIRALAX) packet 17 g, 17 g, Oral, Daily, Xavier Medina MD, 17 g at 07/21/24 0814    predniSONE tablet 60 mg, 60 mg, Oral, Daily, Vane Estrada MD, 60 mg at 07/21/24 0814    senna (SENOKOT) tablet 8.6 mg, 1 tablet, Oral, HS, Xavier Medina MD, 8.6 mg at 07/21/24 2111    sevelamer (RENAGEL) tablet 1,600 mg, 1,600 mg, Oral, TID With Meals, Vane Estrada MD, 1,600 mg at 07/21/24 1532    Sodium Zirconium Cyclosilicate (Lokelma) 10 g, 10 g, Oral, Daily, Tirso Montez MD, 10 g at 07/21/24 0814    [START ON 7/23/2024] torsemide (DEMADEX) tablet 80 mg, 80 mg, Oral, Daily, Joselyn Reyes Bahamonde, MD    traZODone (DESYREL) tablet 200 mg, 200 mg, Oral, HS, Tram Palacios PA-C, 200 mg at 07/21/24 2113    vancomycin (VANCOCIN) 1500 mg in sodium chloride 0.9% 250 mL IVPB, 1,500 mg, Intravenous, Once, Graciela Bettencourt MD    venlafaxine (EFFEXOR-XR) 24 hr capsule 150 mg, 150 mg, Oral, Daily, Tram Plaacios PA-C, 150 mg at 07/21/24 0815    Laboratory Results:  Results from last 7 days   Lab Units  07/22/24  0429 07/21/24  0433 07/20/24  0606 07/20/24  0439 07/19/24  0400 07/18/24  0440 07/17/24  2132 07/17/24  0447 07/16/24  0643   WBC Thousand/uL 18.05* 19.41* 17.94*  --  15.22* 12.75* 10.69* 11.53* 11.63*   HEMOGLOBIN g/dL 8.9* 8.9* 9.3*  --  9.9* 9.5* 9.4* 9.6* 9.4*   HEMATOCRIT % 28.4* 27.0* 29.4*  --  30.3* 29.7* 28.8* 30.1* 30.0*   PLATELETS Thousands/uL 179 202 228  --  256 264 250 283 289   POTASSIUM mmol/L 5.5* 5.1  --  6.1* 5.1 5.4*  --  5.0 5.3   CHLORIDE mmol/L 107 105  --  101 101 100  --  102 102   CO2 mmol/L 20* 23  --  20* 21 22  --  21 15*   BUN mg/dL 101* 82*  --  122* 94* 92*  --  126* 154*   CREATININE mg/dL 8.53* 7.23*  --  9.64* 8.65* 7.88*  --  10.37* 11.51*   CALCIUM mg/dL 9.3 9.1  --  9.7 9.6 9.4  --  9.3 9.8   MAGNESIUM mg/dL  --   --   --  2.5 2.3 2.3  --  2.5 2.7   PHOSPHORUS mg/dL  --  7.6*  --  9.5* 8.2* 8.6*  --  9.4* 11.0*

## 2024-07-22 NOTE — PLAN OF CARE
Post-Dialysis RN Treatment Note    Blood Pressure:  Pre 143/74 mm/Hg  Post 137/85 mmHg   EDW  TBD kg    Weight:  Pre 123.2 kg   Post 122.2 kg   Mode of weight measurement: Bed Scale   Volume Removed  1000 ml    Treatment duration 180 minutes    NS given  No    Treatment shortened? No   Medications given during Rx None Reported   Estimated Kt/V  None Reported   Access type: Permacath/TDC   Access Issues: Yes, describe: lines reversed for optimal BFR     Report called to primary nurse   Yes Peri Wylie        Current hemodialysis plan of care is to remove a total of 1500 ml of fluid over a 3 hour treatment for a net of 1 liter as tolerated.  Monitor vital signs every 15 minutes while on treatment for patient safety.  Utilize a 2 K+ bath for serum potassium of 5.5 to maintain electrolyte balance.  Report received from Peri Wylie.  Plan reviewed with Dr Reyes Bahamonde.  Patient currently receiving plasma exchanges daily as well at this time.        Problem: METABOLIC, FLUID AND ELECTROLYTES - ADULT  Goal: Electrolytes maintained within normal limits  Description: INTERVENTIONS:  - Monitor labs and assess patient for signs and symptoms of electrolyte imbalances  - Administer electrolyte replacement as ordered  - Monitor response to electrolyte replacements, including repeat lab results as appropriate  - Instruct patient on fluid and nutrition as appropriate  Outcome: Progressing  Goal: Fluid balance maintained  Description: INTERVENTIONS:  - Monitor labs   - Monitor I/O and WT  - Instruct patient on fluid and nutrition as appropriate  - Assess for signs & symptoms of volume excess or deficit  Outcome: Progressing

## 2024-07-22 NOTE — PLAN OF CARE
Problem: PAIN - ADULT  Goal: Verbalizes/displays adequate comfort level or baseline comfort level  Description: Interventions:  - Encourage patient to monitor pain and request assistance  - Assess pain using appropriate pain scale  - Administer analgesics based on type and severity of pain and evaluate response  - Implement non-pharmacological measures as appropriate and evaluate response  - Consider cultural and social influences on pain and pain management  - Notify physician/advanced practitioner if interventions unsuccessful or patient reports new pain  Outcome: Progressing     Problem: INFECTION - ADULT  Goal: Absence or prevention of progression during hospitalization  Description: INTERVENTIONS:  - Assess and monitor for signs and symptoms of infection  - Monitor lab/diagnostic results  - Monitor all insertion sites, i.e. indwelling lines, tubes, and drains  - Monitor endotracheal if appropriate and nasal secretions for changes in amount and color  - [place] appropriate cooling/warming therapies per order  - Administer medications as ordered  - Instruct and encourage patient and family to use good hand hygiene technique  - Identify and instruct in appropriate isolation precautions for identified infection/condition  Outcome: Progressing  Goal: Absence of fever/infection during neutropenic period  Description: INTERVENTIONS:  - Monitor WBC    Outcome: Progressing     Problem: SAFETY ADULT  Goal: Patient will remain free of falls  Description: INTERVENTIONS:  - Educate patient/family on patient safety including physical limitations  - Instruct patient to call for assistance with activity   - Consult OT/PT to assist with strengthening/mobility   - Keep Call bell within reach  - Keep bed low and locked with side rails adjusted as appropriate  - Keep care items and personal belongings within reach  - Initiate and maintain comfort rounds  - Make Fall Risk Sign visible to staff  - Offer Toileting every  Hours,  in advance of need  - Initiate/Maintain alarm  - Obtain necessary fall risk management equipment:   - Apply yellow socks and bracelet for high fall risk patients  - Consider moving patient to room near nurses station  Outcome: Progressing  Goal: Maintain or return to baseline ADL function  Description: INTERVENTIONS:  -  Assess patient's ability to carry out ADLs; assess patient's baseline for ADL function and identify physical deficits which impact ability to perform ADLs (bathing, care of mouth/teeth, toileting, grooming, dressing, etc.)  - Assess/evaluate cause of self-care deficits   - Assess range of motion  - Assess patient's mobility; develop plan if impaired  - Assess patient's need for assistive devices and provide as appropriate  - Encourage maximum independence but intervene and supervise when necessary  - Involve family in performance of ADLs  - Assess for home care needs following discharge   - Consider OT consult to assist with ADL evaluation and planning for discharge  - Provide patient education as appropriate  Outcome: Progressing  Goal: Maintains/Returns to pre admission functional level  Description: INTERVENTIONS:  - Perform AM-PAC 6 Click Basic Mobility/ Daily Activity assessment daily.  - Set and communicate daily mobility goal to care team and patient/family/caregiver.   - Collaborate with rehabilitation services on mobility goals if consulted  - Perform Range of Motion  times a day.  - Reposition patient every  hours.  - Dangle patient  times a day  - Stand patient  times a day  - Ambulate patient  times a day  - Out of bed to chair  times a day   - Out of bed for meals  times a day  - Out of bed for toileting  - Record patient progress and toleration of activity level   Outcome: Progressing

## 2024-07-22 NOTE — BRIEF OP NOTE (RAD/CATH)
INTERVENTIONAL RADIOLOGY PROCEDURE NOTE    Date: 7/22/2024    Procedure: IR TUNNELED DIALYSIS CATHETER PLACEMENT     Preoperative diagnosis:   1. 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 Abnormal CT of the chest    3. Acute kidney injury (HCC)    4. Acute renal failure, unspecified acute renal failure type (HCC)    5. Acute renal failure (ARF) (HCC)    6. Abnormal chest CT    7. ANGELICA (acute kidney injury) (HCC)         Postoperative diagnosis: Same.    Surgeon: Graciela Bettencourt MD     Assistant: None. No qualified resident was available.    Blood loss: Minimal    Specimens: None    Findings:    Successful TDC placement.    Complications: None immediate.    Anesthesia: conscious sedation

## 2024-07-22 NOTE — DISCHARGE INSTRUCTIONS
Perma-cath Placement   WHAT YOU NEED TO KNOW:   A perma-cath is a catheter placed through a vein into or near your right atrium. Your right atrium is the right upper chamber of your heart. A perma-cath is used for dialysis in an emergency or until a long-term device is ready to use. After your procedure, you will have some pain and swelling on your chest and neck. You may have some bruises on your chest and neck. You may also have 2 dressings, one on your chest and one on your neck.   DISCHARGE INSTRUCTIONS:   Call 911 for any of the following:   You feel lightheaded, short of breath, and have chest pain.    Your catheter comes out   Contact Interventional Radiology at [phone] ([place] PATIENTS: Contact Interventional Radiology at [phone]) ([facility] PATIENTS: Contact Interventional Radiology at [phone]) if:  Blood soaks through your bandage.   You have new swelling in your arm, neck, face, or chest on your right side.  Your catheter gets wet.    Your bruises or pain get worse.   You have a fever or chills.  Persistent nausea or vomiting.   Your incision is red, swollen, or draining pus.   You have questions or concerns about your condition or care.  Self-care:       Resume your normal diet.  Keep your dressings dry. Do not take a shower or swim. You may take a tub bath, but do not get your dressings wet. Water in your wound can cause bacteria to grow and cause an infection. If your dressing gets wet, dry it off and cover it with dry sterile gauze. Call your healthcare provider. Do not use soaps or ointments.  Do not change your dressings. Your healthcare provider or dialysis nurse will change your dressings. Your dressings should stay in place until your healthcare provider removes them. The dressing on your chest will stay as long as you have the catheter in place. The dressing prevents infection.    Do not remove the red and blue caps from the end of your catheter. The caps prevent air from getting  into your catheter.  Follow up with your healthcare provider as directed: Write down your questions so you remember to ask them during your visits.              Moderate Sedation   WHAT YOU NEED TO KNOW:   Moderate sedation, or conscious sedation, is medicine used during procedures to help you feel relaxed and calm. You will be awake and able to follow directions without anxiety or pain. You will remember little to none of the procedure. You may feel tired, weak, or unsteady on your feet after you get sedation. You may also have trouble concentrating or short-term memory loss. These symptoms should go away in 24 hours or less.   DISCHARGE INSTRUCTIONS:   Call 911 or have someone else call for any of the following:   You have sudden trouble breathing.     You cannot be woken.  Seek care immediately if:   You have a severe headache or dizziness.     Your heart is beating faster than usual.  Contact your healthcare provider if:   You have a fever.     You have nausea or are vomiting for more than 8 hours after the procedure.      Your skin is itchy, swollen, or you have a rash.     You have questions or concerns about your condition or care.  Self-care:   Have someone stay with you for 24 hours. This person can drive you to errands and help you do things around the house. This person can also watch for problems.      Rest and do quiet activities for 24 hours. Do not exercise, ride a bike, or play sports. Stand up slowly to prevent dizziness and falls. Take short walks around the house with another person. Slowly return to your usual activities the next day.      Do not drive or use dangerous machines or tools for 24 hours. You may injure yourself or others. Examples include a lawnmower, saw, or drill. Do not return to work for 24 hours if you use dangerous machines or tools for work.      Do not make important decisions for 24 hours. For example, do not sign important papers or invest money.      Drink liquids as  directed. Liquids help flush the sedation medicine out of your body. Ask how much liquid to drink each day and which liquids are best for you.      Eat small, frequent meals to prevent nausea and vomiting. Start with clear liquids such as juice or broth. If you do not vomit after clear liquids, you can eat your usual foods.      Do not drink alcohol or take medicines that make you drowsy. This includes medicines that help you sleep and anxiety medicines. Ask your healthcare provider if it is safe for you to take pain medicine.  Follow up with your healthcare provider as directed: Write down your questions so you remember to ask them during your visits.   © 2017 [employer] Information is for End User's use only and may not be sold, redistributed or otherwise used for commercial purposes. All illustrations and images included in CareNotes® are the copyrighted property of [employer] or [employer].  The above information is an  only. It is not intended as medical advice for individual conditions or treatments. Talk to your doctor, nurse or pharmacist before following any medical regimen to see if it is safe and effective for you.

## 2024-07-22 NOTE — CASE MANAGEMENT
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     Abnormal CT of the chest 2024     Abnormality of ascending aorta 2024     Postmenopausal 2024     Encounter for screening mammogram for malignant neoplasm of breast 2024     Allergic rhinitis 2024     Persistent cough 2024     Urge incontinence of urine 2024     Exercise intolerance 2024     Family history of coronary artery bypass graft surgery 2024     Urge urinary incontinence 2024     Female stress incontinence 2024     Paranoid schizophrenia ([facility]) 2023     Moderate persistent asthma w/out acute exacerbation 2023     Asthma due to seasonal allergies 2023     Bipolar 1 disorder ([facility]) 2022     Primary hypertension 2022     Dyslipidemia 2022     Morbid obesity with BMI of 45.0-49.9, adult ([facility]) 2022       LOS (days): 10  Geometric Mean LOS (GMLOS) (days): 5.1  Days to GMLOS:-4.9     OBJECTIVE:  Risk of Unplanned Readmission Score: 20.93         Current admission status: Inpatient   Preferred Pharmacy:   [id number] [address] [address]  Phone: [phone] Fax: [phone]    [facility] - [place], [place] - [address] [facility]  [address] [address]  Phone: [phone] Fax: [phone]    Primary Care Provider: Meenakshi Balbuena MD    Primary Insurance: MEDICARE  Secondary Insurance: [facility]    DISCHARGE DETAILS:                                           Other Referral/Resources/Interventions Provided:  Interventions: Dialysis       Patient remains medically unstable for d/c at this time.    PLEX started 07/20 and will get treatment 3/5 today.      CM reached out to [facility] Admissions team to share that patient will not be discharging home today and therefore will not be starting outpatient tomorrow.  CM will continue to update them on her anticipated DC.    CM dept will continue to follow for discharge planning needs.

## 2024-07-23 ENCOUNTER — APPOINTMENT (INPATIENT)
Dept: DIALYSIS | Facility: HOSPITAL | Age: 64
DRG: 673 | End: 2024-07-23
Attending: INTERNAL MEDICINE
Payer: MEDICARE

## 2024-07-23 LAB
ANION GAP SERPL CALCULATED.3IONS-SCNC: 12 MMOL/L (ref 4–13)
BUN SERPL-MCNC: 67 MG/DL (ref 5–25)
CALCIUM SERPL-MCNC: 9.3 MG/DL (ref 8.4–10.2)
CHLORIDE SERPL-SCNC: 102 MMOL/L (ref 96–108)
CO2 SERPL-SCNC: 24 MMOL/L (ref 21–32)
CREAT SERPL-MCNC: 6.34 MG/DL (ref 0.6–1.3)
ERYTHROCYTE [DISTWIDTH] IN BLOOD BY AUTOMATED COUNT: 15.9 % (ref 11.6–15.1)
FIBRINOGEN PPP-MCNC: 154 MG/DL (ref 207–520)
GBM AB SER IA-ACNC: >8 UNITS (ref 0–0.9)
GFR SERPL CREATININE-BSD FRML MDRD: 6 ML/MIN/1.73SQ M
GLUCOSE SERPL-MCNC: 123 MG/DL (ref 65–140)
GLUCOSE SERPL-MCNC: 135 MG/DL (ref 65–140)
GLUCOSE SERPL-MCNC: 151 MG/DL (ref 65–140)
GLUCOSE SERPL-MCNC: 169 MG/DL (ref 65–140)
GLUCOSE SERPL-MCNC: 202 MG/DL (ref 65–140)
HCT VFR BLD AUTO: 27.5 % (ref 34.8–46.1)
HGB BLD-MCNC: 8.9 G/DL (ref 11.5–15.4)
MCH RBC QN AUTO: 27.6 PG (ref 26.8–34.3)
MCHC RBC AUTO-ENTMCNC: 32.4 G/DL (ref 31.4–37.4)
MCV RBC AUTO: 85 FL (ref 82–98)
MYCOBACTERIUM SPEC CULT: NORMAL
MYCOBACTERIUM SPEC CULT: NORMAL
PLATELET # BLD AUTO: 149 THOUSANDS/UL (ref 149–390)
PMV BLD AUTO: 10.1 FL (ref 8.9–12.7)
POTASSIUM SERPL-SCNC: 4.4 MMOL/L (ref 3.5–5.3)
RBC # BLD AUTO: 3.23 MILLION/UL (ref 3.81–5.12)
RHODAMINE-AURAMINE STN SPEC: NORMAL
RHODAMINE-AURAMINE STN SPEC: NORMAL
SODIUM SERPL-SCNC: 138 MMOL/L (ref 135–147)
WBC # BLD AUTO: 22.4 THOUSAND/UL (ref 4.31–10.16)

## 2024-07-23 PROCEDURE — 94760 N-INVAS EAR/PLS OXIMETRY 1: CPT

## 2024-07-23 PROCEDURE — 85027 COMPLETE CBC AUTOMATED: CPT

## 2024-07-23 PROCEDURE — 85384 FIBRINOGEN ACTIVITY: CPT | Performed by: INTERNAL MEDICINE

## 2024-07-23 PROCEDURE — 80048 BASIC METABOLIC PNL TOTAL CA: CPT

## 2024-07-23 PROCEDURE — 82948 REAGENT STRIP/BLOOD GLUCOSE: CPT

## 2024-07-23 PROCEDURE — 94640 AIRWAY INHALATION TREATMENT: CPT

## 2024-07-23 PROCEDURE — 99232 SBSQ HOSP IP/OBS MODERATE 35: CPT | Performed by: HOSPITALIST

## 2024-07-23 PROCEDURE — 90935 HEMODIALYSIS ONE EVALUATION: CPT | Performed by: STUDENT IN AN ORGANIZED HEALTH CARE EDUCATION/TRAINING PROGRAM

## 2024-07-23 RX ORDER — SULFAMETHOXAZOLE AND TRIMETHOPRIM 400; 80 MG/1; MG/1
1 TABLET ORAL 3 TIMES WEEKLY
Status: DISCONTINUED | OUTPATIENT
Start: 2024-07-24 | End: 2024-08-02

## 2024-07-23 RX ORDER — NIFEDIPINE 30 MG/1
30 TABLET, EXTENDED RELEASE ORAL DAILY
Status: DISCONTINUED | OUTPATIENT
Start: 2024-07-24 | End: 2024-07-27

## 2024-07-23 RX ADMIN — INSULIN LISPRO 2 UNITS: 100 INJECTION, SOLUTION INTRAVENOUS; SUBCUTANEOUS at 16:06

## 2024-07-23 RX ADMIN — SODIUM ZIRCONIUM CYCLOSILICATE 10 G: 10 POWDER, FOR SUSPENSION ORAL at 11:15

## 2024-07-23 RX ADMIN — HEPARIN SODIUM 5000 UNITS: 5000 INJECTION INTRAVENOUS; SUBCUTANEOUS at 21:12

## 2024-07-23 RX ADMIN — HEPARIN SODIUM 5000 UNITS: 5000 INJECTION INTRAVENOUS; SUBCUTANEOUS at 05:24

## 2024-07-23 RX ADMIN — Medication 1 TABLET: at 11:27

## 2024-07-23 RX ADMIN — FLUTICASONE FUROATE AND VILANTEROL TRIFENATATE 1 PUFF: 200; 25 POWDER RESPIRATORY (INHALATION) at 11:13

## 2024-07-23 RX ADMIN — ALBUTEROL SULFATE 2 PUFF: 108 AEROSOL, METERED RESPIRATORY (INHALATION) at 11:14

## 2024-07-23 RX ADMIN — OXYBUTYNIN CHLORIDE 5 MG: 5 TABLET, EXTENDED RELEASE ORAL at 11:21

## 2024-07-23 RX ADMIN — PANTOPRAZOLE SODIUM 40 MG: 40 TABLET, DELAYED RELEASE ORAL at 05:24

## 2024-07-23 RX ADMIN — HEPARIN SODIUM 5000 UNITS: 5000 INJECTION INTRAVENOUS; SUBCUTANEOUS at 14:07

## 2024-07-23 RX ADMIN — MONTELUKAST 10 MG: 10 TABLET, FILM COATED ORAL at 11:20

## 2024-07-23 RX ADMIN — TORSEMIDE 80 MG: 20 TABLET ORAL at 11:18

## 2024-07-23 RX ADMIN — CYCLOPHOSPHAMIDE 100 MG: 50 CAPSULE ORAL at 20:42

## 2024-07-23 RX ADMIN — ATOVAQUONE 1500 MG: 750 SUSPENSION ORAL at 11:22

## 2024-07-23 RX ADMIN — POLYETHYLENE GLYCOL 3350 17 G: 17 POWDER, FOR SOLUTION ORAL at 11:12

## 2024-07-23 RX ADMIN — PREDNISONE 60 MG: 20 TABLET ORAL at 11:18

## 2024-07-23 RX ADMIN — SEVELAMER HYDROCHLORIDE 1600 MG: 800 TABLET ORAL at 16:06

## 2024-07-23 RX ADMIN — POLYSACCHARIDE-IRON COMPLEX 150 MG: 150 CAPSULE ORAL at 11:19

## 2024-07-23 RX ADMIN — TRAZODONE HYDROCHLORIDE 200 MG: 100 TABLET ORAL at 21:12

## 2024-07-23 RX ADMIN — VENLAFAXINE HYDROCHLORIDE 150 MG: 150 CAPSULE, EXTENDED RELEASE ORAL at 11:17

## 2024-07-23 RX ADMIN — LORATADINE 10 MG: 10 TABLET ORAL at 11:17

## 2024-07-23 RX ADMIN — IPRATROPIUM BROMIDE AND ALBUTEROL SULFATE 3 ML: 2.5; .5 SOLUTION RESPIRATORY (INHALATION) at 13:13

## 2024-07-23 RX ADMIN — IPRATROPIUM BROMIDE AND ALBUTEROL SULFATE 3 ML: 2.5; .5 SOLUTION RESPIRATORY (INHALATION) at 21:29

## 2024-07-23 RX ADMIN — SEVELAMER HYDROCHLORIDE 1600 MG: 800 TABLET ORAL at 11:27

## 2024-07-23 RX ADMIN — IPRATROPIUM BROMIDE AND ALBUTEROL SULFATE 3 ML: 2.5; .5 SOLUTION RESPIRATORY (INHALATION) at 07:43

## 2024-07-23 RX ADMIN — LAMOTRIGINE 100 MG: 100 TABLET ORAL at 11:17

## 2024-07-23 RX ADMIN — ATORVASTATIN CALCIUM 20 MG: 20 TABLET, FILM COATED ORAL at 11:17

## 2024-07-23 RX ADMIN — SENNOSIDES 8.6 MG: 8.6 TABLET, FILM COATED ORAL at 21:12

## 2024-07-23 NOTE — PROGRESS NOTES
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 Given the lack of hydronephrosis this could either reflect adjacent calcification or nonobstructing stone.   Patient reports that she is still producing urine  US kidney and bladder: No hydronephrosis. 4 mm nonobstructing left intrarenal calculus  Urinalysis: 3+ blood.  3+ protein.  Innumerable red blood cells.  2-4 WBCs.  Moderate bacteria   AURA, C3, and C4 normal  Ig Kappa Free Light Chain:152.5   Ig Lambda Free Light Chain 87.1   Kappa/Lambda FluidC Ratio 1.75  Hepatitis panel nonreactive  HIV nonreactive  QuantiFERON gold negative  protein electrophoresis see labs  hypersensitivity pneumonitis profile negative  GBM antibodies: elevated >8  phospholipase A2 receptor Ab: Negative  ANCA: negative   7/17 Renal biopsy and bronchoscopy performed.  7/19 CM confirmed HD chair time for patient. Will be TTS 10:30 AM with start date Tuesday 7/23.   Bronchial lavage: Right- Negative for malignancy. Abundant pulmonary macrophages. Few benign bronchial cells. Left- Negative for malignancy, pulmonary macrophage, mixed inflammatory cells, Few benign bronchial cells  7/22 R permacath placed by IR. Renal bx: diffuse crescentic glomerulonephritis, Anti-GMB type      Plan  Nephrology onboard - dialysis initiated  Hematology onboard for plan for PLEX - session 4/5  Continue Prednisone 60 mg daily   Cyclophosphamide 100 mg daily   Atovaquone 1500 mg daily   Monitor blood glucose  levels  Hold home dyazide    Persistent cough  Assessment & Plan  Patient endorses worsening shortness of breath and difficulty breathing  Patient noted to have multiple diffuse scattered pulmonary nodules on CT  Quantiferon gold negative 7/9/24    Plan  Completed Ceftriaxone x 5 days.  Tessalon Perles as needed    Abnormal CT of the chest  Assessment & Plan  CT chest: Re demonstration of multiple diffuse scattered nodules  Etiology unclear: HP vs sarcoid vs  vs MAC vs atypical  CRP- 143.7   Sed-61, negative: QuantiFERON  Procal: 0.27--0.30--0.38--0.40  7/17 Renal biopsy and bronchoscopy performed. Results pending.     Plan:  Per pulmonology recs, repeat CT chest in 6 weeks. If the nodularity fails to improve or progresses, may need to reconsider tissue biopsy.    Moderate persistent asthma w/out acute exacerbation  Assessment & Plan  Patient has PFTs ordered but they have not been completed.  Home medication regime Advair -21 mcg 2 puff twice daily, Proventil 2 puffs every 6 hours as needed  On 2 L via nasal cannula.  O2 sat 95 to 96%    Plan:  Supplemental oxygen as needed  DuoNebs every 6 hours, per pulmonology  Albuterol inhaler PRN  Continue benzonatate  Continue fluticasone-vilanterol  Continue loratadine 10 mg  Robitussin PRN    Abnormality of ascending aorta  Assessment & Plan  CT chest: Unchanged fusiform ectasia of the ascending thoracic aorta measuring up to 40 mm     Plan  Recommendation for follow-up low radiation dose chest CT in one year    Bipolar 1 disorder (HCC)  Assessment & Plan  Plan  Continue on Effexor, trazodone and Lamictal    Primary hypertension  Assessment & Plan  Hold Lopressor due to bradycardia  Hold Dyazide  Hydralazine PRN  Monitor vitals as per protocol    Dyslipidemia  Assessment & Plan  Continue on atorvastatin 20 Mg    Sepsis (HCC)-resolved as of 7/14/2024  Assessment & Plan    WBC   Date Value Ref Range Status   07/22/2024 18.05 (H) 4.31 - 10.16 Thousand/uL Final      Patient admitted with sepsis likely secondary to pneumonitis as evidenced by tachycardia, tachypnea and leukocytosis  CT: redemonstration of multiple diffuse scattered nodules, less pronounced than on 2024 suggestive of mycobacterial infection  QuantiFERON gold negative   Lactic acid normal, Procalcitonin trending up: 0.27 > 0.30 > .38 > .40  Sputum culture +2 gram negative rods, +1 gram positive cocci  BC:  staph epidermis detected-contaminated  Leukocytosis likely due to steroid use    Plan:  Completed Ceftriaxone x 5 days         VTE Pharmacologic Prophylaxis: VTE Score: 4 Moderate Risk (Score 3-4) - Pharmacological DVT Prophylaxis Ordered: heparin.    Mobility:   Basic Mobility Inpatient Raw Score: 18  JH-HLM Goal: 6: Walk 10 steps or more  JH-HLM Achieved: 2: Bed activities/Dependent transfer  JH-HLM Goal NOT achieved. Continue with multidisciplinary rounding and encourage appropriate mobility to improve upon JH-HLM goals.    Patient Centered Rounds: I performed bedside rounds with nursing staff today.   Discussions with Specialists or Other Care Team Provider: Nephrology, IR, pulmonology, Hematology    Education and Discussions with Family / Patient: Updated  (sister) via phone.    Current Length of Stay: 11 day(s)  Current Patient Status: Inpatient   Discharge Plan: Anticipate discharge in >72 hrs to home.    Code Status: Level 1 - Full Code    Subjective:   Patient seen and examined at bedside. No overnight events. Pt is being dialyzed currently via R permacath placed by IR yesterday. States she still feels short of breath, and has been on 2L supplemental oxygen. Will wean off oxygen as O2 sat is 95% on room air. Cough remains unchanged. PLEX treatment 4 today.     Objective:     Vitals:   Temp (24hrs), Av.7 °F (37.1 °C), Min:98.3 °F (36.8 °C), Max:100 °F (37.8 °C)    Temp:  [98.3 °F (36.8 °C)-100 °F (37.8 °C)] 100 °F (37.8 °C)  HR:  [] 96  Resp:  [14-20] 14  BP:  ()/(64-97) 168/83  SpO2:  [93 %-96 %] 93 %  Body mass index is 47.06 kg/m².     Input and Output Summary (last 24 hours):     Intake/Output Summary (Last 24 hours) at 7/23/2024 1516  Last data filed at 7/23/2024 1315  Gross per 24 hour   Intake 1680 ml   Output 3550 ml   Net -1870 ml       Physical Exam:   Physical Exam  Vitals and nursing note reviewed.   Constitutional:       Appearance: Normal appearance.   HENT:      Head: Normocephalic and atraumatic.      Mouth/Throat:      Mouth: Mucous membranes are moist.   Eyes:      Extraocular Movements: Extraocular movements intact.      Conjunctiva/sclera: Conjunctivae normal.      Pupils: Pupils are equal, round, and reactive to light.   Cardiovascular:      Rate and Rhythm: Normal rate and regular rhythm.   Pulmonary:      Effort: Pulmonary effort is normal. No respiratory distress.      Breath sounds: Normal breath sounds.      Comments: On supplemental oxygen via nasal cannula  Abdominal:      General: Bowel sounds are normal. There is no distension.      Palpations: Abdomen is soft.      Tenderness: There is no abdominal tenderness. There is no guarding.   Musculoskeletal:      Right lower leg: Edema present.      Left lower leg: Edema present.      Comments: Trace edema noted to BLE     Skin:     General: Skin is warm and dry.   Neurological:      General: No focal deficit present.      Mental Status: She is alert. Mental status is at baseline.        Additional Data:     Labs:  Results from last 7 days   Lab Units 07/23/24  0537 07/22/24  0429 07/21/24  0433 07/20/24  0606   WBC Thousand/uL 22.40*   < > 19.41* 17.94*   HEMOGLOBIN g/dL 8.9*   < > 8.9* 9.3*   HEMATOCRIT % 27.5*   < > 27.0* 29.4*   PLATELETS Thousands/uL 149   < > 202 228   BANDS PCT %  --   --   --  8   LYMPHO PCT %  --   --  2* 5*   MONO PCT %  --   --  9 4   EOS PCT %  --   --  0 0    < > = values in this interval not displayed.     Results from last 7 days   Lab Units 07/23/24  0537  07/22/24  0429 07/21/24  0433 07/20/24  0439   SODIUM mmol/L 138   < > 139 135   POTASSIUM mmol/L 4.4   < > 5.1 6.1*   CHLORIDE mmol/L 102   < > 105 101   CO2 mmol/L 24   < > 23 20*   BUN mg/dL 67*   < > 82* 122*   CREATININE mg/dL 6.34*   < > 7.23* 9.64*   ANION GAP mmol/L 12   < > 11 14*   CALCIUM mg/dL 9.3   < > 9.1 9.7   ALBUMIN g/dL  --   --  3.6 3.3*   TOTAL BILIRUBIN mg/dL  --   --   --  0.37   ALK PHOS U/L  --   --   --  56   ALT U/L  --   --   --  9   AST U/L  --   --   --  9*   GLUCOSE RANDOM mg/dL 151*   < > 138 170*    < > = values in this interval not displayed.     Results from last 7 days   Lab Units 07/22/24  0429   INR  1.18     Results from last 7 days   Lab Units 07/23/24  1101 07/23/24  0701 07/22/24  1538 07/22/24  1051 07/22/24  0707 07/21/24  2101 07/21/24  1518 07/21/24  1114 07/21/24  0612 07/20/24  2046 07/20/24  1104 07/20/24  0706   POC GLUCOSE mg/dl 123 135 158* 132 139 201* 170* 123 124 204* 193* 150*                   Lines/Drains:  Invasive Devices       Peripheral Intravenous Line  Duration             Peripheral IV 07/20/24 Right;Dorsal (posterior) Hand 3 days              Hemodialysis Catheter  Duration             HD Permanent Double Catheter 1 day                    Imaging: Reviewed radiology reports from this admission including: chest xray, chest CT scan, abdominal/pelvic CT, and ultrasound kidney and bladder  IR tunneled dialysis catheter placement   Final Result by Graciela Bettencourt MD (07/22 1018)      Conversion of right-sided internal jugular non-tunneled central venous catheter for a tunneled dialysis catheter, with tip in the expected location of the right atrium.      Plan:      The catheter may be used immediately.      Workstation performed: [id number]         IR biopsy kidney random   Final Result by Sandoval Castro MD (07/18 1358)   Impression: Successful percutaneous nontarget renal biopsy as described.                  Workstation performed: [id number]          IR temporary dialysis catheter placement   Final Result by Graciela Bettencourt MD (07/15 1640)      Insertion of right-sided non-tunneled dual-lumen temporary dialysis catheter, with tip in the expected location of the cavoatrial junction.      Plan:      The catheter may be used immediately.      Workstation performed: [id number]                  Recent Cultures (last 7 days):   Results from last 7 days   Lab Units 07/17/24  1258 07/17/24  1253   GRAM STAIN RESULT  1+ Polys  No bacteria seen 1+ Polys  No bacteria seen       Last 24 Hours Medication List:   Current Facility-Administered Medications   Medication Dose Route Frequency Provider Last Rate    acetaminophen  650 mg Oral Q6H PRN Tram Palacios PA-C      Albumin 5%  200 g Intravenous Daily Madonna Camacho DO Stopped (07/22/24 1011)    albuterol  2 puff Inhalation Q4H PRN Tram Palacios PA-C      atorvastatin  20 mg Oral Daily Tram Palacios PA-C      benzonatate  200 mg Oral TID PRN Tram Palacios PA-C      calcium carbonate-vitamin D  1 tablet Oral Daily With Breakfast Vane Estrada MD      calcium gluconate  1 g Intravenous Daily Madonna Camacho DO Stopped (07/22/24 1012)    cyclophosphamide  100 mg Oral Daily Tirso Lindsey Montez MD      dextromethorphan-guaiFENesin  10 mL Oral Q4H PRN Tram Palacios PA-C      famotidine  10 mg Oral Daily PRN Daya May DO      fluticasone-vilanterol  1 puff Inhalation Daily Tram Palacios PA-C      heparin (porcine)  5,000 Units Subcutaneous Q8H JACK Medina MD      hydrALAZINE  10 mg Intravenous Q6H PRN Yasmine Meadows MD      insulin lispro  2-12 Units Subcutaneous TID AC Yasmine Meadows MD      ipratropium-albuterol  3 mL Nebulization Q6H Luke Montez MD      iron polysaccharides  150 mg Oral Daily Ata Burns MD      lamoTRIgine  100 mg Oral QAM Tram Palacios PA-C      loratadine  10 mg Oral Daily Tram Palacios PA-C      melatonin  3 mg Oral Daily PRN Yasmine Meadows MD      montelukast  10 mg Oral Daily Tram Palacios  DARRICK      [START ON 7/24/2024] NIFEdipine  30 mg Oral Daily Joselyn Reyes Bahamonde, MD      ondansetron  4 mg Intravenous Q6H PRN Yasmine Meadows MD      oxybutynin  5 mg Oral Daily Tram Palacios PA-C      pantoprazole  40 mg Oral Daily Before Breakfast Ronny Webster MD      polyethylene glycol  17 g Oral Daily Xavier Medina MD      predniSONE  60 mg Oral Daily Vane Estrada MD      senna  1 tablet Oral HS Xavier Medina MD      sevelamer  1,600 mg Oral TID With Meals Vane Estrada MD      Sodium Zirconium Cyclosilicate  10 g Oral Daily Tirso Montez MD      [START ON 7/24/2024] sulfamethoxazole-trimethoprim  1 tablet Oral Once per day on Monday Wednesday Friday Joselyn Reyes Bahamonde, MD      torsemide  80 mg Oral Daily Joselyn Reyes Bahamonde, MD      traZODone  200 mg Oral HS Tram Palacios PA-C      venlafaxine  150 mg Oral Daily Tram Palacios PA-C          Today, Patient Was Seen By: Jessica Beckford MD    **Please Note: This note may have been constructed using a voice recognition system.**

## 2024-07-23 NOTE — PLAN OF CARE
Plan:1500 ml as tolerated,goal discussed with Dr. Reyes,      Post-Dialysis RN Treatment Note    Blood Pressure:  Pre 163/84 mm/Hg  Post 156/97 mmHg   EDW  TBD kg    Weight:  Pre 121 kg   Post 119.5 kg   Mode of weight measurement: Standing Scale   Volume Removed  1500 ml    Treatment duration 180 minutes    NS given  No    Treatment shortened? No   Medications given during Rx Not Applicable   Estimated Kt/V  Not Applicable   Access type: Permacath/TDC   Access Issues: No    Report called to primary nurse   Yes / Yulissa Sosa RN         3 hrs, 3K bath  Problem: METABOLIC, FLUID AND ELECTROLYTES - ADULT  Goal: Electrolytes maintained within normal limits  Description: INTERVENTIONS:  - Monitor labs and assess patient for signs and symptoms of electrolyte imbalances  - Administer electrolyte replacement as ordered  - Monitor response to electrolyte replacements, including repeat lab results as appropriate  - Instruct patient on fluid and nutrition as appropriate  Outcome: Progressing  Goal: Fluid balance maintained  Description: INTERVENTIONS:  - Monitor labs   - Monitor I/O and WT  - Instruct patient on fluid and nutrition as appropriate  - Assess for signs & symptoms of volume excess or deficit  Outcome: Progressing

## 2024-07-23 NOTE — PROGRESS NOTES
NEPHROLOGY PROGRESS NOTE   Ngozi Beard 64 y.o. female MRN: [id number]  Unit/Bed#: S -01 Encounter: [id number]  Reason for Consult: ANGELICA    ASSESSMENT AND PLAN:  65 yo woman with new diagnosis of anti-GBM disease 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  Nephrology is consulted for management of ANGELICA     Plan:     # Oligoanuric KDIGO ANGELICA stage 3 with no evidence of kidney recovery yet  Etiology: Unlikely to anti-GBM disease with crescentic GN  Baseline creatinine 1 mg/dL  Current creatinine: Dialysis dependent  Currently on dialysis, well-tolerated  UA: Microhematuria, proteinuria  UPCR 5.8 g/g  Treatment:  Will continue HD TTS.  Patient has a chair and under sound outpatient dialysis unit  Maintain MAP:  Over 65 mmHg if possible/avoid hypoperfusion:  Hold parameters on blood pressure medications  Avoid nephrotoxic agents such as NSAIDs, and IV contrast if possible. Avoid opioids   Adjust medications to GFR  I am trying to set up outpatient plasmapheresis unfortunately I am not sure if patient will be able to be discharged Thursday      # Anti-GBM disease  Anti-GBM>8  Pending follow-up anti-GBM  No pulmonary involvement at this time  Daily plasmapheresis until anti-GBM are negative for at least 14 days  Setting up outpatient plasmapheresis        # Immunosuppression therapy  Methylprednisolone x 3, completed  Currently on prednisone 60 mg,   Cytoxan 100 mg daily adjusted by kidney function and age.  Plan to continue for 3 months     # Prophylaxis  Calcium and vitamin D  Pantoprazole 40  Discontinue atovaquone and neck start Bactrim 1 tablet 3 times a week        #Volume status/hypertension:  Volume: Fluid overload with lower extremity edema  Blood pressure: Hypertensive, /84, goal less than 140/90  Recommend:  UF on HD  Increase torsemide to 80 mg daily  Nifedipine 30 mg daily tomorrow      # Anemia   Hemoglobin 8.9 mg/dL,   No RISHI for now, transfusion if hemoglobin less than 7      #Acid-base  Disorder  serum HCO3  24  At goal     HEMODIALYSIS PROCEDURE NOTE  The patient was seen and examined on hemodialysis. The patient is tolerating the procedure well.  Time: 3hours  Sodium: 135 Blood flow: 350   Dialyzer: F160 Potassium: 3 Dialysate flow: 600   Access: permcath Bicarbonate: 35 Ultrafiltration goal: 1.5L   Medications on HD: none     The highlighted and/or bolded points in my assessment, plan, and disposition were discussed with the primary team and they agree with those points and the plan.  Previous records were personally reviewed by me to obtain a baseline creatinine.   The images (CXR) were personally reviewed by me in PACS      SUBJECTIVE:  Patient seen and examined at bedside during dialysis, well-tolerated.  No shortness of breath, no chest pain     OBJECTIVE:  Current Weight: Weight - Scale: 121 kg (265 lb 10.5 oz)  Vitals:    07/23/24 1030   BP: 149/92   Pulse: 85   Resp: 16   Temp:    SpO2:        Intake/Output Summary (Last 24 hours) at 7/23/2024 1128  Last data filed at 7/23/2024 0919  Gross per 24 hour   Intake 1140 ml   Output 1550 ml   Net -410 ml     Wt Readings from Last 3 Encounters:   07/23/24 121 kg (265 lb 10.5 oz)   07/10/24 109 kg (241 lb)   07/01/24 111 kg (244 lb 6.4 oz)     Temp Readings from Last 3 Encounters:   07/23/24 98.5 °F (36.9 °C)   07/10/24 99.4 °F (37.4 °C) (Oral)   07/01/24 98.2 °F (36.8 °C) (Tympanic)     BP Readings from Last 3 Encounters:   07/23/24 149/92   07/12/24 127/59   07/01/24 124/80     Pulse Readings from Last 3 Encounters:   07/23/24 85   07/12/24 72   07/01/24 62        General:  no acute distress at this time  Skin:  No acute rash  Eyes:  No scleral icterus and noninjected  ENT:  mucous membranes moist  Neck:  no carotid bruits  Chest:  Clear to auscultation percussion, good respiratory effort, no use of accessory respiratory muscles  CVS:  Regular rate and rhythm without rub   Abdomen:  soft and nontender   Extremities:lower extremity edema  Neuro:   No gross focality  Psych:  Alert , cooperative   Dialysis access: PermCath      Medications:    Current Facility-Administered Medications:     acetaminophen (TYLENOL) tablet 650 mg, 650 mg, Oral, Q6H PRN, Tram Palacios PA-C, 650 mg at 07/19/24 1302    albumin human (FLEXBUMIN) 5 % injection 200 g, 200 g, Intravenous, Daily, Madonna Camacho DO, Held at 07/22/24 1011    albuterol (PROVENTIL HFA,VENTOLIN HFA) inhaler 2 puff, 2 puff, Inhalation, Q4H PRN, Tram Palacios PA-C, 2 puff at 07/23/24 1114    atorvastatin (LIPITOR) tablet 20 mg, 20 mg, Oral, Daily, Tram Palacios PA-C, 20 mg at 07/23/24 1117    atovaquone (MEPRON) oral suspension 1,500 mg, 1,500 mg, Oral, Daily With Breakfast, Vane Estrada MD, 1,500 mg at 07/23/24 1122    benzonatate (TESSALON PERLES) capsule 200 mg, 200 mg, Oral, TID PRN, Tram Palacios PA-C    calcium carbonate-vitamin D 500 mg-5 mcg tablet 1 tablet, 1 tablet, Oral, Daily With Breakfast, Vane Estrada MD, 1 tablet at 07/23/24 1127    calcium gluconate 1 g in sodium chloride 0.9% 50 mL (premix), 1 g, Intravenous, Daily, Madonna Camacho DO, Held at 07/22/24 1012    cyclophosphamide (CYTOXAN) capsule 100 mg, 100 mg, Oral, Daily, Tirso Montez MD, 100 mg at 07/22/24 2121    dextromethorphan-guaiFENesin (ROBITUSSIN DM) oral syrup 10 mL, 10 mL, Oral, Q4H PRN, Tram Palacios PA-C    famotidine (PEPCID) tablet 10 mg, 10 mg, Oral, Daily PRN, Daya May DO, 10 mg at 07/19/24 1305    fluticasone-vilanterol 200-25 mcg/actuation 1 puff, 1 puff, Inhalation, Daily, Tram Palacios PA-C, 1 puff at 07/23/24 1113    heparin (porcine) subcutaneous injection 5,000 Units, 5,000 Units, Subcutaneous, Q8H JACK, Xavier Medina MD, 5,000 Units at 07/23/24 0524    hydrALAZINE (APRESOLINE) injection 10 mg, 10 mg, Intravenous, Q6H PRN, Yasmine Meadows MD, 10 mg at 07/20/24 2131    insulin lispro (HumALOG/ADMELOG) 100 units/mL subcutaneous injection 2-12 Units, 2-12 Units, Subcutaneous, TID AC, 2 Units at  07/22/24 1609 **AND** Fingerstick Glucose (POCT), , , TID AC, Yasmine Meadows MD    ipratropium-albuterol (DUO-NEB) 0.5-2.5 mg/3 mL inhalation solution 3 mL, 3 mL, Nebulization, Q6H, Luke Montez MD, 3 mL at 07/23/24 0743    iron polysaccharides (FERREX) capsule 150 mg, 150 mg, Oral, Daily, Ata Burns MD, 150 mg at 07/23/24 1119    lamoTRIgine (LaMICtal) tablet 100 mg, 100 mg, Oral, QAM, Tram Palacios PA-C, 100 mg at 07/23/24 1117    loratadine (CLARITIN) tablet 10 mg, 10 mg, Oral, Daily, Tram Palacios PA-C, 10 mg at 07/23/24 1117    melatonin tablet 3 mg, 3 mg, Oral, Daily PRN, Yasmine Meadows MD, 3 mg at 07/21/24 2111    montelukast (SINGULAIR) tablet 10 mg, 10 mg, Oral, Daily, Tram Palacios PA-C, 10 mg at 07/23/24 1120    ondansetron (ZOFRAN) injection 4 mg, 4 mg, Intravenous, Q6H PRN, Yasmine Meadows MD, 4 mg at 07/21/24 1840    oxybutynin (DITROPAN-XL) 24 hr tablet 5 mg, 5 mg, Oral, Daily, Tram Palacios PA-C, 5 mg at 07/23/24 1121    pantoprazole (PROTONIX) EC tablet 40 mg, 40 mg, Oral, Daily Before Breakfast, Ronny Webster MD, 40 mg at 07/23/24 0524    polyethylene glycol (MIRALAX) packet 17 g, 17 g, Oral, Daily, Xavier Medina MD, 17 g at 07/23/24 1112    predniSONE tablet 60 mg, 60 mg, Oral, Daily, Vane Estrada MD, 60 mg at 07/23/24 1118    senna (SENOKOT) tablet 8.6 mg, 1 tablet, Oral, HS, Xavier Medina MD, 8.6 mg at 07/22/24 2210    sevelamer (RENAGEL) tablet 1,600 mg, 1,600 mg, Oral, TID With Meals, Vane Estrada MD, 1,600 mg at 07/23/24 1127    Sodium Zirconium Cyclosilicate (Lokelma) 10 g, 10 g, Oral, Daily, Tirso Montez MD, 10 g at 07/23/24 1115    torsemide (DEMADEX) tablet 80 mg, 80 mg, Oral, Daily, Joselyn Reyes Bahamonde, MD, 80 mg at 07/23/24 1118    traZODone (DESYREL) tablet 200 mg, 200 mg, Oral, HS, Tram Palacios PA-C, 200 mg at 07/22/24 2120    venlafaxine (EFFEXOR-XR) 24 hr capsule 150 mg, 150 mg, Oral, Daily, Tram Palacios PA-C, 150 mg at 07/23/24 1117    Laboratory  Results:  Results from last 7 days   Lab Units 07/23/24  0537 07/22/24  0429 07/21/24  0433 07/20/24  0606 07/20/24  0439 07/19/24  0400 07/18/24  0440 07/17/24  2132 07/17/24 0447   WBC Thousand/uL 22.40* 18.05* 19.41* 17.94*  --  15.22* 12.75* 10.69* 11.53*   HEMOGLOBIN g/dL 8.9* 8.9* 8.9* 9.3*  --  9.9* 9.5* 9.4* 9.6*   HEMATOCRIT % 27.5* 28.4* 27.0* 29.4*  --  30.3* 29.7* 28.8* 30.1*   PLATELETS Thousands/uL 149 179 202 228  --  256 264 250 283   SODIUM mmol/L 138 140 139  --  135 134* 135  --  137   POTASSIUM mmol/L 4.4 5.5* 5.1  --  6.1* 5.1 5.4*  --  5.0   CHLORIDE mmol/L 102 107 105  --  101 101 100  --  102   CO2 mmol/L 24 20* 23  --  20* 21 22  --  21   BUN mg/dL 67* 101* 82*  --  122* 94* 92*  --  126*   CREATININE mg/dL 6.34* 8.53* 7.23*  --  9.64* 8.65* 7.88*  --  10.37*   CALCIUM mg/dL 9.3 9.3 9.1  --  9.7 9.6 9.4  --  9.3   MAGNESIUM mg/dL  --   --   --   --  2.5 2.3 2.3  --  2.5   PHOSPHORUS mg/dL  --  8.4* 7.6*  --  9.5* 8.2* 8.6*  --  9.4*       IR tunneled dialysis catheter placement   Final Result by Graciela Bettencourt MD (07/22 1018)      Conversion of right-sided internal jugular non-tunneled central venous catheter for a tunneled dialysis catheter, with tip in the expected location of the right atrium.      Plan:      The catheter may be used immediately.      Workstation performed: [id number]         IR biopsy kidney random   Final Result by Sandoval Castro MD (07/18 8368)   Impression: Successful percutaneous nontarget renal biopsy as described.                  Workstation performed: [id number]         IR temporary dialysis catheter placement   Final Result by Graciela Bettencourt MD (07/15 1640)      Insertion of right-sided non-tunneled dual-lumen temporary dialysis catheter, with tip in the expected location of the cavoatrial junction.      Plan:      The catheter may be used immediately.      Workstation performed: [id number]             Portions of the record may have been created with  "voice recognition software. Occasional wrong word or \"sound a like\" substitutions may have occurred due to the inherent limitations of voice recognition software. Read the chart carefully and recognize, using context, where substitutions have occurred.    "

## 2024-07-23 NOTE — RESPIRATORY THERAPY NOTE
07/23/24 0154   Respiratory Assessment   Resp Comments pt asked to skip 2am tx, stated she felt good at this moment and that she wanted to forgo the tx. Pt sounded good on auscultation no wheezing heard pre or post expiration.

## 2024-07-23 NOTE — PLAN OF CARE
Problem: PAIN - ADULT  Goal: Verbalizes/displays adequate comfort level or baseline comfort level  Description: Interventions:  - Encourage patient to monitor pain and request assistance  - Assess pain using appropriate pain scale  - Administer analgesics based on type and severity of pain and evaluate response  - Implement non-pharmacological measures as appropriate and evaluate response  - Consider cultural and social influences on pain and pain management  - Notify physician/advanced practitioner if interventions unsuccessful or patient reports new pain  Outcome: Progressing     Problem: INFECTION - ADULT  Goal: Absence or prevention of progression during hospitalization  Description: INTERVENTIONS:  - Assess and monitor for signs and symptoms of infection  - Monitor lab/diagnostic results  - Monitor all insertion sites, i.e. indwelling lines, tubes, and drains  - Monitor endotracheal if appropriate and nasal secretions for changes in amount and color  - [place] appropriate cooling/warming therapies per order  - Administer medications as ordered  - Instruct and encourage patient and family to use good hand hygiene technique  - Identify and instruct in appropriate isolation precautions for identified infection/condition  Outcome: Progressing  Goal: Absence of fever/infection during neutropenic period  Description: INTERVENTIONS:  - Monitor WBC    Outcome: Progressing     Problem: SAFETY ADULT  Goal: Patient will remain free of falls  Description: INTERVENTIONS:  - Educate patient/family on patient safety including physical limitations  - Instruct patient to call for assistance with activity   - Consult OT/PT to assist with strengthening/mobility   - Keep Call bell within reach  - Keep bed low and locked with side rails adjusted as appropriate  - Keep care items and personal belongings within reach  - Initiate and maintain comfort rounds  - Make Fall Risk Sign visible to staff  - Offer Toileting every 2 Hours,  in advance of need  - Initiate/Maintain bed alarm  - Obtain necessary fall risk management equipment  - Apply yellow socks and bracelet for high fall risk patients  - Consider moving patient to room near nurses station  Outcome: Progressing  Goal: Maintain or return to baseline ADL function  Description: INTERVENTIONS:  -  Assess patient's ability to carry out ADLs; assess patient's baseline for ADL function and identify physical deficits which impact ability to perform ADLs (bathing, care of mouth/teeth, toileting, grooming, dressing, etc.)  - Assess/evaluate cause of self-care deficits   - Assess range of motion  - Assess patient's mobility; develop plan if impaired  - Assess patient's need for assistive devices and provide as appropriate  - Encourage maximum independence but intervene and supervise when necessary  - Involve family in performance of ADLs  - Assess for home care needs following discharge   - Consider OT consult to assist with ADL evaluation and planning for discharge  - Provide patient education as appropriate  Outcome: Progressing  Goal: Maintains/Returns to pre admission functional level  Description: INTERVENTIONS:  - Perform AM-PAC 6 Click Basic Mobility/ Daily Activity assessment daily.  - Set and communicate daily mobility goal to care team and patient/family/caregiver.   - Collaborate with rehabilitation services on mobility goals if consulted  - Perform Range of Motion 2 times a day.  - Reposition patient every 2 hours.  - Dangle patient 2 times a day  - Stand patient 2 times a day  - Ambulate patient 3 times a day  - Out of bed to chair 3 times a day   - Out of bed for meals 3 times a day  - Out of bed for toileting  - Record patient progress and toleration of activity level   Outcome: Progressing     Problem: DISCHARGE PLANNING  Goal: Discharge to home or other facility with appropriate resources  Description: INTERVENTIONS:  - Identify barriers to discharge w/patient and caregiver  -  Arrange for needed discharge resources and transportation as appropriate  - Identify discharge learning needs (meds, wound care, etc.)  - Arrange for interpretive services to assist at discharge as needed  - Refer to Case Management Department for coordinating discharge planning if the patient needs post-hospital services based on physician/advanced practitioner order or complex needs related to functional status, cognitive ability, or social support system  Outcome: Progressing     Problem: Knowledge Deficit  Goal: Patient/family/caregiver demonstrates understanding of disease process, treatment plan, medications, and discharge instructions  Description: Complete learning assessment and assess knowledge base.  Interventions:  - Provide teaching at level of understanding  - Provide teaching via preferred learning methods  Outcome: Progressing     Problem: METABOLIC, FLUID AND ELECTROLYTES - ADULT  Goal: Electrolytes maintained within normal limits  Description: INTERVENTIONS:  - Monitor labs and assess patient for signs and symptoms of electrolyte imbalances  - Administer electrolyte replacement as ordered  - Monitor response to electrolyte replacements, including repeat lab results as appropriate  - Instruct patient on fluid and nutrition as appropriate  Outcome: Progressing  Goal: Fluid balance maintained  Description: INTERVENTIONS:  - Monitor labs   - Monitor I/O and WT  - Instruct patient on fluid and nutrition as appropriate  - Assess for signs & symptoms of volume excess or deficit  Outcome: Progressing     Problem: Nutrition/Hydration-ADULT  Goal: Nutrient/Hydration intake appropriate for improving, restoring or maintaining nutritional needs  Description: Monitor and assess patient's nutrition/hydration status for malnutrition. Collaborate with interdisciplinary team and initiate plan and interventions as ordered.  Monitor patient's weight and dietary intake as ordered or per policy. Utilize nutrition  screening tool and intervene as necessary. Determine patient's food preferences and provide high-protein, high-caloric foods as appropriate.     INTERVENTIONS:  - Monitor oral intake, urinary output, labs, and treatment plans  - Assess nutrition and hydration status and recommend course of action  - Evaluate amount of meals eaten  - Assist patient with eating if necessary   - Allow adequate time for meals  - Recommend/ encourage appropriate diets, oral nutritional supplements, and vitamin/mineral supplements  - Order, calculate, and assess calorie counts as needed  - Recommend, monitor, and adjust tube feedings and TPN/PPN based on assessed needs  - Assess need for intravenous fluids  - Provide specific nutrition/hydration education as appropriate  - Include patient/family/caregiver in decisions related to nutrition  Outcome: Progressing     Problem: Prexisting or High Potential for Compromised Skin Integrity  Goal: Skin integrity is maintained or improved  Description: INTERVENTIONS:  - Identify patients at risk for skin breakdown  - Assess and monitor skin integrity  - Assess and monitor nutrition and hydration status  - Monitor labs   - Assess for incontinence   - Turn and reposition patient  - Assist with mobility/ambulation  - Relieve pressure over bony prominences  - Avoid friction and shearing  - Provide appropriate hygiene as needed including keeping skin clean and dry  - Evaluate need for skin moisturizer/barrier cream  - Collaborate with interdisciplinary team   - Patient/family teaching  - Consider wound care consult   Outcome: Progressing

## 2024-07-24 LAB
ERYTHROCYTE [DISTWIDTH] IN BLOOD BY AUTOMATED COUNT: 15.9 % (ref 11.6–15.1)
FIBRINOGEN PPP-MCNC: 128 MG/DL (ref 207–520)
GLUCOSE SERPL-MCNC: 137 MG/DL (ref 65–140)
GLUCOSE SERPL-MCNC: 143 MG/DL (ref 65–140)
GLUCOSE SERPL-MCNC: 167 MG/DL (ref 65–140)
GLUCOSE SERPL-MCNC: 204 MG/DL (ref 65–140)
HCT VFR BLD AUTO: 25.7 % (ref 34.8–46.1)
HGB BLD-MCNC: 8.3 G/DL (ref 11.5–15.4)
MCH RBC QN AUTO: 27.7 PG (ref 26.8–34.3)
MCHC RBC AUTO-ENTMCNC: 32.3 G/DL (ref 31.4–37.4)
MCV RBC AUTO: 86 FL (ref 82–98)
PLATELET # BLD AUTO: 143 THOUSANDS/UL (ref 149–390)
PMV BLD AUTO: 10.7 FL (ref 8.9–12.7)
RBC # BLD AUTO: 3 MILLION/UL (ref 3.81–5.12)
WBC # BLD AUTO: 24.77 THOUSAND/UL (ref 4.31–10.16)

## 2024-07-24 PROCEDURE — 94640 AIRWAY INHALATION TREATMENT: CPT

## 2024-07-24 PROCEDURE — 85027 COMPLETE CBC AUTOMATED: CPT

## 2024-07-24 PROCEDURE — 85384 FIBRINOGEN ACTIVITY: CPT | Performed by: INTERNAL MEDICINE

## 2024-07-24 PROCEDURE — 82948 REAGENT STRIP/BLOOD GLUCOSE: CPT

## 2024-07-24 PROCEDURE — 94760 N-INVAS EAR/PLS OXIMETRY 1: CPT

## 2024-07-24 PROCEDURE — 99233 SBSQ HOSP IP/OBS HIGH 50: CPT | Performed by: STUDENT IN AN ORGANIZED HEALTH CARE EDUCATION/TRAINING PROGRAM

## 2024-07-24 PROCEDURE — 99232 SBSQ HOSP IP/OBS MODERATE 35: CPT | Performed by: HOSPITALIST

## 2024-07-24 RX ORDER — TORSEMIDE 100 MG/1
100 TABLET ORAL DAILY
Status: DISCONTINUED | OUTPATIENT
Start: 2024-07-25 | End: 2024-07-29

## 2024-07-24 RX ORDER — PREDNISONE 20 MG/1
40 TABLET ORAL DAILY
Status: COMPLETED | OUTPATIENT
Start: 2024-07-27 | End: 2024-08-02

## 2024-07-24 RX ORDER — PREDNISONE 20 MG/1
60 TABLET ORAL DAILY
Status: COMPLETED | OUTPATIENT
Start: 2024-07-25 | End: 2024-07-26

## 2024-07-24 RX ORDER — DOCUSATE SODIUM 100 MG/1
100 CAPSULE, LIQUID FILLED ORAL 2 TIMES DAILY PRN
Status: DISCONTINUED | OUTPATIENT
Start: 2024-07-24 | End: 2024-07-25

## 2024-07-24 RX ORDER — BISACODYL 10 MG
10 SUPPOSITORY, RECTAL RECTAL DAILY PRN
Status: DISCONTINUED | OUTPATIENT
Start: 2024-07-24 | End: 2024-08-04 | Stop reason: HOSPADM

## 2024-07-24 RX ORDER — ALBUMIN, HUMAN INJ 5% 5 %
200 SOLUTION INTRAVENOUS DAILY
Status: DISCONTINUED | OUTPATIENT
Start: 2024-07-24 | End: 2024-07-25 | Stop reason: SDUPTHER

## 2024-07-24 RX ADMIN — HEPARIN SODIUM 5000 UNITS: 5000 INJECTION INTRAVENOUS; SUBCUTANEOUS at 21:22

## 2024-07-24 RX ADMIN — FLUTICASONE FUROATE AND VILANTEROL TRIFENATATE 1 PUFF: 200; 25 POWDER RESPIRATORY (INHALATION) at 07:03

## 2024-07-24 RX ADMIN — IPRATROPIUM BROMIDE AND ALBUTEROL SULFATE 3 ML: 2.5; .5 SOLUTION RESPIRATORY (INHALATION) at 02:40

## 2024-07-24 RX ADMIN — SODIUM ZIRCONIUM CYCLOSILICATE 10 G: 10 POWDER, FOR SUSPENSION ORAL at 08:39

## 2024-07-24 RX ADMIN — PANTOPRAZOLE SODIUM 40 MG: 40 TABLET, DELAYED RELEASE ORAL at 06:49

## 2024-07-24 RX ADMIN — HEPARIN SODIUM 5000 UNITS: 5000 INJECTION INTRAVENOUS; SUBCUTANEOUS at 06:49

## 2024-07-24 RX ADMIN — OXYBUTYNIN CHLORIDE 5 MG: 5 TABLET, EXTENDED RELEASE ORAL at 08:37

## 2024-07-24 RX ADMIN — IPRATROPIUM BROMIDE AND ALBUTEROL SULFATE 3 ML: 2.5; .5 SOLUTION RESPIRATORY (INHALATION) at 13:17

## 2024-07-24 RX ADMIN — LAMOTRIGINE 100 MG: 100 TABLET ORAL at 08:38

## 2024-07-24 RX ADMIN — SEVELAMER HYDROCHLORIDE 1600 MG: 800 TABLET ORAL at 11:48

## 2024-07-24 RX ADMIN — POLYSACCHARIDE-IRON COMPLEX 150 MG: 150 CAPSULE ORAL at 08:36

## 2024-07-24 RX ADMIN — ATORVASTATIN CALCIUM 20 MG: 20 TABLET, FILM COATED ORAL at 08:38

## 2024-07-24 RX ADMIN — CALCIUM GLUCONATE 1 G: 20 INJECTION, SOLUTION INTRAVENOUS at 08:39

## 2024-07-24 RX ADMIN — ALBUMIN (HUMAN) 200 G: 12.5 INJECTION, SOLUTION INTRAVENOUS at 08:40

## 2024-07-24 RX ADMIN — SEVELAMER HYDROCHLORIDE 1600 MG: 800 TABLET ORAL at 15:53

## 2024-07-24 RX ADMIN — SULFAMETHOXAZOLE AND TRIMETHOPRIM 1 TABLET: 400; 80 TABLET ORAL at 08:36

## 2024-07-24 RX ADMIN — NIFEDIPINE 30 MG: 30 TABLET, EXTENDED RELEASE ORAL at 08:37

## 2024-07-24 RX ADMIN — POLYETHYLENE GLYCOL 3350 17 G: 17 POWDER, FOR SOLUTION ORAL at 08:35

## 2024-07-24 RX ADMIN — MONTELUKAST 10 MG: 10 TABLET, FILM COATED ORAL at 08:37

## 2024-07-24 RX ADMIN — TORSEMIDE 80 MG: 20 TABLET ORAL at 08:37

## 2024-07-24 RX ADMIN — TRAZODONE HYDROCHLORIDE 200 MG: 100 TABLET ORAL at 21:22

## 2024-07-24 RX ADMIN — VENLAFAXINE HYDROCHLORIDE 150 MG: 150 CAPSULE, EXTENDED RELEASE ORAL at 08:40

## 2024-07-24 RX ADMIN — IPRATROPIUM BROMIDE AND ALBUTEROL SULFATE 3 ML: 2.5; .5 SOLUTION RESPIRATORY (INHALATION) at 07:03

## 2024-07-24 RX ADMIN — LORATADINE 10 MG: 10 TABLET ORAL at 08:38

## 2024-07-24 RX ADMIN — Medication 1 TABLET: at 07:28

## 2024-07-24 RX ADMIN — CYCLOPHOSPHAMIDE 100 MG: 50 CAPSULE ORAL at 20:22

## 2024-07-24 RX ADMIN — HEPARIN SODIUM 5000 UNITS: 5000 INJECTION INTRAVENOUS; SUBCUTANEOUS at 13:47

## 2024-07-24 RX ADMIN — SEVELAMER HYDROCHLORIDE 1600 MG: 800 TABLET ORAL at 07:28

## 2024-07-24 RX ADMIN — PREDNISONE 60 MG: 20 TABLET ORAL at 08:39

## 2024-07-24 RX ADMIN — SENNOSIDES 8.6 MG: 8.6 TABLET, FILM COATED ORAL at 21:22

## 2024-07-24 RX ADMIN — IPRATROPIUM BROMIDE AND ALBUTEROL SULFATE 3 ML: 2.5; .5 SOLUTION RESPIRATORY (INHALATION) at 19:36

## 2024-07-24 RX ADMIN — INSULIN LISPRO 2 UNITS: 100 INJECTION, SOLUTION INTRAVENOUS; SUBCUTANEOUS at 16:21

## 2024-07-24 NOTE — QUICK NOTE
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    Hematology/oncology will sign off at this time if any additional questions please feel free to reach out.  Thank you for consultation.

## 2024-07-24 NOTE — PROGRESS NOTES
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 Given the lack of hydronephrosis this could either reflect adjacent calcification or nonobstructing stone.   Patient reports that she is still producing urine  US kidney and bladder: No hydronephrosis. 4 mm nonobstructing left intrarenal calculus  Urinalysis: 3+ blood.  3+ protein.  Innumerable red blood cells.  2-4 WBCs.  Moderate bacteria   AURA, C3, and C4 normal  Ig Kappa Free Light Chain:152.5   Ig Lambda Free Light Chain 87.1   Kappa/Lambda FluidC Ratio 1.75  Hepatitis panel nonreactive  HIV nonreactive  QuantiFERON gold negative  protein electrophoresis see labs  hypersensitivity pneumonitis profile negative  GBM antibodies: elevated >8  phospholipase A2 receptor Ab: Negative  ANCA: negative   7/17 Renal biopsy and bronchoscopy performed.  7/19 CM confirmed HD chair time for patient. Will be TTS 10:30 AM with start date Tuesday 7/23.   Bronchial lavage: Right- Negative for malignancy. Abundant pulmonary macrophages. Few benign bronchial cells. Left- Negative for malignancy, pulmonary macrophage, mixed inflammatory cells, Few benign bronchial cells  7/22 R permacath placed by IR. Renal bx: diffuse crescentic glomerulonephritis, Anti-GMB type      Plan  Nephrology onboard - dialysis initiated  Hematology onboard for plan for PLEX - session 5  Continue Prednisone 60 mg daily then begin taper per nephrology  Cyclophosphamide 100 mg daily   D/c atovaquone  Start  bactrim MWF  Monitor blood glucose levels  Hold home dyazide    Persistent cough  Assessment & Plan  Patient endorses worsening shortness of breath and difficulty breathing  Patient noted to have multiple diffuse scattered pulmonary nodules on CT  Quantiferon gold negative 7/9/24    Plan  Completed Ceftriaxone x 5 days.  Tessalon Perles as needed    Abnormal CT of the chest  Assessment & Plan  CT chest: Re demonstration of multiple diffuse scattered nodules  Etiology unclear: HP vs sarcoid vs  vs MAC vs atypical  CRP- 143.7   Sed-61, negative: QuantiFERON  Procal: 0.27--0.30--0.38--0.40  7/17 Renal biopsy and bronchoscopy performed. Results pending.     Plan:  Per pulmonology recs, repeat CT chest in 6 weeks. If the nodularity fails to improve or progresses, may need to reconsider tissue biopsy.    Moderate persistent asthma w/out acute exacerbation  Assessment & Plan  Patient has PFTs ordered but they have not been completed.  Home medication regime Advair -21 mcg 2 puff twice daily, Proventil 2 puffs every 6 hours as needed  On 2 L via nasal cannula.  O2 sat 95 to 96%    Plan:  Supplemental oxygen as needed  DuoNebs every 6 hours, per pulmonology  Albuterol inhaler PRN  Continue benzonatate  Continue fluticasone-vilanterol  Continue loratadine 10 mg  Robitussin PRN    Abnormality of ascending aorta  Assessment & Plan  CT chest: Unchanged fusiform ectasia of the ascending thoracic aorta measuring up to 40 mm     Plan  Recommendation for follow-up low radiation dose chest CT in one year    Bipolar 1 disorder (HCC)  Assessment & Plan  Plan  Continue on Effexor, trazodone and Lamictal    Primary hypertension  Assessment & Plan  Hold Lopressor due to bradycardia  Hold Dyazide  Nifedipine 30 mg daily  Hydralazine PRN  Monitor vitals as per protocol    Dyslipidemia  Assessment & Plan  Continue on atorvastatin 20 Mg    Sepsis (HCC)-resolved as of 7/14/2024  Assessment & Plan    WBC   Date Value Ref Range Status    2024 24.77 (H) 4.31 - 10.16 Thousand/uL Final     Patient admitted with sepsis likely secondary to pneumonitis as evidenced by tachycardia, tachypnea and leukocytosis  CT: redemonstration of multiple diffuse scattered nodules, less pronounced than on 2024 suggestive of mycobacterial infection  QuantiFERON gold negative   Lactic acid normal, Procalcitonin trending up: 0.27 > 0.30 > .38 > .40  Sputum culture +2 gram negative rods, +1 gram positive cocci  BC:  staph epidermis detected-contaminated  Leukocytosis likely due to steroid use    Plan:  Completed Ceftriaxone x 5 days         VTE Pharmacologic Prophylaxis: VTE Score: 4 Moderate Risk (Score 3-4) - Pharmacological DVT Prophylaxis Ordered: heparin.    Mobility:   Basic Mobility Inpatient Raw Score: 18  JH-HLM Goal: 6: Walk 10 steps or more  JH-HLM Achieved: 7: Walk 25 feet or more  JH-HLM Goal NOT achieved. Continue with multidisciplinary rounding and encourage appropriate mobility to improve upon JH-HLM goals.    Patient Centered Rounds: I performed bedside rounds with nursing staff today.   Discussions with Specialists or Other Care Team Provider: Nephrology, IR, pulmonology, Hematology    Education and Discussions with Family / Patient: Attempted to update  (sister) via phone. Left voicemail.     Current Length of Stay: 12 day(s)  Current Patient Status: Inpatient   Discharge Plan: Anticipate discharge in >72 hrs to home.    Code Status: Level 1 - Full Code    Subjective:   Patient seen and examined at bedside. No overnight events. Patient is currently receiving 5th PLEX treatment. She is saturating well on room air. Cough remains unchanged. She is otherwise without complaints. She endorses sleeping well last night.     Objective:     Vitals:   Temp (24hrs), Av.7 °F (37.1 °C), Min:98.2 °F (36.8 °C), Max:99.2 °F (37.3 °C)    Temp:  [98.2 °F (36.8 °C)-99.2 °F (37.3 °C)] 98.2 °F (36.8 °C)  HR:  [69-89] 89  Resp:  [16-20] 16  BP:  (133-151)/(77-90) 144/78  SpO2:  [89 %-97 %] 97 %  Body mass index is 47.06 kg/m².     Input and Output Summary (last 24 hours):     Intake/Output Summary (Last 24 hours) at 7/24/2024 1450  Last data filed at 7/24/2024 1121  Gross per 24 hour   Intake 480 ml   Output --   Net 480 ml       Physical Exam:   Physical Exam  Vitals and nursing note reviewed.   Constitutional:       Appearance: Normal appearance.   HENT:      Head: Normocephalic and atraumatic.      Mouth/Throat:      Mouth: Mucous membranes are moist.   Eyes:      Extraocular Movements: Extraocular movements intact.      Conjunctiva/sclera: Conjunctivae normal.      Pupils: Pupils are equal, round, and reactive to light.   Cardiovascular:      Rate and Rhythm: Normal rate and regular rhythm.   Pulmonary:      Effort: Pulmonary effort is normal. No respiratory distress.      Breath sounds: Normal breath sounds. No wheezing or rales.   Abdominal:      General: Bowel sounds are normal. There is no distension.      Palpations: Abdomen is soft.      Tenderness: There is no abdominal tenderness. There is no guarding.   Musculoskeletal:      Right lower leg: Edema present.      Left lower leg: Edema present.      Comments: Trace edema noted to BLE     Skin:     General: Skin is warm and dry.   Neurological:      General: No focal deficit present.      Mental Status: She is alert. Mental status is at baseline.        Additional Data:     Labs:  Results from last 7 days   Lab Units 07/24/24  0632 07/22/24  0429 07/21/24  0433 07/20/24  0606   WBC Thousand/uL 24.77*   < > 19.41* 17.94*   HEMOGLOBIN g/dL 8.3*   < > 8.9* 9.3*   HEMATOCRIT % 25.7*   < > 27.0* 29.4*   PLATELETS Thousands/uL 143*   < > 202 228   BANDS PCT %  --   --   --  8   LYMPHO PCT %  --   --  2* 5*   MONO PCT %  --   --  9 4   EOS PCT %  --   --  0 0    < > = values in this interval not displayed.     Results from last 7 days   Lab Units 07/23/24  0537 07/22/24  0429 07/21/24  0433 07/20/24  0439    SODIUM mmol/L 138   < > 139 135   POTASSIUM mmol/L 4.4   < > 5.1 6.1*   CHLORIDE mmol/L 102   < > 105 101   CO2 mmol/L 24   < > 23 20*   BUN mg/dL 67*   < > 82* 122*   CREATININE mg/dL 6.34*   < > 7.23* 9.64*   ANION GAP mmol/L 12   < > 11 14*   CALCIUM mg/dL 9.3   < > 9.1 9.7   ALBUMIN g/dL  --   --  3.6 3.3*   TOTAL BILIRUBIN mg/dL  --   --   --  0.37   ALK PHOS U/L  --   --   --  56   ALT U/L  --   --   --  9   AST U/L  --   --   --  9*   GLUCOSE RANDOM mg/dL 151*   < > 138 170*    < > = values in this interval not displayed.     Results from last 7 days   Lab Units 07/22/24  0429   INR  1.18     Results from last 7 days   Lab Units 07/24/24  1104 07/24/24  0723 07/23/24  2100 07/23/24  1553 07/23/24  1101 07/23/24  0701 07/22/24  1538 07/22/24  1051 07/22/24  0707 07/21/24  2101 07/21/24  1518 07/21/24  1114   POC GLUCOSE mg/dl 143* 137 202* 169* 123 135 158* 132 139 201* 170* 123                   Lines/Drains:  Invasive Devices       Peripheral Intravenous Line  Duration             Peripheral IV 07/24/24 Left Antecubital <1 day              Hemodialysis Catheter  Duration             HD Permanent Double Catheter 2 days                    Imaging: Reviewed radiology reports from this admission including: chest xray, chest CT scan, abdominal/pelvic CT, and ultrasound kidney and bladder  IR tunneled dialysis catheter placement   Final Result by Graciela Bettencourt MD (07/22 1018)      Conversion of right-sided internal jugular non-tunneled central venous catheter for a tunneled dialysis catheter, with tip in the expected location of the right atrium.      Plan:      The catheter may be used immediately.      Workstation performed: [id number]         IR biopsy kidney random   Final Result by Sandoval Castro MD (07/18 1358)   Impression: Successful percutaneous nontarget renal biopsy as described.                  Workstation performed: [id number]         IR temporary dialysis catheter placement   Final  Result by Graciela Bettencourt MD (07/15 1640)      Insertion of right-sided non-tunneled dual-lumen temporary dialysis catheter, with tip in the expected location of the cavoatrial junction.      Plan:      The catheter may be used immediately.      Workstation performed: [id number]                  Recent Cultures (last 7 days):           Last 24 Hours Medication List:   Current Facility-Administered Medications   Medication Dose Route Frequency Provider Last Rate    acetaminophen  650 mg Oral Q6H PRN Tram Palacios PA-C      albumin human  200 g Intravenous Daily Yasmine Meadows MD      albuterol  2 puff Inhalation Q4H PRN Tram Palacios PA-C      atorvastatin  20 mg Oral Daily Tram Palacios PA-C      benzonatate  200 mg Oral TID PRN Tram Palacios PA-C      calcium carbonate-vitamin D  1 tablet Oral Daily With Breakfast Vane Estrada MD      calcium gluconate  1 g Intravenous Daily Madonna Camacho DO 1 g (07/24/24 0839)    cyclophosphamide  100 mg Oral Daily Tirso Montez MD      dextromethorphan-guaiFENesin  10 mL Oral Q4H PRN Tram Palacios PA-C      famotidine  10 mg Oral Daily PRN Daya May DO      fluticasone-vilanterol  1 puff Inhalation Daily Tram Palacios PA-C      heparin (porcine)  5,000 Units Subcutaneous Q8H [facility] Xavier Medina MD      hydrALAZINE  10 mg Intravenous Q6H PRN Yasmine Meadows MD      insulin lispro  2-12 Units Subcutaneous TID AC Yasmine Meadows MD      ipratropium-albuterol  3 mL Nebulization Q6H Luke Montez MD      iron polysaccharides  150 mg Oral Daily Ata Burns MD      lamoTRIgine  100 mg Oral QAM Tram Palacios PA-C      loratadine  10 mg Oral Daily Tram Palacios PA-C      melatonin  3 mg Oral Daily PRN Yasmine Meadows MD      montelukast  10 mg Oral Daily Tram Palacios PA-C      NIFEdipine  30 mg Oral Daily Joselyn Reyes Bahamonde, MD      ondansetron  4 mg Intravenous Q6H PRN Yasmine Meadows MD      oxybutynin  5 mg Oral Daily Tram Palacios PA-C      pantoprazole  40 mg Oral Daily Before  Breakfast Ronny Webster MD      polyethylene glycol  17 g Oral Daily Xavier Medina MD      [START ON 7/27/2024] predniSONE  40 mg Oral Daily Joselyn Reyes Bahamonde, MD      [START ON 7/25/2024] predniSONE  60 mg Oral Daily Joselyn Reyes Bahamonde, MD      senna  1 tablet Oral HS Xavier Medina MD      sevelamer  1,600 mg Oral TID With Meals Vane Etsrada MD      Sodium Zirconium Cyclosilicate  10 g Oral Daily Tirso Montez MD      sulfamethoxazole-trimethoprim  1 tablet Oral Once per day on Monday Wednesday Friday Joselyn Reyes Bahamonde, MD      [START ON 7/25/2024] torsemide  100 mg Oral Daily Joselyn Reyes Bahamonde, MD      traZODone  200 mg Oral HS Tram Palacios PA-C      venlafaxine  150 mg Oral Daily Tram Palacios PA-C          Today, Patient Was Seen By: Jessica Beckford MD    **Please Note: This note may have been constructed using a voice recognition system.**

## 2024-07-24 NOTE — CASE MANAGEMENT
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     Abnormal CT of the chest 2024     Abnormality of ascending aorta 2024     Postmenopausal 2024     Encounter for screening mammogram for malignant neoplasm of breast 2024     Allergic rhinitis 2024     Persistent cough 2024     Urge incontinence of urine 2024     Exercise intolerance 2024     Family history of coronary artery bypass graft surgery 2024     Urge urinary incontinence 2024     Female stress incontinence 2024     Paranoid schizophrenia ([facility]) 2023     Moderate persistent asthma w/out acute exacerbation 2023     Asthma due to seasonal allergies 2023     Bipolar 1 disorder ([facility]) 2022     Primary hypertension 2022     Dyslipidemia 2022     Morbid obesity with BMI of 45.0-49.9, adult ([facility]) 2022       LOS (days): 12  Geometric Mean LOS (GMLOS) (days): 5.8  Days to GMLOS:-6.3     OBJECTIVE:  Risk of Unplanned Readmission Score: 22.2         Current admission status: Inpatient   Preferred Pharmacy:   [facility]/pharmacy #[id number] [facility] [address] [address]  Phone: [phone] Fax: [phone]    [facility] - [place], [place] - [facility]  [address] [address]  Phone: [phone] Fax: [phone]    Primary Care Provider: Meenakshi Balbuena MD    Primary Insurance: MEDICARE  Secondary Insurance: [facility]    DISCHARGE DETAILS:         "                                  Other Referral/Resources/Interventions Provided:  Interventions: Dialysis, Other (Specify) (Plasmapheresis)       Patient remains medically unstable for d/c at this time.    CM notified by [facility] that patient is not medically stable for DC at this time.  Nephrology team also discussed with CM that they are still working on trying to set up outpatient plasmapheresis for her.      JEANCARLOS reached out to [facility] and spoke with Siria who then shared with CM, [facility], and Nephro teams \"[facility] does not do plasmapheresis at our site but the options would be [place], [place], [place] or [place]. [place] would have more availability to do a daily treatment but this would only be Monday-Friday no weekends, the patient's dialysis chair time would need to be moved to accommodate and she would need transportation set up through the nephrology office, if Lyft is an option for her (Patient would need to be ambulatory as this is a curb to curb service). Odilia Liu the [facility] manager spoke with Fatmata regarding all of this. I just confirmed with Odilia.\"    CM will continue to follow along with they try to coordinate the outpatient plasmapheresis.  In the mean time CM reached out to [facility] Admissions team to update them that patient would not be discharging home today and therefore would not be starting outpatient at [place] tomorrow.    CM dept will continue to follow for discharge planning needs.                                                                        "

## 2024-07-24 NOTE — PROGRESS NOTES
NEPHROLOGY PROGRESS NOTE   Ngozi Beard 64 y.o. female MRN: [id number]  Unit/Bed#: S -01 Encounter: [id number]  Reason for Consult: ANGELICA    ASSESSMENT AND PLAN:  63 yo woman with new diagnosis of anti-GBM disease 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 Avoid opioids   Adjust medications to GFR  Trying to set up outpatient plasmapheresis unfortunately understand has no appointment for her this week.  [facility] infusion center might have a spot for her however patient has no transportation and cannot make it to [facility]     # Anti-GBM disease  Anti-GBM>8  Discussed with lab to get a titer of anti-GBM to monitor her response to plasmapheresis however they cannot offer titers at this time?   anti-GBM  tomorrow  No pulmonary involvement at this time  Continue daily plasmapheresis until anti-GBM's are negative, will require at least until next Wednesday/Thursday  Setting up outpatient plasmapheresis        # Immunosuppression therapy  Methylprednisolone x 3, completed  Currently on prednisone 60 mg, started on 7/20  60 mg for 1 week  40 mg for 1 week  30 mg for 1 week  25 mg for 2 weeks  20 mg for 2 weeks  15 mg for 2 weeks   12.5 mg for 2 weeks  10 mg for 2 weeks  7.5 mg for 2 weeks  Continue with 5 mg  daily  Cytoxan 100 mg daily adjusted by kidney function and age.  Plan to continue for 3 months     # Prophylaxis  Calcium and vitamin D  Pantoprazole 40  Continue with Bactrim 1 tablet 3 times a week        #Volume status/hypertension:  Volume: Hypervolemic  Blood pressure: Borderline hypertension, /78, goal less than 140/90   Recommend:  UF on HD  Increase torsemide to 100 daily   Nifedipine 30 mg daily      # Anemia   Hemoglobin 8.3 mg/dL,   No RISHI for now, transfusion if hemoglobin less than 7        #Acid-base Disorder  serum HCO3  24  At goal     The highlighted and/or bolded points in my assessment, plan, and disposition were discussed with the primary team and they agree with those points and the plan.  Previous records were personally reviewed by me to obtain a baseline creatinine.   The images (CXR) were personally reviewed by me in PACS      SUBJECTIVE:  Patient seen and examined at bedside. No chest pain, shortness of breath, nausea, vomiting, abdominal pain or diarrhea.    OBJECTIVE:  Current Weight: Weight - Scale: 121 kg (265 lb 10.5 oz)  Vitals:    07/24/24 0722   BP: 144/78   Pulse:    Resp:    Temp:    SpO2:        Intake/Output Summary (Last 24 hours) at 7/24/2024 1124  Last data filed at 7/24/2024 1121  Gross per 24 hour   Intake 720 ml   Output --   Net 720 ml     Wt Readings from Last 3 Encounters:   07/24/24 121 kg (265 lb 10.5 oz)   07/10/24 109 kg (241 lb)   07/01/24 111 kg (244 lb 6.4 oz)     Temp Readings from Last 3 Encounters:   07/24/24 98.2 °F (36.8 °C)   07/10/24 99.4 °F (37.4 °C) (Oral)   07/01/24 98.2 °F (36.8 °C) (Tympanic)     BP Readings from Last 3 Encounters:   07/24/24 144/78   07/12/24 127/59   07/01/24 124/80     Pulse Readings from Last 3 Encounters:   07/24/24 89   07/12/24 72   07/01/24 62        General:  no acute distress at this time  Skin:  No acute rash  Eyes:  No scleral icterus and noninjected  ENT:  mucous membranes moist  Neck:  no carotid bruits  Chest:   Clear to auscultation percussion, good respiratory effort, no use of accessory respiratory muscles  CVS:  Regular rate and rhythm without rub   Abdomen:  soft and nontender   Extremities: lower extremity edema  Neuro:  No gross focality  Psych:  Alert , cooperative   Dialysis access: PermCath      Medications:    Current Facility-Administered Medications:     acetaminophen (TYLENOL) tablet 650 mg, 650 mg, Oral, Q6H PRN, Tram Palacios PA-C, 650 mg at 07/19/24 1302    albumin human (FLEXBUMIN) 5 % injection 200 g, 200 g, Intravenous, Daily, Yasmine Meadows MD, 200 g at 07/24/24 0840    albuterol (PROVENTIL HFA,VENTOLIN HFA) inhaler 2 puff, 2 puff, Inhalation, Q4H PRN, Tram Palacios PA-C, 2 puff at 07/23/24 1114    atorvastatin (LIPITOR) tablet 20 mg, 20 mg, Oral, Daily, Tram Palacios PA-C, 20 mg at 07/24/24 0838    benzonatate (TESSALON PERLES) capsule 200 mg, 200 mg, Oral, TID PRN, Tram aPlacios PA-C    calcium carbonate-vitamin D 500 mg-5 mcg tablet 1 tablet, 1 tablet, Oral, Daily With Breakfast, Vane Estrada MD, 1 tablet at 07/24/24 0728    calcium gluconate 1 g in sodium chloride 0.9% 50 mL (premix), 1 g, Intravenous, Daily, Madonna Camacho DO, Last Rate: 100 mL/hr at 07/24/24 0839, 1 g at 07/24/24 0839    cyclophosphamide (CYTOXAN) capsule 100 mg, 100 mg, Oral, Daily, Tirso Montez MD, 100 mg at 07/23/24 2042    dextromethorphan-guaiFENesin (ROBITUSSIN DM) oral syrup 10 mL, 10 mL, Oral, Q4H PRN, Tram Palacios PA-C    famotidine (PEPCID) tablet 10 mg, 10 mg, Oral, Daily PRN, Daya May DO, 10 mg at 07/19/24 1305    fluticasone-vilanterol 200-25 mcg/actuation 1 puff, 1 puff, Inhalation, Daily, Tram Palacios PA-C, 1 puff at 07/24/24 0703    heparin (porcine) subcutaneous injection 5,000 Units, 5,000 Units, Subcutaneous, Q8H JACK, Xavier Medina MD, 5,000 Units at 07/24/24 0649    hydrALAZINE (APRESOLINE) injection 10 mg, 10 mg, Intravenous, Q6H PRN, Yasmine Meadows MD, 10 mg at 07/20/24 2131    insulin  lispro (HumALOG/ADMELOG) 100 units/mL subcutaneous injection 2-12 Units, 2-12 Units, Subcutaneous, TID AC, 2 Units at 07/23/24 1606 **AND** Fingerstick Glucose (POCT), , , TID AC, Yasmine Meadows MD    ipratropium-albuterol (DUO-NEB) 0.5-2.5 mg/3 mL inhalation solution 3 mL, 3 mL, Nebulization, Q6H, Luke Montez MD, 3 mL at 07/24/24 0703    iron polysaccharides (FERREX) capsule 150 mg, 150 mg, Oral, Daily, Ata Burns MD, 150 mg at 07/24/24 0836    lamoTRIgine (LaMICtal) tablet 100 mg, 100 mg, Oral, QAM, Tram Palacios PA-C, 100 mg at 07/24/24 0838    loratadine (CLARITIN) tablet 10 mg, 10 mg, Oral, Daily, Tram Palacios PA-C, 10 mg at 07/24/24 0838    melatonin tablet 3 mg, 3 mg, Oral, Daily PRN, Yasmine Meadows MD, 3 mg at 07/21/24 2111    montelukast (SINGULAIR) tablet 10 mg, 10 mg, Oral, Daily, Tram Palacios PA-C, 10 mg at 07/24/24 0837    NIFEdipine (PROCARDIA XL) 24 hr tablet 30 mg, 30 mg, Oral, Daily, Joselyn Reyes Bahamonde, MD, 30 mg at 07/24/24 0837    ondansetron (ZOFRAN) injection 4 mg, 4 mg, Intravenous, Q6H PRN, Yasmine Meadows MD, 4 mg at 07/21/24 1840    oxybutynin (DITROPAN-XL) 24 hr tablet 5 mg, 5 mg, Oral, Daily, Tram Palacios PA-C, 5 mg at 07/24/24 0837    pantoprazole (PROTONIX) EC tablet 40 mg, 40 mg, Oral, Daily Before Breakfast, Ronny Webster MD, 40 mg at 07/24/24 0649    polyethylene glycol (MIRALAX) packet 17 g, 17 g, Oral, Daily, Xavier Medina MD, 17 g at 07/24/24 0835    predniSONE tablet 60 mg, 60 mg, Oral, Daily, Vane Estrada MD, 60 mg at 07/24/24 0839    senna (SENOKOT) tablet 8.6 mg, 1 tablet, Oral, HS, Xavier Medina MD, 8.6 mg at 07/23/24 2112    sevelamer (RENAGEL) tablet 1,600 mg, 1,600 mg, Oral, TID With Meals, Vane Estrada MD, 1,600 mg at 07/24/24 0728    Sodium Zirconium Cyclosilicate (Lokelma) 10 g, 10 g, Oral, Daily, Tirso Montez MD, 10 g at 07/24/24 0839    sulfamethoxazole-trimethoprim (BACTRIM) 400-80 mg per tablet 1 tablet, 1 tablet, Oral, Once per day on Monday  Wednesday Friday, Joselyn Reyes Bahamonde, MD, 1 tablet at 07/24/24 0836    torsemide (DEMADEX) tablet 80 mg, 80 mg, Oral, Daily, Joselyn Reyes Bahamonde, MD, 80 mg at 07/24/24 0837    traZODone (DESYREL) tablet 200 mg, 200 mg, Oral, HS, Tram Palacios PA-C, 200 mg at 07/23/24 2112    venlafaxine (EFFEXOR-XR) 24 hr capsule 150 mg, 150 mg, Oral, Daily, Tram Palacios PA-C, 150 mg at 07/24/24 0840    Laboratory Results:  Results from last 7 days   Lab Units 07/24/24  0632 07/23/24  0537 07/22/24  0429 07/21/24  0433 07/20/24  0606 07/20/24  0439 07/19/24  0400 07/18/24  0440   WBC Thousand/uL 24.77* 22.40* 18.05* 19.41* 17.94*  --  15.22* 12.75*   HEMOGLOBIN g/dL 8.3* 8.9* 8.9* 8.9* 9.3*  --  9.9* 9.5*   HEMATOCRIT % 25.7* 27.5* 28.4* 27.0* 29.4*  --  30.3* 29.7*   PLATELETS Thousands/uL 143* 149 179 202 228  --  256 264   SODIUM mmol/L  --  138 140 139  --  135 134* 135   POTASSIUM mmol/L  --  4.4 5.5* 5.1  --  6.1* 5.1 5.4*   CHLORIDE mmol/L  --  102 107 105  --  101 101 100   CO2 mmol/L  --  24 20* 23  --  20* 21 22   BUN mg/dL  --  67* 101* 82*  --  122* 94* 92*   CREATININE mg/dL  --  6.34* 8.53* 7.23*  --  9.64* 8.65* 7.88*   CALCIUM mg/dL  --  9.3 9.3 9.1  --  9.7 9.6 9.4   MAGNESIUM mg/dL  --   --   --   --   --  2.5 2.3 2.3   PHOSPHORUS mg/dL  --   --  8.4* 7.6*  --  9.5* 8.2* 8.6*       IR tunneled dialysis catheter placement   Final Result by Graciela Bettencourt MD (07/22 1018)      Conversion of right-sided internal jugular non-tunneled central venous catheter for a tunneled dialysis catheter, with tip in the expected location of the right atrium.      Plan:      The catheter may be used immediately.      Workstation performed: [id number]         IR biopsy kidney random   Final Result by Sandoval Castro MD (07/18 0326)   Impression: Successful percutaneous nontarget renal biopsy as described.                  Workstation performed: [id number]         IR temporary dialysis catheter placement   Final Result  "by Graciela Bettencourt MD (07/15 1640)      Insertion of right-sided non-tunneled dual-lumen temporary dialysis catheter, with tip in the expected location of the cavoatrial junction.      Plan:      The catheter may be used immediately.      Workstation performed: [id number]             Portions of the record may have been created with voice recognition software. Occasional wrong word or \"sound a like\" substitutions may have occurred due to the inherent limitations of voice recognition software. Read the chart carefully and recognize, using context, where substitutions have occurred.    "

## 2024-07-24 NOTE — PLAN OF CARE
Problem: PAIN - ADULT  Goal: Verbalizes/displays adequate comfort level or baseline comfort level  Description: Interventions:  - Encourage patient to monitor pain and request assistance  - Assess pain using appropriate pain scale  - Administer analgesics based on type and severity of pain and evaluate response  - Implement non-pharmacological measures as appropriate and evaluate response  - Consider cultural and social influences on pain and pain management  - Notify physician/advanced practitioner if interventions unsuccessful or patient reports new pain  Outcome: Progressing     Problem: INFECTION - ADULT  Goal: Absence of fever/infection during neutropenic period  Description: INTERVENTIONS:  - Monitor WBC    Outcome: Progressing     Problem: SAFETY ADULT  Goal: Maintain or return to baseline ADL function  Description: INTERVENTIONS:  -  Assess patient's ability to carry out ADLs; assess patient's baseline for ADL function and identify physical deficits which impact ability to perform ADLs (bathing, care of mouth/teeth, toileting, grooming, dressing, etc.)  - Assess/evaluate cause of self-care deficits   - Assess range of motion  - Assess patient's mobility; develop plan if impaired  - Assess patient's need for assistive devices and provide as appropriate  - Encourage maximum independence but intervene and supervise when necessary  - Involve family in performance of ADLs  - Assess for home care needs following discharge   - Consider OT consult to assist with ADL evaluation and planning for discharge  - Provide patient education as appropriate  Outcome: Progressing     Problem: DISCHARGE PLANNING  Goal: Discharge to home or other facility with appropriate resources  Description: INTERVENTIONS:  - Identify barriers to discharge w/patient and caregiver  - Arrange for needed discharge resources and transportation as appropriate  - Identify discharge learning needs (meds, wound care, etc.)  - Arrange for  interpretive services to assist at discharge as needed  - Refer to Case Management Department for coordinating discharge planning if the patient needs post-hospital services based on physician/advanced practitioner order or complex needs related to functional status, cognitive ability, or social support system  Outcome: Progressing     Problem: Knowledge Deficit  Goal: Patient/family/caregiver demonstrates understanding of disease process, treatment plan, medications, and discharge instructions  Description: Complete learning assessment and assess knowledge base.  Interventions:  - Provide teaching at level of understanding  - Provide teaching via preferred learning methods  Outcome: Progressing     Problem: Nutrition/Hydration-ADULT  Goal: Nutrient/Hydration intake appropriate for improving, restoring or maintaining nutritional needs  Description: Monitor and assess patient's nutrition/hydration status for malnutrition. Collaborate with interdisciplinary team and initiate plan and interventions as ordered.  Monitor patient's weight and dietary intake as ordered or per policy. Utilize nutrition screening tool and intervene as necessary. Determine patient's food preferences and provide high-protein, high-caloric foods as appropriate.     INTERVENTIONS:  - Monitor oral intake, urinary output, labs, and treatment plans  - Assess nutrition and hydration status and recommend course of action  - Evaluate amount of meals eaten  - Assist patient with eating if necessary   - Allow adequate time for meals  - Recommend/ encourage appropriate diets, oral nutritional supplements, and vitamin/mineral supplements  - Order, calculate, and assess calorie counts as needed  - Recommend, monitor, and adjust tube feedings and TPN/PPN based on assessed needs  - Assess need for intravenous fluids  - Provide specific nutrition/hydration education as appropriate  - Include patient/family/caregiver in decisions related to nutrition  Outcome:  Progressing     Problem: Prexisting or High Potential for Compromised Skin Integrity  Goal: Skin integrity is maintained or improved  Description: INTERVENTIONS:  - Identify patients at risk for skin breakdown  - Assess and monitor skin integrity  - Assess and monitor nutrition and hydration status  - Monitor labs   - Assess for incontinence   - Turn and reposition patient  - Assist with mobility/ambulation  - Relieve pressure over bony prominences  - Avoid friction and shearing  - Provide appropriate hygiene as needed including keeping skin clean and dry  - Evaluate need for skin moisturizer/barrier cream  - Collaborate with interdisciplinary team   - Patient/family teaching  - Consider wound care consult   Outcome: Progressing

## 2024-07-24 NOTE — CASE MANAGEMENT
Case Management Progress Note    Patient name Ngozi Beard  Location S /S -01 MRN [id number]  : 1960 Date 2024       LOS (days): 12  Geometric Mean LOS (GMLOS) (days): 5.8  Days to GMLOS:-6.5        OBJECTIVE:        Current admission status: Inpatient  Preferred Pharmacy:   [facility]/pharmacy #[id number] [place] [id number] [address]  Phone: [phone] Fax: [phone]    [facility] - [address] [address]  Phone: [phone] Fax: [phone]    Primary Care Provider: Meenakshi Balbuena MD    Primary Insurance: MEDICARE  Secondary Insurance: [facility]    PROGRESS NOTE:  Patient discussed in weekly LOS meeting.  Assigned CM following for coordination of OP dialysis, and OP plasmapheresis. Will continue efforts regarding same , with discharge upon medical clearance.

## 2024-07-25 ENCOUNTER — APPOINTMENT (INPATIENT)
Dept: DIALYSIS | Facility: HOSPITAL | Age: 64
DRG: 673 | End: 2024-07-25
Attending: INTERNAL MEDICINE
Payer: MEDICARE

## 2024-07-25 LAB
FIBRINOGEN PPP-MCNC: 147 MG/DL (ref 207–520)
GBM AB SER IA-ACNC: >8 UNITS (ref 0–0.9)
GLUCOSE SERPL-MCNC: 139 MG/DL (ref 65–140)
GLUCOSE SERPL-MCNC: 142 MG/DL (ref 65–140)
GLUCOSE SERPL-MCNC: 174 MG/DL (ref 65–140)
GLUCOSE SERPL-MCNC: 213 MG/DL (ref 65–140)

## 2024-07-25 PROCEDURE — 94640 AIRWAY INHALATION TREATMENT: CPT

## 2024-07-25 PROCEDURE — 94760 N-INVAS EAR/PLS OXIMETRY 1: CPT

## 2024-07-25 PROCEDURE — 83520 IMMUNOASSAY QUANT NOS NONAB: CPT | Performed by: STUDENT IN AN ORGANIZED HEALTH CARE EDUCATION/TRAINING PROGRAM

## 2024-07-25 PROCEDURE — 85384 FIBRINOGEN ACTIVITY: CPT | Performed by: INTERNAL MEDICINE

## 2024-07-25 PROCEDURE — 99232 SBSQ HOSP IP/OBS MODERATE 35: CPT | Performed by: HOSPITALIST

## 2024-07-25 PROCEDURE — 90935 HEMODIALYSIS ONE EVALUATION: CPT | Performed by: STUDENT IN AN ORGANIZED HEALTH CARE EDUCATION/TRAINING PROGRAM

## 2024-07-25 PROCEDURE — 82948 REAGENT STRIP/BLOOD GLUCOSE: CPT

## 2024-07-25 RX ORDER — SENNOSIDES 8.6 MG
2 TABLET ORAL 2 TIMES DAILY
Status: DISCONTINUED | OUTPATIENT
Start: 2024-07-25 | End: 2024-08-04 | Stop reason: HOSPADM

## 2024-07-25 RX ADMIN — PREDNISONE 60 MG: 20 TABLET ORAL at 12:17

## 2024-07-25 RX ADMIN — Medication 1 TABLET: at 08:20

## 2024-07-25 RX ADMIN — HEPARIN SODIUM 5000 UNITS: 5000 INJECTION INTRAVENOUS; SUBCUTANEOUS at 14:16

## 2024-07-25 RX ADMIN — LAMOTRIGINE 100 MG: 100 TABLET ORAL at 12:18

## 2024-07-25 RX ADMIN — MONTELUKAST 10 MG: 10 TABLET, FILM COATED ORAL at 12:18

## 2024-07-25 RX ADMIN — SEVELAMER HYDROCHLORIDE 1600 MG: 800 TABLET ORAL at 12:17

## 2024-07-25 RX ADMIN — HEPARIN SODIUM 5000 UNITS: 5000 INJECTION INTRAVENOUS; SUBCUTANEOUS at 21:31

## 2024-07-25 RX ADMIN — SEVELAMER HYDROCHLORIDE 1600 MG: 800 TABLET ORAL at 08:20

## 2024-07-25 RX ADMIN — LORATADINE 10 MG: 10 TABLET ORAL at 12:18

## 2024-07-25 RX ADMIN — INSULIN LISPRO 4 UNITS: 100 INJECTION, SOLUTION INTRAVENOUS; SUBCUTANEOUS at 16:42

## 2024-07-25 RX ADMIN — ATORVASTATIN CALCIUM 20 MG: 20 TABLET, FILM COATED ORAL at 12:18

## 2024-07-25 RX ADMIN — IPRATROPIUM BROMIDE AND ALBUTEROL SULFATE 3 ML: 2.5; .5 SOLUTION RESPIRATORY (INHALATION) at 07:03

## 2024-07-25 RX ADMIN — NIFEDIPINE 30 MG: 30 TABLET, EXTENDED RELEASE ORAL at 12:17

## 2024-07-25 RX ADMIN — TRAZODONE HYDROCHLORIDE 200 MG: 100 TABLET ORAL at 21:31

## 2024-07-25 RX ADMIN — HEPARIN SODIUM 5000 UNITS: 5000 INJECTION INTRAVENOUS; SUBCUTANEOUS at 06:08

## 2024-07-25 RX ADMIN — BISACODYL 10 MG: 10 SUPPOSITORY RECTAL at 14:17

## 2024-07-25 RX ADMIN — CYCLOPHOSPHAMIDE 100 MG: 50 CAPSULE ORAL at 21:00

## 2024-07-25 RX ADMIN — IPRATROPIUM BROMIDE AND ALBUTEROL SULFATE 3 ML: 2.5; .5 SOLUTION RESPIRATORY (INHALATION) at 13:19

## 2024-07-25 RX ADMIN — FLUTICASONE FUROATE AND VILANTEROL TRIFENATATE 1 PUFF: 200; 25 POWDER RESPIRATORY (INHALATION) at 07:02

## 2024-07-25 RX ADMIN — POLYETHYLENE GLYCOL 3350 17 G: 17 POWDER, FOR SOLUTION ORAL at 12:18

## 2024-07-25 RX ADMIN — SEVELAMER HYDROCHLORIDE 1600 MG: 800 TABLET ORAL at 16:43

## 2024-07-25 RX ADMIN — POLYSACCHARIDE-IRON COMPLEX 150 MG: 150 CAPSULE ORAL at 12:18

## 2024-07-25 RX ADMIN — IPRATROPIUM BROMIDE AND ALBUTEROL SULFATE 3 ML: 2.5; .5 SOLUTION RESPIRATORY (INHALATION) at 19:45

## 2024-07-25 RX ADMIN — TORSEMIDE 100 MG: 100 TABLET ORAL at 12:18

## 2024-07-25 RX ADMIN — OXYBUTYNIN CHLORIDE 5 MG: 5 TABLET, EXTENDED RELEASE ORAL at 12:19

## 2024-07-25 RX ADMIN — SENNOSIDES 17.2 MG: 8.6 TABLET, FILM COATED ORAL at 12:18

## 2024-07-25 RX ADMIN — VENLAFAXINE HYDROCHLORIDE 150 MG: 150 CAPSULE, EXTENDED RELEASE ORAL at 12:19

## 2024-07-25 RX ADMIN — PANTOPRAZOLE SODIUM 40 MG: 40 TABLET, DELAYED RELEASE ORAL at 06:08

## 2024-07-25 RX ADMIN — SENNOSIDES 17.2 MG: 8.6 TABLET, FILM COATED ORAL at 17:32

## 2024-07-25 NOTE — PLAN OF CARE
Problem: PAIN - ADULT  Goal: Verbalizes/displays adequate comfort level or baseline comfort level  Description: Interventions:  - Encourage patient to monitor pain and request assistance  - Assess pain using appropriate pain scale  - Administer analgesics based on type and severity of pain and evaluate response  - Implement non-pharmacological measures as appropriate and evaluate response  - Consider cultural and social influences on pain and pain management  - Notify physician/advanced practitioner if interventions unsuccessful or patient reports new pain  Outcome: Progressing     Problem: INFECTION - ADULT  Goal: Absence or prevention of progression during hospitalization  Description: INTERVENTIONS:  - Assess and monitor for signs and symptoms of infection  - Monitor lab/diagnostic results  - Monitor all insertion sites, i.e. indwelling lines, tubes, and drains  - Monitor endotracheal if appropriate and nasal secretions for changes in amount and color  - [place] appropriate cooling/warming therapies per order  - Administer medications as ordered  - Instruct and encourage patient and family to use good hand hygiene technique  - Identify and instruct in appropriate isolation precautions for identified infection/condition  Outcome: Progressing  Goal: Absence of fever/infection during neutropenic period  Description: INTERVENTIONS:  - Monitor WBC    Outcome: Progressing     Problem: SAFETY ADULT  Goal: Patient will remain free of falls  Description: INTERVENTIONS:  - Educate patient/family on patient safety including physical limitations  - Instruct patient to call for assistance with activity   - Consult OT/PT to assist with strengthening/mobility   - Keep Call bell within reach  - Keep bed low and locked with side rails adjusted as appropriate  - Keep care items and personal belongings within reach  - Initiate and maintain comfort rounds  - Make Fall Risk Sign visible to staff  - Offer Toileting every 2 Hours,  in advance of need  - Initiate/Maintain bed/chair alarm  - Obtain necessary fall risk management equipment  - Apply yellow socks and bracelet for high fall risk patients  - Consider moving patient to room near nurses station  Outcome: Progressing  Goal: Maintain or return to baseline ADL function  Description: INTERVENTIONS:  -  Assess patient's ability to carry out ADLs; assess patient's baseline for ADL function and identify physical deficits which impact ability to perform ADLs (bathing, care of mouth/teeth, toileting, grooming, dressing, etc.)  - Assess/evaluate cause of self-care deficits   - Assess range of motion  - Assess patient's mobility; develop plan if impaired  - Assess patient's need for assistive devices and provide as appropriate  - Encourage maximum independence but intervene and supervise when necessary  - Involve family in performance of ADLs  - Assess for home care needs following discharge   - Consider OT consult to assist with ADL evaluation and planning for discharge  - Provide patient education as appropriate  Outcome: Progressing  Goal: Maintains/Returns to pre admission functional level  Description: INTERVENTIONS:  - Perform AM-PAC 6 Click Basic Mobility/ Daily Activity assessment daily.  - Set and communicate daily mobility goal to care team and patient/family/caregiver.   - Collaborate with rehabilitation services on mobility goals if consulted  - Perform Range of Motion 3 times a day.  - Reposition patient every 2 hours.  - Dangle patient 3 times a day  - Stand patient 3 times a day  - Ambulate patient 3 times a day  - Out of bed to chair 3 times a day   - Out of bed for meals 3 times a day  - Out of bed for toileting  - Record patient progress and toleration of activity level   Outcome: Progressing     Problem: DISCHARGE PLANNING  Goal: Discharge to home or other facility with appropriate resources  Description: INTERVENTIONS:  - Identify barriers to discharge w/patient and  caregiver  - Arrange for needed discharge resources and transportation as appropriate  - Identify discharge learning needs (meds, wound care, etc.)  - Arrange for interpretive services to assist at discharge as needed  - Refer to Case Management Department for coordinating discharge planning if the patient needs post-hospital services based on physician/advanced practitioner order or complex needs related to functional status, cognitive ability, or social support system  Outcome: Progressing     Problem: Knowledge Deficit  Goal: Patient/family/caregiver demonstrates understanding of disease process, treatment plan, medications, and discharge instructions  Description: Complete learning assessment and assess knowledge base.  Interventions:  - Provide teaching at level of understanding  - Provide teaching via preferred learning methods  Outcome: Progressing     Problem: METABOLIC, FLUID AND ELECTROLYTES - ADULT  Goal: Electrolytes maintained within normal limits  Description: INTERVENTIONS:  - Monitor labs and assess patient for signs and symptoms of electrolyte imbalances  - Administer electrolyte replacement as ordered  - Monitor response to electrolyte replacements, including repeat lab results as appropriate  - Instruct patient on fluid and nutrition as appropriate  Outcome: Progressing  Goal: Fluid balance maintained  Description: INTERVENTIONS:  - Monitor labs   - Monitor I/O and WT  - Instruct patient on fluid and nutrition as appropriate  - Assess for signs & symptoms of volume excess or deficit  Outcome: Progressing     Problem: Nutrition/Hydration-ADULT  Goal: Nutrient/Hydration intake appropriate for improving, restoring or maintaining nutritional needs  Description: Monitor and assess patient's nutrition/hydration status for malnutrition. Collaborate with interdisciplinary team and initiate plan and interventions as ordered.  Monitor patient's weight and dietary intake as ordered or per policy. Utilize  nutrition screening tool and intervene as necessary. Determine patient's food preferences and provide high-protein, high-caloric foods as appropriate.     INTERVENTIONS:  - Monitor oral intake, urinary output, labs, and treatment plans  - Assess nutrition and hydration status and recommend course of action  - Evaluate amount of meals eaten  - Assist patient with eating if necessary   - Allow adequate time for meals  - Recommend/ encourage appropriate diets, oral nutritional supplements, and vitamin/mineral supplements  - Order, calculate, and assess calorie counts as needed  - Recommend, monitor, and adjust tube feedings and TPN/PPN based on assessed needs  - Assess need for intravenous fluids  - Provide specific nutrition/hydration education as appropriate  - Include patient/family/caregiver in decisions related to nutrition  Outcome: Progressing     Problem: Prexisting or High Potential for Compromised Skin Integrity  Goal: Skin integrity is maintained or improved  Description: INTERVENTIONS:  - Identify patients at risk for skin breakdown  - Assess and monitor skin integrity  - Assess and monitor nutrition and hydration status  - Monitor labs   - Assess for incontinence   - Turn and reposition patient  - Assist with mobility/ambulation  - Relieve pressure over bony prominences  - Avoid friction and shearing  - Provide appropriate hygiene as needed including keeping skin clean and dry  - Evaluate need for skin moisturizer/barrier cream  - Collaborate with interdisciplinary team   - Patient/family teaching  - Consider wound care consult   Outcome: Progressing     Problem: Potential for Falls  Goal: Patient will remain free of falls  Description: INTERVENTIONS:  - Educate patient/family on patient safety including physical limitations  - Instruct patient to call for assistance with activity   - Consult OT/PT to assist with strengthening/mobility   - Keep Call bell within reach  - Keep bed low and locked with side  rails adjusted as appropriate  - Keep care items and personal belongings within reach  - Initiate and maintain comfort rounds  - Make Fall Risk Sign visible to staff  - Offer Toileting every 2 Hours, in advance of need  - Initiate/Maintain bed/chair alarm  - Obtain necessary fall risk management equipment  - Apply yellow socks and bracelet for high fall risk patients  - Consider moving patient to room near nurses station  Outcome: Progressing     Problem: Potential for Falls  Goal: Patient will remain free of falls  Description: INTERVENTIONS:  - Educate patient/family on patient safety including physical limitations  - Instruct patient to call for assistance with activity   - Consult OT/PT to assist with strengthening/mobility   - Keep Call bell within reach  - Keep bed low and locked with side rails adjusted as appropriate  - Keep care items and personal belongings within reach  - Initiate and maintain comfort rounds  - Make Fall Risk Sign visible to staff  - Offer Toileting every 2 Hours, in advance of need  - Initiate/Maintain bed/chair alarm  - Obtain necessary fall risk management equipment  - Apply yellow socks and bracelet for high fall risk patients  - Consider moving patient to room near nurses station  Outcome: Progressing

## 2024-07-25 NOTE — PROGRESS NOTES
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 Given the lack of hydronephrosis this could either reflect adjacent calcification or nonobstructing stone.   Patient reports that she is still producing urine  US kidney and bladder: No hydronephrosis. 4 mm nonobstructing left intrarenal calculus  Urinalysis: 3+ blood.  3+ protein.  Innumerable red blood cells.  2-4 WBCs.  Moderate bacteria   AURA, C3, and C4 normal  Ig Kappa Free Light Chain:152.5   Ig Lambda Free Light Chain 87.1   Kappa/Lambda FluidC Ratio 1.75  Hepatitis panel nonreactive  HIV nonreactive  QuantiFERON gold negative  protein electrophoresis see labs  hypersensitivity pneumonitis profile negative  GBM antibodies: elevated >8  phospholipase A2 receptor Ab: Negative  ANCA: negative   7/17 Renal biopsy and bronchoscopy performed.  7/19 CM confirmed HD chair time for patient. Will be TTS 10:30 AM with start date Tuesday 7/23.   Bronchial lavage: Right- Negative for malignancy. Abundant pulmonary macrophages. Few benign bronchial cells. Left- Negative for malignancy, pulmonary macrophage, mixed inflammatory cells, Few benign bronchial cells  7/22 R permacath placed by IR. Renal bx: diffuse crescentic glomerulonephritis, Anti-GMB type      Plan  Nephrology onboard - dialysis initiated  Hematology onboard for plan for PLEX  Continue Prednisone 60 mg daily then begin taper per nephrology  Cyclophosphamide 100 mg daily   Increase torsemide to 100 mg  daily  Bactrim MWF  Monitor blood glucose levels  Hold home dyazide    Persistent cough  Assessment & Plan  Patient endorses worsening shortness of breath and difficulty breathing  Patient noted to have multiple diffuse scattered pulmonary nodules on CT  Quantiferon gold negative 7/9/24    Plan  Completed Ceftriaxone x 5 days.  Tessalon Perles as needed    Abnormal CT of the chest  Assessment & Plan  CT chest: Re demonstration of multiple diffuse scattered nodules  Etiology unclear: HP vs sarcoid vs  vs MAC vs atypical  CRP- 143.7   Sed-61, negative: QuantiFERON  Procal: 0.27--0.30--0.38--0.40  7/17 Renal biopsy and bronchoscopy performed. Results pending.     Plan:  Per pulmonology recs, repeat CT chest in 6 weeks. If the nodularity fails to improve or progresses, may need to reconsider tissue biopsy.    Moderate persistent asthma w/out acute exacerbation  Assessment & Plan  Patient has PFTs ordered but they have not been completed.  Home medication regime Advair -21 mcg 2 puff twice daily, Proventil 2 puffs every 6 hours as needed  On 2 L via nasal cannula.  O2 sat 95 to 96%    Plan:  Supplemental oxygen as needed  DuoNebs every 6 hours, per pulmonology  Albuterol inhaler PRN  Continue benzonatate  Continue fluticasone-vilanterol  Continue loratadine 10 mg  Robitussin PRN    Abnormality of ascending aorta  Assessment & Plan  CT chest: Unchanged fusiform ectasia of the ascending thoracic aorta measuring up to 40 mm     Plan  Recommendation for follow-up low radiation dose chest CT in one year    Bipolar 1 disorder (HCC)  Assessment & Plan  Plan  Continue on Effexor, trazodone and Lamictal    Primary hypertension  Assessment & Plan  Hold Lopressor due to bradycardia  Hold Dyazide  Nifedipine 30 mg daily  Hydralazine PRN  Monitor vitals as per protocol    Dyslipidemia  Assessment & Plan  Continue on atorvastatin 20 Mg    Sepsis (HCC)-resolved as of 7/14/2024  Assessment & Plan    WBC   Date Value Ref Range  Status   07/24/2024 24.77 (H) 4.31 - 10.16 Thousand/uL Final     Patient admitted with sepsis likely secondary to pneumonitis as evidenced by tachycardia, tachypnea and leukocytosis  CT: redemonstration of multiple diffuse scattered nodules, less pronounced than on 5/16/2024 suggestive of mycobacterial infection  QuantiFERON gold negative   Lactic acid normal, Procalcitonin trending up: 0.27 > 0.30 > .38 > .40  Sputum culture +2 gram negative rods, +1 gram positive cocci  BC: 1/2 staph epidermis detected-contaminated  Leukocytosis likely due to steroid use    Plan:  Completed Ceftriaxone x 5 days         VTE Pharmacologic Prophylaxis: VTE Score: 4 Moderate Risk (Score 3-4) - Pharmacological DVT Prophylaxis Ordered: heparin.    Mobility:   Basic Mobility Inpatient Raw Score: 20  JH-HLM Goal: 6: Walk 10 steps or more  JH-HLM Achieved: 2: Bed activities/Dependent transfer  JH-HLM Goal NOT achieved. Continue with multidisciplinary rounding and encourage appropriate mobility to improve upon JH-HLM goals.    Patient Centered Rounds: I performed bedside rounds with nursing staff today.   Discussions with Specialists or Other Care Team Provider: Nephrology, IR, pulmonology, Hematology    Education and Discussions with Family / Patient: Attempted to update  (sister) via phone. Left voicemail.     Current Length of Stay: 13 day(s)  Current Patient Status: Inpatient   Discharge Plan: Anticipate discharge in >72 hrs to home.    Code Status: Level 1 - Full Code    Subjective:   Patient seen and examined at bedside. No overnight events.  Patient began feeling short of breath last night and was put back on 1 L of oxygen via nasal cannula. Patient is currently getting dialysis. The dialysis technician states that she will  try to remove 2 L today. Will return to see if this is improved patient's shortness of breath. She also reports some constipation and request a suppository. Cough remains unchanged. She is otherwise  without complaints.    Objective:     Vitals:   Temp (24hrs), Av.9 °F (36.6 °C), Min:97.8 °F (36.6 °C), Max:98.2 °F (36.8 °C)    Temp:  [97.8 °F (36.6 °C)-98.2 °F (36.8 °C)] 98 °F (36.7 °C)  HR:  [68-87] 68  Resp:  [16-18] 16  BP: (119-161)/(61-85) 142/78  SpO2:  [94 %-99 %] 98 %  Body mass index is 47.14 kg/m².     Input and Output Summary (last 24 hours):     Intake/Output Summary (Last 24 hours) at 2024 1459  Last data filed at 2024 1150  Gross per 24 hour   Intake 1160 ml   Output 2000 ml   Net -840 ml       Physical Exam:   Physical Exam  Vitals and nursing note reviewed.   Constitutional:       Appearance: Normal appearance.   HENT:      Head: Normocephalic and atraumatic.      Mouth/Throat:      Mouth: Mucous membranes are moist.   Eyes:      Extraocular Movements: Extraocular movements intact.      Conjunctiva/sclera: Conjunctivae normal.      Pupils: Pupils are equal, round, and reactive to light.   Cardiovascular:      Rate and Rhythm: Normal rate and regular rhythm.   Pulmonary:      Effort: Pulmonary effort is normal. No respiratory distress.      Breath sounds: Normal breath sounds. No wheezing or rales.   Abdominal:      General: Bowel sounds are normal. There is no distension.      Palpations: Abdomen is soft.      Tenderness: There is no abdominal tenderness. There is no guarding.   Musculoskeletal:      Right lower leg: Edema present.      Left lower leg: Edema present.      Comments: Trace edema noted to BLE     Skin:     General: Skin is warm and dry.   Neurological:      General: No focal deficit present.      Mental Status: She is alert. Mental status is at baseline.        Additional Data:     Labs:  Results from last 7 days   Lab Units 24  0632 24  0429 24  0433 24  0606   WBC Thousand/uL 24.77*   < > 19.41* 17.94*   HEMOGLOBIN g/dL 8.3*   < > 8.9* 9.3*   HEMATOCRIT % 25.7*   < > 27.0* 29.4*   PLATELETS Thousands/uL 143*   < > 202 228   BANDS PCT %  --    --   --  8   LYMPHO PCT %  --   --  2* 5*   MONO PCT %  --   --  9 4   EOS PCT %  --   --  0 0    < > = values in this interval not displayed.     Results from last 7 days   Lab Units 07/23/24  0537 07/22/24  0429 07/21/24  0433 07/20/24  0439   SODIUM mmol/L 138   < > 139 135   POTASSIUM mmol/L 4.4   < > 5.1 6.1*   CHLORIDE mmol/L 102   < > 105 101   CO2 mmol/L 24   < > 23 20*   BUN mg/dL 67*   < > 82* 122*   CREATININE mg/dL 6.34*   < > 7.23* 9.64*   ANION GAP mmol/L 12   < > 11 14*   CALCIUM mg/dL 9.3   < > 9.1 9.7   ALBUMIN g/dL  --   --  3.6 3.3*   TOTAL BILIRUBIN mg/dL  --   --   --  0.37   ALK PHOS U/L  --   --   --  56   ALT U/L  --   --   --  9   AST U/L  --   --   --  9*   GLUCOSE RANDOM mg/dL 151*   < > 138 170*    < > = values in this interval not displayed.     Results from last 7 days   Lab Units 07/22/24  0429   INR  1.18     Results from last 7 days   Lab Units 07/25/24  1049 07/25/24  0659 07/24/24  2123 07/24/24  1505 07/24/24  1104 07/24/24  0723 07/23/24  2100 07/23/24  1553 07/23/24  1101 07/23/24  0701 07/22/24  1538 07/22/24  1051   POC GLUCOSE mg/dl 142* 139 204* 167* 143* 137 202* 169* 123 135 158* 132                   Lines/Drains:  Invasive Devices       Peripheral Intravenous Line  Duration             Peripheral IV 07/24/24 Left Antecubital 1 day              Hemodialysis Catheter  Duration             HD Permanent Double Catheter 3 days                    Imaging: Reviewed radiology reports from this admission including: chest xray, chest CT scan, abdominal/pelvic CT, and ultrasound kidney and bladder  IR tunneled dialysis catheter placement   Final Result by Graciela Bettencourt MD (07/22 1018)      Conversion of right-sided internal jugular non-tunneled central venous catheter for a tunneled dialysis catheter, with tip in the expected location of the right atrium.      Plan:      The catheter may be used immediately.      Workstation performed: [id number]         IR biopsy kidney random    Final Result by Sandoval Castro MD (07/18 6335)   Impression: Successful percutaneous nontarget renal biopsy as described.                  Workstation performed: [id number]         IR temporary dialysis catheter placement   Final Result by Graciela Bettencourt MD (07/15 1640)      Insertion of right-sided non-tunneled dual-lumen temporary dialysis catheter, with tip in the expected location of the cavoatrial junction.      Plan:      The catheter may be used immediately.      Workstation performed: [id number]                  Recent Cultures (last 7 days):           Last 24 Hours Medication List:   Current Facility-Administered Medications   Medication Dose Route Frequency Provider Last Rate    acetaminophen  650 mg Oral Q6H PRN Tram Palacios PA-C      [START ON 7/28/2024] Albumin 5%  200 g Intravenous Daily Joselyn Reyes Bahamonde, MD      albuterol  2 puff Inhalation Q4H PRN Tram Palacios PA-C      atorvastatin  20 mg Oral Daily Tram Palacios PA-C      benzonatate  200 mg Oral TID PRN Tram Palacios PA-C      bisacodyl  10 mg Rectal Daily PRN Daya May, DO      calcium carbonate-vitamin D  1 tablet Oral Daily With Breakfast Vane Estrada MD      cyclophosphamide  100 mg Oral Daily Tirso Lindsey Montez MD      dextromethorphan-guaiFENesin  10 mL Oral Q4H PRN Tram Palacios PA-C      famotidine  10 mg Oral Daily PRN Daya May, DO      fluticasone-vilanterol  1 puff Inhalation Daily Tram Palacios PA-C      heparin (porcine)  5,000 Units Subcutaneous Q8H JACK Medina MD      insulin lispro  2-12 Units Subcutaneous TID AC Yasmine Meadows MD      ipratropium-albuterol  3 mL Nebulization Q6H Luke Montez MD      iron polysaccharides  150 mg Oral Daily Ata Burns MD      lamoTRIgine  100 mg Oral QAM Tram Palacios PA-C      loratadine  10 mg Oral Daily Tram Palacios PA-C      melatonin  3 mg Oral Daily PRN Yasmine Meadows MD      montelukast  10 mg Oral Daily Tram Palacios PA-C      NIFEdipine  30 mg  Oral Daily Joselyn Reyes Bahamonde, MD      ondansetron  4 mg Intravenous Q6H PRN Yasmine Meadows MD      oxybutynin  5 mg Oral Daily Tram Palacios PA-C      pantoprazole  40 mg Oral Daily Before Breakfast Ronny Webster MD      polyethylene glycol  17 g Oral Daily Xavier Medina MD      [START ON 7/27/2024] predniSONE  40 mg Oral Daily Joselyn Reyes Bahamonde, MD      predniSONE  60 mg Oral Daily Joselyn Reyes Bahamonde, MD      senna  2 tablet Oral BID Yasmine Meadows MD      sevelamer  1,600 mg Oral TID With Meals Vane Estrada MD      Sodium Zirconium Cyclosilicate  10 g Oral Daily Tirso Montez MD      sulfamethoxazole-trimethoprim  1 tablet Oral Once per day on Monday Wednesday Friday Joselyn Reyes Bahamonde, MD      torsemide  100 mg Oral Daily Joselyn Reyes Bahamonde, MD      traZODone  200 mg Oral HS Tram Palacios PA-C      venlafaxine  150 mg Oral Daily Tram Palacios PA-C          Today, Patient Was Seen By: Jessica Beckford MD    **Please Note: This note may have been constructed using a voice recognition system.**

## 2024-07-25 NOTE — PROGRESS NOTES
NEPHROLOGY PROGRESS NOTE   Ngozi Beard 64 y.o. 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 Avoid opioids   Adjust medications to GFR  Patient has transportation issues, no availability to continue plasmapheresis and 1 infusion center.  Patient will have to travel from dialysis unit to the infusion center either [place], [facility].    Plan to continue plasmapheresis inpatient for now until next Thursday      # Anti-GBM disease  Anti-GBM remains >8  Discussed with lab to get a titer of anti-GBM to monitor her response to plasmapheresis however they cannot offer titers at this time?  Anti-GBM on Saturday  No pulmonary involvement at this time  Patient needs to continue with daily plasmapheresis for at least 14 days, plan to continue until Thursday next weeks      # Immunosuppression therapy  Methylprednisolone x 3, completed  Currently on prednisone 60 mg, started on 7/20  60 mg for 1 week  40 mg for 1 week 7/27  30 mg for 1 week  25 mg for 2 weeks  20 mg for 2 weeks  15 mg for 2 weeks   12.5 mg for 2 weeks  10 mg for 2 weeks  7.5 mg for 2 weeks  Continue with 5 mg  daily  Cytoxan 100 mg daily adjusted by kidney function and age.  Plan to continue for 3 months     # Prophylaxis  Calcium and vitamin D  Pantoprazole 40  Continue with Bactrim 1 tablet 3 times a week        #Volume status/hypertension:  Volume: Fluid overload, remains anuric  Blood pressure: Normotensive, /77 , goal less than 140/90   Recommend:  UF on HD 2 L today  Increase torsemide to 100 daily   Nifedipine 30 mg daily      # Anemia   Hemoglobin 8.3 mg/dL,   No RISHI for now, transfusion if hemoglobin less than 7        #Acid-base Disorder  serum HCO3  24  At goal     HEMODIALYSIS PROCEDURE NOTE  The patient was seen and examined on hemodialysis. The patient is tolerating the procedure well.  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  Lung interrupted Linea to granular for C3  EM: No immune type electron dense deposits, severe foot process effacement    The highlighted and/or bolded points in my assessment, plan, and disposition were discussed with the primary team and they agree with those points and the plan.  Previous records were personally reviewed by me to obtain a baseline creatinine.   The images (CXR) were personally reviewed by me in PACS      SUBJECTIVE:  Patient seen and examined at bedside during dialysis. No chest pain, shortness of breath, nausea, vomiting, abdominal pain or diarrhea.     OBJECTIVE:  Current Weight: Weight - Scale: 121 kg (266 lb 1.5 oz)  Vitals:    07/25/24 1030   BP: 138/77   Pulse: 79   Resp: 16   Temp:    SpO2:        Intake/Output Summary (Last 24 hours) at 7/25/2024 1120  Last data filed at 7/25/2024 0845  Gross per 24 hour   Intake 980 ml   Output --   Net 980 ml     Wt Readings from Last 3 Encounters:   07/25/24  121 kg (266 lb 1.5 oz)   07/10/24 109 kg (241 lb)   07/01/24 111 kg (244 lb 6.4 oz)     Temp Readings from Last 3 Encounters:   07/25/24 97.8 °F (36.6 °C) (Oral)   07/10/24 99.4 °F (37.4 °C) (Oral)   07/01/24 98.2 °F (36.8 °C) (Tympanic)     BP Readings from Last 3 Encounters:   07/25/24 138/77   07/12/24 127/59   07/01/24 124/80     Pulse Readings from Last 3 Encounters:   07/25/24 79   07/12/24 72   07/01/24 62      General:  no acute distress at this time  Skin:  No acute rash  Eyes:  No scleral icterus and noninjected  ENT:  mucous membranes moist  Neck:  no carotid bruits  Chest:  Clear to auscultation percussion, good respiratory effort, no use of accessory respiratory muscles  CVS:  Regular rate and rhythm without rub   Abdomen:  soft and nontender   Extremities: lower extremity edema  Neuro:  No gross focality  Psych:  Alert , cooperative       Medications:    Current Facility-Administered Medications:     acetaminophen (TYLENOL) tablet 650 mg, 650 mg, Oral, Q6H PRN, Tram Palacios PA-C, 650 mg at 07/19/24 1302    albumin human (FLEXBUMIN) 5 % injection 200 g, 200 g, Intravenous, Daily, Yasmine Meadows MD, 200 g at 07/24/24 0840    albuterol (PROVENTIL HFA,VENTOLIN HFA) inhaler 2 puff, 2 puff, Inhalation, Q4H PRN, Tram Palacios PA-C, 2 puff at 07/23/24 1114    atorvastatin (LIPITOR) tablet 20 mg, 20 mg, Oral, Daily, Tram Palacios PA-C, 20 mg at 07/24/24 0838    benzonatate (TESSALON PERLES) capsule 200 mg, 200 mg, Oral, TID PRN, Tram Palacios PA-C    bisacodyl (DULCOLAX) rectal suppository 10 mg, 10 mg, Rectal, Daily PRN, Daya May DO    calcium carbonate-vitamin D 500 mg-5 mcg tablet 1 tablet, 1 tablet, Oral, Daily With Breakfast, Vane Estrada MD, 1 tablet at 07/25/24 0820    cyclophosphamide (CYTOXAN) capsule 100 mg, 100 mg, Oral, Daily, Tirso Montez MD, 100 mg at 07/24/24 2022    dextromethorphan-guaiFENesin (ROBITUSSIN DM) oral syrup 10 mL, 10 mL, Oral, Q4H PRN, ALMA ROSA BellC     famotidine (PEPCID) tablet 10 mg, 10 mg, Oral, Daily PRN, Daya May, , 10 mg at 07/19/24 1305    fluticasone-vilanterol 200-25 mcg/actuation 1 puff, 1 puff, Inhalation, Daily, Tram Palacios PA-C, 1 puff at 07/25/24 0702    heparin (porcine) subcutaneous injection 5,000 Units, 5,000 Units, Subcutaneous, Q8H JACK, Xavier Medina MD, 5,000 Units at 07/25/24 0608    hydrALAZINE (APRESOLINE) injection 10 mg, 10 mg, Intravenous, Q6H PRN, Yasmine Meadows MD, 10 mg at 07/20/24 2131    insulin lispro (HumALOG/ADMELOG) 100 units/mL subcutaneous injection 2-12 Units, 2-12 Units, Subcutaneous, TID AC, 2 Units at 07/24/24 1621 **AND** Fingerstick Glucose (POCT), , , TID AC, Yasmine Meadows MD    ipratropium-albuterol (DUO-NEB) 0.5-2.5 mg/3 mL inhalation solution 3 mL, 3 mL, Nebulization, Q6H, Luke Montez MD, 3 mL at 07/25/24 0703    iron polysaccharides (FERREX) capsule 150 mg, 150 mg, Oral, Daily, Ata Burns MD, 150 mg at 07/24/24 0836    lamoTRIgine (LaMICtal) tablet 100 mg, 100 mg, Oral, QAM, Tram Palacios PA-C, 100 mg at 07/24/24 0838    loratadine (CLARITIN) tablet 10 mg, 10 mg, Oral, Daily, Tram Palacios PA-C, 10 mg at 07/24/24 0838    melatonin tablet 3 mg, 3 mg, Oral, Daily PRN, Yasmine Meadows MD, 3 mg at 07/21/24 2111    montelukast (SINGULAIR) tablet 10 mg, 10 mg, Oral, Daily, Tram Palacios PA-C, 10 mg at 07/24/24 0837    NIFEdipine (PROCARDIA XL) 24 hr tablet 30 mg, 30 mg, Oral, Daily, Joselyn Reyes Bahamonde, MD, 30 mg at 07/24/24 0837    ondansetron (ZOFRAN) injection 4 mg, 4 mg, Intravenous, Q6H PRN, Yasmine Meadows MD, 4 mg at 07/21/24 1840    oxybutynin (DITROPAN-XL) 24 hr tablet 5 mg, 5 mg, Oral, Daily, Tram Palacios PA-C, 5 mg at 07/24/24 0837    pantoprazole (PROTONIX) EC tablet 40 mg, 40 mg, Oral, Daily Before Breakfast, Ronny Webster MD, 40 mg at 07/25/24 0608    polyethylene glycol (MIRALAX) packet 17 g, 17 g, Oral, Daily, Xavier Medina MD, 17 g at 07/24/24 0835    [START ON 7/27/2024] predniSONE tablet 40 mg,  40 mg, Oral, Daily, Joselyn Reyes Bahamonde, MD    predniSONE tablet 60 mg, 60 mg, Oral, Daily, Joselyn Reyes Bahamonde, MD    senna (SENOKOT) tablet 17.2 mg, 2 tablet, Oral, BID, Yasmine Meadows MD    sevelamer (RENAGEL) tablet 1,600 mg, 1,600 mg, Oral, TID With Meals, Vane Estrada MD, 1,600 mg at 07/25/24 0820    Sodium Zirconium Cyclosilicate (Lokelma) 10 g, 10 g, Oral, Daily, Tirso Montez MD, 10 g at 07/24/24 0839    sulfamethoxazole-trimethoprim (BACTRIM) 400-80 mg per tablet 1 tablet, 1 tablet, Oral, Once per day on Monday Wednesday Friday, Joselyn Reyes Bahamonde, MD, 1 tablet at 07/24/24 0836    torsemide (DEMADEX) tablet 100 mg, 100 mg, Oral, Daily, Joselyn Reyes Bahamonde, MD    traZODone (DESYREL) tablet 200 mg, 200 mg, Oral, HS, Tram Palacios PA-C, 200 mg at 07/24/24 2122    venlafaxine (EFFEXOR-XR) 24 hr capsule 150 mg, 150 mg, Oral, Daily, Tram Palacios PA-C, 150 mg at 07/24/24 0840    Laboratory Results:  Results from last 7 days   Lab Units 07/24/24  0632 07/23/24  0537 07/22/24  0429 07/21/24  0433 07/20/24  0606 07/20/24  0439 07/19/24  0400   WBC Thousand/uL 24.77* 22.40* 18.05* 19.41* 17.94*  --  15.22*   HEMOGLOBIN g/dL 8.3* 8.9* 8.9* 8.9* 9.3*  --  9.9*   HEMATOCRIT % 25.7* 27.5* 28.4* 27.0* 29.4*  --  30.3*   PLATELETS Thousands/uL 143* 149 179 202 228  --  256   SODIUM mmol/L  --  138 140 139  --  135 134*   POTASSIUM mmol/L  --  4.4 5.5* 5.1  --  6.1* 5.1   CHLORIDE mmol/L  --  102 107 105  --  101 101   CO2 mmol/L  --  24 20* 23  --  20* 21   BUN mg/dL  --  67* 101* 82*  --  122* 94*   CREATININE mg/dL  --  6.34* 8.53* 7.23*  --  9.64* 8.65*   CALCIUM mg/dL  --  9.3 9.3 9.1  --  9.7 9.6   MAGNESIUM mg/dL  --   --   --   --   --  2.5 2.3   PHOSPHORUS mg/dL  --   --  8.4* 7.6*  --  9.5* 8.2*       IR tunneled dialysis catheter placement   Final Result by Graciela Bettencourt MD (07/22 1018)      Conversion of right-sided internal jugular non-tunneled central venous catheter for a tunneled  "dialysis catheter, with tip in the expected location of the right atrium.      Plan:      The catheter may be used immediately.      Workstation performed: [id number]         IR biopsy kidney random   Final Result by Sandoval Castro MD (07/18 1358)   Impression: Successful percutaneous nontarget renal biopsy as described.                  Workstation performed: [id number]         IR temporary dialysis catheter placement   Final Result by Graciela Bettencourt MD (07/15 1640)      Insertion of right-sided non-tunneled dual-lumen temporary dialysis catheter, with tip in the expected location of the cavoatrial junction.      Plan:      The catheter may be used immediately.      Workstation performed: [id number]             Portions of the record may have been created with voice recognition software. Occasional wrong word or \"sound a like\" substitutions may have occurred due to the inherent limitations of voice recognition software. Read the chart carefully and recognize, using context, where substitutions have occurred.    "

## 2024-07-25 NOTE — PLAN OF CARE
Post-Dialysis RN Treatment Note    Blood Pressure:  Pre: 161/76 mm/Hg  Post: 142/78 mmHg   EDW:  TBD     Weight:  Pre: 122.9 kg   Post: 121.0 kg   Mode of weight measurement: Bed Scale   Volume Removed:   1900 ml    Treatment duration:  180 minutes    NS given:  No    Treatment shortened? No   Medications given during Rx:   None Reported   Estimated Kt/V:    Not Applicable   Access type:  Permacath/TDC   Access Issues:  No    Report called to primary nurse   Pipo, Melina    Problem: METABOLIC, FLUID AND ELECTROLYTES - ADULT  Goal: Electrolytes maintained within normal limits  Description: INTERVENTIONS:  - Monitor labs and assess patient for signs and symptoms of electrolyte imbalances  - Administer electrolyte replacement as ordered  - Monitor response to electrolyte replacements, including repeat lab results as appropriate  - Instruct patient on fluid and nutrition as appropriate  7/25/2024 1143 by Faby Cheema RN  Outcome: Progressing  7/25/2024 0902 by Faby Cheema RN  Outcome: Progressing  Goal: Fluid balance maintained  Description: INTERVENTIONS:  - Monitor labs   - Monitor I/O and WT  - Instruct patient on fluid and nutrition as appropriate  - Assess for signs & symptoms of volume excess or deficit  7/25/2024 1143 by Faby Cheema RN  Outcome: Progressing  7/25/2024 0902 by Faby Cheema RN  Outcome: Progressing

## 2024-07-26 ENCOUNTER — HOSPITAL ENCOUNTER (OUTPATIENT)
Dept: INFUSION CENTER | Facility: HOSPITAL | Age: 64
Discharge: HOME | End: 2024-07-26

## 2024-07-26 LAB
ERYTHROCYTE [DISTWIDTH] IN BLOOD BY AUTOMATED COUNT: 16 % (ref 11.6–15.1)
FIBRINOGEN PPP-MCNC: 108 MG/DL (ref 207–520)
GLUCOSE SERPL-MCNC: 117 MG/DL (ref 65–140)
GLUCOSE SERPL-MCNC: 124 MG/DL (ref 65–140)
GLUCOSE SERPL-MCNC: 171 MG/DL (ref 65–140)
GLUCOSE SERPL-MCNC: 185 MG/DL (ref 65–140)
HCT VFR BLD AUTO: 22.7 % (ref 34.8–46.1)
HCT VFR BLD AUTO: 23 % (ref 34.8–46.1)
HGB BLD-MCNC: 7.2 G/DL (ref 11.5–15.4)
HGB BLD-MCNC: 7.3 G/DL (ref 11.5–15.4)
MCH RBC QN AUTO: 27.5 PG (ref 26.8–34.3)
MCHC RBC AUTO-ENTMCNC: 31.7 G/DL (ref 31.4–37.4)
MCV RBC AUTO: 87 FL (ref 82–98)
PLATELET # BLD AUTO: 139 THOUSANDS/UL (ref 149–390)
PMV BLD AUTO: 11.2 FL (ref 8.9–12.7)
RBC # BLD AUTO: 2.62 MILLION/UL (ref 3.81–5.12)
WBC # BLD AUTO: 24.22 THOUSAND/UL (ref 4.31–10.16)

## 2024-07-26 PROCEDURE — 94640 AIRWAY INHALATION TREATMENT: CPT

## 2024-07-26 PROCEDURE — 94760 N-INVAS EAR/PLS OXIMETRY 1: CPT

## 2024-07-26 PROCEDURE — 85027 COMPLETE CBC AUTOMATED: CPT

## 2024-07-26 PROCEDURE — 99232 SBSQ HOSP IP/OBS MODERATE 35: CPT | Performed by: HOSPITALIST

## 2024-07-26 PROCEDURE — 85384 FIBRINOGEN ACTIVITY: CPT | Performed by: INTERNAL MEDICINE

## 2024-07-26 PROCEDURE — 82948 REAGENT STRIP/BLOOD GLUCOSE: CPT

## 2024-07-26 PROCEDURE — 99233 SBSQ HOSP IP/OBS HIGH 50: CPT | Performed by: STUDENT IN AN ORGANIZED HEALTH CARE EDUCATION/TRAINING PROGRAM

## 2024-07-26 PROCEDURE — 85018 HEMOGLOBIN: CPT

## 2024-07-26 PROCEDURE — 85014 HEMATOCRIT: CPT

## 2024-07-26 RX ORDER — CALCIUM GLUCONATE 20 MG/ML
1 INJECTION, SOLUTION INTRAVENOUS ONCE
Status: COMPLETED | OUTPATIENT
Start: 2024-07-26 | End: 2024-07-26

## 2024-07-26 RX ADMIN — NIFEDIPINE 30 MG: 30 TABLET, EXTENDED RELEASE ORAL at 08:07

## 2024-07-26 RX ADMIN — IPRATROPIUM BROMIDE AND ALBUTEROL SULFATE 3 ML: 2.5; .5 SOLUTION RESPIRATORY (INHALATION) at 20:25

## 2024-07-26 RX ADMIN — POLYSACCHARIDE-IRON COMPLEX 150 MG: 150 CAPSULE ORAL at 08:06

## 2024-07-26 RX ADMIN — SENNOSIDES 17.2 MG: 8.6 TABLET, FILM COATED ORAL at 08:07

## 2024-07-26 RX ADMIN — HEPARIN SODIUM 5000 UNITS: 5000 INJECTION INTRAVENOUS; SUBCUTANEOUS at 05:11

## 2024-07-26 RX ADMIN — HEPARIN SODIUM 5000 UNITS: 5000 INJECTION INTRAVENOUS; SUBCUTANEOUS at 14:28

## 2024-07-26 RX ADMIN — LAMOTRIGINE 100 MG: 100 TABLET ORAL at 08:06

## 2024-07-26 RX ADMIN — TRAZODONE HYDROCHLORIDE 200 MG: 100 TABLET ORAL at 21:44

## 2024-07-26 RX ADMIN — SENNOSIDES 17.2 MG: 8.6 TABLET, FILM COATED ORAL at 18:04

## 2024-07-26 RX ADMIN — PANTOPRAZOLE SODIUM 40 MG: 40 TABLET, DELAYED RELEASE ORAL at 05:11

## 2024-07-26 RX ADMIN — IPRATROPIUM BROMIDE AND ALBUTEROL SULFATE 3 ML: 2.5; .5 SOLUTION RESPIRATORY (INHALATION) at 13:59

## 2024-07-26 RX ADMIN — PREDNISONE 60 MG: 20 TABLET ORAL at 08:06

## 2024-07-26 RX ADMIN — SULFAMETHOXAZOLE AND TRIMETHOPRIM 1 TABLET: 400; 80 TABLET ORAL at 08:06

## 2024-07-26 RX ADMIN — SEVELAMER HYDROCHLORIDE 1600 MG: 800 TABLET ORAL at 12:22

## 2024-07-26 RX ADMIN — CALCIUM GLUCONATE 1 G: 20 INJECTION, SOLUTION INTRAVENOUS at 09:07

## 2024-07-26 RX ADMIN — FAMOTIDINE 10 MG: 20 TABLET ORAL at 21:43

## 2024-07-26 RX ADMIN — IPRATROPIUM BROMIDE AND ALBUTEROL SULFATE 3 ML: 2.5; .5 SOLUTION RESPIRATORY (INHALATION) at 07:39

## 2024-07-26 RX ADMIN — ATORVASTATIN CALCIUM 20 MG: 20 TABLET, FILM COATED ORAL at 08:06

## 2024-07-26 RX ADMIN — OXYBUTYNIN CHLORIDE 5 MG: 5 TABLET, EXTENDED RELEASE ORAL at 08:06

## 2024-07-26 RX ADMIN — SEVELAMER HYDROCHLORIDE 1600 MG: 800 TABLET ORAL at 18:04

## 2024-07-26 RX ADMIN — HEPARIN SODIUM 5000 UNITS: 5000 INJECTION INTRAVENOUS; SUBCUTANEOUS at 21:44

## 2024-07-26 RX ADMIN — VENLAFAXINE HYDROCHLORIDE 150 MG: 150 CAPSULE, EXTENDED RELEASE ORAL at 08:07

## 2024-07-26 RX ADMIN — LORATADINE 10 MG: 10 TABLET ORAL at 08:06

## 2024-07-26 RX ADMIN — MONTELUKAST 10 MG: 10 TABLET, FILM COATED ORAL at 08:06

## 2024-07-26 RX ADMIN — Medication 1 TABLET: at 08:06

## 2024-07-26 RX ADMIN — TORSEMIDE 100 MG: 100 TABLET ORAL at 08:06

## 2024-07-26 RX ADMIN — FLUTICASONE FUROATE AND VILANTEROL TRIFENATATE 1 PUFF: 200; 25 POWDER RESPIRATORY (INHALATION) at 07:20

## 2024-07-26 RX ADMIN — SEVELAMER HYDROCHLORIDE 1600 MG: 800 TABLET ORAL at 07:20

## 2024-07-26 RX ADMIN — INSULIN LISPRO 2 UNITS: 100 INJECTION, SOLUTION INTRAVENOUS; SUBCUTANEOUS at 18:05

## 2024-07-26 RX ADMIN — POLYETHYLENE GLYCOL 3350 17 G: 17 POWDER, FOR SOLUTION ORAL at 08:06

## 2024-07-26 RX ADMIN — CYCLOPHOSPHAMIDE 100 MG: 50 CAPSULE ORAL at 21:44

## 2024-07-26 NOTE — PLAN OF CARE
Problem: PAIN - ADULT  Goal: Verbalizes/displays adequate comfort level or baseline comfort level  Description: Interventions:  - Encourage patient to monitor pain and request assistance  - Assess pain using appropriate pain scale  - Administer analgesics based on type and severity of pain and evaluate response  - Implement non-pharmacological measures as appropriate and evaluate response  - Consider cultural and social influences on pain and pain management  - Notify physician/advanced practitioner if interventions unsuccessful or patient reports new pain  Outcome: Progressing     Problem: INFECTION - ADULT  Goal: Absence or prevention of progression during hospitalization  Description: INTERVENTIONS:  - Assess and monitor for signs and symptoms of infection  - Monitor lab/diagnostic results  - Monitor all insertion sites, i.e. indwelling lines, tubes, and drains  - Monitor endotracheal if appropriate and nasal secretions for changes in amount and color  - Mercedes appropriate cooling/warming therapies per order  - Administer medications as ordered  - Instruct and encourage patient and family to use good hand hygiene technique  - Identify and instruct in appropriate isolation precautions for identified infection/condition  Outcome: Progressing  Goal: Absence of fever/infection during neutropenic period  Description: INTERVENTIONS:  - Monitor WBC    Outcome: Progressing     Problem: SAFETY ADULT  Goal: Patient will remain free of falls  Description: INTERVENTIONS:  - Educate patient/family on patient safety including physical limitations  - Instruct patient to call for assistance with activity   - Consult OT/PT to assist with strengthening/mobility   - Keep Call bell within reach  - Keep bed low and locked with side rails adjusted as appropriate  - Keep care items and personal belongings within reach  - Initiate and maintain comfort rounds  - Make Fall Risk Sign visible to staff  - Offer Toileting every 2 Hours,  in advance of need  - Initiate/Maintain bed alarm  - Obtain necessary fall risk management equipment  - Apply yellow socks and bracelet for high fall risk patients  - Consider moving patient to room near nurses station  Outcome: Progressing  Goal: Maintain or return to baseline ADL function  Description: INTERVENTIONS:  -  Assess patient's ability to carry out ADLs; assess patient's baseline for ADL function and identify physical deficits which impact ability to perform ADLs (bathing, care of mouth/teeth, toileting, grooming, dressing, etc.)  - Assess/evaluate cause of self-care deficits   - Assess range of motion  - Assess patient's mobility; develop plan if impaired  - Assess patient's need for assistive devices and provide as appropriate  - Encourage maximum independence but intervene and supervise when necessary  - Involve family in performance of ADLs  - Assess for home care needs following discharge   - Consider OT consult to assist with ADL evaluation and planning for discharge  - Provide patient education as appropriate  Outcome: Progressing  Goal: Maintains/Returns to pre admission functional level  Description: INTERVENTIONS:  - Perform AM-PAC 6 Click Basic Mobility/ Daily Activity assessment daily.  - Set and communicate daily mobility goal to care team and patient/family/caregiver.   - Collaborate with rehabilitation services on mobility goals if consulted  - Perform Range of Motion 3 times a day.  - Reposition patient every 2 hours.  - Dangle patient 3 times a day  - Stand patient 3 times a day  - Ambulate patient 3 times a day  - Out of bed to chair 3 times a day   - Out of bed for meals 3 times a day  - Out of bed for toileting  - Record patient progress and toleration of activity level   Outcome: Progressing     Problem: DISCHARGE PLANNING  Goal: Discharge to home or other facility with appropriate resources  Description: INTERVENTIONS:  - Identify barriers to discharge w/patient and caregiver  -  Arrange for needed discharge resources and transportation as appropriate  - Identify discharge learning needs (meds, wound care, etc.)  - Arrange for interpretive services to assist at discharge as needed  - Refer to Case Management Department for coordinating discharge planning if the patient needs post-hospital services based on physician/advanced practitioner order or complex needs related to functional status, cognitive ability, or social support system  Outcome: Progressing     Problem: Knowledge Deficit  Goal: Patient/family/caregiver demonstrates understanding of disease process, treatment plan, medications, and discharge instructions  Description: Complete learning assessment and assess knowledge base.  Interventions:  - Provide teaching at level of understanding  - Provide teaching via preferred learning methods  Outcome: Progressing     Problem: METABOLIC, FLUID AND ELECTROLYTES - ADULT  Goal: Electrolytes maintained within normal limits  Description: INTERVENTIONS:  - Monitor labs and assess patient for signs and symptoms of electrolyte imbalances  - Administer electrolyte replacement as ordered  - Monitor response to electrolyte replacements, including repeat lab results as appropriate  - Instruct patient on fluid and nutrition as appropriate  Outcome: Progressing  Goal: Fluid balance maintained  Description: INTERVENTIONS:  - Monitor labs   - Monitor I/O and WT  - Instruct patient on fluid and nutrition as appropriate  - Assess for signs & symptoms of volume excess or deficit  Outcome: Progressing     Problem: Nutrition/Hydration-ADULT  Goal: Nutrient/Hydration intake appropriate for improving, restoring or maintaining nutritional needs  Description: Monitor and assess patient's nutrition/hydration status for malnutrition. Collaborate with interdisciplinary team and initiate plan and interventions as ordered.  Monitor patient's weight and dietary intake as ordered or per policy. Utilize nutrition  screening tool and intervene as necessary. Determine patient's food preferences and provide high-protein, high-caloric foods as appropriate.     INTERVENTIONS:  - Monitor oral intake, urinary output, labs, and treatment plans  - Assess nutrition and hydration status and recommend course of action  - Evaluate amount of meals eaten  - Assist patient with eating if necessary   - Allow adequate time for meals  - Recommend/ encourage appropriate diets, oral nutritional supplements, and vitamin/mineral supplements  - Order, calculate, and assess calorie counts as needed  - Recommend, monitor, and adjust tube feedings and TPN/PPN based on assessed needs  - Assess need for intravenous fluids  - Provide specific nutrition/hydration education as appropriate  - Include patient/family/caregiver in decisions related to nutrition  Outcome: Progressing     Problem: Prexisting or High Potential for Compromised Skin Integrity  Goal: Skin integrity is maintained or improved  Description: INTERVENTIONS:  - Identify patients at risk for skin breakdown  - Assess and monitor skin integrity  - Assess and monitor nutrition and hydration status  - Monitor labs   - Assess for incontinence   - Turn and reposition patient  - Assist with mobility/ambulation  - Relieve pressure over bony prominences  - Avoid friction and shearing  - Provide appropriate hygiene as needed including keeping skin clean and dry  - Evaluate need for skin moisturizer/barrier cream  - Collaborate with interdisciplinary team   - Patient/family teaching  - Consider wound care consult   Outcome: Progressing     Problem: Potential for Falls  Goal: Patient will remain free of falls  Description: INTERVENTIONS:  - Educate patient/family on patient safety including physical limitations  - Instruct patient to call for assistance with activity   - Consult OT/PT to assist with strengthening/mobility   - Keep Call bell within reach  - Keep bed low and locked with side rails  adjusted as appropriate  - Keep care items and personal belongings within reach  - Initiate and maintain comfort rounds  - Make Fall Risk Sign visible to staff  - Offer Toileting every 2 Hours, in advance of need  - Initiate/Maintain bed alarm  - Obtain necessary fall risk management equipment  - Apply yellow socks and bracelet for high fall risk patients  - Consider moving patient to room near nurses station  Outcome: Progressing

## 2024-07-26 NOTE — PROGRESS NOTES
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 Given the lack of hydronephrosis this could either reflect adjacent calcification or nonobstructing stone.   Patient reports that she is still producing urine  US kidney and bladder: No hydronephrosis. 4 mm nonobstructing left intrarenal calculus  Urinalysis: 3+ blood.  3+ protein.  Innumerable red blood cells.  2-4 WBCs.  Moderate bacteria   AURA, C3, and C4 normal  Ig Kappa Free Light Chain:152.5   Ig Lambda Free Light Chain 87.1   Kappa/Lambda FluidC Ratio 1.75  Hepatitis panel nonreactive  HIV nonreactive  QuantiFERON gold negative  protein electrophoresis see labs  hypersensitivity pneumonitis profile negative  GBM antibodies: elevated >8  phospholipase A2 receptor Ab: Negative  ANCA: negative   7/17 Renal biopsy and bronchoscopy performed.  7/19 CM confirmed HD chair time for patient. Will be TTS 10:30 AM with start date Tuesday 7/23.   Bronchial lavage: Right- Negative for malignancy. Abundant pulmonary macrophages. Few benign bronchial cells. Left- Negative for malignancy, pulmonary macrophage, mixed inflammatory cells, Few benign bronchial cells  7/22 R permacath placed by IR. Renal bx: diffuse crescentic glomerulonephritis, Anti-GMB type      Plan  Nephrology onboard - dialysis initiated  Hematology onboard for PLEX  Continue Prednisone 60 mg daily then begin taper per nephrology  Cyclophosphamide 100 mg daily   Increase torsemide to 100 mg daily  Bactrim  MWF  Monitor blood glucose levels  Hold home dyazide    Persistent cough  Assessment & Plan  Patient endorses worsening shortness of breath and difficulty breathing  Patient noted to have multiple diffuse scattered pulmonary nodules on CT  Quantiferon gold negative 7/9/24    Plan  Completed Ceftriaxone x 5 days.  Tessalon Perles as needed    Abnormal CT of the chest  Assessment & Plan  CT chest: Re demonstration of multiple diffuse scattered nodules  Etiology unclear: HP vs sarcoid vs  vs MAC vs atypical  CRP- 143.7   Sed-61, negative: QuantiFERON  Procal: 0.27--0.30--0.38--0.40  7/17 Renal biopsy and bronchoscopy performed. Results pending.     Plan:  Per pulmonology recs, repeat CT chest in 6 weeks. If the nodularity fails to improve or progresses, may need to reconsider tissue biopsy.    Moderate persistent asthma w/out acute exacerbation  Assessment & Plan  Patient has PFTs ordered but they have not been completed.  Home medication regime Advair -21 mcg 2 puff twice daily, Proventil 2 puffs every 6 hours as needed  On 2 L via nasal cannula.  O2 sat 95 to 96%    Plan:  Supplemental oxygen as needed  DuoNebs every 6 hours, per pulmonology  Albuterol inhaler PRN  Continue benzonatate  Continue fluticasone-vilanterol  Continue loratadine 10 mg  Robitussin PRN    Abnormality of ascending aorta  Assessment & Plan  CT chest: Unchanged fusiform ectasia of the ascending thoracic aorta measuring up to 40 mm     Plan  Recommendation for follow-up low radiation dose chest CT in one year    Bipolar 1 disorder (HCC)  Assessment & Plan  Plan  Continue on Effexor, trazodone and Lamictal    Primary hypertension  Assessment & Plan  Hold Lopressor due to bradycardia  Hold Dyazide  Nifedipine 30 mg daily  Hydralazine PRN  Monitor vitals as per protocol    Dyslipidemia  Assessment & Plan  Continue on atorvastatin 20 Mg    Sepsis (HCC)-resolved as of 7/14/2024  Assessment & Plan    WBC   Date Value Ref Range Status    2024 24.77 (H) 4.31 - 10.16 Thousand/uL Final     Patient admitted with sepsis likely secondary to pneumonitis as evidenced by tachycardia, tachypnea and leukocytosis  CT: redemonstration of multiple diffuse scattered nodules, less pronounced than on 2024 suggestive of mycobacterial infection  QuantiFERON gold negative   Lactic acid normal, Procalcitonin trending up: 0.27 > 0.30 > .38 > .40  Sputum culture +2 gram negative rods, +1 gram positive cocci  BC:  staph epidermis detected-contaminated  Leukocytosis likely due to steroid use    Plan:  Completed Ceftriaxone x 5 days         VTE Pharmacologic Prophylaxis: VTE Score: 4 Moderate Risk (Score 3-4) - Pharmacological DVT Prophylaxis Ordered: heparin.    Mobility:   Basic Mobility Inpatient Raw Score: 20  JH-HLM Goal: 6: Walk 10 steps or more  JH-HLM Achieved: 7: Walk 25 feet or more  JH-HLM Goal NOT achieved. Continue with multidisciplinary rounding and encourage appropriate mobility to improve upon JH-HLM goals.    Patient Centered Rounds: I performed bedside rounds with nursing staff today.   Discussions with Specialists or Other Care Team Provider: Nephrology, IR, pulmonology, Hematology    Education and Discussions with Family / Patient: Attempted to update  (sister) via phone. Left voicemail.     Current Length of Stay: 14 day(s)  Current Patient Status: Inpatient   Discharge Plan: Anticipate discharge in >72 hrs to home.    Code Status: Level 1 - Full Code    Subjective:   Patient seen and examined at bedside. No overnight events. She is currently receiving PLEX. Patient states shortness of breath has improved after dialysis yesterday.  She is satting well on room air. Patient had a bowel movement yesterday after suppository. Cough remains unchanged. She is otherwise without complaints.    Objective:     Vitals:   Temp (24hrs), Av.3 °F (36.8 °C), Min:97.6 °F (36.4 °C), Max:98.8 °F (37.1 °C)    Temp:  [97.6 °F (36.4 °C)-98.8  °F (37.1 °C)] 97.6 °F (36.4 °C)  HR:  [68-97] 74  Resp:  [16-18] 16  BP: (128-172)/(73-98) 141/82  SpO2:  [89 %-98 %] 97 %  Body mass index is 46.63 kg/m².     Input and Output Summary (last 24 hours):     Intake/Output Summary (Last 24 hours) at 7/26/2024 1130  Last data filed at 7/26/2024 0946  Gross per 24 hour   Intake 740 ml   Output 2050 ml   Net -1310 ml       Physical Exam:   Physical Exam  Vitals and nursing note reviewed.   Constitutional:       Appearance: Normal appearance.   HENT:      Head: Normocephalic and atraumatic.      Mouth/Throat:      Mouth: Mucous membranes are moist.   Eyes:      Extraocular Movements: Extraocular movements intact.      Conjunctiva/sclera: Conjunctivae normal.      Pupils: Pupils are equal, round, and reactive to light.   Cardiovascular:      Rate and Rhythm: Normal rate and regular rhythm.   Pulmonary:      Effort: Pulmonary effort is normal. No respiratory distress.      Breath sounds: Normal breath sounds. No wheezing or rales.   Abdominal:      General: Bowel sounds are normal. There is no distension.      Palpations: Abdomen is soft.      Tenderness: There is no abdominal tenderness. There is no guarding.   Musculoskeletal:      Right lower leg: Edema present.      Left lower leg: Edema present.      Comments: Trace edema noted to BLE     Skin:     General: Skin is warm and dry.   Neurological:      General: No focal deficit present.      Mental Status: She is alert. Mental status is at baseline.        Additional Data:     Labs:  Results from last 7 days   Lab Units 07/26/24  0504 07/22/24  0429 07/21/24  0433 07/20/24  0606   WBC Thousand/uL 24.22*   < > 19.41* 17.94*   HEMOGLOBIN g/dL 7.2*   < > 8.9* 9.3*   HEMATOCRIT % 22.7*   < > 27.0* 29.4*   PLATELETS Thousands/uL 139*   < > 202 228   BANDS PCT %  --   --   --  8   LYMPHO PCT %  --   --  2* 5*   MONO PCT %  --   --  9 4   EOS PCT %  --   --  0 0    < > = values in this interval not displayed.     Results from  last 7 days   Lab Units 07/23/24  0537 07/22/24  0429 07/21/24  0433 07/20/24  0439   SODIUM mmol/L 138   < > 139 135   POTASSIUM mmol/L 4.4   < > 5.1 6.1*   CHLORIDE mmol/L 102   < > 105 101   CO2 mmol/L 24   < > 23 20*   BUN mg/dL 67*   < > 82* 122*   CREATININE mg/dL 6.34*   < > 7.23* 9.64*   ANION GAP mmol/L 12   < > 11 14*   CALCIUM mg/dL 9.3   < > 9.1 9.7   ALBUMIN g/dL  --   --  3.6 3.3*   TOTAL BILIRUBIN mg/dL  --   --   --  0.37   ALK PHOS U/L  --   --   --  56   ALT U/L  --   --   --  9   AST U/L  --   --   --  9*   GLUCOSE RANDOM mg/dL 151*   < > 138 170*    < > = values in this interval not displayed.     Results from last 7 days   Lab Units 07/22/24  0429   INR  1.18     Results from last 7 days   Lab Units 07/26/24  1122 07/26/24  0731 07/25/24  2045 07/25/24  1606 07/25/24  1049 07/25/24  0659 07/24/24  2123 07/24/24  1505 07/24/24  1104 07/24/24  0723 07/23/24  2100 07/23/24  1553   POC GLUCOSE mg/dl 124 117 174* 213* 142* 139 204* 167* 143* 137 202* 169*                   Lines/Drains:  Invasive Devices       Peripheral Intravenous Line  Duration             Peripheral IV 07/24/24 Left Antecubital 2 days              Hemodialysis Catheter  Duration             HD Permanent Double Catheter 4 days                    Imaging: Reviewed radiology reports from this admission including: chest xray, chest CT scan, abdominal/pelvic CT, and ultrasound kidney and bladder  IR tunneled dialysis catheter placement   Final Result by Graciela Bettencourt MD (07/22 1018)      Conversion of right-sided internal jugular non-tunneled central venous catheter for a tunneled dialysis catheter, with tip in the expected location of the right atrium.      Plan:      The catheter may be used immediately.      Workstation performed: [id number]         IR biopsy kidney random   Final Result by Sandoval Castro MD (07/18 7339)   Impression: Successful percutaneous nontarget renal biopsy as described.                  Workstation  performed: [id number]         IR temporary dialysis catheter placement   Final Result by Graciela Bettencourt MD (07/15 1640)      Insertion of right-sided non-tunneled dual-lumen temporary dialysis catheter, with tip in the expected location of the cavoatrial junction.      Plan:      The catheter may be used immediately.      Workstation performed: [id number]                  Recent Cultures (last 7 days):           Last 24 Hours Medication List:   Current Facility-Administered Medications   Medication Dose Route Frequency Provider Last Rate    acetaminophen  650 mg Oral Q6H PRN Tram Palacios PA-C      [START ON 7/28/2024] Albumin 5%  200 g Intravenous Daily Joselyn Reyes Bahamonde, MD      albuterol  2 puff Inhalation Q4H PRN Tram Palacios PA-C      atorvastatin  20 mg Oral Daily Tram Palacios PA-C      benzonatate  200 mg Oral TID PRN Tram Palacios PA-C      bisacodyl  10 mg Rectal Daily PRN Daya May DO      cyclophosphamide  100 mg Oral Daily Tirso Montez MD      dextromethorphan-guaiFENesin  10 mL Oral Q4H PRN Tram Palacios PA-C      famotidine  10 mg Oral Daily PRN Daya May DO      fluticasone-vilanterol  1 puff Inhalation Daily Tram Palacios PA-C      heparin (porcine)  5,000 Units Subcutaneous Q8H JACK Medina MD      insulin lispro  2-12 Units Subcutaneous TID AC Yasmine Meadows MD      ipratropium-albuterol  3 mL Nebulization Q6H Luke Montez MD      iron polysaccharides  150 mg Oral Daily Ata Burns MD      lamoTRIgine  100 mg Oral QAM Tram Palacios PA-C      loratadine  10 mg Oral Daily Tram Palacios PA-C      melatonin  3 mg Oral Daily PRN Yasmine Meadows MD      montelukast  10 mg Oral Daily Tram Palacios PA-C      NIFEdipine  30 mg Oral Daily Joselyn Reyes Bahamonde, MD      ondansetron  4 mg Intravenous Q6H PRN Yasmine Meadows MD      oxybutynin  5 mg Oral Daily Tram Palacios PA-C      pantoprazole  40 mg Oral Daily Before Breakfast Ronny Webster MD      polyethylene glycol  17 g Oral  Daily Xavier Medina MD      [START ON 7/27/2024] predniSONE  40 mg Oral Daily Joselyn Reyes Bahamonde, MD      senna  2 tablet Oral BID Yasmine Meadows MD      sevelamer  1,600 mg Oral TID With Meals Vane Estrada MD      Sodium Zirconium Cyclosilicate  10 g Oral Daily Tirso Montez MD      sulfamethoxazole-trimethoprim  1 tablet Oral Once per day on Monday Wednesday Friday Joselyn Reyes Bahamonde, MD      torsemide  100 mg Oral Daily Joselyn Reyes Bahamonde, MD      traZODone  200 mg Oral HS Tram Palacios PA-C      venlafaxine  150 mg Oral Daily Tram Palacios PA-C          Today, Patient Was Seen By: Jessica Beckford MD    **Please Note: This note may have been constructed using a voice recognition system.**

## 2024-07-26 NOTE — PROGRESS NOTES
NEPHROLOGY PROGRESS NOTE   Ngozi Beard 64 y.o. female MRN: [id number]  Unit/Bed#: S -01 Encounter: [id number]  Reason for Consult: ANGELICA    ASSESSMENT AND PLAN:  63 yo woman with new diagnosis of anti-GBM disease 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  Nephrology is consulted for management of ANGELICA     Plan:     # Anuric KDIGO ANGELICA stage 3, HD dependent  Etiology: Diffuse crescentic glomerulonephritis consistent with anti-GBM disease   Baseline creatinine 1 mg/dL  Current creatinine: No evidence of kidney recovery yet, continues to be dialysis dependent   UA: Microhematuria, proteinuria  UPCR 5.8 g/g  Treatment:  No evidence of kidney recovery yet   Will continue HD TTS as per schedule   Will continue to monitor for kidney recovery  Patient has a chair at [place] dialysis unit TTS at 10:30 AM   Maintain MAP:  Over 65 mmHg if possible/avoid hypoperfusion:  Hold parameters on blood pressure medications  Avoid nephrotoxic agents such as NSAIDs, and IV contrast if possible. Avoid opioids   Adjust medications to GFR  Patient has transportation issues, unable to do plasmapheresis on the weekends in a single center  No availability to travel from dialysis center to plasmapheresis.     # Anti-GBM disease  Anti-GBM remains elevated,>8  Discussed with lab to get a titer of anti-GBM to monitor her response to plasmapheresis however they cannot offer titers at this time?  Anti-GBM on Saturday and Monday  No pulmonary involvement at this time  Continue plasmapheresis until next August 1    # Immunosuppression therapy  Methylprednisolone x 3, completed  Currently on prednisone 60 mg, started on 7/20  60 mg for 1 week  40 mg for 1 week 7/27  30 mg for 1 week  25 mg for 2 weeks  20 mg for 2 weeks  15 mg for 2 weeks   12.5 mg for 2 weeks  10 mg for 2 weeks  7.5 mg for 2 weeks  Continue with 5 mg daily  Cytoxan 100 mg daily adjusted by kidney function and age.  Plan to continue for 3 months     # Prophylaxis  Continue with  calcium and vitamin D  Pantoprazole 40  Continue with Bactrim 1 tablet 3 times a week        #Volume status/hypertension:  Volume: Hypervolemic  Blood pressure: Borderline hypertension, /82  , goal less than 140/90   Recommend:  Ultrafiltration on dialysis tomorrow   Continue  Continue  torsemide to 100 daily   Nifedipine 30 mg daily      # Anemia   Hemoglobin 7.2 mg/dL, trending down  No RISHI for now, transfusion if hemoglobin less than 7        #Acid-base Disorder  serum HCO3  24  At goal        SUBJECTIVE:  Patient seen and examined at bedside. No chest pain, shortness of breath, nausea, vomiting, abdominal pain or diarrhea.     OBJECTIVE:  Current Weight: Weight - Scale: 119 kg (263 lb 3.7 oz)  Vitals:    07/26/24 0740   BP:    Pulse:    Resp:    Temp:    SpO2: 97%       Intake/Output Summary (Last 24 hours) at 7/26/2024 1143  Last data filed at 7/26/2024 0946  Gross per 24 hour   Intake 740 ml   Output 2050 ml   Net -1310 ml     Wt Readings from Last 3 Encounters:   07/26/24 119 kg (263 lb 3.7 oz)   07/10/24 109 kg (241 lb)   07/01/24 111 kg (244 lb 6.4 oz)     Temp Readings from Last 3 Encounters:   07/26/24 97.6 °F (36.4 °C) (Oral)   07/10/24 99.4 °F (37.4 °C) (Oral)   07/01/24 98.2 °F (36.8 °C) (Tympanic)     BP Readings from Last 3 Encounters:   07/26/24 141/82   07/12/24 127/59   07/01/24 124/80     Pulse Readings from Last 3 Encounters:   07/26/24 74   07/12/24 72   07/01/24 62        General:  no acute distress at this time  Skin:  No acute rash  Eyes:  No scleral icterus and noninjected  ENT:  mucous membranes moist  Neck:  no carotid bruits  Chest:  Clear to auscultation percussion, good respiratory effort, no use of accessory respiratory muscles  CVS:  Regular rate and rhythm without rub   Abdomen:  soft and nontender   Extremities: lower extremity edema  Neuro:  No gross focality  Psych:  Alert , cooperative   Dialysis access: PermCath      Medications:    Current Facility-Administered  Medications:     acetaminophen (TYLENOL) tablet 650 mg, 650 mg, Oral, Q6H PRN, Tram Palacios PA-C, 650 mg at 07/19/24 1302    [START ON 7/28/2024] albumin human 200 g 5% IVPB, 200 g, Intravenous, Daily, Joselyn Reyes Bahamonde, MD    albuterol (PROVENTIL HFA,VENTOLIN HFA) inhaler 2 puff, 2 puff, Inhalation, Q4H PRN, Tram Palacios PA-C, 2 puff at 07/23/24 1114    atorvastatin (LIPITOR) tablet 20 mg, 20 mg, Oral, Daily, Tram Palacios PA-C, 20 mg at 07/26/24 0806    benzonatate (TESSALON PERLES) capsule 200 mg, 200 mg, Oral, TID PRN, Tram Palacios PA-C    bisacodyl (DULCOLAX) rectal suppository 10 mg, 10 mg, Rectal, Daily PRN, Daya May DO, 10 mg at 07/25/24 1417    cyclophosphamide (CYTOXAN) capsule 100 mg, 100 mg, Oral, Daily, Tirso Montez MD, 100 mg at 07/25/24 2100    dextromethorphan-guaiFENesin (ROBITUSSIN DM) oral syrup 10 mL, 10 mL, Oral, Q4H PRN, Tram Palacios PA-C    famotidine (PEPCID) tablet 10 mg, 10 mg, Oral, Daily PRN, Daya May DO, 10 mg at 07/19/24 1305    fluticasone-vilanterol 200-25 mcg/actuation 1 puff, 1 puff, Inhalation, Daily, Tram Palacios PA-C, 1 puff at 07/26/24 0720    heparin (porcine) subcutaneous injection 5,000 Units, 5,000 Units, Subcutaneous, Q8H JACK, Xavier Medina MD, 5,000 Units at 07/26/24 0511    insulin lispro (HumALOG/ADMELOG) 100 units/mL subcutaneous injection 2-12 Units, 2-12 Units, Subcutaneous, TID AC, 4 Units at 07/25/24 1642 **AND** Fingerstick Glucose (POCT), , , TID AC, Yasmine Meadows MD    ipratropium-albuterol (DUO-NEB) 0.5-2.5 mg/3 mL inhalation solution 3 mL, 3 mL, Nebulization, Q6H, Luke Montez MD, 3 mL at 07/26/24 0739    iron polysaccharides (FERREX) capsule 150 mg, 150 mg, Oral, Daily, Ata Burns MD, 150 mg at 07/26/24 0806    lamoTRIgine (LaMICtal) tablet 100 mg, 100 mg, Oral, QAM, Tram Palacios PA-C, 100 mg at 07/26/24 0806    loratadine (CLARITIN) tablet 10 mg, 10 mg, Oral, Daily, Tram Palacios PA-C, 10 mg at 07/26/24 0806    melatonin  tablet 3 mg, 3 mg, Oral, Daily PRN, Yasmine Meadows MD, 3 mg at 07/21/24 2111    montelukast (SINGULAIR) tablet 10 mg, 10 mg, Oral, Daily, Tram Palacios PA-C, 10 mg at 07/26/24 0806    NIFEdipine (PROCARDIA XL) 24 hr tablet 30 mg, 30 mg, Oral, Daily, Joselyn Reyes Bahamonde, MD, 30 mg at 07/26/24 0807    ondansetron (ZOFRAN) injection 4 mg, 4 mg, Intravenous, Q6H PRN, Yasmine Meadows MD, 4 mg at 07/21/24 1840    oxybutynin (DITROPAN-XL) 24 hr tablet 5 mg, 5 mg, Oral, Daily, Tram Palacios PA-C, 5 mg at 07/26/24 0806    pantoprazole (PROTONIX) EC tablet 40 mg, 40 mg, Oral, Daily Before Breakfast, Ronny Webster MD, 40 mg at 07/26/24 0511    polyethylene glycol (MIRALAX) packet 17 g, 17 g, Oral, Daily, Xavier Medina MD, 17 g at 07/26/24 0806    [START ON 7/27/2024] predniSONE tablet 40 mg, 40 mg, Oral, Daily, Joselyn Reyes Bahamonde, MD    senna (SENOKOT) tablet 17.2 mg, 2 tablet, Oral, BID, Yasmine Meadows MD, 17.2 mg at 07/26/24 0807    sevelamer (RENAGEL) tablet 1,600 mg, 1,600 mg, Oral, TID With Meals, Vane Estrada MD, 1,600 mg at 07/26/24 0720    Sodium Zirconium Cyclosilicate (Lokelma) 10 g, 10 g, Oral, Daily, Tirso Montez MD, 10 g at 07/24/24 0839    sulfamethoxazole-trimethoprim (BACTRIM) 400-80 mg per tablet 1 tablet, 1 tablet, Oral, Once per day on Monday Wednesday Friday, Joselyn Reyes Bahamonde, MD, 1 tablet at 07/26/24 0806    torsemide (DEMADEX) tablet 100 mg, 100 mg, Oral, Daily, Joselyn Reyes Bahamonde, MD, 100 mg at 07/26/24 0806    traZODone (DESYREL) tablet 200 mg, 200 mg, Oral, HS, Tram Palacios PA-C, 200 mg at 07/25/24 2131    venlafaxine (EFFEXOR-XR) 24 hr capsule 150 mg, 150 mg, Oral, Daily, Tram Palacios PA-C, 150 mg at 07/26/24 0807    Laboratory Results:  Results from last 7 days   Lab Units 07/26/24  0504 07/24/24  0632 07/23/24  0537 07/22/24  0429 07/21/24  0433 07/20/24  0606 07/20/24  0439   WBC Thousand/uL 24.22* 24.77* 22.40* 18.05* 19.41* 17.94*  --    HEMOGLOBIN g/dL 7.2* 8.3* 8.9*  "8.9* 8.9* 9.3*  --    HEMATOCRIT % 22.7* 25.7* 27.5* 28.4* 27.0* 29.4*  --    PLATELETS Thousands/uL 139* 143* 149 179 202 228  --    SODIUM mmol/L  --   --  138 140 139  --  135   POTASSIUM mmol/L  --   --  4.4 5.5* 5.1  --  6.1*   CHLORIDE mmol/L  --   --  102 107 105  --  101   CO2 mmol/L  --   --  24 20* 23  --  20*   BUN mg/dL  --   --  67* 101* 82*  --  122*   CREATININE mg/dL  --   --  6.34* 8.53* 7.23*  --  9.64*   CALCIUM mg/dL  --   --  9.3 9.3 9.1  --  9.7   MAGNESIUM mg/dL  --   --   --   --   --   --  2.5   PHOSPHORUS mg/dL  --   --   --  8.4* 7.6*  --  9.5*       IR tunneled dialysis catheter placement   Final Result by Graciela Bettencourt MD (07/22 1018)      Conversion of right-sided internal jugular non-tunneled central venous catheter for a tunneled dialysis catheter, with tip in the expected location of the right atrium.      Plan:      The catheter may be used immediately.      Workstation performed: [id number]         IR biopsy kidney random   Final Result by Sandoval Castro MD (07/18 1358)   Impression: Successful percutaneous nontarget renal biopsy as described.                  Workstation performed: [id number]         IR temporary dialysis catheter placement   Final Result by Graciela Bettencourt MD (07/15 1640)      Insertion of right-sided non-tunneled dual-lumen temporary dialysis catheter, with tip in the expected location of the cavoatrial junction.      Plan:      The catheter may be used immediately.      Workstation performed: [id number]             Portions of the record may have been created with voice recognition software. Occasional wrong word or \"sound a like\" substitutions may have occurred due to the inherent limitations of voice recognition software. Read the chart carefully and recognize, using context, where substitutions have occurred.    "

## 2024-07-27 ENCOUNTER — APPOINTMENT (INPATIENT)
Dept: DIALYSIS | Facility: HOSPITAL | Age: 64
DRG: 673 | End: 2024-07-27
Payer: MEDICARE

## 2024-07-27 PROBLEM — D72.829 LEUKOCYTOSIS: Status: ACTIVE | Noted: 2024-07-27

## 2024-07-27 PROBLEM — D64.9 ANEMIA: Status: ACTIVE | Noted: 2024-07-27

## 2024-07-27 PROBLEM — D69.6 THROMBOCYTOPENIA (HCC): Status: ACTIVE | Noted: 2024-07-27

## 2024-07-27 LAB
ABO GROUP BLD: NORMAL
ABO GROUP BLD: NORMAL
ANION GAP SERPL CALCULATED.3IONS-SCNC: 11 MMOL/L (ref 4–13)
BLD GP AB SCN SERPL QL: NEGATIVE
BUN SERPL-MCNC: 87 MG/DL (ref 5–25)
CALCIUM SERPL-MCNC: 9.7 MG/DL (ref 8.4–10.2)
CHLORIDE SERPL-SCNC: 111 MMOL/L (ref 96–108)
CO2 SERPL-SCNC: 16 MMOL/L (ref 21–32)
CREAT SERPL-MCNC: 7.61 MG/DL (ref 0.6–1.3)
ERYTHROCYTE [DISTWIDTH] IN BLOOD BY AUTOMATED COUNT: 16.1 % (ref 11.6–15.1)
FIBRINOGEN PPP-MCNC: 118 MG/DL (ref 207–520)
GBM AB SER IA-ACNC: >8 UNITS (ref 0–0.9)
GFR SERPL CREATININE-BSD FRML MDRD: 5 ML/MIN/1.73SQ M
GLUCOSE SERPL-MCNC: 117 MG/DL (ref 65–140)
GLUCOSE SERPL-MCNC: 127 MG/DL (ref 65–140)
GLUCOSE SERPL-MCNC: 131 MG/DL (ref 65–140)
GLUCOSE SERPL-MCNC: 139 MG/DL (ref 65–140)
GLUCOSE SERPL-MCNC: 200 MG/DL (ref 65–140)
HCT VFR BLD AUTO: 21.1 % (ref 34.8–46.1)
HCT VFR BLD AUTO: 25.6 % (ref 34.8–46.1)
HGB BLD-MCNC: 6.6 G/DL (ref 11.5–15.4)
HGB BLD-MCNC: 7.7 G/DL (ref 11.5–15.4)
MCH RBC QN AUTO: 27.6 PG (ref 26.8–34.3)
MCHC RBC AUTO-ENTMCNC: 31.3 G/DL (ref 31.4–37.4)
MCV RBC AUTO: 88 FL (ref 82–98)
PLATELET # BLD AUTO: 135 THOUSANDS/UL (ref 149–390)
PMV BLD AUTO: 10.6 FL (ref 8.9–12.7)
POTASSIUM SERPL-SCNC: 4.6 MMOL/L (ref 3.5–5.3)
RBC # BLD AUTO: 2.39 MILLION/UL (ref 3.81–5.12)
RH BLD: POSITIVE
RH BLD: POSITIVE
SODIUM SERPL-SCNC: 138 MMOL/L (ref 135–147)
SPECIMEN EXPIRATION DATE: NORMAL
WBC # BLD AUTO: 21.46 THOUSAND/UL (ref 4.31–10.16)

## 2024-07-27 PROCEDURE — P9016 RBC LEUKOCYTES REDUCED: HCPCS

## 2024-07-27 PROCEDURE — 85018 HEMOGLOBIN: CPT

## 2024-07-27 PROCEDURE — 82948 REAGENT STRIP/BLOOD GLUCOSE: CPT

## 2024-07-27 PROCEDURE — 85014 HEMATOCRIT: CPT

## 2024-07-27 PROCEDURE — 86901 BLOOD TYPING SEROLOGIC RH(D): CPT

## 2024-07-27 PROCEDURE — 94640 AIRWAY INHALATION TREATMENT: CPT

## 2024-07-27 PROCEDURE — 86900 BLOOD TYPING SEROLOGIC ABO: CPT

## 2024-07-27 PROCEDURE — 86923 COMPATIBILITY TEST ELECTRIC: CPT

## 2024-07-27 PROCEDURE — 85384 FIBRINOGEN ACTIVITY: CPT | Performed by: INTERNAL MEDICINE

## 2024-07-27 PROCEDURE — 83520 IMMUNOASSAY QUANT NOS NONAB: CPT | Performed by: STUDENT IN AN ORGANIZED HEALTH CARE EDUCATION/TRAINING PROGRAM

## 2024-07-27 PROCEDURE — 86850 RBC ANTIBODY SCREEN: CPT

## 2024-07-27 PROCEDURE — 85027 COMPLETE CBC AUTOMATED: CPT

## 2024-07-27 PROCEDURE — 80048 BASIC METABOLIC PNL TOTAL CA: CPT | Performed by: STUDENT IN AN ORGANIZED HEALTH CARE EDUCATION/TRAINING PROGRAM

## 2024-07-27 PROCEDURE — 99232 SBSQ HOSP IP/OBS MODERATE 35: CPT | Performed by: INTERNAL MEDICINE

## 2024-07-27 PROCEDURE — 30233N1 TRANSFUSION OF NONAUTOLOGOUS RED BLOOD CELLS INTO PERIPHERAL VEIN, PERCUTANEOUS APPROACH: ICD-10-PCS | Performed by: INTERNAL MEDICINE

## 2024-07-27 PROCEDURE — 86900 BLOOD TYPING SEROLOGIC ABO: CPT | Performed by: INTERNAL MEDICINE

## 2024-07-27 PROCEDURE — 99233 SBSQ HOSP IP/OBS HIGH 50: CPT | Performed by: STUDENT IN AN ORGANIZED HEALTH CARE EDUCATION/TRAINING PROGRAM

## 2024-07-27 PROCEDURE — 86901 BLOOD TYPING SEROLOGIC RH(D): CPT | Performed by: INTERNAL MEDICINE

## 2024-07-27 PROCEDURE — 94760 N-INVAS EAR/PLS OXIMETRY 1: CPT

## 2024-07-27 RX ORDER — NIFEDIPINE 30 MG/1
60 TABLET, EXTENDED RELEASE ORAL DAILY
Status: DISCONTINUED | OUTPATIENT
Start: 2024-07-27 | End: 2024-08-04 | Stop reason: HOSPADM

## 2024-07-27 RX ORDER — ALBUMIN, HUMAN INJ 5% 5 %
200 SOLUTION INTRAVENOUS DAILY
Status: DISPENSED | OUTPATIENT
Start: 2024-07-27 | End: 2024-08-01

## 2024-07-27 RX ORDER — CALCIUM GLUCONATE 20 MG/ML
1 INJECTION, SOLUTION INTRAVENOUS ONCE
Status: COMPLETED | OUTPATIENT
Start: 2024-07-27 | End: 2024-07-27

## 2024-07-27 RX ADMIN — CALCIUM GLUCONATE 1 G: 20 INJECTION, SOLUTION INTRAVENOUS at 10:57

## 2024-07-27 RX ADMIN — INSULIN LISPRO 4 UNITS: 100 INJECTION, SOLUTION INTRAVENOUS; SUBCUTANEOUS at 17:18

## 2024-07-27 RX ADMIN — LAMOTRIGINE 100 MG: 100 TABLET ORAL at 08:08

## 2024-07-27 RX ADMIN — TRAZODONE HYDROCHLORIDE 200 MG: 100 TABLET ORAL at 22:03

## 2024-07-27 RX ADMIN — HEPARIN SODIUM 5000 UNITS: 5000 INJECTION INTRAVENOUS; SUBCUTANEOUS at 13:39

## 2024-07-27 RX ADMIN — IPRATROPIUM BROMIDE AND ALBUTEROL SULFATE 3 ML: 2.5; .5 SOLUTION RESPIRATORY (INHALATION) at 19:41

## 2024-07-27 RX ADMIN — PREDNISONE 40 MG: 20 TABLET ORAL at 08:08

## 2024-07-27 RX ADMIN — OXYBUTYNIN CHLORIDE 5 MG: 5 TABLET, EXTENDED RELEASE ORAL at 08:07

## 2024-07-27 RX ADMIN — Medication 3 MG: at 22:02

## 2024-07-27 RX ADMIN — ALBUMIN (HUMAN) 200 G: 12.5 INJECTION, SOLUTION INTRAVENOUS at 10:57

## 2024-07-27 RX ADMIN — SENNOSIDES 17.2 MG: 8.6 TABLET, FILM COATED ORAL at 08:08

## 2024-07-27 RX ADMIN — SEVELAMER HYDROCHLORIDE 1600 MG: 800 TABLET ORAL at 08:07

## 2024-07-27 RX ADMIN — POLYSACCHARIDE-IRON COMPLEX 150 MG: 150 CAPSULE ORAL at 08:07

## 2024-07-27 RX ADMIN — NIFEDIPINE 60 MG: 30 TABLET, FILM COATED, EXTENDED RELEASE ORAL at 08:08

## 2024-07-27 RX ADMIN — HEPARIN SODIUM 5000 UNITS: 5000 INJECTION INTRAVENOUS; SUBCUTANEOUS at 22:03

## 2024-07-27 RX ADMIN — TORSEMIDE 100 MG: 100 TABLET ORAL at 08:07

## 2024-07-27 RX ADMIN — HEPARIN SODIUM 5000 UNITS: 5000 INJECTION INTRAVENOUS; SUBCUTANEOUS at 06:12

## 2024-07-27 RX ADMIN — IPRATROPIUM BROMIDE AND ALBUTEROL SULFATE 3 ML: 2.5; .5 SOLUTION RESPIRATORY (INHALATION) at 13:47

## 2024-07-27 RX ADMIN — LORATADINE 10 MG: 10 TABLET ORAL at 08:08

## 2024-07-27 RX ADMIN — VENLAFAXINE HYDROCHLORIDE 150 MG: 150 CAPSULE, EXTENDED RELEASE ORAL at 08:08

## 2024-07-27 RX ADMIN — SEVELAMER HYDROCHLORIDE 1600 MG: 800 TABLET ORAL at 11:40

## 2024-07-27 RX ADMIN — SODIUM ZIRCONIUM CYCLOSILICATE 10 G: 10 POWDER, FOR SUSPENSION ORAL at 08:12

## 2024-07-27 RX ADMIN — FAMOTIDINE 10 MG: 20 TABLET ORAL at 22:02

## 2024-07-27 RX ADMIN — SEVELAMER HYDROCHLORIDE 1600 MG: 800 TABLET ORAL at 17:18

## 2024-07-27 RX ADMIN — PANTOPRAZOLE SODIUM 40 MG: 40 TABLET, DELAYED RELEASE ORAL at 06:12

## 2024-07-27 RX ADMIN — SENNOSIDES 17.2 MG: 8.6 TABLET, FILM COATED ORAL at 17:18

## 2024-07-27 RX ADMIN — ATORVASTATIN CALCIUM 20 MG: 20 TABLET, FILM COATED ORAL at 08:07

## 2024-07-27 RX ADMIN — CYCLOPHOSPHAMIDE 100 MG: 50 CAPSULE ORAL at 22:03

## 2024-07-27 RX ADMIN — FLUTICASONE FUROATE AND VILANTEROL TRIFENATATE 1 PUFF: 200; 25 POWDER RESPIRATORY (INHALATION) at 08:12

## 2024-07-27 RX ADMIN — IPRATROPIUM BROMIDE AND ALBUTEROL SULFATE 3 ML: 2.5; .5 SOLUTION RESPIRATORY (INHALATION) at 07:22

## 2024-07-27 RX ADMIN — IPRATROPIUM BROMIDE AND ALBUTEROL SULFATE 3 ML: 2.5; .5 SOLUTION RESPIRATORY (INHALATION) at 03:05

## 2024-07-27 RX ADMIN — MONTELUKAST 10 MG: 10 TABLET, FILM COATED ORAL at 08:08

## 2024-07-27 NOTE — PLAN OF CARE
Problem: PAIN - ADULT  Goal: Verbalizes/displays adequate comfort level or baseline comfort level  Description: Interventions:  - Encourage patient to monitor pain and request assistance  - Assess pain using appropriate pain scale  - Administer analgesics based on type and severity of pain and evaluate response  - Implement non-pharmacological measures as appropriate and evaluate response  - Consider cultural and social influences on pain and pain management  - Notify physician/advanced practitioner if interventions unsuccessful or patient reports new pain  Outcome: Progressing     Problem: INFECTION - ADULT  Goal: Absence or prevention of progression during hospitalization  Description: INTERVENTIONS:  - Assess and monitor for signs and symptoms of infection  - Monitor lab/diagnostic results  - Monitor all insertion sites, i.e. indwelling lines, tubes, and drains  - Monitor endotracheal if appropriate and nasal secretions for changes in amount and color  - [place] appropriate cooling/warming therapies per order  - Administer medications as ordered  - Instruct and encourage patient and family to use good hand hygiene technique  - Identify and instruct in appropriate isolation precautions for identified infection/condition  Outcome: Progressing  Goal: Absence of fever/infection during neutropenic period  Description: INTERVENTIONS:  - Monitor WBC    Outcome: Progressing     Problem: SAFETY ADULT  Goal: Patient will remain free of falls  Description: INTERVENTIONS:  - Educate patient/family on patient safety including physical limitations  - Instruct patient to call for assistance with activity   - Consult OT/PT to assist with strengthening/mobility   - Keep Call bell within reach  - Keep bed low and locked with side rails adjusted as appropriate  - Keep care items and personal belongings within reach  - Initiate and maintain comfort rounds  - Make Fall Risk Sign visible to staff  - Offer Toileting every 2 Hours,  in advance of need  - Initiate/Maintain bed alarm  - Obtain necessary fall risk management equipment  - Apply yellow socks and bracelet for high fall risk patients  - Consider moving patient to room near nurses station  Outcome: Progressing  Goal: Maintain or return to baseline ADL function  Description: INTERVENTIONS:  -  Assess patient's ability to carry out ADLs; assess patient's baseline for ADL function and identify physical deficits which impact ability to perform ADLs (bathing, care of mouth/teeth, toileting, grooming, dressing, etc.)  - Assess/evaluate cause of self-care deficits   - Assess range of motion  - Assess patient's mobility; develop plan if impaired  - Assess patient's need for assistive devices and provide as appropriate  - Encourage maximum independence but intervene and supervise when necessary  - Involve family in performance of ADLs  - Assess for home care needs following discharge   - Consider OT consult to assist with ADL evaluation and planning for discharge  - Provide patient education as appropriate  Outcome: Progressing  Goal: Maintains/Returns to pre admission functional level  Description: INTERVENTIONS:  - Perform AM-PAC 6 Click Basic Mobility/ Daily Activity assessment daily.  - Set and communicate daily mobility goal to care team and patient/family/caregiver.   - Collaborate with rehabilitation services on mobility goals if consulted  - Perform Range of Motion 3 times a day.  - Reposition patient every 2 hours.  - Dangle patient 3 times a day  - Stand patient 3 times a day  - Ambulate patient 3 times a day  - Out of bed to chair 3 times a day   - Out of bed for meals 3 times a day  - Out of bed for toileting  - Record patient progress and toleration of activity level   Outcome: Progressing     Problem: DISCHARGE PLANNING  Goal: Discharge to home or other facility with appropriate resources  Description: INTERVENTIONS:  - Identify barriers to discharge w/patient and caregiver  -  Arrange for needed discharge resources and transportation as appropriate  - Identify discharge learning needs (meds, wound care, etc.)  - Arrange for interpretive services to assist at discharge as needed  - Refer to Case Management Department for coordinating discharge planning if the patient needs post-hospital services based on physician/advanced practitioner order or complex needs related to functional status, cognitive ability, or social support system  Outcome: Progressing     Problem: Knowledge Deficit  Goal: Patient/family/caregiver demonstrates understanding of disease process, treatment plan, medications, and discharge instructions  Description: Complete learning assessment and assess knowledge base.  Interventions:  - Provide teaching at level of understanding  - Provide teaching via preferred learning methods  Outcome: Progressing     Problem: METABOLIC, FLUID AND ELECTROLYTES - ADULT  Goal: Electrolytes maintained within normal limits  Description: INTERVENTIONS:  - Monitor labs and assess patient for signs and symptoms of electrolyte imbalances  - Administer electrolyte replacement as ordered  - Monitor response to electrolyte replacements, including repeat lab results as appropriate  - Instruct patient on fluid and nutrition as appropriate  Outcome: Progressing  Goal: Fluid balance maintained  Description: INTERVENTIONS:  - Monitor labs   - Monitor I/O and WT  - Instruct patient on fluid and nutrition as appropriate  - Assess for signs & symptoms of volume excess or deficit  Outcome: Progressing     Problem: Nutrition/Hydration-ADULT  Goal: Nutrient/Hydration intake appropriate for improving, restoring or maintaining nutritional needs  Description: Monitor and assess patient's nutrition/hydration status for malnutrition. Collaborate with interdisciplinary team and initiate plan and interventions as ordered.  Monitor patient's weight and dietary intake as ordered or per policy. Utilize nutrition  screening tool and intervene as necessary. Determine patient's food preferences and provide high-protein, high-caloric foods as appropriate.     INTERVENTIONS:  - Monitor oral intake, urinary output, labs, and treatment plans  - Assess nutrition and hydration status and recommend course of action  - Evaluate amount of meals eaten  - Assist patient with eating if necessary   - Allow adequate time for meals  - Recommend/ encourage appropriate diets, oral nutritional supplements, and vitamin/mineral supplements  - Order, calculate, and assess calorie counts as needed  - Recommend, monitor, and adjust tube feedings and TPN/PPN based on assessed needs  - Assess need for intravenous fluids  - Provide specific nutrition/hydration education as appropriate  - Include patient/family/caregiver in decisions related to nutrition  Outcome: Progressing     Problem: Prexisting or High Potential for Compromised Skin Integrity  Goal: Skin integrity is maintained or improved  Description: INTERVENTIONS:  - Identify patients at risk for skin breakdown  - Assess and monitor skin integrity  - Assess and monitor nutrition and hydration status  - Monitor labs   - Assess for incontinence   - Turn and reposition patient  - Assist with mobility/ambulation  - Relieve pressure over bony prominences  - Avoid friction and shearing  - Provide appropriate hygiene as needed including keeping skin clean and dry  - Evaluate need for skin moisturizer/barrier cream  - Collaborate with interdisciplinary team   - Patient/family teaching  - Consider wound care consult   Outcome: Progressing     Problem: Potential for Falls  Goal: Patient will remain free of falls  Description: INTERVENTIONS:  - Educate patient/family on patient safety including physical limitations  - Instruct patient to call for assistance with activity   - Consult OT/PT to assist with strengthening/mobility   - Keep Call bell within reach  - Keep bed low and locked with side rails  adjusted as appropriate  - Keep care items and personal belongings within reach  - Initiate and maintain comfort rounds  - Make Fall Risk Sign visible to staff  - Offer Toileting every 2 Hours, in advance of need  - Initiate/Maintain bed alarm  - Obtain necessary fall risk management equipment  - Apply yellow socks and bracelet for high fall risk patients  - Consider moving patient to room near nurses station  Outcome: Progressing

## 2024-07-27 NOTE — PROGRESS NOTES
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 Given the lack of hydronephrosis this could either reflect adjacent calcification or nonobstructing stone.   Patient reports that she is still producing urine  US kidney and bladder: No hydronephrosis. 4 mm nonobstructing left intrarenal calculus  Urinalysis: 3+ blood.  3+ protein.  Innumerable red blood cells.  2-4 WBCs.  Moderate bacteria   AURA, C3, and C4 normal  Ig Kappa Free Light Chain:152.5   Ig Lambda Free Light Chain 87.1   Kappa/Lambda FluidC Ratio 1.75  Hepatitis panel nonreactive  HIV nonreactive  QuantiFERON gold negative  protein electrophoresis see labs  hypersensitivity pneumonitis profile negative  GBM antibodies: elevated >8  phospholipase A2 receptor Ab: Negative  ANCA: negative   7/17 Renal biopsy and bronchoscopy performed.  7/19 CM confirmed HD chair time for patient. Will be TTS 10:30 AM with start date Tuesday 7/23.   Bronchial lavage: Right- Negative for malignancy. Abundant pulmonary macrophages. Few benign bronchial cells. Left- Negative for malignancy, pulmonary macrophage, mixed inflammatory cells, Few benign bronchial cells  7/22 R permacath placed by IR. Renal bx: diffuse crescentic glomerulonephritis, Anti-GMB type      Plan  Nephrology onboard - dialysis initiated  Hematology onboard for PLEX  Continue Prednisone 60 mg daily then begin taper per nephrology  Cyclophosphamide 100 mg daily   Increase torsemide to 100 mg daily  Bactrim  MWF  Monitor blood glucose levels  Hold home dyazide    Anemia  Assessment & Plan  In the setting of new kidney failure 2/2 rapidly progressive glomerulonephritis, initiated on HD and plasmapharesis. Suspect due to combination of kidney failure and losses caused by the aforementioned treatments.     Transfuse 1 U pRBC now  Check post-transfusion Hgb for appropriate response  If adequate response, monitor Hgb on AM CBC  Transfuse for Hgb<7    Abnormal CT of the chest  Assessment & Plan  CT chest: Re demonstration of multiple diffuse scattered nodules  Etiology unclear: HP vs sarcoid vs  vs MAC vs atypical  CRP- 143.7   Sed-61, negative: QuantiFERON  Procal: 0.27--0.30--0.38--0.40  7/17 Renal biopsy and bronchoscopy performed. Results pending.     Plan:  Per pulmonology recs, repeat CT chest in 6 weeks. If the nodularity fails to improve or progresses, may need to reconsider tissue biopsy.    Persistent cough  Assessment & Plan  Patient endorses worsening shortness of breath and difficulty breathing  Patient noted to have multiple diffuse scattered pulmonary nodules on CT  Quantiferon gold negative 7/9/24    Plan  Completed Ceftriaxone x 5 days.  Tessalon Perles as needed    Abnormality of ascending aorta  Assessment & Plan  CT chest: Unchanged fusiform ectasia of the ascending thoracic aorta measuring up to 40 mm     Plan  Recommendation for follow-up low radiation dose chest CT in one year    Moderate persistent asthma w/out acute exacerbation  Assessment & Plan  Patient has PFTs ordered but they have not been completed.  Home medication regime Advair -21 mcg 2 puff twice daily, Proventil 2 puffs every 6 hours as needed  On 2 L via nasal cannula.  O2 sat 95 to 96%    Plan:  Supplemental oxygen as needed  DuoNebs every 6 hours, per pulmonology  Albuterol inhaler PRN  Continue benzonatate  Continue fluticasone-vilanterol  Continue loratadine 10 mg  Robitussin PRN    Dyslipidemia  Assessment & Plan  Continue  on atorvastatin 20 Mg    Primary hypertension  Assessment & Plan  Hold Lopressor due to bradycardia  Hold Dyazide  Nifedipine 30 mg daily  Hydralazine PRN  Monitor vitals as per protocol    Bipolar 1 disorder (HCC)  Assessment & Plan  Plan  Continue on Effexor, trazodone and Lamictal    Sepsis (HCC)-resolved as of 7/14/2024  Assessment & Plan    WBC   Date Value Ref Range Status   07/24/2024 24.77 (H) 4.31 - 10.16 Thousand/uL Final     Patient admitted with sepsis likely secondary to pneumonitis as evidenced by tachycardia, tachypnea and leukocytosis  CT: redemonstration of multiple diffuse scattered nodules, less pronounced than on 5/16/2024 suggestive of mycobacterial infection  QuantiFERON gold negative   Lactic acid normal, Procalcitonin trending up: 0.27 > 0.30 > .38 > .40  Sputum culture +2 gram negative rods, +1 gram positive cocci  BC: 1/2 staph epidermis detected-contaminated  Leukocytosis likely due to steroid use    Plan:  Completed Ceftriaxone x 5 days           VTE Pharmacologic Prophylaxis: VTE Score: 4 Moderate Risk (Score 3-4) - Pharmacological DVT Prophylaxis Ordered: heparin.    Mobility:   Basic Mobility Inpatient Raw Score: 23  JH-HLM Goal: 7: Walk 25 feet or more  JH-HLM Achieved: 7: Walk 25 feet or more  JH-HLM Goal achieved. Continue to encourage appropriate mobility.    Patient Centered Rounds: I performed bedside rounds with nursing staff today.  Discussions with Specialists or Other Care Team Provider: Nephro    Education and Discussions with Family / Patient: Patient declined call to .     Current Length of Stay: 15 day(s)  Current Patient Status: Inpatient   Discharge Plan: Anticipate discharge in >72 hrs to home with home services.    Code Status: Level 1 - Full Code    Subjective:   This morning, Ms. Beard is seen sitting up in bed, currently receiving plasmapheresis. Reports feeling tired, slept poorly overnight. No other complaints at this time. Denies shortness of  breath, N/V, fevers, chills, pain. Reports eating and drinking well.      Objective:     Vitals:   Temp (24hrs), Av.3 °F (36.8 °C), Min:98.1 °F (36.7 °C), Max:98.6 °F (37 °C)    Temp:  [98.1 °F (36.7 °C)-98.6 °F (37 °C)] 98.1 °F (36.7 °C)  HR:  [72-96] 83  Resp:  [15-18] 18  BP: (122-149)/(69-83) 145/81  SpO2:  [93 %-98 %] 94 %  Body mass index is 45.57 kg/m².     Input and Output Summary (last 24 hours):     Intake/Output Summary (Last 24 hours) at 2024  Last data filed at 2024 1450  Gross per 24 hour   Intake 500 ml   Output 3500 ml   Net -3000 ml       Physical Exam:   Physical Exam  Vitals and nursing note reviewed.   Constitutional:       General: She is not in acute distress.     Appearance: Normal appearance. She is ill-appearing. She is not toxic-appearing or diaphoretic.   HENT:      Head: Normocephalic and atraumatic.      Mouth/Throat:      Mouth: Mucous membranes are moist.   Eyes:      Extraocular Movements: Extraocular movements intact.      Conjunctiva/sclera: Conjunctivae normal.      Pupils: Pupils are equal, round, and reactive to light.   Cardiovascular:      Rate and Rhythm: Normal rate and regular rhythm.      Pulses: Normal pulses.      Heart sounds: Normal heart sounds.   Pulmonary:      Effort: Pulmonary effort is normal. No respiratory distress.      Breath sounds: Normal breath sounds. No stridor. No wheezing, rhonchi or rales.   Chest:      Chest wall: No tenderness.   Abdominal:      General: Bowel sounds are normal. There is no distension.      Palpations: Abdomen is soft.      Tenderness: There is no abdominal tenderness. There is no guarding or rebound.   Musculoskeletal:      Right lower leg: Edema present.      Left lower leg: Edema present.   Skin:     General: Skin is warm and dry.   Neurological:      Mental Status: She is alert.   Psychiatric:         Mood and Affect: Mood normal.         Behavior: Behavior normal.        Additional Data:     Labs:  Results  from last 7 days   Lab Units 07/27/24  1118 07/22/24  0429 07/21/24  0433   WBC Thousand/uL 21.46*   < > 19.41*   HEMOGLOBIN g/dL 6.6*   < > 8.9*   HEMATOCRIT % 21.1*   < > 27.0*   PLATELETS Thousands/uL 135*   < > 202   LYMPHO PCT %  --   --  2*   MONO PCT %  --   --  9   EOS PCT %  --   --  0    < > = values in this interval not displayed.     Results from last 7 days   Lab Units 07/27/24  0817 07/22/24  0429 07/21/24  0433   SODIUM mmol/L 138   < > 139   POTASSIUM mmol/L 4.6   < > 5.1   CHLORIDE mmol/L 111*   < > 105   CO2 mmol/L 16*   < > 23   BUN mg/dL 87*   < > 82*   CREATININE mg/dL 7.61*   < > 7.23*   ANION GAP mmol/L 11   < > 11   CALCIUM mg/dL 9.7   < > 9.1   ALBUMIN g/dL  --   --  3.6   GLUCOSE RANDOM mg/dL 127   < > 138    < > = values in this interval not displayed.     Results from last 7 days   Lab Units 07/22/24  0429   INR  1.18     Results from last 7 days   Lab Units 07/27/24  1539 07/27/24  1120 07/27/24  0705 07/26/24  2103 07/26/24  1553 07/26/24  1122 07/26/24  0731 07/25/24  2045 07/25/24  1606 07/25/24  1049 07/25/24  0659 07/24/24  2123   POC GLUCOSE mg/dl 200* 117 131 171* 185* 124 117 174* 213* 142* 139 204*               Lines/Drains:  Invasive Devices       Peripheral Intravenous Line  Duration             Peripheral IV 07/27/24 Right Antecubital <1 day              Hemodialysis Catheter  Duration             HD Permanent Double Catheter 5 days                          Imaging: No pertinent imaging reviewed.    Recent Cultures (last 7 days):         Last 24 Hours Medication List:   Current Facility-Administered Medications   Medication Dose Route Frequency Provider Last Rate    acetaminophen  650 mg Oral Q6H PRN Tram Palacios PA-C      albumin human  200 g Intravenous Daily Joselyn Reyes Bahamonde, MD      albuterol  2 puff Inhalation Q4H PRN Tram Palacios PA-C      atorvastatin  20 mg Oral Daily Tram Palacios PA-C      benzonatate  200 mg Oral TID PRN Tram Palacios PA-C      bisacodyl   10 mg Rectal Daily PRN Daya May, DO      cyclophosphamide  100 mg Oral Daily Tirso Montez MD      dextromethorphan-guaiFENesin  10 mL Oral Q4H PRN Tram Palacios PA-C      famotidine  10 mg Oral Daily PRN Daya May,       fluticasone-vilanterol  1 puff Inhalation Daily Tram Palacios PA-C      heparin (porcine)  5,000 Units Subcutaneous Q8H [facility] Xavier Medina MD      insulin lispro  2-12 Units Subcutaneous TID AC Yasmine Meadows MD      ipratropium-albuterol  3 mL Nebulization Q6H Luke Montez MD      iron polysaccharides  150 mg Oral Daily Ata Burns MD      lamoTRIgine  100 mg Oral QAM Tram Palacios PA-C      loratadine  10 mg Oral Daily Tram Palacios PA-C      melatonin  3 mg Oral Daily PRN Yasmine Meadows MD      montelukast  10 mg Oral Daily Tram Palacios PA-C      NIFEdipine  60 mg Oral Daily Joselyn Reyes Bahamonde, MD      ondansetron  4 mg Intravenous Q6H PRN Yasmine Meadows MD      oxybutynin  5 mg Oral Daily Tram Palacios PA-C      pantoprazole  40 mg Oral Daily Before Breakfast Ronny Webster MD      polyethylene glycol  17 g Oral Daily Xavier Medina MD      predniSONE  40 mg Oral Daily Joselyn Reyes Bahamonde, MD      senna  2 tablet Oral BID Yasmine Meadows MD      sevelamer  1,600 mg Oral TID With Meals Vane Estrada MD      Sodium Zirconium Cyclosilicate  10 g Oral Daily Tirso Montez MD      sulfamethoxazole-trimethoprim  1 tablet Oral Once per day on Monday Wednesday Friday Joselyn Reyes Bahamonde, MD      torsemide  100 mg Oral Daily Joselyn Reyes Bahamonde, MD      traZODone  200 mg Oral HS Tram Palacios PA-C      venlafaxine  150 mg Oral Daily Tram Palacios PA-C          Today, Patient Was Seen By: Wally Perkins MD    **Please Note: This note may have been constructed using a voice recognition system.**

## 2024-07-27 NOTE — PROGRESS NOTES
Received call from blood bank requesting orders for PLEX treatments tomorrow at 8am. On call SLIM and Nephrology made aware.

## 2024-07-27 NOTE — PLAN OF CARE
Problem: PAIN - ADULT  Goal: Verbalizes/displays adequate comfort level or baseline comfort level  Description: Interventions:  - Encourage patient to monitor pain and request assistance  - Assess pain using appropriate pain scale  - Administer analgesics based on type and severity of pain and evaluate response  - Implement non-pharmacological measures as appropriate and evaluate response  - Consider cultural and social influences on pain and pain management  - Notify physician/advanced practitioner if interventions unsuccessful or patient reports new pain  Outcome: Progressing     Problem: INFECTION - ADULT  Goal: Absence or prevention of progression during hospitalization  Description: INTERVENTIONS:  - Assess and monitor for signs and symptoms of infection  - Monitor lab/diagnostic results  - Monitor all insertion sites, i.e. indwelling lines, tubes, and drains  - Monitor endotracheal if appropriate and nasal secretions for changes in amount and color  - [place] appropriate cooling/warming therapies per order  - Administer medications as ordered  - Instruct and encourage patient and family to use good hand hygiene technique  - Identify and instruct in appropriate isolation precautions for identified infection/condition  Outcome: Progressing  Goal: Absence of fever/infection during neutropenic period  Description: INTERVENTIONS:  - Monitor WBC    Outcome: Progressing     Problem: SAFETY ADULT  Goal: Patient will remain free of falls  Description: INTERVENTIONS:  - Educate patient/family on patient safety including physical limitations  - Instruct patient to call for assistance with activity   - Consult OT/PT to assist with strengthening/mobility   - Keep Call bell within reach  - Keep bed low and locked with side rails adjusted as appropriate  - Keep care items and personal belongings within reach  - Initiate and maintain comfort rounds  - Make Fall Risk Sign visible to staff  - Offer Toileting every 2 Hours,  in advance of need  - Initiate/Maintain bed alarm  - Obtain necessary fall risk management equipment: alarms  - Apply yellow socks and bracelet for high fall risk patients  - Consider moving patient to room near nurses station  Outcome: Progressing  Goal: Maintain or return to baseline ADL function  Description: INTERVENTIONS:  -  Assess patient's ability to carry out ADLs; assess patient's baseline for ADL function and identify physical deficits which impact ability to perform ADLs (bathing, care of mouth/teeth, toileting, grooming, dressing, etc.)  - Assess/evaluate cause of self-care deficits   - Assess range of motion  - Assess patient's mobility; develop plan if impaired  - Assess patient's need for assistive devices and provide as appropriate  - Encourage maximum independence but intervene and supervise when necessary  - Involve family in performance of ADLs  - Assess for home care needs following discharge   - Consider OT consult to assist with ADL evaluation and planning for discharge  - Provide patient education as appropriate  Outcome: Progressing  Goal: Maintains/Returns to pre admission functional level  Description: INTERVENTIONS:  - Perform AM-PAC 6 Click Basic Mobility/ Daily Activity assessment daily.  - Set and communicate daily mobility goal to care team and patient/family/caregiver.   - Collaborate with rehabilitation services on mobility goals if consulted  - Perform Range of Motion 3 times a day.  - Reposition patient every 2 hours.  - Dangle patient 3 times a day  - Stand patient 3 times a day  - Ambulate patient 3 times a day  - Out of bed to chair 3 times a day   - Out of bed for meals 3 times a day  - Out of bed for toileting  - Record patient progress and toleration of activity level   Outcome: Progressing     Problem: DISCHARGE PLANNING  Goal: Discharge to home or other facility with appropriate resources  Description: INTERVENTIONS:  - Identify barriers to discharge w/patient and  caregiver  - Arrange for needed discharge resources and transportation as appropriate  - Identify discharge learning needs (meds, wound care, etc.)  - Arrange for interpretive services to assist at discharge as needed  - Refer to Case Management Department for coordinating discharge planning if the patient needs post-hospital services based on physician/advanced practitioner order or complex needs related to functional status, cognitive ability, or social support system  Outcome: Progressing     Problem: Knowledge Deficit  Goal: Patient/family/caregiver demonstrates understanding of disease process, treatment plan, medications, and discharge instructions  Description: Complete learning assessment and assess knowledge base.  Interventions:  - Provide teaching at level of understanding  - Provide teaching via preferred learning methods  Outcome: Progressing     Problem: METABOLIC, FLUID AND ELECTROLYTES - ADULT  Goal: Electrolytes maintained within normal limits  Description: INTERVENTIONS:  - Monitor labs and assess patient for signs and symptoms of electrolyte imbalances  - Administer electrolyte replacement as ordered  - Monitor response to electrolyte replacements, including repeat lab results as appropriate  - Instruct patient on fluid and nutrition as appropriate  Outcome: Progressing  Goal: Fluid balance maintained  Description: INTERVENTIONS:  - Monitor labs   - Monitor I/O and WT  - Instruct patient on fluid and nutrition as appropriate  - Assess for signs & symptoms of volume excess or deficit  Outcome: Progressing     Problem: Nutrition/Hydration-ADULT  Goal: Nutrient/Hydration intake appropriate for improving, restoring or maintaining nutritional needs  Description: Monitor and assess patient's nutrition/hydration status for malnutrition. Collaborate with interdisciplinary team and initiate plan and interventions as ordered.  Monitor patient's weight and dietary intake as ordered or per policy. Utilize  nutrition screening tool and intervene as necessary. Determine patient's food preferences and provide high-protein, high-caloric foods as appropriate.     INTERVENTIONS:  - Monitor oral intake, urinary output, labs, and treatment plans  - Assess nutrition and hydration status and recommend course of action  - Evaluate amount of meals eaten  - Assist patient with eating if necessary   - Allow adequate time for meals  - Recommend/ encourage appropriate diets, oral nutritional supplements, and vitamin/mineral supplements  - Order, calculate, and assess calorie counts as needed  - Recommend, monitor, and adjust tube feedings and TPN/PPN based on assessed needs  - Assess need for intravenous fluids  - Provide specific nutrition/hydration education as appropriate  - Include patient/family/caregiver in decisions related to nutrition  Outcome: Progressing     Problem: Prexisting or High Potential for Compromised Skin Integrity  Goal: Skin integrity is maintained or improved  Description: INTERVENTIONS:  - Identify patients at risk for skin breakdown  - Assess and monitor skin integrity  - Assess and monitor nutrition and hydration status  - Monitor labs   - Assess for incontinence   - Turn and reposition patient  - Assist with mobility/ambulation  - Relieve pressure over bony prominences  - Avoid friction and shearing  - Provide appropriate hygiene as needed including keeping skin clean and dry  - Evaluate need for skin moisturizer/barrier cream  - Collaborate with interdisciplinary team   - Patient/family teaching  - Consider wound care consult   Outcome: Progressing     Problem: Potential for Falls  Goal: Patient will remain free of falls  Description: INTERVENTIONS:  - Educate patient/family on patient safety including physical limitations  - Instruct patient to call for assistance with activity   - Consult OT/PT to assist with strengthening/mobility   - Keep Call bell within reach  - Keep bed low and locked with side  rails adjusted as appropriate  - Keep care items and personal belongings within reach  - Initiate and maintain comfort rounds  - Make Fall Risk Sign visible to staff  - Offer Toileting every 2 Hours, in advance of need  - Initiate/Maintain bed alarm  - Obtain necessary fall risk management equipment: alarms  - Apply yellow socks and bracelet for high fall risk patients  - Consider moving patient to room near nurses station  Outcome: Progressing

## 2024-07-27 NOTE — ASSESSMENT & PLAN NOTE
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 Suspect due to combination of kidney failure and losses caused by the aforementioned treatments.     Transfuse 1 U pRBC now  Check post-transfusion Hgb for appropriate response  If adequate response, monitor Hgb on AM CBC  Transfuse for Hgb<7

## 2024-07-27 NOTE — PLAN OF CARE
Post-Dialysis RN Treatment Note    Blood Pressure:  Pre 132/73 mm/Hg  Post 135/71 mmHg   EDW  TBD kg    Weight:  Pre 119.8 kg   Post 116.7 kg   Mode of weight measurement: Standing Scale   Volume Removed  3,000 ml    Treatment duration 210 minutes    NS given  No    Treatment shortened? No   Medications given during Rx None Reported   Estimated Kt/V  Not Applicable   Access type: Permacath/TDC   Access Issues: No    Report called to primary nurse   Yes, in person to Lori YEBOAH RN      Started patient on hemodialysis treatment with UF goal of 2.5-3L net as tolerated per HD order x 3.5 hours x 3K bath for serum k+ of 4.4 on 7/23/24.    Problem: METABOLIC, FLUID AND ELECTROLYTES - ADULT  Goal: Electrolytes maintained within normal limits  Description: INTERVENTIONS:  - Monitor labs and assess patient for signs and symptoms of electrolyte imbalances  - Administer electrolyte replacement as ordered  - Monitor response to electrolyte replacements, including repeat lab results as appropriate  - Instruct patient on fluid and nutrition as appropriate  Outcome: Progressing  Goal: Fluid balance maintained  Description: INTERVENTIONS:  - Monitor labs   - Monitor I/O and WT  - Instruct patient on fluid and nutrition as appropriate  - Assess for signs & symptoms of volume excess or deficit  Outcome: Progressing

## 2024-07-27 NOTE — PLAN OF CARE
Problem: PAIN - ADULT  Goal: Verbalizes/displays adequate comfort level or baseline comfort level  Description: Interventions:  - Encourage patient to monitor pain and request assistance  - Assess pain using appropriate pain scale  - Administer analgesics based on type and severity of pain and evaluate response  - Implement non-pharmacological measures as appropriate and evaluate response  - Consider cultural and social influences on pain and pain management  - Notify physician/advanced practitioner if interventions unsuccessful or patient reports new pain  Outcome: Progressing     Problem: INFECTION - ADULT  Goal: Absence or prevention of progression during hospitalization  Description: INTERVENTIONS:  - Assess and monitor for signs and symptoms of infection  - Monitor lab/diagnostic results  - Monitor all insertion sites, i.e. indwelling lines, tubes, and drains  - Monitor endotracheal if appropriate and nasal secretions for changes in amount and color  - [place] appropriate cooling/warming therapies per order  - Administer medications as ordered  - Instruct and encourage patient and family to use good hand hygiene technique  - Identify and instruct in appropriate isolation precautions for identified infection/condition  Outcome: Progressing  Goal: Absence of fever/infection during neutropenic period  Description: INTERVENTIONS:  - Monitor WBC    Outcome: Progressing     Problem: SAFETY ADULT  Goal: Patient will remain free of falls  Description: INTERVENTIONS:  - Educate patient/family on patient safety including physical limitations  - Instruct patient to call for assistance with activity   - Consult OT/PT to assist with strengthening/mobility   - Keep Call bell within reach  - Keep bed low and locked with side rails adjusted as appropriate  - Keep care items and personal belongings within reach  - Initiate and maintain comfort rounds  - Make Fall Risk Sign visible to staff  - Offer Toileting every 2 Hours,  in advance of need  - Initiate/Maintain bed alarm  - Obtain necessary fall risk management equipment  - Apply yellow socks and bracelet for high fall risk patients  - Consider moving patient to room near nurses station  Outcome: Progressing  Goal: Maintain or return to baseline ADL function  Description: INTERVENTIONS:  -  Assess patient's ability to carry out ADLs; assess patient's baseline for ADL function and identify physical deficits which impact ability to perform ADLs (bathing, care of mouth/teeth, toileting, grooming, dressing, etc.)  - Assess/evaluate cause of self-care deficits   - Assess range of motion  - Assess patient's mobility; develop plan if impaired  - Assess patient's need for assistive devices and provide as appropriate  - Encourage maximum independence but intervene and supervise when necessary  - Involve family in performance of ADLs  - Assess for home care needs following discharge   - Consider OT consult to assist with ADL evaluation and planning for discharge  - Provide patient education as appropriate  Outcome: Progressing  Goal: Maintains/Returns to pre admission functional level  Description: INTERVENTIONS:  - Perform AM-PAC 6 Click Basic Mobility/ Daily Activity assessment daily.  - Set and communicate daily mobility goal to care team and patient/family/caregiver.   - Collaborate with rehabilitation services on mobility goals if consulted  - Perform Range of Motion 3 times a day.  - Reposition patient every 2 hours.  - Dangle patient 3 times a day  - Stand patient 3 times a day  - Ambulate patient 3 times a day  - Out of bed to chair 3 times a day   - Out of bed for meals 3 times a day  - Out of bed for toileting  - Record patient progress and toleration of activity level   Outcome: Progressing     Problem: DISCHARGE PLANNING  Goal: Discharge to home or other facility with appropriate resources  Description: INTERVENTIONS:  - Identify barriers to discharge w/patient and caregiver  -  Arrange for needed discharge resources and transportation as appropriate  - Identify discharge learning needs (meds, wound care, etc.)  - Arrange for interpretive services to assist at discharge as needed  - Refer to Case Management Department for coordinating discharge planning if the patient needs post-hospital services based on physician/advanced practitioner order or complex needs related to functional status, cognitive ability, or social support system  Outcome: Progressing     Problem: Knowledge Deficit  Goal: Patient/family/caregiver demonstrates understanding of disease process, treatment plan, medications, and discharge instructions  Description: Complete learning assessment and assess knowledge base.  Interventions:  - Provide teaching at level of understanding  - Provide teaching via preferred learning methods  Outcome: Progressing     Problem: METABOLIC, FLUID AND ELECTROLYTES - ADULT  Goal: Electrolytes maintained within normal limits  Description: INTERVENTIONS:  - Monitor labs and assess patient for signs and symptoms of electrolyte imbalances  - Administer electrolyte replacement as ordered  - Monitor response to electrolyte replacements, including repeat lab results as appropriate  - Instruct patient on fluid and nutrition as appropriate  Outcome: Progressing  Goal: Fluid balance maintained  Description: INTERVENTIONS:  - Monitor labs   - Monitor I/O and WT  - Instruct patient on fluid and nutrition as appropriate  - Assess for signs & symptoms of volume excess or deficit  Outcome: Progressing     Problem: Nutrition/Hydration-ADULT  Goal: Nutrient/Hydration intake appropriate for improving, restoring or maintaining nutritional needs  Description: Monitor and assess patient's nutrition/hydration status for malnutrition. Collaborate with interdisciplinary team and initiate plan and interventions as ordered.  Monitor patient's weight and dietary intake as ordered or per policy. Utilize nutrition  screening tool and intervene as necessary. Determine patient's food preferences and provide high-protein, high-caloric foods as appropriate.     INTERVENTIONS:  - Monitor oral intake, urinary output, labs, and treatment plans  - Assess nutrition and hydration status and recommend course of action  - Evaluate amount of meals eaten  - Assist patient with eating if necessary   - Allow adequate time for meals  - Recommend/ encourage appropriate diets, oral nutritional supplements, and vitamin/mineral supplements  - Order, calculate, and assess calorie counts as needed  - Recommend, monitor, and adjust tube feedings and TPN/PPN based on assessed needs  - Assess need for intravenous fluids  - Provide specific nutrition/hydration education as appropriate  - Include patient/family/caregiver in decisions related to nutrition  Outcome: Progressing     Problem: Prexisting or High Potential for Compromised Skin Integrity  Goal: Skin integrity is maintained or improved  Description: INTERVENTIONS:  - Identify patients at risk for skin breakdown  - Assess and monitor skin integrity  - Assess and monitor nutrition and hydration status  - Monitor labs   - Assess for incontinence   - Turn and reposition patient  - Assist with mobility/ambulation  - Relieve pressure over bony prominences  - Avoid friction and shearing  - Provide appropriate hygiene as needed including keeping skin clean and dry  - Evaluate need for skin moisturizer/barrier cream  - Collaborate with interdisciplinary team   - Patient/family teaching  - Consider wound care consult   Outcome: Progressing     Problem: Potential for Falls  Goal: Patient will remain free of falls  Description: INTERVENTIONS:  - Educate patient/family on patient safety including physical limitations  - Instruct patient to call for assistance with activity   - Consult OT/PT to assist with strengthening/mobility   - Keep Call bell within reach  - Keep bed low and locked with side rails  adjusted as appropriate  - Keep care items and personal belongings within reach  - Initiate and maintain comfort rounds  - Make Fall Risk Sign visible to staff  - Offer Toileting every 2 Hours, in advance of need  - Initiate/Maintain bed alarm  - Obtain necessary fall risk management equipment  - Apply yellow socks and bracelet for high fall risk patients  - Consider moving patient to room near nurses station  Outcome: Progressing

## 2024-07-28 ENCOUNTER — APPOINTMENT (INPATIENT)
Dept: RADIOLOGY | Facility: HOSPITAL | Age: 64
DRG: 673 | End: 2024-07-28
Payer: MEDICARE

## 2024-07-28 LAB
ABO GROUP BLD BPU: NORMAL
ABO GROUP BLD: NORMAL
ANION GAP SERPL CALCULATED.3IONS-SCNC: 8 MMOL/L (ref 4–13)
BPU ID: NORMAL
BUN SERPL-MCNC: 54 MG/DL (ref 5–25)
CALCIUM SERPL-MCNC: 9.2 MG/DL (ref 8.4–10.2)
CHLORIDE SERPL-SCNC: 105 MMOL/L (ref 96–108)
CO2 SERPL-SCNC: 24 MMOL/L (ref 21–32)
CREAT SERPL-MCNC: 5.6 MG/DL (ref 0.6–1.3)
CROSSMATCH: NORMAL
ERYTHROCYTE [DISTWIDTH] IN BLOOD BY AUTOMATED COUNT: 17.2 % (ref 11.6–15.1)
FIBRINOGEN PPP-MCNC: 124 MG/DL (ref 207–520)
GFR SERPL CREATININE-BSD FRML MDRD: 7 ML/MIN/1.73SQ M
GLUCOSE SERPL-MCNC: 109 MG/DL (ref 65–140)
GLUCOSE SERPL-MCNC: 118 MG/DL (ref 65–140)
GLUCOSE SERPL-MCNC: 127 MG/DL (ref 65–140)
GLUCOSE SERPL-MCNC: 173 MG/DL (ref 65–140)
GLUCOSE SERPL-MCNC: 191 MG/DL (ref 65–140)
HCT VFR BLD AUTO: 22.2 % (ref 34.8–46.1)
HGB BLD-MCNC: 7.2 G/DL (ref 11.5–15.4)
MCH RBC QN AUTO: 27.6 PG (ref 26.8–34.3)
MCHC RBC AUTO-ENTMCNC: 32.4 G/DL (ref 31.4–37.4)
MCV RBC AUTO: 85 FL (ref 82–98)
PLATELET # BLD AUTO: 134 THOUSANDS/UL (ref 149–390)
PMV BLD AUTO: 10.4 FL (ref 8.9–12.7)
POTASSIUM SERPL-SCNC: 4.7 MMOL/L (ref 3.5–5.3)
RBC # BLD AUTO: 2.61 MILLION/UL (ref 3.81–5.12)
RH BLD: POSITIVE
SODIUM SERPL-SCNC: 137 MMOL/L (ref 135–147)
UNIT DISPENSE STATUS: NORMAL
UNIT PRODUCT CODE: NORMAL
UNIT PRODUCT VOLUME: 350 ML
UNIT RH: NORMAL
WBC # BLD AUTO: 19.6 THOUSAND/UL (ref 4.31–10.16)

## 2024-07-28 PROCEDURE — 94640 AIRWAY INHALATION TREATMENT: CPT

## 2024-07-28 PROCEDURE — 80048 BASIC METABOLIC PNL TOTAL CA: CPT

## 2024-07-28 PROCEDURE — 99233 SBSQ HOSP IP/OBS HIGH 50: CPT | Performed by: INTERNAL MEDICINE

## 2024-07-28 PROCEDURE — 82948 REAGENT STRIP/BLOOD GLUCOSE: CPT

## 2024-07-28 PROCEDURE — 94760 N-INVAS EAR/PLS OXIMETRY 1: CPT

## 2024-07-28 PROCEDURE — 71045 X-RAY EXAM CHEST 1 VIEW: CPT

## 2024-07-28 PROCEDURE — 99233 SBSQ HOSP IP/OBS HIGH 50: CPT | Performed by: STUDENT IN AN ORGANIZED HEALTH CARE EDUCATION/TRAINING PROGRAM

## 2024-07-28 PROCEDURE — 85384 FIBRINOGEN ACTIVITY: CPT | Performed by: INTERNAL MEDICINE

## 2024-07-28 PROCEDURE — 85027 COMPLETE CBC AUTOMATED: CPT

## 2024-07-28 RX ORDER — CALCIUM GLUCONATE 20 MG/ML
1 INJECTION, SOLUTION INTRAVENOUS ONCE
Status: COMPLETED | OUTPATIENT
Start: 2024-07-28 | End: 2024-07-28

## 2024-07-28 RX ADMIN — LORATADINE 10 MG: 10 TABLET ORAL at 08:22

## 2024-07-28 RX ADMIN — IPRATROPIUM BROMIDE AND ALBUTEROL SULFATE 3 ML: 2.5; .5 SOLUTION RESPIRATORY (INHALATION) at 19:23

## 2024-07-28 RX ADMIN — SENNOSIDES 17.2 MG: 8.6 TABLET, FILM COATED ORAL at 17:09

## 2024-07-28 RX ADMIN — NIFEDIPINE 60 MG: 30 TABLET, FILM COATED, EXTENDED RELEASE ORAL at 08:22

## 2024-07-28 RX ADMIN — SEVELAMER HYDROCHLORIDE 1600 MG: 800 TABLET ORAL at 11:01

## 2024-07-28 RX ADMIN — TORSEMIDE 100 MG: 100 TABLET ORAL at 08:18

## 2024-07-28 RX ADMIN — HEPARIN SODIUM 5000 UNITS: 5000 INJECTION INTRAVENOUS; SUBCUTANEOUS at 21:15

## 2024-07-28 RX ADMIN — POLYSACCHARIDE-IRON COMPLEX 150 MG: 150 CAPSULE ORAL at 08:18

## 2024-07-28 RX ADMIN — SENNOSIDES 17.2 MG: 8.6 TABLET, FILM COATED ORAL at 08:22

## 2024-07-28 RX ADMIN — MONTELUKAST 10 MG: 10 TABLET, FILM COATED ORAL at 08:18

## 2024-07-28 RX ADMIN — OXYBUTYNIN CHLORIDE 5 MG: 5 TABLET, EXTENDED RELEASE ORAL at 08:19

## 2024-07-28 RX ADMIN — CALCIUM GLUCONATE 1 G: 20 INJECTION, SOLUTION INTRAVENOUS at 09:37

## 2024-07-28 RX ADMIN — ALBUMIN (HUMAN) 200 G: 12.5 INJECTION, SOLUTION INTRAVENOUS at 09:38

## 2024-07-28 RX ADMIN — TRAZODONE HYDROCHLORIDE 200 MG: 100 TABLET ORAL at 21:16

## 2024-07-28 RX ADMIN — HEPARIN SODIUM 5000 UNITS: 5000 INJECTION INTRAVENOUS; SUBCUTANEOUS at 14:52

## 2024-07-28 RX ADMIN — FLUTICASONE FUROATE AND VILANTEROL TRIFENATATE 1 PUFF: 200; 25 POWDER RESPIRATORY (INHALATION) at 08:19

## 2024-07-28 RX ADMIN — VENLAFAXINE HYDROCHLORIDE 150 MG: 150 CAPSULE, EXTENDED RELEASE ORAL at 08:19

## 2024-07-28 RX ADMIN — POLYETHYLENE GLYCOL 3350 17 G: 17 POWDER, FOR SOLUTION ORAL at 08:18

## 2024-07-28 RX ADMIN — IPRATROPIUM BROMIDE AND ALBUTEROL SULFATE 3 ML: 2.5; .5 SOLUTION RESPIRATORY (INHALATION) at 07:34

## 2024-07-28 RX ADMIN — HEPARIN SODIUM 5000 UNITS: 5000 INJECTION INTRAVENOUS; SUBCUTANEOUS at 06:31

## 2024-07-28 RX ADMIN — PREDNISONE 40 MG: 20 TABLET ORAL at 08:22

## 2024-07-28 RX ADMIN — FAMOTIDINE 10 MG: 20 TABLET ORAL at 21:15

## 2024-07-28 RX ADMIN — LAMOTRIGINE 100 MG: 100 TABLET ORAL at 08:22

## 2024-07-28 RX ADMIN — CYCLOPHOSPHAMIDE 100 MG: 50 CAPSULE ORAL at 21:16

## 2024-07-28 RX ADMIN — Medication 3 MG: at 21:15

## 2024-07-28 RX ADMIN — INSULIN LISPRO 2 UNITS: 100 INJECTION, SOLUTION INTRAVENOUS; SUBCUTANEOUS at 16:14

## 2024-07-28 RX ADMIN — IPRATROPIUM BROMIDE AND ALBUTEROL SULFATE 3 ML: 2.5; .5 SOLUTION RESPIRATORY (INHALATION) at 13:44

## 2024-07-28 RX ADMIN — PANTOPRAZOLE SODIUM 40 MG: 40 TABLET, DELAYED RELEASE ORAL at 06:31

## 2024-07-28 RX ADMIN — SEVELAMER HYDROCHLORIDE 1600 MG: 800 TABLET ORAL at 08:22

## 2024-07-28 RX ADMIN — SEVELAMER HYDROCHLORIDE 1600 MG: 800 TABLET ORAL at 15:48

## 2024-07-28 RX ADMIN — ATORVASTATIN CALCIUM 20 MG: 20 TABLET, FILM COATED ORAL at 08:19

## 2024-07-28 NOTE — ASSESSMENT & PLAN NOTE
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

## 2024-07-28 NOTE — ASSESSMENT & PLAN NOTE
Patient endorses worsening shortness of breath and difficulty breathing  Patient noted to have multiple diffuse scattered pulmonary nodules on CT  Quantiferon gold negative 7/9/24  Completed Ceftriaxone x 5 days.

## 2024-07-28 NOTE — ASSESSMENT & PLAN NOTE
Hold Lopressor due to bradycardia  Hold Dyazide  Nifedipine 60 mg daily  Hydralazine PRN  Monitor vitals as per protocol

## 2024-07-28 NOTE — ASSESSMENT & PLAN NOTE
Assessment:  Patient admitted with acute renal failure with creatinine of 5.74 (baseline .8)  Patient had BUN 73 and GFR of 6  CT A/P: No hydronephrosis, however there is apparent 3 mm stone near or questionably within the distal right ureter (axial image 142). Given the lack of hydronephrosis this could either reflect adjacent calcification or nonobstructing stone.   Patient reports that she is still producing urine  US kidney and bladder: No hydronephrosis. 4 mm nonobstructing left intrarenal calculus  Urinalysis: 3+ blood.  3+ protein.  Innumerable red blood cells.  2-4 WBCs.  Moderate bacteria   AURA, C3, and C4 normal  Ig Kappa Free Light Chain:152.5   Ig Lambda Free Light Chain 87.1   Kappa/Lambda FluidC Ratio 1.75  Hepatitis panel nonreactive  HIV nonreactive  QuantiFERON gold negative  protein electrophoresis see labs  hypersensitivity pneumonitis profile negative  GBM antibodies: elevated >8  phospholipase A2 receptor Ab: Negative  ANCA: negative   7/17 Renal biopsy and bronchoscopy performed.  7/19 CM confirmed HD chair time for patient. Will be TTS 10:30 AM with start date Tuesday 7/23.   Bronchial lavage: Right- Negative for malignancy. Abundant pulmonary macrophages. Few benign bronchial cells. Left- Negative for malignancy, pulmonary macrophage, mixed inflammatory cells, Few benign bronchial cells  7/20 PLEX initiated  7/22 R permacath placed by IR. Renal bx: diffuse crescentic glomerulonephritis, Anti-GMB type  Chest Xray 7/28: Showed no Pulmonary hemorrhage       Plan  Nephrology onboard - Dialysis TTS   Upon discussion with nephrology, patient can be discharged and will be medically stable after receiving her last PLEX on Thursday 8/1/2024  Hematology onboard for PLEX  Began prednisone taper 7/27 per nephrology  Cyclophosphamide 100 mg daily for 3 months   Discontinue Torsemide to 100 mg daily given aneuric.   Bactrim MWF for PCP prophylaxis.   Monitor blood glucose levels  Hold home dyazide

## 2024-07-28 NOTE — ASSESSMENT & PLAN NOTE
WBC   Date Value Ref Range Status   07/27/2024 21.46 (H) 4.31 - 10.16 Thousand/uL Final     Patient admitted with sepsis likely secondary to pneumonitis as evidenced by tachycardia, tachypnea and leukocytosis  CT: redemonstration of multiple diffuse scattered nodules, less pronounced than on 5/16/2024 suggestive of mycobacterial infection  QuantiFERON gold negative   BC: 1/2 staph epidermis detected-contaminated  Leukocytosis likely due to steroid use  Completed Ceftriaxone x 5 days

## 2024-07-28 NOTE — ASSESSMENT & PLAN NOTE
CT chest: Re demonstration of multiple diffuse scattered nodules    CRP- 143.7   Sed-61, negative: QuantiFERON  Procal: 0.27--0.30--0.38--0.40  7/17 Renal biopsy and bronchoscopy performed. Results pending.     Plan:  Per pulmonology recs, repeat CT chest in 6 weeks. If the nodularity fails to improve or progresses, may need to reconsider tissue biopsy.

## 2024-07-28 NOTE — ASSESSMENT & PLAN NOTE
Assessment:  Patient has PFTs ordered outpatient but they have not been completed.  Home medication regime Advair -21 mcg 2 puff twice daily, Proventil 2 puffs every 6 hours as needed  On 2 L via nasal cannula as needed.  O2 sat 95 to 96%    Plan:  Supplemental oxygen as needed  DuoNebs every 6 hours, per pulmonology  Albuterol inhaler PRN  Continue fluticasone-vilanterol  Continue loratadine 10 mg

## 2024-07-28 NOTE — PROGRESS NOTES
NEPHROLOGY PROGRESS NOTE   Ngozi Beard 64 y.o. 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  Infusion center does not offer plasmapheresis on the weekends  Will continue daily plasma exchange until August 1  Has a chair at [facility] TTS 10:30 AM      # Anti-GBM disease  Last anti-GBM > 8 on 7/25.  Still at risk of developing pulmonary involvement (hemoglobin dropped blast shortness of breath, see below)  Unfortunately we are not able to get titers as per our lab  Pending Anti-GBM from Saturday  and Monday  No pulmonary involvement at this time  Continue plasmapheresis until next August 1     # Immunosuppression therapy  Methylprednisolone x 3, completed  Currently on prednisone 60 mg, started on 7/20  60 mg for 1 week, last dose 7/26  40 mg for 1 week 7/27  30 mg for 1 week 8/3  25 mg for 2 weeks  20 mg for 2 weeks  15 mg for 2 weeks   12.5 mg for 2 weeks  10 mg for 2 weeks  7.5 mg for 2 weeks  Continue with 5 mg daily  Cytoxan 100 mg daily adjusted by kidney function and age.  Plan to continue for 3 months  Please give taper plan on discharge    #PLEX  Currently using albumin  Last fibrinogen 118, if<100 plan is to use cryoprecipitate's     # Prophylaxis  Continue with calcium and vitamin D  Pantoprazole 40   continue Bactrim 1 tablet 3 times a week       #Volume status/hypertension:  Volume: Fluid overload  Blood pressure: Normotensive, /72   , goal less than 140/90   Recommend:  UF on HD 3 L yesterday  Ultrafiltration on dialysis   Continue  torsemide to 100 daily to force urinary output although patient is anuric  Continue nifedipine 60 mg daily      # Anemia   Hemoglobin 7.2 mg/dL, trending down  No RISHI for now,   transfusion if hemoglobin less than 7  Chest x-ray to rule out pulmonary hemorrhage (patient complaining of shortness of breath and sternal chest pain).  Chest x-ray ordered this morning        #Acid-base Disorder  serum HCO3  24  At goal        The highlighted and/or bolded points in my assessment, plan, and disposition were discussed with the primary team and they agree with those points and the plan.  Previous records were personally reviewed by me to obtain a baseline creatinine.   The images (CXR) were personally reviewed by me in PACS      SUBJECTIVE:  Patient seen and examined at bedside.  Patient complains of shortness of breath and sternal chest pain since last night.        OBJECTIVE:  Current Weight: Weight - Scale: 117 kg (258 lb 2.5 oz)  Vitals:    07/27/24 2158   BP: 137/72   Pulse: 95   Resp: 20   Temp: 98.6 °F (37 °C)   SpO2: 94%       Intake/Output Summary (Last 24 hours) at 7/28/2024 0619  Last data filed at 7/28/2024 0542  Gross per 24 hour   Intake 850 ml   Output 3550 ml   Net -2700 ml     Wt Readings from Last 3 Encounters:   07/28/24 117 kg (258 lb 2.5 oz)   07/10/24 109 kg (241 lb)   07/01/24 111 kg (244 lb 6.4 oz)     Temp Readings from Last 3 Encounters:   07/27/24 98.6 °F (37 °C)   07/10/24 99.4 °F (37.4 °C) (Oral)   07/01/24 98.2 °F (36.8 °C) (Tympanic)     BP Readings from Last 3 Encounters:   07/27/24 137/72   07/12/24 127/59   07/01/24 124/80     Pulse Readings from Last 3 Encounters:   07/27/24 95   07/12/24 72   07/01/24 62        General:  no acute distress at this time  Skin:  No acute rash  Eyes:  No scleral  icterus and noninjected  ENT:  mucous membranes moist  Neck:  no carotid bruits  Chest:  Clear to auscultation percussion, good respiratory effort, no use of accessory respiratory muscles  CVS:  Regular rate and rhythm without rub   Abdomen:  soft and nontender   Extremities: lower extremity edema  Neuro:  No gross focality  Psych:  Alert , cooperative   Dialysis access: PermCath      Medications:    Current Facility-Administered Medications:     acetaminophen (TYLENOL) tablet 650 mg, 650 mg, Oral, Q6H PRN, Tram Palacios PA-C, 650 mg at 07/19/24 1302    albumin human (FLEXBUMIN) 5 % injection 200 g, 200 g, Intravenous, Daily, Joselyn Reyes Bahamonde, MD, 200 g at 07/27/24 1057    albuterol (PROVENTIL HFA,VENTOLIN HFA) inhaler 2 puff, 2 puff, Inhalation, Q4H PRN, Tram Palacios PA-C, 2 puff at 07/23/24 1114    atorvastatin (LIPITOR) tablet 20 mg, 20 mg, Oral, Daily, Tram Palacios PA-C, 20 mg at 07/27/24 0807    benzonatate (TESSALON PERLES) capsule 200 mg, 200 mg, Oral, TID PRN, Tram Palacios PA-C    bisacodyl (DULCOLAX) rectal suppository 10 mg, 10 mg, Rectal, Daily PRN, Daya Upasani, DO, 10 mg at 07/25/24 1417    cyclophosphamide (CYTOXAN) capsule 100 mg, 100 mg, Oral, Daily, Tirso Montez MD, 100 mg at 07/27/24 2203    dextromethorphan-guaiFENesin (ROBITUSSIN DM) oral syrup 10 mL, 10 mL, Oral, Q4H PRN, Tram Palacios PA-C    famotidine (PEPCID) tablet 10 mg, 10 mg, Oral, Daily PRN, Daya Upasani, DO, 10 mg at 07/27/24 2202    fluticasone-vilanterol 200-25 mcg/actuation 1 puff, 1 puff, Inhalation, Daily, Tram Palacios PA-C, 1 puff at 07/27/24 0812    heparin (porcine) subcutaneous injection 5,000 Units, 5,000 Units, Subcutaneous, Q8H [facility], Xavier Medina MD, 5,000 Units at 07/27/24 2203    insulin lispro (HumALOG/ADMELOG) 100 units/mL subcutaneous injection 2-12 Units, 2-12 Units, Subcutaneous, TID AC, 4 Units at 07/27/24 1718 **AND** Fingerstick Glucose (POCT), , , TID AC, Yasmine Meadows MD     ipratropium-albuterol (DUO-NEB) 0.5-2.5 mg/3 mL inhalation solution 3 mL, 3 mL, Nebulization, Q6H, Luke Montez MD, 3 mL at 07/27/24 1941    iron polysaccharides (FERREX) capsule 150 mg, 150 mg, Oral, Daily, Ata Burns MD, 150 mg at 07/27/24 0807    lamoTRIgine (LaMICtal) tablet 100 mg, 100 mg, Oral, QAM, Tram Palacios PA-C, 100 mg at 07/27/24 0808    loratadine (CLARITIN) tablet 10 mg, 10 mg, Oral, Daily, Tram Palacios PA-C, 10 mg at 07/27/24 0808    melatonin tablet 3 mg, 3 mg, Oral, Daily PRN, Yasmine Meadows MD, 3 mg at 07/27/24 2202    montelukast (SINGULAIR) tablet 10 mg, 10 mg, Oral, Daily, Tram Palacios PA-C, 10 mg at 07/27/24 0808    NIFEdipine (PROCARDIA XL) 24 hr tablet 60 mg, 60 mg, Oral, Daily, Joselyn Reyes Bahamonde, MD, 60 mg at 07/27/24 0808    ondansetron (ZOFRAN) injection 4 mg, 4 mg, Intravenous, Q6H PRN, Yasmine Meadows MD, 4 mg at 07/21/24 1840    oxybutynin (DITROPAN-XL) 24 hr tablet 5 mg, 5 mg, Oral, Daily, Tram Palacios PA-C, 5 mg at 07/27/24 0807    pantoprazole (PROTONIX) EC tablet 40 mg, 40 mg, Oral, Daily Before Breakfast, Ronny Webster MD, 40 mg at 07/27/24 0612    polyethylene glycol (MIRALAX) packet 17 g, 17 g, Oral, Daily, Xavier Medina MD, 17 g at 07/26/24 0806    predniSONE tablet 40 mg, 40 mg, Oral, Daily, Joselyn Reyes Bahamonde, MD, 40 mg at 07/27/24 0808    senna (SENOKOT) tablet 17.2 mg, 2 tablet, Oral, BID, Yasmine Meadows MD, 17.2 mg at 07/27/24 1718    sevelamer (RENAGEL) tablet 1,600 mg, 1,600 mg, Oral, TID With Meals, Vane Estrada MD, 1,600 mg at 07/27/24 1718    Sodium Zirconium Cyclosilicate (Lokelma) 10 g, 10 g, Oral, Daily, Tirso Montez MD, 10 g at 07/27/24 0812    sulfamethoxazole-trimethoprim (BACTRIM) 400-80 mg per tablet 1 tablet, 1 tablet, Oral, Once per day on Monday Wednesday Friday, Joselyn Reyes Bahamonde, MD, 1 tablet at 07/26/24 0806    torsemide (DEMADEX) tablet 100 mg, 100 mg, Oral, Daily, Joselyn Reyes Bahamonde, MD, 100 mg at 07/27/24 0807    traZODone  (DESYREL) tablet 200 mg, 200 mg, Oral, HS, Tram Palacios PA-C, 200 mg at 07/27/24 2203    venlafaxine (EFFEXOR-XR) 24 hr capsule 150 mg, 150 mg, Oral, Daily, Tram Palacios PA-C, 150 mg at 07/27/24 0808    Laboratory Results:  Results from last 7 days   Lab Units 07/28/24  0539 07/28/24  0533 07/27/24  2109 07/27/24  1118 07/27/24  0817 07/26/24  1536 07/26/24  0504 07/24/24  0632 07/23/24  0537 07/22/24  0429   WBC Thousand/uL 19.60*  --   --  21.46*  --   --  24.22* 24.77* 22.40* 18.05*   HEMOGLOBIN g/dL 7.2*  --  7.7* 6.6*  --  7.3* 7.2* 8.3* 8.9* 8.9*   HEMATOCRIT % 22.2*  --  25.6* 21.1*  --  23.0* 22.7* 25.7* 27.5* 28.4*   PLATELETS Thousands/uL 134*  --   --  135*  --   --  139* 143* 149 179   SODIUM mmol/L  --  137  --   --  138  --   --   --  138 140   POTASSIUM mmol/L  --  4.7  --   --  4.6  --   --   --  4.4 5.5*   CHLORIDE mmol/L  --  105  --   --  111*  --   --   --  102 107   CO2 mmol/L  --  24  --   --  16*  --   --   --  24 20*   BUN mg/dL  --  54*  --   --  87*  --   --   --  67* 101*   CREATININE mg/dL  --  5.60*  --   --  7.61*  --   --   --  6.34* 8.53*   CALCIUM mg/dL  --  9.2  --   --  9.7  --   --   --  9.3 9.3   PHOSPHORUS mg/dL  --   --   --   --   --   --   --   --   --  8.4*       IR tunneled dialysis catheter placement   Final Result by Graciela Bettencourt MD (07/22 1018)      Conversion of right-sided internal jugular non-tunneled central venous catheter for a tunneled dialysis catheter, with tip in the expected location of the right atrium.      Plan:      The catheter may be used immediately.      Workstation performed: [id number]         IR biopsy kidney random   Final Result by Sandoval Castro MD (07/18 1358)   Impression: Successful percutaneous nontarget renal biopsy as described.                  Workstation performed: [id number]         IR temporary dialysis catheter placement   Final Result by Graciela Bettencourt MD (07/15 1640)      Insertion of right-sided non-tunneled dual-lumen  "temporary dialysis catheter, with tip in the expected location of the cavoatrial junction.      Plan:      The catheter may be used immediately.      Workstation performed: [id number]             Portions of the record may have been created with voice recognition software. Occasional wrong word or \"sound a like\" substitutions may have occurred due to the inherent limitations of voice recognition software. Read the chart carefully and recognize, using context, where substitutions have occurred.    "

## 2024-07-28 NOTE — PROGRESS NOTES
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 Given the lack of hydronephrosis this could either reflect adjacent calcification or nonobstructing stone.   Patient reports that she is still producing urine  US kidney and bladder: No hydronephrosis. 4 mm nonobstructing left intrarenal calculus  Urinalysis: 3+ blood.  3+ protein.  Innumerable red blood cells.  2-4 WBCs.  Moderate bacteria   AURA, C3, and C4 normal  Ig Kappa Free Light Chain:152.5   Ig Lambda Free Light Chain 87.1   Kappa/Lambda FluidC Ratio 1.75  Hepatitis panel nonreactive  HIV nonreactive  QuantiFERON gold negative  protein electrophoresis see labs  hypersensitivity pneumonitis profile negative  GBM antibodies: elevated >8  phospholipase A2 receptor Ab: Negative  ANCA: negative   7/17 Renal biopsy and bronchoscopy performed.  7/19 CM confirmed HD chair time for patient. Will be TTS 10:30 AM with start date Tuesday 7/23.   Bronchial lavage: Right- Negative for malignancy. Abundant pulmonary macrophages. Few benign bronchial cells. Left- Negative for malignancy, pulmonary macrophage, mixed inflammatory cells, Few benign bronchial cells  7/20 PLEX initiated  7/22 R permacath placed by IR. Renal bx: diffuse crescentic glomerulonephritis, Anti-GMB type      Plan  Nephrology onboard - dialysis initiated  Hematology onboard for PLEX  Began prednisone taper 7/27 per nephrology  Cyclophosphamide 100 mg daily   Torsemide to 100 mg daily  Bactrim MWF  Monitor  blood glucose levels  Hold home dyazide  F/u CXR to r/o pulmonary hemorrhage per nephrology.    Anemia  Assessment & Plan  In the setting of new kidney failure 2/2 rapidly progressive glomerulonephritis, initiated on HD and plasmapharesis. Suspect due to combination of kidney failure and losses caused by the aforementioned treatments.   7/27 S/p 1 U pRBC     Plan:  Monitor hemoglobin with CBC  Transfuse for Hgb<7    Persistent cough  Assessment & Plan  Patient endorses worsening shortness of breath and difficulty breathing  Patient noted to have multiple diffuse scattered pulmonary nodules on CT  Quantiferon gold negative 7/9/24  Completed Ceftriaxone x 5 days.    Plan  Tessalon Perles as needed    Abnormal CT of the chest  Assessment & Plan  CT chest: Re demonstration of multiple diffuse scattered nodules  Etiology unclear: HP vs sarcoid vs  vs MAC vs atypical  CRP- 143.7   Sed-61, negative: QuantiFERON  Procal: 0.27--0.30--0.38--0.40  7/17 Renal biopsy and bronchoscopy performed. Results pending.     Plan:  Per pulmonology recs, repeat CT chest in 6 weeks. If the nodularity fails to improve or progresses, may need to reconsider tissue biopsy.    Moderate persistent asthma w/out acute exacerbation  Assessment & Plan  Patient has PFTs ordered but they have not been completed.  Home medication regime Advair -21 mcg 2 puff twice daily, Proventil 2 puffs every 6 hours as needed  On 2 L via nasal cannula.  O2 sat 95 to 96%    Plan:  Supplemental oxygen as needed  DuoNebs every 6 hours, per pulmonology  Albuterol inhaler PRN  Continue benzonatate  Continue fluticasone-vilanterol  Continue loratadine 10 mg  Robitussin PRN    Abnormality of ascending aorta  Assessment & Plan  CT chest: Unchanged fusiform ectasia of the ascending thoracic aorta measuring up to 40 mm     Plan  Recommendation for follow-up low radiation dose chest CT in one year    Bipolar 1 disorder (HCC)  Assessment & Plan  Plan  Continue on  Effexor, trazodone and Lamictal    Primary hypertension  Assessment & Plan  Hold Lopressor due to bradycardia  Hold Dyazide  Nifedipine 60 mg daily  Hydralazine PRN  Monitor vitals as per protocol    Dyslipidemia  Assessment & Plan  Continue on atorvastatin 20 Mg    Sepsis (HCC)-resolved as of 7/14/2024  Assessment & Plan    WBC   Date Value Ref Range Status   07/27/2024 21.46 (H) 4.31 - 10.16 Thousand/uL Final     Patient admitted with sepsis likely secondary to pneumonitis as evidenced by tachycardia, tachypnea and leukocytosis  CT: redemonstration of multiple diffuse scattered nodules, less pronounced than on 5/16/2024 suggestive of mycobacterial infection  QuantiFERON gold negative   Lactic acid normal, Procalcitonin trending up: 0.27 > 0.30 > .38 > .40  Sputum culture +2 gram negative rods, +1 gram positive cocci  BC: 1/2 staph epidermis detected-contaminated  Leukocytosis likely due to steroid use  Completed Ceftriaxone x 5 days         VTE Pharmacologic Prophylaxis: VTE Score: 4 Moderate Risk (Score 3-4) - Pharmacological DVT Prophylaxis Ordered: heparin.    Mobility:   Basic Mobility Inpatient Raw Score: 24  JH-HLM Goal: 8: Walk 250 feet or more  JH-HLM Achieved: 7: Walk 25 feet or more  JH-HLM Goal NOT achieved. Continue with multidisciplinary rounding and encourage appropriate mobility to improve upon JH-HLM goals.    Patient Centered Rounds: I performed bedside rounds with nursing staff today.   Discussions with Specialists or Other Care Team Provider: Nephrology, IR, pulmonology, Hematology    Education and Discussions with Family / Patient: Attempted to update  (sister) via phone. Left voicemail.     Current Length of Stay: 16 day(s)  Current Patient Status: Inpatient   Discharge Plan: Anticipate discharge in >72 hrs to home.    Code Status: Level 1 - Full Code    Subjective:   Patient seen and examined at bedside. No overnight events. Patient reports sleeping well last night, and is  currently without new complaints. She is satting well on room air.  States that shortness of breath and cough are at baseline currently.     Objective:     Vitals:   Temp (24hrs), Av.4 °F (36.9 °C), Min:98.1 °F (36.7 °C), Max:98.7 °F (37.1 °C)    Temp:  [98.1 °F (36.7 °C)-98.7 °F (37.1 °C)] 98.3 °F (36.8 °C)  HR:  [76-96] 79  Resp:  [16-20] 16  BP: (134-145)/(71-85) 134/75  SpO2:  [89 %-97 %] 95 %  Body mass index is 45.73 kg/m².     Input and Output Summary (last 24 hours):     Intake/Output Summary (Last 24 hours) at 2024 1434  Last data filed at 2024 0938  Gross per 24 hour   Intake 9371.67 ml   Output 3550 ml   Net 5821.67 ml       Physical Exam:   Physical Exam  Vitals and nursing note reviewed.   Constitutional:       Appearance: Normal appearance.   HENT:      Head: Normocephalic and atraumatic.      Mouth/Throat:      Mouth: Mucous membranes are moist.   Eyes:      Extraocular Movements: Extraocular movements intact.      Conjunctiva/sclera: Conjunctivae normal.      Pupils: Pupils are equal, round, and reactive to light.   Cardiovascular:      Rate and Rhythm: Normal rate and regular rhythm.   Pulmonary:      Effort: Pulmonary effort is normal. No respiratory distress.      Breath sounds: Normal breath sounds. No wheezing or rales.   Abdominal:      General: Bowel sounds are normal. There is no distension.      Palpations: Abdomen is soft.      Tenderness: There is no abdominal tenderness. There is no guarding.   Musculoskeletal:      Right lower leg: Edema present.      Left lower leg: Edema present.      Comments: Bilateral lower extremity edema noted     Skin:     General: Skin is warm and dry.   Neurological:      General: No focal deficit present.      Mental Status: She is alert. Mental status is at baseline.        Additional Data:     Labs:  Results from last 7 days   Lab Units 24  0539   WBC Thousand/uL 19.60*   HEMOGLOBIN g/dL 7.2*   HEMATOCRIT % 22.2*   PLATELETS Thousands/uL  134*     Results from last 7 days   Lab Units 07/28/24  0533   SODIUM mmol/L 137   POTASSIUM mmol/L 4.7   CHLORIDE mmol/L 105   CO2 mmol/L 24   BUN mg/dL 54*   CREATININE mg/dL 5.60*   ANION GAP mmol/L 8   CALCIUM mg/dL 9.2   GLUCOSE RANDOM mg/dL 118     Results from last 7 days   Lab Units 07/22/24  0429   INR  1.18     Results from last 7 days   Lab Units 07/28/24  1102 07/28/24  0712 07/27/24  2048 07/27/24  1539 07/27/24  1120 07/27/24  0705 07/26/24  2103 07/26/24  1553 07/26/24  1122 07/26/24  0731 07/25/24  2045 07/25/24  1606   POC GLUCOSE mg/dl 127 109 139 200* 117 131 171* 185* 124 117 174* 213*                   Lines/Drains:  Invasive Devices       Peripheral Intravenous Line  Duration             Peripheral IV 07/27/24 Right Antecubital <1 day              Hemodialysis Catheter  Duration             HD Permanent Double Catheter 6 days                    Imaging: Reviewed radiology reports from this admission including: chest xray, chest CT scan, abdominal/pelvic CT, and ultrasound kidney and bladder  IR tunneled dialysis catheter placement   Final Result by Graciela Bettencourt MD (07/22 1018)      Conversion of right-sided internal jugular non-tunneled central venous catheter for a tunneled dialysis catheter, with tip in the expected location of the right atrium.      Plan:      The catheter may be used immediately.      Workstation performed: [id number]         IR biopsy kidney random   Final Result by Sandoval Castro MD (07/18 1358)   Impression: Successful percutaneous nontarget renal biopsy as described.                  Workstation performed: [id number]         IR temporary dialysis catheter placement   Final Result by Graciela Bettencourt MD (07/15 1640)      Insertion of right-sided non-tunneled dual-lumen temporary dialysis catheter, with tip in the expected location of the cavoatrial junction.      Plan:      The catheter may be used immediately.      Workstation performed: [id number]         XR  chest portable    (Results Pending)            Recent Cultures (last 7 days):           Last 24 Hours Medication List:   Current Facility-Administered Medications   Medication Dose Route Frequency Provider Last Rate    acetaminophen  650 mg Oral Q6H PRN Tram Palacios PA-C      albumin human  200 g Intravenous Daily Joselyn Reyes Bahamonde, MD      albuterol  2 puff Inhalation Q4H PRN Tram Palacios PA-C      atorvastatin  20 mg Oral Daily Tram Palacios PA-C      benzonatate  200 mg Oral TID PRN Tram Palacios PA-C      bisacodyl  10 mg Rectal Daily PRN Daya May, DO      cyclophosphamide  100 mg Oral Daily Tirso Montez MD      dextromethorphan-guaiFENesin  10 mL Oral Q4H PRN Tram Palacios PA-C      famotidine  10 mg Oral Daily PRN Daya May, DO      fluticasone-vilanterol  1 puff Inhalation Daily Tram Palacios PA-C      heparin (porcine)  5,000 Units Subcutaneous Q8H JACK Xavier Medina MD      insulin lispro  2-12 Units Subcutaneous TID AC Yasmine Meadows MD      ipratropium-albuterol  3 mL Nebulization Q6H Luke Montez MD      iron polysaccharides  150 mg Oral Daily Ata Burns MD      lamoTRIgine  100 mg Oral QAM Tram Palacios PA-C      loratadine  10 mg Oral Daily Tram Palacios PA-C      melatonin  3 mg Oral Daily PRN Yasmine Meadows MD      montelukast  10 mg Oral Daily Tram Palacios PA-C      NIFEdipine  60 mg Oral Daily Joselyn Reyes Bahamonde, MD      ondansetron  4 mg Intravenous Q6H PRN Yasmine Meadows MD      oxybutynin  5 mg Oral Daily Tram Palacios PA-C      pantoprazole  40 mg Oral Daily Before Breakfast Ronny Webster MD      polyethylene glycol  17 g Oral Daily Xavier Medina MD      [START ON 8/3/2024] predniSONE  30 mg Oral Daily Joselyn Reyes Bahamonde, MD      predniSONE  40 mg Oral Daily Joselyn Reyes Bahamonde, MD      senna  2 tablet Oral BID Yasmine Meadows MD      sevelamer  1,600 mg Oral TID With Meals Vane Estrada MD      Sodium Zirconium Cyclosilicate  10 g Oral Daily Tirso Portillo  MD Mel[place]      sulfamethoxazole-trimethoprim  1 tablet Oral Once per day on Monday Wednesday Friday Joselyn Reyes Bahamonde, MD      torsemide  100 mg Oral Daily Joselyn Reyes Bahamonde, MD      traZODone  200 mg Oral HS Tram Palacios PA-C      venlafaxine  150 mg Oral Daily Tram Palacios PA-C          Today, Patient Was Seen By: Jessica Beckford MD    **Please Note: This note may have been constructed using a voice recognition system.**

## 2024-07-29 PROBLEM — R93.89 ABNORMAL CT OF THE CHEST: Status: RESOLVED | Noted: 2024-06-26 | Resolved: 2024-07-29

## 2024-07-29 LAB
ERYTHROCYTE [DISTWIDTH] IN BLOOD BY AUTOMATED COUNT: 17.4 % (ref 11.6–15.1)
FIBRINOGEN PPP-MCNC: 118 MG/DL (ref 207–520)
FUNGUS SPEC CULT: NORMAL
FUNGUS SPEC CULT: NORMAL
GLUCOSE SERPL-MCNC: 133 MG/DL (ref 65–140)
GLUCOSE SERPL-MCNC: 158 MG/DL (ref 65–140)
GLUCOSE SERPL-MCNC: 99 MG/DL (ref 65–140)
HCT VFR BLD AUTO: 21.8 % (ref 34.8–46.1)
HCT VFR BLD AUTO: 24.5 % (ref 34.8–46.1)
HGB BLD-MCNC: 6.8 G/DL (ref 11.5–15.4)
HGB BLD-MCNC: 7.7 G/DL (ref 11.5–15.4)
MCH RBC QN AUTO: 27.2 PG (ref 26.8–34.3)
MCHC RBC AUTO-ENTMCNC: 31.2 G/DL (ref 31.4–37.4)
MCV RBC AUTO: 87 FL (ref 82–98)
PLATELET # BLD AUTO: 136 THOUSANDS/UL (ref 149–390)
PMV BLD AUTO: 10.2 FL (ref 8.9–12.7)
RBC # BLD AUTO: 2.5 MILLION/UL (ref 3.81–5.12)
WBC # BLD AUTO: 17.92 THOUSAND/UL (ref 4.31–10.16)

## 2024-07-29 PROCEDURE — P9016 RBC LEUKOCYTES REDUCED: HCPCS

## 2024-07-29 PROCEDURE — 94640 AIRWAY INHALATION TREATMENT: CPT

## 2024-07-29 PROCEDURE — 83520 IMMUNOASSAY QUANT NOS NONAB: CPT | Performed by: INTERNAL MEDICINE

## 2024-07-29 PROCEDURE — 85027 COMPLETE CBC AUTOMATED: CPT

## 2024-07-29 PROCEDURE — 85018 HEMOGLOBIN: CPT

## 2024-07-29 PROCEDURE — 85014 HEMATOCRIT: CPT

## 2024-07-29 PROCEDURE — 85384 FIBRINOGEN ACTIVITY: CPT | Performed by: INTERNAL MEDICINE

## 2024-07-29 PROCEDURE — 99233 SBSQ HOSP IP/OBS HIGH 50: CPT | Performed by: INTERNAL MEDICINE

## 2024-07-29 PROCEDURE — 82948 REAGENT STRIP/BLOOD GLUCOSE: CPT

## 2024-07-29 PROCEDURE — 94760 N-INVAS EAR/PLS OXIMETRY 1: CPT

## 2024-07-29 RX ORDER — GUAIFENESIN 100 MG/5ML
200 SOLUTION ORAL 2 TIMES DAILY
Status: DISCONTINUED | OUTPATIENT
Start: 2024-07-29 | End: 2024-08-04 | Stop reason: HOSPADM

## 2024-07-29 RX ORDER — CALCIUM GLUCONATE 20 MG/ML
1 INJECTION, SOLUTION INTRAVENOUS ONCE
Status: COMPLETED | OUTPATIENT
Start: 2024-07-29 | End: 2024-07-29

## 2024-07-29 RX ORDER — CALCIUM CARBONATE 500(1250)
1 TABLET ORAL
Status: DISCONTINUED | OUTPATIENT
Start: 2024-07-30 | End: 2024-08-04 | Stop reason: HOSPADM

## 2024-07-29 RX ORDER — MAGNESIUM HYDROXIDE/ALUMINUM HYDROXICE/SIMETHICONE 120; 1200; 1200 MG/30ML; MG/30ML; MG/30ML
30 SUSPENSION ORAL EVERY 4 HOURS PRN
Status: DISCONTINUED | OUTPATIENT
Start: 2024-07-29 | End: 2024-08-04 | Stop reason: HOSPADM

## 2024-07-29 RX ADMIN — TORSEMIDE 100 MG: 100 TABLET ORAL at 08:33

## 2024-07-29 RX ADMIN — INSULIN LISPRO 2 UNITS: 100 INJECTION, SOLUTION INTRAVENOUS; SUBCUTANEOUS at 16:20

## 2024-07-29 RX ADMIN — FLUTICASONE FUROATE AND VILANTEROL TRIFENATATE 1 PUFF: 200; 25 POWDER RESPIRATORY (INHALATION) at 09:48

## 2024-07-29 RX ADMIN — PREDNISONE 40 MG: 20 TABLET ORAL at 08:32

## 2024-07-29 RX ADMIN — MONTELUKAST 10 MG: 10 TABLET, FILM COATED ORAL at 08:33

## 2024-07-29 RX ADMIN — ATORVASTATIN CALCIUM 20 MG: 20 TABLET, FILM COATED ORAL at 08:33

## 2024-07-29 RX ADMIN — TRAZODONE HYDROCHLORIDE 200 MG: 100 TABLET ORAL at 21:05

## 2024-07-29 RX ADMIN — SEVELAMER HYDROCHLORIDE 1600 MG: 800 TABLET ORAL at 16:18

## 2024-07-29 RX ADMIN — NIFEDIPINE 60 MG: 30 TABLET, FILM COATED, EXTENDED RELEASE ORAL at 09:49

## 2024-07-29 RX ADMIN — CALCIUM GLUCONATE 1 G: 20 INJECTION, SOLUTION INTRAVENOUS at 21:06

## 2024-07-29 RX ADMIN — LAMOTRIGINE 100 MG: 100 TABLET ORAL at 08:32

## 2024-07-29 RX ADMIN — IPRATROPIUM BROMIDE AND ALBUTEROL SULFATE 3 ML: 2.5; .5 SOLUTION RESPIRATORY (INHALATION) at 19:30

## 2024-07-29 RX ADMIN — SEVELAMER HYDROCHLORIDE 1600 MG: 800 TABLET ORAL at 11:23

## 2024-07-29 RX ADMIN — CYCLOPHOSPHAMIDE 100 MG: 50 CAPSULE ORAL at 21:05

## 2024-07-29 RX ADMIN — POLYSACCHARIDE-IRON COMPLEX 150 MG: 150 CAPSULE ORAL at 08:33

## 2024-07-29 RX ADMIN — HEPARIN SODIUM 5000 UNITS: 5000 INJECTION INTRAVENOUS; SUBCUTANEOUS at 06:41

## 2024-07-29 RX ADMIN — GUAIFENESIN 200 MG: 200 SOLUTION ORAL at 21:05

## 2024-07-29 RX ADMIN — PANTOPRAZOLE SODIUM 40 MG: 40 TABLET, DELAYED RELEASE ORAL at 06:41

## 2024-07-29 RX ADMIN — LORATADINE 10 MG: 10 TABLET ORAL at 08:33

## 2024-07-29 RX ADMIN — GUAIFENESIN 200 MG: 200 SOLUTION ORAL at 14:59

## 2024-07-29 RX ADMIN — SULFAMETHOXAZOLE AND TRIMETHOPRIM 1 TABLET: 400; 80 TABLET ORAL at 08:34

## 2024-07-29 RX ADMIN — OXYBUTYNIN CHLORIDE 5 MG: 5 TABLET, EXTENDED RELEASE ORAL at 08:33

## 2024-07-29 RX ADMIN — SEVELAMER HYDROCHLORIDE 1600 MG: 800 TABLET ORAL at 08:33

## 2024-07-29 RX ADMIN — HEPARIN SODIUM 5000 UNITS: 5000 INJECTION INTRAVENOUS; SUBCUTANEOUS at 13:21

## 2024-07-29 RX ADMIN — IPRATROPIUM BROMIDE AND ALBUTEROL SULFATE 3 ML: 2.5; .5 SOLUTION RESPIRATORY (INHALATION) at 07:23

## 2024-07-29 RX ADMIN — SENNOSIDES 17.2 MG: 8.6 TABLET, FILM COATED ORAL at 08:33

## 2024-07-29 RX ADMIN — HEPARIN SODIUM 5000 UNITS: 5000 INJECTION INTRAVENOUS; SUBCUTANEOUS at 21:05

## 2024-07-29 RX ADMIN — IPRATROPIUM BROMIDE AND ALBUTEROL SULFATE 3 ML: 2.5; .5 SOLUTION RESPIRATORY (INHALATION) at 13:22

## 2024-07-29 RX ADMIN — VENLAFAXINE HYDROCHLORIDE 150 MG: 150 CAPSULE, EXTENDED RELEASE ORAL at 08:33

## 2024-07-29 RX ADMIN — SENNOSIDES 17.2 MG: 8.6 TABLET, FILM COATED ORAL at 17:22

## 2024-07-29 RX ADMIN — POLYETHYLENE GLYCOL 3350 17 G: 17 POWDER, FOR SOLUTION ORAL at 08:25

## 2024-07-29 NOTE — CASE MANAGEMENT
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     Abnormal CT of the chest 2024     Abnormality of ascending aorta 2024     Postmenopausal 2024     Encounter for screening mammogram for malignant neoplasm of breast 2024     Allergic rhinitis 2024     Persistent cough 2024     Urge incontinence of urine 2024     Exercise intolerance 2024     Family history of coronary artery bypass graft surgery 2024     Urge urinary incontinence 2024     Female stress incontinence 2024     Paranoid schizophrenia ([facility]) 2023     Moderate persistent asthma w/out acute exacerbation 2023     Asthma due to seasonal allergies 2023     Bipolar 1 disorder ([facility]) 2022     Primary hypertension 2022     Dyslipidemia 2022     Morbid obesity with BMI of 45.0-49.9, adult ([facility]) 2022       LOS (days): 17  Geometric Mean LOS (GMLOS) (days): 5.8  Days to GMLOS:-11.3     OBJECTIVE:  Risk of Unplanned Readmission Score: 22.39         Current admission status: Inpatient   Preferred Pharmacy:   [facility]/pharmacy #[id number]  [place] - [facility] [address] [address]  Phone: [phone] Fax: [phone]    [facility] - [address] [address]  Phone: [phone] Fax: [phone]    Primary Care Provider: Meenakshi Balbuena MD    Primary Insurance:  MEDICARE  Secondary Insurance: [facility]    DISCHARGE DETAILS:                                          Other Referral/Resources/Interventions Provided:  Interventions: Dialysis       CM was notified by [facility] provider that patient will need to remain in the hospital through 8/1 when she will complete her plasmapheresis treatments.  At that point she will be medically stable for DC.    Patient's currently set up with a TTS 10:30 HD schedule at [facility].    CM sent a message to [facility] admissions team to see if the facility would be able to accommodate a Saturday start date on 8/3.    CM department will continue to follow to assist with discharge coordination.

## 2024-07-29 NOTE — PROGRESS NOTES
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 Given the lack of hydronephrosis this could either reflect adjacent calcification or nonobstructing stone.   Patient reports that she is still producing urine  US kidney and bladder: No hydronephrosis. 4 mm nonobstructing left intrarenal calculus  Urinalysis: 3+ blood.  3+ protein.  Innumerable red blood cells.  2-4 WBCs.  Moderate bacteria   AURA, C3, and C4 normal  Ig Kappa Free Light Chain:152.5   Ig Lambda Free Light Chain 87.1   Kappa/Lambda FluidC Ratio 1.75  Hepatitis panel nonreactive  HIV nonreactive  QuantiFERON gold negative  protein electrophoresis see labs  hypersensitivity pneumonitis profile negative  GBM antibodies: elevated >8  phospholipase A2 receptor Ab: Negative  ANCA: negative   7/17 Renal biopsy and bronchoscopy performed.  7/19 CM confirmed HD chair time for patient. Will be TTS 10:30 AM with start date Tuesday 7/23.   Bronchial lavage: Right- Negative for malignancy. Abundant pulmonary macrophages. Few benign bronchial cells. Left- Negative for malignancy, pulmonary macrophage, mixed inflammatory cells, Few benign bronchial cells  7/20 PLEX initiated  7/22 R permacath placed by IR. Renal bx: diffuse crescentic glomerulonephritis, Anti-GMB type  Chest Xray 7/28: Showed no Pulmonary hemorrhage       Plan  Nephrology onboard - Dialysis TTS   Upon discussion with nephrology, patient can be discharged and will be medically stable after  receiving her last PLEX on Thursday 8/1/2024  Hematology onboard for PLEX  Began prednisone taper 7/27 per nephrology  Cyclophosphamide 100 mg daily for 3 months   Discontinue Torsemide to 100 mg daily given aneuric.   Bactrim [facility] for PCP prophylaxis.   Monitor blood glucose levels  Hold home dyazide    Moderate persistent asthma w/out acute exacerbation  Assessment & Plan  Assessment:  Patient has PFTs ordered outpatient but they have not been completed.  Home medication regime Advair -21 mcg 2 puff twice daily, Proventil 2 puffs every 6 hours as needed  On 2 L via nasal cannula as needed.  O2 sat 95 to 96%    Plan:  Supplemental oxygen as needed  DuoNebs every 6 hours, per pulmonology  Albuterol inhaler PRN  Continue fluticasone-vilanterol  Continue loratadine 10 mg    Anemia  Assessment & Plan  In the setting of new kidney failure 2/2 rapidly progressive glomerulonephritis  7/27 S/p 1 U pRBC s  7/29: S/p 1 U pRBCs    Plan:  Monitor hemoglobin with CBC  Transfuse for Hgb <7    Persistent cough  Assessment & Plan  Patient endorses worsening shortness of breath and difficulty breathing  Patient noted to have multiple diffuse scattered pulmonary nodules on CT  Quantiferon gold negative 7/9/24  Completed Ceftriaxone x 5 days.      Abnormality of ascending aorta  Assessment & Plan  CT chest: Unchanged fusiform ectasia of the ascending thoracic aorta measuring up to 40 mm     Plan  Recommendation for follow-up low radiation dose chest CT in one year    Dyslipidemia  Assessment & Plan  Continue on atorvastatin 20 Mg    Primary hypertension  Assessment & Plan  Hold Lopressor due to bradycardia  Hold Dyazide  Nifedipine 60 mg daily  Hydralazine PRN  Monitor vitals as per protocol    Bipolar 1 disorder (HCC)  Assessment & Plan  Continue on Effexor, trazodone and Lamictal    Sepsis (HCC)-resolved as of 7/14/2024  Assessment & Plan    WBC   Date Value Ref Range Status   07/27/2024 21.46 (H) 4.31 - 10.16 Thousand/uL  Final     Patient admitted with sepsis likely secondary to pneumonitis as evidenced by tachycardia, tachypnea and leukocytosis  CT: redemonstration of multiple diffuse scattered nodules, less pronounced than on 2024 suggestive of mycobacterial infection  QuantiFERON gold negative   BC:  staph epidermis detected-contaminated  Leukocytosis likely due to steroid use  Completed Ceftriaxone x 5 days         VTE Pharmacologic Prophylaxis: VTE Score: 4 Moderate Risk (Score 3-4) - Pharmacological DVT Prophylaxis Ordered: heparin.    Mobility:   Basic Mobility Inpatient Raw Score: 24  JH-HLM Goal: 8: Walk 250 feet or more  JH-HLM Achieved: 8: Walk 250 feet ot more  JH-HLM Goal achieved. Continue to encourage appropriate mobility.    Patient Centered Rounds: I performed bedside rounds with nursing staff today.  Discussions with Specialists or Other Care Team Provider: Nephrology    Education and Discussions with Family / Patient: Updated  (sister) via phone.    Current Length of Stay: 17 day(s)  Current Patient Status: Inpatient   Discharge Plan: Anticipate discharge in >72 hrs to home.    Code Status: Level 1 - Full Code    Subjective:   Patient was seen and examined this morning.  Complained of dry cough.  She also mentioned that she has chest tightness normally in the afternoon, but no chest pain or palpitations. However, she was breathing comfortably on 2 L nasal cannula.  She mentioned that she has not had a bowel movement in the past 5 days.  She was agreeable to get a suppository.    Objective:     Vitals:   Temp (24hrs), Av.4 °F (36.9 °C), Min:97.6 °F (36.4 °C), Max:99.1 °F (37.3 °C)    Temp:  [97.6 °F (36.4 °C)-99.1 °F (37.3 °C)] 98.4 °F (36.9 °C)  HR:  [78-99] 83  Resp:  [15-17] 16  BP: (128-149)/(70-85) 144/79  SpO2:  [92 %-100 %] 100 %  Body mass index is 46.04 kg/m².     Input and Output Summary (last 24 hours):     Intake/Output Summary (Last 24 hours) at 2024 5327  Last data filed  at 7/29/2024 0711  Gross per 24 hour   Intake 200 ml   Output --   Net 200 ml       Physical Exam:   Physical Exam  Vitals and nursing note reviewed.   Constitutional:       General: She is not in acute distress.     Appearance: She is well-developed.   HENT:      Head: Normocephalic and atraumatic.   Eyes:      Conjunctiva/sclera: Conjunctivae normal.   Cardiovascular:      Rate and Rhythm: Normal rate and regular rhythm.      Heart sounds: No murmur heard.  Pulmonary:      Effort: Pulmonary effort is normal. No respiratory distress.      Breath sounds: Normal breath sounds.      Comments: 2 L  Abdominal:      Palpations: Abdomen is soft.      Tenderness: There is no abdominal tenderness.   Musculoskeletal:         General: No swelling.      Cervical back: Neck supple.      Right lower leg: No edema.      Left lower leg: No edema.   Skin:     General: Skin is warm and dry.      Capillary Refill: Capillary refill takes less than 2 seconds.   Neurological:      Mental Status: She is alert.   Psychiatric:         Mood and Affect: Mood normal.          Additional Data:     Labs:  Results from last 7 days   Lab Units 07/29/24  0558   WBC Thousand/uL 17.92*   HEMOGLOBIN g/dL 6.8*   HEMATOCRIT % 21.8*   PLATELETS Thousands/uL 136*     Results from last 7 days   Lab Units 07/28/24  0533   SODIUM mmol/L 137   POTASSIUM mmol/L 4.7   CHLORIDE mmol/L 105   CO2 mmol/L 24   BUN mg/dL 54*   CREATININE mg/dL 5.60*   ANION GAP mmol/L 8   CALCIUM mg/dL 9.2   GLUCOSE RANDOM mg/dL 118         Results from last 7 days   Lab Units 07/29/24  1146 07/29/24  0710 07/28/24  2105 07/28/24  1604 07/28/24  1102 07/28/24  0712 07/27/24  2048 07/27/24  1539 07/27/24  1120 07/27/24  0705 07/26/24  2103 07/26/24  1553   POC GLUCOSE mg/dl 133 99 173* 191* 127 109 139 200* 117 131 171* 185*               Lines/Drains:  Invasive Devices       Peripheral Intravenous Line  Duration             Peripheral IV 07/27/24 Right Antecubital 1 day               Hemodialysis Catheter  Duration             HD Permanent Double Catheter 7 days                          Imaging: No pertinent imaging reviewed.    Recent Cultures (last 7 days):         Last 24 Hours Medication List:   Current Facility-Administered Medications   Medication Dose Route Frequency Provider Last Rate    acetaminophen  650 mg Oral Q6H PRN Tram Palacios PA-C      albumin human  200 g Intravenous Daily Joselyn Reyes Bahamonde, MD      albuterol  2 puff Inhalation Q4H PRN Tram Palacios PA-C      aluminum-magnesium hydroxide-simethicone  30 mL Oral Q4H PRN Yasmine Meadows MD      atorvastatin  20 mg Oral Daily Tram Palacios PA-C      bisacodyl  10 mg Rectal Daily PRN Daya May DO      [START ON 7/30/2024] calcium carbonate  1 tablet Oral Daily With Breakfast Yasmine Meadows MD      cyclophosphamide  100 mg Oral Daily Tirso Montez MD      famotidine  10 mg Oral Daily PRN Daya May DO      fluticasone-vilanterol  1 puff Inhalation Daily Tram Palacios PA-C      heparin (porcine)  5,000 Units Subcutaneous Q8H [facility] Xavier Medina MD      insulin lispro  2-12 Units Subcutaneous TID AC Yasmine Meadows MD      ipratropium-albuterol  3 mL Nebulization Q6H Luke Montez MD      iron polysaccharides  150 mg Oral Daily Ata Burns MD      lamoTRIgine  100 mg Oral QAM Tram Palacios PA-C      loratadine  10 mg Oral Daily Tram Palacios PA-C      melatonin  3 mg Oral Daily PRN Yasmine Meadows MD      montelukast  10 mg Oral Daily Tram Palacios PA-C      NIFEdipine  60 mg Oral Daily Joselyn Reyes Bahamonde, MD      ondansetron  4 mg Intravenous Q6H PRN Yasmine Meadows MD      oxybutynin  5 mg Oral Daily Tram Palacios PA-C      pantoprazole  40 mg Oral Daily Before Breakfast Ronny Webster MD      polyethylene glycol  17 g Oral Daily Xavier Medina MD      [START ON 8/3/2024] predniSONE  30 mg Oral Daily Joselyn Reyes Bahamonde, MD      predniSONE  40 mg Oral Daily Joselyn Reyes Bahamonde, MD      senna  2 tablet Oral BID Yasmine  MD Abdiel      sevelamer  1,600 mg Oral TID With Meals Vane Estrada MD      Sodium Zirconium Cyclosilicate  10 g Oral Daily Tirso Montez MD      sulfamethoxazole-trimethoprim  1 tablet Oral Once per day on Monday Wednesday Friday Joselyn Reyes Bahamonde, MD      traZODone  200 mg Oral HS Tram Palacios PA-C      venlafaxine  150 mg Oral Daily Tram Palacios PA-C          Today, Patient Was Seen By: Yasmine Meadows MD    **Please Note: This note may have been constructed using a voice recognition system.**

## 2024-07-29 NOTE — PROGRESS NOTES
NEPHROLOGY PROGRESS NOTE   Ngozi Beard 64 y.o. female MRN: [id number]  Unit/Bed#: S -01 Encounter: [id number]      ASSESSMENT/PLAN:  Severe ANGELICA, HD dependent due to anti-GBM  Baseline creatinine 0.8-0.9 as of May 2024  Admitted with creatinine of 4.6 which peaked at 11.5  S/p renal biopsy 7/17: consistent with diffuse crescentic G consistent with anti-GBM disease  Currently dialysis dependent on TTS schedule  No current evidence of renal recovery as she remains anuric  HD today  Anti-GBM disease  Currently on plasmapheresis through 8/1  Immunosuppression:  Status post methylprednisolone x 3  Currently on prednisone taper per Dr Reyes note from 7/30. Currently on 40mg daily this week until 8/3   Cytoxan 100 mg daily for 3 months  Prophylaxis  Continue calcium and vitamin D  Pantoprazole 40 mg daily  Bactrim 1 tab 3 times a week  Nephrotic range proteinuria  UPC ratio 5.8 g  Anemia of CKD  Hemoglobin 6.8 today and getting transfusion  On oral iron  Hypertension  On nifedipine 60 mg daily and torsemide 100 mg daily  Remains an uric so we will consider discontinuing torsemide  Mineral bone disease of CKD  On Renagel 2 tabs 3 times daily with meals  Hyperkalemia  Currently on Lokelma 10 g daily  Shortness of breath  Chest x-ray 7/28: No acute cardiopulmonary disease    Plan Summary:   Check a.m. BMP and phosphorus  Next HD tomorrow  Continue prednisone, Cytoxan and plasmapheresis  Has outpatient dialysis set up TTS at [facility] per discussion with case management    SUBJECTIVE:  Feeling weak and tired.  Has a cough but no current shortness of breath.  No urine output.    OBJECTIVE:  Current Weight: Weight - Scale: 118 kg (259 lb 14.8 oz)  Vitals:    07/29/24 0952   BP: 138/81   Pulse: 85   Resp:    Temp: 98.2 °F (36.8 °C)   SpO2: 98%       Intake/Output Summary (Last 24 hours) at 7/29/2024 1143  Last data filed at 7/29/2024 0711  Gross per 24 hour   Intake 200 ml   Output --   Net 200 ml       General:  Ill-appearing but no acute distress  Skin:  No rash  Eyes:  Sclerae anicteric, no periorbital edema   ENT:  Moist mucous membranes  Neck:  Trachea midline, symmetric   Chest:  Clear to auscultation bilaterally with no wheezes, rales or rhonchi  CVS:  Regular rate and rhythm  Abdomen:  Soft, nontender, nondistended  Neuro:  Awake and alert  Psych:  Appropriate affect  Extremities: no lower extremity edema       Medications:  Scheduled Meds:  Current Facility-Administered Medications   Medication Dose Route Frequency Provider Last Rate    acetaminophen  650 mg Oral Q6H PRN Tram Palacios PA-C      albumin human  200 g Intravenous Daily Joselyn Reyes Bahamonde, MD      albuterol  2 puff Inhalation Q4H PRN Tram Palacios PA-C      aluminum-magnesium hydroxide-simethicone  30 mL Oral Q4H PRN Yasmine Meadows MD      atorvastatin  20 mg Oral Daily Tram Palacios PA-C      bisacodyl  10 mg Rectal Daily PRN Daya May,       cyclophosphamide  100 mg Oral Daily Tirso Montez MD      famotidine  10 mg Oral Daily PRN Daya May,       fluticasone-vilanterol  1 puff Inhalation Daily Tram Palacios PA-C      heparin (porcine)  5,000 Units Subcutaneous Q8H JACK Xavier Medina MD      insulin lispro  2-12 Units Subcutaneous TID AC Yasmine Meadows MD      ipratropium-albuterol  3 mL Nebulization Q6H Luke Montez MD      iron polysaccharides  150 mg Oral Daily Ata Burns MD      lamoTRIgine  100 mg Oral QAM Tram Palacios PA-C      loratadine  10 mg Oral Daily Tram Palacios PA-C      melatonin  3 mg Oral Daily PRN Yasmine Meadows MD      montelukast  10 mg Oral Daily Tram Palacios PA-C      NIFEdipine  60 mg Oral Daily Joselyn Reyes Bahamonde, MD      ondansetron  4 mg Intravenous Q6H PRN Yasmine Meadows MD      oxybutynin  5 mg Oral Daily Tram Palacios PA-C      pantoprazole  40 mg Oral Daily Before Breakfast Ronny Webster MD      polyethylene glycol  17 g Oral Daily Xavier Medina MD      [START ON 8/3/2024] predniSONE  30 mg Oral  Daily Joselyn Reyes Bahamonde, MD      predniSONE  40 mg Oral Daily Joselyn Reyes Bahamonde, MD      senna  2 tablet Oral BID Yasmine Meadows MD      sevelamer  1,600 mg Oral TID With Meals Vane Estrada MD      Sodium Zirconium Cyclosilicate  10 g Oral Daily Tirso Montez MD      sulfamethoxazole-trimethoprim  1 tablet Oral Once per day on Monday Wednesday Friday Joselyn Reyes Bahamonde, MD      torsemide  100 mg Oral Daily Joselyn Reyes Bahamonde, MD      traZODone  200 mg Oral HS Tram Palacios PA-C      venlafaxine  150 mg Oral Daily Tram Palacios PA-C         PRN Meds:.  acetaminophen    albuterol    aluminum-magnesium hydroxide-simethicone    bisacodyl    famotidine    melatonin    ondansetron      Laboratory Results:  Results from last 7 days   Lab Units 07/29/24  0558 07/28/24  0539 07/28/24  0533 07/27/24  2109 07/27/24  1118 07/27/24  0817 07/26/24  1536 07/26/24  0504 07/24/24  0632 07/23/24  0537   WBC Thousand/uL 17.92* 19.60*  --   --  21.46*  --   --  24.22* 24.77* 22.40*   HEMOGLOBIN g/dL 6.8* 7.2*  --  7.7* 6.6*  --  7.3* 7.2* 8.3* 8.9*   HEMATOCRIT % 21.8* 22.2*  --  25.6* 21.1*  --  23.0* 22.7* 25.7* 27.5*   PLATELETS Thousands/uL 136* 134*  --   --  135*  --   --  139* 143* 149   SODIUM mmol/L  --   --  137  --   --  138  --   --   --  138   POTASSIUM mmol/L  --   --  4.7  --   --  4.6  --   --   --  4.4   CHLORIDE mmol/L  --   --  105  --   --  111*  --   --   --  102   CO2 mmol/L  --   --  24  --   --  16*  --   --   --  24   BUN mg/dL  --   --  54*  --   --  87*  --   --   --  67*   CREATININE mg/dL  --   --  5.60*  --   --  7.61*  --   --   --  6.34*   CALCIUM mg/dL  --   --  9.2  --   --  9.7  --   --   --  9.3

## 2024-07-29 NOTE — PLAN OF CARE
Problem: PAIN - ADULT  Goal: Verbalizes/displays adequate comfort level or baseline comfort level  Description: Interventions:  - Encourage patient to monitor pain and request assistance  - Assess pain using appropriate pain scale  - Administer analgesics based on type and severity of pain and evaluate response  - Implement non-pharmacological measures as appropriate and evaluate response  - Consider cultural and social influences on pain and pain management  - Notify physician/advanced practitioner if interventions unsuccessful or patient reports new pain  Outcome: Progressing     Problem: INFECTION - ADULT  Goal: Absence or prevention of progression during hospitalization  Description: INTERVENTIONS:  - Assess and monitor for signs and symptoms of infection  - Monitor lab/diagnostic results  - Monitor all insertion sites, i.e. indwelling lines, tubes, and drains  - Monitor endotracheal if appropriate and nasal secretions for changes in amount and color  - [place] appropriate cooling/warming therapies per order  - Administer medications as ordered  - Instruct and encourage patient and family to use good hand hygiene technique  - Identify and instruct in appropriate isolation precautions for identified infection/condition  Outcome: Progressing  Goal: Absence of fever/infection during neutropenic period  Description: INTERVENTIONS:  - Monitor WBC    Outcome: Progressing     Problem: SAFETY ADULT  Goal: Patient will remain free of falls  Description: INTERVENTIONS:  - Educate patient/family on patient safety including physical limitations  - Instruct patient to call for assistance with activity   - Consult OT/PT to assist with strengthening/mobility   - Keep Call bell within reach  - Keep bed low and locked with side rails adjusted as appropriate  - Keep care items and personal belongings within reach  - Initiate and maintain comfort rounds  - Make Fall Risk Sign visible to staff  - Offer Toileting every 2 Hours,  in advance of need  - Initiate/Maintain bed alarm  - Obtain necessary fall risk management equipment  - Apply yellow socks and bracelet for high fall risk patients  - Consider moving patient to room near nurses station  Outcome: Progressing  Goal: Maintain or return to baseline ADL function  Description: INTERVENTIONS:  -  Assess patient's ability to carry out ADLs; assess patient's baseline for ADL function and identify physical deficits which impact ability to perform ADLs (bathing, care of mouth/teeth, toileting, grooming, dressing, etc.)  - Assess/evaluate cause of self-care deficits   - Assess range of motion  - Assess patient's mobility; develop plan if impaired  - Assess patient's need for assistive devices and provide as appropriate  - Encourage maximum independence but intervene and supervise when necessary  - Involve family in performance of ADLs  - Assess for home care needs following discharge   - Consider OT consult to assist with ADL evaluation and planning for discharge  - Provide patient education as appropriate  Outcome: Progressing  Goal: Maintains/Returns to pre admission functional level  Description: INTERVENTIONS:  - Perform AM-PAC 6 Click Basic Mobility/ Daily Activity assessment daily.  - Set and communicate daily mobility goal to care team and patient/family/caregiver.   - Collaborate with rehabilitation services on mobility goals if consulted  - Perform Range of Motion 3 times a day.  - Reposition patient every 2 hours.  - Dangle patient 3 times a day  - Stand patient 3 times a day  - Ambulate patient 3 times a day  - Out of bed to chair 3 times a day   - Out of bed for meals 3 times a day  - Out of bed for toileting  - Record patient progress and toleration of activity level   Outcome: Progressing     Problem: DISCHARGE PLANNING  Goal: Discharge to home or other facility with appropriate resources  Description: INTERVENTIONS:  - Identify barriers to discharge w/patient and caregiver  -  Arrange for needed discharge resources and transportation as appropriate  - Identify discharge learning needs (meds, wound care, etc.)  - Arrange for interpretive services to assist at discharge as needed  - Refer to Case Management Department for coordinating discharge planning if the patient needs post-hospital services based on physician/advanced practitioner order or complex needs related to functional status, cognitive ability, or social support system  Outcome: Progressing     Problem: Knowledge Deficit  Goal: Patient/family/caregiver demonstrates understanding of disease process, treatment plan, medications, and discharge instructions  Description: Complete learning assessment and assess knowledge base.  Interventions:  - Provide teaching at level of understanding  - Provide teaching via preferred learning methods  Outcome: Progressing     Problem: METABOLIC, FLUID AND ELECTROLYTES - ADULT  Goal: Electrolytes maintained within normal limits  Description: INTERVENTIONS:  - Monitor labs and assess patient for signs and symptoms of electrolyte imbalances  - Administer electrolyte replacement as ordered  - Monitor response to electrolyte replacements, including repeat lab results as appropriate  - Instruct patient on fluid and nutrition as appropriate  Outcome: Progressing  Goal: Fluid balance maintained  Description: INTERVENTIONS:  - Monitor labs   - Monitor I/O and WT  - Instruct patient on fluid and nutrition as appropriate  - Assess for signs & symptoms of volume excess or deficit  Outcome: Progressing     Problem: Nutrition/Hydration-ADULT  Goal: Nutrient/Hydration intake appropriate for improving, restoring or maintaining nutritional needs  Description: Monitor and assess patient's nutrition/hydration status for malnutrition. Collaborate with interdisciplinary team and initiate plan and interventions as ordered.  Monitor patient's weight and dietary intake as ordered or per policy. Utilize nutrition  screening tool and intervene as necessary. Determine patient's food preferences and provide high-protein, high-caloric foods as appropriate.     INTERVENTIONS:  - Monitor oral intake, urinary output, labs, and treatment plans  - Assess nutrition and hydration status and recommend course of action  - Evaluate amount of meals eaten  - Assist patient with eating if necessary   - Allow adequate time for meals  - Recommend/ encourage appropriate diets, oral nutritional supplements, and vitamin/mineral supplements  - Order, calculate, and assess calorie counts as needed  - Recommend, monitor, and adjust tube feedings and TPN/PPN based on assessed needs  - Assess need for intravenous fluids  - Provide specific nutrition/hydration education as appropriate  - Include patient/family/caregiver in decisions related to nutrition  Outcome: Progressing     Problem: Prexisting or High Potential for Compromised Skin Integrity  Goal: Skin integrity is maintained or improved  Description: INTERVENTIONS:  - Identify patients at risk for skin breakdown  - Assess and monitor skin integrity  - Assess and monitor nutrition and hydration status  - Monitor labs   - Assess for incontinence   - Turn and reposition patient  - Assist with mobility/ambulation  - Relieve pressure over bony prominences  - Avoid friction and shearing  - Provide appropriate hygiene as needed including keeping skin clean and dry  - Evaluate need for skin moisturizer/barrier cream  - Collaborate with interdisciplinary team   - Patient/family teaching  - Consider wound care consult   Outcome: Progressing     Problem: Potential for Falls  Goal: Patient will remain free of falls  Description: INTERVENTIONS:  - Educate patient/family on patient safety including physical limitations  - Instruct patient to call for assistance with activity   - Consult OT/PT to assist with strengthening/mobility   - Keep Call bell within reach  - Keep bed low and locked with side rails  adjusted as appropriate  - Keep care items and personal belongings within reach  - Initiate and maintain comfort rounds  - Make Fall Risk Sign visible to staff  - Offer Toileting every 2 Hours, in advance of need  - Initiate/Maintain bed alarm  - Obtain necessary fall risk management equipment  - Apply yellow socks and bracelet for high fall risk patients  - Consider moving patient to room near nurses station  Outcome: Progressing

## 2024-07-30 ENCOUNTER — APPOINTMENT (INPATIENT)
Dept: DIALYSIS | Facility: HOSPITAL | Age: 64
DRG: 673 | End: 2024-07-30
Attending: STUDENT IN AN ORGANIZED HEALTH CARE EDUCATION/TRAINING PROGRAM
Payer: MEDICARE

## 2024-07-30 LAB
ABO GROUP BLD BPU: NORMAL
ANION GAP SERPL CALCULATED.3IONS-SCNC: 13 MMOL/L (ref 4–13)
BPU ID: NORMAL
BUN SERPL-MCNC: 98 MG/DL (ref 5–25)
CALCIUM SERPL-MCNC: 9.4 MG/DL (ref 8.4–10.2)
CHLORIDE SERPL-SCNC: 112 MMOL/L (ref 96–108)
CO2 SERPL-SCNC: 16 MMOL/L (ref 21–32)
CREAT SERPL-MCNC: 8.66 MG/DL (ref 0.6–1.3)
CROSSMATCH: NORMAL
ERYTHROCYTE [DISTWIDTH] IN BLOOD BY AUTOMATED COUNT: 17.4 % (ref 11.6–15.1)
FIBRINOGEN PPP-MCNC: 112 MG/DL (ref 207–520)
GBM AB SER IA-ACNC: >8 UNITS (ref 0–0.9)
GBM AB SER IA-ACNC: >8 UNITS (ref 0–0.9)
GFR SERPL CREATININE-BSD FRML MDRD: 4 ML/MIN/1.73SQ M
GLUCOSE SERPL-MCNC: 119 MG/DL (ref 65–140)
GLUCOSE SERPL-MCNC: 124 MG/DL (ref 65–140)
GLUCOSE SERPL-MCNC: 142 MG/DL (ref 65–140)
GLUCOSE SERPL-MCNC: 153 MG/DL (ref 65–140)
GLUCOSE SERPL-MCNC: 171 MG/DL (ref 65–140)
HCT VFR BLD AUTO: 22.2 % (ref 34.8–46.1)
HGB BLD-MCNC: 7 G/DL (ref 11.5–15.4)
MCH RBC QN AUTO: 27.5 PG (ref 26.8–34.3)
MCHC RBC AUTO-ENTMCNC: 31.5 G/DL (ref 31.4–37.4)
MCV RBC AUTO: 87 FL (ref 82–98)
MYCOBACTERIUM SPEC CULT: NORMAL
MYCOBACTERIUM SPEC CULT: NORMAL
PHOSPHATE SERPL-MCNC: 6.8 MG/DL (ref 2.3–4.1)
PLATELET # BLD AUTO: 137 THOUSANDS/UL (ref 149–390)
PMV BLD AUTO: 10.7 FL (ref 8.9–12.7)
POTASSIUM SERPL-SCNC: 4.7 MMOL/L (ref 3.5–5.3)
RBC # BLD AUTO: 2.55 MILLION/UL (ref 3.81–5.12)
RHODAMINE-AURAMINE STN SPEC: NORMAL
RHODAMINE-AURAMINE STN SPEC: NORMAL
SODIUM SERPL-SCNC: 141 MMOL/L (ref 135–147)
UNIT DISPENSE STATUS: NORMAL
UNIT PRODUCT CODE: NORMAL
UNIT PRODUCT VOLUME: 350 ML
UNIT RH: NORMAL
WBC # BLD AUTO: 18.31 THOUSAND/UL (ref 4.31–10.16)

## 2024-07-30 PROCEDURE — 82948 REAGENT STRIP/BLOOD GLUCOSE: CPT

## 2024-07-30 PROCEDURE — 99232 SBSQ HOSP IP/OBS MODERATE 35: CPT | Performed by: INTERNAL MEDICINE

## 2024-07-30 PROCEDURE — 85384 FIBRINOGEN ACTIVITY: CPT | Performed by: INTERNAL MEDICINE

## 2024-07-30 PROCEDURE — 94760 N-INVAS EAR/PLS OXIMETRY 1: CPT

## 2024-07-30 PROCEDURE — 94640 AIRWAY INHALATION TREATMENT: CPT

## 2024-07-30 PROCEDURE — 84100 ASSAY OF PHOSPHORUS: CPT | Performed by: PHYSICIAN ASSISTANT

## 2024-07-30 PROCEDURE — 80048 BASIC METABOLIC PNL TOTAL CA: CPT | Performed by: PHYSICIAN ASSISTANT

## 2024-07-30 PROCEDURE — 85027 COMPLETE CBC AUTOMATED: CPT

## 2024-07-30 RX ORDER — LAMOTRIGINE 100 MG/1
100 TABLET ORAL
Status: DISCONTINUED | OUTPATIENT
Start: 2024-07-30 | End: 2024-08-04 | Stop reason: HOSPADM

## 2024-07-30 RX ORDER — CALCIUM GLUCONATE 20 MG/ML
2 INJECTION, SOLUTION INTRAVENOUS ONCE
Status: DISCONTINUED | OUTPATIENT
Start: 2024-07-30 | End: 2024-08-04 | Stop reason: HOSPADM

## 2024-07-30 RX ADMIN — PANTOPRAZOLE SODIUM 40 MG: 40 TABLET, DELAYED RELEASE ORAL at 06:36

## 2024-07-30 RX ADMIN — ALBUTEROL SULFATE 2 PUFF: 108 AEROSOL, METERED RESPIRATORY (INHALATION) at 09:31

## 2024-07-30 RX ADMIN — HEPARIN SODIUM 5000 UNITS: 5000 INJECTION INTRAVENOUS; SUBCUTANEOUS at 06:36

## 2024-07-30 RX ADMIN — ATORVASTATIN CALCIUM 20 MG: 20 TABLET, FILM COATED ORAL at 14:17

## 2024-07-30 RX ADMIN — LAMOTRIGINE 100 MG: 100 TABLET ORAL at 21:11

## 2024-07-30 RX ADMIN — PREDNISONE 40 MG: 20 TABLET ORAL at 14:17

## 2024-07-30 RX ADMIN — TRAZODONE HYDROCHLORIDE 200 MG: 100 TABLET ORAL at 21:11

## 2024-07-30 RX ADMIN — SENNOSIDES 17.2 MG: 8.6 TABLET, FILM COATED ORAL at 14:17

## 2024-07-30 RX ADMIN — GUAIFENESIN 200 MG: 200 SOLUTION ORAL at 14:17

## 2024-07-30 RX ADMIN — SEVELAMER HYDROCHLORIDE 1600 MG: 800 TABLET ORAL at 19:25

## 2024-07-30 RX ADMIN — HEPARIN SODIUM 5000 UNITS: 5000 INJECTION INTRAVENOUS; SUBCUTANEOUS at 14:17

## 2024-07-30 RX ADMIN — FLUTICASONE FUROATE AND VILANTEROL TRIFENATATE 1 PUFF: 200; 25 POWDER RESPIRATORY (INHALATION) at 09:31

## 2024-07-30 RX ADMIN — POLYETHYLENE GLYCOL 3350 17 G: 17 POWDER, FOR SOLUTION ORAL at 14:17

## 2024-07-30 RX ADMIN — SEVELAMER HYDROCHLORIDE 1600 MG: 800 TABLET ORAL at 14:17

## 2024-07-30 RX ADMIN — LORATADINE 10 MG: 10 TABLET ORAL at 14:18

## 2024-07-30 RX ADMIN — EPOETIN ALFA 8000 UNITS: 4000 SOLUTION INTRAVENOUS; SUBCUTANEOUS at 15:11

## 2024-07-30 RX ADMIN — CYCLOPHOSPHAMIDE 100 MG: 50 CAPSULE ORAL at 19:25

## 2024-07-30 RX ADMIN — IPRATROPIUM BROMIDE AND ALBUTEROL SULFATE 3 ML: 2.5; .5 SOLUTION RESPIRATORY (INHALATION) at 13:11

## 2024-07-30 RX ADMIN — IPRATROPIUM BROMIDE AND ALBUTEROL SULFATE 3 ML: 2.5; .5 SOLUTION RESPIRATORY (INHALATION) at 20:25

## 2024-07-30 RX ADMIN — VENLAFAXINE HYDROCHLORIDE 150 MG: 150 CAPSULE, EXTENDED RELEASE ORAL at 14:17

## 2024-07-30 RX ADMIN — NIFEDIPINE 60 MG: 30 TABLET, FILM COATED, EXTENDED RELEASE ORAL at 14:17

## 2024-07-30 RX ADMIN — IPRATROPIUM BROMIDE AND ALBUTEROL SULFATE 3 ML: 2.5; .5 SOLUTION RESPIRATORY (INHALATION) at 07:04

## 2024-07-30 RX ADMIN — MONTELUKAST 10 MG: 10 TABLET, FILM COATED ORAL at 14:19

## 2024-07-30 RX ADMIN — Medication 1 TABLET: at 14:17

## 2024-07-30 RX ADMIN — OXYBUTYNIN CHLORIDE 5 MG: 5 TABLET, EXTENDED RELEASE ORAL at 14:18

## 2024-07-30 RX ADMIN — HEPARIN SODIUM 5000 UNITS: 5000 INJECTION INTRAVENOUS; SUBCUTANEOUS at 21:12

## 2024-07-30 RX ADMIN — POLYSACCHARIDE-IRON COMPLEX 150 MG: 150 CAPSULE ORAL at 14:19

## 2024-07-30 NOTE — PROGRESS NOTES
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 Given the lack of hydronephrosis this could either reflect adjacent calcification or nonobstructing stone.   Patient reports that she is still producing urine  US kidney and bladder: No hydronephrosis. 4 mm nonobstructing left intrarenal calculus  Urinalysis: 3+ blood.  3+ protein.  Innumerable red blood cells.  2-4 WBCs.  Moderate bacteria   AURA, C3, and C4 normal  Ig Kappa Free Light Chain:152.5   Ig Lambda Free Light Chain 87.1   Kappa/Lambda FluidC Ratio 1.75  Hepatitis panel nonreactive  HIV nonreactive  QuantiFERON gold negative  protein electrophoresis see labs  hypersensitivity pneumonitis profile negative  GBM antibodies: elevated >8  phospholipase A2 receptor Ab: Negative  ANCA: negative   7/17 Renal biopsy and bronchoscopy performed.  7/19 CM confirmed HD chair time for patient. Will be TTS 10:30 AM with start date Tuesday 7/23.   Bronchial lavage: Right- Negative for malignancy. Abundant pulmonary macrophages. Few benign bronchial cells. Left- Negative for malignancy, pulmonary macrophage, mixed inflammatory cells, Few benign bronchial cells  7/20 PLEX initiated  7/22 R permacath placed by IR. Renal bx: diffuse crescentic glomerulonephritis, Anti-GMB type  Chest Xray 7/28: Showed no Pulmonary hemorrhage       Plan  Nephrology onboard - Dialysis TTS   Upon discussion with nephrology, patient can be discharged and will be medically stable after  receiving her last PLEX on Thursday 8/1/2024  Was started on Epogen today by Nephrology  Hematology onboard for PLEX  Began prednisone taper 7/27 per nephrology  Cyclophosphamide 100 mg daily for 3 months   Discontinue Torsemide to 100 mg daily given aneuric.   Bactrim MW for PCP prophylaxis.   Monitor blood glucose levels  Hold home dyazide    Moderate persistent asthma w/out acute exacerbation  Assessment & Plan  Assessment:  Patient has PFTs ordered outpatient but they have not been completed.  Home medication regime Advair -21 mcg 2 puff twice daily, Proventil 2 puffs every 6 hours as needed  On 2 L via nasal cannula as needed.  O2 sat 95 to 96%    Plan:  Supplemental oxygen as needed  DuoNebs every 6 hours, per pulmonology  Albuterol inhaler PRN  Continue fluticasone-vilanterol  Continue loratadine 10 mg    Anemia  Assessment & Plan  In the setting of new kidney failure 2/2 rapidly progressive glomerulonephritis  7/27 S/p 1 U pRBC s  7/29: S/p 1 U pRBCs    Plan:  Monitor hemoglobin with CBC  Transfuse for Hgb <7    Persistent cough  Assessment & Plan  Patient endorses worsening shortness of breath and difficulty breathing  Patient noted to have multiple diffuse scattered pulmonary nodules on CT  Quantiferon gold negative 7/9/24  Completed Ceftriaxone x 5 days.      Abnormality of ascending aorta  Assessment & Plan  CT chest: Unchanged fusiform ectasia of the ascending thoracic aorta measuring up to 40 mm     Plan  Recommendation for follow-up low radiation dose chest CT in one year    Dyslipidemia  Assessment & Plan  Continue on atorvastatin 20 Mg    Primary hypertension  Assessment & Plan  Hold Lopressor due to bradycardia  Hold Dyazide  Nifedipine 60 mg daily  Hydralazine PRN  Monitor vitals as per protocol    Bipolar 1 disorder (HCC)  Assessment & Plan  Continue on Effexor, trazodone and Lamictal    Sepsis (HCC)-resolved as of 7/14/2024  Assessment & Plan    WBC   Date Value Ref Range Status    2024 18.31 (H) 4.31 - 10.16 Thousand/uL Final     Patient admitted with sepsis likely secondary to pneumonitis as evidenced by tachycardia, tachypnea and leukocytosis  CT: redemonstration of multiple diffuse scattered nodules, less pronounced than on 2024 suggestive of mycobacterial infection  QuantiFERON gold negative   BC:  staph epidermis detected-contaminated  Leukocytosis likely due to steroid use  Completed Ceftriaxone x 5 days         VTE Pharmacologic Prophylaxis: VTE Score: 4 Moderate Risk (Score 3-4) - Pharmacological DVT Prophylaxis Ordered: heparin.    Mobility:   Basic Mobility Inpatient Raw Score: 24  JH-HLM Goal: 8: Walk 250 feet or more  JH-HLM Achieved: 7: Walk 25 feet or more  JH-HLM Goal achieved. Continue to encourage appropriate mobility.    Patient Centered Rounds: I performed bedside rounds with nursing staff today.  Discussions with Specialists or Other Care Team Provider: Nephrology    Education and Discussions with Family / Patient: Updated  (sister) via phone.    Current Length of Stay: 18 day(s)  Current Patient Status: Inpatient   Discharge Plan: Anticipate discharge in >72 hrs to home.    Code Status: Level 1 - Full Code    Subjective:   Denied any complaints this morning.  No chest pain, shortness of breath, or palpitations.  Was tolerating her hemodialysis this morning comfortably.    Objective:     Vitals:   Temp (24hrs), Av °F (36.7 °C), Min:97.6 °F (36.4 °C), Max:99 °F (37.2 °C)    Temp:  [97.6 °F (36.4 °C)-99 °F (37.2 °C)] 99 °F (37.2 °C)  HR:  [75-93] 86  Resp:  [16-20] 16  BP: (120-140)/(63-89) 131/72  SpO2:  [93 %-98 %] 93 %  Body mass index is 46.38 kg/m².     Input and Output Summary (last 24 hours):     Intake/Output Summary (Last 24 hours) at 2024 1503  Last data filed at 2024 1301  Gross per 24 hour   Intake 620 ml   Output 3500 ml   Net -2880 ml       Physical Exam:   Physical Exam  Vitals and nursing note reviewed.    Constitutional:       General: She is not in acute distress.     Appearance: She is well-developed.   HENT:      Head: Normocephalic and atraumatic.   Eyes:      Conjunctiva/sclera: Conjunctivae normal.   Cardiovascular:      Rate and Rhythm: Normal rate and regular rhythm.      Heart sounds: No murmur heard.  Pulmonary:      Effort: Pulmonary effort is normal. No respiratory distress.      Breath sounds: Normal breath sounds.   Abdominal:      Palpations: Abdomen is soft.      Tenderness: There is no abdominal tenderness.   Musculoskeletal:         General: No swelling.      Cervical back: Neck supple.   Skin:     General: Skin is warm.      Capillary Refill: Capillary refill takes less than 2 seconds.      Coloration: Skin is pale.   Neurological:      Mental Status: She is alert.   Psychiatric:         Mood and Affect: Mood normal.          Additional Data:     Labs:  Results from last 7 days   Lab Units 07/30/24  0918   WBC Thousand/uL 18.31*   HEMOGLOBIN g/dL 7.0*   HEMATOCRIT % 22.2*   PLATELETS Thousands/uL 137*     Results from last 7 days   Lab Units 07/30/24  0918   SODIUM mmol/L 141   POTASSIUM mmol/L 4.7   CHLORIDE mmol/L 112*   CO2 mmol/L 16*   BUN mg/dL 98*   CREATININE mg/dL 8.66*   ANION GAP mmol/L 13   CALCIUM mg/dL 9.4   GLUCOSE RANDOM mg/dL 142*         Results from last 7 days   Lab Units 07/30/24  1112 07/30/24  0727 07/29/24  1557 07/29/24  1146 07/29/24  0710 07/28/24  2105 07/28/24  1604 07/28/24  1102 07/28/24  0712 07/27/24  2048 07/27/24  1539 07/27/24  1120   POC GLUCOSE mg/dl 153* 119 158* 133 99 173* 191* 127 109 139 200* 117               Lines/Drains:  Invasive Devices       Peripheral Intravenous Line  Duration             Peripheral IV 07/27/24 Right Antecubital 2 days              Hemodialysis Catheter  Duration             HD Permanent Double Catheter 8 days                          Imaging: No pertinent imaging reviewed.    Recent Cultures (last 7 days):         Last 24 Hours  Medication List:   Current Facility-Administered Medications   Medication Dose Route Frequency Provider Last Rate    acetaminophen  650 mg Oral Q6H PRN Tram Palacios PA-C      albumin human  200 g Intravenous Daily Joselyn Reyes Bahamonde, MD      albuterol  2 puff Inhalation Q4H PRN Tram Palacios PA-C      aluminum-magnesium hydroxide-simethicone  30 mL Oral Q4H PRN Yasmine Meadows MD      atorvastatin  20 mg Oral Daily Tram Palacios PA-C      bisacodyl  10 mg Rectal Daily PRN Daya May,       calcium carbonate  1 tablet Oral Daily With Breakfast Yasmine Meadows MD      calcium gluconate  2 g Intravenous Once Yasmine Meadows MD      cyclophosphamide  100 mg Oral Daily [facility] Lindsey Montez MD      epoetin shavonne  8,000 Units Intravenous Once per day on Tuesday Thursday Saturday Ata Burns MD      famotidine  10 mg Oral Daily PRN Daya May,       fluticasone-vilanterol  1 puff Inhalation Daily Tram Palacios PA-C      guaiFENesin  200 mg Oral BID Yasmine Meadows MD      heparin (porcine)  5,000 Units Subcutaneous Q8H [facility] Xavier Medina MD      insulin lispro  2-12 Units Subcutaneous TID AC Yasmine Meadows MD      ipratropium-albuterol  3 mL Nebulization Q6H Luke Montez MD      iron polysaccharides  150 mg Oral Daily Ata Burns MD      lamoTRIgine  100 mg Oral HS Yasmine Meadows MD      loratadine  10 mg Oral Daily Tram Palacios PA-C      melatonin  3 mg Oral Daily PRN Yasmine Meadows MD      montelukast  10 mg Oral Daily Tram Palacios PA-C      NIFEdipine  60 mg Oral Daily Joselyn Reyes Bahamonde, MD      ondansetron  4 mg Intravenous Q6H PRN Yasmine Meadows MD      oxybutynin  5 mg Oral Daily Tram Palacios PA-C      pantoprazole  40 mg Oral Daily Before Breakfast Ronny Webster MD      polyethylene glycol  17 g Oral Daily Xavier Medina MD      [START ON 8/3/2024] predniSONE  30 mg Oral Daily Joselyn Reyes Bahamonde, MD      predniSONE  40 mg Oral Daily Joselyn Reyes Bahamonde, MD      senna  2 tablet Oral BID Yasmine Meadows MD       sevelamer  1,600 mg Oral TID With Meals Vane Estrada MD      Sodium Zirconium Cyclosilicate  10 g Oral Daily Tirso Montez MD      sulfamethoxazole-trimethoprim  1 tablet Oral Once per day on Monday Wednesday Friday Joselyn Reyes Bahamonde, MD      traZODone  200 mg Oral HS Tram Palacios PA-C      venlafaxine  150 mg Oral Daily Tram Palacios PA-C          Today, Patient Was Seen By: Yasmine Meadows MD    **Please Note: This note may have been constructed using a voice recognition system.**

## 2024-07-30 NOTE — ASSESSMENT & PLAN NOTE
WBC   Date Value Ref Range Status   07/30/2024 18.31 (H) 4.31 - 10.16 Thousand/uL Final     Patient admitted with sepsis likely secondary to pneumonitis as evidenced by tachycardia, tachypnea and leukocytosis  CT: redemonstration of multiple diffuse scattered nodules, less pronounced than on 5/16/2024 suggestive of mycobacterial infection  QuantiFERON gold negative   BC: 1/2 staph epidermis detected-contaminated  Leukocytosis likely due to steroid use  Completed Ceftriaxone x 5 days

## 2024-07-30 NOTE — PROGRESS NOTES
NEPHROLOGY PROGRESS NOTE   Ngozi Beard 64 y.o. 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 Currently on 40mg daily this week until 8/3   Cytoxan 100 mg daily for 3 months  Prophylaxis  Continue calcium and vitamin D  Pantoprazole 40 mg daily  Bactrim 1 tab 3 times a week  Nephrotic range proteinuria  UPC ratio 5.8 g  Anemia of CKD  Hemoglobin 7 today  Status post transfusion yesterday  On oral iron  Hypertension  On nifedipine 60 mg daily  Mineral bone disease of CKD  On Renagel 2 tabs 3 times daily with meals  Hyperkalemia  Currently on Lokelma 10 g daily  Follow-up today's BMP  Shortness of breath  Chest x-ray 7/28: No acute cardiopulmonary disease    Plan Summary:   Follow-up today's labs  Dialysis today  Continue prednisone, Cytoxan and plasmapheresis  Has outpatient dialysis set up TTS at [facility] per discussion with case management    SUBJECTIVE:  Continues with a cough but no shortness of breath.  No hemoptysis.    OBJECTIVE:  Current Weight: Weight - Scale: 119 kg (261 lb 12.8 oz)  Vitals:    07/30/24 1000   BP: 138/77   Pulse: 75   Resp: 20   Temp:    SpO2:        Intake/Output Summary (Last 24 hours) at 7/30/2024 1022  Last data filed at 7/30/2024 0910  Gross per 24 hour   Intake 200 ml   Output --   Net 200 ml       General: Ill-appearing, no acute distress  Skin:  No rash  Eyes:  Sclerae anicteric, no  periorbital edema   ENT:  Moist mucous membranes  Neck:  Trachea midline, symmetric   Chest:  Clear to auscultation bilaterally with no wheezes, rales or rhonchi  CVS:  Regular rate and rhythm  Abdomen:  Soft, nontender, nondistended  Neuro:  Awake and alert  Psych:  Appropriate affect  Extremities: Trace edema      Medications:  Scheduled Meds:  Current Facility-Administered Medications   Medication Dose Route Frequency Provider Last Rate    acetaminophen  650 mg Oral Q6H PRN Tram Palacios PA-C      albumin human  200 g Intravenous Daily Joselyn Reyes Bahamonde, MD      albuterol  2 puff Inhalation Q4H PRN Tram Palacios PA-C      aluminum-magnesium hydroxide-simethicone  30 mL Oral Q4H PRN Yasmine Meadows MD      atorvastatin  20 mg Oral Daily Tram Palacios PA-C      bisacodyl  10 mg Rectal Daily PRN Daya May, DO      calcium carbonate  1 tablet Oral Daily With Breakfast Yasmine Meadows MD      cyclophosphamide  100 mg Oral Daily Tirso Lindsey Montez MD      famotidine  10 mg Oral Daily PRN Daya May, DO      fluticasone-vilanterol  1 puff Inhalation Daily Tram Palacios PA-C      guaiFENesin  200 mg Oral BID Yasmine Meadows MD      heparin (porcine)  5,000 Units Subcutaneous Q8H [facility] Xavier Medina MD      insulin lispro  2-12 Units Subcutaneous TID AC Yasmine Meadows MD      ipratropium-albuterol  3 mL Nebulization Q6H Luke Montez MD      iron polysaccharides  150 mg Oral Daily Ata Burns MD      lamoTRIgine  100 mg Oral QAM Tram Palacios PA-C      loratadine  10 mg Oral Daily Tram Palacios PA-C      melatonin  3 mg Oral Daily PRN Yasmine Meadows MD      montelukast  10 mg Oral Daily Tram Palacios PA-C      NIFEdipine  60 mg Oral Daily Joselyn Reyes Bahamonde, MD      ondansetron  4 mg Intravenous Q6H PRN Yasmine Meadows MD      oxybutynin  5 mg Oral Daily Tram Palacios PA-C      pantoprazole  40 mg Oral Daily Before Breakfast Ronny Webster MD      polyethylene glycol  17 g Oral Daily Xavier Medina MD      [START ON  8/3/2024] predniSONE  30 mg Oral Daily Joselyn Reyes Bahamonde, MD      predniSONE  40 mg Oral Daily Joselyn Reyes Bahamonde, MD      senna  2 tablet Oral BID Yasmine Meadows MD      sevelamer  1,600 mg Oral TID With Meals Vane Estrada MD      Sodium Zirconium Cyclosilicate  10 g Oral Daily Tirso Montez MD      sulfamethoxazole-trimethoprim  1 tablet Oral Once per day on Monday Wednesday Friday Joselyn Reyes Bahamonde, MD      traZODone  200 mg Oral HS Tram Palacios PA-C      venlafaxine  150 mg Oral Daily Tram Palacios PA-C         PRN Meds:.  acetaminophen    albuterol    aluminum-magnesium hydroxide-simethicone    bisacodyl    famotidine    melatonin    ondansetron      Laboratory Results:  Results from last 7 days   Lab Units 07/30/24  0918 07/29/24  1432 07/29/24  0558 07/28/24  0539 07/28/24  0533 07/27/24  2109 07/27/24  1118 07/27/24  0817 07/26/24  1536 07/26/24  0504 07/24/24  0632   WBC Thousand/uL 18.31*  --  17.92* 19.60*  --   --  21.46*  --   --  24.22* 24.77*   HEMOGLOBIN g/dL 7.0* 7.7* 6.8* 7.2*  --  7.7* 6.6*  --  7.3* 7.2* 8.3*   HEMATOCRIT % 22.2* 24.5* 21.8* 22.2*  --  25.6* 21.1*  --  23.0* 22.7* 25.7*   PLATELETS Thousands/uL 137*  --  136* 134*  --   --  135*  --   --  139* 143*   SODIUM mmol/L  --   --   --   --  137  --   --  138  --   --   --    POTASSIUM mmol/L  --   --   --   --  4.7  --   --  4.6  --   --   --    CHLORIDE mmol/L  --   --   --   --  105  --   --  111*  --   --   --    CO2 mmol/L  --   --   --   --  24  --   --  16*  --   --   --    BUN mg/dL  --   --   --   --  54*  --   --  87*  --   --   --    CREATININE mg/dL  --   --   --   --  5.60*  --   --  7.61*  --   --   --    CALCIUM mg/dL  --   --   --   --  9.2  --   --  9.7  --   --   --

## 2024-07-30 NOTE — PLAN OF CARE
Target UF Goal  2.5-3  L as tolerated. Patient dialyzing for  3.5hr  on 2 K bath for serum K of  4.7  per protocol. Treatment plan reviewed with Nephrology.      Problem: METABOLIC, FLUID AND ELECTROLYTES - ADULT  Goal: Electrolytes maintained within normal limits  Description: INTERVENTIONS:  - Monitor labs and assess patient for signs and symptoms of electrolyte imbalances  - Administer electrolyte replacement as ordered  - Monitor response to electrolyte replacements, including repeat lab results as appropriate  - Instruct patient on fluid and nutrition as appropriate  Outcome: Progressing     Problem: METABOLIC, FLUID AND ELECTROLYTES - ADULT  Goal: Fluid balance maintained  Description: INTERVENTIONS:  - Monitor labs   - Monitor I/O and WT  - Instruct patient on fluid and nutrition as appropriate  - Assess for signs & symptoms of volume excess or deficit  Outcome: Progressing

## 2024-07-30 NOTE — PLAN OF CARE
Problem: PAIN - ADULT  Goal: Verbalizes/displays adequate comfort level or baseline comfort level  Description: Interventions:  - Encourage patient to monitor pain and request assistance  - Assess pain using appropriate pain scale  - Administer analgesics based on type and severity of pain and evaluate response  - Implement non-pharmacological measures as appropriate and evaluate response  - Consider cultural and social influences on pain and pain management  - Notify physician/advanced practitioner if interventions unsuccessful or patient reports new pain  Outcome: Progressing     Problem: INFECTION - ADULT  Goal: Absence of fever/infection during neutropenic period  Description: INTERVENTIONS:  - Monitor WBC    Outcome: Progressing     Problem: SAFETY ADULT  Goal: Maintain or return to baseline ADL function  Description: INTERVENTIONS:  -  Assess patient's ability to carry out ADLs; assess patient's baseline for ADL function and identify physical deficits which impact ability to perform ADLs (bathing, care of mouth/teeth, toileting, grooming, dressing, etc.)  - Assess/evaluate cause of self-care deficits   - Assess range of motion  - Assess patient's mobility; develop plan if impaired  - Assess patient's need for assistive devices and provide as appropriate  - Encourage maximum independence but intervene and supervise when necessary  - Involve family in performance of ADLs  - Assess for home care needs following discharge   - Consider OT consult to assist with ADL evaluation and planning for discharge  - Provide patient education as appropriate  Outcome: Progressing     Problem: DISCHARGE PLANNING  Goal: Discharge to home or other facility with appropriate resources  Description: INTERVENTIONS:  - Identify barriers to discharge w/patient and caregiver  - Arrange for needed discharge resources and transportation as appropriate  - Identify discharge learning needs (meds, wound care, etc.)  - Arrange for  interpretive services to assist at discharge as needed  - Refer to Case Management Department for coordinating discharge planning if the patient needs post-hospital services based on physician/advanced practitioner order or complex needs related to functional status, cognitive ability, or social support system  Outcome: Progressing     Problem: Knowledge Deficit  Goal: Patient/family/caregiver demonstrates understanding of disease process, treatment plan, medications, and discharge instructions  Description: Complete learning assessment and assess knowledge base.  Interventions:  - Provide teaching at level of understanding  - Provide teaching via preferred learning methods  Outcome: Progressing

## 2024-07-30 NOTE — HEMODIALYSIS
Post-Dialysis RN Treatment Note    Blood Pressure:  Pre 134/73 mm/Hg  Post 120/74 mmHg   EDW  tbd     Weight:  Pre 118.7 kg   Post 115.9 kg   Mode of weight measurement: Standing Scale   Volume Removed  3000 ml    Treatment duration 210 minutes    NS given  No    Treatment shortened? No   Medications given during Rx Not Applicable   Estimated Kt/V  Not Applicable   Access type: Permacath/TDC   Access Issues: no   Report called to primary nurse   Yes

## 2024-07-30 NOTE — ASSESSMENT & PLAN NOTE
Assessment:  Patient admitted with acute renal failure with creatinine of 5.74 (baseline .8)  Patient had BUN 73 and GFR of 6  CT A/P: No hydronephrosis, however there is apparent 3 mm stone near or questionably within the distal right ureter (axial image 142). Given the lack of hydronephrosis this could either reflect adjacent calcification or nonobstructing stone.   Patient reports that she is still producing urine  US kidney and bladder: No hydronephrosis. 4 mm nonobstructing left intrarenal calculus  Urinalysis: 3+ blood.  3+ protein.  Innumerable red blood cells.  2-4 WBCs.  Moderate bacteria   AURA, C3, and C4 normal  Ig Kappa Free Light Chain:152.5   Ig Lambda Free Light Chain 87.1   Kappa/Lambda FluidC Ratio 1.75  Hepatitis panel nonreactive  HIV nonreactive  QuantiFERON gold negative  protein electrophoresis see labs  hypersensitivity pneumonitis profile negative  GBM antibodies: elevated >8  phospholipase A2 receptor Ab: Negative  ANCA: negative   7/17 Renal biopsy and bronchoscopy performed.  7/19 CM confirmed HD chair time for patient. Will be TTS 10:30 AM with start date Tuesday 7/23.   Bronchial lavage: Right- Negative for malignancy. Abundant pulmonary macrophages. Few benign bronchial cells. Left- Negative for malignancy, pulmonary macrophage, mixed inflammatory cells, Few benign bronchial cells  7/20 PLEX initiated  7/22 R permacath placed by IR. Renal bx: diffuse crescentic glomerulonephritis, Anti-GMB type  Chest Xray 7/28: Showed no Pulmonary hemorrhage       Plan  Nephrology onboard - Dialysis TTS   Upon discussion with nephrology, patient can be discharged and will be medically stable after receiving her last PLEX on Thursday 8/1/2024  Was started on Epogen today by Nephrology  Hematology onboard for PLEX  Began prednisone taper 7/27 per nephrology  Cyclophosphamide 100 mg daily for 3 months   Discontinue Torsemide to 100 mg daily given aneuric.   Bactrim [facility] for PCP prophylaxis.   Monitor blood  glucose levels  Hold home dyazide

## 2024-07-31 LAB
ERYTHROCYTE [DISTWIDTH] IN BLOOD BY AUTOMATED COUNT: 17.5 % (ref 11.6–15.1)
FIBRINOGEN PPP-MCNC: 141 MG/DL (ref 207–520)
GLUCOSE SERPL-MCNC: 135 MG/DL (ref 65–140)
GLUCOSE SERPL-MCNC: 158 MG/DL (ref 65–140)
GLUCOSE SERPL-MCNC: 159 MG/DL (ref 65–140)
GLUCOSE SERPL-MCNC: 166 MG/DL (ref 65–140)
HCT VFR BLD AUTO: 25.2 % (ref 34.8–46.1)
HGB BLD-MCNC: 7.8 G/DL (ref 11.5–15.4)
MCH RBC QN AUTO: 27.8 PG (ref 26.8–34.3)
MCHC RBC AUTO-ENTMCNC: 31 G/DL (ref 31.4–37.4)
MCV RBC AUTO: 90 FL (ref 82–98)
PLATELET # BLD AUTO: 128 THOUSANDS/UL (ref 149–390)
PMV BLD AUTO: 10.3 FL (ref 8.9–12.7)
RBC # BLD AUTO: 2.81 MILLION/UL (ref 3.81–5.12)
WBC # BLD AUTO: 17.97 THOUSAND/UL (ref 4.31–10.16)

## 2024-07-31 PROCEDURE — 85027 COMPLETE CBC AUTOMATED: CPT

## 2024-07-31 PROCEDURE — 85384 FIBRINOGEN ACTIVITY: CPT | Performed by: INTERNAL MEDICINE

## 2024-07-31 PROCEDURE — 94640 AIRWAY INHALATION TREATMENT: CPT

## 2024-07-31 PROCEDURE — 94760 N-INVAS EAR/PLS OXIMETRY 1: CPT

## 2024-07-31 PROCEDURE — 99232 SBSQ HOSP IP/OBS MODERATE 35: CPT | Performed by: INTERNAL MEDICINE

## 2024-07-31 PROCEDURE — 87081 CULTURE SCREEN ONLY: CPT | Performed by: INTERNAL MEDICINE

## 2024-07-31 PROCEDURE — 82948 REAGENT STRIP/BLOOD GLUCOSE: CPT

## 2024-07-31 RX ADMIN — INSULIN LISPRO 2 UNITS: 100 INJECTION, SOLUTION INTRAVENOUS; SUBCUTANEOUS at 11:56

## 2024-07-31 RX ADMIN — IPRATROPIUM BROMIDE AND ALBUTEROL SULFATE 3 ML: 2.5; .5 SOLUTION RESPIRATORY (INHALATION) at 13:41

## 2024-07-31 RX ADMIN — INSULIN LISPRO 2 UNITS: 100 INJECTION, SOLUTION INTRAVENOUS; SUBCUTANEOUS at 16:13

## 2024-07-31 RX ADMIN — Medication 1 TABLET: at 08:16

## 2024-07-31 RX ADMIN — MONTELUKAST 10 MG: 10 TABLET, FILM COATED ORAL at 08:16

## 2024-07-31 RX ADMIN — IPRATROPIUM BROMIDE AND ALBUTEROL SULFATE 3 ML: 2.5; .5 SOLUTION RESPIRATORY (INHALATION) at 20:04

## 2024-07-31 RX ADMIN — SULFAMETHOXAZOLE AND TRIMETHOPRIM 1 TABLET: 400; 80 TABLET ORAL at 08:25

## 2024-07-31 RX ADMIN — LAMOTRIGINE 100 MG: 100 TABLET ORAL at 21:32

## 2024-07-31 RX ADMIN — OXYBUTYNIN CHLORIDE 5 MG: 5 TABLET, EXTENDED RELEASE ORAL at 08:16

## 2024-07-31 RX ADMIN — SEVELAMER HYDROCHLORIDE 1600 MG: 800 TABLET ORAL at 08:16

## 2024-07-31 RX ADMIN — CYCLOPHOSPHAMIDE 100 MG: 50 CAPSULE ORAL at 21:32

## 2024-07-31 RX ADMIN — TRAZODONE HYDROCHLORIDE 200 MG: 100 TABLET ORAL at 21:32

## 2024-07-31 RX ADMIN — PANTOPRAZOLE SODIUM 40 MG: 40 TABLET, DELAYED RELEASE ORAL at 06:26

## 2024-07-31 RX ADMIN — NIFEDIPINE 60 MG: 30 TABLET, FILM COATED, EXTENDED RELEASE ORAL at 08:16

## 2024-07-31 RX ADMIN — ALBUTEROL SULFATE 2 PUFF: 108 AEROSOL, METERED RESPIRATORY (INHALATION) at 08:17

## 2024-07-31 RX ADMIN — BISACODYL 10 MG: 10 SUPPOSITORY RECTAL at 08:17

## 2024-07-31 RX ADMIN — LORATADINE 10 MG: 10 TABLET ORAL at 08:16

## 2024-07-31 RX ADMIN — POLYSACCHARIDE-IRON COMPLEX 150 MG: 150 CAPSULE ORAL at 08:15

## 2024-07-31 RX ADMIN — SEVELAMER HYDROCHLORIDE 1600 MG: 800 TABLET ORAL at 11:56

## 2024-07-31 RX ADMIN — SEVELAMER HYDROCHLORIDE 1600 MG: 800 TABLET ORAL at 16:12

## 2024-07-31 RX ADMIN — VENLAFAXINE HYDROCHLORIDE 150 MG: 150 CAPSULE, EXTENDED RELEASE ORAL at 08:15

## 2024-07-31 RX ADMIN — PREDNISONE 40 MG: 20 TABLET ORAL at 08:16

## 2024-07-31 RX ADMIN — ATORVASTATIN CALCIUM 20 MG: 20 TABLET, FILM COATED ORAL at 08:15

## 2024-07-31 RX ADMIN — SENNOSIDES 17.2 MG: 8.6 TABLET, FILM COATED ORAL at 08:16

## 2024-07-31 RX ADMIN — HEPARIN SODIUM 5000 UNITS: 5000 INJECTION INTRAVENOUS; SUBCUTANEOUS at 06:26

## 2024-07-31 RX ADMIN — GUAIFENESIN 200 MG: 200 SOLUTION ORAL at 21:32

## 2024-07-31 RX ADMIN — POLYETHYLENE GLYCOL 3350 17 G: 17 POWDER, FOR SOLUTION ORAL at 08:17

## 2024-07-31 RX ADMIN — HEPARIN SODIUM 5000 UNITS: 5000 INJECTION INTRAVENOUS; SUBCUTANEOUS at 21:32

## 2024-07-31 RX ADMIN — IPRATROPIUM BROMIDE AND ALBUTEROL SULFATE 3 ML: 2.5; .5 SOLUTION RESPIRATORY (INHALATION) at 07:18

## 2024-07-31 RX ADMIN — HEPARIN SODIUM 5000 UNITS: 5000 INJECTION INTRAVENOUS; SUBCUTANEOUS at 15:22

## 2024-07-31 RX ADMIN — FLUTICASONE FUROATE AND VILANTEROL TRIFENATATE 1 PUFF: 200; 25 POWDER RESPIRATORY (INHALATION) at 08:17

## 2024-07-31 NOTE — PROGRESS NOTES
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 Given the lack of hydronephrosis this could either reflect adjacent calcification or nonobstructing stone.   Patient reports that she is still producing urine  US kidney and bladder: No hydronephrosis. 4 mm nonobstructing left intrarenal calculus  Urinalysis: 3+ blood.  3+ protein.  Innumerable red blood cells.  2-4 WBCs.  Moderate bacteria   AURA, C3, and C4 normal  Ig Kappa Free Light Chain:152.5   Ig Lambda Free Light Chain 87.1   Kappa/Lambda FluidC Ratio 1.75  Hepatitis panel nonreactive  HIV nonreactive  QuantiFERON gold negative  protein electrophoresis see labs  hypersensitivity pneumonitis profile negative  GBM antibodies: elevated >8  phospholipase A2 receptor Ab: Negative  ANCA: negative   7/17 Renal biopsy and bronchoscopy performed.  7/19 CM confirmed HD chair time for patient. Will be TTS.   Bronchial lavage: Negative for TB  7/20 PLEX initiated  7/22 R permacath placed by IR. Renal bx: diffuse crescentic glomerulonephritis, Anti-GMB type  Chest Xray 7/28: Showed no Pulmonary hemorrhage       Plan  Nephrology onboard - Dialysis TTS   Upon discussion with nephrology, patient can be discharged and will be medically stable after receiving her last PLEX on Thursday 8/1/2024  Was started on Epogen today by Nephrology  Hematology onboard for PLEX  Began prednisone taper 7/27 per nephrology  Cyclophosphamide 100 mg daily for 3 months   Discontinue  Torsemide to 100 mg daily given aneuric.   Bactrim MWF for PCP prophylaxis.   Monitor blood glucose levels  Hold home dyazide    Moderate persistent asthma w/out acute exacerbation  Assessment & Plan  Assessment:  Patient has PFTs ordered outpatient but they have not been completed.  Home medication regime Advair -21 mcg 2 puff twice daily, Proventil 2 puffs every 6 hours as needed  On 2 L via nasal cannula as needed.  O2 sat 95 to 96%    Plan:  Supplemental oxygen as needed  DuoNebs every 6 hours, per pulmonology  Albuterol inhaler PRN  Continue fluticasone-vilanterol  Continue loratadine 10 mg    Anemia  Assessment & Plan  In the setting of new kidney failure 2/2 rapidly progressive glomerulonephritis  7/27 S/p 1 U pRBC s  7/29: S/p 1 U pRBCs    Plan:  Monitor hemoglobin with CBC  Transfuse for Hgb <7    Persistent cough  Assessment & Plan  Patient endorses worsening shortness of breath and difficulty breathing  Patient noted to have multiple diffuse scattered pulmonary nodules on CT  Quantiferon gold negative 7/9/24  Completed Ceftriaxone x 5 days.      Abnormality of ascending aorta  Assessment & Plan  CT chest: Unchanged fusiform ectasia of the ascending thoracic aorta measuring up to 40 mm     Plan  Recommendation for follow-up low radiation dose chest CT in one year    Dyslipidemia  Assessment & Plan  Continue on atorvastatin 20 Mg    Primary hypertension  Assessment & Plan  Hold Lopressor due to bradycardia  Hold Dyazide  Nifedipine 60 mg daily  Hydralazine PRN  Monitor vitals as per protocol    Bipolar 1 disorder (HCC)  Assessment & Plan  Continue on Effexor, trazodone and Lamictal    Sepsis (HCC)-resolved as of 7/14/2024  Assessment & Plan    WBC   Date Value Ref Range Status   07/31/2024 17.97 (H) 4.31 - 10.16 Thousand/uL Final     Patient admitted with sepsis likely secondary to pneumonitis as evidenced by tachycardia, tachypnea and leukocytosis  CT: redemonstration of multiple diffuse scattered  nodules, less pronounced than on 2024 suggestive of mycobacterial infection  QuantiFERON gold negative   BC:  staph epidermis detected-contaminated  Leukocytosis likely due to steroid use  Completed Ceftriaxone x 5 days         VTE Pharmacologic Prophylaxis: VTE Score: 4 Moderate Risk (Score 3-4) - Pharmacological DVT Prophylaxis Ordered: heparin.    Mobility:   Basic Mobility Inpatient Raw Score: 24  JH-HLM Goal: 8: Walk 250 feet or more  JH-HLM Achieved: 7: Walk 25 feet or more  JH-HLM Goal achieved. Continue to encourage appropriate mobility.    Patient Centered Rounds: I performed bedside rounds with nursing staff today.  Discussions with Specialists or Other Care Team Provider: Nephrology    Education and Discussions with Family / Patient: Patient declined call to .     Current Length of Stay: 19 day(s)  Current Patient Status: Inpatient   Discharge Plan: Anticipate discharge in >72 hrs to home.    Code Status: Level 1 - Full Code    Subjective:   Patient had no complaints this morning.  She was breathing comfortably on room.  No chest pain, palpitations, shortness of breath.  She still has chronic dry cough but thinks Robitussin is helping. Had a bowel movement today after taking suppository.    Objective:     Vitals:   Temp (24hrs), Av.2 °F (36.8 °C), Min:97.7 °F (36.5 °C), Max:98.6 °F (37 °C)    Temp:  [97.7 °F (36.5 °C)-98.6 °F (37 °C)] 97.7 °F (36.5 °C)  HR:  [82-97] 82  Resp:  [15-18] 16  BP: (138-142)/(79-83) 142/83  SpO2:  [95 %-100 %] 98 %  Body mass index is 45.31 kg/m².     Input and Output Summary (last 24 hours):     Intake/Output Summary (Last 24 hours) at 2024 1641  Last data filed at 2024 0844  Gross per 24 hour   Intake 120 ml   Output --   Net 120 ml       Physical Exam:   Physical Exam  Vitals and nursing note reviewed.   Constitutional:       General: She is not in acute distress.     Appearance: She is well-developed.   HENT:      Head: Normocephalic and  atraumatic.   Eyes:      Conjunctiva/sclera: Conjunctivae normal.   Cardiovascular:      Rate and Rhythm: Normal rate and regular rhythm.      Heart sounds: No murmur heard.  Pulmonary:      Effort: Pulmonary effort is normal. No respiratory distress.      Breath sounds: Normal breath sounds.   Abdominal:      Palpations: Abdomen is soft.      Tenderness: There is no abdominal tenderness.   Musculoskeletal:         General: No swelling.      Cervical back: Neck supple.   Skin:     General: Skin is warm and dry.      Capillary Refill: Capillary refill takes less than 2 seconds.   Neurological:      Mental Status: She is alert.   Psychiatric:         Mood and Affect: Mood normal.          Additional Data:     Labs:  Results from last 7 days   Lab Units 07/31/24  0750   WBC Thousand/uL 17.97*   HEMOGLOBIN g/dL 7.8*   HEMATOCRIT % 25.2*   PLATELETS Thousands/uL 128*     Results from last 7 days   Lab Units 07/30/24  0918   SODIUM mmol/L 141   POTASSIUM mmol/L 4.7   CHLORIDE mmol/L 112*   CO2 mmol/L 16*   BUN mg/dL 98*   CREATININE mg/dL 8.66*   ANION GAP mmol/L 13   CALCIUM mg/dL 9.4   GLUCOSE RANDOM mg/dL 142*         Results from last 7 days   Lab Units 07/31/24  1552 07/31/24  1113 07/31/24  0724 07/30/24  2056 07/30/24  1553 07/30/24  1112 07/30/24  0727 07/29/24  1557 07/29/24  1146 07/29/24  0710 07/28/24  2105 07/28/24  1604   POC GLUCOSE mg/dl 159* 166* 135 171* 124 153* 119 158* 133 99 173* 191*               Lines/Drains:  Invasive Devices       Peripheral Intravenous Line  Duration             Peripheral IV 07/27/24 Right Antecubital 4 days              Hemodialysis Catheter  Duration             HD Permanent Double Catheter 9 days                          Imaging: No pertinent imaging reviewed.    Recent Cultures (last 7 days):         Last 24 Hours Medication List:   Current Facility-Administered Medications   Medication Dose Route Frequency Provider Last Rate    acetaminophen  650 mg Oral Q6H PRN Tram  DARRICK Palacios      albumin human  200 g Intravenous Daily Joselyn Reyes Bahamonde, MD      albuterol  2 puff Inhalation Q4H PRN Tram Palacios PA-C      aluminum-magnesium hydroxide-simethicone  30 mL Oral Q4H PRN Yasmine Meadows MD      atorvastatin  20 mg Oral Daily Tram Palacios PA-C      bisacodyl  10 mg Rectal Daily PRN Daya May,       calcium carbonate  1 tablet Oral Daily With Breakfast Yasmine Meadows MD      calcium gluconate  2 g Intravenous Once Yasmine Meadows MD Stopped (07/30/24 1925)    cyclophosphamide  100 mg Oral Daily Tirso Montez MD      epoetin shavonne  8,000 Units Intravenous Once per day on Tuesday Thursday Saturday Ata Burns MD      famotidine  10 mg Oral Daily PRN Daya May DO      fluticasone-vilanterol  1 puff Inhalation Daily Tram Palacios PA-C      guaiFENesin  200 mg Oral BID Yasmine Meadows MD      heparin (porcine)  5,000 Units Subcutaneous Q8H [facility] Xavier Medina MD      insulin lispro  2-12 Units Subcutaneous TID AC Yasmine Meadows MD      ipratropium-albuterol  3 mL Nebulization Q6H Luke Montez MD      iron polysaccharides  150 mg Oral Daily Ata Burns MD      lamoTRIgine  100 mg Oral HS Yasmine Meadows MD      loratadine  10 mg Oral Daily Tram Palacios PA-C      melatonin  3 mg Oral Daily PRN Yasmine Meadows MD      montelukast  10 mg Oral Daily Tram Palacios PA-C      NIFEdipine  60 mg Oral Daily Joselyn Reyes Bahamonde, MD      ondansetron  4 mg Intravenous Q6H PRN Yasmine Meadows MD      oxybutynin  5 mg Oral Daily Tram Palacios PA-C      pantoprazole  40 mg Oral Daily Before Breakfast Ronny Webster MD      polyethylene glycol  17 g Oral Daily Xavier Medina MD      [START ON 8/3/2024] predniSONE  30 mg Oral Daily Joselyn Reyes Bahamonde, MD      predniSONE  40 mg Oral Daily Joselyn Reyes Bahamonde, MD      senna  2 tablet Oral BID Yasmine Meadows MD      sevelamer  1,600 mg Oral TID With Meals Vane Estrada MD      Sodium Zirconium Cyclosilicate  10 g Oral Daily Tirso Montez MD       sulfamethoxazole-trimethoprim  1 tablet Oral Once per day on Monday Wednesday Friday Joselyn Reyes Bahamonde, MD      traZODone  200 mg Oral HS Tram Palacios PA-C      venlafaxine  150 mg Oral Daily Tram Palacios PA-C          Today, Patient Was Seen By: Yasmine Meadows MD    **Please Note: This note may have been constructed using a voice recognition system.**

## 2024-07-31 NOTE — PLAN OF CARE
Problem: SAFETY ADULT  Goal: Maintain or return to baseline ADL function  Description: INTERVENTIONS:  -  Assess patient's ability to carry out ADLs; assess patient's baseline for ADL function and identify physical deficits which impact ability to perform ADLs (bathing, care of mouth/teeth, toileting, grooming, dressing, etc.)  - Assess/evaluate cause of self-care deficits   - Assess range of motion  - Assess patient's mobility; develop plan if impaired  - Assess patient's need for assistive devices and provide as appropriate  - Encourage maximum independence but intervene and supervise when necessary  - Involve family in performance of ADLs  - Assess for home care needs following discharge   - Consider OT consult to assist with ADL evaluation and planning for discharge  - Provide patient education as appropriate  Outcome: Progressing     Problem: METABOLIC, FLUID AND ELECTROLYTES - ADULT  Goal: Electrolytes maintained within normal limits  Description: INTERVENTIONS:  - Monitor labs and assess patient for signs and symptoms of electrolyte imbalances  - Administer electrolyte replacement as ordered  - Monitor response to electrolyte replacements, including repeat lab results as appropriate  - Instruct patient on fluid and nutrition as appropriate  Outcome: Progressing

## 2024-07-31 NOTE — ASSESSMENT & PLAN NOTE
WBC   Date Value Ref Range Status   07/31/2024 17.97 (H) 4.31 - 10.16 Thousand/uL Final     Patient admitted with sepsis likely secondary to pneumonitis as evidenced by tachycardia, tachypnea and leukocytosis  CT: redemonstration of multiple diffuse scattered nodules, less pronounced than on 5/16/2024 suggestive of mycobacterial infection  QuantiFERON gold negative   BC: 1/2 staph epidermis detected-contaminated  Leukocytosis likely due to steroid use  Completed Ceftriaxone x 5 days

## 2024-07-31 NOTE — PROGRESS NOTES
NEPHROLOGY PROGRESS NOTE   Ngozi Beard 64 y.o. 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  -- Access: Right IJ permacath in place  -- Currently dialyzing TTS  --At this time no evidence of renal recovery as her creatinine continues to rise off dialysis and she has no significant urine output  -- Currently being treated with prednisone taper along with cyclophosphamide 100 mg daily for 3 months with prophylaxis with PPI and Bactrim along with calcium and vitamin D  -- Currently receiving plasmapheresis until August 1  -- Plan for next dialysis tomorrow  --Being set up for outpatient HD at [facility]     Nephrotic range proteinuria  -- Not in steady state but UPC was 5.8 g/g  -- Biopsy-proven anti-GBM  -- Continue current management with prednisone     Hyperkalemia--resolved     Anemia of chronic disease  -- Blood transfusion hemoglobin improved to 7 may require another blood transfusion  -- Plan to start Epogen with dialysis tomorrow     Hypertension  -- Continue nifedipine off torsemide due to anuria  -- Continue current management t     Metabolic acidosis  -- Improving with dialysis      SUBJECTIVE / INTERVAL HISTORY:    No complaints today.  Still having cough but no hemoptysis    OBJECTIVE:  Current Weight: Weight - Scale: 116 kg (255 lb 12.8 oz)  Vitals:    07/30/24 2252 07/31/24 0600 07/31/24 0718 07/31/24 0723   BP: 139/79   142/83   BP Location:       Pulse: 82   82   Resp: 15   16   Temp: 98.4 °F (36.9 °C)   97.7 °F (36.5 °C)   TempSrc:       SpO2: 95%  98% 99%   Weight:   116 kg (255 lb 12.8 oz)         Intake/Output Summary (Last 24 hours) at 7/31/2024 1309  Last data filed at 7/31/2024 0844  Gross per 24 hour   Intake 120 ml   Output --   Net 120 ml       Review of Systems:    Constitutional: Negative for chills and fever.   HENT: Negative for ear pain and sore throat.    Eyes: Negative for pain and visual disturbance.   Respiratory: Negative for cough and shortness of breath.    Cardiovascular: Negative for chest pain and palpitations.   Gastrointestinal: Negative for abdominal pain and vomiting.   Genitourinary: Negative for dysuria and hematuria.   Musculoskeletal: Negative for arthralgias and back pain.   Skin: Negative for color change and rash.   Neurological: Negative for seizures and syncope.   12 point ROS has been reviewed.    Physical Exam  Vitals and nursing note reviewed.   Constitutional:       General: She is not in acute distress.     Appearance: She is well-developed. She is obese.   HENT:      Head: Normocephalic and atraumatic.   Eyes:      General: No scleral icterus.     Conjunctiva/sclera: Conjunctivae normal.      Pupils: Pupils are equal, round, and reactive to light.   Cardiovascular:      Rate and Rhythm: Normal rate and regular rhythm.      Heart sounds: S1 normal and S2 normal. No murmur heard.     No friction rub. No gallop.   Pulmonary:      Effort: Pulmonary effort is normal. No respiratory distress.      Breath sounds: Normal breath sounds. No wheezing or rales.   Abdominal:      General: Bowel sounds are normal.      Palpations: Abdomen is soft.      Tenderness: There is no abdominal tenderness. There is no rebound.   Musculoskeletal:         General: Normal range of motion.      Cervical back: Normal range of motion and neck supple.   Skin:     Findings: No rash.   Neurological:      Mental Status: She is alert and oriented to person, place, and time.   Psychiatric:         Behavior: Behavior normal.         Medications:    Current  Facility-Administered Medications:     acetaminophen (TYLENOL) tablet 650 mg, 650 mg, Oral, Q6H PRN, Tram Palacios PA-C, 650 mg at 07/19/24 1302    albumin human (FLEXBUMIN) 5 % injection 200 g, 200 g, Intravenous, Daily, Joselyn Reyes Bahamonde, MD, 200 g at 07/28/24 0938    albuterol (PROVENTIL HFA,VENTOLIN HFA) inhaler 2 puff, 2 puff, Inhalation, Q4H PRN, Tram Palacios PA-C, 2 puff at 07/31/24 0817    aluminum-magnesium hydroxide-simethicone (MAALOX) oral suspension 30 mL, 30 mL, Oral, Q4H PRN, Yasmine Meadows MD    atorvastatin (LIPITOR) tablet 20 mg, 20 mg, Oral, Daily, Tram Palacios PA-C, 20 mg at 07/31/24 0815    bisacodyl (DULCOLAX) rectal suppository 10 mg, 10 mg, Rectal, Daily PRN, Daya May DO, 10 mg at 07/31/24 0817    calcium carbonate (OYSTER SHELL,OSCAL) 500 mg tablet 1 tablet, 1 tablet, Oral, Daily With Breakfast, Yasmine Meadows MD, 1 tablet at 07/31/24 0816    calcium gluconate 2 g in sodium chloride 0.9% 100 mL (premix), 2 g, Intravenous, Once, Yasmine Meadows MD, Stopped at 07/30/24 1925    cyclophosphamide (CYTOXAN) capsule 100 mg, 100 mg, Oral, Daily, Tirso Montez MD, 100 mg at 07/30/24 1925    epoetin shavonne (EPOGEN,PROCRIT) injection 8,000 Units, 8,000 Units, Intravenous, Once per day on Tuesday Thursday Saturday, Ata Burns MD, 8,000 Units at 07/30/24 1511    famotidine (PEPCID) tablet 10 mg, 10 mg, Oral, Daily PRN, Daya May DO, 10 mg at 07/28/24 2115    fluticasone-vilanterol 200-25 mcg/actuation 1 puff, 1 puff, Inhalation, Daily, Tram Palacios PA-C, 1 puff at 07/31/24 0817    guaiFENesin (ROBITUSSIN) oral liquid 200 mg, 200 mg, Oral, BID, Yasmine Meadows MD, 200 mg at 07/30/24 1417    heparin (porcine) subcutaneous injection 5,000 Units, 5,000 Units, Subcutaneous, Q8H JACK, Xavier Medina MD, 5,000 Units at 07/31/24 0626    insulin lispro (HumALOG/ADMELOG) 100 units/mL subcutaneous injection 2-12 Units, 2-12 Units, Subcutaneous, TID AC, 2 Units at 07/31/24 1156 **AND** Fingerstick  Glucose (POCT), , , TID AC, Yasmine Meadows MD    ipratropium-albuterol (DUO-NEB) 0.5-2.5 mg/3 mL inhalation solution 3 mL, 3 mL, Nebulization, Q6H, Luke Montez MD, 3 mL at 07/31/24 0718    iron polysaccharides (FERREX) capsule 150 mg, 150 mg, Oral, Daily, Ata Burns MD, 150 mg at 07/31/24 0815    lamoTRIgine (LaMICtal) tablet 100 mg, 100 mg, Oral, HS, Yasmine Meadows MD, 100 mg at 07/30/24 2111    loratadine (CLARITIN) tablet 10 mg, 10 mg, Oral, Daily, Tram Palacios PA-C, 10 mg at 07/31/24 0816    melatonin tablet 3 mg, 3 mg, Oral, Daily PRN, Yasmine Meadows MD, 3 mg at 07/28/24 2115    montelukast (SINGULAIR) tablet 10 mg, 10 mg, Oral, Daily, Tram Palacios PA-C, 10 mg at 07/31/24 0816    NIFEdipine (PROCARDIA XL) 24 hr tablet 60 mg, 60 mg, Oral, Daily, Joselyn Reyes Bahamonde, MD, 60 mg at 07/31/24 0816    ondansetron (ZOFRAN) injection 4 mg, 4 mg, Intravenous, Q6H PRN, Yasmine Meadows MD, 4 mg at 07/21/24 1840    oxybutynin (DITROPAN-XL) 24 hr tablet 5 mg, 5 mg, Oral, Daily, Tram Palacios PA-C, 5 mg at 07/31/24 0816    pantoprazole (PROTONIX) EC tablet 40 mg, 40 mg, Oral, Daily Before Breakfast, Ronny Webster MD, 40 mg at 07/31/24 0626    polyethylene glycol (MIRALAX) packet 17 g, 17 g, Oral, Daily, Xavier Medina MD, 17 g at 07/31/24 0817    [START ON 8/3/2024] predniSONE tablet 30 mg, 30 mg, Oral, Daily, Joselyn Reyes Bahamonde, MD    predniSONE tablet 40 mg, 40 mg, Oral, Daily, Joselyn Reyes Bahamonde, MD, 40 mg at 07/31/24 0816    senna (SENOKOT) tablet 17.2 mg, 2 tablet, Oral, BID, Yasmine Meadows MD, 17.2 mg at 07/31/24 0816    sevelamer (RENAGEL) tablet 1,600 mg, 1,600 mg, Oral, TID With Meals, Vane Estrada MD, 1,600 mg at 07/31/24 1156    Sodium Zirconium Cyclosilicate (Lokelma) 10 g, 10 g, Oral, Daily, Tirso Montez MD, 10 g at 07/27/24 0812    sulfamethoxazole-trimethoprim (BACTRIM) 400-80 mg per tablet 1 tablet, 1 tablet, Oral, Once per day on Monday Wednesday Friday, Joselyn Reyes Bahamonde, MD, 1 tablet at  07/31/24 0825    traZODone (DESYREL) tablet 200 mg, 200 mg, Oral, HS, Tram Palacios PA-C, 200 mg at 07/30/24 2111    venlafaxine (EFFEXOR-XR) 24 hr capsule 150 mg, 150 mg, Oral, Daily, Tram Palacios PA-C, 150 mg at 07/31/24 0815    Laboratory Results:  Results from last 7 days   Lab Units 07/31/24  0750 07/30/24  0918 07/29/24  1432 07/29/24  0558 07/28/24  0539 07/28/24  0533 07/27/24  2109 07/27/24  1118 07/27/24  0817 07/26/24  1536 07/26/24  0504   WBC Thousand/uL 17.97* 18.31*  --  17.92* 19.60*  --   --  21.46*  --   --  24.22*   HEMOGLOBIN g/dL 7.8* 7.0* 7.7* 6.8* 7.2*  --  7.7* 6.6*  --    < > 7.2*   HEMATOCRIT % 25.2* 22.2* 24.5* 21.8* 22.2*  --  25.6* 21.1*  --    < > 22.7*   PLATELETS Thousands/uL 128* 137*  --  136* 134*  --   --  135*  --   --  139*   POTASSIUM mmol/L  --  4.7  --   --   --  4.7  --   --  4.6  --   --    CHLORIDE mmol/L  --  112*  --   --   --  105  --   --  111*  --   --    CO2 mmol/L  --  16*  --   --   --  24  --   --  16*  --   --    BUN mg/dL  --  98*  --   --   --  54*  --   --  87*  --   --    CREATININE mg/dL  --  8.66*  --   --   --  5.60*  --   --  7.61*  --   --    CALCIUM mg/dL  --  9.4  --   --   --  9.2  --   --  9.7  --   --    PHOSPHORUS mg/dL  --  6.8*  --   --   --   --   --   --   --   --   --     < > = values in this interval not displayed.       PLEASE NOTE:  This encounter was completed utilizing the M- Modal/Fluency Direct Speech Voice Recognition Software. Grammatical errors, random word insertions, pronoun errors and incomplete sentences are occasional consequences of the system due to software limitations, ambient noise and hardware issues.These may be missed by proof reading prior to affixing electronic signature. Any questions or concerns about the content, text or information contained within the body of this dictation should be directly addressed to the physician for clarification. Please do not hesitate to call me directly if you have any any questions or  concerns.

## 2024-07-31 NOTE — CASE MANAGEMENT
Case Management Progress Note    Patient name Ngozi Beard  Location S /S -01 MRN [id number]  : 1960 Date 2024       LOS (days): 19  Geometric Mean LOS (GMLOS) (days): 5.8  Days to GMLOS:-13.4        OBJECTIVE:        Current admission status: Inpatient  Preferred Pharmacy:   [id number] [place] [place] - [address]  [address] [address]  Phone: [phone] Fax: [phone]    [facility] [address] [facility]  [address] [facility] [address]  [facility] [address]  Phone: [phone] Fax: [phone]    Primary Care Provider: Meenakshi Balbuena MD    Primary Insurance: MEDICARE  Secondary Insurance: [facility]    PROGRESS NOTE:    Weekly Care Management Length of Stay Review     Current LOS: 19 Days    Most Recent Labs:     Lab Results   Component Value Date/Time    WBC 17.97 (H) 2024 07:50 AM    HGB 7.8 (L) 2024 07:50 AM    HCT 25.2 (L) 2024 07:50 AM     (L) 2024 07:50 AM    SODIUM 141 2024 09:18 AM    K 4.7 2024 09:18 AM     (H) 2024 09:18 AM    CO2 16 (L) 2024 09:18 AM    BUN 98 (H) 2024 09:18 AM    CREATININE 8.66 (H) 2024 09:18 AM    GLUC 142 (H) 2024 09:18 AM       Most Recent Vitals:   Vitals:    24 1343   BP:    Pulse:    Resp:    Temp:    SpO2: 98%        Identified Barriers to Discharge/Discharge Goals/Care Management Interventions:  acute renal failure, renal biopsy, IHD-temp need plasmapheresis through .     Intended Discharge Disposition: Home -diaylsis-TTS    Expected Discharge Date:   after diaylsis.

## 2024-07-31 NOTE — CASE MANAGEMENT
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     Abnormality of ascending aorta 2024     Postmenopausal 2024     Encounter for screening mammogram for malignant neoplasm of breast 2024     Allergic rhinitis 2024     Persistent cough 2024     Urge incontinence of urine 2024     Exercise intolerance 2024     Family history of coronary artery bypass graft surgery 2024     Urge urinary incontinence 2024     Female stress incontinence 2024     Paranoid schizophrenia ([facility]) 2023     Moderate persistent asthma w/out acute exacerbation 2023     Asthma due to seasonal allergies 2023     Bipolar 1 disorder ([facility]) 2022     Primary hypertension 2022     Dyslipidemia 2022     Morbid obesity with BMI of 45.0-49.9, adult ([facility]) 2022       LOS (days): 19  Geometric Mean LOS (GMLOS) (days): 5.8  Days to GMLOS:-13.4     OBJECTIVE:  Risk of Unplanned Readmission Score: 25.69         Current admission status: Inpatient   Preferred Pharmacy:   [id number] [address] [address]  Phone: [phone] Fax: [phone]    [facility] - [place] [address] [facility]  [address] [address]  Phone: [phone] Fax: [phone]    Primary Care Provider: Meenakshi Balbuena MD    Primary Insurance: MEDICARE  Secondary Insurance: [employer]  [facility]    DISCHARGE DETAILS:    Discharge planning discussed with:: Patient  Freedom of Choice: Yes  Comments - Freedom of Choice: Reviewed DCP and confirmed HD chair  CM contacted family/caregiver?: No- see comments  Were Treatment Team discharge recommendations reviewed with patient/caregiver?: Yes  Did patient/caregiver verbalize understanding of patient care needs?: Yes  Were patient/caregiver advised of the risks associated with not following Treatment Team discharge recommendations?: Yes    Contacts  Patient Contacts: Patient  Relationship to Patient:: Other (Comment)  Contact Method: In Person  Reason/Outcome: Discharge Planning, Continuity of Care    Requested Home Health Care         Is the patient interested in HHC at discharge?: No    DME Referral Provided  Referral made for DME?: No    Other Referral/Resources/Interventions Provided:  Interventions: Dialysis         Treatment Team Recommendation: Home  Discharge Destination Plan:: Home  Transport at Discharge : Family                                         CM reached out to [facility] to request an updated Welcome letter.    Once this was received CM met with patient at bedside to review with her regarding her chair time, location, and what she needs to bring for her first session.    Patient will call LYNN marley to set up her transport for next Tuesday.    When she is cleared for DC after her HD on Saturday patient stated she will have a ride home with family.    CM department will continue to follow to assist with discharge coordination.

## 2024-07-31 NOTE — ASSESSMENT & PLAN NOTE
Assessment:  Patient admitted with acute renal failure with creatinine of 5.74 (baseline .8)  Patient had BUN 73 and GFR of 6  CT A/P: No hydronephrosis, however there is apparent 3 mm stone near or questionably within the distal right ureter (axial image 142). Given the lack of hydronephrosis this could either reflect adjacent calcification or nonobstructing stone.   Patient reports that she is still producing urine  US kidney and bladder: No hydronephrosis. 4 mm nonobstructing left intrarenal calculus  Urinalysis: 3+ blood.  3+ protein.  Innumerable red blood cells.  2-4 WBCs.  Moderate bacteria   AURA, C3, and C4 normal  Ig Kappa Free Light Chain:152.5   Ig Lambda Free Light Chain 87.1   Kappa/Lambda FluidC Ratio 1.75  Hepatitis panel nonreactive  HIV nonreactive  QuantiFERON gold negative  protein electrophoresis see labs  hypersensitivity pneumonitis profile negative  GBM antibodies: elevated >8  phospholipase A2 receptor Ab: Negative  ANCA: negative   7/17 Renal biopsy and bronchoscopy performed.  7/19 CM confirmed HD chair time for patient. Will be TTS.   Bronchial lavage: Negative for TB  7/20 PLEX initiated  7/22 R permacath placed by IR. Renal bx: diffuse crescentic glomerulonephritis, Anti-GMB type  Chest Xray 7/28: Showed no Pulmonary hemorrhage       Plan  Nephrology onboard - Dialysis TTS   Upon discussion with nephrology, patient can be discharged and will be medically stable after receiving her last PLEX on Thursday 8/1/2024  Was started on Epogen today by Nephrology  Hematology onboard for PLEX  Began prednisone taper 7/27 per nephrology  Cyclophosphamide 100 mg daily for 3 months   Discontinue Torsemide to 100 mg daily given aneuric.   Bactrim MW for PCP prophylaxis.   Monitor blood glucose levels  Hold home dyazide

## 2024-08-01 ENCOUNTER — APPOINTMENT (INPATIENT)
Dept: DIALYSIS | Facility: HOSPITAL | Age: 64
DRG: 673 | End: 2024-08-01
Attending: STUDENT IN AN ORGANIZED HEALTH CARE EDUCATION/TRAINING PROGRAM
Payer: MEDICARE

## 2024-08-01 PROBLEM — R21 RASH: Status: ACTIVE | Noted: 2024-08-01

## 2024-08-01 LAB
ABO GROUP BLD: NORMAL
ANION GAP SERPL CALCULATED.3IONS-SCNC: 13 MMOL/L (ref 4–13)
BLD GP AB SCN SERPL QL: NEGATIVE
BUN SERPL-MCNC: 109 MG/DL (ref 5–25)
CALCIUM SERPL-MCNC: 9.7 MG/DL (ref 8.4–10.2)
CHLORIDE SERPL-SCNC: 111 MMOL/L (ref 96–108)
CO2 SERPL-SCNC: 16 MMOL/L (ref 21–32)
CREAT SERPL-MCNC: 9.35 MG/DL (ref 0.6–1.3)
ERYTHROCYTE [DISTWIDTH] IN BLOOD BY AUTOMATED COUNT: 17.6 % (ref 11.6–15.1)
FIBRINOGEN PPP-MCNC: 113 MG/DL (ref 206–523)
GFR SERPL CREATININE-BSD FRML MDRD: 3 ML/MIN/1.73SQ M
GLUCOSE SERPL-MCNC: 105 MG/DL (ref 65–140)
GLUCOSE SERPL-MCNC: 115 MG/DL (ref 65–140)
GLUCOSE SERPL-MCNC: 136 MG/DL (ref 65–140)
GLUCOSE SERPL-MCNC: 159 MG/DL (ref 65–140)
GLUCOSE SERPL-MCNC: 215 MG/DL (ref 65–140)
HCT VFR BLD AUTO: 21.3 % (ref 34.8–46.1)
HCT VFR BLD AUTO: 24.2 % (ref 34.8–46.1)
HGB BLD-MCNC: 6.7 G/DL (ref 11.5–15.4)
HGB BLD-MCNC: 8 G/DL (ref 11.5–15.4)
MCH RBC QN AUTO: 27.2 PG (ref 26.8–34.3)
MCHC RBC AUTO-ENTMCNC: 31.5 G/DL (ref 31.4–37.4)
MCV RBC AUTO: 87 FL (ref 82–98)
MRSA NOSE QL CULT: NORMAL
PLATELET # BLD AUTO: 122 THOUSANDS/UL (ref 149–390)
PMV BLD AUTO: 10.4 FL (ref 8.9–12.7)
POTASSIUM SERPL-SCNC: 4.7 MMOL/L (ref 3.5–5.3)
RBC # BLD AUTO: 2.46 MILLION/UL (ref 3.81–5.12)
RH BLD: POSITIVE
SODIUM SERPL-SCNC: 140 MMOL/L (ref 135–147)
SPECIMEN EXPIRATION DATE: NORMAL
WBC # BLD AUTO: 16.16 THOUSAND/UL (ref 4.31–10.16)

## 2024-08-01 PROCEDURE — 86901 BLOOD TYPING SEROLOGIC RH(D): CPT

## 2024-08-01 PROCEDURE — 85027 COMPLETE CBC AUTOMATED: CPT | Performed by: INTERNAL MEDICINE

## 2024-08-01 PROCEDURE — 86850 RBC ANTIBODY SCREEN: CPT

## 2024-08-01 PROCEDURE — 30233N1 TRANSFUSION OF NONAUTOLOGOUS RED BLOOD CELLS INTO PERIPHERAL VEIN, PERCUTANEOUS APPROACH: ICD-10-PCS | Performed by: INTERNAL MEDICINE

## 2024-08-01 PROCEDURE — 85014 HEMATOCRIT: CPT

## 2024-08-01 PROCEDURE — 94640 AIRWAY INHALATION TREATMENT: CPT

## 2024-08-01 PROCEDURE — 85384 FIBRINOGEN ACTIVITY: CPT | Performed by: INTERNAL MEDICINE

## 2024-08-01 PROCEDURE — 85018 HEMOGLOBIN: CPT

## 2024-08-01 PROCEDURE — P9016 RBC LEUKOCYTES REDUCED: HCPCS

## 2024-08-01 PROCEDURE — 80048 BASIC METABOLIC PNL TOTAL CA: CPT | Performed by: INTERNAL MEDICINE

## 2024-08-01 PROCEDURE — 99232 SBSQ HOSP IP/OBS MODERATE 35: CPT | Performed by: INTERNAL MEDICINE

## 2024-08-01 PROCEDURE — 86923 COMPATIBILITY TEST ELECTRIC: CPT

## 2024-08-01 PROCEDURE — 86900 BLOOD TYPING SEROLOGIC ABO: CPT

## 2024-08-01 PROCEDURE — 82948 REAGENT STRIP/BLOOD GLUCOSE: CPT

## 2024-08-01 PROCEDURE — 94760 N-INVAS EAR/PLS OXIMETRY 1: CPT

## 2024-08-01 RX ORDER — ALBUMIN, HUMAN INJ 5% 5 %
200 SOLUTION INTRAVENOUS DAILY
Status: DISCONTINUED | OUTPATIENT
Start: 2024-08-01 | End: 2024-08-04 | Stop reason: HOSPADM

## 2024-08-01 RX ORDER — DIPHENHYDRAMINE HCL 25 MG
25 TABLET ORAL EVERY 6 HOURS PRN
Status: DISCONTINUED | OUTPATIENT
Start: 2024-08-01 | End: 2024-08-04 | Stop reason: HOSPADM

## 2024-08-01 RX ORDER — DIPHENHYDRAMINE HYDROCHLORIDE, ZINC ACETATE 2; .1 G/100G; G/100G
CREAM TOPICAL 3 TIMES DAILY PRN
Status: DISCONTINUED | OUTPATIENT
Start: 2024-08-01 | End: 2024-08-04 | Stop reason: HOSPADM

## 2024-08-01 RX ADMIN — SENNOSIDES 17.2 MG: 8.6 TABLET, FILM COATED ORAL at 18:26

## 2024-08-01 RX ADMIN — GUAIFENESIN 200 MG: 200 SOLUTION ORAL at 20:50

## 2024-08-01 RX ADMIN — ATORVASTATIN CALCIUM 20 MG: 20 TABLET, FILM COATED ORAL at 12:59

## 2024-08-01 RX ADMIN — IPRATROPIUM BROMIDE AND ALBUTEROL SULFATE 3 ML: 2.5; .5 SOLUTION RESPIRATORY (INHALATION) at 08:00

## 2024-08-01 RX ADMIN — IPRATROPIUM BROMIDE AND ALBUTEROL SULFATE 3 ML: 2.5; .5 SOLUTION RESPIRATORY (INHALATION) at 13:14

## 2024-08-01 RX ADMIN — ONDANSETRON 4 MG: 2 INJECTION INTRAMUSCULAR; INTRAVENOUS at 16:11

## 2024-08-01 RX ADMIN — PREDNISONE 40 MG: 20 TABLET ORAL at 12:58

## 2024-08-01 RX ADMIN — DIPHENHYDRAMINE HYDROCHLORIDE 25 MG: 25 TABLET ORAL at 18:26

## 2024-08-01 RX ADMIN — TRAZODONE HYDROCHLORIDE 200 MG: 100 TABLET ORAL at 21:41

## 2024-08-01 RX ADMIN — INSULIN LISPRO 2 UNITS: 100 INJECTION, SOLUTION INTRAVENOUS; SUBCUTANEOUS at 13:05

## 2024-08-01 RX ADMIN — HEPARIN SODIUM 5000 UNITS: 5000 INJECTION INTRAVENOUS; SUBCUTANEOUS at 21:41

## 2024-08-01 RX ADMIN — PANTOPRAZOLE SODIUM 40 MG: 40 TABLET, DELAYED RELEASE ORAL at 06:32

## 2024-08-01 RX ADMIN — ALBUTEROL SULFATE 2 PUFF: 108 AEROSOL, METERED RESPIRATORY (INHALATION) at 07:44

## 2024-08-01 RX ADMIN — HEPARIN SODIUM 5000 UNITS: 5000 INJECTION INTRAVENOUS; SUBCUTANEOUS at 06:32

## 2024-08-01 RX ADMIN — LAMOTRIGINE 100 MG: 100 TABLET ORAL at 21:41

## 2024-08-01 RX ADMIN — NIFEDIPINE 60 MG: 30 TABLET, FILM COATED, EXTENDED RELEASE ORAL at 12:58

## 2024-08-01 RX ADMIN — SEVELAMER HYDROCHLORIDE 1600 MG: 800 TABLET ORAL at 15:38

## 2024-08-01 RX ADMIN — CYCLOPHOSPHAMIDE 100 MG: 50 CAPSULE ORAL at 20:49

## 2024-08-01 RX ADMIN — MONTELUKAST 10 MG: 10 TABLET, FILM COATED ORAL at 13:00

## 2024-08-01 RX ADMIN — FLUTICASONE FUROATE AND VILANTEROL TRIFENATATE 1 PUFF: 200; 25 POWDER RESPIRATORY (INHALATION) at 07:44

## 2024-08-01 RX ADMIN — POLYSACCHARIDE-IRON COMPLEX 150 MG: 150 CAPSULE ORAL at 13:00

## 2024-08-01 RX ADMIN — DIPHENHYDRAMINE HYDROCHLORIDE, ZINC ACETATE: 2; .1 CREAM TOPICAL at 15:37

## 2024-08-01 RX ADMIN — Medication 1 TABLET: at 07:40

## 2024-08-01 RX ADMIN — IPRATROPIUM BROMIDE AND ALBUTEROL SULFATE 3 ML: 2.5; .5 SOLUTION RESPIRATORY (INHALATION) at 20:11

## 2024-08-01 RX ADMIN — SEVELAMER HYDROCHLORIDE 1600 MG: 800 TABLET ORAL at 12:58

## 2024-08-01 RX ADMIN — SEVELAMER HYDROCHLORIDE 1600 MG: 800 TABLET ORAL at 07:40

## 2024-08-01 RX ADMIN — HEPARIN SODIUM 5000 UNITS: 5000 INJECTION INTRAVENOUS; SUBCUTANEOUS at 15:38

## 2024-08-01 RX ADMIN — EPOETIN ALFA 8000 UNITS: 4000 SOLUTION INTRAVENOUS; SUBCUTANEOUS at 12:13

## 2024-08-01 RX ADMIN — DIPHENHYDRAMINE HYDROCHLORIDE 25 MG: 25 TABLET ORAL at 12:00

## 2024-08-01 RX ADMIN — LORATADINE 10 MG: 10 TABLET ORAL at 12:58

## 2024-08-01 RX ADMIN — VENLAFAXINE HYDROCHLORIDE 150 MG: 150 CAPSULE, EXTENDED RELEASE ORAL at 12:58

## 2024-08-01 RX ADMIN — OXYBUTYNIN CHLORIDE 5 MG: 5 TABLET, EXTENDED RELEASE ORAL at 12:58

## 2024-08-01 NOTE — ASSESSMENT & PLAN NOTE
Patient developed itchy rash over her anterior chest and back.  Looks like an allergic reaction.  Start Benadryl 25 mg tablet as needed.  Start Benadryl cream as needed.

## 2024-08-01 NOTE — PHYSICIAN ADVISOR
Current patient class: Inpatient  The patient is currently on Hospital Day: 21      The patient was admitted to the hospital at 10:24 AM on 7/12/24 for the following diagnosis:  Sepsis (HCC) [A41.9]     CMS OUTLIER STAY REVIEW    After review of the relevant documentation, labs, vital signs and test results, the patient is appropriate for CONTINUED INPATIENT ADMISSION.     The patient continues to remain hospitalized receiving acute medical care.  The patient has surpassed the expected duration of stay, however given the clinical condition, need for further acute care management, the patient is appropriate to remain in an inpatient status.  The patient still being actively managed, and does have unresolved medical issues requiring further hospitalization.      This review is conducted at 20 days, to help satisfy the requirements for significant outlier stay review as per CMS.  Given the current condition of this patient, the patient satisfies this review was determination for continued inpatient stay.    Rationale is as follows:    The patient is a 64 yrs old Female who presented to the hospital at 7/12/2024 10:24 AM with a chief complaint of abnormal labs.  When the patient came to the hospital creatinine was found to be 5.7.  During the hospitalization patient was seen by nephrology as well as pulmonology for bronchoscopy.  Patient did have a renal biopsy done on 717.  Patient had GBM antibodies which were elevated.  Patient was diagnosed with rapidly progressive glomerulonephritis with anti-GBM antibodies.  Patient is now on dialysis on Tuesdays Thursdays and Saturdays.  The patient is getting plasmapheresis for which the last dose will be today and is also on cyclophosphamide 100 mg daily for the next 3 months.  Patient is also on prednisone is being tapered by nephrology.  Given all of the above the patient has been and continues to receive acute ongoing inpatient medical care.  She should be medically stable  for discharge after today.    The patient’s vitals on arrival were   ED Triage Vitals   Temperature Pulse Respirations Blood Pressure SpO2   07/12/24 1451 07/12/24 1043 07/12/24 1043 07/12/24 1043 07/12/24 1043   98.6 °F (37 °C) 92 17 102/78 95 %      Temp Source Heart Rate Source Patient Position - Orthostatic VS BP Location FiO2 (%)   07/13/24 0900 07/15/24 1437 07/15/24 1935 07/15/24 1935 07/15/24 1602   Oral Monitor Sitting Left arm 80      Pain Score       07/12/24 1100       No Pain           Past Medical History:   Diagnosis Date    Asthma     Chronic pain     Hypertension     Sepsis (HCC) 07/10/2024     Past Surgical History:   Procedure Laterality Date    BACK SURGERY      COLONOSCOPY      IR BIOPSY KIDNEY RANDOM  7/17/2024    IR TEMPORARY DIALYSIS CATHETER PLACEMENT  7/15/2024    IR TUNNELED DIALYSIS CATHETER PLACEMENT  7/22/2024    TUBAL LIGATION             Consults have been placed to:   IP CONSULT TO CASE MANAGEMENT  IP CONSULT TO NEPHROLOGY  INPATIENT CONSULT TO IR  INPATIENT CONSULT TO IR  INPATIENT CONSULT TO IR  INPATIENT CONSULT TO IR  IP CONSULT TO HEMATOLOGY    Vitals:    07/31/24 1643 07/31/24 2006 07/31/24 2230 08/01/24 0727   BP: 147/73  137/72 149/78   Pulse: 78  79 97   Resp: 22  18 18   Temp: 97.7 °F (36.5 °C)  98.5 °F (36.9 °C) 97.7 °F (36.5 °C)   TempSrc:       SpO2: 98% 98% 96% 97%   Weight:           Most recent labs:    Recent Labs     07/30/24  0918 07/31/24  0750   WBC 18.31* 17.97*   HGB 7.0* 7.8*   HCT 22.2* 25.2*   * 128*   K 4.7  --    CALCIUM 9.4  --    BUN 98*  --    CREATININE 8.66*  --        Scheduled Meds:  Current Facility-Administered Medications   Medication Dose Route Frequency Provider Last Rate    acetaminophen  650 mg Oral Q6H PRN Tram Palacios PA-C      albumin human  200 g Intravenous Daily Joselyn Reyes Bahamonde, MD      albuterol  2 puff Inhalation Q4H PRN Tram Stahler, PA-C      aluminum-magnesium hydroxide-simethicone  30 mL Oral Q4H PRN Yasmine Meadosw MD       atorvastatin  20 mg Oral Daily Tram Palacios PA-C      bisacodyl  10 mg Rectal Daily PRN Daya May DO      calcium carbonate  1 tablet Oral Daily With Breakfast Yasmine Meadows MD      calcium gluconate  2 g Intravenous Once Yasmine Meadows MD Stopped (07/30/24 1925)    cyclophosphamide  100 mg Oral Daily Tirso Montez MD      epoetin shavonne  8,000 Units Intravenous Once per day on Tuesday Thursday Saturday Ata Burns MD      famotidine  10 mg Oral Daily PRN Daya May DO      fluticasone-vilanterol  1 puff Inhalation Daily Tram Palacios PA-C      guaiFENesin  200 mg Oral BID Yasmine Meadows MD      heparin (porcine)  5,000 Units Subcutaneous Q8H [facility] Xavier Medina MD      insulin lispro  2-12 Units Subcutaneous TID AC Yasmine Meadows MD      ipratropium-albuterol  3 mL Nebulization Q6H Luke Montez MD      iron polysaccharides  150 mg Oral Daily Ata Burns MD      lamoTRIgine  100 mg Oral HS Yasmine Meadows MD      loratadine  10 mg Oral Daily Tram Palacios PA-C      melatonin  3 mg Oral Daily PRN Yasmine Meadows MD      montelukast  10 mg Oral Daily Tram Palacios PA-C      NIFEdipine  60 mg Oral Daily Joselyn Reyes Bahamonde, MD      ondansetron  4 mg Intravenous Q6H PRN Yasmine Meadows MD      oxybutynin  5 mg Oral Daily Tram Palacios PA-C      pantoprazole  40 mg Oral Daily Before Breakfast Ronny Webster MD      polyethylene glycol  17 g Oral Daily Xavier Medina MD      [START ON 8/3/2024] predniSONE  30 mg Oral Daily Joselyn Reyes Bahamonde, MD      predniSONE  40 mg Oral Daily Joselyn Reyes Bahamonde, MD      senna  2 tablet Oral BID Yasmine Meadows MD      sevelamer  1,600 mg Oral TID With Meals Vane Estrada MD      Sodium Zirconium Cyclosilicate  10 g Oral Daily Tirso Montez MD      sulfamethoxazole-trimethoprim  1 tablet Oral Once per day on Monday Wednesday Friday Joselyn Reyes Bahamonde, MD traZODone  200 mg Oral HS Tram Palacios PA-C      venlafaxine  150 mg Oral Daily Tram Palacios PA-C        Continuous Infusions:   PRN Meds:.  acetaminophen    albuterol    aluminum-magnesium hydroxide-simethicone    bisacodyl    famotidine    melatonin    ondansetron    Surgical procedures (if appropriate):

## 2024-08-01 NOTE — PROGRESS NOTES
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 Given the lack of hydronephrosis this could either reflect adjacent calcification or nonobstructing stone.   Patient reports that she is still producing urine  US kidney and bladder: No hydronephrosis. 4 mm nonobstructing left intrarenal calculus  Urinalysis: 3+ blood.  3+ protein.  Innumerable red blood cells.  2-4 WBCs.  Moderate bacteria   AURA, C3, and C4 normal  Ig Kappa Free Light Chain:152.5   Ig Lambda Free Light Chain 87.1   Kappa/Lambda FluidC Ratio 1.75  Hepatitis panel nonreactive  HIV nonreactive  QuantiFERON gold negative  protein electrophoresis see labs  hypersensitivity pneumonitis profile negative  GBM antibodies: elevated >8  phospholipase A2 receptor Ab: Negative  ANCA: negative   7/17 Renal biopsy and bronchoscopy performed.  7/19 CM confirmed HD chair time for patient. Will be TTS.   Bronchial lavage: Negative for TB  7/20 PLEX initiated  7/22 R permacath placed by IR. Renal bx: diffuse crescentic glomerulonephritis, Anti-GMB type  Chest Xray 7/28: Showed no Pulmonary hemorrhage       Plan  Nephrology onboard - Dialysis TTS   Upon discussion with nephrology, patient can be discharged and will be medically stable after receiving her last PLEX on Thursday 8/1/2024  Was started on Epogen today by Nephrology  Hematology onboard for PLEX  Began prednisone taper 7/27 per nephrology  Cyclophosphamide 100 mg daily for 3 months   Discontinue  Torsemide to 100 mg daily given aneuric.   Bactrim MWF for PCP prophylaxis.   Monitor blood glucose levels  Hold home dyazide    Moderate persistent asthma w/out acute exacerbation  Assessment & Plan  Assessment:  Patient has PFTs ordered outpatient but they have not been completed.  Home medication regime Advair -21 mcg 2 puff twice daily, Proventil 2 puffs every 6 hours as needed  On 2 L via nasal cannula as needed.  O2 sat 95 to 96%    Plan:  Supplemental oxygen as needed  DuoNebs every 6 hours, per pulmonology  Albuterol inhaler PRN  Continue fluticasone-vilanterol  Continue loratadine 10 mg    Anemia  Assessment & Plan  In the setting of new kidney failure 2/2 rapidly progressive glomerulonephritis  7/27 S/p 1 U pRBC s  7/29: S/p 1 U pRBCs  8/1: S/p 1 U pRBCs    Plan:  Monitor hemoglobin with CBC  Transfuse for Hgb <7    Rash  Assessment & Plan  Patient developed itchy rash over her anterior chest and back.  Looks like an allergic reaction.  Start Benadryl 25 mg tablet as needed.  Start Benadryl cream as needed.    Persistent cough  Assessment & Plan  Patient endorses worsening shortness of breath and difficulty breathing  Patient noted to have multiple diffuse scattered pulmonary nodules on CT  Quantiferon gold negative 7/9/24  Completed Ceftriaxone x 5 days.      Abnormality of ascending aorta  Assessment & Plan  CT chest: Unchanged fusiform ectasia of the ascending thoracic aorta measuring up to 40 mm     Plan  Recommendation for follow-up low radiation dose chest CT in one year    Dyslipidemia  Assessment & Plan  Continue on atorvastatin 20 Mg    Primary hypertension  Assessment & Plan  Hold Lopressor due to bradycardia  Hold Dyazide  Nifedipine 60 mg daily  Hydralazine PRN  Monitor vitals as per protocol    Bipolar 1 disorder (HCC)  Assessment & Plan  Continue on Effexor, trazodone and Lamictal    Sepsis (HCC)-resolved as of 7/14/2024  Assessment & Plan    WBC   Date Value Ref Range Status    2024 16.16 (H) 4.31 - 10.16 Thousand/uL Final     Patient admitted with sepsis likely secondary to pneumonitis as evidenced by tachycardia, tachypnea and leukocytosis  CT: redemonstration of multiple diffuse scattered nodules, less pronounced than on 2024 suggestive of mycobacterial infection  QuantiFERON gold negative   BC:  staph epidermis detected-contaminated  Leukocytosis likely due to steroid use  Completed Ceftriaxone x 5 days         VTE Pharmacologic Prophylaxis: VTE Score: 4 Moderate Risk (Score 3-4) - Pharmacological DVT Prophylaxis Ordered: heparin.    Mobility:   Basic Mobility Inpatient Raw Score: 24  JH-HLM Goal: 8: Walk 250 feet or more  JH-HLM Achieved: 7: Walk 25 feet or more  JH-HLM Goal achieved. Continue to encourage appropriate mobility.    Patient Centered Rounds: I performed bedside rounds with nursing staff today.  Discussions with Specialists or Other Care Team Provider: Nephrology    Education and Discussions with Family / Patient: Attempted to update  (sister) via phone. Left voicemail.     Current Length of Stay: 20 day(s)  Current Patient Status: Inpatient   Discharge Plan: Anticipate discharge in >72 hrs to home.    Code Status: Level 1 - Full Code    Subjective:   Patient complained of itchy rash on anterior chest and back this morning.  Rash looks like allergic reaction.  Her cough has improved with Robitussin.  She is having bowel movements.  No chest pain, shortness of breath, or palpitations.  Requested to call her sister.  Attempted to call her sister but left a voicemail.    Objective:     Vitals:   Temp (24hrs), Av.7 °F (37.1 °C), Min:97.7 °F (36.5 °C), Max:99.4 °F (37.4 °C)    Temp:  [97.7 °F (36.5 °C)-99.4 °F (37.4 °C)] 99.3 °F (37.4 °C)  HR:  [72-97] 81  Resp:  [12-22] 18  BP: (112-149)/(71-80) 139/80  SpO2:  [92 %-98 %] 95 %  Body mass index is 45.31 kg/m².     Input and Output Summary (last 24 hours):     Intake/Output Summary (Last 24  hours) at 8/1/2024 1551  Last data filed at 8/1/2024 1530  Gross per 24 hour   Intake 52057.5 ml   Output 3504 ml   Net 9148.5 ml       Physical Exam:   Physical Exam  Vitals and nursing note reviewed.   Constitutional:       General: She is not in acute distress.     Appearance: She is well-developed.   HENT:      Head: Normocephalic and atraumatic.   Eyes:      Conjunctiva/sclera: Conjunctivae normal.   Cardiovascular:      Rate and Rhythm: Normal rate and regular rhythm.      Heart sounds: No murmur heard.  Pulmonary:      Effort: Pulmonary effort is normal. No respiratory distress.      Breath sounds: Normal breath sounds.   Abdominal:      Palpations: Abdomen is soft.      Tenderness: There is no abdominal tenderness.   Musculoskeletal:         General: No swelling.      Cervical back: Neck supple.   Skin:     General: Skin is warm.      Capillary Refill: Capillary refill takes less than 2 seconds.      Findings: Rash present.      Comments: Noticed erythematous rash on anterior chest and back.   Neurological:      Mental Status: She is alert.   Psychiatric:         Mood and Affect: Mood normal.        Additional Data:     Labs:  Results from last 7 days   Lab Units 08/01/24  0844   WBC Thousand/uL 16.16*   HEMOGLOBIN g/dL 6.7*   HEMATOCRIT % 21.3*   PLATELETS Thousands/uL 122*     Results from last 7 days   Lab Units 08/01/24  0858   SODIUM mmol/L 140   POTASSIUM mmol/L 4.7   CHLORIDE mmol/L 111*   CO2 mmol/L 16*   BUN mg/dL 109*   CREATININE mg/dL 9.35*   ANION GAP mmol/L 13   CALCIUM mg/dL 9.7   GLUCOSE RANDOM mg/dL 115         Results from last 7 days   Lab Units 08/01/24  1103 08/01/24  0725 07/31/24  2048 07/31/24  1552 07/31/24  1113 07/31/24  0724 07/30/24  2056 07/30/24  1553 07/30/24  1112 07/30/24  0727 07/29/24  1557 07/29/24  1146   POC GLUCOSE mg/dl 159* 105 158* 159* 166* 135 171* 124 153* 119 158* 133               Lines/Drains:  Invasive Devices       Peripheral Intravenous Line  Duration              Peripheral IV 07/31/24 Left;Ventral (anterior) Forearm <1 day              Hemodialysis Catheter  Duration             HD Permanent Double Catheter 10 days                          Imaging: No pertinent imaging reviewed.    Recent Cultures (last 7 days):         Last 24 Hours Medication List:   Current Facility-Administered Medications   Medication Dose Route Frequency Provider Last Rate    acetaminophen  650 mg Oral Q6H PRN Tram Palacios PA-C      albumin human  200 g Intravenous Daily Yasmine Meadows MD      albuterol  2 puff Inhalation Q4H PRN Tram Palacios PA-C      aluminum-magnesium hydroxide-simethicone  30 mL Oral Q4H PRN Yasmine Meadows MD      atorvastatin  20 mg Oral Daily Tram Palacios PA-C      bisacodyl  10 mg Rectal Daily PRN Daya May DO      calcium carbonate  1 tablet Oral Daily With Breakfast Yasmine Meadows MD      calcium gluconate  2 g Intravenous Once Yasmine Meadows MD Stopped (07/30/24 1925)    cyclophosphamide  100 mg Oral Daily Tirso Montez MD      diphenhydrAMINE  25 mg Oral Q6H PRN Yasmine Meadows MD      diphenhydrAMINE-zinc acetate   Topical TID PRN Yasmine Meadows MD      epoetin shavonne  8,000 Units Intravenous Once per day on Tuesday Thursday Saturday Ata Burns MD      famotidine  10 mg Oral Daily PRN Daya May DO      fluticasone-vilanterol  1 puff Inhalation Daily Tram Palacios PA-C      guaiFENesin  200 mg Oral BID Yasmine Meadows MD      heparin (porcine)  5,000 Units Subcutaneous Q8H JACK Medina MD      insulin lispro  2-12 Units Subcutaneous TID AC Yasmine Meadows MD      ipratropium-albuterol  3 mL Nebulization Q6H Luke Montez MD      iron polysaccharides  150 mg Oral Daily Ata Burns MD      lamoTRIgine  100 mg Oral HS Yasmine Meadows MD      loratadine  10 mg Oral Daily Tram Palacios PA-C      melatonin  3 mg Oral Daily PRN Yasmine Meadows MD      montelukast  10 mg Oral Daily Tram Palacios PA-C      NIFEdipine  60 mg Oral Daily Joselyn Reyes Bahamonde, MD      ondansetron  4  mg Intravenous Q6H PRN Yasmine Meadows MD      oxybutynin  5 mg Oral Daily Tram Palacios PA-C      pantoprazole  40 mg Oral Daily Before Breakfast Ronny Webster MD      polyethylene glycol  17 g Oral Daily Xavier Medina MD      [START ON 8/3/2024] predniSONE  30 mg Oral Daily Joselyn Reyes Bahamonde, MD      predniSONE  40 mg Oral Daily Joselyn Reyes Bahamonde, MD      senna  2 tablet Oral BID Yasmine Meadows MD      sevelamer  1,600 mg Oral TID With Meals Vane Estrada MD      Sodium Zirconium Cyclosilicate  10 g Oral Daily Tirso Montez MD      sulfamethoxazole-trimethoprim  1 tablet Oral Once per day on Monday Wednesday Friday Joselyn Reyes Bahamonde, MD      traZODone  200 mg Oral HS Tram Palacios PA-C      venlafaxine  150 mg Oral Daily Tram Palacios PA-C          Today, Patient Was Seen By: Yasmine Meadows MD    **Please Note: This note may have been constructed using a voice recognition system.**

## 2024-08-01 NOTE — PLAN OF CARE
Target UF Goal  2.5-3  L as tolerated. Patient dialyzing for  3.5 hours  on 2 K bath for serum K of  4.7 from 7/30/24  per protocol. Treatment plan reviewed with Dr. Burns.   Problem: METABOLIC, FLUID AND ELECTROLYTES - ADULT  Goal: Electrolytes maintained within normal limits  Description: INTERVENTIONS:  - Monitor labs and assess patient for signs and symptoms of electrolyte imbalances  - Administer electrolyte replacement as ordered  - Monitor response to electrolyte replacements, including repeat lab results as appropriate  - Instruct patient on fluid and nutrition as appropriate  Outcome: Progressing  Goal: Fluid balance maintained  Description: INTERVENTIONS:  - Monitor labs   - Monitor I/O and WT  - Instruct patient on fluid and nutrition as appropriate  - Assess for signs & symptoms of volume excess or deficit  Outcome: Progressing   Post-Dialysis RN Treatment Note    Blood Pressure:  Pre 132/78 mm/Hg  Post 122/73 mmHg   EDW  tbd kg    Weight:  Pre 117.7 kg   Post 114.7 kg   Mode of weight measurement: Bed Scale   Volume Removed  3000 ml    Treatment duration 210 minutes    NS given  No    Treatment shortened? No   Medications given during Rx Epogen 8000 units   Estimated Kt/V  None Reported   Access type: Permacath/TDC   Access Issues: No    Report called to primary nurse   Yes Jen Cox RN

## 2024-08-01 NOTE — PLAN OF CARE
Problem: PAIN - ADULT  Goal: Verbalizes/displays adequate comfort level or baseline comfort level  Description: Interventions:  - Encourage patient to monitor pain and request assistance  - Assess pain using appropriate pain scale  - Administer analgesics based on type and severity of pain and evaluate response  - Implement non-pharmacological measures as appropriate and evaluate response  - Consider cultural and social influences on pain and pain management  - Notify physician/advanced practitioner if interventions unsuccessful or patient reports new pain  Outcome: Progressing     Problem: INFECTION - ADULT  Goal: Absence or prevention of progression during hospitalization  Description: INTERVENTIONS:  - Assess and monitor for signs and symptoms of infection  - Monitor lab/diagnostic results  - Monitor all insertion sites, i.e. indwelling lines, tubes, and drains  - Monitor endotracheal if appropriate and nasal secretions for changes in amount and color  - [place] appropriate cooling/warming therapies per order  - Administer medications as ordered  - Instruct and encourage patient and family to use good hand hygiene technique  - Identify and instruct in appropriate isolation precautions for identified infection/condition  Outcome: Progressing  Goal: Absence of fever/infection during neutropenic period  Description: INTERVENTIONS:  - Monitor WBC    Outcome: Progressing     Problem: SAFETY ADULT  Goal: Patient will remain free of falls  Description: INTERVENTIONS:  - Educate patient/family on patient safety including physical limitations  - Instruct patient to call for assistance with activity   - Consult OT/PT to assist with strengthening/mobility   - Keep Call bell within reach  - Keep bed low and locked with side rails adjusted as appropriate  - Keep care items and personal belongings within reach  - Initiate and maintain comfort rounds  - Make Fall Risk Sign visible to staff  - Apply yellow socks and bracelet  for high fall risk patients  - Consider moving patient to room near nurses station  Outcome: Progressing  Goal: Maintain or return to baseline ADL function  Description: INTERVENTIONS:  -  Assess patient's ability to carry out ADLs; assess patient's baseline for ADL function and identify physical deficits which impact ability to perform ADLs (bathing, care of mouth/teeth, toileting, grooming, dressing, etc.)  - Assess/evaluate cause of self-care deficits   - Assess range of motion  - Assess patient's mobility; develop plan if impaired  - Assess patient's need for assistive devices and provide as appropriate  - Encourage maximum independence but intervene and supervise when necessary  - Involve family in performance of ADLs  - Assess for home care needs following discharge   - Consider OT consult to assist with ADL evaluation and planning for discharge  - Provide patient education as appropriate  Outcome: Progressing  Goal: Maintains/Returns to pre admission functional level  Description: INTERVENTIONS:  - Perform AM-PAC 6 Click Basic Mobility/ Daily Activity assessment daily.  - Set and communicate daily mobility goal to care team and patient/family/caregiver.   - Collaborate with rehabilitation services on mobility goals if consulted  - Perform Range of Motion   - Reposition patient   - Dangle patient  - Stand patient   - Ambulate patient   - Out of bed to chair   - Out of bed for meals   - Out of bed for toileting  - Record patient progress and toleration of activity level   Outcome: Progressing     Problem: DISCHARGE PLANNING  Goal: Discharge to home or other facility with appropriate resources  Description: INTERVENTIONS:  - Identify barriers to discharge w/patient and caregiver  - Arrange for needed discharge resources and transportation as appropriate  - Identify discharge learning needs (meds, wound care, etc.)  - Arrange for interpretive services to assist at discharge as needed  - Refer to Case Management  Department for coordinating discharge planning if the patient needs post-hospital services based on physician/advanced practitioner order or complex needs related to functional status, cognitive ability, or social support system  Outcome: Progressing     Problem: METABOLIC, FLUID AND ELECTROLYTES - ADULT  Goal: Electrolytes maintained within normal limits  Description: INTERVENTIONS:  - Monitor labs and assess patient for signs and symptoms of electrolyte imbalances  - Administer electrolyte replacement as ordered  - Monitor response to electrolyte replacements, including repeat lab results as appropriate  - Instruct patient on fluid and nutrition as appropriate  Outcome: Progressing  Goal: Fluid balance maintained  Description: INTERVENTIONS:  - Monitor labs   - Monitor I/O and WT  - Instruct patient on fluid and nutrition as appropriate  - Assess for signs & symptoms of volume excess or deficit  Outcome: Progressing     Problem: Nutrition/Hydration-ADULT  Goal: Nutrient/Hydration intake appropriate for improving, restoring or maintaining nutritional needs  Description: Monitor and assess patient's nutrition/hydration status for malnutrition. Collaborate with interdisciplinary team and initiate plan and interventions as ordered.  Monitor patient's weight and dietary intake as ordered or per policy. Utilize nutrition screening tool and intervene as necessary. Determine patient's food preferences and provide high-protein, high-caloric foods as appropriate.     INTERVENTIONS:  - Monitor oral intake, urinary output, labs, and treatment plans  - Assess nutrition and hydration status and recommend course of action  - Evaluate amount of meals eaten  - Assist patient with eating if necessary   - Allow adequate time for meals  - Recommend/ encourage appropriate diets, oral nutritional supplements, and vitamin/mineral supplements  - Order, calculate, and assess calorie counts as needed  - Recommend, monitor, and adjust tube  feedings and TPN/PPN based on assessed needs  - Assess need for intravenous fluids  - Provide specific nutrition/hydration education as appropriate  - Include patient/family/caregiver in decisions related to nutrition  Outcome: Progressing     Problem: Potential for Falls  Goal: Patient will remain free of falls  Description: INTERVENTIONS:  - Educate patient/family on patient safety including physical limitations  - Instruct patient to call for assistance with activity   - Consult OT/PT to assist with strengthening/mobility   - Keep Call bell within reach  - Keep bed low and locked with side rails adjusted as appropriate  - Keep care items and personal belongings within reach  - Initiate and maintain comfort rounds  - Make Fall Risk Sign visible to staff  - Apply yellow socks and bracelet for high fall risk patients  - Consider moving patient to room near nurses station  Outcome: Progressing

## 2024-08-01 NOTE — PROGRESS NOTES
NEPHROLOGY PROGRESS NOTE   Ngozi Beard 64 y.o. 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  -- Currently being treated with prednisone taper along with cyclophosphamide 100 mg daily for 3 months with prophylaxis with PPI and Bactrim along with calcium and vitamin D  --last plasmapheresis for today, August 1  --Completed dialysis this morning  -- Plan to continue outpatient dialysis at [facility]  --After completion of dialysis and plasmapheresis no strong objections to discharge from a renal standpoint with continued follow-up with nephrology at the dialysis unit  --Tentatively plan to start dialysis at the outpatient unit on Tuesday.  Will plan for dialysis on Saturday and then no objections to discharge after dialysis on Saturday.     Nephrotic range proteinuria  -- Not in steady state but UPC was 5.8 g/g  -- Biopsy-proven anti-GBM  -- Continue current management with prednisone  -- Currently on prednisone 40 mg daily for 1 week then will plan to drop to prednisone 30 mg daily starting August 3 for 7 days.  Then plan for 25 mg x 2 weeks.  Then 20 mg for 2 weeks.  Then 15 mg for 2 weeks.  Then 12.5 mg for 2 weeks.  Then 10 mg for 2 weeks.  Then 7.5 mg for 2 weeks.  And then will be maintained on 5 mg daily      Hyperkalemia--resolved     Anemia of chronic disease  --Hemoglobin 6.7  --No objections to blood transfusion  -- Continue Epogen with dialysis, 8000 units with dialysis     Hypertension  -- Continue nifedipine off torsemide due to anuria  -- Continue current management  --Blood pressure is well-controlled     Metabolic acidosis  -- Continues to have acidosis on dialysis.  Increase bicarbonate bath to 40    Immunosuppression  Methylprednisolone x 3, completed  Currently on prednisone 60 mg, started on 7/20  60 mg for 1 week, completed  40 mg for 1 week 7/27  30 mg for 1 week, starting August 3  25 mg for 2 weeks  20 mg for 2 weeks  15 mg for 2 weeks   12.5 mg for 2 weeks  10 mg for 2 weeks  7.5 mg for 2 weeks  Continue with 5 mg daily after that    Cytoxan 100 mg daily adjusted by kidney function and age. Plan to continue for 3 months     Completing plasmapheresis.    SUBJECTIVE / INTERVAL HISTORY:    Completed dialysis today.    OBJECTIVE:  Current Weight: Weight - Scale: 116 kg (255 lb 12.8 oz)  Vitals:    08/01/24 1130 08/01/24 1200 08/01/24 1206 08/01/24 1227   BP: 119/71 122/73 124/77 132/73   Pulse: 84 84 81 80   Resp: 22 22 22 18   Temp:  99.2 °F (37.3 °C)  99.4 °F (37.4 °C)   TempSrc:  Oral     SpO2: 97% 95% 95% 94%   Weight:           Intake/Output Summary (Last 24 hours) at 8/1/2024 1232  Last data filed at 8/1/2024 1200  Gross per 24 hour   Intake 430 ml   Output 3504 ml   Net -3074 ml       Review of Systems:    Constitutional: Negative for chills and fever.   HENT: Negative for ear pain and sore throat.    Eyes: Negative for pain and visual disturbance.   Respiratory: Negative for cough and shortness of breath.    Cardiovascular: Negative for chest pain and palpitations.   Gastrointestinal: Negative for abdominal pain and vomiting.   Genitourinary: Negative for dysuria and hematuria.   Musculoskeletal: Negative for arthralgias and back pain.   Skin: Negative for color change and rash.   Neurological:  Negative for seizures and syncope.   12 point ROS has been reviewed.    Physical Exam  Vitals and nursing note reviewed.   Constitutional:       General: She is not in acute distress.     Appearance: She is well-developed.   HENT:      Head: Normocephalic and atraumatic.   Eyes:      General: No scleral icterus.     Conjunctiva/sclera: Conjunctivae normal.      Pupils: Pupils are equal, round, and reactive to light.   Cardiovascular:      Rate and Rhythm: Normal rate and regular rhythm.      Heart sounds: S1 normal and S2 normal. No murmur heard.     No friction rub. No gallop.   Pulmonary:      Effort: Pulmonary effort is normal. No respiratory distress.      Breath sounds: Normal breath sounds. No wheezing or rales.   Abdominal:      General: Bowel sounds are normal.      Palpations: Abdomen is soft.      Tenderness: There is no abdominal tenderness. There is no rebound.   Musculoskeletal:         General: Normal range of motion.      Cervical back: Normal range of motion and neck supple.   Skin:     Findings: No rash.   Neurological:      Mental Status: She is alert and oriented to person, place, and time.   Psychiatric:         Behavior: Behavior normal.         Medications:    Current Facility-Administered Medications:     acetaminophen (TYLENOL) tablet 650 mg, 650 mg, Oral, Q6H PRN, Tram Palacios PA-C, 650 mg at 07/19/24 1302    albumin human (FLEXBUMIN) 5 % injection 200 g, 200 g, Intravenous, Daily, Yasmine Meadows MD    albuterol (PROVENTIL HFA,VENTOLIN HFA) inhaler 2 puff, 2 puff, Inhalation, Q4H PRN, Tram Palacios PA-C, 2 puff at 08/01/24 0744    aluminum-magnesium hydroxide-simethicone (MAALOX) oral suspension 30 mL, 30 mL, Oral, Q4H PRN, Yasmine Meadows MD    atorvastatin (LIPITOR) tablet 20 mg, 20 mg, Oral, Daily, Tram Palacios PA-C, 20 mg at 07/31/24 0815    bisacodyl (DULCOLAX) rectal suppository 10 mg, 10 mg, Rectal, Daily PRN, Daya May, , 10 mg at 07/31/24 0817    calcium carbonate (OYSTER  SHELL,OSCAL) 500 mg tablet 1 tablet, 1 tablet, Oral, Daily With Breakfast, Yasmine Meadows MD, 1 tablet at 08/01/24 0740    calcium gluconate 2 g in sodium chloride 0.9% 100 mL (premix), 2 g, Intravenous, Once, Yasmine Meadows MD, Stopped at 07/30/24 1925    cyclophosphamide (CYTOXAN) capsule 100 mg, 100 mg, Oral, Daily, Tirso Montez MD, 100 mg at 07/31/24 2132    diphenhydrAMINE (BENADRYL) tablet 25 mg, 25 mg, Oral, Q6H PRN, Yasmine Meadows MD, 25 mg at 08/01/24 1200    epoetin shavonne (EPOGEN,PROCRIT) injection 8,000 Units, 8,000 Units, Intravenous, Once per day on Tuesday Thursday Saturday, Ata Burns MD, 8,000 Units at 08/01/24 1213    famotidine (PEPCID) tablet 10 mg, 10 mg, Oral, Daily PRN, Daya May DO, 10 mg at 07/28/24 2115    fluticasone-vilanterol 200-25 mcg/actuation 1 puff, 1 puff, Inhalation, Daily, Tram Palacios PA-C, 1 puff at 08/01/24 0744    guaiFENesin (ROBITUSSIN) oral liquid 200 mg, 200 mg, Oral, BID, Yasmine Meadows MD, 200 mg at 07/31/24 2132    heparin (porcine) subcutaneous injection 5,000 Units, 5,000 Units, Subcutaneous, Q8H [facility], Xavier Medina MD, 5,000 Units at 08/01/24 0632    insulin lispro (HumALOG/ADMELOG) 100 units/mL subcutaneous injection 2-12 Units, 2-12 Units, Subcutaneous, TID AC, 2 Units at 07/31/24 1613 **AND** Fingerstick Glucose (POCT), , , TID AC, Yasmine Meadows MD    ipratropium-albuterol (DUO-NEB) 0.5-2.5 mg/3 mL inhalation solution 3 mL, 3 mL, Nebulization, Q6H, Luke Montez MD, 3 mL at 08/01/24 0800    iron polysaccharides (FERREX) capsule 150 mg, 150 mg, Oral, Daily, Ata Burns MD, 150 mg at 07/31/24 0815    lamoTRIgine (LaMICtal) tablet 100 mg, 100 mg, Oral, HS, Yasmine Meadows MD, 100 mg at 07/31/24 2132    loratadine (CLARITIN) tablet 10 mg, 10 mg, Oral, Daily, Tram Palacios PA-C, 10 mg at 07/31/24 0816    melatonin tablet 3 mg, 3 mg, Oral, Daily PRN, Yasmine Meadows MD, 3 mg at 07/28/24 2115    montelukast (SINGULAIR) tablet 10 mg, 10 mg, Oral, Daily, Tram Palacios PA-C, 10 mg  at 07/31/24 0816    NIFEdipine (PROCARDIA XL) 24 hr tablet 60 mg, 60 mg, Oral, Daily, Joselyn Reyes Bahamonde, MD, 60 mg at 07/31/24 0816    ondansetron (ZOFRAN) injection 4 mg, 4 mg, Intravenous, Q6H PRN, Yasmine Meadows MD, 4 mg at 07/21/24 1840    oxybutynin (DITROPAN-XL) 24 hr tablet 5 mg, 5 mg, Oral, Daily, Tram Palacios PA-C, 5 mg at 07/31/24 0816    pantoprazole (PROTONIX) EC tablet 40 mg, 40 mg, Oral, Daily Before Breakfast, Ronny Webster MD, 40 mg at 08/01/24 0632    polyethylene glycol (MIRALAX) packet 17 g, 17 g, Oral, Daily, Xavier Medina MD, 17 g at 07/31/24 0817    [START ON 8/3/2024] predniSONE tablet 30 mg, 30 mg, Oral, Daily, Joselyn Reyes Bahamonde, MD    predniSONE tablet 40 mg, 40 mg, Oral, Daily, Joselyn Reyes Bahamonde, MD, 40 mg at 07/31/24 0816    senna (SENOKOT) tablet 17.2 mg, 2 tablet, Oral, BID, Yasmine Meadows MD, 17.2 mg at 07/31/24 0816    sevelamer (RENAGEL) tablet 1,600 mg, 1,600 mg, Oral, TID With Meals, Vane Estrada MD, 1,600 mg at 08/01/24 0740    Sodium Zirconium Cyclosilicate (Lokelma) 10 g, 10 g, Oral, Daily, Tirso Montez MD, 10 g at 07/27/24 0812    sulfamethoxazole-trimethoprim (BACTRIM) 400-80 mg per tablet 1 tablet, 1 tablet, Oral, Once per day on Monday Wednesday Friday, Joselyn Reyes Bahamonde, MD, 1 tablet at 07/31/24 0825    traZODone (DESYREL) tablet 200 mg, 200 mg, Oral, HS, Tram Palacios PA-C, 200 mg at 07/31/24 2132    venlafaxine (EFFEXOR-XR) 24 hr capsule 150 mg, 150 mg, Oral, Daily, Tram Palacios PA-C, 150 mg at 07/31/24 0815    Laboratory Results:  Results from last 7 days   Lab Units 08/01/24  0858 08/01/24  0844 07/31/24  0750 07/30/24  0918 07/29/24  1432 07/29/24  0558 07/28/24  0539 07/28/24  0533 07/27/24  2109 07/27/24  1118 07/27/24  0817 07/26/24  1536 07/26/24  0504   WBC Thousand/uL  --  16.16* 17.97* 18.31*  --  17.92* 19.60*  --   --  21.46*  --   --  24.22*   HEMOGLOBIN g/dL  --  6.7* 7.8* 7.0* 7.7* 6.8* 7.2*  --  7.7* 6.6*  --    < > 7.2*    HEMATOCRIT %  --  21.3* 25.2* 22.2* 24.5* 21.8* 22.2*  --  25.6* 21.1*  --    < > 22.7*   PLATELETS Thousands/uL  --  122* 128* 137*  --  136* 134*  --   --  135*  --   --  139*   POTASSIUM mmol/L 4.7  --   --  4.7  --   --   --  4.7  --   --  4.6  --   --    CHLORIDE mmol/L 111*  --   --  112*  --   --   --  105  --   --  111*  --   --    CO2 mmol/L 16*  --   --  16*  --   --   --  24  --   --  16*  --   --    BUN mg/dL 109*  --   --  98*  --   --   --  54*  --   --  87*  --   --    CREATININE mg/dL 9.35*  --   --  8.66*  --   --   --  5.60*  --   --  7.61*  --   --    CALCIUM mg/dL 9.7  --   --  9.4  --   --   --  9.2  --   --  9.7  --   --    PHOSPHORUS mg/dL  --   --   --  6.8*  --   --   --   --   --   --   --   --   --     < > = values in this interval not displayed.       PLEASE NOTE:  This encounter was completed utilizing the [employer] Direct Speech Voice Recognition Software. Grammatical errors, random word insertions, pronoun errors and incomplete sentences are occasional consequences of the system due to software limitations, ambient noise and hardware issues.These may be missed by proof reading prior to affixing electronic signature. Any questions or concerns about the content, text or information contained within the body of this dictation should be directly addressed to the physician for clarification. Please do not hesitate to call me directly if you have any any questions or concerns.

## 2024-08-01 NOTE — ASSESSMENT & PLAN NOTE
WBC   Date Value Ref Range Status   08/01/2024 16.16 (H) 4.31 - 10.16 Thousand/uL Final     Patient admitted with sepsis likely secondary to pneumonitis as evidenced by tachycardia, tachypnea and leukocytosis  CT: redemonstration of multiple diffuse scattered nodules, less pronounced than on 5/16/2024 suggestive of mycobacterial infection  QuantiFERON gold negative   BC: 1/2 staph epidermis detected-contaminated  Leukocytosis likely due to steroid use  Completed Ceftriaxone x 5 days

## 2024-08-01 NOTE — ASSESSMENT & PLAN NOTE
In the setting of new kidney failure 2/2 rapidly progressive glomerulonephritis  7/27 S/p 1 U pRBC s  7/29: S/p 1 U pRBCs  8/1: S/p 1 U pRBCs    Plan:  Monitor hemoglobin with CBC  Transfuse for Hgb <7

## 2024-08-02 LAB
ABO GROUP BLD BPU: NORMAL
ALBUMIN SERPL BCG-MCNC: 4.4 G/DL (ref 3.5–5)
ALP SERPL-CCNC: 19 U/L (ref 34–104)
ALT SERPL W P-5'-P-CCNC: 6 U/L (ref 7–52)
ANION GAP SERPL CALCULATED.3IONS-SCNC: 11 MMOL/L (ref 4–13)
ANISOCYTOSIS BLD QL SMEAR: PRESENT
AST SERPL W P-5'-P-CCNC: 7 U/L (ref 13–39)
BASOPHILS # BLD MANUAL: 0 THOUSAND/UL (ref 0–0.1)
BASOPHILS NFR MAR MANUAL: 0 % (ref 0–1)
BILIRUB DIRECT SERPL-MCNC: 0.37 MG/DL (ref 0–0.2)
BILIRUB SERPL-MCNC: 0.58 MG/DL (ref 0.2–1)
BPU ID: NORMAL
BUN SERPL-MCNC: 64 MG/DL (ref 5–25)
BURR CELLS BLD QL SMEAR: PRESENT
CALCIUM SERPL-MCNC: 9.2 MG/DL (ref 8.4–10.2)
CHLORIDE SERPL-SCNC: 106 MMOL/L (ref 96–108)
CO2 SERPL-SCNC: 21 MMOL/L (ref 21–32)
CREAT SERPL-MCNC: 6.34 MG/DL (ref 0.6–1.3)
CROSSMATCH: NORMAL
EOSINOPHIL # BLD MANUAL: 0 THOUSAND/UL (ref 0–0.4)
EOSINOPHIL NFR BLD MANUAL: 0 % (ref 0–6)
ERYTHROCYTE [DISTWIDTH] IN BLOOD BY AUTOMATED COUNT: 17 % (ref 11.6–15.1)
ERYTHROCYTE [DISTWIDTH] IN BLOOD BY AUTOMATED COUNT: 17.1 % (ref 11.6–15.1)
FIBRINOGEN PPP-MCNC: 91 MG/DL (ref 206–523)
GFR SERPL CREATININE-BSD FRML MDRD: 6 ML/MIN/1.73SQ M
GLUCOSE SERPL-MCNC: 108 MG/DL (ref 65–140)
GLUCOSE SERPL-MCNC: 119 MG/DL (ref 65–140)
GLUCOSE SERPL-MCNC: 135 MG/DL (ref 65–140)
GLUCOSE SERPL-MCNC: 153 MG/DL (ref 65–140)
GLUCOSE SERPL-MCNC: 88 MG/DL (ref 65–140)
HCT VFR BLD AUTO: 24.3 % (ref 34.8–46.1)
HCT VFR BLD AUTO: 25.1 % (ref 34.8–46.1)
HELMET CELLS BLD QL SMEAR: PRESENT
HGB BLD-MCNC: 7.8 G/DL (ref 11.5–15.4)
HGB BLD-MCNC: 8 G/DL (ref 11.5–15.4)
HYPERCHROMIA BLD QL SMEAR: PRESENT
LG PLATELETS BLD QL SMEAR: PRESENT
LYMPHOCYTES # BLD AUTO: 0.52 THOUSAND/UL (ref 0.6–4.47)
LYMPHOCYTES # BLD AUTO: 4 % (ref 14–44)
MCH RBC QN AUTO: 27.5 PG (ref 26.8–34.3)
MCH RBC QN AUTO: 27.8 PG (ref 26.8–34.3)
MCHC RBC AUTO-ENTMCNC: 31.9 G/DL (ref 31.4–37.4)
MCHC RBC AUTO-ENTMCNC: 32.1 G/DL (ref 31.4–37.4)
MCV RBC AUTO: 86 FL (ref 82–98)
MCV RBC AUTO: 87 FL (ref 82–98)
MICROCYTES BLD QL AUTO: PRESENT
MONOCYTES # BLD AUTO: 0.39 THOUSAND/UL (ref 0–1.22)
MONOCYTES NFR BLD: 3 % (ref 4–12)
NEUTROPHILS # BLD MANUAL: 12.11 THOUSAND/UL (ref 1.85–7.62)
NEUTS SEG NFR BLD AUTO: 93 % (ref 43–75)
PLATELET # BLD AUTO: 124 THOUSANDS/UL (ref 149–390)
PLATELET # BLD AUTO: 139 THOUSANDS/UL (ref 149–390)
PLATELET BLD QL SMEAR: ABNORMAL
PMV BLD AUTO: 11.1 FL (ref 8.9–12.7)
PMV BLD AUTO: 11.2 FL (ref 8.9–12.7)
POLYCHROMASIA BLD QL SMEAR: PRESENT
POTASSIUM SERPL-SCNC: 4.7 MMOL/L (ref 3.5–5.3)
PROT SERPL-MCNC: 5.2 G/DL (ref 6.4–8.4)
RBC # BLD AUTO: 2.81 MILLION/UL (ref 3.81–5.12)
RBC # BLD AUTO: 2.91 MILLION/UL (ref 3.81–5.12)
RBC MORPH BLD: PRESENT
SODIUM SERPL-SCNC: 138 MMOL/L (ref 135–147)
UNIT DISPENSE STATUS: NORMAL
UNIT PRODUCT CODE: NORMAL
UNIT PRODUCT VOLUME: 350 ML
UNIT RH: NORMAL
WBC # BLD AUTO: 13.02 THOUSAND/UL (ref 4.31–10.16)
WBC # BLD AUTO: 13.17 THOUSAND/UL (ref 4.31–10.16)

## 2024-08-02 PROCEDURE — 85027 COMPLETE CBC AUTOMATED: CPT | Performed by: INTERNAL MEDICINE

## 2024-08-02 PROCEDURE — NC001 PR NO CHARGE: Performed by: DERMATOLOGY

## 2024-08-02 PROCEDURE — 99232 SBSQ HOSP IP/OBS MODERATE 35: CPT | Performed by: INTERNAL MEDICINE

## 2024-08-02 PROCEDURE — 85007 BL SMEAR W/DIFF WBC COUNT: CPT

## 2024-08-02 PROCEDURE — 94760 N-INVAS EAR/PLS OXIMETRY 1: CPT

## 2024-08-02 PROCEDURE — 94640 AIRWAY INHALATION TREATMENT: CPT

## 2024-08-02 PROCEDURE — 85384 FIBRINOGEN ACTIVITY: CPT | Performed by: INTERNAL MEDICINE

## 2024-08-02 PROCEDURE — 80048 BASIC METABOLIC PNL TOTAL CA: CPT | Performed by: INTERNAL MEDICINE

## 2024-08-02 PROCEDURE — 85027 COMPLETE CBC AUTOMATED: CPT

## 2024-08-02 PROCEDURE — 82948 REAGENT STRIP/BLOOD GLUCOSE: CPT

## 2024-08-02 PROCEDURE — 80076 HEPATIC FUNCTION PANEL: CPT

## 2024-08-02 RX ORDER — TRIAMCINOLONE ACETONIDE 1 MG/G
CREAM TOPICAL 2 TIMES DAILY
Status: DISCONTINUED | OUTPATIENT
Start: 2024-08-02 | End: 2024-08-04 | Stop reason: HOSPADM

## 2024-08-02 RX ADMIN — Medication 1 TABLET: at 06:34

## 2024-08-02 RX ADMIN — SENNOSIDES 17.2 MG: 8.6 TABLET, FILM COATED ORAL at 18:05

## 2024-08-02 RX ADMIN — SEVELAMER HYDROCHLORIDE 1600 MG: 800 TABLET ORAL at 06:34

## 2024-08-02 RX ADMIN — IPRATROPIUM BROMIDE AND ALBUTEROL SULFATE 3 ML: 2.5; .5 SOLUTION RESPIRATORY (INHALATION) at 07:01

## 2024-08-02 RX ADMIN — OXYBUTYNIN CHLORIDE 5 MG: 5 TABLET, EXTENDED RELEASE ORAL at 09:25

## 2024-08-02 RX ADMIN — DIPHENHYDRAMINE HYDROCHLORIDE 25 MG: 25 TABLET ORAL at 09:25

## 2024-08-02 RX ADMIN — SEVELAMER HYDROCHLORIDE 1600 MG: 800 TABLET ORAL at 16:31

## 2024-08-02 RX ADMIN — Medication 1 TABLET: at 06:23

## 2024-08-02 RX ADMIN — HEPARIN SODIUM 5000 UNITS: 5000 INJECTION INTRAVENOUS; SUBCUTANEOUS at 22:30

## 2024-08-02 RX ADMIN — IPRATROPIUM BROMIDE AND ALBUTEROL SULFATE 3 ML: 2.5; .5 SOLUTION RESPIRATORY (INHALATION) at 13:07

## 2024-08-02 RX ADMIN — HEPARIN SODIUM 5000 UNITS: 5000 INJECTION INTRAVENOUS; SUBCUTANEOUS at 14:48

## 2024-08-02 RX ADMIN — GUAIFENESIN 200 MG: 200 SOLUTION ORAL at 09:26

## 2024-08-02 RX ADMIN — TRIAMCINOLONE ACETONIDE: 1 CREAM TOPICAL at 18:06

## 2024-08-02 RX ADMIN — SENNOSIDES 17.2 MG: 8.6 TABLET, FILM COATED ORAL at 09:26

## 2024-08-02 RX ADMIN — SEVELAMER HYDROCHLORIDE 1600 MG: 800 TABLET ORAL at 06:23

## 2024-08-02 RX ADMIN — SODIUM ZIRCONIUM CYCLOSILICATE 10 G: 10 POWDER, FOR SUSPENSION ORAL at 09:28

## 2024-08-02 RX ADMIN — MONTELUKAST 10 MG: 10 TABLET, FILM COATED ORAL at 09:26

## 2024-08-02 RX ADMIN — INSULIN LISPRO 2 UNITS: 100 INJECTION, SOLUTION INTRAVENOUS; SUBCUTANEOUS at 16:31

## 2024-08-02 RX ADMIN — VENLAFAXINE HYDROCHLORIDE 150 MG: 150 CAPSULE, EXTENDED RELEASE ORAL at 09:25

## 2024-08-02 RX ADMIN — LAMOTRIGINE 100 MG: 100 TABLET ORAL at 22:30

## 2024-08-02 RX ADMIN — PANTOPRAZOLE SODIUM 40 MG: 40 TABLET, DELAYED RELEASE ORAL at 05:40

## 2024-08-02 RX ADMIN — CYCLOPHOSPHAMIDE 100 MG: 50 CAPSULE ORAL at 20:20

## 2024-08-02 RX ADMIN — IPRATROPIUM BROMIDE AND ALBUTEROL SULFATE 3 ML: 2.5; .5 SOLUTION RESPIRATORY (INHALATION) at 20:24

## 2024-08-02 RX ADMIN — ONDANSETRON 4 MG: 2 INJECTION INTRAMUSCULAR; INTRAVENOUS at 07:19

## 2024-08-02 RX ADMIN — SULFAMETHOXAZOLE AND TRIMETHOPRIM 1 TABLET: 400; 80 TABLET ORAL at 09:27

## 2024-08-02 RX ADMIN — PANTOPRAZOLE SODIUM 40 MG: 40 TABLET, DELAYED RELEASE ORAL at 06:33

## 2024-08-02 RX ADMIN — HEPARIN SODIUM 5000 UNITS: 5000 INJECTION INTRAVENOUS; SUBCUTANEOUS at 05:40

## 2024-08-02 RX ADMIN — ATORVASTATIN CALCIUM 20 MG: 20 TABLET, FILM COATED ORAL at 09:26

## 2024-08-02 RX ADMIN — TRAZODONE HYDROCHLORIDE 200 MG: 100 TABLET ORAL at 22:30

## 2024-08-02 RX ADMIN — LORATADINE 10 MG: 10 TABLET ORAL at 09:25

## 2024-08-02 RX ADMIN — FLUTICASONE FUROATE AND VILANTEROL TRIFENATATE 1 PUFF: 200; 25 POWDER RESPIRATORY (INHALATION) at 09:27

## 2024-08-02 RX ADMIN — POLYSACCHARIDE-IRON COMPLEX 150 MG: 150 CAPSULE ORAL at 09:25

## 2024-08-02 RX ADMIN — NIFEDIPINE 60 MG: 30 TABLET, FILM COATED, EXTENDED RELEASE ORAL at 09:26

## 2024-08-02 RX ADMIN — PREDNISONE 40 MG: 20 TABLET ORAL at 09:25

## 2024-08-02 RX ADMIN — SEVELAMER HYDROCHLORIDE 1600 MG: 800 TABLET ORAL at 12:14

## 2024-08-02 NOTE — ASSESSMENT & PLAN NOTE
WBC   Date Value Ref Range Status   08/02/2024 13.17 (H) 4.31 - 10.16 Thousand/uL Final     Patient admitted with sepsis likely secondary to pneumonitis as evidenced by tachycardia, tachypnea and leukocytosis  CT: redemonstration of multiple diffuse scattered nodules, less pronounced than on 5/16/2024 suggestive of mycobacterial infection  QuantiFERON gold negative   BC: 1/2 staph epidermis detected-contaminated  Leukocytosis likely due to steroid use  Completed Ceftriaxone x 5 days

## 2024-08-02 NOTE — PROGRESS NOTES
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 Given the lack of hydronephrosis this could either reflect adjacent calcification or nonobstructing stone.   Patient reports that she is still producing urine  US kidney and bladder: No hydronephrosis. 4 mm nonobstructing left intrarenal calculus  Urinalysis: 3+ blood.  3+ protein.  Innumerable red blood cells.  2-4 WBCs.  Moderate bacteria   AURA, C3, and C4 normal  Ig Kappa Free Light Chain:152.5   Ig Lambda Free Light Chain 87.1   Kappa/Lambda FluidC Ratio 1.75  Hepatitis panel nonreactive  HIV nonreactive  QuantiFERON gold negative  protein electrophoresis see labs  hypersensitivity pneumonitis profile negative  GBM antibodies: elevated >8  phospholipase A2 receptor Ab: Negative  ANCA: negative   7/17 Renal biopsy and bronchoscopy performed.  7/19 CM confirmed HD chair time for patient. Will be TTS.   Bronchial lavage: Negative for TB  7/20 PLEX initiated  7/22 R permacath placed by IR. Renal bx: diffuse crescentic glomerulonephritis, Anti-GMB type  Chest Xray 7/28: Showed no Pulmonary hemorrhage       Plan  Nephrology onboard - Dialysis TTS   Upon discussion with nephrology, patient can be discharged and will be medically stable after receiving her last PLEX on Thursday 8/1/2024  Was started on Epogen today by Nephrology  Hematology onboard for PLEX  Began prednisone taper 7/27 per nephrology  Cyclophosphamide 100 mg daily for 3 months   Discontinue  Torsemide to 100 mg daily given aneuric.   Bactrim MW for PCP prophylaxis.   Monitor blood glucose levels  Hold home dyazide    Moderate persistent asthma w/out acute exacerbation  Assessment & Plan  Assessment:  Patient has PFTs ordered outpatient but they have not been completed.  Home medication regime Advair -21 mcg 2 puff twice daily, Proventil 2 puffs every 6 hours as needed  On 2 L via nasal cannula as needed.  O2 sat 95 to 96%    Plan:  Supplemental oxygen as needed  DuoNebs every 6 hours, per pulmonology  Albuterol inhaler PRN  Continue fluticasone-vilanterol  Continue loratadine 10 mg    Anemia  Assessment & Plan  In the setting of new kidney failure 2/2 rapidly progressive glomerulonephritis  7/27 S/p 1 U pRBC s  7/29: S/p 1 U pRBCs  8/1: S/p 1 U pRBCs    Plan:  Monitor hemoglobin with CBC  Transfuse for Hgb <7    Rash  Assessment & Plan  Patient developed itchy rash over her anterior chest and back.  Looks like an allergic reaction.  Start Benadryl 25 mg tablet as needed.  Start Benadryl cream as needed.  Dermatology is consulted.     Persistent cough  Assessment & Plan  Patient endorses worsening shortness of breath and difficulty breathing  Patient noted to have multiple diffuse scattered pulmonary nodules on CT  Quantiferon gold negative 7/9/24  Completed Ceftriaxone x 5 days.      Abnormality of ascending aorta  Assessment & Plan  CT chest: Unchanged fusiform ectasia of the ascending thoracic aorta measuring up to 40 mm     Plan  Recommendation for follow-up low radiation dose chest CT in one year    Dyslipidemia  Assessment & Plan  Continue on atorvastatin 20 Mg    Primary hypertension  Assessment & Plan  Hold Lopressor due to bradycardia  Hold Dyazide  Nifedipine 60 mg daily  Hydralazine PRN  Monitor vitals as per protocol    Bipolar 1 disorder (HCC)  Assessment & Plan  Continue on Effexor, trazodone and Lamictal    Sepsis (HCC)-resolved as of 7/14/2024  Assessment & Plan    WBC   Date  Value Ref Range Status   2024 13.17 (H) 4.31 - 10.16 Thousand/uL Final     Patient admitted with sepsis likely secondary to pneumonitis as evidenced by tachycardia, tachypnea and leukocytosis  CT: redemonstration of multiple diffuse scattered nodules, less pronounced than on 2024 suggestive of mycobacterial infection  QuantiFERON gold negative   BC:  staph epidermis detected-contaminated  Leukocytosis likely due to steroid use  Completed Ceftriaxone x 5 days         VTE Pharmacologic Prophylaxis: VTE Score: 4 Moderate Risk (Score 3-4) - Pharmacological DVT Prophylaxis Ordered: heparin.    Mobility:   Basic Mobility Inpatient Raw Score: 24  JH-HLM Goal: 8: Walk 250 feet or more  JH-HLM Achieved: 8: Walk 250 feet ot more  JH-HLM Goal achieved. Continue to encourage appropriate mobility.    Patient Centered Rounds: I performed bedside rounds with nursing staff today.  Discussions with Specialists or Other Care Team Provider: Nephrology    Education and Discussions with Family / Patient: Patient declined call to .     Current Length of Stay: 21 day(s)  Current Patient Status: Inpatient   Discharge Plan: Anticipate discharge tomorrow to home.    Code Status: Level 1 - Full Code    Subjective:   Denied any chest pain or palpitations. Rash has improved but its spread all over her body. Reports itching has responded to benadryl. Eager to go home. Haven't had a bowel movement today.     Objective:     Vitals:   Temp (24hrs), Av.5 °F (36.9 °C), Min:97.6 °F (36.4 °C), Max:99.1 °F (37.3 °C)    Temp:  [97.6 °F (36.4 °C)-99.1 °F (37.3 °C)] 99.1 °F (37.3 °C)  HR:  [85-98] 85  Resp:  [16-18] 16  BP: (120-129)/(75-76) 129/76  SpO2:  [89 %-96 %] 89 %  Body mass index is 44.37 kg/m².     Input and Output Summary (last 24 hours):     Intake/Output Summary (Last 24 hours) at 2024 162  Last data filed at 2024 0815  Gross per 24 hour   Intake 240 ml   Output --   Net 240 ml       Physical Exam:    Physical Exam  Vitals and nursing note reviewed.   Constitutional:       General: She is not in acute distress.     Appearance: She is well-developed.   HENT:      Head: Normocephalic and atraumatic.   Eyes:      Conjunctiva/sclera: Conjunctivae normal.   Cardiovascular:      Rate and Rhythm: Normal rate and regular rhythm.      Heart sounds: No murmur heard.  Pulmonary:      Effort: Pulmonary effort is normal. No respiratory distress.      Breath sounds: Normal breath sounds.   Abdominal:      Palpations: Abdomen is soft.      Tenderness: There is no abdominal tenderness.   Musculoskeletal:         General: No swelling.      Cervical back: Neck supple.   Skin:     General: Skin is warm and dry.      Capillary Refill: Capillary refill takes less than 2 seconds.      Findings: Rash present.      Comments: Diffuse rash over chest, neck, back, and lower legs. Refer to media   Neurological:      Mental Status: She is alert.   Psychiatric:         Mood and Affect: Mood normal.          Additional Data:     Labs:  Results from last 7 days   Lab Units 08/02/24  0620   WBC Thousand/uL 13.17*   HEMOGLOBIN g/dL 8.0*   HEMATOCRIT % 25.1*   PLATELETS Thousands/uL 124*     Results from last 7 days   Lab Units 08/02/24  0620   SODIUM mmol/L 138   POTASSIUM mmol/L 4.7   CHLORIDE mmol/L 106   CO2 mmol/L 21   BUN mg/dL 64*   CREATININE mg/dL 6.34*   ANION GAP mmol/L 11   CALCIUM mg/dL 9.2   GLUCOSE RANDOM mg/dL 108         Results from last 7 days   Lab Units 08/02/24  1616 08/02/24  1130 08/02/24  0704 08/01/24  2048 08/01/24  1608 08/01/24  1103 08/01/24  0725 07/31/24  2048 07/31/24  1552 07/31/24  1113 07/31/24  0724 07/30/24  2056   POC GLUCOSE mg/dl 153* 119 88 215* 136 159* 105 158* 159* 166* 135 171*               Lines/Drains:  Invasive Devices       Peripheral Intravenous Line  Duration             Peripheral IV 07/31/24 Left;Ventral (anterior) Forearm 1 day              Hemodialysis Catheter  Duration             HD  Permanent Double Catheter 11 days                          Imaging: No pertinent imaging reviewed.    Recent Cultures (last 7 days):         Last 24 Hours Medication List:   Current Facility-Administered Medications   Medication Dose Route Frequency Provider Last Rate    acetaminophen  650 mg Oral Q6H PRN Tram Palacios PA-C      albumin human  200 g Intravenous Daily Yasmine Meadows MD      albuterol  2 puff Inhalation Q4H PRN Tram Palacios PA-C      aluminum-magnesium hydroxide-simethicone  30 mL Oral Q4H PRN Yasmine Meadows MD      atorvastatin  20 mg Oral Daily Tram Palacios PA-C      bisacodyl  10 mg Rectal Daily PRN Daya May DO      calcium carbonate  1 tablet Oral Daily With Breakfast Yasmine Meadows MD      calcium gluconate  2 g Intravenous Once Yasmine Meadows MD Stopped (07/30/24 1925)    cyclophosphamide  100 mg Oral Daily Tirso Lindsey Montez MD      diphenhydrAMINE  25 mg Oral Q6H PRN Yasmine Meadows MD      diphenhydrAMINE-zinc acetate   Topical TID PRN Yasmine Meadows MD      epoetin shavonne  8,000 Units Intravenous Once per day on Tuesday Thursday Saturday Ata Burns MD      famotidine  10 mg Oral Daily PRN Daya May DO      fluticasone-vilanterol  1 puff Inhalation Daily Tram Palacios PA-C      guaiFENesin  200 mg Oral BID Yasmine Meadows MD      heparin (porcine)  5,000 Units Subcutaneous Q8H JACK Medina MD      insulin lispro  2-12 Units Subcutaneous TID AC Yasmine Meadows MD      ipratropium-albuterol  3 mL Nebulization Q6H Luke Montez MD      iron polysaccharides  150 mg Oral Daily Ata Burns MD      lamoTRIgine  100 mg Oral HS Yasmine Meadows MD      loratadine  10 mg Oral Daily Tram Palacios PA-C      melatonin  3 mg Oral Daily PRN Yasmine Meadows MD      montelukast  10 mg Oral Daily Tram Palacios PA-C      NIFEdipine  60 mg Oral Daily Joselyn Reyes Bahamonde, MD      ondansetron  4 mg Intravenous Q6H PRN Yasmine Meadows MD      oxybutynin  5 mg Oral Daily Tram Palacios PA-C      pantoprazole  40 mg Oral Daily  Before Breakfast Ronny Webster MD      polyethylene glycol  17 g Oral Daily Xavier Medina MD      [START ON 8/3/2024] predniSONE  30 mg Oral Daily Joselyn Reyes Bahamonde, MD      senna  2 tablet Oral BID Yasmine Meadows MD      sevelamer  1,600 mg Oral TID With Meals Vane Estrada MD      Sodium Zirconium Cyclosilicate  10 g Oral Daily Tirso Montez MD      sulfamethoxazole-trimethoprim  1 tablet Oral Once per day on Monday Wednesday Friday Joselyn Reyes Bahamonde, MD      traZODone  200 mg Oral HS Tram Palacios PA-C      venlafaxine  150 mg Oral Daily Tram Palacios PA-C          Today, Patient Was Seen By: Yasmine Meadows MD    **Please Note: This note may have been constructed using a voice recognition system.**

## 2024-08-02 NOTE — PROGRESS NOTES
NEPHROLOGY PROGRESS NOTE   Ngozi Beard 64 y.o. 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  -- Currently being treated with prednisone taper along with cyclophosphamide 100 mg daily for 3 months with prophylaxis with PPI and Bactrim along with calcium and vitamin D  --last plasmapheresis for today, August 1  --Completed dialysis yesterday.  -- Plan to continue outpatient dialysis at [facility] starting next Tuesday  -- Plan for dialysis tomorrow can be discharged after dialysis.     Nephrotic range proteinuria  -- Not in steady state but UPC was 5.8 g/g  -- Biopsy-proven anti-GBM  -- Continue current management with prednisone  -- Currently on prednisone 40 mg daily for 1 week then will plan to drop to prednisone 30 mg daily starting August 3 for 7 days.  Then plan for 25 mg x 2 weeks.  Then 20 mg for 2 weeks.  Then 15 mg for 2 weeks.  Then 12.5 mg for 2 weeks.  Then 10 mg for 2 weeks.  Then 7.5 mg for 2 weeks.  And then will be maintained on 5 mg daily     Hyperkalemia--resolved     Anemia of chronic disease  --Hemoglobin better at 8  --No objections to blood transfusion  -- Continue Epogen with dialysis, 8000 units with dialysis     Hypertension  -- Continue nifedipine off torsemide due to anuria  --Continue current  management  --Blood pressure is well-controlled     Metabolic acidosis  -- Acidosis improving, increase bicarbonate bath to 40 for the next treatment     Immunosuppression  Methylprednisolone x 3, completed  Currently on prednisone 60 mg, started on 7/20  60 mg for 1 week, completed  40 mg for 1 week 7/27  30 mg for 1 week, starting August 3  25 mg for 2 weeks  20 mg for 2 weeks  15 mg for 2 weeks   12.5 mg for 2 weeks  10 mg for 2 weeks  7.5 mg for 2 weeks  Continue with 5 mg daily after that     Cytoxan 100 mg daily adjusted by kidney function and age. Plan to continue for 3 months      Completed plasmapheresis      SUBJECTIVE / INTERVAL HISTORY:    No hemoptysis no chest pain.  Persistent cough    OBJECTIVE:  Current Weight: Weight - Scale: 114 kg (250 lb 8 oz)  Vitals:    08/01/24 1900 08/01/24 2232 08/02/24 0600 08/02/24 0702   BP:  120/75  125/76   BP Location:    Left arm   Pulse:  89  98   Resp:  18  16   Temp:  98.9 °F (37.2 °C)  97.6 °F (36.4 °C)   TempSrc:    Oral   SpO2: 95% 95%  96%   Weight:   114 kg (250 lb 8 oz)        Intake/Output Summary (Last 24 hours) at 8/2/2024 1103  Last data filed at 8/2/2024 0815  Gross per 24 hour   Intake 61583.5 ml   Output 3504 ml   Net 8718.5 ml       Review of Systems:    Constitutional: Negative for chills and fever.   HENT: Negative for ear pain and sore throat.    Eyes: Negative for pain and visual disturbance.   Respiratory: Negative for cough and shortness of breath.    Cardiovascular: Negative for chest pain and palpitations.   Gastrointestinal: Negative for abdominal pain and vomiting.   Genitourinary: Negative for dysuria and hematuria.   Musculoskeletal: Negative for arthralgias and back pain.   Skin: Negative for color change and rash.   Neurological: Negative for seizures and syncope.   12 point ROS has been reviewed.    Physical Exam  Vitals and nursing note reviewed.   Constitutional:       General: She is not in acute distress.     Appearance: She is  well-developed.   HENT:      Head: Normocephalic and atraumatic.   Eyes:      General: No scleral icterus.     Conjunctiva/sclera: Conjunctivae normal.      Pupils: Pupils are equal, round, and reactive to light.   Cardiovascular:      Rate and Rhythm: Normal rate and regular rhythm.      Heart sounds: S1 normal and S2 normal. No murmur heard.     No friction rub. No gallop.   Pulmonary:      Effort: Pulmonary effort is normal. No respiratory distress.      Breath sounds: Normal breath sounds. No wheezing or rales.   Abdominal:      General: Bowel sounds are normal.      Palpations: Abdomen is soft.      Tenderness: There is no abdominal tenderness. There is no rebound.   Musculoskeletal:         General: Normal range of motion.      Cervical back: Normal range of motion and neck supple.   Skin:     Findings: No rash.   Neurological:      Mental Status: She is alert and oriented to person, place, and time.   Psychiatric:         Behavior: Behavior normal.         Medications:    Current Facility-Administered Medications:     acetaminophen (TYLENOL) tablet 650 mg, 650 mg, Oral, Q6H PRN, Tram Palacios PA-C, 650 mg at 07/19/24 1302    albumin human (FLEXBUMIN) 5 % injection 200 g, 200 g, Intravenous, Daily, Yasmine Meadows MD    albuterol (PROVENTIL HFA,VENTOLIN HFA) inhaler 2 puff, 2 puff, Inhalation, Q4H PRN, Tram Palacios PA-C, 2 puff at 08/01/24 0744    aluminum-magnesium hydroxide-simethicone (MAALOX) oral suspension 30 mL, 30 mL, Oral, Q4H PRN, Yasmine Maedows MD    atorvastatin (LIPITOR) tablet 20 mg, 20 mg, Oral, Daily, Tram Palacios PA-C, 20 mg at 08/02/24 0926    bisacodyl (DULCOLAX) rectal suppository 10 mg, 10 mg, Rectal, Daily PRN, Daya May DO, 10 mg at 07/31/24 0817    calcium carbonate (OYSTER SHELL,OSCAL) 500 mg tablet 1 tablet, 1 tablet, Oral, Daily With Breakfast, Yasmine Meadows MD, 1 tablet at 08/02/24 0634    calcium gluconate 2 g in sodium chloride 0.9% 100 mL (premix), 2 g, Intravenous, Once, Yasmine  MD Abdiel, Stopped at 07/30/24 1925    cyclophosphamide (CYTOXAN) capsule 100 mg, 100 mg, Oral, Daily, Tirso Montez MD, 100 mg at 08/01/24 2049    diphenhydrAMINE (BENADRYL) tablet 25 mg, 25 mg, Oral, Q6H PRN, Yasmine Meadows MD, 25 mg at 08/02/24 0925    diphenhydrAMINE-zinc acetate (BENADRYL) 2-0.1 % cream, , Topical, TID PRN, Yasmine Meadows MD, Given at 08/01/24 1537    epoetin shavonne (EPOGEN,PROCRIT) injection 8,000 Units, 8,000 Units, Intravenous, Once per day on Tuesday Thursday Saturday, Ata Burns MD, 8,000 Units at 08/01/24 1213    famotidine (PEPCID) tablet 10 mg, 10 mg, Oral, Daily PRN, Daya May DO, 10 mg at 07/28/24 2115    fluticasone-vilanterol 200-25 mcg/actuation 1 puff, 1 puff, Inhalation, Daily, Tram Palacios PA-C, 1 puff at 08/02/24 0927    guaiFENesin (ROBITUSSIN) oral liquid 200 mg, 200 mg, Oral, BID, Yasmine Meadows MD, 200 mg at 08/02/24 0926    heparin (porcine) subcutaneous injection 5,000 Units, 5,000 Units, Subcutaneous, Q8H [facility], Xavier Medina MD, 5,000 Units at 08/02/24 0540    insulin lispro (HumALOG/ADMELOG) 100 units/mL subcutaneous injection 2-12 Units, 2-12 Units, Subcutaneous, TID AC, 2 Units at 08/01/24 1305 **AND** Fingerstick Glucose (POCT), , , TID AC, Yasmine Meadows MD    ipratropium-albuterol (DUO-NEB) 0.5-2.5 mg/3 mL inhalation solution 3 mL, 3 mL, Nebulization, Q6H, Luke Montez MD, 3 mL at 08/02/24 0701    iron polysaccharides (FERREX) capsule 150 mg, 150 mg, Oral, Daily, Ata Burns MD, 150 mg at 08/02/24 0925    lamoTRIgine (LaMICtal) tablet 100 mg, 100 mg, Oral, HS, Yasmine Meadows MD, 100 mg at 08/01/24 2141    loratadine (CLARITIN) tablet 10 mg, 10 mg, Oral, Daily, Tram Palacios PA-C, 10 mg at 08/02/24 0925    melatonin tablet 3 mg, 3 mg, Oral, Daily PRN, Yasmine Meadows MD, 3 mg at 07/28/24 2115    montelukast (SINGULAIR) tablet 10 mg, 10 mg, Oral, Daily, Tram Palacios PA-C, 10 mg at 08/02/24 0926    NIFEdipine (PROCARDIA XL) 24 hr tablet 60 mg, 60 mg, Oral, Daily, Chrystal  Reyes Bahamonde, MD, 60 mg at 08/02/24 0926    ondansetron (ZOFRAN) injection 4 mg, 4 mg, Intravenous, Q6H PRN, Yasmine Meadows MD, 4 mg at 08/02/24 0719    oxybutynin (DITROPAN-XL) 24 hr tablet 5 mg, 5 mg, Oral, Daily, Tram Palacios PA-C, 5 mg at 08/02/24 0925    pantoprazole (PROTONIX) EC tablet 40 mg, 40 mg, Oral, Daily Before Breakfast, Ronny Webster MD, 40 mg at 08/02/24 0633    polyethylene glycol (MIRALAX) packet 17 g, 17 g, Oral, Daily, Xavier Medina MD, 17 g at 07/31/24 0817    [START ON 8/3/2024] predniSONE tablet 30 mg, 30 mg, Oral, Daily, Joselyn Reyes Bahamonde, MD    senna (SENOKOT) tablet 17.2 mg, 2 tablet, Oral, BID, Yasmine Meadows MD, 17.2 mg at 08/02/24 0926    sevelamer (RENAGEL) tablet 1,600 mg, 1,600 mg, Oral, TID With Meals, Vane Estrada MD, 1,600 mg at 08/02/24 0634    Sodium Zirconium Cyclosilicate (Lokelma) 10 g, 10 g, Oral, Daily, Tirso Montez MD, 10 g at 08/02/24 0928    sulfamethoxazole-trimethoprim (BACTRIM) 400-80 mg per tablet 1 tablet, 1 tablet, Oral, Once per day on Monday Wednesday Friday, Joselyn Reyes Bahamonde, MD, 1 tablet at 08/02/24 0927    traZODone (DESYREL) tablet 200 mg, 200 mg, Oral, HS, Tram Palacios PA-C, 200 mg at 08/01/24 2141    venlafaxine (EFFEXOR-XR) 24 hr capsule 150 mg, 150 mg, Oral, Daily, Tram Palacios PA-C, 150 mg at 08/02/24 0925    Laboratory Results:  Results from last 7 days   Lab Units 08/02/24  0620 08/01/24  1755 08/01/24  0858 08/01/24  0844 07/31/24  0750 07/30/24  0918 07/29/24  1432 07/29/24  0558 07/28/24  0539 07/28/24  0533 07/27/24  2109 07/27/24  1118 07/27/24  0817   WBC Thousand/uL 13.17*  --   --  16.16* 17.97* 18.31*  --  17.92* 19.60*  --   --  21.46*  --    HEMOGLOBIN g/dL 8.0* 8.0*  --  6.7* 7.8* 7.0* 7.7* 6.8* 7.2*  --    < > 6.6*  --    HEMATOCRIT % 25.1* 24.2*  --  21.3* 25.2* 22.2* 24.5* 21.8* 22.2*  --    < > 21.1*  --    PLATELETS Thousands/uL 124*  --   --  122* 128* 137*  --  136* 134*  --   --  135*  --    POTASSIUM mmol/L  4.7  --  4.7  --   --  4.7  --   --   --  4.7  --   --  4.6   CHLORIDE mmol/L 106  --  111*  --   --  112*  --   --   --  105  --   --  111*   CO2 mmol/L 21  --  16*  --   --  16*  --   --   --  24  --   --  16*   BUN mg/dL 64*  --  109*  --   --  98*  --   --   --  54*  --   --  87*   CREATININE mg/dL 6.34*  --  9.35*  --   --  8.66*  --   --   --  5.60*  --   --  7.61*   CALCIUM mg/dL 9.2  --  9.7  --   --  9.4  --   --   --  9.2  --   --  9.7   PHOSPHORUS mg/dL  --   --   --   --   --  6.8*  --   --   --   --   --   --   --     < > = values in this interval not displayed.       PLEASE NOTE:  This encounter was completed utilizing the [employer] Direct Speech Voice Recognition Software. Grammatical errors, random word insertions, pronoun errors and incomplete sentences are occasional consequences of the system due to software limitations, ambient noise and hardware issues.These may be missed by proof reading prior to affixing electronic signature. Any questions or concerns about the content, text or information contained within the body of this dictation should be directly addressed to the physician for clarification. Please do not hesitate to call me directly if you have any any questions or concerns.

## 2024-08-02 NOTE — PLAN OF CARE
Problem: PAIN - ADULT  Goal: Verbalizes/displays adequate comfort level or baseline comfort level  Description: Interventions:  - Encourage patient to monitor pain and request assistance  - Assess pain using appropriate pain scale  - Administer analgesics based on type and severity of pain and evaluate response  - Implement non-pharmacological measures as appropriate and evaluate response  - Consider cultural and social influences on pain and pain management  - Notify physician/advanced practitioner if interventions unsuccessful or patient reports new pain  Outcome: Progressing     Problem: INFECTION - ADULT  Goal: Absence or prevention of progression during hospitalization  Description: INTERVENTIONS:  - Assess and monitor for signs and symptoms of infection  - Monitor lab/diagnostic results  - Monitor all insertion sites, i.e. indwelling lines, tubes, and drains  - Monitor endotracheal if appropriate and nasal secretions for changes in amount and color  - [place] appropriate cooling/warming therapies per order  - Administer medications as ordered  - Instruct and encourage patient and family to use good hand hygiene technique  - Identify and instruct in appropriate isolation precautions for identified infection/condition  Outcome: Progressing  Goal: Absence of fever/infection during neutropenic period  Description: INTERVENTIONS:  - Monitor WBC    Outcome: Progressing     Problem: SAFETY ADULT  Goal: Patient will remain free of falls  Description: INTERVENTIONS:  - Educate patient/family on patient safety including physical limitations  - Instruct patient to call for assistance with activity   - Consult OT/PT to assist with strengthening/mobility   - Keep Call bell within reach  - Keep bed low and locked with side rails adjusted as appropriate  - Keep care items and personal belongings within reach  - Initiate and maintain comfort rounds  - Make Fall Risk Sign visible to staff  - Offer Toileting every  Hours,  in advance of need  - Initiate/Maintain alarm  - Obtain necessary fall risk management equipment:   - Apply yellow socks and bracelet for high fall risk patients  - Consider moving patient to room near nurses station  Outcome: Progressing  Goal: Maintain or return to baseline ADL function  Description: INTERVENTIONS:  -  Assess patient's ability to carry out ADLs; assess patient's baseline for ADL function and identify physical deficits which impact ability to perform ADLs (bathing, care of mouth/teeth, toileting, grooming, dressing, etc.)  - Assess/evaluate cause of self-care deficits   - Assess range of motion  - Assess patient's mobility; develop plan if impaired  - Assess patient's need for assistive devices and provide as appropriate  - Encourage maximum independence but intervene and supervise when necessary  - Involve family in performance of ADLs  - Assess for home care needs following discharge   - Consider OT consult to assist with ADL evaluation and planning for discharge  - Provide patient education as appropriate  Outcome: Progressing  Goal: Maintains/Returns to pre admission functional level  Description: INTERVENTIONS:  - Perform AM-PAC 6 Click Basic Mobility/ Daily Activity assessment daily.  - Set and communicate daily mobility goal to care team and patient/family/caregiver.   - Collaborate with rehabilitation services on mobility goals if consulted  - Perform Range of Motion  times a day.  - Reposition patient every  hours.  - Dangle patient  times a day  - Stand patient  times a day  - Ambulate patient  times a day  - Out of bed to chair  times a day   - Out of bed for meals  times a day  - Out of bed for toileting  - Record patient progress and toleration of activity level   Outcome: Progressing     Problem: DISCHARGE PLANNING  Goal: Discharge to home or other facility with appropriate resources  Description: INTERVENTIONS:  - Identify barriers to discharge w/patient and caregiver  - Arrange for  needed discharge resources and transportation as appropriate  - Identify discharge learning needs (meds, wound care, etc.)  - Arrange for interpretive services to assist at discharge as needed  - Refer to Case Management Department for coordinating discharge planning if the patient needs post-hospital services based on physician/advanced practitioner order or complex needs related to functional status, cognitive ability, or social support system  Outcome: Progressing     Problem: Knowledge Deficit  Goal: Patient/family/caregiver demonstrates understanding of disease process, treatment plan, medications, and discharge instructions  Description: Complete learning assessment and assess knowledge base.  Interventions:  - Provide teaching at level of understanding  - Provide teaching via preferred learning methods  Outcome: Progressing     Problem: METABOLIC, FLUID AND ELECTROLYTES - ADULT  Goal: Electrolytes maintained within normal limits  Description: INTERVENTIONS:  - Monitor labs and assess patient for signs and symptoms of electrolyte imbalances  - Administer electrolyte replacement as ordered  - Monitor response to electrolyte replacements, including repeat lab results as appropriate  - Instruct patient on fluid and nutrition as appropriate  Outcome: Progressing  Goal: Fluid balance maintained  Description: INTERVENTIONS:  - Monitor labs   - Monitor I/O and WT  - Instruct patient on fluid and nutrition as appropriate  - Assess for signs & symptoms of volume excess or deficit  Outcome: Progressing     Problem: Nutrition/Hydration-ADULT  Goal: Nutrient/Hydration intake appropriate for improving, restoring or maintaining nutritional needs  Description: Monitor and assess patient's nutrition/hydration status for malnutrition. Collaborate with interdisciplinary team and initiate plan and interventions as ordered.  Monitor patient's weight and dietary intake as ordered or per policy. Utilize nutrition screening tool and  intervene as necessary. Determine patient's food preferences and provide high-protein, high-caloric foods as appropriate.     INTERVENTIONS:  - Monitor oral intake, urinary output, labs, and treatment plans  - Assess nutrition and hydration status and recommend course of action  - Evaluate amount of meals eaten  - Assist patient with eating if necessary   - Allow adequate time for meals  - Recommend/ encourage appropriate diets, oral nutritional supplements, and vitamin/mineral supplements  - Order, calculate, and assess calorie counts as needed  - Recommend, monitor, and adjust tube feedings and TPN/PPN based on assessed needs  - Assess need for intravenous fluids  - Provide specific nutrition/hydration education as appropriate  - Include patient/family/caregiver in decisions related to nutrition  Outcome: Progressing     Problem: Prexisting or High Potential for Compromised Skin Integrity  Goal: Skin integrity is maintained or improved  Description: INTERVENTIONS:  - Identify patients at risk for skin breakdown  - Assess and monitor skin integrity  - Assess and monitor nutrition and hydration status  - Monitor labs   - Assess for incontinence   - Turn and reposition patient  - Assist with mobility/ambulation  - Relieve pressure over bony prominences  - Avoid friction and shearing  - Provide appropriate hygiene as needed including keeping skin clean and dry  - Evaluate need for skin moisturizer/barrier cream  - Collaborate with interdisciplinary team   - Patient/family teaching  - Consider wound care consult   Outcome: Progressing     Problem: Potential for Falls  Goal: Patient will remain free of falls  Description: INTERVENTIONS:  - Educate patient/family on patient safety including physical limitations  - Instruct patient to call for assistance with activity   - Consult OT/PT to assist with strengthening/mobility   - Keep Call bell within reach  - Keep bed low and locked with side rails adjusted as  appropriate  - Keep care items and personal belongings within reach  - Initiate and maintain comfort rounds  - Make Fall Risk Sign visible to staff  - Offer Toileting every  Hours, in advance of need  - Initiate/Maintain alarm  - Obtain necessary fall risk management equipment:   - Apply yellow socks and bracelet for high fall risk patients  - Consider moving patient to room near nurses station  Outcome: Progressing

## 2024-08-02 NOTE — CASE MANAGEMENT
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     Abnormality of ascending aorta 2024     Postmenopausal 2024     Encounter for screening mammogram for malignant neoplasm of breast 2024     Allergic rhinitis 2024     Persistent cough 2024     Urge incontinence of urine 2024     Exercise intolerance 2024     Family history of coronary artery bypass graft surgery 2024     Urge urinary incontinence 2024     Female stress incontinence 2024     Paranoid schizophrenia ([facility]) 2023     Moderate persistent asthma w/out acute exacerbation 2023     Asthma due to seasonal allergies 2023     Bipolar 1 disorder ([facility]) 2022     Primary hypertension 2022     Dyslipidemia 2022     Morbid obesity with BMI of 45.0-49.9, adult ([facility]) 2022       LOS (days): 21  Geometric Mean LOS (GMLOS) (days): 5.8  Days to GMLOS:-15.3     OBJECTIVE:  Risk of Unplanned Readmission Score: 24.78         Current admission status: Inpatient   Preferred Pharmacy:   [id number] - [place] [facility] [address] [address]  Phone: [phone] Fax: [phone]    [facility] - [place] [id number] [place] [address] [address]  Phone: [phone] Fax: [phone]    Primary Care Provider: Meenakshi Balbuena MD    Primary Insurance: MEDICARE  Secondary Insurance:  [facility]    DISCHARGE DETAILS:    Discharge planning discussed with:: Patient  Freedom of Choice: Yes  Comments - Freedom of Choice: Reviewed DCP  CM contacted family/caregiver?: No- see comments  Were Treatment Team discharge recommendations reviewed with patient/caregiver?: Yes  Did patient/caregiver verbalize understanding of patient care needs?: Yes  Were patient/caregiver advised of the risks associated with not following Treatment Team discharge recommendations?: Yes    Contacts  Patient Contacts: Patient  Relationship to Patient:: Other (Comment)  Reason/Outcome: Discharge Planning, Continuity of Care    Requested Home Health Care         Is the patient interested in HHC at discharge?: No    DME Referral Provided  Referral made for DME?: No    Other Referral/Resources/Interventions Provided:  Interventions: Dialysis         Treatment Team Recommendation: Home  Discharge Destination Plan:: Home  Transport at Discharge : Family                             IMM Given (Date):: 08/02/24  IMM Given to:: Patient        IMM reviewed with patient.  Patient agrees with discharge determination.     CM met with patient at bedside to review DCP again.  Plan will be for home after her HD treatment here tomorrow.    Family will provide transportation home.    Patient is working with Danni Baca to arrange her needed transportation to her HD appointments.  Her first outpatient treatment at [facility] is on Tuesday 8/6 at 10:30 AM.  Patient has a copy of the welcome letter and facility contact and appointment information was also added to the AVS.    CM reviewed the availability of treatment team to discuss questions or concerns patient and/or family may have regarding  understanding medications and recognizing signs and symptoms once discharged.  CM also encouraged patient to follow up with all recommended appointments after discharge. Patient advised of importance for patient and family to  participate in managing patient's medical well being.

## 2024-08-02 NOTE — ASSESSMENT & PLAN NOTE
Patient developed itchy rash over her anterior chest and back.  Looks like an allergic reaction.  Start Benadryl 25 mg tablet as needed.  Start Benadryl cream as needed.  Dermatology is consulted.

## 2024-08-02 NOTE — CONSULTS
"DERMATOLOGY:  PROGRESS NOTE  Ngozi Beard 64 y.o. 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 Has developed pruritic rash predominantly on back but also involving inframammary, abdomen, extremities to a lesser degree. Pt denies     Fever, sob, lymphadenopathy, abdominal pain, other systemic symptoms. Started on Bactrim  for PJP ppx while she is on prednisone and cytoxan. Pt has many antibiotics listed in her allergy list with rash as reaction. Regiscar for DRESS is -2 \"no case\" however has not had cbc w diff and lfts since rash developed.          Please Tiger Text on call resident prior to discharge so urgent outpatient dermatology follow up can be coordinated. Follow up will be at our [facility]. Office number: [phone]     Thank you for involving me in the care of your patient. Please call with questions, change in clinical status or if tests recommended above are abnormal.     Discussed with the primary service.  Physical exam:     Temp:  [97.6 °F (36.4 °C)-99.3 °F (37.4 °C)] 99.1 °F (37.3 °C)  HR:  [81-98] 85  Resp:  [16-18] 16  BP: (120-139)/(75-80) 129/76  SpO2:  [89 %-96 %] 89 %  Temp (24hrs), Av.7 °F (37.1 °C), Min:97.6 °F (36.4 °C), Max:99.3 °F (37.4 °C)  Current: Temperature: 99.1 °F (37.3 °C)    FULL SKIN EXAM   Morbilliform rash " predominantly on back, minimal involvement of abdomen, extremities.                    Labs, Imaging, & Other Studies   MEDICATIONS:  All current active medications have been reviewed.     Lab Results:  I have personally reviewed pertinent labs.     Results from last 7 days   Lab Units 08/02/24  0620 08/01/24  1755 08/01/24  0844 07/31/24  0750   WBC Thousand/uL 13.17*  --  16.16* 17.97*   HEMOGLOBIN g/dL 8.0* 8.0* 6.7* 7.8*   PLATELETS Thousands/uL 124*  --  122* 128*     Results from last 7 days   Lab Units 08/02/24  0620   POTASSIUM mmol/L 4.7   CHLORIDE mmol/L 106   CO2 mmol/L 21   BUN mg/dL 64*   CREATININE mg/dL 6.34*   EGFR ml/min/1.73sq m 6   CALCIUM mg/dL 9.2     Results from last 7 days   Lab Units 07/31/24  0825   MRSA CULTURE ONLY  No Methicillin Resistant Staphlyococcus aureus (MRSA) isolated     Pathology:   I have personally reviewed pertinent reports.     Jessie Zavala MD  Dermatology, PGY-3

## 2024-08-03 ENCOUNTER — APPOINTMENT (INPATIENT)
Dept: DIALYSIS | Facility: HOSPITAL | Age: 64
DRG: 673 | End: 2024-08-03
Attending: INTERNAL MEDICINE
Payer: MEDICARE

## 2024-08-03 LAB
ALBUMIN SERPL BCG-MCNC: 4.2 G/DL (ref 3.5–5)
ALP SERPL-CCNC: 20 U/L (ref 34–104)
ALT SERPL W P-5'-P-CCNC: 7 U/L (ref 7–52)
ANION GAP SERPL CALCULATED.3IONS-SCNC: 12 MMOL/L (ref 4–13)
AST SERPL W P-5'-P-CCNC: 6 U/L (ref 13–39)
BASOPHILS # BLD AUTO: 0.04 THOUSANDS/ÂΜL (ref 0–0.1)
BASOPHILS NFR BLD AUTO: 0 % (ref 0–1)
BILIRUB SERPL-MCNC: 0.55 MG/DL (ref 0.2–1)
BUN SERPL-MCNC: 92 MG/DL (ref 5–25)
CALCIUM SERPL-MCNC: 9.6 MG/DL (ref 8.4–10.2)
CHLORIDE SERPL-SCNC: 108 MMOL/L (ref 96–108)
CO2 SERPL-SCNC: 20 MMOL/L (ref 21–32)
CREAT SERPL-MCNC: 8.25 MG/DL (ref 0.6–1.3)
EOSINOPHIL # BLD AUTO: 0.41 THOUSAND/ÂΜL (ref 0–0.61)
EOSINOPHIL NFR BLD AUTO: 4 % (ref 0–6)
ERYTHROCYTE [DISTWIDTH] IN BLOOD BY AUTOMATED COUNT: 17.1 % (ref 11.6–15.1)
FIBRINOGEN PPP-MCNC: 139 MG/DL (ref 206–523)
GFR SERPL CREATININE-BSD FRML MDRD: 4 ML/MIN/1.73SQ M
GLUCOSE SERPL-MCNC: 117 MG/DL (ref 65–140)
GLUCOSE SERPL-MCNC: 169 MG/DL (ref 65–140)
GLUCOSE SERPL-MCNC: 83 MG/DL (ref 65–140)
GLUCOSE SERPL-MCNC: 94 MG/DL (ref 65–140)
HCT VFR BLD AUTO: 23.5 % (ref 34.8–46.1)
HGB BLD-MCNC: 7.6 G/DL (ref 11.5–15.4)
IMM GRANULOCYTES # BLD AUTO: 0.24 THOUSAND/UL (ref 0–0.2)
IMM GRANULOCYTES NFR BLD AUTO: 2 % (ref 0–2)
LYMPHOCYTES # BLD AUTO: 0.68 THOUSANDS/ÂΜL (ref 0.6–4.47)
LYMPHOCYTES NFR BLD AUTO: 6 % (ref 14–44)
MCH RBC QN AUTO: 28.4 PG (ref 26.8–34.3)
MCHC RBC AUTO-ENTMCNC: 32.3 G/DL (ref 31.4–37.4)
MCV RBC AUTO: 88 FL (ref 82–98)
MONOCYTES # BLD AUTO: 0.59 THOUSAND/ÂΜL (ref 0.17–1.22)
MONOCYTES NFR BLD AUTO: 5 % (ref 4–12)
NEUTROPHILS # BLD AUTO: 9.73 THOUSANDS/ÂΜL (ref 1.85–7.62)
NEUTS SEG NFR BLD AUTO: 83 % (ref 43–75)
NRBC BLD AUTO-RTO: 0 /100 WBCS
PLATELET # BLD AUTO: 120 THOUSANDS/UL (ref 149–390)
PMV BLD AUTO: 10.5 FL (ref 8.9–12.7)
POTASSIUM SERPL-SCNC: 5 MMOL/L (ref 3.5–5.3)
PROT SERPL-MCNC: 5 G/DL (ref 6.4–8.4)
RBC # BLD AUTO: 2.68 MILLION/UL (ref 3.81–5.12)
SODIUM SERPL-SCNC: 140 MMOL/L (ref 135–147)
WBC # BLD AUTO: 11.69 THOUSAND/UL (ref 4.31–10.16)

## 2024-08-03 PROCEDURE — 85025 COMPLETE CBC W/AUTO DIFF WBC: CPT | Performed by: INTERNAL MEDICINE

## 2024-08-03 PROCEDURE — 94760 N-INVAS EAR/PLS OXIMETRY 1: CPT

## 2024-08-03 PROCEDURE — 94640 AIRWAY INHALATION TREATMENT: CPT

## 2024-08-03 PROCEDURE — 99232 SBSQ HOSP IP/OBS MODERATE 35: CPT | Performed by: INTERNAL MEDICINE

## 2024-08-03 PROCEDURE — 85384 FIBRINOGEN ACTIVITY: CPT | Performed by: INTERNAL MEDICINE

## 2024-08-03 PROCEDURE — 80053 COMPREHEN METABOLIC PANEL: CPT | Performed by: INTERNAL MEDICINE

## 2024-08-03 PROCEDURE — 82948 REAGENT STRIP/BLOOD GLUCOSE: CPT

## 2024-08-03 RX ORDER — PREDNISONE 10 MG/1
TABLET ORAL
Qty: 99 TABLET | Refills: 0 | Status: CANCELLED | OUTPATIENT
Start: 2024-08-04 | End: 2024-09-20

## 2024-08-03 RX ORDER — IPRATROPIUM BROMIDE AND ALBUTEROL SULFATE 2.5; .5 MG/3ML; MG/3ML
3 SOLUTION RESPIRATORY (INHALATION)
Status: DISCONTINUED | OUTPATIENT
Start: 2024-08-03 | End: 2024-08-04 | Stop reason: HOSPADM

## 2024-08-03 RX ORDER — POLYETHYLENE GLYCOL 3350 17 G/17G
17 POWDER, FOR SOLUTION ORAL DAILY
Status: CANCELLED
Start: 2024-08-04

## 2024-08-03 RX ADMIN — INSULIN LISPRO 2 UNITS: 100 INJECTION, SOLUTION INTRAVENOUS; SUBCUTANEOUS at 17:34

## 2024-08-03 RX ADMIN — OXYBUTYNIN CHLORIDE 5 MG: 5 TABLET, EXTENDED RELEASE ORAL at 08:22

## 2024-08-03 RX ADMIN — DIPHENHYDRAMINE HYDROCHLORIDE, ZINC ACETATE: 2; .1 CREAM TOPICAL at 06:08

## 2024-08-03 RX ADMIN — CYCLOPHOSPHAMIDE 100 MG: 50 CAPSULE ORAL at 21:47

## 2024-08-03 RX ADMIN — HEPARIN SODIUM 5000 UNITS: 5000 INJECTION INTRAVENOUS; SUBCUTANEOUS at 21:46

## 2024-08-03 RX ADMIN — LAMOTRIGINE 100 MG: 100 TABLET ORAL at 21:46

## 2024-08-03 RX ADMIN — HEPARIN SODIUM 5000 UNITS: 5000 INJECTION INTRAVENOUS; SUBCUTANEOUS at 06:05

## 2024-08-03 RX ADMIN — MONTELUKAST 10 MG: 10 TABLET, FILM COATED ORAL at 08:22

## 2024-08-03 RX ADMIN — ATORVASTATIN CALCIUM 20 MG: 20 TABLET, FILM COATED ORAL at 08:21

## 2024-08-03 RX ADMIN — Medication 1 TABLET: at 08:22

## 2024-08-03 RX ADMIN — IPRATROPIUM BROMIDE AND ALBUTEROL SULFATE 3 ML: 2.5; .5 SOLUTION RESPIRATORY (INHALATION) at 07:49

## 2024-08-03 RX ADMIN — EPOETIN ALFA 8000 UNITS: 4000 SOLUTION INTRAVENOUS; SUBCUTANEOUS at 15:27

## 2024-08-03 RX ADMIN — SENNOSIDES 17.2 MG: 8.6 TABLET, FILM COATED ORAL at 17:34

## 2024-08-03 RX ADMIN — LORATADINE 10 MG: 10 TABLET ORAL at 08:22

## 2024-08-03 RX ADMIN — PREDNISONE 30 MG: 10 TABLET ORAL at 08:22

## 2024-08-03 RX ADMIN — VENLAFAXINE HYDROCHLORIDE 150 MG: 150 CAPSULE, EXTENDED RELEASE ORAL at 08:22

## 2024-08-03 RX ADMIN — HEPARIN SODIUM 5000 UNITS: 5000 INJECTION INTRAVENOUS; SUBCUTANEOUS at 13:14

## 2024-08-03 RX ADMIN — SODIUM ZIRCONIUM CYCLOSILICATE 10 G: 10 POWDER, FOR SUSPENSION ORAL at 08:23

## 2024-08-03 RX ADMIN — SEVELAMER HYDROCHLORIDE 1600 MG: 800 TABLET ORAL at 12:17

## 2024-08-03 RX ADMIN — SEVELAMER HYDROCHLORIDE 1600 MG: 800 TABLET ORAL at 17:34

## 2024-08-03 RX ADMIN — FAMOTIDINE 10 MG: 20 TABLET ORAL at 13:14

## 2024-08-03 RX ADMIN — IPRATROPIUM BROMIDE AND ALBUTEROL SULFATE 3 ML: 2.5; .5 SOLUTION RESPIRATORY (INHALATION) at 19:38

## 2024-08-03 RX ADMIN — NIFEDIPINE 60 MG: 30 TABLET, FILM COATED, EXTENDED RELEASE ORAL at 08:22

## 2024-08-03 RX ADMIN — POLYSACCHARIDE-IRON COMPLEX 150 MG: 150 CAPSULE ORAL at 08:22

## 2024-08-03 RX ADMIN — SEVELAMER HYDROCHLORIDE 1600 MG: 800 TABLET ORAL at 08:22

## 2024-08-03 RX ADMIN — TRIAMCINOLONE ACETONIDE: 1 CREAM TOPICAL at 17:36

## 2024-08-03 RX ADMIN — PANTOPRAZOLE SODIUM 40 MG: 40 TABLET, DELAYED RELEASE ORAL at 06:05

## 2024-08-03 RX ADMIN — SENNOSIDES 17.2 MG: 8.6 TABLET, FILM COATED ORAL at 08:22

## 2024-08-03 RX ADMIN — GUAIFENESIN 200 MG: 200 SOLUTION ORAL at 08:22

## 2024-08-03 RX ADMIN — TRAZODONE HYDROCHLORIDE 200 MG: 100 TABLET ORAL at 21:46

## 2024-08-03 RX ADMIN — FLUTICASONE FUROATE AND VILANTEROL TRIFENATATE 1 PUFF: 200; 25 POWDER RESPIRATORY (INHALATION) at 08:23

## 2024-08-03 NOTE — PLAN OF CARE
Post-Dialysis RN Treatment Note    Blood Pressure:  Pre: 154/85 mm/Hg  Post:  156/77  mmHg   EDW:   TBD   Weight:  Pre: 116.9 kg   Post: 114.5 kg   Mode of weight measurement: Bed Scale   Volume Removed:  2400 ml    Treatment duration:   210 minutes    NS given:  No    Treatment shortened? No   Medications given during Rx:   Epogen   Estimated Kt/V:    Not Applicable   Access type:    Permacath/TDC   Access Issues:    Yes, skin near dressing red and irritated,  bleeding near insertion  site, primary RN aware, IR contacted   Report called to primary nurse:    Yes, Peri     Problem: METABOLIC, FLUID AND ELECTROLYTES - ADULT  Goal: Electrolytes maintained within normal limits  Description: INTERVENTIONS:  - Monitor labs and assess patient for signs and symptoms of electrolyte imbalances  - Administer electrolyte replacement as ordered  - Monitor response to electrolyte replacements, including repeat lab results as appropriate  - Instruct patient on fluid and nutrition as appropriate  Outcome: Progressing  Goal: Fluid balance maintained  Description: INTERVENTIONS:  - Monitor labs   - Monitor I/O and WT  - Instruct patient on fluid and nutrition as appropriate  - Assess for signs & symptoms of volume excess or deficit  Outcome: Progressing

## 2024-08-03 NOTE — ASSESSMENT & PLAN NOTE
Patient developed itchy rash over her anterior chest and back.  Looks like an allergic reaction.  Benadryl tablet and cream as needed.  Upon discussion with dermatology patient can be discharged on Kenalog cream and to follow with dermatology outpatient.  Rash would be expected to resolve within 1 to 2 weeks.

## 2024-08-03 NOTE — INCIDENTAL FINDINGS
The following findings require follow up:  Radiographic finding   Finding: fusiform ectasia of the ascending thoracic aorta measuring up to 40 mm.    Follow up required: Lower radiation dose chest CT   Follow up should be done within 1 year     Please notify the following clinician to assist with the follow up:   Dr. Meenakshi Balbuena      Incidental finding results were discussed with the Patient by Yasmine Meadows MD on 08/03/24.   They expressed understanding and all questions answered.

## 2024-08-03 NOTE — DISCHARGE SUMMARY
[facility]  Discharge- Ngozi Beard 1960, 64 y.o. 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 Given the lack of hydronephrosis this could either reflect adjacent calcification or nonobstructing stone.   Patient reports that she is still producing urine  US kidney and bladder: No hydronephrosis. 4 mm nonobstructing left intrarenal calculus  Urinalysis: 3+ blood.  3+ protein.  Innumerable red blood cells.  2-4 WBCs.  Moderate bacteria   AURA, C3, and C4 normal  Ig Kappa Free Light Chain:152.5   Ig Lambda Free Light Chain 87.1   Kappa/Lambda FluidC Ratio 1.75  Hepatitis panel nonreactive  HIV nonreactive  QuantiFERON gold negative  protein electrophoresis see labs  hypersensitivity pneumonitis profile negative  GBM antibodies: elevated >8  phospholipase A2 receptor Ab: Negative  ANCA: negative   7/17 Renal biopsy and bronchoscopy performed.  7/19 CM confirmed HD chair time for patient. Will be TTS.   Bronchial lavage: Negative for TB  7/20 PLEX initiated  7/22 R permacath placed by IR. Renal bx: diffuse crescentic glomerulonephritis, Anti-GMB type  Chest Xray 7/28: Showed no Pulmonary hemorrhage       Plan  Nephrology onboard - Dialysis TTS   Patient will continue follow-up with nephrology on the outpatient setting  As per nephrology patient started on prednisone taper  Currently on 30 mg once a day D2/7, 25 mg for 2 weeks then 20 mg for 2 weeks then15 mg for 2 weeks then 12.5 mg for 2 weeks then 10 mg for 2 weeks  then 7.5 mg for 2 weeks then Continue with 5 mg daily after that.  Patient would also continue with cyclophosphamide 100 mg daily for 90 days  Patient will follow-up with nephrology in the outpatient setting for further management evaluation  For PJP prophylaxis patient will continue with atovaquone 1500 milligrams daily while on immunosuppression asked patient that had reaction to Bactrim.    Rash  Assessment & Plan  Patient developed itchy rash over her anterior chest and back.  Looks like an allergic reaction.  Benadryl tablet and cream as needed.  Upon discussion with dermatology patient can be discharged on Kenalog cream and to follow with dermatology outpatient.  Rash would be expected to resolve within 1 to 2 weeks.  Follow-up with dermatology on the outpatient setting referral provided.    Thrombocytopenia (HCC)  Assessment & Plan  Likely in the setting of acute renal failure    Anemia  Assessment & Plan  In the setting of new kidney failure 2/2 rapidly progressive glomerulonephritis  7/27 S/p 1 U pRBC s  7/29: S/p 1 U pRBCs  8/1: S/p 1 U pRBCs    Started on Epogen while inpatient  Follow-up with primary care provider within 1 week    Persistent cough  Assessment & Plan  Patient endorses worsening shortness of breath and difficulty breathing  Patient noted to have multiple diffuse scattered pulmonary nodules on CT  Quantiferon gold negative 7/9/24  Completed Ceftriaxone x 5 days.      Abnormality of ascending aorta  Assessment & Plan  CT chest: Unchanged fusiform ectasia of the ascending thoracic aorta measuring up to 40 mm     Plan  Recommendation for follow-up low radiation dose chest CT in one year    Moderate persistent asthma w/out acute exacerbation  Assessment & Plan  Assessment:  Patient has PFTs ordered outpatient but they have not been completed.  Home medication regime Advair -21 mcg 2 puff twice daily, Proventil 2 puffs every 6 hours as needed  On 2 L via nasal cannula as needed.  O2 sat  95 to 96%    Plan:  Albuterol inhaler PRN  Continue fluticasone-vilanterol  Continue loratadine 10 mg    Dyslipidemia  Assessment & Plan  Continue on atorvastatin 20 Mg    Primary hypertension  Assessment & Plan  Blood Pressure: 114/70  Upon discussion with nephrology patient will be discharged on nifedipine 60 mg daily.    Bipolar 1 disorder (HCC)  Assessment & Plan  Continue on Effexor, trazodone and Lamictal        Medical Problems       Resolved Problems  Date Reviewed: 8/4/2024            Resolved    Sepsis (HCC) 7/14/2024     Resolved by  Ronny Webster MD    Leukocytosis 8/4/2024     Resolved by  Todd Dickens MD        Discharging Resident: Shital Keith MD  Discharging Attending: No att. providers found  PCP: Meenakshi Balbuena MD  Admission Date:   Admission Orders (From admission, onward)       Ordered        07/12/24 1147  INPATIENT ADMISSION  Once                          Discharge Date: 08/04/24    Consultations During Hospital Stay:  Nephrology  IR  Dermatology  Hematology-Oncology  Pulmonary Medicine     Procedures Performed:   HD cathter placement.   Bronchoscopy     Significant Findings / Test Results:   Anti-GBM RPGN   Anemia  Diffuse pulmonary nodules  Rash    Incidental Findings:   fusiform ectasia of the ascending thoracic aorta measuring up to 40 mm    Metabolic acidosis  Hyperkalemia   I reviewed the above mentioned incidental findings with the patient and/or family and they expressed understanding.    Test Results Pending at Discharge (will require follow up):  None     Outpatient Tests Requested:  CT scan chest within 4-6 weeks     Complications:  None    Reason for Admission: Abnormal labs     Hospital Course:   Ngozi Beard is a 64 y.o. female patient with PMHx of asthma, HTN, bipolar who originally presented to the hospital on 7/12/2024 due to abnormal labs. Patient was noted to have severe ANGELICA with creatinine 4 times her baseline by outpatient follow up with  pulmonary medicine. She was admitted to [facility] and Nephrology were consulted who did recommend transfer to [place]. Pulmonary has also been consulted given chest CT scan showing diffuse pulmonary nodularity. Multiple autoimmune labs were ordered. ANCA, anti-double standard DNA, C3, C4, hypersensitivities pneumonitis profile, and PhospholipasE A2 were all negative. She was also worked up for possible MM. Kidney function continued to worsen rapidly leading to high suspicion of RPGN. Anti-GB, antibodies came back positive. Nephrology recommended starting dialysis which was initiated on 7/16 a renal biopsy that was done on 7/17. Pulmonary were requested to do a bronchoscopy to exclude TB infection before loading the patient with high dose solumedrol. TB was excluded and she received 2.5 grams of IV solumedrol on 7/18 & 7/19. Was then started on Prednisone 60 mg. Permament HD cathter was placed by IR on 7/22. As kidney was undergoing a rapid destructive disease process nephrology recommended to start plasmapheresis. Hematology were consulted and patient received 14 days of PLEX. She was noted to have low hemoglobin requiring multiple transfusions. Started on Epogen by Nephrology. Given high does steroids Bactrim was started for PCP prophylaxis. She continued to have chronic cough but pulmonary recommended CT chest outpatient within 4-6 weeks. She was started on cyclophosphamide which she will have to continue for 3 months and her home Metoprolol was switched to Nifedipine. On 8/1 she developed diffuse body rash and dermatology were consulted it was thought to be an allergic reaction to bacterium and was switched to Atovaquone. As we planned discharge on 8/3 she had mild bleeding from her HD catheter, IR on call were contacted and stated that this is expected and no worrisome, however patient didn't have a ride as it was late around 6 PM. She is being discharged stable and set up to start dialysis on a TTS  schedule. She will follow with Nephrology, Pulmonology, and Dermatology.        Please see above list of diagnoses and related plan for additional information.     Condition at Discharge: stable    Discharge Day Visit / Exam:   Subjective: No acute overnight events reported by nursing team.  Patient seen at bedside denied any more bleeding from HD cath.  In regards to rash noted that is still persistent and rash has somewhat progressed however feels well overall.  Patient anxious if possible to be discharged today otherwise no acute complaint.  Vitals: Blood Pressure: 114/70 (08/04/24 0753)  Pulse: 82 (08/04/24 0753)  Temperature: 97.7 °F (36.5 °C) (08/04/24 0753)  Temp Source: Oral (08/03/24 1630)  Respirations: 18 (08/04/24 0753)  Weight - Scale: 112 kg (247 lb 5.7 oz) (08/04/24 0559)  SpO2: 99 % (08/04/24 0753)  Exam:   Physical Exam  Vitals and nursing note reviewed.   Constitutional:       General: She is not in acute distress.     Appearance: She is well-developed.   HENT:      Head: Normocephalic and atraumatic.   Eyes:      Conjunctiva/sclera: Conjunctivae normal.   Cardiovascular:      Rate and Rhythm: Normal rate and regular rhythm.      Heart sounds: No murmur heard.  Pulmonary:      Effort: Pulmonary effort is normal. No respiratory distress.      Breath sounds: Normal breath sounds.   Chest:       Abdominal:      Palpations: Abdomen is soft.      Tenderness: There is no abdominal tenderness.   Musculoskeletal:         General: No swelling.      Cervical back: Neck supple.   Skin:     General: Skin is warm and dry.      Capillary Refill: Capillary refill takes less than 2 seconds.      Findings: Rash present.      Comments: Extensive morbilliform rash anterior thorax and back.  No involvement of the abdomen or extremities.   Neurological:      Mental Status: She is alert.   Psychiatric:         Mood and Affect: Mood normal.          Discussion with Family: Patient declined call to .      Discharge instructions/Information to patient and family:   See after visit summary for information provided to patient and family.      Provisions for Follow-Up Care:  See after visit summary for information related to follow-up care and any pertinent home health orders.      Mobility at time of Discharge:   Basic Mobility Inpatient Raw Score: 24  JH-HLM Goal: 8: Walk 250 feet or more  JH-HLM Achieved: 7: Walk 25 feet or more  HLM Goal achieved. Continue to encourage appropriate mobility.     Disposition:   Home    Planned Readmission: None    Discharge Medications:  See after visit summary for reconciled discharge medications provided to patient and/or family.      **Please Note: This note may have been constructed using a voice recognition system**

## 2024-08-03 NOTE — DISCHARGE INSTR - AVS FIRST PAGE
Dear Ngozi Beard,     It was our pleasure to care for you here at [facility].  It is our hope that we were always able to exceed the expected standards for your care during your stay.  You were hospitalized due to Kidney failure.  You were cared for on the Forth floor by Shital Keith MD under the service of Todd Askew* with the [facility] who covers for your primary care physician (PCP), Meenakshi Balbuena MD, while you were hospitalized.  If you have any questions or concerns related to this hospitalization, you may contact us at .  For follow up as well as any medication refills, we recommend that you follow up with your primary care physician.  A registered nurse will reach out to you by phone within a few days after your discharge to answer any additional questions that you may have after going home.  However, at this time we provide for you here, the most important instructions / recommendations at discharge:     Notable Medication Adjustments -   Starting tomorrow 08/05/2024 continue with prednisone 30 mg once a day for 5 days, then will continue with prednisone 25 mg once a day for 14 days then will continue with prednisone 20 mg once a day for 14 days then continue with prednisone 15 mg once a day for 14 days, then continue with prednisone 12.5 mg once a day for 14 days THEN prednisone 7.5 mg once a day for 2 weeks then will continue with prednisone 5 mg once a day indefinitely.  Start taking Atovaquone 10 mL (1500 mg) daily starting tomorrow.  Start taking cyclophosphamide two capsules of 50 mg for a total of 100 mg once daily starting tomorrow.  Start taking Nifedipine 30 mg two tablets once daily starting tomorrow.   Start taking Oxybutynin 5 mg one tablet once daily.   Start taking pantoprazole 40 mg one tablet once daily.   Start taking Ferrex 150 mg one tablet once daily.   Start taking Sevelamer 800  mg two tablets three times daily.   Start taking Lokelma one packet once daily by mouth.   Start taking calcium carbonate 500 mg one tablet daily with breakfast.   Please apply Kenalog cream on rash twice daily for up to 2 weeks or until rash resolves.   Use Miralax and Senna tablets as needed for constipation.   Stop taking Metoprolol.   Testing Required after Discharge -   Repeat CT chest in 4 to 6 weeks within discharge.  ** Please contact your PCP to request testing orders for any of the testing recommended here **  Important follow up information -   Please follow-up with your primary care provider within 1 week of discharge.  Please follow-up with nephrology outpatient.  Please follow-up with dermatology outpatient.  Please follow-up with pulmonary medicine outpatient.  Other Instructions -   Please be aware that you are on Saturday, Tuesday, and Thursday dialysis schedule.  Please be aware that your diffuse body rash might take up to 1 to 2 weeks to resolve.  If you experience any chest pain, shortness of breath, or worsening of symptoms please return back to the ED.  Please review this entire after visit summary as additional general instructions including medication list, appointments, activity, diet, any pertinent wound care, and other additional recommendations from your care team that may be provided for you.      Sincerely,     Shital Keith MD

## 2024-08-03 NOTE — ASSESSMENT & PLAN NOTE
WBC   Date Value Ref Range Status   08/03/2024 11.69 (H) 4.31 - 10.16 Thousand/uL Final     Patient admitted with sepsis likely secondary to pneumonitis as evidenced by tachycardia, tachypnea and leukocytosis  CT: redemonstration of multiple diffuse scattered nodules, less pronounced than on 5/16/2024 suggestive of mycobacterial infection  QuantiFERON gold negative   BC: 1/2 staph epidermis detected-contaminated  Leukocytosis likely due to steroid use  Completed Ceftriaxone x 5 days

## 2024-08-03 NOTE — ASSESSMENT & PLAN NOTE
Assessment:  Patient admitted with acute renal failure with creatinine of 5.74 (baseline .8)  Patient had BUN 73 and GFR of 6  CT A/P: No hydronephrosis, however there is apparent 3 mm stone near or questionably within the distal right ureter (axial image 142). Given the lack of hydronephrosis this could either reflect adjacent calcification or nonobstructing stone.   Patient reports that she is still producing urine  US kidney and bladder: No hydronephrosis. 4 mm nonobstructing left intrarenal calculus  Urinalysis: 3+ blood.  3+ protein.  Innumerable red blood cells.  2-4 WBCs.  Moderate bacteria   AURA, C3, and C4 normal  Ig Kappa Free Light Chain:152.5   Ig Lambda Free Light Chain 87.1   Kappa/Lambda FluidC Ratio 1.75  Hepatitis panel nonreactive  HIV nonreactive  QuantiFERON gold negative  protein electrophoresis see labs  hypersensitivity pneumonitis profile negative  GBM antibodies: elevated >8  phospholipase A2 receptor Ab: Negative  ANCA: negative   7/17 Renal biopsy and bronchoscopy performed.  7/19 CM confirmed HD chair time for patient. Will be TTS.   Bronchial lavage: Negative for TB  7/20 PLEX initiated  7/22 R permacath placed by IR. Renal bx: diffuse crescentic glomerulonephritis, Anti-GMB type  Chest Xray 7/28: Showed no Pulmonary hemorrhage       Plan  Nephrology onboard - Dialysis TTS   Upon discussion with nephrology, patient can be discharged and will be medically stable after receiving her last PLEX on Thursday 8/1/2024  Was started on Epogen by Nephrology through her hospital stay.  Patient was unable to be discharged today due to mild bleeding from her HD catheter.  Upon discussion with IR on-call they stated that this would be expected and recommended to only change the dressing.  However, patient reported that she does not have a ride as it was around 6 PM.  Will discharge tomorrow.  She developed allergic rash secondary to Bactrim.  Discussed with Dr. Pham from infectious disease and  recommended discharging her on atovaquone for PCP prophylaxis.  Hematology onboard for PLEX  Began prednisone taper 7/27 per nephrology  Cyclophosphamide 100 mg daily for 3 months   Discontinue Torsemide to 100 mg daily given aneuric.   Monitor blood glucose levels

## 2024-08-03 NOTE — PLAN OF CARE
Problem: PAIN - ADULT  Goal: Verbalizes/displays adequate comfort level or baseline comfort level  Description: Interventions:  - Encourage patient to monitor pain and request assistance  - Assess pain using appropriate pain scale  - Administer analgesics based on type and severity of pain and evaluate response  - Implement non-pharmacological measures as appropriate and evaluate response  - Consider cultural and social influences on pain and pain management  - Notify physician/advanced practitioner if interventions unsuccessful or patient reports new pain  Outcome: Progressing     Problem: INFECTION - ADULT  Goal: Absence or prevention of progression during hospitalization  Description: INTERVENTIONS:  - Assess and monitor for signs and symptoms of infection  - Monitor lab/diagnostic results  - Monitor all insertion sites, i.e. indwelling lines, tubes, and drains  - Monitor endotracheal if appropriate and nasal secretions for changes in amount and color  - [place] appropriate cooling/warming therapies per order  - Administer medications as ordered  - Instruct and encourage patient and family to use good hand hygiene technique  - Identify and instruct in appropriate isolation precautions for identified infection/condition  Outcome: Progressing  Goal: Absence of fever/infection during neutropenic period  Description: INTERVENTIONS:  - Monitor WBC    Outcome: Progressing     Problem: SAFETY ADULT  Goal: Patient will remain free of falls  Description: INTERVENTIONS:  - Educate patient/family on patient safety including physical limitations  - Instruct patient to call for assistance with activity   - Consult OT/PT to assist with strengthening/mobility   - Keep Call bell within reach  - Keep bed low and locked with side rails adjusted as appropriate  - Keep care items and personal belongings within reach  - Initiate and maintain comfort rounds  - Make Fall Risk Sign visible to staff  - Offer Toileting every  Hours,  in advance of need  - Initiate/Maintain alarm  - Obtain necessary fall risk management equipment:   - Apply yellow socks and bracelet for high fall risk patients  - Consider moving patient to room near nurses station  Outcome: Progressing  Goal: Maintain or return to baseline ADL function  Description: INTERVENTIONS:  -  Assess patient's ability to carry out ADLs; assess patient's baseline for ADL function and identify physical deficits which impact ability to perform ADLs (bathing, care of mouth/teeth, toileting, grooming, dressing, etc.)  - Assess/evaluate cause of self-care deficits   - Assess range of motion  - Assess patient's mobility; develop plan if impaired  - Assess patient's need for assistive devices and provide as appropriate  - Encourage maximum independence but intervene and supervise when necessary  - Involve family in performance of ADLs  - Assess for home care needs following discharge   - Consider OT consult to assist with ADL evaluation and planning for discharge  - Provide patient education as appropriate  Outcome: Progressing  Goal: Maintains/Returns to pre admission functional level  Description: INTERVENTIONS:  - Perform AM-PAC 6 Click Basic Mobility/ Daily Activity assessment daily.  - Set and communicate daily mobility goal to care team and patient/family/caregiver.   - Collaborate with rehabilitation services on mobility goals if consulted  - Perform Range of Motion  times a day.  - Reposition patient every  hours.  - Dangle patient  times a day  - Stand patient  times a day  - Ambulate patient  times a day  - Out of bed to chair  times a day   - Out of bed for meal times a day  - Out of bed for toileting  - Record patient progress and toleration of activity level   Outcome: Progressing     Problem: DISCHARGE PLANNING  Goal: Discharge to home or other facility with appropriate resources  Description: INTERVENTIONS:  - Identify barriers to discharge w/patient and caregiver  - Arrange for  needed discharge resources and transportation as appropriate  - Identify discharge learning needs (meds, wound care, etc.)  - Arrange for interpretive services to assist at discharge as needed  - Refer to Case Management Department for coordinating discharge planning if the patient needs post-hospital services based on physician/advanced practitioner order or complex needs related to functional status, cognitive ability, or social support system  Outcome: Progressing     Problem: Knowledge Deficit  Goal: Patient/family/caregiver demonstrates understanding of disease process, treatment plan, medications, and discharge instructions  Description: Complete learning assessment and assess knowledge base.  Interventions:  - Provide teaching at level of understanding  - Provide teaching via preferred learning methods  Outcome: Progressing     Problem: METABOLIC, FLUID AND ELECTROLYTES - ADULT  Goal: Electrolytes maintained within normal limits  Description: INTERVENTIONS:  - Monitor labs and assess patient for signs and symptoms of electrolyte imbalances  - Administer electrolyte replacement as ordered  - Monitor response to electrolyte replacements, including repeat lab results as appropriate  - Instruct patient on fluid and nutrition as appropriate  Outcome: Progressing  Goal: Fluid balance maintained  Description: INTERVENTIONS:  - Monitor labs   - Monitor I/O and WT  - Instruct patient on fluid and nutrition as appropriate  - Assess for signs & symptoms of volume excess or deficit  Outcome: Progressing     Problem: Nutrition/Hydration-ADULT  Goal: Nutrient/Hydration intake appropriate for improving, restoring or maintaining nutritional needs  Description: Monitor and assess patient's nutrition/hydration status for malnutrition. Collaborate with interdisciplinary team and initiate plan and interventions as ordered.  Monitor patient's weight and dietary intake as ordered or per policy. Utilize nutrition screening tool and  intervene as necessary. Determine patient's food preferences and provide high-protein, high-caloric foods as appropriate.     INTERVENTIONS:  - Monitor oral intake, urinary output, labs, and treatment plans  - Assess nutrition and hydration status and recommend course of action  - Evaluate amount of meals eaten  - Assist patient with eating if necessary   - Allow adequate time for meals  - Recommend/ encourage appropriate diets, oral nutritional supplements, and vitamin/mineral supplements  - Order, calculate, and assess calorie counts as needed  - Recommend, monitor, and adjust tube feedings and TPN/PPN based on assessed needs  - Assess need for intravenous fluids  - Provide specific nutrition/hydration education as appropriate  - Include patient/family/caregiver in decisions related to nutrition  Outcome: Progressing     Problem: Prexisting or High Potential for Compromised Skin Integrity  Goal: Skin integrity is maintained or improved  Description: INTERVENTIONS:  - Identify patients at risk for skin breakdown  - Assess and monitor skin integrity  - Assess and monitor nutrition and hydration status  - Monitor labs   - Assess for incontinence   - Turn and reposition patient  - Assist with mobility/ambulation  - Relieve pressure over bony prominences  - Avoid friction and shearing  - Provide appropriate hygiene as needed including keeping skin clean and dry  - Evaluate need for skin moisturizer/barrier cream  - Collaborate with interdisciplinary team   - Patient/family teaching  - Consider wound care consult   Outcome: Progressing     Problem: Potential for Falls  Goal: Patient will remain free of falls  Description: INTERVENTIONS:  - Educate patient/family on patient safety including physical limitations  - Instruct patient to call for assistance with activity   - Consult OT/PT to assist with strengthening/mobility   - Keep Call bell within reach  - Keep bed low and locked with side rails adjusted as  appropriate  - Keep care items and personal belongings within reach  - Initiate and maintain comfort rounds  - Make Fall Risk Sign visible to staff  - Offer Toileting every  Hours, in advance of need  - Initiate/Maintain alarm  - Obtain necessary fall risk management equipment:   - Apply yellow socks and bracelet for high fall risk patients  - Consider moving patient to room near nurses station  Outcome: Progressing

## 2024-08-03 NOTE — PLAN OF CARE
Problem: PAIN - ADULT  Goal: Verbalizes/displays adequate comfort level or baseline comfort level  Description: Interventions:  - Encourage patient to monitor pain and request assistance  - Assess pain using appropriate pain scale  - Administer analgesics based on type and severity of pain and evaluate response  - Implement non-pharmacological measures as appropriate and evaluate response  - Consider cultural and social influences on pain and pain management  - Notify physician/advanced practitioner if interventions unsuccessful or patient reports new pain  Outcome: Progressing     Problem: INFECTION - ADULT  Goal: Absence or prevention of progression during hospitalization  Description: INTERVENTIONS:  - Assess and monitor for signs and symptoms of infection  - Monitor lab/diagnostic results  - Monitor all insertion sites, i.e. indwelling lines, tubes, and drains  - Monitor endotracheal if appropriate and nasal secretions for changes in amount and color  - [place] appropriate cooling/warming therapies per order  - Administer medications as ordered  - Instruct and encourage patient and family to use good hand hygiene technique  - Identify and instruct in appropriate isolation precautions for identified infection/condition  Outcome: Progressing  Goal: Absence of fever/infection during neutropenic period  Description: INTERVENTIONS:  - Monitor WBC    Outcome: Progressing

## 2024-08-03 NOTE — PROGRESS NOTES
NEPHROLOGY PROGRESS NOTE   Ngozi Beard 64 y.o. 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  -- Currently being treated with prednisone taper along with cyclophosphamide 100 mg daily for 3 months with prophylaxis with PPI and Bactrim along with calcium and vitamin D  --last plasmapheresis for today, August 1  -- Plan to continue outpatient dialysis at [facility] starting next Tuesday  -- Plan for hemodialysis today.  -- Message sent to the outpatient HD clinic  --Stable for discharge after dialysis today     Nephrotic range proteinuria  -- Not in steady state but UPC was 5.8 g/g  -- Biopsy-proven anti-GBM  -- Continue current management with prednisone  -- Currently on prednisone 40 mg daily for 1 week then will plan to drop to prednisone 30 mg daily starting August 3 for 7 days.  Then plan for 25 mg x 2 weeks.  Then 20 mg for 2 weeks.  Then 15 mg for 2 weeks.  Then 12.5 mg for 2 weeks.  Then 10 mg for 2 weeks.  Then 7.5 mg for 2 weeks.  And then will be maintained on 5 mg daily     Hyperkalemia--resolved     Anemia of chronic disease  --Hemoglobin better at 8  --No objections to blood transfusion  -- Continue Epogen on dialysis and will convert to Mircera as an outpatient     Hypertension  -- Continue nifedipine off torsemide  due to anuria  -- Blood pressure control continue current management     Metabolic acidosis  -- Acidosis improving, increase bicarb bath to 40 for today     Immunosuppression  Methylprednisolone x 3, completed  Currently on prednisone 60 mg, started on 7/20  60 mg for 1 week, completed  40 mg for 1 week 7/27  30 mg for 1 week, starting August 3  25 mg for 2 weeks  20 mg for 2 weeks  15 mg for 2 weeks   12.5 mg for 2 weeks  10 mg for 2 weeks  7.5 mg for 2 weeks  Continue with 5 mg daily after that     Cytoxan 100 mg daily adjusted by kidney function and age. Plan to continue for 3 months      Completed plasmapheresis      SUBJECTIVE / INTERVAL HISTORY:    No complaints feels fine    OBJECTIVE:  Current Weight: Weight - Scale: 114 kg (250 lb 7.1 oz)  Vitals:    08/02/24 2024 08/02/24 2229 08/03/24 0600 08/03/24 0822   BP:  132/76  130/67   BP Location:       Pulse:  80  80   Resp:  16  18   Temp:  98.8 °F (37.1 °C)  98.3 °F (36.8 °C)   TempSrc:       SpO2: 96% 94%  94%   Weight:   114 kg (250 lb 7.1 oz)      No intake or output data in the 24 hours ending 08/03/24 0901    Review of Systems:    Constitutional: Negative for chills and fever.   HENT: Negative for ear pain and sore throat.    Eyes: Negative for pain and visual disturbance.   Respiratory: Negative for cough and shortness of breath.    Cardiovascular: Negative for chest pain and palpitations.   Gastrointestinal: Negative for abdominal pain and vomiting.   Genitourinary: Negative for dysuria and hematuria.   Musculoskeletal: Negative for arthralgias and back pain.   Skin: Negative for color change and rash.   Neurological: Negative for seizures and syncope.   12 point ROS has been reviewed.    Physical Exam  Vitals and nursing note reviewed.   Constitutional:       General: She is not in acute distress.     Appearance: She is well-developed. She is obese.   HENT:      Head: Normocephalic and atraumatic.   Eyes:      General: No scleral icterus.      Conjunctiva/sclera: Conjunctivae normal.      Pupils: Pupils are equal, round, and reactive to light.   Cardiovascular:      Rate and Rhythm: Normal rate and regular rhythm.      Heart sounds: S1 normal and S2 normal. No murmur heard.     No friction rub. No gallop.   Pulmonary:      Effort: Pulmonary effort is normal. No respiratory distress.      Breath sounds: Normal breath sounds. No wheezing or rales.   Abdominal:      General: Bowel sounds are normal.      Palpations: Abdomen is soft.      Tenderness: There is no abdominal tenderness. There is no rebound.   Musculoskeletal:         General: Normal range of motion.      Cervical back: Normal range of motion and neck supple.   Skin:     Findings: No rash.   Neurological:      Mental Status: She is alert and oriented to person, place, and time.   Psychiatric:         Behavior: Behavior normal.         Medications:    Current Facility-Administered Medications:     acetaminophen (TYLENOL) tablet 650 mg, 650 mg, Oral, Q6H PRN, Tram Palacios PA-C, 650 mg at 07/19/24 1302    albumin human (FLEXBUMIN) 5 % injection 200 g, 200 g, Intravenous, Daily, Yasmine Meadows MD    albuterol (PROVENTIL HFA,VENTOLIN HFA) inhaler 2 puff, 2 puff, Inhalation, Q4H PRN, Tram Palacios PA-C, 2 puff at 08/01/24 0744    aluminum-magnesium hydroxide-simethicone (MAALOX) oral suspension 30 mL, 30 mL, Oral, Q4H PRN, Yasmine Meadows MD    atorvastatin (LIPITOR) tablet 20 mg, 20 mg, Oral, Daily, Tram Palacios PA-C, 20 mg at 08/03/24 0821    bisacodyl (DULCOLAX) rectal suppository 10 mg, 10 mg, Rectal, Daily PRN, Daya May DO, 10 mg at 07/31/24 0817    calcium carbonate (OYSTER SHELL,OSCAL) 500 mg tablet 1 tablet, 1 tablet, Oral, Daily With Breakfast, Yasmine Meadows MD, 1 tablet at 08/03/24 0822    calcium gluconate 2 g in sodium chloride 0.9% 100 mL (premix), 2 g, Intravenous, Once, Yasmine Meadows MD, Stopped at 07/30/24 1925    cyclophosphamide (CYTOXAN) capsule 100 mg, 100 mg, Oral, Daily, Tirso  Lindsey Montez MD, 100 mg at 08/02/24 2020    diphenhydrAMINE (BENADRYL) tablet 25 mg, 25 mg, Oral, Q6H PRN, Yasmine Meadows MD, 25 mg at 08/02/24 0925    diphenhydrAMINE-zinc acetate (BENADRYL) 2-0.1 % cream, , Topical, TID PRN, Yasmine Meadows MD, Given at 08/03/24 0608    epoetin shavonne (EPOGEN,PROCRIT) injection 8,000 Units, 8,000 Units, Intravenous, Once per day on Tuesday Thursday Saturday, Ata Burns MD, 8,000 Units at 08/01/24 1213    famotidine (PEPCID) tablet 10 mg, 10 mg, Oral, Daily PRN, Daya May DO, 10 mg at 07/28/24 2115    fluticasone-vilanterol 200-25 mcg/actuation 1 puff, 1 puff, Inhalation, Daily, Tram Palacios PA-C, 1 puff at 08/03/24 0823    guaiFENesin (ROBITUSSIN) oral liquid 200 mg, 200 mg, Oral, BID, Yasmine Meadows MD, 200 mg at 08/03/24 0822    heparin (porcine) subcutaneous injection 5,000 Units, 5,000 Units, Subcutaneous, Q8H JACK, Xavier Medina MD, 5,000 Units at 08/03/24 0605    insulin lispro (HumALOG/ADMELOG) 100 units/mL subcutaneous injection 2-12 Units, 2-12 Units, Subcutaneous, TID AC, 2 Units at 08/02/24 1631 **AND** Fingerstick Glucose (POCT), , , TID AC, Yasmine Meadows MD    ipratropium-albuterol (DUO-NEB) 0.5-2.5 mg/3 mL inhalation solution 3 mL, 3 mL, Nebulization, Q6H, Luke Montez MD, 3 mL at 08/03/24 0749    iron polysaccharides (FERREX) capsule 150 mg, 150 mg, Oral, Daily, Ata Burns MD, 150 mg at 08/03/24 0822    lamoTRIgine (LaMICtal) tablet 100 mg, 100 mg, Oral, HS, Yasmine Meadows MD, 100 mg at 08/02/24 2230    loratadine (CLARITIN) tablet 10 mg, 10 mg, Oral, Daily, Tram Palacios PA-C, 10 mg at 08/03/24 0822    melatonin tablet 3 mg, 3 mg, Oral, Daily PRN, Yasmine Meadows MD, 3 mg at 07/28/24 2115    montelukast (SINGULAIR) tablet 10 mg, 10 mg, Oral, Daily, Tram Palacios PA-C, 10 mg at 08/03/24 0822    NIFEdipine (PROCARDIA XL) 24 hr tablet 60 mg, 60 mg, Oral, Daily, Joselyn Reyes Bahamonde, MD, 60 mg at 08/03/24 0822    ondansetron (ZOFRAN) injection 4 mg, 4 mg, Intravenous, Q6H  PRN, Yasmine Meadows MD, 4 mg at 08/02/24 0719    oxybutynin (DITROPAN-XL) 24 hr tablet 5 mg, 5 mg, Oral, Daily, Tram Palacios PA-C, 5 mg at 08/03/24 0822    pantoprazole (PROTONIX) EC tablet 40 mg, 40 mg, Oral, Daily Before Breakfast, Ronny Webster MD, 40 mg at 08/03/24 0605    polyethylene glycol (MIRALAX) packet 17 g, 17 g, Oral, Daily, Xavier Medina MD, 17 g at 07/31/24 0817    predniSONE tablet 30 mg, 30 mg, Oral, Daily, Joselyn Reyes Bahamonde, MD, 30 mg at 08/03/24 0822    senna (SENOKOT) tablet 17.2 mg, 2 tablet, Oral, BID, Yasmine Meadows MD, 17.2 mg at 08/03/24 0822    sevelamer (RENAGEL) tablet 1,600 mg, 1,600 mg, Oral, TID With Meals, Vane Estrada MD, 1,600 mg at 08/03/24 0822    Sodium Zirconium Cyclosilicate (Lokelma) 10 g, 10 g, Oral, Daily, Tirso Montez MD, 10 g at 08/03/24 0823    traZODone (DESYREL) tablet 200 mg, 200 mg, Oral, HS, Tram Palacios PA-C, 200 mg at 08/02/24 2230    triamcinolone (KENALOG) 0.1 % cream, , Topical, BID, Yasmine Meadows MD, Given at 08/02/24 1806    venlafaxine (EFFEXOR-XR) 24 hr capsule 150 mg, 150 mg, Oral, Daily, Tram Palacios PA-C, 150 mg at 08/03/24 0822    Laboratory Results:  Results from last 7 days   Lab Units 08/03/24  0428 08/02/24  1832 08/02/24  0620 08/01/24  1755 08/01/24  0858 08/01/24  0844 07/31/24  0750 07/30/24  0918 07/29/24  1432 07/29/24  0558 07/28/24  0539 07/28/24  0533   WBC Thousand/uL 11.69* 13.02* 13.17*  --   --  16.16* 17.97* 18.31*  --  17.92*   < >  --    HEMOGLOBIN g/dL 7.6* 7.8* 8.0* 8.0*  --  6.7* 7.8* 7.0*   < > 6.8*   < >  --    HEMATOCRIT % 23.5* 24.3* 25.1* 24.2*  --  21.3* 25.2* 22.2*   < > 21.8*   < >  --    PLATELETS Thousands/uL 120* 139* 124*  --   --  122* 128* 137*  --  136*   < >  --    POTASSIUM mmol/L 5.0  --  4.7  --  4.7  --   --  4.7  --   --   --  4.7   CHLORIDE mmol/L 108  --  106  --  111*  --   --  112*  --   --   --  105   CO2 mmol/L 20*  --  21  --  16*  --   --  16*  --   --   --  24   BUN mg/dL 92*  --  64*  --   109*  --   --  98*  --   --   --  54*   CREATININE mg/dL 8.25*  --  6.34*  --  9.35*  --   --  8.66*  --   --   --  5.60*   CALCIUM mg/dL 9.6  --  9.2  --  9.7  --   --  9.4  --   --   --  9.2   PHOSPHORUS mg/dL  --   --   --   --   --   --   --  6.8*  --   --   --   --     < > = values in this interval not displayed.       PLEASE NOTE:  This encounter was completed utilizing the [employer]Flu[employer] Direct Speech Voice Recognition Software. Grammatical errors, random word insertions, pronoun errors and incomplete sentences are occasional consequences of the system due to software limitations, ambient noise and hardware issues.These may be missed by proof reading prior to affixing electronic signature. Any questions or concerns about the content, text or information contained within the body of this dictation should be directly addressed to the physician for clarification. Please do not hesitate to call me directly if you have any any questions or concerns.

## 2024-08-03 NOTE — PROGRESS NOTES
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 Given the lack of hydronephrosis this could either reflect adjacent calcification or nonobstructing stone.   Patient reports that she is still producing urine  US kidney and bladder: No hydronephrosis. 4 mm nonobstructing left intrarenal calculus  Urinalysis: 3+ blood.  3+ protein.  Innumerable red blood cells.  2-4 WBCs.  Moderate bacteria   AURA, C3, and C4 normal  Ig Kappa Free Light Chain:152.5   Ig Lambda Free Light Chain 87.1   Kappa/Lambda FluidC Ratio 1.75  Hepatitis panel nonreactive  HIV nonreactive  QuantiFERON gold negative  protein electrophoresis see labs  hypersensitivity pneumonitis profile negative  GBM antibodies: elevated >8  phospholipase A2 receptor Ab: Negative  ANCA: negative   7/17 Renal biopsy and bronchoscopy performed.  7/19 CM confirmed HD chair time for patient. Will be TTS.   Bronchial lavage: Negative for TB  7/20 PLEX initiated  7/22 R permacath placed by IR. Renal bx: diffuse crescentic glomerulonephritis, Anti-GMB type  Chest Xray 7/28: Showed no Pulmonary hemorrhage       Plan  Nephrology onboard - Dialysis TTS   Upon discussion with nephrology, patient can be discharged and will be medically stable after receiving her last PLEX on Thursday 8/1/2024  Was started on Epogen by Nephrology through her hospital stay.  Patient was unable to be discharged today due to mild bleeding from her HD catheter.  Upon discussion with IR  on-call they stated that this would be expected and recommended to only change the dressing.  However, patient reported that she does not have a ride as it was around 6 PM.  Will discharge tomorrow.  She developed allergic rash secondary to Bactrim.  Discussed with Dr. Pham from infectious disease and recommended discharging her on atovaquone for PCP prophylaxis.  Hematology onboard for PLEX  Began prednisone taper 7/27 per nephrology  Cyclophosphamide 100 mg daily for 3 months   Discontinue Torsemide to 100 mg daily given aneuric.   Monitor blood glucose levels    Moderate persistent asthma w/out acute exacerbation  Assessment & Plan  Assessment:  Patient has PFTs ordered outpatient but they have not been completed.  Home medication regime Advair -21 mcg 2 puff twice daily, Proventil 2 puffs every 6 hours as needed  On 2 L via nasal cannula as needed.  O2 sat 95 to 96%    Plan:  Supplemental oxygen as needed  DuoNebs every 6 hours, per pulmonology  Albuterol inhaler PRN  Continue fluticasone-vilanterol  Continue loratadine 10 mg    Anemia  Assessment & Plan  In the setting of new kidney failure 2/2 rapidly progressive glomerulonephritis  7/27 S/p 1 U pRBC s  7/29: S/p 1 U pRBCs  8/1: S/p 1 U pRBCs    Plan:  Monitor hemoglobin with CBC  Transfuse for Hgb <7    Rash  Assessment & Plan  Patient developed itchy rash over her anterior chest and back.  Looks like an allergic reaction.  Benadryl tablet and cream as needed.  Upon discussion with dermatology patient can be discharged on Kenalog cream and to follow with dermatology outpatient.  Rash would be expected to resolve within 1 to 2 weeks.    Persistent cough  Assessment & Plan  Patient endorses worsening shortness of breath and difficulty breathing  Patient noted to have multiple diffuse scattered pulmonary nodules on CT  Quantiferon gold negative 7/9/24  Completed Ceftriaxone x 5 days.      Abnormality of ascending aorta  Assessment & Plan  CT chest:  Unchanged fusiform ectasia of the ascending thoracic aorta measuring up to 40 mm     Plan  Recommendation for follow-up low radiation dose chest CT in one year    Dyslipidemia  Assessment & Plan  Continue on atorvastatin 20 Mg    Primary hypertension  Assessment & Plan  Upon discussion with nephrology patient will be discharged on nifedipine 60 mg daily.    Bipolar 1 disorder (HCC)  Assessment & Plan  Continue on Effexor, trazodone and Lamictal    Sepsis (HCC)-resolved as of 7/14/2024  Assessment & Plan    WBC   Date Value Ref Range Status   08/03/2024 11.69 (H) 4.31 - 10.16 Thousand/uL Final     Patient admitted with sepsis likely secondary to pneumonitis as evidenced by tachycardia, tachypnea and leukocytosis  CT: redemonstration of multiple diffuse scattered nodules, less pronounced than on 5/16/2024 suggestive of mycobacterial infection  QuantiFERON gold negative   BC: 1/2 staph epidermis detected-contaminated  Leukocytosis likely due to steroid use  Completed Ceftriaxone x 5 days         VTE Pharmacologic Prophylaxis: VTE Score: 4 Moderate Risk (Score 3-4) - Pharmacological DVT Prophylaxis Ordered: heparin.    Mobility:   Basic Mobility Inpatient Raw Score: 24  JH-HLM Goal: 8: Walk 250 feet or more  JH-HLM Achieved: 8: Walk 250 feet ot more  JH-HLM Goal achieved. Continue to encourage appropriate mobility.    Patient Centered Rounds: I performed bedside rounds with nursing staff today.  Discussions with Specialists or Other Care Team Provider:     Education and Discussions with Family / Patient: Updated  (daughter) via phone.    Current Length of Stay: 22 day(s)  Current Patient Status: Inpatient   Discharge Plan: Anticipate discharge tomorrow to home.    Code Status: Level 1 - Full Code    Subjective:   Patient had no complaints this morning.  No chest pain, shortness of breath, or palpitations.  Her rash looked mildly improved.  She was so eager to go home.  However, I was called at bedside  "around 5 PM for mild bleeding from her HD catheter.  Upon discussion with IR if this was expected and they recommended just to change the dressing.  As I discussed discharge with the patient she stated that she does not have a ride at this point of the day and would like to stay another day.  Sister \" Jacquelyn\" was updated over the phone.    Objective:     Vitals:   Temp (24hrs), Av.4 °F (36.9 °C), Min:98.2 °F (36.8 °C), Max:98.8 °F (37.1 °C)    Temp:  [98.2 °F (36.8 °C)-98.8 °F (37.1 °C)] 98.4 °F (36.9 °C)  HR:  [73-80] 78  Resp:  [16-18] 16  BP: (130-156)/(67-85) 156/77  SpO2:  [94 %-96 %] 94 %  Body mass index is 44.36 kg/m².     Input and Output Summary (last 24 hours):     Intake/Output Summary (Last 24 hours) at 8/3/2024 1814  Last data filed at 8/3/2024 1649  Gross per 24 hour   Intake 980 ml   Output 2500 ml   Net -1520 ml       Physical Exam:   Physical Exam  Vitals and nursing note reviewed.   Constitutional:       General: She is not in acute distress.     Appearance: She is well-developed.   HENT:      Head: Normocephalic and atraumatic.   Eyes:      Conjunctiva/sclera: Conjunctivae normal.   Cardiovascular:      Rate and Rhythm: Normal rate and regular rhythm.      Heart sounds: No murmur heard.  Pulmonary:      Effort: Pulmonary effort is normal. No respiratory distress.      Breath sounds: Normal breath sounds.   Abdominal:      Palpations: Abdomen is soft.      Tenderness: There is no abdominal tenderness.   Musculoskeletal:         General: No swelling.      Cervical back: Neck supple.   Skin:     General: Skin is warm and dry.      Capillary Refill: Capillary refill takes less than 2 seconds.      Findings: Rash present.      Comments: Diffuse erythematous rash over anterior neck, chest, back, and lower legs that looks improved compared to yesterday.   Neurological:      Mental Status: She is alert.   Psychiatric:         Mood and Affect: Mood normal.          Additional Data: "     Labs:  Results from last 7 days   Lab Units 08/03/24  0428   WBC Thousand/uL 11.69*   HEMOGLOBIN g/dL 7.6*   HEMATOCRIT % 23.5*   PLATELETS Thousands/uL 120*   SEGS PCT % 83*   LYMPHO PCT % 6*   MONO PCT % 5   EOS PCT % 4     Results from last 7 days   Lab Units 08/03/24  0428   SODIUM mmol/L 140   POTASSIUM mmol/L 5.0   CHLORIDE mmol/L 108   CO2 mmol/L 20*   BUN mg/dL 92*   CREATININE mg/dL 8.25*   ANION GAP mmol/L 12   CALCIUM mg/dL 9.6   ALBUMIN g/dL 4.2   TOTAL BILIRUBIN mg/dL 0.55   ALK PHOS U/L 20*   ALT U/L 7   AST U/L 6*   GLUCOSE RANDOM mg/dL 94         Results from last 7 days   Lab Units 08/03/24  1557 08/03/24  1040 08/03/24  0721 08/02/24  2110 08/02/24  1616 08/02/24  1130 08/02/24  0704 08/01/24  2048 08/01/24  1608 08/01/24  1103 08/01/24  0725 07/31/24  2048   POC GLUCOSE mg/dl 169* 117 83 135 153* 119 88 215* 136 159* 105 158*               Lines/Drains:  Invasive Devices       Peripheral Intravenous Line  Duration             Peripheral IV 07/31/24 Left;Ventral (anterior) Forearm 2 days              Hemodialysis Catheter  Duration             HD Permanent Double Catheter 12 days                          Imaging: No pertinent imaging reviewed.    Recent Cultures (last 7 days):         Last 24 Hours Medication List:   Current Facility-Administered Medications   Medication Dose Route Frequency Provider Last Rate    acetaminophen  650 mg Oral Q6H PRN Tram Palacios PA-C      albumin human  200 g Intravenous Daily Yasmine Meadows MD      albuterol  2 puff Inhalation Q4H PRN Tram Palacios PA-C      aluminum-magnesium hydroxide-simethicone  30 mL Oral Q4H PRN Yasmine Meadows MD      atorvastatin  20 mg Oral Daily Tram Palacios PA-C      bisacodyl  10 mg Rectal Daily PRN Daya May DO      calcium carbonate  1 tablet Oral Daily With Breakfast Yasmine Meadows MD      calcium gluconate  2 g Intravenous Once Yasmine Meadows MD Stopped (07/30/24 1925)    cyclophosphamide  100 mg Oral Daily Tirso Montez MD       diphenhydrAMINE  25 mg Oral Q6H PRN Yasmine Meadows MD      diphenhydrAMINE-zinc acetate   Topical TID PRN Yasmine Meadows MD      epoetin shavonne  8,000 Units Intravenous Once per day on Tuesday Thursday Saturday Ata Burns MD      famotidine  10 mg Oral Daily PRN Daya May DO      fluticasone-vilanterol  1 puff Inhalation Daily Tram Palacios PA-C      guaiFENesin  200 mg Oral BID Yasmine Meadows MD      heparin (porcine)  5,000 Units Subcutaneous Q8H [facility] Xavier Medina MD      insulin lispro  2-12 Units Subcutaneous TID AC Yasmine Meadows MD      ipratropium-albuterol  3 mL Nebulization TID Todd Dickens MD      iron polysaccharides  150 mg Oral Daily Ata Burns MD      lamoTRIgine  100 mg Oral HS Yasmine Meadows MD      loratadine  10 mg Oral Daily Tram Palacios PA-C      melatonin  3 mg Oral Daily PRN Yasmine Meadows MD      montelukast  10 mg Oral Daily Tram Palacios PA-C      NIFEdipine  60 mg Oral Daily Joselyn Reyes Bahamonde, MD      ondansetron  4 mg Intravenous Q6H PRN Yasmine Meadows MD      oxybutynin  5 mg Oral Daily Tram Palacios PA-C      pantoprazole  40 mg Oral Daily Before Breakfast Ronny Webster MD      polyethylene glycol  17 g Oral Daily Xavier Medina MD      predniSONE  30 mg Oral Daily Joselyn Reyes Bahamonde, MD      senna  2 tablet Oral BID Yasmine Meadows MD      sevelamer  1,600 mg Oral TID With Meals Vane Estrada MD      Sodium Zirconium Cyclosilicate  10 g Oral Daily Tirso Montez MD      traZODone  200 mg Oral HS Tram Palacios PA-C      triamcinolone   Topical BID Yasmine Meadows MD      venlafaxine  150 mg Oral Daily Tram Palacios PA-C          Today, Patient Was Seen By: Yasmine Meadows MD    **Please Note: This note may have been constructed using a voice recognition system.**

## 2024-08-04 VITALS
WEIGHT: 247.36 LBS | SYSTOLIC BLOOD PRESSURE: 114 MMHG | HEART RATE: 82 BPM | DIASTOLIC BLOOD PRESSURE: 70 MMHG | TEMPERATURE: 97.7 F | OXYGEN SATURATION: 99 % | RESPIRATION RATE: 18 BRPM | BODY MASS INDEX: 43.82 KG/M2

## 2024-08-04 PROBLEM — D72.829 LEUKOCYTOSIS: Status: RESOLVED | Noted: 2024-07-27 | Resolved: 2024-08-04

## 2024-08-04 LAB
ERYTHROCYTE [DISTWIDTH] IN BLOOD BY AUTOMATED COUNT: 17 % (ref 11.6–15.1)
FIBRINOGEN PPP-MCNC: 205 MG/DL (ref 206–523)
GLUCOSE SERPL-MCNC: 101 MG/DL (ref 65–140)
GLUCOSE SERPL-MCNC: 126 MG/DL (ref 65–140)
HCT VFR BLD AUTO: 25.9 % (ref 34.8–46.1)
HGB BLD-MCNC: 8.4 G/DL (ref 11.5–15.4)
MCH RBC QN AUTO: 28.2 PG (ref 26.8–34.3)
MCHC RBC AUTO-ENTMCNC: 32.4 G/DL (ref 31.4–37.4)
MCV RBC AUTO: 87 FL (ref 82–98)
PLATELET # BLD AUTO: 144 THOUSANDS/UL (ref 149–390)
PMV BLD AUTO: 10 FL (ref 8.9–12.7)
RBC # BLD AUTO: 2.98 MILLION/UL (ref 3.81–5.12)
WBC # BLD AUTO: 9.15 THOUSAND/UL (ref 4.31–10.16)

## 2024-08-04 PROCEDURE — 85384 FIBRINOGEN ACTIVITY: CPT | Performed by: INTERNAL MEDICINE

## 2024-08-04 PROCEDURE — 99239 HOSP IP/OBS DSCHRG MGMT >30: CPT | Performed by: INTERNAL MEDICINE

## 2024-08-04 PROCEDURE — 82948 REAGENT STRIP/BLOOD GLUCOSE: CPT

## 2024-08-04 PROCEDURE — 94760 N-INVAS EAR/PLS OXIMETRY 1: CPT

## 2024-08-04 PROCEDURE — 85027 COMPLETE CBC AUTOMATED: CPT

## 2024-08-04 PROCEDURE — 94640 AIRWAY INHALATION TREATMENT: CPT

## 2024-08-04 RX ORDER — CYCLOPHOSPHAMIDE 50 MG/1
100 CAPSULE ORAL DAILY
Qty: 60 CAPSULE | Refills: 0 | Status: SHIPPED | OUTPATIENT
Start: 2024-08-04 | End: 2024-09-03

## 2024-08-04 RX ORDER — ATOVAQUONE 750 MG/5ML
1500 SUSPENSION ORAL
Status: DISCONTINUED | OUTPATIENT
Start: 2024-08-05 | End: 2024-08-04 | Stop reason: HOSPADM

## 2024-08-04 RX ORDER — PREDNISONE 5 MG/1
5 TABLET ORAL DAILY
Status: DISCONTINUED | OUTPATIENT
Start: 2024-11-16 | End: 2024-08-04 | Stop reason: SDUPTHER

## 2024-08-04 RX ORDER — PREDNISONE 2.5 MG/1
TABLET ORAL
Qty: 554 TABLET | Refills: 0 | Status: SHIPPED | OUTPATIENT
Start: 2024-08-05 | End: 2024-08-04

## 2024-08-04 RX ORDER — PREDNISONE 5 MG/1
5 TABLET ORAL DAILY
Status: DISCONTINUED | OUTPATIENT
Start: 2024-11-02 | End: 2024-08-04 | Stop reason: HOSPADM

## 2024-08-04 RX ORDER — PREDNISONE 2.5 MG/1
TABLET ORAL
Qty: 168 TABLET | Refills: 0 | Status: CANCELLED | OUTPATIENT
Start: 2024-09-20 | End: 2024-11-01

## 2024-08-04 RX ORDER — PREDNISONE 10 MG/1
10 TABLET ORAL DAILY
Status: DISCONTINUED | OUTPATIENT
Start: 2024-10-05 | End: 2024-08-04 | Stop reason: HOSPADM

## 2024-08-04 RX ORDER — POLYETHYLENE GLYCOL 3350 17 G/17G
17 POWDER, FOR SOLUTION ORAL DAILY PRN
Qty: 119 G | Refills: 0 | Status: SHIPPED | OUTPATIENT
Start: 2024-08-04 | End: 2024-08-11

## 2024-08-04 RX ORDER — NIFEDIPINE 30 MG/1
60 TABLET, EXTENDED RELEASE ORAL DAILY
Qty: 60 TABLET | Refills: 0 | Status: SHIPPED | OUTPATIENT
Start: 2024-08-04

## 2024-08-04 RX ORDER — PREDNISONE 20 MG/1
20 TABLET ORAL DAILY
Status: DISCONTINUED | OUTPATIENT
Start: 2024-08-24 | End: 2024-08-04

## 2024-08-04 RX ORDER — PREDNISONE 10 MG/1
10 TABLET ORAL DAILY
Status: DISCONTINUED | OUTPATIENT
Start: 2024-10-05 | End: 2024-08-04

## 2024-08-04 RX ORDER — PREDNISONE 5 MG/1
5 TABLET ORAL DAILY
Status: DISCONTINUED | OUTPATIENT
Start: 2024-10-19 | End: 2024-08-04 | Stop reason: SDUPTHER

## 2024-08-04 RX ORDER — PREDNISONE 5 MG/1
5 TABLET ORAL DAILY
Qty: 30 TABLET | Refills: 0 | Status: CANCELLED | OUTPATIENT
Start: 2024-08-04

## 2024-08-04 RX ORDER — SEVELAMER HYDROCHLORIDE 800 MG/1
1600 TABLET, FILM COATED ORAL
Qty: 90 TABLET | Refills: 0 | Status: SHIPPED | OUTPATIENT
Start: 2024-08-04

## 2024-08-04 RX ORDER — CALCIUM CARBONATE 500(1250)
1 TABLET ORAL
Qty: 30 TABLET | Refills: 0 | Status: SHIPPED | OUTPATIENT
Start: 2024-08-04

## 2024-08-04 RX ORDER — PREDNISONE 10 MG/1
10 TABLET ORAL DAILY
Status: DISCONTINUED | OUTPATIENT
Start: 2024-09-21 | End: 2024-08-04 | Stop reason: SDUPTHER

## 2024-08-04 RX ORDER — PREDNISONE 2.5 MG/1
TABLET ORAL
Qty: 554 TABLET | Refills: 0 | Status: SHIPPED | OUTPATIENT
Start: 2024-08-05 | End: 2024-12-02

## 2024-08-04 RX ORDER — TRIAMCINOLONE ACETONIDE 1 MG/G
CREAM TOPICAL 2 TIMES DAILY
Qty: 28.4 G | Refills: 0 | Status: SHIPPED | OUTPATIENT
Start: 2024-08-04 | End: 2024-08-13

## 2024-08-04 RX ORDER — ATOVAQUONE 750 MG/5ML
1500 SUSPENSION ORAL DAILY
Qty: 300 ML | Refills: 0 | Status: SHIPPED | OUTPATIENT
Start: 2024-08-05 | End: 2024-09-04

## 2024-08-04 RX ORDER — PREDNISONE 2.5 MG/1
TABLET ORAL
Qty: 554 TABLET | Refills: 0 | Status: CANCELLED | OUTPATIENT
Start: 2024-08-05 | End: 2024-11-17

## 2024-08-04 RX ORDER — SENNOSIDES 8.6 MG
17.2 TABLET ORAL 2 TIMES DAILY
Qty: 30 TABLET | Refills: 0 | Status: SHIPPED | OUTPATIENT
Start: 2024-08-04

## 2024-08-04 RX ORDER — ATOVAQUONE 750 MG/5ML
1500 SUSPENSION ORAL
Qty: 300 ML | Refills: 0 | Status: CANCELLED | OUTPATIENT
Start: 2024-08-05 | End: 2024-09-04

## 2024-08-04 RX ORDER — PREDNISONE 20 MG/1
20 TABLET ORAL DAILY
Status: DISCONTINUED | OUTPATIENT
Start: 2024-08-24 | End: 2024-08-04 | Stop reason: HOSPADM

## 2024-08-04 RX ORDER — PREDNISONE 5 MG/1
5 TABLET ORAL DAILY
Status: DISCONTINUED | OUTPATIENT
Start: 2024-11-02 | End: 2024-08-04

## 2024-08-04 RX ORDER — IRON POLYSACCHARIDE COMPLEX 150 MG
150 CAPSULE ORAL DAILY
Qty: 30 CAPSULE | Refills: 0 | Status: SHIPPED | OUTPATIENT
Start: 2024-08-04

## 2024-08-04 RX ORDER — PANTOPRAZOLE SODIUM 40 MG/1
40 TABLET, DELAYED RELEASE ORAL
Qty: 30 TABLET | Refills: 0 | Status: SHIPPED | OUTPATIENT
Start: 2024-08-04

## 2024-08-04 RX ADMIN — MONTELUKAST 10 MG: 10 TABLET, FILM COATED ORAL at 09:04

## 2024-08-04 RX ADMIN — SEVELAMER HYDROCHLORIDE 1600 MG: 800 TABLET ORAL at 09:03

## 2024-08-04 RX ADMIN — FLUTICASONE FUROATE AND VILANTEROL TRIFENATATE 1 PUFF: 200; 25 POWDER RESPIRATORY (INHALATION) at 09:03

## 2024-08-04 RX ADMIN — HEPARIN SODIUM 5000 UNITS: 5000 INJECTION INTRAVENOUS; SUBCUTANEOUS at 05:31

## 2024-08-04 RX ADMIN — TRIAMCINOLONE ACETONIDE: 1 CREAM TOPICAL at 09:04

## 2024-08-04 RX ADMIN — ATORVASTATIN CALCIUM 20 MG: 20 TABLET, FILM COATED ORAL at 09:04

## 2024-08-04 RX ADMIN — SENNOSIDES 17.2 MG: 8.6 TABLET, FILM COATED ORAL at 09:04

## 2024-08-04 RX ADMIN — SEVELAMER HYDROCHLORIDE 1600 MG: 800 TABLET ORAL at 13:08

## 2024-08-04 RX ADMIN — OXYBUTYNIN CHLORIDE 5 MG: 5 TABLET, EXTENDED RELEASE ORAL at 09:03

## 2024-08-04 RX ADMIN — IPRATROPIUM BROMIDE AND ALBUTEROL SULFATE 3 ML: 2.5; .5 SOLUTION RESPIRATORY (INHALATION) at 07:42

## 2024-08-04 RX ADMIN — PREDNISONE 30 MG: 10 TABLET ORAL at 09:03

## 2024-08-04 RX ADMIN — Medication 1 TABLET: at 09:03

## 2024-08-04 RX ADMIN — SODIUM ZIRCONIUM CYCLOSILICATE 10 G: 10 POWDER, FOR SUSPENSION ORAL at 09:03

## 2024-08-04 RX ADMIN — VENLAFAXINE HYDROCHLORIDE 150 MG: 150 CAPSULE, EXTENDED RELEASE ORAL at 09:04

## 2024-08-04 RX ADMIN — POLYSACCHARIDE-IRON COMPLEX 150 MG: 150 CAPSULE ORAL at 09:03

## 2024-08-04 RX ADMIN — NIFEDIPINE 60 MG: 30 TABLET, FILM COATED, EXTENDED RELEASE ORAL at 09:03

## 2024-08-04 RX ADMIN — LORATADINE 10 MG: 10 TABLET ORAL at 09:04

## 2024-08-04 RX ADMIN — PANTOPRAZOLE SODIUM 40 MG: 40 TABLET, DELAYED RELEASE ORAL at 09:04

## 2024-08-04 NOTE — ASSESSMENT & PLAN NOTE
Assessment:  Patient has PFTs ordered outpatient but they have not been completed.  Home medication regime Advair -21 mcg 2 puff twice daily, Proventil 2 puffs every 6 hours as needed  On 2 L via nasal cannula as needed.  O2 sat 95 to 96%    Plan:  Albuterol inhaler PRN  Continue fluticasone-vilanterol  Continue loratadine 10 mg

## 2024-08-04 NOTE — ASSESSMENT & PLAN NOTE
Assessment:  Patient admitted with acute renal failure with creatinine of 5.74 (baseline .8)  Patient had BUN 73 and GFR of 6  CT A/P: No hydronephrosis, however there is apparent 3 mm stone near or questionably within the distal right ureter (axial image 142). Given the lack of hydronephrosis this could either reflect adjacent calcification or nonobstructing stone.   Patient reports that she is still producing urine  US kidney and bladder: No hydronephrosis. 4 mm nonobstructing left intrarenal calculus  Urinalysis: 3+ blood.  3+ protein.  Innumerable red blood cells.  2-4 WBCs.  Moderate bacteria   AURA, C3, and C4 normal  Ig Kappa Free Light Chain:152.5   Ig Lambda Free Light Chain 87.1   Kappa/Lambda FluidC Ratio 1.75  Hepatitis panel nonreactive  HIV nonreactive  QuantiFERON gold negative  protein electrophoresis see labs  hypersensitivity pneumonitis profile negative  GBM antibodies: elevated >8  phospholipase A2 receptor Ab: Negative  ANCA: negative   7/17 Renal biopsy and bronchoscopy performed.  7/19 CM confirmed HD chair time for patient. Will be TTS.   Bronchial lavage: Negative for TB  7/20 PLEX initiated  7/22 R permacath placed by IR. Renal bx: diffuse crescentic glomerulonephritis, Anti-GMB type  Chest Xray 7/28: Showed no Pulmonary hemorrhage       Plan  Nephrology onboard - Dialysis TTS   Patient will continue follow-up with nephrology on the outpatient setting  As per nephrology patient started on prednisone taper  Currently on 30 mg once a day D2/7, 25 mg for 2 weeks then 20 mg for 2 weeks then15 mg for 2 weeks then 12.5 mg for 2 weeks then 10 mg for 2 weeks then 7.5 mg for 2 weeks then Continue with 5 mg daily after that.  Patient would also continue with cyclophosphamide 100 mg daily for 90 days  Patient will follow-up with nephrology in the outpatient setting for further management evaluation  For PJP prophylaxis patient will continue with atovaquone 1500 milligrams daily while on  immunosuppression asked patient that had reaction to Bactrim.

## 2024-08-04 NOTE — ASSESSMENT & PLAN NOTE
Patient developed itchy rash over her anterior chest and back.  Looks like an allergic reaction.  Benadryl tablet and cream as needed.  Upon discussion with dermatology patient can be discharged on Kenalog cream and to follow with dermatology outpatient.  Rash would be expected to resolve within 1 to 2 weeks.  Follow-up with dermatology on the outpatient setting referral provided.

## 2024-08-04 NOTE — ASSESSMENT & PLAN NOTE
Blood Pressure: 114/70  Upon discussion with nephrology patient will be discharged on nifedipine 60 mg daily.

## 2024-08-04 NOTE — PLAN OF CARE
Problem: PAIN - ADULT  Goal: Verbalizes/displays adequate comfort level or baseline comfort level  Description: Interventions:  - Encourage patient to monitor pain and request assistance  - Assess pain using appropriate pain scale  - Administer analgesics based on type and severity of pain and evaluate response  - Implement non-pharmacological measures as appropriate and evaluate response  - Consider cultural and social influences on pain and pain management  - Notify physician/advanced practitioner if interventions unsuccessful or patient reports new pain  Outcome: Progressing     Problem: INFECTION - ADULT  Goal: Absence or prevention of progression during hospitalization  Description: INTERVENTIONS:  - Assess and monitor for signs and symptoms of infection  - Monitor lab/diagnostic results  - Monitor all insertion sites, i.e. indwelling lines, tubes, and drains  - Monitor endotracheal if appropriate and nasal secretions for changes in amount and color  - [place] appropriate cooling/warming therapies per order  - Administer medications as ordered  - Instruct and encourage patient and family to use good hand hygiene technique  - Identify and instruct in appropriate isolation precautions for identified infection/condition  Outcome: Progressing  Goal: Absence of fever/infection during neutropenic period  Description: INTERVENTIONS:  - Monitor WBC    Outcome: Progressing     Problem: SAFETY ADULT  Goal: Patient will remain free of falls  Description: INTERVENTIONS:  - Educate patient/family on patient safety including physical limitations  - Instruct patient to call for assistance with activity   - Consult OT/PT to assist with strengthening/mobility   - Keep Call bell within reach  - Keep bed low and locked with side rails adjusted as appropriate  - Keep care items and personal belongings within reach  - Initiate and maintain comfort rounds  - Make Fall Risk Sign visible to staff  - Offer Toileting every  Hours,  in advance of need  - Initiate/Maintain alarm  - Obtain necessary fall risk management equipment:   - Apply yellow socks and bracelet for high fall risk patients  - Consider moving patient to room near nurses station  Outcome: Progressing  Goal: Maintain or return to baseline ADL function  Description: INTERVENTIONS:  -  Assess patient's ability to carry out ADLs; assess patient's baseline for ADL function and identify physical deficits which impact ability to perform ADLs (bathing, care of mouth/teeth, toileting, grooming, dressing, etc.)  - Assess/evaluate cause of self-care deficits   - Assess range of motion  - Assess patient's mobility; develop plan if impaired  - Assess patient's need for assistive devices and provide as appropriate  - Encourage maximum independence but intervene and supervise when necessary  - Involve family in performance of ADLs  - Assess for home care needs following discharge   - Consider OT consult to assist with ADL evaluation and planning for discharge  - Provide patient education as appropriate  Outcome: Progressing  Goal: Maintains/Returns to pre admission functional level  Description: INTERVENTIONS:  - Perform AM-PAC 6 Click Basic Mobility/ Daily Activity assessment daily.  - Set and communicate daily mobility goal to care team and patient/family/caregiver.   - Collaborate with rehabilitation services on mobility goals if consulted  - Perform Range of Motion  times a day.  - Reposition patient every  hours.  - Dangle patient  times a day  - Stand patient  times a day  - Ambulate patient  times a day  - Out of bed to chair times a day   - Out of bed for meal times a day  - Out of bed for toileting  - Record patient progress and toleration of activity level   Outcome: Progressing     Problem: DISCHARGE PLANNING  Goal: Discharge to home or other facility with appropriate resources  Description: INTERVENTIONS:  - Identify barriers to discharge w/patient and caregiver  - Arrange for  needed discharge resources and transportation as appropriate  - Identify discharge learning needs (meds, wound care, etc.)  - Arrange for interpretive services to assist at discharge as needed  - Refer to Case Management Department for coordinating discharge planning if the patient needs post-hospital services based on physician/advanced practitioner order or complex needs related to functional status, cognitive ability, or social support system  Outcome: Progressing     Problem: Knowledge Deficit  Goal: Patient/family/caregiver demonstrates understanding of disease process, treatment plan, medications, and discharge instructions  Description: Complete learning assessment and assess knowledge base.  Interventions:  - Provide teaching at level of understanding  - Provide teaching via preferred learning methods  Outcome: Progressing     Problem: METABOLIC, FLUID AND ELECTROLYTES - ADULT  Goal: Electrolytes maintained within normal limits  Description: INTERVENTIONS:  - Monitor labs and assess patient for signs and symptoms of electrolyte imbalances  - Administer electrolyte replacement as ordered  - Monitor response to electrolyte replacements, including repeat lab results as appropriate  - Instruct patient on fluid and nutrition as appropriate  Outcome: Progressing  Goal: Fluid balance maintained  Description: INTERVENTIONS:  - Monitor labs   - Monitor I/O and WT  - Instruct patient on fluid and nutrition as appropriate  - Assess for signs & symptoms of volume excess or deficit  Outcome: Progressing     Problem: Nutrition/Hydration-ADULT  Goal: Nutrient/Hydration intake appropriate for improving, restoring or maintaining nutritional needs  Description: Monitor and assess patient's nutrition/hydration status for malnutrition. Collaborate with interdisciplinary team and initiate plan and interventions as ordered.  Monitor patient's weight and dietary intake as ordered or per policy. Utilize nutrition screening tool and  intervene as necessary. Determine patient's food preferences and provide high-protein, high-caloric foods as appropriate.     INTERVENTIONS:  - Monitor oral intake, urinary output, labs, and treatment plans  - Assess nutrition and hydration status and recommend course of action  - Evaluate amount of meals eaten  - Assist patient with eating if necessary   - Allow adequate time for meals  - Recommend/ encourage appropriate diets, oral nutritional supplements, and vitamin/mineral supplements  - Order, calculate, and assess calorie counts as needed  - Recommend, monitor, and adjust tube feedings and TPN/PPN based on assessed needs  - Assess need for intravenous fluids  - Provide specific nutrition/hydration education as appropriate  - Include patient/family/caregiver in decisions related to nutrition  Outcome: Progressing     Problem: Prexisting or High Potential for Compromised Skin Integrity  Goal: Skin integrity is maintained or improved  Description: INTERVENTIONS:  - Identify patients at risk for skin breakdown  - Assess and monitor skin integrity  - Assess and monitor nutrition and hydration status  - Monitor labs   - Assess for incontinence   - Turn and reposition patient  - Assist with mobility/ambulation  - Relieve pressure over bony prominences  - Avoid friction and shearing  - Provide appropriate hygiene as needed including keeping skin clean and dry  - Evaluate need for skin moisturizer/barrier cream  - Collaborate with interdisciplinary team   - Patient/family teaching  - Consider wound care consult   Outcome: Progressing     Problem: Potential for Falls  Goal: Patient will remain free of falls  Description: INTERVENTIONS:  - Educate patient/family on patient safety including physical limitations  - Instruct patient to call for assistance with activity   - Consult OT/PT to assist with strengthening/mobility   - Keep Call bell within reach  - Keep bed low and locked with side rails adjusted as  appropriate  - Keep care items and personal belongings within reach  - Initiate and maintain comfort rounds  - Make Fall Risk Sign visible to staff  - Offer Toileting every  Hours, in advance of need  - Initiate/Maintain alarm  - Obtain necessary fall risk management equipment:   - Apply yellow socks and bracelet for high fall risk patients  - Consider moving patient to room near nurses station  Outcome: Progressing

## 2024-08-04 NOTE — ASSESSMENT & PLAN NOTE
In the setting of new kidney failure 2/2 rapidly progressive glomerulonephritis  7/27 S/p 1 U pRBC s  7/29: S/p 1 U pRBCs  8/1: S/p 1 U pRBCs    Started on Epogen while inpatient  Follow-up with primary care provider within 1 week

## 2024-08-05 ENCOUNTER — TRANSITIONAL CARE MANAGEMENT (OUTPATIENT)
Dept: FAMILY MEDICINE CLINIC | Facility: CLINIC | Age: 64
End: 2024-08-05

## 2024-08-05 LAB
FUNGUS SPEC CULT: NORMAL
FUNGUS SPEC CULT: NORMAL

## 2024-08-06 ENCOUNTER — TELEPHONE (OUTPATIENT)
Dept: DERMATOLOGY | Facility: CLINIC | Age: 64
End: 2024-08-06

## 2024-08-06 LAB
MYCOBACTERIUM SPEC CULT: NORMAL
MYCOBACTERIUM SPEC CULT: NORMAL
RHODAMINE-AURAMINE STN SPEC: NORMAL
RHODAMINE-AURAMINE STN SPEC: NORMAL

## 2024-08-06 NOTE — TELEPHONE ENCOUNTER
Called and left voicemail for patient with appt details on 8/21. Advised to contact office if appt date/time does not work.

## 2024-08-10 ENCOUNTER — HOSPITAL ENCOUNTER (INPATIENT)
Facility: HOSPITAL | Age: 64
LOS: 2 days | Discharge: HOME | DRG: 698 | End: 2024-08-13
Attending: EMERGENCY MEDICINE | Admitting: INTERNAL MEDICINE
Payer: COMMERCIAL

## 2024-08-10 ENCOUNTER — APPOINTMENT (EMERGENCY)
Dept: RADIOLOGY | Facility: HOSPITAL | Age: 64
DRG: 698 | End: 2024-08-10
Payer: COMMERCIAL

## 2024-08-10 DIAGNOSIS — R79.89 ELEVATED TROPONIN: ICD-10-CM

## 2024-08-10 DIAGNOSIS — N01.9 RAPIDLY PROGRESSIVE GLOMERULONEPHRITIS WITH ANTI-GBM ANTIBODIES: ICD-10-CM

## 2024-08-10 DIAGNOSIS — R11.2 NAUSEA & VOMITING: ICD-10-CM

## 2024-08-10 DIAGNOSIS — R07.9 ACUTE CHEST PAIN: Primary | ICD-10-CM

## 2024-08-10 LAB
ALBUMIN SERPL BCG-MCNC: 3.7 G/DL (ref 3.5–5)
ALP SERPL-CCNC: 63 U/L (ref 34–104)
ALT SERPL W P-5'-P-CCNC: 14 U/L (ref 7–52)
ANION GAP SERPL CALCULATED.3IONS-SCNC: 14 MMOL/L (ref 4–13)
AST SERPL W P-5'-P-CCNC: 21 U/L (ref 13–39)
BILIRUB SERPL-MCNC: 0.43 MG/DL (ref 0.2–1)
BUN SERPL-MCNC: 65 MG/DL (ref 5–25)
CALCIUM SERPL-MCNC: 9.2 MG/DL (ref 8.4–10.2)
CARDIAC TROPONIN I PNL SERPL HS: 60 NG/L
CHLORIDE SERPL-SCNC: 101 MMOL/L (ref 96–108)
CO2 SERPL-SCNC: 24 MMOL/L (ref 21–32)
CREAT SERPL-MCNC: 9.34 MG/DL (ref 0.6–1.3)
GFR SERPL CREATININE-BSD FRML MDRD: 3 ML/MIN/1.73SQ M
GLUCOSE SERPL-MCNC: 152 MG/DL (ref 65–140)
MAGNESIUM SERPL-MCNC: 2.1 MG/DL (ref 1.9–2.7)
POTASSIUM SERPL-SCNC: 4.7 MMOL/L (ref 3.5–5.3)
PROT SERPL-MCNC: 5.5 G/DL (ref 6.4–8.4)
SODIUM SERPL-SCNC: 139 MMOL/L (ref 135–147)

## 2024-08-10 PROCEDURE — 36415 COLL VENOUS BLD VENIPUNCTURE: CPT

## 2024-08-10 PROCEDURE — 83735 ASSAY OF MAGNESIUM: CPT

## 2024-08-10 PROCEDURE — 71045 X-RAY EXAM CHEST 1 VIEW: CPT

## 2024-08-10 PROCEDURE — 85027 COMPLETE CBC AUTOMATED: CPT

## 2024-08-10 PROCEDURE — 99285 EMERGENCY DEPT VISIT HI MDM: CPT

## 2024-08-10 PROCEDURE — 85007 BL SMEAR W/DIFF WBC COUNT: CPT

## 2024-08-10 PROCEDURE — 99285 EMERGENCY DEPT VISIT HI MDM: CPT | Performed by: EMERGENCY MEDICINE

## 2024-08-10 PROCEDURE — 84484 ASSAY OF TROPONIN QUANT: CPT

## 2024-08-10 PROCEDURE — 93005 ELECTROCARDIOGRAM TRACING: CPT

## 2024-08-10 PROCEDURE — 80053 COMPREHEN METABOLIC PANEL: CPT

## 2024-08-10 NOTE — Clinical Note
Case was discussed with [facility] and the patient's admission status was agreed to be Admission Status: inpatient status to the service of Dr. jigar Christensen

## 2024-08-11 ENCOUNTER — APPOINTMENT (INPATIENT)
Dept: VASCULAR ULTRASOUND | Facility: HOSPITAL | Age: 64
DRG: 698 | End: 2024-08-11
Payer: COMMERCIAL

## 2024-08-11 PROBLEM — R06.02 SHORTNESS OF BREATH: Status: ACTIVE | Noted: 2024-08-11

## 2024-08-11 LAB
2HR DELTA HS TROPONIN: 3 NG/L
4HR DELTA HS TROPONIN: -1 NG/L
ABO GROUP BLD: NORMAL
ANION GAP SERPL CALCULATED.3IONS-SCNC: 11 MMOL/L (ref 4–13)
ANISOCYTOSIS BLD QL SMEAR: PRESENT
BASOPHILS # BLD MANUAL: 0.07 THOUSAND/UL (ref 0–0.1)
BASOPHILS NFR MAR MANUAL: 1 % (ref 0–1)
BLD GP AB SCN SERPL QL: NEGATIVE
BNP SERPL-MCNC: 533 PG/ML (ref 0–100)
BUN SERPL-MCNC: 70 MG/DL (ref 5–25)
CALCIUM SERPL-MCNC: 8.9 MG/DL (ref 8.4–10.2)
CARDIAC TROPONIN I PNL SERPL HS: 59 NG/L
CARDIAC TROPONIN I PNL SERPL HS: 63 NG/L
CHLORIDE SERPL-SCNC: 104 MMOL/L (ref 96–108)
CO2 SERPL-SCNC: 26 MMOL/L (ref 21–32)
CREAT SERPL-MCNC: 9.79 MG/DL (ref 0.6–1.3)
EOSINOPHIL # BLD MANUAL: 0.64 THOUSAND/UL (ref 0–0.4)
EOSINOPHIL NFR BLD MANUAL: 9 % (ref 0–6)
ERYTHROCYTE [DISTWIDTH] IN BLOOD BY AUTOMATED COUNT: 18.2 % (ref 11.6–15.1)
ERYTHROCYTE [DISTWIDTH] IN BLOOD BY AUTOMATED COUNT: 18.6 % (ref 11.6–15.1)
GFR SERPL CREATININE-BSD FRML MDRD: 3 ML/MIN/1.73SQ M
GLUCOSE SERPL-MCNC: 88 MG/DL (ref 65–140)
HCT VFR BLD AUTO: 21.6 % (ref 34.8–46.1)
HCT VFR BLD AUTO: 21.8 % (ref 34.8–46.1)
HCT VFR BLD AUTO: 22.3 % (ref 34.8–46.1)
HCT VFR BLD AUTO: 25.4 % (ref 34.8–46.1)
HGB BLD-MCNC: 6.7 G/DL (ref 11.5–15.4)
HGB BLD-MCNC: 6.8 G/DL (ref 11.5–15.4)
HGB BLD-MCNC: 7 G/DL (ref 11.5–15.4)
HGB BLD-MCNC: 7.5 G/DL (ref 11.5–15.4)
LYMPHOCYTES # BLD AUTO: 1.29 THOUSAND/UL (ref 0.6–4.47)
LYMPHOCYTES # BLD AUTO: 18 % (ref 14–44)
MCH RBC QN AUTO: 28 PG (ref 26.8–34.3)
MCH RBC QN AUTO: 28.4 PG (ref 26.8–34.3)
MCHC RBC AUTO-ENTMCNC: 29.5 G/DL (ref 31.4–37.4)
MCHC RBC AUTO-ENTMCNC: 31 G/DL (ref 31.4–37.4)
MCV RBC AUTO: 90 FL (ref 82–98)
MCV RBC AUTO: 96 FL (ref 82–98)
METAMYELOCYTE ABSOLUTE CT: 0.07 THOUSAND/UL (ref 0–0.1)
METAMYELOCYTES NFR BLD MANUAL: 1 % (ref 0–1)
MONOCYTES # BLD AUTO: 0.29 THOUSAND/UL (ref 0–1.22)
MONOCYTES NFR BLD: 4 % (ref 4–12)
MYELOCYTE ABSOLUTE CT: 0.14 THOUSAND/UL (ref 0–0.1)
MYELOCYTES NFR BLD MANUAL: 2 % (ref 0–1)
NEUTROPHILS # BLD MANUAL: 4.64 THOUSAND/UL (ref 1.85–7.62)
NEUTS BAND NFR BLD MANUAL: 1 % (ref 0–8)
NEUTS SEG NFR BLD AUTO: 64 % (ref 43–75)
PLATELET # BLD AUTO: 165 THOUSANDS/UL (ref 149–390)
PLATELET # BLD AUTO: 174 THOUSANDS/UL (ref 149–390)
PLATELET BLD QL SMEAR: ADEQUATE
PMV BLD AUTO: 10 FL (ref 8.9–12.7)
PMV BLD AUTO: 9.9 FL (ref 8.9–12.7)
POIKILOCYTOSIS BLD QL SMEAR: PRESENT
POTASSIUM SERPL-SCNC: 4 MMOL/L (ref 3.5–5.3)
RBC # BLD AUTO: 2.39 MILLION/UL (ref 3.81–5.12)
RBC # BLD AUTO: 2.64 MILLION/UL (ref 3.81–5.12)
RBC MORPH BLD: PRESENT
RH BLD: POSITIVE
SODIUM SERPL-SCNC: 141 MMOL/L (ref 135–147)
SPECIMEN EXPIRATION DATE: NORMAL
WBC # BLD AUTO: 7.09 THOUSAND/UL (ref 4.31–10.16)
WBC # BLD AUTO: 7.14 THOUSAND/UL (ref 4.31–10.16)

## 2024-08-11 PROCEDURE — 86850 RBC ANTIBODY SCREEN: CPT

## 2024-08-11 PROCEDURE — 85014 HEMATOCRIT: CPT

## 2024-08-11 PROCEDURE — 83880 ASSAY OF NATRIURETIC PEPTIDE: CPT

## 2024-08-11 PROCEDURE — 93971 EXTREMITY STUDY: CPT | Performed by: SURGERY

## 2024-08-11 PROCEDURE — 86923 COMPATIBILITY TEST ELECTRIC: CPT

## 2024-08-11 PROCEDURE — 99223 1ST HOSP IP/OBS HIGH 75: CPT | Performed by: INTERNAL MEDICINE

## 2024-08-11 PROCEDURE — 93971 EXTREMITY STUDY: CPT

## 2024-08-11 PROCEDURE — 84484 ASSAY OF TROPONIN QUANT: CPT

## 2024-08-11 PROCEDURE — 36415 COLL VENOUS BLD VENIPUNCTURE: CPT

## 2024-08-11 PROCEDURE — 99222 1ST HOSP IP/OBS MODERATE 55: CPT | Performed by: INTERNAL MEDICINE

## 2024-08-11 PROCEDURE — 30233N1 TRANSFUSION OF NONAUTOLOGOUS RED BLOOD CELLS INTO PERIPHERAL VEIN, PERCUTANEOUS APPROACH: ICD-10-PCS | Performed by: INTERNAL MEDICINE

## 2024-08-11 PROCEDURE — 86900 BLOOD TYPING SEROLOGIC ABO: CPT

## 2024-08-11 PROCEDURE — NC001 PR NO CHARGE: Performed by: RADIOLOGY

## 2024-08-11 PROCEDURE — 80048 BASIC METABOLIC PNL TOTAL CA: CPT

## 2024-08-11 PROCEDURE — 86901 BLOOD TYPING SEROLOGIC RH(D): CPT

## 2024-08-11 PROCEDURE — 85027 COMPLETE CBC AUTOMATED: CPT

## 2024-08-11 PROCEDURE — 85018 HEMOGLOBIN: CPT

## 2024-08-11 PROCEDURE — P9016 RBC LEUKOCYTES REDUCED: HCPCS

## 2024-08-11 PROCEDURE — 93005 ELECTROCARDIOGRAM TRACING: CPT

## 2024-08-11 RX ORDER — DOCOSANOL 100 MG/G
CREAM TOPICAL
Status: DISCONTINUED | OUTPATIENT
Start: 2024-08-11 | End: 2024-08-13 | Stop reason: HOSPADM

## 2024-08-11 RX ORDER — ATOVAQUONE 750 MG/5ML
1500 SUSPENSION ORAL DAILY
Status: DISCONTINUED | OUTPATIENT
Start: 2024-08-11 | End: 2024-08-13 | Stop reason: HOSPADM

## 2024-08-11 RX ORDER — IRON POLYSACCHARIDE COMPLEX 150 MG
150 CAPSULE ORAL
Status: DISCONTINUED | OUTPATIENT
Start: 2024-08-11 | End: 2024-08-13 | Stop reason: HOSPADM

## 2024-08-11 RX ORDER — POLYETHYLENE GLYCOL 3350 17 G/17G
17 POWDER, FOR SOLUTION ORAL DAILY PRN
Status: DISCONTINUED | OUTPATIENT
Start: 2024-08-11 | End: 2024-08-11

## 2024-08-11 RX ORDER — VENLAFAXINE HYDROCHLORIDE 75 MG/1
75 CAPSULE, EXTENDED RELEASE ORAL DAILY
COMMUNITY

## 2024-08-11 RX ORDER — GUAIFENESIN 600 MG/1
600 TABLET, EXTENDED RELEASE ORAL EVERY 12 HOURS SCHEDULED
Status: DISCONTINUED | OUTPATIENT
Start: 2024-08-11 | End: 2024-08-13 | Stop reason: HOSPADM

## 2024-08-11 RX ORDER — LAMOTRIGINE 100 MG/1
100 TABLET ORAL EVERY MORNING
Status: DISCONTINUED | OUTPATIENT
Start: 2024-08-11 | End: 2024-08-11

## 2024-08-11 RX ORDER — ATORVASTATIN CALCIUM 20 MG/1
20 TABLET, FILM COATED ORAL
Status: DISCONTINUED | OUTPATIENT
Start: 2024-08-11 | End: 2024-08-13 | Stop reason: HOSPADM

## 2024-08-11 RX ORDER — MONTELUKAST SODIUM 10 MG/1
10 TABLET ORAL DAILY
Status: DISCONTINUED | OUTPATIENT
Start: 2024-08-11 | End: 2024-08-13 | Stop reason: HOSPADM

## 2024-08-11 RX ORDER — ACETAMINOPHEN 325 MG/1
650 TABLET ORAL EVERY 6 HOURS PRN
Status: DISCONTINUED | OUTPATIENT
Start: 2024-08-11 | End: 2024-08-13 | Stop reason: HOSPADM

## 2024-08-11 RX ORDER — FLUTICASONE FUROATE AND VILANTEROL 200; 25 UG/1; UG/1
2 POWDER RESPIRATORY (INHALATION) DAILY
Status: DISCONTINUED | OUTPATIENT
Start: 2024-08-12 | End: 2024-08-13 | Stop reason: HOSPADM

## 2024-08-11 RX ORDER — VENLAFAXINE HYDROCHLORIDE 150 MG/1
150 CAPSULE, EXTENDED RELEASE ORAL DAILY
Status: DISCONTINUED | OUTPATIENT
Start: 2024-08-11 | End: 2024-08-13 | Stop reason: HOSPADM

## 2024-08-11 RX ORDER — POLYETHYLENE GLYCOL 3350 17 G/17G
17 POWDER, FOR SOLUTION ORAL DAILY
Status: DISCONTINUED | OUTPATIENT
Start: 2024-08-11 | End: 2024-08-13 | Stop reason: HOSPADM

## 2024-08-11 RX ORDER — PREDNISONE 10 MG/1
10 TABLET ORAL DAILY
Status: DISCONTINUED | OUTPATIENT
Start: 2024-10-05 | End: 2024-08-13 | Stop reason: HOSPADM

## 2024-08-11 RX ORDER — PREDNISONE 20 MG/1
20 TABLET ORAL DAILY
Status: DISCONTINUED | OUTPATIENT
Start: 2024-08-24 | End: 2024-08-13 | Stop reason: HOSPADM

## 2024-08-11 RX ORDER — LANOLIN ALCOHOL/MO/W.PET/CERES
3 CREAM (GRAM) TOPICAL
Status: DISCONTINUED | OUTPATIENT
Start: 2024-08-11 | End: 2024-08-13 | Stop reason: HOSPADM

## 2024-08-11 RX ORDER — PREDNISONE 5 MG/1
5 TABLET ORAL DAILY
Status: DISCONTINUED | OUTPATIENT
Start: 2024-11-02 | End: 2024-08-13 | Stop reason: HOSPADM

## 2024-08-11 RX ORDER — TRAZODONE HYDROCHLORIDE 100 MG/1
200 TABLET ORAL
Status: DISCONTINUED | OUTPATIENT
Start: 2024-08-11 | End: 2024-08-13 | Stop reason: HOSPADM

## 2024-08-11 RX ORDER — PANTOPRAZOLE SODIUM 40 MG/1
40 TABLET, DELAYED RELEASE ORAL
Status: DISCONTINUED | OUTPATIENT
Start: 2024-08-11 | End: 2024-08-13 | Stop reason: HOSPADM

## 2024-08-11 RX ORDER — VENLAFAXINE HYDROCHLORIDE 75 MG/1
75 CAPSULE, EXTENDED RELEASE ORAL DAILY
Status: DISCONTINUED | OUTPATIENT
Start: 2024-08-11 | End: 2024-08-13 | Stop reason: HOSPADM

## 2024-08-11 RX ORDER — SEVELAMER HYDROCHLORIDE 800 MG/1
1600 TABLET, FILM COATED ORAL
Status: DISCONTINUED | OUTPATIENT
Start: 2024-08-11 | End: 2024-08-13 | Stop reason: HOSPADM

## 2024-08-11 RX ORDER — CALCIUM CARBONATE 500(1250)
1 TABLET ORAL
Status: DISCONTINUED | OUTPATIENT
Start: 2024-08-11 | End: 2024-08-13 | Stop reason: HOSPADM

## 2024-08-11 RX ORDER — FLUTICASONE FUROATE AND VILANTEROL 200; 25 UG/1; UG/1
1 POWDER RESPIRATORY (INHALATION) DAILY
Status: DISCONTINUED | OUTPATIENT
Start: 2024-08-11 | End: 2024-08-11

## 2024-08-11 RX ORDER — LEVALBUTEROL INHALATION SOLUTION 1.25 MG/3ML
1.25 SOLUTION RESPIRATORY (INHALATION) ONCE
Status: DISCONTINUED | OUTPATIENT
Start: 2024-08-11 | End: 2024-08-13 | Stop reason: HOSPADM

## 2024-08-11 RX ORDER — CYCLOPHOSPHAMIDE 50 MG/1
100 CAPSULE ORAL DAILY
Status: DISCONTINUED | OUTPATIENT
Start: 2024-08-11 | End: 2024-08-13 | Stop reason: HOSPADM

## 2024-08-11 RX ORDER — INSULIN LISPRO 100 [IU]/ML
2-12 INJECTION, SOLUTION INTRAVENOUS; SUBCUTANEOUS
Status: DISCONTINUED | OUTPATIENT
Start: 2024-08-11 | End: 2024-08-11

## 2024-08-11 RX ORDER — NIFEDIPINE 30 MG/1
60 TABLET, EXTENDED RELEASE ORAL DAILY
Status: DISCONTINUED | OUTPATIENT
Start: 2024-08-11 | End: 2024-08-13 | Stop reason: HOSPADM

## 2024-08-11 RX ORDER — ALBUTEROL SULFATE 90 UG/1
2 AEROSOL, METERED RESPIRATORY (INHALATION) EVERY 4 HOURS PRN
Status: DISCONTINUED | OUTPATIENT
Start: 2024-08-11 | End: 2024-08-13 | Stop reason: HOSPADM

## 2024-08-11 RX ORDER — IRON POLYSACCHARIDE COMPLEX 150 MG
150 CAPSULE ORAL DAILY
Status: DISCONTINUED | OUTPATIENT
Start: 2024-08-11 | End: 2024-08-11

## 2024-08-11 RX ORDER — LAMOTRIGINE 100 MG/1
100 TABLET ORAL
Status: DISCONTINUED | OUTPATIENT
Start: 2024-08-11 | End: 2024-08-13 | Stop reason: HOSPADM

## 2024-08-11 RX ORDER — HEPARIN SODIUM 5000 [USP'U]/ML
5000 INJECTION, SOLUTION INTRAVENOUS; SUBCUTANEOUS EVERY 8 HOURS SCHEDULED
Status: DISCONTINUED | OUTPATIENT
Start: 2024-08-11 | End: 2024-08-13 | Stop reason: HOSPADM

## 2024-08-11 RX ADMIN — SEVELAMER HYDROCHLORIDE 1600 MG: 800 TABLET ORAL at 08:27

## 2024-08-11 RX ADMIN — Medication 3 MG: at 21:21

## 2024-08-11 RX ADMIN — ALBUTEROL SULFATE 2 PUFF: 90 AEROSOL, METERED RESPIRATORY (INHALATION) at 10:20

## 2024-08-11 RX ADMIN — SODIUM ZIRCONIUM CYCLOSILICATE 10 G: 10 POWDER, FOR SUSPENSION ORAL at 10:31

## 2024-08-11 RX ADMIN — HEPARIN SODIUM 5000 UNITS: 5000 INJECTION INTRAVENOUS; SUBCUTANEOUS at 14:23

## 2024-08-11 RX ADMIN — MONTELUKAST 10 MG: 10 TABLET, FILM COATED ORAL at 10:27

## 2024-08-11 RX ADMIN — Medication 1 TABLET: at 08:27

## 2024-08-11 RX ADMIN — DOCOSANOL: 100 CREAM TOPICAL at 14:27

## 2024-08-11 RX ADMIN — TRAZODONE HYDROCHLORIDE 200 MG: 100 TABLET ORAL at 21:21

## 2024-08-11 RX ADMIN — SEVELAMER HYDROCHLORIDE 1600 MG: 800 TABLET ORAL at 11:14

## 2024-08-11 RX ADMIN — Medication 3 MG: at 02:18

## 2024-08-11 RX ADMIN — HEPARIN SODIUM 5000 UNITS: 5000 INJECTION INTRAVENOUS; SUBCUTANEOUS at 21:21

## 2024-08-11 RX ADMIN — LAMOTRIGINE 100 MG: 100 TABLET ORAL at 03:11

## 2024-08-11 RX ADMIN — ALBUTEROL SULFATE 2 PUFF: 90 AEROSOL, METERED RESPIRATORY (INHALATION) at 17:04

## 2024-08-11 RX ADMIN — PANTOPRAZOLE SODIUM 40 MG: 20 TABLET, DELAYED RELEASE ORAL at 06:33

## 2024-08-11 RX ADMIN — TRAZODONE HYDROCHLORIDE 200 MG: 100 TABLET ORAL at 02:18

## 2024-08-11 RX ADMIN — ATOVAQUONE 1500 MG: 750 SUSPENSION ORAL at 10:21

## 2024-08-11 RX ADMIN — GUAIFENESIN 600 MG: 600 TABLET ORAL at 21:21

## 2024-08-11 RX ADMIN — GUAIFENESIN 600 MG: 600 TABLET ORAL at 10:25

## 2024-08-11 RX ADMIN — LAMOTRIGINE 100 MG: 100 TABLET ORAL at 21:21

## 2024-08-11 RX ADMIN — ATORVASTATIN CALCIUM 20 MG: 20 TABLET, FILM COATED ORAL at 16:35

## 2024-08-11 RX ADMIN — NIFEDIPINE 60 MG: 30 TABLET, FILM COATED, EXTENDED RELEASE ORAL at 10:26

## 2024-08-11 RX ADMIN — CYCLOPHOSPHAMIDE 100 MG: 50 CAPSULE ORAL at 10:23

## 2024-08-11 RX ADMIN — PREDNISONE 25 MG: 1 TABLET ORAL at 10:28

## 2024-08-11 RX ADMIN — HEPARIN SODIUM 5000 UNITS: 5000 INJECTION INTRAVENOUS; SUBCUTANEOUS at 06:34

## 2024-08-11 RX ADMIN — ACETAMINOPHEN 650 MG: 325 TABLET, FILM COATED ORAL at 03:10

## 2024-08-11 RX ADMIN — SEVELAMER HYDROCHLORIDE 1600 MG: 800 TABLET ORAL at 16:35

## 2024-08-11 RX ADMIN — VENLAFAXINE HYDROCHLORIDE 75 MG: 75 CAPSULE, EXTENDED RELEASE ORAL at 10:28

## 2024-08-11 RX ADMIN — VENLAFAXINE HYDROCHLORIDE 150 MG: 150 CAPSULE, EXTENDED RELEASE ORAL at 08:27

## 2024-08-11 RX ADMIN — DOCOSANOL: 100 CREAM TOPICAL at 19:22

## 2024-08-11 RX ADMIN — POLYSACCHARIDE-IRON COMPLEX 150 MG: 150 CAPSULE ORAL at 06:33

## 2024-08-11 NOTE — ASSESSMENT & PLAN NOTE
Patient was tearful. Stated that she feels depressed.  Continue Effexor 150 mg and Lamictal 100 mg daily.

## 2024-08-11 NOTE — ED PROVIDER NOTES
History  Chief Complaint   Patient presents with    Chest Pain     Pt arrives via EMS from home, reports SOB and CP that has since resolved. Pt also states today at her dialysis appt, they were unable to access her site in her R chest wall, so she was unable to get her treatment.     64-year-old female with past medical history of hypertension, hyperlipidemia, rapidly progressive glomerulonephritis with no previous cardiac history presents several hours following acute onset chest pain and shortness of breath.  Patient states that she was settling into bed and had acute onset of chest pain and shortness of breath which was worsened while lying down and improved sitting up.  Symptoms have since resolved and she feels well at present in the ED. At present denies any headaches, dizziness, fever, chills, sore throat, cough, chest pain, shortness of breath, abdominal pain, nausea, vomiting, diarrhea, dysuria, hematuria.            Prior to Admission Medications   Prescriptions Last Dose Informant Patient Reported? Taking?   Advair -21 MCG/ACT inhaler 8/11/2024  No Yes   Sig: Inhale 2 puffs 2 (two) times a day Rinse mouth after use.   NIFEdipine (PROCARDIA XL) 30 mg 24 hr tablet 8/10/2024  No Yes   Sig: Take 2 tablets (60 mg total) by mouth daily   Sodium Zirconium Cyclosilicate (Lokelma) 10 g Not Taking  No No   Sig: Take 1 packet (10 g total) by mouth daily   Patient not taking: Reported on 8/11/2024   albuterol (PROVENTIL HFA,VENTOLIN HFA) 90 mcg/act inhaler 8/10/2024  No Yes   Sig: INHALE 2 PUFFS BY MOUTH EVERY 4 HOURS AS NEEDED FOR SHORTNESS OF BREATH   atorvastatin (LIPITOR) 20 mg tablet 8/11/2024  No Yes   Sig: TAKE 1 TABLET BY MOUTH EVERY DAY   atovaquone (MEPRON) 750 mg/5 mL suspension 8/10/2024  No Yes   Sig: Take 10 mL (1,500 mg total) by mouth daily Do not start before August 5, 2024.   calcium carbonate (OYSTER SHELL,OSCAL) 500 mg 8/11/2024  No Yes   Sig: Take 1 tablet by mouth daily with breakfast    cetirizine (ZyrTEC) 10 mg tablet Not Taking  No No   Sig: Take 1 tablet (10 mg total) by mouth daily   Patient not taking: Reported on 5/1/2024   cyclophosphamide (CYTOXAN) 50 mg capsule 8/11/2024  No Yes   Sig: Take 2 capsules (100 mg total) by mouth daily   iron polysaccharides (FERREX) 150 mg capsule 8/11/2024  No Yes   Sig: Take 1 capsule (150 mg total) by mouth daily   lamoTRIgine (LaMICtal) 100 mg tablet 8/10/2024  Yes Yes   Sig: Take 100 mg by mouth every morning   montelukast (SINGULAIR) 10 mg tablet 8/10/2024  No Yes   Sig: TAKE 1 TABLET BY MOUTH EVERY DAY   oxybutynin (DITROPAN-XL) 5 mg 24 hr tablet Not Taking  No No   Sig: TAKE 1 TABLET (5 MG TOTAL) BY MOUTH DAILY.   Patient not taking: Reported on 8/11/2024   pantoprazole (PROTONIX) 40 mg tablet 8/10/2024  No Yes   Sig: Take 1 tablet (40 mg total) by mouth daily before breakfast   polyethylene glycol (MIRALAX) 17 g packet Not Taking  No No   Sig: Take 17 g by mouth daily as needed (Use for constipation as needed) for up to 7 days   Patient not taking: Reported on 8/11/2024   predniSONE 2.5 mg tablet 8/11/2024  No Yes   Sig: Take 12 tablets (30 mg total) by mouth daily for 5 days, THEN 10 tablets (25 mg total) daily for 14 days, THEN 8 tablets (20 mg total) daily for 14 days, THEN 6 tablets (15 mg total) daily for 14 days, THEN 5 tablets (12.5 mg total) daily for 14 days, THEN 4 tablets (10 mg total) daily for 14 days, THEN 3 tablets (7.5 mg total) daily for 14 days, THEN 2 tablets (5 mg total) daily. Do not start before August 5, 2024.   senna (SENOKOT) 8.6 mg 8/10/2024  No Yes   Sig: Take 2 tablets (17.2 mg total) by mouth 2 (two) times a day Use as needed constipation   sevelamer (RENAGEL) 800 mg tablet 8/11/2024  No Yes   Sig: Take 2 tablets (1,600 mg total) by mouth 3 (three) times a day with meals   traZODone (DESYREL) 100 mg tablet Past Week  Yes Yes   Sig: Take 200 mg by mouth daily at bedtime   triamcinolone (KENALOG) 0.1 % cream Not Taking  No  No   Sig: Apply topically 2 (two) times a day   Patient not taking: Reported on 2024   venlafaxine (EFFEXOR-XR) 150 mg 24 hr capsule 8/10/2024  No Yes   Sig: TAKE 1 CAPSULE BY MOUTH DAILY WITH BREAKFAST.      Facility-Administered Medications: None       Past Medical History:   Diagnosis Date    Asthma     Chronic pain     Hypertension     Renal disorder     Sepsis (HCC) 07/10/2024       Past Surgical History:   Procedure Laterality Date    BACK SURGERY      COLONOSCOPY      IR BIOPSY KIDNEY RANDOM  2024    IR TEMPORARY DIALYSIS CATHETER PLACEMENT  7/15/2024    IR TUNNELED DIALYSIS CATHETER PLACEMENT  2024    TUBAL LIGATION         Family History   Problem Relation Age of Onset    Diabetes Mother     Hypertension Mother     Lung cancer Mother     Colon cancer Mother     Colon cancer Father     Hypertension Sister     Multiple sclerosis Sister     Hypothyroidism Maternal Aunt      I have reviewed and agree with the history as documented.    E-Cigarette/Vaping    E-Cigarette Use Never User      E-Cigarette/Vaping Substances    Nicotine No     THC No     CBD No     Flavoring No      Social History     Tobacco Use    Smoking status: Former     Current packs/day: 0.00     Average packs/day: 1 pack/day for 15.0 years (15.0 ttl pk-yrs)     Types: Cigarettes     Start date:      Quit date:      Years since quittin.6    Smokeless tobacco: Never   Vaping Use    Vaping status: Never Used   Substance Use Topics    Alcohol use: No     Comment: hX:Occas.     Drug use: No        Review of Systems   All other systems reviewed and are negative.      Physical Exam  ED Triage Vitals   Temperature Pulse Respirations Blood Pressure SpO2   08/10/24 2228 08/10/24 2228 08/10/24 2228 08/10/24 2228 08/10/24 2228   98 °F (36.7 °C) (!) 106 18 128/67 95 %      Temp Source Heart Rate Source Patient Position - Orthostatic VS BP Location FiO2 (%)   08/10/24 2228 08/10/24 2228 08/10/24 2228 08/10/24 2228 --   Oral  Monitor Lying Right arm       Pain Score       08/10/24 2230       No Pain             Orthostatic Vital Signs  Vitals:    08/10/24 2230 08/10/24 2300 08/11/24 0000 08/11/24 0030   BP: 130/70 132/78  136/80   Pulse: 104 97 98 100   Patient Position - Orthostatic VS: Sitting Sitting  Sitting       Physical Exam  Constitutional:       Appearance: Normal appearance.   Eyes:      Extraocular Movements: Extraocular movements intact.      Conjunctiva/sclera: Conjunctivae normal.      Pupils: Pupils are equal, round, and reactive to light.   Cardiovascular:      Rate and Rhythm: Regular rhythm. Tachycardia present.      Pulses: Normal pulses.      Heart sounds: Normal heart sounds. No murmur heard.     No friction rub. No gallop.   Pulmonary:      Effort: Pulmonary effort is normal. No tachypnea or respiratory distress.      Breath sounds: Normal breath sounds. No stridor. No decreased breath sounds, wheezing, rhonchi or rales.   Abdominal:      General: Abdomen is flat. Bowel sounds are normal. There is no distension.      Palpations: Abdomen is soft.      Tenderness: There is no abdominal tenderness. There is no right CVA tenderness, left CVA tenderness, guarding or rebound.   Skin:     General: Skin is warm and dry.      Capillary Refill: Capillary refill takes less than 2 seconds.   Neurological:      General: No focal deficit present.      Mental Status: She is alert and oriented to person, place, and time. Mental status is at baseline.      Cranial Nerves: No cranial nerve deficit.      Sensory: No sensory deficit.      Motor: No weakness.      Coordination: Coordination normal.         ED Medications  Medications - No data to display    Diagnostic Studies  Results Reviewed       Procedure Component Value Units Date/Time    D-dimer, quantitative [[id number]]     Lab Status: No result Specimen: Blood     B-Type Natriuretic Peptide(BNP) [[id number]] Collected: 08/11/24 0021    Lab Status: In process Specimen: Blood from  Arm, Right Updated: 08/11/24 0028    HS Troponin I 2hr [[id number]] Collected: 08/11/24 0021    Lab Status: In process Specimen: Blood from Arm, Right Updated: 08/11/24 0028    CBC and differential [[id number]]  (Abnormal) Collected: 08/10/24 2240    Lab Status: Final result Specimen: Blood from Arm, Right Updated: 08/11/24 0015     WBC 7.14 Thousand/uL      RBC 2.64 Million/uL      Hemoglobin 7.5 g/dL      Hematocrit 25.4 %      MCV 96 fL      MCH 28.4 pg      MCHC 29.5 g/dL      RDW 18.6 %      MPV 10.0 fL      Platelets 174 Thousands/uL     Narrative:      This is an appended report.  These results have been appended to a previously verified report.    Manual Differential(PHLEBS Do Not Order) [[id number]]  (Abnormal) Collected: 08/10/24 2240    Lab Status: Final result Specimen: Blood from Arm, Right Updated: 08/11/24 0015     Segmented % 64 %      Bands % 1 %      Lymphocytes % 18 %      Monocytes % 4 %      Eosinophils % 9 %      Basophils % 1 %      Metamyelocytes % 1 %      Myelocytes % 2 %      Absolute Neutrophils 4.64 Thousand/uL      Absolute Lymphocytes 1.29 Thousand/uL      Absolute Monocytes 0.29 Thousand/uL      Absolute Eosinophils 0.64 Thousand/uL      Absolute Basophils 0.07 Thousand/uL      Absolute Metamyelocytes 0.07 Thousand/uL      Absolute Myelocytes 0.14 Thousand/uL      Total Counted --     RBC Morphology Present     Platelet Estimate Adequate     Anisocytosis Present     Poikilocytes Present    RBC Morphology Reflex Test [[id number]] Collected: 08/10/24 2240    Lab Status: In process Specimen: Blood from Arm, Right Updated: 08/11/24 0014    HS Troponin I 4hr [[id number]]     Lab Status: No result Specimen: Blood     HS Troponin 0hr (reflex protocol) [[id number]]  (Abnormal) Collected: 08/10/24 2240    Lab Status: Final result Specimen: Blood from Arm, Right Updated: 08/10/24 2327     hs TnI 0hr 60 ng/L     Comprehensive metabolic panel [[id number]]  (Abnormal) Collected: 08/10/24 2240    Lab  Status: Final result Specimen: Blood from Arm, Right Updated: 08/10/24 2320     Sodium 139 mmol/L      Potassium 4.7 mmol/L      Chloride 101 mmol/L      CO2 24 mmol/L      ANION GAP 14 mmol/L      BUN 65 mg/dL      Creatinine 9.34 mg/dL      Glucose 152 mg/dL      Calcium 9.2 mg/dL      AST 21 U/L      ALT 14 U/L      Alkaline Phosphatase 63 U/L      Total Protein 5.5 g/dL      Albumin 3.7 g/dL      Total Bilirubin 0.43 mg/dL      eGFR 3 ml/min/1.73sq m     Narrative:      National Kidney Disease Foundation guidelines for Chronic Kidney Disease (CKD):     Stage 1 with normal or high GFR (GFR > 90 mL/min/1.73 square meters)    Stage 2 Mild CKD (GFR = 60-89 mL/min/1.73 square meters)    Stage 3A Moderate CKD (GFR = 45-59 mL/min/1.73 square meters)    Stage 3B Moderate CKD (GFR = 30-44 mL/min/1.73 square meters)    Stage 4 Severe CKD (GFR = 15-29 mL/min/1.73 square meters)    Stage 5 End Stage CKD (GFR <15 mL/min/1.73 square meters)  Note: GFR calculation is accurate only with a steady state creatinine    Magnesium [[id number]]  (Normal) Collected: 08/10/24 2240    Lab Status: Final result Specimen: Blood from Arm, Right Updated: 08/10/24 2320     Magnesium 2.1 mg/dL                    X-ray chest 1 view portable   ED Interpretation by Solomon Avila DO (08/10 2329)   No acute cardiopulmonary disease            Procedures  ECG 12 Lead Documentation Only    Date/Time: 8/10/2024 11:32 PM    Performed by: Solomon Avila DO  Authorized by: Solomon Avila DO    ECG reviewed by me, the ED Provider: yes    Patient location:  ED  Previous ECG:     Previous ECG:  Unavailable  Interpretation:     Interpretation: abnormal    Rate:     ECG rate assessment: tachycardic    Rhythm:     Rhythm: sinus rhythm    Ectopy:     Ectopy: PVCs    QRS:     QRS axis:  Normal    QRS intervals:  Normal  Conduction:     Conduction: normal    ST segments:     ST segments:  Normal  T waves:     T waves: inverted      Inverted:  V1 and V2        ED Course  ED  Course as of 08/11/24 0057   Sat Aug 10, 2024   2252 64-year-old female with past medical history of hypertension, hyperlipidemia, rapidly progressive glomerulonephritis with no previous cardiac history presents several hours following acute onset chest pain and shortness of breath.  Patient states that she was settling into bed and had acute onset of chest pain and shortness of breath which was worsened while lying down and improved sitting up.  Symptoms have since resolved and she feels well at present in the ED. At present denies any headaches, dizziness, fever, chills, sore throat, cough, chest pain, shortness of breath, abdominal pain, nausea, vomiting, diarrhea, dysuria, hematuria.   2253 DDX including but not limited to: ACS, PE, pneumonia, pericarditis, stable angina   2254 Initial workup to include: CBC, CMP, BNP, magnesium, troponin, EKG, chest x-ray   2305 Hemoglobin(!): 7.5  Wosening anemia from 8.4 (1 week prior)   2325 Magnesium  WNL   2326 Creatinine(!): 9.34  Worse from previous, patient missed dialysis today   2328 HS Troponin 0hr (reflex protocol)(!)  Elevated   2328 X-ray chest 1 view portable  No acute cardiopulmonary disease, similar to previous CXR   2339 Patient allergic to aspirin, withheld at this time.   2339 Discussed elevated troponin with patient with need for admission for further cardiac workup.  Patient understands and agrees to plan, in no distress at present in room.   Sun Aug 11, 2024   0034 Patient admitted to medicine for further cardiac workup             HEART Risk Score      Flowsheet Row Most Recent Value   Heart Score Risk Calculator    History 1 Filed at: 08/11/2024 0057   ECG 0 Filed at: 08/11/2024 0057   Age 2 Filed at: 08/11/2024 0057   Risk Factors 2 Filed at: 08/11/2024 0057   Troponin 2 Filed at: 08/11/2024 0057   HEART Score 7 Filed at: 08/11/2024 0057                        SBIRT 22yo+      Flowsheet Row Most Recent Value   Initial Alcohol Screen: US AUDIT-C     1.  How often do you have a drink containing alcohol? 0 Filed at: 08/10/2024 2239   2. How many drinks containing alcohol do you have on a typical day you are drinking?  0 Filed at: 08/10/2024 2239   3a. Male UNDER 65: How often do you have five or more drinks on one occasion? 0 Filed at: 08/10/2024 2239   3b. FEMALE Any Age, or MALE 65+: How often do you have 4 or more drinks on one occassion? 0 Filed at: 08/10/2024 2239   Audit-C Score 0 Filed at: 08/10/2024 2239   MIKEY: How many times in the past year have you...    Used an illegal drug or used a prescription medication for non-medical reasons? Never Filed at: 08/10/2024 2239                  Medical Decision Making  Amount and/or Complexity of Data Reviewed  Labs: ordered. Decision-making details documented in ED Course.  Radiology: ordered and independent interpretation performed. Decision-making details documented in ED Course.    Risk  Decision regarding hospitalization.          Disposition  Final diagnoses:   Acute chest pain   Elevated troponin     Time reflects when diagnosis was documented in both MDM as applicable and the Disposition within this note       Time User Action Codes Description Comment    8/11/2024 12:06 AM Solomon Avila [R07.9] Acute chest pain     8/11/2024 12:06 AM Solomon Avila [R79.89] Elevated troponin           ED Disposition       ED Disposition   Admit    Condition   Stable    Date/Time   Sun Aug 11, 2024 0008    Comment   Case was discussed with Dr. Tobias and the patient's admission status was agreed to be Admission Status: inpatient status to the service of Dr. Tobias.               Follow-up Information    None         Patient's Medications   Discharge Prescriptions    No medications on file     No discharge procedures on file.    PDMP Review         Value Time User    PDMP Reviewed  Yes 8/10/2024 10:36 PM Marbin Romero MD             ED Provider  Attending physically available and evaluated Ngozi Beard. I  managed the patient along with the ED Attending.    Electronically Signed by           Solomon Avila DO  08/11/24 0051       Solomon Avila DO  08/11/24 0058

## 2024-08-11 NOTE — ED ATTENDING ATTESTATION
8/10/2024  I, Marbin Romero MD, saw and evaluated the patient. I have discussed the patient with the resident/non-physician practitioner and agree with the resident's/non-physician practitioner's findings, Plan of Care, and MDM as documented in the resident's/non-physician practitioner's note, except where noted. All available labs and Radiology studies were reviewed.  I was present for key portions of any procedure(s) performed by the resident/non-physician practitioner and I was immediately available to provide assistance.       At this point I agree with the current assessment done in the Emergency Department.  I have conducted an independent evaluation of this patient a history and physical is as follows: Patient is a 64 year old female with chest pain and sob tonight which has since resolved. No cough. No fever. No N/V. No travel. Patient is allergic to ASA so ASA not given. Could not get her hemodialysis earlier today since her catheter could not be accessed and is due to have this addressed this Monday. No early CAD in family. No smoking. Was last seen at [facility] ED on 7/10/24 for ANGELICA and hematuria. PMPAWARERX website checked on this patient and no Rx found. NCAT. Moist mucous membranes. No scleral icterus. Lungs clear. Tachycardia without murmur. Nontender chest wall. Abdomen soft and nontender. Good bowel sounds. (+) bilateral LE edema. DDX including but not limited to: ACS, MI, doubt PE; PTX, pneumonia, doubt dissection; pleurisy, pericarditis, myocarditis; doubt rhabdomyolysis; GI etiology, renal failure, CHF, pleural effusion. I reviewed EKG. Will check labs and EKG.     ED Course         Critical Care Time  Procedures

## 2024-08-11 NOTE — QUICK NOTE
I did attempt to inform patient's family member.  Voicemail was not set up yet so I did not put a voicemail.

## 2024-08-11 NOTE — ASSESSMENT & PLAN NOTE
Assessment:  ESRD on HD at 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  She does not look volume overloaded exam.  Patient is aneuric.     Plan:  Nephrology consult appreciated.  If continues to have issues with catheter; consider IR.  Continue home sevelamer.  Continue Lokelma.  Continue prednisone taper.   Continue cyclophosphamide.  Atovaquone for PCP prophylaxis.

## 2024-08-11 NOTE — ED NOTES
"Pt can be heard out side of room, complaining of bed being semi fowlers and cardiac stickers falling off. Pt utilizing dom, josep RN to bedside.  HOB to high fowlers, orderes reviewed, no telemetery order found, cardiac cables removed.   When asking if pt is okay, pt states \"I'm trying, but I wish I could just have my Effexor\"  reminded pt of previous conversation that she wanted to wait for breakfast before takng her meds to avoid any nausea.  Plan made with pt to give effexor with crackers, and finish med pass with breakfast.       Rashawn Gaspar RN  08/11/24 0820    "
"Pt utilizing callbell, requesting inhailers. O2 sat of 97 appears not in distress.  Morning meds furnished for am med pass, upon seeing Bryanna Newman, pt became tearful, stating \"I take 400mcg of advair at home, that is not going to do anything.\"  Advised to pt that advair is often substituted with breo ellipta and rescue inhailer.  Pt coninues to be tearfull, stating \"and they wonder why I cannot breath, I was on 02 last time I was here.\"  Pt requesting to speak with MD. Rashawn Gaspar, RN  08/11/24 0194    "
Patient

## 2024-08-11 NOTE — ASSESSMENT & PLAN NOTE
Patient does not appear to be in asthma exacerbation.  No wheezing noticed on exam.  Continue home Advair, Singulair, and albuterol as needed.

## 2024-08-11 NOTE — QUICK NOTE
The patient has RPGN (rapidly probated chlamydial nephritis) and recently got admitted with acute renal failure with BUN/creatinine: More than 20 and CT abdomen pelvis shows absence of hydronephrosis but 3 mm stone in the distal right ureter.  Nephrology was on board and patient will discharge once steroid taper with cyclophosphamide and hemodialysis (TTS).    Because of chronic steroid, patient was started on Bactrim but it had morbilliform rash show dermatologist did recommend atovaquone as a prophylaxis for PCP.  Urinalysis showed absence of  Protein, RBC.  HIV panel, hepatitis panel, QuantiFERON gold unremarkable.  GBS antibodies: Elevated.  Phospholipase A2 receptor antibodies negative so membranous glomerulonephritis could be ruled out.  ANCA is negative and renal biopsy and bronchoscopy is pending.  Bronchoalveolar lavage was unremarkable.    Patient did not undergo hemodialysis on Saturday because of lack of access of vessels.    Nephrology is on board.  Troponin is unremarkable and BNP is 533 (could be falsely low in the background of obesity).    Patient's BUN and creatinine are elevated, possibly because of missing HD    Physical examination: No volume overload no wheezing, cardiovascular and respiratory examination is unremarkable.

## 2024-08-11 NOTE — CONSULTS
NEPHROLOGY HOSPITAL CONSULTATION   Ngozi Beard 64 y.o. female MRN: [id number]  Unit/Bed#: -01 Encounter: [id number]    ASSESSMENT and PLAN:    64 y.o. 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 A renal consultation is requested today for assistance in the management of ANGELICA requiring dialysis.    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  Hypoallergenic?,  Target weight 115 kg, blood flow rate 350, 2K bath, bicarbonate 40, sodium 135  - Chest x-ray no acute cardiopulmonary disease  - Patient did not have dialysis on 8/10 due to access issues    Plan  - Patient does not have emergent indication for dialysis today  - Will consult IR for access evaluation and may need catheter change  - Next dialysis after IR evaluation either Monday or Tuesday  - Lab work in a.m.  - Continue prednisone taper  - Continue atovaquone  - Continue PPI  - Utilize hypoallergenic filter for now until confirming with outpatient dialysis unit  - Okay to continue Lokelma today but if remains stable with dialysis with regards to potassium, hold standing Lokelma  - Follow-up duplex per primary team of lower extremities.  May need to consider VQ scan versus CT of the chest with contrast.  Primary team is evaluating.  - I have reviewed with primary team resident regarding IR consult and rest of renal plan and we are in agreement  - I have messaged IR team to discuss  - I have sent a secure chat to the dialysis charge nurse    Portions of the record may have been created with voice  "recognition software. Occasional wrong word or \"sound a like\" substitutions may have occurred due to the inherent limitations of voice recognition software. Read the chart carefully and recognize, using context, where substitutions have occurred.If you have any questions, please contact the dictating provider.      2-Access-dialysis catheter is not currently working per the patient's report from yesterday at dialysis it was not accessible.  We will consult IR for evaluation    3-acid/base-appropriate    4-electrolytes-appropriate    5-anemia-monitoring hemoglobin for now.    6-anti-GBM disease-received plasma exchange until August 1.    - On prednisone taper-25 mg x 2 weeks. Then 20 mg for 2 weeks. Then 15 mg for 2 weeks. Then 12.5 mg for 2 weeks. Then 10 mg for 2 weeks. Then 7.5 mg for 2 weeks. And then will be maintained on 5 mg daily  - PCP prophylaxis  - GI prophylaxis  - On Cytoxan oral for 3 months    7-chest pain-resolved.  Per primary team.  Troponins unrevealing thus far.    8-volume-euvolemic on my exam.  He is on room air.  Lungs are clear to auscultation..  Currently is not in any respiratory distress and no shortness of breath.    9-anemia-is on nifedipine    10-rash with Bactrim.  Was changed to atovaquone.  During prior admission        HISTORY OF PRESENT ILLNESS:  Requesting Physician: Quique Tobias MD  Reason for Consult: ANGELICA currently requiring dialysis    Ngozi Beard is a 64 y.o. 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 A renal consultation is requested today for assistance in the management of ANGELICA requiring dialysis.  Patient states that last night she developed chest pain and shortness of breath when attempting to go to sleep and developed this suddenly.  Up until this point was feeling well.  She went to dialysis but " they could not dialyze her due to catheter was not accessible with aspirating and flushing per the patient.  She states her symptoms have now resolved..    PAST MEDICAL HISTORY:  Past Medical History:   Diagnosis Date    Asthma     Chronic pain     Hypertension     Renal disorder     Sepsis (HCC) 07/10/2024       PAST SURGICAL HISTORY:  Past Surgical History:   Procedure Laterality Date    BACK SURGERY      COLONOSCOPY      IR BIOPSY KIDNEY RANDOM  2024    IR TEMPORARY DIALYSIS CATHETER PLACEMENT  7/15/2024    IR TUNNELED DIALYSIS CATHETER PLACEMENT  2024    TUBAL LIGATION         ALLERGIES:  Allergies   Allergen Reactions    Penicillins Hives     Reaction Date: 2005; [facility]notation - 2012: meena rn    Amoxicillin Rash    Aspirin Rash    Bactrim [Sulfamethoxazole-Trimethoprim] Rash    Ibuprofen Rash    Morphine Rash    Oxycodone Rash    Valacyclovir Rash       SOCIAL HISTORY:  Social History     Substance and Sexual Activity   Alcohol Use No    Comment: hX:Occas.      Social History     Substance and Sexual Activity   Drug Use No     Social History     Tobacco Use   Smoking Status Former    Current packs/day: 0.00    Average packs/day: 1 pack/day for 15.0 years (15.0 ttl pk-yrs)    Types: Cigarettes    Start date:     Quit date:     Years since quittin.6   Smokeless Tobacco Never       FAMILY HISTORY:  Family History   Problem Relation Age of Onset    Diabetes Mother     Hypertension Mother     Lung cancer Mother     Colon cancer Mother     Colon cancer Father     Hypertension Sister     Multiple sclerosis Sister     Hypothyroidism Maternal Aunt        MEDICATIONS:    Current Facility-Administered Medications:     acetaminophen (TYLENOL) tablet 650 mg, 650 mg, Oral, Q6H PRN, Yasmine Meadows MD, 650 mg at 24 0310    albuterol (PROVENTIL HFA,VENTOLIN HFA) inhaler 2 puff, 2 puff, Inhalation, Q4H PRN, Yasmine Meadows MD, 2 puff at 24 1020    atorvastatin (LIPITOR) tablet 20 mg, 20 mg,  Oral, Daily With Dinner, Yasmine Meadows MD    atovaquone (MEPRON) oral suspension 1,500 mg, 1,500 mg, Oral, Daily, Yasmine Meadows MD, 1,500 mg at 08/11/24 1021    calcium carbonate (OYSTER SHELL,OSCAL) 500 mg tablet 1 tablet, 1 tablet, Oral, Daily With Breakfast, Yasmine Meadows MD, 1 tablet at 08/11/24 0827    cyclophosphamide (CYTOXAN) capsule 100 mg, 100 mg, Oral, Daily, Yasmine Meadows MD, 100 mg at 08/11/24 1023    docosanol (ABREVA) 10 % cream, , Topical, 5x Daily, Dana Raya MD    [START ON 8/12/2024] fluticasone-vilanterol 200-25 mcg/actuation 2 puff, 2 puff, Inhalation, Daily, Quique Tobias MD    guaiFENesin (MUCINEX) 12 hr tablet 600 mg, 600 mg, Oral, Q12H JACK, Dana Raya MD, 600 mg at 08/11/24 1025    heparin (porcine) subcutaneous injection 5,000 Units, 5,000 Units, Subcutaneous, Q8H [facility], Yasmine Meadows MD, 5,000 Units at 08/11/24 0634    iron polysaccharides (FERREX) capsule 150 mg, 150 mg, Oral, Q48H, Dana Raya MD, 150 mg at 08/11/24 0633    lamoTRIgine (LaMICtal) tablet 100 mg, 100 mg, Oral, HS, Yasmine Meadows MD, 100 mg at 08/11/24 0311    melatonin tablet 3 mg, 3 mg, Oral, HS, Dana Raya MD, 3 mg at 08/11/24 0218    montelukast (SINGULAIR) tablet 10 mg, 10 mg, Oral, Daily, Yasmine Meadows MD, 10 mg at 08/11/24 1027    NIFEdipine (PROCARDIA XL) 24 hr tablet 60 mg, 60 mg, Oral, Daily, Yasmine Meadows MD, 60 mg at 08/11/24 1026    pantoprazole (PROTONIX) EC tablet 40 mg, 40 mg, Oral, Daily Before Breakfast, Yasmine Meadows MD, 40 mg at 08/11/24 0633    polyethylene glycol (MIRALAX) packet 17 g, 17 g, Oral, Daily, Dana Raya MD    predniSONE tablet 25 mg, 25 mg, Oral, Daily, 25 mg at 08/11/24 1028 **FOLLOWED BY** [START ON 8/24/2024] predniSONE tablet 20 mg, 20 mg, Oral, Daily **FOLLOWED BY** [START ON 9/7/2024] predniSONE tablet 15 mg, 15 mg, Oral, Daily **FOLLOWED BY** [START ON 9/21/2024] predniSONE tablet 12.5 mg, 12.5 mg, Oral, Daily **FOLLOWED BY** [START ON 10/5/2024] predniSONE  tablet 10 mg, 10 mg, Oral, Daily **FOLLOWED BY** [START ON 10/19/2024] predniSONE tablet 7.5 mg, 7.5 mg, Oral, Daily **FOLLOWED BY** [START ON 11/2/2024] predniSONE tablet 5 mg, 5 mg, Oral, Daily, Yasmine Meadows MD    sevelamer (RENAGEL) tablet 1,600 mg, 1,600 mg, Oral, TID With Meals, Yasmine Meadows MD, 1,600 mg at 08/11/24 1114    Sodium Zirconium Cyclosilicate (Lokelma) 10 g, 10 g, Oral, Daily, Yasmine Meadows MD, 10 g at 08/11/24 1031    traZODone (DESYREL) tablet 200 mg, 200 mg, Oral, HS, Yasmine Meadows MD, 200 mg at 08/11/24 0218    venlafaxine (EFFEXOR-XR) 24 hr capsule 150 mg, 150 mg, Oral, Daily, Yasmine Meadows MD, 150 mg at 08/11/24 0827    venlafaxine (EFFEXOR-XR) 24 hr capsule 75 mg, 75 mg, Oral, Daily, Dana Raya MD, 75 mg at 08/11/24 1028    REVIEW OF SYSTEMS:    All the systems were reviewed and were negative except as documented on the HPI.    PHYSICAL EXAM:  Current Weight: Weight - Scale: 118 kg (259 lb 0.7 oz)  First Weight: Weight - Scale: 118 kg (259 lb 0.7 oz)  Vitals:    08/11/24 0930 08/11/24 0945 08/11/24 1000 08/11/24 1059   BP:   158/77 167/81   BP Location:   Right arm Right arm   Pulse: 84 82 71 96   Resp:   18 20   Temp:    99.1 °F (37.3 °C)   TempSrc:    Oral   SpO2: 95% 96% 97% 93%   Weight:       Height:         No intake or output data in the 24 hours ending 08/11/24 1217  Physical Exam  General: NAD  Skin: Faint scaly rash on face and neck  Eyes: anicteric sclera  ENT: moist mucous membrane  Neck: supple  Chest: CTA b/l, no ronchii, no wheeze, no rubs, no rales  CVS: s1s2, no murmur, no gallop, no rub  Abdomen: soft, nontender, nl sounds  Extremities: no edema LE b/l, tunneled dialysis catheter site is covered.  Nontender tract site.  : no sherwood  Neuro: AAOX3  Psych: normal affect      Invasive Devices:      Lab Results:   Results from last 7 days   Lab Units 08/11/24  1039 08/11/24  0637 08/10/24  2240   WBC Thousand/uL  --  7.09 7.14   HEMOGLOBIN g/dL 7.0* 6.7* 7.5*   HEMATOCRIT %  22.3* 21.6* 25.4*   PLATELETS Thousands/uL  --  165 174   POTASSIUM mmol/L  --  4.0 4.7   CHLORIDE mmol/L  --  104 101   CO2 mmol/L  --  26 24   BUN mg/dL  --  70* 65*   CREATININE mg/dL  --  9.79* 9.34*   CALCIUM mg/dL  --  8.9 9.2   MAGNESIUM mg/dL  --   --  2.1   ALK PHOS U/L  --   --  63   ALT U/L  --   --  14   AST U/L  --   --  21

## 2024-08-11 NOTE — CONSULTS
e-Consult (IPC)  - Interventional Radiology  Ngozi Beard 64 y.o. female MRN: [id number]  Unit/Bed#: -01 Encounter: [id number]          Interventional Radiology has been consulted to evaluate Ngozi Beard    We were consulted by Dr. Estrada concerning this patient with dysfunctional dialysis catheter.    Inpatient Consult to IR  Consult performed by: Gerber Lora MD  Consult ordered by: Vane Estrada MD        08/11/24    Assessment/Recommendation:   65 yo female admitted for chest pain now resolved but with nonfunctioning dialysis catheter. Most recent chest xray shows that the catheter tip in in the innominate vein rather than the right atrium. She will require exchange and repositioning of the catheter tomorrow as she miss her dialysis today. Order placed for NPO after midnight.    11-20 minutes, >50% of the total time devoted to medical consultative verbal/EMR discussion between providers. Written report will be generated in the EMR.     Thank you for allowing Interventional Radiology to participate in the care of Ngozi Beard. Please don't hesitate to call or TigerText us with any questions.     Gerber Lora MD

## 2024-08-11 NOTE — ASSESSMENT & PLAN NOTE
Assessment:  Presented with a brief episode of shortness of breath associated with chest tightness lasting few minutes while trying to lay down where she had to call EMS. Didn't require oxygen.   Upon arrival, SOB resolved and was breathing on RA.   Complains of right leg swelling and tenderness. Chronic productive cough. No fever or chills.  Tachycardiac  No leukocytosis.  Troponins 63>60>59     CXR unremarkable per my read.  EKG: Sinus tachycardia. No S1Q3T3, Rt axis deviation, or T wave inversion in precordial leads.   Well's score 4.5 (Tachycardia and signs and symptoms of DVT)  Patient does not look volume overloaded on exam. Clear breath sounds.   Differentials include PE vs anxiety vs chronic lung disease vs anemia. No suspicion for ACS    Plan:  Doppler ultrasound right leg.  Will hold off on CTA chest for now. If patient remains tachycardiac or worsens can consider CT.  Elevated troponins likely 2/2 ESRD.  D-dimer would not be reliable as it can be elevated in ESRD.   A.m. BMP  AM CBC

## 2024-08-11 NOTE — H&P
[facility]  H&P  Name: Ngozi Beard 64 y.o. female I MRN: [id number]  Unit/Bed#: ED-30 I Date of Admission: 8/10/2024   Date of Service: 8/11/2024 I Hospital Day: 0      Assessment & Plan   * Shortness of breath  Assessment & Plan  Assessment:  Presented with a brief episode of shortness of breath associated with chest tightness lasting few minutes while trying to lay down where she had to call EMS. Didn't require oxygen.   Upon arrival, SOB resolved and was breathing on RA.   Complains of right leg swelling and tenderness. Chronic productive cough. No fever or chills.  Tachycardiac  No leukocytosis.  Troponins 63>60>59     CXR unremarkable per my read.  EKG: Sinus tachycardia. No S1Q3T3, Rt axis deviation, or T wave inversion in precordial leads.   Well's score 4.5 (Tachycardia and signs and symptoms of DVT)  Patient does not look volume overloaded on exam. Clear breath sounds.   Differentials include PE vs anxiety vs chronic lung disease vs anemia. No suspicion for ACS    Plan:  Doppler ultrasound right leg.  Will hold off on CTA chest for now. If patient remains tachycardiac or worsens can consider CT.  Elevated troponins likely 2/2 ESRD.  D-dimer would not be reliable as it can be elevated in ESRD.   A.m. 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  She does not look volume overloaded exam.  Patient is aneuric.     Plan:  Nephrology consult appreciated.  If continues to have issues with catheter; consider IR.  Continue home sevelamer.  Continue  Lokelma.  Continue prednisone taper.   Continue cyclophosphamide.  Atovaquone for PCP prophylaxis.    Moderate persistent asthma w/out acute exacerbation  Assessment & Plan  Patient does not appear to be in asthma exacerbation.  No wheezing noticed on exam.  Continue home Advair, Singulair, and albuterol as needed.    Anemia  Assessment & Plan  Anemia in the setting of ESRD.  Patient required multiple blood transfusions through her recent hospital stay.  Continue home Ferrex 150 mg daily.  AM CBC.  Transfuse if less than 7.    Bipolar 1 disorder (HCC)  Assessment & Plan  Patient was tearful. Stated that she feels depressed.  Continue Effexor 150 mg and Lamictal 100 mg daily.       VTE Pharmacologic Prophylaxis: VTE Score: 4 Moderate Risk (Score 3-4) - Pharmacological DVT Prophylaxis Ordered: heparin.  Code Status: Level 1 - Full Code   Discussion with family: Patient declined call to .     Anticipated Length of Stay: Patient will be admitted on an inpatient basis with an anticipated length of stay of greater than 2 midnights secondary to SOB.    Chief Complaint: SOB    History of Present Illness:  Ngozi Beard is a 64 y.o. female with a PMH of Anti-GBM disease, ESRD on HD (TTS), HTN, Anemia, hypertension, hyperlipidemia, bipolar 1 who presents with shortness of breath. Reports that as she was trying to lay down for a nap around 4 PM today she had a brief episode of shortness of breath and was gasping for air for few minutes associated with chest tightness so she had to call EMS. The episode resolved on its own before arriving to the ED. Has chronic productive cough with whitish sputum. No fever or chills. Denies sick contacts. Stated that she has been walking around the house and not sedentary. No chest pain or palpitations. Noticed swelling of right leg today associated with tenderness. No headache. No abdominal pain or NVD.  Normal bowel movements. Patient is anuric.Ambulates using a cane.  Upon  my evaluation, she was tearful stating that she feels depressed as her 3 children do not talk to her and she lives alone. She was breathing on room air comfortably.    Review of Systems:  Review of Systems   Constitutional:  Negative for chills and fever.   HENT:  Negative for ear pain and sore throat.    Eyes:  Negative for pain and visual disturbance.   Respiratory:  Positive for cough, chest tightness and shortness of breath.    Cardiovascular:  Positive for leg swelling. Negative for chest pain and palpitations.   Gastrointestinal:  Negative for abdominal pain and vomiting.   Genitourinary:  Negative for dysuria and hematuria.   Musculoskeletal:  Negative for arthralgias and back pain.   Skin:  Negative for color change and rash.   Neurological:  Negative for seizures and syncope.   All other systems reviewed and are negative.      Past Medical and Surgical History:   Past Medical History:   Diagnosis Date    Asthma     Chronic pain     Hypertension     Renal disorder     Sepsis (HCC) 07/10/2024       Past Surgical History:   Procedure Laterality Date    BACK SURGERY      COLONOSCOPY      IR BIOPSY KIDNEY RANDOM  7/17/2024    IR TEMPORARY DIALYSIS CATHETER PLACEMENT  7/15/2024    IR TUNNELED DIALYSIS CATHETER PLACEMENT  7/22/2024    TUBAL LIGATION         Meds/Allergies:  Prior to Admission medications    Medication Sig Start Date End Date Taking? Authorizing Provider   Advair -21 MCG/ACT inhaler Inhale 2 puffs 2 (two) times a day Rinse mouth after use. 7/1/24  Yes Radha Bhatt MD   albuterol (PROVENTIL HFA,VENTOLIN HFA) 90 mcg/act inhaler INHALE 2 PUFFS BY MOUTH EVERY 4 HOURS AS NEEDED FOR SHORTNESS OF BREATH 11/24/23  Yes Meenakshi Balbuena MD   atorvastatin (LIPITOR) 20 mg tablet TAKE 1 TABLET BY MOUTH EVERY DAY 4/8/24  Yes Meenakshi Balbuena MD   atovaquone (MEPRON) 750 mg/5 mL suspension Take 10 mL (1,500 mg total) by mouth daily Do not start before August 5, 2024. 8/5/24 9/4/24 Yes Shital Lin  MD Sagar   calcium carbonate (OYSTER SHELL,OSCAL) 500 mg Take 1 tablet by mouth daily with breakfast 8/4/24  Yes Shital Keith MD   cyclophosphamide (CYTOXAN) 50 mg capsule Take 2 capsules (100 mg total) by mouth daily 8/4/24 9/3/24 Yes Shital Keith MD   iron polysaccharides (FERREX) 150 mg capsule Take 1 capsule (150 mg total) by mouth daily 8/4/24  Yes Shital Keith MD   lamoTRIgine (LaMICtal) 100 mg tablet Take 100 mg by mouth daily at bedtime 4/9/24  Yes Starla Slaughter MD   montelukast (SINGULAIR) 10 mg tablet TAKE 1 TABLET BY MOUTH EVERY DAY 12/26/23  Yes Meenakshi Balbuena MD   NIFEdipine (PROCARDIA XL) 30 mg 24 hr tablet Take 2 tablets (60 mg total) by mouth daily 8/4/24  Yes Shital Keith MD   pantoprazole (PROTONIX) 40 mg tablet Take 1 tablet (40 mg total) by mouth daily before breakfast 8/4/24  Yes Shital Keith MD   predniSONE 2.5 mg tablet Take 12 tablets (30 mg total) by mouth daily for 5 days, THEN 10 tablets (25 mg total) daily for 14 days, THEN 8 tablets (20 mg total) daily for 14 days, THEN 6 tablets (15 mg total) daily for 14 days, THEN 5 tablets (12.5 mg total) daily for 14 days, THEN 4 tablets (10 mg total) daily for 14 days, THEN 3 tablets (7.5 mg total) daily for 14 days, THEN 2 tablets (5 mg total) daily. Do not start before August 5, 2024. 8/5/24 12/2/24 Yes Shital Keith MD   senna (SENOKOT) 8.6 mg Take 2 tablets (17.2 mg total) by mouth 2 (two) times a day Use as needed constipation 8/4/24  Yes Shital Keith MD   sevelamer (RENAGEL) 800 mg tablet Take 2 tablets (1,600 mg total) by mouth 3 (three) times a day with meals 8/4/24  Yes Shital Keith MD   traZODone (DESYREL) 100 mg tablet Take 200 mg by mouth daily at bedtime   Yes Historical Provider, MD   venlafaxine (EFFEXOR-XR) 150 mg 24 hr capsule TAKE 1 CAPSULE BY MOUTH DAILY WITH BREAKFAST. 9/20/23  Yes Meenakshi Balbuena MD    cetirizine (ZyrTEC) 10 mg tablet Take 1 tablet (10 mg total) by mouth daily  Patient not taking: Reported on 2024 3/14/23   Meenakshi Balbuena MD   oxybutynin (DITROPAN-XL) 5 mg 24 hr tablet TAKE 1 TABLET (5 MG TOTAL) BY MOUTH DAILY.  Patient not taking: Reported on 2024   Meenakshi Balbuena MD   polyethylene glycol (MIRALAX) 17 g packet Take 17 g by mouth daily as needed (Use for constipation as needed) for up to 7 days  Patient not taking: Reported on 2024  Shital Keith MD   Sodium Zirconium Cyclosilicate (Lokelma) 10 g Take 1 packet (10 g total) by mouth daily  Patient not taking: Reported on 2024   Shital Keith MD   triamcinolone (KENALOG) 0.1 % cream Apply topically 2 (two) times a day  Patient not taking: Reported on 2024   Shital Keith MD     I have reviewed home medications with patient personally.    Allergies:   Allergies   Allergen Reactions    Penicillins Hives     Reaction Date: 2005; 2012: meena mcdonald    Amoxicillin Rash    Aspirin Rash    Bactrim [Sulfamethoxazole-Trimethoprim] Rash    Ibuprofen Rash    Morphine Rash    Oxycodone Rash    Valacyclovir Rash       Social History:  Marital Status: /Civil Union   Occupation: Retired   Patient Pre-hospital Living Situation: Home  Patient Pre-hospital Level of Mobility: walks with cane  Patient Pre-hospital Diet Restrictions: None   Substance Use History:   Social History     Substance and Sexual Activity   Alcohol Use No    Comment: hX:Occas.      Social History     Tobacco Use   Smoking Status Former    Current packs/day: 0.00    Average packs/day: 1 pack/day for 15.0 years (15.0 ttl pk-yrs)    Types: Cigarettes    Start date:     Quit date:     Years since quittin.6   Smokeless Tobacco Never     Social History     Substance and Sexual Activity   Drug Use No       Family History:  Family History   Problem Relation Age of  "Onset    Diabetes Mother     Hypertension Mother     Lung cancer Mother     Colon cancer Mother     Colon cancer Father     Hypertension Sister     Multiple sclerosis Sister     Hypothyroidism Maternal Aunt        Physical Exam:     Vitals:   Blood Pressure: 132/65 (08/11/24 0300)  Pulse: 90 (08/11/24 0300)  Temperature: 98 °F (36.7 °C) (08/10/24 2228)  Temp Source: Oral (08/10/24 2228)  Respirations: 20 (08/11/24 0300)  Height: 5' 3\" (160 cm) (08/10/24 2230)  Weight - Scale: 118 kg (259 lb 0.7 oz) (08/10/24 2230)  SpO2: 96 % (08/11/24 0300)    Physical Exam  Vitals and nursing note reviewed.   Constitutional:       General: She is not in acute distress.     Appearance: She is well-developed.   HENT:      Head: Normocephalic and atraumatic.   Eyes:      Conjunctiva/sclera: Conjunctivae normal.   Cardiovascular:      Rate and Rhythm: Normal rate and regular rhythm.      Heart sounds: No murmur heard.  Pulmonary:      Effort: Pulmonary effort is normal. No respiratory distress.      Breath sounds: Normal breath sounds.   Abdominal:      Palpations: Abdomen is soft.      Tenderness: There is no abdominal tenderness.   Musculoskeletal:         General: No swelling.      Cervical back: Neck supple.      Right lower leg: No edema.      Left lower leg: No edema.      Comments: Rt leg warmth and tenderness. No redness. Didn't notice swelling.    Skin:     General: Skin is warm and dry.      Capillary Refill: Capillary refill takes less than 2 seconds.   Neurological:      Mental Status: She is alert.   Psychiatric:         Mood and Affect: Mood normal.         Additional Data:     Lab Results:  Results from last 7 days   Lab Units 08/10/24  2240   WBC Thousand/uL 7.14   HEMOGLOBIN g/dL 7.5*   HEMATOCRIT % 25.4*   PLATELETS Thousands/uL 174   BANDS PCT % 1   LYMPHO PCT % 18   MONO PCT % 4   EOS PCT % 9*     Results from last 7 days   Lab Units 08/10/24  2240   SODIUM mmol/L 139   POTASSIUM mmol/L 4.7   CHLORIDE mmol/L 101 "   CO2 mmol/L 24   BUN mg/dL 65*   CREATININE mg/dL 9.34*   ANION GAP mmol/L 14*   CALCIUM mg/dL 9.2   ALBUMIN g/dL 3.7   TOTAL BILIRUBIN mg/dL 0.43   ALK PHOS U/L 63   ALT U/L 14   AST U/L 21   GLUCOSE RANDOM mg/dL 152*         Results from last 7 days   Lab Units 08/04/24  1032 08/04/24  0720   POC GLUCOSE mg/dl 126 101     Lab Results   Component Value Date    HGBA1C 6.6 (H) 07/09/2024    HGBA1C 5.9 11/26/2016    HGBA1C 6.0 (H) 01/18/2016           Lines/Drains:  Invasive Devices       Peripheral Intravenous Line  Duration             Peripheral IV 08/10/24 Right;Ventral (anterior) Forearm <1 day    Peripheral IV 08/11/24 Left;Ventral (anterior) Forearm <1 day              Hemodialysis Catheter  Duration             HD Permanent Double Catheter 19 days                        Imaging: Personally reviewed the following imaging: chest xray  X-ray chest 1 view portable   ED Interpretation by Solomon Avila DO (08/10 2329)   No acute cardiopulmonary disease      VAS VENOUS DUPLEX -LOWER LIMB UNILATERAL    (Results Pending)       EKG and Other Studies Reviewed on Admission:   EKG: Personally Reviewed.    ** Please Note: This note has been constructed using a voice recognition system. **

## 2024-08-11 NOTE — ASSESSMENT & PLAN NOTE
Anemia in the setting of ESRD.  Patient required multiple blood transfusions through her recent hospital stay.  Continue home Ferrex 150 mg daily.  AM CBC.  Transfuse if less than 7.

## 2024-08-12 ENCOUNTER — APPOINTMENT (INPATIENT)
Dept: RADIOLOGY | Facility: HOSPITAL | Age: 64
DRG: 698 | End: 2024-08-12
Payer: COMMERCIAL

## 2024-08-12 ENCOUNTER — APPOINTMENT (INPATIENT)
Dept: DIALYSIS | Facility: HOSPITAL | Age: 64
DRG: 698 | End: 2024-08-12
Payer: COMMERCIAL

## 2024-08-12 DIAGNOSIS — F20.0 PARANOID SCHIZOPHRENIA (HCC): Primary | ICD-10-CM

## 2024-08-12 LAB
ABO GROUP BLD BPU: NORMAL
ANION GAP SERPL CALCULATED.3IONS-SCNC: 11 MMOL/L (ref 4–13)
ANISOCYTOSIS BLD QL SMEAR: PRESENT
BASOPHILS # BLD MANUAL: 0 THOUSAND/UL (ref 0–0.1)
BASOPHILS NFR MAR MANUAL: 0 % (ref 0–1)
BPU ID: NORMAL
BUN SERPL-MCNC: 82 MG/DL (ref 5–25)
CALCIUM SERPL-MCNC: 9.6 MG/DL (ref 8.4–10.2)
CHLORIDE SERPL-SCNC: 103 MMOL/L (ref 96–108)
CO2 SERPL-SCNC: 28 MMOL/L (ref 21–32)
CREAT SERPL-MCNC: 11.14 MG/DL (ref 0.6–1.3)
CROSSMATCH: NORMAL
EOSINOPHIL # BLD MANUAL: 0.27 THOUSAND/UL (ref 0–0.4)
EOSINOPHIL NFR BLD MANUAL: 4 % (ref 0–6)
ERYTHROCYTE [DISTWIDTH] IN BLOOD BY AUTOMATED COUNT: 17.3 % (ref 11.6–15.1)
FUNGUS SPEC CULT: NORMAL
FUNGUS SPEC CULT: NORMAL
GFR SERPL CREATININE-BSD FRML MDRD: 3 ML/MIN/1.73SQ M
GLUCOSE SERPL-MCNC: 95 MG/DL (ref 65–140)
HBV CORE AB SER QL: NORMAL
HBV CORE IGM SER QL: NORMAL
HBV SURFACE AB SER-ACNC: <3 MIU/ML
HBV SURFACE AG SER QL: NORMAL
HCT VFR BLD AUTO: 24.8 % (ref 34.8–46.1)
HCT VFR BLD AUTO: 24.8 % (ref 34.8–46.1)
HCV AB SER QL: NORMAL
HGB BLD-MCNC: 7.7 G/DL (ref 11.5–15.4)
HGB BLD-MCNC: 7.7 G/DL (ref 11.5–15.4)
LYMPHOCYTES # BLD AUTO: 1.01 THOUSAND/UL (ref 0.6–4.47)
LYMPHOCYTES # BLD AUTO: 15 % (ref 14–44)
MCH RBC QN AUTO: 28.2 PG (ref 26.8–34.3)
MCHC RBC AUTO-ENTMCNC: 31 G/DL (ref 31.4–37.4)
MCV RBC AUTO: 91 FL (ref 82–98)
METAMYELOCYTE ABSOLUTE CT: 0.13 THOUSAND/UL (ref 0–0.1)
METAMYELOCYTES NFR BLD MANUAL: 2 % (ref 0–1)
MONOCYTES # BLD AUTO: 0.13 THOUSAND/UL (ref 0–1.22)
MONOCYTES NFR BLD: 2 % (ref 4–12)
NEUTROPHILS # BLD MANUAL: 5.17 THOUSAND/UL (ref 1.85–7.62)
NEUTS BAND NFR BLD MANUAL: 1 % (ref 0–8)
NEUTS SEG NFR BLD AUTO: 76 % (ref 43–75)
PHOSPHATE SERPL-MCNC: 5.3 MG/DL (ref 2.3–4.1)
PLATELET # BLD AUTO: 157 THOUSANDS/UL (ref 149–390)
PLATELET BLD QL SMEAR: ADEQUATE
PMV BLD AUTO: 10.2 FL (ref 8.9–12.7)
POIKILOCYTOSIS BLD QL SMEAR: PRESENT
POTASSIUM SERPL-SCNC: 4.7 MMOL/L (ref 3.5–5.3)
RBC # BLD AUTO: 2.73 MILLION/UL (ref 3.81–5.12)
RBC MORPH BLD: PRESENT
SODIUM SERPL-SCNC: 142 MMOL/L (ref 135–147)
UNIT DISPENSE STATUS: NORMAL
UNIT PRODUCT CODE: NORMAL
UNIT PRODUCT VOLUME: 350 ML
UNIT RH: NORMAL
WBC # BLD AUTO: 6.71 THOUSAND/UL (ref 4.31–10.16)

## 2024-08-12 PROCEDURE — 99232 SBSQ HOSP IP/OBS MODERATE 35: CPT | Performed by: INTERNAL MEDICINE

## 2024-08-12 PROCEDURE — 80048 BASIC METABOLIC PNL TOTAL CA: CPT

## 2024-08-12 PROCEDURE — 85027 COMPLETE CBC AUTOMATED: CPT

## 2024-08-12 PROCEDURE — C1769 GUIDE WIRE: HCPCS

## 2024-08-12 PROCEDURE — 86706 HEP B SURFACE ANTIBODY: CPT | Performed by: INTERNAL MEDICINE

## 2024-08-12 PROCEDURE — 36581 REPLACE TUNNELED CV CATH: CPT

## 2024-08-12 PROCEDURE — 85007 BL SMEAR W/DIFF WBC COUNT: CPT

## 2024-08-12 PROCEDURE — 86803 HEPATITIS C AB TEST: CPT | Performed by: INTERNAL MEDICINE

## 2024-08-12 PROCEDURE — 36581 REPLACE TUNNELED CV CATH: CPT | Performed by: RADIOLOGY

## 2024-08-12 PROCEDURE — 77001 FLUOROGUIDE FOR VEIN DEVICE: CPT | Performed by: RADIOLOGY

## 2024-08-12 PROCEDURE — C1750 CATH, HEMODIALYSIS,LONG-TERM: HCPCS

## 2024-08-12 PROCEDURE — 84100 ASSAY OF PHOSPHORUS: CPT | Performed by: INTERNAL MEDICINE

## 2024-08-12 PROCEDURE — 87340 HEPATITIS B SURFACE AG IA: CPT | Performed by: INTERNAL MEDICINE

## 2024-08-12 PROCEDURE — 99153 MOD SED SAME PHYS/QHP EA: CPT

## 2024-08-12 PROCEDURE — 85014 HEMATOCRIT: CPT

## 2024-08-12 PROCEDURE — 85018 HEMOGLOBIN: CPT

## 2024-08-12 PROCEDURE — 99152 MOD SED SAME PHYS/QHP 5/>YRS: CPT | Performed by: RADIOLOGY

## 2024-08-12 PROCEDURE — 5A1D70Z PERFORMANCE OF URINARY FILTRATION, INTERMITTENT, LESS THAN 6 HOURS PER DAY: ICD-10-PCS | Performed by: PHYSICIAN ASSISTANT

## 2024-08-12 PROCEDURE — 86705 HEP B CORE ANTIBODY IGM: CPT | Performed by: INTERNAL MEDICINE

## 2024-08-12 PROCEDURE — 0J2TXYZ CHANGE OTHER DEVICE IN TRUNK SUBCUTANEOUS TISSUE AND FASCIA, EXTERNAL APPROACH: ICD-10-PCS | Performed by: RADIOLOGY

## 2024-08-12 PROCEDURE — 99152 MOD SED SAME PHYS/QHP 5/>YRS: CPT

## 2024-08-12 PROCEDURE — 02H633Z INSERTION OF INFUSION DEVICE INTO RIGHT ATRIUM, PERCUTANEOUS APPROACH: ICD-10-PCS | Performed by: RADIOLOGY

## 2024-08-12 PROCEDURE — 86704 HEP B CORE ANTIBODY TOTAL: CPT | Performed by: INTERNAL MEDICINE

## 2024-08-12 RX ORDER — ONDANSETRON 4 MG/1
4 TABLET, ORALLY DISINTEGRATING ORAL ONCE
Status: CANCELLED | OUTPATIENT
Start: 2024-08-12

## 2024-08-12 RX ORDER — DIPHENHYDRAMINE HCL 25 MG
25 TABLET ORAL EVERY 6 HOURS PRN
Status: DISCONTINUED | OUTPATIENT
Start: 2024-08-12 | End: 2024-08-13 | Stop reason: HOSPADM

## 2024-08-12 RX ORDER — FENTANYL CITRATE 50 UG/ML
INJECTION, SOLUTION INTRAMUSCULAR; INTRAVENOUS AS NEEDED
Status: COMPLETED | OUTPATIENT
Start: 2024-08-12 | End: 2024-08-12

## 2024-08-12 RX ORDER — LIDOCAINE WITH 8.4% SOD BICARB 0.9%(10ML)
SYRINGE (ML) INJECTION AS NEEDED
Status: COMPLETED | OUTPATIENT
Start: 2024-08-12 | End: 2024-08-12

## 2024-08-12 RX ORDER — MIDAZOLAM HYDROCHLORIDE 2 MG/2ML
INJECTION, SOLUTION INTRAMUSCULAR; INTRAVENOUS AS NEEDED
Status: COMPLETED | OUTPATIENT
Start: 2024-08-12 | End: 2024-08-12

## 2024-08-12 RX ORDER — ONDANSETRON 2 MG/ML
4 INJECTION INTRAMUSCULAR; INTRAVENOUS ONCE
Status: COMPLETED | OUTPATIENT
Start: 2024-08-12 | End: 2024-08-12

## 2024-08-12 RX ORDER — ONDANSETRON 2 MG/ML
4 INJECTION INTRAMUSCULAR; INTRAVENOUS EVERY 6 HOURS PRN
Status: DISCONTINUED | OUTPATIENT
Start: 2024-08-12 | End: 2024-08-13 | Stop reason: HOSPADM

## 2024-08-12 RX ADMIN — DOCOSANOL: 100 CREAM TOPICAL at 06:14

## 2024-08-12 RX ADMIN — FENTANYL CITRATE 25 MCG: 50 INJECTION INTRAMUSCULAR; INTRAVENOUS at 08:43

## 2024-08-12 RX ADMIN — ONDANSETRON 4 MG: 2 INJECTION INTRAMUSCULAR; INTRAVENOUS at 17:50

## 2024-08-12 RX ADMIN — ATORVASTATIN CALCIUM 20 MG: 20 TABLET, FILM COATED ORAL at 22:08

## 2024-08-12 RX ADMIN — VANCOMYCIN HYDROCHLORIDE 1500 MG: 5 INJECTION, POWDER, LYOPHILIZED, FOR SOLUTION INTRAVENOUS at 08:50

## 2024-08-12 RX ADMIN — HEPARIN SODIUM 5000 UNITS: 5000 INJECTION INTRAVENOUS; SUBCUTANEOUS at 05:44

## 2024-08-12 RX ADMIN — GUAIFENESIN 600 MG: 600 TABLET ORAL at 09:14

## 2024-08-12 RX ADMIN — MIDAZOLAM 0.5 MG: 1 INJECTION INTRAMUSCULAR; INTRAVENOUS at 08:43

## 2024-08-12 RX ADMIN — SODIUM ZIRCONIUM CYCLOSILICATE 10 G: 10 POWDER, FOR SUSPENSION ORAL at 09:16

## 2024-08-12 RX ADMIN — FLUTICASONE FUROATE AND VILANTEROL TRIFENATATE 2 PUFF: 200; 25 POWDER RESPIRATORY (INHALATION) at 09:20

## 2024-08-12 RX ADMIN — Medication 3 MG: at 21:59

## 2024-08-12 RX ADMIN — MIDAZOLAM 0.5 MG: 1 INJECTION INTRAMUSCULAR; INTRAVENOUS at 08:32

## 2024-08-12 RX ADMIN — Medication 1 TABLET: at 09:15

## 2024-08-12 RX ADMIN — DIPHENHYDRAMINE HYDROCHLORIDE 25 MG: 25 TABLET ORAL at 11:01

## 2024-08-12 RX ADMIN — LAMOTRIGINE 100 MG: 100 TABLET ORAL at 21:59

## 2024-08-12 RX ADMIN — POLYETHYLENE GLYCOL 3350 17 G: 17 POWDER, FOR SOLUTION ORAL at 09:14

## 2024-08-12 RX ADMIN — MONTELUKAST 10 MG: 10 TABLET, FILM COATED ORAL at 09:13

## 2024-08-12 RX ADMIN — GUAIFENESIN 600 MG: 600 TABLET ORAL at 21:59

## 2024-08-12 RX ADMIN — FENTANYL CITRATE 25 MCG: 50 INJECTION INTRAMUSCULAR; INTRAVENOUS at 08:32

## 2024-08-12 RX ADMIN — TRAZODONE HYDROCHLORIDE 200 MG: 100 TABLET ORAL at 21:59

## 2024-08-12 RX ADMIN — PANTOPRAZOLE SODIUM 40 MG: 20 TABLET, DELAYED RELEASE ORAL at 05:44

## 2024-08-12 RX ADMIN — ATOVAQUONE 1500 MG: 750 SUSPENSION ORAL at 09:17

## 2024-08-12 RX ADMIN — HEPARIN SODIUM 5000 UNITS: 5000 INJECTION INTRAVENOUS; SUBCUTANEOUS at 13:56

## 2024-08-12 RX ADMIN — PREDNISONE 25 MG: 1 TABLET ORAL at 09:14

## 2024-08-12 RX ADMIN — VENLAFAXINE HYDROCHLORIDE 75 MG: 75 CAPSULE, EXTENDED RELEASE ORAL at 09:15

## 2024-08-12 RX ADMIN — Medication 10 ML: at 08:41

## 2024-08-12 RX ADMIN — NIFEDIPINE 60 MG: 30 TABLET, FILM COATED, EXTENDED RELEASE ORAL at 09:14

## 2024-08-12 RX ADMIN — HEPARIN SODIUM 5000 UNITS: 5000 INJECTION INTRAVENOUS; SUBCUTANEOUS at 21:59

## 2024-08-12 RX ADMIN — VENLAFAXINE HYDROCHLORIDE 150 MG: 150 CAPSULE, EXTENDED RELEASE ORAL at 09:14

## 2024-08-12 RX ADMIN — CYCLOPHOSPHAMIDE 100 MG: 50 CAPSULE ORAL at 09:16

## 2024-08-12 RX ADMIN — SEVELAMER HYDROCHLORIDE 1600 MG: 800 TABLET ORAL at 13:17

## 2024-08-12 NOTE — PLAN OF CARE
Problem: Potential for Falls  Goal: Patient will remain free of falls  Description: INTERVENTIONS:  - Educate patient/family on patient safety including physical limitations  - Instruct patient to call for assistance with activity   - Consult OT/PT to assist with strengthening/mobility   - Keep Call bell within reach  - Keep bed low and locked with side rails adjusted as appropriate  - Keep care items and personal belongings within reach  - Initiate and maintain comfort rounds  - Make Fall Risk Sign visible to staff  - Offer Toileting every 2 Hours, in advance of need  - Initiate/Maintain bed alarm  - Apply yellow socks and bracelet for high fall risk patients  - Consider moving patient to room near nurses station  Outcome: Progressing     Problem: PAIN - ADULT  Goal: Verbalizes/displays adequate comfort level or baseline comfort level  Description: Interventions:  - Encourage patient to monitor pain and request assistance  - Assess pain using appropriate pain scale  - Administer analgesics based on type and severity of pain and evaluate response  - Implement non-pharmacological measures as appropriate and evaluate response  - Consider cultural and social influences on pain and pain management  - Notify physician/advanced practitioner if interventions unsuccessful or patient reports new pain  Outcome: Progressing     Problem: SAFETY ADULT  Goal: Patient will remain free of falls  Description: INTERVENTIONS:  - Educate patient/family on patient safety including physical limitations  - Instruct patient to call for assistance with activity   - Consult OT/PT to assist with strengthening/mobility   - Keep Call bell within reach  - Keep bed low and locked with side rails adjusted as appropriate  - Keep care items and personal belongings within reach  - Initiate and maintain comfort rounds  - Make Fall Risk Sign visible to staff  - Offer Toileting every 2 Hours, in advance of need  - Initiate/Maintain bed alarm  - Apply  yellow socks and bracelet for high fall risk patients  - Consider moving patient to room near nurses station  Outcome: Progressing     Problem: Knowledge Deficit  Goal: Patient/family/caregiver demonstrates understanding of disease process, treatment plan, medications, and discharge instructions  Description: Complete learning assessment and assess knowledge base.  Interventions:  - Provide teaching at level of understanding  - Provide teaching via preferred learning methods  Outcome: Progressing

## 2024-08-12 NOTE — PLAN OF CARE
Target UF Goal  1-2  L as tolerated. Patient dialyzing for 2 hours on 2 K bath for serum K of  4.7  per protocol. Treatment plan reviewed with Nephrology.       Post-Dialysis RN Treatment Note    Blood Pressure:  Pre 135/71 mm/Hg  Post 140/84 mmHg   EDW  115 kg    Weight:  Pre 113.9 kg   Post 111.3 kg   Mode of weight measurement: Bed Scale   Volume Removed  1883 ml    Treatment duration  1 hours and 52 minutes   NS given  No    Treatment shortened? Yes, describe: Tx ended 7 minutes early due to patient c/o feeling nauseous   Medications given during Rx None Reported   Estimated Kt/V  None Reported   Access type: Permacath/TDC   Access Issues: No    Report called to primary nurse   Yes, Kristen Armando, RN & Aurea Salmon RN              Problem: METABOLIC, FLUID AND ELECTROLYTES - ADULT  Goal: Electrolytes maintained within normal limits  Description: INTERVENTIONS:  - Monitor labs and assess patient for signs and symptoms of electrolyte imbalances  - Administer electrolyte replacement as ordered  - Monitor response to electrolyte replacements, including repeat lab results as appropriate  - Instruct patient on fluid and nutrition as appropriate  Outcome: Progressing  Goal: Fluid balance maintained  Description: INTERVENTIONS:  - Monitor labs   - Monitor I/O and WT  - Instruct patient on fluid and nutrition as appropriate  - Assess for signs & symptoms of volume excess or deficit  Outcome: Progressing

## 2024-08-12 NOTE — SEDATION DOCUMENTATION
Tunneled dialysis catheter exchanged by Dr. Bettencourt. CHG dressing and sutures to right IJ site. Both lumens flushed, blood return noted, and capped. Patient tolerated well.

## 2024-08-12 NOTE — PROGRESS NOTES
NEPHROLOGY PROGRESS NOTE   Nogzi Beard 64 y.o. 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  Nephrology consulted for management of ANGELICA/HD.    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  Will plan to continue regular Tuesday, Thursday, Saturday schedule during hospitalization.  -Continue prednisone taper  -Continue atovaquone  -Continue PPI  -d/w Dr. Roman    Access/catheter malfunction: R IJV permcath  -+hx nonfunctioning PermCath and unable to perform outpatient dialysis on 8/10/2024  -S/p R IJV PermCath exchange today by IR  -site clear  -continue to use catheter for hemodialysis access    Anti-GBM disease:  -On prednisone taper of 25 mg x 2 weeks, 20 mg x 2 weeks, 15 mg x 2 weeks, 12.5 mg x 2 weeks, 10 mg x 2 weeks, 7.5 mg x 2 weeks and then maintained on 7 mg daily  -On Cytoxan x 3 months  -Continue atovaquone for PCP prophylaxis. +hx rash with Bactrim  -continue PPI for GI prophylaxis    Chest pain:  -Initially resolved and troponins were unrevealing  -Complains of chest tightness/SOB today  -Euvolemic Per primary team    HTN/volume status:  -BP acceptable  -Volume status hypervolemic  -maintain goal BP <140/90  -home medications: Nifedipine 60 mg daily  -current medications: Nifedipine 60 mg daily  -Continue  current regimen  -continue UF with dialysis as BP tolerates  -continue to monitor    Anemia in CKD:  -Hbg 7.7 today,below goal but stable.  Goal Hgb >10  -on oral iron  -continue to monitor  -transfuse for Hgb <7.0  -Management per primary team    Hx hyperkalemia:  -resolved  -on Lokelma daily pre-hospital  -K+ 4.7 today  -continue Lokelma for now  -Low potassium diet  -Will manage with HD  -Continue to monitor    Bone mineral disease of renal origin:  -Calcium stable  -Hyperphosphatemia: Phosphorus 5.3, not at goal. continue binders, sevelamer 1600 mg 3 times daily with meals.  Continue low phosphorus diet  -Renal diet  -continue to monitor phosphorus, PTH, calcium, magnesium as outpatient    Plan Summary:  -Continue IHD TTS.  -will plan for short 2-hour treatment today given ongoing respiratory symptoms  -Next full treatment tomorrow, 8/13/2024      SUBJECTIVE:  Patient awake, alert.  S/p PermCath exchange today and new catheter in place.  Continues complain of chest tightness and dyspnea although O2 sat stable on room air.  Patient currently on 2L NC O2 for comfort.  Minimal baseline urine output.  VSS    A complete review of systems was performed. Pertinent positives and negatives noted in the HPI. Otherwise the review of systems was negative.  OBJECTIVE:  Current Weight: Weight - Scale: 118 kg (259 lb 0.7 oz)  Vitals:    08/12/24 0845 08/12/24 0850 08/12/24 0855 08/12/24 0912   BP: 134/73 135/75 135/75 131/70   BP Location:    Right arm   Pulse: 68 67 64 71   Resp:       Temp:       TempSrc:       SpO2: 97% 95% 97%    Weight:       Height:           Intake/Output Summary (Last 24 hours) at 8/12/2024 1257  Last data filed at 8/12/2024 1133  Gross per 24 hour   Intake 951.67 ml   Output --   Net 951.67 ml       General:  Awake, alert, appears comfortable and in no acute distress.  Nontoxic.  Skin:  No rash, warm, good skin turgor   Eyes:  PERRL, EOMI, sclerae nonicteric.  no periorbital edema   ENT:  Moist mucous  membranes  Neck:  Trachea midline, symmetric.  No JVD.  Chest:  Clear to auscultation bilaterally without wheezes, crackles or rhonchi  CVS:  Regular rate and rhythm without murmur, gallop or rub.  S1 and S2 identified and normal.  No S3, S4.   Abdomen:  Soft, nontender, nondistended without masses.  Normal bowel sounds x 4 quadrants.  No bruit.  Extremities:  Warm, pink, motor and sensory intact and well perfused.  No cyanosis, pallor.  1+ BLE edema.  Neuro:  Awake, alert, oriented x3.  Grossly intact  Psych:  Appropriate affect.  Mentating appropriately.  Normal mental status exam  Access: new R IJV PermCath in place, site clear      Medications:    Current Facility-Administered Medications:     acetaminophen (TYLENOL) tablet 650 mg, 650 mg, Oral, Q6H PRN, Yasmine Meadows MD, 650 mg at 08/11/24 0310    albuterol (PROVENTIL HFA,VENTOLIN HFA) inhaler 2 puff, 2 puff, Inhalation, Q4H PRN, Yasmine Meadows MD, 2 puff at 08/11/24 1704    atorvastatin (LIPITOR) tablet 20 mg, 20 mg, Oral, Daily With Dinner, Yasmine Meadows MD, 20 mg at 08/11/24 1635    atovaquone (MEPRON) oral suspension 1,500 mg, 1,500 mg, Oral, Daily, Yasmine Meadows MD, 1,500 mg at 08/12/24 0917    calcium carbonate (OYSTER SHELL,OSCAL) 500 mg tablet 1 tablet, 1 tablet, Oral, Daily With Breakfast, Yasmine Meadows MD, 1 tablet at 08/12/24 0915    cyclophosphamide (CYTOXAN) capsule 100 mg, 100 mg, Oral, Daily, Yasmine Meadows MD, 100 mg at 08/12/24 0916    diphenhydrAMINE (BENADRYL) tablet 25 mg, 25 mg, Oral, Q6H PRN, Xavier Medina MD, 25 mg at 08/12/24 1101    docosanol (ABREVA) 10 % cream, , Topical, 5x Daily, Dana Raya MD, Given at 08/12/24 0614    fluticasone-vilanterol 200-25 mcg/actuation 2 puff, 2 puff, Inhalation, Daily, Quique Tobias MD, 2 puff at 08/12/24 0920    guaiFENesin (MUCINEX) 12 hr tablet 600 mg, 600 mg, Oral, Q12H [facility], Dana Raya MD, 600 mg at 08/12/24 0914    heparin (porcine) subcutaneous injection 5,000 Units, 5,000 Units,  Subcutaneous, Q8H JACK, Yasmine Meadows MD, 5,000 Units at 08/12/24 0544    iron polysaccharides (FERREX) capsule 150 mg, 150 mg, Oral, Q48H, Dana Raya MD, 150 mg at 08/11/24 0633    lamoTRIgine (LaMICtal) tablet 100 mg, 100 mg, Oral, HS, Yasmine Meadows MD, 100 mg at 08/11/24 2121    levalbuterol (XOPENEX) inhalation solution 1.25 mg, 1.25 mg, Nebulization, Once, Yasmine Meadows MD    melatonin tablet 3 mg, 3 mg, Oral, HS, Dana Raya MD, 3 mg at 08/11/24 2121    montelukast (SINGULAIR) tablet 10 mg, 10 mg, Oral, Daily, Yasmine Meadows MD, 10 mg at 08/12/24 0913    NIFEdipine (PROCARDIA XL) 24 hr tablet 60 mg, 60 mg, Oral, Daily, Yasmine Meadows MD, 60 mg at 08/12/24 0914    pantoprazole (PROTONIX) EC tablet 40 mg, 40 mg, Oral, Daily Before Breakfast, Yasmine Meadows MD, 40 mg at 08/12/24 0544    polyethylene glycol (MIRALAX) packet 17 g, 17 g, Oral, Daily, Dana Raya MD, 17 g at 08/12/24 0914    predniSONE tablet 25 mg, 25 mg, Oral, Daily, 25 mg at 08/12/24 0914 **FOLLOWED BY** [START ON 8/24/2024] predniSONE tablet 20 mg, 20 mg, Oral, Daily **FOLLOWED BY** [START ON 9/7/2024] predniSONE tablet 15 mg, 15 mg, Oral, Daily **FOLLOWED BY** [START ON 9/21/2024] predniSONE tablet 12.5 mg, 12.5 mg, Oral, Daily **FOLLOWED BY** [START ON 10/5/2024] predniSONE tablet 10 mg, 10 mg, Oral, Daily **FOLLOWED BY** [START ON 10/19/2024] predniSONE tablet 7.5 mg, 7.5 mg, Oral, Daily **FOLLOWED BY** [START ON 11/2/2024] predniSONE tablet 5 mg, 5 mg, Oral, Daily, Yasmine Meadows MD    sevelamer (RENAGEL) tablet 1,600 mg, 1,600 mg, Oral, TID With Meals, Yasmine Meadows MD, 1,600 mg at 08/11/24 1635    Sodium Zirconium Cyclosilicate (Lokelma) 10 g, 10 g, Oral, Daily, Yasmine Meadows MD, 10 g at 08/12/24 0916    traZODone (DESYREL) tablet 200 mg, 200 mg, Oral, HS, Yasmine Meadows MD, 200 mg at 08/11/24 2121    venlafaxine (EFFEXOR-XR) 24 hr capsule 150 mg, 150 mg, Oral, Daily, Yasmine Meadows MD, 150 mg at 08/12/24 0914    venlafaxine (EFFEXOR-XR) 24 hr  capsule 75 mg, 75 mg, Oral, Daily, Dana Raya MD, 75 mg at 08/12/24 0915    Laboratory Results:  Results from last 7 days   Lab Units 08/12/24  0542 08/11/24  1643 08/11/24  1039 08/11/24  0637 08/10/24  2240   WBC Thousand/uL 6.71  --   --  7.09 7.14   HEMOGLOBIN g/dL 7.7*  7.7* 6.8* 7.0* 6.7* 7.5*   HEMATOCRIT % 24.8*  24.8* 21.8* 22.3* 21.6* 25.4*   PLATELETS Thousands/uL 157  --   --  165 174   SODIUM mmol/L 142  --   --  141 139   POTASSIUM mmol/L 4.7  --   --  4.0 4.7   CHLORIDE mmol/L 103  --   --  104 101   CO2 mmol/L 28  --   --  26 24   BUN mg/dL 82*  --   --  70* 65*   CREATININE mg/dL 11.14*  --   --  9.79* 9.34*   CALCIUM mg/dL 9.6  --   --  8.9 9.2   MAGNESIUM mg/dL  --   --   --   --  2.1   PHOSPHORUS mg/dL 5.3*  --   --   --   --        I have personally reviewed the blood work as stated above and in my note.  I have personally reviewed internal Medicine, co-consultants and previous nephrology notes.

## 2024-08-12 NOTE — ASSESSMENT & PLAN NOTE
Assessment:  Presented with a brief episode of shortness of breath associated with chest tightness lasting few minutes while trying to lay down where she had to call EMS. Didn't require oxygen.   Upon arrival, SOB resolved and was breathing on RA.   Complains of right leg swelling and tenderness. Chronic productive cough. No fever or chills.  Tachycardiac  No leukocytosis.  Troponins elevated likely secondary to ESRD     CXR unremarkable  EKG: Sinus tachycardia. No S1Q3T3, Rt axis deviation, or T wave inversion in precordial leads.   Well's score 4.5 (Tachycardia and signs and symptoms of DVT)  Patient does not look volume overloaded on exam. Clear breath sounds.   DVT US negative  D-Dimer non-reliable in ESRD  S/P IR catheter replacement  History of asthma    Plan:  Will hold off on CTA chest for now. If patient remains tachycardiac or worsens can consider CT.  Monitor BMP  Monitor CBC  Continue home inhaler regimen  Nephrology consultation for dialysis resumption and discharge recommendations

## 2024-08-12 NOTE — CASE MANAGEMENT
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     Abnormality of ascending aorta 2024     Postmenopausal 2024     Encounter for screening mammogram for malignant neoplasm of breast 2024     Allergic rhinitis 2024     Persistent cough 2024     Urge incontinence of urine 2024     Exercise intolerance 2024     Family history of coronary artery bypass graft surgery 2024     Urge urinary incontinence 2024     Female stress incontinence 2024     Paranoid schizophrenia ([facility]) 2023     Moderate persistent asthma w/out acute exacerbation 2023     Asthma due to seasonal allergies 2023     Bipolar 1 disorder ([facility]) 2022     Primary hypertension 2022     Dyslipidemia 2022     Morbid obesity with BMI of 45.0-49.9, adult ([facility]) 2022       LOS (days): 1  Geometric Mean LOS (GMLOS) (days):   Days to GMLOS:     OBJECTIVE:  PATIENT READMITTED TO HOSPITAL  Risk of Unplanned Readmission Score: 29.09         Current admission status: Inpatient       Preferred Pharmacy:   [id number]  [place] [address] [address]  Phone: [phone] Fax: [phone]    [facility] - [address] [address]  Phone: [phone] Fax: [phone]    Primary Care Provider: Meenakshi Balbuena MD    [facility] Insurance: [facility]   REP  [facility]    ASSESSMENT:  Active Health Care Proxies    There are no active Health Care Proxies on file.                      Patient Information  Admitted from:: Home  Mental Status: Alert  During Assessment patient was accompanied by: Not accompanied during assessment  Assessment information provided by:: Patient  Primary Caregiver: Self  Support Systems: Self, Friend  County of Residence: [place]  What city do you live in?: [place]  Home entry access options. Select all that apply.: Stairs  Number of steps to enter home.: 4  Do the steps have railings?: Yes  Type of Current Residence: Apartment  Floor Level: 1  Upon entering residence, is there a bedroom on the main floor (no further steps)?: Yes  Upon entering residence, is there a bathroom on the main floor (no further steps)?: Yes  Living Arrangements: Lives w/ Friend    Activities of Daily Living Prior to Admission  Functional Status: Independent  Completes ADLs independently?: Yes  Ambulates independently?: Yes  Does patient use assisted devices?: Yes  Assisted Devices (DME) used: Straight Cane  Does patient currently own DME?: Yes  What DME does the patient currently own?: Straight Cane  Does patient have a history of Outpatient Therapy (PT/OT)?: No  Does the patient have a history of Short-Term Rehab?: No  Does patient have a history of HHC?: No  Does patient currently have HHC?: No         Patient Information Continued  Income Source: Pension/penitentiary  Does patient have prescription coverage?: Yes  Does patient receive dialysis treatments?: No  Does patient have a history of substance abuse?: No  Does patient have a history of Mental Health Diagnosis?: No         Means of Transportation  Means of Transport to Appts:: Danni Baca      Social Determinants of Health (SDOH)      Flowsheet Row Most Recent Value   Housing Stability    In the last 12 months, was there a time when you were not able to  pay the mortgage or rent on time? N   At any time in the past 12 months, were you homeless or living in a shelter (including now)? N   Transportation Needs    In the past 12 months, has lack of transportation kept you from medical appointments or from getting medications? no   In the past 12 months, has lack of transportation kept you from meetings, work, or from getting things needed for daily living? No   Food Insecurity    Within the past 12 months, you worried that your food would run out before you got the money to buy more. Never true   Within the past 12 months, the food you bought just didn't last and you didn't have money to get more. Never true   Utilities    In the past 12 months has the electric, gas, oil, or water company threatened to shut off services in your home? No            DISCHARGE DETAILS:    Discharge planning discussed with:: Patient  Freedom of Choice: Yes  Comments - Freedom of Choice: Cm met with patient to review Cm role. Patient does not anticipate any therapy needs upon discharge. Patient has HHA starting up next week daily for a few hours to assist with bathing and dressing. Patient will need assistance with a ride home when she is medically stable for discharge. Patient unable to ambulate stairs so will need stretcher.                Contacts  Patient Contacts: Patient  Relationship to Patient:: Other (Comment)  Contact Method: In Person  Reason/Outcome: Discharge Planning, Continuity of Care    Requested Home Health Care         Is the patient interested in HHC at discharge?: No    DME Referral Provided  Referral made for DME?: No    Other Referral/Resources/Interventions Provided:  Interventions: Transportation  Referral Comments: Patient will need transportation home by stretcher van    Would you like to participate in our [facility] service program?  : No - Declined

## 2024-08-12 NOTE — ASSESSMENT & PLAN NOTE
Anemia in the setting of ESRD.  Patient required multiple blood transfusions through her recent hospital stay.    PLAN  Continue home Ferrex 150 mg daily.  Monitor CBC.  Transfuse if less than 7.

## 2024-08-12 NOTE — ASSESSMENT & PLAN NOTE
Assessment:  ESRD on HD at [facility] ([facility]) after recent diagnosis of RBGN  She had a prolonged hospital stay (7/12-8/4) where she was started on dialysis and received plasmapheresis for 14 days after biopsy-proven RBGN- AntiGBM on 7/17  Patient states that she missed her dialysis today as they were unable to access her catheter and requested her to come back on Monday.  Patient is aneuric.   S/P IR catheter replacement; confirmed placement with CXR    Plan:  Patient scheduled for dialysis  Continue home sevelamer.  Continue Lokelma.  Continue prednisone taper.   Continue cyclophosphamide.  Atovaquone for PCP prophylaxis.  Nephrology follow up for discharge recommendations

## 2024-08-12 NOTE — PROGRESS NOTES
Critical access hospital  Progress Note  Name: Ngozi Beard I  MRN: [id number]  Unit/Bed#: -01 I Date of Admission: 8/10/2024   Date of Service: 8/12/2024 I Hospital Day: 1    Assessment & Plan   * Shortness of breath  Assessment & Plan  Assessment:  Presented with a brief episode of shortness of breath associated with chest tightness lasting few minutes while trying to lay down where she had to call EMS. Didn't require oxygen.   Upon arrival, SOB resolved and was breathing on RA.   Complains of right leg swelling and tenderness. Chronic productive cough. No fever or chills.  Tachycardiac  No leukocytosis.  Troponins elevated likely secondary to ESRD     CXR unremarkable  EKG: Sinus tachycardia. No S1Q3T3, Rt axis deviation, or T wave inversion in precordial leads.   Well's score 4.5 (Tachycardia and signs and symptoms of DVT)  Patient does not look volume overloaded on exam. Clear breath sounds.   DVT US negative  D-Dimer non-reliable in ESRD  S/P IR catheter replacement  History of asthma    Plan:  Will hold off on CTA chest for now. If patient remains tachycardiac or worsens can consider CT.  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  Patient is aneuric.   S/P IR catheter replacement; confirmed placement with CXR    Plan:  Patient scheduled for dialysis  Continue home sevelamer.  Continue Lokelma.  Continue prednisone taper.   Continue  cyclophosphamide.  Atovaquone for PCP prophylaxis.  Nephrology follow up for discharge recommendations    Anemia  Assessment & Plan  Anemia in the setting of ESRD.  Patient required multiple blood transfusions through her recent hospital stay.    PLAN  Continue home Ferrex 150 mg daily.  Monitor CBC.  Transfuse if less than 7.    Moderate persistent asthma w/out acute exacerbation  Assessment & Plan  Patient does not appear to be in asthma exacerbation.  Well controlled on home regimen    PLAN  Continue home Advair and Singulair,   Continue albuterol as needed.    Bipolar 1 disorder (HCC)  Assessment & Plan  Plan  Continue Effexor 150 mg and Lamictal 100 mg daily.               VTE Pharmacologic Prophylaxis: VTE Score: 4 Moderate Risk (Score 3-4) - Pharmacological DVT Prophylaxis Ordered: heparin.    Mobility:   Basic Mobility Inpatient Raw Score: 23  JH-HLM Goal: 7: Walk 25 feet or more  JH-HLM Achieved: 8: Walk 250 feet ot more  JH-HLM Goal achieved. Continue to encourage appropriate mobility.    Patient Centered Rounds: I performed bedside rounds with nursing staff today.   Discussions with Specialists or Other Care Team Provider: Attending Dr Tobias    Education and Discussions with Family / Patient: Updated  (sister) via phone. Spoke in regards to patient clinical status and improvement of shortness of breath, IR replacement of catheter and resuming dialysis.  She understood the update and stated that patient was weak on discharge and unable to climb steps.  PT on board now.    Total Time Spent on Date of Encounter in care of patient: 30 mins. This time was spent on one or more of the following: performing physical exam; counseling and coordination of care; obtaining or reviewing history; documenting in the medical record; reviewing/ordering tests, medications or procedures; communicating with other healthcare professionals and discussing with patient's family/caregivers.    Current Length of  Stay: 1 day(s)  Current Patient Status: Inpatient   Certification Statement: The patient will continue to require additional inpatient hospital stay due to hemodialysis scheduling and PT evaluation  Discharge Plan: Anticipate discharge in 24-48 hrs to home.    Code Status: Level 1 - Full Code    Subjective:   Patient endorses improvement in her current symptoms.   Endorsed chest tightness, pruritic rash, shortness of breath; lungs clear on auscultation and patient O2 saturation is 95%  Denies headache, lightheadedness, diarrhea.    Objective:     Vitals:   Temp (24hrs), Av °F (36.7 °C), Min:97.6 °F (36.4 °C), Max:98.5 °F (36.9 °C)    Temp:  [97.6 °F (36.4 °C)-98.5 °F (36.9 °C)] 97.9 °F (36.6 °C)  HR:  [64-89] 71  Resp:  [18-20] 18  BP: (116-150)/(62-82) 131/70  SpO2:  [94 %-99 %] 97 %  Body mass index is 45.89 kg/m².     Input and Output Summary (last 24 hours):     Intake/Output Summary (Last 24 hours) at 2024 1337  Last data filed at 2024 1133  Gross per 24 hour   Intake 951.67 ml   Output --   Net 951.67 ml       Physical Exam:   Physical Exam  Constitutional:       General: She is not in acute distress.     Appearance: She is obese. She is not ill-appearing.   Cardiovascular:      Rate and Rhythm: Normal rate and regular rhythm.      Heart sounds: No murmur heard.  Pulmonary:      Effort: Pulmonary effort is normal.      Breath sounds: No wheezing or rales.   Abdominal:      Tenderness: There is no abdominal tenderness.   Musculoskeletal:         General: Swelling present.   Skin:     Findings: Rash (Pruritic rash on back and buttock) present.   Neurological:      Mental Status: She is alert.      Motor: No weakness (Bilateral lower extremities).          Additional Data:     Labs:  Results from last 7 days   Lab Units 24  0542   WBC Thousand/uL 6.71   HEMOGLOBIN g/dL 7.7*  7.7*   HEMATOCRIT % 24.8*  24.8*   PLATELETS Thousands/uL 157   BANDS PCT % 1   LYMPHO PCT % 15   MONO PCT % 2*   EOS  PCT % 4     Results from last 7 days   Lab Units 08/12/24  0542 08/11/24  0637 08/10/24  2240   SODIUM mmol/L 142   < > 139   POTASSIUM mmol/L 4.7   < > 4.7   CHLORIDE mmol/L 103   < > 101   CO2 mmol/L 28   < > 24   BUN mg/dL 82*   < > 65*   CREATININE mg/dL 11.14*   < > 9.34*   ANION GAP mmol/L 11   < > 14*   CALCIUM mg/dL 9.6   < > 9.2   ALBUMIN g/dL  --   --  3.7   TOTAL BILIRUBIN mg/dL  --   --  0.43   ALK PHOS U/L  --   --  63   ALT U/L  --   --  14   AST U/L  --   --  21   GLUCOSE RANDOM mg/dL 95   < > 152*    < > = values in this interval not displayed.                       Lines/Drains:  Invasive Devices       Peripheral Intravenous Line  Duration             Peripheral IV 08/10/24 Right;Ventral (anterior) Forearm 1 day              Hemodialysis Catheter  Duration             HD Permanent Double Catheter <1 day                          Imaging: Reviewed radiology reports from this admission including: chest xray Dialysis catheter exchange with tip in expected location of right atrium.  Safe to use.    Recent Cultures (last 7 days):         Last 24 Hours Medication List:   Current Facility-Administered Medications   Medication Dose Route Frequency Provider Last Rate    acetaminophen  650 mg Oral Q6H PRN Yasmine Meadows MD      albuterol  2 puff Inhalation Q4H PRN Yasmine Meadows MD      atorvastatin  20 mg Oral Daily With Dinner Yasmine Meadows MD      atovaquone  1,500 mg Oral Daily Yasmine Meadows MD      calcium carbonate  1 tablet Oral Daily With Breakfast Yasmine Meadows MD      cyclophosphamide  100 mg Oral Daily Yasmine Meadows MD      diphenhydrAMINE  25 mg Oral Q6H PRN Xavier Medina MD      docosanol   Topical 5x Daily Dana Raya MD      fluticasone-vilanterol  2 puff Inhalation Daily Quique Tobias MD      guaiFENesin  600 mg Oral Q12H [facility] Dana Raya MD      heparin (porcine)  5,000 Units Subcutaneous Q8H JACK Yasmine Meadows MD      iron polysaccharides  150 mg Oral Q48H Dana Raya MD       lamoTRIgine  100 mg Oral HS Yasmine Meadows MD      levalbuterol  1.25 mg Nebulization Once Yasmine Meadows MD      melatonin  3 mg Oral HS Dana Raya MD      montelukast  10 mg Oral Daily Yasmine Meadows MD      NIFEdipine  60 mg Oral Daily Yasmine Meadows MD      pantoprazole  40 mg Oral Daily Before Breakfast Yasmine Meadows MD      polyethylene glycol  17 g Oral Daily Dana Raya MD      predniSONE  25 mg Oral Daily Yasmine Meadows MD      Followed by    [START ON 8/24/2024] predniSONE  20 mg Oral Daily Yasmine Meadows MD      Followed by    [START ON 9/7/2024] predniSONE  15 mg Oral Daily Yasmine Meadows MD      Followed by    [START ON 9/21/2024] predniSONE  12.5 mg Oral Daily Yasmine Meadows MD      Followed by    [START ON 10/5/2024] predniSONE  10 mg Oral Daily Yasmine Meadows MD      Followed by    [START ON 10/19/2024] predniSONE  7.5 mg Oral Daily Yasmine Meadows MD      Followed by    [START ON 11/2/2024] predniSONE  5 mg Oral Daily Yasmine Meadows MD      sevelamer  1,600 mg Oral TID With Meals Yasmine Meadows MD      Sodium Zirconium Cyclosilicate  10 g Oral Daily Yasmine Meadows MD      traZODone  200 mg Oral HS Yasmine Meadows MD      venlafaxine  150 mg Oral Daily Yasmine Meadows MD      venlafaxine  75 mg Oral Daily Dana Raya MD          Today, Patient Was Seen By: Gerber Lynch MD    **Please Note: This note may have been constructed using a voice recognition system.**

## 2024-08-12 NOTE — UTILIZATION REVIEW
Initial Clinical Review    Admission: Date/Time/Statement:   Admission Orders (From admission, onward)       Ordered        08/11/24 0008  INPATIENT ADMISSION  Once                          Orders Placed This Encounter   Procedures    INPATIENT ADMISSION     Standing Status:   Standing     Number of Occurrences:   1     Order Specific Question:   Level of Care     Answer:   Med Surg [16]     Order Specific Question:   Estimated length of stay     Answer:   More than 2 Midnights     Order Specific Question:   Certification     Answer:   I certify that inpatient services are medically necessary for this patient for a duration of greater than two midnights. See H&P and MD Progress Notes for additional information about the patient's course of treatment.     ED Arrival Information       Expected   -    Arrival   8/10/2024 22:27    Acuity   Urgent              Means of arrival   Ambulance    Escorted by   [facility]    Service   Hospitalist    Admission type   Emergency              Arrival complaint   ems             Chief Complaint   Patient presents with    Chest Pain     Pt arrives via EMS from home, reports SOB and CP that has since resolved. Pt also states today at her dialysis appt, they were unable to access her site in her R chest wall, so she was unable to get her treatment.       Initial Presentation: 64 y.o. female PMH of Anti-GBM disease, ESRD on HD (TTS), RPCN-rapidly probated chlamydial nephritis  ,  anuric,  HTN, Anemia,HTN,  HLD, bipolar 1, asthma  who presents to ED from home 8/10/24 via EMS with shortness of breath. Reports that as she was trying to lay down for a nap around 4 PM today she had a brief episode of shortness of breath and was gasping for air for few minutes associated with chest tightness so she had to call EMS. The episode resolved on its own before arriving to the ED. Pt also noted RLE swelling today associated tenderness. Has chronic productive cough .Patient states that she missed  her dialysis today as they were unable to access her catheter and requested her to come back on Monday.   On  exam, does not appear vol overloaded.  Rt leg warmth and tenderness. No redness or swelling noted. Pt tachycardic . Well's score 4.5    Labs hgb 7.5, creat 9.34 , trop 63>60>59 likely 2/2 ESRD, . CXR shows nothing acute . ECG- ST. P admitted as Inpatient with SOB.DDX include PE vs anxiety vs chronic lung disease vs anemia. No suspicion for ACS Plan- Obtain doppler US RLE. CBC, BMP in am . Nephrology consult for HD Consider IR consult if continues  with HD cath issues. Continue home meds .   Anticipated Length of Stay/Certification Statement: Patient will be admitted on an inpatient basis with an anticipated length of stay of greater than 2 midnights secondary to SOB.     Date 8/11 day 2   Nephrology consult- acute kidney failure with RPGN secondary to recently dx biopsy-proven anti-GBM disease ,  initiated on dialysis 7/16/2024 . Has R IJ permacth , did not have dialysis on 8/10 due to access issues  Target weight 115 kg . Plan- does not have emergent indication for dialysis today . Consult IR for access evaluation and may need catheter change Next dialysis after IR evaluation either Monday or Tuesday .  Utilize hypoallergenic filter for now until confirming with outpatient dialysis unit   Labs in am . Continue prednisone taper , atovaquone, PPI. Okay to continue Lokelma today but if remains stable with dialysis with regards to potassium, hold standing Lokelma   IR consult- Most recent chest xray shows that the catheter tip in in the innominate vein rather than the right atrium. She will require exchange and repositioning of the catheter tomorrow as she miss her dialysis today.  NPO after midnight.   Medicine-   patient reports no significant improvement from yesterday. Was concerned about home inhalers Symbicort was asking if she could get that in the hospital. Explained that she that we probably do  not have it. Requested to bring it from home . Currently saturating well on room air. Electrolytes are stable   Hgb down to 6.7- pt transfused 1 U leukoreduced RBC . Continue to monitor hgb .     Date: 8/12   Day3   IR 8/12/24 @ 0853- Tunneled dialysis catheter exchanged . CHG dressing and sutures to right IJ site. Both lumens flushed, blood return noted, and capped   Nephrology -  Plan for HD today  x2 h due to missed HD on Saturday. Then next HD tomorrow as reg day . No evidence of renal recovery at this time K stable, continue Lokelma . Continue current immunosuppression.   Medicine-  pt c/o SOB and chest tightness. Pruritic rash on back and buttock.  Patient HD catheter was replaced today. Now awaiting dialysis. Lungs clear t, saturating well on room air, 95 % . . Suspect chest tightness most likely from mild volume overload from missing dialysis. US neg for DVT . Continue home inhaler regimen .    Hgb 7.7 today after transfused yesterday .       ED Triage Vitals   Temperature Pulse Respirations Blood Pressure SpO2 Pain Score   08/10/24 2228 08/10/24 2228 08/10/24 2228 08/10/24 2228 08/10/24 2228 08/10/24 2230   98 °F (36.7 °C) (!) 106 18 128/67 95 % No Pain     Weight (last 2 days)       Date/Time Weight    08/10/24 2230 118 (259.04)            Vital Signs (last 3 days)       Date/Time Temp Pulse Resp BP MAP (mmHg) SpO2 FiO2 (%) O2 Device Patient Position - Orthostatic VS Douglas Coma Scale Score Pain    08/12/24 1720 -- 75 18 140/84 105 -- -- -- Lying -- --    08/12/24 1715 -- 77 18 144/82 106 -- -- -- -- -- --    08/12/24 1712 -- 85 18 129/86 104 -- -- -- -- -- --    08/12/24 1700 -- 89 18 110/57 75 -- -- -- -- -- --    08/12/24 1630 -- 67 19 136/80 100 -- -- -- -- -- --    08/12/24 1600 -- 71 19 138/82 104 -- -- -- -- -- --    08/12/24 1530 -- 73 19 134/95 115 -- -- -- -- -- --    08/12/24 1525 -- 66 19 137/81 105 -- -- -- -- -- --    08/12/24 1520 -- 74 19 135/71 95 -- -- -- Lying -- --    08/12/24 0912 --  71 -- 131/70 -- -- -- -- Lying -- No Pain    08/12/24 0900 -- -- -- -- -- -- -- -- -- 15 --    08/12/24 0855 -- 64 -- 135/75 -- 97 % 23 -- -- -- --    08/12/24 0850 -- 67 -- 135/75 -- 95 % 72 -- -- -- --    08/12/24 0845 -- 68 -- 134/73 -- 97 % 76 -- -- -- --    08/12/24 0840 -- 71 -- 140/76 -- 98 % 73 -- -- -- --    08/12/24 0835 -- 66 -- 140/82 -- 99 % 71 -- -- -- --    08/12/24 0830 -- 67 -- 138/80 -- 99 % 72 -- -- -- --    08/12/24 0803 -- -- -- -- -- -- -- -- -- -- No Pain    08/12/24 0700 97.9 °F (36.6 °C) 78 18 150/75 106 97 % -- None (Room air) Lying -- --    08/12/24 0027 97.8 °F (36.6 °C) 89 20 116/62 82 98 % -- -- Lying -- --    08/11/24 2225 97.6 °F (36.4 °C) 77 18 143/74 103 98 % -- -- Lying 15 --    08/11/24 2119 98.1 °F (36.7 °C) 78 18 134/70 97 99 % -- -- Lying -- --    08/11/24 2052 98.2 °F (36.8 °C) 79 18 130/65 91 99 % -- -- Lying -- --    08/11/24 1923 -- -- -- -- -- -- -- -- -- -- No Pain    08/11/24 1600 98.5 °F (36.9 °C) 89 18 133/68 94 94 % -- None (Room air) Lying -- --    08/11/24 1059 99.1 °F (37.3 °C) 96 20 167/81 116 93 % -- None (Room air) Lying -- --    08/11/24 1000 -- 71 18 158/77 -- 97 % -- None (Room air) Sitting -- --    08/11/24 0945 -- 82 -- -- -- 96 % -- -- -- -- --    08/11/24 0930 -- 84 -- -- -- 95 % -- -- -- -- --    08/11/24 0715 -- 79 18 140/73 102 94 % -- None (Room air) Lying -- --    08/11/24 0314 -- -- -- -- -- -- -- -- -- -- 7 08/11/24 0310 -- -- -- -- -- -- -- -- -- -- 7 08/11/24 0300 -- 90 20 132/65 -- 96 % -- None (Room air) Sitting -- 7    08/11/24 0200 -- 88 20 142/77 -- 96 % -- None (Room air) Sitting 15 6    08/11/24 0030 -- 100 20 136/80 -- 96 % -- None (Room air) Sitting -- No Pain    08/11/24 0000 -- 98 -- -- -- 99 % -- -- -- -- --    08/10/24 2300 -- 97 20 132/78 -- 96 % -- None (Room air) Sitting -- --    08/10/24 2245 -- -- -- -- -- -- -- None (Room air) -- -- --    08/10/24 2230 -- 104 20 130/70 -- 95 % -- None (Room air) Sitting -- No Pain     08/10/24 2228 98 °F (36.7 °C) 106 18 128/67 -- 95 % -- None (Room air) Lying -- --              Pertinent Labs/Diagnostic Test Results:   Radiology:  IR tunneled dialysis catheter check/change/reposition/angioplasty   Final Interpretation by Graciela Bettencourt MD (08/12 8512)      Right-sided internal jugular tunneled dialysis catheter exchange, with its tip in the expected location of the right atrium.      Plan:      The catheter may be used immediately.      Workstation performed: [id number]         VAS VENOUS DUPLEX -LOWER LIMB UNILATERAL   Final Interpretation by Flor Mata MD (08/11 6157)   RIGHT LOWER LIMB  No evidence of acute or chronic deep vein thrombosis .  No evidence of superficial thrombophlebitis noted.  Doppler evaluation shows a normal response to augmentation maneuvers.  Popliteal, posterior tibial and anterior tibial arterial Doppler waveform's are  triphasic.     LEFT LOWER LIMB LIMITED  Evaluation shows no evidence of thrombus in the common femoral vein.  Doppler evaluation shows a normal response to augmentation maneuvers.         X-ray chest 1 view portable   ED Interpretation by Solomon Avila DO (08/10 6796)   No acute cardiopulmonary disease      Final Interpretation by Andreia Whalen MD (08/11 0608)      No acute cardiopulmonary disease.            Workstation performed: [id number]           8/10 ECG- ED read    ECG rate assessment: tachycardic    Rhythm:     Rhythm: sinus rhythm    Ectopy:     Ectopy: PVCs    QRS:     QRS axis:  Normal     QRS intervals:  Normal   Conduction:     Conduction: normal    ST segments:     ST segments:  Normal   T waves:     T waves: inverted      Inverted:  V1 and V2           Results from last 7 days   Lab Units 08/12/24  0542 08/11/24  1643 08/11/24  1039 08/11/24  0637 08/10/24  2240   WBC Thousand/uL 6.71  --   --  7.09 7.14   HEMOGLOBIN g/dL 7.7*  7.7* 6.8* 7.0* 6.7* 7.5*   HEMATOCRIT % 24.8*  24.8* 21.8* 22.3* 21.6* 25.4*   PLATELETS Thousands/uL 157   --   --  165 174   BANDS PCT % 1  --   --   --  1         Results from last 7 days   Lab Units 08/12/24  0542 08/11/24  0637 08/10/24  2240   SODIUM mmol/L 142 141 139   POTASSIUM mmol/L 4.7 4.0 4.7   CHLORIDE mmol/L 103 104 101   CO2 mmol/L 28 26 24   ANION GAP mmol/L 11 11 14*   BUN mg/dL 82* 70* 65*   CREATININE mg/dL 11.14* 9.79* 9.34*   EGFR ml/min/1.73sq m 3 3 3   CALCIUM mg/dL 9.6 8.9 9.2   MAGNESIUM mg/dL  --   --  2.1   PHOSPHORUS mg/dL 5.3*  --   --      Results from last 7 days   Lab Units 08/10/24  2240   AST U/L 21   ALT U/L 14   ALK PHOS U/L 63   TOTAL PROTEIN g/dL 5.5*   ALBUMIN g/dL 3.7   TOTAL BILIRUBIN mg/dL 0.43         Results from last 7 days   Lab Units 08/12/24  0542 08/11/24  0637 08/10/24  2240   GLUCOSE RANDOM mg/dL 95 88 152*             Results from last 7 days   Lab Units 08/11/24  0222 08/11/24  0021 08/10/24  2240   HS TNI 0HR ng/L  --   --  60*   HS TNI 2HR ng/L  --  63*  --    HSTNI D2 ng/L  --  3  --    HS TNI 4HR ng/L 59*  --   --    HSTNI D4 ng/L -1  --   --            Results from last 7 days   Lab Units 08/11/24  0021   BNP pg/mL 533*             Results from last 7 days   Lab Units 08/12/24  0547   UNIT PRODUCT CODE  [id number]   UNIT NUMBER  [id number]   UNITABO  A   UNITRH  POS   CROSSMATCH  Compatible   UNIT DISPENSE STATUS  Presumed Trans   UNIT PRODUCT VOL ml 350             ED Treatment-Medication Administration from 08/10/2024 2227 to 08/11/2024 1059         Date/Time Order Dose Route Action     08/11/2024 0634 heparin (porcine) subcutaneous injection 5,000 Units 5,000 Units Subcutaneous Given     08/11/2024 1020 albuterol (PROVENTIL HFA,VENTOLIN HFA) inhaler 2 puff 2 puff Inhalation Given     08/11/2024 1021 atovaquone (MEPRON) oral suspension 1,500 mg 1,500 mg Oral Given     08/11/2024 0827 calcium carbonate (OYSTER SHELL,OSCAL) 500 mg tablet 1 tablet 1 tablet Oral Given     08/11/2024 1023 cyclophosphamide (CYTOXAN) capsule 100 mg 100 mg Oral Given     08/11/2024  1027 montelukast (SINGULAIR) tablet 10 mg 10 mg Oral Given     08/11/2024 1026 NIFEdipine (PROCARDIA XL) 24 hr tablet 60 mg 60 mg Oral Given     08/11/2024 0633 pantoprazole (PROTONIX) EC tablet 40 mg 40 mg Oral Given     08/11/2024 1028 predniSONE tablet 25 mg 25 mg Oral Given     08/11/2024 0827 sevelamer (RENAGEL) tablet 1,600 mg 1,600 mg Oral Given     08/11/2024 1031 Sodium Zirconium Cyclosilicate (Lokelma) 10 g 10 g Oral Given     08/11/2024 0218 traZODone (DESYREL) tablet 200 mg 200 mg Oral Given     08/11/2024 0827 venlafaxine (EFFEXOR-XR) 24 hr capsule 150 mg 150 mg Oral Given     08/11/2024 0218 melatonin tablet 3 mg 3 mg Oral Given     08/11/2024 0311 lamoTRIgine (LaMICtal) tablet 100 mg 100 mg Oral Given     08/11/2024 0310 acetaminophen (TYLENOL) tablet 650 mg 650 mg Oral Given     08/11/2024 0633 iron polysaccharides (FERREX) capsule 150 mg 150 mg Oral Given     08/11/2024 1025 guaiFENesin (MUCINEX) 12 hr tablet 600 mg 600 mg Oral Given     08/11/2024 1028 venlafaxine (EFFEXOR-XR) 24 hr capsule 75 mg 75 mg Oral Given            Past Medical History:   Diagnosis Date    Asthma     Chronic pain     Hypertension     Renal disorder     Sepsis (HCC) 07/10/2024     Present on Admission:   Bipolar 1 disorder (HCC)   Moderate persistent asthma w/out acute exacerbation   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 female  Admission Orders:  Scheduled Medications:  atorvastatin, 20 mg, Oral, Daily With Dinner  atovaquone, 1,500 mg, Oral, Daily  calcium carbonate, 1 tablet, Oral, Daily With Breakfast  cyclophosphamide, 100 mg, Oral, Daily  docosanol, , Topical, 5x Daily  fluticasone-vilanterol, 2 puff, Inhalation, Daily  guaiFENesin, 600 mg, Oral, Q12H JACK  heparin (porcine), 5,000 Units,  Subcutaneous, Q8H JACK  iron polysaccharides, 150 mg, Oral, Q48H  lamoTRIgine, 100 mg, Oral, HS  levalbuterol, 1.25 mg, Nebulization, Once  melatonin, 3 mg, Oral, HS  montelukast, 10 mg, Oral, Daily  NIFEdipine, 60 mg, Oral, Daily  pantoprazole, 40 mg, Oral, Daily Before Breakfast  polyethylene glycol, 17 g, Oral, Daily  predniSONE, 25 mg, Oral, Daily   Followed by  [START ON 8/24/2024] predniSONE, 20 mg, Oral, Daily   Followed by  [START ON 9/7/2024] predniSONE, 15 mg, Oral, Daily   Followed by  [START ON 9/21/2024] predniSONE, 12.5 mg, Oral, Daily   Followed by  [START ON 10/5/2024] predniSONE, 10 mg, Oral, Daily   Followed by  [START ON 10/19/2024] predniSONE, 7.5 mg, Oral, Daily   Followed by  [START ON 11/2/2024] predniSONE, 5 mg, Oral, Daily  sevelamer, 1,600 mg, Oral, TID With Meals  Sodium Zirconium Cyclosilicate, 10 g, Oral, Daily  traZODone, 200 mg, Oral, HS  venlafaxine, 150 mg, Oral, Daily  venlafaxine, 75 mg, Oral, Daily    vancomycin (VANCOCIN) 1500 mg in sodium chloride 0.9% 250 mL IVPB  Dose: 1,500 mg  Freq: Once Route: IV  Indications Comment: TDC exchange  Last Dose: Stopped (08/12/24 1133)  Start: 08/12/24 0845 End: 08/12/24 1133  Continuous IV Infusions:     PRN Meds:  acetaminophen, 650 mg, Oral, Q6H PRN  albuterol, 2 puff, Inhalation, Q4H PRN x1 8/11   diphenhydrAMINE, 25 mg, Oral, Q6H PRN x1 8/12     Lo phos  renal diet, 1500 ml FR    SCD    IP CONSULT TO NEPHROLOGY  INPATIENT CONSULT TO IR    Network Utilization Review Department  ATTENTION: Please call with any questions or concerns to [phone] and carefully listen to the prompts so that you are directed to the right person. All voicemails are confidential.   For Discharge needs, contact Care Management DC Support Team at [phone] opt. 2  Send all requests for admission clinical reviews, approved or denied determinations and any other requests to dedicated fax number below belonging to the campus where the patient is receiving  treatment. List of dedicated fax numbers for the Facilities:  FACILITY NAME UR FAX NUMBER   ADMISSION DENIALS (Administrative/Medical Necessity) [phone]   DISCHARGE SUPPORT TEAM (NETWORK) [phone]   PARENT CHILD HEALTH (Maternity/NICU/Pediatrics) [phone]   [facility] [phone]   [facility] [phone]   [facility] [phone]   [facility] [phone]   [facility] [phone]   [facility] [phone]   [facility] [phone]   [facility] [phone]   [facility] [phone]   [facility] [phone]   [facility] [phone]   [facility] [phone]

## 2024-08-12 NOTE — PLAN OF CARE
Problem: Potential for Falls  Goal: Patient will remain free of falls  Description: INTERVENTIONS:  - Educate patient/family on patient safety including physical limitations  - Instruct patient to call for assistance with activity   - Consult OT/PT to assist with strengthening/mobility   - Keep Call bell within reach  - Keep bed low and locked with side rails adjusted as appropriate  - Keep care items and personal belongings within reach  - Initiate and maintain comfort rounds  - Make Fall Risk Sign visible to staff  - Apply yellow socks and bracelet for high fall risk patients  - Consider moving patient to room near nurses station  Outcome: Progressing     Problem: PAIN - ADULT  Goal: Verbalizes/displays adequate comfort level or baseline comfort level  Description: Interventions:  - Encourage patient to monitor pain and request assistance  - Assess pain using appropriate pain scale  - Administer analgesics based on type and severity of pain and evaluate response  - Implement non-pharmacological measures as appropriate and evaluate response  - Consider cultural and social influences on pain and pain management  - Notify physician/advanced practitioner if interventions unsuccessful or patient reports new pain  Outcome: Progressing     Problem: SAFETY ADULT  Goal: Patient will remain free of falls  Description: INTERVENTIONS:  - Educate patient/family on patient safety including physical limitations  - Instruct patient to call for assistance with activity   - Consult OT/PT to assist with strengthening/mobility   - Keep Call bell within reach  - Keep bed low and locked with side rails adjusted as appropriate  - Keep care items and personal belongings within reach  - Initiate and maintain comfort rounds  - Make Fall Risk Sign visible to staff  - Apply yellow socks and bracelet for high fall risk patients  - Consider moving patient to room near nurses station  Outcome: Progressing     Problem: Knowledge Deficit  Goal:  Patient/family/caregiver demonstrates understanding of disease process, treatment plan, medications, and discharge instructions  Description: Complete learning assessment and assess knowledge base.  Interventions:  - Provide teaching at level of understanding  - Provide teaching via preferred learning methods  Outcome: Progressing

## 2024-08-13 ENCOUNTER — APPOINTMENT (INPATIENT)
Dept: DIALYSIS | Facility: HOSPITAL | Age: 64
DRG: 698 | End: 2024-08-13
Payer: COMMERCIAL

## 2024-08-13 VITALS
TEMPERATURE: 98.2 F | HEART RATE: 92 BPM | BODY MASS INDEX: 45.9 KG/M2 | RESPIRATION RATE: 16 BRPM | HEIGHT: 63 IN | SYSTOLIC BLOOD PRESSURE: 123 MMHG | OXYGEN SATURATION: 88 % | WEIGHT: 259.04 LBS | DIASTOLIC BLOOD PRESSURE: 75 MMHG

## 2024-08-13 LAB
ANION GAP SERPL CALCULATED.3IONS-SCNC: 11 MMOL/L (ref 4–13)
ATRIAL RATE: 102 BPM
ATRIAL RATE: 107 BPM
BUN SERPL-MCNC: 63 MG/DL (ref 5–25)
CALCIUM SERPL-MCNC: 9 MG/DL (ref 8.4–10.2)
CHLORIDE SERPL-SCNC: 98 MMOL/L (ref 96–108)
CO2 SERPL-SCNC: 29 MMOL/L (ref 21–32)
CREAT SERPL-MCNC: 8.89 MG/DL (ref 0.6–1.3)
ERYTHROCYTE [DISTWIDTH] IN BLOOD BY AUTOMATED COUNT: 17.2 % (ref 11.6–15.1)
GFR SERPL CREATININE-BSD FRML MDRD: 4 ML/MIN/1.73SQ M
GLUCOSE SERPL-MCNC: 71 MG/DL (ref 65–140)
HCT VFR BLD AUTO: 23.8 % (ref 34.8–46.1)
HGB BLD-MCNC: 7.4 G/DL (ref 11.5–15.4)
MCH RBC QN AUTO: 27.6 PG (ref 26.8–34.3)
MCHC RBC AUTO-ENTMCNC: 31.1 G/DL (ref 31.4–37.4)
MCV RBC AUTO: 89 FL (ref 82–98)
MYCOBACTERIUM SPEC CULT: NORMAL
MYCOBACTERIUM SPEC CULT: NORMAL
P AXIS: 23 DEGREES
P AXIS: 26 DEGREES
PLATELET # BLD AUTO: 181 THOUSANDS/UL (ref 149–390)
PMV BLD AUTO: 9.7 FL (ref 8.9–12.7)
POTASSIUM SERPL-SCNC: 4.3 MMOL/L (ref 3.5–5.3)
PR INTERVAL: 158 MS
PR INTERVAL: 178 MS
QRS AXIS: -1 DEGREES
QRS AXIS: -21 DEGREES
QRSD INTERVAL: 128 MS
QRSD INTERVAL: 78 MS
QT INTERVAL: 346 MS
QT INTERVAL: 352 MS
QTC INTERVAL: 458 MS
QTC INTERVAL: 461 MS
RBC # BLD AUTO: 2.68 MILLION/UL (ref 3.81–5.12)
RHODAMINE-AURAMINE STN SPEC: NORMAL
RHODAMINE-AURAMINE STN SPEC: NORMAL
SODIUM SERPL-SCNC: 138 MMOL/L (ref 135–147)
T WAVE AXIS: 33 DEGREES
T WAVE AXIS: 98 DEGREES
VENTRICULAR RATE: 102 BPM
VENTRICULAR RATE: 107 BPM
WBC # BLD AUTO: 7.33 THOUSAND/UL (ref 4.31–10.16)

## 2024-08-13 PROCEDURE — 99232 SBSQ HOSP IP/OBS MODERATE 35: CPT | Performed by: STUDENT IN AN ORGANIZED HEALTH CARE EDUCATION/TRAINING PROGRAM

## 2024-08-13 PROCEDURE — 80048 BASIC METABOLIC PNL TOTAL CA: CPT

## 2024-08-13 PROCEDURE — 85027 COMPLETE CBC AUTOMATED: CPT

## 2024-08-13 PROCEDURE — 93010 ELECTROCARDIOGRAM REPORT: CPT | Performed by: INTERNAL MEDICINE

## 2024-08-13 PROCEDURE — 5A1D70Z PERFORMANCE OF URINARY FILTRATION, INTERMITTENT, LESS THAN 6 HOURS PER DAY: ICD-10-PCS | Performed by: PHYSICIAN ASSISTANT

## 2024-08-13 PROCEDURE — 99239 HOSP IP/OBS DSCHRG MGMT >30: CPT | Performed by: INTERNAL MEDICINE

## 2024-08-13 RX ORDER — ONDANSETRON 4 MG/1
4 TABLET, ORALLY DISINTEGRATING ORAL EVERY 6 HOURS PRN
Status: CANCELLED | OUTPATIENT
Start: 2024-08-13 | End: 2024-08-27

## 2024-08-13 RX ORDER — ONDANSETRON 4 MG/1
4 TABLET, FILM COATED ORAL EVERY 8 HOURS PRN
Qty: 20 TABLET | Refills: 0 | Status: SHIPPED | OUTPATIENT
Start: 2024-08-13

## 2024-08-13 RX ADMIN — PANTOPRAZOLE SODIUM 40 MG: 20 TABLET, DELAYED RELEASE ORAL at 05:49

## 2024-08-13 RX ADMIN — SEVELAMER HYDROCHLORIDE 1600 MG: 800 TABLET ORAL at 07:52

## 2024-08-13 RX ADMIN — Medication 1 TABLET: at 07:52

## 2024-08-13 RX ADMIN — NIFEDIPINE 60 MG: 30 TABLET, FILM COATED, EXTENDED RELEASE ORAL at 14:14

## 2024-08-13 RX ADMIN — GUAIFENESIN 600 MG: 600 TABLET ORAL at 14:13

## 2024-08-13 RX ADMIN — VENLAFAXINE HYDROCHLORIDE 150 MG: 150 CAPSULE, EXTENDED RELEASE ORAL at 14:13

## 2024-08-13 RX ADMIN — HEPARIN SODIUM 5000 UNITS: 5000 INJECTION INTRAVENOUS; SUBCUTANEOUS at 05:49

## 2024-08-13 RX ADMIN — POLYSACCHARIDE-IRON COMPLEX 150 MG: 150 CAPSULE ORAL at 05:49

## 2024-08-13 RX ADMIN — HEPARIN SODIUM 5000 UNITS: 5000 INJECTION INTRAVENOUS; SUBCUTANEOUS at 14:14

## 2024-08-13 RX ADMIN — MONTELUKAST 10 MG: 10 TABLET, FILM COATED ORAL at 14:13

## 2024-08-13 RX ADMIN — PREDNISONE 25 MG: 1 TABLET ORAL at 14:17

## 2024-08-13 RX ADMIN — ALBUTEROL SULFATE 2 PUFF: 90 AEROSOL, METERED RESPIRATORY (INHALATION) at 08:05

## 2024-08-13 RX ADMIN — SEVELAMER HYDROCHLORIDE 1600 MG: 800 TABLET ORAL at 14:13

## 2024-08-13 RX ADMIN — ONDANSETRON 4 MG: 2 INJECTION INTRAMUSCULAR; INTRAVENOUS at 08:05

## 2024-08-13 RX ADMIN — FLUTICASONE FUROATE AND VILANTEROL TRIFENATATE 2 PUFF: 200; 25 POWDER RESPIRATORY (INHALATION) at 08:32

## 2024-08-13 NOTE — PROGRESS NOTES
NEPHROLOGY PROGRESS NOTE   Ngozi Beard 64 y.o. 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  Patient admitted with PermCath dysfunction.  Nephrology is consulted for management of ANGELICA    PLAN:    # Anuric  KDIGO ANGELICA stage 3, dialysis dependent with no evidence of kidney recovery  Etiology: Anti-GBM disease  Continue to be dialysis dependent, anuric  Treatment:  Patient had dialysis yesterday, continue with dialysis today as per schedule  Maintain MAP:  Over 65 mmHg if possible/avoid hypoperfusion:  Hold parameters on blood pressure medications  Avoid nephrotoxic agents such as NSAIDs, and IV contrast if possible. Avoid opioids   Adjust medications to GFR  Stable for discharge from a and after dialysis    # Anti-GBM disease  Status post Plex for 14 days, last anti-GBM more than 8 on July 29  Still at risk of developing pulmonary hemorrhage  Continue with Cytoxan      # Immunosuppressive therapy  Continue with prednisone taper as a scheduled  Cytoxan 100 mg daily, adjusted by kidney function and age plan to continue for 3 months    # Prophylaxis  On atovaquone, before she was on Bactrim but patient is hyperkalemic  PPIs  Calcium and vitamin D    #Acid-base Disorder  serum HCO3 29 mmol/L  Stable     #Volume status/hypertension:  Volume: Hypervolemic  Blood pressure: Normotensive, /71, goal less than 140/90  Recommend:  Unfortunately patient is anuric with no evidence of kidney recovery  Continue with nifedipine 60 mg daily, avoid BP meds before dialysis    # Hyperkalemia  On Lokelma  Low potassium diet      #Anemia:  Current hemoglobin: 7.4  Treatment:  Will plan for Epogen as an outpatient  Transfuse for hemoglobin less than 7.0 per primary service      # Vascular access complications  PermCath replaced due to  malfunction  Work well on dialysis yesterday    #Secondary Hyperparathyroidism   Continue Renagel with meals      The highlighted and/or bolded points in my assessment, plan, and disposition were discussed with the primary team and they agree with those points and the plan.  Previous records were personally reviewed by me to obtain a baseline creatinine.   The images (CXR) were personally reviewed by me in PACS      SUBJECTIVE:  Patient seen and examined at bedside. No chest pain, shortness of breath, nausea, vomiting, abdominal pain or diarrhea.     OBJECTIVE:  Current Weight: Weight - Scale: 118 kg (259 lb 0.7 oz)  Vitals:    08/13/24 0719   BP: 123/71   Pulse: 87   Resp: 14   Temp: 98.2 °F (36.8 °C)   SpO2: 91%       Intake/Output Summary (Last 24 hours) at 8/13/2024 0835  Last data filed at 8/12/2024 1843  Gross per 24 hour   Intake 1880 ml   Output 2383 ml   Net -503 ml     Wt Readings from Last 3 Encounters:   08/10/24 118 kg (259 lb 0.7 oz)   08/04/24 112 kg (247 lb 5.7 oz)   07/10/24 109 kg (241 lb)     Temp Readings from Last 3 Encounters:   08/13/24 98.2 °F (36.8 °C)   08/04/24 97.7 °F (36.5 °C)   07/10/24 99.4 °F (37.4 °C) (Oral)     BP Readings from Last 3 Encounters:   08/13/24 123/71   08/04/24 114/70   07/12/24 127/59     Pulse Readings from Last 3 Encounters:   08/13/24 87   08/04/24 82   07/12/24 72        General:  no acute distress at this time  Skin:  No acute rash  Eyes:  No scleral icterus and noninjected  ENT:  mucous membranes moist  Neck:  no carotid bruits  Chest:  Clear to auscultation percussion, good respiratory effort, no use of accessory respiratory muscles  CVS:  Regular rate and rhythm without rub   Abdomen:  soft and nontender   Extremities: lower extremity edema  Neuro:  No gross focality  Psych:  Alert , cooperative   Dialysis access: Right IJ PermCath      Medications:    Current Facility-Administered Medications:     acetaminophen (TYLENOL) tablet 650 mg, 650 mg, Oral, Q6H PRN,  Yasmine Meadows MD, 650 mg at 08/11/24 0310    albuterol (PROVENTIL HFA,VENTOLIN HFA) inhaler 2 puff, 2 puff, Inhalation, Q4H PRN, Yasmine Meadows MD, 2 puff at 08/13/24 0805    atorvastatin (LIPITOR) tablet 20 mg, 20 mg, Oral, Daily With Dinner, Yasmine Meadows MD, 20 mg at 08/12/24 2208    atovaquone (MEPRON) oral suspension 1,500 mg, 1,500 mg, Oral, Daily, Yasmine Meadows MD, 1,500 mg at 08/12/24 0917    calcium carbonate (OYSTER SHELL,OSCAL) 500 mg tablet 1 tablet, 1 tablet, Oral, Daily With Breakfast, Yasmine Meadows MD, 1 tablet at 08/13/24 0752    cyclophosphamide (CYTOXAN) capsule 100 mg, 100 mg, Oral, Daily, Yasmine Meadows MD, 100 mg at 08/12/24 0916    diphenhydrAMINE (BENADRYL) tablet 25 mg, 25 mg, Oral, Q6H PRN, Xavier Medina MD, 25 mg at 08/12/24 1101    docosanol (ABREVA) 10 % cream, , Topical, 5x Daily, Dana Raya MD, Given at 08/12/24 0614    fluticasone-vilanterol 200-25 mcg/actuation 2 puff, 2 puff, Inhalation, Daily, Quique Tobias MD, 2 puff at 08/13/24 0832    guaiFENesin (MUCINEX) 12 hr tablet 600 mg, 600 mg, Oral, Q12H [facility], Dana Raya MD, 600 mg at 08/12/24 2159    heparin (porcine) subcutaneous injection 5,000 Units, 5,000 Units, Subcutaneous, Q8H [facility], Yasmine Meadows MD, 5,000 Units at 08/13/24 0549    iron polysaccharides (FERREX) capsule 150 mg, 150 mg, Oral, Q48H, Dana Raya MD, 150 mg at 08/13/24 0549    lamoTRIgine (LaMICtal) tablet 100 mg, 100 mg, Oral, HS, Yasmine Meadows MD, 100 mg at 08/12/24 2159    levalbuterol (XOPENEX) inhalation solution 1.25 mg, 1.25 mg, Nebulization, Once, Yasmine Meadows MD    melatonin tablet 3 mg, 3 mg, Oral, HS, Dana Raya MD, 3 mg at 08/12/24 2159    montelukast (SINGULAIR) tablet 10 mg, 10 mg, Oral, Daily, Yasmine Meadows MD, 10 mg at 08/12/24 0913    NIFEdipine (PROCARDIA XL) 24 hr tablet 60 mg, 60 mg, Oral, Daily, Yasmine Meadows MD, 60 mg at 08/12/24 0914    ondansetron (ZOFRAN) injection 4 mg, 4 mg, Intravenous, Q6H PRN, aYsmine Meadows MD, 4 mg at  08/13/24 0805    pantoprazole (PROTONIX) EC tablet 40 mg, 40 mg, Oral, Daily Before Breakfast, Yasmine Meadows MD, 40 mg at 08/13/24 0549    polyethylene glycol (MIRALAX) packet 17 g, 17 g, Oral, Daily, Dana Raya MD, 17 g at 08/12/24 0914    predniSONE tablet 25 mg, 25 mg, Oral, Daily, 25 mg at 08/12/24 0914 **FOLLOWED BY** [START ON 8/24/2024] predniSONE tablet 20 mg, 20 mg, Oral, Daily **FOLLOWED BY** [START ON 9/7/2024] predniSONE tablet 15 mg, 15 mg, Oral, Daily **FOLLOWED BY** [START ON 9/21/2024] predniSONE tablet 12.5 mg, 12.5 mg, Oral, Daily **FOLLOWED BY** [START ON 10/5/2024] predniSONE tablet 10 mg, 10 mg, Oral, Daily **FOLLOWED BY** [START ON 10/19/2024] predniSONE tablet 7.5 mg, 7.5 mg, Oral, Daily **FOLLOWED BY** [START ON 11/2/2024] predniSONE tablet 5 mg, 5 mg, Oral, Daily, Yasmine Meadows MD    sevelamer (RENAGEL) tablet 1,600 mg, 1,600 mg, Oral, TID With Meals, Yasmine Meadows MD, 1,600 mg at 08/13/24 0752    Sodium Zirconium Cyclosilicate (Lokelma) 10 g, 10 g, Oral, Daily, Yasmine Meadows MD, 10 g at 08/12/24 0916    traZODone (DESYREL) tablet 200 mg, 200 mg, Oral, HS, Yasmine Meadows MD, 200 mg at 08/12/24 2159    venlafaxine (EFFEXOR-XR) 24 hr capsule 150 mg, 150 mg, Oral, Daily, Yasmine Meadows MD, 150 mg at 08/12/24 0914    venlafaxine (EFFEXOR-XR) 24 hr capsule 75 mg, 75 mg, Oral, Daily, Dana Raya MD, 75 mg at 08/12/24 0915    Laboratory Results:  Results from last 7 days   Lab Units 08/13/24  0511 08/12/24  0542 08/11/24  1643 08/11/24  1039 08/11/24  0637 08/10/24  2240   WBC Thousand/uL 7.33 6.71  --   --  7.09 7.14   HEMOGLOBIN g/dL 7.4* 7.7*  7.7* 6.8* 7.0* 6.7* 7.5*   HEMATOCRIT % 23.8* 24.8*  24.8* 21.8* 22.3* 21.6* 25.4*   PLATELETS Thousands/uL 181 157  --   --  165 174   SODIUM mmol/L 138 142  --   --  141 139   POTASSIUM mmol/L 4.3 4.7  --   --  4.0 4.7   CHLORIDE mmol/L 98 103  --   --  104 101   CO2 mmol/L 29 28  --   --  26 24   BUN mg/dL 63* 82*  --   --  70* 65*   CREATININE  "mg/dL 8.89* 11.14*  --   --  9.79* 9.34*   CALCIUM mg/dL 9.0 9.6  --   --  8.9 9.2   MAGNESIUM mg/dL  --   --   --   --   --  2.1   PHOSPHORUS mg/dL  --  5.3*  --   --   --   --        IR tunneled dialysis catheter check/change/reposition/angioplasty   Final Result by Graciela Bettencourt MD (08/12 1052)      Right-sided internal jugular tunneled dialysis catheter exchange, with its tip in the expected location of the right atrium.      Plan:      The catheter may be used immediately.      Workstation performed: [id number]         VAS VENOUS DUPLEX -LOWER LIMB UNILATERAL   Final Result by Flor Mata MD (08/11 7587)      X-ray chest 1 view portable   ED Interpretation by Solomon Avila DO (08/10 5002)   No acute cardiopulmonary disease      Final Result by Andreia Whalen MD (08/11 0608)      No acute cardiopulmonary disease.            Workstation performed: [id number]             Portions of the record may have been created with voice recognition software. Occasional wrong word or \"sound a like\" substitutions may have occurred due to the inherent limitations of voice recognition software. Read the chart carefully and recognize, using context, where substitutions have occurred.    "

## 2024-08-13 NOTE — UTILIZATION REVIEW
NOTIFICATION OF INPATIENT ADMISSION   AUTHORIZATION REQUEST   SERVICING FACILITY:   [place] [address]  Tax ID: [id number]  NPI: [id number]   ATTENDING PROVIDER:  Attending Name and NPI#: Vj Hicks Md [[id number]]  Address: [address] [place] [address]  Phone: [phone]     ADMISSION INFORMATION:  Place of Service: Inpatient [facility]  Place of Service Code: 21  Inpatient Admission Date/Time: 8/11/24 12:08 AM  Discharge Date/Time: No discharge date for patient encounter.  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     PHYSICIAN ADVISORY SERVICES:  Medical Necessity Denial & Qntf-ye-Vjiq Review  Phone: [phone]  Fax: [phone]  Email: [email or web address]     DISCHARGE SUPPORT TEAM:  For Patients Discharge Needs & Updates  Phone: [phone] opt. [phone] Fax: [phone]  Email: [email or web address]

## 2024-08-13 NOTE — DISCHARGE SUMMARY
[facility]  Discharge- Ngozi Beard 1960, 64 y.o. female MRN: [id number]  Unit/Bed#: W -01 Encounter: [id number]  Primary Care Provider: Meenakshi Balbuena MD   Date and time admitted to hospital: 8/10/2024 10:27 PM    * Shortness of breath  Assessment & Plan  Assessment:  Presented with a brief episode of shortness of breath associated with chest tightness lasting few minutes while trying to lay down where she had to call EMS. Didn't require oxygen.   Upon arrival, SOB resolved and was breathing on RA.   Complains of right leg swelling and tenderness. Chronic productive cough. No fever or chills.  Tachycardiac  No leukocytosis.  Troponins elevated likely secondary to ESRD     CXR unremarkable  EKG: Sinus tachycardia. No S1Q3T3, Rt axis deviation, or T wave inversion in precordial leads.   Well's score 4.5 (Tachycardia and signs and symptoms of DVT)  Patient does not look volume overloaded on exam. Clear breath sounds.   DVT US negative  D-Dimer non-reliable in ESRD  S/P IR catheter replacement  History of asthma    Plan:  Will hold off on CTA chest for now. If patient remains tachycardiac or worsens can consider CT.  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  Patient is aneuric.   S/P IR catheter replacement; confirmed placement with CXR    Plan:  Can resume scheduled dialysis  Given 15 day supply of Zofran ODT  Continue home  sevelamer.  Continue Lokelma.  Continue prednisone taper.   Continue cyclophosphamide.  Atovaquone for PCP prophylaxis.  Cytoxan 100 mg daily, adjusted by kidney function and age plan to continue for 3 months     Anemia  Assessment & Plan  Anemia in the setting of ESRD.  Patient required multiple blood transfusions through her recent hospital stay.    PLAN  Continue home Ferrex 150 mg daily.  Monitor CBC.  Transfuse if less than 7.    Moderate persistent asthma w/out acute exacerbation  Assessment & Plan  Patient does not appear to be in asthma exacerbation.  Well controlled on home regimen    PLAN  Continue home Advair and Singulair,   Continue albuterol as needed.    Bipolar 1 disorder (HCC)  Assessment & Plan  Plan  Continue Effexor 150 mg and Lamictal 100 mg daily.        Medical Problems       Resolved Problems  Date Reviewed: 8/10/2024   None       Discharging Resident: Gerber Lynch MD  Discharging Attending: Vj Hicks MD  PCP: Meenakshi Balbuena MD  Admission Date:   Admission Orders (From admission, onward)       Ordered        08/11/24 0008  INPATIENT ADMISSION  Once                          Discharge Date: 08/13/24    Consultations During Hospital Stay:  Interventional radiology  Nephrology    Procedures Performed:   Hemodialysis  Dialysis catheter change    Significant Findings / Test Results:   Catheter change confirmed position via Chest Xray    Incidental Findings:   None     Test Results Pending at Discharge (will require follow up):  None     Outpatient Tests Requested:  BMP 1 week    Complications:  None    Reason for Admission: Chest tightness and shortness of breath    Hospital Course:   Ngozi Beard is a 64 y.o. female patient who originally presented to the hospital on 8/10/2024 due to chest tightness and shortness of breath in the setting of missed dialysis due to inaccessible catheter.  IR consulted for catheter change with Xray confirmation. Short hemodialysis session same day on  "catheter change.  Patient to resume normal dialysis schedule getting a full hemodialysis session today before discharge.  Patient encountering nausea after each dialysis; Zofran ordered.    Please see above list of diagnoses and related plan for additional information.     Condition at Discharge: fair    Discharge Day Visit / Exam:   Subjective:  Patient endorsing resolution of chest tightness, but has continued shortness of breath.  Patient SpO2 is 90% this morning, no wheezes on auscultation.  Patient to get her PRN nebulizer treatment.  Vitals: Blood Pressure: 123/75 (08/13/24 1258)  Pulse: 92 (08/13/24 1258)  Temperature: 98.2 °F (36.8 °C) (08/13/24 1258)  Temp Source: Oral (08/13/24 1258)  Respirations: 16 (08/13/24 1258)  Height: 5' 3\" (160 cm) (08/10/24 2230)  Weight - Scale: 118 kg (259 lb 0.7 oz) (08/10/24 2230)  SpO2: (!) 88 % (08/13/24 1258)  Exam:   Physical Exam  Vitals and nursing note reviewed.   Constitutional:       General: She is not in acute distress.     Appearance: She is well-developed.   HENT:      Head: Normocephalic and atraumatic.   Eyes:      Conjunctiva/sclera: Conjunctivae normal.   Cardiovascular:      Rate and Rhythm: Normal rate and regular rhythm.      Heart sounds: No murmur heard.  Pulmonary:      Effort: Pulmonary effort is normal. No respiratory distress.      Breath sounds: Normal breath sounds.   Abdominal:      Palpations: Abdomen is soft.      Tenderness: There is no abdominal tenderness.   Musculoskeletal:         General: No swelling.      Cervical back: Neck supple.   Skin:     General: Skin is warm and dry.      Capillary Refill: Capillary refill takes less than 2 seconds.   Neurological:      Mental Status: She is alert.   Psychiatric:         Mood and Affect: Mood normal.          Discussion with Family: Patient declined call to .     Discharge instructions/Information to patient and family:   See after visit summary for information provided to patient " and family.      Provisions for Follow-Up Care:  See after visit summary for information related to follow-up care and any pertinent home health orders.      Mobility at time of Discharge:   Basic Mobility Inpatient Raw Score: 23  JH-HLM Goal: 7: Walk 25 feet or more  JH-HLM Achieved: 7: Walk 25 feet or more  HLM Goal achieved. Continue to encourage appropriate mobility.     Disposition:   Home    Planned Readmission: No    Discharge Medications:  See after visit summary for reconciled discharge medications provided to patient and/or family.      **Please Note: This note may have been constructed using a voice recognition system**

## 2024-08-13 NOTE — DISCHARGE INSTR - AVS FIRST PAGE
Dear Ngozi Beard,     It was our pleasure to care for you here at [facility].  It is our hope that we were always able to exceed the expected standards for your care during your stay.  You were hospitalized due to chest tightness.  You were cared for on the 4th floor by Gerber Lynch MD under the service of Vj Hicks MD with the [facility] who covers for your primary care physician (PCP), Meenakshi Balbuena MD, while you were hospitalized.  If you have any questions or concerns related to this hospitalization, you may contact us at .  For follow up as well as any medication refills, we recommend that you follow up with your primary care physician.  A registered nurse will reach out to you by phone within a few days after your discharge to answer any additional questions that you may have after going home.  However, at this time we provide for you here, the most important instructions / recommendations at discharge:     Notable Medication Adjustments -   Please start taking Zofran 4 mg every 8 hours as needed for nausea and vomiting.  Continue your prednisone taper as before   No other medication changes were made at this time.  Testing Required after Discharge -   Repeat BMP 1 week after discharge  ** Please contact your PCP to request testing orders for any of the testing recommended here **  Important follow up information -   Please follow up with your primary care physician within 1 week of discharge.  Other Instructions -   If you experience worsening shortness of breath, chest pain, lightheadedness or dizziness; please return to emergency department.  Please review this entire after visit summary as additional general instructions including medication list, appointments, activity, diet, any pertinent wound care, and other additional recommendations from your care team that may be provided for you.      Sincerely,     Gerber  FIDELINA Lynch MD

## 2024-08-13 NOTE — PLAN OF CARE
Post-Dialysis RN Treatment Note    Blood Pressure:  Pre 127/71 mm/Hg  Post 123/75 mmHg   EDW  115 kg    Weight:  Pre 117.2 kg   Post 114.4 kg   Mode of weight measurement: Standing Scale   Volume Removed  2507 ml    Treatment duration 210 minutes    NS given  No    Treatment shortened? No   Medications given during Rx None Reported   Estimated Kt/V  None Reported   Access type: Permacath/TDC   Access Issues: No    Report called to primary nurse   Yes Yanique Dunn RN         Current hemodialysis plan of care is to remove a total of 3000 ml of fluid over a 3 1/2 hour treatment for a net of 2.5 liters as tolerated.  Monitor vital signs every 15 minutes while on treatment for patient safety.  Utilize a 3 K+ bath for serum potassium of 4.3 to maintain electrolyte balance.  Report received from Yanique Dunn RN.  Plan reviewed with BETH Montalvo PA-C nephrology.        Problem: METABOLIC, FLUID AND ELECTROLYTES - ADULT  Goal: Electrolytes maintained within normal limits  Description: INTERVENTIONS:  - Monitor labs and assess patient for signs and symptoms of electrolyte imbalances  - Administer electrolyte replacement as ordered  - Monitor response to electrolyte replacements, including repeat lab results as appropriate  - Instruct patient on fluid and nutrition as appropriate  Outcome: Progressing  Goal: Fluid balance maintained  Description: INTERVENTIONS:  - Monitor labs   - Monitor I/O and WT  - Instruct patient on fluid and nutrition as appropriate  - Assess for signs & symptoms of volume excess or deficit  Outcome: Progressing

## 2024-08-13 NOTE — ASSESSMENT & PLAN NOTE
Assessment:  ESRD on HD at [facility] ([facility]) after recent diagnosis of RBGN  She had a prolonged hospital stay (7/12-8/4) where she was started on dialysis and received plasmapheresis for 14 days after biopsy-proven RBGN- AntiGBM on 7/17  Patient states that she missed her dialysis today as they were unable to access her catheter and requested her to come back on Monday.  Patient is aneuric.   S/P IR catheter replacement; confirmed placement with CXR    Plan:  Can resume scheduled dialysis  Given 15 day supply of Zofran ODT  Continue home sevelamer.  Continue Lokelma.  Continue prednisone taper.   Continue cyclophosphamide.  Atovaquone for PCP prophylaxis.  Cytoxan 100 mg daily, adjusted by kidney function and age plan to continue for 3 months

## 2024-08-13 NOTE — ASSESSMENT & PLAN NOTE
Patient does not appear to be in asthma exacerbation.  Well controlled on home regimen    PLAN  Continue home Advair and Singulair,   Continue albuterol as needed.

## 2024-08-14 ENCOUNTER — HOSPITAL ENCOUNTER (EMERGENCY)
Facility: HOSPITAL | Age: 64
Discharge: HOME | End: 2024-08-14
Attending: EMERGENCY MEDICINE
Payer: COMMERCIAL

## 2024-08-14 ENCOUNTER — TELEPHONE (OUTPATIENT)
Age: 64
End: 2024-08-14

## 2024-08-14 ENCOUNTER — NURSE TRIAGE (OUTPATIENT)
Age: 64
End: 2024-08-14

## 2024-08-14 VITALS
SYSTOLIC BLOOD PRESSURE: 122 MMHG | RESPIRATION RATE: 18 BRPM | HEART RATE: 97 BPM | OXYGEN SATURATION: 96 % | TEMPERATURE: 99.2 F | DIASTOLIC BLOOD PRESSURE: 65 MMHG

## 2024-08-14 DIAGNOSIS — E78.5 DYSLIPIDEMIA: ICD-10-CM

## 2024-08-14 DIAGNOSIS — J44.9 CHRONIC OBSTRUCTIVE PULMONARY DISEASE, UNSPECIFIED COPD TYPE (HCC): ICD-10-CM

## 2024-08-14 DIAGNOSIS — F20.0 PARANOID SCHIZOPHRENIA (HCC): ICD-10-CM

## 2024-08-14 DIAGNOSIS — I10 PRIMARY HYPERTENSION: Primary | ICD-10-CM

## 2024-08-14 DIAGNOSIS — Z74.2 HOME HELP NEEDED: Primary | ICD-10-CM

## 2024-08-14 LAB
ALBUMIN SERPL BCG-MCNC: 3.6 G/DL (ref 3.5–5)
ALP SERPL-CCNC: 66 U/L (ref 34–104)
ALT SERPL W P-5'-P-CCNC: 11 U/L (ref 7–52)
ANION GAP SERPL CALCULATED.3IONS-SCNC: 14 MMOL/L (ref 4–13)
ANISOCYTOSIS BLD QL SMEAR: PRESENT
AST SERPL W P-5'-P-CCNC: 8 U/L (ref 13–39)
BASO STIPL BLD QL SMEAR: PRESENT
BASOPHILS # BLD MANUAL: 0.07 THOUSAND/UL (ref 0–0.1)
BASOPHILS NFR MAR MANUAL: 1 % (ref 0–1)
BILIRUB SERPL-MCNC: 0.51 MG/DL (ref 0.2–1)
BUN SERPL-MCNC: 41 MG/DL (ref 5–25)
CALCIUM SERPL-MCNC: 9.3 MG/DL (ref 8.4–10.2)
CHLORIDE SERPL-SCNC: 95 MMOL/L (ref 96–108)
CO2 SERPL-SCNC: 32 MMOL/L (ref 21–32)
CREAT SERPL-MCNC: 6.36 MG/DL (ref 0.6–1.3)
EOSINOPHIL # BLD MANUAL: 0.63 THOUSAND/UL (ref 0–0.4)
EOSINOPHIL NFR BLD MANUAL: 9 % (ref 0–6)
ERYTHROCYTE [DISTWIDTH] IN BLOOD BY AUTOMATED COUNT: 16.4 % (ref 11.6–15.1)
GFR SERPL CREATININE-BSD FRML MDRD: 6 ML/MIN/1.73SQ M
GLUCOSE SERPL-MCNC: 84 MG/DL (ref 65–140)
HCT VFR BLD AUTO: 26.3 % (ref 34.8–46.1)
HGB BLD-MCNC: 8.5 G/DL (ref 11.5–15.4)
LYMPHOCYTES # BLD AUTO: 1.67 THOUSAND/UL (ref 0.6–4.47)
LYMPHOCYTES # BLD AUTO: 24 % (ref 14–44)
MCH RBC QN AUTO: 28.5 PG (ref 26.8–34.3)
MCHC RBC AUTO-ENTMCNC: 32.3 G/DL (ref 31.4–37.4)
MCV RBC AUTO: 88 FL (ref 82–98)
METAMYELOCYTE ABSOLUTE CT: 0.14 THOUSAND/UL (ref 0–0.1)
METAMYELOCYTES NFR BLD MANUAL: 2 % (ref 0–1)
MONOCYTES # BLD AUTO: 0.42 THOUSAND/UL (ref 0–1.22)
MONOCYTES NFR BLD: 6 % (ref 4–12)
NEUTROPHILS # BLD MANUAL: 4.03 THOUSAND/UL (ref 1.85–7.62)
NEUTS BAND NFR BLD MANUAL: 2 % (ref 0–8)
NEUTS SEG NFR BLD AUTO: 56 % (ref 43–75)
OVALOCYTES BLD QL SMEAR: PRESENT
PLATELET # BLD AUTO: 195 THOUSANDS/UL (ref 149–390)
PLATELET BLD QL SMEAR: ADEQUATE
PMV BLD AUTO: 9.5 FL (ref 8.9–12.7)
POIKILOCYTOSIS BLD QL SMEAR: PRESENT
POTASSIUM SERPL-SCNC: 3.8 MMOL/L (ref 3.5–5.3)
PROT SERPL-MCNC: 5.5 G/DL (ref 6.4–8.4)
RBC # BLD AUTO: 2.98 MILLION/UL (ref 3.81–5.12)
RBC MORPH BLD: PRESENT
SODIUM SERPL-SCNC: 141 MMOL/L (ref 135–147)
WBC # BLD AUTO: 6.95 THOUSAND/UL (ref 4.31–10.16)

## 2024-08-14 PROCEDURE — 80053 COMPREHEN METABOLIC PANEL: CPT | Performed by: INTERNAL MEDICINE

## 2024-08-14 PROCEDURE — 99283 EMERGENCY DEPT VISIT LOW MDM: CPT

## 2024-08-14 PROCEDURE — 36415 COLL VENOUS BLD VENIPUNCTURE: CPT | Performed by: INTERNAL MEDICINE

## 2024-08-14 PROCEDURE — 99284 EMERGENCY DEPT VISIT MOD MDM: CPT | Performed by: INTERNAL MEDICINE

## 2024-08-14 PROCEDURE — 97167 OT EVAL HIGH COMPLEX 60 MIN: CPT

## 2024-08-14 PROCEDURE — 97162 PT EVAL MOD COMPLEX 30 MIN: CPT

## 2024-08-14 PROCEDURE — 85007 BL SMEAR W/DIFF WBC COUNT: CPT | Performed by: INTERNAL MEDICINE

## 2024-08-14 PROCEDURE — 85027 COMPLETE CBC AUTOMATED: CPT | Performed by: INTERNAL MEDICINE

## 2024-08-14 NOTE — CASE MANAGEMENT
Case Management Progress Note    Patient name Ngozi Beard  Location ED 14/ED 14 MRN [id number]  : 1960 Date 2024       LOS (days): 0  Geometric Mean LOS (GMLOS) (days):   Days to GMLOS:        OBJECTIVE:        Current admission status: Emergency  Preferred Pharmacy:   [id number] [place] - [address]  [address] [address]  Phone: [phone] Fax: [phone]    [facility] - [place], [place] - [address]  [address]  [address]  [place] [address]  Phone: [phone] Fax: [phone]    Primary Care Provider: Meenakshi Balbuena MD    Primary Insurance: [employer]  REP  Secondary Insurance: [facility]    PROGRESS NOTE:    Per rounding with PT/OT, patient recommended for LVL III as she is functionally independent with ADLs. Auth initiated for [facility]. ED provider made aware that patient would likely not be approved and that DCP should be for return home. If authorization is approved, patient is able to admit to SNF from home.     Patient reporting to ED that she doesn't want to go home and reports she cannot care for self - therapy evals determiend patient is able to care for self.     Referral made to [facility] for home services evaluation to determine if patient is eligible for any home services. VNA/HHC services not arranged at this time due to no skilled need for home care and patient is not homebound.  Plan for D/C home today with follow-up from [facility].

## 2024-08-14 NOTE — TELEPHONE ENCOUNTER
Regarding: SOB weak swollen legs  ----- Message from Amaya ARREDONDO sent at 8/14/2024 11:02 AM EDT -----  SOB, not taking medicine, weak and swollen legs. Patient has end stage 3 renal failure and is on dialysis.  Amaya Laureano

## 2024-08-14 NOTE — UTILIZATION REVIEW
NOTIFICATION OF ADMISSION DISCHARGE   This is a Notification of Discharge from [facility]. Please be advised that this patient has been discharge from our facility. Below you will find the admission and discharge date and time including the patient’s disposition.   UTILIZATION REVIEW CONTACT:  Kelli Fabian  Utilization   Network Utilization Review Department  Phone: [phone] x carefully listen to the prompts. All voicemails are confidential.  Email: [email or web address]     ADMISSION INFORMATION  PRESENTATION DATE: 8/10/2024 10:27 PM  OBERVATION ADMISSION DATE: N/A  INPATIENT ADMISSION DATE: 8/11/24 12:08 AM   DISCHARGE DATE: 8/13/2024  5:03 PM   DISPOSITION:Home/Self Care    Network Utilization Review Department  ATTENTION: Please call with any questions or concerns to [phone] and carefully listen to the prompts so that you are directed to the right person. All voicemails are confidential.   For Discharge needs, contact Care Management DC Support Team at [phone] opt. 2  Send all requests for admission clinical reviews, approved or denied determinations and any other requests to dedicated fax number below belonging to the campus where the patient is receiving treatment. List of dedicated fax numbers for the Facilities:  FACILITY NAME UR FAX NUMBER   ADMISSION DENIALS (Administrative/Medical Necessity) [phone]   DISCHARGE SUPPORT TEAM ([facility]) [phone]   PARENT CHILD HEALTH (Maternity/NICU/Pediatrics) [phone]   General acute hospital [phone]   [facility] [phone]   [facility] [phone]   General acute hospital [phone]   [facility] [phone]   [facility] [phone]   [facility] [phone]   [facility] [phone]    [facility] [phone]   [facility] [phone]   [facility] [phone]   [facility] [phone]

## 2024-08-14 NOTE — CASE MANAGEMENT
[place] Support Center received request for authorization from Care Manager.  Authorization request submitted for: [facility]  Facility Name:  [facility] NPI:[id number]   Facility MD:   Voilet Weston  NPI: [id number]                    Authorization initiated by contacting insurance:  [employer]   Via: Phone w/ [facility]   Clinicals submitted via fax # [phone]  Pending Reference #:[id number]    Care Manager notified: veronica freeman     Updates to authorization status will be noted in chart. Please reach out to CM for updates on any clinical information.

## 2024-08-14 NOTE — TELEPHONE ENCOUNTER
"Patient's sister called concerned with Ngozi's condition. Was discharged yesterday to home. She is very SOB, falling, weak, No VNA , lives alone. Forgetting to take her medicine, can not do ADL's without assistance. Understands now she can not live alone. Advised ED for re-evaluation. Sister will call the ambulance to take her.she can not ambulate on her own, no walker.   Reason for Disposition   MODERATE difficulty breathing (e.g., speaks in phrases, SOB even at rest, pulse 100-120) of new-onset or worse than normal    Answer Assessment - Initial Assessment Questions  1. RESPIRATORY STATUS: \"Describe your breathing?\" (e.g., wheezing, shortness of breath, unable to speak, severe coughing)       SOB  2. ONSET: \"When did this breathing problem begin?\"       Since admission  3. PATTERN \"Does the difficult breathing come and go, or has it been constant since it started?\"       Recent Discharge yesterday, now worse   4. SEVERITY: \"How bad is your breathing?\" (e.g., mild, moderate, severe)     - MILD: No SOB at rest, mild SOB with walking, speaks normally in sentences, can lay down, no retractions, pulse < 100.     - MODERATE: SOB at rest, SOB with minimal exertion and prefers to sit, cannot lie down flat, speaks in phrases, mild retractions, audible wheezing, pulse 100-120.     - SEVERE: Very SOB at rest, speaks in single words, struggling to breathe, sitting hunched forward, retractions, pulse > 120       Moderate to severe   5. RECURRENT SYMPTOM: \"Have you had difficulty breathing before?\" If Yes, ask: \"When was the last time?\" and \"What happened that time?\"       History of Asthma, On Dialysis   6. CARDIAC HISTORY: \"Do you have any history of heart disease?\" (e.g., heart attack, angina, bypass surgery, angioplasty)       CKD on Dialysis   7. LUNG HISTORY: \"Do you have any history of lung disease?\"  (e.g., pulmonary embolus, asthma, emphysema)      Pulm. Nodule   8. CAUSE: \"What do you think is causing the breathing " "problem?\"       Unknown , On Dialysis  9. OTHER SYMPTOMS: \"Do you have any other symptoms? (e.g., dizziness, runny nose, cough, chest pain, fever)      Weak, edema   10. PREGNANCY: \"Is there any chance you are pregnant?\" \"When was your last menstrual period?\"        N/a   11. TRAVEL: \"Have you traveled out of the country in the last month?\" (e.g., travel history, exposures)        N/a    Protocols used: Breathing Difficulty-ADULT-[place]    "

## 2024-08-14 NOTE — ED PROVIDER NOTES
History  Chief Complaint   Patient presents with    Medical Problem     Pt is unable to care for herself and feels that she needs help.      This is a 64 years old came by EMS, and patient stated that she cannot take care of herself and she wants to be admitted to nursing home.  Patient discharged yesterday from [facility] for having pneumonia.  Patient stated that she keeps forgetting her medication and she needs somebody to take care of her.  Patient has history of ESRD and she is on dialysis x 3 weekly, and the last dialysis was yesterday.  Patient has history of hypertension, diabetes, pneumonia, asthma,.  At the ER patient has no complain and she asked for breakfast.  Patient denies CP, SOB, abdominal pain, nausea vomiting diarrhea.        Prior to Admission Medications   Prescriptions Last Dose Informant Patient Reported? Taking?   Advair -21 MCG/ACT inhaler   No No   Sig: Inhale 2 puffs 2 (two) times a day Rinse mouth after use.   NIFEdipine (PROCARDIA XL) 30 mg 24 hr tablet   No No   Sig: Take 2 tablets (60 mg total) by mouth daily   albuterol (PROVENTIL HFA,VENTOLIN HFA) 90 mcg/act inhaler   No No   Sig: INHALE 2 PUFFS BY MOUTH EVERY 4 HOURS AS NEEDED FOR SHORTNESS OF BREATH   atorvastatin (LIPITOR) 20 mg tablet   No No   Sig: TAKE 1 TABLET BY MOUTH EVERY DAY   atovaquone (MEPRON) 750 mg/5 mL suspension   No No   Sig: Take 10 mL (1,500 mg total) by mouth daily Do not start before August 5, 2024.   calcium carbonate (OYSTER SHELL,OSCAL) 500 mg   No No   Sig: Take 1 tablet by mouth daily with breakfast   cyclophosphamide (CYTOXAN) 50 mg capsule   No No   Sig: Take 2 capsules (100 mg total) by mouth daily   iron polysaccharides (FERREX) 150 mg capsule   No No   Sig: Take 1 capsule (150 mg total) by mouth daily   lamoTRIgine (LaMICtal) 100 mg tablet   Yes No   Sig: Take 100 mg by mouth daily at bedtime   montelukast (SINGULAIR) 10 mg tablet   No No   Sig: TAKE 1 TABLET BY MOUTH EVERY DAY    ondansetron (ZOFRAN) 4 mg tablet   No No   Sig: Take 1 tablet (4 mg total) by mouth every 8 (eight) hours as needed for nausea or vomiting   pantoprazole (PROTONIX) 40 mg tablet   No No   Sig: Take 1 tablet (40 mg total) by mouth daily before breakfast   polyethylene glycol (MIRALAX) 17 g packet   No No   Sig: Take 17 g by mouth daily as needed (Use for constipation as needed) for up to 7 days   Patient not taking: Reported on 8/11/2024   predniSONE 2.5 mg tablet   No No   Sig: Take 12 tablets (30 mg total) by mouth daily for 5 days, THEN 10 tablets (25 mg total) daily for 14 days, THEN 8 tablets (20 mg total) daily for 14 days, THEN 6 tablets (15 mg total) daily for 14 days, THEN 5 tablets (12.5 mg total) daily for 14 days, THEN 4 tablets (10 mg total) daily for 14 days, THEN 3 tablets (7.5 mg total) daily for 14 days, THEN 2 tablets (5 mg total) daily. Do not start before August 5, 2024.   senna (SENOKOT) 8.6 mg   No No   Sig: Take 2 tablets (17.2 mg total) by mouth 2 (two) times a day Use as needed constipation   sevelamer (RENAGEL) 800 mg tablet   No No   Sig: Take 2 tablets (1,600 mg total) by mouth 3 (three) times a day with meals   traZODone (DESYREL) 100 mg tablet   Yes No   Sig: Take 200 mg by mouth daily at bedtime   venlafaxine (EFFEXOR-XR) 150 mg 24 hr capsule   No No   Sig: TAKE 1 CAPSULE BY MOUTH DAILY WITH BREAKFAST.   venlafaxine (EFFEXOR-XR) 75 mg 24 hr capsule   Yes No   Sig: Take 75 mg by mouth daily Pt states she takes 150mg and 75mg effexor. Daily. For a totoal of 225mg      Facility-Administered Medications: None       Past Medical History:   Diagnosis Date    Asthma     Chronic pain     Hypertension     Renal disorder     Sepsis (HCC) 07/10/2024       Past Surgical History:   Procedure Laterality Date    BACK SURGERY      COLONOSCOPY      IR BIOPSY KIDNEY RANDOM  7/17/2024    IR TEMPORARY DIALYSIS CATHETER PLACEMENT  7/15/2024    IR TUNNELED DIALYSIS CATHETER  CHECK/CHANGE/REPOSITION/ANGIOPLASTY  2024    IR TUNNELED DIALYSIS CATHETER PLACEMENT  2024    TUBAL LIGATION         Family History   Problem Relation Age of Onset    Diabetes Mother     Hypertension Mother     Lung cancer Mother     Colon cancer Mother     Colon cancer Father     Hypertension Sister     Multiple sclerosis Sister     Hypothyroidism Maternal Aunt      I have reviewed and agree with the history as documented.    E-Cigarette/Vaping    E-Cigarette Use Never User      E-Cigarette/Vaping Substances    Nicotine No     THC No     CBD No     Flavoring No      Social History     Tobacco Use    Smoking status: Former     Current packs/day: 0.00     Average packs/day: 1 pack/day for 15.0 years (15.0 ttl pk-yrs)     Types: Cigarettes     Start date:      Quit date:      Years since quittin.6    Smokeless tobacco: Never   Vaping Use    Vaping status: Never Used   Substance Use Topics    Alcohol use: No     Comment: hX:Occas.     Drug use: No       Review of Systems   Constitutional:  Negative for chills and fever.   HENT:  Negative for congestion, ear pain, postnasal drip, rhinorrhea, sinus pressure, sinus pain, sore throat, tinnitus and trouble swallowing.    Eyes:  Negative for pain and visual disturbance.   Respiratory:  Negative for cough and shortness of breath.    Cardiovascular:  Negative for chest pain and palpitations.   Gastrointestinal:  Negative for abdominal pain and vomiting.   Endocrine: Negative for polydipsia, polyphagia and polyuria.   Genitourinary:  Negative for difficulty urinating, dysuria and hematuria.   Musculoskeletal:  Negative for arthralgias and back pain.   Skin:  Negative for color change and rash.   Neurological:  Negative for dizziness, seizures, syncope, facial asymmetry, weakness, light-headedness and headaches.   All other systems reviewed and are negative.      Physical Exam  Physical Exam  Vitals and nursing note reviewed.   Constitutional:        General: She is not in acute distress.     Appearance: She is well-developed. She is not ill-appearing, toxic-appearing or diaphoretic.   HENT:      Head: Normocephalic and atraumatic.      Right Ear: Ear canal normal.      Left Ear: Ear canal normal.      Nose: Nose normal. No congestion or rhinorrhea.      Mouth/Throat:      Pharynx: No oropharyngeal exudate or posterior oropharyngeal erythema.   Eyes:      General: No scleral icterus.        Right eye: No discharge.         Left eye: No discharge.      Conjunctiva/sclera: Conjunctivae normal.   Neck:      Vascular: No carotid bruit.   Cardiovascular:      Rate and Rhythm: Normal rate and regular rhythm.      Heart sounds: No murmur heard.     No friction rub. No gallop.   Pulmonary:      Effort: Pulmonary effort is normal. No respiratory distress.      Breath sounds: Normal breath sounds. No stridor. No wheezing, rhonchi or rales.   Chest:      Chest wall: No tenderness.   Abdominal:      General: There is no distension.      Palpations: Abdomen is soft. There is no mass.      Tenderness: There is no abdominal tenderness. There is no right CVA tenderness, left CVA tenderness, guarding or rebound.      Hernia: No hernia is present.   Musculoskeletal:         General: No swelling, tenderness, deformity or signs of injury.      Cervical back: Neck supple. No rigidity or tenderness.      Right lower leg: No edema.      Left lower leg: No edema.   Lymphadenopathy:      Cervical: No cervical adenopathy.   Skin:     General: Skin is warm and dry.      Capillary Refill: Capillary refill takes less than 2 seconds.      Coloration: Skin is not jaundiced or pale.      Findings: No bruising, erythema, lesion or rash.   Neurological:      Mental Status: She is alert.   Psychiatric:         Mood and Affect: Mood normal.         Vital Signs  ED Triage Vitals   Temperature Pulse Respirations Blood Pressure SpO2   08/14/24 1217 08/14/24 1217 08/14/24 1217 08/14/24 1217 08/14/24  1217   99.2 °F (37.3 °C) 97 18 122/65 96 %      Temp Source Heart Rate Source Patient Position - Orthostatic VS BP Location FiO2 (%)   08/14/24 1217 08/14/24 1217 08/14/24 1217 08/14/24 1217 --   Oral Monitor Lying Right arm       Pain Score       08/14/24 1447       No Pain           Vitals:    08/14/24 1217   BP: 122/65   Pulse: 97   Patient Position - Orthostatic VS: Lying         Visual Acuity      ED Medications  Medications - No data to display    Diagnostic Studies  Results Reviewed       Procedure Component Value Units Date/Time    RBC Morphology Reflex Test [[id number]] Collected: 08/14/24 1236    Lab Status: Final result Specimen: Blood from Arm, Left Updated: 08/14/24 1402    CBC and differential [[id number]]  (Abnormal) Collected: 08/14/24 1236    Lab Status: Final result Specimen: Blood from Arm, Left Updated: 08/14/24 1319     WBC 6.95 Thousand/uL      RBC 2.98 Million/uL      Hemoglobin 8.5 g/dL      Hematocrit 26.3 %      MCV 88 fL      MCH 28.5 pg      MCHC 32.3 g/dL      RDW 16.4 %      MPV 9.5 fL      Platelets 195 Thousands/uL     Narrative:      This is an appended report.  These results have been appended to a previously verified report.    Manual Differential(PHLEBS Do Not Order) [[id number]]  (Abnormal) Collected: 08/14/24 1236    Lab Status: Final result Specimen: Blood from Arm, Left Updated: 08/14/24 1319     Segmented % 56 %      Bands % 2 %      Lymphocytes % 24 %      Monocytes % 6 %      Eosinophils % 9 %      Basophils % 1 %      Metamyelocytes % 2 %      Absolute Neutrophils 4.03 Thousand/uL      Absolute Lymphocytes 1.67 Thousand/uL      Absolute Monocytes 0.42 Thousand/uL      Absolute Eosinophils 0.63 Thousand/uL      Absolute Basophils 0.07 Thousand/uL      Absolute Metamyelocytes 0.14 Thousand/uL      Total Counted --     RBC Morphology Present     Platelet Estimate Adequate     Anisocytosis Present     Basophilic Stippling Present     Ovalocytes Present     Poikilocytes Present     Comprehensive metabolic panel [[id number]]  (Abnormal) Collected: 08/14/24 1236    Lab Status: Final result Specimen: Blood from Arm, Left Updated: 08/14/24 1258     Sodium 141 mmol/L      Potassium 3.8 mmol/L      Chloride 95 mmol/L      CO2 32 mmol/L      ANION GAP 14 mmol/L      BUN 41 mg/dL      Creatinine 6.36 mg/dL      Glucose 84 mg/dL      Calcium 9.3 mg/dL      AST 8 U/L      ALT 11 U/L      Alkaline Phosphatase 66 U/L      Total Protein 5.5 g/dL      Albumin 3.6 g/dL      Total Bilirubin 0.51 mg/dL      eGFR 6 ml/min/1.73sq m     Narrative:      National Kidney Disease Foundation guidelines for Chronic Kidney Disease (CKD):     Stage 1 with normal or high GFR (GFR > 90 mL/min/1.73 square meters)    Stage 2 Mild CKD (GFR = 60-89 mL/min/1.73 square meters)    Stage 3A Moderate CKD (GFR = 45-59 mL/min/1.73 square meters)    Stage 3B Moderate CKD (GFR = 30-44 mL/min/1.73 square meters)    Stage 4 Severe CKD (GFR = 15-29 mL/min/1.73 square meters)    Stage 5 End Stage CKD (GFR <15 mL/min/1.73 square meters)  Note: GFR calculation is accurate only with a steady state creatinine                   No orders to display              Procedures  Procedures         ED Course                                 SBIRT 22yo+      Flowsheet Row Most Recent Value   Initial Alcohol Screen: US AUDIT-C     1. How often do you have a drink containing alcohol? 0 Filed at: 08/14/2024 1221   2. How many drinks containing alcohol do you have on a typical day you are drinking?  0 Filed at: 08/14/2024 1221   3a. Male UNDER 65: How often do you have five or more drinks on one occasion? 0 Filed at: 08/14/2024 1221   3b. FEMALE Any Age, or MALE 65+: How often do you have 4 or more drinks on one occassion? 0 Filed at: 08/14/2024 1221   Audit-C Score 0 Filed at: 08/14/2024 1221   MIKEY: How many times in the past year have you...    Used an illegal drug or used a prescription medication for non-medical reasons? Never Filed at: 08/14/2024 1221                       Medical Decision Making  This is 64 years old came for asking she want to be placed in a nursing home.  Patient has multiple medical problems that she cannot take care of herself.  Patient has history of ESRD and she is on dialysis x 3 weekly last dialysis was yesterday.  Patient has history of hypertension, diabetes, asthma, pneumonia.  Patient just discharged from [facility] yesterday.  Patient has no complaints and denies CP, SOB, abdominal pain, nausea vomiting diarrhea.  Patient sitting in no distress asking to have breakfast. Reviewing labs; cbc, cmp , NO change from previous ones .   Pt evaluated by PT, and OT , and stated pt can walk, and can be independent .  Contact  Mr. Dami Michael  and stated; Dr. Cole - they're basically not recommending STR for her with her level of functionality. I have a bed at [facility] and can submit for auth so it's likely going to get denied so honestly it's probably best that we send her home. If it does get approved I can get transport to get her from home to [facility].  Then he added that we can set up [facility] service for but that is all long term care, she's going to have to private pay for those services.  Then he added; Medicare does NOT pay for long term care. I will put in a referral to the [facility] aging office to follow up with her at home.    Amount and/or Complexity of Data Reviewed  Labs: ordered.     Details: Reviewing labs; cbc, cmp , NO change from previous ones .   Discussion of management or test interpretation with external provider(s): Contact  Mr. Dami Michael  and stated; Dr. Cole - they're basically not recommending STR for her with her level of functionality. I have a bed at [facility] and can submit for auth so it's likely going to get denied so honestly it's probably best that we send her home. If it does get approved I can get transport to get her from home to [facility].  Then he  added that we can set up [facility] service for but that is all jail care, she's going to have to private pay for those services.  Then he added; Medicare does NOT pay for jail care. I will put in a referral to the [facility] aging office to follow up with her at home.                   Disposition  Final diagnoses:   Home help needed     Time reflects when diagnosis was documented in both MDM as applicable and the Disposition within this note       Time User Action Codes Description Comment    8/14/2024  3:51 PM Abdwinnieek Moheb Add [N18.6] ESRD (end stage renal disease) ([facility])     8/14/2024  3:51 PM Abdelmalek Moheb Add [Z74.2] Home help needed     8/14/2024  3:51 PM Abdelmalek Moheb Modify [Z74.2] Home help needed     8/14/2024  3:51 PM Abdelmalek Moheb Remove [N18.6] ESRD (end stage renal disease) ([facility])           ED Disposition       ED Disposition   Discharge    Condition   Stable    Date/Time   Wed Aug 14, 2024 1551    Comment   Ngozi Beard discharge to home/self care.                   Follow-up Information       Follow up With Specialties Details Why Contact Info    Meenakshi Balbuena MD Family Medicine In 3 days  [address] [address]  [phone]              Discharge Medication List as of 8/14/2024  3:51 PM        CONTINUE these medications which have NOT CHANGED    Details   Advair -21 MCG/ACT inhaler Inhale 2 puffs 2 (two) times a day Rinse mouth after use., Starting Mon 7/1/2024, Normal      albuterol (PROVENTIL HFA,VENTOLIN HFA) 90 mcg/act inhaler INHALE 2 PUFFS BY MOUTH EVERY 4 HOURS AS NEEDED FOR SHORTNESS OF BREATH, Starting Fri 11/24/2023, Normal      atorvastatin (LIPITOR) 20 mg tablet TAKE 1 TABLET BY MOUTH EVERY DAY, Normal      atovaquone (MEPRON) 750 mg/5 mL suspension Take 10 mL (1,500 mg total) by mouth daily Do not start before August 5, 2024., Starting Mon 8/5/2024, Until Wed 9/4/2024, Normal      calcium carbonate (OYSTER SHELL,OSCAL) 500 mg  Take 1 tablet by mouth daily with breakfast, Starting Sun 8/4/2024, Normal      cyclophosphamide (CYTOXAN) 50 mg capsule Take 2 capsules (100 mg total) by mouth daily, Starting Sun 8/4/2024, Until Tue 9/3/2024, Normal      iron polysaccharides (FERREX) 150 mg capsule Take 1 capsule (150 mg total) by mouth daily, Starting Sun 8/4/2024, Normal      lamoTRIgine (LaMICtal) 100 mg tablet Take 100 mg by mouth daily at bedtime, Starting Tue 4/9/2024, Historical Med      montelukast (SINGULAIR) 10 mg tablet TAKE 1 TABLET BY MOUTH EVERY DAY, Normal      NIFEdipine (PROCARDIA XL) 30 mg 24 hr tablet Take 2 tablets (60 mg total) by mouth daily, Starting Sun 8/4/2024, Normal      ondansetron (ZOFRAN) 4 mg tablet Take 1 tablet (4 mg total) by mouth every 8 (eight) hours as needed for nausea or vomiting, Starting Tue 8/13/2024, Normal      pantoprazole (PROTONIX) 40 mg tablet Take 1 tablet (40 mg total) by mouth daily before breakfast, Starting Sun 8/4/2024, Normal      polyethylene glycol (MIRALAX) 17 g packet Take 17 g by mouth daily as needed (Use for constipation as needed) for up to 7 days, Starting Sun 8/4/2024, Until Sun 8/11/2024 at 2359, Normal      predniSONE 2.5 mg tablet Multiple Dosages:Starting Mon 8/5/2024, Until Fri 8/9/2024 at 2359, THEN Starting Sat 8/10/2024, Until Fri 8/23/2024 at 2359, THEN Starting Sat 8/24/2024, Until Fri 9/6/2024 at 2359, THEN Starting Sat 9/7/2024, Until Fri 9/20/2024 at 2359, THEN Star ting Sat 9/21/2024, Until Fri 10/4/2024 at 2359, THEN Starting Sat 10/5/2024, Until Fri 10/18/2024 at 2359, THEN Starting Sat 10/19/2024, Until Fri 11/1/2024 at 2359, THEN Starting Sat 11/2/2024, Until Sun 12/1/2024 at 2359Take 12 tablets (30 mg tota l) by mouth daily for 5 days, THEN 10 tablets (25 mg total) daily for 14 days, THEN 8 tablets (20 mg total) daily for 14 days, THEN 6 tablets (15 mg total) daily for 14 days, THEN 5 tablets (12.5 mg total) daily for 14 days, THEN 4 tablets (10 mg total)  daily  for 14 days, THEN 3 tablets (7.5 mg total) daily for 14 days, THEN 2 tablets (5 mg total) daily. Do not start before August 5, 2024., Normal      senna (SENOKOT) 8.6 mg Take 2 tablets (17.2 mg total) by mouth 2 (two) times a day Use as needed constipation, Starting Sun 8/4/2024, Normal      sevelamer (RENAGEL) 800 mg tablet Take 2 tablets (1,600 mg total) by mouth 3 (three) times a day with meals, Starting Sun 8/4/2024, Normal      traZODone (DESYREL) 100 mg tablet Take 200 mg by mouth daily at bedtime, Historical Med      !! venlafaxine (EFFEXOR-XR) 150 mg 24 hr capsule TAKE 1 CAPSULE BY MOUTH DAILY WITH BREAKFAST., Normal      !! venlafaxine (EFFEXOR-XR) 75 mg 24 hr capsule Take 75 mg by mouth daily Pt states she takes 150mg and 75mg effexor. Daily. For a totoal of 225mg, Historical Med       !! - Potential duplicate medications found. Please discuss with provider.          No discharge procedures on file.    PDMP Review         Value Time User    PDMP Reviewed  Yes 8/10/2024 10:36 PM Marbin Romero MD            ED Provider  Electronically Signed by             Rodrigo Cole MD  08/15/24 2313

## 2024-08-14 NOTE — OCCUPATIONAL THERAPY NOTE
Occupational Therapy Evaluation     Patient Name: Ngozi Beard  Today's Date: 8/14/2024  Problem List  Active Problems:  There are no active Hospital Problems.    Past Medical History  Past Medical History:   Diagnosis Date    Asthma     Chronic pain     Hypertension     Renal disorder     Sepsis (HCC) 07/10/2024     Past Surgical History  Past Surgical History:   Procedure Laterality Date    BACK SURGERY      COLONOSCOPY      IR BIOPSY KIDNEY RANDOM  7/17/2024    IR TEMPORARY DIALYSIS CATHETER PLACEMENT  7/15/2024    IR TUNNELED DIALYSIS CATHETER CHECK/CHANGE/REPOSITION/ANGIOPLASTY  8/12/2024    IR TUNNELED DIALYSIS CATHETER PLACEMENT  7/22/2024    TUBAL LIGATION             08/14/24 1447   OT Last Visit   OT Visit Date 08/14/24   Note Type   Note type Evaluation   Pain Assessment   Pain Assessment Tool 0-10   Pain Score No Pain   Pain Location/Orientation Location: Chest   Pain Onset/Description   (tightness)   Patient's Stated Pain Goal No pain   Restrictions/Precautions   Weight Bearing Precautions Per Order No   Other Precautions Cognitive;Fall Risk   Home Living   Type of Home Apartment   Home Layout One level  (3+ 1 DILLAN. Reports that she receives assistance to manage steps to HD sesssions via [facility])   Bathroom Shower/Tub Tub/shower unit   Bathroom Toilet Standard   Bathroom Equipment   (no AD at baseline)   Home Equipment Cane   Prior Function   Level of [place] Independent with ADLs;Independent with functional mobility;Independent with IADLS   Lives With   (roommate)   IADLs Family/Friend/Other provides transportation;Independent with medication management;Family/Friend/Other provides meals  (Reports that her sister assists with finances)   Falls in the last 6 months 0   Subjective   Subjective Pt endorsing anxiety t/o   ADL   Eating Assistance 7  Independent   Grooming Assistance 7  Independent   UB Bathing Assistance 5  Supervision/Setup   LB Bathing Assistance 5  Supervision/Setup   UB  Dressing Assistance 5  Supervision/Setup   LB Dressing Assistance 5  Supervision/Setup   Toileting Assistance  5  Supervision/Setup   Functional Assistance 5  Supervision/Setup   Bed Mobility   Supine to Sit 6  Modified independent   Sit to Supine 6  Modified independent   Transfers   Sit to Stand 5  Supervision   Stand to Sit 5  Supervision   Functional Mobility   Functional Mobility 5  Supervision   Additional Comments RW. Pt endorsed anxiousness/dizziness during functional mobiltiy assessment. No LOB noted. /87 s/p activity.   Balance   Static Sitting Good   Dynamic Sitting Fair +   Static Standing Fair   Dynamic Standing Fair   Activity Tolerance   Activity Tolerance   (Pt endorsed anxiety/dizziness with OOB activity)   Medical Staff Made Aware PT Jovi   RUE Assessment   RUE Assessment WFL   LUE Assessment   LUE Assessment WFL   Cognition   Overall Cognitive Status WFL   Arousal/Participation Alert   Attention Within functional limits   Orientation Level Oriented to person;Oriented to place;Oriented to situation  (Pt oriented to month/year)   Memory   (Appears grossly intact. Pt able to count by serial 2s, able to verbalize how many quarters in $1.50.)   Following Commands Follows all commands and directions without difficulty   Comments Pt endored some memory issues, stating that she forgot to take yesterday's meds. Pt does admit to having a pill box   Assessment   Limitation Decreased high-level ADLs;Decreased self-care trans;Decreased cognition   Prognosis Good   Assessment Pt is a 64 y.o. female seen for OT evaluation at [facility], admitted 8/14/2024 w/ decreased ability to care for herself. Comorbidities affecting pt's functional performance at time of assessment include: bipolar disorder, primary HTN, paranoid schizophrenia, HTN.  Prior to admission, pt was living with roommate in an apt with few steps to manage.  Pt was I w/  ADLS and most IADLS, & required cane PTA. Upon evaluation,  pt appears to be performing below baseline functional status. Pt currently requires mod I for bed mobility, sup for functional mobility/transfers, sup for UB ADLs and aup for LB ADLS 2* the following deficits impacting occupational performance: decreased balance, decreased tolerance, and decreased coping skills. Full objective findings from OT assessment regarding body systems outlined above. Personal factors affecting pt at time of IE include:steps to enter environment, limited home support, difficulty performing IADLS , health management , and environment. These impairments, as well as risk for falls  limit pt's ability to safely engage in all baseline areas of occupation and mobility. Pt to benefit from continued skilled OT tx while in the hospital to address deficits as defined above and maximize level of functional independence w ADL's and functional mobility. Occupational Performance areas to address include: medication management, health maintenance, functional mobility, and community mobility.  This evaluation required an extensive review of medical and/or therapy records and additional review of physical, cognitive and psychosocial history related to functional performance. Based upon functional performance deficits and assessments, this evaluation has been identified as a high complexity evaluation.  At this time, OT recommendations at time of discharge are level 3 low intensity resources.   Goals   Patient Goals Pt wishes to not return home, however unable to verbalize why   Plan   OT Frequency Eval only   Discharge Recommendation   Rehab Resource Intensity Level, OT III (Minimum Resource Intensity)   Equipment Recommended Shower/Tub chair with back ($)   Additional Comments  The patient's raw score on the AM-PAC Daily Activity inpatient short form is 24, standardized score is 57.54, greater than 39.4. Patients at this level are likely to benefit from DC to home. However please refer to therapist  recommendation for discharge planning given other factors that may influence destination.   AM-PAC Daily Activity Inpatient   Lower Body Dressing 4   Bathing 4   Toileting 4   Upper Body Dressing 4   Grooming 4   Eating 4   Daily Activity Raw Score 24   Daily Activity Standardized Score (Calc for Raw Score >=11) 57.54   AM-PAC Applied Cognition Inpatient   Following a Speech/Presentation 3   Understanding Ordinary Conversation 4   Taking Medications 3   Remembering Where Things Are Placed or Put Away 4   Remembering List of 4-5 Errands 3   Taking Care of Complicated Tasks 3   Applied Cognition Raw Score 20   Applied Cognition Standardized Score 41.76   End of Consult   Education Provided Yes   Patient Position at End of Consult Supine;All needs within reach   Nurse Communication Nurse aware of consult             Pt will achieve the following goals within 10 days.    *Pt will complete UB bathing and dressing with mod I.    *Pt will complete LB bathing and dressing with mod I .    * Pt will complete toileting w/ mod I w/ G hygiene/thoroughness using DME PRN    *Pt will complete bed mobility with mod I, with bed flat and no side rail to prep for purposeful tasks    *Pt will perform functional transfers with on/off all surfaces with mod I using DME as needed w/ G balance/safety.    * Pt will engage in ongoing cognitive assessment w/ G participation to A w/ independent med mgmt.    *Pt will improve functional mobility during ADL/IADL/leisure tasks to mod I using DME as needed w/ G balance/safety.           Cheyenne Braun, OTR/L

## 2024-08-14 NOTE — PHYSICAL THERAPY NOTE
"   PHYSICAL THERAPY Evaluation  DATE: 08/14/24  TIME: 0223-0728    NAME:  Ngozi Beard  AGE:   64 y.o.  Mrn:   [id number]  Length Of Stay: 0    ADMIT DX:  Known medical problems [Z78.9]    Past Medical History:   Diagnosis Date    Asthma     Chronic pain     Hypertension     Renal disorder     Sepsis (HCC) 07/10/2024     Past Surgical History:   Procedure Laterality Date    BACK SURGERY      COLONOSCOPY      IR BIOPSY KIDNEY RANDOM  7/17/2024    IR TEMPORARY DIALYSIS CATHETER PLACEMENT  7/15/2024    IR TUNNELED DIALYSIS CATHETER CHECK/CHANGE/REPOSITION/ANGIOPLASTY  8/12/2024    IR TUNNELED DIALYSIS CATHETER PLACEMENT  7/22/2024    TUBAL LIGATION          08/14/24 1409   PT Last Visit   PT Visit Date 08/14/24   Note Type   Note type Evaluation   Additional Comments 65 y/o presents due to inability to care for herself, SOB, generalized weakness.  Recently seen at [place] and dx with pneumonia.   Pain Assessment   Pain Assessment Tool 0-10   Pain Score   (\"tightness\")   Pain Location/Orientation Location: Chest   Restrictions/Precautions   Weight Bearing Precautions Per Order No   Other Precautions Fall Risk   Home Living   Additional Comments Pt lives with roommate in 1-level house, 3 DILLAN.   Prior Function   Comments Prior to admission: ambulates with a SPC.  independent with ADL, needs assist with IADLs. Takes [email or web address] services to HD, does not manage stairs at baseline.   General   Additional Pertinent History vitals: 105 bpm, 92%, 142/87   Family/Caregiver Present No   Cognition   Overall Cognitive Status WFL   Arousal/Participation Alert   Orientation Level Oriented X4   Subjective   Subjective Pt reports anxiety, does not feel she is able to return home and manage things like her medications.  Expresses complaints about the condition of her house due to her roommate.   RUE Assessment   RUE Assessment WFL   LUE Assessment   LUE Assessment WFL   RLE Assessment   RLE Assessment WFL  (hip flex 3+/5.  hip add " 4-/5. knee 3+/5 grossly.  ankle 3+/5 grossly)   LLE Assessment   LLE Assessment WFL  (hip flex 3+/5. hip add 4-/5. knee 4-/5 grossly. ankle 4-/5 grossly)   Coordination   Movements are Fluid and Coordinated 1   Sensation   (diminished light touch sensation to tamara toes and plantar surface of feet)   Bed Mobility   Supine to Sit 6  Modified independent  (performed from long sitting position)   Additional items Increased time required   Sit to Supine 6  Modified independent   Additional items Increased time required   Transfers   Sit to Stand 5  Supervision   Additional items Assist x 1;Increased time required  (without use of UEs)   Stand to Sit 7  Independent   Ambulation/Elevation   Gait pattern Forward Flexion;Wide JUAN;Decreased foot clearance;Inconsistent gama;Short stride;Excessively slow   Gait Assistance 5  Supervision   Additional items Assist x 1;Verbal cues;Tactile cues   Assistive Device Rolling walker   Distance 35'   Balance   Static Sitting Good   Static Standing Fair   Ambulatory Fair  (RW)   Endurance Deficit   Endurance Deficit Yes   Endurance Deficit Description Pt reports fatigue with ambulating short distance   Activity Tolerance   Activity Tolerance Patient limited by fatigue  (Pt limited by anxiety and c/o off balance)   Medical Staff Made Aware collaborated with OT   Assessment   Prognosis Fair   Problem List Decreased strength;Decreased endurance;Impaired balance;Decreased mobility;Impaired sensation;Obesity   Assessment Orders for PT eval and treat received. Pt's PMHx: chronic pain, back pain, Bipolar/paranoid schizophrenia, asthma, anemia. Pt exhibits physical deficits noted in problem list above.  Deficits listed contribute to functional limitations that are significant from the patient PLOF and include: impaired standing balance, tolerance for OOB functional activities, unsafe/inefficient ambulation, fall risk, and unable to safely manage stairs/steps/ramp    Additional considerations  affecting pt's discharge planning: Insufficient or unavailable assistance at home, Unfavorable, dangerous, or deplorable home environment, Significant amount of stairs to manage in order to access living quarters, Progressive cognitive decline affecting safety awareness/insight, and Inability to perform necessary transfers and/or mobility out of bed, with the assistance that is currently available    During today's session, formal PT evaluation performed.  Pt exhibits slight strength deficits of BLE (left weaker than right), decreased endurance or tolerance for standing, instability/balance issue requiring increased use of AD.  Despite deficits, pt was able to perform all bed and transfers under own strength, sit<>stand without use of Ues, and ambulate short distance with a walker.  Pt could benefit from skilled therapy intervention at home to address these deficits.     The -PAC Mobility Score was used to assist in determining pt safety w/ mobility/self care & appropriate d/c recommendations, see above for scores. Patient's clinical presentation is stable  due to significant need for care assistance compared to baseline, ongoing medical management needs, and complicated social/support system.     From a PT/mobility standpoint given the above findings, DC recommendation is level: III (Minimum Rehab Resource Intensity)    Considering the patient's PLOF, co-morbidities, acute functional limitations, functional outcome measures, and/or goal to progress functional independence; this patient would benefit from skilled Physical Therapy intervention in the acute care setting.   Barriers to Discharge Decreased caregiver support;Inaccessible home environment   Goals   Patient Goals none reported   [facility] Expiration Date 08/24/24   Short Term Goal #1 1: Pt will perform rolling to either side in flat bed, independently.  2: Pt will perform sit<>supine on flat bed, independently.  3: Pt will perform stand pivot transfer  without/LRAD, mod I.  4: Pt will ambulate 150' without AD/LRAD, mod I. 5: Pt will perform written HEP, with cues. 6: Pt will ascend/descend 3 stairs with AD, sup A.   Plan   Treatment/Interventions Functional transfer training;Elevations;LE strengthening/ROM;Therapeutic exercise;Endurance training;Patient/family training;Equipment eval/education;Bed mobility;Gait training   PT Frequency 2-3x/wk   Discharge Recommendation   Rehab Resource Intensity Level, PT III (Minimum Resource Intensity)   AM-PAC Basic Mobility Inpatient   Turning in Flat Bed Without Bedrails 4   Lying on Back to Sitting on Edge of Flat Bed Without Bedrails 4   Moving Bed to Chair 3   Standing Up From Chair Using Arms 4   Walk in Room 3   Climb 3-5 Stairs With Railing 2   Basic Mobility Inpatient Raw Score 20   Basic Mobility Standardized Score 43.99   [facility] Highest Level Of Mobility   JH-HLM Goal 6: Walk 10 steps or more   JH-HLM Achieved 6: Walk 10 steps or more   End of Consult   Patient Position at End of Consult Supine;All needs within reach     The patient's AM-PAC Basic Mobility Inpatient Short Form Raw Score is 20. A Raw score of greater than or equal to 16 suggests the patient may benefit from discharge to home. Please also refer to the recommendation of the Physical Therapist for safe discharge planning.    Héctor Ortega, PT, DPT  [place] Licensure #[id number]

## 2024-08-14 NOTE — DISCHARGE INSTRUCTIONS
Follow up with your PMD.  Labs Reviewed   CBC AND DIFFERENTIAL - Abnormal       Result Value Ref Range Status    WBC 6.95  4.31 - 10.16 Thousand/uL Final    RBC 2.98 (*) 3.81 - 5.12 Million/uL Final    Hemoglobin 8.5 (*) 11.5 - 15.4 g/dL Final    Hematocrit 26.3 (*) 34.8 - 46.1 % Final    MCV 88  82 - 98 fL Final    MCH 28.5  26.8 - 34.3 pg Final    MCHC 32.3  31.4 - 37.4 g/dL Final    RDW 16.4 (*) 11.6 - 15.1 % Final    MPV 9.5  8.9 - 12.7 fL Final    Platelets 195  149 - 390 Thousands/uL Final    Narrative:     This is an appended report.  These results have been appended to a previously verified report.   COMPREHENSIVE METABOLIC PANEL - Abnormal    Sodium 141  135 - 147 mmol/L Final    Potassium 3.8  3.5 - 5.3 mmol/L Final    Chloride 95 (*) 96 - 108 mmol/L Final    CO2 32  21 - 32 mmol/L Final    ANION GAP 14 (*) 4 - 13 mmol/L Final    BUN 41 (*) 5 - 25 mg/dL Final    Creatinine 6.36 (*) 0.60 - 1.30 mg/dL Final    Comment: Standardized to IDMS reference method    Glucose 84  65 - 140 mg/dL Final    Comment: If the patient is fasting, the ADA then defines impaired fasting glucose as > 100 mg/dL and diabetes as > or equal to 123 mg/dL.    Calcium 9.3  8.4 - 10.2 mg/dL Final    AST 8 (*) 13 - 39 U/L Final    ALT 11  7 - 52 U/L Final    Comment: Specimen collection should occur prior to Sulfasalazine administration due to the potential for falsely depressed results.     Alkaline Phosphatase 66  34 - 104 U/L Final    Total Protein 5.5 (*) 6.4 - 8.4 g/dL Final    Albumin 3.6  3.5 - 5.0 g/dL Final    Total Bilirubin 0.51  0.20 - 1.00 mg/dL Final    Comment: Use of this assay is not recommended for patients undergoing treatment with eltrombopag due to the potential for falsely elevated results.  N-acetyl-p-benzoquinone imine (metabolite of Acetaminophen) will generate erroneously low results in samples for patients that have taken an overdose of Acetaminophen.    eGFR 6  ml/min/1.73sq m Final    Narrative:     National  Kidney Disease Foundation guidelines for Chronic Kidney Disease (CKD):     Stage 1 with normal or high GFR (GFR > 90 mL/min/1.73 square meters)    Stage 2 Mild CKD (GFR = 60-89 mL/min/1.73 square meters)    Stage 3A Moderate CKD (GFR = 45-59 mL/min/1.73 square meters)    Stage 3B Moderate CKD (GFR = 30-44 mL/min/1.73 square meters)    Stage 4 Severe CKD (GFR = 15-29 mL/min/1.73 square meters)    Stage 5 End Stage CKD (GFR <15 mL/min/1.73 square meters)  Note: GFR calculation is accurate only with a steady state creatinine   MANUAL DIFFERENTIAL(PHLEBS DO NOT ORDER) - Abnormal    Segmented % 56  43 - 75 % Final    Bands % 2  0 - 8 % Final    Lymphocytes % 24  14 - 44 % Final    Monocytes % 6  4 - 12 % Final    Eosinophils % 9 (*) 0 - 6 % Final    Basophils % 1  0 - 1 % Final    Metamyelocytes % 2 (*) 0 - 1 % Final    Absolute Neutrophils 4.03  1.85 - 7.62 Thousand/uL Final    Absolute Lymphocytes 1.67  0.60 - 4.47 Thousand/uL Final    Absolute Monocytes 0.42  0.00 - 1.22 Thousand/uL Final    Absolute Eosinophils 0.63 (*) 0.00 - 0.40 Thousand/uL Final    Absolute Basophils 0.07  0.00 - 0.10 Thousand/uL Final    Absolute Metamyelocytes 0.14 (*) 0.00 - 0.10 Thousand/uL Final    Total Counted     Final    RBC Morphology Present   Final    Platelet Estimate Adequate  Adequate Final    Anisocytosis Present   Final    Basophilic Stippling Present   Final    Ovalocytes Present   Final    Poikilocytes Present   Final   RBC MORPHOLOGY REFLEX TEST

## 2024-08-14 NOTE — TELEPHONE ENCOUNTER
Patient sister thinks she should be in a assisted care facility . Patient can't move in and out of home freely can't walk is forgetting to take her medicine. Is there a  that patient can get to her into the right place. Patient is in dialysis 3times a week. And she has end stage renal failure. Please advise and call sister back for further options. Sister was transferred to nurse line for triage  Amaya Laureano

## 2024-08-14 NOTE — CASE MANAGEMENT
Case Management Progress Note    Patient name Ngozi Beard  Location ED 14/ED 14 MRN [id number]  : 1960 Date 2024       LOS (days): 0  Geometric Mean LOS (GMLOS) (days):   Days to GMLOS:        OBJECTIVE:        Current admission status: Emergency  Preferred Pharmacy:   [facility]/pharmacy #[id number] - [place], [place] - [address]  [address] [address]  Phone: [phone] Fax: [phone]    [facility] - [place] [address] [address]  [address] [place]  [address]  [place] [address]  Phone: [phone] Fax: [phone]    Primary Care Provider: Meenakshi Balbuena MD    Primary Insurance: [employer] [facility]  Secondary Insurance: [employer] [facility]    PROGRESS NOTE:    Consult to CM entered for:   Pt has multiple medical problems and stated she can not take care of herself and want to go to NH.     PT/OT orders requested from provider as patient will need patient choice and auth for placement.STR referrals opened in [place].    Per chart, patient is independent from home at baseline. TTS OP MANUEL w/ Sidney Meredith.

## 2024-08-15 ENCOUNTER — PATIENT OUTREACH (OUTPATIENT)
Dept: CASE MANAGEMENT | Facility: OTHER | Age: 64
End: 2024-08-15

## 2024-08-15 ENCOUNTER — TELEPHONE (OUTPATIENT)
Age: 64
End: 2024-08-15

## 2024-08-15 ENCOUNTER — TRANSITIONAL CARE MANAGEMENT (OUTPATIENT)
Dept: FAMILY MEDICINE CLINIC | Facility: CLINIC | Age: 64
End: 2024-08-15

## 2024-08-15 NOTE — TELEPHONE ENCOUNTER
[facility] -  [phone]    He called stating that if the cyclophosphamide (CYTOXAN) 50 mg capsule is not used in conjunction with a transplant or a cancer treatment it requires a pre authorization.    He will send a form via fax regarding this information

## 2024-08-15 NOTE — PROGRESS NOTES
TRUE ARSHAD received referral for patient from Amaya Laureano MA. Per chart review, patient presented to [facility] and [facility] yesterday due to stating she is unable to care for herself and wants to be admitted into a nursing home. Patient was also hospitalized at [facility] from 8/10-8/13 and was discharged home. Per review, patient was evaluated by PT/OT at a level 3 and patient was discharged home. A referral was made to [facility] by CALEB ARSHAD to determine if patient is eligible for home care. VNA/HHC services were not arranged due to patient not having a skilled need for home care and because patient is not home bound.    CALEB ARSHAD placed referral to STR options and authorization was submitted for [facility], but due to patient not having a skilled need she was discharged home. CALEB ARSHAD stated that if authorization is received for STR, patient can admit to STR from home. Patient has no previous OP SW CM outreach.    TRUE ARSHAD placed initial outreach call to patient and left a voicemail. TRUE ARSHAD to place second outreach call within one week if return call is not received prior.

## 2024-08-16 DIAGNOSIS — J44.9 CHRONIC OBSTRUCTIVE PULMONARY DISEASE, UNSPECIFIED COPD TYPE (HCC): ICD-10-CM

## 2024-08-16 RX ORDER — FLUTICASONE PROPIONATE AND SALMETEROL XINAFOATE 230; 21 UG/1; UG/1
2 AEROSOL, METERED RESPIRATORY (INHALATION) 2 TIMES DAILY
Qty: 12 G | Refills: 0 | Status: SHIPPED | OUTPATIENT
Start: 2024-08-16

## 2024-08-16 NOTE — TELEPHONE ENCOUNTER
Reason for call:   [x] Refill   [] Prior Auth  [] Other:     Office:   [x] PCP/Provider -   [] Specialty/Provider -       Does the patient have enough for 3 days?   [x] Yes   [] No - Send as HP to POD

## 2024-08-16 NOTE — CASE MANAGEMENT
Support Center has received PEER TO PEER request prior to determination being made.   Received for SNF Authorization.   Insurance: [employer]  Called in by Rep: Uzma  Facility: [facility]   Pending Auth #:[id number]   Peer to Peer Phone#:  [phone] option5      Deadline:8/16 @3pm  Please notify Discharge Support if P2P will not be completed.     Care Manager notified:veronica freeman     Please reach out to CM for updates on any clinical information.

## 2024-08-19 LAB
FUNGUS SPEC CULT: NORMAL
FUNGUS SPEC CULT: NORMAL

## 2024-08-20 ENCOUNTER — PATIENT OUTREACH (OUTPATIENT)
Dept: CASE MANAGEMENT | Facility: OTHER | Age: 64
End: 2024-08-20

## 2024-08-20 NOTE — LETTER
08/20/24    Dear Ngozi Beard,    I am a Care Manager with [facility]. We have made several attempts to call you by phone.  It is important that you contact us back at [phone] so that we can assist with your care needs.     Sincerely,       Yazmin SCHNEIDER  Outpatient Social Work Care Manager

## 2024-08-20 NOTE — PROGRESS NOTES
TRUE ARSHAD placed second outreach call to patient and left a voicemail. TRUE ARSHAD sent Unable to Reach letter to patient's address listed on chart.    TRUE ARSHAD closed referral due to unable to make contact.

## 2024-08-23 ENCOUNTER — TELEPHONE (OUTPATIENT)
Dept: FAMILY MEDICINE CLINIC | Facility: CLINIC | Age: 64
End: 2024-08-23

## 2024-08-23 ENCOUNTER — TELEPHONE (OUTPATIENT)
Dept: NEPHROLOGY | Facility: CLINIC | Age: 64
End: 2024-08-23

## 2024-08-23 DIAGNOSIS — N01.9 RAPIDLY PROGRESSIVE GLOMERULONEPHRITIS WITH ANTI-GBM ANTIBODIES: ICD-10-CM

## 2024-08-23 RX ORDER — SEVELAMER HYDROCHLORIDE 800 MG/1
1600 TABLET, FILM COATED ORAL
Qty: 90 TABLET | Refills: 0 | Status: ON HOLD | OUTPATIENT
Start: 2024-08-23

## 2024-08-23 RX ORDER — SEVELAMER HYDROCHLORIDE 800 MG/1
1600 TABLET, FILM COATED ORAL
Qty: 90 TABLET | Refills: 0 | OUTPATIENT
Start: 2024-08-23

## 2024-08-23 NOTE — TELEPHONE ENCOUNTER
Needs refill     sevelamer (RENAGEL) 800 mg tablet 90 tablet 0       Sig: Take 2 tablets (1,600 mg total) by mouth 3 (three) times a day with meals      To the [facility] 09[facility]

## 2024-08-23 NOTE — TELEPHONE ENCOUNTER
Patient calling to request   Requested Prescriptions     Pending Prescriptions Disp Refills    sevelamer (RENAGEL) 800 mg tablet 90 tablet 0     Sig: Take 2 tablets (1,600 mg total) by mouth 3 (three) times a day with meals     To the [facility] 0960

## 2024-08-29 ENCOUNTER — PREP FOR PROCEDURE (OUTPATIENT)
Dept: INTERVENTIONAL RADIOLOGY/VASCULAR | Facility: CLINIC | Age: 64
End: 2024-08-29

## 2024-08-29 DIAGNOSIS — N18.6 ESRD (END STAGE RENAL DISEASE) (HCC): Primary | ICD-10-CM

## 2024-08-30 ENCOUNTER — HOSPITAL ENCOUNTER (INPATIENT)
Facility: HOSPITAL | Age: 64
LOS: 20 days | Discharge: NURSING, REHAB OR LONG-TERM CARE | DRG: 286 | End: 2024-09-19
Attending: EMERGENCY MEDICINE | Admitting: INTERNAL MEDICINE
Payer: COMMERCIAL

## 2024-08-30 ENCOUNTER — APPOINTMENT (EMERGENCY)
Dept: RADIOLOGY | Facility: HOSPITAL | Age: 64
DRG: 286 | End: 2024-08-30
Payer: COMMERCIAL

## 2024-08-30 DIAGNOSIS — Z99.2 DEPENDENCE ON RENAL DIALYSIS (HCC): ICD-10-CM

## 2024-08-30 DIAGNOSIS — T82.9XXA COMPLICATION ASSOCIATED WITH DIALYSIS CATHETER: ICD-10-CM

## 2024-08-30 DIAGNOSIS — J45.41 MODERATE PERSISTENT ASTHMA WITH ACUTE EXACERBATION: ICD-10-CM

## 2024-08-30 DIAGNOSIS — Z99.2 ACUTE RENAL FAILURE ON DIALYSIS (HCC): ICD-10-CM

## 2024-08-30 DIAGNOSIS — R78.81 BACTEREMIA: ICD-10-CM

## 2024-08-30 DIAGNOSIS — R09.02 HYPOXIA: ICD-10-CM

## 2024-08-30 DIAGNOSIS — J44.1 COPD EXACERBATION (HCC): ICD-10-CM

## 2024-08-30 DIAGNOSIS — R06.02 SHORTNESS OF BREATH: ICD-10-CM

## 2024-08-30 DIAGNOSIS — N17.9 ACUTE RENAL FAILURE ON DIALYSIS (HCC): ICD-10-CM

## 2024-08-30 DIAGNOSIS — N01.9 RAPIDLY PROGRESSIVE GLOMERULONEPHRITIS WITH ANTI-GBM ANTIBODIES: ICD-10-CM

## 2024-08-30 DIAGNOSIS — R93.89 ABNORMAL CT OF THE CHEST: ICD-10-CM

## 2024-08-30 DIAGNOSIS — J18.9 MULTIFOCAL PNEUMONIA: ICD-10-CM

## 2024-08-30 DIAGNOSIS — I42.9 CARDIOMYOPATHY, UNSPECIFIED TYPE (HCC): ICD-10-CM

## 2024-08-30 DIAGNOSIS — F20.0 PARANOID SCHIZOPHRENIA (HCC): Primary | ICD-10-CM

## 2024-08-30 LAB
ALBUMIN SERPL BCG-MCNC: 3.8 G/DL (ref 3.5–5)
ALP SERPL-CCNC: 106 U/L (ref 34–104)
ALT SERPL W P-5'-P-CCNC: 60 U/L (ref 7–52)
ANION GAP SERPL CALCULATED.3IONS-SCNC: 15 MMOL/L (ref 4–13)
ANISOCYTOSIS BLD QL SMEAR: PRESENT
APTT PPP: >210 SECONDS (ref 23–34)
AST SERPL W P-5'-P-CCNC: 65 U/L (ref 13–39)
ATRIAL RATE: 125 BPM
BASE EX.OXY STD BLDV CALC-SCNC: 77.1 % (ref 60–80)
BASE EXCESS BLDV CALC-SCNC: -3.8 MMOL/L
BASOPHILS # BLD MANUAL: 0 THOUSAND/UL (ref 0–0.1)
BASOPHILS NFR MAR MANUAL: 0 % (ref 0–1)
BILIRUB SERPL-MCNC: 0.71 MG/DL (ref 0.2–1)
BNP SERPL-MCNC: 3019 PG/ML (ref 0–100)
BUN SERPL-MCNC: 44 MG/DL (ref 5–25)
CALCIUM SERPL-MCNC: 9.8 MG/DL (ref 8.4–10.2)
CARDIAC TROPONIN I PNL SERPL HS: 212 NG/L
CHLORIDE SERPL-SCNC: 102 MMOL/L (ref 96–108)
CO2 SERPL-SCNC: 22 MMOL/L (ref 21–32)
CREAT SERPL-MCNC: 8.11 MG/DL (ref 0.6–1.3)
DACRYOCYTES BLD QL SMEAR: PRESENT
EOSINOPHIL # BLD MANUAL: 0 THOUSAND/UL (ref 0–0.4)
EOSINOPHIL NFR BLD MANUAL: 0 % (ref 0–6)
ERYTHROCYTE [DISTWIDTH] IN BLOOD BY AUTOMATED COUNT: 17.5 % (ref 11.6–15.1)
FLUAV RNA RESP QL NAA+PROBE: NEGATIVE
FLUBV RNA RESP QL NAA+PROBE: NEGATIVE
GFR SERPL CREATININE-BSD FRML MDRD: 4 ML/MIN/1.73SQ M
GLUCOSE SERPL-MCNC: 105 MG/DL (ref 65–140)
HCO3 BLDV-SCNC: 20 MMOL/L (ref 24–30)
HCT VFR BLD AUTO: 23 % (ref 34.8–46.1)
HELMET CELLS BLD QL SMEAR: PRESENT
HGB BLD-MCNC: 7.1 G/DL (ref 11.5–15.4)
INR PPP: 1.99 (ref 0.85–1.19)
LYMPHOCYTES # BLD AUTO: 1.59 THOUSAND/UL (ref 0.6–4.47)
LYMPHOCYTES # BLD AUTO: 11 % (ref 14–44)
MAGNESIUM SERPL-MCNC: 2.1 MG/DL (ref 1.9–2.7)
MCH RBC QN AUTO: 28.5 PG (ref 26.8–34.3)
MCHC RBC AUTO-ENTMCNC: 30.9 G/DL (ref 31.4–37.4)
MCV RBC AUTO: 92 FL (ref 82–98)
MONOCYTES # BLD AUTO: 0.58 THOUSAND/UL (ref 0–1.22)
MONOCYTES NFR BLD: 4 % (ref 4–12)
NEUTROPHILS # BLD MANUAL: 12.28 THOUSAND/UL (ref 1.85–7.62)
NEUTS SEG NFR BLD AUTO: 85 % (ref 43–75)
NRBC BLD AUTO-RTO: 1 /100 WBC (ref 0–2)
O2 CT BLDV-SCNC: 8.9 ML/DL
P AXIS: 36 DEGREES
PCO2 BLDV: 30.9 MM HG (ref 42–50)
PH BLDV: 7.43 [PH] (ref 7.3–7.4)
PHOSPHATE SERPL-MCNC: 6.1 MG/DL (ref 2.3–4.1)
PLATELET # BLD AUTO: 258 THOUSANDS/UL (ref 149–390)
PLATELET BLD QL SMEAR: ADEQUATE
PMV BLD AUTO: 10 FL (ref 8.9–12.7)
PO2 BLDV: 43.8 MM HG (ref 35–45)
POLYCHROMASIA BLD QL SMEAR: PRESENT
POTASSIUM SERPL-SCNC: 5.7 MMOL/L (ref 3.5–5.3)
PR INTERVAL: 176 MS
PROCALCITONIN SERPL-MCNC: 0.77 NG/ML
PROT SERPL-MCNC: 6.1 G/DL (ref 6.4–8.4)
PROTHROMBIN TIME: 23.3 SECONDS (ref 12.3–15)
QRS AXIS: 23 DEGREES
QRSD INTERVAL: 126 MS
QT INTERVAL: 338 MS
QTC INTERVAL: 483 MS
RBC # BLD AUTO: 2.49 MILLION/UL (ref 3.81–5.12)
RBC MORPH BLD: PRESENT
RSV RNA RESP QL NAA+PROBE: NEGATIVE
SARS-COV-2 RNA RESP QL NAA+PROBE: NEGATIVE
SODIUM SERPL-SCNC: 139 MMOL/L (ref 135–147)
T WAVE AXIS: 26 DEGREES
VENTRICULAR RATE: 123 BPM
WBC # BLD AUTO: 14.45 THOUSAND/UL (ref 4.31–10.16)

## 2024-08-30 PROCEDURE — 96375 TX/PRO/DX INJ NEW DRUG ADDON: CPT

## 2024-08-30 PROCEDURE — 0241U HB NFCT DS VIR RESP RNA 4 TRGT: CPT | Performed by: PHYSICIAN ASSISTANT

## 2024-08-30 PROCEDURE — 80053 COMPREHEN METABOLIC PANEL: CPT | Performed by: PHYSICIAN ASSISTANT

## 2024-08-30 PROCEDURE — 85379 FIBRIN DEGRADATION QUANT: CPT

## 2024-08-30 PROCEDURE — 36415 COLL VENOUS BLD VENIPUNCTURE: CPT | Performed by: PHYSICIAN ASSISTANT

## 2024-08-30 PROCEDURE — 85610 PROTHROMBIN TIME: CPT | Performed by: PHYSICIAN ASSISTANT

## 2024-08-30 PROCEDURE — 84100 ASSAY OF PHOSPHORUS: CPT | Performed by: PHYSICIAN ASSISTANT

## 2024-08-30 PROCEDURE — 82805 BLOOD GASES W/O2 SATURATION: CPT | Performed by: PHYSICIAN ASSISTANT

## 2024-08-30 PROCEDURE — 84484 ASSAY OF TROPONIN QUANT: CPT | Performed by: PHYSICIAN ASSISTANT

## 2024-08-30 PROCEDURE — 87154 CUL TYP ID BLD PTHGN 6+ TRGT: CPT | Performed by: PHYSICIAN ASSISTANT

## 2024-08-30 PROCEDURE — 99285 EMERGENCY DEPT VISIT HI MDM: CPT | Performed by: PHYSICIAN ASSISTANT

## 2024-08-30 PROCEDURE — 87040 BLOOD CULTURE FOR BACTERIA: CPT | Performed by: PHYSICIAN ASSISTANT

## 2024-08-30 PROCEDURE — 85007 BL SMEAR W/DIFF WBC COUNT: CPT | Performed by: PHYSICIAN ASSISTANT

## 2024-08-30 PROCEDURE — 96365 THER/PROPH/DIAG IV INF INIT: CPT

## 2024-08-30 PROCEDURE — 93005 ELECTROCARDIOGRAM TRACING: CPT

## 2024-08-30 PROCEDURE — 71045 X-RAY EXAM CHEST 1 VIEW: CPT

## 2024-08-30 PROCEDURE — 99285 EMERGENCY DEPT VISIT HI MDM: CPT

## 2024-08-30 PROCEDURE — 83880 ASSAY OF NATRIURETIC PEPTIDE: CPT | Performed by: PHYSICIAN ASSISTANT

## 2024-08-30 PROCEDURE — 84145 PROCALCITONIN (PCT): CPT | Performed by: PHYSICIAN ASSISTANT

## 2024-08-30 PROCEDURE — 85027 COMPLETE CBC AUTOMATED: CPT | Performed by: PHYSICIAN ASSISTANT

## 2024-08-30 PROCEDURE — 83735 ASSAY OF MAGNESIUM: CPT | Performed by: PHYSICIAN ASSISTANT

## 2024-08-30 PROCEDURE — 94640 AIRWAY INHALATION TREATMENT: CPT

## 2024-08-30 PROCEDURE — 85730 THROMBOPLASTIN TIME PARTIAL: CPT | Performed by: PHYSICIAN ASSISTANT

## 2024-08-30 PROCEDURE — 87147 CULTURE TYPE IMMUNOLOGIC: CPT | Performed by: PHYSICIAN ASSISTANT

## 2024-08-30 RX ORDER — IPRATROPIUM BROMIDE AND ALBUTEROL SULFATE .5; 3 MG/3ML; MG/3ML
1 SOLUTION RESPIRATORY (INHALATION) ONCE
Status: COMPLETED | OUTPATIENT
Start: 2024-08-30 | End: 2024-08-30

## 2024-08-30 RX ORDER — METHYLPREDNISOLONE SODIUM SUCCINATE 125 MG/2ML
80 INJECTION, POWDER, LYOPHILIZED, FOR SOLUTION INTRAMUSCULAR; INTRAVENOUS ONCE
Status: COMPLETED | OUTPATIENT
Start: 2024-08-30 | End: 2024-08-30

## 2024-08-30 RX ORDER — HEPARIN SODIUM 5000 [USP'U]/ML
5000 INJECTION, SOLUTION INTRAVENOUS; SUBCUTANEOUS EVERY 8 HOURS SCHEDULED
Status: DISCONTINUED | OUTPATIENT
Start: 2024-08-30 | End: 2024-09-19 | Stop reason: HOSPADM

## 2024-08-30 RX ORDER — ALBUTEROL SULFATE 2.5 MG/3ML
3 SOLUTION RESPIRATORY (INHALATION) ONCE
Status: COMPLETED | OUTPATIENT
Start: 2024-08-30 | End: 2024-08-30

## 2024-08-30 RX ORDER — IPRATROPIUM BROMIDE AND ALBUTEROL SULFATE 2.5; .5 MG/3ML; MG/3ML
3 SOLUTION RESPIRATORY (INHALATION) ONCE
Status: COMPLETED | OUTPATIENT
Start: 2024-08-30 | End: 2024-08-30

## 2024-08-30 RX ADMIN — METHYLPREDNISOLONE SODIUM SUCCINATE 80 MG: 125 INJECTION, POWDER, FOR SOLUTION INTRAMUSCULAR; INTRAVENOUS at 20:23

## 2024-08-30 RX ADMIN — TRIMETHOBENZAMIDE HYDROCHLORIDE 200 MG: 100 INJECTION INTRAMUSCULAR at 22:08

## 2024-08-30 RX ADMIN — IPRATROPIUM BROMIDE AND ALBUTEROL SULFATE 3 ML: 2.5; .5 SOLUTION RESPIRATORY (INHALATION) at 20:40

## 2024-08-30 RX ADMIN — AZITHROMYCIN MONOHYDRATE 500 MG: 500 INJECTION, POWDER, LYOPHILIZED, FOR SOLUTION INTRAVENOUS at 21:08

## 2024-08-31 ENCOUNTER — APPOINTMENT (INPATIENT)
Dept: DIALYSIS | Facility: HOSPITAL | Age: 64
DRG: 286 | End: 2024-08-31
Payer: COMMERCIAL

## 2024-08-31 ENCOUNTER — APPOINTMENT (INPATIENT)
Dept: CT IMAGING | Facility: HOSPITAL | Age: 64
DRG: 286 | End: 2024-08-31
Payer: COMMERCIAL

## 2024-08-31 ENCOUNTER — APPOINTMENT (INPATIENT)
Dept: NON INVASIVE DIAGNOSTICS | Facility: HOSPITAL | Age: 64
DRG: 286 | End: 2024-08-31
Payer: COMMERCIAL

## 2024-08-31 PROBLEM — R53.1 GENERALIZED WEAKNESS: Status: ACTIVE | Noted: 2024-08-31

## 2024-08-31 PROBLEM — E87.5 HYPERKALEMIA: Status: ACTIVE | Noted: 2024-08-31

## 2024-08-31 PROBLEM — J45.41 MODERATE PERSISTENT ASTHMA WITH ACUTE EXACERBATION: Status: ACTIVE | Noted: 2023-02-23

## 2024-08-31 PROBLEM — R74.01 ELEVATED TRANSAMINASE LEVEL: Status: ACTIVE | Noted: 2024-08-31

## 2024-08-31 PROBLEM — Z99.2 DEPENDENCE ON RENAL DIALYSIS (HCC): Status: ACTIVE | Noted: 2024-08-31

## 2024-08-31 PROBLEM — R65.10 SIRS (SYSTEMIC INFLAMMATORY RESPONSE SYNDROME) (HCC): Status: ACTIVE | Noted: 2024-08-31

## 2024-08-31 LAB
2HR DELTA HS TROPONIN: 13 NG/L
4HR DELTA HS TROPONIN: 13 NG/L
ABO GROUP BLD: NORMAL
ALBUMIN SERPL BCG-MCNC: 3.7 G/DL (ref 3.5–5)
ALP SERPL-CCNC: 100 U/L (ref 34–104)
ALT SERPL W P-5'-P-CCNC: 59 U/L (ref 7–52)
ANION GAP SERPL CALCULATED.3IONS-SCNC: 11 MMOL/L (ref 4–13)
ANISOCYTOSIS BLD QL SMEAR: PRESENT
AST SERPL W P-5'-P-CCNC: 57 U/L (ref 13–39)
BASOPHILS # BLD MANUAL: 0 THOUSAND/UL (ref 0–0.1)
BASOPHILS NFR MAR MANUAL: 0 % (ref 0–1)
BILIRUB SERPL-MCNC: 0.41 MG/DL (ref 0.2–1)
BLD GP AB SCN SERPL QL: NEGATIVE
BUN SERPL-MCNC: 51 MG/DL (ref 5–25)
CALCIUM SERPL-MCNC: 9.6 MG/DL (ref 8.4–10.2)
CARDIAC TROPONIN I PNL SERPL HS: 225 NG/L
CARDIAC TROPONIN I PNL SERPL HS: 225 NG/L
CARDIAC TROPONIN I PNL SERPL HS: 250 NG/L (ref 8–18)
CHLORIDE SERPL-SCNC: 102 MMOL/L (ref 96–108)
CO2 SERPL-SCNC: 25 MMOL/L (ref 21–32)
CREAT SERPL-MCNC: 8.68 MG/DL (ref 0.6–1.3)
D DIMER PPP FEU-MCNC: 2.18 UG/ML FEU
EOSINOPHIL # BLD MANUAL: 0 THOUSAND/UL (ref 0–0.4)
EOSINOPHIL NFR BLD MANUAL: 0 % (ref 0–6)
ERYTHROCYTE [DISTWIDTH] IN BLOOD BY AUTOMATED COUNT: 17.2 % (ref 11.6–15.1)
GFR SERPL CREATININE-BSD FRML MDRD: 4 ML/MIN/1.73SQ M
GLUCOSE SERPL-MCNC: 153 MG/DL (ref 65–140)
HCT VFR BLD AUTO: 22.1 % (ref 34.8–46.1)
HCT VFR BLD AUTO: 22.2 % (ref 34.8–46.1)
HCT VFR BLD AUTO: 25.4 % (ref 34.8–46.1)
HGB BLD-MCNC: 6.7 G/DL (ref 11.5–15.4)
HGB BLD-MCNC: 6.9 G/DL (ref 11.5–15.4)
HGB BLD-MCNC: 7.9 G/DL (ref 11.5–15.4)
LYMPHOCYTES # BLD AUTO: 0.43 THOUSAND/UL (ref 0.6–4.47)
LYMPHOCYTES # BLD AUTO: 4 % (ref 14–44)
MAGNESIUM SERPL-MCNC: 2.3 MG/DL (ref 1.9–2.7)
MCH RBC QN AUTO: 28 PG (ref 26.8–34.3)
MCHC RBC AUTO-ENTMCNC: 30.3 G/DL (ref 31.4–37.4)
MCV RBC AUTO: 93 FL (ref 82–98)
MECA+MECC ISLT/SPM QL: DETECTED
MONOCYTES # BLD AUTO: 0.43 THOUSAND/UL (ref 0–1.22)
MONOCYTES NFR BLD: 4 % (ref 4–12)
NEUTROPHILS # BLD MANUAL: 9.79 THOUSAND/UL (ref 1.85–7.62)
NEUTS BAND NFR BLD MANUAL: 3 % (ref 0–8)
NEUTS SEG NFR BLD AUTO: 89 % (ref 43–75)
OVALOCYTES BLD QL SMEAR: PRESENT
PHOSPHATE SERPL-MCNC: 5.8 MG/DL (ref 2.3–4.1)
PLATELET # BLD AUTO: 245 THOUSANDS/UL (ref 149–390)
PLATELET BLD QL SMEAR: ADEQUATE
PMV BLD AUTO: 10.4 FL (ref 8.9–12.7)
POLYCHROMASIA BLD QL SMEAR: PRESENT
POTASSIUM SERPL-SCNC: 5.6 MMOL/L (ref 3.5–5.3)
POTASSIUM SERPL-SCNC: 5.7 MMOL/L (ref 3.5–5.3)
PROCALCITONIN SERPL-MCNC: 1 NG/ML
PROT SERPL-MCNC: 6 G/DL (ref 6.4–8.4)
RBC # BLD AUTO: 2.39 MILLION/UL (ref 3.81–5.12)
RBC MORPH BLD: PRESENT
RH BLD: POSITIVE
S EPIDERMIDIS DNA BLD POS QL NAA+NON-PRB: DETECTED
SODIUM SERPL-SCNC: 138 MMOL/L (ref 135–147)
SPECIMEN EXPIRATION DATE: NORMAL
WBC # BLD AUTO: 10.64 THOUSAND/UL (ref 4.31–10.16)

## 2024-08-31 PROCEDURE — 30233N1 TRANSFUSION OF NONAUTOLOGOUS RED BLOOD CELLS INTO PERIPHERAL VEIN, PERCUTANEOUS APPROACH: ICD-10-PCS | Performed by: INTERNAL MEDICINE

## 2024-08-31 PROCEDURE — 85018 HEMOGLOBIN: CPT | Performed by: STUDENT IN AN ORGANIZED HEALTH CARE EDUCATION/TRAINING PROGRAM

## 2024-08-31 PROCEDURE — 5A1D70Z PERFORMANCE OF URINARY FILTRATION, INTERMITTENT, LESS THAN 6 HOURS PER DAY: ICD-10-PCS | Performed by: INTERNAL MEDICINE

## 2024-08-31 PROCEDURE — 86900 BLOOD TYPING SEROLOGIC ABO: CPT | Performed by: STUDENT IN AN ORGANIZED HEALTH CARE EDUCATION/TRAINING PROGRAM

## 2024-08-31 PROCEDURE — 85018 HEMOGLOBIN: CPT

## 2024-08-31 PROCEDURE — 84132 ASSAY OF SERUM POTASSIUM: CPT

## 2024-08-31 PROCEDURE — 71275 CT ANGIOGRAPHY CHEST: CPT

## 2024-08-31 PROCEDURE — 84484 ASSAY OF TROPONIN QUANT: CPT | Performed by: STUDENT IN AN ORGANIZED HEALTH CARE EDUCATION/TRAINING PROGRAM

## 2024-08-31 PROCEDURE — 84100 ASSAY OF PHOSPHORUS: CPT

## 2024-08-31 PROCEDURE — 93306 TTE W/DOPPLER COMPLETE: CPT | Performed by: INTERNAL MEDICINE

## 2024-08-31 PROCEDURE — 84484 ASSAY OF TROPONIN QUANT: CPT

## 2024-08-31 PROCEDURE — 93306 TTE W/DOPPLER COMPLETE: CPT

## 2024-08-31 PROCEDURE — 85027 COMPLETE CBC AUTOMATED: CPT

## 2024-08-31 PROCEDURE — 87081 CULTURE SCREEN ONLY: CPT | Performed by: NURSE PRACTITIONER

## 2024-08-31 PROCEDURE — 80053 COMPREHEN METABOLIC PANEL: CPT

## 2024-08-31 PROCEDURE — 94664 DEMO&/EVAL PT USE INHALER: CPT

## 2024-08-31 PROCEDURE — 94760 N-INVAS EAR/PLS OXIMETRY 1: CPT

## 2024-08-31 PROCEDURE — 86850 RBC ANTIBODY SCREEN: CPT | Performed by: STUDENT IN AN ORGANIZED HEALTH CARE EDUCATION/TRAINING PROGRAM

## 2024-08-31 PROCEDURE — 84145 PROCALCITONIN (PCT): CPT

## 2024-08-31 PROCEDURE — 86901 BLOOD TYPING SEROLOGIC RH(D): CPT | Performed by: STUDENT IN AN ORGANIZED HEALTH CARE EDUCATION/TRAINING PROGRAM

## 2024-08-31 PROCEDURE — 99223 1ST HOSP IP/OBS HIGH 75: CPT | Performed by: INTERNAL MEDICINE

## 2024-08-31 PROCEDURE — 86923 COMPATIBILITY TEST ELECTRIC: CPT

## 2024-08-31 PROCEDURE — 94640 AIRWAY INHALATION TREATMENT: CPT

## 2024-08-31 PROCEDURE — 85007 BL SMEAR W/DIFF WBC COUNT: CPT

## 2024-08-31 PROCEDURE — 85014 HEMATOCRIT: CPT

## 2024-08-31 PROCEDURE — P9040 RBC LEUKOREDUCED IRRADIATED: HCPCS

## 2024-08-31 PROCEDURE — 83735 ASSAY OF MAGNESIUM: CPT

## 2024-08-31 PROCEDURE — 85014 HEMATOCRIT: CPT | Performed by: STUDENT IN AN ORGANIZED HEALTH CARE EDUCATION/TRAINING PROGRAM

## 2024-08-31 RX ORDER — TRAZODONE HYDROCHLORIDE 100 MG/1
200 TABLET ORAL
Status: DISCONTINUED | OUTPATIENT
Start: 2024-08-31 | End: 2024-09-19 | Stop reason: HOSPADM

## 2024-08-31 RX ORDER — ALBUTEROL SULFATE 0.83 MG/ML
2.5 SOLUTION RESPIRATORY (INHALATION)
Status: DISCONTINUED | OUTPATIENT
Start: 2024-08-31 | End: 2024-09-12

## 2024-08-31 RX ORDER — ACETAMINOPHEN 325 MG/1
650 TABLET ORAL EVERY 6 HOURS PRN
Status: DISCONTINUED | OUTPATIENT
Start: 2024-08-31 | End: 2024-09-19 | Stop reason: HOSPADM

## 2024-08-31 RX ORDER — ALBUTEROL SULFATE 0.83 MG/ML
2.5 SOLUTION RESPIRATORY (INHALATION)
Status: DISCONTINUED | OUTPATIENT
Start: 2024-08-31 | End: 2024-08-31

## 2024-08-31 RX ORDER — BENZONATATE 100 MG/1
100 CAPSULE ORAL 3 TIMES DAILY
Status: DISCONTINUED | OUTPATIENT
Start: 2024-08-31 | End: 2024-08-31

## 2024-08-31 RX ORDER — ATORVASTATIN CALCIUM 20 MG/1
20 TABLET, FILM COATED ORAL DAILY
Status: DISCONTINUED | OUTPATIENT
Start: 2024-08-31 | End: 2024-09-19 | Stop reason: HOSPADM

## 2024-08-31 RX ORDER — NIFEDIPINE 30 MG/1
60 TABLET, EXTENDED RELEASE ORAL DAILY
Status: DISCONTINUED | OUTPATIENT
Start: 2024-08-31 | End: 2024-09-04

## 2024-08-31 RX ORDER — ATOVAQUONE 750 MG/5ML
1500 SUSPENSION ORAL DAILY
Status: DISPENSED | OUTPATIENT
Start: 2024-08-31 | End: 2024-09-12

## 2024-08-31 RX ORDER — LAMOTRIGINE 100 MG/1
100 TABLET ORAL
Status: DISCONTINUED | OUTPATIENT
Start: 2024-08-31 | End: 2024-09-19 | Stop reason: HOSPADM

## 2024-08-31 RX ORDER — PANTOPRAZOLE SODIUM 40 MG/1
40 TABLET, DELAYED RELEASE ORAL
Status: DISCONTINUED | OUTPATIENT
Start: 2024-08-31 | End: 2024-09-19 | Stop reason: HOSPADM

## 2024-08-31 RX ORDER — METHYLPREDNISOLONE SODIUM SUCCINATE 40 MG/ML
40 INJECTION, POWDER, LYOPHILIZED, FOR SOLUTION INTRAMUSCULAR; INTRAVENOUS DAILY
Status: DISCONTINUED | OUTPATIENT
Start: 2024-08-31 | End: 2024-08-31

## 2024-08-31 RX ORDER — FLUTICASONE FUROATE AND VILANTEROL 200; 25 UG/1; UG/1
1 POWDER RESPIRATORY (INHALATION) DAILY
Status: DISCONTINUED | OUTPATIENT
Start: 2024-08-31 | End: 2024-08-31

## 2024-08-31 RX ORDER — VANCOMYCIN HYDROCHLORIDE 750 MG/150ML
10 INJECTION, SOLUTION INTRAVENOUS 3 TIMES WEEKLY
Status: DISCONTINUED | OUTPATIENT
Start: 2024-09-03 | End: 2024-09-01

## 2024-08-31 RX ORDER — ALBUTEROL SULFATE 90 UG/1
2 INHALANT RESPIRATORY (INHALATION) EVERY 4 HOURS PRN
Status: DISCONTINUED | OUTPATIENT
Start: 2024-08-31 | End: 2024-08-31

## 2024-08-31 RX ORDER — PREDNISONE 20 MG/1
40 TABLET ORAL DAILY
Status: DISCONTINUED | OUTPATIENT
Start: 2024-08-31 | End: 2024-08-31

## 2024-08-31 RX ORDER — FLUTICASONE FUROATE AND VILANTEROL 200; 25 UG/1; UG/1
1 POWDER RESPIRATORY (INHALATION) DAILY
Status: DISCONTINUED | OUTPATIENT
Start: 2024-09-01 | End: 2024-09-19 | Stop reason: HOSPADM

## 2024-08-31 RX ORDER — ALBUTEROL SULFATE 90 UG/1
2 INHALANT RESPIRATORY (INHALATION) EVERY 4 HOURS PRN
Status: DISCONTINUED | OUTPATIENT
Start: 2024-08-31 | End: 2024-09-19 | Stop reason: HOSPADM

## 2024-08-31 RX ORDER — MONTELUKAST SODIUM 10 MG/1
10 TABLET ORAL DAILY
Status: DISCONTINUED | OUTPATIENT
Start: 2024-08-31 | End: 2024-09-19 | Stop reason: HOSPADM

## 2024-08-31 RX ORDER — GUAIFENESIN/DEXTROMETHORPHAN 100-10MG/5
10 SYRUP ORAL EVERY 4 HOURS PRN
Status: DISCONTINUED | OUTPATIENT
Start: 2024-08-31 | End: 2024-09-19 | Stop reason: HOSPADM

## 2024-08-31 RX ORDER — SEVELAMER HYDROCHLORIDE 800 MG/1
1600 TABLET, FILM COATED ORAL
Status: DISCONTINUED | OUTPATIENT
Start: 2024-08-31 | End: 2024-09-19 | Stop reason: HOSPADM

## 2024-08-31 RX ORDER — IRON POLYSACCHARIDE COMPLEX 150 MG
150 CAPSULE ORAL DAILY
Status: DISCONTINUED | OUTPATIENT
Start: 2024-08-31 | End: 2024-09-19 | Stop reason: HOSPADM

## 2024-08-31 RX ORDER — LIDOCAINE 50 MG/G
1 PATCH TOPICAL DAILY
Status: DISCONTINUED | OUTPATIENT
Start: 2024-08-31 | End: 2024-09-19 | Stop reason: HOSPADM

## 2024-08-31 RX ORDER — CYCLOPHOSPHAMIDE 50 MG/1
100 CAPSULE ORAL DAILY
Status: DISCONTINUED | OUTPATIENT
Start: 2024-08-31 | End: 2024-09-19 | Stop reason: HOSPADM

## 2024-08-31 RX ORDER — FLUTICASONE FUROATE AND VILANTEROL 200; 25 UG/1; UG/1
1 POWDER RESPIRATORY (INHALATION) 2 TIMES DAILY
Status: DISCONTINUED | OUTPATIENT
Start: 2024-09-01 | End: 2024-08-31

## 2024-08-31 RX ORDER — HYDROCODONE POLISTIREX AND CHLORPHENIRAMINE POLISTIREX 10; 8 MG/5ML; MG/5ML
5 SUSPENSION, EXTENDED RELEASE ORAL ONCE
Status: COMPLETED | OUTPATIENT
Start: 2024-08-31 | End: 2024-08-31

## 2024-08-31 RX ADMIN — SODIUM ZIRCONIUM CYCLOSILICATE 10 G: 10 POWDER, FOR SUSPENSION ORAL at 09:11

## 2024-08-31 RX ADMIN — ALBUTEROL SULFATE 2 PUFF: 90 AEROSOL, METERED RESPIRATORY (INHALATION) at 20:39

## 2024-08-31 RX ADMIN — TRAZODONE HYDROCHLORIDE 200 MG: 100 TABLET ORAL at 00:12

## 2024-08-31 RX ADMIN — ALBUTEROL SULFATE 2.5 MG: 2.5 SOLUTION RESPIRATORY (INHALATION) at 19:40

## 2024-08-31 RX ADMIN — VENLAFAXINE HYDROCHLORIDE 225 MG: 150 CAPSULE, EXTENDED RELEASE ORAL at 09:08

## 2024-08-31 RX ADMIN — HEPARIN SODIUM 5000 UNITS: 5000 INJECTION INTRAVENOUS; SUBCUTANEOUS at 00:45

## 2024-08-31 RX ADMIN — SEVELAMER HYDROCHLORIDE 1600 MG: 800 TABLET ORAL at 13:49

## 2024-08-31 RX ADMIN — GUAIFENESIN AND DEXTROMETHORPHAN 10 ML: 100; 10 SYRUP ORAL at 00:45

## 2024-08-31 RX ADMIN — VANCOMYCIN HYDROCHLORIDE 1750 MG: 10 INJECTION, POWDER, LYOPHILIZED, FOR SOLUTION INTRAVENOUS at 21:17

## 2024-08-31 RX ADMIN — NIFEDIPINE 60 MG: 30 TABLET, EXTENDED RELEASE ORAL at 09:08

## 2024-08-31 RX ADMIN — IOHEXOL 80 ML: 350 INJECTION, SOLUTION INTRAVENOUS at 01:54

## 2024-08-31 RX ADMIN — CYCLOPHOSPHAMIDE 100 MG: 50 CAPSULE ORAL at 09:10

## 2024-08-31 RX ADMIN — MONTELUKAST 10 MG: 10 TABLET, FILM COATED ORAL at 09:08

## 2024-08-31 RX ADMIN — HYDROCODONE POLISTIREX AND CHLORPHENIRAMINE POLISTIREX 5 ML: 10; 8 SUSPENSION, EXTENDED RELEASE ORAL at 02:05

## 2024-08-31 RX ADMIN — ALBUTEROL SULFATE 2.5 MG: 2.5 SOLUTION RESPIRATORY (INHALATION) at 00:28

## 2024-08-31 RX ADMIN — SEVELAMER HYDROCHLORIDE 1600 MG: 800 TABLET ORAL at 20:00

## 2024-08-31 RX ADMIN — LIDOCAINE 5% 1 PATCH: 700 PATCH TOPICAL at 14:32

## 2024-08-31 RX ADMIN — METHYLPREDNISOLONE SODIUM SUCCINATE 40 MG: 40 INJECTION, POWDER, FOR SOLUTION INTRAMUSCULAR; INTRAVENOUS at 09:08

## 2024-08-31 RX ADMIN — ATOVAQUONE 1500 MG: 750 SUSPENSION ORAL at 09:10

## 2024-08-31 RX ADMIN — IPRATROPIUM BROMIDE 0.5 MG: 0.5 SOLUTION RESPIRATORY (INHALATION) at 00:28

## 2024-08-31 RX ADMIN — IPRATROPIUM BROMIDE 0.5 MG: 0.5 SOLUTION RESPIRATORY (INHALATION) at 07:24

## 2024-08-31 RX ADMIN — POLYSACCHARIDE-IRON COMPLEX 150 MG: 150 CAPSULE ORAL at 09:08

## 2024-08-31 RX ADMIN — TRAZODONE HYDROCHLORIDE 200 MG: 100 TABLET ORAL at 21:12

## 2024-08-31 RX ADMIN — IPRATROPIUM BROMIDE 0.5 MG: 0.5 SOLUTION RESPIRATORY (INHALATION) at 19:40

## 2024-08-31 RX ADMIN — ALBUTEROL SULFATE 2.5 MG: 2.5 SOLUTION RESPIRATORY (INHALATION) at 07:24

## 2024-08-31 RX ADMIN — SEVELAMER HYDROCHLORIDE 1600 MG: 800 TABLET ORAL at 09:08

## 2024-08-31 RX ADMIN — HEPARIN SODIUM 5000 UNITS: 5000 INJECTION INTRAVENOUS; SUBCUTANEOUS at 21:07

## 2024-08-31 RX ADMIN — GUAIFENESIN AND DEXTROMETHORPHAN 10 ML: 100; 10 SYRUP ORAL at 13:49

## 2024-08-31 RX ADMIN — EPOETIN ALFA 6000 UNITS: 3000 SOLUTION INTRAVENOUS; SUBCUTANEOUS at 18:55

## 2024-08-31 RX ADMIN — LAMOTRIGINE 100 MG: 100 TABLET ORAL at 00:12

## 2024-08-31 RX ADMIN — FLUTICASONE FUROATE AND VILANTEROL TRIFENATATE 1 PUFF: 200; 25 POWDER RESPIRATORY (INHALATION) at 09:09

## 2024-08-31 RX ADMIN — ATORVASTATIN CALCIUM 20 MG: 20 TABLET, FILM COATED ORAL at 09:09

## 2024-08-31 RX ADMIN — PANTOPRAZOLE SODIUM 40 MG: 40 TABLET, DELAYED RELEASE ORAL at 06:20

## 2024-08-31 RX ADMIN — LAMOTRIGINE 100 MG: 100 TABLET ORAL at 21:12

## 2024-08-31 NOTE — H&P
Pending sale to [facility]  H&P  Name: Ngozi Beard 64 y.o. female I MRN: [id number]  Unit/Bed#: S -01 I Date of Admission: 8/30/2024   Date of Service: 8/31/2024 I Hospital Day: 1      Assessment & Plan   * Moderate persistent asthma with acute exacerbation  Assessment & Plan  Patient presented with 1 week history of acutely worsening dyspnea with intermittent chest tightness. She reports persistent cough for 6 months that is intermittently productive of yellow sputum. Denies fever, chills, worsening of cough. She does not require supplemental oxygen at baseline  Reports noncompliance with inhalers for several days prior to admission.   S/p Solu-medrol 80 mg and albuterol-ipratropium DuoNebs by EMS/ED.  On initial examination, tachycardic, dyspneic, and using accessory muscles. Mild end-expiratory wheezing. Requiring 2-3 L/min O2.  Possible underlying COPD, but no formal PFTs in chart. These were ordered by outpatient Pulmonology, but not completed to date.     Plan  Continue supplemental oxygen prn, titrate to maintain SpO2 > 92%  Continue albuterol-ipratropium nebs  Will continue IV Solu-Medrol 40 mg daily    Shortness of breath  Assessment & Plan  Patient presented with 1 week history of acutely worsening dyspnea with intermittent chronic chest tightness.  Tachycardia, tachypnea, dyspnea, and accessory muscle use noted on examination. Does not appear significantly volume overloaded on exam  Wells score - 4.5 (moderate)  Elevated D dimer, but inconclusive given renal failure  Elevated BNP 3000s, but in the setting of renal failure  Likely secondary to asthma exacerbation vs symptomatic anemia. 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 S/p  PLEX.  Patient is anuric and dialysis-dependent  Tues/Thurs/Sat schedule  Access: Right IJ PermCath  Previously initiated on prednisone taper per Nephrology    Plan  Nephrology consulted, appreciate recommendations  Continue PTA cyclophosphamide  Continue PTA sevalemer  Continue PTA atovaquone for PJP prophylaxis  Hold PTA prednisone taper at this time. See plan for asthma exacerbation.    Generalized weakness  Assessment & Plan  POA: Patient endorsed several days of generalized weakness and difficulty/inability getting out of bed due to this. She expressed concerns about taking care of herself at home    Plan  PT/OT evaluation apprecaited    SIRS (systemic inflammatory response syndrome) (HCC)  Assessment & Plan  On admission, met SIRS criteria with leukocytosis and tachycardia  Patient endorses chronic cough for 6 months, intermittently productive of yellow sputum. Remains afebrile.  Procalcitonin 0.77 (in the setting of renal disease)  COVID/Flu/RSV negative  CXR - mildly pulmonary vascular congestion. No evidence of consolidation  No suspected source of infection    Plan  Monitor off antibiotics  Monitor WBC and fever curve  Follow blood cultures    Hyperkalemia  Assessment & Plan  Recent Labs     08/30/24 2025 08/31/24  0033   K 5.7* 5.7*       Mildly elevated on admission  Previously on scheduled Lokelma daily, but this was stopped during a previous admission    Plan  Monitor on telemetry  Anticipate improvement with dialysis  Start Lokelma    Anemia  Assessment & Plan  Recent Labs     08/30/24 2025   HGB 7.1*      Baseline Hgb 7-8  Etiology: component of iron deficiency and renal disease  Has received Epogen during prior hospitalization    Plan  Monitor Hgb, transfuse for < 7  Continue PTA Ferrex    Elevated transaminase level  Assessment & Plan  Recent Labs     08/30/24 2025   AST 65*   ALT 60*   INR 1.99*       INR 1.99  Unclear etiology. Possibly secondary to atovaquone use  Asymptomatic    Plan  Trend  CMP  Consider RUQ US and acute hepatitis panel    Dyslipidemia  Assessment & Plan  Continue PTA Lipitor    Primary hypertension  Assessment & Plan  Continue PTA nifedipine    Bipolar 1 disorder (HCC)  Assessment & Plan  Continue PTA Lamictal, venlafaxine (150 mg and 75 mg daily in the morning), and trazodone         VTE Pharmacologic Prophylaxis: VTE Score: 7 High Risk (Score >/= 5) - Pharmacological DVT Prophylaxis Ordered: heparin. Sequential Compression Devices Ordered.  Code Status: Level 1 - Full Code   Discussion with family: Patient declined call to .     Anticipated Length of Stay: Patient will be admitted on an inpatient basis with an anticipated length of stay of greater than 2 midnights secondary to asthma exacerbation, generalized weakness.    Chief Complaint: shortness of breath    History of Present Illness:  Ngozi Beard is a 64 y.o. female with a PMH of anti-GBM disease, dialysis dependence, HTN, anemia, and bipolar disorder who presented from home on 8/30 due to 1 week history of acutely worsening shortness of breath especially with exertion. She reports intermittent chest tightness and a chronic cough for the past 6 months that is intermittently productive of yellow sputum. Patient also endorses generalized weakness and decreased appetite for the past 3 days. She had difficulty getting out of bed in this time and was afraid of falling. As a result of this, she admits to decreased food/water intake and not having taken her medications. Patient states that she may have used her inhalers once or twice since her symptoms worsened. She believes all of her symptoms are related to her asthma. In the ED, patient reported nausea and vomited x1. She denies fever, chills, diaphoresis, chest pain, abdominal pain, changes to bowel movements, recent falls, or sick contacts. Patient is a former smoker (ages 16-30, 1 ppd), but denies alcohol or illicit drug use. She is anuric and  dialysis-dependent; she believes she underwent dialysis this past Tuesday, but cannot remember if she went on Thursday. In the ED, patient was given Solu-Medol 80 mg x 1, DuoNeb, and azithromycin. Admitted to [facility] for further management of asthma exacerbation.    Review of Systems:  Review of Systems   Constitutional:  Positive for appetite change and fatigue. Negative for chills and fever.   HENT:  Negative for ear pain and sore throat.    Eyes:  Negative for pain and visual disturbance.   Respiratory:  Positive for cough, chest tightness and shortness of breath.    Cardiovascular:  Negative for chest pain and palpitations.   Gastrointestinal:  Negative for abdominal pain and vomiting.   Genitourinary:  Negative for dysuria and hematuria.   Musculoskeletal:  Negative for arthralgias and back pain.   Skin:  Negative for color change and rash.   Neurological:  Negative for seizures and syncope.   All other systems reviewed and are negative.      Past Medical and Surgical History:   Past Medical History:   Diagnosis Date    Asthma     Chronic pain     Hypertension     Renal disorder     Sepsis (HCC) 07/10/2024       Past Surgical History:   Procedure Laterality Date    BACK SURGERY      COLONOSCOPY      IR BIOPSY KIDNEY RANDOM  7/17/2024    IR TEMPORARY DIALYSIS CATHETER PLACEMENT  7/15/2024    IR TUNNELED DIALYSIS CATHETER CHECK/CHANGE/REPOSITION/ANGIOPLASTY  8/12/2024    IR TUNNELED DIALYSIS CATHETER PLACEMENT  7/22/2024    TUBAL LIGATION         Meds/Allergies:  Prior to Admission medications    Medication Sig Start Date End Date Taking? Authorizing Provider   Advair -21 MCG/ACT inhaler Inhale 2 puffs 2 (two) times a day Rinse mouth after use. 8/16/24  Yes Radha Bhatt MD   albuterol (PROVENTIL HFA,VENTOLIN HFA) 90 mcg/act inhaler INHALE 2 PUFFS BY MOUTH EVERY 4 HOURS AS NEEDED FOR SHORTNESS OF BREATH 11/24/23  Yes Meenakshi Balbuena MD   atorvastatin (LIPITOR) 20 mg tablet TAKE 1 TABLET BY MOUTH EVERY DAY  4/8/24  Yes Meenakshi Balbuena MD   atovaquone (MEPRON) 750 mg/5 mL suspension Take 10 mL (1,500 mg total) by mouth daily Do not start before August 5, 2024. 8/5/24 9/4/24 Yes Shital Keith MD   calcium carbonate (OYSTER SHELL,OSCAL) 500 mg Take 1 tablet by mouth daily with breakfast 8/4/24  Yes Shital Keith MD   cyclophosphamide (CYTOXAN) 50 mg capsule Take 2 capsules (100 mg total) by mouth daily 8/4/24 9/3/24 Yes Shital Keith MD   lamoTRIgine (LaMICtal) 100 mg tablet Take 100 mg by mouth daily at bedtime 4/9/24  Yes Starla Slaughter MD   montelukast (SINGULAIR) 10 mg tablet TAKE 1 TABLET BY MOUTH EVERY DAY 12/26/23  Yes Meenakshi Balbuena MD   NIFEdipine (PROCARDIA XL) 30 mg 24 hr tablet Take 2 tablets (60 mg total) by mouth daily 8/4/24  Yes Shital Keith MD   ondansetron (ZOFRAN) 4 mg tablet Take 1 tablet (4 mg total) by mouth every 8 (eight) hours as needed for nausea or vomiting 8/13/24  Yes Xavier Medina MD   pantoprazole (PROTONIX) 40 mg tablet Take 1 tablet (40 mg total) by mouth daily before breakfast 8/4/24  Yes Shital Keith MD   predniSONE 2.5 mg tablet Take 12 tablets (30 mg total) by mouth daily for 5 days, THEN 10 tablets (25 mg total) daily for 14 days, THEN 8 tablets (20 mg total) daily for 14 days, THEN 6 tablets (15 mg total) daily for 14 days, THEN 5 tablets (12.5 mg total) daily for 14 days, THEN 4 tablets (10 mg total) daily for 14 days, THEN 3 tablets (7.5 mg total) daily for 14 days, THEN 2 tablets (5 mg total) daily. Do not start before August 5, 2024. 8/5/24 12/2/24 Yes Shital Keith MD   senna (SENOKOT) 8.6 mg Take 2 tablets (17.2 mg total) by mouth 2 (two) times a day Use as needed constipation 8/4/24  Yes Shital Keith MD   sevelamer (RENAGEL) 800 mg tablet Take 2 tablets (1,600 mg total) by mouth 3 (three) times a day with meals 8/23/24  Yes Joselyn Reyes Bahamonde, MD   traZODone (DESYREL) 100  mg tablet Take 200 mg by mouth daily at bedtime   Yes Historical Provider, MD   venlafaxine (EFFEXOR-XR) 150 mg 24 hr capsule TAKE 1 CAPSULE BY MOUTH DAILY WITH BREAKFAST. 23  Yes Meenakshi Balbuena MD   venlafaxine (EFFEXOR-XR) 75 mg 24 hr capsule Take 75 mg by mouth daily Pt states she takes 150mg and 75mg effexor. Daily. For a totoal of 225mg   Yes Historical Provider, MD   iron polysaccharides (FERREX) 150 mg capsule Take 1 capsule (150 mg total) by mouth daily 24   Shital Keith MD     I have reviewed home medications with patient personally.    Allergies:   Allergies   Allergen Reactions    Penicillins Hives     Reaction Date: 2005; Annotation - 2012: meena mcdonald    Amoxicillin Rash    Aspirin Rash    Bactrim [Sulfamethoxazole-Trimethoprim] Rash    Ibuprofen Rash    Morphine Rash    Oxycodone Rash    Valacyclovir Rash       Social History:  Marital Status: /Civil Union   Occupation: Retired  Patient Pre-hospital Living Situation: Apartment  Patient Pre-hospital Level of Mobility: walks with cane  Patient Pre-hospital Diet Restrictions: None  Substance Use History:   Social History     Substance and Sexual Activity   Alcohol Use No    Comment: hX:Occas.      Social History     Tobacco Use   Smoking Status Former    Current packs/day: 0.00    Average packs/day: 1 pack/day for 15.0 years (15.0 ttl pk-yrs)    Types: Cigarettes    Start date:     Quit date:     Years since quittin.6   Smokeless Tobacco Never     Social History     Substance and Sexual Activity   Drug Use No       Family History:  Family History   Problem Relation Age of Onset    Diabetes Mother     Hypertension Mother     Lung cancer Mother     Colon cancer Mother     Colon cancer Father     Hypertension Sister     Multiple sclerosis Sister     Hypothyroidism Maternal Aunt        Physical Exam:     Vitals:   Blood Pressure: 138/91 (24 0103)  Pulse: (!) 106 (24 0103)  Temperature: 98.8 °F (37.1  °C) (08/31/24 0103)  Temp Source: Axillary (08/30/24 2224)  Respirations: 20 (08/31/24 0103)  SpO2: 90 % (08/31/24 0103)    Physical Exam  Vitals and nursing note reviewed.   Constitutional:       General: She is not in acute distress.     Appearance: She is well-developed. She is obese.   HENT:      Head: Normocephalic and atraumatic.   Eyes:      Conjunctiva/sclera: Conjunctivae normal.      Pupils: Pupils are equal, round, and reactive to light.   Cardiovascular:      Rate and Rhythm: Regular rhythm. Tachycardia present.      Heart sounds: No murmur heard.  Pulmonary:      Effort: Pulmonary effort is normal. No respiratory distress.      Breath sounds: Wheezing present.   Abdominal:      Palpations: Abdomen is soft.      Tenderness: There is no abdominal tenderness.   Musculoskeletal:         General: No swelling.      Cervical back: Neck supple.      Right lower leg: No edema.      Left lower leg: No edema.   Skin:     General: Skin is warm and dry.   Neurological:      Mental Status: She is alert and oriented to person, place, and time. Mental status is at baseline.      Motor: No weakness.   Psychiatric:         Mood and Affect: Mood normal.        Additional Data:     Lab Results:  Results from last 7 days   Lab Units 08/31/24 0443 08/30/24 2025   WBC Thousand/uL 10.64* 14.45*   HEMOGLOBIN g/dL 6.7* 7.1*   HEMATOCRIT % 22.1* 23.0*   PLATELETS Thousands/uL 245 258   LYMPHO PCT %  --  11*   MONO PCT %  --  4   EOS PCT %  --  0     Results from last 7 days   Lab Units 08/31/24 0443   SODIUM mmol/L 138   POTASSIUM mmol/L 5.6*   CHLORIDE mmol/L 102   CO2 mmol/L 25   BUN mg/dL 51*   CREATININE mg/dL 8.68*   ANION GAP mmol/L 11   CALCIUM mg/dL 9.6   ALBUMIN g/dL 3.7   TOTAL BILIRUBIN mg/dL 0.41   ALK PHOS U/L 100   ALT U/L 59*   AST U/L 57*   GLUCOSE RANDOM mg/dL 153*     Results from last 7 days   Lab Units 08/30/24 2025   INR  1.99*         Lab Results   Component Value Date    HGBA1C 6.6 (H) 07/09/2024     HGBA1C 5.9 11/26/2016    HGBA1C 6.0 (H) 01/18/2016     Results from last 7 days   Lab Units 08/30/24 2025   PROCALCITONIN ng/ml 0.77*       Lines/Drains:  Invasive Devices       Peripheral Intravenous Line  Duration             Peripheral IV 08/30/24 Right Antecubital <1 day              Hemodialysis Catheter  Duration             HD Permanent Double Catheter 18 days                        Imaging: Personally reviewed the following imaging: chest xray  XR chest 1 view portable   ED Interpretation by Alejandro Johnston PA-C (08/30 2112)   Consolidation right lower lobe suspicious for pneumonia      CTA chest pe study    (Results Pending)       EKG and Other Studies Reviewed on Admission:   EKG: Sinus Tachycardia. .    ** Please Note: This note has been constructed using a voice recognition system. **

## 2024-08-31 NOTE — ASSESSMENT & PLAN NOTE
Recent Labs     08/30/24 2025   HGB 7.1*      Baseline Hgb 7-8  Etiology: component of iron deficiency and renal disease  Has received Epogen during prior hospitalization    Plan  Monitor Hgb, transfuse for < 7  Continue PTA Ferrex

## 2024-08-31 NOTE — ASSESSMENT & PLAN NOTE
On admission, met SIRS criteria with leukocytosis and tachycardia  Patient endorses chronic cough for 6 months, intermittently productive of yellow sputum. Remains afebrile.  Procalcitonin 0.77 (in the setting of renal disease)  CXR - mildly pulmonary vascular congestion. No evidence of consolidation  No suspected source of infection    Plan  Monitor off antibiotics  Monitor WBC and fever curve

## 2024-08-31 NOTE — ASSESSMENT & PLAN NOTE
Recent Labs     08/30/24 2025 08/31/24  0033   K 5.7* 5.7*       Mildly elevated on admission  Previously on scheduled Lokelma daily, but this was stopped during a previous admission    Plan  Monitor on telemetry  Anticipate improvement with dialysis  Start Lokelma

## 2024-08-31 NOTE — ASSESSMENT & PLAN NOTE
Recent Labs     08/30/24 2025 08/31/24  0033   K 5.7* 5.7*     Mildly elevated on admission  Previously on scheduled Lokelma daily, but this was stopped during a previous admission    Plan  Monitor on telemetry  Anticipate improvement with dialysis  Start Lokelma     sick contacts

## 2024-08-31 NOTE — ASSESSMENT & PLAN NOTE
Recent Labs     08/30/24 2025   AST 65*   ALT 60*   INR 1.99*     INR 1.99  Unclear etiology. Possibly secondary to atovaquone use  Asymptomatic    Plan  Trend CMP  Consider RUQ US and acute hepatitis panel

## 2024-08-31 NOTE — ASSESSMENT & PLAN NOTE
On admission, met SIRS criteria with leukocytosis and tachycardia  Patient endorses chronic cough for 6 months, intermittently productive of yellow sputum. Remains afebrile.  Procalcitonin 0.77 (in the setting of renal disease)  COVID/Flu/RSV negative  CXR - mildly pulmonary vascular congestion. No evidence of consolidation  CT chest revealed evidence of multifocal pneumonia  White blood cell count was elevated at 19.55 on 9/1/2024  She was tachycardic at 112 bpm    Plan  Infectious disease consult placed  Monitor WBC and fever curve  Follow blood cultures

## 2024-08-31 NOTE — ASSESSMENT & PLAN NOTE
Patient presented with 1 week history of acutely worsening dyspnea with intermittent chest tightness. She reports persistent cough for 6 months that is intermittently productive of yellow sputum. Denies fever, chills, worsening of cough. She does not require supplemental oxygen at baseline  Reports noncompliance with inhalers for several days prior to admission.   S/p Solu-medrol 80 mg and albuterol-ipratropium DuoNebs by EMS/ED.  On initial examination, tachycardic, dyspneic, and using accessory muscles. Mild end-expiratory wheezing. Requiring 2-3 L/min O2.  Possible underlying COPD, but no formal PFTs in chart. These were ordered by outpatient Pulmonology, but not completed to date.     Plan  Continue supplemental oxygen prn, titrate to maintain SpO2 > 92%  Continue albuterol-ipratropium nebs  Will continue IV Solu-Medrol 40 mg daily

## 2024-08-31 NOTE — QUICK NOTE
Blood culture 1 of 2 sets positive for cocci in cluster, patient has permacath in situ for HD, will give one dose of IV vancomycin and follow-up final culture results.  Consult pharmacy for dose adjustment.

## 2024-08-31 NOTE — CONSULTS
NEPHROLOGY HOSPITAL CONSULTATION   Ngozi Beard 64 y.o. female MRN: [id number]  Unit/Bed#: S -01 Encounter: [id number]    ASSESSMENT and PLAN:  Ngozi Beard is a 64 y.o. female who was admitted to [facility] after presenting with worsening shortness of breath with exertion. A renal consultation is requested today for assistance in the management of dialysis dependent ANGELICA.    1.  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  TTS at [facility].  Access is right chest wall permacath.  Hemodialysis today.    2.  Immunosuppression.  Status post Plex for 14 days.  Most recent anti-GBM level on 07/29 still more than 8.  Currently on oral Cytoxan 100 mg daily.  She is also on prednisone taper but currently on IV methylprednisolone for asthma exacerbation.  Continue atovaquone for PJP prophylaxis.  Follow results of CT chest.    3.  Hypertension.  Home Rx: Nifedipine 60 mg daily.  Blood pressure is currently controlled.  Continue current Rx.    4.  Hyperkalemia.  Serum potassium level 5.6 this morning.  Continue Lokelma 10 g daily.  Dialysis with 2K bath today.    5.  Anemia in renal insufficiency.  She is on Mircera 60 mcg every 2 weeks as outpatient.  Hemoglobin today 6.7, she will need blood transfusion.  Start Epogen 6000 units with dialysis.    6.  Hyperphosphatemia.  Continue Renvela 2 tablet 3 times daily with meals.    7.  Acute exacerbation of asthma.  Currently on IV Solu-Medrol per primary team.  Follow results of CT chest.    Discussed with internal medicine team.  After discussion, we agreed to perform hemodialysis today per schedule and that will also help with treatment of hyperkalemia.  We also agreed with administration of PRBC and initiation of Epogen for anemia.    HISTORY OF PRESENT ILLNESS:  Requesting Physician: Todd Askew*  Reason for Consult: Dialysis dependent ANGELICA    Ngozi Beard is a 64 y.o. female who was admitted to [facility]  [place] after presenting with worsening shortness of breath with exertion. A renal consultation is requested today for assistance in the management of dialysis dependent ANGELICA.  Patient has history of anti-GBM disease, dialysis dependence, hypertension, anemia who presented on  due to 1 week history of acutely worsening shortness of breath specially with exertion.  She reported intermittent chest tightness and a chronic cough for the past 6 months that is intermittently productive of yellow sputum.  Patient also had generalized weakness and decreased appetite for the past 3 days.  She is dialysis dependent due to ANGELICA due to anti-GBM disease.    PAST MEDICAL HISTORY:  Past Medical History:   Diagnosis Date    Asthma     Chronic pain     Hypertension     Renal disorder     Sepsis (HCC) 07/10/2024       PAST SURGICAL HISTORY:  Past Surgical History:   Procedure Laterality Date    BACK SURGERY      COLONOSCOPY      IR BIOPSY KIDNEY RANDOM  2024    IR TEMPORARY DIALYSIS CATHETER PLACEMENT  7/15/2024    IR TUNNELED DIALYSIS CATHETER CHECK/CHANGE/REPOSITION/ANGIOPLASTY  2024    IR TUNNELED DIALYSIS CATHETER PLACEMENT  2024    TUBAL LIGATION         ALLERGIES:  Allergies   Allergen Reactions    Penicillins Hives     Reaction Date: 2005; Annotation - [id number]: meena rn    Amoxicillin Rash    Aspirin Rash    Bactrim [Sulfamethoxazole-Trimethoprim] Rash    Ibuprofen Rash    Morphine Rash    Oxycodone Rash    Valacyclovir Rash       SOCIAL HISTORY:  Social History     Substance and Sexual Activity   Alcohol Use No    Comment: hX:Occas.      Social History     Substance and Sexual Activity   Drug Use No     Social History     Tobacco Use   Smoking Status Former    Current packs/day: 0.00    Average packs/day: 1 pack/day for 15.0 years (15.0 ttl pk-yrs)    Types: Cigarettes    Start date:     Quit date:     Years since quittin.6   Smokeless Tobacco Never       FAMILY HISTORY:  Family History    Problem Relation Age of Onset    Diabetes Mother     Hypertension Mother     Lung cancer Mother     Colon cancer Mother     Colon cancer Father     Hypertension Sister     Multiple sclerosis Sister     Hypothyroidism Maternal Aunt        MEDICATIONS:    Current Facility-Administered Medications:     acetaminophen (TYLENOL) tablet 650 mg, 650 mg, Oral, Q6H PRN, Kely Robert MD    albuterol (PROVENTIL HFA,VENTOLIN HFA) inhaler 2 puff, 2 puff, Inhalation, Q4H PRN, Kely Robert MD    albuterol inhalation solution 2.5 mg, 2.5 mg, Nebulization, TID, Todd Dickens MD, 2.5 mg at 08/31/24 0724    atorvastatin (LIPITOR) tablet 20 mg, 20 mg, Oral, Daily, Kely Robert MD    atovaquone (MEPRON) oral suspension 1,500 mg, 1,500 mg, Oral, Daily, Kely Robert MD    cyclophosphamide (CYTOXAN) capsule 100 mg, 100 mg, Oral, Daily, Kely Robert MD    dextromethorphan-guaiFENesin (ROBITUSSIN DM) oral syrup 10 mL, 10 mL, Oral, Q4H PRN, Kely Robert MD, 10 mL at 08/31/24 0045    epoetin shavonne (EPOGEN,PROCRIT) injection 6,000 Units, 6,000 Units, Intravenous, After Dialysis, Tirso Montez MD    fluticasone-vilanterol 200-25 mcg/actuation 1 puff, 1 puff, Inhalation, Daily, Kely Robert MD    heparin (porcine) subcutaneous injection 5,000 Units, 5,000 Units, Subcutaneous, Q8H JACK, Kely Robert MD, 5,000 Units at 08/31/24 0045    ipratropium (ATROVENT) 0.02 % inhalation solution 0.5 mg, 0.5 mg, Nebulization, TID, Todd Dickens MD, 0.5 mg at 08/31/24 0724    iron polysaccharides (FERREX) capsule 150 mg, 150 mg, Oral, Daily, Kely Robert MD    lamoTRIgine (LaMICtal) tablet 100 mg, 100 mg, Oral, HS, Kely Robert MD, 100 mg at 08/31/24 0012    methylPREDNISolone sodium succinate (Solu-MEDROL) injection 40 mg, 40 mg, Intravenous, Daily, Kely Robert MD    montelukast (SINGULAIR) tablet 10 mg, 10 mg, Oral, Daily, Kely Robert MD    NIFEdipine (PROCARDIA XL) 24 hr tablet 60 mg, 60 mg, Oral, Daily, Kely Robert MD     pantoprazole (PROTONIX) EC tablet 40 mg, 40 mg, Oral, Daily Before Breakfast, Kely Robert MD, 40 mg at 08/31/24 0620    sevelamer (RENAGEL) tablet 1,600 mg, 1,600 mg, Oral, TID With Meals, Kely Robert MD    Sodium Zirconium Cyclosilicate (Lokelma) 10 g, 10 g, Oral, Daily, Trish Kline MD    traZODone (DESYREL) tablet 200 mg, 200 mg, Oral, HS, Kely Robert MD, 200 mg at 08/31/24 0012    trimethobenzamide (TIGAN) IM injection 200 mg, 200 mg, Intramuscular, Q6H PRN, Kely Robert MD, 200 mg at 08/30/24 2208    venlafaxine (EFFEXOR-XR) 24 hr capsule 225 mg, 225 mg, Oral, Daily, Kely Robert MD    REVIEW OF SYSTEMS:  Review of Systems   Constitutional:  Negative for chills and fever.   HENT:  Negative for ear pain and sore throat.    Eyes:  Negative for pain and visual disturbance.   Respiratory:  Positive for shortness of breath. Negative for cough.    Cardiovascular:  Negative for chest pain and palpitations.   Gastrointestinal:  Negative for abdominal pain and vomiting.   Genitourinary:  Negative for dysuria and hematuria.   Musculoskeletal:  Negative for arthralgias and back pain.   Skin:  Negative for color change and rash.   Neurological:  Negative for seizures and syncope.   All other systems reviewed and are negative.    PHYSICAL EXAM:  Current Weight:    First Weight:    Vitals:    08/31/24 0028 08/31/24 0103 08/31/24 0726 08/31/24 0751   BP:  138/91  136/80   BP Location:       Pulse:  (!) 106  95   Resp:  20     Temp:  98.8 °F (37.1 °C)  98.4 °F (36.9 °C)   TempSrc:       SpO2: 93% 90% 94% 93%       Intake/Output Summary (Last 24 hours) at 8/31/2024 0844  Last data filed at 8/30/2024 2209  Gross per 24 hour   Intake 500 ml   Output --   Net 500 ml     Physical Exam  Constitutional:       Appearance: Normal appearance.   HENT:      Head: Normocephalic and atraumatic.   Cardiovascular:      Rate and Rhythm: Normal rate and regular rhythm.      Pulses: Normal pulses.      Heart sounds: Normal heart  "sounds.      Comments: Right chest wall permacath present  Pulmonary:      Effort: Pulmonary effort is normal.      Breath sounds: Normal breath sounds.   Abdominal:      Palpations: Abdomen is soft.   Musculoskeletal:         General: Normal range of motion.      Right lower leg: No edema.      Left lower leg: No edema.   Skin:     General: Skin is warm.   Neurological:      Mental Status: She is alert and oriented to person, place, and time. Mental status is at baseline.   Psychiatric:         Mood and Affect: Mood normal.        Lab Results:   Results from last 7 days   Lab Units 08/31/24  0755 08/31/24  0443 08/31/24  0033 08/30/24 2025   WBC Thousand/uL  --  10.64*  --  14.45*   HEMOGLOBIN g/dL 6.9* 6.7*  --  7.1*   HEMATOCRIT % 22.2* 22.1*  --  23.0*   PLATELETS Thousands/uL  --  245  --  258   POTASSIUM mmol/L  --  5.6* 5.7* 5.7*   CHLORIDE mmol/L  --  102  --  102   CO2 mmol/L  --  25  --  22   BUN mg/dL  --  51*  --  44*   CREATININE mg/dL  --  8.68*  --  8.11*   CALCIUM mg/dL  --  9.6  --  9.8   MAGNESIUM mg/dL  --  2.3  --  2.1   PHOSPHORUS mg/dL  --  5.8*  --  6.1*   ALK PHOS U/L  --  100  --  106*   ALT U/L  --  59*  --  60*   AST U/L  --  57*  --  65*     Portions of the record may have been created with voice recognition software. Occasional wrong word or \"sound a like\" substitutions may have occurred due to the inherent limitations of voice recognition software. Read the chart carefully and recognize, using context, where substitutions have occurred. If you have any questions, please contact the dictating provider.    "

## 2024-08-31 NOTE — PLAN OF CARE
Post-Dialysis RN Treatment Note    Blood Pressure:  Pre 137/86 mm/Hg  Post 116/74 mmHg   EDW  111.5 kg    Weight:  Pre 107.6 kg   Post 106.7 kg   Mode of weight measurement: Standing Scale   Volume Removed  1848 ml    Treatment duration 170 minutes    NS given  Yes, total of 900, 300 with each rinseback when systems clotting    Treatment shortened? Yes, describe: 40 minutes when the 4th system clotting   Medications given during Rx Epogen 6000 units   Estimated Kt/V  0.57   Access type: Permacath/TDC   Access Issues: Yes, describe: No return from arterial limb, unable to maintain prescribed BFR.       Report called to primary nurse   Yes Vargas Juan RN     Clotted 4 systems during treatment.   Able to return blood each time as system was returned prior to complete clotting occurred to prevent loss of blood.  Continued treatment for 170 minutes due to being holiday weekend.  Maintained BFR with lines reversed at 200 ml/min.  Treatment terminated after 4th system beginning to clot with 40 minutes remaining.  Dr Montez notified and aware.      Current hemodialysis plan of care is to remove a total of 2500 ml of fluid over a 3 1/2 hour treatment for a net of 2 liters as tolerated.  Monitor vital signs every 15 minutes while on treatment for patient safety.  Maintain cardiac monitoring while on treatment for patient safety.  Utilize a 2 K+ bath for serum potassium of 5.6 to maintain electrolyte balance.  Report received from Rhonda Judge RN.  Plan reviewed with Dr Montez.        Problem: METABOLIC, FLUID AND ELECTROLYTES - ADULT  Goal: Electrolytes maintained within normal limits  Description: INTERVENTIONS:  - Monitor labs and assess patient for signs and symptoms of electrolyte imbalances  - Administer electrolyte replacement as ordered  - Monitor response to electrolyte replacements, including repeat lab results as appropriate  - Instruct patient on fluid and nutrition as appropriate  Outcome: Progressing  Goal: Fluid  balance maintained  Description: INTERVENTIONS:  - Monitor labs   - Monitor I/O and WT  - Instruct patient on fluid and nutrition as appropriate  - Assess for signs & symptoms of volume excess or deficit  Outcome: Progressing

## 2024-08-31 NOTE — PLAN OF CARE
Problem: Nutrition/Hydration-ADULT  Goal: Nutrient/Hydration intake appropriate for improving, restoring or maintaining nutritional needs  Description: Monitor and assess patient's nutrition/hydration status for malnutrition. Collaborate with interdisciplinary team and initiate plan and interventions as ordered.  Monitor patient's weight and dietary intake as ordered or per policy. Utilize nutrition screening tool and intervene as necessary. Determine patient's food preferences and provide high-protein, high-caloric foods as appropriate.     INTERVENTIONS:  - Monitor oral intake, urinary output, labs, and treatment plans  - Assess nutrition and hydration status and recommend course of action  - Evaluate amount of meals eaten  - Assist patient with eating if necessary   - Allow adequate time for meals  - Recommend/ encourage appropriate diets, oral nutritional supplements, and vitamin/mineral supplements  - Order, calculate, and assess calorie counts as needed  - Recommend, monitor, and adjust tube feedings and TPN/PPN based on assessed needs  - Assess need for intravenous fluids  - Provide specific nutrition/hydration education as appropriate  - Include patient/family/caregiver in decisions related to nutrition  Outcome: Progressing     Problem: Prexisting or High Potential for Compromised Skin Integrity  Goal: Skin integrity is maintained or improved  Description: INTERVENTIONS:  - Identify patients at risk for skin breakdown  - Assess and monitor skin integrity  - Assess and monitor nutrition and hydration status  - Monitor labs   - Assess for incontinence   - Turn and reposition patient  - Assist with mobility/ambulation  - Relieve pressure over bony prominences  - Avoid friction and shearing  - Provide appropriate hygiene as needed including keeping skin clean and dry  - Evaluate need for skin moisturizer/barrier cream  - Collaborate with interdisciplinary team   - Patient/family teaching  - Consider wound  care consult   Outcome: Progressing     Problem: CARDIOVASCULAR - ADULT  Goal: Maintains optimal cardiac output and hemodynamic stability  Description: INTERVENTIONS:  - Monitor I/O, vital signs and rhythm  - Monitor for S/S and trends of decreased cardiac output  - Administer and titrate ordered vasoactive medications to optimize hemodynamic stability  - Assess quality of pulses, skin color and temperature  - Assess for signs of decreased coronary artery perfusion  - Instruct patient to report change in severity of symptoms  Outcome: Progressing  Goal: Absence of cardiac dysrhythmias or at baseline rhythm  Description: INTERVENTIONS:  - Continuous cardiac monitoring, vital signs, obtain 12 lead EKG if ordered  - Administer antiarrhythmic and heart rate control medications as ordered  - Monitor electrolytes and administer replacement therapy as ordered  Outcome: Progressing     Problem: RESPIRATORY - ADULT  Goal: Achieves optimal ventilation and oxygenation  Description: INTERVENTIONS:  - Assess for changes in respiratory status  - Assess for changes in mentation and behavior  - Position to facilitate oxygenation and minimize respiratory effort  - Oxygen administered by appropriate delivery if ordered  - Initiate smoking cessation education as indicated  - Encourage broncho-pulmonary hygiene including cough, deep breathe, Incentive Spirometry  - Assess the need for suctioning and aspirate as needed  - Assess and instruct to report SOB or any respiratory difficulty  - Respiratory Therapy support as indicated  Outcome: Progressing     Problem: METABOLIC, FLUID AND ELECTROLYTES - ADULT  Goal: Electrolytes maintained within normal limits  Description: INTERVENTIONS:  - Monitor labs and assess patient for signs and symptoms of electrolyte imbalances  - Administer electrolyte replacement as ordered  - Monitor response to electrolyte replacements, including repeat lab results as appropriate  - Instruct patient on fluid and  nutrition as appropriate  Outcome: Progressing  Goal: Fluid balance maintained  Description: INTERVENTIONS:  - Monitor labs   - Monitor I/O and WT  - Instruct patient on fluid and nutrition as appropriate  - Assess for signs & symptoms of volume excess or deficit  Outcome: Progressing     Problem: HEMATOLOGIC - ADULT  Goal: Maintains hematologic stability  Description: INTERVENTIONS  - Assess for signs and symptoms of bleeding or hemorrhage  - Monitor labs  - Administer supportive blood products/factors as ordered and appropriate  Outcome: Progressing     Problem: MUSCULOSKELETAL - ADULT  Goal: Maintain or return mobility to safest level of function  Description: INTERVENTIONS:  - Assess patient's ability to carry out ADLs; assess patient's baseline for ADL function and identify physical deficits which impact ability to perform ADLs (bathing, care of mouth/teeth, toileting, grooming, dressing, etc.)  - Assess/evaluate cause of self-care deficits   - Assess range of motion  - Assess patient's mobility  - Assess patient's need for assistive devices and provide as appropriate  - Encourage maximum independence but intervene and supervise when necessary  - Involve family in performance of ADLs  - Assess for home care needs following discharge   - Consider OT consult to assist with ADL evaluation and planning for discharge  - Provide patient education as appropriate  Outcome: Progressing  Goal: Maintain proper alignment of affected body part  Description: INTERVENTIONS:  - Support, maintain and protect limb and body alignment  - Provide patient/ family with appropriate education  Outcome: Progressing

## 2024-08-31 NOTE — QUICK NOTE
Patient was reexamined at bedside in the morning, continue to endorse shortness of breath upon exertion.  Denies any chest pain.  Exam demonstrated no wheezing or crackles on exam.    Touched base with nephrology, we will undergo hemodialysis today.  Following up on CTA PE, echocardiogram.

## 2024-08-31 NOTE — RESPIRATORY THERAPY NOTE
RT Protocol Note  Ngozi Beard 64 y.o. female MRN: [id number]  Unit/Bed#: S -01 Encounter: [id number]    Assessment        Home Pulmonary Medications:  Albuterol inhaler Q4 prn, Advair 2 puffs BID, Singulair oral tablet 1x daily.       Past Medical History:   Diagnosis Date    Asthma     Chronic pain     Hypertension     Renal disorder     Sepsis (HCC) 07/10/2024     Subjective         Objective    Physical Exam:   Assessment Type: Pre-treatment  General Appearance: Alert, Awake  Respiratory Pattern: Dyspnea with exertion  Chest Assessment: Chest expansion symmetrical  Bilateral Breath Sounds: Diminished  Cough: Strong, Non-productive  O2 Device: 2L NC    Vitals:  Blood pressure 138/91, pulse (!) 106, temperature 98.8 °F (37.1 °C), resp. rate 20, SpO2 90%.          Imaging and other studies: I have personally reviewed pertinent reports.      O2 Device: 2L NC     Plan    Respiratory Plan: Home Bronchodilator Patient pathway        Resp Comments: (P) Patient with hx of Asthma and COPD on bronchodilator regimen. Home meds are Albuterol inhaler Q4 prn, Singulair oral tablet 1x daily, Advair 2 puffs BID. The patient states she is more SOB prior to her dialysis treatments. Patient recd on 2L NC, tx given and adjusted to TID albuterol with inhaler Q4 prn and Atrovent TID for SOB and Wheezing.+

## 2024-08-31 NOTE — ED PROVIDER NOTES
"History  Chief Complaint   Patient presents with    Asthma     Pt reports asthma attack. Hasn't taken any meds at home due to \"sleeping the past 3 days\". Ems gave 3 albuterol and 1 duo neb     Patient is a 64-year-old female with a history of hypertension, COPD, ESRD on hemodialysis (Tuesday, Thursday, Saturday), that presents to the emergency department with shortness of breath symptoms for 1 day.  Patient denies associated symptoms.  Patient denies supplemental oxygen needed at home at this time.  Patient reports being a hemodialysis patient and his reportedly not missed any hemodialysis days and stated \"I started dialysis 1 month ago.\"  Patient also reports that she had not taken any of her daily medications for 3 days prior to current ED presentation because \"I have been sleeping, I have been too tired.\"  Patient denies recent weight gain.  Patient denies palliative factors with provocative factors of breathing deeply.  Patient denies any noneffective treatment.  Patient denies fevers, chills, nausea, vomiting, diarrhea, constipation and urinary symptoms.  Patient has recent fall recent trauma.  Patient denies sick contacts recent travel.  Patient with last ED visit on 8/14/2024 for clinical impression of; home health needed; physician contacted  with no recommendations for STR for patient level of functionality; patient currently lives at home.  Patient denies new weight gain.  Patient denies chest pain and abdominal pain.      History provided by:  Patient   used: No    Asthma  Associated symptoms: shortness of breath    Associated symptoms: no abdominal pain, no chest pain, no congestion, no cough, no diarrhea, no ear pain, no fever, no headaches, no nausea, no rash, no rhinorrhea, no sore throat and no vomiting        Prior to Admission Medications   Prescriptions Last Dose Informant Patient Reported? Taking?   Advair -21 MCG/ACT inhaler 8/29/2024  No Yes   Sig: Inhale " 2 puffs 2 (two) times a day Rinse mouth after use.   NIFEdipine (PROCARDIA XL) 30 mg 24 hr tablet Past Week  No Yes   Sig: Take 2 tablets (60 mg total) by mouth daily   albuterol (PROVENTIL HFA,VENTOLIN HFA) 90 mcg/act inhaler 8/29/2024  No Yes   Sig: INHALE 2 PUFFS BY MOUTH EVERY 4 HOURS AS NEEDED FOR SHORTNESS OF BREATH   atorvastatin (LIPITOR) 20 mg tablet Past Week  No Yes   Sig: TAKE 1 TABLET BY MOUTH EVERY DAY   atovaquone (MEPRON) 750 mg/5 mL suspension Past Week  No Yes   Sig: Take 10 mL (1,500 mg total) by mouth daily Do not start before August 5, 2024.   calcium carbonate (OYSTER SHELL,OSCAL) 500 mg Past Week  No Yes   Sig: Take 1 tablet by mouth daily with breakfast   cyclophosphamide (CYTOXAN) 50 mg capsule Past Week  No Yes   Sig: Take 2 capsules (100 mg total) by mouth daily   iron polysaccharides (FERREX) 150 mg capsule Unknown  No No   Sig: Take 1 capsule (150 mg total) by mouth daily   lamoTRIgine (LaMICtal) 100 mg tablet Past Week  Yes Yes   Sig: Take 100 mg by mouth daily at bedtime   montelukast (SINGULAIR) 10 mg tablet Past Week  No Yes   Sig: TAKE 1 TABLET BY MOUTH EVERY DAY   ondansetron (ZOFRAN) 4 mg tablet 8/29/2024  No Yes   Sig: Take 1 tablet (4 mg total) by mouth every 8 (eight) hours as needed for nausea or vomiting   pantoprazole (PROTONIX) 40 mg tablet Past Week  No Yes   Sig: Take 1 tablet (40 mg total) by mouth daily before breakfast   predniSONE 2.5 mg tablet Past Week  No Yes   Sig: Take 12 tablets (30 mg total) by mouth daily for 5 days, THEN 10 tablets (25 mg total) daily for 14 days, THEN 8 tablets (20 mg total) daily for 14 days, THEN 6 tablets (15 mg total) daily for 14 days, THEN 5 tablets (12.5 mg total) daily for 14 days, THEN 4 tablets (10 mg total) daily for 14 days, THEN 3 tablets (7.5 mg total) daily for 14 days, THEN 2 tablets (5 mg total) daily. Do not start before August 5, 2024.   senna (SENOKOT) 8.6 mg 8/29/2024  No Yes   Sig: Take 2 tablets (17.2 mg total) by  mouth 2 (two) times a day Use as needed constipation   sevelamer (RENAGEL) 800 mg tablet 2024  No Yes   Sig: Take 2 tablets (1,600 mg total) by mouth 3 (three) times a day with meals   traZODone (DESYREL) 100 mg tablet Past Week  Yes Yes   Sig: Take 200 mg by mouth daily at bedtime   venlafaxine (EFFEXOR-XR) 150 mg 24 hr capsule Past Week  No Yes   Sig: TAKE 1 CAPSULE BY MOUTH DAILY WITH BREAKFAST.   venlafaxine (EFFEXOR-XR) 75 mg 24 hr capsule Past Week  Yes Yes   Sig: Take 75 mg by mouth daily Pt states she takes 150mg and 75mg effexor. Daily. For a totoal of 225mg      Facility-Administered Medications: None       Past Medical History:   Diagnosis Date    Asthma     Chronic pain     Hypertension     Renal disorder     Sepsis (HCC) 07/10/2024       Past Surgical History:   Procedure Laterality Date    BACK SURGERY      COLONOSCOPY      IR BIOPSY KIDNEY RANDOM  2024    IR TEMPORARY DIALYSIS CATHETER PLACEMENT  7/15/2024    IR TUNNELED DIALYSIS CATHETER CHECK/CHANGE/REPOSITION/ANGIOPLASTY  2024    IR TUNNELED DIALYSIS CATHETER PLACEMENT  2024    TUBAL LIGATION         Family History   Problem Relation Age of Onset    Diabetes Mother     Hypertension Mother     Lung cancer Mother     Colon cancer Mother     Colon cancer Father     Hypertension Sister     Multiple sclerosis Sister     Hypothyroidism Maternal Aunt      I have reviewed and agree with the history as documented.    E-Cigarette/Vaping    E-Cigarette Use Never User      E-Cigarette/Vaping Substances    Nicotine No     THC No     CBD No     Flavoring No      Social History     Tobacco Use    Smoking status: Former     Current packs/day: 0.00     Average packs/day: 1 pack/day for 15.0 years (15.0 ttl pk-yrs)     Types: Cigarettes     Start date:      Quit date:      Years since quittin.6    Smokeless tobacco: Never   Vaping Use    Vaping status: Never Used   Substance Use Topics    Alcohol use: No     Comment: hX:Occas.      Drug use: No       Review of Systems   Constitutional:  Negative for activity change, appetite change, chills and fever.   HENT:  Negative for congestion, ear pain, postnasal drip, rhinorrhea, sinus pressure, sinus pain, sore throat and tinnitus.    Eyes:  Negative for photophobia, pain and visual disturbance.   Respiratory:  Positive for chest tightness and shortness of breath. Negative for cough.    Cardiovascular:  Negative for chest pain and palpitations.   Gastrointestinal:  Negative for abdominal pain, constipation, diarrhea, nausea and vomiting.   Genitourinary:  Negative for difficulty urinating, dysuria, flank pain, frequency, hematuria and urgency.   Musculoskeletal:  Negative for arthralgias, back pain, gait problem, neck pain and neck stiffness.   Skin:  Negative for color change, pallor and rash.   Allergic/Immunologic: Negative for environmental allergies and food allergies.   Neurological:  Negative for dizziness, seizures, syncope, weakness, numbness and headaches.   Psychiatric/Behavioral:  Negative for confusion.    All other systems reviewed and are negative.      Physical Exam  Physical Exam  Vitals and nursing note reviewed.   Constitutional:       General: She is awake.      Appearance: Normal appearance. She is well-developed and normal weight. She is not ill-appearing, toxic-appearing or diaphoretic.      Comments: /69 (BP Location: Right arm)   Pulse (!) 125   Temp 98.3 °F (36.8 °C) (Oral)   Resp 18   SpO2 94%      HENT:      Head: Normocephalic and atraumatic.      Jaw: There is normal jaw occlusion.      Right Ear: Hearing, tympanic membrane and external ear normal. No decreased hearing noted. No drainage, swelling or tenderness. No mastoid tenderness.      Left Ear: Hearing, tympanic membrane and external ear normal. No decreased hearing noted. No drainage, swelling or tenderness. No mastoid tenderness.      Nose: Nose normal.      Mouth/Throat:      Lips: Pink.      Mouth:  Mucous membranes are moist.      Pharynx: Oropharynx is clear. Uvula midline.   Eyes:      General: Lids are normal. Vision grossly intact. Gaze aligned appropriately.         Right eye: No discharge.         Left eye: No discharge.      Extraocular Movements: Extraocular movements intact.      Conjunctiva/sclera: Conjunctivae normal.      Pupils: Pupils are equal, round, and reactive to light.   Neck:      Vascular: No JVD.      Trachea: Trachea and phonation normal. No tracheal tenderness or tracheal deviation.   Cardiovascular:      Rate and Rhythm: Normal rate and regular rhythm.      Pulses: Normal pulses.           Radial pulses are 2+ on the right side and 2+ on the left side.        Posterior tibial pulses are 2+ on the right side and 2+ on the left side.      Heart sounds: Normal heart sounds.   Pulmonary:      Effort: Pulmonary effort is normal.      Breath sounds: Normal breath sounds and air entry. No stridor. No decreased breath sounds, wheezing, rhonchi or rales.   Chest:      Chest wall: No tenderness.   Abdominal:      General: Abdomen is flat. Bowel sounds are normal. There is no distension.      Palpations: Abdomen is soft. Abdomen is not rigid.      Tenderness: There is no abdominal tenderness. There is no guarding or rebound.   Musculoskeletal:         General: Normal range of motion.      Cervical back: Full passive range of motion without pain, normal range of motion and neck supple. No rigidity. No spinous process tenderness or muscular tenderness. Normal range of motion.   Feet:      Right foot:      Toenail Condition: Right toenails are normal.      Left foot:      Toenail Condition: Left toenails are normal.   Lymphadenopathy:      Head:      Right side of head: No submental, submandibular, tonsillar, preauricular, posterior auricular or occipital adenopathy.      Left side of head: No submental, submandibular, tonsillar, preauricular, posterior auricular or occipital adenopathy.       Cervical: No cervical adenopathy.      Right cervical: No superficial, deep or posterior cervical adenopathy.     Left cervical: No superficial, deep or posterior cervical adenopathy.   Skin:     General: Skin is warm.      Capillary Refill: Capillary refill takes less than 2 seconds.      Findings: No rash.   Neurological:      General: No focal deficit present.      Mental Status: She is alert and oriented to person, place, and time. Mental status is at baseline.      GCS: GCS eye subscore is 4. GCS verbal subscore is 5. GCS motor subscore is 6.      Sensory: No sensory deficit.      Deep Tendon Reflexes: Reflexes are normal and symmetric.      Reflex Scores:       Patellar reflexes are 2+ on the right side and 2+ on the left side.  Psychiatric:         Attention and Perception: Attention normal.         Mood and Affect: Mood normal.         Speech: Speech normal.         Behavior: Behavior normal. Behavior is cooperative.         Thought Content: Thought content normal.         Judgment: Judgment normal.         Vital Signs  ED Triage Vitals   Temperature Pulse Respirations Blood Pressure SpO2   08/30/24 2006 08/30/24 2004 08/30/24 2004 08/30/24 2004 08/30/24 2004   98.3 °F (36.8 °C) (!) 125 18 128/69 (!) 89 %      Temp Source Heart Rate Source Patient Position - Orthostatic VS BP Location FiO2 (%)   08/30/24 2006 08/30/24 2004 08/30/24 2004 08/30/24 2004 --   Oral Monitor Sitting Right arm       Pain Score       08/30/24 2233       4           Vitals:    08/30/24 2224 08/30/24 2309 08/31/24 0103 08/31/24 0751   BP: 120/84 129/88 138/91 136/80   Pulse: (!) 124 (!) 129 (!) 106 95   Patient Position - Orthostatic VS: Sitting            Visual Acuity  Visual Acuity      Flowsheet Row Most Recent Value   L Pupil Size (mm) 2   R Pupil Size (mm) 2   L Pupil Shape Round   R Pupil Shape Round            ED Medications  Medications   trimethobenzamide (TIGAN) IM injection 200 mg (200 mg Intramuscular Given 8/30/24 2208)    heparin (porcine) subcutaneous injection 5,000 Units (5,000 Units Subcutaneous Given 8/31/24 0045)   fluticasone-vilanterol 200-25 mcg/actuation 1 puff (has no administration in time range)   atovaquone (MEPRON) oral suspension 1,500 mg (has no administration in time range)   cyclophosphamide (CYTOXAN) capsule 100 mg (has no administration in time range)   lamoTRIgine (LaMICtal) tablet 100 mg (100 mg Oral Given 8/31/24 0012)   NIFEdipine (PROCARDIA XL) 24 hr tablet 60 mg (has no administration in time range)   pantoprazole (PROTONIX) EC tablet 40 mg (40 mg Oral Given 8/31/24 0620)   sevelamer (RENAGEL) tablet 1,600 mg (has no administration in time range)   traZODone (DESYREL) tablet 200 mg (200 mg Oral Given 8/31/24 0012)   venlafaxine (EFFEXOR-XR) 24 hr capsule 225 mg (has no administration in time range)   atorvastatin (LIPITOR) tablet 20 mg (has no administration in time range)   montelukast (SINGULAIR) tablet 10 mg (has no administration in time range)   acetaminophen (TYLENOL) tablet 650 mg (has no administration in time range)   albuterol (PROVENTIL HFA,VENTOLIN HFA) inhaler 2 puff (has no administration in time range)   dextromethorphan-guaiFENesin (ROBITUSSIN DM) oral syrup 10 mL (10 mL Oral Given 8/31/24 0045)   iron polysaccharides (FERREX) capsule 150 mg (has no administration in time range)   methylPREDNISolone sodium succinate (Solu-MEDROL) injection 40 mg (has no administration in time range)   ipratropium (ATROVENT) 0.02 % inhalation solution 0.5 mg (0.5 mg Nebulization Given 8/31/24 0724)   albuterol inhalation solution 2.5 mg (2.5 mg Nebulization Given 8/31/24 0724)   Sodium Zirconium Cyclosilicate (Lokelma) 10 g (has no administration in time range)   ipratropium-albuterol (FOR EMS ONLY) (DUO-NEB) 0.5-2.5 mg/3 mL inhalation solution 3 mL (0 mL Does not apply Given to EMS 8/30/24 2007)   albuterol (FOR EMS ONLY) (2.5 mg/3 mL) 0.083 % inhalation solution 7.5 mg (0 mg Does not apply Given to EMS  8/30/24 2007)   methylPREDNISolone sodium succinate (Solu-MEDROL) injection 80 mg (80 mg Intravenous Given 8/30/24 2023)   ipratropium-albuterol (DUO-NEB) 0.5-2.5 mg/3 mL inhalation solution 3 mL (3 mL Nebulization Given 8/30/24 2040)   azithromycin (ZITHROMAX) 500 mg in sodium chloride 0.9% 250mL IVPB 500 mg (0 mg Intravenous Stopped 8/30/24 2209)   Hydrocod Abdulaziz-Chlorphe Abdulaziz ER (TUSSIONEX) ER suspension 5 mL (5 mL Oral Given 8/31/24 0205)   iohexol (OMNIPAQUE) 350 MG/ML injection (MULTI-DOSE) 80 mL (80 mL Intravenous Given 8/31/24 0154)       Diagnostic Studies  Results Reviewed       Procedure Component Value Units Date/Time    HS Troponin I 4hr [[id number]]  (Abnormal) Collected: 08/31/24 0033    Lab Status: Final result Specimen: Blood from Hand, Left Updated: 08/31/24 0117     hs TnI 4hr 225 ng/L      Delta 4hr hsTnI 13 ng/L     D-dimer, quantitative [[id number]]  (Abnormal) Collected: 08/30/24 2025    Lab Status: Final result Specimen: Blood from Arm, Right Updated: 08/31/24 0109     D-Dimer, Quant 2.18 ug/ml FEU     Narrative:      In the evaluation for possible pulmonary embolism, in the appropriate (Well's Score of 4 or less) patient, the age adjusted d-dimer cutoff for this patient can be calculated as:    Age x 0.01 (in ug/mL) for Age-adjusted D-dimer exclusion threshold for a patient over 50 years.    Blood culture #1 [[id number]] Collected: 08/30/24 2039    Lab Status: Preliminary result Specimen: Blood from Line, Venous Updated: 08/31/24 0034     Blood Culture Received in Microbiology Lab. Culture in Progress.    HS Troponin I 2hr [[id number]]  (Abnormal) Collected: 08/30/24 2249    Lab Status: Final result Specimen: Blood from Hand, Left Updated: 08/31/24 0025     hs TnI 2hr 225 ng/L      Delta 2hr hsTnI 13 ng/L     Blood culture #2 [[id number]] Collected: 08/30/24 2039    Lab Status: Preliminary result Specimen: Blood from Arm, Right Updated: 08/31/24 0001     Blood Culture Received in Microbiology Lab.  Culture in Progress.    RBC Morphology Reflex Test [[id number]] Collected: 08/30/24 2025    Lab Status: Final result Specimen: Blood from Arm, Right Updated: 08/30/24 2201    Procalcitonin [[id number]]  (Abnormal) Collected: 08/30/24 2025    Lab Status: Final result Specimen: Blood from Arm, Right Updated: 08/30/24 2130     Procalcitonin 0.77 ng/ml     B-Type Natriuretic Peptide(BNP) [[id number]]  (Abnormal) Collected: 08/30/24 2025    Lab Status: Final result Specimen: Blood from Arm, Right Updated: 08/30/24 2113     BNP 3,019 pg/mL     FLU/RSV/COVID - if FLU/RSV clinically relevant [[id number]]  (Normal) Collected: 08/30/24 2025    Lab Status: Final result Specimen: Nares from Nose Updated: 08/30/24 2112     SARS-CoV-2 Negative     INFLUENZA A PCR Negative     INFLUENZA B PCR Negative     RSV PCR Negative    Narrative:      This test has been performed using the CoV-2/Flu/RSV plus assay on the [employer] GeneXpert platform. This test has been validated by the  and verified by the performing laboratory.     This test is designed to amplify and detect the following: nucleocapsid (N), envelope (E), and RNA-dependent RNA polymerase (RdRP) genes of the SARS-CoV-2 genome; matrix (M), basic polymerase (PB2), and acidic protein (PA) segments of the influenza A genome; matrix (M) and non-structural protein (NS) segments of the influenza B genome, and the nucleocapsid genes of RSV A and RSV B.     Positive results are indicative of the presence of Flu A, Flu B, RSV, and/or SARS-CoV-2 RNA. Positive results for SARS-CoV-2 or suspected novel influenza should be reported to state, local, or federal health departments according to local reporting requirements.      All results should be assessed in conjunction with clinical presentation and other laboratory markers for clinical management.     FOR PEDIATRIC PATIENTS - copy/paste COVID Guidelines URL to browser:  [email or web address]       Protime-INR [[id number]]  (Abnormal) Collected: 08/30/24 2025    Lab Status: Final result Specimen: Blood from Arm, Right Updated: 08/30/24 2111     Protime 23.3 seconds      INR 1.99    Narrative:      INR Therapeutic Range    Indication                                             INR Range      Atrial Fibrillation                                               2.0-3.0  Hypercoagulable State                                    2.0.2.3  Left Ventricular Asist Device                            2.0-3.0  Mechanical Heart Valve                                  -    Aortic(with afib, MI, embolism, HF, LA enlargement,    and/or coagulopathy)                                     2.0-3.0 (2.5-3.5)     Mitral                                                             2.5-3.5  Prosthetic/Bioprosthetic Heart Valve               2.0-3.0  Venous thromboembolism (VTE: VT, PE        2.0-3.0    APTT [[id number]]  (Abnormal) Collected: 08/30/24 2025    Lab Status: Final result Specimen: Blood from Arm, Right Updated: 08/30/24 2111     PTT >210 seconds     CBC and differential [[id number]]  (Abnormal) Collected: 08/30/24 2025    Lab Status: Final result Specimen: Blood from Arm, Right Updated: 08/30/24 2109     WBC 14.45 Thousand/uL      RBC 2.49 Million/uL      Hemoglobin 7.1 g/dL      Hematocrit 23.0 %      MCV 92 fL      MCH 28.5 pg      MCHC 30.9 g/dL      RDW 17.5 %      MPV 10.0 fL      Platelets 258 Thousands/uL     Narrative:      This is an appended report.  These results have been appended to a previously verified report.    Manual Differential(PHLEBS Do Not Order) [[id number]]  (Abnormal) Collected: 08/30/24 2025    Lab Status: Final result Specimen: Blood from Arm, Right Updated: 08/30/24 2109     Segmented % 85 %      Lymphocytes % 11 %      Monocytes % 4 %      Eosinophils % 0 %      Basophils % 0 %      Absolute Neutrophils 12.28 Thousand/uL       Absolute Lymphocytes 1.59 Thousand/uL      Absolute Monocytes 0.58 Thousand/uL      Absolute Eosinophils 0.00 Thousand/uL      Absolute Basophils 0.00 Thousand/uL      Total Counted --     nRBC 1 /100 WBC      RBC Morphology Present     Platelet Estimate Adequate     Anisocytosis Present     Helmet Cells Present     Polychromasia Present     Tear Drop Cells Present    Comprehensive metabolic panel [[id number]]  (Abnormal) Collected: 08/30/24 2025    Lab Status: Final result Specimen: Blood from Arm, Right Updated: 08/30/24 2057     Sodium 139 mmol/L      Potassium 5.7 mmol/L      Chloride 102 mmol/L      CO2 22 mmol/L      ANION GAP 15 mmol/L      BUN 44 mg/dL      Creatinine 8.11 mg/dL      Glucose 105 mg/dL      Calcium 9.8 mg/dL      AST 65 U/L      ALT 60 U/L      Alkaline Phosphatase 106 U/L      Total Protein 6.1 g/dL      Albumin 3.8 g/dL      Total Bilirubin 0.71 mg/dL      eGFR 4 ml/min/1.73sq m     Narrative:      National Kid[employer] Disease Foundation guidelines for Chronic Kidney Disease (CKD):     Stage 1 with normal or high GFR (GFR > 90 mL/min/1.73 square meters)    Stage 2 Mild CKD (GFR = 60-89 mL/min/1.73 square meters)    Stage 3A Moderate CKD (GFR = 45-59 mL/min/1.73 square meters)    Stage 3B Moderate CKD (GFR = 30-44 mL/min/1.73 square meters)    Stage 4 Severe CKD (GFR = 15-29 mL/min/1.73 square meters)    Stage 5 End Stage CKD (GFR <15 mL/min/1.73 square meters)  Note: GFR calculation is accurate only with a steady state creatinine    Magnesium [[id number]]  (Normal) Collected: 08/30/24 2025    Lab Status: Final result Specimen: Blood from Arm, Right Updated: 08/30/24 2057     Magnesium 2.1 mg/dL     Phosphorus [[id number]]  (Abnormal) Collected: 08/30/24 2025    Lab Status: Final result Specimen: Blood from Arm, Right Updated: 08/30/24 2057     Phosphorus 6.1 mg/dL     HS Troponin 0hr (reflex protocol) [[id number]]  (Abnormal) Collected: 08/30/24 2025    Lab Status: Final result Specimen: Blood from  Arm, Right Updated: 08/30/24 2055     hs TnI 0hr 212 ng/L     Blood gas, venous [[id number]]  (Abnormal) Collected: 08/30/24 2025    Lab Status: Final result Specimen: Blood from Arm, Right Updated: 08/30/24 2035     pH, Dean 7.428     pCO2, Dean 30.9 mm Hg      pO2, Dean 43.8 mm Hg      HCO3, Dean 20.0 mmol/L      Base Excess, Dean -3.8 mmol/L      O2 Content, Dean 8.9 ml/dL      O2 HGB, VENOUS 77.1 %                    XR chest 1 view portable   ED Interpretation by Alejandro Johnston PA-C (08/30 2112)   Consolidation right lower lobe suspicious for pneumonia      CTA chest pe study    (Results Pending)              Procedures  ECG 12 Lead Documentation Only    Date/Time: 8/30/2024 8:13 PM    Performed by: Alejandro Johnston PA-C  Authorized by: Alejandro Johnston PA-C    Indications / Diagnosis:  Shortness of breath and hypoxia  ECG reviewed by me, the ED Provider: yes    Patient location:  ED  Previous ECG:     Previous ECG:  Compared to current    Comparison ECG info:  When compared with ECG of August 11, 2024, no significant changes were noted.    Similarity:  No change    Comparison to cardiac monitor: Yes    Interpretation:     Interpretation: normal    Rate:     ECG rate:  123    ECG rate assessment: normal    Rhythm:     Rhythm: sinus tachycardia    Ectopy:     Ectopy: none    QRS:     QRS axis:  Normal    QRS intervals:  Normal  Conduction:     Conduction: abnormal      Abnormal conduction comment:  Intraventricular conduction block  ST segments:     ST segments:  Normal  T waves:     T waves: normal             ED Course                                               Medical Decision Making  Patient is a 64-year-old female with a history of hypertension, COPD, ESRD on hemodialysis (Tuesday, Thursday, Saturday), that presents to the emergency department with shortness of breath symptoms for 1 day.    Patient hemodynamically stable and afebrile; new oxygen requirement 2 L nasal cannula oxygen with improvement of SpO2 saturation  to 93% from 89% room air; BNP 3019, troponin 212; potassium 5.7, ECG with no changes, phosphorus 6.1, normal magnesium 2.1     chest x-ray with possible consolidation right lower lobe suspicious for pneumonia-procalcitonin 0.77, leukocytosis 14.45  Patient has anemia likely secondary to chronic disease  DuoNeb x 1, Solu-Medrol 80 mg  Azithromycin IV  Discussed patient case with internal medicine team and both agreed to place patient on inpatient status on the care of Dr. Medina, internal medicine  Patient with verbal understandable clinical laboratory and imaging findings, admission instructions and verbalized agreement with patient current treatment plan.      Amount and/or Complexity of Data Reviewed  Labs: ordered.  Radiology: ordered and independent interpretation performed.    Risk  Prescription drug management.  Decision regarding hospitalization.                 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 Medina, internal medicine .               Follow-up Information    None         Current Discharge Medication List        CONTINUE these medications which have NOT CHANGED    Details   Advair -21 MCG/ACT inhaler Inhale 2 puffs 2 (two) times a day Rinse mouth after use.  Qty: 12 g, Refills: 0    Comments: Substitution to a  formulary equivalent within the same pharmaceutical class is authorized.  Associated Diagnoses: Chronic obstructive pulmonary disease, unspecified COPD type ([facility])      albuterol (PROVENTIL HFA,VENTOLIN HFA) 90 mcg/act inhaler INHALE 2 PUFFS BY MOUTH EVERY 4 HOURS AS NEEDED FOR SHORTNESS OF BREATH  Qty: 18 g, Refills: 5    Comments: Substitution to a formulary equivalent within the same pharmaceutical class is authorized.  Associated Diagnoses: Medication refill      atorvastatin (LIPITOR) 20 mg tablet TAKE 1 TABLET BY MOUTH EVERY DAY  Qty: 90 tablet, Refills: 1    Associated Diagnoses: Dyslipidemia      atovaquone (MEPRON) 750 mg/5 mL suspension Take 10 mL (1,500 mg total) by mouth daily Do not start before August 5, 2024.  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      lamoTRIgine (LaMICtal) 100 mg tablet Take 100 mg by mouth daily at bedtime      montelukast (SINGULAIR) 10 mg tablet TAKE 1 TABLET BY MOUTH EVERY DAY  Qty: 90 tablet, Refills: 1    Associated Diagnoses: Asthma due to seasonal allergies      NIFEdipine (PROCARDIA XL) 30 mg 24 hr tablet Take 2 tablets (60 mg total) by mouth daily  Qty: 60 tablet, Refills: 0    Associated Diagnoses: 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      ondansetron (ZOFRAN) 4 mg tablet Take 1 tablet (4 mg total) by mouth every 8 (eight) hours as needed for nausea or vomiting  Qty: 20 tablet, Refills: 0    Associated Diagnoses: Nausea & vomiting      pantoprazole (PROTONIX)  40 mg tablet Take 1 tablet (40 mg total) by mouth daily before breakfast  Qty: 30 tablet, Refills: 0    Associated Diagnoses: 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 Do not start before August 5, 2024.  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 venlafaxine (EFFEXOR-XR) 150 mg 24 hr capsule TAKE 1 CAPSULE BY MOUTH DAILY WITH BREAKFAST.  Qty: 30 capsule, Refills: 0    Associated Diagnoses: Bipolar 1 disorder (HCC)      !! venlafaxine (EFFEXOR-XR) 75 mg 24 hr capsule Take 75 mg by mouth daily Pt states she takes 150mg and 75mg effexor. Daily. For a totoal of 225mg      iron polysaccharides (FERREX) 150 mg capsule Take 1 capsule (150 mg total) by mouth daily  Qty: 30 capsule, Refills: 0    Associated Diagnoses: Anemia due to chronic kidney disease, on chronic dialysis (HCC)       !! - Potential duplicate medications found. Please discuss with provider.          No discharge procedures on file.    PDMP Review         Value Time  User    PDMP Reviewed  Yes 8/31/2024  7:56 AM Alejandro Johnston PA-C            ED Provider  Electronically Signed by             Alejandro Johnston PA-C  08/31/24 0753

## 2024-08-31 NOTE — ASSESSMENT & PLAN NOTE
Patient presented with 1 week history of acutely worsening dyspnea with intermittent chronic chest tightness.  Tachycardia, tachypnea, dyspnea, and accessory muscle use noted on examination. Does not appear significantly volume overloaded on exam  Wells score - 4.5 (moderate)  Elevated D dimer, but inconclusive given renal failure  Elevated BNP 3000s, but in the setting of renal failure  Likely secondary to asthma exacerbation vs symptomatic anemia. Rule out PE    Plan  CTA chest PE study ordered  TTE ordered for completion of work-up  See plan for asthma exacerbation

## 2024-08-31 NOTE — ASSESSMENT & PLAN NOTE
Admission in July 2024 for ANGELICA. 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 S/p PLEX.  Patient is anuric and dialysis-dependent  Tues/Thurs/Sat schedule  Access: Right IJ PermCath  Previously initiated on prednisone taper per Nephrology    Plan  Nephrology consulted, appreciate recommendations  Continue PTA cyclophosphamide  Continue PTA sevalemer  Continue PTA atovaquone for PJP prophylaxis  Hold PTA prednisone taper at this time. See plan for asthma exacerbation.

## 2024-08-31 NOTE — ASSESSMENT & PLAN NOTE
Patient presented with 1 week history of acutely worsening dyspnea with intermittent chronic chest tightness.  Tachycardia, tachypnea, dyspnea, and accessory muscle use noted on examination. Does not appear significantly volume overloaded on exam  Wells score - 4.5 (moderate)  Elevated D dimer, but inconclusive given renal failure  Elevated BNP 3000s, but in the setting of renal failure  Likely secondary to asthma exacerbation. Cannot rule out PE    Plan  CTA chest PE study ordered  TTE ordered for completion of work-up  See plan for asthma exacerbation

## 2024-08-31 NOTE — PHYSICAL THERAPY NOTE
PHYSICAL THERAPY CANCELLATION NOTE          Patient Name: Ngozi Beard  Today's Date: 8/31/2024 08/31/24 1106   Note Type   Note type Cancelled Session   Cancel Reasons Medical status   Additional Comments PT eval orders received, chart review performed. Pt pending imaging: CTA chest PE study ordered due to tachycardia, dyspnea, r/o PE. Will hold PT eval and mobility at this time. PT will continue to follow pt as appropriate and as schedule allows.         Marianne Wilson, PT, DPT  08/31/24

## 2024-08-31 NOTE — ASSESSMENT & PLAN NOTE
POA: Patient endorsed several days of generalized weakness and difficulty/inability getting out of bed due to this. She expressed concerns about taking care of herself at home    Plan  PT/OT evaluation apprecaited

## 2024-09-01 PROBLEM — J18.9 MULTIFOCAL PNEUMONIA: Status: ACTIVE | Noted: 2024-09-01

## 2024-09-01 PROBLEM — E87.5 HYPERKALEMIA: Status: RESOLVED | Noted: 2024-08-31 | Resolved: 2024-09-01

## 2024-09-01 LAB
ABO GROUP BLD BPU: NORMAL
ALBUMIN SERPL BCG-MCNC: 3.7 G/DL (ref 3.5–5)
ALP SERPL-CCNC: 97 U/L (ref 34–104)
ALT SERPL W P-5'-P-CCNC: 61 U/L (ref 7–52)
ANION GAP SERPL CALCULATED.3IONS-SCNC: 11 MMOL/L (ref 4–13)
ANISOCYTOSIS BLD QL SMEAR: PRESENT
AST SERPL W P-5'-P-CCNC: 31 U/L (ref 13–39)
BASO STIPL BLD QL SMEAR: PRESENT
BASOPHILS # BLD MANUAL: 0.2 THOUSAND/UL (ref 0–0.1)
BASOPHILS NFR MAR MANUAL: 1 % (ref 0–1)
BILIRUB SERPL-MCNC: 0.44 MG/DL (ref 0.2–1)
BPU ID: NORMAL
BUN SERPL-MCNC: 40 MG/DL (ref 5–25)
CALCIUM SERPL-MCNC: 9.6 MG/DL (ref 8.4–10.2)
CHLORIDE SERPL-SCNC: 98 MMOL/L (ref 96–108)
CO2 SERPL-SCNC: 28 MMOL/L (ref 21–32)
CREAT SERPL-MCNC: 6.86 MG/DL (ref 0.6–1.3)
CROSSMATCH: NORMAL
EOSINOPHIL # BLD MANUAL: 0 THOUSAND/UL (ref 0–0.4)
EOSINOPHIL NFR BLD MANUAL: 0 % (ref 0–6)
ERYTHROCYTE [DISTWIDTH] IN BLOOD BY AUTOMATED COUNT: 18.3 % (ref 11.6–15.1)
GFR SERPL CREATININE-BSD FRML MDRD: 5 ML/MIN/1.73SQ M
GLUCOSE SERPL-MCNC: 107 MG/DL (ref 65–140)
HCT VFR BLD AUTO: 24.6 % (ref 34.8–46.1)
HGB BLD-MCNC: 7.9 G/DL (ref 11.5–15.4)
LYMPHOCYTES # BLD AUTO: 1.96 THOUSAND/UL (ref 0.6–4.47)
LYMPHOCYTES # BLD AUTO: 10 % (ref 14–44)
MAGNESIUM SERPL-MCNC: 2.1 MG/DL (ref 1.9–2.7)
MCH RBC QN AUTO: 28.9 PG (ref 26.8–34.3)
MCHC RBC AUTO-ENTMCNC: 32.1 G/DL (ref 31.4–37.4)
MCV RBC AUTO: 90 FL (ref 82–98)
MONOCYTES # BLD AUTO: 0.59 THOUSAND/UL (ref 0–1.22)
MONOCYTES NFR BLD: 3 % (ref 4–12)
MRSA NOSE QL CULT: NORMAL
MYELOCYTE ABSOLUTE CT: 0.2 THOUSAND/UL (ref 0–0.1)
MYELOCYTES NFR BLD MANUAL: 1 % (ref 0–1)
NEUTROPHILS # BLD MANUAL: 16.62 THOUSAND/UL (ref 1.85–7.62)
NEUTS SEG NFR BLD AUTO: 85 % (ref 43–75)
PHOSPHATE SERPL-MCNC: 4.7 MG/DL (ref 2.3–4.1)
PLATELET # BLD AUTO: 243 THOUSANDS/UL (ref 149–390)
PLATELET BLD QL SMEAR: ADEQUATE
PMV BLD AUTO: 10.2 FL (ref 8.9–12.7)
POLYCHROMASIA BLD QL SMEAR: PRESENT
POTASSIUM SERPL-SCNC: 4.4 MMOL/L (ref 3.5–5.3)
PROT SERPL-MCNC: 6 G/DL (ref 6.4–8.4)
RBC # BLD AUTO: 2.73 MILLION/UL (ref 3.81–5.12)
RBC MORPH BLD: PRESENT
SODIUM SERPL-SCNC: 137 MMOL/L (ref 135–147)
UNIT DISPENSE STATUS: NORMAL
UNIT PRODUCT CODE: NORMAL
UNIT PRODUCT VOLUME: 350 ML
UNIT RH: NORMAL
WBC # BLD AUTO: 19.55 THOUSAND/UL (ref 4.31–10.16)

## 2024-09-01 PROCEDURE — 97163 PT EVAL HIGH COMPLEX 45 MIN: CPT

## 2024-09-01 PROCEDURE — 94760 N-INVAS EAR/PLS OXIMETRY 1: CPT

## 2024-09-01 PROCEDURE — 94640 AIRWAY INHALATION TREATMENT: CPT

## 2024-09-01 PROCEDURE — 99232 SBSQ HOSP IP/OBS MODERATE 35: CPT | Performed by: INTERNAL MEDICINE

## 2024-09-01 PROCEDURE — 99233 SBSQ HOSP IP/OBS HIGH 50: CPT | Performed by: INTERNAL MEDICINE

## 2024-09-01 PROCEDURE — 83735 ASSAY OF MAGNESIUM: CPT | Performed by: STUDENT IN AN ORGANIZED HEALTH CARE EDUCATION/TRAINING PROGRAM

## 2024-09-01 PROCEDURE — 99222 1ST HOSP IP/OBS MODERATE 55: CPT | Performed by: INTERNAL MEDICINE

## 2024-09-01 PROCEDURE — NC001 PR NO CHARGE: Performed by: RADIOLOGY

## 2024-09-01 PROCEDURE — 84100 ASSAY OF PHOSPHORUS: CPT | Performed by: STUDENT IN AN ORGANIZED HEALTH CARE EDUCATION/TRAINING PROGRAM

## 2024-09-01 PROCEDURE — 85007 BL SMEAR W/DIFF WBC COUNT: CPT | Performed by: INTERNAL MEDICINE

## 2024-09-01 PROCEDURE — 80053 COMPREHEN METABOLIC PANEL: CPT | Performed by: INTERNAL MEDICINE

## 2024-09-01 PROCEDURE — 85027 COMPLETE CBC AUTOMATED: CPT | Performed by: INTERNAL MEDICINE

## 2024-09-01 RX ORDER — PREDNISONE 20 MG/1
20 TABLET ORAL DAILY
Status: DISCONTINUED | OUTPATIENT
Start: 2024-09-01 | End: 2024-09-06

## 2024-09-01 RX ADMIN — HEPARIN SODIUM 5000 UNITS: 5000 INJECTION INTRAVENOUS; SUBCUTANEOUS at 16:18

## 2024-09-01 RX ADMIN — IPRATROPIUM BROMIDE 0.5 MG: 0.5 SOLUTION RESPIRATORY (INHALATION) at 14:04

## 2024-09-01 RX ADMIN — ALBUTEROL SULFATE 2.5 MG: 2.5 SOLUTION RESPIRATORY (INHALATION) at 19:34

## 2024-09-01 RX ADMIN — NIFEDIPINE 60 MG: 30 TABLET, EXTENDED RELEASE ORAL at 08:09

## 2024-09-01 RX ADMIN — ALBUTEROL SULFATE 2.5 MG: 2.5 SOLUTION RESPIRATORY (INHALATION) at 07:32

## 2024-09-01 RX ADMIN — LIDOCAINE 5% 1 PATCH: 700 PATCH TOPICAL at 08:08

## 2024-09-01 RX ADMIN — ATORVASTATIN CALCIUM 20 MG: 20 TABLET, FILM COATED ORAL at 08:10

## 2024-09-01 RX ADMIN — TRAZODONE HYDROCHLORIDE 200 MG: 100 TABLET ORAL at 21:42

## 2024-09-01 RX ADMIN — SODIUM ZIRCONIUM CYCLOSILICATE 10 G: 10 POWDER, FOR SUSPENSION ORAL at 08:11

## 2024-09-01 RX ADMIN — MONTELUKAST 10 MG: 10 TABLET, FILM COATED ORAL at 08:08

## 2024-09-01 RX ADMIN — SEVELAMER HYDROCHLORIDE 1600 MG: 800 TABLET ORAL at 16:18

## 2024-09-01 RX ADMIN — POLYSACCHARIDE-IRON COMPLEX 150 MG: 150 CAPSULE ORAL at 08:08

## 2024-09-01 RX ADMIN — IPRATROPIUM BROMIDE 0.5 MG: 0.5 SOLUTION RESPIRATORY (INHALATION) at 19:34

## 2024-09-01 RX ADMIN — HEPARIN SODIUM 5000 UNITS: 5000 INJECTION INTRAVENOUS; SUBCUTANEOUS at 08:08

## 2024-09-01 RX ADMIN — FLUTICASONE FUROATE AND VILANTEROL TRIFENATATE 1 PUFF: 200; 25 POWDER RESPIRATORY (INHALATION) at 08:12

## 2024-09-01 RX ADMIN — ALBUTEROL SULFATE 2.5 MG: 2.5 SOLUTION RESPIRATORY (INHALATION) at 14:04

## 2024-09-01 RX ADMIN — VENLAFAXINE HYDROCHLORIDE 225 MG: 150 CAPSULE, EXTENDED RELEASE ORAL at 08:08

## 2024-09-01 RX ADMIN — CYCLOPHOSPHAMIDE 100 MG: 50 CAPSULE ORAL at 08:11

## 2024-09-01 RX ADMIN — IPRATROPIUM BROMIDE 0.5 MG: 0.5 SOLUTION RESPIRATORY (INHALATION) at 07:32

## 2024-09-01 RX ADMIN — PREDNISONE 20 MG: 20 TABLET ORAL at 17:07

## 2024-09-01 RX ADMIN — SEVELAMER HYDROCHLORIDE 1600 MG: 800 TABLET ORAL at 08:08

## 2024-09-01 RX ADMIN — LAMOTRIGINE 100 MG: 100 TABLET ORAL at 21:42

## 2024-09-01 RX ADMIN — SEVELAMER HYDROCHLORIDE 1600 MG: 800 TABLET ORAL at 11:54

## 2024-09-01 RX ADMIN — ATOVAQUONE 1500 MG: 750 SUSPENSION ORAL at 08:12

## 2024-09-01 RX ADMIN — PANTOPRAZOLE SODIUM 40 MG: 40 TABLET, DELAYED RELEASE ORAL at 05:52

## 2024-09-01 RX ADMIN — GUAIFENESIN AND DEXTROMETHORPHAN 10 ML: 100; 10 SYRUP ORAL at 21:42

## 2024-09-01 NOTE — PHYSICAL THERAPY NOTE
PHYSICAL THERAPY EVALUATION NOTE          Patient Name: Ngozi Beard  Today's Date: 2024        AGE:   64 y.o.  Mrn:   [id number]  ADMIT DX:  Asthma [J45.909]  Hypoxia [R09.02]  COPD exacerbation (HCC) [J44.1]    Past Medical History:  Past Medical History:   Diagnosis Date    Asthma     Chronic pain     Hypertension     Renal disorder     Sepsis (HCC) 07/10/2024       Past Surgical History:  Past Surgical History:   Procedure Laterality Date    BACK SURGERY      COLONOSCOPY      IR BIOPSY KIDNEY RANDOM  2024    IR TEMPORARY DIALYSIS CATHETER PLACEMENT  7/15/2024    IR TUNNELED DIALYSIS CATHETER CHECK/CHANGE/REPOSITION/ANGIOPLASTY  2024    IR TUNNELED DIALYSIS CATHETER PLACEMENT  2024    TUBAL LIGATION       Length Of Stay: 2        PHYSICAL THERAPY EVALUATION:    Patient's identity confirmed via 2 patient identifiers (full name and ) at start of session       24 1330   PT Last Visit   PT Visit Date 24   Note Type   Note type Evaluation   Pain Assessment   Pain Assessment Tool 0-10   Pain Score No Pain   Restrictions/Precautions   Weight Bearing Precautions Per Order No   Other Precautions Chair Alarm;Bed Alarm;O2;Fall Risk  (2L O2 via NC)   Home Living   Type of Home Apartment   Home Layout One level;Performs ADLs on one level;Able to live on main level with bedroom/bathroom  (3+1 DILLAN, pt reports she has assistance w/ stair negotiation to/from HD appointments)   Bathroom Shower/Tub Tub/shower unit   Bathroom Toilet Standard   Home Equipment Cane   Prior Function   Level of Davy Independent with functional mobility;Independent with ADLs;Needs assistance with IADLS   Lives With Other (Comment)  (roommate)   Receives Help From Family  (supportive sister, looking for ALFs for pt to move to)   IADLs Family/Friend/Other provides transportation;Independent with meal prep;Independent with medication management  (pt uses LANTA  "van)   Falls in the last 6 months 0   Comments At baseline pt amb ind w/ SPC, pt  reports   General   Additional Pertinent History CTA chest pe study: no pulmonary embolism. SpO2 >90% on 2L throughout eval   Family/Caregiver Present No   Cognition   Overall Cognitive Status WFL  (appeared WFL in conversation)   Arousal/Participation Cooperative   Orientation Level Oriented X4   Memory Decreased recall of precautions   Following Commands Follows one step commands without difficulty   Comments Pt ID via name and ; pt agreeable to PT eval and mobility, pt w/ questionable safety awareness, impulsive at times   Subjective   Subjective \"I'm just so tired\"   Strength RLE   RLE Overall Strength   (grossly assessed w/ functional mobility, at least 3+/5)   Strength LLE   LLE Overall Strength   (grossly assessed w/ functional mobility, at least 3+/5)   Light Touch   RLE Light Touch Grossly intact   LLE Light Touch Grossly intact   Bed Mobility   Supine to Sit 5  Supervision   Additional items Assist x 1;HOB elevated;Bedrails;Increased time required;Verbal cues   Additional Comments able to maintain sitting balance at EOB w/ supervision   Transfers   Sit to Stand 4  Minimal assistance   Additional items Assist x 1;Impulsive;Increased time required;Verbal cues   Stand to Sit 4  Minimal assistance   Additional items Assist x 1;Armrests;Increased time required;Verbal cues   Ambulation/Elevation   Gait pattern Improper Weight shift;Wide JUAN;Decreased foot clearance;Short stride;Decreased hip extension;Decreased heel strike;Decreased toe off   Gait Assistance 4  Minimal assist   Additional items Assist x 1;Verbal cues   Assistive Device Rolling walker   Distance 4'  (pt reports she is unable to ambulate further due to fatigue)   Balance   Static Sitting Fair +   Dynamic Sitting Fair   Static Standing Fair -  (w/ RW)   Dynamic Standing Poor +   Ambulatory Poor +  (w/ RW)   Endurance Deficit   Endurance Deficit Yes   Endurance " Deficit Description decreased overall activity tolerance, limited ambulatory endurance   Activity Tolerance   Activity Tolerance Patient limited by fatigue   Nurse Made Aware WANG Donald   Assessment   Prognosis Good   Problem List Decreased strength;Decreased endurance;Impaired balance;Decreased mobility   Assessment Ngozi Beard is a 64 y.o. Female who [facility] due to asthma and diagnosis of moderate persistent asthma w/ acute exacerbation. Orders for PT eval and treat received, w/ activity orders of up and OOB as tolerated. Comorbidities affecting pt's functional mobility at time of evaluation include: HTN, anemia, SOB, generalized weakness, asthma, SIRS. Personal factors affecting DC include: ambulating w/ assistive device, stairs to enter home, inability to ambulate household distances, inability to navigate level surfaces w/o external assistance, and inability to perform IADLs. At baseline, pt mobilizes independently w/ SPC, and w/ 0 fall(s) in the previous 6 months. Upon evaluation, pt presents w/ the following deficits: impaired strength, impaired balance, decreased endurance/activity tolerance, and gait deviations. Pt currently requires  supervision for bed mobility, supervision for transfers, supervision w/ RW for ambulation. Pt's clinical presentation is unstable/unpredictable due to abnormal lab values, need for increased assistance w/ functional mobility compared to baseline, need for input for mobility technique, recent drastic decline in mobility status, ongoing medical management. From a PT/mobility standpoint given the above findings, DC recommendation is level: II (Moderate Rehab Resource Intensity). During current admission, pt will benefit from continued skilled inpatient PT in the acute care setting in order to address the above deficits and to maximize function and mobility prior to DC from acute care.   Goals   Patient Goals to feel better   [facility] Expiration Date 09/11/24    Short Term Goal #1 Pt will: perform bed mobility w/ mod I to decrease pt's burden of care and increase pt's independence w/ repositioning in bed; perform transfers w/ mod I to promote OOB mobility; ambulate at least 150' w/ LRAD and mod I to increase pt's ambulatory endurance/tolerance; negotiate 3 stair(s) w/ UE support and min Ax1 to facilitate pt returning to previous living environment; increase all balance ratings by at least 1 grade to decrease pt's risk of falls   PT Treatment Day 0   Plan   Treatment/Interventions Functional transfer training;LE strengthening/ROM;Elevations;Therapeutic exercise;Endurance training;Patient/family training;Equipment eval/education;Bed mobility;Gait training;Compensatory technique education   PT Frequency 3-5x/wk   Discharge Recommendation   Rehab Resource Intensity Level, PT II (Moderate Resource Intensity)   Equipment Recommended Walker   Walker Package Recommended Wheeled walker   Change/add to Walker Package? No   AM-PAC Basic Mobility Inpatient   Turning in Flat Bed Without Bedrails 3   Lying on Back to Sitting on Edge of Flat Bed Without Bedrails 3   Moving Bed to Chair 3   Standing Up From Chair Using Arms 3   Walk in Room 2   Climb 3-5 Stairs With Railing 1   Basic Mobility Inpatient Raw Score 15   Basic Mobility Standardized Score 36.97   [facility] Highest Level Of Mobility   -[facility] Goal 4: Move to chair/commode   -[facility] Achieved 4: Move to chair/commode   End of Consult   Patient Position at End of Consult Bedside chair;Bed/Chair alarm activated;All needs within reach       The patient's AM-PAC Basic Mobility Inpatient Short Form Raw Score is 15. A Raw score of less than or equal to 16 suggests the patient may benefit from discharge to post-acute rehabilitation services. Please also refer to the recommendation of the Physical Therapist for safe discharge planning.    Pt will benefit from skilled inpatient PT during this admission in order to facilitate progress  towards goals and to maximize functional independence prior to DC      DC rec: level II (Moderate Rehab Resource Intensity)        Marianne Wilson, PT, DPT  09/01/24        l

## 2024-09-01 NOTE — QUICK NOTE
Patient seen briefly at bedside.  Chart reviewed.  Vitals reviewed.  Labs reviewed.  Patient had hemodialysis yesterday.  Hyperkalemia has resolved.  0.9 L ultrafiltration was achieved.  System clotted 4 times during treatment.  Blood flow rate was maintained at 200 mL/min with lines reversed.  Treatment was terminated after fourth system clotting.  IR has been consulted for evaluation of permacath with possibility of exchange.  Next nephrology follow-up will be tomorrow.  Please call with questions in interim.

## 2024-09-01 NOTE — ASSESSMENT & PLAN NOTE
-CT chest PE study revealed no evidence of PE  -There is persistent tree-in-bud nodularity in the lungs suggestive of a widespread infectious bronchiolitis that may be secondary to an atypical mycobacterium. Follicular bronchiolitis and acute hypersensitivity pneumonitis could also be in the differential   -New mild patchy bilateral multifocal pneumonia  -new moderate bilateral pleural effusions   - ID: Suspect chronic process, hold antibiotics    Plan:   Pulmonology consult placed for potential bronchoscopy

## 2024-09-01 NOTE — PROGRESS NOTES
Vancomycin IV Pharmacy-to-Dose Consultation    Ngozi Beard is a 64 y.o. female who is currently receiving Vancomycin IV with management by the Pharmacy Consult service.    Relevant clinical data and objective / subjective history reviewed.  Vancomycin Assessment:  Indication and Goal AUC/Trough: Bacteremia (goal -600, trough >10)    Micro:       Renal Function:  Renal replacement: HD  Days of Therapy: 2  Current Dose: 750mg after each dialysis session    Vancomycin Plan:  New Dosing: continue current dosing  Next Level: 0600 on 9/7  Renal Function Monitoring: Daily BMP and UOP  Pharmacy will continue to follow closely for s/sx of nephrotoxicity, infusion reactions and appropriateness of therapy.  BMP and CBC will be ordered per protocol. We will continue to follow the patient’s culture results and clinical progress daily.    Kristy Campos, Pharmacist

## 2024-09-01 NOTE — ASSESSMENT & PLAN NOTE
Multifactorial, respiratory failure secondary to chronic pulmonary infection, volume overload, chronic anemia, chronic deconditioning from being ill    Plan  PT/OT evaluation apprecaited

## 2024-09-01 NOTE — PROGRESS NOTES
NEPHROLOGY HOSPITAL PROGRESS NOTE   Ngozi Beard 64 y.o. female MRN: [id number]  Unit/Bed#: S -01 Encounter: [id number]  Reason for Consult: Dialysis dependent ANGELICA    ASSESSMENT and PLAN:  Ngozi Beard is a 64 y.o. female who was admitted to [facility] after presenting with worsening shortness of breath with exertion. A renal consultation is requested today for assistance in the management of dialysis dependent ANGELICA.     1.  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  TTS at [facility].  Access is right chest wall permacath.  She had hemodialysis yesterday.  System clotted 4 times during treatment. Blood flow rate was maintained at 200 mL/min with lines reversed and there was inability to increase blood flow. Treatment was terminated after fourth system clotting.  IR has been consulted for evaluation of permacath with possibility of exchange.      2.  Immunosuppression.  Status post Plex for 14 days.  Most recent anti-GBM level on 07/29 still more than 8.  Currently on oral Cytoxan 100 mg daily.  She is also on prednisone taper.  Continue atovaquone for PCP prophylaxis.       3.  Hypertension.  Home Rx: Nifedipine 60 mg daily.  Blood pressure is currently controlled.  Continue current Rx.     4.  Hyperkalemia.  Serum potassium level today 4.4 improved after dialysis.  Continue Lokelma 10 g daily.     5.  Anemia in renal insufficiency.  She is on Mircera 60 mcg every 2 weeks as outpatient.  Hemoglobin today 7.9 improved from 6.7 after 1 U PRBC on 08/31 .  Epogen 6000 units with dialysis.     6.  Hyperphosphatemia.  Continue Renvela 2 tablet 3 times daily with meals.     7.  Acute hypoxic respiratory failure.  Currently on prednisone 20 mg daily.  CT chest showing persistent tree-in-bud nodularity in the lungs suggestive of widespread infectious bronchiolitis.  Agree with infectious disease regarding pulmonology consultation for further evaluation given history of anti-GBM  disease.    SUBJECTIVE / 24H INTERVAL HISTORY:  Patient seen and examined this evening.  Dyspnea improved but still present.  She had hemodialysis yesterday.    OBJECTIVE:  Current Weight: Weight - Scale: 107 kg (235 lb 3.2 oz)  Vitals:    09/01/24 0600 09/01/24 0732 09/01/24 0805 09/01/24 1407   BP:   125/83    BP Location:   Left arm    Pulse:   (!) 112    Resp:   20    Temp:   97.7 °F (36.5 °C)    TempSrc:   Oral    SpO2:  94% 94% 97%   Weight: 107 kg (235 lb 3.2 oz)      Height:           Intake/Output Summary (Last 24 hours) at 9/1/2024 1720  Last data filed at 9/1/2024 1407  Gross per 24 hour   Intake 1680 ml   Output 2348 ml   Net -668 ml     Review of Systems   Constitutional:  Negative for chills and fever.   HENT:  Negative for ear pain and sore throat.    Eyes:  Negative for pain and visual disturbance.   Respiratory:  Positive for shortness of breath. Negative for cough.    Cardiovascular:  Negative for chest pain and palpitations.   Gastrointestinal:  Negative for abdominal pain and vomiting.   Genitourinary:  Negative for dysuria and hematuria.   Musculoskeletal:  Negative for arthralgias and back pain.   Skin:  Negative for color change and rash.   Neurological:  Negative for seizures and syncope.   All other systems reviewed and are negative.    Physical Exam  Vitals and nursing note reviewed.   Constitutional:       General: She is not in acute distress.     Appearance: She is well-developed.   HENT:      Head: Normocephalic and atraumatic.   Eyes:      Conjunctiva/sclera: Conjunctivae normal.   Cardiovascular:      Rate and Rhythm: Normal rate and regular rhythm.      Heart sounds: No murmur heard.     Comments: Right chest wall permacath present  Pulmonary:      Effort: Pulmonary effort is normal. No respiratory distress.      Breath sounds: Wheezing present.   Abdominal:      Palpations: Abdomen is soft.      Tenderness: There is no abdominal tenderness.   Musculoskeletal:         General: No  swelling.      Cervical back: Neck supple.      Right lower leg: No edema.      Left lower leg: No edema.   Skin:     General: Skin is warm and dry.      Capillary Refill: Capillary refill takes less than 2 seconds.   Neurological:      Mental Status: She is alert.   Psychiatric:         Mood and Affect: Mood normal.       Medications:    Current Facility-Administered Medications:     acetaminophen (TYLENOL) tablet 650 mg, 650 mg, Oral, Q6H PRN, Kely Robert MD    albuterol (PROVENTIL HFA,VENTOLIN HFA) inhaler 2 puff, 2 puff, Inhalation, Q4H PRN, Kely Robert MD, 2 puff at 08/31/24 2039    albuterol inhalation solution 2.5 mg, 2.5 mg, Nebulization, TID, Todd Dickens MD, 2.5 mg at 09/01/24 1404    atorvastatin (LIPITOR) tablet 20 mg, 20 mg, Oral, Daily, Kely Robert MD, 20 mg at 09/01/24 0810    atovaquone (MEPRON) oral suspension 1,500 mg, 1,500 mg, Oral, Daily, Kely Robert MD, 1,500 mg at 09/01/24 0812    cyclophosphamide (CYTOXAN) capsule 100 mg, 100 mg, Oral, Daily, Kely Robert MD, 100 mg at 09/01/24 0811    dextromethorphan-guaiFENesin (ROBITUSSIN DM) oral syrup 10 mL, 10 mL, Oral, Q4H PRN, Kely Robert MD, 10 mL at 08/31/24 1349    epoetin shavonne (EPOGEN,PROCRIT) injection 6,000 Units, 6,000 Units, Intravenous, After Dialysis, Tirso Montez MD, 6,000 Units at 08/31/24 1855    fluticasone-vilanterol 200-25 mcg/actuation 1 puff, 1 puff, Inhalation, Daily, Robert Gilbert MD, 1 puff at 09/01/24 0812    heparin (porcine) subcutaneous injection 5,000 Units, 5,000 Units, Subcutaneous, Q8H JACK, Kely Robert MD, 5,000 Units at 09/01/24 1618    ipratropium (ATROVENT) 0.02 % inhalation solution 0.5 mg, 0.5 mg, Nebulization, TID, Todd Dickens MD, 0.5 mg at 09/01/24 1404    iron polysaccharides (FERREX) capsule 150 mg, 150 mg, Oral, Daily, Kely Robert MD, 150 mg at 09/01/24 0808    lamoTRIgine (LaMICtal) tablet 100 mg, 100 mg, Oral, HS, Kely Robert MD, 100 mg at 08/31/24 2119     lidocaine (LIDODERM) 5 % patch 1 patch, 1 patch, Topical, Daily, Trish Kline MD, 1 patch at 09/01/24 0808    montelukast (SINGULAIR) tablet 10 mg, 10 mg, Oral, Daily, Kely Robert MD, 10 mg at 09/01/24 0808    NIFEdipine (PROCARDIA XL) 24 hr tablet 60 mg, 60 mg, Oral, Daily, Kely Robert MD, 60 mg at 09/01/24 0809    pantoprazole (PROTONIX) EC tablet 40 mg, 40 mg, Oral, Daily Before Breakfast, Kely Robert MD, 40 mg at 09/01/24 0552    predniSONE tablet 20 mg, 20 mg, Oral, Daily, Gerber Kim Jr., DO, 20 mg at 09/01/24 1707    sevelamer (RENAGEL) tablet 1,600 mg, 1,600 mg, Oral, TID With Meals, Kely Robert MD, 1,600 mg at 09/01/24 1618    Sodium Zirconium Cyclosilicate (Lokelma) 10 g, 10 g, Oral, Daily, Trish Kline MD, 10 g at 09/01/24 0811    traZODone (DESYREL) tablet 200 mg, 200 mg, Oral, HS, Kely Robert MD, 200 mg at 08/31/24 2112    trimethobenzamide (TIGAN) IM injection 200 mg, 200 mg, Intramuscular, Q6H PRN, Kely Robert MD, 200 mg at 08/30/24 2208    venlafaxine (EFFEXOR-XR) 24 hr capsule 225 mg, 225 mg, Oral, Daily, Kely Robert MD, 225 mg at 09/01/24 0808    Laboratory Results:  Results from last 7 days   Lab Units 09/01/24  0521 08/31/24  1556 08/31/24  0755 08/31/24  0443 08/31/24  0033 08/30/24  2025   WBC Thousand/uL 19.55*  --   --  10.64*  --  14.45*   HEMOGLOBIN g/dL 7.9* 7.9* 6.9* 6.7*  --  7.1*   HEMATOCRIT % 24.6* 25.4* 22.2* 22.1*  --  23.0*   PLATELETS Thousands/uL 243  --   --  245  --  258   POTASSIUM mmol/L 4.4  --   --  5.6* 5.7* 5.7*   CHLORIDE mmol/L 98  --   --  102  --  102   CO2 mmol/L 28  --   --  25  --  22   BUN mg/dL 40*  --   --  51*  --  44*   CREATININE mg/dL 6.86*  --   --  8.68*  --  8.11*   CALCIUM mg/dL 9.6  --   --  9.6  --  9.8   MAGNESIUM mg/dL 2.1  --   --  2.3  --  2.1   PHOSPHORUS mg/dL 4.7*  --   --  5.8*  --  6.1*       Portions of the record may have been created with voice recognition software. Occasional wrong word or  "\"sound a like\" substitutions may have occurred due to the inherent limitations of voice recognition software. Read the chart carefully and recognize, using context, where substitutions have occurred. If you have any questions, please contact the dictating provider.    "

## 2024-09-01 NOTE — ASSESSMENT & PLAN NOTE
Mild Bilateral wheezing was noted in the lower lung base upon initial presentation.  Does not require frequent inhaler use as an outpatient setting.  Low suspicion for exacerbation for the clinical symptoms presented during this hospital on admission    Plan  Continue albuterol-ipratropium nebs  Pulmonology consult in place

## 2024-09-01 NOTE — PLAN OF CARE
Problem: PHYSICAL THERAPY ADULT  Goal: Performs mobility at highest level of function for planned discharge setting.  See evaluation for individualized goals.  Description: Treatment/Interventions: Functional transfer training, LE strengthening/ROM, Elevations, Therapeutic exercise, Endurance training, Patient/family training, Equipment eval/education, Bed mobility, Gait training, Compensatory technique education  Equipment Recommended: Walker       See flowsheet documentation for full assessment, interventions and recommendations.  9/1/2024 1544 by Marianne Wilson PT  Note: Prognosis: Good  Problem List: Decreased strength, Decreased endurance, Impaired balance, Decreased mobility  Assessment: Ngozi Beard is a 64 y.o. Female who presents to [facility] on 9/1/24 due to asthma and diagnosis of moderate persistent asthma w/ acute exacerbation. Orders for PT eval and treat received, w/ activity orders of up and OOB as tolerated. Comorbidities affecting pt's functional mobility at time of evaluation include: HTN, anemia, SOB, generalized weakness, asthma, SIRS. Personal factors affecting DC include: ambulating w/ assistive device, stairs to enter home, inability to ambulate household distances, inability to navigate level surfaces w/o external assistance, and inability to perform IADLs. At baseline, pt mobilizes independently w/ SPC, and w/ 0 fall(s) in the previous 6 months. Upon evaluation, pt presents w/ the following deficits: impaired strength, impaired balance, decreased endurance/activity tolerance, and gait deviations. Pt currently requires  supervision for bed mobility, supervision for transfers, supervision w/ RW for ambulation. Pt's clinical presentation is unstable/unpredictable due to abnormal lab values, need for increased assistance w/ functional mobility compared to baseline, need for input for mobility technique, recent drastic decline in mobility status, ongoing medical management. From a PT/mobility  standpoint given the above findings, DC recommendation is level: II (Moderate Rehab Resource Intensity). During current admission, pt will benefit from continued skilled inpatient PT in the acute care setting in order to address the above deficits and to maximize function and mobility prior to DC from acute care.        Rehab Resource Intensity Level, PT: II (Moderate Resource Intensity)    See flowsheet documentation for full assessment.

## 2024-09-01 NOTE — ASSESSMENT & PLAN NOTE
Recent Labs     08/31/24  0755 08/31/24  1556 09/01/24  0521   HGB 6.9* 7.9* 7.9*        Baseline Hgb 7-8  Etiology: component of iron deficiency and renal disease  Has received Epogen during prior hospitalization    Plan  Monitor Hgb, transfuse for < 7  Continue PTA Ferrex

## 2024-09-01 NOTE — ASSESSMENT & PLAN NOTE
On admission, met SIRS criteria with leukocytosis and tachycardia  Procalcitonin 0.77 (in the setting of renal disease)  COVID/Flu/RSV negative  CXR - mildly pulmonary vascular congestion. No evidence of consolidation  CT chest revealed evidence of multifocal pneumonia    Multifactorial, steroid use, hypoxic respiratory failure, volume overload, chronic pulmonary infection    Plan  Monitor WBC and fever curve  Follow blood cultures

## 2024-09-01 NOTE — PROGRESS NOTES
Ngozi Beard is a 64 y.o. female who is currently ordered Vancomycin IV with management by the Pharmacy Consult service.  Relevant clinical data and objective / subjective history reviewed.  Vancomycin Assessment:  Indication and Goal AUC/Trough: Bacteremia (goal -600, trough >10)  Clinical Status:  new  Micro:     Renal Function:  SCr: 8.68 mg/dL  CrCl: 7 mL/min  Renal replacement: HD Tues/Thur/Sat  Days of Therapy: 1  Current Dose: 1750 mg IV loading dose given  Vancomycin Plan:  New Dosing: 10 mg/kg (750 mg) IV after each HD session (Tues/Thur/Sat)  Next Level: 9/7 @ 0600; target pre-HD level of 15-20 mcg/mL  Renal Function Monitoring: Daily BMP and UOP  Pharmacy will continue to follow closely for s/sx of nephrotoxicity, infusion reactions and appropriateness of therapy.  BMP and CBC will be ordered per protocol. We will continue to follow the patient’s culture results and clinical progress daily.    Darcie Madrigal, Pharmacist

## 2024-09-01 NOTE — ASSESSMENT & PLAN NOTE
-CT chest PE study revealed no evidence of PE  -There is persistent tree-in-bud nodularity in the lungs suggestive of a widespread infectious bronchiolitis that may be secondary to an atypical mycobacterium. Follicular bronchiolitis and acute hypersensitivity pneumonitis could also be in the differential   -New mild patchy bilateral multifocal pneumonia  -new moderate bilateral pleural effusions     Plan:   Infectious disease consult placed, appreciate recommendations for antibiotic

## 2024-09-01 NOTE — ASSESSMENT & PLAN NOTE
CLINE: Negative PE, BNP 3019, CT chest-bilateral pleural effusion, tree-in-bud nodularity    Multifactorial, contributing factors of volume overload, chronic anemia, underlying COPD and asthma.  Ruling out Mycobacterium infection.     Plan  Pending pulmonology evaluation, bronchoscopy, bronchoalveolar lavage

## 2024-09-01 NOTE — CONSULTS
[facility] Record Consult  IR has been consulted to evaluate the patient, determine the appropriate procedure, and whether or not a procedure can and should be performed regarding the care of Ngozi Beard.    We were consulted by nephrology concerning Ngozi Beard, and to possibly perform a tunneled dialysis catheter check possible exchange if medically appropriate for the patient. The patient is aware that a specialty consultation is taking place, and agrees to the IR Consult on their behalf.     Assessment/Plan:   65 yo female with ESRD on HD having difficulty with catheter during dialysis.  Catheter check possible intervention is desired.  This is reasonable and will be arranged by the IR staff.  Anticipate procedure on 9/3.  Coagulation labs are elevated and will need to be corrected and ac held for anticipated procedure on 9/3    I spent 20 minutes in medical consultative time evaluating the medical record and imaging of Ngozi Beard.    Thank you for allowing [facility] to participate in the care of Ngozi Beard. Please don't hesitate to call or TigerText us with any questions.     Ariel Park, DO

## 2024-09-01 NOTE — PROGRESS NOTES
[facility]  Progress Note  Name: Ngozi Beard I  MRN: [id number]  Unit/Bed#: S -01 I Date of Admission: 8/30/2024   Date of Service: 9/1/2024 I Hospital Day: 2    Assessment & Plan   * Moderate persistent asthma with acute exacerbation  Assessment & Plan  Patient presented with 1 week history of acutely worsening dyspnea with intermittent chest tightness. She reports persistent cough for 6 months that is intermittently productive of yellow sputum. Denies fever, chills, worsening of cough. She does not require supplemental oxygen at baseline  Reports noncompliance with inhalers for several days prior to admission.   S/p Solu-medrol 80 mg and albuterol-ipratropium DuoNebs by EMS/ED.  On initial examination, tachycardic, dyspneic, and using accessory muscles. Mild end-expiratory wheezing. Requiring 2-3 L/min O2.  Possible underlying COPD, but no formal PFTs in chart. These were ordered by outpatient Pulmonology, but not completed to date.      Plan  Continue supplemental oxygen prn, titrate to maintain SpO2 > 92%  Continue albuterol-ipratropium nebs  Will begin prednisone 20 mg daily for 8 days  Pulmonology consult in place    Multifocal pneumonia  Assessment & Plan  -CT chest PE study revealed no evidence of PE  -There is persistent tree-in-bud nodularity in the lungs suggestive of a widespread infectious bronchiolitis that may be secondary to an atypical mycobacterium. Follicular bronchiolitis and acute hypersensitivity pneumonitis could also be in the differential   -New mild patchy bilateral multifocal pneumonia  -new moderate bilateral pleural effusions     Plan:   Infectious disease consult placed, appreciate recommendations for antibiotic  Pulmonology consult placed for potential bronchoscopy      Generalized weakness  Assessment & Plan  POA: Patient endorsed several days of generalized weakness and difficulty/inability getting out of bed due to this. She expressed concerns  about taking care of herself at home    Plan  PT/OT evaluation apprecaited    Elevated transaminase level  Assessment & Plan  Recent Labs     08/30/24 2025 08/31/24  0443 09/01/24  0521   AST 65* 57* 31   ALT 60* 59* 61*   INR 1.99*  --   --        INR 1.99  Unclear etiology. Possibly secondary to atovaquone use  Asymptomatic    Plan  Trend CMP  Consider RUQ US and acute hepatitis panel    SIRS (systemic inflammatory response syndrome) (HCC)  Assessment & Plan  On admission, met SIRS criteria with leukocytosis and tachycardia  Patient endorses chronic cough for 6 months, intermittently productive of yellow sputum. Remains afebrile.  Procalcitonin 0.77 (in the setting of renal disease)  COVID/Flu/RSV negative  CXR - mildly pulmonary vascular congestion. 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 S/p PLEX.  Patient is anuric and dialysis-dependent  Tues/Thurs/Sat schedule  Access: Right IJ PermCath  Previously initiated on prednisone taper per Nephrology    Plan  Nephrology consulted, appreciate recommendations  Continue PTA cyclophosphamide  Continue PTA sevalemer  Continue PTA atovaquone for PJP prophylaxis  Initiate prednisone taper at 20 mg once daily    Shortness of breath  Assessment & Plan  Patient presented with 1 week history of acutely worsening dyspnea with intermittent chronic chest tightness.  Tachycardia, tachypnea, dyspnea, and accessory muscle use noted on examination. Does not appear significantly volume overloaded on exam  Wells score - 4.5 (moderate)  Elevated D dimer, but inconclusive given renal  failure  Elevated BNP 3000s, but in the setting of renal failure  Likely secondary to asthma exacerbation vs symptomatic anemia. Rule out PE    Plan  CTA chest PE study: No pulmonary embolism  TTE ordered for completion of work-up  See plan for asthma exacerbation    Anemia  Assessment & Plan  Recent Labs     08/31/24  0755 08/31/24  1556 09/01/24  0521   HGB 6.9* 7.9* 7.9*        Baseline Hgb 7-8  Etiology: component of iron deficiency and renal disease  Has received Epogen during prior hospitalization    Plan  Monitor Hgb, transfuse for < 7  Continue PTA Ferrex    Dyslipidemia  Assessment & Plan  Continue PTA Lipitor    Primary hypertension  Assessment & Plan  Continue PTA nifedipine    Bipolar 1 disorder (HCC)  Assessment & Plan  Continue PTA Lamictal, venlafaxine (150 mg and 75 mg daily in the morning), and trazodone      VTE Pharmacologic Prophylaxis: VTE Score: 7 High Risk (Score >/= 5) - Pharmacological DVT Prophylaxis Ordered: heparin. Sequential Compression Devices Ordered.    Mobility:   Basic Mobility Inpatient Raw Score: 15  JH-HLM Goal: 4: Move to chair/commode  JH-HLM Achieved: 4: Move to chair/commode  JH-HLM Goal achieved. Continue to encourage appropriate mobility.    Patient Centered Rounds: I performed bedside rounds with nursing staff today.  Discussions with Specialists or Other Care Team Provider: Infectious disease, interventional radiology, nephrology    Education and Discussions with Family / Patient: Attempted to update  (sister) via phone. Left voicemail.     Current Length of Stay: 2 day(s)  Current Patient Status: Inpatient   Discharge Plan: Anticipate discharge in 24-48 hrs to discharge location to be determined pending rehab evaluations.    Code Status: Level 1 - Full Code    Subjective:   Patient was seen and examined at bedside, no acute events overnight.  Her shortness of breath has been unchanged since yesterday, she endorses a chest tightness however there was no  chest pain.  She denies any fevers or chills.  She endorses a productive cough with yellow mucus production.    Objective:     Vitals:   Temp (24hrs), Av °F (36.7 °C), Min:97.7 °F (36.5 °C), Max:98.3 °F (36.8 °C)    Temp:  [97.7 °F (36.5 °C)-98.3 °F (36.8 °C)] 97.7 °F (36.5 °C)  HR:  [] 112  Resp:  [20] 20  BP: (114-136)/(71-86) 125/83  SpO2:  [91 %-99 %] 97 %  Body mass index is 41.66 kg/m².     Input and Output Summary (last 24 hours):     Intake/Output Summary (Last 24 hours) at 2024 1713  Last data filed at 2024 1407  Gross per 24 hour   Intake 1680 ml   Output 2348 ml   Net -668 ml       Physical Exam:   Physical Exam  Constitutional:       General: She is not in acute distress.     Appearance: She is obese. She is ill-appearing.   Cardiovascular:      Rate and Rhythm: Regular rhythm. Tachycardia present.      Pulses: Normal pulses.      Heart sounds: Normal heart sounds. No murmur heard.  Pulmonary:      Effort: Pulmonary effort is normal. No respiratory distress.      Breath sounds: Wheezing present.   Abdominal:      General: Bowel sounds are normal. There is no distension.      Palpations: Abdomen is soft.      Tenderness: There is no abdominal tenderness.   Musculoskeletal:      Right lower leg: Edema present.      Left lower leg: Edema present.      Comments: +2 pitting edema bilaterally   Skin:     General: Skin is warm and dry.      Capillary Refill: Capillary refill takes less than 2 seconds.   Neurological:      General: No focal deficit present.      Sensory: No sensory deficit.      Motor: No weakness.   Psychiatric:         Mood and Affect: Mood normal.         Behavior: Behavior normal.          Additional Data:     Labs:  Results from last 7 days   Lab Units 24  0521 24  0755 24  0443   WBC Thousand/uL 19.55*  --  10.64*   HEMOGLOBIN g/dL 7.9*   < > 6.7*   HEMATOCRIT % 24.6*   < > 22.1*   PLATELETS Thousands/uL 243  --  245   BANDS PCT %  --   --  3   LYMPHO  PCT % 10*  --  4*   MONO PCT % 3*  --  4   EOS PCT % 0  --  0    < > = values in this interval not displayed.     Results from last 7 days   Lab Units 09/01/24  0521   SODIUM mmol/L 137   POTASSIUM mmol/L 4.4   CHLORIDE mmol/L 98   CO2 mmol/L 28   BUN mg/dL 40*   CREATININE mg/dL 6.86*   ANION GAP mmol/L 11   CALCIUM mg/dL 9.6   ALBUMIN g/dL 3.7   TOTAL BILIRUBIN mg/dL 0.44   ALK PHOS U/L 97   ALT U/L 61*   AST U/L 31   GLUCOSE RANDOM mg/dL 107     Results from last 7 days   Lab Units 08/30/24 2025   INR  1.99*             Results from last 7 days   Lab Units 08/31/24  0443 08/30/24 2025   PROCALCITONIN ng/ml 1.00* 0.77*       Lines/Drains:  Invasive Devices       Peripheral Intravenous Line  Duration             Peripheral IV 08/30/24 Right Antecubital 1 day              Hemodialysis Catheter  Duration             HD Permanent Double Catheter 20 days                      Telemetry:  Telemetry Orders (From admission, onward)               24 Hour Telemetry Monitoring  Continuous x 24 Hours (Telem)        Question:  Reason for 24 Hour Telemetry  Answer:  Decompensated CHF- and any one of the following: continuous diuretic infusion or total diuretic dose >200 mg daily, associated electrolyte derangement (I.e. K < 3.0), ionotropic drip (continuous infusion), hx of ventricular arrhythmia, or new EF < 35%                     Telemetry Reviewed: Sinus Tachycardia  Indication for Continued Telemetry Use: Metabolic/electrolyte disturbance with high probability of dysrhythmia             Imaging: Personally reviewed the following imaging: chest CT scan    Recent Cultures (last 7 days):   Results from last 7 days   Lab Units 08/30/24 2039   BLOOD CULTURE  Staphylococcus epidermidis*  No Growth at 24 hrs.   GRAM STAIN RESULT  Gram positive cocci in clusters*       Last 24 Hours Medication List:   Current Facility-Administered Medications   Medication Dose Route Frequency Provider Last Rate    acetaminophen  650 mg Oral Q6H  PRN Kely Robert MD      albuterol  2 puff Inhalation Q4H PRN Kely Robert MD      albuterol  2.5 mg Nebulization TID Todd Dickens MD      atorvastatin  20 mg Oral Daily Kely Robert MD      atovaquone  1,500 mg Oral Daily Kely Robert MD      cyclophosphamide  100 mg Oral Daily Kely Robert MD      dextromethorphan-guaiFENesin  10 mL Oral Q4H PRN Kely Robert MD      epoetin shavonne  6,000 Units Intravenous After Dialysis Tirso Montez MD      fluticasone-vilanterol  1 puff Inhalation Daily Robert Gilbert MD      heparin (porcine)  5,000 Units Subcutaneous Q8H JACK Kely Robert MD      ipratropium  0.5 mg Nebulization TID Todd Dickens MD      iron polysaccharides  150 mg Oral Daily Kely Robert MD      lamoTRIgine  100 mg Oral HS Kely Robert MD      lidocaine  1 patch Topical Daily Trish Kline MD      montelukast  10 mg Oral Daily Kely Robert MD      NIFEdipine  60 mg Oral Daily Kely Robert MD      pantoprazole  40 mg Oral Daily Before Breakfast Kely Robert MD      predniSONE  20 mg Oral Daily Gerber Kim Jr., DO      sevelamer  1,600 mg Oral TID With Meals Kely Robert MD      Sodium Zirconium Cyclosilicate  10 g Oral Daily Trish Kline MD      traZODone  200 mg Oral HS Kely Robert MD      trimethobenzamide  200 mg Intramuscular Q6H PRN Kely Rboert MD      venlafaxine  225 mg Oral Daily Kely Robert MD          Today, Patient Was Seen By: Gerber Kim Jr, DO    **Please Note: This note may have been constructed using a voice recognition system.**

## 2024-09-01 NOTE — ASSESSMENT & PLAN NOTE
Recent Labs     08/30/24  2025 08/31/24  0443 09/01/24  0521   AST 65* 57* 31   ALT 60* 59* 61*   INR 1.99*  --   --        INR 1.99  Unclear etiology. Possibly secondary to atovaquone use  Asymptomatic    Plan  Trend CMP  Consider RUQ US and acute hepatitis panel

## 2024-09-01 NOTE — UTILIZATION REVIEW
"Initial Clinical Review    Admission: Date/Time/Statement:   Admission Orders (From admission, onward)       Ordered        08/30/24 2140  INPATIENT ADMISSION  Once                          Orders Placed This Encounter   Procedures    INPATIENT ADMISSION     Standing Status:   Standing     Number of Occurrences:   1     Order Specific Question:   Level of Care     Answer:   Med Surg [16]     Order Specific Question:   Estimated length of stay     Answer:   More than 2 Midnights     Order Specific Question:   Certification     Answer:   I certify that inpatient services are medically necessary for this patient for a duration of greater than two midnights. See H&P and MD Progress Notes for additional information about the patient's course of treatment.     ED Arrival Information       Expected   -    Arrival   8/30/2024 20:01    Acuity   Urgent              Means of arrival   Ambulance    Escorted by   [facility]    Service   Hospitalist    Admission type   Emergency              Arrival complaint   EMS- Asthma             Chief Complaint   Patient presents with    Asthma     Pt reports asthma attack. Hasn't taken any meds at home due to \"sleeping the past 3 days\". Ems gave 3 albuterol and 1 duo neb       Initial Presentation: 64 y.o. female with hx  anti-GBM disease, dialysis dependence, HTN, anemia, and bipolar disorder who presented from home on 8/30 due to 1 week history of acutely worsening shortness of breath especially with exertion. She reports intermittent chest tightness and a chronic cough for the past 6 months that is intermittently productive of yellow sputum. Patient also endorses generalized weakness and decreased appetite for the past 3 days. She had difficulty getting out of bed in this time and was afraid of falling. As a result of this, she admits to decreased food/water intake and not having taken her medications.Pt anuric , believes she underwent dialysis this past Tuesday, but cannot remember " if she went on Thursday.  Pt reports nausea and vomited in ED. O2 sat 89 % on RA, applied O2 @ 2L with O2 sat low 90's. Tachycardic, wheezing noted.Tachycardic, dyspneic, and using accessory muscles. Has R IJ Permacath .   Labs WBC 14.45, procal 0.77, hgb 7.1, K 5.6, creat 8.68. BNP 3000s .Elevated D dimer  CXR  : mildly pulmonary vascular congestion. No evidence of consolidation . ECG- ST .   Pt given Solu-Medol 80 mg x 1, DuoNeb, and azithromycin in ED. Admitted as Inpatient with moderate persistent asthma  w/ exacerbation . SIRS. Generalized weakness. PLan-supplemental O2 0 wean as able to keep sat >92 %. albuterol-ipratropium nebs.  IV Solu-Medrol 40 mg daily . Obtain CTA chest PE study. Obtain echo. Nephro consult for HD. PT/OT . Monitor off abx . Monitor WBC, fever curve. Telemetry. Start Lokelma   Anticipated Length of Stay/Certification Statement: Patient will be admitted on an inpatient basis with an anticipated length of stay of greater than 2 midnights secondary to asthma exacerbation, generalized weakness.        Date: 8/31    Day 2:    Nephrology consult- Plan for HD today Immunosuppression. Status post Plex for 14 days. Most recent anti-GBM level on 07/29 still more than 8. Currently on oral Cytoxan 100 mg daily. She is also on prednisone taper but currently on IV methylprednisolone for asthma exacerbation. Continue atovaquone for PJP prophylaxis. Follow results of CT chest. Hyperkalemia. Serum potassium level 5.6 this morning. Continue Lokelma 10 g daily. Dialysis with 2K bath today.  Anemia in renal insufficiency.  She is on Mircera 60 mcg every 2 weeks as outpatient.  Hemoglobin today 6.7, she will need blood transfusion.  Start Epogen 6000 units with dialysis   Medicine- continues  to endorse shortness of breath upon exertion.  Denies any chest pain.  On exam, no wheezing or crackles . Diminished breath sounds particularly at the left basal lung field  . SOB on exertion possibly multifactorial with  worsening anemia, possible pulmonary congestion/volume overload , possible asthma/COPD overlap syndrome exacerbation; rule out pulmonary embolism; rule out infectious process/pneumonia.  Touched base with nephrology, we will undergo hemodialysis today. Continue nebs, Iv steroids.  Following up on CTA PE, echocardiogram.1/2 Blood cx + for cocci in cluster, patient has permacath in situ for HD, will give one dose of IV vancomycin and follow-up final culture results . Transfuse 1 U PRBC for Hgb 6.9 today . Monitor CBC. Trend procal.     Date: 9/1   Day 3: Has surpassed a 2nd midnight with active treatments and services.  Nephro- Patient had hemodialysis yesterday. Hyperkalemia has resolved. 0.9 L ultrafiltration was achieved. System clotted 4 times during treatment. Blood flow rate was maintained at 200 mL/min with lines reversed. Treatment was terminated after fourth system clotting. IR has been consulted for evaluation of permacath with possibility of exchange.   IR consult - ESRD on HD having difficulty with catheter during dialysis. Catheter check possible intervention is desired. This is reasonable and will be arranged by the IR staff. Anticipate procedure on 9/3. Coagulation labs are elevated and will need to be corrected and ac held for anticipated procedure on 9/3   Medicine- Hgb up to 7.9 today after 1 U PRBC yesterday . Per nsg + dyspnea at rest, tachypneic. Diminished breath sounds, wheezing .+2 piting edema BLE.  Telemetry- ST. Pt anxious. IV Solumedrol d/c'ed yesterday . WBC up to 19.55 today  with tachycardia,  productive cough with yellow mucus production. CT chest revealed evidence of multifocal pneumonia . ID consult placed today.Continue telemetry. CBC, BMP in am .             ED Triage Vitals   Temperature Pulse Respirations Blood Pressure SpO2 Pain Score   08/30/24 2006 08/30/24 2004 08/30/24 2004 08/30/24 2004 08/30/24 2004 08/30/24 2233   98.3 °F (36.8 °C) (!) 125 18 128/69 (!) 89 % 4     Weight  (last 2 days)       Date/Time Weight    09/01/24 0600 107 (235.2)    08/31/24 1405 108 (238.1)    08/31/24 0727 108 (237.44)            Vital Signs (last 3 days)       Date/Time Temp Pulse Resp BP MAP (mmHg) SpO2 Calculated FIO2 (%) - Nasal Cannula Nasal Cannula O2 Flow Rate (L/min) O2 Device Patient Position - Orthostatic VS Kristen Coma Scale Score Pain    09/01/24 0900 -- -- -- -- -- -- -- -- -- -- 15 No Pain    09/01/24 08:05:58 97.7 °F (36.5 °C) 112 20 125/83 97 94 % -- -- None (Room air) Sitting -- --    09/01/24 0732 -- -- -- -- -- 94 % -- -- None (Room air) -- -- --    08/31/24 21:24:19 98 °F (36.7 °C) 96 -- 136/76 96 93 % -- -- -- -- -- --    08/31/24 21:23:50 -- 98 -- 136/76 96 91 % 32 3 L/min -- -- -- No Pain    08/31/24 2100 -- -- -- -- -- -- -- -- Nasal cannula -- 15 --    08/1958 98.3 °F (36.8 °C) 96 20 116/74 88 99 % -- -- -- -- -- --    08/31/24 1945 -- 85 20 119/78 92 99 % -- -- -- -- -- --    08/31/24 1940 -- 89 20 119/78 92 98 % 32 3 L/min Nasal cannula -- -- --    08/31/24 1930 -- 82 20 115/71 86 94 % -- -- -- -- -- --    08/31/24 1915 -- 88 20 114/73 87 96 % -- -- -- -- -- --    08/31/24 1900 -- 88 20 120/79 93 95 % -- -- -- -- -- --    08/31/24 1845 -- 88 20 127/83 98 95 % -- -- -- -- -- --    08/31/24 1830 -- 95 20 124/80 95 95 % -- -- -- -- -- --    08/31/24 1815 -- 85 20 119/77 91 96 % -- -- -- -- -- --    08/31/24 1800 -- 90 20 115/74 88 92 % -- -- -- -- -- --    08/31/24 1745 -- 87 20 120/73 89 92 % -- -- -- -- -- --    08/31/24 1730 -- 95 20 130/86 101 93 % -- -- -- -- -- --    08/31/24 1715 -- 96 20 127/79 95 94 % -- -- -- -- -- --    08/31/24 1700 -- 94 20 122/79 93 92 % -- -- -- -- -- --    08/31/24 1645 -- 89 20 127/82 97 92 % -- -- -- -- -- --    08/31/24 1630 -- 92 20 133/79 97 -- -- -- -- -- -- --    08/31/24 1558 98.3 °F (36.8 °C) 97 20 137/86 103 95 % -- -- -- Lying -- --    08/31/24 15:17:09 98.3 °F (36.8 °C) 105 -- 130/91 104 91 % -- -- -- -- -- --    08/31/24 1405 -- 98  -- 136/86 -- 95 % -- -- -- -- -- --    08/31/24 1340 98.5 °F (36.9 °C) 100 18 136/86 -- -- -- -- -- -- -- --    08/31/24 1041 98.4 °F (36.9 °C) 95 18 142/83 -- -- -- -- -- -- -- --    08/31/24 1021 97.5 °F (36.4 °C) 101 18 123/77 -- -- -- -- -- -- -- --    08/31/24 0830 -- -- -- -- -- -- -- -- -- -- 15 No Pain    08/31/24 07:51:45 98.4 °F (36.9 °C) 95 -- 136/80 99 93 % -- -- -- -- -- --    08/31/24 0726 -- -- -- -- -- 94 % -- -- -- -- -- --    08/31/24 01:03:04 98.8 °F (37.1 °C) 106 20 138/91 107 90 % -- -- -- -- -- --    08/31/24 0028 -- -- -- -- -- 93 % 28 2 L/min Nasal cannula -- -- --    08/30/24 23:09:45 98.4 °F (36.9 °C) 129 19 129/88 102 93 % -- -- -- -- -- --    08/30/24 2238 -- -- -- -- -- -- 28 2 L/min Nasal cannula -- 15 4    08/30/24 2233 -- -- -- -- -- -- -- -- -- -- -- 4    08/30/24 22:24:15 98.4 °F (36.9 °C) 124 24 120/84 96 94 % 28 2 L/min Nasal cannula Sitting -- --    08/30/24 2200 -- 127 19 133/71 -- 91 % 32 3 L/min  Nasal cannula  Sitting -- --    08/30/24 2047 -- -- -- -- -- -- -- -- -- -- 15 --    08/30/24 2015 -- -- -- -- -- 94 % 28 2 L/min Nasal cannula -- -- --    08/30/24 2006 98.3 °F (36.8 °C) -- -- -- -- -- -- -- -- -- -- --    08/30/24 2004 -- 125 18 128/69 -- 89 % -- -- None (Room air) Sitting -- --              Pertinent Labs/Diagnostic Test Results:   Radiology:  XR chest 1 view portable   ED Interpretation by Alejandro Johnston PA-C (08/30 2112)   Consolidation right lower lobe suspicious for pneumonia      Final Interpretation by Devyn Silva MD (08/31 1314)         1. New opacification lateral left lung base may reflect atelectasis or pneumonia. Probable small left effusion.   2. Mild cardiomegaly with mild vascular and interstitial prominence suggesting some degree of volume overload.            Workstation performed: [id number]         CTA chest pe study    (8/31/24 )    No pulmonary embolism.   Since July 11, 2022 there is persistent tree-in-bud nodularity in the lungs  suggestive of a widespread infectious bronchiolitis that may be secondary to an atypical mycobacterium. Follicular bronchiolitis and acute hypersensitivity pneumonitis could also   be in the differential. Follow-up chest CT in 3 months after treatment for infectious bronchiolitis is advised.   New mild patchy bilateral multifocal pneumonia.   Pulmonary hypertension   New moderate bilateral pleural effusions.     IR tunneled dialysis catheter check/change/reposition/angioplasty    (Results Pending)     Cardiology:   8/30 ECG- ED read-   ECG rate:  123     ECG rate assessment: normal    Rhythm:     Rhythm: sinus tachycardia    Ectopy:     Ectopy: none    QRS:     QRS axis:  Normal     QRS intervals:  Normal   Conduction:     Conduction: abnormal      Abnormal conduction comment:  Intraventricular conduction block   ST segments:     ST segments:  Normal   T waves:     T waves: normal   Echo complete w/ contrast if indicated   Final Result by Ty Reese MD (09/01 1127)        Left Ventricle: Left ventricular cavity size is mildly dilated. Wall    thickness is mildly increased. The left ventricular ejection fraction is    25%. Systolic function is severely reduced. There is severe global    hypokinesis. Diastolic function is normal.     Right Ventricle: Right ventricular cavity size is mildly dilated.    Systolic function is mildly reduced.     Left Atrium: The atrium is mildly dilated.     Right Atrium: The atrium is mildly dilated.     Aortic Valve: There is mild regurgitation.     Mitral Valve: There is moderate to severe regurgitation.     Tricuspid Valve: There is moderate regurgitation. The right ventricular    systolic pressure is moderately elevated. The estimated right ventricular    systolic pressure is 48.00 mmHg.     Pericardium: There is a small pericardial effusion anterior to the    heart.           GI:  No orders to display       Results from last 7 days   Lab Units 08/30/24 2025   SARS-COV-2   Negative     Results from last 7 days   Lab Units 09/01/24  0521 08/31/24  1556 08/31/24  0755 08/31/24 0443 08/30/24 2025   WBC Thousand/uL 19.55*  --   --  10.64* 14.45*   HEMOGLOBIN g/dL 7.9* 7.9* 6.9* 6.7* 7.1*   HEMATOCRIT % 24.6* 25.4* 22.2* 22.1* 23.0*   PLATELETS Thousands/uL 243  --   --  245 258   BANDS PCT %  --   --   --  3  --          Results from last 7 days   Lab Units 09/01/24  0521 08/31/24  0443 08/31/24  0033 08/30/24 2025   SODIUM mmol/L 137 138  --  139   POTASSIUM mmol/L 4.4 5.6* 5.7* 5.7*   CHLORIDE mmol/L 98 102  --  102   CO2 mmol/L 28 25  --  22   ANION GAP mmol/L 11 11  --  15*   BUN mg/dL 40* 51*  --  44*   CREATININE mg/dL 6.86* 8.68*  --  8.11*   EGFR ml/min/1.73sq m 5 4  --  4   CALCIUM mg/dL 9.6 9.6  --  9.8   MAGNESIUM mg/dL 2.1 2.3  --  2.1   PHOSPHORUS mg/dL 4.7* 5.8*  --  6.1*     Results from last 7 days   Lab Units 09/01/24 0521 08/31/24 0443 08/30/24 2025   AST U/L 31 57* 65*   ALT U/L 61* 59* 60*   ALK PHOS U/L 97 100 106*   TOTAL PROTEIN g/dL 6.0* 6.0* 6.1*   ALBUMIN g/dL 3.7 3.7 3.8   TOTAL BILIRUBIN mg/dL 0.44 0.41 0.71         Results from last 7 days   Lab Units 09/01/24 0521 08/31/24 0443 08/30/24 2025   GLUCOSE RANDOM mg/dL 107 153* 105              Results from last 7 days   Lab Units 08/30/24 2025   PH FRANK  7.428*   PCO2 FRANK mm Hg 30.9*   PO2 FRANK mm Hg 43.8   HCO3 FRANK mmol/L 20.0*   BASE EXC FRANK mmol/L -3.8   O2 CONTENT FRANK ml/dL 8.9   O2 HGB, VENOUS % 77.1             Results from last 7 days   Lab Units 08/31/24  0033 08/30/24  2249 08/30/24 2025   HS TNI 0HR ng/L  --   --  212*   HS TNI 2HR ng/L  --  225*  --    HSTNI D2 ng/L  --  13  --    HS TNI 4HR ng/L 225*  --   --    HSTNI D4 ng/L 13  --   --      Results from last 7 days   Lab Units 08/30/24 2025   D-DIMER QUANTITATIVE ug/ml FEU 2.18*     Results from last 7 days   Lab Units 08/30/24 2025   PROTIME seconds 23.3*   INR  1.99*   PTT seconds >210*         Results from last 7 days   Lab Units  08/31/24  0443 08/30/24 2025   PROCALCITONIN ng/ml 1.00* 0.77*                 Results from last 7 days   Lab Units 08/30/24 2025   BNP pg/mL 3,019*             Results from last 7 days   Lab Units 09/01/24  0547   UNIT PRODUCT CODE  [id number]   UNIT NUMBER  [id number]   UNITABO  A   UNITRH  POS   CROSSMATCH  Compatible   UNIT DISPENSE STATUS  Presumed Trans   UNIT PRODUCT VOL mL 350           Results from last 7 days   Lab Units 08/30/24 2025   INFLUENZA A PCR  Negative   INFLUENZA B PCR  Negative   RSV PCR  Negative                   Results from last 7 days   Lab Units 08/30/24 2039   BLOOD CULTURE  Staphylococcus epidermidis*  No Growth at 24 hrs.   GRAM STAIN RESULT  Gram positive cocci in clusters*                   ED Treatment-Medication Administration from 08/30/2024 2000 to 08/30/2024 2218         Date/Time Order Dose Route Action     08/30/2024 2007 ipratropium-albuterol (FOR EMS ONLY) (DUO-NEB) 0.5-2.5 mg/3 mL inhalation solution 3 mL 0 mL Does not apply Given to EMS     08/30/2024 2007 albuterol (FOR EMS ONLY) (2.5 mg/3 mL) 0.083 % inhalation solution 7.5 mg 0 mg Does not apply Given to EMS     08/30/2024 2023 methylPREDNISolone sodium succinate (Solu-MEDROL) injection 80 mg 80 mg Intravenous Given     08/30/2024 2040 ipratropium-albuterol (DUO-NEB) 0.5-2.5 mg/3 mL inhalation solution 3 mL 3 mL Nebulization Given     08/30/2024 2108 azithromycin (ZITHROMAX) 500 mg in sodium chloride 0.9% 250mL IVPB 500 mg 500 mg Intravenous New Bag     08/30/2024 2208 trimethobenzamide (TIGAN) IM injection 200 mg 200 mg Intramuscular Given            Past Medical History:   Diagnosis Date    Asthma     Chronic pain     Hypertension     Renal disorder     Sepsis ([facility]) 07/10/2024     Present on Admission:   Bipolar 1 disorder ([facility])   Primary hypertension   Dyslipidemia   Moderate persistent asthma with acute exacerbation   Anemia   SIRS (systemic inflammatory response syndrome) ([facility])   Shortness of breath    Elevated transaminase level   Generalized weakness      Admitting Diagnosis: Asthma [J45.909]  Hypoxia [R09.02]  COPD exacerbation (HCC) [J44.1]  Age/Sex: 64 y.o. female  Admission Orders:  Scheduled Medications:  albuterol, 2.5 mg, Nebulization, TID  atorvastatin, 20 mg, Oral, Daily  atovaquone, 1,500 mg, Oral, Daily  cyclophosphamide, 100 mg, Oral, Daily  fluticasone-vilanterol, 1 puff, Inhalation, Daily  heparin (porcine), 5,000 Units, Subcutaneous, Q8H JACK  ipratropium, 0.5 mg, Nebulization, TID  iron polysaccharides, 150 mg, Oral, Daily  lamoTRIgine, 100 mg, Oral, HS  lidocaine, 1 patch, Topical, Daily  montelukast, 10 mg, Oral, Daily  NIFEdipine, 60 mg, Oral, Daily  pantoprazole, 40 mg, Oral, Daily Before Breakfast  sevelamer, 1,600 mg, Oral, TID With Meals  Sodium Zirconium Cyclosilicate, 10 g, Oral, Daily  traZODone, 200 mg, Oral, HS  [START ON 9/3/2024] vancomycin, 10 mg/kg (Adjusted), Intravenous, Once per day on Tuesday Thursday Saturday  venlafaxine, 225 mg, Oral, Daily    azithromycin (ZITHROMAX) 500 mg in sodium chloride 0.9% 250mL IVPB 500 mg  Dose: 500 mg  Freq: Once Route: IV  Last Dose: Stopped (08/30/24 2209)  Start: 08/30/24 2100 End: 08/30/24 2209  vancomycin (VANCOCIN) 1,750 mg in sodium chloride 0.9 % 500 mL IVPB  Dose: 25 mg/kg  Weight Dosing Info: 74.6 kg (Adjusted)  Freq: Once Route: IV  Last Dose: 1,750 mg (08/31/24 2117)  Start: 08/31/24 2000 End: 08/31/24 2317  methylPREDNISolone sodium succinate (Solu-MEDROL) injection 40 mg  Dose: 40 mg  Freq: Daily Route: IV  Start: 08/31/24 0900 End: 08/31/24 1102   Hydrocod Abdulaziz-Chlorphe Abdulaziz ER (TUSSIONEX) ER suspension 5 mL  Dose: 5 mL  Freq: Once Route: PO  Start: 08/31/24 0130 End: 08/31/24 0205    Continuous IV Infusions:     PRN Meds:  acetaminophen, 650 mg, Oral, Q6H PRN  albuterol, 2 puff, Inhalation, Q4H PRN x1 8/31   dextromethorphan-guaiFENesin, 10 mL, Oral, Q4H PRN x2 8/31   epoetin shavonne, 6,000 Units, Intravenous, After Dialysis x1  8/31  trimethobenzamide, 200 mg, Intramuscular, Q6H PRN     HD TTS   Renal lo phos diet, 2 GM Na, FR 1800 ml    SCD  OOB as asha     IP CONSULT TO NEPHROLOGY  IP CONSULT TO CASE MANAGEMENT  IP CONSULT TO PHARMACY  INPATIENT CONSULT TO IR    Network Utilization Review Department  ATTENTION: Please call with any questions or concerns to [phone] and carefully listen to the prompts so that you are directed to the right person. All voicemails are confidential.   For Discharge needs, contact Care Management DC Support Team at [phone] opt. 2  Send all requests for admission clinical reviews, approved or denied determinations and any other requests to dedicated fax number below belonging to the campus where the patient is receiving treatment. List of dedicated fax numbers for the Facilities:  FACILITY NAME UR FAX NUMBER   ADMISSION DENIALS (Administrative/Medical Necessity) [phone]   DISCHARGE SUPPORT TEAM (NETWORK) [phone]   PARENT CHILD HEALTH (Maternity/NICU/Pediatrics) [phone]   [facility] [phone]   [facility] [phone]   [facility] [phone]   [facility] [phone]   [facility] [phone]   [facility] [phone]   [facility] [phone]   [facility] [phone]   [facility] [phone]   [facility] [phone]   [facility] [phone]   [facility] [phone]

## 2024-09-01 NOTE — ASSESSMENT & PLAN NOTE
Admission in July 2024 for ANGELICA. 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 S/p PLEX.  Patient is anuric and dialysis-dependent  Tues/Thurs/Sat schedule  Access: Right IJ PermCath    Plan  IR pending cath reevaluation 9/3   Hemodialysis scheduled on 9/3  Continue PTA cyclophosphamide  Continue PTA sevalemer  Continue PTA atovaquone for PJP prophylaxis  Continue prednisone taper from outpatient nephrology prior to this admission  20 mg once daily till September 6  15 mg starting September 7 to September 20  12.5 mg starting September 21 to October 4  10 mg starting October 5 to October 18  7.5 mg starting October 19 to November 2  5 mg daily starting November 3

## 2024-09-01 NOTE — CONSULTS
Consultation - Infectious Disease   Ngozi Beard 64 y.o. female MRN: [id number]  Unit/Bed#: S -01 Encounter: [id number]      IMPRESSION & RECOMMENDATIONS:   Impression/Recommendations:  This is a 64 y.o. 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  Chest CT showed patchy bilateral infiltrates.  In addition, there is growth of MRSE in 1 of 2 admission blood culture sets.    1.   Pneumonitis versus pneumonia.  Given underlying anti-GBM disease, it is unclear whether chest CT findings are related to patient's underlying collagen vascular disease or pneumonia.  If this is pneumonia, CT and clinical picture is consistent with atypical pneumonia rather than acute bacterial pneumonia.  Patient is supposed to be on atovaquone for PCP prophylaxis but is unclear whether she is taking it.  Regardless, given her immunosuppressed state, it would be best that we try to establish diagnosis rather than treat her empirically.  With patient's very stable clinical status, it is best to keep her off antibiotic pending workup, so that antibiotic does not affect culture result.  Continue to observe off antibiotic for now, other than atovaquone PCP prophylaxis.  Recommend pulmonary evaluation for bronchoscopy.  Monitor respiratory symptoms and O2 saturation.  Monitor temperature.    2.  Bacteremia, with growth of MRSE in only 1 out of 2 admission blood cultures.  Patient has no fever and is not systemically ill, making true bacteremia not likely clinically.  Although patient does have permacath in place, unless the second admission blood culture also has growth of the same pathogen, this is most likely contaminated blood draw.  Discontinue IV vancomycin.  Follow-up on second concurrent admission blood culture.    3.  Leukocytosis.  In a patient on chronic steroid and now with increased steroid dosage,  leukocytosis is expected.  It is difficult to make clinical sense of her leukocytosis.    4.  ESRD, on HD.  Functional difficulty of permacath noted.  HD per nephrology.    5.  Recently diagnosed rapidly progressing anti-GBM disease.  Patient is on Cytoxan and prednisone since diagnosis.  Continue outpatient Cytoxan/steroid.  Continue/restart atovaquone PCP prophylaxis.    Prior records reviewed in detail.  Discussed with patient in detail regarding the above plan.  Discussed with Dr. Dickens from [facility] service regarding observation off antibiotic and recommendation for pulmonary evaluation for bronchoscopy.  He is in agreement and will consult pulmonology.    Thank you for this consultation.  We will follow along with you.    HISTORY OF PRESENT ILLNESS:  Reason for Consult: Pneumonia.    HPI: Ngozi Beard is a 64 y.o. female, with multiple medical problems including asthma, anti-GBM disease on Cytoxan and prednisone, ESRD on HD, presented to ER on the evening of 8/30 with 1 week history of dyspnea.  On presentation, patient had leukocytosis but no fever.  Azithromycin was given in the ER but this was not continued.  Patient was admitted and treated for asthma exacerbation with increase steroid dose, nebulizers and O2 supplement.  Yesterday, there was growth of GPC in clusters in 1 of 2 admission blood culture sets.  IV vancomycin was started.  In addition, chest CT was done today, showing patchy bilateral infiltrates.  For these reasons, we are asked to evaluate the patient.    Patient states that she has a chronic cough that is productive of clear to yellow sputum.  However, her dyspnea has been more acute, over the last week.  No fever or chills at home.    With regard to patient's anti-GBM disease, this was recently diagnosed and rapidly progressive.  Patient was started on immunosuppressants in July of this year and she became HD dependent at that time.  Patient was started on Bactrim for PCP prophylaxis but  developed a rash on it.  Antibiotic was changed to atovaquone.  Per discussion with the patient, she is not sure whether she is taking it.    REVIEW OF SYSTEMS:  A complete system-based review was done.  Except for what is noted in HPI above, ROS of systems is otherwise negative.    PAST MEDICAL HISTORY:  Past Medical History:   Diagnosis Date    Asthma     Chronic pain     Hypertension     Renal disorder     Sepsis (HCC) 07/10/2024     Past Surgical History:   Procedure Laterality Date    BACK SURGERY      COLONOSCOPY      IR BIOPSY KIDNEY RANDOM  2024    IR TEMPORARY DIALYSIS CATHETER PLACEMENT  7/15/2024    IR TUNNELED DIALYSIS CATHETER CHECK/CHANGE/REPOSITION/ANGIOPLASTY  2024    IR TUNNELED DIALYSIS CATHETER PLACEMENT  2024    TUBAL LIGATION       Problem list reviewed.    FAMILY HISTORY:  Non-contributory    SOCIAL HISTORY:  Social History     Substance and Sexual Activity   Alcohol Use No    Comment: hX:Occas.      Social History     Substance and Sexual Activity   Drug Use No     Social History     Tobacco Use   Smoking Status Former    Current packs/day: 0.00    Average packs/day: 1 pack/day for 15.0 years (15.0 ttl pk-yrs)    Types: Cigarettes    Start date:     Quit date:     Years since quittin.6   Smokeless Tobacco Never       ALLERGIES:  Allergies   Allergen Reactions    Penicillins Hives     Reaction Date: 2005; Annotation - 2012: meena mcdonald    Amoxicillin Rash    Aspirin Rash    Bactrim [Sulfamethoxazole-Trimethoprim] Rash    Ibuprofen Rash    Morphine Rash    Oxycodone Rash    Valacyclovir Rash       MEDICATIONS:  All current active medications have been reviewed.  Patient is currently on IV vancomycin.    PHYSICAL EXAM:  Vitals:  Temp:  [97.7 °F (36.5 °C)-98.3 °F (36.8 °C)] 97.7 °F (36.5 °C)  HR:  [] 112  Resp:  [20] 20  BP: (114-136)/(71-86) 125/83  SpO2:  [91 %-99 %] 97 %  Temp (24hrs), Av °F (36.7 °C), Min:97.7 °F (36.5 °C), Max:98.3 °F (36.8  °C)  Current: Temperature: 97.7 °F (36.5 °C)     Physical Exam:  General:  Well-nourished, well-developed, in no acute distress. Awake, alert and oriented x 3.  Eyes:  Conjunctive clear with no hemorrhages or effusions  Oropharynx:  No ulcers, no lesions, pharynx benign, no tonsillitis  Neck:  Supple, no lymphadenopathy, no mass, nontender  Lungs: Decreased breath sounds, diffuse rhonchi, bibasilar rales, no wheezing, no accessory muscle use  Cardiac: Tachycardic with regular rhythm, normal S1, normal S2, no murmurs  Abdomen:  Soft, nondistended, non-tender, no HSM  Extremities: Trace leg edema, no erythema, nontender. No ulcers  Skin:  No rashes, no ulcers  Neurological:  Moves all four extremities spontaneously, sensation grossly intact    LABS, IMAGING, & OTHER STUDIES:  Lab Results:  I have personally reviewed pertinent labs.  Results from last 7 days   Lab Units 09/01/24  0521 08/31/24  0443 08/31/24  0033 08/30/24 2025   POTASSIUM mmol/L 4.4 5.6* 5.7* 5.7*   CHLORIDE mmol/L 98 102  --  102   CO2 mmol/L 28 25  --  22   BUN mg/dL 40* 51*  --  44*   CREATININE mg/dL 6.86* 8.68*  --  8.11*   EGFR ml/min/1.73sq m 5 4  --  4   CALCIUM mg/dL 9.6 9.6  --  9.8   AST U/L 31 57*  --  65*   ALT U/L 61* 59*  --  60*   ALK PHOS U/L 97 100  --  106*     Results from last 7 days   Lab Units 09/01/24  0521 08/31/24  1556 08/31/24  0755 08/31/24  0443 08/30/24 2025   WBC Thousand/uL 19.55*  --   --  10.64* 14.45*   HEMOGLOBIN g/dL 7.9* 7.9* 6.9* 6.7* 7.1*   PLATELETS Thousands/uL 243  --   --  245 258     Results from last 7 days   Lab Units 08/31/24  0043 08/30/24 2039   BLOOD CULTURE   --  Staphylococcus epidermidis*  No Growth at 24 hrs.   GRAM STAIN RESULT   --  Gram positive cocci in clusters*   MRSA CULTURE ONLY  No Methicillin Resistant Staphlyococcus aureus (MRSA) isolated  --        Imaging Studies:   I have personally reviewed pertinent imaging study reports and images in PACS.  CXR reviewed personally.  Bilateral  pleural effusion with basilar atelectasis versus infiltrate.  Patchy bilateral nodular infiltrates.  Chest CT reviewed personally.  Moderate pleural effusions.    EKG, Pathology, and Other Studies:   I have personally reviewed pertinent reports.

## 2024-09-02 PROBLEM — J45.909 ASTHMA WITHOUT ACUTE EXACERBATION: Status: ACTIVE | Noted: 2023-02-23

## 2024-09-02 PROBLEM — E66.01 MORBID OBESITY WITH BMI OF 45.0-49.9, ADULT (HCC): Status: RESOLVED | Noted: 2022-01-12 | Resolved: 2024-09-02

## 2024-09-02 PROBLEM — I42.9 CARDIOMYOPATHY (HCC): Status: ACTIVE | Noted: 2024-09-02

## 2024-09-02 LAB
ANION GAP SERPL CALCULATED.3IONS-SCNC: 11 MMOL/L (ref 4–13)
ANISOCYTOSIS BLD QL SMEAR: PRESENT
AORTIC ROOT: 3.4 CM
APICAL FOUR CHAMBER EJECTION FRACTION: 37 %
ASCENDING AORTA: 3.5 CM
AV REGURGITATION PRESSURE HALF TIME: 579 MS
BACTERIA BLD CULT: ABNORMAL
BASOPHILS # BLD MANUAL: 0 THOUSAND/UL (ref 0–0.1)
BASOPHILS NFR MAR MANUAL: 0 % (ref 0–1)
BSA FOR ECHO PROCEDURE: 2.08 M2
BUN SERPL-MCNC: 50 MG/DL (ref 5–25)
CALCIUM SERPL-MCNC: 9.5 MG/DL (ref 8.4–10.2)
CHLORIDE SERPL-SCNC: 100 MMOL/L (ref 96–108)
CO2 SERPL-SCNC: 28 MMOL/L (ref 21–32)
CREAT SERPL-MCNC: 8.64 MG/DL (ref 0.6–1.3)
CRP SERPL QL: 25.6 MG/L
EOSINOPHIL # BLD MANUAL: 0 THOUSAND/UL (ref 0–0.4)
EOSINOPHIL NFR BLD MANUAL: 0 % (ref 0–6)
ERYTHROCYTE [DISTWIDTH] IN BLOOD BY AUTOMATED COUNT: 18.2 % (ref 11.6–15.1)
FRACTIONAL SHORTENING: 22 (ref 28–44)
GFR SERPL CREATININE-BSD FRML MDRD: 4 ML/MIN/1.73SQ M
GLUCOSE SERPL-MCNC: 134 MG/DL (ref 65–140)
GRAM STN SPEC: ABNORMAL
HCT VFR BLD AUTO: 25.4 % (ref 34.8–46.1)
HGB BLD-MCNC: 7.8 G/DL (ref 11.5–15.4)
INTERVENTRICULAR SEPTUM IN DIASTOLE (PARASTERNAL SHORT AXIS VIEW): 1.2 CM
INTERVENTRICULAR SEPTUM: 1.2 CM (ref 0.6–1.1)
LAAS-AP2: 20.3 CM2
LAAS-AP4: 20.8 CM2
LEFT ATRIUM SIZE: 4.2 CM
LEFT ATRIUM VOLUME (MOD BIPLANE): 68 ML
LEFT ATRIUM VOLUME INDEX (MOD BIPLANE): 32.7 ML/M2
LEFT INTERNAL DIMENSION IN SYSTOLE: 4.3 CM (ref 2.1–4)
LEFT VENTRICLE DIASTOLIC VOLUME (MOD BIPLANE): 195 ML
LEFT VENTRICLE DIASTOLIC VOLUME INDEX (MOD BIPLANE): 93.8 ML/M2
LEFT VENTRICLE SYSTOLIC VOLUME (MOD BIPLANE): 122 ML
LEFT VENTRICLE SYSTOLIC VOLUME INDEX (MOD BIPLANE): 58.7 ML/M2
LEFT VENTRICULAR INTERNAL DIMENSION IN DIASTOLE: 5.5 CM (ref 3.5–6)
LEFT VENTRICULAR POSTERIOR WALL IN END DIASTOLE: 1.2 CM
LEFT VENTRICULAR STROKE VOLUME: 69 ML
LV EF: 37 %
LVSV (TEICH): 69 ML
LYMPHOCYTES # BLD AUTO: 1.67 THOUSAND/UL (ref 0.6–4.47)
LYMPHOCYTES # BLD AUTO: 13 % (ref 14–44)
MCH RBC QN AUTO: 28.4 PG (ref 26.8–34.3)
MCHC RBC AUTO-ENTMCNC: 30.7 G/DL (ref 31.4–37.4)
MCV RBC AUTO: 92 FL (ref 82–98)
MECA+MECC ISLT/SPM QL: DETECTED
METAMYELOCYTE ABSOLUTE CT: 0.52 THOUSAND/UL (ref 0–0.1)
METAMYELOCYTES NFR BLD MANUAL: 4 % (ref 0–1)
MITRAL REGURGITATION PEAK VELOCITY: 4.56 M/S
MITRAL VALVE MEAN INFLOW VELOCITY: 3.42 M/S
MITRAL VALVE REGURGITANT PEAK GRADIENT: 83 MMHG
MONOCYTES # BLD AUTO: 0.39 THOUSAND/UL (ref 0–1.22)
MONOCYTES NFR BLD: 3 % (ref 4–12)
MV E'TISSUE VEL-SEP: 15 CM/S
MYELOCYTE ABSOLUTE CT: 0.13 THOUSAND/UL (ref 0–0.1)
MYELOCYTES NFR BLD MANUAL: 1 % (ref 0–1)
NEUTROPHILS # BLD MANUAL: 10.18 THOUSAND/UL (ref 1.85–7.62)
NEUTS BAND NFR BLD MANUAL: 4 % (ref 0–8)
NEUTS SEG NFR BLD AUTO: 75 % (ref 43–75)
PLATELET # BLD AUTO: 244 THOUSANDS/UL (ref 149–390)
PLATELET BLD QL SMEAR: ADEQUATE
PMV BLD AUTO: 10.4 FL (ref 8.9–12.7)
POLYCHROMASIA BLD QL SMEAR: PRESENT
POTASSIUM SERPL-SCNC: 5.2 MMOL/L (ref 3.5–5.3)
RA PRESSURE ESTIMATED: 10 MMHG
RBC # BLD AUTO: 2.75 MILLION/UL (ref 3.81–5.12)
RBC MORPH BLD: PRESENT
RIGHT ATRIAL 2D VOLUME: 51 ML
RIGHT ATRIUM AREA SYSTOLE A4C: 17.6 CM2
RIGHT VENTRICLE ID DIMENSION: 4.2 CM
RV PSP: 48 MMHG
S EPIDERMIDIS DNA BLD POS QL NAA+NON-PRB: DETECTED
SL CV AV DECELERATION TIME RETROGRADE: 1997 MS
SL CV AV PEAK GRADIENT RETROGRADE: 61 MMHG
SL CV DOP CALC MV VTI RETROGRADE: 145.6 CM
SL CV LEFT ATRIUM LENGTH A2C: 4.9 CM
SL CV LV EF: 25
SL CV MV MEAN GRADIENT RETROGRADE: 52 MMHG
SL CV PED ECHO LEFT VENTRICLE DIASTOLIC VOLUME (MOD BIPLANE) 2D: 150 ML
SL CV PED ECHO LEFT VENTRICLE SYSTOLIC VOLUME (MOD BIPLANE) 2D: 82 ML
SODIUM SERPL-SCNC: 139 MMOL/L (ref 135–147)
TR MAX PG: 38 MMHG
TR PEAK VELOCITY: 3.1 M/S
TRICUSPID ANNULAR PLANE SYSTOLIC EXCURSION: 2.3 CM
TRICUSPID VALVE PEAK REGURGITATION VELOCITY: 3.08 M/S
WBC # BLD AUTO: 12.88 THOUSAND/UL (ref 4.31–10.16)

## 2024-09-02 PROCEDURE — 94760 N-INVAS EAR/PLS OXIMETRY 1: CPT

## 2024-09-02 PROCEDURE — 99232 SBSQ HOSP IP/OBS MODERATE 35: CPT | Performed by: INTERNAL MEDICINE

## 2024-09-02 PROCEDURE — 99223 1ST HOSP IP/OBS HIGH 75: CPT | Performed by: INTERNAL MEDICINE

## 2024-09-02 PROCEDURE — 87205 SMEAR GRAM STAIN: CPT

## 2024-09-02 PROCEDURE — 87070 CULTURE OTHR SPECIMN AEROBIC: CPT

## 2024-09-02 PROCEDURE — 87186 SC STD MICRODIL/AGAR DIL: CPT

## 2024-09-02 PROCEDURE — 86140 C-REACTIVE PROTEIN: CPT | Performed by: INTERNAL MEDICINE

## 2024-09-02 PROCEDURE — 94640 AIRWAY INHALATION TREATMENT: CPT

## 2024-09-02 PROCEDURE — 85007 BL SMEAR W/DIFF WBC COUNT: CPT

## 2024-09-02 PROCEDURE — 0202U NFCT DS 22 TRGT SARS-COV-2: CPT

## 2024-09-02 PROCEDURE — 85027 COMPLETE CBC AUTOMATED: CPT

## 2024-09-02 PROCEDURE — 87077 CULTURE AEROBIC IDENTIFY: CPT

## 2024-09-02 PROCEDURE — 80048 BASIC METABOLIC PNL TOTAL CA: CPT | Performed by: INTERNAL MEDICINE

## 2024-09-02 RX ORDER — METOPROLOL SUCCINATE 25 MG/1
25 TABLET, EXTENDED RELEASE ORAL DAILY
Status: DISCONTINUED | OUTPATIENT
Start: 2024-09-02 | End: 2024-09-04

## 2024-09-02 RX ADMIN — IPRATROPIUM BROMIDE 0.5 MG: 0.5 SOLUTION RESPIRATORY (INHALATION) at 07:56

## 2024-09-02 RX ADMIN — SEVELAMER HYDROCHLORIDE 1600 MG: 800 TABLET ORAL at 12:27

## 2024-09-02 RX ADMIN — TRAZODONE HYDROCHLORIDE 200 MG: 100 TABLET ORAL at 21:15

## 2024-09-02 RX ADMIN — HEPARIN SODIUM 5000 UNITS: 5000 INJECTION INTRAVENOUS; SUBCUTANEOUS at 17:23

## 2024-09-02 RX ADMIN — ATORVASTATIN CALCIUM 20 MG: 20 TABLET, FILM COATED ORAL at 09:31

## 2024-09-02 RX ADMIN — PANTOPRAZOLE SODIUM 40 MG: 40 TABLET, DELAYED RELEASE ORAL at 09:32

## 2024-09-02 RX ADMIN — NIFEDIPINE 60 MG: 30 TABLET, EXTENDED RELEASE ORAL at 09:32

## 2024-09-02 RX ADMIN — ALBUTEROL SULFATE 2 PUFF: 90 AEROSOL, METERED RESPIRATORY (INHALATION) at 17:23

## 2024-09-02 RX ADMIN — FLUTICASONE FUROATE AND VILANTEROL TRIFENATATE 1 PUFF: 200; 25 POWDER RESPIRATORY (INHALATION) at 09:37

## 2024-09-02 RX ADMIN — POLYSACCHARIDE-IRON COMPLEX 150 MG: 150 CAPSULE ORAL at 09:31

## 2024-09-02 RX ADMIN — ALBUTEROL SULFATE 2.5 MG: 2.5 SOLUTION RESPIRATORY (INHALATION) at 13:32

## 2024-09-02 RX ADMIN — ATOVAQUONE 1500 MG: 750 SUSPENSION ORAL at 09:37

## 2024-09-02 RX ADMIN — CYCLOPHOSPHAMIDE 100 MG: 50 CAPSULE ORAL at 09:38

## 2024-09-02 RX ADMIN — SEVELAMER HYDROCHLORIDE 1600 MG: 800 TABLET ORAL at 09:32

## 2024-09-02 RX ADMIN — ALBUTEROL SULFATE 2.5 MG: 2.5 SOLUTION RESPIRATORY (INHALATION) at 07:56

## 2024-09-02 RX ADMIN — HEPARIN SODIUM 5000 UNITS: 5000 INJECTION INTRAVENOUS; SUBCUTANEOUS at 09:32

## 2024-09-02 RX ADMIN — SEVELAMER HYDROCHLORIDE 1600 MG: 800 TABLET ORAL at 17:23

## 2024-09-02 RX ADMIN — PREDNISONE 20 MG: 20 TABLET ORAL at 09:31

## 2024-09-02 RX ADMIN — VENLAFAXINE HYDROCHLORIDE 225 MG: 150 CAPSULE, EXTENDED RELEASE ORAL at 09:29

## 2024-09-02 RX ADMIN — LAMOTRIGINE 100 MG: 100 TABLET ORAL at 21:16

## 2024-09-02 RX ADMIN — LIDOCAINE 5% 1 PATCH: 700 PATCH TOPICAL at 09:32

## 2024-09-02 RX ADMIN — IPRATROPIUM BROMIDE 0.5 MG: 0.5 SOLUTION RESPIRATORY (INHALATION) at 13:32

## 2024-09-02 RX ADMIN — METOPROLOL SUCCINATE 25 MG: 25 TABLET, EXTENDED RELEASE ORAL at 12:27

## 2024-09-02 RX ADMIN — MONTELUKAST 10 MG: 10 TABLET, FILM COATED ORAL at 09:31

## 2024-09-02 NOTE — CASE MANAGEMENT
Case Management Assessment & Discharge Planning Note    Patient name Ngozi Beard  Location S /S -01 MRN [id number]  : 1960 Date 2024       Current Admission Date: 2024  Current Admission Diagnosis:Moderate persistent asthma with acute exacerbation   Patient Active Problem List    Diagnosis Date Noted Date Diagnosed    Cardiomyopathy (HCC) 2024     Multifocal pneumonia 2024     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 ([facility]) 2024     SIRS (systemic inflammatory response syndrome) ([facility]) 2024     Elevated transaminase level 2024     Generalized weakness 2024     Shortness of breath 2024     Rash 2024     Anemia 2024     Thrombocytopenia ([facility]) 2024     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     Abnormality of ascending aorta 2024     Postmenopausal 2024     Encounter for screening mammogram for malignant neoplasm of breast 2024     Allergic rhinitis 2024     Persistent cough 2024     Urge incontinence of urine 2024     Exercise intolerance 2024     Family history of coronary artery bypass graft surgery 2024     Urge urinary incontinence 2024     Female stress incontinence 2024     Paranoid schizophrenia ([facility]) 2023     Moderate persistent asthma with acute exacerbation 2023     Asthma due to seasonal allergies 2023     Bipolar 1 disorder ([facility]) 2022     Primary hypertension 2022     Dyslipidemia 2022     Morbid obesity with BMI of 45.0-49.9, adult ([facility]) 2022       LOS (days): 3  Geometric Mean LOS (GMLOS) (days):   Days to GMLOS:     OBJECTIVE:  PATIENT READMITTED TO HOSPITAL  Risk of Unplanned Readmission Score: 35.79         Current admission status: Inpatient       Preferred Pharmacy:   [facility]/pharmacy #[id number] - [place] [facility] [address]  [facility] [address]  Phone: [phone] Fax: [phone]    [facility] - [place], [place] - [facility] [facility]  [address] [facility]  [address]  [facility] [address]  Phone: [phone] Fax: [phone]    Primary Care Provider: Meenakshi Balbuena MD    Primary Insurance: [employer] [facility]  Secondary Insurance: [employer] Pending sale to [facility]    ASSESSMENT:  Active Health Care Proxies       [place], Christine First Alternate Health Care Agent - Sister   Primary Phone: [phone] (Mobile)                           Readmission Root Cause  30 Day Readmission: Yes  Who directed you to return to the hospital?: Self  Did you understand whom to contact if you had questions or problems?: Yes  Did you get your prescriptions before you left the hospital?: Yes  Were you able to get your prescriptions filled when you left the hospital?: Yes  Did you take your medications as prescribed?: Yes  Were you able to get to your follow-up appointments?: Yes  During previous admission, was a post-acute recommendation made?: No  Patient was readmitted due to: Moderate persistant asthma with acute exacerbation  Action Plan: Therapy eval with a STR recommendation    Patient Information  Admitted from:: Home  Mental Status: Alert  During Assessment patient was accompanied by: Not accompanied during assessment  Assessment information provided by:: Patient  Primary Caregiver: Self  Support Systems: Family members, Friend  County of Residence: [place]  What city do you live in?: [place]  Home entry access options. Select all that apply.: Stairs  Number of steps to enter home.: 4  Do the steps have railings?: Yes  Type of Current Residence: Apartment  Floor Level: 1  Upon entering residence, is there a bedroom on the main floor (no further steps)?: Yes  Upon entering residence, is there a bathroom on the main floor (no further steps)?: Yes  Living Arrangements: Lives w/ Friend  Is  patient a ?: No    Activities of Daily Living Prior to Admission  Functional Status: Independent  Completes ADLs independently?: Yes  Ambulates independently?: Yes  Does patient use assisted devices?: Yes  Assisted Devices (DME) used: Straight Cane  Does patient currently own DME?: Yes  What DME does the patient currently own?: Straight Cane  Does patient have a history of Outpatient Therapy (PT/OT)?: No  Does the patient have a history of Short-Term Rehab?: No  Does patient have a history of HHC?: No  Does patient currently have HHC?: No         Patient Information Continued  Income Source: SSI/SSD  Does patient have prescription coverage?: Yes  Does patient receive dialysis treatments?: Yes ([facility] TEE, chair time 10:30am)  Does patient have a history of substance abuse?: No    PHQ 2/9 Screening   Reviewed PHQ 2/9 Depression Screening Score?: No    Means of Transportation  Means of Transport to Appts:: Danni Baca      Social Determinants of Health (SDOH)      Flowsheet Row Most Recent Value   Housing Stability    In the last 12 months, was there a time when you were not able to pay the mortgage or rent on time? N   In the past 12 months, how many times have you moved where you were living? 0   At any time in the past 12 months, were you homeless or living in a shelter (including now)? N   Transportation Needs    In the past 12 months, has lack of transportation kept you from medical appointments or from getting medications? no   In the past 12 months, has lack of transportation kept you from meetings, work, or from getting things needed for daily living? No   Food Insecurity    Within the past 12 months, you worried that your food would run out before you got the money to buy more. Never true   Within the past 12 months, the food you bought just didn't last and you didn't have money to get more. Never true   Utilities    In the past 12 months has the electric, gas, oil, or water company threatened to  shut off services in your home? No            DISCHARGE DETAILS:    Discharge planning discussed with:: Patient  Freedom of Choice: Yes  Comments - Freedom of Choice: CM met with Pt with an introduction and explanation of role. CM discussed the recommendation of STR which the Pt reported being agreeable to and gave permission for a blanket referral.       Requested Home Health Care         Is the patient interested in HHC at discharge?: No    DME Referral Provided  Referral made for DME?: No    Other Referral/Resources/Interventions Provided:  Interventions: Short Term Rehab  Referral Comments: CM made a blanket SNF referral.         Treatment Team Recommendation: Short Term Rehab  Discharge Destination Plan:: Short Term Rehab

## 2024-09-02 NOTE — CONSULTS
"Consultation - Pulmonary Medicine   Ngozi Beard 64 y.o. female MRN: [id number]  Unit/Bed#: S -01 Encounter: [id number]      Assessment/Plan:    Acute respiratory insufficiency  Abnormal CT chest with diffuse tree-in-bud nodularities, patchy airspace consolidation in RUL, RLL and LLL, moderate bilateral pleural effusions  Anti-GBM syndrome   Moderate persistent asthma without acute exacerbation  Pulmonary Hypertension    Patient is currently on 2L oxygen saturating at 97%. Titrate oxygen to maintain saturations >89%. She is not oxygen dependent at baseline  CT chest with persistent tree-in-bud nodularity in the lungs. Patchy airspace consolidation in the right upper and lower lobes and anterior basilar segment left lower lobe. New moderate bilateral pleural effusions.   Blood cultures 1/2 positive for MRSE, however patient asymptomatic of bacteremia and this could be a contamination. She is currently being observed off antibiotics per ID recommendations  Check urinary antigens, sputum and RP2 panel.   Home regimen: Advair 230-21 2 puffs twice daily, singulair daily, albuterol inhaler prn  Continue albuterol/atrovent nebs TID, singulair daily, advair equivalent inhaler 1 puff daily  Recommend discontinuing prednisone 20mg, patient without wheezing on exam, no evidence of acute exacerbation  Pulmonary hygiene: cough and deep breath, OOB as tolerated, scheduled [facility]  Nephrology following closely. Patient receiving dialysis    History of Present Illness   Physician Requesting Consult: Todd Askew*  Reason for Consult / Principal Problem: Moderate persistent asthma with acute exacerbation, multifocal pneumonia  Chief Complaint: \"more short of breath\"  HPI: Ngozi Beard is a 64 y.o.  female who presented to [facility] with complaints of increased dyspnea on exertion over the past 1-2 weeks. She also states she has had chronic cough for past 6 months, however has " recently developed productive cough bringing up yellow/green sputum. Upon admission she was found to have abnormal CT chest with patchy bilateral infiltrates and diffuse tree in bud nodulatities, as well as + MRSE 1/2 blood cultures.     She recently had elongated admission 7/10 - 8/4. During this admission was found to have abnormal CT chest with diffuse pulmonary nodularity with unknown etiology. She underwent bronchoscopy on 7/17. Cytology negative, AFB and fungal cultures negative, BAL culture negative. At the time was recommended to repeat CT in 4-6 weeks and if no change would then consider tissue biopsy - transbronchial biopsy vs VATS. 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 She is maintained on cytoxan. She was put on atovaquone for PCP prophylaxis, as Bactrim caused skin reactions for patient.     Currently she denies any recent fevers or chills. She denies leg pain, chest pain or hemoptysis. She reports feeling wheezy and chest tightness. She has a chronic cough, newly productive over the past week or two. She is maintained on Advair 230-21, singulair, and albuterol inhaler as an outpatient and follows with pulmonary Dr Rojas. She has a history of adulthood asthma, has never completed PFTs. She is a former smoker with a 15 pack year smoking history, she quit in 1988.    Inpatient consult to Pulmonology  Consult performed by: JEB Babcock  Consult ordered by: Gerber Kim Jr., DO          Review of Systems   Constitutional:  Negative for chills and fever.   HENT:  Negative for ear pain, rhinorrhea and sore throat.    Eyes:  Negative for pain and visual disturbance.   Respiratory:  Positive for cough, chest tightness, shortness of breath (with exertion) and wheezing.    Cardiovascular:  Negative for chest pain and palpitations.   Musculoskeletal:  Negative for arthralgias and back pain.   Skin:  Negative  for color change and rash.   Neurological:  Negative for dizziness, syncope and light-headedness.   All other systems reviewed and are negative.      Historical Information   Past Medical History:   Diagnosis Date    Asthma     Chronic pain     Hypertension     Renal disorder     Sepsis (HCC) 07/10/2024     Past Surgical History:   Procedure Laterality Date    BACK SURGERY      COLONOSCOPY      IR BIOPSY KIDNEY RANDOM  2024    IR TEMPORARY DIALYSIS CATHETER PLACEMENT  7/15/2024    IR TUNNELED DIALYSIS CATHETER CHECK/CHANGE/REPOSITION/ANGIOPLASTY  2024    IR TUNNELED DIALYSIS CATHETER PLACEMENT  2024    TUBAL LIGATION       Social History   Social History     Substance and Sexual Activity   Alcohol Use No    Comment: hX:Occas.      Social History     Substance and Sexual Activity   Drug Use No     Social History     Tobacco Use   Smoking Status Former    Current packs/day: 0.00    Average packs/day: 1 pack/day for 15.0 years (15.0 ttl pk-yrs)    Types: Cigarettes    Start date:     Quit date:     Years since quittin.6   Smokeless Tobacco Never     E-Cigarette/Vaping    E-Cigarette Use Never User      E-Cigarette/Vaping Substances    Nicotine No     THC No     CBD No     Flavoring No        Meds/Allergies   all current active meds have been reviewed and current meds:   Current Facility-Administered Medications   Medication Dose Route Frequency    acetaminophen (TYLENOL) tablet 650 mg  650 mg Oral Q6H PRN    albuterol (PROVENTIL HFA,VENTOLIN HFA) inhaler 2 puff  2 puff Inhalation Q4H PRN    albuterol inhalation solution 2.5 mg  2.5 mg Nebulization TID    atorvastatin (LIPITOR) tablet 20 mg  20 mg Oral Daily    atovaquone (MEPRON) oral suspension 1,500 mg  1,500 mg Oral Daily    cyclophosphamide (CYTOXAN) capsule 100 mg  100 mg Oral Daily    dextromethorphan-guaiFENesin (ROBITUSSIN DM) oral syrup 10 mL  10 mL Oral Q4H PRN    epoetin shavonne (EPOGEN,PROCRIT) injection 6,000 Units  6,000 Units  "Intravenous After Dialysis    fluticasone-vilanterol 200-25 mcg/actuation 1 puff  1 puff Inhalation Daily    heparin (porcine) subcutaneous injection 5,000 Units  5,000 Units Subcutaneous Q8H JACK    ipratropium (ATROVENT) 0.02 % inhalation solution 0.5 mg  0.5 mg Nebulization TID    iron polysaccharides (FERREX) capsule 150 mg  150 mg Oral Daily    lamoTRIgine (LaMICtal) tablet 100 mg  100 mg Oral HS    lidocaine (LIDODERM) 5 % patch 1 patch  1 patch Topical Daily    montelukast (SINGULAIR) tablet 10 mg  10 mg Oral Daily    NIFEdipine (PROCARDIA XL) 24 hr tablet 60 mg  60 mg Oral Daily    pantoprazole (PROTONIX) EC tablet 40 mg  40 mg Oral Daily Before Breakfast    predniSONE tablet 20 mg  20 mg Oral Daily    sevelamer (RENAGEL) tablet 1,600 mg  1,600 mg Oral TID With Meals    Sodium Zirconium Cyclosilicate (Lokelma) 10 g  10 g Oral Daily    traZODone (DESYREL) tablet 200 mg  200 mg Oral HS    trimethobenzamide (TIGAN) IM injection 200 mg  200 mg Intramuscular Q6H PRN    venlafaxine (EFFEXOR-XR) 24 hr capsule 225 mg  225 mg Oral Daily       Allergies   Allergen Reactions    Penicillins Hives     Reaction Date: 24May2005; Annotation - 04Sep2012: meena mcdonald    Amoxicillin Rash    Aspirin Rash    Bactrim [Sulfamethoxazole-Trimethoprim] Rash    Ibuprofen Rash    Morphine Rash    Oxycodone Rash    Valacyclovir Rash       Objective   Vitals: Blood pressure 117/75, pulse 96, temperature 98.5 °F (36.9 °C), temperature source Oral, resp. rate 19, height 5' 3\" (1.6 m), weight 107 kg (236 lb 5.3 oz), SpO2 96%.2L,Body mass index is 41.86 kg/m².    Intake/Output Summary (Last 24 hours) at 9/2/2024 0928  Last data filed at 9/2/2024 0600  Gross per 24 hour   Intake 420 ml   Output 0 ml   Net 420 ml     Invasive Devices       Peripheral Intravenous Line  Duration             Peripheral IV 08/30/24 Right Antecubital 2 days              Hemodialysis Catheter  Duration             HD Permanent Double Catheter 21 days              "       Physical Exam  Vitals and nursing note reviewed.   Constitutional:       General: She is not in acute distress.     Appearance: She is obese.   HENT:      Head: Normocephalic and atraumatic.      Nose: Nose normal.      Mouth/Throat:      Pharynx: Oropharynx is clear.   Eyes:      Conjunctiva/sclera: Conjunctivae normal.   Cardiovascular:      Rate and Rhythm: Normal rate.   Pulmonary:      Effort: Pulmonary effort is normal. No respiratory distress.      Breath sounds: Decreased breath sounds and rhonchi (diffuse bilaterally) present. No wheezing.   Abdominal:      Palpations: Abdomen is soft.   Musculoskeletal:         General: No swelling. Normal range of motion.      Cervical back: Normal range of motion and neck supple.   Skin:     General: Skin is warm and dry.      Capillary Refill: Capillary refill takes less than 2 seconds.   Neurological:      General: No focal deficit present.      Mental Status: She is alert and oriented to person, place, and time.   Psychiatric:         Mood and Affect: Mood normal.         Lab Results: I have personally reviewed pertinent lab results., CBC:   Lab Results   Component Value Date    WBC 12.88 (H) 09/02/2024    HGB 7.8 (L) 09/02/2024    HCT 25.4 (L) 09/02/2024    MCV 92 09/02/2024     09/02/2024    RBC 2.75 (L) 09/02/2024    MCH 28.4 09/02/2024    MCHC 30.7 (L) 09/02/2024    RDW 18.2 (H) 09/02/2024    MPV 10.4 09/02/2024   , CMP:   Lab Results   Component Value Date    SODIUM 139 09/02/2024    K 5.2 09/02/2024     09/02/2024    CO2 28 09/02/2024    BUN 50 (H) 09/02/2024    CREATININE 8.64 (H) 09/02/2024    CALCIUM 9.5 09/02/2024    EGFR 4 09/02/2024     VBG:   Latest Reference Range & Units 08/30/24 20:25   pH, Dean 7.300 - 7.400  7.428 (H)   pCO2, Dean 42.0 - 50.0 mm Hg 30.9 (L)   pO2, Dean 35.0 - 45.0 mm Hg 43.8   HCO3, Dean 24 - 30 mmol/L 20.0 (L)   Base Excess, Dean mmol/L -3.8   O2 Content, Dean ml/dL 8.9   O2 HGB, VENOUS 60.0 - 80.0 % 77.1        Procalcitonin: 1.00, 0.77    Flu/COVID/RSV PCR: Negative    Culture Data: MRSA negative, BC 1/2 + MRSE    Urinary antigens: Need collected     D-Dimer: 2.18    BNP: 3019    Imaging Studies: I have personally reviewed pertinent reports.   and I have personally reviewed pertinent films in PACS     CTA chest pe study, 8/31/2024  No pulmonary embolism.  Since July 11, 2022 there is persistent tree-in-bud nodularity in the lungs suggestive of a widespread infectious bronchiolitis that may be secondary to an atypical mycobacterium. Follicular bronchiolitis and acute hypersensitivity pneumonitis could also be in the differential. Follow-up chest CT in 3 months after treatment for infectious bronchiolitis is advised.  New mild patchy bilateral multifocal pneumonia.  Pulmonary hypertension  New moderate bilateral pleural effusions.    XR chest portable, 8/30/2024  1. New opacification lateral left lung base may reflect atelectasis or pneumonia. Probable small left effusion.  2. Mild cardiomegaly with mild vascular and interstitial prominence suggesting some degree of volume overload.    EKG, Pathology, and Other Studies: I have personally reviewed pertinent reports.       Transthoracic echocardiogram, 8/31/2024    Left Ventricle: Left ventricular cavity size is mildly dilated. Wall thickness is mildly increased. The left ventricular ejection fraction is 25%. Systolic function is severely reduced. There is severe global hypokinesis.    Right Ventricle: Right ventricular cavity size is mildly dilated. Systolic function is mildly reduced.    Left Atrium: The atrium is mildly dilated.    Right Atrium: The atrium is mildly dilated.    Aortic Valve: There is mild regurgitation.    Mitral Valve: There is moderate to severe regurgitation.    Tricuspid Valve: There is moderate regurgitation. The right ventricular systolic pressure is moderately elevated. The estimated right ventricular systolic pressure is 48.00 mmHg.     "Pericardium: There is a small pericardial effusion anterior to the heart.        Code Status: Level 1 - Full Code      Lata Olson, MSN RN FNP-BC  Nurse Practitioner  [facility]       Portions of the record may have been created with voice recognition software.  Occasional wrong word or \"sound a like\" substitutions may have occurred due to the inherent limitations of voice recognition software.  Read the chart carefully and recognize, using context, where substitutions have occurred.  "

## 2024-09-02 NOTE — PROGRESS NOTES
NEPHROLOGY HOSPITAL PROGRESS NOTE   Ngozi Beard 64 y.o. female MRN: [id number]  Unit/Bed#: S -01 Encounter: [id number]  Reason for Consult: ESRD    ASSESSMENT and PLAN:    64-year-old female with a past medical history of newly diagnosed anti-GBM disease initiated on dialysis July 16, 2024, asthma, hypertension, bipolar disorder who initially presents with chest pain and shortness of breath.  Recent admission with similar presentation.  Nephrology is on board for dialysis management    1-acute kidney failure with rapidly progressive GN secondary to biopsy-proven anti-GBM disease-outpatient dialysis unit [facility] schedule    - Access-right IJ PermCath  - Dialysis last on Saturday but had difficulty with blood flow rates during dialysis and catheter malfunction.  Patient had 0.9 L ultrafiltration.  System clotted 4 times.  Blood flow rate was maintained at 200 cc.  - 9/2-patient respiratory status slowly improving.  Potassium 5.2 and continue Lokelma.  Dialysis tomorrow.    Plan  - Next dialysis Tuesday  - IR evaluation 9/3 for catheter evaluation due to issues on dialysis 8/31  - Review case primary team resident we are in agreement renal plan regarding dialysis and IR evaluation and rest of renal plan  - Noted plans from cardiology team with depressed EF and further guideline-directed therapies per cardiology team  - Noted potential bronchoscopy plans.  Please update renal team with scheduling.  From renal standpoint dialysis Tuesday afternoon  - For now utilizing hypoallergenic filter until confirming with outpatient dialysis unit     2-Access-recent admission with malfunctioning right IJ PermCath.  PermCath exchanged last on August 12.  IR team reconsulted    3-anti-GBM disease-status post plasma exchange, Cytoxan and prednisone.  Is on Cytoxan and prednisone currently on atovaquone for prophylaxis.  Most recent anti-GBM level end of July is still elevated.  Is on prednisone taper as  outpatient.  But did receive IV methylprednisolone this admission for possible asthma exacerbation.    4-hypertension-continue nifedipine    5-chest discomfort-improved from admission.  Per cardiology team.  Ultrafiltrate on dialysis as tolerates    -Echocardiogram with severely depressed EF 25%, global hypokinesis.  PASP 48.  Mild aortic disease, moderate to severe mitral regurgitation  - Relatively euvolemic 9/2 but cautious monitoring for now    6-MBD-on phosphorus binders    7-anemia-monitoring.  Is on Mircera as outpatient.  Is on Epogen while inpatient.   received packed red blood cell on August 31    7-[id number] on Lokelma daily and monitor with clearance on dialysis    9-shortness of breath-multifactorial.  Concerning for pneumonitis versus pneumonia.  Observing off antibiotics.  Also noted to have bacteremia 1 out of 2 blood cultures positive.  Vancomycin was discontinued.  Follow repeat cultures per primary team.    - Pulmonary team being brought on board  - Cardiology on board for depressed EF    SUBJECTIVE / 24H INTERVAL HISTORY:    -145 syst. On 2 L NC. States SOB is improved. Still intermittent Chest tightness but improved.     OBJECTIVE:  Current Weight: Weight - Scale: 107 kg (236 lb 5.3 oz)  Vitals:    09/02/24 0600 09/02/24 0757 09/02/24 0926 09/02/24 1227   BP:   127/85 145/82   BP Location:       Pulse:   98 99   Resp:       Temp:   97.6 °F (36.4 °C)    TempSrc:       SpO2:  96% 95%    Weight: 107 kg (236 lb 5.3 oz)      Height:           Intake/Output Summary (Last 24 hours) at 9/2/2024 1238  Last data filed at 9/2/2024 0944  Gross per 24 hour   Intake 540 ml   Output 0 ml   Net 540 ml     General: NAD  Skin: no rash  Eyes: anicteric sclera  ENT: moist mucous membrane  Neck: supple  Chest: CTA b/l, no ronchii, no wheeze, no rubs, no rales  CVS: s1s2, no murmur, no gallop, no rub  Abdomen: soft, nontender, nl sounds  Extremities: no edema LE b/l, right IJ tunnel catheter no erythema  or drainage  : no sherwood  Neuro: AAOX3  Psych: normal affect    Medications:    Current Facility-Administered Medications:     acetaminophen (TYLENOL) tablet 650 mg, 650 mg, Oral, Q6H PRN, Kely Robert MD    albuterol (PROVENTIL HFA,VENTOLIN HFA) inhaler 2 puff, 2 puff, Inhalation, Q4H PRN, Kely Robert MD, 2 puff at 08/31/24 2039    albuterol inhalation solution 2.5 mg, 2.5 mg, Nebulization, TID, Todd Dickens MD, 2.5 mg at 09/02/24 0756    atorvastatin (LIPITOR) tablet 20 mg, 20 mg, Oral, Daily, Kely Robert MD, 20 mg at 09/02/24 0931    atovaquone (MEPRON) oral suspension 1,500 mg, 1,500 mg, Oral, Daily, Kely Robert MD, 1,500 mg at 09/02/24 0937    cyclophosphamide (CYTOXAN) capsule 100 mg, 100 mg, Oral, Daily, Kely Robert MD, 100 mg at 09/02/24 0938    dextromethorphan-guaiFENesin (ROBITUSSIN DM) oral syrup 10 mL, 10 mL, Oral, Q4H PRN, Kely Robert MD, 10 mL at 09/01/24 2142    epoetin shavonne (EPOGEN,PROCRIT) injection 6,000 Units, 6,000 Units, Intravenous, After Dialysis, Tirso Montez MD, 6,000 Units at 08/31/24 1855    fluticasone-vilanterol 200-25 mcg/actuation 1 puff, 1 puff, Inhalation, Daily, Robert Gilbert MD, 1 puff at 09/02/24 0937    heparin (porcine) subcutaneous injection 5,000 Units, 5,000 Units, Subcutaneous, Q8H JACK, Kely Robert MD, 5,000 Units at 09/02/24 0932    ipratropium (ATROVENT) 0.02 % inhalation solution 0.5 mg, 0.5 mg, Nebulization, TID, Todd Dickens MD, 0.5 mg at 09/02/24 0756    iron polysaccharides (FERREX) capsule 150 mg, 150 mg, Oral, Daily, Kely Robert MD, 150 mg at 09/02/24 0931    lamoTRIgine (LaMICtal) tablet 100 mg, 100 mg, Oral, HS, Kely Robert MD, 100 mg at 09/01/24 2142    lidocaine (LIDODERM) 5 % patch 1 patch, 1 patch, Topical, Daily, Trish Kline MD, 1 patch at 09/02/24 0932    metoprolol succinate (TOPROL-XL) 24 hr tablet 25 mg, 25 mg, Oral, Daily, JEB Wan, 25 mg at 09/02/24 1227    montelust  (SINGULAIR) tablet 10 mg, 10 mg, Oral, Daily, Kely Robert MD, 10 mg at 09/02/24 0931    NIFEdipine (PROCARDIA XL) 24 hr tablet 60 mg, 60 mg, Oral, Daily, Kely Robert MD, 60 mg at 09/02/24 0932    pantoprazole (PROTONIX) EC tablet 40 mg, 40 mg, Oral, Daily Before Breakfast, Kely Robert MD, 40 mg at 09/02/24 0932    predniSONE tablet 20 mg, 20 mg, Oral, Daily, Gerber Kim Jr., DO, 20 mg at 09/02/24 0931    sevelamer (RENAGEL) tablet 1,600 mg, 1,600 mg, Oral, TID With Meals, Kely Robert MD, 1,600 mg at 09/02/24 1227    Sodium Zirconium Cyclosilicate (Lokelma) 10 g, 10 g, Oral, Daily, Trish Kline MD, 10 g at 09/01/24 0811    traZODone (DESYREL) tablet 200 mg, 200 mg, Oral, HS, Kely Robert MD, 200 mg at 09/01/24 2142    trimethobenzamide (TIGAN) IM injection 200 mg, 200 mg, Intramuscular, Q6H PRN, Kely Robert MD, 200 mg at 08/30/24 2208    venlafaxine (EFFEXOR-XR) 24 hr capsule 225 mg, 225 mg, Oral, Daily, Kely Robert MD, 225 mg at 09/02/24 0929    Laboratory Results:  Results from last 7 days   Lab Units 09/02/24  0509 09/01/24  0521 08/31/24  1556 08/31/24  0755 08/31/24  0443 08/31/24  0033 08/30/24  2025   WBC Thousand/uL 12.88* 19.55*  --   --  10.64*  --  14.45*   HEMOGLOBIN g/dL 7.8* 7.9* 7.9* 6.9* 6.7*  --  7.1*   HEMATOCRIT % 25.4* 24.6* 25.4* 22.2* 22.1*  --  23.0*   PLATELETS Thousands/uL 244 243  --   --  245  --  258   POTASSIUM mmol/L 5.2 4.4  --   --  5.6* 5.7* 5.7*   CHLORIDE mmol/L 100 98  --   --  102  --  102   CO2 mmol/L 28 28  --   --  25  --  22   BUN mg/dL 50* 40*  --   --  51*  --  44*   CREATININE mg/dL 8.64* 6.86*  --   --  8.68*  --  8.11*   CALCIUM mg/dL 9.5 9.6  --   --  9.6  --  9.8   MAGNESIUM mg/dL  --  2.1  --   --  2.3  --  2.1   PHOSPHORUS mg/dL  --  4.7*  --   --  5.8*  --  6.1*

## 2024-09-02 NOTE — PROGRESS NOTES
[facility]  Progress Note  Name: Ngozi Beard I  MRN: [id number]  Unit/Bed#: S -01 I Date of Admission: 8/30/2024   Date of Service: 9/2/2024 I Hospital Day: 3    Assessment & Plan   Multifocal pneumonia  Assessment & Plan  -CT chest PE study revealed no evidence of PE  -There is persistent tree-in-bud nodularity in the lungs suggestive of a widespread infectious bronchiolitis that may be secondary to an atypical mycobacterium. 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 S/p PLEX.  Patient is anuric and dialysis-dependent  Tues/Thurs/Sat schedule  Access: Right IJ PermCath    Plan  IR pending cath reevaluation 9/3   Hemodialysis scheduled on 9/3  Continue PTA cyclophosphamide  Continue PTA sevalemer  Continue PTA atovaquone for PJP prophylaxis  Continue prednisone taper from outpatient nephrology prior to this admission  20 mg once daily till September 6  15 mg starting September 7 to September 20  12.5 mg starting September 21 to October 4  10 mg starting October 5 to October 18  7.5 mg starting October 19 to November 2  5 mg daily starting November 3    * Shortness of breath  Assessment & Plan  CLINE: Negative PE, BNP 3019, CT chest-bilateral pleural effusion, tree-in-bud nodularity    Multifactorial, contributing factors of volume overload, chronic anemia, underlying COPD and asthma.  Ruling out Mycobacterium infection.     Plan  Pending pulmonology evaluation, bronchoscopy, bronchoalveolar lavage    Cardiomyopathy (HCC)  Assessment &  Plan  August 31-EF of 25% with global hypokinesis.  No prior echo for comparison.    Suspect nonischemic cardiomyopathy    Cardiology consult      Generalized weakness  Assessment & Plan  Multifactorial, respiratory failure secondary to chronic pulmonary infection, volume overload, chronic anemia, chronic deconditioning from being ill    Plan  PT/OT evaluation apprecaited    Elevated transaminase level  Assessment & Plan  Recent Labs     08/30/24  2025 08/31/24  0443 09/01/24  0521   AST 65* 57* 31   ALT 60* 59* 61*   INR 1.99*  --   --        INR 1.99  Unclear etiology. Possibly secondary to atovaquone use  Asymptomatic    Plan  Trend CMP  Consider RUQ US and acute hepatitis panel    SIRS (systemic inflammatory response syndrome) (HCC)  Assessment & Plan  On admission, met SIRS criteria with leukocytosis and tachycardia  Procalcitonin 0.77 (in the setting of renal disease)  COVID/Flu/RSV negative  CXR - mildly pulmonary vascular congestion. No evidence of consolidation  CT chest revealed evidence of multifocal pneumonia    Multifactorial, steroid use, hypoxic respiratory failure, volume overload, chronic pulmonary infection    Plan  Monitor WBC and fever curve  Follow blood cultures    Asthma without acute exacerbation  Assessment & Plan  Mild Bilateral wheezing was noted in the lower lung base upon initial presentation.  Does not require frequent inhaler use as an outpatient setting.  Low suspicion for exacerbation for the clinical symptoms presented during this hospital on admission    Plan  Continue albuterol-ipratropium nebs  Pulmonology consult in place    Anemia  Assessment & Plan  Recent Labs     08/31/24  0755 08/31/24  1556 09/01/24  0521   HGB 6.9* 7.9* 7.9*        Baseline Hgb 7-8  Etiology: component of iron deficiency and renal disease  Has received Epogen during prior hospitalization    Plan  Monitor Hgb, transfuse for < 7  Continue PTA Ferrex    Dyslipidemia  Assessment & Plan  Continue PTA  Lipitor    Primary hypertension  Assessment & Plan  Continue PTA nifedipine    Bipolar 1 disorder (HCC)  Assessment & Plan  Continue PTA Lamictal, venlafaxine (150 mg and 75 mg daily in the morning), and trazodone           VTE Pharmacologic Prophylaxis: VTE Score: 7 High Risk (Score >/= 5) - Pharmacological DVT Prophylaxis Ordered: heparin. Sequential Compression Devices Ordered.    Mobility:   Basic Mobility Inpatient Raw Score: 15  JH-HLM Goal: 4: Move to chair/commode  JH-HLM Achieved: 2: Bed activities/Dependent transfer  JH-HLM Goal NOT achieved. Continue with multidisciplinary rounding and encourage appropriate mobility to improve upon JH-HLM goals.    Patient Centered Rounds: I performed bedside rounds with nursing staff today.  Discussions with Specialists or Other Care Team Provider: Cardiology/pulmonology/infectious disease    Education and Discussions with Family / Patient: Attempted to update  (sister) via phone. Left voicemail.     Current Length of Stay: 3 day(s)  Current Patient Status: Inpatient   Discharge Plan: Anticipate discharge in 48-72 hrs to be determined    Code Status: Level 1 - Full Code    Subjective:   No acute events overnight    Endorses shortness of breath upon exertion, however improved from initial presentation.    Denies any chest pain, fever, chills, abdominal pain, headache, diarrhea.      Objective:     Vitals:   Temp (24hrs), Av.1 °F (36.7 °C), Min:97.6 °F (36.4 °C), Max:98.5 °F (36.9 °C)    Temp:  [97.6 °F (36.4 °C)-98.5 °F (36.9 °C)] 97.6 °F (36.4 °C)  HR:  [90-99] 99  Resp:  [19] 19  BP: (117-145)/(75-85) 145/82  SpO2:  [92 %-97 %] 95 %  Body mass index is 41.86 kg/m².     Input and Output Summary (last 24 hours):     Intake/Output Summary (Last 24 hours) at 2024 1351  Last data filed at 2024 1309  Gross per 24 hour   Intake 780 ml   Output 0 ml   Net 780 ml       Physical Exam:   Physical Exam  Constitutional:       Comments: Alert, awake,  comfortable in bed   HENT:      Mouth/Throat:      Mouth: Mucous membranes are moist.   Neck:      Comments: No JVD  Cardiovascular:      Rate and Rhythm: Normal rate and regular rhythm.   Pulmonary:      Effort: Pulmonary effort is normal.      Breath sounds: Normal breath sounds.   Abdominal:      General: Bowel sounds are normal.      Palpations: Abdomen is soft.   Skin:     General: Skin is warm.      Capillary Refill: Capillary refill takes less than 2 seconds.          Additional Data:     Labs:  Results from last 7 days   Lab Units 09/02/24  0509   WBC Thousand/uL 12.88*   HEMOGLOBIN g/dL 7.8*   HEMATOCRIT % 25.4*   PLATELETS Thousands/uL 244   BANDS PCT % 4   LYMPHO PCT % 13*   MONO PCT % 3*   EOS PCT % 0     Results from last 7 days   Lab Units 09/02/24  0509 09/01/24  0521   SODIUM mmol/L 139 137   POTASSIUM mmol/L 5.2 4.4   CHLORIDE mmol/L 100 98   CO2 mmol/L 28 28   BUN mg/dL 50* 40*   CREATININE mg/dL 8.64* 6.86*   ANION GAP mmol/L 11 11   CALCIUM mg/dL 9.5 9.6   ALBUMIN g/dL  --  3.7   TOTAL BILIRUBIN mg/dL  --  0.44   ALK PHOS U/L  --  97   ALT U/L  --  61*   AST U/L  --  31   GLUCOSE RANDOM mg/dL 134 107     Results from last 7 days   Lab Units 08/30/24 2025   INR  1.99*             Results from last 7 days   Lab Units 08/31/24  0443 08/30/24 2025   PROCALCITONIN ng/ml 1.00* 0.77*       Lines/Drains:  Invasive Devices       Peripheral Intravenous Line  Duration             Peripheral IV 08/30/24 Right Antecubital 2 days              Hemodialysis Catheter  Duration             HD Permanent Double Catheter 21 days                      Telemetry:  Telemetry Orders (From admission, onward)               24 Hour Telemetry Monitoring  Continuous x 24 Hours (Telem)        Question:  Reason for 24 Hour Telemetry  Answer:  Decompensated CHF- and any one of the following: continuous diuretic infusion or total diuretic dose >200 mg daily, associated electrolyte derangement (I.e. K < 3.0), ionotropic drip  (continuous infusion), hx of ventricular arrhythmia, or new EF < 35%                     Telemetry Reviewed: Normal Sinus Rhythm  Indication for Continued Telemetry Use: Acute CHF on >200 mg lasix/day or equivalent dose or with new reduced EF.              Imaging: No pertinent imaging reviewed.    Recent Cultures (last 7 days):   Results from last 7 days   Lab Units 08/30/24 2039   BLOOD CULTURE  Staphylococcus epidermidis*  No Growth at 48 hrs.   GRAM STAIN RESULT  Gram positive cocci in clusters*       Last 24 Hours Medication List:   Current Facility-Administered Medications   Medication Dose Route Frequency Provider Last Rate    acetaminophen  650 mg Oral Q6H PRN Kely Robert MD      albuterol  2 puff Inhalation Q4H PRN Kely Robert MD      albuterol  2.5 mg Nebulization TID Todd Dickens MD      atorvastatin  20 mg Oral Daily Kely Robert MD      atovaquone  1,500 mg Oral Daily Kely Robert MD      cyclophosphamide  100 mg Oral Daily Kely Robert MD      dextromethorphan-guaiFENesin  10 mL Oral Q4H PRN Kely Robert MD      epoetin shavonne  6,000 Units Intravenous After Dialysis Tirso Montez MD      fluticasone-vilanterol  1 puff Inhalation Daily Robert Gilbert MD      heparin (porcine)  5,000 Units Subcutaneous Q8H [facility] Kely Robert MD      ipratropium  0.5 mg Nebulization TID Todd Dickens MD      iron polysaccharides  150 mg Oral Daily Kely Robert MD      lamoTRIgine  100 mg Oral HS Kely Robert MD      lidocaine  1 patch Topical Daily Trish Klien MD      metoprolol succinate  25 mg Oral Daily JEB Wan      montelukast  10 mg Oral Daily Kely Robert MD      NIFEdipine  60 mg Oral Daily Kely Robert MD      pantoprazole  40 mg Oral Daily Before Breakfast Kely Robert MD      predniSONE  20 mg Oral Daily Gerber Kim Jr., DO      sevelamer  1,600 mg Oral TID With Meals Kely Robert MD      Sodium Zirconium Cyclosilicate  10 g Oral  Daily Trish Kline MD      traZODone  200 mg Oral HS Kely Robert MD      trimethobenzamide  200 mg Intramuscular Q6H PRN Kely Robert MD      venlafaxine  225 mg Oral Daily Keyl Robert MD          Today, Patient Was Seen By: Kathryn Cleveland MD    **Please Note: This note may have been constructed using a voice recognition system.**

## 2024-09-02 NOTE — ASSESSMENT & PLAN NOTE
Multifactorial from asthma, mild volume overload (managed via HD), pneumonia, anemia, and persistent changes on CT chest

## 2024-09-02 NOTE — PROGRESS NOTES
Progress Note - Infectious Disease   Ngozi Beard 64 y.o. female MRN: [id number]  Unit/Bed#: S -01 Encounter: [id number]      Impression/Recommendations:  1.   Pneumonitis versus pneumonia.  Given underlying anti-GBM disease, it is unclear whether chest CT findings are related to patient's underlying collagen vascular disease or pneumonia.  If this is pneumonia, CT and clinical picture is consistent with atypical pneumonia rather than acute bacterial pneumonia.  Patient is supposed to be on atovaquone for PCP prophylaxis but is unclear whether she is taking it.  Regardless, given her immunosuppressed state, it would be best that we try to establish diagnosis rather than treat her empirically.  With patient's very stable clinical status, it is best to keep her off antibiotic pending workup, so that antibiotic does not affect culture result.  Continue to observe off antibiotic for now, other than atovaquone PCP prophylaxis.  Recommend pulmonary evaluation for bronchoscopy.  Monitor respiratory symptoms and O2 saturation.  Monitor temperature.     2.  Bacteremia, with growth of MRSE in only 1 out of 2 admission blood cultures.  Patient has no fever and is not systemically ill, making true bacteremia not likely clinically.  Although patient does have permacath in place, unless the second admission blood culture also has growth of the same pathogen, this is most likely contaminated blood draw.  Patient had been on IV vancomycin but this was discontinued.  No antibiotic needed for this indication.  Follow-up on second concurrent admission blood culture.     3.  Leukocytosis.  In a patient on chronic steroid and now with increased steroid dosage, leukocytosis is expected.  It is difficult to make clinical sense of her leukocytosis.     4.  ESRD, on HD.  Functional difficulty of permacath noted.  HD per nephrology.     5.  Recently diagnosed rapidly progressing anti-GBM disease.  Patient is on Cytoxan and  prednisone since diagnosis.  Continue outpatient Cytoxan/steroid.  Continue/restart atovaquone PCP prophylaxis.     Discussed with patient in detail regarding the above plan.  Discussed with Dr. Dickens from [facility] service regarding continuing to observe off antibiotic.  He is in agreement.    Antibiotics:  Off antibiotic    Subjective:  Patient with stable dyspnea on exertion, comfortable at rest.  Stable cough productive of clear to yellow sputum.  Temperature stays down.  No chills.  No diarrhea.    Objective:  Vitals:  Temp:  [97.6 °F (36.4 °C)-98.5 °F (36.9 °C)] 97.6 °F (36.4 °C)  HR:  [90-99] 99  Resp:  [19] 19  BP: (117-145)/(75-85) 145/82  SpO2:  [92 %-97 %] 95 %  Temp (24hrs), Av.1 °F (36.7 °C), Min:97.6 °F (36.4 °C), Max:98.5 °F (36.9 °C)  Current: Temperature: 97.6 °F (36.4 °C)    Physical Exam:     General: Awake, alert, cooperative, no distress.   Neck:  Supple. No mass.  No lymphadenopathy.   Lungs: Expansion symmetric, basilar rhonchi and sparse rales, no wheezing, respirations unlabored.   Heart:  Regular rate and rhythm, S1 and S2 normal, no murmur.   Abdomen: Soft, nondistended, non-tender, bowel sounds active all four quadrants, no masses, no organomegaly.   Extremities: Stable mild leg edema. No erythema/warmth. No ulcer. Nontender to palpation.   Skin:  No rash.   Neuro: Moves all extremities.     Invasive Devices       Peripheral Intravenous Line  Duration             Peripheral IV 24 Right Antecubital 2 days              Hemodialysis Catheter  Duration             HD Permanent Double Catheter 21 days                    Labs studies:   I have personally reviewed pertinent labs.  Results from last 7 days   Lab Units 24  0509 24  0521 24  0443 24  0033 24   POTASSIUM mmol/L 5.2 4.4 5.6*   < > 5.7*   CHLORIDE mmol/L 100 98 102  --  102   CO2 mmol/L 28 28 25  --  22   BUN mg/dL 50* 40* 51*  --  44*   CREATININE mg/dL 8.64* 6.86* 8.68*  --  8.11*   EGFR  ml/min/1.73sq m 4 5 4  --  4   CALCIUM mg/dL 9.5 9.6 9.6  --  9.8   AST U/L  --  31 57*  --  65*   ALT U/L  --  61* 59*  --  60*   ALK PHOS U/L  --  97 100  --  106*    < > = values in this interval not displayed.     Results from last 7 days   Lab Units 09/02/24  0509 09/01/24  0521 08/31/24  1556 08/31/24  0755 08/31/24  0443   WBC Thousand/uL 12.88* 19.55*  --   --  10.64*   HEMOGLOBIN g/dL 7.8* 7.9* 7.9*   < > 6.7*   PLATELETS Thousands/uL 244 243  --   --  245    < > = values in this interval not displayed.     Results from last 7 days   Lab Units 08/31/24  0043 08/30/24  2039   BLOOD CULTURE   --  Staphylococcus epidermidis*  No Growth at 48 hrs.   GRAM STAIN RESULT   --  Gram positive cocci in clusters*   MRSA CULTURE ONLY  No Methicillin Resistant Staphlyococcus aureus (MRSA) isolated  --        Imaging Studies:   I have personally reviewed pertinent imaging study reports and images in PACS.    EKG, Pathology, and Other Studies:   I have personally reviewed pertinent reports.

## 2024-09-02 NOTE — MALNUTRITION/BMI
This medical record reflects one or more clinical indicators suggestive of malnutrition and/or morbid obesity.    Malnutrition Findings:                                 BMI Findings:  Adult BMI Classifications: Morbid Obesity 40-44.9        Body mass index is 41.86 kg/m².     See Nutrition note dated 9/2/2024 for additional details.  Completed nutrition assessment is viewable in the nutrition documentation.

## 2024-09-02 NOTE — H&P (VIEW-ONLY)
"Consultation - Pulmonary Medicine   Ngozi Beard 64 y.o. female MRN: [id number]  Unit/Bed#: S -01 Encounter: [id number]      Assessment/Plan:    Acute respiratory insufficiency  Abnormal CT chest with diffuse tree-in-bud nodularities, patchy airspace consolidation in RUL, RLL and LLL, moderate bilateral pleural effusions  Anti-GBM syndrome   Moderate persistent asthma without acute exacerbation  Pulmonary Hypertension    Patient is currently on 2L oxygen saturating at 97%. Titrate oxygen to maintain saturations >89%. She is not oxygen dependent at baseline  CT chest with persistent tree-in-bud nodularity in the lungs. Patchy airspace consolidation in the right upper and lower lobes and anterior basilar segment left lower lobe. New moderate bilateral pleural effusions.   Blood cultures 1/2 positive for MRSE, however patient asymptomatic of bacteremia and this could be a contamination. She is currently being observed off antibiotics per ID recommendations  Check urinary antigens, sputum and RP2 panel.   Home regimen: Advair 230-21 2 puffs twice daily, singulair daily, albuterol inhaler prn  Continue albuterol/atrovent nebs TID, singulair daily, advair equivalent inhaler 1 puff daily  Recommend discontinuing prednisone 20mg, patient without wheezing on exam, no evidence of acute exacerbation  Pulmonary hygiene: cough and deep breath, OOB as tolerated, scheduled [facility]  Nephrology following closely. Patient receiving dialysis    History of Present Illness   Physician Requesting Consult: Todd Askew*  Reason for Consult / Principal Problem: Moderate persistent asthma with acute exacerbation, multifocal pneumonia  Chief Complaint: \"more short of breath\"  HPI: Ngozi Beard is a 64 y.o.  female who presented to [facility] with complaints of increased dyspnea on exertion over the past 1-2 weeks. She also states she has had chronic cough for past 6 months, however has " recently developed productive cough bringing up yellow/green sputum. Upon admission she was found to have abnormal CT chest with patchy bilateral infiltrates and diffuse tree in bud nodulatities, as well as + MRSE 1/2 blood cultures.     She recently had elongated admission 7/10 - 8/4. During this admission was found to have abnormal CT chest with diffuse pulmonary nodularity with unknown etiology. She underwent bronchoscopy on 7/17. Cytology negative, AFB and fungal cultures negative, BAL culture negative. At the time was recommended to repeat CT in 4-6 weeks and if no change would then consider tissue biopsy - transbronchial biopsy vs VATS. 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 She is maintained on cytoxan. She was put on atovaquone for PCP prophylaxis, as Bactrim caused skin reactions for patient.     Currently she denies any recent fevers or chills. She denies leg pain, chest pain or hemoptysis. She reports feeling wheezy and chest tightness. She has a chronic cough, newly productive over the past week or two. She is maintained on Advair 230-21, singulair, and albuterol inhaler as an outpatient and follows with pulmonary Dr Rojas. She has a history of adulthood asthma, has never completed PFTs. She is a former smoker with a 15 pack year smoking history, she quit in 1988.    Inpatient consult to Pulmonology  Consult performed by: JEB Babcock  Consult ordered by: Gerber Kim Jr., DO          Review of Systems   Constitutional:  Negative for chills and fever.   HENT:  Negative for ear pain, rhinorrhea and sore throat.    Eyes:  Negative for pain and visual disturbance.   Respiratory:  Positive for cough, chest tightness, shortness of breath (with exertion) and wheezing.    Cardiovascular:  Negative for chest pain and palpitations.   Musculoskeletal:  Negative for arthralgias and back pain.   Skin:  Negative  for color change and rash.   Neurological:  Negative for dizziness, syncope and light-headedness.   All other systems reviewed and are negative.      Historical Information   Past Medical History:   Diagnosis Date    Asthma     Chronic pain     Hypertension     Renal disorder     Sepsis (HCC) 07/10/2024     Past Surgical History:   Procedure Laterality Date    BACK SURGERY      COLONOSCOPY      IR BIOPSY KIDNEY RANDOM  2024    IR TEMPORARY DIALYSIS CATHETER PLACEMENT  7/15/2024    IR TUNNELED DIALYSIS CATHETER CHECK/CHANGE/REPOSITION/ANGIOPLASTY  2024    IR TUNNELED DIALYSIS CATHETER PLACEMENT  2024    TUBAL LIGATION       Social History   Social History     Substance and Sexual Activity   Alcohol Use No    Comment: hX:Occas.      Social History     Substance and Sexual Activity   Drug Use No     Social History     Tobacco Use   Smoking Status Former    Current packs/day: 0.00    Average packs/day: 1 pack/day for 15.0 years (15.0 ttl pk-yrs)    Types: Cigarettes    Start date:     Quit date:     Years since quittin.6   Smokeless Tobacco Never     E-Cigarette/Vaping    E-Cigarette Use Never User      E-Cigarette/Vaping Substances    Nicotine No     THC No     CBD No     Flavoring No        Meds/Allergies   all current active meds have been reviewed and current meds:   Current Facility-Administered Medications   Medication Dose Route Frequency    acetaminophen (TYLENOL) tablet 650 mg  650 mg Oral Q6H PRN    albuterol (PROVENTIL HFA,VENTOLIN HFA) inhaler 2 puff  2 puff Inhalation Q4H PRN    albuterol inhalation solution 2.5 mg  2.5 mg Nebulization TID    atorvastatin (LIPITOR) tablet 20 mg  20 mg Oral Daily    atovaquone (MEPRON) oral suspension 1,500 mg  1,500 mg Oral Daily    cyclophosphamide (CYTOXAN) capsule 100 mg  100 mg Oral Daily    dextromethorphan-guaiFENesin (ROBITUSSIN DM) oral syrup 10 mL  10 mL Oral Q4H PRN    epoetin shavonne (EPOGEN,PROCRIT) injection 6,000 Units  6,000 Units  "Intravenous After Dialysis    fluticasone-vilanterol 200-25 mcg/actuation 1 puff  1 puff Inhalation Daily    heparin (porcine) subcutaneous injection 5,000 Units  5,000 Units Subcutaneous Q8H JACK    ipratropium (ATROVENT) 0.02 % inhalation solution 0.5 mg  0.5 mg Nebulization TID    iron polysaccharides (FERREX) capsule 150 mg  150 mg Oral Daily    lamoTRIgine (LaMICtal) tablet 100 mg  100 mg Oral HS    lidocaine (LIDODERM) 5 % patch 1 patch  1 patch Topical Daily    montelukast (SINGULAIR) tablet 10 mg  10 mg Oral Daily    NIFEdipine (PROCARDIA XL) 24 hr tablet 60 mg  60 mg Oral Daily    pantoprazole (PROTONIX) EC tablet 40 mg  40 mg Oral Daily Before Breakfast    predniSONE tablet 20 mg  20 mg Oral Daily    sevelamer (RENAGEL) tablet 1,600 mg  1,600 mg Oral TID With Meals    Sodium Zirconium Cyclosilicate (Lokelma) 10 g  10 g Oral Daily    traZODone (DESYREL) tablet 200 mg  200 mg Oral HS    trimethobenzamide (TIGAN) IM injection 200 mg  200 mg Intramuscular Q6H PRN    venlafaxine (EFFEXOR-XR) 24 hr capsule 225 mg  225 mg Oral Daily       Allergies   Allergen Reactions    Penicillins Hives     Reaction Date: 24May2005; Annotation - 04Sep2012: meena mcdonald    Amoxicillin Rash    Aspirin Rash    Bactrim [Sulfamethoxazole-Trimethoprim] Rash    Ibuprofen Rash    Morphine Rash    Oxycodone Rash    Valacyclovir Rash       Objective   Vitals: Blood pressure 117/75, pulse 96, temperature 98.5 °F (36.9 °C), temperature source Oral, resp. rate 19, height 5' 3\" (1.6 m), weight 107 kg (236 lb 5.3 oz), SpO2 96%.2L,Body mass index is 41.86 kg/m².    Intake/Output Summary (Last 24 hours) at 9/2/2024 0928  Last data filed at 9/2/2024 0600  Gross per 24 hour   Intake 420 ml   Output 0 ml   Net 420 ml     Invasive Devices       Peripheral Intravenous Line  Duration             Peripheral IV 08/30/24 Right Antecubital 2 days              Hemodialysis Catheter  Duration             HD Permanent Double Catheter 21 days              "       Physical Exam  Vitals and nursing note reviewed.   Constitutional:       General: She is not in acute distress.     Appearance: She is obese.   HENT:      Head: Normocephalic and atraumatic.      Nose: Nose normal.      Mouth/Throat:      Pharynx: Oropharynx is clear.   Eyes:      Conjunctiva/sclera: Conjunctivae normal.   Cardiovascular:      Rate and Rhythm: Normal rate.   Pulmonary:      Effort: Pulmonary effort is normal. No respiratory distress.      Breath sounds: Decreased breath sounds and rhonchi (diffuse bilaterally) present. No wheezing.   Abdominal:      Palpations: Abdomen is soft.   Musculoskeletal:         General: No swelling. Normal range of motion.      Cervical back: Normal range of motion and neck supple.   Skin:     General: Skin is warm and dry.      Capillary Refill: Capillary refill takes less than 2 seconds.   Neurological:      General: No focal deficit present.      Mental Status: She is alert and oriented to person, place, and time.   Psychiatric:         Mood and Affect: Mood normal.         Lab Results: I have personally reviewed pertinent lab results., CBC:   Lab Results   Component Value Date    WBC 12.88 (H) 09/02/2024    HGB 7.8 (L) 09/02/2024    HCT 25.4 (L) 09/02/2024    MCV 92 09/02/2024     09/02/2024    RBC 2.75 (L) 09/02/2024    MCH 28.4 09/02/2024    MCHC 30.7 (L) 09/02/2024    RDW 18.2 (H) 09/02/2024    MPV 10.4 09/02/2024   , CMP:   Lab Results   Component Value Date    SODIUM 139 09/02/2024    K 5.2 09/02/2024     09/02/2024    CO2 28 09/02/2024    BUN 50 (H) 09/02/2024    CREATININE 8.64 (H) 09/02/2024    CALCIUM 9.5 09/02/2024    EGFR 4 09/02/2024     VBG:   Latest Reference Range & Units 08/30/24 20:25   pH, Edan 7.300 - 7.400  7.428 (H)   pCO2, Dean 42.0 - 50.0 mm Hg 30.9 (L)   pO2, Dean 35.0 - 45.0 mm Hg 43.8   HCO3, Dean 24 - 30 mmol/L 20.0 (L)   Base Excess, Dean mmol/L -3.8   O2 Content, Dean ml/dL 8.9   O2 HGB, VENOUS 60.0 - 80.0 % 77.1        Procalcitonin: 1.00, 0.77    Flu/COVID/RSV PCR: Negative    Culture Data: MRSA negative, BC 1/2 + MRSE    Urinary antigens: Need collected     D-Dimer: 2.18    BNP: 3019    Imaging Studies: I have personally reviewed pertinent reports.   and I have personally reviewed pertinent films in PACS     CTA chest pe study, 8/31/2024  No pulmonary embolism.  Since July 11, 2022 there is persistent tree-in-bud nodularity in the lungs suggestive of a widespread infectious bronchiolitis that may be secondary to an atypical mycobacterium. Follicular bronchiolitis and acute hypersensitivity pneumonitis could also be in the differential. Follow-up chest CT in 3 months after treatment for infectious bronchiolitis is advised.  New mild patchy bilateral multifocal pneumonia.  Pulmonary hypertension  New moderate bilateral pleural effusions.    XR chest portable, 8/30/2024  1. New opacification lateral left lung base may reflect atelectasis or pneumonia. Probable small left effusion.  2. Mild cardiomegaly with mild vascular and interstitial prominence suggesting some degree of volume overload.    EKG, Pathology, and Other Studies: I have personally reviewed pertinent reports.       Transthoracic echocardiogram, 8/31/2024    Left Ventricle: Left ventricular cavity size is mildly dilated. Wall thickness is mildly increased. The left ventricular ejection fraction is 25%. Systolic function is severely reduced. There is severe global hypokinesis.    Right Ventricle: Right ventricular cavity size is mildly dilated. Systolic function is mildly reduced.    Left Atrium: The atrium is mildly dilated.    Right Atrium: The atrium is mildly dilated.    Aortic Valve: There is mild regurgitation.    Mitral Valve: There is moderate to severe regurgitation.    Tricuspid Valve: There is moderate regurgitation. The right ventricular systolic pressure is moderately elevated. The estimated right ventricular systolic pressure is 48.00 mmHg.     "Pericardium: There is a small pericardial effusion anterior to the heart.        Code Status: Level 1 - Full Code      Lata Olson, MSN RN FNP-BC  Nurse Practitioner  [facility]       Portions of the record may have been created with voice recognition software.  Occasional wrong word or \"sound a like\" substitutions may have occurred due to the inherent limitations of voice recognition software.  Read the chart carefully and recognize, using context, where substitutions have occurred.  "

## 2024-09-02 NOTE — ASSESSMENT & PLAN NOTE
Hemoglobin as low as 6.7 on 8/31 s/p 1 unit PRBCs. Up to 7.8 today which is within recent baseline.

## 2024-09-02 NOTE — CONSULTS
Consultation - Cardiology Team One  Ngozi Beard 64 y.o. female MRN: [id number]  Unit/Bed#: S -01 Encounter: [id number]    Inpatient consult to Cardiology  Consult performed by: JEB Wan  Consult ordered by: Gerber Kim Jr., DO      Physician Requesting Consult: Todd Askew*  Reason for Consult / Principal Problem: new cardiomyopathy    Assessment & Plan  Cardiomyopathy (HCC)  Newly diagnosed although no prior echo on file for comparison  Overall appears well compensated on exam today  Unclear etiology but will need further testing  Not on GDMT  Primary hypertension  Stable on nifedipine 60 mg daily  Dyslipidemia  , , HDL 31, LDL 79 in May 2024. On atorvastatin 20 mg daily  Morbid obesity with BMI of 45.0-49.9, adult (HCC)    Anemia  Hemoglobin as low as 6.7 on 8/31 s/p 1 unit PRBCs. Up to 7.8 today which is within recent baseline.  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 ID following.    Plan  Add Toprol XL 25 mg daily for GDMT  Plan for eventual ischemic eval for further work up - likely cardiac cath prior to d/c  Volume management per HD- appears generally euvolemic on exam today  Continue to monitor on telemetry    History of Present Illness   HPI: Ngozi Beard is a 64 y.o. year old female with HTN, HLD, anti-GBM disease, ESRD on HD, chronic anemia, asthma, and bipolar 1 disorder.    She presented to the ED on 8/30/24 with c/o worsening shortness of breath and chronic cough/sputum production for nearly 6 months. She met SIRS criteria on admission with leukocytosis and tachycardia. COVID/Flu/RSV negative. CXR showed mild vascular congestion. CTA PE study negative for PE but read  "as \"persistent tree in bud nodularity suggestive of a widespread infectious broncholitis..new patchy bilateral multifocal pneumonia, pulmonary HTN, and moderate bilateral pleural effusions\". She has been afebrile with controlled BP. She is maintaining adequate O2 sats on 2L. Nephrology and infectious disease are following. She had an echocardiogram completed 8/31 revealing LVEF 25% with severe global hypokinesis, normal diastolic function, mildly dilated RV with mildly reduced systolic function, mild AI, moderate to severe MR, moderate TR with RVSP 48 mmHg and a small pericardial effusion anterior to the heart. Cardiology consulted for further evaluation of her new cardiomyopathy.     EKG reviewed personally: 8/30 Sinus tachycardia with LBBB    Telemetry reviewed personally: NSR with LBBB    Review of Systems   Constitutional: Negative for chills, malaise/fatigue and weight gain.   Cardiovascular:  Positive for dyspnea on exertion. Negative for chest pain, leg swelling, orthopnea, palpitations and syncope.   Respiratory:  Negative for cough, shortness of breath, sleep disturbances due to breathing and sputum production.    Gastrointestinal:  Negative for bloating, nausea and vomiting.   Genitourinary:         Anuric   Neurological:  Negative for dizziness, light-headedness and weakness.   Psychiatric/Behavioral:  Negative for altered mental status.    All other systems reviewed and are negative.    Historical Information   Past Medical History:   Diagnosis Date    Asthma     Chronic pain     Hypertension     Renal disorder     Sepsis (HCC) 07/10/2024     Past Surgical History:   Procedure Laterality Date    BACK SURGERY      COLONOSCOPY      IR BIOPSY KIDNEY RANDOM  7/17/2024    IR TEMPORARY DIALYSIS CATHETER PLACEMENT  7/15/2024    IR TUNNELED DIALYSIS CATHETER CHECK/CHANGE/REPOSITION/ANGIOPLASTY  8/12/2024    IR TUNNELED DIALYSIS CATHETER PLACEMENT  7/22/2024    TUBAL LIGATION       Social History     Substance " and Sexual Activity   Alcohol Use No    Comment: hX:Occas.      Social History     Substance and Sexual Activity   Drug Use No     Social History     Tobacco Use   Smoking Status Former    Current packs/day: 0.00    Average packs/day: 1 pack/day for 15.0 years (15.0 ttl pk-yrs)    Types: Cigarettes    Start date:     Quit date:     Years since quittin.6   Smokeless Tobacco Never     Family History:   Family History   Problem Relation Age of Onset    Diabetes Mother     Hypertension Mother     Lung cancer Mother     Colon cancer Mother     Colon cancer Father     Hypertension Sister     Multiple sclerosis Sister     Hypothyroidism Maternal Aunt        Meds/Allergies   all current active meds have been reviewed and current meds:   Current Facility-Administered Medications   Medication Dose Route Frequency    acetaminophen (TYLENOL) tablet 650 mg  650 mg Oral Q6H PRN    albuterol (PROVENTIL HFA,VENTOLIN HFA) inhaler 2 puff  2 puff Inhalation Q4H PRN    albuterol inhalation solution 2.5 mg  2.5 mg Nebulization TID    atorvastatin (LIPITOR) tablet 20 mg  20 mg Oral Daily    atovaquone (MEPRON) oral suspension 1,500 mg  1,500 mg Oral Daily    cyclophosphamide (CYTOXAN) capsule 100 mg  100 mg Oral Daily    dextromethorphan-guaiFENesin (ROBITUSSIN DM) oral syrup 10 mL  10 mL Oral Q4H PRN    epoetin shavonne (EPOGEN,PROCRIT) injection 6,000 Units  6,000 Units Intravenous After Dialysis    fluticasone-vilanterol 200-25 mcg/actuation 1 puff  1 puff Inhalation Daily    heparin (porcine) subcutaneous injection 5,000 Units  5,000 Units Subcutaneous Q8H JACK    ipratropium (ATROVENT) 0.02 % inhalation solution 0.5 mg  0.5 mg Nebulization TID    iron polysaccharides (FERREX) capsule 150 mg  150 mg Oral Daily    lamoTRIgine (LaMICtal) tablet 100 mg  100 mg Oral HS    lidocaine (LIDODERM) 5 % patch 1 patch  1 patch Topical Daily    montelukast (SINGULAIR) tablet 10 mg  10 mg Oral Daily    NIFEdipine (PROCARDIA XL) 24 hr  "tablet 60 mg  60 mg Oral Daily    pantoprazole (PROTONIX) EC tablet 40 mg  40 mg Oral Daily Before Breakfast    predniSONE tablet 20 mg  20 mg Oral Daily    sevelamer (RENAGEL) tablet 1,600 mg  1,600 mg Oral TID With Meals    Sodium Zirconium Cyclosilicate (Lokelma) 10 g  10 g Oral Daily    traZODone (DESYREL) tablet 200 mg  200 mg Oral HS    trimethobenzamide (TIGAN) IM injection 200 mg  200 mg Intramuscular Q6H PRN    venlafaxine (EFFEXOR-XR) 24 hr capsule 225 mg  225 mg Oral Daily          Allergies   Allergen Reactions    Penicillins Hives     Reaction Date: 2005; Annotation - 2012: meena mcdonald    Amoxicillin Rash    Aspirin Rash    Bactrim [Sulfamethoxazole-Trimethoprim] Rash    Ibuprofen Rash    Morphine Rash    Oxycodone Rash    Valacyclovir Rash       Objective   Vitals: Blood pressure 117/75, pulse 96, temperature 98.5 °F (36.9 °C), temperature source Oral, resp. rate 19, height 5' 3\" (1.6 m), weight 107 kg (236 lb 5.3 oz), SpO2 96%., Body mass index is 41.86 kg/m².,     Systolic (24hrs), Av , Min:117 , Max:119     Diastolic (24hrs), Av, Min:75, Max:75        Intake/Output Summary (Last 24 hours) at 2024 0806  Last data filed at 2024 0600  Gross per 24 hour   Intake 780 ml   Output 0 ml   Net 780 ml     Wt Readings from Last 3 Encounters:   24 107 kg (236 lb 5.3 oz)   08/10/24 118 kg (259 lb 0.7 oz)   24 112 kg (247 lb 5.7 oz)     Invasive Devices       Peripheral Intravenous Line  Duration             Peripheral IV 24 Right Antecubital 2 days              Hemodialysis Catheter  Duration             HD Permanent Double Catheter 20 days                    Physical Exam  Vitals reviewed.   Constitutional:       General: She is not in acute distress.     Appearance: She is obese.   Neck:      Vascular: No hepatojugular reflux or JVD.   Cardiovascular:      Rate and Rhythm: Normal rate and regular rhythm.      Heart sounds: Normal heart sounds. No murmur heard.     No " friction rub. No gallop.   Pulmonary:      Effort: Pulmonary effort is normal. No respiratory distress.      Breath sounds: No rales.      Comments: Decreased at bases, more decreased on L than R but overall clear. 97% on 2LNC  Abdominal:      General: Bowel sounds are normal. There is no distension.      Palpations: Abdomen is soft.      Tenderness: There is no abdominal tenderness.   Musculoskeletal:         General: No tenderness. Normal range of motion.      Cervical back: Neck supple.      Right lower leg: Edema (trace) present.      Left lower leg: Edema (trace) present.   Skin:     General: Skin is warm and dry.      Capillary Refill: Capillary refill takes less than 2 seconds.      Findings: No erythema.   Neurological:      Mental Status: She is alert and oriented to person, place, and time.   Psychiatric:         Mood and Affect: Mood normal.     LABORATORY RESULTS:      CBC with diff:   Results from last 7 days   Lab Units 09/02/24  0509 09/01/24  0521 08/31/24  1556 08/31/24  0755 08/31/24  0443 08/30/24 2025   WBC Thousand/uL 12.88* 19.55*  --   --  10.64* 14.45*   HEMOGLOBIN g/dL 7.8* 7.9* 7.9* 6.9* 6.7* 7.1*   HEMATOCRIT % 25.4* 24.6* 25.4* 22.2* 22.1* 23.0*   MCV fL 92 90  --   --  93 92   PLATELETS Thousands/uL 244 243  --   --  245 258   RBC Million/uL 2.75* 2.73*  --   --  2.39* 2.49*   MCH pg 28.4 28.9  --   --  28.0 28.5   MCHC g/dL 30.7* 32.1  --   --  30.3* 30.9*   RDW % 18.2* 18.3*  --   --  17.2* 17.5*   MPV fL 10.4 10.2  --   --  10.4 10.0   NRBC /100 WBC  --   --   --   --   --  1       CMP:  Results from last 7 days   Lab Units 09/02/24  0509 09/01/24  0521 08/31/24  0443 08/31/24  0033 08/30/24 2025   POTASSIUM mmol/L 5.2 4.4 5.6* 5.7* 5.7*   CHLORIDE mmol/L 100 98 102  --  102   CO2 mmol/L 28 28 25  --  22   BUN mg/dL 50* 40* 51*  --  44*   CREATININE mg/dL 8.64* 6.86* 8.68*  --  8.11*   CALCIUM mg/dL 9.5 9.6 9.6  --  9.8   AST U/L  --  31 57*  --  65*   ALT U/L  --  61* 59*  --  60*  "  ALK PHOS U/L  --  97 100  --  106*   EGFR ml/min/1.73sq m 4 5 4  --  4       BMP:  Results from last 7 days   Lab Units 09/02/24  0509 09/01/24  0521 08/31/24  0443 08/31/24  0033 08/30/24 2025   POTASSIUM mmol/L 5.2 4.4 5.6* 5.7* 5.7*   CHLORIDE mmol/L 100 98 102  --  102   CO2 mmol/L 28 28 25  --  22   BUN mg/dL 50* 40* 51*  --  44*   CREATININE mg/dL 8.64* 6.86* 8.68*  --  8.11*   CALCIUM mg/dL 9.5 9.6 9.6  --  9.8       Lab Results   Component Value Date    CREATININE 8.64 (H) 09/02/2024    CREATININE 6.86 (H) 09/01/2024    CREATININE 8.68 (H) 08/31/2024       No results found for: \"NTBNP\"       Results from last 7 days   Lab Units 09/01/24  0521 08/31/24  0443 08/30/24 2025   MAGNESIUM mg/dL 2.1 2.3 2.1                     Results from last 7 days   Lab Units 08/30/24 2025   INR  1.99*     Lipid Profile:   No results found for: \"CHOL\"  Lab Results   Component Value Date    HDL 31 (L) 05/09/2024    HDL 46 11/26/2016    HDL 48 06/28/2016     Lab Results   Component Value Date    LDLCALC 79 05/09/2024    LDLCALC 138 (H) 11/26/2016    LDLCALC 145 (H) 06/28/2016     Lab Results   Component Value Date    TRIG 129 05/09/2024    TRIG 143 11/26/2016    TRIG 117 06/28/2016     Imaging: I have personally reviewed pertinent reports.   and I have personally reviewed pertinent films in PACS  CTA chest pe study    Result Date: 9/1/2024  Narrative: CTA - CHEST WITH IV CONTRAST - PULMONARY ANGIOGRAM INDICATION: tachycardia, dyspnea, rule out PE. COMPARISON: Chest CT dated 7/11/2024 TECHNIQUE: CTA examination of the chest was performed using angiographic technique according to a protocol specifically tailored to evaluate for pulmonary embolism. Multiplanar 2D reformatted images were created from the source data. In addition, coronal  3D MIP postprocessing was performed on the acquisition scanner. Radiation dose length product (DLP) for this visit: 377 mGy-cm . This examination, like all CT scans performed in the [facility]" [facility], was performed utilizing techniques to minimize radiation dose exposure, including the use of iterative reconstruction and automated exposure control. IV Contrast: 80 mL of iohexol (OMNIPAQUE) FINDINGS: PULMONARY ARTERIAL TREE:  No pulmonary embolus. Prominent main pulmonary artery measuring up to 4.2 cm suggestive of pulmonary hypertension. Evaluation of the pulmonary arteries is slightly degraded due to some breathing artifact. LUNGS: Diffuse tree-in-bud nodularity in the lungs may represent a widespread infectious bronchiolitis possibly from atypical mycobacterium. Patchy airspace consolidation in the right upper an lower lobes is suggestive of small areas of pneumonia. Patchy  airspace consolidation in the anterior basilar segment left lower lobe also suggestive of pneumonia. PLEURA: Moderate bilateral pleural effusions. Bibasilar atelectasis. HEART/GREAT VESSELS: Heart is unremarkable for patient's age. No thoracic aortic aneurysm. Small amount of coronary calcification along the left anterior descending coronary. MEDIASTINUM AND MENA: Unremarkable. CHEST WALL AND LOWER NECK: Unremarkable. VISUALIZED STRUCTURES IN THE UPPER ABDOMEN: Unremarkable. OSSEOUS STRUCTURES: Moderate endplate degenerative change in the thoracic spine. There is degenerative change along the bilateral acromioclavicular joints. There is mild degenerative change of the right shoulder.     Impression: No pulmonary embolism. Since July 11, 2022 there is persistent tree-in-bud nodularity in the lungs suggestive of a widespread infectious bronchiolitis that may be secondary to an atypical mycobacterium. Follicular bronchiolitis and acute hypersensitivity pneumonitis could also  be in the differential. Follow-up chest CT in 3 months after treatment for infectious bronchiolitis is advised. New mild patchy bilateral multifocal pneumonia. Pulmonary hypertension New moderate bilateral pleural effusions. The study was marked in EPIC  for immediate notification. Workstation performed: [id number]     Echo complete w/ contrast if indicated    Result Date: 9/1/2024  Narrative:   Left Ventricle: Left ventricular cavity size is mildly dilated. Wall thickness is mildly increased. The left ventricular ejection fraction is 25%. Systolic function is severely reduced. There is severe global hypokinesis. Diastolic function is normal.   Right Ventricle: Right ventricular cavity size is mildly dilated. Systolic function is mildly reduced.   Left Atrium: The atrium is mildly dilated.   Right Atrium: The atrium is mildly dilated.   Aortic Valve: There is mild regurgitation.   Mitral Valve: There is moderate to severe regurgitation.   Tricuspid Valve: There is moderate regurgitation. The right ventricular systolic pressure is moderately elevated. The estimated right ventricular systolic pressure is 48.00 mmHg.   Pericardium: There is a small pericardial effusion anterior to the heart.     XR chest 1 view portable    Result Date: 8/31/2024  Narrative: XR CHEST PORTABLE INDICATION: hx of dialysis and sob symptoms; currently on 2 liters nc. COMPARISON: 8/10/2024 FINDINGS: Tunneled right chest wall hemodialysis catheter with tip in the superior vena cava in stable position. Mild opacification lateral left lung base may reflect pneumonia or atelectasis. Small left pleural effusion. Top normal size heart. Mild vascular and interstitial prominence. Bones are unremarkable for age. Normal upper abdomen.     Impression: 1. New opacification lateral left lung base may reflect atelectasis or pneumonia. Probable small left effusion. 2. Mild cardiomegaly with mild vascular and interstitial prominence suggesting some degree of volume overload. Workstation performed: [id number]     IR tunneled dialysis catheter check/change/reposition/angioplasty    Result Date: 8/12/2024  Narrative: PROCEDURE: Tunneled central venous catheter exchange PROCEDURE SUMMARY: - Tunneled central  venous catheter exchange with fluoroscopic guidance - Additional procedure(s): None INDICATION: Catheter now in the innominate vein and not working. Catheter has been retracted with tip in the brachiocephalic vein and is nonfunctional. COMPLICATIONS: No immediate complications. ESTIMATED BLOOD LOSS (mL): Less than 10 CONTRAST: None. RADIATION DOSE: Fluoroscopy time: 1.7 MIN Reference air kerma: 80 mGy ANESTHESIA/SEDATION: Level of anesthesia/sedation: Moderate sedation (conscious sedation) Anesthesia/sedation administered by: Independent trained observer under attending supervision with continuous monitoring of the patient's level of consciousness and physiologic status Total intra-service sedation time (minutes): 30 PROCEDURE DETAILS: Informed consent for the procedure including risks, benefits and alternatives was obtained and time-out was performed prior to the procedure. The site was prepared and draped using all elements of maximal sterile barrier technique including sterile gloves, sterile gown, cap, mask, large sterile sheet, sterile ultrasound probe cover, hand hygiene and cutaneous antisepsis with 2% chlorhexidine. A wire was passed through the indwelling tunneled central venous catheter and into the central veins. The catheter was removed, and a new catheter was advanced under fluoroscopic guidance. Catheter tip location was fluoroscopically verified and a permanent image was stored. Catheter placed: Tunnel dialysis catheter Catheter size and length: 15.5 Chinese by 23 cm (cuff-to-tip) Catheter flush: Normal saline The catheter was secured. A sterile dressing was applied. Catheter securement technique: Non-absorbable suture FINDINGS: 1.  Indwelling catheter was retracted with tip at the superior cavoatrial junction. 2. Post exchange radiograph demonstrated tip of the catheter in the right atrium, appropriate in position.  There was excellent flow through each lumen of the newly placed catheter.      Impression: Right-sided internal jugular tunneled dialysis catheter exchange, with its tip in the expected location of the right atrium. Plan: The catheter may be used immediately. Workstation performed: [id number]     VAS VENOUS DUPLEX -LOWER LIMB UNILATERAL    Result Date: 8/11/2024  Narrative:  [facility] REPORT CLINICAL: Indications: Patient presents with right lower extremity pain x 3 days. Risk Factors The patient has history of HTN, CKD and previous smoking (quit >10yrs ago).   CONCLUSION: Impression: RIGHT LOWER LIMB No evidence of acute or chronic deep vein thrombosis . No evidence of superficial thrombophlebitis noted. Doppler evaluation shows a normal response to augmentation maneuvers. Popliteal, posterior tibial and anterior tibial arterial Doppler waveform's are triphasic.  LEFT LOWER LIMB LIMITED Evaluation shows no evidence of thrombus in the common femoral vein. Doppler evaluation shows a normal response to augmentation maneuvers.  Technical findings were given to Solomon Avila DO via EPIC secure chat.  SIGNATURE: Electronically Signed by: AIDEN VASQUEZ MD on 2024-08-11 05:57:12 PM    X-ray chest 1 view portable    Result Date: 8/11/2024  Narrative: XR CHEST PORTABLE INDICATION: chest pain. COMPARISON: CXR 7/28/2024, chest CT 7/11/2024. FINDINGS: Right central catheter in upper SVC. Subtle nodularity throughout the lungs on CT not conspicuous on CXR. No pneumothorax or pleural effusion. Normal cardiomediastinal silhouette. Bones are unremarkable for age. Normal upper abdomen.     Impression: No acute cardiopulmonary disease. Workstation performed: [id number]     Counseling / Coordination of Care  Total floor / unit time spent today 45 minutes.  Greater than 50% of total time was spent with the patient and / or family counseling and / or coordination of care.  A description of the counseling / coordination of care: Review of history, current assessment, development of a plan.    Code  Status: Level 1 - Full Code    ** Please Note: Dragon 360 Dictation voice to text software may have been used in the creation of this document. **

## 2024-09-02 NOTE — ASSESSMENT & PLAN NOTE
Newly diagnosed although no prior echo on file for comparison  Overall appears well compensated on exam today  Unclear etiology but will need further testing  Not on GDMT

## 2024-09-02 NOTE — ASSESSMENT & PLAN NOTE
August 31-EF of 25% with global hypokinesis.  No prior echo for comparison.    Suspect nonischemic cardiomyopathy    Cardiology consult

## 2024-09-03 ENCOUNTER — APPOINTMENT (INPATIENT)
Dept: DIALYSIS | Facility: HOSPITAL | Age: 64
DRG: 286 | End: 2024-09-03
Payer: COMMERCIAL

## 2024-09-03 ENCOUNTER — APPOINTMENT (INPATIENT)
Dept: RADIOLOGY | Facility: HOSPITAL | Age: 64
DRG: 286 | End: 2024-09-03
Attending: RADIOLOGY
Payer: COMMERCIAL

## 2024-09-03 LAB
ANION GAP SERPL CALCULATED.3IONS-SCNC: 11 MMOL/L (ref 4–13)
B PARAP IS1001 DNA NPH QL NAA+NON-PROBE: NOT DETECTED
B PERT.PT PRMT NPH QL NAA+NON-PROBE: NOT DETECTED
BUN SERPL-MCNC: 62 MG/DL (ref 5–25)
C PNEUM DNA NPH QL NAA+NON-PROBE: NOT DETECTED
CALCIUM SERPL-MCNC: 9.5 MG/DL (ref 8.4–10.2)
CHLORIDE SERPL-SCNC: 100 MMOL/L (ref 96–108)
CO2 SERPL-SCNC: 26 MMOL/L (ref 21–32)
CREAT SERPL-MCNC: 9.85 MG/DL (ref 0.6–1.3)
ERYTHROCYTE [DISTWIDTH] IN BLOOD BY AUTOMATED COUNT: 18.4 % (ref 11.6–15.1)
FLUAV RNA NPH QL NAA+NON-PROBE: NOT DETECTED
FLUBV RNA NPH QL NAA+NON-PROBE: NOT DETECTED
GFR SERPL CREATININE-BSD FRML MDRD: 3 ML/MIN/1.73SQ M
GLUCOSE SERPL-MCNC: 95 MG/DL (ref 65–140)
HADV DNA NPH QL NAA+NON-PROBE: NOT DETECTED
HCOV 229E RNA NPH QL NAA+NON-PROBE: NOT DETECTED
HCOV HKU1 RNA NPH QL NAA+NON-PROBE: NOT DETECTED
HCOV NL63 RNA NPH QL NAA+NON-PROBE: NOT DETECTED
HCOV OC43 RNA NPH QL NAA+NON-PROBE: NOT DETECTED
HCT VFR BLD AUTO: 27 % (ref 34.8–46.1)
HGB BLD-MCNC: 8.2 G/DL (ref 11.5–15.4)
HMPV RNA NPH QL NAA+NON-PROBE: NOT DETECTED
HPIV1 RNA NPH QL NAA+NON-PROBE: NOT DETECTED
HPIV2 RNA NPH QL NAA+NON-PROBE: NOT DETECTED
HPIV3 RNA NPH QL NAA+NON-PROBE: NOT DETECTED
HPIV4 RNA NPH QL NAA+NON-PROBE: NOT DETECTED
M PNEUMO DNA NPH QL NAA+NON-PROBE: NOT DETECTED
MCH RBC QN AUTO: 28.7 PG (ref 26.8–34.3)
MCHC RBC AUTO-ENTMCNC: 30.4 G/DL (ref 31.4–37.4)
MCV RBC AUTO: 94 FL (ref 82–98)
MYCOBACTERIUM SPEC CULT: NORMAL
MYCOBACTERIUM SPEC CULT: NORMAL
PLATELET # BLD AUTO: 266 THOUSANDS/UL (ref 149–390)
PMV BLD AUTO: 10.3 FL (ref 8.9–12.7)
POTASSIUM SERPL-SCNC: 4.8 MMOL/L (ref 3.5–5.3)
RBC # BLD AUTO: 2.86 MILLION/UL (ref 3.81–5.12)
RHODAMINE-AURAMINE STN SPEC: NORMAL
RHODAMINE-AURAMINE STN SPEC: NORMAL
RSV RNA NPH QL NAA+NON-PROBE: NOT DETECTED
RV+EV RNA NPH QL NAA+NON-PROBE: NOT DETECTED
SARS-COV-2 RNA NPH QL NAA+NON-PROBE: NOT DETECTED
SODIUM SERPL-SCNC: 137 MMOL/L (ref 135–147)
WBC # BLD AUTO: 12.39 THOUSAND/UL (ref 4.31–10.16)

## 2024-09-03 PROCEDURE — 37799 UNLISTED PX VASCULAR SURGERY: CPT | Performed by: RADIOLOGY

## 2024-09-03 PROCEDURE — 94760 N-INVAS EAR/PLS OXIMETRY 1: CPT

## 2024-09-03 PROCEDURE — 97110 THERAPEUTIC EXERCISES: CPT

## 2024-09-03 PROCEDURE — 97530 THERAPEUTIC ACTIVITIES: CPT

## 2024-09-03 PROCEDURE — 0J2TXYZ CHANGE OTHER DEVICE IN TRUNK SUBCUTANEOUS TISSUE AND FASCIA, EXTERNAL APPROACH: ICD-10-PCS | Performed by: RADIOLOGY

## 2024-09-03 PROCEDURE — 90935 HEMODIALYSIS ONE EVALUATION: CPT | Performed by: STUDENT IN AN ORGANIZED HEALTH CARE EDUCATION/TRAINING PROGRAM

## 2024-09-03 PROCEDURE — 77001 FLUOROGUIDE FOR VEIN DEVICE: CPT

## 2024-09-03 PROCEDURE — 80048 BASIC METABOLIC PNL TOTAL CA: CPT

## 2024-09-03 PROCEDURE — 5A1D70Z PERFORMANCE OF URINARY FILTRATION, INTERMITTENT, LESS THAN 6 HOURS PER DAY: ICD-10-PCS | Performed by: STUDENT IN AN ORGANIZED HEALTH CARE EDUCATION/TRAINING PROGRAM

## 2024-09-03 PROCEDURE — 99153 MOD SED SAME PHYS/QHP EA: CPT

## 2024-09-03 PROCEDURE — C1750 CATH, HEMODIALYSIS,LONG-TERM: HCPCS

## 2024-09-03 PROCEDURE — C1769 GUIDE WIRE: HCPCS

## 2024-09-03 PROCEDURE — 77001 FLUOROGUIDE FOR VEIN DEVICE: CPT | Performed by: RADIOLOGY

## 2024-09-03 PROCEDURE — 94640 AIRWAY INHALATION TREATMENT: CPT

## 2024-09-03 PROCEDURE — 99232 SBSQ HOSP IP/OBS MODERATE 35: CPT | Performed by: INTERNAL MEDICINE

## 2024-09-03 PROCEDURE — 99152 MOD SED SAME PHYS/QHP 5/>YRS: CPT | Performed by: RADIOLOGY

## 2024-09-03 PROCEDURE — 99152 MOD SED SAME PHYS/QHP 5/>YRS: CPT

## 2024-09-03 PROCEDURE — C1757 CATH, THROMBECTOMY/EMBOLECT: HCPCS

## 2024-09-03 PROCEDURE — 36581 REPLACE TUNNELED CV CATH: CPT | Performed by: RADIOLOGY

## 2024-09-03 PROCEDURE — 85027 COMPLETE CBC AUTOMATED: CPT

## 2024-09-03 RX ORDER — FENTANYL CITRATE 50 UG/ML
INJECTION, SOLUTION INTRAMUSCULAR; INTRAVENOUS AS NEEDED
Status: COMPLETED | OUTPATIENT
Start: 2024-09-03 | End: 2024-09-03

## 2024-09-03 RX ORDER — HEPARIN SODIUM,PORCINE/PF 10 UNIT/ML
2 SYRINGE (ML) INTRAVENOUS ONCE
Status: COMPLETED | OUTPATIENT
Start: 2024-09-03 | End: 2024-09-03

## 2024-09-03 RX ORDER — VANCOMYCIN HYDROCHLORIDE 500 MG/100ML
INJECTION, SOLUTION INTRAVENOUS
Status: COMPLETED | OUTPATIENT
Start: 2024-09-03 | End: 2024-09-03

## 2024-09-03 RX ORDER — HEPARIN SODIUM,PORCINE/PF 10 UNIT/ML
2.1 SYRINGE (ML) INTRAVENOUS ONCE
Status: COMPLETED | OUTPATIENT
Start: 2024-09-03 | End: 2024-09-03

## 2024-09-03 RX ORDER — MIDAZOLAM HYDROCHLORIDE 2 MG/2ML
INJECTION, SOLUTION INTRAMUSCULAR; INTRAVENOUS AS NEEDED
Status: COMPLETED | OUTPATIENT
Start: 2024-09-03 | End: 2024-09-03

## 2024-09-03 RX ORDER — LOSARTAN POTASSIUM 25 MG/1
25 TABLET ORAL DAILY
Status: DISCONTINUED | OUTPATIENT
Start: 2024-09-03 | End: 2024-09-08

## 2024-09-03 RX ADMIN — LIDOCAINE 5% 1 PATCH: 700 PATCH TOPICAL at 11:20

## 2024-09-03 RX ADMIN — HEPARIN SODIUM 5000 UNITS: 5000 INJECTION INTRAVENOUS; SUBCUTANEOUS at 17:18

## 2024-09-03 RX ADMIN — SEVELAMER HYDROCHLORIDE 1600 MG: 800 TABLET ORAL at 17:15

## 2024-09-03 RX ADMIN — FENTANYL CITRATE 25 MCG: 50 INJECTION INTRAMUSCULAR; INTRAVENOUS at 10:23

## 2024-09-03 RX ADMIN — Medication 2 ML: at 10:38

## 2024-09-03 RX ADMIN — TRAZODONE HYDROCHLORIDE 200 MG: 100 TABLET ORAL at 21:05

## 2024-09-03 RX ADMIN — IPRATROPIUM BROMIDE 0.5 MG: 0.5 SOLUTION RESPIRATORY (INHALATION) at 07:56

## 2024-09-03 RX ADMIN — MIDAZOLAM 0.5 MG: 1 INJECTION INTRAMUSCULAR; INTRAVENOUS at 10:23

## 2024-09-03 RX ADMIN — LOSARTAN POTASSIUM 25 MG: 25 TABLET, FILM COATED ORAL at 15:43

## 2024-09-03 RX ADMIN — LAMOTRIGINE 100 MG: 100 TABLET ORAL at 21:05

## 2024-09-03 RX ADMIN — Medication 20 UNITS: at 14:46

## 2024-09-03 RX ADMIN — ALBUTEROL SULFATE 2.5 MG: 2.5 SOLUTION RESPIRATORY (INHALATION) at 13:43

## 2024-09-03 RX ADMIN — Medication 6 ML: at 10:31

## 2024-09-03 RX ADMIN — VANCOMYCIN HYDROCHLORIDE 1000 MG: 500 INJECTION, SOLUTION INTRAVENOUS at 10:23

## 2024-09-03 RX ADMIN — IPRATROPIUM BROMIDE 0.5 MG: 0.5 SOLUTION RESPIRATORY (INHALATION) at 13:43

## 2024-09-03 RX ADMIN — ALBUTEROL SULFATE 2.5 MG: 2.5 SOLUTION RESPIRATORY (INHALATION) at 07:56

## 2024-09-03 RX ADMIN — EPOETIN ALFA 6000 UNITS: 3000 SOLUTION INTRAVENOUS; SUBCUTANEOUS at 14:16

## 2024-09-03 RX ADMIN — FLUTICASONE FUROATE AND VILANTEROL TRIFENATATE 1 PUFF: 200; 25 POWDER RESPIRATORY (INHALATION) at 11:20

## 2024-09-03 RX ADMIN — Medication 21 UNITS: at 14:46

## 2024-09-03 NOTE — PROGRESS NOTES
Progress Note - Infectious Disease   Ngozi Beard 64 y.o. female MRN: [id number]  Unit/Bed#: S -01 Encounter: [id number]      Impression/Recommendations:  1.   Pneumonitis versus pneumonia.  Given underlying anti-GBM disease, it is unclear whether chest CT findings are related to patient's underlying collagen vascular disease or pneumonia.  If this is pneumonia, CT and clinical picture is consistent with atypical pneumonia rather than acute bacterial pneumonia.  Patient is supposed to be on atovaquone for PCP prophylaxis but is unclear whether she is taking it.  Regardless, given her immunosuppressed state, it would be best that we try to establish diagnosis rather than treat her empirically.  With patient's very stable clinical status, it is best to keep her off antibiotic pending workup, so that antibiotic does not affect culture result.  Pulmonary evaluation noted.  Plan for bronchoscopy noted.  Continue to observe off antibiotic for now, other than atovaquone PCP prophylaxis.  Bronchoscopy planned for pulmonary.  Monitor respiratory symptoms and O2 saturation.  Monitor temperature.     2.  Bacteremia, with growth of MRSE in only 1 out of 2 admission blood cultures.  Patient has no fever and is not systemically ill, making true bacteremia not likely clinically.  Although patient does have permacath in place, unless the second admission blood culture also has growth of the same pathogen, this is most likely contaminated blood draw.  Patient had been on IV vancomycin but this was discontinued.  No antibiotic needed for this indication.  Follow-up on second concurrent admission blood culture.     3.  Leukocytosis.  In a patient on chronic steroid and now with increased steroid dosage, leukocytosis is expected.  It is difficult to make clinical sense of her leukocytosis.     4.  ESRD, on HD.  Functional difficulty of permacath noted.  HD per nephrology.     5.  Recently diagnosed rapidly progressing  anti-GBM disease.  Patient is on Cytoxan and prednisone since diagnosis.  Continue outpatient Cytoxan/steroid.  Continue/restart atovaquone PCP prophylaxis.     Discussed with patient in detail regarding the above plan.  Discussed with multiple services regarding management plan above.     Antibiotics:  Off antibiotic     Subjective:  Dyspnea a little improved since admission.  Stable cough productive of clear to yellow sputum.  Temperature stays down.  No chills.  No diarrhea.    Objective:  Vitals:  Temp:  [97.2 °F (36.2 °C)-98.6 °F (37 °C)] 98.2 °F (36.8 °C)  HR:  [] 103  Resp:  [16-18] 16  BP: (118-157)/(71-94) 132/72  SpO2:  [93 %-100 %] 93 %  Temp (24hrs), Av.8 °F (36.6 °C), Min:97.2 °F (36.2 °C), Max:98.6 °F (37 °C)  Current: Temperature: 98.2 °F (36.8 °C)    Physical Exam:     General: Awake, alert, cooperative, no distress.   Neck:  Supple. No mass.  No lymphadenopathy.   Lungs: Expansion symmetric, sparse basilar rhonchi and rales, no wheezing, respirations unlabored.   Heart:  Regular rate and rhythm, S1 and S2 normal, no murmur.   Abdomen: Soft, nondistended, non-tender, bowel sounds active all four quadrants, no masses, no organomegaly.   Extremities: Stable mild leg edema. No erythema/warmth. No ulcer. Nontender to palpation.   Skin:  No rash.   Neuro: Moves all extremities.     Invasive Devices       Peripheral Intravenous Line  Duration             Peripheral IV 24 Left Wrist <1 day    Peripheral IV 24 Left;Proximal;Ventral (anterior) Forearm <1 day              Hemodialysis Catheter  Duration             HD Permanent Double Catheter <1 day                    Labs studies:   I have personally reviewed pertinent labs.  Results from last 7 days   Lab Units 24  0510 24  0509 24  0521 24  0443 24  0033 24   POTASSIUM mmol/L 4.8 5.2 4.4 5.6*   < > 5.7*   CHLORIDE mmol/L 100 100 98 102  --  102   CO2 mmol/L 26 28 28 25  --  22   BUN mg/dL 62*  50* 40* 51*  --  44*   CREATININE mg/dL 9.85* 8.64* 6.86* 8.68*  --  8.11*   EGFR ml/min/1.73sq m 3 4 5 4  --  4   CALCIUM mg/dL 9.5 9.5 9.6 9.6  --  9.8   AST U/L  --   --  31 57*  --  65*   ALT U/L  --   --  61* 59*  --  60*   ALK PHOS U/L  --   --  97 100  --  106*    < > = values in this interval not displayed.     Results from last 7 days   Lab Units 09/03/24  0510 09/02/24  0509 09/01/24  0521   WBC Thousand/uL 12.39* 12.88* 19.55*   HEMOGLOBIN g/dL 8.2* 7.8* 7.9*   PLATELETS Thousands/uL 266 244 243     Results from last 7 days   Lab Units 09/02/24  1804 08/31/24  0043 08/30/24  2039   BLOOD CULTURE   --   --  No Growth at 72 hrs.  Staphylococcus epidermidis*   SPUTUM CULTURE  Culture too young- will reincubate  --   --    GRAM STAIN RESULT  Rare Epithelial cells per low power field*  No polys seen*  Rare Gram positive cocci in pairs*  Rare Gram variable rods*  --  Gram positive cocci in clusters*   MRSA CULTURE ONLY   --  No Methicillin Resistant Staphlyococcus aureus (MRSA) isolated  --        Imaging Studies:   I have personally reviewed pertinent imaging study reports and images in PACS.    EKG, Pathology, and Other Studies:   I have personally reviewed pertinent reports.

## 2024-09-03 NOTE — PROGRESS NOTES
NEPHROLOGY PROGRESS NOTE   Ngozi Beard 64 y.o. female MRN: [id number]  Unit/Bed#: S -01 Encounter: [id number]  Reason for Consult: Management of ESRD    ASSESSMENT AND PLAN:  65 yo woman with PMH of anti-GBM disease on Cytoxan, prednisone s/p plasmapheresis, dialysis dependent TTS.  Patient admitted with shortness of breath.  nephrology is consulted for management of dialysis      PLAN:    # Anuric  KDIGO ANGELICA stage 3, dialysis dependent with no evidence of kidney recovery  Etiology: Anti-GBM disease  Continue to be dialysis dependent, anuric  Treatment:  Currently on dialysis, well-tolerated   Plan to continue to use per schedule, next dialysis TTS  Maintain MAP:  Over 65 mmHg if possible/avoid hypoperfusion:  Hold parameters on blood pressure medications  Avoid nephrotoxic agents such as NSAIDs, and IV contrast if possible. Avoid opioids   Adjust medications to GFR     # Anti-GBM disease  Status post Plex for 14 days, last anti-GBM more than 8 on July 29  No evidence of kidney recovery at this time   still at risk of developing pulmonary hemorrhage  Continue with Cytoxan        # Immunosuppressive therapy  Continue with prednisone taper as a scheduled currently taking 20 mg  Cytoxan 100 mg daily, adjusted by kidney function and age plan to continue for 3 months     # Prophylaxis  On atovaquone, before she was on Bactrim but patient is hyperkalemic  PPIs  Calcium and vitamin D     #Acid-base Disorder  serum HCO3 26 mmol/L  At goal     #Volume status/hypertension:  Volume: Fluid overload, pleural effusion 126/83  Blood pressure: Normotensive, /83, goal less than 140/90  Recommend:  Low-sodium diet  UF on HD  Losartan 25  Metoprolol 25 daily  Nifedipine 60     # Hyperkalemia  On Lokelma  Low potassium diet        #Anemia:  Current hemoglobin: 8 point  Treatment:  On Epogen 6000 units today   Transfuse for hemoglobin less than 7.0 per primary service       # Vascular access complications  PermCath  replaced today due to dysfunction  Working well on dialysis today     #Secondary Hyperparathyroidism   Continue Renagel with meals     # Vascular access complications  PermCath dysfunction, exchanged today  As per IR catheter had fibrin film  Plan for heparin lock    HEMODIALYSIS PROCEDURE NOTE  The patient was seen and examined on hemodialysis. The patient is tolerating the procedure well.  Time: 3.5 hours  Sodium: 135 Blood flow: 350   Dialyzer: F160 Potassium: 2 Dialysate flow: 800   Access: PC Bicarbonate: 35 Ultrafiltration goal: 2L   Medications on HD: EPogen       The highlighted and/or bolded points in my assessment, plan, and disposition were discussed with the primary team and we agree with fluid removal on dialysis today .  Previous records were personally reviewed by me to obtain a baseline creatinine.   The images (CXR) were personally reviewed by me in PACS      SUBJECTIVE:  Patient seen and examined at bedside. No chest pain, shortness of breath.  Patient is upset because she is hungry, she has been n.p.o. all day     OBJECTIVE:  Current Weight: Weight - Scale: 108 kg (237 lb 3.4 oz)  Vitals:    09/03/24 1300   BP: 126/83   Pulse: 97   Resp: 16   Temp:    SpO2:        Intake/Output Summary (Last 24 hours) at 9/3/2024 1311  Last data filed at 9/3/2024 1100  Gross per 24 hour   Intake 400 ml   Output 0 ml   Net 400 ml     Wt Readings from Last 3 Encounters:   09/03/24 108 kg (237 lb 3.4 oz)   08/10/24 118 kg (259 lb 0.7 oz)   08/04/24 112 kg (247 lb 5.7 oz)     Temp Readings from Last 3 Encounters:   09/03/24 (!) 97.2 °F (36.2 °C) (Oral)   08/14/24 99.2 °F (37.3 °C) (Oral)   08/13/24 98.2 °F (36.8 °C) (Oral)     BP Readings from Last 3 Encounters:   09/03/24 126/83   08/14/24 122/65   08/13/24 123/75     Pulse Readings from Last 3 Encounters:   09/03/24 97   08/14/24 97   08/13/24 92        General:  no acute distress at this time  Skin:  No acute rash  Eyes:  No scleral icterus and noninjected  ENT:   mucous membranes moist  Neck:  no carotid bruits  Chest:  Clear to auscultation percussion, good respiratory effort, no use of accessory respiratory muscles  CVS:  Regular rate and rhythm without rub   Abdomen:  soft and nontender   Extremities: lower extremity edema  Neuro:  No gross focality  Psych:  Alert , cooperative   Vascular access: PermCath      Medications:    Current Facility-Administered Medications:     acetaminophen (TYLENOL) tablet 650 mg, 650 mg, Oral, Q6H PRN, Kely Robert MD    albuterol (PROVENTIL HFA,VENTOLIN HFA) inhaler 2 puff, 2 puff, Inhalation, Q4H PRN, Kely Robert MD, 2 puff at 09/02/24 1723    albuterol inhalation solution 2.5 mg, 2.5 mg, Nebulization, TID, Todd Dickens MD, 2.5 mg at 09/03/24 0756    atorvastatin (LIPITOR) tablet 20 mg, 20 mg, Oral, Daily, Kely Robert MD, 20 mg at 09/02/24 0931    atovaquone (MEPRON) oral suspension 1,500 mg, 1,500 mg, Oral, Daily, Kely Robert MD, 1,500 mg at 09/02/24 0937    cyclophosphamide (CYTOXAN) capsule 100 mg, 100 mg, Oral, Daily, Kely Robert MD, 100 mg at 09/02/24 0938    dextromethorphan-guaiFENesin (ROBITUSSIN DM) oral syrup 10 mL, 10 mL, Oral, Q4H PRN, Kely Robert MD, 10 mL at 09/01/24 2142    epoetin shavonne (EPOGEN,PROCRIT) injection 6,000 Units, 6,000 Units, Intravenous, After Dialysis, Tirso Montez MD, 6,000 Units at 08/31/24 1855    fluticasone-vilanterol 200-25 mcg/actuation 1 puff, 1 puff, Inhalation, Daily, Robert Gilbert MD, 1 puff at 09/03/24 1120    heparin (porcine) subcutaneous injection 5,000 Units, 5,000 Units, Subcutaneous, Q8H JACK, Kely Robert MD, 5,000 Units at 09/02/24 1723    ipratropium (ATROVENT) 0.02 % inhalation solution 0.5 mg, 0.5 mg, Nebulization, TID, Todd Dickens MD, 0.5 mg at 09/03/24 0756    iron polysaccharides (FERREX) capsule 150 mg, 150 mg, Oral, Daily, Kely Robert MD, 150 mg at 09/02/24 0931    lamoTRIgine (LaMICtal) tablet 100 mg, 100 mg, Oral, HS, Kely Robert,  MD, 100 mg at 09/02/24 2116    lidocaine (LIDODERM) 5 % patch 1 patch, 1 patch, Topical, Daily, Trish Kline MD, 1 patch at 09/03/24 1120    metoprolol succinate (TOPROL-XL) 24 hr tablet 25 mg, 25 mg, Oral, Daily, JEB Wan, 25 mg at 09/02/24 1227    montelukast (SINGULAIR) tablet 10 mg, 10 mg, Oral, Daily, Kley Robert MD, 10 mg at 09/02/24 0931    NIFEdipine (PROCARDIA XL) 24 hr tablet 60 mg, 60 mg, Oral, Daily, Kely Robert MD, 60 mg at 09/02/24 0932    pantoprazole (PROTONIX) EC tablet 40 mg, 40 mg, Oral, Daily Before Breakfast, Kely Robert MD, 40 mg at 09/02/24 0932    predniSONE tablet 20 mg, 20 mg, Oral, Daily, Gerber Kim Jr., DO, 20 mg at 09/02/24 0931    sevelamer (RENAGEL) tablet 1,600 mg, 1,600 mg, Oral, TID With Meals, Kely Robert MD, 1,600 mg at 09/02/24 1723    Sodium Zirconium Cyclosilicate (Lokelma) 10 g, 10 g, Oral, Daily, Trish Kline MD, 10 g at 09/01/24 0811    traZODone (DESYREL) tablet 200 mg, 200 mg, Oral, HS, Kely Robert MD, 200 mg at 09/02/24 2115    trimethobenzamide (TIGAN) IM injection 200 mg, 200 mg, Intramuscular, Q6H PRN, Kely Robert MD, 200 mg at 08/30/24 2208    venlafaxine (EFFEXOR-XR) 24 hr capsule 225 mg, 225 mg, Oral, Daily, Kely Robert MD, 225 mg at 09/02/24 0929    Laboratory Results:  Results from last 7 days   Lab Units 09/03/24  0510 09/02/24  0509 09/01/24  0521 08/31/24  1556 08/31/24  0755 08/31/24  0443 08/31/24  0033 08/30/24 2025   WBC Thousand/uL 12.39* 12.88* 19.55*  --   --  10.64*  --  14.45*   HEMOGLOBIN g/dL 8.2* 7.8* 7.9* 7.9* 6.9* 6.7*  --  7.1*   HEMATOCRIT % 27.0* 25.4* 24.6* 25.4* 22.2* 22.1*  --  23.0*   PLATELETS Thousands/uL 266 244 243  --   --  245  --  258   SODIUM mmol/L 137 139 137  --   --  138  --  139   POTASSIUM mmol/L 4.8 5.2 4.4  --   --  5.6* 5.7* 5.7*   CHLORIDE mmol/L 100 100 98  --   --  102  --  102   CO2 mmol/L 26 28 28  --   --  25  --  22   BUN mg/dL 62* 50* 40*  --   --  51*   --  44*   CREATININE mg/dL 9.85* 8.64* 6.86*  --   --  8.68*  --  8.11*   CALCIUM mg/dL 9.5 9.5 9.6  --   --  9.6  --  9.8   MAGNESIUM mg/dL  --   --  2.1  --   --  2.3  --  2.1   PHOSPHORUS mg/dL  --   --  4.7*  --   --  5.8*  --  6.1*       IR tunneled dialysis catheter check/change/reposition/angioplasty   Final Result by Graciela Bettencourt MD (09/03 1142)   1.  Pericatheter fibrin sheath stripping.   2.  Right-sided internal jugular tunneled dialysis catheter exchange, with its tip in the expected location of the right atrium. I exchanged a Titan dialysis catheter to an Kwan Split tunneled dialysis catheter.      Plan:      The catheter may be used immediately.      Workstation performed: [id number]         CTA chest pe study   Final Result by Jorge Modi MD (09/01 1239)      No pulmonary embolism.      Since July 11, 2022 there is persistent tree-in-bud nodularity in the lungs suggestive of a widespread infectious bronchiolitis that may be secondary to an atypical mycobacterium. Follicular bronchiolitis and acute hypersensitivity pneumonitis could also    be in the differential. Follow-up chest CT in 3 months after treatment for infectious bronchiolitis is advised.      New mild patchy bilateral multifocal pneumonia.      Pulmonary hypertension      New moderate bilateral pleural effusions.      The study was marked in EPIC for immediate notification.      Workstation performed: [id number]         XR chest 1 view portable   ED Interpretation by Alejandro Johnston PA-C (08/30 2112)   Consolidation right lower lobe suspicious for pneumonia      Final Result by Devyn Silva MD (08/31 1314)         1. New opacification lateral left lung base may reflect atelectasis or pneumonia. Probable small left effusion.   2. Mild cardiomegaly with mild vascular and interstitial prominence suggesting some degree of volume overload.            Workstation performed: [id number]             Portions of the record may have been  "created with voice recognition software. Occasional wrong word or \"sound a like\" substitutions may have occurred due to the inherent limitations of voice recognition software. Read the chart carefully and recognize, using context, where substitutions have occurred.    "

## 2024-09-03 NOTE — PROGRESS NOTES
Progress Note - Pulmonary   Ngozi Beard 64 y.o. female MRN: [id number]  Unit/Bed#: S -01 Encounter: [id number]      Assessment/Plan:  Patient is a 64-year-old female with past medical history of moderate persistent asthma, pulmonary hypertension, recently diagnosed anti-GBM disease, and ESRD on HD who presents for worsening shortness of breath.     #Acute hypoxic respiratory insufficiency  #Abnormal CT chest with diffuse tree-in-bud nodularities, patchy airspace consolidation in RUL, RLL and LLL, moderate bilateral pleural effusions  #New congestive heart failure with reduced EF of 25%  #Anti-GBM syndrome  #ESRD on HD  #Moderate persistent asthma without acute exacerbation  #Pulmonary Hypertension     Patient is currently on 2L oxygen saturating 94%. Titrate oxygen to maintain saturations >89%. She is not oxygen dependent at baseline  CT chest with persistent tree-in-bud nodularity in the lungs. Patchy airspace consolidation in the right upper and lower lobes and anterior basilar segment left lower lobe. New moderate bilateral pleural effusions.   Blood cultures 1/2 positive for MRSE, however patient asymptomatic of bacteremia and this could be a contamination. She is currently being observed off antibiotics per ID recommendations  Sputum culture contaminated with oral izzy. RP2 panel negative.   Patient would benefit from repeat bronchoscopic evaluation for DAH however she has new severely reduced EF and bilateral pleural effusions. Would recommend additional volume removal with HD tomorrow.  Repeat chest x-ray following HD  If patient is not clinically improved following fluid removal, will recommend repeat bronchoscopy later this week  Home regimen: Advair 230-21 2 puffs twice daily, singulair daily, albuterol inhaler prn  Continue albuterol/atrovent nebs TID, breo daily, singulair daily  Recommend discontinuing prednisone 20mg, patient without wheezing on exam, no evidence of acute exacerbation  OOB  "as tolerated, scheduled nebs    Subjective:   Patient examined at bedside this morning. She is currently on 2L supplemental oxygen saturating 95%. She denies any worsening shortness of breath. She continues to have productive cough with yellow sputum.     Objective:    Vitals: Blood pressure 118/81, pulse 83, temperature (!) 97.4 °F (36.3 °C), resp. rate 18, height 5' 3\" (1.6 m), weight 108 kg (237 lb 3.4 oz), SpO2 96%., 2L, Body mass index is 42.02 kg/m².      Intake/Output Summary (Last 24 hours) at 9/3/2024 0902  Last data filed at 9/3/2024 0600  Gross per 24 hour   Intake 560 ml   Output 0 ml   Net 560 ml       Physical Exam  Vitals and nursing note reviewed.   Constitutional:       General: She is not in acute distress.     Appearance: She is obese.   HENT:      Head: Normocephalic and atraumatic.      Nose: Nose normal.      Mouth/Throat:      Pharynx: Oropharynx is clear.   Eyes:      Conjunctiva/sclera: Conjunctivae normal.   Cardiovascular:      Rate and Rhythm: Normal rate.   Pulmonary:      Effort: Pulmonary effort is normal. No respiratory distress.      Breath sounds: Decreased breath sounds and rhonchi (diffuse bilaterally) present. No wheezing.   Abdominal:      Palpations: Abdomen is soft.   Musculoskeletal:         General: No swelling. Normal range of motion.      Cervical back: Normal range of motion and neck supple.   Skin:     General: Skin is warm and dry.      Capillary Refill: Capillary refill takes less than 2 seconds.   Neurological:      General: No focal deficit present.      Mental Status: She is alert and oriented to person, place, and time.   Psychiatric:         Mood and Affect: Mood normal.     Labs:   Results from last 7 days   Lab Units 09/03/24  0510 09/02/24  0509 09/01/24  0521 08/31/24  0755 08/31/24  0443   WBC Thousand/uL 12.39* 12.88* 19.55*  --  10.64*   HEMOGLOBIN g/dL 8.2* 7.8* 7.9*   < > 6.7*   HEMATOCRIT % 27.0* 25.4* 24.6*   < > 22.1*   PLATELETS Thousands/uL 266 244 " "243  --  245   MONO PCT %  --  3* 3*  --  4   EOS PCT %  --  0 0  --  0    < > = values in this interval not displayed.      Results from last 7 days   Lab Units 09/03/24  0510 09/02/24  0509 09/01/24  0521 08/31/24 0443 08/31/24  0033 08/30/24 2025   POTASSIUM mmol/L 4.8 5.2 4.4 5.6*   < > 5.7*   CHLORIDE mmol/L 100 100 98 102  --  102   CO2 mmol/L 26 28 28 25  --  22   BUN mg/dL 62* 50* 40* 51*  --  44*   CREATININE mg/dL 9.85* 8.64* 6.86* 8.68*  --  8.11*   CALCIUM mg/dL 9.5 9.5 9.6 9.6  --  9.8   ALK PHOS U/L  --   --  97 100  --  106*   ALT U/L  --   --  61* 59*  --  60*   AST U/L  --   --  31 57*  --  65*    < > = values in this interval not displayed.     Results from last 7 days   Lab Units 09/01/24  0521 08/31/24 0443 08/30/24 2025   MAGNESIUM mg/dL 2.1 2.3 2.1     Results from last 7 days   Lab Units 09/01/24  0521 08/31/24 0443 08/30/24 2025   PHOSPHORUS mg/dL 4.7* 5.8* 6.1*      Results from last 7 days   Lab Units 08/30/24 2025   INR  1.99*   PTT seconds >210*         No results found for: \"TROPONINI\"    Procalcitonin: 1.00, 0.77     Flu/COVID/RSV PCR: Negative     Culture Data: MRSA negative, BC 1/2 + MRSE     Urinary antigens: Need collected      D-Dimer: 2.18     BNP: 3019    Microbiology:  Microbiology Results (last 21 days)       Collected Updated Procedure Result Status Patient Facility Result Comment      09/02/2024 1804 09/03/2024 0754 Sputum culture and Gram stain [[id number]]   (Abnormal)   Expectorated Sputum    Preliminary result [facility]  Component Value   Gram Stain Result Rare Epithelial cells per low power field  [P]     No polys seen  [P]     Rare Gram positive cocci in pairs  [P]     Rare Gram variable rods  [P]                09/02/2024 1427 09/03/2024 0000 Respiratory Panel 2.1(RP2)with COVID19 [[id number]]   Nasopharyngeal Swab    Final result [facility]  Component Value   Adenovirus Not Detected   Bordetella parapertussis " Not Detected   Bordetella pertussis Not Detected   Chlamydia pneumoniae Not Detected   SARS-CoV-2 Not Detected   Coronavirus 229E Not Detected   Coronavirus HKU1 Not Detected   Coronavirus NL63 Not Detected   Coronavirus OC43 Not Detected   Human Metapneumovirus Not Detected   Rhino/Enterovirus Not Detected   Influenza A Not Detected   Influenza B Not Detected   Mycoplasma pneumoniae Not Detected   Parainfluenza 1 Not Detected   Parainfluenza 2 Not Detected   Parainfluenza 3 Not Detected   Parainfluenza 4 Not Detected   Respiratory Syncytial Virus Not Detected            08/31/2024 0043 09/01/2024 1005 MRSA culture [[id number]]   Nares from Nose    Final result [facility]  Component Value   MRSA Culture Only No Methicillin Resistant Staphlyococcus aureus (MRSA) isolated               08/30/2024 2039 09/02/2024 2301 Blood culture #1 [[id number]]   Blood from Line, Venous    Preliminary result [facility]  Component Value   Blood Culture No Growth at 72 hrs.  [P]                08/30/2024 2039 09/02/2024 0911 Blood culture #2 [[id number]]    (Abnormal)   Blood from Arm, Right    Final result [facility] Susceptibility testing will not be performed as this organism, when isolated from a single set of blood cultures, represents probable skin izzy contamination. Please call the Microbiology Laboratory within 5 days at [phone] if further workup is required. Component Value   Blood Culture Staphylococcus epidermidis   Gram Stain Result Gram positive cocci in clusters    Refer to Blood Culture Identification Panel results    This is an appended report. These results have been appended to a previously preliminary verified report.               08/30/2024 2039 09/02/2024 0911 Blood Culture Identification Panel [[id number]]    (Abnormal)   Blood from Arm, Right    Final result [facility] Film Array panel tests for 11  gram positive organisms, 15 gram negative organisms, 7 yeast species and 10 resistance genes.  Component Value   Staphylococcus epidermidis Detected   mecA/C (Methicillin-resistance gene) Detected    This organism is a coagulase-negative Staphylococcus    If only 1 set of blood cultures is positive, this may represent a contaminant.  Consider holding antibiotics or repeating cultures prior to starting antibiotics.      If >1 one set of blood cultures is positive, vancomycin is the preferred therapy.                 08/30/2024 2025 08/30/2024 2112 FLU/RSV/COVID - if FLU/RSV clinically relevant [[id number]]    Nares from Nose    Final result [facility] This test has been performed using the CoV-2/Flu/RSV plus assay on the [employer] platform. This test has been validated by the  and verified by the performing laboratory.    This test is designed to amplify and detect the following: nucleocapsid (N), envelope (E), and RNA-dependent RNA polymerase (RdRP) genes of the SARS-CoV-2 genome; matrix (M), basic polymerase (PB2), and acidic protein (PA) segments of the influenza A genome; matrix (M) and non-structural protein (NS) segments of the influenza B genome, and the nucleocapsid genes of RSV A and RSV B.    Positive results are indicative of the presence of Flu A, Flu B, RSV, and/or SARS-CoV-2 RNA. Positive results for SARS-CoV-2 or suspected novel influenza should be reported to state, local, or federal health departments according to local reporting requirements.     All results should be assessed in conjunction with clinical presentation and other laboratory markers for clinical management.    FOR PEDIATRIC PATIENTS - copy/paste COVID Guidelines URL to browser: [email or web address]     Component Value   SARS-CoV-2 Negative    INFLUENZA A PCR Negative    INFLUENZA B PCR Negative    RSV PCR Negative                        Imaging and other studies: I have personally reviewed pertinent films in PACS  CTA chest pe study, 8/31/2024  No pulmonary embolism.  Since July 11, 2022 there is persistent tree-in-bud nodularity in the lungs suggestive of a widespread infectious bronchiolitis that may be secondary to an atypical mycobacterium. Follicular bronchiolitis and acute hypersensitivity pneumonitis could also be in the differential. Follow-up chest CT in 3 months after treatment for infectious bronchiolitis is advised.  New mild patchy bilateral multifocal pneumonia.  Pulmonary hypertension  New moderate bilateral pleural effusions.     XR chest portable, 8/30/2024  1. New opacification lateral left lung base may reflect atelectasis or pneumonia. Probable small left effusion.  2. Mild cardiomegaly with mild vascular and interstitial prominence suggesting some degree of volume overload.     EKG, Pathology, and Other Studies: I have personally reviewed pertinent reports.                  Transthoracic echocardiogram, 8/31/2024    Left Ventricle: Left ventricular cavity size is mildly dilated. Wall thickness is mildly increased. The left ventricular ejection fraction is 25%. Systolic function is severely reduced. There is severe global hypokinesis.    Right Ventricle: Right ventricular cavity size is mildly dilated. Systolic function is mildly reduced.    Left Atrium: The atrium is mildly dilated.    Right Atrium: The atrium is mildly dilated.    Aortic Valve: There is mild regurgitation.    Mitral Valve: There is moderate to severe regurgitation.    Tricuspid Valve: There is moderate regurgitation. The right ventricular systolic pressure is moderately elevated. The estimated right ventricular systolic pressure is 48.00 mmHg.    Pericardium: There is a small pericardial effusion anterior to the heart.    Gill Stevens  Pulmonary & Critical Care Medicine Fellow PGY-4  [facility]

## 2024-09-03 NOTE — ASSESSMENT & PLAN NOTE
August 31-EF of 25% with global hypokinesis.  No prior echo for comparison.    Suspect nonischemic cardiomyopathy    Cardiology consulted  GDMT recommended, pt started on Toprol 25 mg daily, add Losartan 25 mg daily  Ischemic work up with with left cardiac cath in o/p settings

## 2024-09-03 NOTE — PROGRESS NOTES
[facility]  Progress Note  Name: Ngozi Beard I  MRN: [id number]  Unit/Bed#: S -01 I Date of Admission: 8/30/2024   Date of Service: 9/3/2024 I Hospital Day: 4    Assessment & Plan   * Shortness of breath  Assessment & Plan  CLINE: Negative PE, BNP 3019, CT chest-bilateral pleural effusion, tree-in-bud nodularity    Multifactorial, contributing factors of volume overload, chronic anemia, underlying COPD and asthma.  Ruling out Mycobacterium infection.     Plan  Pulmonology is following, see pna problem list    Multifocal pneumonia  Assessment & Plan  -CT chest PE study revealed no evidence of PE  -There is persistent tree-in-bud nodularity in the lungs suggestive of a widespread infectious bronchiolitis that may be secondary to an atypical mycobacterium. Follicular bronchiolitis and acute hypersensitivity pneumonitis could also be in the differential   -New mild patchy bilateral multifocal pneumonia  -new moderate bilateral pleural effusions   - ID: Suspect chronic process, hold antibiotics    Plan:   Pulmonology consult placed, RP2 panel negative, no bronchoscopy recommended as of now  Monitor off Abx as per ID      SIRS (systemic inflammatory response syndrome) (HCC)  Assessment & Plan  On admission, met SIRS criteria with leukocytosis and tachycardia  Procalcitonin 0.77 (in the setting of renal disease)  COVID/Flu/RSV negative  CXR - mildly pulmonary vascular congestion. No evidence of consolidation  CT chest revealed evidence of multifocal pneumonia    Multifactorial, steroid use, hypoxic respiratory failure, volume overload, chronic pulmonary infection    Plan  Monitor WBC and fever curve  1/2 Bc grew MRSA staph epidermidis, most likely contamination, follow second Bc  ID is on board, monitor off Abx    Cardiomyopathy (HCC)  Assessment & Plan  August 31-EF of 25% with global hypokinesis.  No prior echo for comparison.    Suspect nonischemic cardiomyopathy    Cardiology  consulted  GDMT recommended, pt started on Toprol 25 mg daily, add Losartan 25 mg daily  Ischemic work up with with left cardiac cath in o/p settings        Generalized weakness  Assessment & Plan  Multifactorial, respiratory failure secondary to chronic pulmonary infection, volume overload, chronic anemia, chronic deconditioning from being ill    Plan  PT/OT evaluation apprecaited    Elevated transaminase level  Assessment & Plan  Recent Labs     09/01/24  0521   AST 31   ALT 61*       INR 1.99  Unclear etiology. 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 S/p PLEX.  Patient is anuric and dialysis-dependent  Tues/Thurs/Sat schedule  Access: Right IJ PermCath, Cath reevaluation  by IR on 9/3, no issues during HD    Plan:  Hemodialysis scheduled on 9/3  Continue PTA cyclophosphamide  Continue PTA sevalemer  Continue PTA atovaquone for PJP prophylaxis  Continue prednisone taper from outpatient nephrology prior to this admission  20 mg once daily till September 6  15 mg starting September 7 to September 20  12.5 mg starting September 21 to October 4  10 mg starting October 5 to October 18  7.5 mg starting October 19 to November 2  5 mg daily starting November 3    Anemia  Assessment & Plan  Recent Labs     09/01/24  0521 09/02/24  0509 09/03/24  0510   HGB 7.9* 7.8* 8.2*        S/p 1 U RBC on 8/31  Baseline Hgb 7-8  Etiology: component of iron deficiency and renal disease  Has received Epogen during prior hospitalization    Plan  Monitor Hgb, transfuse for < 7  Continue PTA Ferrex    Asthma without acute exacerbation  Assessment & Plan  Mild Bilateral wheezing was noted in the lower lung base upon initial presentation.  Does not require frequent inhaler use as an outpatient  setting.  Low suspicion for exacerbation for the clinical symptoms presented during this hospital on admission    Plan  Continue albuterol-ipratropium nebs  Pulmonology consult in place, recs appreciated, RP2 is negative, bronchoscopy need is still in discussion    Dyslipidemia  Assessment & Plan  Continue PTA Lipitor    Primary hypertension  Assessment & Plan  Continue PTA nifedipine    Bipolar 1 disorder (HCC)  Assessment & Plan  Continue PTA Lamictal, venlafaxine (150 mg and 75 mg daily in the morning), and trazodone               VTE Pharmacologic Prophylaxis: VTE Score: 7 High Risk (Score >/= 5) - Pharmacological DVT Prophylaxis Ordered: heparin. Sequential Compression Devices Ordered.    Mobility:   Basic Mobility Inpatient Raw Score: 15  JH-HLM Goal: 4: Move to chair/commode  JH-HLM Achieved: 2: Bed activities/Dependent transfer  JH-HLM Goal NOT achieved. Continue with multidisciplinary rounding and encourage appropriate mobility to improve upon JH-HLM goals.    Patient Centered Rounds: I performed bedside rounds with nursing staff today.  Discussions with Specialists or Other Care Team Provider: attending physician    Education and Discussions with Family / Patient:  will call later in the day.     Current Length of Stay: 4 day(s)  Current Patient Status: Inpatient   Discharge Plan: Anticipate discharge in 24-48 hrs to STR was recommended, disposition based on pt's decision    Code Status: Level 1 - Full Code    Subjective:   No acute events overnight, pt is hemodynamically stable. Examined pt at the bedside this am, pt states that she is feeling fine however mentioned that she is still feeling significant sob when walking. Pt confirm mild cough with white yellow sputum production. Pt express no new complains.    Objective:     Vitals:   Temp (24hrs), Av.7 °F (36.5 °C), Min:97.2 °F (36.2 °C), Max:98.6 °F (37 °C)    Temp:  [97.2 °F (36.2 °C)-98.6 °F (37 °C)] 97.2 °F (36.2 °C)  HR:  [78-97] 78  Resp:   [16-18] 16  BP: (118-157)/(74-94) 142/81  SpO2:  [96 %-100 %] 98 %  Body mass index is 42.02 kg/m².     Input and Output Summary (last 24 hours):     Intake/Output Summary (Last 24 hours) at 9/3/2024 1415  Last data filed at 9/3/2024 1100  Gross per 24 hour   Intake 400 ml   Output 0 ml   Net 400 ml       Physical Exam:   Physical Exam  Vitals and nursing note reviewed.   Constitutional:       General: She is not in acute distress.     Appearance: She is well-developed.   HENT:      Head: Normocephalic and atraumatic.   Eyes:      Conjunctiva/sclera: Conjunctivae normal.   Cardiovascular:      Rate and Rhythm: Normal rate and regular rhythm.      Heart sounds: No murmur heard.  Pulmonary:      Effort: Pulmonary effort is normal. No respiratory distress.      Breath sounds: No rales.      Comments: 2 L NC in placed during evaluation, we'll try to wean off later  Abdominal:      Palpations: Abdomen is soft.      Tenderness: There is no abdominal tenderness.   Musculoskeletal:         General: No swelling.      Cervical back: Neck supple.      Comments: Anterior shin tenderness b/l   Skin:     General: Skin is warm and dry.      Capillary Refill: Capillary refill takes less than 2 seconds.   Neurological:      Mental Status: She is alert.   Psychiatric:         Mood and Affect: Mood normal.          Additional Data:     Labs:  Results from last 7 days   Lab Units 09/03/24  0510 09/02/24  0509   WBC Thousand/uL 12.39* 12.88*   HEMOGLOBIN g/dL 8.2* 7.8*   HEMATOCRIT % 27.0* 25.4*   PLATELETS Thousands/uL 266 244   BANDS PCT %  --  4   LYMPHO PCT %  --  13*   MONO PCT %  --  3*   EOS PCT %  --  0     Results from last 7 days   Lab Units 09/03/24  0510 09/02/24  0509 09/01/24  0521   SODIUM mmol/L 137   < > 137   POTASSIUM mmol/L 4.8   < > 4.4   CHLORIDE mmol/L 100   < > 98   CO2 mmol/L 26   < > 28   BUN mg/dL 62*   < > 40*   CREATININE mg/dL 9.85*   < > 6.86*   ANION GAP mmol/L 11   < > 11   CALCIUM mg/dL 9.5   < > 9.6    ALBUMIN g/dL  --   --  3.7   TOTAL BILIRUBIN mg/dL  --   --  0.44   ALK PHOS U/L  --   --  97   ALT U/L  --   --  61*   AST U/L  --   --  31   GLUCOSE RANDOM mg/dL 95   < > 107    < > = values in this interval not displayed.     Results from last 7 days   Lab Units 08/30/24 2025   INR  1.99*             Results from last 7 days   Lab Units 08/31/24  0443 08/30/24 2025   PROCALCITONIN ng/ml 1.00* 0.77*       Lines/Drains:  Invasive Devices       Peripheral Intravenous Line  Duration             Peripheral IV 09/03/24 Left Wrist <1 day    Peripheral IV 09/03/24 Left;Proximal;Ventral (anterior) Forearm <1 day              Hemodialysis Catheter  Duration             HD Permanent Double Catheter <1 day                      Telemetry:  Telemetry Orders (From admission, onward)               24 Hour Telemetry Monitoring  Continuous x 24 Hours (Telem)        Question:  Reason for 24 Hour Telemetry  Answer:  Decompensated CHF- and any one of the following: continuous diuretic infusion or total diuretic dose >200 mg daily, associated electrolyte derangement (I.e. K < 3.0), ionotropic drip (continuous infusion), hx of ventricular arrhythmia, or new EF < 35%                     Indication for Continued Telemetry Use new EF 25%             Imaging: Reviewed radiology reports from this admission including: chest CT scan    Recent Cultures (last 7 days):   Results from last 7 days   Lab Units 09/02/24  1804 08/30/24 2039   BLOOD CULTURE   --  No Growth at 72 hrs.  Staphylococcus epidermidis*   SPUTUM CULTURE  Culture too young- will reincubate  --    GRAM STAIN RESULT  Rare Epithelial cells per low power field*  No polys seen*  Rare Gram positive cocci in pairs*  Rare Gram variable rods* Gram positive cocci in clusters*       Last 24 Hours Medication List:   Current Facility-Administered Medications   Medication Dose Route Frequency Provider Last Rate    acetaminophen  650 mg Oral Q6H PRN Kely Robert MD      albuterol  2  puff Inhalation Q4H PRN Kely Robert MD      albuterol  2.5 mg Nebulization TID Todd Dickens MD      atorvastatin  20 mg Oral Daily Kely Robert MD      atovaquone  1,500 mg Oral Daily Trish Kline MD      cyclophosphamide  100 mg Oral Daily Kely Robert MD      dextromethorphan-guaiFENesin  10 mL Oral Q4H PRN Kely Robert MD      epoetin shavonne  6,000 Units Intravenous After Dialysis Tirso Montez MD      fluticasone-vilanterol  1 puff Inhalation Daily Robert Gilbert MD      heparin (porcine)  5,000 Units Subcutaneous Q8H JACK Kely Robert MD      Heparin Na (Pork) Lock Flsh PF  2 mL Intracatheter Once Joselyn Reyes Bahamonde, MD      Heparin Na (Pork) Lock Flsh PF  2.1 mL Intracatheter Once Joselyn Reyes Bahamonde, MD      ipratropium  0.5 mg Nebulization TID Todd Dickens MD      iron polysaccharides  150 mg Oral Daily Kely Robert MD      lamoTRIgine  100 mg Oral HS Kely Robert MD      lidocaine  1 patch Topical Daily Trish Kline MD      losartan  25 mg Oral Daily Ty Reese MD      metoprolol succinate  25 mg Oral Daily JEB Wan      montelukast  10 mg Oral Daily Kely Robert MD      NIFEdipine  60 mg Oral Daily Kely Robert MD      pantoprazole  40 mg Oral Daily Before Breakfast Kely Robert MD      predniSONE  20 mg Oral Daily Gerber Kim Jr., DO      sevelamer  1,600 mg Oral TID With Meals Kely Robert MD      Sodium Zirconium Cyclosilicate  10 g Oral Daily Trish Kline MD      traZODone  200 mg Oral HS Kely Robert MD      trimethobenzamide  200 mg Intramuscular Q6H PRN Kely Robert MD      venlafaxine  225 mg Oral Daily Kely Robert MD          Today, Patient Was Seen By: Trish Kline MD    **Please Note: This note may have been constructed using a voice recognition system.**

## 2024-09-03 NOTE — ASSESSMENT & PLAN NOTE
Recent Labs     09/01/24  0521 09/02/24  0509 09/03/24  0510   HGB 7.9* 7.8* 8.2*        S/p 1 U RBC on 8/31  Baseline Hgb 7-8  Etiology: component of iron deficiency and renal disease  Has received Epogen during prior hospitalization    Plan  Monitor Hgb, transfuse for < 7  Continue PTA Ferrex

## 2024-09-03 NOTE — PROGRESS NOTES
"Cardiology Progress Note - Ngozi Beard 64 y.o. female MRN: [id number]    Unit/Bed#: S -01 Encounter: [id number]        Subjective:    No significant events overnight.  No chest pain    Review of Systems   Constitutional: Positive for malaise/fatigue.   Cardiovascular:  Negative for chest pain.   Respiratory:  Negative for shortness of breath.        Objective:   Vitals: Blood pressure 157/94, pulse 79, temperature (!) 97.4 °F (36.3 °C), resp. rate 16, height 5' 3\" (1.6 m), weight 108 kg (237 lb 3.4 oz), SpO2 97%., Body mass index is 42.02 kg/m².,   Orthostatic Blood Pressures      Flowsheet Row Most Recent Value   Blood Pressure 157/94 filed at 2024 1035   Patient Position - Orthostatic VS Lying filed at 2024 2127           Systolic (24hrs), Av , Min:118 , Max:157     Diastolic (24hrs), Av, Min:81, Max:94      Intake/Output Summary (Last 24 hours) at 9/3/2024 1041  Last data filed at 9/3/2024 0600  Gross per 24 hour   Intake 440 ml   Output 0 ml   Net 440 ml     Weight (last 2 days)       Date/Time Weight    24 0458 108 (237.22)    24 0600 107 (236.33)    24 0600 107 (235.2)              Telemetry Review: No significant arrhythmias seen on telemetry review. nsr  Physical Exam  Vitals and nursing note reviewed.   Constitutional:       Appearance: Normal appearance.   HENT:      Head: Normocephalic.      Nose: Nose normal.      Mouth/Throat:      Mouth: Mucous membranes are moist.   Eyes:      General: No scleral icterus.     Conjunctiva/sclera: Conjunctivae normal.   Cardiovascular:      Rate and Rhythm: Normal rate and regular rhythm.      Heart sounds: No murmur heard.     No gallop.   Pulmonary:      Effort: Pulmonary effort is normal. No respiratory distress.      Breath sounds: Normal breath sounds. No wheezing or rales.   Abdominal:      General: Abdomen is flat. Bowel sounds are normal. There is no distension.      Palpations: Abdomen is soft.      Tenderness: " There is no abdominal tenderness. There is no guarding.   Musculoskeletal:      Cervical back: Normal range of motion and neck supple.      Right lower leg: No edema.      Left lower leg: No edema.   Skin:     General: Skin is warm and dry.   Neurological:      General: No focal deficit present.      Mental Status: She is alert and oriented to person, place, and time.   Psychiatric:         Mood and Affect: Mood normal.         Behavior: Behavior normal.           Laboratory Results:        CBC with diff:   Results from last 7 days   Lab Units 09/03/24  0510 09/02/24  0509 09/01/24  0521 08/31/24  1556 08/31/24  0755 08/31/24  0443 08/30/24 2025   WBC Thousand/uL 12.39* 12.88* 19.55*  --   --  10.64* 14.45*   HEMOGLOBIN g/dL 8.2* 7.8* 7.9* 7.9* 6.9* 6.7* 7.1*   HEMATOCRIT % 27.0* 25.4* 24.6* 25.4* 22.2* 22.1* 23.0*   MCV fL 94 92 90  --   --  93 92   PLATELETS Thousands/uL 266 244 243  --   --  245 258   RBC Million/uL 2.86* 2.75* 2.73*  --   --  2.39* 2.49*   MCH pg 28.7 28.4 28.9  --   --  28.0 28.5   MCHC g/dL 30.4* 30.7* 32.1  --   --  30.3* 30.9*   RDW % 18.4* 18.2* 18.3*  --   --  17.2* 17.5*   MPV fL 10.3 10.4 10.2  --   --  10.4 10.0   NRBC /100 WBC  --   --   --   --   --   --  1         CMP:  Results from last 7 days   Lab Units 09/03/24  0510 09/02/24  0509 09/01/24  0521 08/31/24  0443 08/31/24  0033 08/30/24 2025   POTASSIUM mmol/L 4.8 5.2 4.4 5.6* 5.7* 5.7*   CHLORIDE mmol/L 100 100 98 102  --  102   CO2 mmol/L 26 28 28 25  --  22   BUN mg/dL 62* 50* 40* 51*  --  44*   CREATININE mg/dL 9.85* 8.64* 6.86* 8.68*  --  8.11*   CALCIUM mg/dL 9.5 9.5 9.6 9.6  --  9.8   AST U/L  --   --  31 57*  --  65*   ALT U/L  --   --  61* 59*  --  60*   ALK PHOS U/L  --   --  97 100  --  106*   EGFR ml/min/1.73sq m 3 4 5 4  --  4         BMP:  Results from last 7 days   Lab Units 09/03/24  0510 09/02/24  0509 09/01/24  0521 08/31/24  0443 08/31/24  0033 08/30/24  2025   POTASSIUM mmol/L 4.8 5.2 4.4 5.6* 5.7* 5.7*  "  CHLORIDE mmol/L 100 100 98 102  --  102   CO2 mmol/L 26 28 28 25  --  22   BUN mg/dL 62* 50* 40* 51*  --  44*   CREATININE mg/dL 9.85* 8.64* 6.86* 8.68*  --  8.11*   CALCIUM mg/dL 9.5 9.5 9.6 9.6  --  9.8       BNP: No results for input(s): \"BNP\" in the last 72 hours.    Magnesium:   Results from last 7 days   Lab Units 09/01/24  0521 08/31/24  0443 08/30/24 2025   MAGNESIUM mg/dL 2.1 2.3 2.1       Coags:   Results from last 7 days   Lab Units 08/30/24 2025   PTT seconds >210*   INR  1.99*       TSH: No results found for: \"TSH\"    Hemoglobin A1C       Lipid Profile:       Cardiac testing:   No results found for this or any previous visit.    No results found for this or any previous visit.    No results found for this or any previous visit.    No results found for this or any previous visit.      Meds/Allergies   current meds:   Current Facility-Administered Medications   Medication Dose Route Frequency    acetaminophen (TYLENOL) tablet 650 mg  650 mg Oral Q6H PRN    albuterol (PROVENTIL HFA,VENTOLIN HFA) inhaler 2 puff  2 puff Inhalation Q4H PRN    albuterol inhalation solution 2.5 mg  2.5 mg Nebulization TID    atorvastatin (LIPITOR) tablet 20 mg  20 mg Oral Daily    atovaquone (MEPRON) oral suspension 1,500 mg  1,500 mg Oral Daily    cyclophosphamide (CYTOXAN) capsule 100 mg  100 mg Oral Daily    dextromethorphan-guaiFENesin (ROBITUSSIN DM) oral syrup 10 mL  10 mL Oral Q4H PRN    epoetin shavonne (EPOGEN,PROCRIT) injection 6,000 Units  6,000 Units Intravenous After Dialysis    fluticasone-vilanterol 200-25 mcg/actuation 1 puff  1 puff Inhalation Daily    heparin (porcine) subcutaneous injection 5,000 Units  5,000 Units Subcutaneous Q8H JACK    ipratropium (ATROVENT) 0.02 % inhalation solution 0.5 mg  0.5 mg Nebulization TID    iron polysaccharides (FERREX) capsule 150 mg  150 mg Oral Daily    lamoTRIgine (LaMICtal) tablet 100 mg  100 mg Oral HS    lidocaine (LIDODERM) 5 % patch 1 patch  1 patch Topical Daily    " losartan (COZAAR) tablet 25 mg  25 mg Oral Daily    metoprolol succinate (TOPROL-XL) 24 hr tablet 25 mg  25 mg Oral Daily    montelukast (SINGULAIR) tablet 10 mg  10 mg Oral Daily    NIFEdipine (PROCARDIA XL) 24 hr tablet 60 mg  60 mg Oral Daily    pantoprazole (PROTONIX) EC tablet 40 mg  40 mg Oral Daily Before Breakfast    predniSONE tablet 20 mg  20 mg Oral Daily    sevelamer (RENAGEL) tablet 1,600 mg  1,600 mg Oral TID With Meals    Sodium Zirconium Cyclosilicate (Lokelma) 10 g  10 g Oral Daily    traZODone (DESYREL) tablet 200 mg  200 mg Oral HS    trimethobenzamide (TIGAN) IM injection 200 mg  200 mg Intramuscular Q6H PRN    venlafaxine (EFFEXOR-XR) 24 hr capsule 225 mg  225 mg Oral Daily       Medications Prior to Admission:     albuterol (PROVENTIL HFA,VENTOLIN HFA) 90 mcg/act inhaler    atorvastatin (LIPITOR) 20 mg tablet    atovaquone (MEPRON) 750 mg/5 mL suspension    calcium carbonate (OYSTER SHELL,OSCAL) 500 mg    cyclophosphamide (CYTOXAN) 50 mg capsule    lamoTRIgine (LaMICtal) 100 mg tablet    montelukast (SINGULAIR) 10 mg tablet    NIFEdipine (PROCARDIA XL) 30 mg 24 hr tablet    ondansetron (ZOFRAN) 4 mg tablet    pantoprazole (PROTONIX) 40 mg tablet    predniSONE 2.5 mg tablet    senna (SENOKOT) 8.6 mg    sevelamer (RENAGEL) 800 mg tablet    traZODone (DESYREL) 100 mg tablet    venlafaxine (EFFEXOR-XR) 150 mg 24 hr capsule    venlafaxine (EFFEXOR-XR) 75 mg 24 hr capsule    Advair -21 MCG/ACT inhaler    iron polysaccharides (FERREX) 150 mg capsule    vancomycin,       Assessment:  Principal Problem:    Shortness of breath  Active Problems:    Bipolar 1 disorder (HCC)    Primary hypertension    Dyslipidemia    Asthma without acute exacerbation    Anemia    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 Add losartan for cardiomyopathy and continue metoprolol.    Ultimately would do elective left heart cath for ischemic workup.     Counseling / Coordination of Care  Total floor / unit time spent today 25 minutes.  Greater than 50% of total time was spent with the patient and / or family counseling and / or coordination of care.  A description of the counseling / coordination of care.

## 2024-09-03 NOTE — HOSPITAL COURSE
This is a 64-year-old presented upon to this admission for shortness of breath that has been ongoing for 2 weeks.  Has been progressively worsening within the past week.  Very clarification , patient missed 1 session of dialysis.  Upon first evaluation, patient presented with tachycardia, and requiring 2 L oxygen to maintain saturation.  She received a DuoNeb and Solu-Medrol 80 mg.  She was found to have leukocytosis and elevated Pro-Bennett and D-dimer. Her hemoglobin was found to be in the 6.9, she was then given 1 unit of blood. Chest x-ray demonstrated vascular and interstitial markings indicative of volume overload. Subsequently she was ordered to have CTA scan.  Her CTA PE demonstrated tree-in-bud nodularity suggestive of bronchiolitis. Her echo found to have EF of 25%.  She underwent hemodialysis and any noted mild improvement of her symptoms.  She was then evaluated by cardiology, nephrology and pulmonology.  Her sputum culture returned positive for Pseudomonas.  The results was thought to be chronic colonization.  Upon discussion, patient was initiated with a course of cefepime x 7 days.  Her hospitalization was complicated by malfunctioning of her HD port, requiring multiple adjustment from the interventional radiologist.  Pulmonology decided to proceed with bronchoscopy to investigate possible underlying causes for her port malfunctioning.  Patient remained stable after the procedure.    Upon discharge patient will need to follow-up with cardiology for future cardiac imaging and potential heart catheterization for newly found EF of 25%.  Patient will need to follow-up with pulmonology to discuss pending bronchoscopy results and repeat CT imaging in 3 months  Patient will need to follow-up with nephrology during her scheduled hemodialysis session.

## 2024-09-03 NOTE — PLAN OF CARE
Problem: PHYSICAL THERAPY ADULT  Goal: Performs mobility at highest level of function for planned discharge setting.  See evaluation for individualized goals.  Description: Treatment/Interventions: Functional transfer training, LE strengthening/ROM, Elevations, Therapeutic exercise, Endurance training, Patient/family training, Equipment eval/education, Bed mobility, Gait training, Compensatory technique education  Equipment Recommended: Walker       See flowsheet documentation for full assessment, interventions and recommendations.  Outcome: Progressing  Note: Prognosis: Good  Problem List: Decreased strength, Decreased endurance, Impaired balance, Decreased mobility  Assessment: pt began tx session lying supine in the bed as pt was agreeable to participate in PT intervention. In comparison to previous tx sessions pt continues to remain consistant for requiring /s for completing a supine<>sit EOB transfer as pt utilized bed rail to pull up in to a seated EOB position. pt was able to sit EOB w/o LOB while performing TE at EOB. pt continues to be functioning below her base line level and continues to require min Ax1 for all functional transfers to and from RW and ambulation w/ RW for safety and balance. pt continues to demonstrate the inability to ambulate house hold distances as pt was limited to 10'x1 RW min Ax1 due to fatigue and generalized weakness. pt required a seated therapeutic rest break at EOB prior to completing transfer from EOB to supine. Post tx pt continues to demonatarte the following deficits: limited activity tolerance, functional mobility, ambulation distance and functional transfers. Continue to recoimmend  Dc w/ level 2 moderate rehab resource intensity when medically cleared        Rehab Resource Intensity Level, PT: II (Moderate Resource Intensity)    See flowsheet documentation for full assessment.

## 2024-09-03 NOTE — HEMODIALYSIS
Post-Dialysis RN Treatment Note    Blood Pressure:  Pre 142/90 mm/Hg  Post 121/71 mmHg   EDW  111.5 kg    Weight:  Pre 109 kg   Post 107 kg   Mode of weight measurement: Bed Scale   Volume Removed  2000 ml    Treatment duration 210 minutes    NS given  No    Treatment shortened? No   Medications given during Rx Epogen 6000 units   Estimated Kt/V  None Reported   Access type: Permacath/TDC   Access Issues: No    Report called to primary nurse   Yes Darcie CONROY RN    **HEPARIN LOCK IN BOTH LUMENS OF HD CATHETER**

## 2024-09-03 NOTE — SEDATION DOCUMENTATION
Procedure ended. IR permacath exchange completed by Dr. Bettencourt. Dressing to site. Report and care assumed by primary RN

## 2024-09-03 NOTE — PLAN OF CARE
Pt. On HD treatment for 3.5 hours with a UF goal of 2 L as tolerated. Pt on a 2 k 2.5 triston bath for a potassium of 4.8 on 09/03/24.  Problem: METABOLIC, FLUID AND ELECTROLYTES - ADULT  Goal: Electrolytes maintained within normal limits  Description: INTERVENTIONS:  - Monitor labs and assess patient for signs and symptoms of electrolyte imbalances  - Administer electrolyte replacement as ordered  - Monitor response to electrolyte replacements, including repeat lab results as appropriate  - Instruct patient on fluid and nutrition as appropriate  Outcome: Progressing  Goal: Fluid balance maintained  Description: INTERVENTIONS:  - Monitor labs   - Monitor I/O and WT  - Instruct patient on fluid and nutrition as appropriate  - Assess for signs & symptoms of volume excess or deficit  Outcome: Progressing

## 2024-09-03 NOTE — DISCHARGE INSTRUCTIONS
Perma-cath Placement   WHAT YOU NEED TO KNOW:   A perma-cath is a catheter placed through a vein into or near your right atrium. Your right atrium is the right upper chamber of your heart. A perma-cath is used for dialysis in an emergency or until a long-term device is ready to use. After your procedure, you will have some pain and swelling on your chest and neck. You may have some bruises on your chest and neck. You may also have 2 dressings, one on your chest and one on your neck.   DISCHARGE INSTRUCTIONS:   Call 911 for any of the following:   You feel lightheaded, short of breath, and have chest pain.    Your catheter comes out   Contact Interventional Radiology at [phone] ([facility] PATIENTS: Contact Interventional Radiology at [phone]) ([facility] PATIENTS: Contact Interventional Radiology at [phone]) if:  Blood soaks through your bandage.   You have new swelling in your arm, neck, face, or chest on your right side.  Your catheter gets wet.    Your bruises or pain get worse.   You have a fever or chills.  Persistent nausea or vomiting.   Your incision is red, swollen, or draining pus.   You have questions or concerns about your condition or care.  Self-care:       Resume your normal diet.  Keep your dressings dry. Do not take a shower or swim. You may take a tub bath, but do not get your dressings wet. Water in your wound can cause bacteria to grow and cause an infection. If your dressing gets wet, dry it off and cover it with dry sterile gauze. Call your healthcare provider. Do not use soaps or ointments.  Do not change your dressings. Your healthcare provider or dialysis nurse will change your dressings. Your dressings should stay in place until your healthcare provider removes them. The dressing on your chest will stay as long as you have the catheter in place. The dressing prevents infection.    Do not remove the red and blue caps from the end of your catheter. The caps prevent air from getting  into your catheter.  Follow up with your healthcare provider as directed: Write down your questions so you remember to ask them during your visits.

## 2024-09-03 NOTE — ASSESSMENT & PLAN NOTE
Recent Labs     09/01/24  0521   AST 31   ALT 61*       INR 1.99  Unclear etiology. Possibly secondary to atovaquone use  Asymptomatic    Plan  Trend CMP  Consider RUQ US and acute hepatitis panel

## 2024-09-03 NOTE — ASSESSMENT & PLAN NOTE
Mild Bilateral wheezing was noted in the lower lung base upon initial presentation.  Does not require frequent inhaler use as an outpatient setting.  Low suspicion for exacerbation for the clinical symptoms presented during this hospital on admission    Plan  Continue albuterol-ipratropium nebs  Pulmonology consult in place, recs appreciated, RP2 is negative, bronchoscopy need is still in discussion

## 2024-09-03 NOTE — ASSESSMENT & PLAN NOTE
-CT chest PE study revealed no evidence of PE  -There is persistent tree-in-bud nodularity in the lungs suggestive of a widespread infectious bronchiolitis that may be secondary to an atypical mycobacterium. Follicular bronchiolitis and acute hypersensitivity pneumonitis could also be in the differential   -New mild patchy bilateral multifocal pneumonia  -new moderate bilateral pleural effusions   - ID: Suspect chronic process, hold antibiotics    Plan:   Pulmonology consult placed, RP2 panel negative, no bronchoscopy recommended as of now  Monitor off Abx as per ID

## 2024-09-03 NOTE — ASSESSMENT & PLAN NOTE
CLINE: Negative PE, BNP 3019, CT chest-bilateral pleural effusion, tree-in-bud nodularity    Multifactorial, contributing factors of volume overload, chronic anemia, underlying COPD and asthma. Lower suspicion for PNA    Plan  Pulmonology is following, see pna problem list

## 2024-09-03 NOTE — ASSESSMENT & PLAN NOTE
On admission, met SIRS criteria with leukocytosis and tachycardia  Procalcitonin 0.77 (in the setting of renal disease)  COVID/Flu/RSV negative  CXR - mildly pulmonary vascular congestion. No evidence of consolidation  CT chest revealed evidence of multifocal pneumonia    Multifactorial, steroid use, hypoxic respiratory failure, volume overload, chronic pulmonary infection    Plan  Monitor WBC and fever curve  1/2 Bc grew MRSA staph epidermidis, most likely contamination, follow second Bc  ID is on board, monitor off Abx

## 2024-09-03 NOTE — ASSESSMENT & PLAN NOTE
Admission in July 2024 for ANGELICA. 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 S/p PLEX.  Patient is anuric and dialysis-dependent  Tues/Thurs/Sat schedule  Access: Right IJ PermCath, Cath reevaluation  by IR on 9/3, no issues during HD    Plan:  Hemodialysis scheduled on 9/3  Continue PTA cyclophosphamide  Continue PTA sevalemer  Continue PTA atovaquone for PJP prophylaxis  Continue prednisone taper from outpatient nephrology prior to this admission  20 mg once daily till September 6  15 mg starting September 7 to September 20  12.5 mg starting September 21 to October 4  10 mg starting October 5 to October 18  7.5 mg starting October 19 to November 2  5 mg daily starting November 3

## 2024-09-03 NOTE — PHYSICAL THERAPY NOTE
PHYSICAL THERAPY NOTE          Patient Name: Ngozi Beard  Today's Date: 9/3/2024           09/03/24 1529   PT Last Visit   PT Visit Date 24   Note Type   Note Type Treatment   Pain Assessment   Pain Assessment Tool 0-10   Pain Score No Pain   Patient's Stated Pain Goal No pain   Hospital Pain Intervention(s) Repositioned;Ambulation/increased activity;Rest   Multiple Pain Sites No   Pain Rating: FLACC (Rest) - Face 0   Pain Rating: FLACC (Rest) - Legs 0   Pain Rating: FLACC (Rest) - Activity 0   Pain Rating: FLACC (Rest) - Cry 0   Pain Rating: FLACC (Rest) - Consolability 0   Score: FLACC (Rest) 0   Restrictions/Precautions   Weight Bearing Precautions Per Order No   Other Precautions Chair Alarm;Bed Alarm;O2;Fall Risk  (2L NC 02)   General   Chart Reviewed Yes   Response to Previous Treatment Other (Comment)  (pt reports fatigue and weakness due to dialysis)   Family/Caregiver Present No   Cognition   Overall Cognitive Status WFL   Arousal/Participation Alert;Responsive;Cooperative   Attention Attends with cues to redirect   Orientation Level Oriented X4   Memory Decreased recall of precautions   Following Commands Follows one step commands without difficulty   Comments pt ID by name and  pt was agreeable to participate in PT intervention   Subjective   Subjective pt stated she feels weak and fatigue post dialysis but will try to walk and get OOB   Bed Mobility   Supine to Sit 5  Supervision   Additional items Assist x 1;HOB elevated;Bedrails;Increased time required;Verbal cues   Sit to Supine 5  Supervision   Additional items Assist x 1;HOB elevated;Bedrails;Increased time required;Verbal cues   Additional Comments pt was able to sit EOB and participate in TE activities w/o LOB >8 minutes in order to increase static/dynamic sitting balance   Transfers   Sit to Stand 4  Minimal assistance   Additional items Assist x  1;Increased time required;Verbal cues   Stand to Sit 4  Minimal assistance   Additional items Assist x 1;Increased time required;Verbal cues   Stand pivot Unable to assess   Additional Comments pt continues to remain coinsistant for requiring min Ax1 for all functional tyransfesr to and from Rw with VC's for hand placement   Ambulation/Elevation   Gait pattern Improper Weight shift;Wide JUAN;Decreased foot clearance;Short stride;Foward flexed;Excessively slow   Gait Assistance 4  Minimal assist   Additional items Assist x 1;Verbal cues   Assistive Device Rolling walker   Distance 10'x1 RW   Stair Management Assistance Not tested   Ambulation/Elevation Additional Comments pt continues to be limited with functional mobility, activity tolerance and ambulation distance 10'x1 RW min ax1 for safety and balance   Balance   Static Sitting Fair +   Dynamic Sitting Fair   Static Standing Fair -   Dynamic Standing Poor +   Ambulatory Poor +  (w/ RW)   Endurance Deficit   Endurance Deficit Yes   Endurance Deficit Description limited activity tolerance, funcitonal mobility and ambulation distance   Activity Tolerance   Activity Tolerance Patient limited by fatigue;Other (Comment)  (generalized weakness)   Nurse Made Aware Spoke to RN   Exercises   Hip Abduction Sitting;15 reps;AROM;Bilateral   Hip Adduction Sitting;15 reps;AROM;Bilateral   Knee AROM Long Arc Quad Sitting;10 reps;AROM;Bilateral   Ankle Pumps Sitting;15 reps;AROM;Bilateral   Marching Sitting;5 reps;AROM;Bilateral   Assessment   Prognosis Good   Problem List Decreased strength;Decreased endurance;Impaired balance;Decreased mobility   Assessment pt began tx session lying supine in the bed as pt was agreeable to participate in PT intervention. In comparison to previous tx sessions pt continues to remain consistant for requiring /s for completing a supine<>sit EOB transfer as pt utilized bed rail to pull up in to a seated EOB position. pt was able to sit EOB w/o LOB  while performing TE at EOB. pt continues to be functioning below her base line level and continues to require min Ax1 for all functional transfers to and from RW and ambulation w/ RW for safety and balance. pt continues to demonstrate the inability to ambulate house hold distances as pt was limited to 10'x1 RW min Ax1 due to fatigue and generalized weakness. pt required a seated therapeutic rest break at EOB prior to completing transfer from EOB to supine. Post tx pt continues to demonatarte the following deficits: limited activity tolerance, functional mobility, ambulation distance and functional transfers. Continue to recoimmend  Dc w/ level 2 moderate rehab resource intensity when medically cleared   Goals   Patient Goals to get some rest   STG Expiration Date 09/11/24   PT Treatment Day 1   Plan   Treatment/Interventions Functional transfer training;LE strengthening/ROM;Elevations;Therapeutic exercise;Endurance training;Patient/family training;Equipment eval/education;Bed mobility;Gait training;Spoke to nursing;Compensatory technique education   Progress Slow progress, decreased activity tolerance   PT Frequency 3-5x/wk   Discharge Recommendation   Rehab Resource Intensity Level, PT II (Moderate Resource Intensity)   Equipment Recommended Walker   Walker Package Recommended Wheeled walker   Change/add to Walker Package? No   AM-PAC Basic Mobility Inpatient   Turning in Flat Bed Without Bedrails 3   Lying on Back to Sitting on Edge of Flat Bed Without Bedrails 3   Moving Bed to Chair 3   Standing Up From Chair Using Arms 3   Walk in Room 3   Climb 3-5 Stairs With Railing 1   Basic Mobility Inpatient Raw Score 16   Basic Mobility Standardized Score 38.32   [facility] Highest Level Of Mobility   -[facility] Goal 5: Stand one or more mins   -[facility] Achieved 6: Walk 10 steps or more   Education   Education Provided Mobility training;Assistive device   Patient Reinforcement needed   End of Consult   Patient Position at  End of Consult Supine;Bed/Chair alarm activated;All needs within reach   The patient's AM-PAC Basic Mobility Inpatient Short Form Raw Score is 16. A Raw score of less than or equal to 16 suggests the patient may benefit from discharge to post-acute rehabilitation services. Please also refer to the recommendation of the Physical Therapist for safe discharge planning.    Javon Brown

## 2024-09-03 NOTE — OCCUPATIONAL THERAPY NOTE
Occupational Therapy Cancellation Note         Patient Name: Ngozi Beard  Today's Date: 9/3/2024       09/03/24 1252   Note Type   Note type Cancelled Session   Cancel Reasons Patient off floor/hemodialysis   Additional Comments OT orders received, chart reviewed. Pt currently receiving bedside HD at this time. Will cancel and f/u once appropriate     Margie Shipman, OT

## 2024-09-04 ENCOUNTER — APPOINTMENT (INPATIENT)
Dept: DIALYSIS | Facility: HOSPITAL | Age: 64
DRG: 286 | End: 2024-09-04
Payer: COMMERCIAL

## 2024-09-04 ENCOUNTER — APPOINTMENT (INPATIENT)
Dept: RADIOLOGY | Facility: HOSPITAL | Age: 64
DRG: 286 | End: 2024-09-04
Payer: COMMERCIAL

## 2024-09-04 LAB
ANION GAP SERPL CALCULATED.3IONS-SCNC: 8 MMOL/L (ref 4–13)
ATRIAL RATE: 125 BPM
BACTERIA BLD CULT: NORMAL
BUN SERPL-MCNC: 31 MG/DL (ref 5–25)
CALCIUM SERPL-MCNC: 8.8 MG/DL (ref 8.4–10.2)
CHLORIDE SERPL-SCNC: 97 MMOL/L (ref 96–108)
CO2 SERPL-SCNC: 34 MMOL/L (ref 21–32)
CREAT SERPL-MCNC: 6.16 MG/DL (ref 0.6–1.3)
ERYTHROCYTE [DISTWIDTH] IN BLOOD BY AUTOMATED COUNT: 18.5 % (ref 11.6–15.1)
GFR SERPL CREATININE-BSD FRML MDRD: 6 ML/MIN/1.73SQ M
GLUCOSE SERPL-MCNC: 138 MG/DL (ref 65–140)
GLUCOSE SERPL-MCNC: 84 MG/DL (ref 65–140)
HCT VFR BLD AUTO: 27.5 % (ref 34.8–46.1)
HGB BLD-MCNC: 8.4 G/DL (ref 11.5–15.4)
MCH RBC QN AUTO: 28.2 PG (ref 26.8–34.3)
MCHC RBC AUTO-ENTMCNC: 30.5 G/DL (ref 31.4–37.4)
MCV RBC AUTO: 92 FL (ref 82–98)
P AXIS: 36 DEGREES
PLATELET # BLD AUTO: 238 THOUSANDS/UL (ref 149–390)
PMV BLD AUTO: 10.1 FL (ref 8.9–12.7)
POTASSIUM SERPL-SCNC: 4 MMOL/L (ref 3.5–5.3)
PR INTERVAL: 176 MS
QRS AXIS: 23 DEGREES
QRSD INTERVAL: 126 MS
QT INTERVAL: 338 MS
QTC INTERVAL: 483 MS
RBC # BLD AUTO: 2.98 MILLION/UL (ref 3.81–5.12)
SODIUM SERPL-SCNC: 139 MMOL/L (ref 135–147)
T WAVE AXIS: 26 DEGREES
VENTRICULAR RATE: 123 BPM
WBC # BLD AUTO: 9.89 THOUSAND/UL (ref 4.31–10.16)

## 2024-09-04 PROCEDURE — 90999 UNLISTED DIALYSIS PROCEDURE: CPT | Performed by: STUDENT IN AN ORGANIZED HEALTH CARE EDUCATION/TRAINING PROGRAM

## 2024-09-04 PROCEDURE — 94640 AIRWAY INHALATION TREATMENT: CPT

## 2024-09-04 PROCEDURE — 80048 BASIC METABOLIC PNL TOTAL CA: CPT

## 2024-09-04 PROCEDURE — 85027 COMPLETE CBC AUTOMATED: CPT

## 2024-09-04 PROCEDURE — 82948 REAGENT STRIP/BLOOD GLUCOSE: CPT

## 2024-09-04 PROCEDURE — 99232 SBSQ HOSP IP/OBS MODERATE 35: CPT | Performed by: INTERNAL MEDICINE

## 2024-09-04 PROCEDURE — 71045 X-RAY EXAM CHEST 1 VIEW: CPT

## 2024-09-04 PROCEDURE — 97167 OT EVAL HIGH COMPLEX 60 MIN: CPT

## 2024-09-04 PROCEDURE — 99233 SBSQ HOSP IP/OBS HIGH 50: CPT | Performed by: INTERNAL MEDICINE

## 2024-09-04 PROCEDURE — 94760 N-INVAS EAR/PLS OXIMETRY 1: CPT

## 2024-09-04 PROCEDURE — 93010 ELECTROCARDIOGRAM REPORT: CPT | Performed by: INTERNAL MEDICINE

## 2024-09-04 RX ORDER — HEPARIN SODIUM,PORCINE/PF 10 UNIT/ML
2 SYRINGE (ML) INTRAVENOUS ONCE
Status: COMPLETED | OUTPATIENT
Start: 2024-09-04 | End: 2024-09-04

## 2024-09-04 RX ORDER — METOPROLOL SUCCINATE 50 MG/1
50 TABLET, EXTENDED RELEASE ORAL DAILY
Status: DISCONTINUED | OUTPATIENT
Start: 2024-09-05 | End: 2024-09-05

## 2024-09-04 RX ORDER — HEPARIN SODIUM,PORCINE/PF 10 UNIT/ML
2.1 SYRINGE (ML) INTRAVENOUS ONCE
Status: COMPLETED | OUTPATIENT
Start: 2024-09-04 | End: 2024-09-04

## 2024-09-04 RX ORDER — HEPARIN SODIUM 1000 [USP'U]/ML
2000 INJECTION, SOLUTION INTRAVENOUS; SUBCUTANEOUS ONCE
Status: COMPLETED | OUTPATIENT
Start: 2024-09-04 | End: 2024-09-04

## 2024-09-04 RX ADMIN — CEFEPIME 1000 MG: 1 INJECTION, POWDER, FOR SOLUTION INTRAMUSCULAR; INTRAVENOUS at 20:00

## 2024-09-04 RX ADMIN — Medication 21 UNITS: at 10:46

## 2024-09-04 RX ADMIN — ALBUTEROL SULFATE 2.5 MG: 2.5 SOLUTION RESPIRATORY (INHALATION) at 14:20

## 2024-09-04 RX ADMIN — TRAZODONE HYDROCHLORIDE 200 MG: 100 TABLET ORAL at 21:05

## 2024-09-04 RX ADMIN — IPRATROPIUM BROMIDE 0.5 MG: 0.5 SOLUTION RESPIRATORY (INHALATION) at 14:19

## 2024-09-04 RX ADMIN — SEVELAMER HYDROCHLORIDE 1600 MG: 800 TABLET ORAL at 17:10

## 2024-09-04 RX ADMIN — FLUTICASONE FUROATE AND VILANTEROL TRIFENATATE 1 PUFF: 200; 25 POWDER RESPIRATORY (INHALATION) at 09:10

## 2024-09-04 RX ADMIN — HEPARIN SODIUM 5000 UNITS: 5000 INJECTION INTRAVENOUS; SUBCUTANEOUS at 17:10

## 2024-09-04 RX ADMIN — HEPARIN SODIUM 2000 UNITS: 1000 INJECTION INTRAVENOUS; SUBCUTANEOUS at 10:14

## 2024-09-04 RX ADMIN — SEVELAMER HYDROCHLORIDE 1600 MG: 800 TABLET ORAL at 12:09

## 2024-09-04 RX ADMIN — ALBUTEROL SULFATE 2 PUFF: 90 AEROSOL, METERED RESPIRATORY (INHALATION) at 09:10

## 2024-09-04 RX ADMIN — IPRATROPIUM BROMIDE 0.5 MG: 0.5 SOLUTION RESPIRATORY (INHALATION) at 07:10

## 2024-09-04 RX ADMIN — Medication 20 UNITS: at 10:38

## 2024-09-04 RX ADMIN — LAMOTRIGINE 100 MG: 100 TABLET ORAL at 21:04

## 2024-09-04 RX ADMIN — ALBUTEROL SULFATE 2.5 MG: 2.5 SOLUTION RESPIRATORY (INHALATION) at 07:10

## 2024-09-04 NOTE — ASSESSMENT & PLAN NOTE
Mild Bilateral wheezing was noted in the lower lung base upon initial presentation.  Does not require frequent inhaler use as an outpatient setting.  Low suspicion for exacerbation for the clinical symptoms presented during this hospital on admission    Plan  Continue albuterol-ipratropium nebs

## 2024-09-04 NOTE — QUICK NOTE
Discussed with Dr. Allison from Pulmonary service.  There is no immediate plan for bronchoscopy.    Will give pt with a brief course of iv cefepime for her possible Pseudomonas bronchitis to see whether her respiratory symptoms are improved with it.     Of note, pt's pcn allergy was hives only.

## 2024-09-04 NOTE — CASE MANAGEMENT
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) 2023     Asthma without acute exacerbation 2023     Asthma due to seasonal allergies 2023     Bipolar 1 disorder ([facility]) 2022     Primary hypertension 2022     Dyslipidemia 2022       LOS (days): 5  Geometric Mean LOS (GMLOS) (days): 4.4  Days to GMLOS:-0.3     OBJECTIVE:  Risk of Unplanned Readmission Score: 34.14         Current admission status: Inpatient   Preferred Pharmacy:   [facility]/pharmacy #[id number]  [place] [address] [address]  Phone: [phone] Fax: [phone]    [facility] - [place] [address] [address] [place] [address] [address]  [facility]  [address]  [facility] [address]  Phone: [phone] Fax: [phone]    Primary Care Provider: Meenakshi Balbuena MD    Primary Insurance: [employer] [facility]  Secondary Insurance: [facility]    DISCHARGE DETAILS:    Discharge planning discussed with:: Patient  Freedom of Choice: Yes  Comments - Mathias of Choice: [facility] vs [facility]     Were Treatment Team discharge recommendations reviewed with patient/caregiver?: Yes  Did patient/caregiver verbalize understanding of patient care needs?: N/A- going to facility  Were patient/caregiver advised of the risks associated with not following Treatment Team discharge recommendations?: Yes    Contacts  Patient Contacts: Ngozi  Contact Method: In Person  Reason/Outcome: Continuity of Care, Referral, Discharge Planning    Other Referral/Resources/Interventions Provided:  Interventions: Other (Specify), Short Term Rehab  Referral Comments: CM spoke with pt at bedside to f/u on dcp. CM reviewed STR options. Pt reported she does not have family to transport her to HD. Pt aware [facility] is able to accept with onsite HD and [facility] is still reviewing and will make final determination tomorrow. Pt reported preference for [facility], if [facility] is unable to accept pt agreeable to [facility]. CM will continue to follow.      Treatment Team Recommendation: Short Term Rehab  Discharge Destination Plan:: Short Term Rehab

## 2024-09-04 NOTE — PHYSICAL THERAPY NOTE
Physical Therapy Cancellation Note           09/04/24 1037   Note Type   Note type Cancelled Session   Cancel Reasons Patient off floor/hemodialysis   Additional Comments PT active on PT caseload. Pt currently receiving bed side HD. Will hold and f/u as appropriate.     Héctor Kaye PT

## 2024-09-04 NOTE — CASE MANAGEMENT
Case Management Progress Note    Patient name Ngozi Beard  Location S /S -01 MRN [id number]  : 1960 Date 2024       LOS (days): 5  Geometric Mean LOS (GMLOS) (days):   Days to GMLOS:        OBJECTIVE:        Current admission status: Inpatient  Preferred Pharmacy:   [facility]/pharmacy #[id number] - [place] - [address] [facility]  [address] [facility] [address]  Phone: [phone] Fax: [phone]    [facility] - [place] [address] [place] [address] [employer] [address] [address]  Phone: [phone] Fax: [phone]    Primary Care Provider: Meenakshi Balbuena MD    Primary Insurance: [employer] [facility]  Secondary Insurance: [facility]    PROGRESS NOTE:  Patient discussed at weekly LOS meeting held this date.  Assigned CM following for STR with OP dialysis.  Patient remains a medical hold at this time; referrals placed for STR with final determination pending. CM to continue to follow and assist with same.

## 2024-09-04 NOTE — PLAN OF CARE
Target UF Goal 3 L as tolerated. Patient dialyzing for 3 hours using sequential only. Treatment plan reviewed with Nephrology.     Problem: METABOLIC, FLUID AND ELECTROLYTES - ADULT  Goal: Electrolytes maintained within normal limits  Description: INTERVENTIONS:  - Monitor labs and assess patient for signs and symptoms of electrolyte imbalances  - Administer electrolyte replacement as ordered  - Monitor response to electrolyte replacements, including repeat lab results as appropriate  - Instruct patient on fluid and nutrition as appropriate  Outcome: Progressing  Goal: Fluid balance maintained  Description: INTERVENTIONS:  - Monitor labs   - Monitor I/O and WT  - Instruct patient on fluid and nutrition as appropriate  - Assess for signs & symptoms of volume excess or deficit  Outcome: Progressing

## 2024-09-04 NOTE — PLAN OF CARE
Problem: OCCUPATIONAL THERAPY ADULT  Goal: Performs self-care activities at highest level of function for planned discharge setting.  See evaluation for individualized goals.  Description: Treatment Interventions: ADL retraining, Functional transfer training, UE strengthening/ROM, Endurance training, Patient/family training, Equipment evaluation/education, Compensatory technique education, Energy conservation, Activityengagement          See flowsheet documentation for full assessment, interventions and recommendations.   Note: Limitation: Decreased ADL status, Decreased UE strength, Decreased Safe judgement during ADL, Decreased endurance, Decreased high-level ADLs, Decreased self-care trans (safety awareness, LE strength, balance)  Prognosis: Good      Patient is a 64 y.o. female seen for OT evaluation at [facility] following admission on 8/30/2024  s/p Shortness of breath. Please see above for comprehensive list of comorbidities and significant PMHx impacting functional performance.  Upon initial evaluation, pt appears to be performing below baseline functional status.   Occupational performance is affected by the following deficits: endurance ,  decreased muscular strength , decreased balance , decreased standing tolerance for self care tasks , decreased trunk control , decreased activity tolerance , and impaired safety awareness . Personal/Environmental factors impacting D/C include: steps to enter/navigate the home and Assistance needed for ADLs and functional mobility. Supporting factors include: attitude towards recovery Patient would benefit from OT services within the acute care setting to maximize level of functional independence in the following areas self-care transfers, functional mobility, and ADLs.  From OT standpoint, recommendation at time of D/C would be Level 2: moderate resource intensity .         Rehab Resource Intensity Level, OT: II (Moderate Resource Intensity)

## 2024-09-04 NOTE — ASSESSMENT & PLAN NOTE
INR 1.99  Unclear etiology. Possibly secondary to atovaquone use  Asymptomatic    Plan  Trend CMP  Consider RUQ US and acute hepatitis panel in the future

## 2024-09-04 NOTE — PROGRESS NOTES
[facility]  Progress Note  Name: Ngozi Beard I  MRN: [id number]  Unit/Bed#: S -01 I Date of Admission: 8/30/2024   Date of Service: 9/4/2024 I Hospital Day: 5    Assessment & Plan   Multifocal pneumonia  Assessment & Plan  -CT chest PE study revealed no evidence of PE. There is persistent tree-in-bud nodularity in the lungs suggestive of a widespread infectious bronchiolitis that may be secondary to an atypical mycobacterium. Follicular bronchiolitis and acute hypersensitivity pneumonitis could also be in the differential.  New mild patchy bilateral multifocal pneumonia  - Sputum culture: 4+ Pseudomonas   - ID: Suspect chronic process, hold antibiotics  - Cardiology: Cleared for bronchoscopy.    Plan:   - Pending bronchoscopy per Pulm scheduling.        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 S/p PLEX.  Patient is anuric and dialysis-dependent  Tues/Thurs/Sat schedule  Access: Right IJ PermCath, Cath reevaluation  by IR on 9/3, no issues during HD. 9/4, issues with clotting, will needs re-evaluation @9.5     Plan:  Hemodialysis scheduled on 9/5  Continue PTA cyclophosphamide  Continue PTA sevalemer  Continue PTA atovaquone for PJP prophylaxis  Continue prednisone taper from outpatient nephrology prior to this admission  20 mg once daily till September 6  15 mg starting September 7 to September 20  12.5 mg starting September 21 to October 4  10 mg starting October 5 to October 18  7.5 mg starting October 19 to November 2  5 mg daily starting November 3    * Shortness of breath  Assessment & Plan  CLINE: Negative PE, BNP 3019, CT chest-bilateral pleural effusion, tree-in-bud nodularity    Multifactorial, contributing factors of volume overload, chronic anemia, underlying COPD and asthma. Lower suspicion for PNA    Plan  Pulmonology is following, see  pna problem list    Cardiomyopathy (HCC)  Assessment & Plan  August 31-EF of 25% with global hypokinesis.  No prior echo for comparison.    Suspect nonischemic cardiomyopathy from inflammatory/rheumatologic etiology     GDMT recommended, pt started on Toprol 50 mg daily, Losartan 25 mg daily  9/4 - Nifedipine held per Cardiology  Will need Cardiac MRI   Ischemic work up with with left cardiac cath in o/p settings        Generalized weakness  Assessment & Plan  Multifactorial, respiratory failure secondary to chronic pulmonary infection, volume overload, chronic anemia, chronic deconditioning from being ill    Plan  PT/OT evaluation apprecaited    Elevated transaminase level  Assessment & Plan    INR 1.99  Unclear etiology. Possibly secondary to atovaquone use  Asymptomatic    Plan  Trend CMP  Consider RUQ US and acute hepatitis panel in the future    SIRS (systemic inflammatory response syndrome) ([facility])  Assessment & Plan  On admission, met SIRS criteria with leukocytosis and tachycardia  Procalcitonin 0.77 (in the setting of renal disease)  COVID/Flu/RSV negative  CXR - mildly pulmonary vascular congestion. No evidence of consolidation  CT chest revealed evidence of multifocal pneumonia    Multifactorial, steroid use, hypoxic respiratory failure, volume overload, chronic pulmonary infection    Plan  Monitor WBC and fever curve  1/2 Bc grew MRSA staph epidermidis, most likely contamination, follow second Bc  ID is on board, monitor off Abx    Asthma without acute exacerbation  Assessment & Plan  Mild Bilateral wheezing was noted in the lower lung base upon initial presentation.  Does not require frequent inhaler use as an outpatient setting.  Low suspicion for exacerbation for the clinical symptoms presented during this hospital on admission    Plan  Continue albuterol-ipratropium nebs    Anemia  Assessment & Plan  Recent Labs     09/02/24  0509 09/03/24  0510 09/04/24  0450   HGB 7.8* 8.2* 8.4*      Baseline Hgb  7-8  Etiology: component of iron deficiency and renal disease.     Plan  Monitor Hgb, transfuse for < 7  Continue PTA Ferrex    Dyslipidemia  Assessment & Plan  Continue PTA Lipitor    Primary hypertension  Assessment & Plan  Continue PTA nifedipine 60 mg daily  Start Losartan 25 mg daily  Start Toprol 25 mg daily    Bipolar 1 disorder (HCC)  Assessment & Plan  Continue PTA Lamictal, venlafaxine (150 mg and 75 mg daily in the morning), and trazodone               VTE Pharmacologic Prophylaxis: VTE Score: 7 High Risk (Score >/= 5) - Pharmacological DVT Prophylaxis Ordered: heparin. Sequential Compression Devices Ordered.    Mobility:   Basic Mobility Inpatient Raw Score: 16  JH-HLM Goal: 5: Stand one or more mins  JH-HLM Achieved: 6: Walk 10 steps or more  JH-HLM Goal achieved. Continue to encourage appropriate mobility.    Patient Centered Rounds: I performed bedside rounds with nursing staff today.  Discussions with Specialists or Other Care Team Provider: Nursing/cardiology/pulmonology/ID/nephrology    Education and Discussions with Family / Patient: Updated  (sister) via phone.    Current Length of Stay: 5 day(s)  Current Patient Status: Inpatient   Discharge Plan: Anticipate discharge in 48-72 hrs to rehab facility.    Code Status: Level 1 - Full Code    Subjective:   No acute events overnight.    Endorses having chest tightness, localizes in the sternum, reproducible without radiation, exacerbates with inspiration    Endorses having left ankle pain.    Improvement in shortness of breath, no fever, chills, abdominal pain,    Objective:     Vitals:   Temp (24hrs), Av.1 °F (36.7 °C), Min:97.6 °F (36.4 °C), Max:98.8 °F (37.1 °C)    Temp:  [97.6 °F (36.4 °C)-98.8 °F (37.1 °C)] 98.3 °F (36.8 °C)  HR:  [] 110  Resp:  [17-20] 19  BP: (106-138)/(72-90) 108/75  SpO2:  [86 %-99 %] 99 %  Body mass index is 40.93 kg/m².     Input and Output Summary (last 24 hours):     Intake/Output Summary (Last 24  hours) at 9/4/2024 1510  Last data filed at 9/4/2024 1030  Gross per 24 hour   Intake 1200 ml   Output 1295 ml   Net -95 ml       Physical Exam:   Physical Exam  Constitutional:       Appearance: She is ill-appearing.   Neck:      Comments: No JVD  Cardiovascular:      Rate and Rhythm: Normal rate and regular rhythm.   Pulmonary:      Effort: Pulmonary effort is normal.      Breath sounds: Normal breath sounds.   Abdominal:      General: Bowel sounds are normal.      Palpations: Abdomen is soft.   Musculoskeletal:      Comments: Tenderness in left lower leg, above ankle  Joints nonerythematous, full range of motion   Skin:     General: Skin is warm.   Neurological:      Mental Status: She is alert.          Additional Data:     Labs:  Results from last 7 days   Lab Units 09/04/24  0450 09/03/24  0510 09/02/24  0509   WBC Thousand/uL 9.89   < > 12.88*   HEMOGLOBIN g/dL 8.4*   < > 7.8*   HEMATOCRIT % 27.5*   < > 25.4*   PLATELETS Thousands/uL 238   < > 244   BANDS PCT %  --   --  4   LYMPHO PCT %  --   --  13*   MONO PCT %  --   --  3*   EOS PCT %  --   --  0    < > = values in this interval not displayed.     Results from last 7 days   Lab Units 09/04/24  0450 09/02/24  0509 09/01/24  0521   SODIUM mmol/L 139   < > 137   POTASSIUM mmol/L 4.0   < > 4.4   CHLORIDE mmol/L 97   < > 98   CO2 mmol/L 34*   < > 28   BUN mg/dL 31*   < > 40*   CREATININE mg/dL 6.16*   < > 6.86*   ANION GAP mmol/L 8   < > 11   CALCIUM mg/dL 8.8   < > 9.6   ALBUMIN g/dL  --   --  3.7   TOTAL BILIRUBIN mg/dL  --   --  0.44   ALK PHOS U/L  --   --  97   ALT U/L  --   --  61*   AST U/L  --   --  31   GLUCOSE RANDOM mg/dL 84   < > 107    < > = values in this interval not displayed.     Results from last 7 days   Lab Units 08/30/24 2025   INR  1.99*             Results from last 7 days   Lab Units 08/31/24  0443 08/30/24 2025   PROCALCITONIN ng/ml 1.00* 0.77*       Lines/Drains:  Invasive Devices       Peripheral Intravenous Line  Duration              Peripheral IV 09/03/24 Left Wrist 1 day              Hemodialysis Catheter  Duration             HD Permanent Double Catheter 1 day                      Telemetry:  Telemetry Orders (From admission, onward)               24 Hour Telemetry Monitoring  Continuous x 24 Hours (Telem)        Expiring   Question:  Reason for 24 Hour Telemetry  Answer:  Decompensated CHF- and any one of the following: continuous diuretic infusion or total diuretic dose >200 mg daily, associated electrolyte derangement (I.e. K < 3.0), ionotropic drip (continuous infusion), hx of ventricular arrhythmia, or new EF < 35%                     Telemetry Reviewed: Normal Sinus Rhythm  Indication for Continued Telemetry Use: Acute CHF on >200 mg lasix/day or equivalent dose or with new reduced EF.              Imaging: Reviewed radiology reports from this admission including: all since admission    Recent Cultures (last 7 days):   Results from last 7 days   Lab Units 09/02/24  1804 08/30/24 2039   BLOOD CULTURE   --  No Growth After 4 Days.  Staphylococcus epidermidis*   SPUTUM CULTURE  4+ Growth of Pseudomonas aeruginosa*  --    GRAM STAIN RESULT  Rare Epithelial cells per low power field*  No polys seen*  Rare Gram positive cocci in pairs*  Rare Gram variable rods* Gram positive cocci in clusters*       Last 24 Hours Medication List:   Current Facility-Administered Medications   Medication Dose Route Frequency Provider Last Rate    acetaminophen  650 mg Oral Q6H PRN Kely Robert MD      albuterol  2 puff Inhalation Q4H PRN Kely Robert MD      albuterol  2.5 mg Nebulization TID Todd Dickens MD      atorvastatin  20 mg Oral Daily Kely Robert MD      atovaquone  1,500 mg Oral Daily Trish Kline MD      cyclophosphamide  100 mg Oral Daily Kely Robert MD      dextromethorphan-guaiFENesin  10 mL Oral Q4H PRN Kely Robert MD      epoetin shavonne  6,000 Units Intravenous After Dialysis Tirso Montez MD       fluticasone-vilanterol  1 puff Inhalation Daily Robert Gilbert MD      heparin (porcine)  5,000 Units Subcutaneous Q8H [facility] Kely Robert MD      ipratropium  0.5 mg Nebulization TID Todd Dickens MD      iron polysaccharides  150 mg Oral Daily Kely Robert MD      lamoTRIgine  100 mg Oral HS Kely Robert MD      lidocaine  1 patch Topical Daily Trish Kline MD      losartan  25 mg Oral Daily Ty Reese MD      [START ON 9/5/2024] metoprolol succinate  50 mg Oral Daily Gerber Robbins MD      montelukast  10 mg Oral Daily Kely Robert MD      pantoprazole  40 mg Oral Daily Before Breakfast Kely Robert MD      predniSONE  20 mg Oral Daily Gerber Kim Jr., DO      sevelamer  1,600 mg Oral TID With Meals Kely Robert MD      Sodium Zirconium Cyclosilicate  10 g Oral Daily Trish Kline MD      traZODone  200 mg Oral HS Kely Robert MD      trimethobenzamide  200 mg Intramuscular Q6H PRN Kely Robert MD      venlafaxine  225 mg Oral Daily Kely Robert MD          Today, Patient Was Seen By: Kathryn Cleveland MD    **Please Note: This note may have been constructed using a voice recognition system.**

## 2024-09-04 NOTE — ASSESSMENT & PLAN NOTE
August 31-EF of 25% with global hypokinesis.  No prior echo for comparison.    Suspect nonischemic cardiomyopathy from inflammatory/rheumatologic etiology     GDMT recommended, pt started on Toprol 50 mg daily, Losartan 25 mg daily  9/4 - Nifedipine held per Cardiology  Will need Cardiac MRI   Ischemic work up with with left cardiac cath in o/p settings

## 2024-09-04 NOTE — OCCUPATIONAL THERAPY NOTE
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)    SIRS (systemic inflammatory response syndrome) (HCC)    Elevated transaminase level    Generalized weakness    Multifocal pneumonia    Cardiomyopathy (HCC)    Past Medical History  Past Medical History:   Diagnosis Date    Asthma     Chronic pain     Hypertension     Renal disorder     Sepsis (HCC) 07/10/2024     Past Surgical History  Past Surgical History:   Procedure Laterality Date    BACK SURGERY      COLONOSCOPY      IR BIOPSY KIDNEY RANDOM  7/17/2024    IR TEMPORARY DIALYSIS CATHETER PLACEMENT  7/15/2024    IR TUNNELED DIALYSIS CATHETER CHECK/CHANGE/REPOSITION/ANGIOPLASTY  8/12/2024    IR TUNNELED DIALYSIS CATHETER CHECK/CHANGE/REPOSITION/ANGIOPLASTY  9/3/2024    IR TUNNELED DIALYSIS CATHETER PLACEMENT  7/22/2024    TUBAL LIGATION             09/04/24 1334   OT Last Visit   OT Visit Date 09/04/24   Note Type   Note type Evaluation   Pain Assessment   Pain Assessment Tool 0-10   Pain Score No Pain  (\"just dizzy\")   Restrictions/Precautions   Weight Bearing Precautions Per Order No   Other Precautions Chair Alarm;Bed Alarm;Fall Risk;Multiple lines  (HD port)   Home Living   Type of Home Apartment   Home Layout One level;Performs ADLs on one level;Able to live on main level with bedroom/bathroom;Stairs to enter with rails  (3+1 DILLAN, requires A for stair negotiation to/from appointments)   Bathroom Shower/Tub Tub/shower unit   Bathroom Toilet Standard   Bathroom Equipment   (none)   Home Equipment Cane   Additional Comments mod (I) c SPC at baseline, but pt reports feeling as though she could use more support   Prior Function   Level of [place] Independent with " "ADLs;Independent with functional mobility;Needs assistance with IADLS   Lives With Other (Comment)  (roommate)   Receives Help From Family  (sister, staciemmate. Pt reports sister is looking for ALFs for pt to move to)   IADLs   (Uses [employer] van. roomate gets Rx, sister takes pt to grocery store.)   Falls in the last 6 months 0   Vocational Retired   Comments Pt utilizes [employer] van to/from HD appointments. Reports will use electric scooters at stores when she is out, but primarily is home, limited to household distances d/t SOB.   Lifestyle   Autonomy PTA, pt is (I) c ADLs, A with IADLs, mod (I) c SPC. lives c roommate. HD 3x/week c [employer] van. (-) fals   Reciprocal Relationships sister, roommate   Service to Others retired   Intrinsic Gratification reports sleeping often, \"I dont do much\"   General   Additional Pertinent History Pt admitted d/t SOB, findings of tree in bed bronchiolitis, new onset cardiomyopathy, PNA vs pneumonitis. Complicated by generalized weakness, SIRS. Hx of T1 bipolar and schizoaffective disorder, ESRD on HD   Family/Caregiver Present No   Subjective   Subjective Pt reporting dizziness and fatigue. \"I feel so weak\"   ADL   Where Assessed Edge of bed   Eating Assistance 6  Modified independent   Eating Deficit   (finishing meal tray up arriva;l)   Grooming Assistance 5  Supervision/Setup   Grooming Deficit   (in seated position)   UB Bathing Assistance 5  Supervision/Setup   LB Bathing Assistance 4  Minimal Assistance   UB Dressing Assistance 5  Supervision/Setup   LB Dressing Assistance 3  Moderate Assistance   LB Dressing Deficit   (donned B socks in side sitting position at EOB to compensate for limited functional reach. Declines changing underwear)   Toileting Assistance  3  Moderate Assistance   Functional Assistance 4  Minimal Assistance   Additional Comments Pt overall requiring periodic rest breaks during LB ADLs d/t c/o dizziness.   Bed Mobility   Supine to Sit 5  Supervision   Additional " "items Increased time required   Sit to Supine   (seated OOB in recliner at end of session)   Additional Comments Reports persistent dizziness throughout session, non progressing. BP at /75, BP following mobility 131/75   Transfers   Sit to Stand 4  Minimal assistance   Additional items Assist x 1;Increased time required;Verbal cues   Stand to Sit 4  Minimal assistance   Additional items Assist x 1;Impulsive;Verbal cues  (uncontrolled descent to recliner d/t fatigue)   Additional Comments cueing for hand placements during transfer, pt attempts to pull on RW. Increased cueing for application   Functional Mobility   Functional Mobility 4  Minimal assistance   Additional Comments limited to household distance within room, pt with persistent complain of dizziness and \"shaky legs\" d/t weakness. /75 after mobility. Declines further mobility.   Additional items Rolling walker   Balance   Static Sitting Fair +   Dynamic Sitting Fair   Static Standing Fair -   Dynamic Standing Poor +   Activity Tolerance   Activity Tolerance Patient limited by fatigue   Nurse Made Aware Spoke with WANG Wagner prior to session   RUE Assessment   RUE Assessment WFL  (grossly 3+/5 MMT throughout)   LUE Assessment   LUE Assessment WFL  (grossly 3+/5 MMT throughout)   Hand Function   Gross Motor Coordination Functional   Fine Motor Coordination Functional   Sensation   Light Touch No apparent deficits   Vision-Basic Assessment   Current Vision Wears glasses only for reading   Cognition   Overall Cognitive Status WFL   Arousal/Participation Alert;Cooperative   Attention Within functional limits   Orientation Level Oriented to person;Oriented to place;Oriented to situation   Memory Decreased recall of recent events;Decreased recall of precautions   Following Commands Follows one step commands with increased time or repetition   Comments Pt agreeable to OT session. Intermittently frustrated during session re: dizziness, functional status " and weakness. Pt appears to benefit well  from emotional support and supportive listening   Assessment   Limitation Decreased ADL status;Decreased UE strength;Decreased Safe judgement during ADL;Decreased endurance;Decreased high-level ADLs;Decreased self-care trans  (safety awareness, LE strength, balance)   Assessment Patient is a 64 y.o. female seen for OT evaluation at [facility] following admission on 8/30/2024  s/p Shortness of breath. Please see above for comprehensive list of comorbidities and significant PMHx impacting functional performance.  Upon initial evaluation, pt appears to be performing below baseline functional status.   Occupational performance is affected by the following deficits: endurance ,  decreased muscular strength , decreased balance , decreased standing tolerance for self care tasks , decreased trunk control , decreased activity tolerance , and impaired safety awareness . Personal/Environmental factors impacting D/C include: steps to enter/navigate the home and Assistance needed for ADLs and functional mobility. Supporting factors include: attitude towards recovery Patient would benefit from OT services within the acute care setting to maximize level of functional independence in the following areas self-care transfers, functional mobility, and ADLs.  From OT standpoint, recommendation at time of D/C would be Level 2: moderate resource intensity .          Prognosis Good       Goals   Patient Goals to feel stronger, agreeable to rehab   Plan   Treatment Interventions ADL retraining;Functional transfer training;UE strengthening/ROM;Endurance training;Patient/family training;Equipment evaluation/education;Compensatory technique education;Energy conservation;Activityengagement   Goal Expiration Date 09/14/24   OT Treatment Day 0   OT Frequency 3-5x/wk   Discharge Recommendation   Rehab Resource Intensity Level, OT II (Moderate Resource Intensity)   AM-PAC Daily Activity  Inpatient   Lower Body Dressing 2   Bathing 3   Toileting 2   Upper Body Dressing 3   Grooming 3   Eating 4   Daily Activity Raw Score 17   Daily Activity Standardized Score (Calc for Raw Score >=11) 37.26   AM-PAC Applied Cognition Inpatient   Following a Speech/Presentation 3   Understanding Ordinary Conversation 4   Taking Medications 2   Remembering Where Things Are Placed or Put Away 3   Remembering List of 4-5 Errands 3   Taking Care of Complicated Tasks 2   Applied Cognition Raw Score 17   Applied Cognition Standardized Score 36.52   End of Consult   Education Provided Yes   Patient Position at End of Consult Bedside chair;Bed/Chair alarm activated;All needs within reach   Nurse Communication Nurse aware of consult   Goals established on initial evaluation in order to achieve pt's goal of feeling stronger     Pt will complete UB ADLs Mod independent   for increased ADL independence within 10 days.     Pt will complete LB ADLs Mod independent   for increased ADL independence within 10 days.     Pt will complete toileting Supervision  in bathroom for increased ADL independence within 10 days.     Pt will demonstrate proper body mechanics to complete self-care transfers and household distance functional mobility with  Supervision and use of LRAD for increased safety and functional independence within 10 days.     Pt will demonstrate standing tolerance of 4 min with for increased activity tolerance during ADL/IADL tasks within 10 days.     Pt will complete bed mobility Mod independent  for increased independence in repositioning, pressure offloading, and managing comfort.     Pt will demonstrate proper body mechanics and fall prevention strategies during 100% of tx sessions for increased safety awareness during ADL/IADLs    Pt will demonstrate activity tolerance of 30 min in therapeutic tasks for increased participation in meaningful activities upon D/C.    Pt will participate in ongoing cognitive assessments to  assist with safe D/C planning and supervision/assistance recommendations.     Pt will demonstrate OOB sitting tolerance of 2-4 hr/day for increased activity tolerance and engagement in leisure activities within 10 days.     Margie Shipman, OT

## 2024-09-04 NOTE — PROGRESS NOTES
"Cardiology Progress Note - Ngozi Beard 64 y.o. 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  Certainly there was the evidence found within the last month of anti-GBM vasculitis and a \"tree-in-bud\" bronchiolitis however more recently I do agree that volume overload is been playing a role with her effusions and now new onset cardiomyopathy.  Volume management per hemodialysis which they are trying to make her more net negative for pulmonary to perform a bronchoscopy.    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  Certainly there is some risk of anesthesia but given an overall low risk procedure from a cardiac standpoint she can proceed.  We will follow her through this process.    New onset cardiomyopathy - Unclear etiology but likely nonischemic and could be rheumatologic/inflammatory driven.  She will require an eventual ischemic workup, which can likely be done as an outpatient.  Would also recommend a cardiac MRI.  Suggest discontinuing nifedipine and tailor her hypertensive therapy to her cardiomyopathy.  I will uptitrate Toprol-XL to 50 mg daily.  On low-dose losartan.    Subjective:   Patient seen and examined.  No significant events overnight.  Patient states her breathing has improved, but is still present to some degree.    Objective:     Vitals: " "Blood pressure 106/72, pulse 88, temperature 98.3 °F (36.8 °C), temperature source Oral, resp. rate 19, height 5' 3\" (1.6 m), weight 105 kg (231 lb 0.7 oz), SpO2 92%., Body mass index is 40.93 kg/m².,   Orthostatic Blood Pressures      Flowsheet Row Most Recent Value   Blood Pressure 106/72 filed at 09/04/2024 1030   Patient Position - Orthostatic VS Lying filed at 09/04/2024 1030              Intake/Output Summary (Last 24 hours) at 9/4/2024 1308  Last data filed at 9/4/2024 1030  Gross per 24 hour   Intake 1500 ml   Output 3795 ml   Net -2295 ml       No significant arrhythmias seen on telemetry review.       Physical Exam:    GEN: Ngozi Beard appears well, alert and oriented x 3, pleasant and cooperative   HEENT: pupils equal, round, and reactive to light; extraocular muscles intact  NECK: supple, no carotid bruits   HEART: regular rhythm, Tachy S1 and S2, no murmurs, clicks, gallops or rubs   LUNGS: Diminished bilaterally; no wheezes, rales, or rhonchi   ABDOMEN: normal bowel sounds, soft, no tenderness, no distention  EXTREMITIES: peripheral pulses normal; no clubbing, cyanosis.  + Edema with pitting and nonpitting components  NEURO: no focal findings   SKIN: normal without suspicious lesions on exposed skin    Medications:      Current Facility-Administered Medications:     acetaminophen (TYLENOL) tablet 650 mg, 650 mg, Oral, Q6H PRN, Kely Robert MD    albuterol (PROVENTIL HFA,VENTOLIN HFA) inhaler 2 puff, 2 puff, Inhalation, Q4H PRN, Kely Robert MD, 2 puff at 09/04/24 0910    albuterol inhalation solution 2.5 mg, 2.5 mg, Nebulization, TID, Todd Dickens MD, 2.5 mg at 09/04/24 0710    atorvastatin (LIPITOR) tablet 20 mg, 20 mg, Oral, Daily, Kely Robert MD, 20 mg at 09/02/24 0931    atovaquone (MEPRON) oral suspension 1,500 mg, 1,500 mg, Oral, Daily, Trish Kline MD, 1,500 mg at 09/02/24 0937    cyclophosphamide (CYTOXAN) capsule 100 mg, 100 mg, Oral, Daily, Kely Robert MD, " 100 mg at 09/02/24 0938    dextromethorphan-guaiFENesin (ROBITUSSIN DM) oral syrup 10 mL, 10 mL, Oral, Q4H PRN, Kely Robert MD, 10 mL at 09/01/24 2142    epoetin shavonne (EPOGEN,PROCRIT) injection 6,000 Units, 6,000 Units, Intravenous, After Dialysis, Tirso Montez MD, 6,000 Units at 09/03/24 1416    fluticasone-vilanterol 200-25 mcg/actuation 1 puff, 1 puff, Inhalation, Daily, Robert Gilbert MD, 1 puff at 09/04/24 0910    heparin (porcine) subcutaneous injection 5,000 Units, 5,000 Units, Subcutaneous, Q8H JACK, Kely Robert MD, 5,000 Units at 09/03/24 1718    ipratropium (ATROVENT) 0.02 % inhalation solution 0.5 mg, 0.5 mg, Nebulization, TID, Todd Dickens MD, 0.5 mg at 09/04/24 0710    iron polysaccharides (FERREX) capsule 150 mg, 150 mg, Oral, Daily, Kely Robert MD, 150 mg at 09/02/24 0931    lamoTRIgine (LaMICtal) tablet 100 mg, 100 mg, Oral, HS, Kely Robert MD, 100 mg at 09/03/24 2105    lidocaine (LIDODERM) 5 % patch 1 patch, 1 patch, Topical, Daily, Trish Kline MD, 1 patch at 09/03/24 1120    losartan (COZAAR) tablet 25 mg, 25 mg, Oral, Daily, Ty Reese MD, 25 mg at 09/03/24 1543    metoprolol succinate (TOPROL-XL) 24 hr tablet 25 mg, 25 mg, Oral, Daily, JEB Wan, 25 mg at 09/02/24 1227    montelukast (SINGULAIR) tablet 10 mg, 10 mg, Oral, Daily, Kely Robert MD, 10 mg at 09/02/24 0931    NIFEdipine (PROCARDIA XL) 24 hr tablet 60 mg, 60 mg, Oral, Daily, Kely Robert MD, 60 mg at 09/02/24 0932    pantoprazole (PROTONIX) EC tablet 40 mg, 40 mg, Oral, Daily Before Breakfast, Kely Robert MD, 40 mg at 09/02/24 0932    predniSONE tablet 20 mg, 20 mg, Oral, Daily, Gerber Kim Jr., DO, 20 mg at 09/02/24 0931    sevelamer (RENAGEL) tablet 1,600 mg, 1,600 mg, Oral, TID With Meals, Kely Robert MD, 1,600 mg at 09/04/24 1209    Sodium Zirconium Cyclosilicate (Lokelma) 10 g, 10 g, Oral, Daily, Trish Kline MD, 10 g at 09/01/24 0811     traZODone (DESYREL) tablet 200 mg, 200 mg, Oral, HS, Kely Robert MD, 200 mg at 09/03/24 2105    trimethobenzamide (TIGAN) IM injection 200 mg, 200 mg, Intramuscular, Q6H PRN, Kely Robert MD, 200 mg at 08/30/24 2208    venlafaxine (EFFEXOR-XR) 24 hr capsule 225 mg, 225 mg, Oral, Daily, Kely Robert MD, 225 mg at 09/02/24 0929     Labs & Results:        Results from last 7 days   Lab Units 09/04/24 0450 09/03/24  0510 09/02/24  0509   WBC Thousand/uL 9.89 12.39* 12.88*   HEMOGLOBIN g/dL 8.4* 8.2* 7.8*   HEMATOCRIT % 27.5* 27.0* 25.4*   PLATELETS Thousands/uL 238 266 244         Results from last 7 days   Lab Units 09/04/24  0450 09/03/24  0510 09/02/24  0509 09/01/24 0521 08/31/24 0443 08/31/24 0033 08/30/24 2025   POTASSIUM mmol/L 4.0 4.8 5.2 4.4 5.6*   < > 5.7*   CHLORIDE mmol/L 97 100 100 98 102  --  102   CO2 mmol/L 34* 26 28 28 25  --  22   BUN mg/dL 31* 62* 50* 40* 51*  --  44*   CREATININE mg/dL 6.16* 9.85* 8.64* 6.86* 8.68*  --  8.11*   CALCIUM mg/dL 8.8 9.5 9.5 9.6 9.6  --  9.8   ALK PHOS U/L  --   --   --  97 100  --  106*   ALT U/L  --   --   --  61* 59*  --  60*   AST U/L  --   --   --  31 57*  --  65*    < > = values in this interval not displayed.     Results from last 7 days   Lab Units 08/30/24 2025   INR  1.99*   PTT seconds >210*     Results from last 7 days   Lab Units 09/01/24 0521 08/31/24 0443 08/30/24 2025   MAGNESIUM mg/dL 2.1 2.3 2.1         EKG personally reviewed by Gerber Robbins MD. Sinus tachycardia with nonspecific interventricular conduction block.    ECHO:    Left Ventricle: Left ventricular cavity size is mildly dilated. Wall thickness is mildly increased. The left ventricular ejection fraction is 25%. Systolic function is severely reduced. There is severe global hypokinesis.    Right Ventricle: Right ventricular cavity size is mildly dilated. Systolic function is mildly reduced.    Left Atrium: The atrium is mildly dilated.    Right Atrium: The atrium is mildly dilated.     Aortic Valve: There is mild regurgitation.    Mitral Valve: There is moderate to severe regurgitation.    Tricuspid Valve: There is moderate regurgitation. The right ventricular systolic pressure is moderately elevated. The estimated right ventricular systolic pressure is 48.00 mmHg.    Pericardium: There is a small pericardial effusion anterior to the heart.    Counseling / Coordination of Care  Total floor / unit time spent today 25 minutes.  Greater than 50% of total time was spent with the patient and / or family counseling and / or coordination of care.

## 2024-09-04 NOTE — HEMODIALYSIS
Post-Dialysis RN Treatment Note    Blood Pressure:  Pre 138/81 mm/Hg  Post 106/72 mmHg   EDW  111.5 kg    Weight:  Pre 105.4 kg   Post 104.8 kg   Mode of weight measurement: Standing Scale   Volume Removed  1295 ml    Treatment duration  1 hour and 45 minutes   NS given  Yes, to return blood from clotted system    Treatment shortened? Yes, describe: due to high pressure readings    Medications given during Rx Heparin 2000 U IVP at start of 2nd circuit and then Heparin lock x 2 to bilateral catheter limbs   Estimated Kt/V  Not Applicable   Access type: Permacath/TDC   Access Issues: Yes, describe: Unable to maintain good BFR even with reversing lines, removing tegios and 2nd circuit with heparin IVP given at start of treatment on second circuit     Verbal Report to primary nurse   Yes  Laura PIÑA

## 2024-09-04 NOTE — ASSESSMENT & PLAN NOTE
Recent Labs     09/02/24  0509 09/03/24  0510 09/04/24  0450   HGB 7.8* 8.2* 8.4*      Baseline Hgb 7-8  Etiology: component of iron deficiency and renal disease.     Plan  Monitor Hgb, transfuse for < 7  Continue PTA Ferrex

## 2024-09-04 NOTE — PROGRESS NOTES
Progress Note - Pulmonary   Ngozi Beard 64 y.o. female MRN: [id number]  Unit/Bed#: S -01 Encounter: [id number]      Assessment/Plan:  Patient is a 64-year-old female with past medical history of moderate persistent asthma, pulmonary hypertension, recently diagnosed anti-GBM disease, and ESRD on HD who presents for worsening shortness of breath.     #Acute hypoxic respiratory insufficiency  #Abnormal CT chest with diffuse tree-in-bud nodularities, patchy airspace consolidation in RUL, RLL and LLL, moderate bilateral pleural effusions  #New congestive heart failure with reduced EF of 25%  #Anti-GBM syndrome  #ESRD on HD  #Moderate persistent asthma without acute exacerbation  #Pulmonary Hypertension     Patient had a repeat CT chest with persistent tree-in-bud nodularity in the lungs, patchy airspace consolidation in the right upper and lower lobes and anterior basilar segment left lower lobe, and new moderate bilateral pleural effusions. This looks improved from her previous CT scan in terms of her bronchiolitis however the bilateral pleural effusions are new. Given her newly diagnosed renal failure and newly reduced ejection fraction, it is likely that patient's shortness of breath and CT scan findings are due to volume overload.     After discussion with multidisciplinary team, would like to try volume removal prior to moving forward with bronchoscopy, especially given severely reduced EF. Would need cardiac clearance for anesthesia for bronchoscopy.     Plan:  -Plan for additional HD session this morning with volume removal.  -Repeat chest x-ray following HD to determine if effusions have resolved  -If patient is not clinically improved following fluid removal, will recommend repeat bronchoscopy later this week to evaluate for diffuse alveolar hemorrhage.   -Continue to monitor O2 saturations and maintain O2 saturation >89%  -Recommend cardiomyopathy evaluation with cardiology and likely ischemic  "workup.   -Continue albuterol/atrovent nebs TID, breo daily (in place of home advair), singulair daily  -Resume home regimen of advair 2 puffs twice daily, singulair daily, albuterol inhaler prn  -Continue to encourage incentive spirometry, OOB as tolerated    Subjective:   Patient examined at bedside this morning. She was receiving HD at bedside. She was off supplemental oxygen saturating 90-93% on room air. She reports some subjective shortness of breath and thinks that it is due to her inhalers.     Objective:    Vitals: Blood pressure 134/90, pulse 96, temperature 98.8 °F (37.1 °C), temperature source Oral, resp. rate 20, height 5' 3\" (1.6 m), weight 105 kg (232 lb 5.8 oz), SpO2 93%., RA, Body mass index is 41.16 kg/m².      Intake/Output Summary (Last 24 hours) at 9/4/2024 0841  Last data filed at 9/4/2024 0201  Gross per 24 hour   Intake 680 ml   Output 2500 ml   Net -1820 ml       Physical Exam  Vitals and nursing note reviewed.   Constitutional:       General: She is not in acute distress.     Appearance: She is obese.   HENT:      Head: Normocephalic and atraumatic.      Nose: Nose normal.      Mouth/Throat:      Pharynx: Oropharynx is clear.   Eyes:      Conjunctiva/sclera: Conjunctivae normal.   Cardiovascular:      Rate and Rhythm: Normal rate.   Pulmonary:      Effort: Pulmonary effort is normal. No respiratory distress.      Breath sounds: Decreased breath sounds and rhonchi (diffuse bilaterally) present. No wheezing.   Abdominal:      Palpations: Abdomen is soft.   Musculoskeletal:         General: No swelling. Normal range of motion.      Cervical back: Normal range of motion and neck supple.   Skin:     General: Skin is warm and dry.      Capillary Refill: Capillary refill takes less than 2 seconds.   Neurological:      General: No focal deficit present.      Mental Status: She is alert and oriented to person, place, and time.   Psychiatric:         Mood and Affect: Mood normal.     Labs:   Results " "from last 7 days   Lab Units 09/04/24 0450 09/03/24 0510 09/02/24 0509 09/01/24 0521 08/31/24 0755 08/31/24 0443   WBC Thousand/uL 9.89 12.39* 12.88* 19.55*  --  10.64*   HEMOGLOBIN g/dL 8.4* 8.2* 7.8* 7.9*   < > 6.7*   HEMATOCRIT % 27.5* 27.0* 25.4* 24.6*   < > 22.1*   PLATELETS Thousands/uL 238 266 244 243  --  245   MONO PCT %  --   --  3* 3*  --  4   EOS PCT %  --   --  0 0  --  0    < > = values in this interval not displayed.      Results from last 7 days   Lab Units 09/04/24 0450 09/03/24 0510 09/02/24 0509 09/01/24 0521 08/31/24 0443 08/31/24 0033 08/30/24 2025   POTASSIUM mmol/L 4.0 4.8 5.2 4.4 5.6*   < > 5.7*   CHLORIDE mmol/L 97 100 100 98 102  --  102   CO2 mmol/L 34* 26 28 28 25  --  22   BUN mg/dL 31* 62* 50* 40* 51*  --  44*   CREATININE mg/dL 6.16* 9.85* 8.64* 6.86* 8.68*  --  8.11*   CALCIUM mg/dL 8.8 9.5 9.5 9.6 9.6  --  9.8   ALK PHOS U/L  --   --   --  97 100  --  106*   ALT U/L  --   --   --  61* 59*  --  60*   AST U/L  --   --   --  31 57*  --  65*    < > = values in this interval not displayed.     Results from last 7 days   Lab Units 09/01/24 0521 08/31/24 0443 08/30/24 2025   MAGNESIUM mg/dL 2.1 2.3 2.1     Results from last 7 days   Lab Units 09/01/24 0521 08/31/24 0443 08/30/24 2025   PHOSPHORUS mg/dL 4.7* 5.8* 6.1*      Results from last 7 days   Lab Units 08/30/24 2025   INR  1.99*   PTT seconds >210*         No results found for: \"TROPONINI\"    Procalcitonin: 1.00, 0.77     Flu/COVID/RSV PCR: Negative     Culture Data: MRSA negative, BC 1/2 + MRSE     Urinary antigens: Need collected      D-Dimer: 2.18     BNP: 3019    Microbiology:  Microbiology Results (last 21 days)       Collected Updated Procedure Result Status Patient Facility Result Comment      09/02/2024 1804 09/03/2024 0754 Sputum culture and Gram stain [[id number]]   (Abnormal)   Expectorated Sputum    [facility]  Component Value   Gram Stain Result Rare " Epithelial cells per low power field  [P]     No polys seen  [P]     Rare Gram positive cocci in pairs  [P]     Rare Gram variable rods  [P]                09/02/2024 1427 09/03/2024 0000 Respiratory Panel 2.1(RP2)with COVID19 [[id number]]   Nasopharyngeal Swab    Final result [facility]  Component Value   Adenovirus Not Detected   Bordetella parapertussis Not Detected   Bordetella pertussis Not Detected   Chlamydia pneumoniae Not Detected   SARS-CoV-2 Not Detected   Coronavirus 229E Not Detected   Coronavirus HKU1 Not Detected   Coronavirus NL63 Not Detected   Coronavirus OC43 Not Detected   Human Metapneumovirus Not Detected   Rhino/Enterovirus Not Detected   Influenza A Not Detected   Influenza B Not Detected   Mycoplasma pneumoniae Not Detected   Parainfluenza 1 Not Detected   Parainfluenza 2 Not Detected   Parainfluenza 3 Not Detected   Parainfluenza 4 Not Detected   Respiratory Syncytial Virus Not Detected            08/31/2024 0043 09/01/2024 1005 MRSA culture [[id number]]   Nares from Nose    Final result [facility]  Component Value   MRSA Culture Only No Methicillin Resistant Staphlyococcus aureus (MRSA) isolated               08/30/2024 2039 09/02/2024 2301 Blood culture #1 [[id number]]   Blood from Line, Venous    Preliminary result [facility]  Component Value   Blood Culture No Growth at 72 hrs.  [P]                08/30/2024 2039 09/02/2024 0911 Blood culture #2 [[id number]]    (Abnormal)   Blood from Arm, Right    Final result [facility] Susceptibility testing will not be performed as this organism, when isolated from a single set of blood cultures, represents probable skin izzy contamination. Please call the Microbiology Laboratory within 5 days at [phone] if further workup is required. Component Value   Blood Culture Staphylococcus epidermidis   Gram Stain Result Gram positive cocci in  clusters    Refer to Blood Culture Identification Panel results    This is an appended report. These results have been appended to a previously preliminary verified report.               08/30/2024 2039 09/02/2024 0911 Blood Culture Identification Panel [[id number]]    (Abnormal)   Blood from Arm, Right    Final result [facility] Film Array panel tests for 11 gram positive organisms, 15 gram negative organisms, 7 yeast species and 10 resistance genes.  Component Value   Staphylococcus epidermidis Detected   mecA/C (Methicillin-resistance gene) Detected    This organism is a coagulase-negative Staphylococcus    If only 1 set of blood cultures is positive, this may represent a contaminant.  Consider holding antibiotics or repeating cultures prior to starting antibiotics.      If >1 one set of blood cultures is positive, vancomycin is the preferred therapy.                 08/30/2024 2025 08/30/2024 2112 FLU/RSV/COVID - if FLU/RSV clinically relevant [[id number]]    Nares from Nose    Final result [facility] This test has been performed using the CoV-2/Flu/RSV plus assay on the [employer] GeneXpert platform. This test has been validated by the  and verified by the performing laboratory.    This test is designed to amplify and detect the following: nucleocapsid (N), envelope (E), and RNA-dependent RNA polymerase (RdRP) genes of the SARS-CoV-2 genome; matrix (M), basic polymerase (PB2), and acidic protein (PA) segments of the influenza A genome; matrix (M) and non-structural protein (NS) segments of the influenza B genome, and the nucleocapsid genes of RSV A and RSV B.    Positive results are indicative of the presence of Flu A, Flu B, RSV, and/or SARS-CoV-2 RNA. Positive results for SARS-CoV-2 or suspected novel influenza should be reported to state, local, or federal health departments according to local reporting requirements.     All results should be  assessed in conjunction with clinical presentation and other laboratory markers for clinical management.    FOR PEDIATRIC PATIENTS - copy/paste COVID Guidelines URL to browser: [email or web address]     Component Value   SARS-CoV-2 Negative    INFLUENZA A PCR Negative    INFLUENZA B PCR Negative    RSV PCR Negative                       Imaging and other studies: I have personally reviewed pertinent films in PACS    CTA Chest PE 8/31/2024:   No pulmonary embolism. Since July 11, 2022 there is persistent tree-in-bud nodularity in the lungs suggestive of a widespread infectious bronchiolitis that may be secondary to an atypical mycobacterium. Follicular bronchiolitis and acute hypersensitivity pneumonitis could also be in the differential. Follow-up chest CT in 3 months after treatment for infectious bronchiolitis is advised. New mild patchy bilateral multifocal pneumonia. Pulmonary hypertension. New moderate bilateral pleural effusions.     Chest x-ray 8/30/2024:  New opacification lateral left lung base may reflect atelectasis or pneumonia. Probable small left effusion. Mild cardiomegaly with mild vascular and interstitial prominence suggesting some degree of volume overload.     EKG, Pathology, and Other Studies: I have personally reviewed pertinent reports.                  Echocardiogram, 8/31/2024:  Left Ventricle: Left ventricular cavity size is mildly dilated. Wall thickness is mildly increased. The left ventricular ejection fraction is 25%. Systolic function is severely reduced. There is severe global hypokinesis.  Right Ventricle: Right ventricular cavity size mildly dilated. Systolic function mildly reduced.  Left Atrium: Atrium is mildly dilated.  Right Atrium: Atrium is mildly dilated.  Aortic Valve: Mild regurgitation.  Mitral Valve: Moderate to severe regurgitation.  Tricuspid Valve: Moderate regurgitation. Right ventricular systolic pressure is  moderately elevated. The estimated RVSP is 48.00 mmHg.  Pericardium: Small pericardial effusion anterior to the heart.    Gill Stevens  Pulmonary & Critical Care Medicine Fellow PGY-4  [facility]

## 2024-09-04 NOTE — ASSESSMENT & PLAN NOTE
-CT chest PE study revealed no evidence of PE. There is persistent tree-in-bud nodularity in the lungs suggestive of a widespread infectious bronchiolitis that may be secondary to an atypical mycobacterium. Follicular bronchiolitis and acute hypersensitivity pneumonitis could also be in the differential.  New mild patchy bilateral multifocal pneumonia  - Sputum culture: 4+ Pseudomonas   - ID: Suspect chronic process, hold antibiotics  - Cardiology: Cleared for bronchoscopy.    Plan:   - Pending bronchoscopy per Pulm scheduling.

## 2024-09-04 NOTE — PROGRESS NOTES
Progress Note - Infectious Disease   Ngozi Beard 64 y.o. female MRN: [id number]  Unit/Bed#: S -01 Encounter: [id number]      Impression/Recommendations:  1.   Pneumonitis versus pneumonia.  Given underlying anti-GBM disease, it is unclear whether chest CT findings are related to patient's underlying collagen vascular disease or pneumonia.  If this is pneumonia, CT and clinical picture is consistent with atypical pneumonia rather than acute bacterial pneumonia.  Patient is supposed to be on atovaquone for PCP prophylaxis but is unclear whether she is taking it.  Regardless, given her immunosuppressed state, it would be best that we try to establish diagnosis rather than treat her empirically.  With patient's very stable clinical status, it is best to keep her off antibiotic pending workup, so that antibiotic does not affect culture result.  Pulmonary evaluation noted.  With patient's improved respiratory symptoms, option would be bronchoscopy versus observation with repeat CT and subsequent bronchoscopy if worse.  Sputum culture with Pseudomonas, likely upper airway colonization.  Continue to observe off antibiotic for now, other than atovaquone PCP prophylaxis.  Monitor respiratory symptoms and O2 saturation.  Monitor temperature.  Bronchoscopy versus serial clinical and radiological monitoring.     2.  Bacteremia, with growth of MRSE in only 1 out of 2 admission blood cultures.  Patient has no fever and is not systemically ill, making true bacteremia not likely clinically.  Although patient does have permacath in place, unless the second admission blood culture also has growth of the same pathogen, this is most likely contaminated blood draw.  Patient had been on IV vancomycin but this was discontinued.  No antibiotic needed for this indication.  Follow-up on second concurrent admission blood culture.     3.  Leukocytosis.  In a patient on chronic steroid and now with increased steroid dosage,  leukocytosis is expected.  It is difficult to make clinical sense of her leukocytosis.     4.  ESRD, on HD.  Functional difficulty of permacath noted.  HD per nephrology.     5.  Recently diagnosed rapidly progressing anti-GBM disease.  Patient is on Cytoxan and prednisone since diagnosis.  Continue outpatient Cytoxan/steroid.  Continue/restart atovaquone PCP prophylaxis.     Discussed with patient in detail regarding the above plan.  Discussed with Dr. Boss from primary service regarding observation off antibiotic.  He is in agreement.  Discussed with Dr. Stevens from pulmonary service regarding observation off antibiotic.     Antibiotics:  Off antibiotic     Subjective:  Dyspnea and cough improved from admission but stable.  Temperature stays down.  No chills.  No diarrhea.    Objective:  Vitals:  Temp:  [97.6 °F (36.4 °C)-98.8 °F (37.1 °C)] 98.3 °F (36.8 °C)  HR:  [] 88  Resp:  [16-20] 19  BP: (106-142)/(71-90) 106/72  SpO2:  [88 %-98 %] 92 %  Temp (24hrs), Av.1 °F (36.7 °C), Min:97.6 °F (36.4 °C), Max:98.8 °F (37.1 °C)  Current: Temperature: 98.3 °F (36.8 °C)    Physical Exam:     General: Awake, alert, cooperative, no distress.   Neck:  Supple. No mass.  No lymphadenopathy.   Lungs: Expansion symmetric, basilar rhonchi, no rales, no wheezing, respirations unlabored.   Heart:  Regular rate and rhythm, S1 and S2 normal, no murmur.   Abdomen: Soft, nondistended, non-tender, bowel sounds active all four quadrants, no masses, no organomegaly.   Extremities: Stable mild leg edema. No erythema/warmth. No ulcer. Nontender to palpation.   Skin:  No rash.   Neuro: Moves all extremities.     Invasive Devices       Peripheral Intravenous Line  Duration             Peripheral IV 24 Left Wrist 1 day              Hemodialysis Catheter  Duration             HD Permanent Double Catheter 1 day                    Labs studies:   I have personally reviewed pertinent labs.  Results from last 7 days   Lab Units  09/04/24  0450 09/03/24  0510 09/02/24  0509 09/01/24  0521 08/31/24  0443 08/31/24  0033 08/30/24 2025   POTASSIUM mmol/L 4.0 4.8 5.2 4.4 5.6*   < > 5.7*   CHLORIDE mmol/L 97 100 100 98 102  --  102   CO2 mmol/L 34* 26 28 28 25  --  22   BUN mg/dL 31* 62* 50* 40* 51*  --  44*   CREATININE mg/dL 6.16* 9.85* 8.64* 6.86* 8.68*  --  8.11*   EGFR ml/min/1.73sq m 6 3 4 5 4  --  4   CALCIUM mg/dL 8.8 9.5 9.5 9.6 9.6  --  9.8   AST U/L  --   --   --  31 57*  --  65*   ALT U/L  --   --   --  61* 59*  --  60*   ALK PHOS U/L  --   --   --  97 100  --  106*    < > = values in this interval not displayed.     Results from last 7 days   Lab Units 09/04/24  0450 09/03/24  0510 09/02/24  0509   WBC Thousand/uL 9.89 12.39* 12.88*   HEMOGLOBIN g/dL 8.4* 8.2* 7.8*   PLATELETS Thousands/uL 238 266 244     Results from last 7 days   Lab Units 09/02/24  1804 08/31/24  0043 08/30/24 2039   BLOOD CULTURE   --   --  No Growth After 4 Days.  Staphylococcus epidermidis*   SPUTUM CULTURE  4+ Growth of Pseudomonas aeruginosa*  --   --    GRAM STAIN RESULT  Rare Epithelial cells per low power field*  No polys seen*  Rare Gram positive cocci in pairs*  Rare Gram variable rods*  --  Gram positive cocci in clusters*   MRSA CULTURE ONLY   --  No Methicillin Resistant Staphlyococcus aureus (MRSA) isolated  --        Imaging Studies:   I have personally reviewed pertinent imaging study reports and images in PACS.    EKG, Pathology, and Other Studies:   I have personally reviewed pertinent reports.

## 2024-09-04 NOTE — PROGRESS NOTES
NEPHROLOGY PROGRESS NOTE   Ngozi Beard 64 y.o. female MRN: [id number]  Unit/Bed#: S -01 Encounter: [id number]  Reason for Consult: ANGELICA, dialysis dependent    ASSESSMENT AND PLAN:  63 yo woman with PMH of anti-GBM disease on Cytoxan, prednisone s/p plasmapheresis, dialysis dependent TTS.  Patient admitted with shortness of breath.  nephrology is consulted for management of dialysis        PLAN:    # Anuric  KDIGO ANGELICA stage 3, dialysis dependent with no evidence of kidney recovery  Etiology: Anti-GBM disease  Continue to be dialysis dependent, anuric  Treatment:  Continue to be dialysis dependent TTS  Patient had extra dialysis today for ultrafiltration however PermCath did not work well.  See below  Plan to continue HD as per schedule tomorrow   Maintain MAP:  Over 65 mmHg if possible/avoid hypoperfusion:  Hold parameters on blood pressure medications  Avoid nephrotoxic agents such as NSAIDs, and IV contrast if possible. Avoid opioids   Adjust medications to GFR     # Anti-GBM disease  Status post Plex for 14 days, last anti-GBM more than 8 on July 29  No kidney recovery   Concern of pulmonary hemorrhage pending bronchoscopy  Continue with Cytoxan        # Immunosuppressive therapy  Continue with prednisone taper as a scheduled currently taking 20 mg  Cytoxan 100 mg daily, adjusted by kidney function and age plan to continue for 3 months     # Prophylaxis  On atovaquone, before she was on Bactrim but patient became  hyperkalemic  PPIs  Calcium and vitamin D     #Acid-base Disorder  serum HCO3 34 mmol/L  Will monitor     #Volume status/hypertension:  Volume: Hypervolemic   Blood pressure: Normotensive, /72, goal less than 140/90  Recommend:  Low-sodium diet  UF attempted today however unable to complete due to PermCath dysfunction  Losartan 25  Metoprolol 25 daily  Nifedipine 60     # Hyperkalemia  On Lokelma  Low potassium diet        #Anemia:  Current hemoglobin: 8 point  Treatment:  Epogen 6000 on  dialysis  Transfuse for hemoglobin less than 7.0 per primary service       # Vascular access complications  PermCath replaced on 9/3   Today PermCath did not work well, arterial pressure high, we gave heparin with no results  Heparin lock used, will attempt dialysis tomorrow   Plan to discuss with IR tomorrow if PermCath  dysfunction continues     #Secondary Hyperparathyroidism   Continue Renagel with meals    Ultrafiltration  PROCEDURE NOTE  The patient was seen and examined on ultrafiltration. The patient is tolerating the procedure well.  Time: 2 hours  Sodium: n/a Blood flow: 350   Dialyzer: F160 Potassium: n/a Dialysate flow: n/a   Access: PC Bicarbonate: n/a Ultrafiltration goal: 1.3L only due to PermCath issues   Medications on HD: heparin         The highlighted and/or bolded points in my assessment, plan, and disposition were discussed with the primary team and we agree to repeat  dialysis tomorrow .  Previous records were personally reviewed by me to obtain a baseline creatinine.   The images (CXR) were personally reviewed by me in PACS      SUBJECTIVE:  Patient seen and examined at bedside. No chest pain, shortness of breath    OBJECTIVE:  Current Weight: Weight - Scale: 105 kg (231 lb 0.7 oz)  Vitals:    09/04/24 1030   BP: 106/72   Pulse: 88   Resp: 19   Temp: 98.3 °F (36.8 °C)   SpO2: 92%       Intake/Output Summary (Last 24 hours) at 9/4/2024 1111  Last data filed at 9/4/2024 1030  Gross per 24 hour   Intake 1500 ml   Output 3795 ml   Net -2295 ml     Wt Readings from Last 3 Encounters:   09/04/24 105 kg (231 lb 0.7 oz)   08/10/24 118 kg (259 lb 0.7 oz)   08/04/24 112 kg (247 lb 5.7 oz)     Temp Readings from Last 3 Encounters:   09/04/24 98.3 °F (36.8 °C) (Oral)   08/14/24 99.2 °F (37.3 °C) (Oral)   08/13/24 98.2 °F (36.8 °C) (Oral)     BP Readings from Last 3 Encounters:   09/04/24 106/72   08/14/24 122/65   08/13/24 123/75     Pulse Readings from Last 3 Encounters:   09/04/24 88   08/14/24 97    08/13/24 92      General:  no acute distress at this time  Skin:  No acute rash  Eyes:  No scleral icterus and noninjected  ENT:  mucous membranes moist  Neck:  no carotid bruits  Chest:  Clear to auscultation percussion, good respiratory effort, no use of accessory respiratory muscles  CVS:  Regular rate and rhythm without rub   Abdomen:  soft and nontender   Extremities: lower extremity edema  Neuro:  No gross focality  Psych:  Alert , cooperative  Dialysis access: Permcath        Medications:    Current Facility-Administered Medications:     acetaminophen (TYLENOL) tablet 650 mg, 650 mg, Oral, Q6H PRN, Kely Robert MD    albuterol (PROVENTIL HFA,VENTOLIN HFA) inhaler 2 puff, 2 puff, Inhalation, Q4H PRN, Kely Robert MD, 2 puff at 09/04/24 0910    albuterol inhalation solution 2.5 mg, 2.5 mg, Nebulization, TID, Todd Dickens MD, 2.5 mg at 09/04/24 0710    atorvastatin (LIPITOR) tablet 20 mg, 20 mg, Oral, Daily, Kely Robert MD, 20 mg at 09/02/24 0931    atovaquone (MEPRON) oral suspension 1,500 mg, 1,500 mg, Oral, Daily, Trish Kline MD, 1,500 mg at 09/02/24 0937    cyclophosphamide (CYTOXAN) capsule 100 mg, 100 mg, Oral, Daily, Kely Robert MD, 100 mg at 09/02/24 0938    dextromethorphan-guaiFENesin (ROBITUSSIN DM) oral syrup 10 mL, 10 mL, Oral, Q4H PRN, Kely Robert MD, 10 mL at 09/01/24 2142    epoetin shavonne (EPOGEN,PROCRIT) injection 6,000 Units, 6,000 Units, Intravenous, After Dialysis, Tirso Montez MD, 6,000 Units at 09/03/24 1416    fluticasone-vilanterol 200-25 mcg/actuation 1 puff, 1 puff, Inhalation, Daily, Robert Gilbert MD, 1 puff at 09/04/24 0910    heparin (porcine) subcutaneous injection 5,000 Units, 5,000 Units, Subcutaneous, Q8H JACK, Kely Robert MD, 5,000 Units at 09/03/24 1718    ipratropium (ATROVENT) 0.02 % inhalation solution 0.5 mg, 0.5 mg, Nebulization, TID, Todd Dickens MD, 0.5 mg at 09/04/24 0710    iron polysaccharides (FERREX)  capsule 150 mg, 150 mg, Oral, Daily, Kely Robert MD, 150 mg at 09/02/24 0931    lamoTRIgine (LaMICtal) tablet 100 mg, 100 mg, Oral, HS, Kely Robert MD, 100 mg at 09/03/24 2105    lidocaine (LIDODERM) 5 % patch 1 patch, 1 patch, Topical, Daily, Trish Kline MD, 1 patch at 09/03/24 1120    losartan (COZAAR) tablet 25 mg, 25 mg, Oral, Daily, Ty Reese MD, 25 mg at 09/03/24 1543    metoprolol succinate (TOPROL-XL) 24 hr tablet 25 mg, 25 mg, Oral, Daily, JEB Wan, 25 mg at 09/02/24 1227    montelukast (SINGULAIR) tablet 10 mg, 10 mg, Oral, Daily, Kely Robert MD, 10 mg at 09/02/24 0931    NIFEdipine (PROCARDIA XL) 24 hr tablet 60 mg, 60 mg, Oral, Daily, Kely Robert MD, 60 mg at 09/02/24 0932    pantoprazole (PROTONIX) EC tablet 40 mg, 40 mg, Oral, Daily Before Breakfast, Kely Robert MD, 40 mg at 09/02/24 0932    predniSONE tablet 20 mg, 20 mg, Oral, Daily, Gerber Kim Jr., DO, 20 mg at 09/02/24 0931    sevelamer (RENAGEL) tablet 1,600 mg, 1,600 mg, Oral, TID With Meals, Kely Robert MD, 1,600 mg at 09/03/24 1715    Sodium Zirconium Cyclosilicate (Lokelma) 10 g, 10 g, Oral, Daily, Trish Kline MD, 10 g at 09/01/24 0811    traZODone (DESYREL) tablet 200 mg, 200 mg, Oral, HS, Kely Robert MD, 200 mg at 09/03/24 2105    trimethobenzamide (TIGAN) IM injection 200 mg, 200 mg, Intramuscular, Q6H PRN, Kely Robert MD, 200 mg at 08/30/24 2208    venlafaxine (EFFEXOR-XR) 24 hr capsule 225 mg, 225 mg, Oral, Daily, Kely Robert MD, 225 mg at 09/02/24 0929    Laboratory Results:  Results from last 7 days   Lab Units 09/04/24  0450 09/03/24  0510 09/02/24  0509 09/01/24  0521 08/31/24  1556 08/31/24  0755 08/31/24  0443 08/31/24  0033 08/30/24  2025   WBC Thousand/uL 9.89 12.39* 12.88* 19.55*  --   --  10.64*  --  14.45*   HEMOGLOBIN g/dL 8.4* 8.2* 7.8* 7.9* 7.9* 6.9* 6.7*  --  7.1*   HEMATOCRIT % 27.5* 27.0* 25.4* 24.6* 25.4* 22.2* 22.1*  --  23.0*   PLATELETS Thousands/uL  238 266 244 243  --   --  245  --  258   SODIUM mmol/L 139 137 139 137  --   --  138  --  139   POTASSIUM mmol/L 4.0 4.8 5.2 4.4  --   --  5.6* 5.7* 5.7*   CHLORIDE mmol/L 97 100 100 98  --   --  102  --  102   CO2 mmol/L 34* 26 28 28  --   --  25  --  22   BUN mg/dL 31* 62* 50* 40*  --   --  51*  --  44*   CREATININE mg/dL 6.16* 9.85* 8.64* 6.86*  --   --  8.68*  --  8.11*   CALCIUM mg/dL 8.8 9.5 9.5 9.6  --   --  9.6  --  9.8   MAGNESIUM mg/dL  --   --   --  2.1  --   --  2.3  --  2.1   PHOSPHORUS mg/dL  --   --   --  4.7*  --   --  5.8*  --  6.1*       IR tunneled dialysis catheter check/change/reposition/angioplasty   Final Result by Graciela Bettencourt MD (09/03 1142)   1.  Pericatheter fibrin sheath stripping.   2.  Right-sided internal jugular tunneled dialysis catheter exchange, with its tip in the expected location of the right atrium. I exchanged a Titan dialysis catheter to an Kwan Split tunneled dialysis catheter.      Plan:      The catheter may be used immediately.      Workstation performed: [id number]         CTA chest pe study   Final Result by Jorge Modi MD (09/01 1239)      No pulmonary embolism.      Since July 11, 2022 there is persistent tree-in-bud nodularity in the lungs suggestive of a widespread infectious bronchiolitis that may be secondary to an atypical mycobacterium. Follicular bronchiolitis and acute hypersensitivity pneumonitis could also    be in the differential. Follow-up chest CT in 3 months after treatment for infectious bronchiolitis is advised.      New mild patchy bilateral multifocal pneumonia.      Pulmonary hypertension      New moderate bilateral pleural effusions.      The study was marked in EPIC for immediate notification.      Workstation performed: [id number]         XR chest 1 view portable   ED Interpretation by Alejandro Johnston PA-C (08/30 2112)   Consolidation right lower lobe suspicious for pneumonia      Final Result by Devyn Silva MD (08/31 1314)        "  1. New opacification lateral left lung base may reflect atelectasis or pneumonia. Probable small left effusion.   2. Mild cardiomegaly with mild vascular and interstitial prominence suggesting some degree of volume overload.            Workstation performed: [id number]         XR chest portable    (Results Pending)       Portions of the record may have been created with voice recognition software. Occasional wrong word or \"sound a like\" substitutions may have occurred due to the inherent limitations of voice recognition software. Read the chart carefully and recognize, using context, where substitutions have occurred.    "

## 2024-09-04 NOTE — ASSESSMENT & PLAN NOTE
Admission in July 2024 for ANGELICA. 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 S/p PLEX.  Patient is anuric and dialysis-dependent  Tues/Thurs/Sat schedule  Access: Right IJ PermCath, Cath reevaluation  by IR on 9/3, no issues during HD. 9/4, issues with clotting, will needs re-evaluation @9.5     Plan:  Hemodialysis scheduled on 9/5  Continue PTA cyclophosphamide  Continue PTA sevalemer  Continue PTA atovaquone for PJP prophylaxis  Continue prednisone taper from outpatient nephrology prior to this admission  20 mg once daily till September 6  15 mg starting September 7 to September 20  12.5 mg starting September 21 to October 4  10 mg starting October 5 to October 18  7.5 mg starting October 19 to November 2  5 mg daily starting November 3

## 2024-09-05 ENCOUNTER — APPOINTMENT (INPATIENT)
Dept: DIALYSIS | Facility: HOSPITAL | Age: 64
DRG: 286 | End: 2024-09-05
Attending: INTERNAL MEDICINE
Payer: COMMERCIAL

## 2024-09-05 PROBLEM — R74.01 ELEVATED TRANSAMINASE LEVEL: Status: RESOLVED | Noted: 2024-08-31 | Resolved: 2024-09-05

## 2024-09-05 PROBLEM — R65.10 SIRS (SYSTEMIC INFLAMMATORY RESPONSE SYNDROME) (HCC): Status: RESOLVED | Noted: 2024-08-31 | Resolved: 2024-09-05

## 2024-09-05 LAB
ALBUMIN SERPL BCG-MCNC: 3.2 G/DL (ref 3.5–5)
ALP SERPL-CCNC: 72 U/L (ref 34–104)
ALT SERPL W P-5'-P-CCNC: 20 U/L (ref 7–52)
ANION GAP SERPL CALCULATED.3IONS-SCNC: 8 MMOL/L (ref 4–13)
AST SERPL W P-5'-P-CCNC: 10 U/L (ref 13–39)
BACTERIA SPT RESP CULT: ABNORMAL
BACTERIA SPT RESP CULT: ABNORMAL
BILIRUB SERPL-MCNC: 0.48 MG/DL (ref 0.2–1)
BUN SERPL-MCNC: 40 MG/DL (ref 5–25)
CALCIUM ALBUM COR SERPL-MCNC: 10 MG/DL (ref 8.3–10.1)
CALCIUM SERPL-MCNC: 9.4 MG/DL (ref 8.4–10.2)
CHLORIDE SERPL-SCNC: 100 MMOL/L (ref 96–108)
CO2 SERPL-SCNC: 32 MMOL/L (ref 21–32)
CREAT SERPL-MCNC: 7.83 MG/DL (ref 0.6–1.3)
ERYTHROCYTE [DISTWIDTH] IN BLOOD BY AUTOMATED COUNT: 18.8 % (ref 11.6–15.1)
GFR SERPL CREATININE-BSD FRML MDRD: 4 ML/MIN/1.73SQ M
GLUCOSE SERPL-MCNC: 92 MG/DL (ref 65–140)
GRAM STN SPEC: ABNORMAL
HBV CORE AB SER QL: NORMAL
HBV CORE IGM SER QL: NORMAL
HBV SURFACE AB SER-ACNC: <3 MIU/ML
HBV SURFACE AG SER QL: NORMAL
HCT VFR BLD AUTO: 27.7 % (ref 34.8–46.1)
HCV AB SER QL: NORMAL
HGB BLD-MCNC: 8.5 G/DL (ref 11.5–15.4)
MCH RBC QN AUTO: 28.6 PG (ref 26.8–34.3)
MCHC RBC AUTO-ENTMCNC: 30.7 G/DL (ref 31.4–37.4)
MCV RBC AUTO: 93 FL (ref 82–98)
PLATELET # BLD AUTO: 216 THOUSANDS/UL (ref 149–390)
PMV BLD AUTO: 10.2 FL (ref 8.9–12.7)
POTASSIUM SERPL-SCNC: 4.2 MMOL/L (ref 3.5–5.3)
PROT SERPL-MCNC: 5.4 G/DL (ref 6.4–8.4)
RBC # BLD AUTO: 2.97 MILLION/UL (ref 3.81–5.12)
SODIUM SERPL-SCNC: 140 MMOL/L (ref 135–147)
WBC # BLD AUTO: 10 THOUSAND/UL (ref 4.31–10.16)

## 2024-09-05 PROCEDURE — 86803 HEPATITIS C AB TEST: CPT

## 2024-09-05 PROCEDURE — NC001 PR NO CHARGE: Performed by: STUDENT IN AN ORGANIZED HEALTH CARE EDUCATION/TRAINING PROGRAM

## 2024-09-05 PROCEDURE — 99232 SBSQ HOSP IP/OBS MODERATE 35: CPT | Performed by: INTERNAL MEDICINE

## 2024-09-05 PROCEDURE — 99233 SBSQ HOSP IP/OBS HIGH 50: CPT | Performed by: INTERNAL MEDICINE

## 2024-09-05 PROCEDURE — 85027 COMPLETE CBC AUTOMATED: CPT

## 2024-09-05 PROCEDURE — 80053 COMPREHEN METABOLIC PANEL: CPT

## 2024-09-05 PROCEDURE — 86704 HEP B CORE ANTIBODY TOTAL: CPT

## 2024-09-05 PROCEDURE — 94640 AIRWAY INHALATION TREATMENT: CPT

## 2024-09-05 PROCEDURE — 94760 N-INVAS EAR/PLS OXIMETRY 1: CPT

## 2024-09-05 PROCEDURE — 86705 HEP B CORE ANTIBODY IGM: CPT

## 2024-09-05 PROCEDURE — 99232 SBSQ HOSP IP/OBS MODERATE 35: CPT | Performed by: STUDENT IN AN ORGANIZED HEALTH CARE EDUCATION/TRAINING PROGRAM

## 2024-09-05 PROCEDURE — 86706 HEP B SURFACE ANTIBODY: CPT

## 2024-09-05 PROCEDURE — 87340 HEPATITIS B SURFACE AG IA: CPT

## 2024-09-05 RX ORDER — PREDNISONE 10 MG/1
10 TABLET ORAL DAILY
Status: DISCONTINUED | OUTPATIENT
Start: 2024-10-05 | End: 2024-09-05

## 2024-09-05 RX ORDER — METOPROLOL SUCCINATE 50 MG/1
50 TABLET, EXTENDED RELEASE ORAL 2 TIMES DAILY
Status: DISCONTINUED | OUTPATIENT
Start: 2024-09-05 | End: 2024-09-19 | Stop reason: HOSPADM

## 2024-09-05 RX ADMIN — PREDNISONE 20 MG: 20 TABLET ORAL at 17:29

## 2024-09-05 RX ADMIN — METOPROLOL SUCCINATE 50 MG: 50 TABLET, EXTENDED RELEASE ORAL at 17:28

## 2024-09-05 RX ADMIN — ALTEPLASE 2 MG: 2.2 INJECTION, POWDER, LYOPHILIZED, FOR SOLUTION INTRAVENOUS at 10:33

## 2024-09-05 RX ADMIN — LIDOCAINE 5% 1 PATCH: 700 PATCH TOPICAL at 08:16

## 2024-09-05 RX ADMIN — ALBUTEROL SULFATE 2.5 MG: 2.5 SOLUTION RESPIRATORY (INHALATION) at 07:24

## 2024-09-05 RX ADMIN — EPOETIN ALFA 6000 UNITS: 3000 SOLUTION INTRAVENOUS; SUBCUTANEOUS at 12:12

## 2024-09-05 RX ADMIN — HEPARIN SODIUM 5000 UNITS: 5000 INJECTION INTRAVENOUS; SUBCUTANEOUS at 00:50

## 2024-09-05 RX ADMIN — IPRATROPIUM BROMIDE 0.5 MG: 0.5 SOLUTION RESPIRATORY (INHALATION) at 14:08

## 2024-09-05 RX ADMIN — PANTOPRAZOLE SODIUM 40 MG: 40 TABLET, DELAYED RELEASE ORAL at 05:42

## 2024-09-05 RX ADMIN — FLUTICASONE FUROATE AND VILANTEROL TRIFENATATE 1 PUFF: 200; 25 POWDER RESPIRATORY (INHALATION) at 08:16

## 2024-09-05 RX ADMIN — IPRATROPIUM BROMIDE 0.5 MG: 0.5 SOLUTION RESPIRATORY (INHALATION) at 07:24

## 2024-09-05 RX ADMIN — IPRATROPIUM BROMIDE 0.5 MG: 0.5 SOLUTION RESPIRATORY (INHALATION) at 19:58

## 2024-09-05 RX ADMIN — LAMOTRIGINE 100 MG: 100 TABLET ORAL at 20:31

## 2024-09-05 RX ADMIN — CEFEPIME 1000 MG: 1 INJECTION, POWDER, FOR SOLUTION INTRAMUSCULAR; INTRAVENOUS at 20:55

## 2024-09-05 RX ADMIN — TRAZODONE HYDROCHLORIDE 200 MG: 100 TABLET ORAL at 20:31

## 2024-09-05 RX ADMIN — ALBUTEROL SULFATE 2.5 MG: 2.5 SOLUTION RESPIRATORY (INHALATION) at 19:58

## 2024-09-05 RX ADMIN — ALBUTEROL SULFATE 2.5 MG: 2.5 SOLUTION RESPIRATORY (INHALATION) at 14:08

## 2024-09-05 RX ADMIN — HEPARIN SODIUM 5000 UNITS: 5000 INJECTION INTRAVENOUS; SUBCUTANEOUS at 08:17

## 2024-09-05 RX ADMIN — ALTEPLASE 2 MG: 2.2 INJECTION, POWDER, LYOPHILIZED, FOR SOLUTION INTRAVENOUS at 10:34

## 2024-09-05 NOTE — PROGRESS NOTES
"Cardiology Progress Note - Ngozi Beard 64 y.o. 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  Certainly there was the evidence found within the last month of anti-GBM vasculitis and a \"tree-in-bud\" bronchiolitis however more recently I do agree that volume overload is been playing a role with her effusions and now new onset cardiomyopathy.  Volume management per hemodialysis which they are trying to make her more net negative for pulmonary to perform a bronchoscopy.    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  Certainly there is some risk of anesthesia but given an overall low risk procedure from a cardiac standpoint she can proceed.  We will follow her through this process.    New onset cardiomyopathy - Unclear etiology but likely nonischemic and could be rheumatologic/inflammatory driven.  She will require an eventual ischemic workup, which can likely be done as an outpatient.  Would also recommend a cardiac MRI which can be done as an inpatient or outpatient.  We discontinued nifedipine yesterday.  We will continue to uptitrate Toprol-XL to 50 mg twice daily.  She is on low-dose losartan 25 mg daily.    Subjective:   Patient seen and examined.  No significant events overnight.  About to receive dialysis this morning.  Still short of breath at times, particularly notes shortness of breath and cough at night while trying to " "sleep.    Objective:     Vitals: Blood pressure 130/77, pulse (!) 107, temperature 98.1 °F (36.7 °C), resp. rate 20, height 5' 3\" (1.6 m), weight 105 kg (232 lb 5.8 oz), SpO2 90%., Body mass index is 41.16 kg/m².,   Orthostatic Blood Pressures      Flowsheet Row Most Recent Value   Blood Pressure 130/77 filed at 09/05/2024 0918   Patient Position - Orthostatic VS Lying filed at 09/04/2024 1030              Intake/Output Summary (Last 24 hours) at 9/5/2024 1046  Last data filed at 9/5/2024 0900  Gross per 24 hour   Intake 360 ml   Output --   Net 360 ml       No significant arrhythmias seen on telemetry review.  Sinus rhythm or sinus tachycardia.    Physical Exam:  GEN: Ngozi Beard appears well, alert and oriented x 3, pleasant and cooperative   HEENT: pupils equal, round, and reactive to light; extraocular muscles intact  NECK: supple, no carotid bruits.  +JVD   HEART: irregular rhythm, normal S1 and S2, no significant murmurs, clicks, gallops or rubs   LUNGS: Diminished at bases bilaterally; no wheezes, rales, or rhonchi   ABDOMEN: normal bowel sounds, soft, no tenderness, no distention  EXTREMITIES: peripheral pulses normal; no clubbing, cyanosis. + Edema with both pitting and nonpitting components.  NEURO: no focal findings   SKIN: normal without suspicious lesions on exposed skin       Medications:      Current Facility-Administered Medications:     acetaminophen (TYLENOL) tablet 650 mg, 650 mg, Oral, Q6H PRN, Kely Robert MD    albuterol (PROVENTIL HFA,VENTOLIN HFA) inhaler 2 puff, 2 puff, Inhalation, Q4H PRN, Kely Robert MD, 2 puff at 09/04/24 0910    albuterol inhalation solution 2.5 mg, 2.5 mg, Nebulization, TID, Todd Dickens MD, 2.5 mg at 09/05/24 0724    atorvastatin (LIPITOR) tablet 20 mg, 20 mg, Oral, Daily, Kely Robert MD, 20 mg at 09/02/24 0931    atovaquone (MEPRON) oral suspension 1,500 mg, 1,500 mg, Oral, Daily, Trish Kline MD, 1,500 mg at 09/02/24 0937    " cefepime (MAXIPIME) 1,000 mg in dextrose 5 % 50 mL IVPB, 1,000 mg, Intravenous, Q24H, Jagjit Pham MD, Last Rate: 100 mL/hr at 09/04/24 2000, 1,000 mg at 09/04/24 2000    cyclophosphamide (CYTOXAN) capsule 100 mg, 100 mg, Oral, Daily, Kely Robert MD, 100 mg at 09/02/24 0938    dextromethorphan-guaiFENesin (ROBITUSSIN DM) oral syrup 10 mL, 10 mL, Oral, Q4H PRN, Kely Robert MD, 10 mL at 09/01/24 2142    epoetin shavonne (EPOGEN,PROCRIT) injection 6,000 Units, 6,000 Units, Intravenous, After Dialysis, Tirso Montez MD, 6,000 Units at 09/03/24 1416    fluticasone-vilanterol 200-25 mcg/actuation 1 puff, 1 puff, Inhalation, Daily, Robert Gilbert MD, 1 puff at 09/05/24 0816    heparin (porcine) subcutaneous injection 5,000 Units, 5,000 Units, Subcutaneous, Q8H JACK, Kely Robert MD, 5,000 Units at 09/05/24 0817    ipratropium (ATROVENT) 0.02 % inhalation solution 0.5 mg, 0.5 mg, Nebulization, TID, Todd Dickens MD, 0.5 mg at 09/05/24 0724    iron polysaccharides (FERREX) capsule 150 mg, 150 mg, Oral, Daily, Kely Robert MD, 150 mg at 09/02/24 0931    lamoTRIgine (LaMICtal) tablet 100 mg, 100 mg, Oral, HS, Kely Robert MD, 100 mg at 09/04/24 2104    lidocaine (LIDODERM) 5 % patch 1 patch, 1 patch, Topical, Daily, Trish Kline MD, 1 patch at 09/05/24 0816    losartan (COZAAR) tablet 25 mg, 25 mg, Oral, Daily, Ty Reese MD, 25 mg at 09/03/24 1543    metoprolol succinate (TOPROL-XL) 24 hr tablet 50 mg, 50 mg, Oral, Daily, Gerber Robbins MD    montelukast (SINGULAIR) tablet 10 mg, 10 mg, Oral, Daily, Kely Robert MD, 10 mg at 09/02/24 0931    pantoprazole (PROTONIX) EC tablet 40 mg, 40 mg, Oral, Daily Before Breakfast, Kely Robert MD, 40 mg at 09/05/24 0542    predniSONE tablet 20 mg, 20 mg, Oral, Daily, Gerber Kim Jr., DO, 20 mg at 09/02/24 0931    sevelamer (RENAGEL) tablet 1,600 mg, 1,600 mg, Oral, TID With Meals, Kely Robert MD, 1,600 mg at 09/04/24 1710    Sodium  Zirconium Cyclosilicate (Lokelma) 10 g, 10 g, Oral, Daily, Tirsh Kline MD, 10 g at 09/01/24 0811    traZODone (DESYREL) tablet 200 mg, 200 mg, Oral, HS, Kely Robert MD, 200 mg at 09/04/24 2105    trimethobenzamide (TIGAN) IM injection 200 mg, 200 mg, Intramuscular, Q6H PRN, Kely Robert MD, 200 mg at 08/30/24 2208    venlafaxine (EFFEXOR-XR) 24 hr capsule 225 mg, 225 mg, Oral, Daily, Kely Robert MD, 225 mg at 09/02/24 0929     Labs & Results:        Results from last 7 days   Lab Units 09/05/24 0525 09/04/24  0450 09/03/24  0510   WBC Thousand/uL 10.00 9.89 12.39*   HEMOGLOBIN g/dL 8.5* 8.4* 8.2*   HEMATOCRIT % 27.7* 27.5* 27.0*   PLATELETS Thousands/uL 216 238 266         Results from last 7 days   Lab Units 09/05/24  0525 09/04/24  0450 09/03/24  0510 09/02/24  0509 09/01/24 0521 08/31/24 0443   POTASSIUM mmol/L 4.2 4.0 4.8   < > 4.4 5.6*   CHLORIDE mmol/L 100 97 100   < > 98 102   CO2 mmol/L 32 34* 26   < > 28 25   BUN mg/dL 40* 31* 62*   < > 40* 51*   CREATININE mg/dL 7.83* 6.16* 9.85*   < > 6.86* 8.68*   CALCIUM mg/dL 9.4 8.8 9.5   < > 9.6 9.6   ALK PHOS U/L 72  --   --   --  97 100   ALT U/L 20  --   --   --  61* 59*   AST U/L 10*  --   --   --  31 57*    < > = values in this interval not displayed.     Results from last 7 days   Lab Units 08/30/24 2025   INR  1.99*   PTT seconds >210*     Results from last 7 days   Lab Units 09/01/24  0521 08/31/24  0443 08/30/24  2025   MAGNESIUM mg/dL 2.1 2.3 2.1         EKG personally reviewed by Gerber Robbins MD. Sinus tachycardia with nonspecific interventricular conduction block.    ECHO:    Left Ventricle: Left ventricular cavity size is mildly dilated. Wall thickness is mildly increased. The left ventricular ejection fraction is 25%. Systolic function is severely reduced. There is severe global hypokinesis.    Right Ventricle: Right ventricular cavity size is mildly dilated. Systolic function is mildly reduced.    Left Atrium: The atrium is  mildly dilated.    Right Atrium: The atrium is mildly dilated.    Aortic Valve: There is mild regurgitation.    Mitral Valve: There is moderate to severe regurgitation.    Tricuspid Valve: There is moderate regurgitation. The right ventricular systolic pressure is moderately elevated. The estimated right ventricular systolic pressure is 48.00 mmHg.    Pericardium: There is a small pericardial effusion anterior to the heart.    Counseling / Coordination of Care  Total floor / unit time spent today 25 minutes.  Greater than 50% of total time was spent with the patient and / or family counseling and / or coordination of care.

## 2024-09-05 NOTE — PROGRESS NOTES
NEPHROLOGY PROGRESS NOTE   Ngozi Beard 64 y.o. female MRN: [id number]  Unit/Bed#: S -01 Encounter: [id number]  Reason for Consult: ANGELICA, dialysis dependent    ASSESSMENT AND PLAN:  63 yo woman with PMH of anti-GBM disease on Cytoxan, prednisone s/p plasmapheresis, dialysis dependent TTS.  Patient admitted with shortness of breath.  nephrology is consulted for management of dialysis        PLAN:    # Anuric  KDIGO ANGELICA stage 3, dialysis dependent with no evidence of kidney recovery  Etiology: Anti-GBM disease  Continue to be dialysis dependent, anuric  Treatment:  Patient had a session of UF yesterday, had to be terminated early due to the catheter dysfunction  Attempted dialysis today however PermCath is now working, status post Cathflo with no success  For PermCath evaluation  IR consult  Will put patient on the schedule for tomorrow     # Anti-GBM disease  Status post Plex for 14 days, last anti-GBM more than 8 on July 29  No kidney recovery   Concern of pulmonary hemorrhage pending bronchoscopy  Continue with Cytoxan        # Immunosuppressive therapy  Continue with prednisone taper as a scheduled currently taking 20 mg  Cytoxan 100 mg daily, adjusted by kidney function and age plan to continue for 3 months     # Prophylaxis  On atovaquone, before she was on Bactrim but patient became  hyperkalemic  PPIs  Calcium and vitamin D     #Acid-base Disorder  serum HCO3 32 mmol/L  Will monitor     #Volume status/hypertension:  Volume:fluid overload  Blood pressure: Normotensive, /81, goal less than 140/90  Recommend:  Low-sodium diet  Will attempt ultrafiltration tomorrow on dialysis after PermCath exchange   Losartan 25  Metoprolol 25 daily  Nifedipine 60     # Hyperkalemia  On Lokelma  Low potassium diet        #Anemia:  Current hemoglobin:8.5 mg/dL  Treatment:  Epogen 6000 on dialysis  Transfuse for hemoglobin less than 7.0 per primary service       # Vascular access complications  PermCath replaced on  9/3   9/4: PermCath did not work well, UF had to be terminated early, received heparin on dialysis with heparin lock   9/5: Attempted dialysis with no success, s/p alteplase.  PermCath not working   IR consult for PermCath exchange versus left IJ PermCath    #Secondary Hyperparathyroidism   Continue Renagel with meals    The highlighted and/or bolded points in my assessment, plan, and disposition were discussed with the primary team and we agreed to consult IR for PermCath exchange.  Previous records were personally reviewed by me to obtain a baseline creatinine.   The images (CXR) were personally reviewed by me in PACS      SUBJECTIVE:  Patient seen and examined at bedside.  Patient seen this morning, no shortness of breath, chest pain.  Unable to do dialysis due to PermCath dysfunction    OBJECTIVE:  Current Weight: Weight - Scale: 105 kg (232 lb 5.8 oz)  Vitals:    09/05/24 1145   BP: 131/81   Pulse: 96   Resp:    Temp:    SpO2: 93%       Intake/Output Summary (Last 24 hours) at 9/5/2024 1159  Last data filed at 9/5/2024 0900  Gross per 24 hour   Intake 360 ml   Output --   Net 360 ml     Wt Readings from Last 3 Encounters:   09/05/24 105 kg (232 lb 5.8 oz)   08/10/24 118 kg (259 lb 0.7 oz)   08/04/24 112 kg (247 lb 5.7 oz)     Temp Readings from Last 3 Encounters:   09/05/24 98.1 °F (36.7 °C) (Oral)   08/14/24 99.2 °F (37.3 °C) (Oral)   08/13/24 98.2 °F (36.8 °C) (Oral)     BP Readings from Last 3 Encounters:   09/05/24 131/81   08/14/24 122/65   08/13/24 123/75     Pulse Readings from Last 3 Encounters:   09/05/24 96   08/14/24 97   08/13/24 92      General:  no acute distress at this time  Skin:  No acute rash  Eyes:  No scleral icterus and noninjected  ENT:  mucous membranes moist  Neck:  no carotid bruits  Chest:  Clear to auscultation percussion, good respiratory effort, no use of accessory respiratory muscles  CVS:  Regular rate and rhythm without rub   Abdomen:  soft and nontender   Extremities: lower  extremity edema  Neuro:  No gross focality  Psych:  Alert , cooperative   Dialysis access: PermCath        Medications:    Current Facility-Administered Medications:     acetaminophen (TYLENOL) tablet 650 mg, 650 mg, Oral, Q6H PRN, Kely Robert MD    albuterol (PROVENTIL HFA,VENTOLIN HFA) inhaler 2 puff, 2 puff, Inhalation, Q4H PRN, Kely Robert MD, 2 puff at 09/04/24 0910    albuterol inhalation solution 2.5 mg, 2.5 mg, Nebulization, TID, Todd Dickens MD, 2.5 mg at 09/05/24 0724    atorvastatin (LIPITOR) tablet 20 mg, 20 mg, Oral, Daily, Kely Robert MD, 20 mg at 09/02/24 0931    atovaquone (MEPRON) oral suspension 1,500 mg, 1,500 mg, Oral, Daily, Trish Kline MD, 1,500 mg at 09/02/24 0937    cefepime (MAXIPIME) 1,000 mg in dextrose 5 % 50 mL IVPB, 1,000 mg, Intravenous, Q24H, Jagjit Pham MD, Last Rate: 100 mL/hr at 09/04/24 2000, 1,000 mg at 09/04/24 2000    cyclophosphamide (CYTOXAN) capsule 100 mg, 100 mg, Oral, Daily, Kely Robert MD, 100 mg at 09/02/24 0938    dextromethorphan-guaiFENesin (ROBITUSSIN DM) oral syrup 10 mL, 10 mL, Oral, Q4H PRN, Kely Robert MD, 10 mL at 09/01/24 2142    epoetin shavonne (EPOGEN,PROCRIT) injection 6,000 Units, 6,000 Units, Intravenous, After Dialysis, Tirso Montez MD, 6,000 Units at 09/03/24 1416    fluticasone-vilanterol 200-25 mcg/actuation 1 puff, 1 puff, Inhalation, Daily, Robert Gilbert MD, 1 puff at 09/05/24 0816    heparin (porcine) subcutaneous injection 5,000 Units, 5,000 Units, Subcutaneous, Q8H JACK, Kely Robert MD, 5,000 Units at 09/05/24 0817    ipratropium (ATROVENT) 0.02 % inhalation solution 0.5 mg, 0.5 mg, Nebulization, TID, Todd Dickens MD, 0.5 mg at 09/05/24 0724    iron polysaccharides (FERREX) capsule 150 mg, 150 mg, Oral, Daily, Kely Robert MD, 150 mg at 09/02/24 0931    lamoTRIgine (LaMICtal) tablet 100 mg, 100 mg, Oral, HS, Kely Robert MD, 100 mg at 09/04/24 2104    lidocaine (LIDODERM) 5 % patch 1  patch, 1 patch, Topical, Daily, Trish Kline MD, 1 patch at 09/05/24 0816    losartan (COZAAR) tablet 25 mg, 25 mg, Oral, Daily, Ty Reese MD, 25 mg at 09/03/24 1543    metoprolol succinate (TOPROL-XL) 24 hr tablet 50 mg, 50 mg, Oral, BID, Gerber Robbins MD    montelukast (SINGULAIR) tablet 10 mg, 10 mg, Oral, Daily, Kely Robert MD, 10 mg at 09/02/24 0931    pantoprazole (PROTONIX) EC tablet 40 mg, 40 mg, Oral, Daily Before Breakfast, Kely Robert MD, 40 mg at 09/05/24 0542    predniSONE tablet 20 mg, 20 mg, Oral, Daily, Gerber Kim Jr., DO, 20 mg at 09/02/24 0931    sevelamer (RENAGEL) tablet 1,600 mg, 1,600 mg, Oral, TID With Meals, Kely Robert MD, 1,600 mg at 09/04/24 1710    Sodium Zirconium Cyclosilicate (Lokelma) 10 g, 10 g, Oral, Daily, Trish Kline MD, 10 g at 09/01/24 0811    traZODone (DESYREL) tablet 200 mg, 200 mg, Oral, HS, Kely Robert MD, 200 mg at 09/04/24 2105    trimethobenzamide (TIGAN) IM injection 200 mg, 200 mg, Intramuscular, Q6H PRN, Kely Robert MD, 200 mg at 08/30/24 2208    venlafaxine (EFFEXOR-XR) 24 hr capsule 225 mg, 225 mg, Oral, Daily, Kely Robert MD, 225 mg at 09/02/24 0929    Laboratory Results:  Results from last 7 days   Lab Units 09/05/24  0525 09/04/24  0450 09/03/24  0510 09/02/24  0509 09/01/24  0521 08/31/24  1556 08/31/24  0755 08/31/24  0443 08/31/24  0033 08/30/24 2025   WBC Thousand/uL 10.00 9.89 12.39* 12.88* 19.55*  --   --  10.64*  --  14.45*   HEMOGLOBIN g/dL 8.5* 8.4* 8.2* 7.8* 7.9* 7.9* 6.9* 6.7*  --  7.1*   HEMATOCRIT % 27.7* 27.5* 27.0* 25.4* 24.6* 25.4* 22.2* 22.1*  --  23.0*   PLATELETS Thousands/uL 216 238 266 244 243  --   --  245  --  258   SODIUM mmol/L 140 139 137 139 137  --   --  138  --  139   POTASSIUM mmol/L 4.2 4.0 4.8 5.2 4.4  --   --  5.6* 5.7* 5.7*   CHLORIDE mmol/L 100 97 100 100 98  --   --  102  --  102   CO2 mmol/L 32 34* 26 28 28  --   --  25  --  22   BUN mg/dL 40* 31* 62* 50* 40*  --   --   51*  --  44*   CREATININE mg/dL 7.83* 6.16* 9.85* 8.64* 6.86*  --   --  8.68*  --  8.11*   CALCIUM mg/dL 9.4 8.8 9.5 9.5 9.6  --   --  9.6  --  9.8   MAGNESIUM mg/dL  --   --   --   --  2.1  --   --  2.3  --  2.1   PHOSPHORUS mg/dL  --   --   --   --  4.7*  --   --  5.8*  --  6.1*       XR chest portable   Final Result by Maddie Valencia MD (09/04 1449)   No pleural effusions identified.   Probable mild pulmonary vascular congestion.   Unchanged cardiomegaly.            Workstation performed: [id number]         IR tunneled dialysis catheter check/change/reposition/angioplasty   Final Result by Graciela Bettencourt MD (09/03 1142)   1.  Pericatheter fibrin sheath stripping.   2.  Right-sided internal jugular tunneled dialysis catheter exchange, with its tip in the expected location of the right atrium. I exchanged a Titan dialysis catheter to an Kwan Split tunneled dialysis catheter.      Plan:      The catheter may be used immediately.      Workstation performed: [id number]         CTA chest pe study   Final Result by Jorge Modi MD (09/01 1239)      No pulmonary embolism.      Since July 11, 2022 there is persistent tree-in-bud nodularity in the lungs suggestive of a widespread infectious bronchiolitis that may be secondary to an atypical mycobacterium. Follicular bronchiolitis and acute hypersensitivity pneumonitis could also    be in the differential. Follow-up chest CT in 3 months after treatment for infectious bronchiolitis is advised.      New mild patchy bilateral multifocal pneumonia.      Pulmonary hypertension      New moderate bilateral pleural effusions.      The study was marked in EPIC for immediate notification.      Workstation performed: [id number]         XR chest 1 view portable   ED Interpretation by Alejandro Johnston PA-C (08/30 2112)   Consolidation right lower lobe suspicious for pneumonia      Final Result by Devyn Silva MD (08/31 1314)         1. New opacification lateral left lung base  "may reflect atelectasis or pneumonia. Probable small left effusion.   2. Mild cardiomegaly with mild vascular and interstitial prominence suggesting some degree of volume overload.            Workstation performed: [id number]             Portions of the record may have been created with voice recognition software. Occasional wrong word or \"sound a like\" substitutions may have occurred due to the inherent limitations of voice recognition software. Read the chart carefully and recognize, using context, where substitutions have occurred.    "

## 2024-09-05 NOTE — PROGRESS NOTES
[facility]  Progress Note  Name: Ngozi Beard I  MRN: [id number]  Unit/Bed#: S -01 I Date of Admission: 8/30/2024   Date of Service: 9/5/2024 I Hospital Day: 6    Assessment & Plan   Multifocal pneumonia  Assessment & Plan  -CT chest PE study revealed no evidence of PE. There is persistent tree-in-bud nodularity in the lungs suggestive of a widespread infectious bronchiolitis that may be secondary to an atypical mycobacterium. Follicular bronchiolitis and acute hypersensitivity pneumonitis could also be in the differential.  New mild patchy bilateral multifocal pneumonia  - Sputum culture: 4+ Pseudomonas   - ID: Suspect chronic process, however will proceed with short course of cefepime and observe clinically.   - Cardiology: Cleared for bronchoscopy.  - Pulm: Considering bronchoscopy if no improvement with abx, likely to be an outpatient procedure.    Plan:   -Cefepime 1 g daily for 5 days end date of September 10.   - Once HD schedule is stable and back to 3 times weekly schedule, IV cefepime can be transitioned to post HD dosing.         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 S/p PLEX.  Patient is anuric and dialysis-dependent  Tues/Thurs/Sat schedule  Access: Right IJ PermCath, Cath reevaluation  by IR on 9/3, no issues during HD. 9/4, issues with clotting, will needs re-evaluation @9.5. Requires Re-evaluation 9/6 IR, Fluid poor returns on 9/5.     Plan:  Hemodialysis tentatively scheduled on 9/6, pending IR re- evaluation 9/6  Continue PTA cyclophosphamide  Continue PTA sevalemer  Continue PTA atovaquone for PJP prophylaxis  Continue prednisone taper from outpatient nephrology prior to this admission  20 mg once daily till September 6  15 mg starting September 7 to September 20  12.5 mg starting September 21 to October 4  10 mg starting  October 5 to October 18  7.5 mg starting October 19 to November 2  5 mg daily starting November 3    * Shortness of breath  Assessment & Plan  CLINE: Negative PE, BNP 3019, CT chest-bilateral pleural effusion, tree-in-bud nodularity    Multifactorial, contributing factors of volume overload, chronic anemia, underlying COPD and asthma. Lower suspicion for PNA    Clinically improving with hemodialysis    Plan  Pulmonology is following, see pna problem list    Cardiomyopathy (HCC)  Assessment & Plan  August 31-EF of 25% with global hypokinesis.  No prior echo for comparison.    Suspect nonischemic cardiomyopathy from inflammatory/rheumatologic etiology     GDMT recommended, pt started on Toprol 50 mg daily, Losartan 25 mg daily  9/4 - Nifedipine held per Cardiology  Will need Cardiac MRI, likely an outpatient procedure.  Ischemic work up with with left cardiac cath in o/p settings        Generalized weakness  Assessment & Plan  Multifactorial, respiratory failure secondary to chronic pulmonary infection, volume overload, chronic anemia, chronic deconditioning from being ill    Plan  PT/OT evaluation apprecaited    Asthma without acute exacerbation  Assessment & Plan  Mild Bilateral wheezing was noted in the lower lung base upon initial presentation.  Does not require frequent inhaler use as an outpatient setting.  Low suspicion for exacerbation for the clinical symptoms presented during this hospital on admission    Plan  Continue albuterol-ipratropium nebs    Elevated transaminase level-resolved as of 9/5/2024  Assessment & Plan    INR 1.99  Unclear etiology. Possibly secondary to atovaquone use  Asymptomatic    Plan  Trend CMP  Consider RUQ US and acute hepatitis panel in the future    SIRS (systemic inflammatory response syndrome) (HCC)-resolved as of 9/5/2024  Assessment & Plan  On admission, met SIRS criteria with leukocytosis and tachycardia  Procalcitonin 0.77 (in the setting of renal disease)  COVID/Flu/RSV  negative  CXR - mildly pulmonary vascular congestion. No evidence of consolidation  CT chest revealed evidence of multifocal pneumonia    Multifactorial, steroid use, hypoxic respiratory failure, volume overload, chronic pulmonary infection    Plan  Monitor WBC and fever curve  1/2 Bc grew MRSA staph epidermidis, most likely contamination, follow second Bc  ID is on board, monitor off Abx    Anemia  Assessment & Plan  Recent Labs     09/03/24  0510 09/04/24  0450 09/05/24  0525   HGB 8.2* 8.4* 8.5*        Baseline Hgb 7-8  Etiology: component of iron deficiency and renal disease.     Plan  Monitor Hgb, transfuse for < 7  Continue PTA Ferrex    Dyslipidemia  Assessment & Plan  Continue PTA Lipitor    Primary hypertension  Assessment & Plan  Continue PTA nifedipine 60 mg daily  Start Losartan 25 mg daily  Start Toprol 25 mg daily    Bipolar 1 disorder (HCC)  Assessment & Plan  Continue PTA Lamictal, venlafaxine (150 mg and 75 mg daily in the morning), and trazodone 200 mg qd             VTE Pharmacologic Prophylaxis: VTE Score: 7 High Risk (Score >/= 5) - Pharmacological DVT Prophylaxis Ordered: heparin. Sequential Compression Devices Ordered.    Mobility:   Basic Mobility Inpatient Raw Score: 16  JH-HLM Goal: 5: Stand one or more mins  JH-HLM Achieved: 6: Walk 10 steps or more  JH-HLM Goal NOT achieved. Continue with multidisciplinary rounding and encourage appropriate mobility to improve upon JH-HLM goals.    Patient Centered Rounds: I performed bedside rounds with nursing staff today.  Discussions with Specialists or Other Care Team Provider: Nephrology, pulmonology, cardiology, interventional radiology, nursing    Education and Discussions with Family / Patient: Updated  (sister) via phone.    Current Length of Stay: 6 day(s)  Current Patient Status: Inpatient   Discharge Plan: Anticipate discharge in 48-72 hrs to rehab facility    Code Status: Level 1 - Full Code    Subjective:   Denies any  complaints.    Presents with no complaints.  Denies any chest pain, shortness of breath, fever, chills, abdominal pain, swellings.    Objective:     Vitals:   Temp (24hrs), Av.2 °F (36.8 °C), Min:98.1 °F (36.7 °C), Max:98.4 °F (36.9 °C)    Temp:  [98.1 °F (36.7 °C)-98.4 °F (36.9 °C)] 98.1 °F (36.7 °C)  HR:  [] 102  Resp:  [18-22] 22  BP: (112-134)/(76-92) 118/83  SpO2:  [90 %-99 %] 99 %  Body mass index is 41.16 kg/m².     Input and Output Summary (last 24 hours):     Intake/Output Summary (Last 24 hours) at 2024 1436  Last data filed at 2024 1229  Gross per 24 hour   Intake 860 ml   Output 430 ml   Net 430 ml       Physical Exam:   Physical Exam  Constitutional:       Appearance: She is ill-appearing.   HENT:      Mouth/Throat:      Mouth: Mucous membranes are moist.   Neck:      Comments: No JVD  Cardiovascular:      Rate and Rhythm: Normal rate and regular rhythm.      Heart sounds: No murmur heard.  Pulmonary:      Effort: Pulmonary effort is normal.      Breath sounds: Normal breath sounds.      Comments: Room air  Abdominal:      Palpations: Abdomen is soft.   Musculoskeletal:      Cervical back: Neck supple.   Skin:     General: Skin is warm.      Capillary Refill: Capillary refill takes less than 2 seconds.   Neurological:      Mental Status: She is alert.   Psychiatric:         Mood and Affect: Mood normal.          Additional Data:     Labs:  Results from last 7 days   Lab Units 24  0525 24  0510 24  0509   WBC Thousand/uL 10.00   < > 12.88*   HEMOGLOBIN g/dL 8.5*   < > 7.8*   HEMATOCRIT % 27.7*   < > 25.4*   PLATELETS Thousands/uL 216   < > 244   BANDS PCT %  --   --  4   LYMPHO PCT %  --   --  13*   MONO PCT %  --   --  3*   EOS PCT %  --   --  0    < > = values in this interval not displayed.     Results from last 7 days   Lab Units 24  0525   SODIUM mmol/L 140   POTASSIUM mmol/L 4.2   CHLORIDE mmol/L 100   CO2 mmol/L 32   BUN mg/dL 40*   CREATININE mg/dL 7.83*    ANION GAP mmol/L 8   CALCIUM mg/dL 9.4   ALBUMIN g/dL 3.2*   TOTAL BILIRUBIN mg/dL 0.48   ALK PHOS U/L 72   ALT U/L 20   AST U/L 10*   GLUCOSE RANDOM mg/dL 92     Results from last 7 days   Lab Units 08/30/24 2025   INR  1.99*     Results from last 7 days   Lab Units 09/04/24  1605   POC GLUCOSE mg/dl 138         Results from last 7 days   Lab Units 08/31/24  0443 08/30/24 2025   PROCALCITONIN ng/ml 1.00* 0.77*       Lines/Drains:  Invasive Devices       Peripheral Intravenous Line  Duration             Peripheral IV 09/03/24 Left Wrist 2 days              Hemodialysis Catheter  Duration             HD Permanent Double Catheter 2 days                      Telemetry:  Telemetry Orders (From admission, onward)               24 Hour Telemetry Monitoring  Continuous x 24 Hours (Telem)        Question:  Reason for 24 Hour Telemetry  Answer:  Decompensated CHF- and any one of the following: continuous diuretic infusion or total diuretic dose >200 mg daily, associated electrolyte derangement (I.e. K < 3.0), ionotropic drip (continuous infusion), hx of ventricular arrhythmia, or new EF < 35%                     Telemetry Reviewed: Normal Sinus Rhythm  Indication for Continued Telemetry Use: Acute CHF on >200 mg lasix/day or equivalent dose or with new reduced EF.              Imaging: No pertinent imaging reviewed.    Recent Cultures (last 7 days):   Results from last 7 days   Lab Units 09/02/24  1804 08/30/24 2039   BLOOD CULTURE   --  No Growth After 5 Days.  Staphylococcus epidermidis*   SPUTUM CULTURE  4+ Growth of Pseudomonas aeruginosa*  4+ Growth of  --    GRAM STAIN RESULT  Rare Epithelial cells per low power field*  No polys seen*  Rare Gram positive cocci in pairs*  Rare Gram variable rods* Gram positive cocci in clusters*       Last 24 Hours Medication List:   Current Facility-Administered Medications   Medication Dose Route Frequency Provider Last Rate    acetaminophen  650 mg Oral Q6H PRN Kely Robert  MD      albuterol  2 puff Inhalation Q4H PRN Kely Robert MD      albuterol  2.5 mg Nebulization TID Todd Dickens MD      atorvastatin  20 mg Oral Daily Kely Robert MD      atovaquone  1,500 mg Oral Daily Trish Kline MD      cefepime  1,000 mg Intravenous Q24H Jagjit Pham MD 1,000 mg (09/04/24 2000)    cyclophosphamide  100 mg Oral Daily Kely Robert MD      dextromethorphan-guaiFENesin  10 mL Oral Q4H PRN Kely Robert MD      epoetin shavonne  6,000 Units Intravenous After Dialysis Tirso Montez MD      fluticasone-vilanterol  1 puff Inhalation Daily Robert Gilbert MD      heparin (porcine)  5,000 Units Subcutaneous Q8H [facility] Kely Robert MD      ipratropium  0.5 mg Nebulization TID Todd Dickens MD      iron polysaccharides  150 mg Oral Daily Kely Robert MD      lamoTRIgine  100 mg Oral HS Kely Robert MD      lidocaine  1 patch Topical Daily Trish Kline MD      losartan  25 mg Oral Daily Ty Reese MD      metoprolol succinate  50 mg Oral BID Gerber Robbins MD      montelukast  10 mg Oral Daily Kely Robert MD      pantoprazole  40 mg Oral Daily Before Breakfast Kely Robert MD      predniSONE  20 mg Oral Daily Gerber Kim Jr., DO      sevelamer  1,600 mg Oral TID With Meals Kely Robert MD      Sodium Zirconium Cyclosilicate  10 g Oral Daily Trish Kline MD      traZODone  200 mg Oral HS Kely Robert MD      trimethobenzamide  200 mg Intramuscular Q6H PRN Kely Robert MD      venlafaxine  225 mg Oral Daily Kely Robert MD          Today, Patient Was Seen By: Kathryn Cleveland MD    **Please Note: This note may have been constructed using a voice recognition system.**

## 2024-09-05 NOTE — CONSULTS
e-Consult (IPC)  - Interventional Radiology  Ngozi Beard 64 y.o. female MRN: [id number]  Unit/Bed#: S -01 Encounter: [id number]          Interventional Radiology has been consulted to evaluate Ngozi Beard    We were consulted by nephrology concerning this patient.    Inpatient Consult to IR  Consult performed by: Gerber Fletcher MD  Consult ordered by: Joselyn Reyes Bahamonde, MD        09/05/24    Assessment/Recommendation:   64 yof pmh esrd dialyzed through a RIJ TDC.    The catheter is malfunctioning.  It was last exchanged 2 days ago.    We'll plan on evaluating the catheter tomorrow pending IR scheduling availability.  Please make NPO at MN.    11-20 minutes, >50% of the total time devoted to medical consultative verbal/EMR discussion between providers. Written report will be generated in the EMR.     Thank you for allowing Interventional Radiology to participate in the care of Ngozi Beard. Please don't hesitate to call or TigerText us with any questions.     Gerber Fletcher MD

## 2024-09-05 NOTE — ASSESSMENT & PLAN NOTE
-CT chest PE study revealed no evidence of PE. There is persistent tree-in-bud nodularity in the lungs suggestive of a widespread infectious bronchiolitis that may be secondary to an atypical mycobacterium. Follicular bronchiolitis and acute hypersensitivity pneumonitis could also be in the differential.  New mild patchy bilateral multifocal pneumonia  - Sputum culture: 4+ Pseudomonas   - ID: Suspect chronic process, however will proceed with short course of cefepime and observe clinically.   - Cardiology: Cleared for bronchoscopy.  - Pulm: Considering bronchoscopy if no improvement with abx, likely to be an outpatient procedure.    Plan:   -Cefepime 1 g daily for 5 days end date of September 10.   - Once HD schedule is stable and back to 3 times weekly schedule, IV cefepime can be transitioned to post HD dosing.

## 2024-09-05 NOTE — PROGRESS NOTES
Progress Note - Infectious Disease   Ngozi Beard 64 y.o. female MRN: [id number]  Unit/Bed#: S -01 Encounter: [id number]      Impression/Recommendations:  1.   Pseudomonas respiratory infection.  Clinical and radiological picture is more consistent with bronchitis than pneumonia.  Patient's dyspnea is already much improved with aggressive fluid removal.  However, she has persistent cough.  Given no immediate plan for bronchoscopy, antibiotic was initiated for likely Pseudomonas bronchitis.  Given high risk for fluoroquinolone toxicity, will have patient complete treatment course with IV cefepime.  Continue IV cefepime.  Treat x 7-day course, through 9/10.  Once HD schedule is stable and back to 3 times weekly schedule, IV cefepime can be transitioned to post HD dosing.    2.  Pneumonitis versus pneumonia.  Patient's respiratory symptoms are improving with aggressive diuresis.  Therefore, plan is to continue aggressive diuresis, in addition to treatment for Pseudomonas respiratory infection (see above).  If respiratory symptoms do not resolve or recur, patient can be reevaluated with repeat chest CT and bronchoscopy at that time.  Antibiotic plan as in above.  Plan for aggressive fluid removal via HD per nephrology.  Monitor respiratory symptoms and O2 saturation.  Monitor temperature.Bronchoscopy versus serial clinical and radiological monitoring.     3.  Bacteremia, with growth of MRSE in only 1 out of 2 admission blood cultures.  Patient has no fever and is not systemically ill, making true bacteremia not likely clinically.  Although patient does have permacath in place, with growth in only 1 culture sent, this is still most likely contaminated blood draw.  Patient had been on IV vancomycin but this was discontinued.  She remains clinically well off it.  No antibiotic needed for this indication.     3.  Leukocytosis.  In a patient on chronic steroid and now with increased steroid dosage, leukocytosis is  expected.  It is difficult to make clinical sense of her leukocytosis.     4.  Advanced CKD, on HD.  Functional difficulty of permacath noted.  HD per nephrology.     5.  Recently diagnosed rapidly progressing anti-GBM disease.  Patient is on Cytoxan and prednisone since diagnosis.  Continue outpatient Cytoxan/steroid.  Continue/restart atovaquone PCP prophylaxis.     Discussed with patient in detail regarding the above plan.  Discussed with Dr. Stevens from pulmonary service and Dr. Cleveland from primary service regarding plan above.  They are in agreement.     Antibiotics:  Cefepime # 2     Subjective:  Dyspnea and cough continue to improve.  Temperature stays down.  No chills.  No diarrhea.    Objective:  Vitals:  Temp:  [98.1 °F (36.7 °C)-98.4 °F (36.9 °C)] 98.1 °F (36.7 °C)  HR:  [] 92  Resp:  [18-20] 20  BP: (108-134)/(75-92) 134/92  SpO2:  [86 %-99 %] 92 %  Temp (24hrs), Av.2 °F (36.8 °C), Min:98.1 °F (36.7 °C), Max:98.4 °F (36.9 °C)  Current: Temperature: 98.1 °F (36.7 °C)    Physical Exam:     General: Awake, alert, cooperative, no distress.   Neck:  Supple. No mass.  No lymphadenopathy.   Lungs: Expansion symmetric, no rales, no wheezing, respirations unlabored.   Heart:  Regular rate and rhythm, S1 and S2 normal, no murmur.   Abdomen: Soft, nondistended, non-tender, bowel sounds active all four quadrants, no masses, no organomegaly.   Extremities: Stable leg edema. No erythema/warmth. No draining ulcer. Nontender to palpation.   Skin:  No rash.   Neuro: Moves all extremities.     Invasive Devices       Peripheral Intravenous Line  Duration             Peripheral IV 24 Left Wrist 2 days              Hemodialysis Catheter  Duration             HD Permanent Double Catheter 2 days                    Labs studies:   I have personally reviewed pertinent labs.  Results from last 7 days   Lab Units 24  0525 24  0450 24  0510 24  0509 24  0521 24  0443   POTASSIUM mmol/L  4.2 4.0 4.8   < > 4.4 5.6*   CHLORIDE mmol/L 100 97 100   < > 98 102   CO2 mmol/L 32 34* 26   < > 28 25   BUN mg/dL 40* 31* 62*   < > 40* 51*   CREATININE mg/dL 7.83* 6.16* 9.85*   < > 6.86* 8.68*   EGFR ml/min/1.73sq m 4 6 3   < > 5 4   CALCIUM mg/dL 9.4 8.8 9.5   < > 9.6 9.6   AST U/L 10*  --   --   --  31 57*   ALT U/L 20  --   --   --  61* 59*   ALK PHOS U/L 72  --   --   --  97 100    < > = values in this interval not displayed.     Results from last 7 days   Lab Units 09/05/24  0525 09/04/24  0450 09/03/24  0510   WBC Thousand/uL 10.00 9.89 12.39*   HEMOGLOBIN g/dL 8.5* 8.4* 8.2*   PLATELETS Thousands/uL 216 238 266     Results from last 7 days   Lab Units 09/02/24  1804 08/31/24  0043 08/30/24  2039   BLOOD CULTURE   --   --  No Growth After 5 Days.  Staphylococcus epidermidis*   SPUTUM CULTURE  4+ Growth of Pseudomonas aeruginosa*  4+ Growth of  --   --    GRAM STAIN RESULT  Rare Epithelial cells per low power field*  No polys seen*  Rare Gram positive cocci in pairs*  Rare Gram variable rods*  --  Gram positive cocci in clusters*   MRSA CULTURE ONLY   --  No Methicillin Resistant Staphlyococcus aureus (MRSA) isolated  --        Imaging Studies:   I have personally reviewed pertinent imaging study reports and images in PACS.    EKG, Pathology, and Other Studies:   I have personally reviewed pertinent reports.

## 2024-09-05 NOTE — ASSESSMENT & PLAN NOTE
Recent Labs     09/03/24  0510 09/04/24  0450 09/05/24  0525   HGB 8.2* 8.4* 8.5*        Baseline Hgb 7-8  Etiology: component of iron deficiency and renal disease.     Plan  Monitor Hgb, transfuse for < 7  Continue PTA Ferrex

## 2024-09-05 NOTE — PROGRESS NOTES
Patient:    MRN:  [id number]    Aidin Request ID:  [id number]    Level of care reserved:    Partner Reserved:    Clinical needs requested:    Geography searched:  15 miles around [id number]    Start of Service:    Request sent:  11:59am EDT on 9/2/2024 by Nubia El    Partner reserved:  by Gely Roberto    Choice list shared:  9:22am EDT on 9/5/2024 by Gely Roberto

## 2024-09-05 NOTE — PLAN OF CARE
Post-Dialysis RN Treatment Note    Blood Pressure:  Pre 131/81 mm/Hg  Post 118/83 mmHg   EDW  111.5 kg    Weight:  Pre 105.4 kg   Post 105.4 kg   Mode of weight measurement: Standing Scale   Volume Removed  0 ml    Treatment duration 45 minutes    NS given  No    Treatment shortened? Yes, describe: 2 hours and 45 minutes at patient request due to CVC not functioning.   Medications given during Rx Epogen 6000 units, Activase/Alteplase to each lumen of CVC pre treatment   Estimated Kt/V  Not Applicable   Access type: Permacath/TDC   Access Issues: Yes, describe: no return and poor function.  Patient to see IR tomorrow     Report called to primary nurse   Yes Laura Anand    Patient became extremely upset due to poorly functioning CVC.  Dr Reyes Bahamonde notified and aware.  Unable to maintain  or greater even after using TPA.  Patient became very frustrated with the situation and demanded to come off of the treatment.  Became even more upset when she found out that she was NPO due to possible IR with CVC exchange.  Primary nurse and team notified.         Current hemodialysis plan of care is to remove a total of 2500 ml of fluid over a 3 1/2 hour treatment for a net of 2 liters as tolerated.  Monitor vital signs every 15 minutes while on treatment for patient safety.  Utilize a 3 K+ bath for serum potassium of 4.2 to maintain electrolyte balance.  Report received from Laura Anand.  Plan reviewed with Dr Reyes Bahamonde at bedside.        Problem: METABOLIC, FLUID AND ELECTROLYTES - ADULT  Goal: Electrolytes maintained within normal limits  Description: INTERVENTIONS:  - Monitor labs and assess patient for signs and symptoms of electrolyte imbalances  - Administer electrolyte replacement as ordered  - Monitor response to electrolyte replacements, including repeat lab results as appropriate  - Instruct patient on fluid and nutrition as appropriate  Outcome: Progressing  Goal: Fluid balance  maintained  Description: INTERVENTIONS:  - Monitor labs   - Monitor I/O and WT  - Instruct patient on fluid and nutrition as appropriate  - Assess for signs & symptoms of volume excess or deficit  Outcome: Progressing

## 2024-09-05 NOTE — NURSING NOTE
Patient is refusing all oral medications, except for metoprolol and prednisone at this time- MD made aware

## 2024-09-05 NOTE — ASSESSMENT & PLAN NOTE
Admission in July 2024 for ANGELICA. 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 S/p PLEX.  Patient is anuric and dialysis-dependent  Tues/Thurs/Sat schedule  Access: Right IJ PermCath, Cath reevaluation  by IR on 9/3, no issues during HD. 9/4, issues with clotting, will needs re-evaluation @9.5. Requires Re-evaluation 9/6 IR, Fluid poor returns on 9/5.     Plan:  Hemodialysis tentatively scheduled on 9/6, pending IR re- evaluation 9/6  Continue PTA cyclophosphamide  Continue PTA sevalemer  Continue PTA atovaquone for PJP prophylaxis  Continue prednisone taper from outpatient nephrology prior to this admission  20 mg once daily till September 6  15 mg starting September 7 to September 20  12.5 mg starting September 21 to October 4  10 mg starting October 5 to October 18  7.5 mg starting October 19 to November 2  5 mg daily starting November 3

## 2024-09-05 NOTE — PROGRESS NOTES
Progress Note - Pulmonary   Ngozi Beard 64 y.o. female MRN: [id number]  Unit/Bed#: S -01 Encounter: [id number]      Assessment/Plan:  Patient is a 64-year-old female with past medical history of moderate persistent asthma, pulmonary hypertension, recently diagnosed anti-GBM disease, and ESRD on HD who presents for worsening shortness of breath.     #Acute hypoxic respiratory insufficiency  #Abnormal CT chest with diffuse tree-in-bud nodularities, patchy airspace consolidation in RUL, RLL and LLL, moderate bilateral pleural effusions  #Pseudomonas bronchitis  #New congestive heart failure with reduced EF of 25%  #Anti-GBM syndrome  #ESRD on HD  #Moderate persistent asthma without acute exacerbation  #Pulmonary Hypertension     Patient had a repeat CT chest with persistent tree-in-bud nodularity in the lungs, patchy airspace consolidation in the right upper and lower lobes and anterior basilar segment left lower lobe, and new moderate bilateral pleural effusions. This looks improved from her previous CT scan in terms of her bronchiolitis however the bilateral pleural effusions are new. Given her newly diagnosed renal failure and newly reduced ejection fraction, it is likely that patient's shortness of breath and CT scan findings are due to volume overload. In addition, patient's sputum culture is growing pseudomonas and she has cough productive of yellow sputum.     Plan:  -Plan for continued volume removal with HD as tolerated  -Continue IV cefepime for treatment of pseudomonas pneumonia  -Low suspicion for PCP pneumonia at this time and thus will hold off on bronchoscopy unless patient clinically worsens.   -Continue to monitor O2 saturations and maintain O2 saturation >89%  -Recommend cardiomyopathy evaluation with cardiology and likely ischemic workup.   -Continue albuterol/atrovent nebs TID, breo daily (in place of home advair), singulair daily  -Continue to encourage incentive spirometry, OOB as  "tolerated.  -Will arrange for outpatient pulmonary follow-up.    Subjective:   Patient examined at bedside this morning. She is on room air saturating well. She reports her breathing is feeling somewhat improved. She continues to have cough productive of yellow sputum. She is going to get an additional HD session this morning.     Objective:    Vitals: Blood pressure 130/77, pulse (!) 107, temperature 98.1 °F (36.7 °C), resp. rate 20, height 5' 3\" (1.6 m), weight 105 kg (232 lb 5.8 oz), SpO2 90%., RA, Body mass index is 41.16 kg/m².      Intake/Output Summary (Last 24 hours) at 9/5/2024 1006  Last data filed at 9/4/2024 1700  Gross per 24 hour   Intake 740 ml   Output 1295 ml   Net -555 ml       Physical Exam  Vitals and nursing note reviewed.   Constitutional:       General: She is not in acute distress.     Appearance: She is obese.   HENT:      Head: Normocephalic and atraumatic.      Nose: Nose normal.      Mouth/Throat:      Pharynx: Oropharynx is clear.   Eyes:      Conjunctiva/sclera: Conjunctivae normal.   Cardiovascular:      Rate and Rhythm: Normal rate.   Pulmonary:      Effort: Pulmonary effort is normal. No respiratory distress.      Breath sounds: Decreased breath sounds and rhonchi (diffuse bilaterally) present. No wheezing.   Abdominal:      Palpations: Abdomen is soft.   Musculoskeletal:         General: No swelling. Normal range of motion.      Cervical back: Normal range of motion and neck supple.   Skin:     General: Skin is warm and dry.      Capillary Refill: Capillary refill takes less than 2 seconds.   Neurological:      General: No focal deficit present.      Mental Status: She is alert and oriented to person, place, and time.   Psychiatric:         Mood and Affect: Mood normal.     Labs:   Results from last 7 days   Lab Units 09/05/24  0525 09/04/24  0450 09/03/24  0510 09/02/24  0509 09/01/24  0521 08/31/24  0755 08/31/24  0443   WBC Thousand/uL 10.00 9.89 12.39* 12.88* 19.55*  --  10.64* " "  HEMOGLOBIN g/dL 8.5* 8.4* 8.2* 7.8* 7.9*   < > 6.7*   HEMATOCRIT % 27.7* 27.5* 27.0* 25.4* 24.6*   < > 22.1*   PLATELETS Thousands/uL 216 238 266 244 243  --  245   MONO PCT %  --   --   --  3* 3*  --  4   EOS PCT %  --   --   --  0 0  --  0    < > = values in this interval not displayed.      Results from last 7 days   Lab Units 09/05/24  0525 09/04/24  0450 09/03/24  0510 09/02/24  0509 09/01/24 0521 08/31/24 0443   POTASSIUM mmol/L 4.2 4.0 4.8   < > 4.4 5.6*   CHLORIDE mmol/L 100 97 100   < > 98 102   CO2 mmol/L 32 34* 26   < > 28 25   BUN mg/dL 40* 31* 62*   < > 40* 51*   CREATININE mg/dL 7.83* 6.16* 9.85*   < > 6.86* 8.68*   CALCIUM mg/dL 9.4 8.8 9.5   < > 9.6 9.6   ALK PHOS U/L 72  --   --   --  97 100   ALT U/L 20  --   --   --  61* 59*   AST U/L 10*  --   --   --  31 57*    < > = values in this interval not displayed.     Results from last 7 days   Lab Units 09/01/24 0521 08/31/24 0443 08/30/24 2025   MAGNESIUM mg/dL 2.1 2.3 2.1     Results from last 7 days   Lab Units 09/01/24 0521 08/31/24 0443 08/30/24 2025   PHOSPHORUS mg/dL 4.7* 5.8* 6.1*      Results from last 7 days   Lab Units 08/30/24 2025   INR  1.99*   PTT seconds >210*         No results found for: \"TROPONINI\"    Procalcitonin: 1.00, 0.77     Flu/COVID/RSV PCR: Negative     Culture Data: MRSA negative, BC 1/2 + MRSE     Urinary antigens: Need collected      D-Dimer: 2.18     BNP: 3019    Microbiology:  Microbiology Results (last 21 days)       Collected Updated Procedure Result Status Patient Facility Result Comment      09/02/2024 1804 09/03/2024 0754 Sputum culture and Gram stain [[id number]]   (Abnormal)   Expectorated Sputum    [facility]  Component Value   Gram Stain Result Rare Epithelial cells per low power field  [P]     No polys seen  [P]     Rare Gram positive cocci in pairs  [P]     Rare Gram variable rods  [P]                09/02/2024 1427 09/03/2024 0000 Respiratory Panel " 2.1(RP2)with COVID19 [[id number]]   Nasopharyngeal Swab    Final result [facility]  Component Value   Adenovirus Not Detected   Bordetella parapertussis Not Detected   Bordetella pertussis Not Detected   Chlamydia pneumoniae Not Detected   SARS-CoV-2 Not Detected   Coronavirus 229E Not Detected   Coronavirus HKU1 Not Detected   Coronavirus NL63 Not Detected   Coronavirus OC43 Not Detected   Human Metapneumovirus Not Detected   Rhino/Enterovirus Not Detected   Influenza A Not Detected   Influenza B Not Detected   Mycoplasma pneumoniae Not Detected   Parainfluenza 1 Not Detected   Parainfluenza 2 Not Detected   Parainfluenza 3 Not Detected   Parainfluenza 4 Not Detected   Respiratory Syncytial Virus Not Detected            08/31/2024 0043 09/01/2024 1005 MRSA culture [[id number]]   Nares from Nose    Final result [facility]  Component Value   MRSA Culture Only No Methicillin Resistant Staphlyococcus aureus (MRSA) isolated               08/30/2024 2039 09/02/2024 2301 Blood culture #1 [[id number]]   Blood from Line, Venous    Preliminary result [facility]  Component Value   Blood Culture No Growth at 72 hrs.  [P]                08/30/2024 2039 09/02/2024 0911 Blood culture #2 [[id number]]    (Abnormal)   Blood from Arm, Right    Final result [facility] Susceptibility testing will not be performed as this organism, when isolated from a single set of blood cultures, represents probable skin izzy contamination. Please call the Microbiology Laboratory within 5 days at [phone] if further workup is required. Component Value   Blood Culture Staphylococcus epidermidis   Gram Stain Result Gram positive cocci in clusters    Refer to Blood Culture Identification Panel results    This is an appended report. These results have been appended to a previously preliminary verified report.               08/30/2024 2039  09/02/2024 0911 Blood Culture Identification Panel [[id number]]    (Abnormal)   Blood from Arm, Right    Final result [facility] Film Array panel tests for 11 gram positive organisms, 15 gram negative organisms, 7 yeast species and 10 resistance genes.  Component Value   Staphylococcus epidermidis Detected   mecA/C (Methicillin-resistance gene) Detected    This organism is a coagulase-negative Staphylococcus    If only 1 set of blood cultures is positive, this may represent a contaminant.  Consider holding antibiotics or repeating cultures prior to starting antibiotics.      If >1 one set of blood cultures is positive, vancomycin is the preferred therapy.                 08/30/2024 2025 08/30/2024 2112 FLU/RSV/COVID - if FLU/RSV clinically relevant [[id number]]    Nares from Nose    Final result [facility] This test has been performed using the CoV-2/Flu/RSV plus assay on the [employer] GeneXpert platform. This test has been validated by the  and verified by the performing laboratory.    This test is designed to amplify and detect the following: nucleocapsid (N), envelope (E), and RNA-dependent RNA polymerase (RdRP) genes of the SARS-CoV-2 genome; matrix (M), basic polymerase (PB2), and acidic protein (PA) segments of the influenza A genome; matrix (M) and non-structural protein (NS) segments of the influenza B genome, and the nucleocapsid genes of RSV A and RSV B.    Positive results are indicative of the presence of Flu A, Flu B, RSV, and/or SARS-CoV-2 RNA. Positive results for SARS-CoV-2 or suspected novel influenza should be reported to state, local, or federal health departments according to local reporting requirements.     All results should be assessed in conjunction with clinical presentation and other laboratory markers for clinical management.    FOR PEDIATRIC PATIENTS - copy/paste COVID Guidelines URL to browser:  [email or web address]     Component Value   SARS-CoV-2 Negative    INFLUENZA A PCR Negative    INFLUENZA B PCR Negative    RSV PCR Negative                       Imaging and other studies: I have personally reviewed pertinent films in PACS    CTA Chest PE 8/31/2024:   No pulmonary embolism. Since July 11, 2022 there is persistent tree-in-bud nodularity in the lungs suggestive of a widespread infectious bronchiolitis that may be secondary to an atypical mycobacterium. Follicular bronchiolitis and acute hypersensitivity pneumonitis could also be in the differential. Follow-up chest CT in 3 months after treatment for infectious bronchiolitis is advised. New mild patchy bilateral multifocal pneumonia. Pulmonary hypertension. New moderate bilateral pleural effusions.     Chest x-ray 8/30/2024:  New opacification lateral left lung base may reflect atelectasis or pneumonia. Probable small left effusion. Mild cardiomegaly with mild vascular and interstitial prominence suggesting some degree of volume overload.     EKG, Pathology, and Other Studies: I have personally reviewed pertinent reports.                  Echocardiogram, 8/31/2024:  Left Ventricle: Left ventricular cavity size is mildly dilated. Wall thickness is mildly increased. The left ventricular ejection fraction is 25%. Systolic function is severely reduced. There is severe global hypokinesis.  Right Ventricle: Right ventricular cavity size mildly dilated. Systolic function mildly reduced.  Left Atrium: Atrium is mildly dilated.  Right Atrium: Atrium is mildly dilated.  Aortic Valve: Mild regurgitation.  Mitral Valve: Moderate to severe regurgitation.  Tricuspid Valve: Moderate regurgitation. Right ventricular systolic pressure is moderately elevated. The estimated RVSP is 48.00 mmHg.  Pericardium: Small pericardial effusion anterior to the heart.    Gill Stevens  Pulmonary & Critical Care Medicine Fellow  PGY-4  [facility]

## 2024-09-05 NOTE — ASSESSMENT & PLAN NOTE
Continue PTA Lamictal, venlafaxine (150 mg and 75 mg daily in the morning), and trazodone 200 mg qd

## 2024-09-05 NOTE — ASSESSMENT & PLAN NOTE
CLINE: Negative PE, BNP 3019, CT chest-bilateral pleural effusion, tree-in-bud nodularity    Multifactorial, contributing factors of volume overload, chronic anemia, underlying COPD and asthma. Lower suspicion for PNA    Clinically improving with hemodialysis    Plan  Pulmonology is following, see pna problem list

## 2024-09-05 NOTE — ASSESSMENT & PLAN NOTE
August 31-EF of 25% with global hypokinesis.  No prior echo for comparison.    Suspect nonischemic cardiomyopathy from inflammatory/rheumatologic etiology     GDMT recommended, pt started on Toprol 50 mg daily, Losartan 25 mg daily  9/4 - Nifedipine held per Cardiology  Will need Cardiac MRI, likely an outpatient procedure.  Ischemic work up with with left cardiac cath in o/p settings

## 2024-09-05 NOTE — CASE MANAGEMENT
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     Abnormality of ascending aorta 2024     Postmenopausal 2024     Encounter for screening mammogram for malignant neoplasm of breast 2024     Allergic rhinitis 2024     Persistent cough 2024     Urge incontinence of urine 2024     Exercise intolerance 2024     Family history of coronary artery bypass graft surgery 2024     Urge urinary incontinence 2024     Female stress incontinence 2024     Paranoid schizophrenia ([facility]) 2023     Asthma without acute exacerbation 2023     Asthma due to seasonal allergies 2023     Bipolar 1 disorder ([facility]) 2022     Primary hypertension 2022     Dyslipidemia 2022       LOS (days): 6  Geometric Mean LOS (GMLOS) (days): 4.4  Days to GMLOS:-1.4     OBJECTIVE:  Risk of Unplanned Readmission Score: 34.04         Current admission status: Inpatient   Preferred Pharmacy:   [id number] - [address] [place] [id number]  Phone: [phone] Fax: [phone]    [facility] - [place] [id number] [address]  Phone: [phone] Fax: [phone]    Primary Care Provider: Meenakshi Balbuena  MD    Primary Insurance: [facility]  Secondary Insurance: [facility]    DISCHARGE DETAILS:    Discharge planning discussed with:: Patient  Freedom of Choice: Yes  Comments - [place] of Choice: [facility]     Were Treatment Team discharge recommendations reviewed with patient/caregiver?: Yes  Did patient/caregiver verbalize understanding of patient care needs?: N/A- going to facility  Were patient/caregiver advised of the risks associated with not following Treatment Team discharge recommendations?: Yes    Contacts  Patient Contacts: Ngozi  Relationship to Patient:: Other (Comment)  Contact Method: In Person  Reason/Outcome: Continuity of Care, Referral, Discharge Planning    Other Referral/Resources/Interventions Provided:  Interventions: Other (Specify), Short Term Rehab  Referral Comments: CM spoke with pt at bedside to notify her [facility] is able to offer a bed. Pt reported [facility] is preferred facility. CM reserved facility via [facility]. CM will continue to monitor for medical stability so insurance auth can be obtained.         Treatment Team Recommendation: Short Term Rehab  Discharge Destination Plan:: Short Term Rehab

## 2024-09-06 ENCOUNTER — APPOINTMENT (INPATIENT)
Dept: RADIOLOGY | Facility: HOSPITAL | Age: 64
DRG: 286 | End: 2024-09-06
Attending: STUDENT IN AN ORGANIZED HEALTH CARE EDUCATION/TRAINING PROGRAM
Payer: COMMERCIAL

## 2024-09-06 ENCOUNTER — APPOINTMENT (INPATIENT)
Dept: DIALYSIS | Facility: HOSPITAL | Age: 64
DRG: 286 | End: 2024-09-06
Payer: COMMERCIAL

## 2024-09-06 LAB
ALBUMIN SERPL BCG-MCNC: 3.4 G/DL (ref 3.5–5)
ALP SERPL-CCNC: 75 U/L (ref 34–104)
ALT SERPL W P-5'-P-CCNC: 16 U/L (ref 7–52)
ANION GAP SERPL CALCULATED.3IONS-SCNC: 10 MMOL/L (ref 4–13)
AST SERPL W P-5'-P-CCNC: 10 U/L (ref 13–39)
BILIRUB SERPL-MCNC: 0.45 MG/DL (ref 0.2–1)
BUN SERPL-MCNC: 52 MG/DL (ref 5–25)
CALCIUM ALBUM COR SERPL-MCNC: 10.2 MG/DL (ref 8.3–10.1)
CALCIUM SERPL-MCNC: 9.7 MG/DL (ref 8.4–10.2)
CHLORIDE SERPL-SCNC: 99 MMOL/L (ref 96–108)
CO2 SERPL-SCNC: 29 MMOL/L (ref 21–32)
CREAT SERPL-MCNC: 8.84 MG/DL (ref 0.6–1.3)
ERYTHROCYTE [DISTWIDTH] IN BLOOD BY AUTOMATED COUNT: 18.8 % (ref 11.6–15.1)
GFR SERPL CREATININE-BSD FRML MDRD: 4 ML/MIN/1.73SQ M
GLUCOSE SERPL-MCNC: 127 MG/DL (ref 65–140)
HCT VFR BLD AUTO: 27.5 % (ref 34.8–46.1)
HGB BLD-MCNC: 8.5 G/DL (ref 11.5–15.4)
MCH RBC QN AUTO: 29 PG (ref 26.8–34.3)
MCHC RBC AUTO-ENTMCNC: 30.9 G/DL (ref 31.4–37.4)
MCV RBC AUTO: 94 FL (ref 82–98)
PLATELET # BLD AUTO: 213 THOUSANDS/UL (ref 149–390)
PMV BLD AUTO: 10.4 FL (ref 8.9–12.7)
POTASSIUM SERPL-SCNC: 5.1 MMOL/L (ref 3.5–5.3)
PROT SERPL-MCNC: 5.7 G/DL (ref 6.4–8.4)
RBC # BLD AUTO: 2.93 MILLION/UL (ref 3.81–5.12)
SODIUM SERPL-SCNC: 138 MMOL/L (ref 135–147)
WBC # BLD AUTO: 9.45 THOUSAND/UL (ref 4.31–10.16)

## 2024-09-06 PROCEDURE — 99232 SBSQ HOSP IP/OBS MODERATE 35: CPT | Performed by: INTERNAL MEDICINE

## 2024-09-06 PROCEDURE — 99024 POSTOP FOLLOW-UP VISIT: CPT | Performed by: RADIOLOGY

## 2024-09-06 PROCEDURE — 0JWTXXZ REVISION OF TUNNELED VASCULAR ACCESS DEVICE IN TRUNK SUBCUTANEOUS TISSUE AND FASCIA, EXTERNAL APPROACH: ICD-10-PCS | Performed by: RADIOLOGY

## 2024-09-06 PROCEDURE — 94640 AIRWAY INHALATION TREATMENT: CPT

## 2024-09-06 PROCEDURE — 85027 COMPLETE CBC AUTOMATED: CPT

## 2024-09-06 PROCEDURE — 80053 COMPREHEN METABOLIC PANEL: CPT

## 2024-09-06 PROCEDURE — 94760 N-INVAS EAR/PLS OXIMETRY 1: CPT

## 2024-09-06 PROCEDURE — 99232 SBSQ HOSP IP/OBS MODERATE 35: CPT | Performed by: STUDENT IN AN ORGANIZED HEALTH CARE EDUCATION/TRAINING PROGRAM

## 2024-09-06 RX ORDER — HEPARIN SODIUM 1000 [USP'U]/ML
2000 INJECTION, SOLUTION INTRAVENOUS; SUBCUTANEOUS ONCE
Status: DISCONTINUED | OUTPATIENT
Start: 2024-09-06 | End: 2024-09-06

## 2024-09-06 RX ORDER — HEPARIN SODIUM,PORCINE/PF 10 UNIT/ML
2.1 SYRINGE (ML) INTRAVENOUS ONCE
Status: DISCONTINUED | OUTPATIENT
Start: 2024-09-06 | End: 2024-09-06

## 2024-09-06 RX ORDER — HEPARIN SODIUM 1000 [USP'U]/ML
2000 INJECTION, SOLUTION INTRAVENOUS; SUBCUTANEOUS ONCE AS NEEDED
Status: COMPLETED | OUTPATIENT
Start: 2024-09-06 | End: 2024-09-07

## 2024-09-06 RX ORDER — HEPARIN SODIUM 1000 [USP'U]/ML
2100 INJECTION, SOLUTION INTRAVENOUS; SUBCUTANEOUS ONCE
Status: DISCONTINUED | OUTPATIENT
Start: 2024-09-06 | End: 2024-09-06

## 2024-09-06 RX ORDER — HEPARIN SODIUM 1000 [USP'U]/ML
500 INJECTION, SOLUTION INTRAVENOUS; SUBCUTANEOUS
Status: COMPLETED | OUTPATIENT
Start: 2024-09-06 | End: 2024-09-07

## 2024-09-06 RX ORDER — HEPARIN SODIUM,PORCINE/PF 10 UNIT/ML
2.1 SYRINGE (ML) INTRAVENOUS ONCE
Status: DISCONTINUED | OUTPATIENT
Start: 2024-09-06 | End: 2024-09-06 | Stop reason: SDUPTHER

## 2024-09-06 RX ORDER — HEPARIN SODIUM,PORCINE/PF 10 UNIT/ML
2 SYRINGE (ML) INTRAVENOUS ONCE
Status: DISCONTINUED | OUTPATIENT
Start: 2024-09-06 | End: 2024-09-06

## 2024-09-06 RX ORDER — LIDOCAINE WITH 8.4% SOD BICARB 0.9%(10ML)
SYRINGE (ML) INJECTION AS NEEDED
Status: COMPLETED | OUTPATIENT
Start: 2024-09-06 | End: 2024-09-06

## 2024-09-06 RX ORDER — HEPARIN SODIUM,PORCINE/PF 10 UNIT/ML
2 SYRINGE (ML) INTRAVENOUS ONCE
Status: DISCONTINUED | OUTPATIENT
Start: 2024-09-06 | End: 2024-09-06 | Stop reason: SDUPTHER

## 2024-09-06 RX ADMIN — ALTEPLASE 2 MG: 2.2 INJECTION, POWDER, LYOPHILIZED, FOR SOLUTION INTRAVENOUS at 15:55

## 2024-09-06 RX ADMIN — PREDNISONE 20 MG: 10 TABLET ORAL at 17:35

## 2024-09-06 RX ADMIN — IPRATROPIUM BROMIDE 0.5 MG: 0.5 SOLUTION RESPIRATORY (INHALATION) at 19:57

## 2024-09-06 RX ADMIN — ALBUTEROL SULFATE 2.5 MG: 2.5 SOLUTION RESPIRATORY (INHALATION) at 07:46

## 2024-09-06 RX ADMIN — ALBUTEROL SULFATE 2.5 MG: 2.5 SOLUTION RESPIRATORY (INHALATION) at 19:57

## 2024-09-06 RX ADMIN — METOPROLOL SUCCINATE 50 MG: 50 TABLET, EXTENDED RELEASE ORAL at 17:35

## 2024-09-06 RX ADMIN — Medication 5 ML: at 13:49

## 2024-09-06 RX ADMIN — FLUTICASONE FUROATE AND VILANTEROL TRIFENATATE 1 PUFF: 200; 25 POWDER RESPIRATORY (INHALATION) at 08:20

## 2024-09-06 RX ADMIN — ALBUTEROL SULFATE 2 PUFF: 90 AEROSOL, METERED RESPIRATORY (INHALATION) at 08:20

## 2024-09-06 RX ADMIN — TRAZODONE HYDROCHLORIDE 200 MG: 100 TABLET ORAL at 21:24

## 2024-09-06 RX ADMIN — CEFEPIME 1000 MG: 1 INJECTION, POWDER, FOR SOLUTION INTRAMUSCULAR; INTRAVENOUS at 20:29

## 2024-09-06 RX ADMIN — LAMOTRIGINE 100 MG: 100 TABLET ORAL at 21:24

## 2024-09-06 RX ADMIN — LIDOCAINE 5% 1 PATCH: 700 PATCH TOPICAL at 08:19

## 2024-09-06 RX ADMIN — IPRATROPIUM BROMIDE 0.5 MG: 0.5 SOLUTION RESPIRATORY (INHALATION) at 07:46

## 2024-09-06 RX ADMIN — PANTOPRAZOLE SODIUM 40 MG: 40 TABLET, DELAYED RELEASE ORAL at 06:11

## 2024-09-06 NOTE — PROGRESS NOTES
Patient seen at bedside while on dialysis.    The right sided tunneled dialysis catheter was prepped and draped in the usual sterile fashion.  The suture was cut, and the catheter was pulled back while on the machine.  Prior to the repositioning the catheter had alarmed twice.  Afterwards, the catheter appeared to be functioning normally with no evidence of any issue.  The catheter was resutured and sterilely dressed according to protocol.

## 2024-09-06 NOTE — PROGRESS NOTES
NEPHROLOGY PROGRESS NOTE   Ngozi Beard 64 y.o. 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  Patient admitted with shortness of breath.  nephrology is consulted for management of dialysis        PLAN:    # Anuric  KDIGO ANGELICA stage 3, dialysis dependent with no evidence of kidney recovery  Etiology: Anti-GBM disease  Continue to be dialysis dependent, anuric  Last effective dialysis was on 9/3  Treatment:  Plan for HD today.  Discussed with IR, Dr. Gill will add chest PermCath during dialysis today     # Anti-GBM disease  Status post Plex for 14 days, last anti-GBM more than 8 on July 29  No kidney recovery   Concern of pulmonary hemorrhage pending bronchoscopy  Continue with Cytoxan        # Immunosuppressive therapy  Continue with prednisone taper as a scheduled currently taking 20 mg  Cytoxan 100 mg daily, adjusted by kidney function and age plan to continue for 3 months     # Prophylaxis  On atovaquone, before she was on Bactrim but patient became  hyperkalemic  PPIs  Calcium and vitamin D     #Acid-base Disorder  serum HCO3 29 mmol/L  Will adjust bicarbonate on dialysis     #Volume status/hypertension:  Volume: Hypervolemic  Blood pressure: Normotensive, /78, goal less than 140/90 ultrafiltration on dialysis hopefully today  Recommend:  Low-sodium diet  Losartan 25  Metoprolol 25 daily  Nifedipine 60     # Hyperkalemia  On Lokelma  Low potassium diet        #Anemia:  Current hemoglobin:8.5 mg/dL  Treatment:  Epogen 6000 on dialysis  Transfuse for hemoglobin less than 7.0 per primary service       # Vascular access complications  PermCath replaced on 9/3   9/4: PermCath did not work well, UF had to be terminated early, received heparin on dialysis with heparin lock   9/5: Attempted dialysis with no  success, s/p alteplase.  PermCath not working   IR to adjust PermCath on dialysis today as above      #Secondary Hyperparathyroidism   Continue Renagel with meals    The highlighted and/or bolded points in my assessment, plan, and disposition were discussed with the primary team and we agree proceed with dialysis today okay .  Previous records were personally reviewed by me to obtain a baseline creatinine.   The images (CXR) were personally reviewed by me in PACS      SUBJECTIVE:  Patient seen and examined at bedside. No chest pain, shortness of breath,     OBJECTIVE:  Current Weight: Weight - Scale: 106 kg (233 lb 0.4 oz)  Vitals:    09/06/24 0746   BP: 122/78   Pulse: 74   Resp: 17   Temp: 98.4 °F (36.9 °C)   SpO2: 98%       Intake/Output Summary (Last 24 hours) at 9/6/2024 1150  Last data filed at 9/6/2024 0609  Gross per 24 hour   Intake 420 ml   Output 430 ml   Net -10 ml     Wt Readings from Last 3 Encounters:   09/06/24 106 kg (233 lb 0.4 oz)   08/10/24 118 kg (259 lb 0.7 oz)   08/04/24 112 kg (247 lb 5.7 oz)     Temp Readings from Last 3 Encounters:   09/06/24 98.4 °F (36.9 °C) (Oral)   08/14/24 99.2 °F (37.3 °C) (Oral)   08/13/24 98.2 °F (36.8 °C) (Oral)     BP Readings from Last 3 Encounters:   09/06/24 122/78   08/14/24 122/65   08/13/24 123/75     Pulse Readings from Last 3 Encounters:   09/06/24 74   08/14/24 97   08/13/24 92        General:  no acute distress at this time  Skin:  No acute rash  Eyes:  No scleral icterus and noninjected  ENT:  mucous membranes moist  Neck:  no carotid bruits  Chest:  Clear to auscultation percussion, good respiratory effort, no use of accessory respiratory muscles  CVS:  Regular rate and rhythm without rub   Abdomen:  soft and nontender   Extremities: lower extremity edema  Neuro:  No gross focality  Psych:  Alert , cooperative   Dialysis access PermCath so;      Medications:    Current Facility-Administered Medications:     acetaminophen (TYLENOL) tablet 650 mg, 650  mg, Oral, Q6H PRN, Kely Robert MD    albuterol (PROVENTIL HFA,VENTOLIN HFA) inhaler 2 puff, 2 puff, Inhalation, Q4H PRN, Kely Robert MD, 2 puff at 09/06/24 0820    albuterol inhalation solution 2.5 mg, 2.5 mg, Nebulization, TID, Todd Dickens MD, 2.5 mg at 09/06/24 0746    atorvastatin (LIPITOR) tablet 20 mg, 20 mg, Oral, Daily, Kely Robert MD, 20 mg at 09/02/24 0931    atovaquone (MEPRON) oral suspension 1,500 mg, 1,500 mg, Oral, Daily, Trish Kline MD, 1,500 mg at 09/02/24 0937    cefepime (MAXIPIME) 1,000 mg in dextrose 5 % 50 mL IVPB, 1,000 mg, Intravenous, Q24H, Jagjit Pham MD, Last Rate: 100 mL/hr at 09/05/24 2055, 1,000 mg at 09/05/24 2055    cyclophosphamide (CYTOXAN) capsule 100 mg, 100 mg, Oral, Daily, Kely Robert MD, 100 mg at 09/02/24 0938    dextromethorphan-guaiFENesin (ROBITUSSIN DM) oral syrup 10 mL, 10 mL, Oral, Q4H PRN, Kely Robert MD, 10 mL at 09/01/24 2142    epoetin shavonne (EPOGEN,PROCRIT) injection 6,000 Units, 6,000 Units, Intravenous, After Dialysis, Tirso Montez MD, 6,000 Units at 09/05/24 1212    fluticasone-vilanterol 200-25 mcg/actuation 1 puff, 1 puff, Inhalation, Daily, Robert Gilbert MD, 1 puff at 09/06/24 0820    heparin (porcine) injection 2,000 Units, 2,000 Units, Intravenous, Once, Joselyn Reyes Bahamonde, MD    heparin (porcine) subcutaneous injection 5,000 Units, 5,000 Units, Subcutaneous, Q8H JACK, Kely Robert MD, 5,000 Units at 09/05/24 0817    Heparin Na (Pork) Lock Flsh PF injection 2 Units, 2 Units, Intracatheter, Once, Joselyn Reyes Bahamonde, MD    Heparin Na (Pork) Lock Flsh PF injection 2.1 Units, 2.1 Units, Intracatheter, Once, Joselyn Reyes Bahamonde, MD    ipratropium (ATROVENT) 0.02 % inhalation solution 0.5 mg, 0.5 mg, Nebulization, TID, Todd Dickens MD, 0.5 mg at 09/06/24 0746    iron polysaccharides (FERREX) capsule 150 mg, 150 mg, Oral, Daily, Kely Robert MD, 150 mg at 09/02/24 0931    lamoTRIgine  (LaMICtal) tablet 100 mg, 100 mg, Oral, HS, Kely Robert MD, 100 mg at 09/05/24 2031    lidocaine (LIDODERM) 5 % patch 1 patch, 1 patch, Topical, Daily, Trish Kline MD, 1 patch at 09/06/24 0819    losartan (COZAAR) tablet 25 mg, 25 mg, Oral, Daily, Ty Reese MD, 25 mg at 09/03/24 1543    metoprolol succinate (TOPROL-XL) 24 hr tablet 50 mg, 50 mg, Oral, BID, Gerber Robbins MD, 50 mg at 09/05/24 1728    montelukast (SINGULAIR) tablet 10 mg, 10 mg, Oral, Daily, Kely Robert MD, 10 mg at 09/02/24 0931    pantoprazole (PROTONIX) EC tablet 40 mg, 40 mg, Oral, Daily Before Breakfast, Kely Robert MD, 40 mg at 09/06/24 0611    [START ON 9/7/2024] predniSONE tablet 15 mg, 15 mg, Oral, Daily, Kathryn Cleveland MD    predniSONE tablet 20 mg, 20 mg, Oral, Daily, Kathryn Cleveland MD, 20 mg at 09/05/24 1729    sevelamer (RENAGEL) tablet 1,600 mg, 1,600 mg, Oral, TID With Meals, Kely Robert MD, 1,600 mg at 09/04/24 1710    Sodium Zirconium Cyclosilicate (Lokelma) 10 g, 10 g, Oral, Daily, Trish Kline MD, 10 g at 09/01/24 0811    traZODone (DESYREL) tablet 200 mg, 200 mg, Oral, HS, Kely Robert MD, 200 mg at 09/05/24 2031    trimethobenzamide (TIGAN) IM injection 200 mg, 200 mg, Intramuscular, Q6H PRN, Kely Robert MD, 200 mg at 08/30/24 2208    venlafaxine (EFFEXOR-XR) 24 hr capsule 225 mg, 225 mg, Oral, Daily, Kely Robert MD, 225 mg at 09/02/24 0929    Laboratory Results:  Results from last 7 days   Lab Units 09/06/24  0609 09/05/24  0525 09/04/24  0450 09/03/24  0510 09/02/24  0509 09/01/24  0521 08/31/24  1556 08/31/24  0755 08/31/24  0443 08/31/24  0033 08/30/24 2025   WBC Thousand/uL 9.45 10.00 9.89 12.39* 12.88* 19.55*  --   --  10.64*  --  14.45*   HEMOGLOBIN g/dL 8.5* 8.5* 8.4* 8.2* 7.8* 7.9* 7.9*   < > 6.7*  --  7.1*   HEMATOCRIT % 27.5* 27.7* 27.5* 27.0* 25.4* 24.6* 25.4*   < > 22.1*  --  23.0*   PLATELETS Thousands/uL 213 216 238 266 244 243  --   --  245  --  258   SODIUM mmol/L 138 140 139  137 139 137  --   --  138  --  139   POTASSIUM mmol/L 5.1 4.2 4.0 4.8 5.2 4.4  --   --  5.6*   < > 5.7*   CHLORIDE mmol/L 99 100 97 100 100 98  --   --  102  --  102   CO2 mmol/L 29 32 34* 26 28 28  --   --  25  --  22   BUN mg/dL 52* 40* 31* 62* 50* 40*  --   --  51*  --  44*   CREATININE mg/dL 8.84* 7.83* 6.16* 9.85* 8.64* 6.86*  --   --  8.68*  --  8.11*   CALCIUM mg/dL 9.7 9.4 8.8 9.5 9.5 9.6  --   --  9.6  --  9.8   MAGNESIUM mg/dL  --   --   --   --   --  2.1  --   --  2.3  --  2.1   PHOSPHORUS mg/dL  --   --   --   --   --  4.7*  --   --  5.8*  --  6.1*    < > = values in this interval not displayed.       XR chest portable   Final Result by Maddie Valencia MD (09/04 1449)   No pleural effusions identified.   Probable mild pulmonary vascular congestion.   Unchanged cardiomegaly.            Workstation performed: [id number]         IR tunneled dialysis catheter check/change/reposition/angioplasty   Final Result by Graciela Bettencourt MD (09/03 1142)   1.  Pericatheter fibrin sheath stripping.   2.  Right-sided internal jugular tunneled dialysis catheter exchange, with its tip in the expected location of the right atrium. I exchanged a Titan dialysis catheter to an Kwan Split tunneled dialysis catheter.      Plan:      The catheter may be used immediately.      Workstation performed: [id number]         CTA chest pe study   Final Result by Jorge Modi MD (09/01 1239)      No pulmonary embolism.      Since July 11, 2022 there is persistent tree-in-bud nodularity in the lungs suggestive of a widespread infectious bronchiolitis that may be secondary to an atypical mycobacterium. Follicular bronchiolitis and acute hypersensitivity pneumonitis could also    be in the differential. Follow-up chest CT in 3 months after treatment for infectious bronchiolitis is advised.      New mild patchy bilateral multifocal pneumonia.      Pulmonary hypertension      New moderate bilateral pleural effusions.      The study was  "marked in EPIC for immediate notification.      Workstation performed: [id number]         XR chest 1 view portable   ED Interpretation by Alejandro Johnston PA-C (08/30 2112)   Consolidation right lower lobe suspicious for pneumonia      Final Result by Devyn Silva MD (08/31 1314)         1. New opacification lateral left lung base may reflect atelectasis or pneumonia. Probable small left effusion.   2. Mild cardiomegaly with mild vascular and interstitial prominence suggesting some degree of volume overload.            Workstation performed: [id number]         IR tunneled dialysis catheter check/change/reposition/angioplasty    (Results Pending)       Portions of the record may have been created with voice recognition software. Occasional wrong word or \"sound a like\" substitutions may have occurred due to the inherent limitations of voice recognition software. Read the chart carefully and recognize, using context, where substitutions have occurred.    "

## 2024-09-06 NOTE — QUICK NOTE
Attempted to call pt's sister Jacquelyn several times with updates and clarify her request for meeting with all specialist. There was no answer. Voicemail left.

## 2024-09-06 NOTE — PROGRESS NOTES
Progress Note - Infectious Disease   Ngozi Beard 64 y.o. female MRN: [id number]  Unit/Bed#: S -01 Encounter: [id number]      IImpression/Recommendations:  1.   Pseudomonas respiratory infection.  Clinical and radiological picture is more consistent with bronchitis than pneumonia.  Patient's dyspnea is already much improved with aggressive fluid removal.  However, she has persistent cough.  Given no immediate plan for bronchoscopy, antibiotic was initiated for likely Pseudomonas bronchitis.  Given high risk for fluoroquinolone toxicity, will have patient complete treatment course with IV cefepime.  Continue IV cefepime.  Treat x 7-day course, through 9/10.  Once HD schedule is stable and back to 3 times weekly schedule, IV cefepime can be transitioned to post HD dosing.     2.  Pneumonitis versus pneumonia.  Patient's respiratory symptoms are improving with aggressive diuresis.  Therefore, plan is to continue aggressive diuresis, in addition to treatment for Pseudomonas respiratory infection (see above).  If respiratory symptoms do not resolve or recur, patient can be reevaluated with repeat chest CT and bronchoscopy at that time.  Antibiotic plan as in above.  Plan for aggressive fluid removal via HD per nephrology.  Monitor respiratory symptoms and O2 saturation.     3.  Bacteremia, with growth of MRSE in only 1 out of 2 admission blood cultures.  Patient has no fever and is not systemically ill, making true bacteremia not likely clinically.  Although patient does have permacath in place, with growth in only 1 culture sent, this is still most likely contaminated blood draw.  Patient had been on IV vancomycin but this was discontinued.  She remains clinically well off it.  No antibiotic needed for this indication.     3.  Leukocytosis.  Most likely steroid effect.  WBC fluctuating, currently normal.     4.  Advanced CKD, on HD.  Functional difficulty of permacath noted.  IR evaluation for permacath  revision pending.  HD per nephrology.     5.  Recently diagnosed rapidly progressing anti-GBM disease.  Patient is on Cytoxan and prednisone since diagnosis.  Continue outpatient Cytoxan/steroid.  Continue/restart atovaquone PCP prophylaxis.     Discussed with patient in detail regarding the above plan.     Antibiotics:  Cefepime # 3     Subjective:  Dyspnea is much improved, near baseline.  Cough is also improved.  Temperature stays down.  No chills.  No diarrhea.    Objective:  Vitals:  Temp:  [98.1 °F (36.7 °C)-99.6 °F (37.6 °C)] 98.4 °F (36.9 °C)  HR:  [] 74  Resp:  [17-22] 17  BP: (118-134)/(71-92) 122/78  SpO2:  [92 %-99 %] 98 %  Temp (24hrs), Av.6 °F (37 °C), Min:98.1 °F (36.7 °C), Max:99.6 °F (37.6 °C)  Current: Temperature: 98.4 °F (36.9 °C)    Physical Exam:     General: Awake, alert, cooperative, no distress.   Neck:  Supple. No mass.  No lymphadenopathy.   Lungs: Expansion symmetric, no rales, no wheezing, respirations unlabored.   Heart:  Regular rate and rhythm, S1 and S2 normal, no murmur.   Abdomen: Soft, nondistended, non-tender, bowel sounds active all four quadrants, no masses, no organomegaly.   Extremities: No edema. No erythema/warmth. No ulcer. Nontender to palpation.   Skin:  No rash.   Neuro: Moves all extremities.     Invasive Devices       Peripheral Intravenous Line  Duration             Peripheral IV 24 Dorsal (posterior);Left Forearm <1 day              Hemodialysis Catheter  Duration             HD Permanent Double Catheter 3 days                    Labs studies:   I have personally reviewed pertinent labs.  Results from last 7 days   Lab Units 24  0609 24  0525 24  0450 24  0509 24  0521   POTASSIUM mmol/L 5.1 4.2 4.0   < > 4.4   CHLORIDE mmol/L 99 100 97   < > 98   CO2 mmol/L 29 32 34*   < > 28   BUN mg/dL 52* 40* 31*   < > 40*   CREATININE mg/dL 8.84* 7.83* 6.16*   < > 6.86*   EGFR ml/min/1.73sq m 4 4 6   < > 5   CALCIUM mg/dL 9.7 9.4  8.8   < > 9.6   AST U/L 10* 10*  --   --  31   ALT U/L 16 20  --   --  61*   ALK PHOS U/L 75 72  --   --  97    < > = values in this interval not displayed.     Results from last 7 days   Lab Units 09/06/24  0609 09/05/24  0525 09/04/24  0450   WBC Thousand/uL 9.45 10.00 9.89   HEMOGLOBIN g/dL 8.5* 8.5* 8.4*   PLATELETS Thousands/uL 213 216 238     Results from last 7 days   Lab Units 09/02/24  1804 08/31/24  0043 08/30/24  2039   BLOOD CULTURE   --   --  No Growth After 5 Days.  Staphylococcus epidermidis*   SPUTUM CULTURE  4+ Growth of Pseudomonas aeruginosa*  4+ Growth of  --   --    GRAM STAIN RESULT  Rare Epithelial cells per low power field*  No polys seen*  Rare Gram positive cocci in pairs*  Rare Gram variable rods*  --  Gram positive cocci in clusters*   MRSA CULTURE ONLY   --  No Methicillin Resistant Staphlyococcus aureus (MRSA) isolated  --        Imaging Studies:   I have personally reviewed pertinent imaging study reports and images in PACS.    EKG, Pathology, and Other Studies:   I have personally reviewed pertinent reports.

## 2024-09-06 NOTE — NURSING NOTE
Pt is refusing all oral medications at this time- she states she will not take them until after her IR procedure. RN educated pt that she is permitted her medications w a sip of water- patient continues to refuse. MD notified.

## 2024-09-06 NOTE — OCCUPATIONAL THERAPY NOTE
Occupational Therapy Cancellation Note        Patient Name: Ngozi Beard  Today's Date: 9/6/2024 09/06/24 1341   Note Type   Note type Cancelled Session   Cancel Reasons Patient off floor/hemodialysis   Additional Comments Pt with active orders on OT caseload. Tx session attempted, however pt receiving HD at bedside at this time. Will cancel and f/u as appropriate     Margie Shipman, OT

## 2024-09-06 NOTE — CASE MANAGEMENT
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     Abnormality of ascending aorta 2024     Postmenopausal 2024     Encounter for screening mammogram for malignant neoplasm of breast 2024     Allergic rhinitis 2024     Persistent cough 2024     Urge incontinence of urine 2024     Exercise intolerance 2024     Family history of coronary artery bypass graft surgery 2024     Urge urinary incontinence 2024     Female stress incontinence 2024     Paranoid schizophrenia ([facility]) 2023     Asthma without acute exacerbation 2023     Asthma due to seasonal allergies 2023     Bipolar 1 disorder ([facility]) 2022     Primary hypertension 2022     Dyslipidemia 2022       LOS (days): 7  Geometric Mean LOS (GMLOS) (days): 4.4  Days to GMLOS:-2.3     OBJECTIVE:  Risk of Unplanned Readmission Score: 34.36         Current admission status: Inpatient   Preferred Pharmacy:   [id number] - [address] [place] [id number]  Phone: [phone] Fax: [phone]    [facility] - [place] [address] [place] [address] [address] [address]  Phone: [phone] Fax: [phone]    Primary Care Provider: Meenakshi Balbuena  MD    Primary Insurance: [facility] [employer] [facility] REP  Secondary Insurance: [facility]    DISCHARGE DETAILS:    Discharge planning discussed with:: Sistrt  Freedom of Choice: Yes  Comments - [place] of Choice: [place]  CM contacted family/caregiver?: Yes (Sister via phone)  Were Treatment Team discharge recommendations reviewed with patient/caregiver?: Yes  Did patient/caregiver verbalize understanding of patient care needs?: N/A- going to facility  Were patient/caregiver advised of the risks associated with not following Treatment Team discharge recommendations?: Yes    Contacts  Patient Contacts: Jacquelyn  Relationship to Patient:: Family  Contact Method: Phone  Phone Number: [phone]  Reason/Outcome: Continuity of Care, Referral, Discharge Planning, Emergency Contact              Other Referral/Resources/Interventions Provided:  Interventions: Other (Specify)  Referral Comments: CM spoke with pt sister Jacquelyn via phone to provide dcp update. Sister aware pt has chosen [facility], sister/pt reported she would like to stay at [facility] for LTC. Sister aware [facility] admissions will likely want to reach out to her to touch base re: financial application. Sister in agreement with same. Sister requesting family meeting with medical team, reported she will be at the hospital tomorrow. CM notified provider of same. JEANCARLOS spoke with Germania from [facility] admissions to provide dcp update. Germania reported they are still able to offer a bed for pt for STR with transition to LTC. Germania requesting LOC process be initiated from hospital, and CM can forward letter once received. CM notified by [facility] via email LOC was completed in July, awaiting response. Germania reported pt can be admitted without LOC letter being received, pending insurance auth, however requested letter be forwarded once received. JEANCARLOS spoke with Kay at [facility], who reported LOC was completed in July, requested an  updated MA51 and PASRR be sent for review. CM completed same, awaiting provider signature. CM will remain available.         Treatment Team Recommendation: SNF, Short Term Rehab  Discharge Destination Plan:: Short Term Rehab, SNF

## 2024-09-06 NOTE — PROGRESS NOTES
[facility]  Progress Note  Name: Ngozi Beard I  MRN: [id number]  Unit/Bed#: S -01 I Date of Admission: 8/30/2024   Date of Service: 9/6/2024 I Hospital Day: 7    Assessment & Plan   Multifocal pneumonia  Assessment & Plan  -CT chest PE study revealed no evidence of PE. There is persistent tree-in-bud nodularity in the lungs suggestive of a widespread infectious bronchiolitis that may be secondary to an atypical mycobacterium. Follicular bronchiolitis and acute hypersensitivity pneumonitis could also be in the differential.  New mild patchy bilateral multifocal pneumonia  - Sputum culture: 4+ Pseudomonas   - ID: Suspect chronic process, however will proceed with short course of cefepime and observe clinically.   - Cardiology: Cleared for bronchoscopy.  - Pulm: Considering bronchoscopy if no improvement with abx, likely to be an outpatient procedure.    Improvement in coughing and sputum production symptoms with addition of abx, suspect patient presentation partially contributed from bronchitis secondary to pseudomonas.     Plan:   -Cefepime 1 g daily for 7 days end date of September 10.   - Once HD schedule is stable and back to 3 times weekly schedule, IV cefepime can be transitioned to post HD dosing.         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 S/p PLEX.  Patient is anuric and dialysis-dependent  Tues/Thurs/Sat schedule  Access: Right IJ PermCath, Cath reevaluation  by IR on 9/3, no issues during HD. 9/4, issues with clotting, will needs re-evaluation @9.5. Requires Re-evaluation 9/6 IR, Fluid poor returns on 9/5.     Plan:  Hemodialysis tentatively scheduled on 9/6, pending IR re- evaluation 9/6  Continue PTA cyclophosphamide  Continue PTA sevalemer  Continue PTA atovaquone for PJP prophylaxis  Continue prednisone taper from outpatient  nephrology prior to this admission  20 mg once daily till September 6  15 mg starting September 7 to September 20  12.5 mg starting September 21 to October 4  10 mg starting October 5 to October 18  7.5 mg starting October 19 to November 2  5 mg daily starting November 3    * Shortness of breath  Assessment & Plan  CLINE: Negative PE, BNP 3019, CT chest-bilateral pleural effusion, tree-in-bud nodularity    Multifactorial, contributing factors of volume overload, chronic anemia, underlying COPD and asthma.     Clinically improving with hemodialysis    Plan  Pulmonology is following, see pna problem list    Cardiomyopathy (HCC)  Assessment & Plan  August 31-EF of 25% with global hypokinesis.  No prior echo for comparison.    Suspect nonischemic cardiomyopathy from inflammatory/rheumatologic etiology     GDMT recommended, pt started on Toprol 50 mg daily, Losartan 25 mg daily  9/4 - Nifedipine held per Cardiology  Will need Cardiac MRI, likely an outpatient procedure.  Ischemic work up with with left cardiac cath in o/p settings        Generalized weakness  Assessment & Plan  Multifactorial, respiratory failure secondary to chronic pulmonary infection, volume overload, chronic anemia, chronic deconditioning from being ill    Plan  PT/OT evaluation apprecaited    Asthma without acute exacerbation  Assessment & Plan  Mild Bilateral wheezing was noted in the lower lung base upon initial presentation.  Does not require frequent inhaler use as an outpatient setting.  Low suspicion for exacerbation for the clinical symptoms presented during this hospital on admission    Plan  Continue albuterol-ipratropium nebs    Anemia  Assessment & Plan  Recent Labs     09/04/24  0450 09/05/24  0525 09/06/24  0609   HGB 8.4* 8.5* 8.5*        Baseline Hgb 7-8  Etiology: component of iron deficiency and renal disease.     Plan  Monitor Hgb, transfuse for < 7  Continue PTA Ferrex    Dyslipidemia  Assessment & Plan  Continue PTA  Lipitor    Bipolar 1 disorder (HCC)  Assessment & Plan  Continue PTA Lamictal, venlafaxine (150 mg and 75 mg daily in the morning), and trazodone 200 mg qd    Primary hypertension  Assessment & Plan  Continue PTA nifedipine 60 mg daily  Start Losartan 25 mg daily  Start Toprol 25 mg daily             VTE Pharmacologic Prophylaxis: VTE Score: 7 High Risk (Score >/= 5) - Pharmacological DVT Prophylaxis Ordered: heparin. Sequential Compression Devices Ordered.    Mobility:   Basic Mobility Inpatient Raw Score: 16  JH-HLM Goal: 5: Stand one or more mins  JH-HLM Achieved: 7: Walk 25 feet or more  JH-HLM Goal achieved. Continue to encourage appropriate mobility.    Patient Centered Rounds: I performed bedside rounds with nursing staff today.  Discussions with Specialists or Other Care Team Provider: Nursing      Education and Discussions with Family / Patient:  Will update sister.     Current Length of Stay: 7 day(s)  Current Patient Status: Inpatient   Discharge Plan: Anticipate discharge in 48-72 hrs to rehab facility.    Code Status: Level 1 - Full Code    Subjective:   Overnight: No acute events over night     Complaints: No complaints.     Endorses improvement in her cough symptoms and sputum production since the initiation of antibiotics.     Patient denies Headache, Fever, Chills,  Chest Pain,  Abdominal Pain,  Diarrhea,       Objective:     Vitals:   Temp (24hrs), Av.7 °F (37.1 °C), Min:98.1 °F (36.7 °C), Max:99.6 °F (37.6 °C)    Temp:  [98.1 °F (36.7 °C)-99.6 °F (37.6 °C)] 98.4 °F (36.9 °C)  HR:  [] 74  Resp:  [17-22] 17  BP: (118-134)/(71-92) 122/78  SpO2:  [92 %-99 %] 98 %  Body mass index is 41.28 kg/m².     Input and Output Summary (last 24 hours):     Intake/Output Summary (Last 24 hours) at 2024 1151  Last data filed at 2024 0609  Gross per 24 hour   Intake 420 ml   Output 430 ml   Net -10 ml       Physical Exam:   Physical Exam  Constitutional:       General: She is not in acute  distress.     Appearance: She is ill-appearing.   Cardiovascular:      Rate and Rhythm: Normal rate and regular rhythm.      Comments: No peripheral edema  Pulmonary:      Effort: Pulmonary effort is normal.      Breath sounds: Normal breath sounds.      Comments: 1 L oxygen  Abdominal:      Palpations: Abdomen is soft.   Musculoskeletal:      Cervical back: Neck supple.   Skin:     General: Skin is warm.      Capillary Refill: Capillary refill takes less than 2 seconds.   Neurological:      Mental Status: She is alert.          Additional Data:     Labs:  Results from last 7 days   Lab Units 09/06/24  0609 09/03/24  0510 09/02/24  0509   WBC Thousand/uL 9.45   < > 12.88*   HEMOGLOBIN g/dL 8.5*   < > 7.8*   HEMATOCRIT % 27.5*   < > 25.4*   PLATELETS Thousands/uL 213   < > 244   BANDS PCT %  --   --  4   LYMPHO PCT %  --   --  13*   MONO PCT %  --   --  3*   EOS PCT %  --   --  0    < > = values in this interval not displayed.     Results from last 7 days   Lab Units 09/06/24  0609   SODIUM mmol/L 138   POTASSIUM mmol/L 5.1   CHLORIDE mmol/L 99   CO2 mmol/L 29   BUN mg/dL 52*   CREATININE mg/dL 8.84*   ANION GAP mmol/L 10   CALCIUM mg/dL 9.7   ALBUMIN g/dL 3.4*   TOTAL BILIRUBIN mg/dL 0.45   ALK PHOS U/L 75   ALT U/L 16   AST U/L 10*   GLUCOSE RANDOM mg/dL 127     Results from last 7 days   Lab Units 08/30/24  2025   INR  1.99*     Results from last 7 days   Lab Units 09/04/24  1605   POC GLUCOSE mg/dl 138         Results from last 7 days   Lab Units 08/31/24  0443 08/30/24  2025   PROCALCITONIN ng/ml 1.00* 0.77*       Lines/Drains:  Invasive Devices       Peripheral Intravenous Line  Duration             Peripheral IV 09/05/24 Dorsal (posterior);Left Forearm <1 day              Hemodialysis Catheter  Duration             HD Permanent Double Catheter 3 days                      Telemetry:  Telemetry Orders (From admission, onward)               24 Hour Telemetry Monitoring  Continuous x 24 Hours (Telem)            Question:  Reason for 24 Hour Telemetry  Answer:  Decompensated CHF- and any one of the following: continuous diuretic infusion or total diuretic dose >200 mg daily, associated electrolyte derangement (I.e. K < 3.0), ionotropic drip (continuous infusion), hx of ventricular arrhythmia, or new EF < 35%                     Telemetry Reviewed: Normal Sinus Rhythm  Indication for Continued Telemetry Use: Acute CHF on >200 mg lasix/day or equivalent dose or with new reduced EF.              Imaging: Reviewed radiology reports from this admission including: chest xray    Recent Cultures (last 7 days):   Results from last 7 days   Lab Units 24  1804 24   BLOOD CULTURE   --  No Growth After 5 Days.  Staphylococcus epidermidis*   SPUTUM CULTURE  4+ Growth of Pseudomonas aeruginosa*  4+ Growth of  --    GRAM STAIN RESULT  Rare Epithelial cells per low power field*  No polys seen*  Rare Gram positive cocci in pairs*  Rare Gram variable rods* Gram positive cocci in clusters*       Last 24 Hours Medication List:   Current Facility-Administered Medications   Medication Dose Route Frequency Provider Last Rate    acetaminophen  650 mg Oral Q6H PRN Kely Robert MD      albuterol  2 puff Inhalation Q4H PRN Kely Robert MD      albuterol  2.5 mg Nebulization TID Todd Dickens MD      atorvastatin  20 mg Oral Daily Kely Robert MD      atovaquone  1,500 mg Oral Daily Trish Kline MD      cefepime  1,000 mg Intravenous Q24H Jagjit Pham MD 1,000 mg (24)    cyclophosphamide  100 mg Oral Daily Kely Robert MD      dextromethorphan-guaiFENesin  10 mL Oral Q4H PRN Kely Robert MD      epoetin shavonne  6,000 Units Intravenous After Dialysis Tirso Montez MD      fluticasone-vilanterol  1 puff Inhalation Daily Robert Gilbert MD      heparin (porcine)  2,000 Units Intravenous Once Joselyn Reyes Bahamonde, MD      heparin (porcine)  5,000 Units Subcutaneous Q8H Counts include [facility] beds at the [facility]  Kely Robert MD      Heparin Na (Pork) Lock Flsh PF  2 Units Intracatheter Once Joselyn Reyes Bahamonde, MD      Heparin Na (Pork) Lock Flsh PF  2.1 Units Intracatheter Once Joselyn Reyes Bahamonde, MD      ipratropium  0.5 mg Nebulization TID Todd Dickens MD      iron polysaccharides  150 mg Oral Daily Kely Robert MD      lamoTRIgine  100 mg Oral HS Kely Robert MD      lidocaine  1 patch Topical Daily Trihs Kline MD      losartan  25 mg Oral Daily Ty Reese MD      metoprolol succinate  50 mg Oral BID Gerber Robbins MD      montelukast  10 mg Oral Daily Kely Robert MD      pantoprazole  40 mg Oral Daily Before Breakfast Kely Robert MD      [START ON 9/7/2024] predniSONE  15 mg Oral Daily Kathryn Cleveland MD      predniSONE  20 mg Oral Daily Kathryn Cleveland MD      sevelamer  1,600 mg Oral TID With Meals Kely Robert MD      Sodium Zirconium Cyclosilicate  10 g Oral Daily Trish Kline MD      traZODone  200 mg Oral HS Kely Robert MD      trimethobenzamide  200 mg Intramuscular Q6H PRN Kely Robert MD      venlafaxine  225 mg Oral Daily Kely Robert MD          Today, Patient Was Seen By: Kathryn Cleveland MD    **Please Note: This note may have been constructed using a voice recognition system.**

## 2024-09-06 NOTE — ASSESSMENT & PLAN NOTE
Recent Labs     09/04/24  0450 09/05/24  0525 09/06/24  0609   HGB 8.4* 8.5* 8.5*        Baseline Hgb 7-8  Etiology: component of iron deficiency and renal disease.     Plan  Monitor Hgb, transfuse for < 7  Continue PTA Ferrex

## 2024-09-06 NOTE — QUICK NOTE
I called sister to update: sister had several questions regarding Avf and Permcath. I explained the difference.  I also explained that AVF/AVG cannot be used for dialysis right away and we need to wait at least 6 to 8 weeks after surgery to be able to use it.  I updated about dysfunctional PermCath and  the plan of interventional radiologist to fix it at bedside today.  Patient and sister are very frustrated and they request a family meeting with team and specialist for next week.  Primary team informed        Joselyn Reyes Bahamonde, MD  Nephrology Attending

## 2024-09-06 NOTE — ASSESSMENT & PLAN NOTE
CLINE: Negative PE, BNP 3019, CT chest-bilateral pleural effusion, tree-in-bud nodularity    Multifactorial, contributing factors of volume overload, chronic anemia, underlying COPD and asthma.     Clinically improving with hemodialysis    Plan  Pulmonology is following, see pna problem list

## 2024-09-06 NOTE — PLAN OF CARE
Patient presents for a 3.5 hour HD session on a 2K2.5Ca bath for a serum potassium of 5.1 mmol/L drawn today with a net UF goal of 2-3 L as tolerated per discussion with Dr. Reyes.    Post-Dialysis RN Treatment Note    Blood Pressure:  Pre 148/87 mm/Hg  Post 109/76 mmHg   EDW  111.5 kg    Weight:  Pre 105.7 kg   Post 104.7 kg   Mode of weight measurement: Standing Scale   Volume Removed  1098 ml    Treatment duration 90 minutes    NS given  No    Treatment shortened? No   Medications given during Rx (2) 2 mg CathFlow   Estimated Kt/V  0.59   Access type: Permacath/TDC   Access Issues: Yes, describe: Previous issues with CVC; IR adjusted catheter at bedside while on HD tx. Tx ran well for 1.5 hours and then  spiked; Unclear if catheter related or system clotted; Dr. Reyes discontinued HD tx today with the following plan for tomorrow; CVC limbs have Cathflow dwelling overnight until tomorrow HD tx and Heparin bolus will need to be utilized to prevent clotting the HD machine system to complete a full HD tx for fluid removal.     Report called to primary nurse   Yes, Laura Anand RN    Problem: METABOLIC, FLUID AND ELECTROLYTES - ADULT  Goal: Electrolytes maintained within normal limits  Description: INTERVENTIONS:  - Monitor labs and assess patient for signs and symptoms of electrolyte imbalances  - Administer electrolyte replacement as ordered  - Monitor response to electrolyte replacements, including repeat lab results as appropriate  - Instruct patient on fluid and nutrition as appropriate  Outcome: Progressing  Goal: Fluid balance maintained  Description: INTERVENTIONS:  - Monitor labs   - Monitor I/O and WT  - Instruct patient on fluid and nutrition as appropriate  - Assess for signs & symptoms of volume excess or deficit  Outcome: Progressing

## 2024-09-06 NOTE — ASSESSMENT & PLAN NOTE
-CT chest PE study revealed no evidence of PE. There is persistent tree-in-bud nodularity in the lungs suggestive of a widespread infectious bronchiolitis that may be secondary to an atypical mycobacterium. Follicular bronchiolitis and acute hypersensitivity pneumonitis could also be in the differential.  New mild patchy bilateral multifocal pneumonia  - Sputum culture: 4+ Pseudomonas   - ID: Suspect chronic process, however will proceed with short course of cefepime and observe clinically.   - Cardiology: Cleared for bronchoscopy.  - Pulm: Considering bronchoscopy if no improvement with abx, likely to be an outpatient procedure.    Improvement in coughing and sputum production symptoms with addition of abx, suspect patient presentation partially contributed from bronchitis secondary to pseudomonas.     Plan:   -Cefepime 1 g daily for 7 days end date of September 10.   - Once HD schedule is stable and back to 3 times weekly schedule, IV cefepime can be transitioned to post HD dosing.

## 2024-09-06 NOTE — PROGRESS NOTES
"Cardiology Progress Note - Ngozi Beard 64 y.o. 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  Certainly there was the evidence found within the last month of anti-GBM vasculitis and a \"tree-in-bud\" bronchiolitis, along with what appears to be pneumonia versus pneumonitis, however more recently I do agree that volume overload is been playing a role with her effusions and now new onset cardiomyopathy.  Volume management per hemodialysis which they are trying to make her more net negative for pulmonary to perform a bronchoscopy.    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  Certainly there is some risk of anesthesia but given an overall low risk procedure from a cardiac standpoint, she can proceed.  We will follow her through this process.    New onset cardiomyopathy - Unclear etiology but likely nonischemic and could be rheumatologic/inflammatory driven.  She will require an eventual ischemic workup, which can likely be done as an outpatient.  Would also recommend a cardiac MRI which can be done as an inpatient or outpatient.  We discontinued nifedipine and continued to uptitrate Toprol-XL to 50 mg twice daily.  She is on low-dose losartan 25 mg daily.  Overall hemodynamically stable.  No changes were made today.  We will arrange for outpatient follow-up.  Please call with any questions.    Subjective:   Patient seen and examined.  No " "significant events overnight.  Denies chest pain or shortness of breath.    Objective:     Vitals: Blood pressure 122/78, pulse 74, temperature 98.4 °F (36.9 °C), temperature source Oral, resp. rate 17, height 5' 3\" (1.6 m), weight 106 kg (233 lb 0.4 oz), SpO2 98%., Body mass index is 41.28 kg/m².,   Orthostatic Blood Pressures      Flowsheet Row Most Recent Value   Blood Pressure 122/78 filed at 09/06/2024 0746   Patient Position - Orthostatic VS Lying filed at 09/06/2024 0746              Intake/Output Summary (Last 24 hours) at 9/6/2024 1106  Last data filed at 9/6/2024 0609  Gross per 24 hour   Intake 620 ml   Output 430 ml   Net 190 ml       No significant arrhythmias seen on telemetry review.  Sinus rhythm or sinus tachycardia.    Physical Exam:    GEN: Ngozi Beard appears well, alert and oriented x 3, pleasant and cooperative   HEENT: pupils equal, round, and reactive to light; extraocular muscles intact  NECK: supple, no carotid bruits. + JVD  HEART: regular rhythm, normal S1 and S2, no murmurs, clicks, gallops or rubs   LUNGS: clear to auscultation bilaterally; no wheezes, rales, or rhonchi   ABDOMEN: normal bowel sounds, soft, no tenderness, no distention  EXTREMITIES: peripheral pulses normal; no clubbing, cyanosis.  + Edema with pitting and nonpitting components  NEURO: no focal findings   SKIN: normal without suspicious lesions on exposed skin       Medications:      Current Facility-Administered Medications:     acetaminophen (TYLENOL) tablet 650 mg, 650 mg, Oral, Q6H PRN, Kely Robert MD    albuterol (PROVENTIL HFA,VENTOLIN HFA) inhaler 2 puff, 2 puff, Inhalation, Q4H PRN, Kely Robert MD, 2 puff at 09/06/24 0820    albuterol inhalation solution 2.5 mg, 2.5 mg, Nebulization, TID, Todd Dickens MD, 2.5 mg at 09/06/24 0746    atorvastatin (LIPITOR) tablet 20 mg, 20 mg, Oral, Daily, Kely Robert MD, 20 mg at 09/02/24 0931    atovaquone (MEPRON) oral suspension 1,500 mg, 1,500 mg, Oral, " Daily, Trish Kline MD, 1,500 mg at 09/02/24 0937    cefepime (MAXIPIME) 1,000 mg in dextrose 5 % 50 mL IVPB, 1,000 mg, Intravenous, Q24H, Jagjit Pham MD, Last Rate: 100 mL/hr at 09/05/24 2055, 1,000 mg at 09/05/24 2055    cyclophosphamide (CYTOXAN) capsule 100 mg, 100 mg, Oral, Daily, Kely Robert MD, 100 mg at 09/02/24 0938    dextromethorphan-guaiFENesin (ROBITUSSIN DM) oral syrup 10 mL, 10 mL, Oral, Q4H PRN, Kely Robert MD, 10 mL at 09/01/24 2142    epoetin shavonne (EPOGEN,PROCRIT) injection 6,000 Units, 6,000 Units, Intravenous, After Dialysis, Tirso Montez MD, 6,000 Units at 09/05/24 1212    fluticasone-vilanterol 200-25 mcg/actuation 1 puff, 1 puff, Inhalation, Daily, Robert Gilbert MD, 1 puff at 09/06/24 0820    heparin (porcine) injection 2,000 Units, 2,000 Units, Intravenous, Once, Joselyn Reyes Bahamonde, MD    heparin (porcine) subcutaneous injection 5,000 Units, 5,000 Units, Subcutaneous, Q8H JACK, Kely Robert MD, 5,000 Units at 09/05/24 0817    Heparin Na (Pork) Lock Flsh PF injection 2 Units, 2 Units, Intracatheter, Once, Joselyn Reyes Bahamonde, MD    Heparin Na (Pork) Lock Flsh PF injection 2.1 Units, 2.1 Units, Intracatheter, Once, Joselyn Reyes Bahamonde, MD    ipratropium (ATROVENT) 0.02 % inhalation solution 0.5 mg, 0.5 mg, Nebulization, TID, Todd Dickens MD, 0.5 mg at 09/06/24 0746    iron polysaccharides (FERREX) capsule 150 mg, 150 mg, Oral, Daily, Kely Robert MD, 150 mg at 09/02/24 0931    lamoTRIgine (LaMICtal) tablet 100 mg, 100 mg, Oral, HS, Kely Robert MD, 100 mg at 09/05/24 2031    lidocaine (LIDODERM) 5 % patch 1 patch, 1 patch, Topical, Daily, Trish Kline MD, 1 patch at 09/06/24 0819    losartan (COZAAR) tablet 25 mg, 25 mg, Oral, Daily, Ty Reese MD, 25 mg at 09/03/24 1543    metoprolol succinate (TOPROL-XL) 24 hr tablet 50 mg, 50 mg, Oral, BID, Gerber Robbins MD, 50 mg at 09/05/24 1728    montelukast (SINGULAIR) tablet 10  mg, 10 mg, Oral, Daily, Kely Robert MD, 10 mg at 09/02/24 0931    pantoprazole (PROTONIX) EC tablet 40 mg, 40 mg, Oral, Daily Before Breakfast, Kely Robert MD, 40 mg at 09/06/24 0611    [START ON 9/7/2024] predniSONE tablet 15 mg, 15 mg, Oral, Daily, Kathryn Cleveland MD    predniSONE tablet 20 mg, 20 mg, Oral, Daily, Kathryn Cleveland MD, 20 mg at 09/05/24 1729    sevelamer (RENAGEL) tablet 1,600 mg, 1,600 mg, Oral, TID With Meals, Kely Robert MD, 1,600 mg at 09/04/24 1710    Sodium Zirconium Cyclosilicate (Lokelma) 10 g, 10 g, Oral, Daily, Trish Kline MD, 10 g at 09/01/24 0811    traZODone (DESYREL) tablet 200 mg, 200 mg, Oral, HS, Kely Robert MD, 200 mg at 09/05/24 2031    trimethobenzamide (TIGAN) IM injection 200 mg, 200 mg, Intramuscular, Q6H PRN, Kely Robert MD, 200 mg at 08/30/24 2208    venlafaxine (EFFEXOR-XR) 24 hr capsule 225 mg, 225 mg, Oral, Daily, Kely Robert MD, 225 mg at 09/02/24 0929     Labs & Results:        Results from last 7 days   Lab Units 09/06/24  0609 09/05/24  0525 09/04/24  0450   WBC Thousand/uL 9.45 10.00 9.89   HEMOGLOBIN g/dL 8.5* 8.5* 8.4*   HEMATOCRIT % 27.5* 27.7* 27.5*   PLATELETS Thousands/uL 213 216 238         Results from last 7 days   Lab Units 09/06/24  0609 09/05/24  0525 09/04/24  0450 09/02/24  0509 09/01/24  0521   POTASSIUM mmol/L 5.1 4.2 4.0   < > 4.4   CHLORIDE mmol/L 99 100 97   < > 98   CO2 mmol/L 29 32 34*   < > 28   BUN mg/dL 52* 40* 31*   < > 40*   CREATININE mg/dL 8.84* 7.83* 6.16*   < > 6.86*   CALCIUM mg/dL 9.7 9.4 8.8   < > 9.6   ALK PHOS U/L 75 72  --   --  97   ALT U/L 16 20  --   --  61*   AST U/L 10* 10*  --   --  31    < > = values in this interval not displayed.     Results from last 7 days   Lab Units 08/30/24 2025   INR  1.99*   PTT seconds >210*     Results from last 7 days   Lab Units 09/01/24  0521 08/31/24  0443 08/30/24 2025   MAGNESIUM mg/dL 2.1 2.3 2.1         EKG personally reviewed by Gerber Robbins MD. Sinus tachycardia with  nonspecific interventricular conduction block.    ECHO:    Left Ventricle: Left ventricular cavity size is mildly dilated. Wall thickness is mildly increased. The left ventricular ejection fraction is 25%. Systolic function is severely reduced. There is severe global hypokinesis.    Right Ventricle: Right ventricular cavity size is mildly dilated. Systolic function is mildly reduced.    Left Atrium: The atrium is mildly dilated.    Right Atrium: The atrium is mildly dilated.    Aortic Valve: There is mild regurgitation.    Mitral Valve: There is moderate to severe regurgitation.    Tricuspid Valve: There is moderate regurgitation. The right ventricular systolic pressure is moderately elevated. The estimated right ventricular systolic pressure is 48.00 mmHg.    Pericardium: There is a small pericardial effusion anterior to the heart.    Counseling / Coordination of Care  Total floor / unit time spent today 25 minutes.  Greater than 50% of total time was spent with the patient and / or family counseling and / or coordination of care.

## 2024-09-07 ENCOUNTER — APPOINTMENT (INPATIENT)
Dept: DIALYSIS | Facility: HOSPITAL | Age: 64
DRG: 286 | End: 2024-09-07
Attending: STUDENT IN AN ORGANIZED HEALTH CARE EDUCATION/TRAINING PROGRAM
Payer: COMMERCIAL

## 2024-09-07 LAB
ALBUMIN SERPL BCG-MCNC: 3.4 G/DL (ref 3.5–5)
ALP SERPL-CCNC: 72 U/L (ref 34–104)
ALT SERPL W P-5'-P-CCNC: 13 U/L (ref 7–52)
ANION GAP SERPL CALCULATED.3IONS-SCNC: 9 MMOL/L (ref 4–13)
AST SERPL W P-5'-P-CCNC: 16 U/L (ref 13–39)
BILIRUB SERPL-MCNC: 0.5 MG/DL (ref 0.2–1)
BUN SERPL-MCNC: 44 MG/DL (ref 5–25)
CALCIUM ALBUM COR SERPL-MCNC: 9.8 MG/DL (ref 8.3–10.1)
CALCIUM SERPL-MCNC: 9.3 MG/DL (ref 8.4–10.2)
CHLORIDE SERPL-SCNC: 98 MMOL/L (ref 96–108)
CO2 SERPL-SCNC: 29 MMOL/L (ref 21–32)
CREAT SERPL-MCNC: 7.95 MG/DL (ref 0.6–1.3)
ERYTHROCYTE [DISTWIDTH] IN BLOOD BY AUTOMATED COUNT: 19.1 % (ref 11.6–15.1)
GFR SERPL CREATININE-BSD FRML MDRD: 4 ML/MIN/1.73SQ M
GLUCOSE SERPL-MCNC: 130 MG/DL (ref 65–140)
HCT VFR BLD AUTO: 26.5 % (ref 34.8–46.1)
HGB BLD-MCNC: 8.2 G/DL (ref 11.5–15.4)
MCH RBC QN AUTO: 29.1 PG (ref 26.8–34.3)
MCHC RBC AUTO-ENTMCNC: 30.9 G/DL (ref 31.4–37.4)
MCV RBC AUTO: 94 FL (ref 82–98)
PLATELET # BLD AUTO: 191 THOUSANDS/UL (ref 149–390)
PMV BLD AUTO: 10.6 FL (ref 8.9–12.7)
POTASSIUM SERPL-SCNC: 5.4 MMOL/L (ref 3.5–5.3)
PROT SERPL-MCNC: 5.7 G/DL (ref 6.4–8.4)
RBC # BLD AUTO: 2.82 MILLION/UL (ref 3.81–5.12)
SODIUM SERPL-SCNC: 136 MMOL/L (ref 135–147)
WBC # BLD AUTO: 9.08 THOUSAND/UL (ref 4.31–10.16)

## 2024-09-07 PROCEDURE — 97110 THERAPEUTIC EXERCISES: CPT

## 2024-09-07 PROCEDURE — 97116 GAIT TRAINING THERAPY: CPT

## 2024-09-07 PROCEDURE — 80053 COMPREHEN METABOLIC PANEL: CPT

## 2024-09-07 PROCEDURE — 94640 AIRWAY INHALATION TREATMENT: CPT

## 2024-09-07 PROCEDURE — 99232 SBSQ HOSP IP/OBS MODERATE 35: CPT | Performed by: INTERNAL MEDICINE

## 2024-09-07 PROCEDURE — 85027 COMPLETE CBC AUTOMATED: CPT

## 2024-09-07 PROCEDURE — 94760 N-INVAS EAR/PLS OXIMETRY 1: CPT

## 2024-09-07 RX ORDER — POLYETHYLENE GLYCOL 3350 17 G/17G
17 POWDER, FOR SOLUTION ORAL DAILY
Status: DISCONTINUED | OUTPATIENT
Start: 2024-09-07 | End: 2024-09-19 | Stop reason: HOSPADM

## 2024-09-07 RX ORDER — AMOXICILLIN 250 MG
1 CAPSULE ORAL
Status: DISCONTINUED | OUTPATIENT
Start: 2024-09-07 | End: 2024-09-10

## 2024-09-07 RX ADMIN — CYCLOPHOSPHAMIDE 100 MG: 50 CAPSULE ORAL at 12:35

## 2024-09-07 RX ADMIN — FLUTICASONE FUROATE AND VILANTEROL TRIFENATATE 1 PUFF: 200; 25 POWDER RESPIRATORY (INHALATION) at 12:35

## 2024-09-07 RX ADMIN — PANTOPRAZOLE SODIUM 40 MG: 40 TABLET, DELAYED RELEASE ORAL at 08:00

## 2024-09-07 RX ADMIN — ALBUTEROL SULFATE 2.5 MG: 2.5 SOLUTION RESPIRATORY (INHALATION) at 14:58

## 2024-09-07 RX ADMIN — HEPARIN SODIUM 500 UNITS: 1000 INJECTION INTRAVENOUS; SUBCUTANEOUS at 10:30

## 2024-09-07 RX ADMIN — LAMOTRIGINE 100 MG: 100 TABLET ORAL at 21:08

## 2024-09-07 RX ADMIN — CEFEPIME 1000 MG: 1 INJECTION, POWDER, FOR SOLUTION INTRAMUSCULAR; INTRAVENOUS at 16:20

## 2024-09-07 RX ADMIN — VENLAFAXINE HYDROCHLORIDE 225 MG: 150 CAPSULE, EXTENDED RELEASE ORAL at 12:32

## 2024-09-07 RX ADMIN — HEPARIN SODIUM 500 UNITS: 1000 INJECTION INTRAVENOUS; SUBCUTANEOUS at 09:26

## 2024-09-07 RX ADMIN — SENNOSIDES AND DOCUSATE SODIUM 1 TABLET: 50; 8.6 TABLET ORAL at 21:07

## 2024-09-07 RX ADMIN — SEVELAMER HYDROCHLORIDE 1600 MG: 800 TABLET ORAL at 17:16

## 2024-09-07 RX ADMIN — ATORVASTATIN CALCIUM 20 MG: 20 TABLET, FILM COATED ORAL at 12:33

## 2024-09-07 RX ADMIN — HEPARIN SODIUM 500 UNITS: 1000 INJECTION INTRAVENOUS; SUBCUTANEOUS at 11:29

## 2024-09-07 RX ADMIN — METOPROLOL SUCCINATE 50 MG: 50 TABLET, EXTENDED RELEASE ORAL at 17:16

## 2024-09-07 RX ADMIN — METOPROLOL SUCCINATE 50 MG: 50 TABLET, EXTENDED RELEASE ORAL at 12:32

## 2024-09-07 RX ADMIN — HEPARIN SODIUM 2000 UNITS: 1000 INJECTION INTRAVENOUS; SUBCUTANEOUS at 08:38

## 2024-09-07 RX ADMIN — LOSARTAN POTASSIUM 25 MG: 25 TABLET, FILM COATED ORAL at 12:33

## 2024-09-07 RX ADMIN — ATOVAQUONE 1500 MG: 750 SUSPENSION ORAL at 12:35

## 2024-09-07 RX ADMIN — POLYSACCHARIDE-IRON COMPLEX 150 MG: 150 CAPSULE ORAL at 12:33

## 2024-09-07 RX ADMIN — EPOETIN ALFA 6000 UNITS: 3000 SOLUTION INTRAVENOUS; SUBCUTANEOUS at 10:31

## 2024-09-07 RX ADMIN — IPRATROPIUM BROMIDE 0.5 MG: 0.5 SOLUTION RESPIRATORY (INHALATION) at 07:23

## 2024-09-07 RX ADMIN — IPRATROPIUM BROMIDE 0.5 MG: 0.5 SOLUTION RESPIRATORY (INHALATION) at 14:58

## 2024-09-07 RX ADMIN — SEVELAMER HYDROCHLORIDE 1600 MG: 800 TABLET ORAL at 12:34

## 2024-09-07 RX ADMIN — MONTELUKAST 10 MG: 10 TABLET, FILM COATED ORAL at 12:32

## 2024-09-07 RX ADMIN — TRAZODONE HYDROCHLORIDE 200 MG: 100 TABLET ORAL at 21:07

## 2024-09-07 RX ADMIN — PREDNISONE 15 MG: 10 TABLET ORAL at 12:33

## 2024-09-07 RX ADMIN — POLYETHYLENE GLYCOL 3350 17 G: 17 POWDER, FOR SOLUTION ORAL at 12:32

## 2024-09-07 RX ADMIN — ALBUTEROL SULFATE 2.5 MG: 2.5 SOLUTION RESPIRATORY (INHALATION) at 07:23

## 2024-09-07 NOTE — PHYSICAL THERAPY NOTE
PHYSICAL THERAPY NOTE          Patient Name: Ngozi Beard  Today's Date: 9/7/2024 09/07/24 1400   PT Last Visit   PT Visit Date 09/07/24   Note Type   Note Type Treatment   Pain Assessment   Pain Assessment Tool 0-10   Pain Score No Pain   Hospital Pain Intervention(s) Repositioned;Ambulation/increased activity;Rest   Multiple Pain Sites No   Pain Rating: FLACC (Rest) - Face 0   Pain Rating: FLACC (Rest) - Legs 0   Pain Rating: FLACC (Rest) - Activity 0   Pain Rating: FLACC (Rest) - Cry 0   Pain Rating: FLACC (Rest) - Consolability 0   Score: FLACC (Rest) 0   Restrictions/Precautions   Weight Bearing Precautions Per Order No   Other Precautions Chair Alarm;Bed Alarm;Fall Risk  (HD port)   General   Chart Reviewed Yes   Response to Previous Treatment Patient reporting fatigue but able to participate.   Family/Caregiver Present No   Cognition   Overall Cognitive Status WFL   Arousal/Participation Alert;Responsive;Cooperative   Attention Within functional limits   Orientation Level Oriented X4   Memory Decreased recall of recent events;Decreased recall of precautions   Following Commands Follows one step commands with increased time or repetition   Comments pt was pleasant and cooperative in todays tx session   Subjective   Subjective pt was agreeable to participate in PT intervention as pt stated no pain pre/pist tx session.   Bed Mobility   Supine to Sit 5  Supervision   Additional items Assist x 1;HOB elevated;Bedrails;Increased time required;Verbal cues   Sit to Supine 5  Supervision   Additional items Assist x 1;HOB elevated;Bedrails;Increased time required;Verbal cues   Additional Comments pt was able tos it EOB and increase static/dynamic sitting as pt participate in TE activities >8 minutes w/o LOB   Transfers   Sit to Stand 4  Minimal assistance   Additional items Assist x 1;Increased time required;Verbal cues    Stand to Sit 4  Minimal assistance   Additional items Assist x 1;Increased time required;Verbal cues  (w/ RW)   Additional Comments pt continues to require RW and min ax1 for all functional transfers in order to increase safety and balance with transfers   Ambulation/Elevation   Gait pattern Improper Weight shift;Decreased foot clearance;Short stride;Foward flexed;Decreased hip extension;Decreased heel strike;Decreased toe off;Excessively slow   Gait Assistance 4  Minimal assist   Additional items Assist x 1;Verbal cues   Assistive Device Rolling walker   Distance 120'x1 RW   Stair Management Assistance Not tested   Ambulation/Elevation Additional Comments pt continues to be limited with activity tolerance and ambulation distance due regis fatigue and generalized weakness   Balance   Static Sitting Fair +   Dynamic Sitting Fair   Static Standing Fair -   Dynamic Standing Poor +   Ambulatory Poor +  (w/ RW)   Endurance Deficit   Endurance Deficit Yes   Endurance Deficit Description limited activity tolerance and ambulation distance   Activity Tolerance   Activity Tolerance Patient limited by fatigue   Nurse Made Aware Spoke to RN   Exercises   Hip Abduction Sitting;15 reps;AROM;Bilateral   Hip Adduction Sitting;15 reps;AROM;Bilateral  (pillow squeezes)   Knee AROM Long Arc Quad Sitting;15 reps;AROM;Bilateral   Ankle Pumps Sitting;20 reps;AROM;Bilateral   Marching Sitting;10 reps;AROM;Bilateral   Assessment   Prognosis Good   Problem List Decreased strength;Decreased endurance;Impaired balance;Decreased mobility   Assessment pt began tx session lying supine in the bed as pt was agreeable to participate in PT intervention. PT to focus on increasing pt activity tolerance, ambulation posture/balance , TE activities, bed mobility and functional transfers. In comparison to previou stx sessions pt continues to remain consistant for requiring /s for all bed mobility and min ax1 for all functional transfers to and from RW  dae VC's for hand placement. pt was able to increase ambulation distance to 120'x1 RW but required min ax1 for safety and balance as pt had slight LOB with directional change. pt was able to sit EOB and participate in TE activities >8 minutes while increasing static/dynamic sitting balance. Post tx pt in bed with call bell and all pt needs met. pt would benefit from continued skilled PT intervention in order to increase pt activity tolerance, ambulation distance and and steps completed. Continue to recommend Dc w/ level 2 moderate rehab resource intensity when medically cleared   Goals   Patient Goals none stated   STG Expiration Date 09/11/24   PT Treatment Day 2   Plan   Treatment/Interventions Functional transfer training;LE strengthening/ROM;Elevations;Therapeutic exercise;Endurance training;Patient/family training;Equipment eval/education;Bed mobility;Gait training;Compensatory technique education   Progress Slow progress, decreased activity tolerance   PT Frequency 3-5x/wk   Discharge Recommendation   Rehab Resource Intensity Level, PT II (Moderate Resource Intensity)   Equipment Recommended Walker   Walker Package Recommended Wheeled walker   Change/add to Walker Package? No   AM-PAC Basic Mobility Inpatient   Turning in Flat Bed Without Bedrails 3   Lying on Back to Sitting on Edge of Flat Bed Without Bedrails 3   Moving Bed to Chair 3   Standing Up From Chair Using Arms 3   Walk in Room 3   Climb 3-5 Stairs With Railing 1   Basic Mobility Inpatient Raw Score 16   Basic Mobility Standardized Score 38.32   [facility] Highest Level Of Mobility   -HLM Goal 5: Stand one or more mins   -HLM Achieved 7: Walk 25 feet or more   Education   Education Provided Assistive device;Mobility training;Other  (TE activities)   Patient Demonstrates acceptance/verbal understanding   End of Consult   Patient Position at End of Consult Supine;Bed/Chair alarm activated;All needs within reach   The patient's AM-PAC Basic  Mobility Inpatient Short Form Raw Score is 16. A Raw score of less than or equal to 16 suggests the patient may benefit from discharge to post-acute rehabilitation services. Please also refer to the recommendation of the Physical Therapist for safe discharge planning.    Javon Brown

## 2024-09-07 NOTE — PLAN OF CARE
Problem: PHYSICAL THERAPY ADULT  Goal: Performs mobility at highest level of function for planned discharge setting.  See evaluation for individualized goals.  Description: Treatment/Interventions: Functional transfer training, LE strengthening/ROM, Elevations, Therapeutic exercise, Endurance training, Patient/family training, Equipment eval/education, Bed mobility, Gait training, Compensatory technique education  Equipment Recommended: Walker       See flowsheet documentation for full assessment, interventions and recommendations.  Outcome: Progressing  Note: Prognosis: Good  Problem List: Decreased strength, Decreased endurance, Impaired balance, Decreased mobility  Assessment: pt began tx session lying supine in the bed as pt was agreeable to participate in PT intervention. PT to focus on increasing pt activity tolerance, ambulation posture/balance , TE activities, bed mobility and functional transfers. In comparison to previou stx sessions pt continues to remain consistant for requiring /s for all bed mobility and min ax1 for all functional transfers to and from RW wiuth VC's for hand placement. pt was able to increase ambulation distance to 120'x1 RW but required min ax1 for safety and balance as pt had slight LOB with directional change. pt was able to sit EOB and participate in TE activities >8 minutes while increasing static/dynamic sitting balance. Post tx pt in bed with call bell and all pt needs met. pt would benefit from continued skilled PT intervention in order to increase pt activity tolerance, ambulation distance and and steps completed. Continue to recommend Dc w/ level 2 moderate rehab resource intensity when medically cleared        Rehab Resource Intensity Level, PT: II (Moderate Resource Intensity)    See flowsheet documentation for full assessment.

## 2024-09-07 NOTE — PROGRESS NOTES
NEPHROLOGY HOSPITAL PROGRESS NOTE   Ngozi Beard 64 y.o. 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  Recent admission with similar presentation.  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  Patient had 0.9 L ultrafiltration.  System clotted 4 times.  Blood flow rate was maintained at 200 cc.  - 9/3-completed dialysis.  Had a catheter exchange  - 9/4-dialysis treatment attempted for ultrafiltration.  Had system clotting and high venous pressures.  - 9/5-dialysis attempted but catheter was again not working.  45 minutes of dialysis was completed  - 9/6-tunneled dialysis catheter repositioned by IR team at bedside.  Cathflo placed after dialysis session only lasted for 90 minutes and again with catheter flow issues.  1 L ultrafiltrated.  - 9/7-was able to complete dialysis with 3 with 0.2 L ultrafiltration.     Plan  - Daily evaluation for dialysis  - Continue Lokelma for now  - Low potassium diet when eating  - Next regular dialysis treatment will be Tuesday but may need to have an extra ultrafiltration treatment Monday and will reevaluate daily for now  - IR team has been closely following this past week as outlined above for catheter issues.  Currently today catheter appears to have worked well  - It appears that we have been using a hypoallergenic filter but this past week have used  regular filter without issues noted    2-Access-recent admission with malfunctioning right IJ PermCath.  PermCath exchanged last on August 12.  IR team reevaluated the patient as outlined above and patient has had catheter exchange and then further manipulation this past week     3-anti-GBM disease-status post plasma exchange, Cytoxan and prednisone.  Is on Cytoxan and prednisone currently on atovaquone for prophylaxis.  Most recent anti-GBM level end of July is still elevated.  Is on prednisone taper as outpatient.  But did receive IV methylprednisolone this admission for possible asthma exacerbation.     4-hypertension-continue nifedipine     5-chest discomfort-improved from admission.  Per cardiology team.  Ultrafiltrate on dialysis as tolerates and attempt to continue to challenge ultrafiltration goals     -Echocardiogram with severely depressed EF 25%, global hypokinesis.  PASP 48.  Mild aortic disease, moderate to severe mitral regurgitation  - Cardiology on board and eventually will need ischemic workup likely as outpatient.  Cardiac MRI.  Medications adjusted for goal-directed medical therapy for depressed EF.     6-MBD-on phosphorus binders     7-anemia-monitoring.  Is on Mircera as outpatient.  Is on Epogen while inpatient.   received packed red blood cell on August 31 and hemoglobin is remaining stable 8.2 on 9/7     0-ylxaptngjpkm-lr on Lokelma daily and monitor with clearance on dialysis.  Hyperkalemia borderline and monitor with dialysis     9-shortness of breath-multifactorial.  Initially there was concern for pneumonia versus pneumonitis.  Further workup showed ejection fraction of 15 to 20%.     - Pulmonary team being brought on board  - Cardiology on board for depressed EF  - Pseudomonas in sputum cultures-brief course of IV antibiotics per ID team  - Attempting to ultrafiltrate with dialysis and continuing to challenge weight        Portions of the record may have been created with voice  "recognition software. Occasional wrong word or \"sound a like\" substitutions may have occurred due to the inherent limitations of voice recognition software. Read the chart carefully and recognize, using context, where substitutions have occurred.If you have any questions, please contact the dictating provider.    SUBJECTIVE / 24H INTERVAL HISTORY:    Blood pressure is 1 13-1 20 systolic.  Patient denies complaints currently.  Dialysis completed earlier today.  Patient shortness of breath has improved.  Still has intermittent shortness of breath.  But overall she states it is much improved    OBJECTIVE:  Current Weight: Weight - Scale: 104 kg (229 lb 11.5 oz)  Vitals:    09/07/24 1210 09/07/24 1458 09/07/24 1507 09/07/24 1710   BP: 135/82  120/69 113/70   BP Location: Left arm      Pulse: 88  88 95   Resp: 16  18    Temp: 98.4 °F (36.9 °C)      TempSrc:       SpO2:  95% 98% 93%   Weight:       Height:           Intake/Output Summary (Last 24 hours) at 9/7/2024 1925  Last data filed at 9/7/2024 1300  Gross per 24 hour   Intake 1060 ml   Output 3000 ml   Net -1940 ml     General: NAD  Skin: no rash  Eyes: anicteric sclera  ENT: moist mucous membrane  Neck: supple  Chest: CTA b/l, no ronchii, no wheeze, no rubs, no rales  CVS: s1s2, no murmur, no gallop, no rub  Abdomen: soft, nontender, nl sounds  Extremities: trace edema LE b/l, right IJ tunneled dialysis catheter  : no sherwood  Neuro: AAOX3  Psych: normal affect    Medications:    Current Facility-Administered Medications:     acetaminophen (TYLENOL) tablet 650 mg, 650 mg, Oral, Q6H PRN, Kely Robert MD    albuterol (PROVENTIL HFA,VENTOLIN HFA) inhaler 2 puff, 2 puff, Inhalation, Q4H PRN, Kely Robert MD, 2 puff at 09/06/24 0820    albuterol inhalation solution 2.5 mg, 2.5 mg, Nebulization, TID, Todd Dickens MD, 2.5 mg at 09/07/24 1458    atorvastatin (LIPITOR) tablet 20 mg, 20 mg, Oral, Daily, Kely Robert MD, 20 mg at 09/07/24 1233    atovaquone " (MEPRON) oral suspension 1,500 mg, 1,500 mg, Oral, Daily, Kathryn Cleveland MD, 1,500 mg at 09/07/24 1235    cefepime (MAXIPIME) 1,000 mg in dextrose 5 % 50 mL IVPB, 1,000 mg, Intravenous, Q24H, Jagjit Pham MD, Last Rate: 100 mL/hr at 09/07/24 1620, 1,000 mg at 09/07/24 1620    cyclophosphamide (CYTOXAN) capsule 100 mg, 100 mg, Oral, Daily, Kely Robert MD, 100 mg at 09/07/24 1235    dextromethorphan-guaiFENesin (ROBITUSSIN DM) oral syrup 10 mL, 10 mL, Oral, Q4H PRN, Kely Robert MD, 10 mL at 09/01/24 2142    epoetin shavonne (EPOGEN,PROCRIT) injection 6,000 Units, 6,000 Units, Intravenous, After Dialysis, Tirso Montez MD, 6,000 Units at 09/07/24 1031    fluticasone-vilanterol 200-25 mcg/actuation 1 puff, 1 puff, Inhalation, Daily, Robert Gilbert MD, 1 puff at 09/07/24 1235    heparin (porcine) subcutaneous injection 5,000 Units, 5,000 Units, Subcutaneous, Q8H JACK, Kely Robert MD, 5,000 Units at 09/05/24 0817    ipratropium (ATROVENT) 0.02 % inhalation solution 0.5 mg, 0.5 mg, Nebulization, TID, Todd Dickens MD, 0.5 mg at 09/07/24 1458    iron polysaccharides (FERREX) capsule 150 mg, 150 mg, Oral, Daily, Kely Robert MD, 150 mg at 09/07/24 1233    lamoTRIgine (LaMICtal) tablet 100 mg, 100 mg, Oral, HS, Kely Robert MD, 100 mg at 09/06/24 2124    lidocaine (LIDODERM) 5 % patch 1 patch, 1 patch, Topical, Daily, Trish Kline MD, 1 patch at 09/06/24 0819    losartan (COZAAR) tablet 25 mg, 25 mg, Oral, Daily, Ty Reese MD, 25 mg at 09/07/24 1233    metoprolol succinate (TOPROL-XL) 24 hr tablet 50 mg, 50 mg, Oral, BID, Gerber Robbins MD, 50 mg at 09/07/24 1716    montelukast (SINGULAIR) tablet 10 mg, 10 mg, Oral, Daily, Kely Robert MD, 10 mg at 09/07/24 1232    pantoprazole (PROTONIX) EC tablet 40 mg, 40 mg, Oral, Daily Before Breakfast, Kely Robert MD, 40 mg at 09/07/24 0800    polyethylene glycol (MIRALAX) packet 17 g, 17 g, Oral, Daily, Kathryn Cleveland MD, 17 g at 09/07/24 1232     predniSONE tablet 15 mg, 15 mg, Oral, Daily, Kathryn Cleveland MD, 15 mg at 09/07/24 1233    senna-docusate sodium (SENOKOT S) 8.6-50 mg per tablet 1 tablet, 1 tablet, Oral, HS, Kathryn Cleveland MD    sevelamer (RENAGEL) tablet 1,600 mg, 1,600 mg, Oral, TID With Meals, Kely Robert MD, 1,600 mg at 09/07/24 1716    Sodium Zirconium Cyclosilicate (Lokelma) 10 g, 10 g, Oral, Daily, Trish Kline MD, 10 g at 09/01/24 0811    traZODone (DESYREL) tablet 200 mg, 200 mg, Oral, HS, Kely Robert MD, 200 mg at 09/06/24 2124    trimethobenzamide (TIGAN) IM injection 200 mg, 200 mg, Intramuscular, Q6H PRN, Kely Robert MD, 200 mg at 08/30/24 2208    venlafaxine (EFFEXOR-XR) 24 hr capsule 225 mg, 225 mg, Oral, Daily, Kely Robert MD, 225 mg at 09/07/24 1232    Laboratory Results:  Results from last 7 days   Lab Units 09/07/24  0545 09/06/24  0609 09/05/24  0525 09/04/24  0450 09/03/24  0510 09/02/24  0509 09/01/24  0521   WBC Thousand/uL 9.08 9.45 10.00 9.89 12.39* 12.88* 19.55*   HEMOGLOBIN g/dL 8.2* 8.5* 8.5* 8.4* 8.2* 7.8* 7.9*   HEMATOCRIT % 26.5* 27.5* 27.7* 27.5* 27.0* 25.4* 24.6*   PLATELETS Thousands/uL 191 213 216 238 266 244 243   POTASSIUM mmol/L 5.4* 5.1 4.2 4.0 4.8 5.2 4.4   CHLORIDE mmol/L 98 99 100 97 100 100 98   CO2 mmol/L 29 29 32 34* 26 28 28   BUN mg/dL 44* 52* 40* 31* 62* 50* 40*   CREATININE mg/dL 7.95* 8.84* 7.83* 6.16* 9.85* 8.64* 6.86*   CALCIUM mg/dL 9.3 9.7 9.4 8.8 9.5 9.5 9.6   MAGNESIUM mg/dL  --   --   --   --   --   --  2.1   PHOSPHORUS mg/dL  --   --   --   --   --   --  4.7*

## 2024-09-07 NOTE — PROGRESS NOTES
Former[facility]  Progress Note  Name: Ngozi Beard I  MRN: [id number]  Unit/Bed#: S -01 I Date of Admission: 8/30/2024   Date of Service: 9/7/2024 I Hospital Day: 8    Assessment & Plan   Multifocal pneumonia  Assessment & Plan  -CT chest PE study revealed no evidence of PE. There is persistent tree-in-bud nodularity in the lungs suggestive of a widespread infectious bronchiolitis that may be secondary to an atypical mycobacterium. Follicular bronchiolitis and acute hypersensitivity pneumonitis could also be in the differential.  New mild patchy bilateral multifocal pneumonia  - Sputum culture: 4+ Pseudomonas   - ID: Suspect chronic process, however will proceed with short course of cefepime and observe clinically.   - Cardiology: Cleared for bronchoscopy.  - Pulm: Considering bronchoscopy if no improvement with abx, likely to be an outpatient procedure.    Improvement in coughing and sputum production symptoms with addition of abx, suspect patient presentation partially contributed from bronchitis secondary to pseudomonas.     Plan:   -Cefepime 1 g daily for 7 days end date of September 10.   - Once HD schedule is stable and back to 3 times weekly schedule, IV cefepime can be transitioned to post HD dosing.         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 S/p PLEX.  Patient is anuric and dialysis-dependent  Tues/Thurs/Sat schedule  Access: Right IJ PermCath, Cath reevaluation  by IR on 9/3, no issues during HD. 9/4, issues with clotting, will needs re-evaluation @9.5. Requires Re-evaluation 9/6 IR, Fluid poor returns on 9/5. 9/6-bedside readjustment was made by IR, reclotted after 1.5 hours, [facility] dwelling overnight. 9/7 - HD without issues     Plan:  Hemodialysis tentatively scheduled on 9/6, pending IR re- evaluation 9/6  Continue PTA  cyclophosphamide  Continue PTA sevalemer  Continue PTA atovaquone for PJP prophylaxis  Continue prednisone taper from outpatient nephrology prior to this admission  20 mg once daily till September 6  15 mg starting September 7 to September 20  12.5 mg starting September 21 to October 4  10 mg starting October 5 to October 18  7.5 mg starting October 19 to November 2  5 mg daily starting November 3    * Shortness of breath  Assessment & Plan  CLINE: Negative PE, BNP 3019, CT chest-bilateral pleural effusion, tree-in-bud nodularity    Multifactorial, contributing factors of volume overload, chronic anemia, underlying COPD and asthma.     Clinically improving with hemodialysis    Plan  Pulmonology is following, see pna problem list    Cardiomyopathy (HCC)  Assessment & Plan  August 31-EF of 25% with global hypokinesis.  No prior echo for comparison.    Suspect nonischemic cardiomyopathy from inflammatory/rheumatologic etiology     GDMT recommended, pt started on Toprol 50 mg daily, Losartan 25 mg daily  9/4 - Nifedipine held per Cardiology  Will need Cardiac MRI, likely an outpatient procedure.  Ischemic work up with with left cardiac cath in o/p settings        Generalized weakness  Assessment & Plan  Multifactorial, respiratory failure secondary to chronic pulmonary infection, volume overload, chronic anemia, chronic deconditioning from being ill    Plan  PT/OT evaluation apprecaited    Asthma without acute exacerbation  Assessment & Plan  Mild Bilateral wheezing was noted in the lower lung base upon initial presentation.  Does not require frequent inhaler use as an outpatient setting.  Low suspicion for exacerbation for the clinical symptoms presented during this hospital on admission    Plan  Continue albuterol-ipratropium nebs    Anemia  Assessment & Plan  Recent Labs     09/05/24  0525 09/06/24  0609 09/07/24  0545   HGB 8.5* 8.5* 8.2*        Baseline Hgb 7-8  Etiology: component of iron deficiency and renal  disease.     Plan  Monitor Hgb, transfuse for < 7  Continue PTA Ferrex    Dyslipidemia  Assessment & Plan  Continue PTA Lipitor    Bipolar 1 disorder (HCC)  Assessment & Plan  Continue PTA Lamictal, venlafaxine (150 mg and 75 mg daily in the morning), and trazodone 200 mg qd    Primary hypertension  Assessment & Plan  Continue PTA nifedipine 60 mg daily  Start Losartan 25 mg daily  Start Toprol 25 mg daily               VTE Pharmacologic Prophylaxis: VTE Score: 7 High Risk (Score >/= 5) - Pharmacological DVT Prophylaxis Ordered: heparin. Sequential Compression Devices Ordered.    Mobility:   Basic Mobility Inpatient Raw Score: 16  JH-HLM Goal: 5: Stand one or more mins  JH-HLM Achieved: 4: Move to chair/commode  JH-HLM Goal NOT achieved. Continue with multidisciplinary rounding and encourage appropriate mobility to improve upon JH-HLM goals.    Patient Centered Rounds:  with nursing   Discussions with Specialists or Other Care Team Provider: Nursing     Education and Discussions with Family / Patient: Updated  (sister) at bedside.    Current Length of Stay: 8 day(s)  Current Patient Status: Inpatient   Discharge Plan:  Be determined    Code Status: Level 1 - Full Code    Subjective:   Overnight: No acute events over night     Complaints: No complaints.     Endorses mild shortness of breath.    Patient denies headache, fever, chills, chest pain, abdominal pain, leg swellings.    Last BM Date: 24       Objective:     Vitals:   Temp (24hrs), Av.3 °F (36.8 °C), Min:98.1 °F (36.7 °C), Max:98.8 °F (37.1 °C)    Temp:  [98.1 °F (36.7 °C)-98.8 °F (37.1 °C)] 98.4 °F (36.9 °C)  HR:  [81-94] 88  Resp:  [16-18] 16  BP: (102-148)/(69-94) 135/82  SpO2:  [91 %-98 %] 94 %  Body mass index is 40.69 kg/m².     Input and Output Summary (last 24 hours):     Intake/Output Summary (Last 24 hours) at 2024 1240  Last data filed at 2024 0830  Gross per 24 hour   Intake 1010 ml   Output 1598 ml   Net -588 ml        Physical Exam:   Physical Exam  Constitutional:       General: She is not in acute distress.     Appearance: She is ill-appearing.   Cardiovascular:      Rate and Rhythm: Normal rate and regular rhythm.      Comments: No peripheral edema  Pulmonary:      Effort: Pulmonary effort is normal.      Breath sounds: Normal breath sounds.      Comments: 1 L oxygen  Abdominal:      Palpations: Abdomen is soft.   Musculoskeletal:      Cervical back: Neck supple.   Skin:     General: Skin is warm.      Capillary Refill: Capillary refill takes less than 2 seconds.   Neurological:      Mental Status: She is alert.          Additional Data:     Labs:  Results from last 7 days   Lab Units 24  0545 24  0510 24  0509   WBC Thousand/uL 9.08   < > 12.88*   HEMOGLOBIN g/dL 8.2*   < > 7.8*   HEMATOCRIT % 26.5*   < > 25.4*   PLATELETS Thousands/uL 191   < > 244   BANDS PCT %  --   --  4   LYMPHO PCT %  --   --  13*   MONO PCT %  --   --  3*   EOS PCT %  --   --  0    < > = values in this interval not displayed.     Results from last 7 days   Lab Units 24  0545   SODIUM mmol/L 136   POTASSIUM mmol/L 5.4*   CHLORIDE mmol/L 98   CO2 mmol/L 29   BUN mg/dL 44*   CREATININE mg/dL 7.95*   ANION GAP mmol/L 9   CALCIUM mg/dL 9.3   ALBUMIN g/dL 3.4*   TOTAL BILIRUBIN mg/dL 0.50   ALK PHOS U/L 72   ALT U/L 13   AST U/L 16   GLUCOSE RANDOM mg/dL 130         Results from last 7 days   Lab Units 24  1605   POC GLUCOSE mg/dl 138               Lines/Drains:  Invasive Devices       Peripheral Intravenous Line  Duration             Peripheral IV 24 Dorsal (posterior);Left Forearm 1 day              Hemodialysis Catheter  Duration             HD Permanent Double Catheter 4 days                      Telemetry:  Telemetry Orders (From admission, onward)               24 Hour Telemetry Monitoring  Continuous x 24 Hours (Telem)           Question:  Reason for 24 Hour Telemetry  Answer:  Decompensated CHF- and  any one of the following: continuous diuretic infusion or total diuretic dose >200 mg daily, associated electrolyte derangement (I.e. K < 3.0), ionotropic drip (continuous infusion), hx of ventricular arrhythmia, or new EF < 35%                     Telemetry Reviewed: Normal Sinus Rhythm  Indication for Continued Telemetry Use: Acute CHF on >200 mg lasix/day or equivalent dose or with new reduced EF.              Imaging: No pertinent imaging reviewed.    Recent Cultures (last 7 days):   Results from last 7 days   Lab Units 09/02/24  1804   SPUTUM CULTURE  4+ Growth of Pseudomonas aeruginosa*  4+ Growth of   GRAM STAIN RESULT  Rare Epithelial cells per low power field*  No polys seen*  Rare Gram positive cocci in pairs*  Rare Gram variable rods*       Last 24 Hours Medication List:   Current Facility-Administered Medications   Medication Dose Route Frequency Provider Last Rate    acetaminophen  650 mg Oral Q6H PRN Kely Robert MD      albuterol  2 puff Inhalation Q4H PRN Kely Robert MD      albuterol  2.5 mg Nebulization TID Todd Dickens MD      atorvastatin  20 mg Oral Daily Kely Robert MD      atovaquone  1,500 mg Oral Daily Trish Kline MD      cefepime  1,000 mg Intravenous Q24H Jagjit Pham MD 1,000 mg (09/06/24 2029)    cyclophosphamide  100 mg Oral Daily Kely Robert MD      dextromethorphan-guaiFENesin  10 mL Oral Q4H PRN Kely Robert MD      epoetin shavonne  6,000 Units Intravenous After Dialysis Tirso Montez MD      fluticasone-vilanterol  1 puff Inhalation Daily Robert Gilbert MD      heparin (porcine)  5,000 Units Subcutaneous Q8H [facility] Kely Robert MD      ipratropium  0.5 mg Nebulization TID Todd Dickens MD      iron polysaccharides  150 mg Oral Daily Kely Robert MD      lamoTRIgine  100 mg Oral HS Kely Robert MD      lidocaine  1 patch Topical Daily Trish Kline MD      losartan  25 mg Oral Daily Ty Reese MD      metoprolol  succinate  50 mg Oral BID Gerber Robbins MD      montelukast  10 mg Oral Daily Kely Robetr MD      pantoprazole  40 mg Oral Daily Before Breakfast Kely Robert MD      polyethylene glycol  17 g Oral Daily Kathryn Cleveland MD      predniSONE  15 mg Oral Daily Kathryn Cleveland MD      senna-docusate sodium  1 tablet Oral HS Kathryn Cleveland MD      sevelamer  1,600 mg Oral TID With Meals Kely Robert MD      Sodium Zirconium Cyclosilicate  10 g Oral Daily Trish Kline MD      traZODone  200 mg Oral HS Kely Robert MD      trimethobenzamide  200 mg Intramuscular Q6H PRN Kely Robert MD      venlafaxine  225 mg Oral Daily Kely Robert MD          Today, Patient Was Seen By: Kathryn Cleveland MD    **Please Note: This note may have been constructed using a voice recognition system.**

## 2024-09-07 NOTE — PLAN OF CARE
Post-Dialysis RN Treatment Note    Blood Pressure:  Pre: 119/74 mm/Hg  Post: 135/82 mmHg   EDW:  TBD     Weight:  Pre:  105.2 kg   Post: 102.0 kg   Mode of weight measurement: Standing Scale   Volume Removed:   3200 ml    Treatment duration:   210 minutes    NS given:   No    Treatment shortened?    No   Medications given during Rx:  Epogen and Heparin   Estimated Kt/V:    Not Applicable   Access type:    Permacath/TDC   Access Issues:     No    Report called to primary nurse:   Su Foss    Problem: METABOLIC, FLUID AND ELECTROLYTES - ADULT  Goal: Electrolytes maintained within normal limits  Description: INTERVENTIONS:  - Monitor labs and assess patient for signs and symptoms of electrolyte imbalances  - Administer electrolyte replacement as ordered  - Monitor response to electrolyte replacements, including repeat lab results as appropriate  - Instruct patient on fluid and nutrition as appropriate  Outcome: Progressing  Goal: Fluid balance maintained  Description: INTERVENTIONS:  - Monitor labs   - Monitor I/O and WT  - Instruct patient on fluid and nutrition as appropriate  - Assess for signs & symptoms of volume excess or deficit  Outcome: Progressing     Problem: HEMATOLOGIC - ADULT  Goal: Maintains hematologic stability  Description: INTERVENTIONS  - Assess for signs and symptoms of bleeding or hemorrhage  - Monitor labs  - Administer supportive blood products/factors as ordered and appropriate  Outcome: Progressing

## 2024-09-07 NOTE — ASSESSMENT & PLAN NOTE
Recent Labs     09/05/24  0525 09/06/24  0609 09/07/24  0545   HGB 8.5* 8.5* 8.2*        Baseline Hgb 7-8  Etiology: component of iron deficiency and renal disease.     Plan  Monitor Hgb, transfuse for < 7  Continue PTA Ferrex

## 2024-09-07 NOTE — ASSESSMENT & PLAN NOTE
Admission in July 2024 for [facility]. 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 S/p PLEX.  Patient is anuric and dialysis-dependent  Tues/Thurs/Sat schedule  Access: Right IJ PermCath, Cath reevaluation  by IR on 9/3, no issues during HD. 9/4, issues with clotting, will needs re-evaluation @9.5. Requires Re-evaluation 9/6 IR, Fluid poor returns on 9/5. 9/6-bedside readjustment was made by IR, reclotted after 1.5 hours, [facility] dwelling overnight. 9/7 - HD without issues     Plan:  Hemodialysis tentatively scheduled on 9/6, pending IR re- evaluation 9/6  Continue PTA cyclophosphamide  Continue PTA sevalemer  Continue PTA atovaquone for PJP prophylaxis  Continue prednisone taper from outpatient nephrology prior to this admission  20 mg once daily till September 6  15 mg starting September 7 to September 20  12.5 mg starting September 21 to October 4  10 mg starting October 5 to October 18  7.5 mg starting October 19 to November 2  5 mg daily starting November 3

## 2024-09-08 ENCOUNTER — APPOINTMENT (INPATIENT)
Dept: RADIOLOGY | Facility: HOSPITAL | Age: 64
DRG: 286 | End: 2024-09-08
Payer: COMMERCIAL

## 2024-09-08 ENCOUNTER — APPOINTMENT (INPATIENT)
Dept: DIALYSIS | Facility: HOSPITAL | Age: 64
DRG: 286 | End: 2024-09-08
Payer: COMMERCIAL

## 2024-09-08 LAB
ANION GAP SERPL CALCULATED.3IONS-SCNC: 10 MMOL/L (ref 4–13)
BUN SERPL-MCNC: 32 MG/DL (ref 5–25)
CALCIUM SERPL-MCNC: 9.6 MG/DL (ref 8.4–10.2)
CHLORIDE SERPL-SCNC: 100 MMOL/L (ref 96–108)
CO2 SERPL-SCNC: 29 MMOL/L (ref 21–32)
CREAT SERPL-MCNC: 5.73 MG/DL (ref 0.6–1.3)
ERYTHROCYTE [DISTWIDTH] IN BLOOD BY AUTOMATED COUNT: 19.4 % (ref 11.6–15.1)
GFR SERPL CREATININE-BSD FRML MDRD: 7 ML/MIN/1.73SQ M
GLUCOSE SERPL-MCNC: 112 MG/DL (ref 65–140)
HCT VFR BLD AUTO: 27.6 % (ref 34.8–46.1)
HGB BLD-MCNC: 8.5 G/DL (ref 11.5–15.4)
MCH RBC QN AUTO: 29.1 PG (ref 26.8–34.3)
MCHC RBC AUTO-ENTMCNC: 30.8 G/DL (ref 31.4–37.4)
MCV RBC AUTO: 95 FL (ref 82–98)
PLATELET # BLD AUTO: 206 THOUSANDS/UL (ref 149–390)
PMV BLD AUTO: 10.6 FL (ref 8.9–12.7)
POTASSIUM SERPL-SCNC: 4.2 MMOL/L (ref 3.5–5.3)
RBC # BLD AUTO: 2.92 MILLION/UL (ref 3.81–5.12)
SODIUM SERPL-SCNC: 139 MMOL/L (ref 135–147)
WBC # BLD AUTO: 11.86 THOUSAND/UL (ref 4.31–10.16)

## 2024-09-08 PROCEDURE — 99232 SBSQ HOSP IP/OBS MODERATE 35: CPT | Performed by: INTERNAL MEDICINE

## 2024-09-08 PROCEDURE — 80048 BASIC METABOLIC PNL TOTAL CA: CPT | Performed by: STUDENT IN AN ORGANIZED HEALTH CARE EDUCATION/TRAINING PROGRAM

## 2024-09-08 PROCEDURE — 71045 X-RAY EXAM CHEST 1 VIEW: CPT

## 2024-09-08 PROCEDURE — 94640 AIRWAY INHALATION TREATMENT: CPT

## 2024-09-08 PROCEDURE — 94760 N-INVAS EAR/PLS OXIMETRY 1: CPT

## 2024-09-08 PROCEDURE — 85027 COMPLETE CBC AUTOMATED: CPT | Performed by: STUDENT IN AN ORGANIZED HEALTH CARE EDUCATION/TRAINING PROGRAM

## 2024-09-08 RX ORDER — ALBUMIN (HUMAN) 12.5 G/50ML
12.5 SOLUTION INTRAVENOUS ONCE
Status: DISCONTINUED | OUTPATIENT
Start: 2024-09-08 | End: 2024-09-08

## 2024-09-08 RX ADMIN — CEFEPIME 1000 MG: 1 INJECTION, POWDER, FOR SOLUTION INTRAMUSCULAR; INTRAVENOUS at 22:30

## 2024-09-08 RX ADMIN — VENLAFAXINE HYDROCHLORIDE 225 MG: 150 CAPSULE, EXTENDED RELEASE ORAL at 08:47

## 2024-09-08 RX ADMIN — CYCLOPHOSPHAMIDE 100 MG: 50 CAPSULE ORAL at 08:51

## 2024-09-08 RX ADMIN — ALBUTEROL SULFATE 2.5 MG: 2.5 SOLUTION RESPIRATORY (INHALATION) at 13:28

## 2024-09-08 RX ADMIN — LAMOTRIGINE 100 MG: 100 TABLET ORAL at 20:47

## 2024-09-08 RX ADMIN — TRAZODONE HYDROCHLORIDE 200 MG: 100 TABLET ORAL at 20:48

## 2024-09-08 RX ADMIN — PANTOPRAZOLE SODIUM 40 MG: 40 TABLET, DELAYED RELEASE ORAL at 08:55

## 2024-09-08 RX ADMIN — IPRATROPIUM BROMIDE 0.5 MG: 0.5 SOLUTION RESPIRATORY (INHALATION) at 19:39

## 2024-09-08 RX ADMIN — IPRATROPIUM BROMIDE 0.5 MG: 0.5 SOLUTION RESPIRATORY (INHALATION) at 08:14

## 2024-09-08 RX ADMIN — IPRATROPIUM BROMIDE 0.5 MG: 0.5 SOLUTION RESPIRATORY (INHALATION) at 13:28

## 2024-09-08 RX ADMIN — GUAIFENESIN AND DEXTROMETHORPHAN 10 ML: 100; 10 SYRUP ORAL at 19:26

## 2024-09-08 RX ADMIN — METOPROLOL SUCCINATE 50 MG: 50 TABLET, EXTENDED RELEASE ORAL at 19:25

## 2024-09-08 RX ADMIN — SENNOSIDES AND DOCUSATE SODIUM 1 TABLET: 50; 8.6 TABLET ORAL at 20:47

## 2024-09-08 RX ADMIN — PREDNISONE 15 MG: 10 TABLET ORAL at 08:47

## 2024-09-08 RX ADMIN — FLUTICASONE FUROATE AND VILANTEROL TRIFENATATE 1 PUFF: 200; 25 POWDER RESPIRATORY (INHALATION) at 08:50

## 2024-09-08 RX ADMIN — ALBUTEROL SULFATE 2.5 MG: 2.5 SOLUTION RESPIRATORY (INHALATION) at 19:39

## 2024-09-08 RX ADMIN — ATORVASTATIN CALCIUM 20 MG: 20 TABLET, FILM COATED ORAL at 08:47

## 2024-09-08 RX ADMIN — POLYSACCHARIDE-IRON COMPLEX 150 MG: 150 CAPSULE ORAL at 08:47

## 2024-09-08 RX ADMIN — SEVELAMER HYDROCHLORIDE 1600 MG: 800 TABLET ORAL at 12:30

## 2024-09-08 RX ADMIN — HEPARIN SODIUM 5000 UNITS: 5000 INJECTION INTRAVENOUS; SUBCUTANEOUS at 20:48

## 2024-09-08 RX ADMIN — MONTELUKAST 10 MG: 10 TABLET, FILM COATED ORAL at 08:47

## 2024-09-08 RX ADMIN — LOSARTAN POTASSIUM 25 MG: 25 TABLET, FILM COATED ORAL at 08:47

## 2024-09-08 RX ADMIN — SEVELAMER HYDROCHLORIDE 1600 MG: 800 TABLET ORAL at 19:26

## 2024-09-08 RX ADMIN — METOPROLOL SUCCINATE 50 MG: 50 TABLET, EXTENDED RELEASE ORAL at 08:47

## 2024-09-08 RX ADMIN — ATOVAQUONE 1500 MG: 750 SUSPENSION ORAL at 08:50

## 2024-09-08 RX ADMIN — HEPARIN SODIUM 5000 UNITS: 5000 INJECTION INTRAVENOUS; SUBCUTANEOUS at 08:50

## 2024-09-08 RX ADMIN — ALBUTEROL SULFATE 2.5 MG: 2.5 SOLUTION RESPIRATORY (INHALATION) at 08:14

## 2024-09-08 RX ADMIN — SEVELAMER HYDROCHLORIDE 1600 MG: 800 TABLET ORAL at 08:55

## 2024-09-08 NOTE — PLAN OF CARE
Target UF Goal  1-2  L as tolerated. Patient dialyzing for 2 hours on 3 K bath for serum K of  4.2  per protocol. Treatment plan reviewed with Nephrology.   Problem: METABOLIC, FLUID AND ELECTROLYTES - ADULT  Goal: Electrolytes maintained within normal limits  Description: INTERVENTIONS:  - Monitor labs and assess patient for signs and symptoms of electrolyte imbalances  - Administer electrolyte replacement as ordered  - Monitor response to electrolyte replacements, including repeat lab results as appropriate  - Instruct patient on fluid and nutrition as appropriate  Outcome: Progressing  Goal: Fluid balance maintained  Description: INTERVENTIONS:  - Monitor labs   - Monitor I/O and WT  - Instruct patient on fluid and nutrition as appropriate  - Assess for signs & symptoms of volume excess or deficit  Outcome: Progressing   Post-Dialysis RN Treatment Note    Blood Pressure:  Pre 123/72 mm/Hg  Post 113/75 mmHg   EDW  111.5 kg    Weight:  Pre 102.4 kg   Post Not Applicable kg   Mode of weight measurement: Standing Scale   Volume Removed  0 ml +370   Treatment duration 6 minutes    NS given  No    Treatment shortened? Yes, describe: due to access issue  Dr. Estrada was aware   Medications given during Rx None Reported   Estimated Kt/V  None Reported   Access type: Permacath/TDC   Access Issues: Yes Has high arterial pressure even @[id number]. Attempted to reversed line and was the same. Dr. Estrada was aware   Report called to primary nurse   Yes Kristen Armando RN

## 2024-09-08 NOTE — PROGRESS NOTES
NEPHROLOGY HOSPITAL PROGRESS NOTE   Ngozi Beard 64 y.o. 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  Recent admission with similar presentation.  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  Patient had 0.9 L ultrafiltration.  System clotted 4 times.  Blood flow rate was maintained at 200 cc.  - 9/3-completed dialysis.  Had a catheter exchange  - 9/4-dialysis treatment attempted for ultrafiltration.  Had system clotting and high venous pressures.  - 9/5-dialysis attempted but catheter was again not working.  45 minutes of dialysis was completed  - 9/6-tunneled dialysis catheter repositioned by IR team at bedside.  Cathflo placed after dialysis session only lasted for 90 minutes and again with catheter flow issues.  1 L ultrafiltrated.  - 9/7-was able to complete dialysis with 3.2 L ultrafiltration.  With improvement in volume state  - 9/8-continues to be short of breath worsening this morning.  Will plan for dialysis treatment with ultrafiltration.     Plan  - Regular dialysis treatment TTS schedule  - Urgent treatment today for shortness of breath and tachypnea and likely volume overload  - Check chest x-ray  - Consider holding losartan for now to allow for adequate ultrafiltration  - Okay to continue daily Lokelma for now  - IR team has been closely following the patient's course due to catheter issues.  Catheter was appropriately  working on 9/7  - Utilizing hypoallergenic filter for now but to note, the extra treatments patient had this week utilized regular filter and no issues.  Prior likely concern for allergy and skin rash was not related to filter?  - Review case primary team resident we are in agreement renal plan for dialysis today    Update-patient's dialysis catheter was unsuccessful for use.  Our dialysis nurse attempted multiple changes to the patient's position, catheter flows and still unsuccessful.  I have consulted IR for reevaluation tomorrow.  Reviewing x-ray, patient is significantly volume overloaded as anticipated based on exam.  Shortness of breath may have other etiologies.  I am reaching out to primary team to discuss. I have discussed case with IR Attending on call also.     2-Access-recent admission with malfunctioning right IJ PermCath.  PermCath exchanged last on August 12.  IR team reevaluated the patient as outlined above and patient has had catheter exchange and then further manipulation this past week     3-anti-GBM disease-status post plasma exchange, Cytoxan and prednisone.  Is on Cytoxan and prednisone currently on atovaquone for prophylaxis.  Most recent anti-GBM level end of July is still elevated.  Is on prednisone taper as outpatient.  But did receive IV methylprednisolone this admission for possible asthma exacerbation.     4-hypertension-monitoring.  Marginal at times.  Hold losartan for now     5-chest discomfort-improved from admission.  Per cardiology team.  Ultrafiltrate on dialysis as tolerates and attempt to continue to challenge ultrafiltration goals     -Echocardiogram with severely depressed EF 25%, global hypokinesis.  PASP 48.  Mild aortic disease, moderate to severe mitral regurgitation  - Cardiology on board and eventually will need ischemic workup likely as outpatient.  Cardiac MRI.  Medications adjusted for goal-directed medical therapy for depressed EF.     6-MBD-on phosphorus binders    "  7-anemia-monitoring.  Is on Mircera as outpatient.  Is on Epogen while inpatient.   received packed red blood cell on August 31 and hemoglobin is remaining stable      1-[id number] on Lokelma daily and monitor with clearance on dialysis.  Hyperkalemia resolved with dialysis     9-shortness of breath-multifactorial.  Initially there was concern for pneumonia versus pneumonitis.  Further workup showed ejection fraction of 15 to 20%.     - Pulmonary team being brought on board  - Cardiology on board for depressed EF  - Pseudomonas in sputum cultures-brief course of IV antibiotics per ID team  - Attempting to ultrafiltrate with dialysis and continuing to challenge weight on 9/8          Portions of the record may have been created with voice recognition software. Occasional wrong word or \"sound a like\" substitutions may have occurred due to the inherent limitations of voice recognition software. Read the chart carefully and recognize, using context, where substitutions have occurred.If you have any questions, please contact the dictating provider.    SUBJECTIVE / 24H INTERVAL HISTORY:  Patient is tachypneic and short of breath.  Difficulty with breathing worsening today compared to yesterday    OBJECTIVE:  Current Weight: Weight - Scale: 102 kg (224 lb 3.3 oz)  Vitals:    09/08/24 0721 09/08/24 0815 09/08/24 0847 09/08/24 1329   BP: 94/60  110/66    BP Location:       Pulse: 70  73    Resp:       Temp: 97.7 °F (36.5 °C)      TempSrc:       SpO2: 90% 95%  97%   Weight:       Height:           Intake/Output Summary (Last 24 hours) at 9/8/2024 1543  Last data filed at 9/7/2024 1800  Gross per 24 hour   Intake 240 ml   Output 0 ml   Net 240 ml     General: NAD  Skin: no rash  Eyes: anicteric sclera  ENT: moist mucous membrane  Neck: supple  Chest: Slight diminished intake bases.  Otherwise relatively clear to auscultation  CVS: s1s2, no murmur, no gallop, no rub  Abdomen: soft, nontender, nl sounds  Extremities: " Trace pitting edema LE b/l  : no sherwood  Neuro: AAOX3  Psych: normal affect    Medications:    Current Facility-Administered Medications:     acetaminophen (TYLENOL) tablet 650 mg, 650 mg, Oral, Q6H PRN, Kely Robert MD    albumin human (FLEXBUMIN) 25 % injection 12.5 g, 12.5 g, Intravenous, Once, Vane Estrada MD    albuterol (PROVENTIL HFA,VENTOLIN HFA) inhaler 2 puff, 2 puff, Inhalation, Q4H PRN, Kely Robert MD, 2 puff at 09/06/24 0820    albuterol inhalation solution 2.5 mg, 2.5 mg, Nebulization, TID, Todd Dickens MD, 2.5 mg at 09/08/24 1328    atorvastatin (LIPITOR) tablet 20 mg, 20 mg, Oral, Daily, Kely Robert MD, 20 mg at 09/08/24 0847    atovaquone (MEPRON) oral suspension 1,500 mg, 1,500 mg, Oral, Daily, Kathryn Cleveland MD, 1,500 mg at 09/08/24 0850    cefepime (MAXIPIME) 1,000 mg in dextrose 5 % 50 mL IVPB, 1,000 mg, Intravenous, Q24H, Jagjit Pham MD, Last Rate: 100 mL/hr at 09/07/24 1620, 1,000 mg at 09/07/24 1620    cyclophosphamide (CYTOXAN) capsule 100 mg, 100 mg, Oral, Daily, Kely Robert MD, 100 mg at 09/08/24 0851    dextromethorphan-guaiFENesin (ROBITUSSIN DM) oral syrup 10 mL, 10 mL, Oral, Q4H PRN, Kely Robert MD, 10 mL at 09/01/24 2142    epoetin shavonne (EPOGEN,PROCRIT) injection 6,000 Units, 6,000 Units, Intravenous, After Dialysis, Tirso Montez MD, 6,000 Units at 09/07/24 1031    fluticasone-vilanterol 200-25 mcg/actuation 1 puff, 1 puff, Inhalation, Daily, Robert Gilbert MD, 1 puff at 09/08/24 0850    heparin (porcine) subcutaneous injection 5,000 Units, 5,000 Units, Subcutaneous, Q8H JACK, Kely Robert MD, 5,000 Units at 09/08/24 0850    ipratropium (ATROVENT) 0.02 % inhalation solution 0.5 mg, 0.5 mg, Nebulization, TID, Todd Dickens MD, 0.5 mg at 09/08/24 1328    iron polysaccharides (FERREX) capsule 150 mg, 150 mg, Oral, Daily, Kely Robert MD, 150 mg at 09/08/24 0847    lamoTRIgine (LaMICtal) tablet 100 mg, 100 mg, Oral, HS, Kely Robert MD, 100  mg at 09/07/24 2108    lidocaine (LIDODERM) 5 % patch 1 patch, 1 patch, Topical, Daily, Trish Kline MD, 1 patch at 09/06/24 0819    metoprolol succinate (TOPROL-XL) 24 hr tablet 50 mg, 50 mg, Oral, BID, Gerber Robbins MD, 50 mg at 09/08/24 0847    montelukast (SINGULAIR) tablet 10 mg, 10 mg, Oral, Daily, Kely Robert MD, 10 mg at 09/08/24 0847    pantoprazole (PROTONIX) EC tablet 40 mg, 40 mg, Oral, Daily Before Breakfast, Kely Robert MD, 40 mg at 09/08/24 0855    polyethylene glycol (MIRALAX) packet 17 g, 17 g, Oral, Daily, Kathryn Cleveland MD, 17 g at 09/07/24 1232    predniSONE tablet 15 mg, 15 mg, Oral, Daily, Kathryn Cleveland MD, 15 mg at 09/08/24 0847    senna-docusate sodium (SENOKOT S) 8.6-50 mg per tablet 1 tablet, 1 tablet, Oral, HS, Kathryn Cleveland MD, 1 tablet at 09/07/24 2107    sevelamer (RENAGEL) tablet 1,600 mg, 1,600 mg, Oral, TID With Meals, Kely Robert MD, 1,600 mg at 09/08/24 1230    Sodium Zirconium Cyclosilicate (Lokelma) 10 g, 10 g, Oral, Daily, Trish Kline MD, 10 g at 09/01/24 0811    traZODone (DESYREL) tablet 200 mg, 200 mg, Oral, HS, Kely Robert MD, 200 mg at 09/07/24 2107    trimethobenzamide (TIGAN) IM injection 200 mg, 200 mg, Intramuscular, Q6H PRN, Kely Robert MD, 200 mg at 08/30/24 2208    venlafaxine (EFFEXOR-XR) 24 hr capsule 225 mg, 225 mg, Oral, Daily, Kely Robert MD, 225 mg at 09/08/24 0847    Laboratory Results:  Results from last 7 days   Lab Units 09/08/24  0515 09/07/24  0545 09/06/24  0609 09/05/24  0525 09/04/24  0450 09/03/24  0510 09/02/24  0509   WBC Thousand/uL 11.86* 9.08 9.45 10.00 9.89 12.39* 12.88*   HEMOGLOBIN g/dL 8.5* 8.2* 8.5* 8.5* 8.4* 8.2* 7.8*   HEMATOCRIT % 27.6* 26.5* 27.5* 27.7* 27.5* 27.0* 25.4*   PLATELETS Thousands/uL 206 191 213 216 238 266 244   POTASSIUM mmol/L 4.2 5.4* 5.1 4.2 4.0 4.8 5.2   CHLORIDE mmol/L 100 98 99 100 97 100 100   CO2 mmol/L 29 29 29 32 34* 26 28   BUN mg/dL 32* 44* 52* 40* 31* 62* 50*   CREATININE mg/dL  5.73* 7.95* 8.84* 7.83* 6.16* 9.85* 8.64*   CALCIUM mg/dL 9.6 9.3 9.7 9.4 8.8 9.5 9.5

## 2024-09-08 NOTE — QUICK NOTE
INTERPROFESSIONAL (ELECTRONIC OR PHONE) CONSULTATION - Interventional Radiology  Ngozi Beard 64 y.o. female MRN: [id number]  Unit/Bed#: S -01 Encounter: [id number]    IR has been consulted regarding the care of Ngozi Beard.    We were consulted by nephrology concerning this patient on dialysis    Inpatient Consult to IR  Consult performed by: Caterina Guerrier MD  Consult ordered by: Vane Estrada MD        09/08/24      Assessment/Recommendation:         Contacted by nephrology team that dialysis worked yesterday but once again today good flow rates could not be achieved     She has low ejection fraction and apparently was getting hypotensive with dialysis  Her right dialysis catheter has been exchanged stripped and angioplasty     It is not clear to me that a left sided catheter would work any better     We have already tried a split tip catheter.  I am not sure what additional options we have     A groin line would be consideration so that dialysis TIPS would be in the inferior vena cava but obviously this comes with greater risk of infection     I will place order for tunneled dialysis catheter exchange     Recommend patient be n.p.o. after midnight so that procedure can be done with sedation if possible     Recommend that there be interdisciplinary discussion on the morning of the ninth with nephrology primary team and IR to determine additional options         Total time spent in review of data, discussion with requesting provider and rendering advice was 17 min     Thank you for allowing Interventional Radiology to participate in the care of Ngozi Beard. Please don't hesitate to call or TigerText us with any questions.     Caterina Guerrier MD      Past Medical History:  Past Medical History:   Diagnosis Date    Asthma     Chronic pain     Hypertension     Renal disorder     Sepsis (HCC) 07/10/2024       Past Surgical History:  Past Surgical History:   Procedure Laterality Date    BACK  SURGERY      COLONOSCOPY      IR BIOPSY KIDNEY RANDOM  2024    IR TEMPORARY DIALYSIS CATHETER PLACEMENT  7/15/2024    IR TUNNELED DIALYSIS CATHETER CHECK/CHANGE/REPOSITION/ANGIOPLASTY  2024    IR TUNNELED DIALYSIS CATHETER CHECK/CHANGE/REPOSITION/ANGIOPLASTY  9/3/2024    IR TUNNELED DIALYSIS CATHETER PLACEMENT  2024    TUBAL LIGATION         Social History:  Social History     Substance and Sexual Activity   Alcohol Use No    Comment: hX:Occas.      Social History     Substance and Sexual Activity   Drug Use No     Social History     Tobacco Use   Smoking Status Former    Current packs/day: 0.00    Average packs/day: 1 pack/day for 15.0 years (15.0 ttl pk-yrs)    Types: Cigarettes    Start date:     Quit date:     Years since quittin.7   Smokeless Tobacco Never       Family History:  Family History   Problem Relation Age of Onset    Diabetes Mother     Hypertension Mother     Lung cancer Mother     Colon cancer Mother     Colon cancer Father     Hypertension Sister     Multiple sclerosis Sister     Hypothyroidism Maternal Aunt        Allergies:  Allergies   Allergen Reactions    Penicillins Hives     Reaction Date: 2005; 2012: meena mcdonald    Amoxicillin Rash    Aspirin Rash    Bactrim [Sulfamethoxazole-Trimethoprim] Rash    Ibuprofen Rash    Morphine Rash    Oxycodone Rash    Valacyclovir Rash       Medications:    Medications Prior to Admission:     albuterol (PROVENTIL HFA,VENTOLIN HFA) 90 mcg/act inhaler    atorvastatin (LIPITOR) 20 mg tablet    [] atovaquone (MEPRON) 750 mg/5 mL suspension    calcium carbonate (OYSTER SHELL,OSCAL) 500 mg    cyclophosphamide (CYTOXAN) 50 mg capsule    lamoTRIgine (LaMICtal) 100 mg tablet    montelukast (SINGULAIR) 10 mg tablet    NIFEdipine (PROCARDIA XL) 30 mg 24 hr tablet    ondansetron (ZOFRAN) 4 mg tablet    pantoprazole (PROTONIX) 40 mg tablet    predniSONE 2.5 mg tablet    senna (SENOKOT) 8.6 mg    sevelamer (RENAGEL) 800  mg tablet    traZODone (DESYREL) 100 mg tablet    venlafaxine (EFFEXOR-XR) 150 mg 24 hr capsule    venlafaxine (EFFEXOR-XR) 75 mg 24 hr capsule    Advair -21 MCG/ACT inhaler    iron polysaccharides (FERREX) 150 mg capsule  Current Facility-Administered Medications   Medication Dose Route Frequency    acetaminophen (TYLENOL) tablet 650 mg  650 mg Oral Q6H PRN    albuterol (PROVENTIL HFA,VENTOLIN HFA) inhaler 2 puff  2 puff Inhalation Q4H PRN    albuterol inhalation solution 2.5 mg  2.5 mg Nebulization TID    atorvastatin (LIPITOR) tablet 20 mg  20 mg Oral Daily    atovaquone (MEPRON) oral suspension 1,500 mg  1,500 mg Oral Daily    cefepime (MAXIPIME) 1,000 mg in dextrose 5 % 50 mL IVPB  1,000 mg Intravenous Q24H    cyclophosphamide (CYTOXAN) capsule 100 mg  100 mg Oral Daily    dextromethorphan-guaiFENesin (ROBITUSSIN DM) oral syrup 10 mL  10 mL Oral Q4H PRN    epoetin shavonne (EPOGEN,PROCRIT) injection 6,000 Units  6,000 Units Intravenous After Dialysis    fluticasone-vilanterol 200-25 mcg/actuation 1 puff  1 puff Inhalation Daily    heparin (porcine) subcutaneous injection 5,000 Units  5,000 Units Subcutaneous Q8H JACK    ipratropium (ATROVENT) 0.02 % inhalation solution 0.5 mg  0.5 mg Nebulization TID    iron polysaccharides (FERREX) capsule 150 mg  150 mg Oral Daily    lamoTRIgine (LaMICtal) tablet 100 mg  100 mg Oral HS    lidocaine (LIDODERM) 5 % patch 1 patch  1 patch Topical Daily    metoprolol succinate (TOPROL-XL) 24 hr tablet 50 mg  50 mg Oral BID    montelukast (SINGULAIR) tablet 10 mg  10 mg Oral Daily    pantoprazole (PROTONIX) EC tablet 40 mg  40 mg Oral Daily Before Breakfast    polyethylene glycol (MIRALAX) packet 17 g  17 g Oral Daily    predniSONE tablet 15 mg  15 mg Oral Daily    senna-docusate sodium (SENOKOT S) 8.6-50 mg per tablet 1 tablet  1 tablet Oral HS    sevelamer (RENAGEL) tablet 1,600 mg  1,600 mg Oral TID With Meals    Sodium Zirconium Cyclosilicate (Lokelma) 10 g  10 g Oral Daily  "   traZODone (DESYREL) tablet 200 mg  200 mg Oral HS    trimethobenzamide (TIGAN) IM injection 200 mg  200 mg Intramuscular Q6H PRN    venlafaxine (EFFEXOR-XR) 24 hr capsule 225 mg  225 mg Oral Daily       Vitals:  /75   Pulse 77   Temp 98.3 °F (36.8 °C) (Oral)   Resp 20   Ht 5' 3\" (1.6 m)   Wt 102 kg (224 lb 3.3 oz)   SpO2 95%   BMI 39.72 kg/m²   Body mass index is 39.72 kg/m².  Weight (last 2 days)       Date/Time Weight    09/08/24 0500 102 (224.21)    09/07/24 0545 104 (229.72)    09/06/24 0500 106 (233.03)            I/Os:    Intake/Output Summary (Last 24 hours) at 9/8/2024 1824  Last data filed at 9/8/2024 1604  Gross per 24 hour   Intake 500 ml   Output 131 ml   Net 369 ml       Lab Results and Cultures:   CBC with diff:   Lab Results   Component Value Date    WBC 11.86 (H) 09/08/2024    HGB 8.5 (L) 09/08/2024    HCT 27.6 (L) 09/08/2024    MCV 95 09/08/2024     09/08/2024    RBC 2.92 (L) 09/08/2024    MCH 29.1 09/08/2024    MCHC 30.8 (L) 09/08/2024    RDW 19.4 (H) 09/08/2024    MPV 10.6 09/08/2024    NRBC 1 08/30/2024      BMP/CMP:  Lab Results   Component Value Date    K 4.2 09/08/2024     09/08/2024    CO2 29 09/08/2024    BUN 32 (H) 09/08/2024    CREATININE 5.73 (H) 09/08/2024    CALCIUM 9.6 09/08/2024    AST 16 09/07/2024    ALT 13 09/07/2024    ALKPHOS 72 09/07/2024    EGFR 7 09/08/2024   ,     Coags:   Lab Results   Component Value Date    PTT >210 (HH) 08/30/2024    INR 1.99 (H) 08/30/2024   ,          HgbA1c:   Lab Results   Component Value Date    HGBA1C 6.6 (H) 07/09/2024    HGBA1C 5.9 11/26/2016    HGBA1C 6.0 (H) 01/18/2016       Blood Culture:   Lab Results   Component Value Date    BLOODCX No Growth After 5 Days. 08/30/2024    BLOODCX Staphylococcus epidermidis (A) 08/30/2024   ,   Urinalysis:   Lab Results   Component Value Date    COLORU Red (A) 07/10/2024    CLARITYU Slightly Cloudy (A) 07/10/2024    SPECGRAV 1.020 07/10/2024    PHUR 6.5 07/10/2024    LEUKOCYTESUR " "Negative 07/10/2024    NITRITE Negative 07/10/2024    GLUCOSEU Negative 07/10/2024    KETONESU Negative 07/10/2024    BILIRUBINUR Negative 07/10/2024    BLOODU 3+ (A) 07/10/2024   ,   Urine Culture: No results found for: \"URINECX\",   Wound Culure:  No results found for: \"WOUNDCULT\"    Imaging Studies: I have personally reviewed pertinent films in PACS    "

## 2024-09-08 NOTE — PROGRESS NOTES
[facility]  Progress Note  Name: Ngozi Beard I  MRN: [id number]  Unit/Bed#: S -01 I Date of Admission: 8/30/2024   Date of Service: 9/8/2024 I Hospital Day: 9    Assessment & Plan   Multifocal pneumonia  Assessment & Plan  -CT chest PE study revealed no evidence of PE. There is persistent tree-in-bud nodularity in the lungs suggestive of a widespread infectious bronchiolitis that may be secondary to an atypical mycobacterium. Follicular bronchiolitis and acute hypersensitivity pneumonitis could also be in the differential.  New mild patchy bilateral multifocal pneumonia  - Sputum culture: 4+ Pseudomonas   - ID: Suspect chronic process, however will proceed with short course of cefepime and observe clinically.   - Cardiology: Cleared for bronchoscopy.  - Pulm: Considering bronchoscopy if no improvement with abx, likely to be an outpatient procedure.    Improvement in coughing and sputum production symptoms with addition of abx, suspect patient presentation partially contributed from bronchitis secondary to pseudomonas.     Plan:   -Cefepime 1 g daily for 7 days end date of September 10.   - Once HD schedule is stable and back to 3 times weekly schedule, IV cefepime can be transitioned to post HD dosing.         Asthma without acute exacerbation  Assessment & Plan  Mild Bilateral wheezing was noted in the lower lung base upon initial presentation.  Does not require frequent inhaler use as an outpatient setting.  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 S/p PLEX.  Patient is anuric and dialysis-dependent  Tues/Thurs/Sat schedule  Access: Right IJ Selene, Cath reevaluation  by IR on 9/3, no issues  during HD. 9/4, issues with clotting, will needs re-evaluation @9.5. Requires Re-evaluation 9/6 IR, Fluid poor returns on 9/5. 9/6-bedside readjustment was made by IR, reclotted after 1.5 hours, Cathflo dwelling overnight. 9/7 - HD without issues     Plan:  Hemodialysis tentatively scheduled on 9/6, pending IR re- evaluation 9/6  Continue PTA cyclophosphamide  Continue PTA sevalemer  Continue PTA atovaquone for PJP prophylaxis  Continue prednisone taper from outpatient nephrology prior to this admission  20 mg once daily till September 6  15 mg starting September 7 to September 20  12.5 mg starting September 21 to October 4  10 mg starting October 5 to October 18  7.5 mg starting October 19 to November 2  5 mg daily starting November 3    * Shortness of breath  Assessment & Plan  CLINE: Negative PE, BNP 3019, CT chest-bilateral pleural effusion, tree-in-bud nodularity    Multifactorial, contributing factors of volume overload, chronic anemia, underlying COPD and asthma.     Clinically improving with hemodialysis    Plan  Pulmonology is following, see pna problem list    Cardiomyopathy (HCC)  Assessment & Plan  August 31-EF of 25% with global hypokinesis.  No prior echo for comparison.    Suspect nonischemic cardiomyopathy from inflammatory/rheumatologic etiology     GDMT recommended, pt started on Toprol 50 mg daily, Losartan 25 mg daily  9/4 - Nifedipine held per Cardiology  Will need Cardiac MRI, likely an outpatient procedure.  Ischemic work up with with left cardiac cath in o/p settings        Generalized weakness  Assessment & Plan  Multifactorial, respiratory failure secondary to chronic pulmonary infection, volume overload, chronic anemia, chronic deconditioning from being ill    Plan  PT/OT evaluation apprecaited    Anemia  Assessment & Plan  Recent Labs     09/06/24  0609 09/07/24  0545 09/08/24  0515   HGB 8.5* 8.2* 8.5*        Baseline Hgb 7-8  Etiology: component of iron deficiency and renal disease.      Plan  Monitor Hgb, transfuse for < 7  Continue PTA Ferrex    Dyslipidemia  Assessment & Plan  Continue PTA Lipitor    Primary hypertension  Assessment & Plan  Continue PTA nifedipine 60 mg daily  Start Losartan 25 mg daily  Start Toprol 25 mg daily    Bipolar 1 disorder (HCC)  Assessment & Plan  Continue PTA Lamictal, venlafaxine (150 mg and 75 mg daily in the morning), and trazodone 200 mg qd           VTE Pharmacologic Prophylaxis: VTE Score: 7 High Risk (Score >/= 5) - Pharmacological DVT Prophylaxis Ordered: heparin. Sequential Compression Devices Ordered.    Mobility:   Basic Mobility Inpatient Raw Score: 16  JH-HLM Goal: 5: Stand one or more mins  JH-HLM Achieved: 6: Walk 10 steps or more  JH-HLM Goal achieved. Continue to encourage appropriate mobility.    Patient Centered Rounds: I performed bedside rounds with nursing staff today.  Discussions with Specialists or Other Care Team Provider: Attending    Education and Discussions with Family / Patient: Patient declined call to .     Current Length of Stay: 9 day(s)  Current Patient Status: Inpatient   Discharge Plan: Anticipate discharge tomorrow to rehab facility.    Code Status: Level 1 - Full Code    Subjective:   Patient was seen and examined at bedside, no acute events overnight.  She continues to endorse shortness of breath with chest tightness at night and in the morning.  She indicates this has been present for about a week now.  She denies any fever or chills.  She underwent dialysis with no issues yesterday.    Objective:     Vitals:   Temp (24hrs), Av.2 °F (36.8 °C), Min:97.7 °F (36.5 °C), Max:98.7 °F (37.1 °C)    Temp:  [97.7 °F (36.5 °C)-98.7 °F (37.1 °C)] 97.7 °F (36.5 °C)  HR:  [70-95] 73  Resp:  [18] 18  BP: ()/(60-75) 110/66  SpO2:  [90 %-98 %] 95 %  Body mass index is 39.72 kg/m².     Input and Output Summary (last 24 hours):     Intake/Output Summary (Last 24 hours) at 2024 1241  Last data filed at 2024  1800  Gross per 24 hour   Intake 900 ml   Output 0 ml   Net 900 ml       Physical Exam:   Physical Exam  Constitutional:       General: She is not in acute distress.     Appearance: She is ill-appearing.   Cardiovascular:      Rate and Rhythm: Normal rate and regular rhythm.      Pulses: Normal pulses.      Heart sounds: Normal heart sounds. No murmur heard.  Pulmonary:      Effort: Pulmonary effort is normal. No respiratory distress.      Breath sounds: Wheezing present.   Abdominal:      General: Bowel sounds are normal. There is no distension.      Palpations: Abdomen is soft.      Tenderness: There is no abdominal tenderness.   Musculoskeletal:      Right lower leg: No edema.      Left lower leg: No edema.   Skin:     General: Skin is warm and dry.      Capillary Refill: Capillary refill takes less than 2 seconds.   Neurological:      Sensory: No sensory deficit.      Motor: No weakness.   Psychiatric:         Mood and Affect: Mood normal.         Behavior: Behavior normal.          Additional Data:     Labs:  Results from last 7 days   Lab Units 09/08/24  0515 09/03/24  0510 09/02/24  0509   WBC Thousand/uL 11.86*   < > 12.88*   HEMOGLOBIN g/dL 8.5*   < > 7.8*   HEMATOCRIT % 27.6*   < > 25.4*   PLATELETS Thousands/uL 206   < > 244   BANDS PCT %  --   --  4   LYMPHO PCT %  --   --  13*   MONO PCT %  --   --  3*   EOS PCT %  --   --  0    < > = values in this interval not displayed.     Results from last 7 days   Lab Units 09/08/24  0515 09/07/24  0545   SODIUM mmol/L 139 136   POTASSIUM mmol/L 4.2 5.4*   CHLORIDE mmol/L 100 98   CO2 mmol/L 29 29   BUN mg/dL 32* 44*   CREATININE mg/dL 5.73* 7.95*   ANION GAP mmol/L 10 9   CALCIUM mg/dL 9.6 9.3   ALBUMIN g/dL  --  3.4*   TOTAL BILIRUBIN mg/dL  --  0.50   ALK PHOS U/L  --  72   ALT U/L  --  13   AST U/L  --  16   GLUCOSE RANDOM mg/dL 112 130         Results from last 7 days   Lab Units 09/04/24  1605   POC GLUCOSE mg/dl 138               Lines/Drains:  Invasive  Devices       Peripheral Intravenous Line  Duration             Peripheral IV 24 Dorsal (posterior);Left Forearm 2 days              Hemodialysis Catheter  Duration             HD Permanent Double Catheter 5 days                      Telemetry:  Telemetry Orders (From admission, onward)               24 Hour Telemetry Monitoring  Continuous x 24 Hours (Telem)           Question:  Reason for 24 Hour Telemetry  Answer:  Decompensated CHF- and any one of the following: continuous diuretic infusion or total diuretic dose >200 mg daily, associated electrolyte derangement (I.e. K < 3.0), ionotropic drip (continuous infusion), hx of ventricular arrhythmia, or new EF < 35%                     Telemetry Reviewed: Normal Sinus Rhythm  Indication for Continued Telemetry Use: Metabolic/electrolyte disturbance with high probability of dysrhythmia             Imaging: No pertinent imaging reviewed.    Recent Cultures (last 7 days):   Results from last 7 days   Lab Units 24  1804   SPUTUM CULTURE  4+ Growth of Pseudomonas aeruginosa*  4+ Growth of   GRAM STAIN RESULT  Rare Epithelial cells per low power field*  No polys seen*  Rare Gram positive cocci in pairs*  Rare Gram variable rods*       Last 24 Hours Medication List:   Current Facility-Administered Medications   Medication Dose Route Frequency Provider Last Rate    acetaminophen  650 mg Oral Q6H PRN Kely Robert MD      albuterol  2 puff Inhalation Q4H PRN Kely Robert MD      albuterol  2.5 mg Nebulization TID Todd Dickens MD      atorvastatin  20 mg Oral Daily Kely Robert MD      atovaquone  1,500 mg Oral Daily Kathryn Cleveland MD      cefepime  1,000 mg Intravenous Q24H Jagjit Pham MD 1,000 mg (24 1620)    cyclophosphamide  100 mg Oral Daily Kely Robert MD      dextromethorphan-guaiFENesin  10 mL Oral Q4H PRN Kely Robert MD      epoetin shavonne  6,000 Units Intravenous After Dialysis Tirso Montez MD      fluticasone-vilanterol  1  puff Inhalation Daily Robert Gilbert MD      heparin (porcine)  5,000 Units Subcutaneous Q8H [facility] Kely Robert MD      ipratropium  0.5 mg Nebulization TID Todd Dickens MD      iron polysaccharides  150 mg Oral Daily Kely Robert MD      lamoTRIgine  100 mg Oral HS Kely Robert MD      lidocaine  1 patch Topical Daily Trish Kline MD      losartan  25 mg Oral Daily Ty Reese MD      metoprolol succinate  50 mg Oral BID Gerber Robbins MD      montelukast  10 mg Oral Daily Kely Robert MD      pantoprazole  40 mg Oral Daily Before Breakfast Kely Robert MD      polyethylene glycol  17 g Oral Daily Kathryn Cleveland MD      predniSONE  15 mg Oral Daily Kathryn Cleveland MD      senna-docusate sodium  1 tablet Oral HS Kathryn Cleveland MD      sevelamer  1,600 mg Oral TID With Meals Kely Robert MD      Sodium Zirconium Cyclosilicate  10 g Oral Daily Trish Kline MD      traZODone  200 mg Oral HS Kely Robert MD      trimethobenzamide  200 mg Intramuscular Q6H PRN Kely Robert MD      venlafaxine  225 mg Oral Daily Kely Robert MD          Today, Patient Was Seen By: Gerber Kim Jr, DO    **Please Note: This note may have been constructed using a voice recognition system.**

## 2024-09-08 NOTE — QUICK NOTE
Pt was scheduled to undergo dialysis today in the setting of shortness of breath reported earlier this morning. However, HD catheter was unsuccessful for use despite numerous attempts at changing pt position.    At the time of this encounter, pt does not appear to be in any acute distress. She is on 2L O2 via NC. She endorses some shortness of breath but then describes this similar to what she has been experiencing the past few days. She is able to participate in conversation without significant dyspnea. No rales, wheezing, or crackles on auscultation. Bilateral 1+ pitting edema which appears to be her recent baseline per chart review. Pt is alert & oriented x3, no evidence of uremic signs/symptoms at this current moment.    Encouraged pt to make staff aware if she experiences worsening dyspnea or lethargy. Spoke with dialysis RN and pt's RN as well. If pt acutely decompensates, IR can re-evaluate today. However, if pt appears stable IR will re-evaluate tomorrow. Will make night team aware of this as well.

## 2024-09-08 NOTE — ASSESSMENT & PLAN NOTE
Recent Labs     09/06/24  0609 09/07/24  0545 09/08/24  0515   HGB 8.5* 8.2* 8.5*        Baseline Hgb 7-8  Etiology: component of iron deficiency and renal disease.     Plan  Monitor Hgb, transfuse for < 7  Continue PTA Ferrex

## 2024-09-09 PROBLEM — Z99.2 ACUTE RENAL FAILURE ON DIALYSIS (HCC): Status: ACTIVE | Noted: 2024-09-09

## 2024-09-09 PROBLEM — N17.9 ACUTE RENAL FAILURE ON DIALYSIS (HCC): Status: ACTIVE | Noted: 2024-09-09

## 2024-09-09 PROBLEM — T82.9XXA COMPLICATION ASSOCIATED WITH DIALYSIS CATHETER: Status: ACTIVE | Noted: 2024-09-09

## 2024-09-09 LAB
ANION GAP SERPL CALCULATED.3IONS-SCNC: 10 MMOL/L (ref 4–13)
BUN SERPL-MCNC: 48 MG/DL (ref 5–25)
CALCIUM SERPL-MCNC: 9.4 MG/DL (ref 8.4–10.2)
CHLORIDE SERPL-SCNC: 100 MMOL/L (ref 96–108)
CO2 SERPL-SCNC: 30 MMOL/L (ref 21–32)
CREAT SERPL-MCNC: 7.57 MG/DL (ref 0.6–1.3)
ERYTHROCYTE [DISTWIDTH] IN BLOOD BY AUTOMATED COUNT: 19.3 % (ref 11.6–15.1)
GFR SERPL CREATININE-BSD FRML MDRD: 5 ML/MIN/1.73SQ M
GLUCOSE SERPL-MCNC: 106 MG/DL (ref 65–140)
HCT VFR BLD AUTO: 26.6 % (ref 34.8–46.1)
HGB BLD-MCNC: 8.1 G/DL (ref 11.5–15.4)
MAGNESIUM SERPL-MCNC: 2.3 MG/DL (ref 1.9–2.7)
MCH RBC QN AUTO: 28.9 PG (ref 26.8–34.3)
MCHC RBC AUTO-ENTMCNC: 30.5 G/DL (ref 31.4–37.4)
MCV RBC AUTO: 95 FL (ref 82–98)
PHOSPHATE SERPL-MCNC: 5.3 MG/DL (ref 2.3–4.1)
PLATELET # BLD AUTO: 182 THOUSANDS/UL (ref 149–390)
PMV BLD AUTO: 10.2 FL (ref 8.9–12.7)
POTASSIUM SERPL-SCNC: 4 MMOL/L (ref 3.5–5.3)
RBC # BLD AUTO: 2.8 MILLION/UL (ref 3.81–5.12)
SODIUM SERPL-SCNC: 140 MMOL/L (ref 135–147)
WBC # BLD AUTO: 10.15 THOUSAND/UL (ref 4.31–10.16)

## 2024-09-09 PROCEDURE — 99232 SBSQ HOSP IP/OBS MODERATE 35: CPT | Performed by: INTERNAL MEDICINE

## 2024-09-09 PROCEDURE — NC001 PR NO CHARGE: Performed by: RADIOLOGY

## 2024-09-09 PROCEDURE — 85027 COMPLETE CBC AUTOMATED: CPT | Performed by: STUDENT IN AN ORGANIZED HEALTH CARE EDUCATION/TRAINING PROGRAM

## 2024-09-09 PROCEDURE — 94640 AIRWAY INHALATION TREATMENT: CPT

## 2024-09-09 PROCEDURE — 84100 ASSAY OF PHOSPHORUS: CPT | Performed by: INTERNAL MEDICINE

## 2024-09-09 PROCEDURE — 83735 ASSAY OF MAGNESIUM: CPT | Performed by: INTERNAL MEDICINE

## 2024-09-09 PROCEDURE — 94760 N-INVAS EAR/PLS OXIMETRY 1: CPT

## 2024-09-09 PROCEDURE — 80048 BASIC METABOLIC PNL TOTAL CA: CPT | Performed by: INTERNAL MEDICINE

## 2024-09-09 RX ADMIN — IPRATROPIUM BROMIDE 0.5 MG: 0.5 SOLUTION RESPIRATORY (INHALATION) at 13:38

## 2024-09-09 RX ADMIN — ALBUTEROL SULFATE 2.5 MG: 2.5 SOLUTION RESPIRATORY (INHALATION) at 07:36

## 2024-09-09 RX ADMIN — LAMOTRIGINE 100 MG: 100 TABLET ORAL at 22:00

## 2024-09-09 RX ADMIN — METOPROLOL SUCCINATE 50 MG: 50 TABLET, EXTENDED RELEASE ORAL at 16:47

## 2024-09-09 RX ADMIN — ALBUTEROL SULFATE 2 PUFF: 90 AEROSOL, METERED RESPIRATORY (INHALATION) at 11:51

## 2024-09-09 RX ADMIN — ALBUTEROL SULFATE 2.5 MG: 2.5 SOLUTION RESPIRATORY (INHALATION) at 19:42

## 2024-09-09 RX ADMIN — CEFEPIME 1000 MG: 1 INJECTION, POWDER, FOR SOLUTION INTRAMUSCULAR; INTRAVENOUS at 16:47

## 2024-09-09 RX ADMIN — METOPROLOL SUCCINATE 50 MG: 50 TABLET, EXTENDED RELEASE ORAL at 08:39

## 2024-09-09 RX ADMIN — PANTOPRAZOLE SODIUM 40 MG: 40 TABLET, DELAYED RELEASE ORAL at 08:40

## 2024-09-09 RX ADMIN — SEVELAMER HYDROCHLORIDE 1600 MG: 800 TABLET ORAL at 16:46

## 2024-09-09 RX ADMIN — POLYSACCHARIDE-IRON COMPLEX 150 MG: 150 CAPSULE ORAL at 08:41

## 2024-09-09 RX ADMIN — PREDNISONE 15 MG: 10 TABLET ORAL at 08:41

## 2024-09-09 RX ADMIN — MONTELUKAST 10 MG: 10 TABLET, FILM COATED ORAL at 08:42

## 2024-09-09 RX ADMIN — TRAZODONE HYDROCHLORIDE 200 MG: 100 TABLET ORAL at 21:30

## 2024-09-09 RX ADMIN — ALBUTEROL SULFATE 2.5 MG: 2.5 SOLUTION RESPIRATORY (INHALATION) at 13:38

## 2024-09-09 RX ADMIN — ATORVASTATIN CALCIUM 20 MG: 20 TABLET, FILM COATED ORAL at 08:41

## 2024-09-09 RX ADMIN — IPRATROPIUM BROMIDE 0.5 MG: 0.5 SOLUTION RESPIRATORY (INHALATION) at 19:42

## 2024-09-09 RX ADMIN — SENNOSIDES AND DOCUSATE SODIUM 1 TABLET: 50; 8.6 TABLET ORAL at 22:00

## 2024-09-09 RX ADMIN — HEPARIN SODIUM 5000 UNITS: 5000 INJECTION INTRAVENOUS; SUBCUTANEOUS at 16:47

## 2024-09-09 RX ADMIN — HEPARIN SODIUM 5000 UNITS: 5000 INJECTION INTRAVENOUS; SUBCUTANEOUS at 08:38

## 2024-09-09 RX ADMIN — IPRATROPIUM BROMIDE 0.5 MG: 0.5 SOLUTION RESPIRATORY (INHALATION) at 07:36

## 2024-09-09 RX ADMIN — FLUTICASONE FUROATE AND VILANTEROL TRIFENATATE 1 PUFF: 200; 25 POWDER RESPIRATORY (INHALATION) at 08:44

## 2024-09-09 NOTE — ASSESSMENT & PLAN NOTE
-- Secondary to biopsy-proven anti-GBM disease.  Treated with cyclophosphamide prednisone and plasmapheresis but no renal recovery as of yet.  -- Continue dialysis TTS where she gets at [facility]

## 2024-09-09 NOTE — ASSESSMENT & PLAN NOTE
-- Sent a message to the internal medicine team and pulmonary team and interventional team.  Recommend bronchoscopy.  --Patient still with persistent cough  --Pseudomonas respiratory infection.  Infectious disease on board currently completing a course of IV cefepime

## 2024-09-09 NOTE — CASE MANAGEMENT
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     Abnormality of ascending aorta 2024     Postmenopausal 2024     Encounter for screening mammogram for malignant neoplasm of breast 2024     Allergic rhinitis 2024     Persistent cough 2024     Urge incontinence of urine 2024     Exercise intolerance 2024     Family history of coronary artery bypass graft surgery 2024     Urge urinary incontinence 2024     Female stress incontinence 2024     Paranoid schizophrenia ([facility]) 2023     Asthma without acute exacerbation 2023     Asthma due to seasonal allergies 2023     Bipolar 1 disorder ([facility]) 2022     Primary hypertension 2022     Dyslipidemia 2022       LOS (days): 10  Geometric Mean LOS (GMLOS) (days): 4.4  Days to GMLOS:-5.1     OBJECTIVE:  Risk of Unplanned Readmission Score: 37.13         Current admission status: Inpatient   Preferred Pharmacy:   [id number] [place], [place] - [address] [place] [id number]  Phone: [phone] Fax: [phone]    [facility] - [place] [id number] [address] [address]  Phone: [phone] Fax: [phone]    Primary Care Provider: Meenakshi  MD Esha    Primary Insurance: [facility]  Secondary Insurance: [facility]    DISCHARGE DETAILS:    Level of Care determination received from [facility]. Pt is nursing facility clinically eligible.  LOC uploaded in [employer].

## 2024-09-09 NOTE — ASSESSMENT & PLAN NOTE
-- Persistent issues with the HD catheter , has been evaluated by IR multiple times , good flow rates could not be achieved on the last treatment  -- low ejection fraction and apparently was getting hypotensive with dialysis  -- Her right dialysis catheter has been exchanged stripped and angioplasty  --Chest x-ray personally reviewed with IR in the reading room together  --Planning for repositioning as the catheter appears to be pulled too far.  If still having issues with the HD catheter and IR will make an attempt of securing the catheter better HD nurse concerned that primary pulmonary issues are causing some issues with the catheter.  I have asked Meme if they can consider a bronchoscopy.  If having persistent issues with the HD catheter we we will plan for possible urgent start PD with transfer to [facility].

## 2024-09-09 NOTE — ASSESSMENT & PLAN NOTE
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

## 2024-09-09 NOTE — ASSESSMENT & PLAN NOTE
Admission in July 2024 for ANGELICA. 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 S/p PLEX.  Patient is anuric and dialysis-dependent  Tues/Thurs/Sat schedule  Access: Right IJ PermCath, Cath reevaluation  by IR on 9/3, no issues during HD. 9/4, issues with clotting, will needs re-evaluation @9.5. Requires Re-evaluation 9/6 IR, Fluid poor returns on 9/5. 9/6-bedside readjustment was made by IR, reclotted after 1.5 hours, Cathflo dwelling overnight. 9/7 - HD without issues . 9/8 -cannot achieve good flow.  9/9-IR procedure unavailable, facilitated conversation to IR and nephrology regarding plans for dialysis.  Likely procedure 9/10    Plan:  Hemodialysis schedule per nephro   continue PTA cyclophosphamide  Continue PTA sevalemer  Continue PTA atovaquone for PJP prophylaxis  Continue prednisone taper from outpatient nephrology prior to this admission  20 mg once daily till September 6  15 mg starting September 7 to September 20  12.5 mg starting September 21 to October 4  10 mg starting October 5 to October 18  7.5 mg starting October 19 to November 2  5 mg daily starting November 3

## 2024-09-09 NOTE — PROGRESS NOTES
This is a patient with end-stage renal disease on hemodialysis Tuesday Thursday Saturday.  She has been having frequent recurrent issues with her tunneled dialysis catheter.  It was for first placed by me on 7/22/2024 and she has had 2 subsequent exchanges/repositioning.  On her last exchange, I noted that the catheter had retracted back with a catheter tip in the SVC and I exchanged it for an Kwan Split catheter with the tips of both lumens position within the right atrium.  She again has some issues with low last week and one of my colleagues 1 to dialysis and slightly retracted the catheter.  I reviewed the chest radiograph and it appears now that the catheter tip is in the SVC.    I had in person discussion with Dr. Burns and he conveyed that the senior dialysis nursing staff reported that the flow issue might be related to her respiratory symptoms (persistent cough and irregular respiratory pattern).  I am not sure if this is a direct cause-and-effect or coincidental.    We plan to perform a TDC check/change/repositioning tomorrow prior to her dialysis session.  If she has recurrent issues with her dialysis catheter after this change, Dr. Burns is planning to start her on peritoneal dialysis urgently.

## 2024-09-09 NOTE — ASSESSMENT & PLAN NOTE
-- Pulmonary on board.  Chest x-ray personally reviewed.  --Recommend bronchoscopy  --Saturating 97% on room air  --Having persistent coughing and abnormal respirations still

## 2024-09-09 NOTE — PROGRESS NOTES
[facility]  Progress Note  Name: Ngozi Beard I  MRN: [id number]  Unit/Bed#: S -01 I Date of Admission: 8/30/2024   Date of Service: 9/9/2024 I Hospital Day: 10    Assessment & Plan   Multifocal pneumonia  Assessment & Plan  -CT chest PE study revealed no evidence of PE. There is persistent tree-in-bud nodularity in the lungs suggestive of a widespread infectious bronchiolitis that may be secondary to an atypical mycobacterium. Follicular bronchiolitis and acute hypersensitivity pneumonitis could also be in the differential.  New mild patchy bilateral multifocal pneumonia  - Sputum culture: 4+ Pseudomonas   - ID: Suspect chronic process, however will proceed with short course of cefepime and observe clinically.   - Cardiology: Cleared for bronchoscopy.  - Pulm: Considering bronchoscopy if no improvement with abx, likely to be an outpatient procedure.    Improvement in coughing and sputum production symptoms with addition of abx, suspect patient presentation partially contributed from bronchitis secondary to pseudomonas.     Plan:   -Cefepime 1 g daily for 7 days end date of September 10.   - Once HD schedule is stable and back to 3 times weekly schedule, IV cefepime can be transitioned to post HD dosing.         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 S/p PLEX.  Patient is anuric and dialysis-dependent  Tues/Thurs/Sat schedule  Access: Right IJ PermCath, Cath reevaluation  by IR on 9/3, no issues during HD. 9/4, issues with clotting, will needs re-evaluation @9.5. Requires Re-evaluation 9/6 IR, Fluid poor returns on 9/5. 9/6-bedside readjustment was made by IR, reclotted after 1.5 hours, Cathflo dwelling overnight. 9/7 - HD without issues . 9/8 -cannot achieve good flow.  9/9-IR procedure unavailable, facilitated conversation to IR and nephrology  regarding plans for dialysis.  Likely procedure 9/10    Plan:  Hemodialysis schedule per nephro   continue PTA cyclophosphamide  Continue PTA sevalemer  Continue PTA atovaquone for PJP prophylaxis  Started Lokelma per nephro  Continue prednisone taper from outpatient nephrology prior to this admission  20 mg once daily till September 6  15 mg starting September 7 to September 20  12.5 mg starting September 21 to October 4  10 mg starting October 5 to October 18  7.5 mg starting October 19 to November 2  5 mg daily starting November 3    * Shortness of breath  Assessment & Plan  CLINE: Negative PE, BNP 3019, CT chest-bilateral pleural effusion, tree-in-bud nodularity    Multifactorial, contributing factors of volume overload, chronic anemia, underlying COPD and asthma.     Clinically improving with hemodialysis    Plan  Pulmonology is following, see pna problem list    Cardiomyopathy (HCC)  Assessment & Plan  August 31-EF of 25% with global hypokinesis.  No prior echo for comparison.    Suspect nonischemic cardiomyopathy from inflammatory/rheumatologic etiology     GDMT recommended, pt started on Toprol 50 mg daily, Losartan 25 mg daily  9/4 - Nifedipine held per Cardiology  9/9 Losartan held per nephro   Will need Cardiac MRI, likely an outpatient procedure.  Ischemic work up with with left cardiac cath in o/p settings        Generalized weakness  Assessment & Plan  Multifactorial, respiratory failure secondary to chronic pulmonary infection, volume overload, chronic anemia, chronic deconditioning from being ill    Plan  PT/OT evaluation apprecaited    Asthma without acute exacerbation  Assessment & Plan  Mild Bilateral wheezing was noted in the lower lung base upon initial presentation.  Does not require frequent inhaler use as an outpatient setting.  Low suspicion for exacerbation for the clinical symptoms presented during this hospital on admission    Plan  Continue albuterol-ipratropium nebs    Anemia  Assessment &  Plan  Recent Labs     24  0545 24  0515 24  0521   HGB 8.2* 8.5* 8.1*        Baseline Hgb 7-8  Etiology: component of iron deficiency and renal disease.     Plan  Monitor Hgb, transfuse for < 7  Continue PTA Ferrex    Dyslipidemia  Assessment & Plan  Continue PTA Lipitor    Bipolar 1 disorder (HCC)  Assessment & Plan  Continue PTA Lamictal, venlafaxine (150 mg and 75 mg daily in the morning), and trazodone 200 mg qd    Primary hypertension  Assessment & Plan  Continue volume control with dialysis  Losartan 25 mg daily (held per nephro for ultra filtration)  Start Toprol 25 mg daily         Current Length of Stay: 10 day(s)  Current Patient Status: Inpatient   Code Status: Level 1 - Full Code      Discharge Plan: Disposition:  Expected date of discharge: pending insurance authorization  Dispo destination: [facility]  Indwelling drains/lines: Tunnel Cath  DME needs: n/a  HH needs: n/a   F/U appts: Nephrology/ Cardiology / Pulm  Preferred pharmacy: on file  Meds: n/a  Transportation: facility    Subjective:   : Cannot achieve good flow, will place tunneled dialysis catheter exchange, plan to discussion with nephrology with options.    Overnight: No acute events over night     Complaints:  Endorses shortness of breath , worsened with lying flat.    Patient denies Headache, Fever, Chills, Chest Pain,  Abdominal Pain, diarrhea, leg swellings    Diet: Diet NPO  Diet Renal; Potassium 2 GM; Yes; Fluid Restriction 1800 ML; No   Bowel regimen:   Last BM Date: 24   VTE Pharmacologic Prophylaxis: VTE Score: 7 High Risk (Score >/= 5) - Pharmacological DVT Prophylaxis Ordered: heparin. Sequential Compression Devices Ordered.    Objective:    Vitals:   Temp (24hrs), Av.6 °F (36.4 °C), Min:97.3 °F (36.3 °C), Max:98.3 °F (36.8 °C)    Temp:  [97.3 °F (36.3 °C)-98.3 °F (36.8 °C)] 97.3 °F (36.3 °C)  HR:  [71-79] 73  Resp:  [18-20] 20  BP: (106-123)/(64-75) 106/66  SpO2:  [92 %-99 %] 92 %  Input  and Output Summary (last 24 hours):     Intake/Output Summary (Last 24 hours) at 9/9/2024 1435  Last data filed at 9/9/2024 0654  Gross per 24 hour   Intake 500 ml   Output 131 ml   Net 369 ml     Physical Exam:   Physical Exam  Constitutional:       General: She is not in acute distress.     Appearance: She is ill-appearing.   Cardiovascular:      Rate and Rhythm: Normal rate and regular rhythm.      Comments: No peripheral edema  Pulmonary:      Effort: Pulmonary effort is normal.      Breath sounds: Normal breath sounds.      Comments: 2L oxygen  Abdominal:      Palpations: Abdomen is soft.   Musculoskeletal:      Cervical back: Neck supple.   Skin:     General: Skin is warm.      Capillary Refill: Capillary refill takes less than 2 seconds.   Neurological:      Mental Status: She is alert.        Additional Data:   Labs:  Hemoglobin 8.1 from 8.5  Potassium at 4  Results from last 7 days   Lab Units 09/09/24  0521   WBC Thousand/uL 10.15   HEMOGLOBIN g/dL 8.1*   HEMATOCRIT % 26.6*   PLATELETS Thousands/uL 182     Results from last 7 days   Lab Units 09/09/24  0521 09/08/24  0515 09/07/24  0545   SODIUM mmol/L 140   < > 136   POTASSIUM mmol/L 4.0   < > 5.4*   CHLORIDE mmol/L 100   < > 98   CO2 mmol/L 30   < > 29   BUN mg/dL 48*   < > 44*   CREATININE mg/dL 7.57*   < > 7.95*   ANION GAP mmol/L 10   < > 9   CALCIUM mg/dL 9.4   < > 9.3   ALBUMIN g/dL  --   --  3.4*   TOTAL BILIRUBIN mg/dL  --   --  0.50   ALK PHOS U/L  --   --  72   ALT U/L  --   --  13   AST U/L  --   --  16   GLUCOSE RANDOM mg/dL 106   < > 130    < > = values in this interval not displayed.         Results from last 7 days   Lab Units 09/04/24  1605   POC GLUCOSE mg/dl 138               Recent Cultures (last 7 days):   Results from last 7 days   Lab Units 09/02/24  1804   SPUTUM CULTURE  4+ Growth of Pseudomonas aeruginosa*  4+ Growth of   GRAM STAIN RESULT  Rare Epithelial cells per low power field*  No polys seen*  Rare Gram positive cocci in  pairs*  Rare Gram variable rods*       Imaging:   X-ray9/8 -venous congestions without pleural effusion, or opacities.  XR chest portable  Narrative: XR CHEST PORTABLE    INDICATION: shortness of breath.    COMPARISON: 9/4/2024    FINDINGS: The right IJ approach dialysis catheter terminates in the region of the SVC.    Clear lungs. No pneumothorax or pleural effusion.    Unchanged mild cardiomegaly.    Bones are unremarkable for age.    Normal upper abdomen.  Impression: No acute cardiopulmonary disease.  Dialysis catheter tip appears to be at the level of the SVC. PA and lateral views in standard position to confirm.    Workstation performed: [id number]      Mobility:   Basic Mobility Inpatient Raw Score: 16  JH-HLM Goal: 5: Stand one or more mins  JH-HLM Achieved: 6: Walk 10 steps or more  JH-HLM Goal achieved. Continue to encourage appropriate mobility.    Last 24 Hours Medication List:   Current Facility-Administered Medications   Medication Dose Route Frequency Provider Last Rate    acetaminophen  650 mg Oral Q6H PRN Kely Robert MD      albuterol  2 puff Inhalation Q4H PRN Kely Robert MD      albuterol  2.5 mg Nebulization TID Todd Dickens MD      atorvastatin  20 mg Oral Daily Kely Robert MD      atovaquone  1,500 mg Oral Daily Kathryn Cleveland MD      cefepime  1,000 mg Intravenous Q24H Kathryn Cleveland MD      cyclophosphamide  100 mg Oral Daily Kely Robert MD      dextromethorphan-guaiFENesin  10 mL Oral Q4H PRN Kely Robert MD      epoetin shavonne  6,000 Units Intravenous After Dialysis Tirso Montez MD      fluticasone-vilanterol  1 puff Inhalation Daily Robert Gilbert MD      heparin (porcine)  5,000 Units Subcutaneous Q8H [facility] Kely Robert MD      ipratropium  0.5 mg Nebulization TID Todd Dickens MD      iron polysaccharides  150 mg Oral Daily Kely Robert MD      lamoTRIgine  100 mg Oral HS Kely Robert MD      lidocaine  1 patch Topical Daily Trish Kline,  MD      metoprolol succinate  50 mg Oral BID Gerber Robbins MD      montelukast  10 mg Oral Daily Kely Robert MD      pantoprazole  40 mg Oral Daily Before Breakfast Kely Robert MD      polyethylene glycol  17 g Oral Daily Kathryn Cleveland MD      predniSONE  15 mg Oral Daily Kathryn Cleveland MD      senna-docusate sodium  1 tablet Oral HS Kathryn Cleveland MD      sevelamer  1,600 mg Oral TID With Meals Kely Robert MD      Sodium Zirconium Cyclosilicate  10 g Oral Daily Trish Kline MD      traZODone  200 mg Oral HS Kely Robert MD      trimethobenzamide  200 mg Intramuscular Q6H PRN Kely Robert MD      venlafaxine  225 mg Oral Daily Kely Robert MD         Lines/Drains:  Invasive Devices       Peripheral Intravenous Line  Duration             Peripheral IV 09/05/24 Dorsal (posterior);Left Forearm 3 days              Hemodialysis Catheter  Duration             HD Permanent Double Catheter 6 days                      Telemetry:  Telemetry Orders (From admission, onward)               24 Hour Telemetry Monitoring  Continuous x 24 Hours (Telem)        Expiring   Question:  Reason for 24 Hour Telemetry  Answer:  Decompensated CHF- and any one of the following: continuous diuretic infusion or total diuretic dose >200 mg daily, associated electrolyte derangement (I.e. K < 3.0), ionotropic drip (continuous infusion), hx of ventricular arrhythmia, or new EF < 35%                     Telemetry Reviewed: Normal Sinus Rhythm  Indication for Continued Telemetry Use: Acute CHF on >200 mg lasix/day or equivalent dose or with new reduced EF.              Patient Centered Rounds: I performed bedside rounds with nursing staff today.  Discussions with Specialists or Other Care Team Provider: IR, Nephrology  Education and Discussions with Family / Patient:   .  Will update sister  Today, Patient Was Seen By: Kathryn Cleveland MD    **Please Note: This note may have been constructed using a voice recognition system.**

## 2024-09-09 NOTE — ASSESSMENT & PLAN NOTE
Continue volume control with dialysis  Losartan 25 mg daily (held per nephro for ultra filtration)  Start Toprol 25 mg daily

## 2024-09-09 NOTE — ASSESSMENT & PLAN NOTE
-- Blood pressure currently under excellent management and very well-controlled.  --Does not examine overtly volume overloaded at this time  --Continue current management

## 2024-09-09 NOTE — PROGRESS NOTES
NEPHROLOGY HOSPITAL PROGRESS NOTE   Ngozi Beard 64 y.o. female MRN: [id number]  Unit/Bed#: S -01 Encounter: [id number]  Reason for Consult: Acute renal failure currently dialysis dependent    Brief History of Admission - 64-year-old female with a past medical history of newly diagnosed anti-GBM disease initiated on dialysis July 16, 2024, asthma, hypertension, bipolar disorder who initially presents with chest pain and shortness of breath. Recent admission with similar presentation. Nephrology is on board for dialysis management     Assessment & Plan  Shortness of breath  -- Pulmonary on board.  Chest x-ray personally reviewed.  --Recommend bronchoscopy  --Saturating 97% on room air  --Having persistent coughing and abnormal respirations still  Primary hypertension  -- Blood pressure currently under excellent management and very well-controlled.  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  Recommend bronchoscopy.  --Patient still with persistent cough  --Pseudomonas respiratory infection.  Infectious disease on board currently completing a course of IV cefepime  Acute renal failure on dialysis (HCC)  -- Secondary to biopsy-proven anti-GBM disease.  Treated with cyclophosphamide  "prednisone and plasmapheresis but no renal recovery as of yet.  -- Continue dialysis TTS where she gets at [facility]    Complication associated with dialysis catheter  -- Persistent issues with the HD catheter , has been evaluated by IR multiple times , good flow rates could not be achieved on the last treatment  -- low ejection fraction and apparently was getting hypotensive with dialysis  -- Her right dialysis catheter has been exchanged stripped and angioplasty  --Chest x-ray personally reviewed with IR in the reading room together  --Planning for repositioning as the catheter appears to be pulled too far.  If still having issues with the HD catheter and IR will make an attempt of securing the catheter better HD nurse concerned that primary pulmonary issues are causing some issues with the catheter.  I have asked Meme if they can consider a bronchoscopy.  If having persistent issues with the HD catheter we we will plan for possible urgent start PD with transfer to [facility].      I have reviewed the nephrology recommendations including assessment and plan, with with the patient, the internal medicine team including the attending and resident, pulmonary, and interventional radiology I have also spoken to the HD nurse, and we are in agreement with renal plan including the information outlined above.    Portions of the record may have been created with voice recognition software. Occasional wrong word or \"sound a like\" substitutions may have occurred due to the inherent limitations of voice recognition software. Read the chart carefully and recognize, using context, where substitutions have occurred.If you have any questions, please contact the dictating provider.    SUBJECTIVE / 24H INTERVAL HISTORY:    Generalized weakness but no overnight event    OBJECTIVE:  Current Weight: Weight - Scale: 101 kg (223 lb 1.7 oz)  Vitals:    09/09/24 0734 09/09/24 0736 09/09/24 1339 09/09/24 1508   BP: " 106/66   110/60   BP Location: Left arm      Pulse: 73   83   Resp: 20   20   Temp:    98.4 °F (36.9 °C)   TempSrc:       SpO2: 99% 97% 92% 97%   Weight:       Height:           Intake/Output Summary (Last 24 hours) at 9/9/2024 1539  Last data filed at 9/9/2024 0654  Gross per 24 hour   Intake 500 ml   Output 131 ml   Net 369 ml     General: Awake, no acute distress  HEENT: Dry and pink mucous membranes  Neck: Supple  Skin: No rashes no lesions  Cardiovascular: S1-S2 appreciated regular rate and rhythm  Lungs: Coarse breath sounds  Abdomen: Nontender nondistended  Extremities: No significant edema  : No flank pain  Neurologic: No asterixis  Access: IJ permacath with no erythema or exudate      Medications:    Current Facility-Administered Medications:     acetaminophen (TYLENOL) tablet 650 mg, 650 mg, Oral, Q6H PRN, Kely Robert MD    albuterol (PROVENTIL HFA,VENTOLIN HFA) inhaler 2 puff, 2 puff, Inhalation, Q4H PRN, Kely Robert MD, 2 puff at 09/09/24 1151    albuterol inhalation solution 2.5 mg, 2.5 mg, Nebulization, TID, Todd Dickens MD, 2.5 mg at 09/09/24 1338    atorvastatin (LIPITOR) tablet 20 mg, 20 mg, Oral, Daily, Keyl Robert MD, 20 mg at 09/09/24 0841    atovaquone (MEPRON) oral suspension 1,500 mg, 1,500 mg, Oral, Daily, Kathryn Cleveland MD, 1,500 mg at 09/08/24 0850    cefepime (MAXIPIME) 1,000 mg in dextrose 5 % 50 mL IVPB, 1,000 mg, Intravenous, Q24H, Kathryn Cleveland MD    cyclophosphamide (CYTOXAN) capsule 100 mg, 100 mg, Oral, Daily, Kely Robert MD, 100 mg at 09/08/24 0851    dextromethorphan-guaiFENesin (ROBITUSSIN DM) oral syrup 10 mL, 10 mL, Oral, Q4H PRN, Kely Robert MD, 10 mL at 09/08/24 1926    epoetin shavonne (EPOGEN,PROCRIT) injection 6,000 Units, 6,000 Units, Intravenous, After Dialysis, Tirso Montez MD, 6,000 Units at 09/07/24 1031    fluticasone-vilanterol 200-25 mcg/actuation 1 puff, 1 puff, Inhalation, Daily, Robert Gilbert MD, 1 puff at 09/09/24 0844    heparin  (porcine) subcutaneous injection 5,000 Units, 5,000 Units, Subcutaneous, Q8H JACK, Kely Robert MD, 5,000 Units at 09/09/24 0838    ipratropium (ATROVENT) 0.02 % inhalation solution 0.5 mg, 0.5 mg, Nebulization, TID, Todd Dickens MD, 0.5 mg at 09/09/24 1338    iron polysaccharides (FERREX) capsule 150 mg, 150 mg, Oral, Daily, Kely Robert MD, 150 mg at 09/09/24 0841    lamoTRIgine (LaMICtal) tablet 100 mg, 100 mg, Oral, HS, Kely Robert MD, 100 mg at 09/08/24 2047    lidocaine (LIDODERM) 5 % patch 1 patch, 1 patch, Topical, Daily, Trish Kline MD, 1 patch at 09/06/24 0819    metoprolol succinate (TOPROL-XL) 24 hr tablet 50 mg, 50 mg, Oral, BID, Gerber Robbins MD, 50 mg at 09/09/24 0839    montelukast (SINGULAIR) tablet 10 mg, 10 mg, Oral, Daily, Kely Robert MD, 10 mg at 09/09/24 0842    pantoprazole (PROTONIX) EC tablet 40 mg, 40 mg, Oral, Daily Before Breakfast, Kely Robert MD, 40 mg at 09/09/24 0840    polyethylene glycol (MIRALAX) packet 17 g, 17 g, Oral, Daily, Kathryn Cleveland MD, 17 g at 09/07/24 1232    predniSONE tablet 15 mg, 15 mg, Oral, Daily, Kathryn Cleveland MD, 15 mg at 09/09/24 0841    senna-docusate sodium (SENOKOT S) 8.6-50 mg per tablet 1 tablet, 1 tablet, Oral, HS, Kathryn Cleveland MD, 1 tablet at 09/08/24 2047    sevelamer (RENAGEL) tablet 1,600 mg, 1,600 mg, Oral, TID With Meals, Kely Robert MD, 1,600 mg at 09/08/24 1926    Sodium Zirconium Cyclosilicate (Lokelma) 10 g, 10 g, Oral, Daily, Trish Kline MD, 10 g at 09/01/24 0811    traZODone (DESYREL) tablet 200 mg, 200 mg, Oral, HS, Kely Robert MD, 200 mg at 09/08/24 2048    trimethobenzamide (TIGAN) IM injection 200 mg, 200 mg, Intramuscular, Q6H PRN, Kely Robert MD, 200 mg at 08/30/24 2208    venlafaxine (EFFEXOR-XR) 24 hr capsule 225 mg, 225 mg, Oral, Daily, Kely Robert MD, 225 mg at 09/08/24 0847    Laboratory Results:  Results from last 7 days   Lab Units 09/09/24  0521 09/08/24  0515 09/07/24  0545  09/06/24  0609 09/05/24  0525 09/04/24  0450 09/03/24  0510   WBC Thousand/uL 10.15 11.86* 9.08 9.45 10.00 9.89 12.39*   HEMOGLOBIN g/dL 8.1* 8.5* 8.2* 8.5* 8.5* 8.4* 8.2*   HEMATOCRIT % 26.6* 27.6* 26.5* 27.5* 27.7* 27.5* 27.0*   PLATELETS Thousands/uL 182 206 191 213 216 238 266   POTASSIUM mmol/L 4.0 4.2 5.4* 5.1 4.2 4.0 4.8   CHLORIDE mmol/L 100 100 98 99 100 97 100   CO2 mmol/L 30 29 29 29 32 34* 26   BUN mg/dL 48* 32* 44* 52* 40* 31* 62*   CREATININE mg/dL 7.57* 5.73* 7.95* 8.84* 7.83* 6.16* 9.85*   CALCIUM mg/dL 9.4 9.6 9.3 9.7 9.4 8.8 9.5   MAGNESIUM mg/dL 2.3  --   --   --   --   --   --    PHOSPHORUS mg/dL 5.3*  --   --   --   --   --   --

## 2024-09-09 NOTE — PROGRESS NOTES
Progress Note - Infectious Disease   Ngozi Beard 64 y.o. female MRN: [id number]  Unit/Bed#: S -01 Encounter: [id number]      Impression/Recommendations:  1.   Pseudomonas respiratory infection.  Clinical and radiological picture is more consistent with bronchitis than pneumonia.  Patient's dyspnea is already much improved with aggressive fluid removal.  However, she has persistent cough.  Given no immediate plan for bronchoscopy, antibiotic was initiated for likely Pseudomonas bronchitis.  Given high risk for fluoroquinolone toxicity, will have patient complete treatment course with IV cefepime.  Continue IV cefepime.  Treat x 7-day course, through 9/10.     2.  Pneumonitis versus pneumonia.  Patient's respiratory symptoms are improving with aggressive diuresis.  Therefore, plan is to continue aggressive diuresis, in addition to treatment for Pseudomonas respiratory infection (see above).  If respiratory symptoms do not resolve or recur, patient can be reevaluated with repeat chest CT and bronchoscopy at that time.  Antibiotic plan as in above.  Plan for aggressive fluid removal via HD per nephrology.  Monitor respiratory symptoms and O2 saturation.     3.  Bacteremia, with growth of MRSE in only 1 out of 2 admission blood cultures.  Patient has no fever and is not systemically ill, making true bacteremia not likely clinically.  Although patient does have permacath in place, with growth in only 1 culture sent, this is still most likely contaminated blood draw.  Patient had been on IV vancomycin but this was discontinued.  She remains clinically well off it.  No antibiotic needed for this indication.     3.  Leukocytosis.  Most likely steroid effect.  WBC fluctuating, currently normal.     4.  Advanced CKD, on HD.  Functional difficulty of permacath noted.  IR evaluation for permacath revision pending.  HD per nephrology.     5.  Recently diagnosed rapidly progressing anti-GBM disease.  Patient is on  Cytoxan and prednisone since diagnosis.  Continue outpatient Cytoxan/steroid.  Continue/restart atovaquone PCP prophylaxis.     Discussed with patient in detail regarding the above plan.  Discussed with Dr. Cleveland from primary service regarding antibiotic plan above.  He is in agreement.     Antibiotics:  Cefepime # 6     Subjective:  Dyspnea is much improved from admission, near baseline.  Cough is also improved, still mildly productive.  Temperature stays down.  No chills.  No diarrhea.    Objective:  Vitals:  Temp:  [97.3 °F (36.3 °C)-98.3 °F (36.8 °C)] 97.3 °F (36.3 °C)  HR:  [71-79] 73  Resp:  [18-20] 20  BP: (106-123)/(64-75) 106/66  SpO2:  [93 %-99 %] 97 %  Temp (24hrs), Av.6 °F (36.4 °C), Min:97.3 °F (36.3 °C), Max:98.3 °F (36.8 °C)  Current: Temperature: (!) 97.3 °F (36.3 °C)    Physical Exam:     General: Awake, alert, cooperative, no distress.   Neck:  Supple. No mass.  No lymphadenopathy.   Lungs: Expansion symmetric, no rales, no wheezing, respirations unlabored.   Heart:  Regular rate and rhythm, S1 and S2 normal, no murmur.   Abdomen: Soft, nondistended, non-tender, bowel sounds active all four quadrants, no masses, no organomegaly.   Extremities: Stable mild leg edema. No erythema/warmth. No ulcer. Nontender to palpation.   Skin:  No rash.   Neuro: Moves all extremities.     Invasive Devices       Peripheral Intravenous Line  Duration             Peripheral IV 24 Dorsal (posterior);Left Forearm 3 days              Hemodialysis Catheter  Duration             HD Permanent Double Catheter 6 days                    Labs studies:   I have personally reviewed pertinent labs.  Results from last 7 days   Lab Units 24  0521 24  0515 24  0545 24  0609 24  0525   POTASSIUM mmol/L 4.0 4.2 5.4* 5.1 4.2   CHLORIDE mmol/L 100 100 98 99 100   CO2 mmol/L 30 29 29 29 32   BUN mg/dL 48* 32* 44* 52* 40*   CREATININE mg/dL 7.57* 5.73* 7.95* 8.84* 7.83*   EGFR ml/min/1.73sq m 5 7 4 4 4    CALCIUM mg/dL 9.4 9.6 9.3 9.7 9.4   AST U/L  --   --  16 10* 10*   ALT U/L  --   --  13 16 20   ALK PHOS U/L  --   --  72 75 72     Results from last 7 days   Lab Units 09/09/24  0521 09/08/24  0515 09/07/24  0545   WBC Thousand/uL 10.15 11.86* 9.08   HEMOGLOBIN g/dL 8.1* 8.5* 8.2*   PLATELETS Thousands/uL 182 206 191     Results from last 7 days   Lab Units 09/02/24  1804   SPUTUM CULTURE  4+ Growth of Pseudomonas aeruginosa*  4+ Growth of   GRAM STAIN RESULT  Rare Epithelial cells per low power field*  No polys seen*  Rare Gram positive cocci in pairs*  Rare Gram variable rods*       Imaging Studies:   I have personally reviewed pertinent imaging study reports and images in PACS.    EKG, Pathology, and Other Studies:   I have personally reviewed pertinent reports.

## 2024-09-09 NOTE — ASSESSMENT & PLAN NOTE
Recent Labs     09/08/24  0515 09/09/24  0521 09/10/24  0538   HGB 8.5* 8.1* 8.3*      Baseline Hgb 7-8  Etiology: component of iron deficiency and renal disease.     Plan  Monitor Hgb, transfuse for < 7  Continue PTA Ferrex

## 2024-09-09 NOTE — ASSESSMENT & PLAN NOTE
August 31-EF of 25% with global hypokinesis.  No prior echo for comparison.    Suspect nonischemic cardiomyopathy from inflammatory/rheumatologic etiology     GDMT recommended, pt started on Toprol 50 mg daily, Losartan 25 mg daily  9/4 - Nifedipine held per Cardiology  9/9 Losartan held per nephro   Will need Cardiac MRI, likely an outpatient procedure.  Ischemic work up with with left cardiac cath in o/p settings

## 2024-09-09 NOTE — ASSESSMENT & PLAN NOTE
Recent Labs     09/07/24  0545 09/08/24  0515 09/09/24  0521   HGB 8.2* 8.5* 8.1*        Baseline Hgb 7-8  Etiology: component of iron deficiency and renal disease.     Plan  Monitor Hgb, transfuse for < 7  Continue PTA Ferrex

## 2024-09-10 ENCOUNTER — APPOINTMENT (INPATIENT)
Dept: RADIOLOGY | Facility: HOSPITAL | Age: 64
DRG: 286 | End: 2024-09-10
Attending: RADIOLOGY
Payer: COMMERCIAL

## 2024-09-10 ENCOUNTER — APPOINTMENT (INPATIENT)
Dept: DIALYSIS | Facility: HOSPITAL | Age: 64
DRG: 286 | End: 2024-09-10
Attending: INTERNAL MEDICINE
Payer: COMMERCIAL

## 2024-09-10 PROBLEM — N01.9: Status: RESOLVED | Noted: 2024-07-19 | Resolved: 2024-09-10

## 2024-09-10 PROBLEM — N01.9: Status: ACTIVE | Noted: 2024-09-10

## 2024-09-10 PROBLEM — N17.9 ACUTE RENAL FAILURE ON DIALYSIS (HCC): Status: RESOLVED | Noted: 2024-09-09 | Resolved: 2024-09-10

## 2024-09-10 PROBLEM — Z99.2 ACUTE RENAL FAILURE ON DIALYSIS (HCC): Status: RESOLVED | Noted: 2024-09-09 | Resolved: 2024-09-10

## 2024-09-10 PROBLEM — T82.9XXA COMPLICATION ASSOCIATED WITH DIALYSIS CATHETER: Status: RESOLVED | Noted: 2024-09-09 | Resolved: 2024-09-10

## 2024-09-10 LAB
ANION GAP SERPL CALCULATED.3IONS-SCNC: 10 MMOL/L (ref 4–13)
BUN SERPL-MCNC: 63 MG/DL (ref 5–25)
CALCIUM SERPL-MCNC: 9.5 MG/DL (ref 8.4–10.2)
CHLORIDE SERPL-SCNC: 101 MMOL/L (ref 96–108)
CO2 SERPL-SCNC: 30 MMOL/L (ref 21–32)
CREAT SERPL-MCNC: 9.17 MG/DL (ref 0.6–1.3)
ERYTHROCYTE [DISTWIDTH] IN BLOOD BY AUTOMATED COUNT: 18.7 % (ref 11.6–15.1)
GFR SERPL CREATININE-BSD FRML MDRD: 4 ML/MIN/1.73SQ M
GLUCOSE SERPL-MCNC: 97 MG/DL (ref 65–140)
HCT VFR BLD AUTO: 27.2 % (ref 34.8–46.1)
HGB BLD-MCNC: 8.3 G/DL (ref 11.5–15.4)
MCH RBC QN AUTO: 29.3 PG (ref 26.8–34.3)
MCHC RBC AUTO-ENTMCNC: 30.5 G/DL (ref 31.4–37.4)
MCV RBC AUTO: 96 FL (ref 82–98)
PLATELET # BLD AUTO: 206 THOUSANDS/UL (ref 149–390)
PMV BLD AUTO: 10.7 FL (ref 8.9–12.7)
POTASSIUM SERPL-SCNC: 4.1 MMOL/L (ref 3.5–5.3)
RBC # BLD AUTO: 2.83 MILLION/UL (ref 3.81–5.12)
SODIUM SERPL-SCNC: 141 MMOL/L (ref 135–147)
WBC # BLD AUTO: 10.41 THOUSAND/UL (ref 4.31–10.16)

## 2024-09-10 PROCEDURE — 94760 N-INVAS EAR/PLS OXIMETRY 1: CPT

## 2024-09-10 PROCEDURE — 75901 REMOVE CVA DEVICE OBSTRUCT: CPT

## 2024-09-10 PROCEDURE — C1750 CATH, HEMODIALYSIS,LONG-TERM: HCPCS

## 2024-09-10 PROCEDURE — 99153 MOD SED SAME PHYS/QHP EA: CPT

## 2024-09-10 PROCEDURE — 77001 FLUOROGUIDE FOR VEIN DEVICE: CPT | Performed by: STUDENT IN AN ORGANIZED HEALTH CARE EDUCATION/TRAINING PROGRAM

## 2024-09-10 PROCEDURE — 99152 MOD SED SAME PHYS/QHP 5/>YRS: CPT | Performed by: STUDENT IN AN ORGANIZED HEALTH CARE EDUCATION/TRAINING PROGRAM

## 2024-09-10 PROCEDURE — 99152 MOD SED SAME PHYS/QHP 5/>YRS: CPT

## 2024-09-10 PROCEDURE — 77001 FLUOROGUIDE FOR VEIN DEVICE: CPT

## 2024-09-10 PROCEDURE — 0J2SXYZ CHANGE OTHER DEVICE IN HEAD AND NECK SUBCUTANEOUS TISSUE AND FASCIA, EXTERNAL APPROACH: ICD-10-PCS | Performed by: STUDENT IN AN ORGANIZED HEALTH CARE EDUCATION/TRAINING PROGRAM

## 2024-09-10 PROCEDURE — 75827 VEIN X-RAY CHEST: CPT

## 2024-09-10 PROCEDURE — 99232 SBSQ HOSP IP/OBS MODERATE 35: CPT | Performed by: INTERNAL MEDICINE

## 2024-09-10 PROCEDURE — 80048 BASIC METABOLIC PNL TOTAL CA: CPT

## 2024-09-10 PROCEDURE — NC001 PR NO CHARGE: Performed by: STUDENT IN AN ORGANIZED HEALTH CARE EDUCATION/TRAINING PROGRAM

## 2024-09-10 PROCEDURE — 85027 COMPLETE CBC AUTOMATED: CPT | Performed by: STUDENT IN AN ORGANIZED HEALTH CARE EDUCATION/TRAINING PROGRAM

## 2024-09-10 PROCEDURE — 36581 REPLACE TUNNELED CV CATH: CPT | Performed by: STUDENT IN AN ORGANIZED HEALTH CARE EDUCATION/TRAINING PROGRAM

## 2024-09-10 PROCEDURE — 94640 AIRWAY INHALATION TREATMENT: CPT

## 2024-09-10 PROCEDURE — 90935 HEMODIALYSIS ONE EVALUATION: CPT | Performed by: INTERNAL MEDICINE

## 2024-09-10 RX ORDER — AMOXICILLIN 250 MG
2 CAPSULE ORAL
Status: DISCONTINUED | OUTPATIENT
Start: 2024-09-10 | End: 2024-09-19 | Stop reason: HOSPADM

## 2024-09-10 RX ORDER — BISACODYL 5 MG/1
5 TABLET, DELAYED RELEASE ORAL ONCE
Status: DISCONTINUED | OUTPATIENT
Start: 2024-09-10 | End: 2024-09-19 | Stop reason: HOSPADM

## 2024-09-10 RX ORDER — VANCOMYCIN HYDROCHLORIDE 500 MG/100ML
INJECTION, SOLUTION INTRAVENOUS
Status: DISCONTINUED | OUTPATIENT
Start: 2024-09-10 | End: 2024-09-19 | Stop reason: HOSPADM

## 2024-09-10 RX ORDER — FENTANYL CITRATE 50 UG/ML
INJECTION, SOLUTION INTRAMUSCULAR; INTRAVENOUS AS NEEDED
Status: DISCONTINUED | OUTPATIENT
Start: 2024-09-10 | End: 2024-09-17 | Stop reason: HOSPADM

## 2024-09-10 RX ORDER — HEPARIN SODIUM 1000 [USP'U]/ML
500 INJECTION, SOLUTION INTRAVENOUS; SUBCUTANEOUS
Status: ACTIVE | OUTPATIENT
Start: 2024-09-10 | End: 2024-09-12

## 2024-09-10 RX ORDER — MIDAZOLAM HYDROCHLORIDE 2 MG/2ML
INJECTION, SOLUTION INTRAMUSCULAR; INTRAVENOUS AS NEEDED
Status: DISCONTINUED | OUTPATIENT
Start: 2024-09-10 | End: 2024-09-17 | Stop reason: HOSPADM

## 2024-09-10 RX ORDER — HEPARIN SODIUM 1000 [USP'U]/ML
2000 INJECTION, SOLUTION INTRAVENOUS; SUBCUTANEOUS ONCE
Status: COMPLETED | OUTPATIENT
Start: 2024-09-10 | End: 2024-09-10

## 2024-09-10 RX ADMIN — FLUTICASONE FUROATE AND VILANTEROL TRIFENATATE 1 PUFF: 200; 25 POWDER RESPIRATORY (INHALATION) at 10:11

## 2024-09-10 RX ADMIN — MONTELUKAST 10 MG: 10 TABLET, FILM COATED ORAL at 13:23

## 2024-09-10 RX ADMIN — METOPROLOL SUCCINATE 50 MG: 50 TABLET, EXTENDED RELEASE ORAL at 17:35

## 2024-09-10 RX ADMIN — POLYSACCHARIDE-IRON COMPLEX 150 MG: 150 CAPSULE ORAL at 13:23

## 2024-09-10 RX ADMIN — TRIMETHOBENZAMIDE HYDROCHLORIDE 200 MG: 100 INJECTION INTRAMUSCULAR at 15:37

## 2024-09-10 RX ADMIN — IPRATROPIUM BROMIDE 0.5 MG: 0.5 SOLUTION RESPIRATORY (INHALATION) at 07:36

## 2024-09-10 RX ADMIN — FENTANYL CITRATE 25 MCG: 50 INJECTION INTRAMUSCULAR; INTRAVENOUS at 11:22

## 2024-09-10 RX ADMIN — VENLAFAXINE HYDROCHLORIDE 225 MG: 150 CAPSULE, EXTENDED RELEASE ORAL at 13:23

## 2024-09-10 RX ADMIN — CEFEPIME 1000 MG: 1 INJECTION, POWDER, FOR SOLUTION INTRAMUSCULAR; INTRAVENOUS at 16:37

## 2024-09-10 RX ADMIN — HEPARIN SODIUM 500 UNITS: 1000 INJECTION INTRAVENOUS; SUBCUTANEOUS at 14:10

## 2024-09-10 RX ADMIN — SENNOSIDES AND DOCUSATE SODIUM 2 TABLET: 8.6; 5 TABLET ORAL at 21:01

## 2024-09-10 RX ADMIN — PANTOPRAZOLE SODIUM 40 MG: 40 TABLET, DELAYED RELEASE ORAL at 10:12

## 2024-09-10 RX ADMIN — VANCOMYCIN HYDROCHLORIDE 1000 MG: 500 INJECTION, SOLUTION INTRAVENOUS at 11:09

## 2024-09-10 RX ADMIN — Medication 5 ML: at 11:32

## 2024-09-10 RX ADMIN — MIDAZOLAM 0.5 MG: 1 INJECTION INTRAMUSCULAR; INTRAVENOUS at 11:22

## 2024-09-10 RX ADMIN — SEVELAMER HYDROCHLORIDE 1600 MG: 800 TABLET ORAL at 13:23

## 2024-09-10 RX ADMIN — EPOETIN ALFA 6000 UNITS: 3000 SOLUTION INTRAVENOUS; SUBCUTANEOUS at 15:42

## 2024-09-10 RX ADMIN — SEVELAMER HYDROCHLORIDE 1600 MG: 800 TABLET ORAL at 17:36

## 2024-09-10 RX ADMIN — ALBUTEROL SULFATE 2.5 MG: 2.5 SOLUTION RESPIRATORY (INHALATION) at 07:36

## 2024-09-10 RX ADMIN — ALBUTEROL SULFATE 2.5 MG: 2.5 SOLUTION RESPIRATORY (INHALATION) at 20:23

## 2024-09-10 RX ADMIN — LAMOTRIGINE 100 MG: 100 TABLET ORAL at 21:01

## 2024-09-10 RX ADMIN — PREDNISONE 15 MG: 10 TABLET ORAL at 13:23

## 2024-09-10 RX ADMIN — CYCLOPHOSPHAMIDE 100 MG: 50 CAPSULE ORAL at 13:25

## 2024-09-10 RX ADMIN — ATOVAQUONE 1500 MG: 750 SUSPENSION ORAL at 13:25

## 2024-09-10 RX ADMIN — HEPARIN SODIUM 2000 UNITS: 1000 INJECTION INTRAVENOUS; SUBCUTANEOUS at 13:14

## 2024-09-10 RX ADMIN — LIDOCAINE 5% 1 PATCH: 700 PATCH TOPICAL at 10:20

## 2024-09-10 RX ADMIN — IPRATROPIUM BROMIDE 0.5 MG: 0.5 SOLUTION RESPIRATORY (INHALATION) at 20:23

## 2024-09-10 RX ADMIN — HEPARIN SODIUM 500 UNITS: 1000 INJECTION INTRAVENOUS; SUBCUTANEOUS at 14:59

## 2024-09-10 RX ADMIN — HEPARIN SODIUM 5000 UNITS: 5000 INJECTION INTRAVENOUS; SUBCUTANEOUS at 16:43

## 2024-09-10 RX ADMIN — TRAZODONE HYDROCHLORIDE 200 MG: 100 TABLET ORAL at 21:01

## 2024-09-10 RX ADMIN — ATORVASTATIN CALCIUM 20 MG: 20 TABLET, FILM COATED ORAL at 13:23

## 2024-09-10 NOTE — ASSESSMENT & PLAN NOTE
-- Continue cyclophosphamide and prednisone taper as detailed  --Completed plasmapheresis in August  --Remains dialysis dependent, TTS at [facility]  --Discussed with pulmonary yesterday about a bronchoscopy due to ongoing pulmonary issues.  --Access: Right IJ permcath, repositioned by IR today, access worked well today for dialysis treatment.  Will monitor success with this current catheter.  Our plan is that if the catheter continues to have issues ongoing consider transitioning to urgent start PD.  --Seen on dialysis tolerated treatment well but blood pressure dropped likely because she was at IR earlier for procedure.  Will take her off early but the catheter worked well.

## 2024-09-10 NOTE — PROCEDURES
NEPHROLOGY HOSPITAL PROCEDURE NOTE   Ngozi Beard 64 y.o. female MRN: [id number]  Unit/Bed#: S -01 Encounter: [id number]  Reason for Consult: ARF dialysis dependent    Brief History of Admission - 64-year-old female with a past medical history of newly diagnosed anti-GBM disease initiated on dialysis July 16, 2024, asthma, hypertension, bipolar disorder who initially presents with chest pain and shortness of breath. Recent admission with similar presentation. Nephrology is on board for dialysis management     Patient seen and examined on hemodialysis at 4 PM.  Plan of care discussed with HD nurse.  Patient currently hypotensive.  Access working well at this time.  Will discontinue treatment 20 minutes earlier as she is feeling nauseous.  Blood pressure is improving with return of the blood flow.  Good arterial and venous pressure on dialysis.  3K bath blood flow rate 200 mL/min.  Dialysate flow rate 1.5X.  Removed 1 kg.    Assessment & Plan  Shortness of breath  -- Pulmonary on board.  Chest x-ray personally reviewed.  --Recommend bronchoscopy, I discussed with pulmonary yesterday  -- Continues to have shortness of breath and cough  --Patient n.p.o. at midnight and cleared by cardiology for bronchoscopy.  Primary hypertension  -- Blood pressure currently under excellent management and very well-controlled.  -- Patient's blood pressure did drop on dialysis but she was in IR earlier for repositioning of her HD catheter  --Blood pressure is improving now that she is coming off dialysis.  Anemia  -- Continue RISHI with dialysis    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)  -- Continue cyclophosphamide and prednisone taper as detailed  --Completed plasmapheresis in August  --Remains dialysis dependent, TTS at [facility]  --Discussed with pulmonary yesterday about a bronchoscopy due to ongoing pulmonary issues.  --Access: Right IJ permcath, repositioned by IR today, access worked well today  "for dialysis treatment.  Will monitor success with this current catheter.  Our plan is that if the catheter continues to have issues ongoing consider transitioning to urgent start PD.  --Seen on dialysis tolerated treatment well but blood pressure dropped likely because she was at IR earlier for procedure.  Will take her off early but the catheter worked well.  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  Catheter worked well during today's treatment hopefully will work well on further treatments.      I have reviewed the nephrology recommendations including assessment and plan, with patient, and we are in agreement with renal plan including the information outlined above.    Portions of the record may have been created with voice recognition software. Occasional wrong word or \"sound a like\" substitutions may have occurred due to the inherent limitations of voice recognition software. Read the chart carefully and recognize, using context, where substitutions have occurred.If you have any questions, please contact the dictating provider.    SUBJECTIVE / 24H INTERVAL HISTORY:    Hypotensive on dialysis.    OBJECTIVE:  Current Weight: Weight - Scale: 104 kg (230 lb 6.1 oz)  Vitals:    09/10/24 1430 " 09/10/24 1500 09/10/24 1530 09/10/24 1600   BP: 95/67 (!) 87/60 (!) 88/55 (!) 92/48   BP Location: Left arm Left arm Left arm Left arm   Pulse: 76 75 76 72   Resp: 16 16 16 16   Temp:       TempSrc:       SpO2:       Weight:       Height:           Intake/Output Summary (Last 24 hours) at 9/10/2024 1618  Last data filed at 9/10/2024 1618  Gross per 24 hour   Intake 200 ml   Output 1000 ml   Net -800 ml     General: Awake, no acute distress  HEENT: Dry and pale mucous membranes  Neck: Supple  Skin: No rashes no lesions  Cardiovascular: S1-S2 appreciated regular rate and rhythm  Lungs: Coarse breath sounds  Abdomen: Nontender nondistended  Extremities: No edema  Neurologic: No asterixis  Access: Right IJ permacath    Medications:    Current Facility-Administered Medications:     acetaminophen (TYLENOL) tablet 650 mg, 650 mg, Oral, Q6H PRN, Kely Robert MD    albuterol (PROVENTIL HFA,VENTOLIN HFA) inhaler 2 puff, 2 puff, Inhalation, Q4H PRN, Kely Robert MD, 2 puff at 09/09/24 1151    albuterol inhalation solution 2.5 mg, 2.5 mg, Nebulization, TID, Todd Dickens MD, 2.5 mg at 09/10/24 0736    atorvastatin (LIPITOR) tablet 20 mg, 20 mg, Oral, Daily, Kely Robert MD, 20 mg at 09/10/24 1323    atovaquone (MEPRON) oral suspension 1,500 mg, 1,500 mg, Oral, Daily, Kathryn Cleveland MD, 1,500 mg at 09/10/24 1325    bisacodyl (DULCOLAX) EC tablet 5 mg, 5 mg, Oral, Once, Trish Kline MD    cefepime (MAXIPIME) 1,000 mg in dextrose 5 % 50 mL IVPB, 1,000 mg, Intravenous, Q24H, Kathryn Cleveland MD, Last Rate: 100 mL/hr at 09/09/24 1647, 1,000 mg at 09/09/24 1647    cyclophosphamide (CYTOXAN) capsule 100 mg, 100 mg, Oral, Daily, Kely Robert MD, 100 mg at 09/10/24 1325    dextromethorphan-guaiFENesin (ROBITUSSIN DM) oral syrup 10 mL, 10 mL, Oral, Q4H PRN, Kely Robert MD, 10 mL at 09/08/24 1926    epoetin shavonne (EPOGEN,PROCRIT) injection 6,000 Units, 6,000 Units, Intravenous, After Dialysis, Tirso Montez MD,  6,000 Units at 09/10/24 1542    fentaNYL injection, , Intravenous, PRN, Gerber Fletcher MD, 25 mcg at 09/10/24 1122    fluticasone-vilanterol 200-25 mcg/actuation 1 puff, 1 puff, Inhalation, Daily, Robert Gilbert MD, 1 puff at 09/10/24 1011    heparin (porcine) injection 500 Units, 500 Units, Intracatheter, Q1H PRN, Trish Kline MD, 500 Units at 09/10/24 1459    heparin (porcine) subcutaneous injection 5,000 Units, 5,000 Units, Subcutaneous, Q8H JACK, Kely Robert MD, 5,000 Units at 09/09/24 1647    ipratropium (ATROVENT) 0.02 % inhalation solution 0.5 mg, 0.5 mg, Nebulization, TID, Todd Dickens MD, 0.5 mg at 09/10/24 0736    iron polysaccharides (FERREX) capsule 150 mg, 150 mg, Oral, Daily, Kely Robert MD, 150 mg at 09/10/24 1323    lamoTRIgine (LaMICtal) tablet 100 mg, 100 mg, Oral, HS, Kely Robert MD, 100 mg at 09/09/24 2200    lidocaine (LIDODERM) 5 % patch 1 patch, 1 patch, Topical, Daily, Trish Kline MD, 1 patch at 09/10/24 1020    lidocaine-epinephrine 1%-1:765550 buffered, , Infiltration, PRN, Gerber Fletcher MD, 5 mL at 09/10/24 1132    metoprolol succinate (TOPROL-XL) 24 hr tablet 50 mg, 50 mg, Oral, BID, Gerber Robbins MD, 50 mg at 09/09/24 1647    midazolam (VERSED) injection, , , PRN, Gerber Fletcher MD, 0.5 mg at 09/10/24 1122    montelukast (SINGULAIR) tablet 10 mg, 10 mg, Oral, Daily, Kely Robert MD, 10 mg at 09/10/24 1323    pantoprazole (PROTONIX) EC tablet 40 mg, 40 mg, Oral, Daily Before Breakfast, Kely Robert MD, 40 mg at 09/10/24 1012    polyethylene glycol (MIRALAX) packet 17 g, 17 g, Oral, Daily, Kathryn Cleveland MD, 17 g at 09/07/24 1232    predniSONE tablet 15 mg, 15 mg, Oral, Daily, Kathryn Cleveland MD, 15 mg at 09/10/24 1323    senna-docusate sodium (SENOKOT S) 8.6-50 mg per tablet 2 tablet, 2 tablet, Oral, HS, Trish Kline MD    sevelamer (RENAGEL) tablet 1,600 mg, 1,600 mg, Oral, TID With Meals, Kely Robert MD, 1,600 mg at  09/10/24 1323    Sodium Zirconium Cyclosilicate (Lokelma) 10 g, 10 g, Oral, Daily, Trish Kline MD, 10 g at 09/01/24 0811    traZODone (DESYREL) tablet 200 mg, 200 mg, Oral, HS, Kely Robert MD, 200 mg at 09/09/24 2130    trimethobenzamide (TIGAN) IM injection 200 mg, 200 mg, Intramuscular, Q6H PRN, Kely Robert MD, 200 mg at 09/10/24 1537    vancomycin (VANCOCIN) IVPB (premix in dextrose), , Intravenous, Continuous PRN, Gerber Fletcher MD, 1,000 mg at 09/10/24 1109    venlafaxine (EFFEXOR-XR) 24 hr capsule 225 mg, 225 mg, Oral, Daily, Kely Robert MD, 225 mg at 09/10/24 1323    Laboratory Results:  Results from last 7 days   Lab Units 09/10/24  0538 09/09/24  0521 09/08/24  0515 09/07/24  0545 09/06/24  0609 09/05/24  0525 09/04/24  0450   WBC Thousand/uL 10.41* 10.15 11.86* 9.08 9.45 10.00 9.89   HEMOGLOBIN g/dL 8.3* 8.1* 8.5* 8.2* 8.5* 8.5* 8.4*   HEMATOCRIT % 27.2* 26.6* 27.6* 26.5* 27.5* 27.7* 27.5*   PLATELETS Thousands/uL 206 182 206 191 213 216 238   POTASSIUM mmol/L 4.1 4.0 4.2 5.4* 5.1 4.2 4.0   CHLORIDE mmol/L 101 100 100 98 99 100 97   CO2 mmol/L 30 30 29 29 29 32 34*   BUN mg/dL 63* 48* 32* 44* 52* 40* 31*   CREATININE mg/dL 9.17* 7.57* 5.73* 7.95* 8.84* 7.83* 6.16*   CALCIUM mg/dL 9.5 9.4 9.6 9.3 9.7 9.4 8.8   MAGNESIUM mg/dL  --  2.3  --   --   --   --   --    PHOSPHORUS mg/dL  --  5.3*  --   --   --   --   --    \

## 2024-09-10 NOTE — PLAN OF CARE
Post-Dialysis RN Treatment Note    Blood Pressure:  Pre:  114/74 mm/Hg  Post:  136/74 mmHg   EDW:  111.5 kg    Weight:  Pre: 104.0  kg   Post:  103.0 kg   Mode of weight measurement: Standing Scale   Volume Removed:   1000 ml    Treatment duration:  200 minutes    NS given:  No    Treatment shortened? Yes, Patient c/o headache and nausea, and BP low, Dr. Grant leija   Medications given during Rx:  Heparin and Epogen   Estimated Kt/V:    Not Applicable   Access type:    Permacath/TDC   Access Issues:    No    Report called to primary nurse   Yes:  Donato    Problem: METABOLIC, FLUID AND ELECTROLYTES - ADULT  Goal: Electrolytes maintained within normal limits  Description: INTERVENTIONS:  - Monitor labs and assess patient for signs and symptoms of electrolyte imbalances  - Administer electrolyte replacement as ordered  - Monitor response to electrolyte replacements, including repeat lab results as appropriate  - Instruct patient on fluid and nutrition as appropriate  Outcome: Progressing  Goal: Fluid balance maintained  Description: INTERVENTIONS:  - Monitor labs   - Monitor I/O and WT  - Instruct patient on fluid and nutrition as appropriate  - Assess for signs & symptoms of volume excess or deficit  Outcome: Progressing

## 2024-09-10 NOTE — CASE MANAGEMENT
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 ([facility]) 2024     Generalized weakness 2024     Shortness of breath 2024     Rash 2024     Anemia 2024     Thrombocytopenia ([facility]) 2024     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     Abnormality of ascending aorta 2024     Postmenopausal 2024     Encounter for screening mammogram for malignant neoplasm of breast 2024     Allergic rhinitis 2024     Persistent cough 2024     Urge incontinence of urine 2024     Exercise intolerance 2024     Family history of coronary artery bypass graft surgery 2024     Urge urinary incontinence 2024     Female stress incontinence 2024     Paranoid schizophrenia ([facility]) 2023     Asthma without acute exacerbation 2023     Asthma due to seasonal allergies 2023     Bipolar 1 disorder ([facility]) 2022     Primary hypertension 2022     Dyslipidemia 2022       LOS (days): 11  Geometric Mean LOS (GMLOS) (days): 4.4  Days to GMLOS:-6.2     OBJECTIVE:  Risk of Unplanned Readmission Score: 39.68         Current admission status: Inpatient   Preferred Pharmacy:   [id number]  [place] [address] [address]  Phone: [phone] Fax: [phone]    [facility] - [place] [address] [address] [place] [address] [address]  [facility]  [address] [place] [id number]  Phone: [phone] Fax: [phone]    Primary Care Provider: Meenakshi Balbuena MD    Primary Insurance: [facility]  Secondary Insurance: [facility]    DISCHARGE DETAILS:    CM was informed by the [facility] Admissions Dept that this Pt had an IP Psych stay within the past 2 years, therefore needs to be optioned. [facility]OS [facility]  [facility] phone to inform her of the same. Kay will meet with the Pt today to complete the assessment. JEANCARLOS will send Kay the additional requested clinical information.

## 2024-09-10 NOTE — PROGRESS NOTES
Progress Note - Infectious Disease   Ngozi Beard 64 y.o. female MRN: [id number]  Unit/Bed#: S -01 Encounter: [id number]      Impression/Recommendations:  1.   Pseudomonas respiratory infection.  Clinical and radiological picture is more consistent with bronchitis than pneumonia.  Patient's dyspnea is already much improved with aggressive fluid removal.  However, she has persistent cough.  Given no immediate plan for bronchoscopy, antibiotic was initiated for likely Pseudomonas bronchitis.  Given high risk for fluoroquinolone toxicity, will have patient complete treatment course with IV cefepime.  Continue IV cefepime.  Treat x 7-day course, complete today.     2.  Pneumonitis versus pneumonia.  Patient's respiratory symptoms are much improved with aggressive diuresis.  Patient is no longer requiring O2 supplement.  Therefore, plan is to continue aggressive diuresis, in addition to treatment for Pseudomonas respiratory infection (see above).  If respiratory symptoms do not recur after completion of antibiotic course, patient can be reevaluated with repeat chest CT and bronchoscopy at that time.  Antibiotic plan as in above.  Plan for aggressive fluid removal via HD per nephrology.  Monitor respiratory symptoms and O2 saturation.     3.  Bacteremia, with growth of MRSE in only 1 out of 2 admission blood cultures.  Patient has no fever and is not systemically ill, making true bacteremia not likely clinically.  Although patient does have permacath in place, with growth in only 1 culture sent, this is still most likely contaminated blood draw.  Patient had been on IV vancomycin but this was discontinued.  She remains clinically well off it.  No antibiotic needed for this indication.     3.  Leukocytosis.  Most likely steroid effect.  WBC fluctuating, currently normal.     4.  Advanced CKD, on HD.  Functional difficulty of permacath noted.  IR evaluation for permacath revision pending.  HD per nephrology.      5.  Recently diagnosed rapidly progressing anti-GBM disease.  Patient is on Cytoxan and prednisone since diagnosis.  Continue outpatient Cytoxan/steroid.  Continue atovaquone PCP prophylaxis.     Discussed with patient in detail regarding the above plan.    Antibiotics:  Cefepime # 7     Subjective:  Minimal dyspnea at rest.  Cough is improved, still mildly productive.  Temperature stays down.  No chills.  Patient is tolerating antibiotic well.  No nausea, vomiting or diarrhea.    Objective:  Vitals:  Temp:  [97.4 °F (36.3 °C)-98.4 °F (36.9 °C)] 98.3 °F (36.8 °C)  HR:  [72-83] 72  Resp:  [17-20] 17  BP: (110-124)/(60-74) 110/68  SpO2:  [92 %-99 %] 92 %  Temp (24hrs), Av °F (36.7 °C), Min:97.4 °F (36.3 °C), Max:98.4 °F (36.9 °C)  Current: Temperature: 98.3 °F (36.8 °C)    Physical Exam:     General: Awake, alert, cooperative, no distress.   Neck:  Supple. No mass.  No lymphadenopathy.   Lungs: Expansion symmetric, no rales, no wheezing, respirations unlabored.   Heart:  Regular rate and rhythm, S1 and S2 normal, no murmur.   Abdomen: Soft, nondistended, non-tender, bowel sounds active all four quadrants, no masses, no organomegaly.   Extremities: No edema. No erythema/warmth. No ulcer. Nontender to palpation.   Skin:  No rash.   Neuro: Moves all extremities.     Invasive Devices       Peripheral Intravenous Line  Duration             Peripheral IV 24 Proximal;Right;Ventral (anterior) Forearm <1 day              Hemodialysis Catheter  Duration             HD Permanent Double Catheter 6 days                    Labs studies:   I have personally reviewed pertinent labs.  Results from last 7 days   Lab Units 09/10/24  0538 24  0521 24  0515 24  0545 24  0609 24  0525   POTASSIUM mmol/L 4.1 4.0 4.2 5.4* 5.1 4.2   CHLORIDE mmol/L 101 100 100 98 99 100   CO2 mmol/L 30 30 29 29 29 32   BUN mg/dL 63* 48* 32* 44* 52* 40*   CREATININE mg/dL 9.17* 7.57* 5.73* 7.95* 8.84* 7.83*   EGFR  ml/min/1.73sq m 4 5 7 4 4 4   CALCIUM mg/dL 9.5 9.4 9.6 9.3 9.7 9.4   AST U/L  --   --   --  16 10* 10*   ALT U/L  --   --   --  13 16 20   ALK PHOS U/L  --   --   --  72 75 72     Results from last 7 days   Lab Units 09/10/24  0538 09/09/24  0521 09/08/24  0515   WBC Thousand/uL 10.41* 10.15 11.86*   HEMOGLOBIN g/dL 8.3* 8.1* 8.5*   PLATELETS Thousands/uL 206 182 206           Imaging Studies:   I have personally reviewed pertinent imaging study reports and images in PACS.    EKG, Pathology, and Other Studies:   I have personally reviewed pertinent reports.

## 2024-09-10 NOTE — ASSESSMENT & PLAN NOTE
-- Blood pressure currently under excellent management and very well-controlled.  -- Patient's blood pressure did drop on dialysis but she was in IR earlier for repositioning of her HD catheter  --Blood pressure is improving now that she is coming off dialysis.

## 2024-09-10 NOTE — PROGRESS NOTES
"Interventional Radiology Preprocedure Note    History/Indication for procedure:   Ngozi Beard is a 64 y.o. female with a PMH of ESRD who presents for evaluation of her malfunctioning TDC.    Relevant past medical history:    Past Medical History:   Diagnosis Date    Asthma     Chronic pain     Hypertension     Renal disorder     Sepsis (HCC) 07/10/2024     Patient Active Problem List   Diagnosis    Bipolar 1 disorder (HCC)    Primary hypertension    Dyslipidemia    Paranoid schizophrenia (HCC)    Asthma without acute exacerbation    Asthma due to seasonal allergies    Abnormality of ascending aorta    Postmenopausal    Encounter for screening mammogram for malignant neoplasm of breast    Allergic rhinitis    Persistent cough    Urge incontinence of urine    Exercise intolerance    Family history of coronary artery bypass graft surgery    Urge urinary incontinence    Female stress incontinence    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    Anemia    Thrombocytopenia (HCC)    Rash    Shortness of breath    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)    Generalized weakness    Multifocal pneumonia    Cardiomyopathy (HCC)    Acute renal failure on dialysis (HCC)    Complication associated with dialysis catheter       /68   Pulse 72   Temp 98.3 °F (36.8 °C)   Resp 17   Ht 5' 3\" (1.6 m)   Wt 104 kg (230 lb 6.1 oz)   SpO2 92%   BMI 40.81 kg/m²     Medications:    Inpatient Medications:     Scheduled Medications:  Current Facility-Administered Medications   Medication Dose Route Frequency Provider Last Rate    acetaminophen  650 mg Oral Q6H PRN Kely Robert MD      albuterol  2 puff Inhalation Q4H PRN Kely Robert MD      albuterol  2.5 mg Nebulization TID Todd Dickens MD      atorvastatin  20 mg Oral Daily Kely Robert MD      atovaquone  1,500 mg Oral Daily Kathryn Cleveland MD      bisacodyl  5 mg Oral Once Trish Kline MD      cefepime  1,000 " mg Intravenous Q24H Kathryn Cleveland MD 1,000 mg (09/09/24 1647)    cyclophosphamide  100 mg Oral Daily Kely Robert MD      dextromethorphan-guaiFENesin  10 mL Oral Q4H PRN Kely Robert MD      epoetin shavonne  6,000 Units Intravenous After Dialysis Tirso Montez MD      fluticasone-vilanterol  1 puff Inhalation Daily Robert Gilbert MD      heparin (porcine)  2,000 Units Intravenous Once Radha Montalvo PA-C      heparin (porcine)  500 Units Intracatheter Q1H PRN Trish Kline MD      heparin (porcine)  5,000 Units Subcutaneous Q8H [facility] Kely Robert MD      ipratropium  0.5 mg Nebulization TID Todd Dickens MD      iron polysaccharides  150 mg Oral Daily Kely Robert MD      lamoTRIgine  100 mg Oral HS Kely Robert MD      lidocaine  1 patch Topical Daily Trish Kline MD      metoprolol succinate  50 mg Oral BID Gerber Robbins MD      montelukast  10 mg Oral Daily Kely Robert MD      pantoprazole  40 mg Oral Daily Before Breakfast Kely Robert MD      polyethylene glycol  17 g Oral Daily Kathryn Cleveland MD      predniSONE  15 mg Oral Daily Kathryn Cleveland MD      senna-docusate sodium  2 tablet Oral HS Trish Kline MD      sevelamer  1,600 mg Oral TID With Meals Kely Robert MD      Sodium Zirconium Cyclosilicate  10 g Oral Daily Trish Kline MD      traZODone  200 mg Oral HS Kely Robert MD      trimethobenzamide  200 mg Intramuscular Q6H PRN Kely Robert MD      venlafaxine  225 mg Oral Daily Kely Robert MD         Infusions:       PRN:    acetaminophen    albuterol    dextromethorphan-guaiFENesin    epoetin shavonne    heparin (porcine)    trimethobenzamide    Outpatient Medications:  No current facility-administered medications on file prior to encounter.     Current Outpatient Medications on File Prior to Encounter   Medication Sig Dispense Refill    albuterol (PROVENTIL HFA,VENTOLIN HFA) 90 mcg/act inhaler INHALE 2 PUFFS BY MOUTH EVERY 4 HOURS  AS NEEDED FOR SHORTNESS OF BREATH 18 g 5    atorvastatin (LIPITOR) 20 mg tablet TAKE 1 TABLET BY MOUTH EVERY DAY 90 tablet 1    calcium carbonate (OYSTER SHELL,OSCAL) 500 mg Take 1 tablet by mouth daily with breakfast 30 tablet 0    cyclophosphamide (CYTOXAN) 50 mg capsule Take 2 capsules (100 mg total) by mouth daily 60 capsule 0    lamoTRIgine (LaMICtal) 100 mg tablet Take 100 mg by mouth daily at bedtime      montelukast (SINGULAIR) 10 mg tablet TAKE 1 TABLET BY MOUTH EVERY DAY 90 tablet 1    NIFEdipine (PROCARDIA XL) 30 mg 24 hr tablet Take 2 tablets (60 mg total) by mouth daily 60 tablet 0    ondansetron (ZOFRAN) 4 mg tablet Take 1 tablet (4 mg total) by mouth every 8 (eight) hours as needed for nausea or vomiting 20 tablet 0    pantoprazole (PROTONIX) 40 mg tablet Take 1 tablet (40 mg total) by mouth daily before breakfast 30 tablet 0    predniSONE 2.5 mg tablet Take 12 tablets (30 mg total) by mouth daily for 5 days, THEN 10 tablets (25 mg total) daily for 14 days, THEN 8 tablets (20 mg total) daily for 14 days, THEN 6 tablets (15 mg total) daily for 14 days, THEN 5 tablets (12.5 mg total) daily for 14 days, THEN 4 tablets (10 mg total) daily for 14 days, THEN 3 tablets (7.5 mg total) daily for 14 days, THEN 2 tablets (5 mg total) daily. Do not start before August 5, 2024. 554 tablet 0    senna (SENOKOT) 8.6 mg Take 2 tablets (17.2 mg total) by mouth 2 (two) times a day Use as needed constipation 30 tablet 0    sevelamer (RENAGEL) 800 mg tablet Take 2 tablets (1,600 mg total) by mouth 3 (three) times a day with meals 90 tablet 0    traZODone (DESYREL) 100 mg tablet Take 200 mg by mouth daily at bedtime      venlafaxine (EFFEXOR-XR) 150 mg 24 hr capsule TAKE 1 CAPSULE BY MOUTH DAILY WITH BREAKFAST. 30 capsule 0    venlafaxine (EFFEXOR-XR) 75 mg 24 hr capsule Take 75 mg by mouth daily Pt states she takes 150mg and 75mg effexor. Daily. For a totoal of 225mg      Advair -21 MCG/ACT inhaler Inhale 2 puffs 2  (two) times a day Rinse mouth after use. 12 g 0    iron polysaccharides (FERREX) 150 mg capsule Take 1 capsule (150 mg total) by mouth daily 30 capsule 0       Allergies   Allergen Reactions    Penicillins Hives     Reaction Date: 24May2005; Annotation - 04Sep2012: meena mcdonald    Amoxicillin Rash    Aspirin Rash    Bactrim [Sulfamethoxazole-Trimethoprim] Rash    Ibuprofen Rash    Morphine Rash    Oxycodone Rash    Valacyclovir Rash       Anticoagulants: none    ASA classification: ASA 4 - Patient with severe systemic disease that is a constant threat to life    Airway Assessment: III (soft and hard palate and base of uvula visible)    Relevant family history: None    Relevant review of systems: None    Prior sedation/anesthesia: yes    Can the patient lie flat? Yes     NPO Status: yes    Labs:   CBC with diff:   Lab Results   Component Value Date    WBC 10.41 (H) 09/10/2024    HGB 8.3 (L) 09/10/2024    HCT 27.2 (L) 09/10/2024    MCV 96 09/10/2024     09/10/2024    RBC 2.83 (L) 09/10/2024    MCH 29.3 09/10/2024    MCHC 30.5 (L) 09/10/2024    RDW 18.7 (H) 09/10/2024    MPV 10.7 09/10/2024    NRBC 1 08/30/2024     BMP/CMP:  Lab Results   Component Value Date    K 4.1 09/10/2024     09/10/2024    CO2 30 09/10/2024    BUN 63 (H) 09/10/2024    CREATININE 9.17 (H) 09/10/2024    CALCIUM 9.5 09/10/2024    AST 16 09/07/2024    ALT 13 09/07/2024    ALKPHOS 72 09/07/2024    EGFR 4 09/10/2024   ,     Coags:   Lab Results   Component Value Date    PTT >210 (HH) 08/30/2024    INR 1.99 (H) 08/30/2024   ,          Relevant imaging studies:   Reviewed.    Directed physical examination:  I agree with the physical exam performed on 9/9/24 and there are no additional changes.    Assessment/Plan:   TDC evaluation, possible OTWE.    Sedation/Anesthesia plan:  Moderate sedation will be used as needed for procedure.    Consent with alternatives to the procedure, risks and benefits have been explained and discussed with the  patient/patient's family: yes, continuity of care.

## 2024-09-10 NOTE — PROGRESS NOTES
Progress Note - Pulmonary   Ngozi Beard 64 y.o. female MRN: [id number]  Unit/Bed#: S -01 Encounter: [id number]      Assessment/Plan:  Patient is a 64-year-old female with past medical history of moderate persistent asthma, pulmonary hypertension, recently diagnosed anti-GBM disease, and ESRD on HD who presents for worsening shortness of breath.     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  #ESRD on HD  #Moderate persistent asthma without acute exacerbation  #Pulmonary Hypertension       Plan:  -Since patient has not been having clinical improvement with HD volume removal, will plan for bronchoscopy on 9/11 with BAL to rule out PCP/atypical infection and DAH   -NPO at midnight  -Patient cleared by cardiology for bronchoscopic procedure  -Continue to monitor O2 saturations and maintain O2 saturation >89%  -Recommend cardiomyopathy evaluation with cardiology and likely ischemic workup  -Continue albuterol/atrovent nebs TID, breo daily (in place of home advair), singulair daily  -Continue to encourage incentive spirometry, OOB as tolerated.  -Will arrange for outpatient pulmonary follow-up.    Subjective:   Patient continues to have shortness of breath despite HD and volume removal. She remains on room air. She has been treated with cefepime for pseudomonas bronchitis. Pulmonary team has been re-consulted for further evaluation to determine if bronchoscopy would be possible. Patient is agreeable to bronchoscopy and would like to have this done to determine what is going on.     Objective:    Vitals: Blood pressure 116/71, pulse 72, temperature (!) 97.4 °F (36.3 °C), temperature source Oral, resp. rate 17,  "height 5' 3\" (1.6 m), weight 104 kg (230 lb 6.1 oz), SpO2 95%., RA, Body mass index is 40.81 kg/m².    No intake or output data in the 24 hours ending 09/10/24 0826      Physical Exam  Vitals and nursing note reviewed.   Constitutional:       General: She is not in acute distress.     Appearance: She is obese.   HENT:      Head: Normocephalic and atraumatic.      Nose: Nose normal.      Mouth/Throat:      Pharynx: Oropharynx is clear.   Eyes:      Conjunctiva/sclera: Conjunctivae normal.   Cardiovascular:      Rate and Rhythm: Normal rate.   Pulmonary:      Effort: Pulmonary effort is normal. No respiratory distress.      Breath sounds: Decreased breath sounds and rhonchi (diffuse bilaterally) present. No wheezing.   Abdominal:      Palpations: Abdomen is soft.   Musculoskeletal:         General: No swelling. Normal range of motion.      Cervical back: Normal range of motion and neck supple.   Skin:     General: Skin is warm and dry.      Capillary Refill: Capillary refill takes less than 2 seconds.   Neurological:      General: No focal deficit present.      Mental Status: She is alert and oriented to person, place, and time.   Psychiatric:         Mood and Affect: Mood normal.     Labs:   Results from last 7 days   Lab Units 09/10/24  0538 09/09/24  0521 09/08/24  0515   WBC Thousand/uL 10.41* 10.15 11.86*   HEMOGLOBIN g/dL 8.3* 8.1* 8.5*   HEMATOCRIT % 27.2* 26.6* 27.6*   PLATELETS Thousands/uL 206 182 206      Results from last 7 days   Lab Units 09/10/24  0538 09/09/24  0521 09/08/24  0515 09/07/24  0545 09/06/24  0609 09/05/24  0525   POTASSIUM mmol/L 4.1 4.0 4.2 5.4* 5.1 4.2   CHLORIDE mmol/L 101 100 100 98 99 100   CO2 mmol/L 30 30 29 29 29 32   BUN mg/dL 63* 48* 32* 44* 52* 40*   CREATININE mg/dL 9.17* 7.57* 5.73* 7.95* 8.84* 7.83*   CALCIUM mg/dL 9.5 9.4 9.6 9.3 9.7 9.4   ALK PHOS U/L  --   --   --  72 75 72   ALT U/L  --   --   --  13 16 20   AST U/L  --   --   --  16 10* 10*     Results from last 7 days " "  Lab Units 09/09/24  0521   MAGNESIUM mg/dL 2.3     Results from last 7 days   Lab Units 09/09/24  0521   PHOSPHORUS mg/dL 5.3*                No results found for: \"TROPONINI\"    Procalcitonin: 1.00, 0.77     Flu/COVID/RSV PCR: Negative     Culture Data: MRSA negative, BC 1/2 + MRSE     Urinary antigens: Need collected      D-Dimer: 2.18     BNP: 3019    Microbiology:  Microbiology Results (last 21 days)       Collected Updated Procedure Result Status Patient Facility Result Comment      09/02/2024 1804 09/03/2024 0754 Sputum culture and Gram stain [[id number]]   (Abnormal)   Expectorated Sputum    Preliminary result [facility]  Component Value   Gram Stain Result Rare Epithelial cells per low power field  [P]     No polys seen  [P]     Rare Gram positive cocci in pairs  [P]     Rare Gram variable rods  [P]                09/02/2024 1427 09/03/2024 0000 Respiratory Panel 2.1(RP2)with COVID19 [[id number]]   Nasopharyngeal Swab    Final result [facility]  Component Value   Adenovirus Not Detected   Bordetella parapertussis Not Detected   Bordetella pertussis Not Detected   Chlamydia pneumoniae Not Detected   SARS-CoV-2 Not Detected   Coronavirus 229E Not Detected   Coronavirus HKU1 Not Detected   Coronavirus NL63 Not Detected   Coronavirus OC43 Not Detected   Human Metapneumovirus Not Detected   Rhino/Enterovirus Not Detected   Influenza A Not Detected   Influenza B Not Detected   Mycoplasma pneumoniae Not Detected   Parainfluenza 1 Not Detected   Parainfluenza 2 Not Detected   Parainfluenza 3 Not Detected   Parainfluenza 4 Not Detected   Respiratory Syncytial Virus Not Detected            08/31/2024 0043 09/01/2024 1005 MRSA culture [[id number]]   Nares from Nose    Final result [facility]  Component Value   MRSA Culture Only No Methicillin Resistant Staphlyococcus aureus (MRSA) isolated               08/30/2024 2039 09/02/2024 2301 Blood " culture #1 [[id number]]   Blood from Line, Venous    Preliminary result [facility]  Component Value   Blood Culture No Growth at 72 hrs.  [P]                08/30/2024 2039 09/02/2024 0911 Blood culture #2 [[id number]]    (Abnormal)   Blood from Arm, Right    Final result [facility] Susceptibility testing will not be performed as this organism, when isolated from a single set of blood cultures, represents probable skin izzy contamination. Please call the Microbiology Laboratory within 5 days at [phone] if further workup is required. Component Value   Blood Culture Staphylococcus epidermidis   Gram Stain Result Gram positive cocci in clusters    Refer to Blood Culture Identification Panel results    This is an appended report. These results have been appended to a previously preliminary verified report.               08/30/2024 2039 09/02/2024 0911 Blood Culture Identification Panel [[id number]]    (Abnormal)   Blood from Arm, Right    Final result [facility] Film Array panel tests for 11 gram positive organisms, 15 gram negative organisms, 7 yeast species and 10 resistance genes.  Component Value   Staphylococcus epidermidis Detected   mecA/C (Methicillin-resistance gene) Detected    This organism is a coagulase-negative Staphylococcus    If only 1 set of blood cultures is positive, this may represent a contaminant.  Consider holding antibiotics or repeating cultures prior to starting antibiotics.      If >1 one set of blood cultures is positive, vancomycin is the preferred therapy.                 08/30/2024 2025 08/30/2024 2112 FLU/RSV/COVID - if FLU/RSV clinically relevant [[id number]]    Nares from Nose    Final result [facility] This test has been performed using the CoV-2/Flu/RSV plus assay on the [email or web address] GeneXpert platform. This test has been validated by the  and verified by the  performing laboratory.    This test is designed to amplify and detect the following: nucleocapsid (N), envelope (E), and RNA-dependent RNA polymerase (RdRP) genes of the SARS-CoV-2 genome; matrix (M), basic polymerase (PB2), and acidic protein (PA) segments of the influenza A genome; matrix (M) and non-structural protein (NS) segments of the influenza B genome, and the nucleocapsid genes of RSV A and RSV B.    Positive results are indicative of the presence of Flu A, Flu B, RSV, and/or SARS-CoV-2 RNA. Positive results for SARS-CoV-2 or suspected novel influenza should be reported to state, local, or federal health departments according to local reporting requirements.     All results should be assessed in conjunction with clinical presentation and other laboratory markers for clinical management.    FOR PEDIATRIC PATIENTS - copy/paste COVID Guidelines URL to browser: [email or web address]     Component Value   SARS-CoV-2 Negative    INFLUENZA A PCR Negative    INFLUENZA B PCR Negative    RSV PCR Negative                       Imaging and other studies: I have personally reviewed pertinent films in PACS    CTA Chest PE 8/31/2024:   No pulmonary embolism. Since July 11, 2022 there is persistent tree-in-bud nodularity in the lungs suggestive of a widespread infectious bronchiolitis that may be secondary to an atypical mycobacterium. Follicular bronchiolitis and acute hypersensitivity pneumonitis could also be in the differential. Follow-up chest CT in 3 months after treatment for infectious bronchiolitis is advised. New mild patchy bilateral multifocal pneumonia. Pulmonary hypertension. New moderate bilateral pleural effusions.     Chest x-ray 8/30/2024:  New opacification lateral left lung base may reflect atelectasis or pneumonia. Probable small left effusion. Mild cardiomegaly with mild vascular and interstitial prominence suggesting some degree of volume  overload.     EKG, Pathology, and Other Studies: I have personally reviewed pertinent reports.                  Echocardiogram, 8/31/2024:  Left Ventricle: Left ventricular cavity size is mildly dilated. Wall thickness is mildly increased. The left ventricular ejection fraction is 25%. Systolic function is severely reduced. There is severe global hypokinesis.  Right Ventricle: Right ventricular cavity size mildly dilated. Systolic function mildly reduced.  Left Atrium: Atrium is mildly dilated.  Right Atrium: Atrium is mildly dilated.  Aortic Valve: Mild regurgitation.  Mitral Valve: Moderate to severe regurgitation.  Tricuspid Valve: Moderate regurgitation. Right ventricular systolic pressure is moderately elevated. The estimated RVSP is 48.00 mmHg.  Pericardium: Small pericardial effusion anterior to the heart.    Gill Stevens  Pulmonary & Critical Care Medicine Fellow PGY-4  [facility]

## 2024-09-10 NOTE — BRIEF OP NOTE (RAD/CATH)
INTERVENTIONAL RADIOLOGY PROCEDURE NOTE    Date: 9/10/2024    Procedure:   TDC OTWE      Preoperative diagnosis:   1. COPD exacerbation (HCC)    2. Hypoxia    3. 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 Bacteremia    5. 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 Abnormal CT of the chest    7. Shortness of breath    8. Moderate persistent asthma with acute exacerbation    9. Multifocal pneumonia    10. Complication associated with dialysis catheter         Postoperative diagnosis: Same.    Surgeon: Gerber Fletcher MD     Assistant: None. No qualified resident was available.    Blood loss: 5 ml    Specimens: none.     Findings:   TDC OTWE w/FSS    Complications: None immediate.    Anesthesia: conscious sedation

## 2024-09-10 NOTE — OCCUPATIONAL THERAPY NOTE
Occupational Therapy Cancellation Note        Patient Name: Ngozi Beard  Today's Date: 9/10/2024       09/10/24 1142   Note Type   Note type Cancelled Session   Cancel Reasons Patient off floor/hemodialysis   Additional Comments Pt with active OT orders, chart reviewed. Currently off floor to IR for HD cath procedure, planned for HD session following procedure. Will cancel and f/u as appropriate     Margie Shipman, OT

## 2024-09-10 NOTE — SEDATION DOCUMENTATION
Procedure ended. IR HD permacath exchanged by Dr. Fletcher. Dressing to site. Report and care assumed by primary RN

## 2024-09-10 NOTE — ASSESSMENT & PLAN NOTE
-- Pulmonary on board.  Chest x-ray personally reviewed.  --Recommend bronchoscopy, I discussed with pulmonary yesterday  -- Continues to have shortness of breath and cough  --Patient n.p.o. at midnight and cleared by cardiology for bronchoscopy.

## 2024-09-10 NOTE — PROGRESS NOTES
Progress Note - Hospitalist   Name: Ngozi Beard 64 y.o. female I MRN: [id number]  Unit/Bed#: S -01 I Date of Admission: 8/30/2024   Date of Service: 9/10/2024 I Hospital Day: 11    Assessment & Plan  Shortness of breath  CLINE: Negative PE, BNP 3019, CT chest-bilateral pleural effusion, tree-in-bud nodularity    Multifactorial, contributing factors of volume overload, chronic anemia, underlying COPD and asthma.     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 S/p PLEX.  Patient is anuric and dialysis-dependent  Tues/Thurs/Sat schedule  Access: Right IJ PermCath, Cath reevaluation  by IR on 9/3, no issues during HD. 9/4, issues with clotting, will needs re-evaluation @9.5. Requires Re-evaluation 9/6 IR, Fluid poor returns on 9/5. 9/6-bedside readjustment was made by IR, reclotted after 1.5 hours, [facility]flo dwelling overnight. 9/7 - HD without issues . 9/8 -cannot achieve good flow.  9/9-IR procedure unavailable, facilitated conversation to IR and nephrology regarding plans for dialysis.  9/10 - catheter exchanged, no issue with hemodialysis    Plan:  Hemodialysis schedule per nephro   continue PTA cyclophosphamide  Continue PTA sevalemer  Continue PTA atovaquone for PJP prophylaxis  Started Lokelma per nephro  Continue prednisone taper from outpatient nephrology prior to this admission  20 mg once daily till September 6  15 mg starting September 7 to September 20  12.5 mg starting September 21 to October 4  10 mg starting October 5 to October 18  7.5 mg starting October 19 to November 2  5 mg daily starting November 3  Multifocal pneumonia  -CT chest PE study revealed no evidence of PE. There is persistent tree-in-bud nodularity in the lungs suggestive of a widespread infectious bronchiolitis that may be  secondary to an atypical mycobacterium. Follicular bronchiolitis and acute hypersensitivity pneumonitis could also be in the differential.  New mild patchy bilateral multifocal pneumonia  - Sputum culture: 4+ Pseudomonas   - ID: Suspect chronic process, however will proceed with short course of cefepime and observe clinically.   - Cardiology: Cleared for bronchoscopy.  - Pulm: Considering bronchoscopy if no improvement with abx, likely to be an outpatient procedure.    Improvement in coughing and sputum production symptoms with addition of abx, suspect patient presentation partially contributed from bronchitis secondary to pseudomonas.     Plan:   -Cefepime 1 g daily for 7 days end date of September 10.   - Once HD schedule is stable and back to 3 times weekly schedule, IV cefepime can be transitioned to post HD dosing.       Cardiomyopathy (HCC)  August 31-EF of 25% with global hypokinesis.  No prior echo for comparison.    Suspect nonischemic cardiomyopathy from inflammatory/rheumatologic etiology     GDMT recommended, pt started on Toprol 50 mg daily, Losartan 25 mg daily  9/4 - Nifedipine held per Cardiology  9/9 Losartan held per nephro   Will need Cardiac MRI, likely an outpatient procedure.  Ischemic work up with with left cardiac cath in o/p settings      Asthma without acute exacerbation  Mild Bilateral wheezing was noted in the lower lung base upon initial presentation.  Does not require frequent inhaler use as an outpatient setting.  Low suspicion for exacerbation for the clinical symptoms presented during this hospital on admission    Plan  Continue albuterol-ipratropium nebs  Generalized weakness  Multifactorial, respiratory failure secondary to chronic pulmonary infection, volume overload, chronic anemia, chronic deconditioning from being ill    Plan  PT/OT evaluation apprecaited  Anemia  Recent Labs     09/08/24  0515 09/09/24  0521 09/10/24  0538   HGB 8.5* 8.1* 8.3*      Baseline Hgb 7-8  Etiology:  component of iron deficiency and renal disease.     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   Preferred pharmacy: on file  Refills:  Transportation: facility      Subjective:   Overnight: No acute events over night     Complaints: No complaints.     Patient denies Headache, Fever, Chills, Chest Pain,  Shortness of breath,  Swellings    Diet: Diet Renal; Lo Phosphorus; Yes; Fluid Restriction 1800 ML; No   Bowel regimen:   Last BM Date: 24   VTE Pharmacologic Prophylaxis: VTE Score: 7 High Risk (Score >/= 5) - Pharmacological DVT Prophylaxis Ordered: heparin. Sequential Compression Devices Ordered.    Objective:    Vitals:   Temp (24hrs), Av.9 °F (36.6 °C), Min:97.4 °F (36.3 °C), Max:98.3 °F (36.8 °C)    Temp:  [97.4 °F (36.3 °C)-98.3 °F (36.8 °C)] 98 °F (36.7 °C)  HR:  [64-80] 75  Resp:  [16-18] 16  BP: ()/(60-79) 87/60  SpO2:  [92 %-100 %] 99 %  Input and Output Summary (last 24 hours):     Intake/Output Summary (Last 24 hours) at 9/10/2024 1511  Last data filed at 9/10/2024  1300  Gross per 24 hour   Intake 200 ml   Output --   Net 200 ml     Physical Exam:   Physical Exam  Constitutional:       General: She is not in acute distress.     Appearance: She is ill-appearing.   Cardiovascular:      Rate and Rhythm: Normal rate and regular rhythm.      Comments: No peripheral edema  Pulmonary:      Effort: Pulmonary effort is normal.      Breath sounds: Normal breath sounds.      Comments: Room air  Abdominal:      Palpations: Abdomen is soft.   Musculoskeletal:      Cervical back: Neck supple.   Skin:     General: Skin is warm.      Capillary Refill: Capillary refill takes less than 2 seconds.   Neurological:      Mental Status: She is alert.        Additional Data:   Labs:  Results from last 7 days   Lab Units 09/10/24  0538   WBC Thousand/uL 10.41*   HEMOGLOBIN g/dL 8.3*   HEMATOCRIT % 27.2*   PLATELETS Thousands/uL 206     Results from last 7 days   Lab Units 09/10/24  0538 09/08/24  0515 09/07/24  0545   SODIUM mmol/L 141   < > 136   POTASSIUM mmol/L 4.1   < > 5.4*   CHLORIDE mmol/L 101   < > 98   CO2 mmol/L 30   < > 29   BUN mg/dL 63*   < > 44*   CREATININE mg/dL 9.17*   < > 7.95*   ANION GAP mmol/L 10   < > 9   CALCIUM mg/dL 9.5   < > 9.3   ALBUMIN g/dL  --   --  3.4*   TOTAL BILIRUBIN mg/dL  --   --  0.50   ALK PHOS U/L  --   --  72   ALT U/L  --   --  13   AST U/L  --   --  16   GLUCOSE RANDOM mg/dL 97   < > 130    < > = values in this interval not displayed.         Results from last 7 days   Lab Units 09/04/24  1605   POC GLUCOSE mg/dl 138               Recent Cultures (last 7 days):         Imaging: XR chest portable  Narrative: XR CHEST PORTABLE    INDICATION: shortness of breath.    COMPARISON: 9/4/2024    FINDINGS: The right IJ approach dialysis catheter terminates in the region of the SVC.    Clear lungs. No pneumothorax or pleural effusion.    Unchanged mild cardiomegaly.    Bones are unremarkable for age.    Normal upper abdomen.  Impression: No acute cardiopulmonary  disease.  Dialysis catheter tip appears to be at the level of the SVC. PA and lateral views in standard position to confirm.    Workstation performed: [id number]      Mobility:   Basic Mobility Inpatient Raw Score: 16  JH-HLM Goal: 5: Stand one or more mins  JH-HLM Achieved: 6: Walk 10 steps or more  JH-HLM Goal achieved. Continue to encourage appropriate mobility.    Last 24 Hours Medication List:   Current Facility-Administered Medications   Medication Dose Route Frequency Provider Last Rate    acetaminophen  650 mg Oral Q6H PRN Kely Robert MD      albuterol  2 puff Inhalation Q4H PRN Kely Robert MD      albuterol  2.5 mg Nebulization TID Todd Dickens MD      atorvastatin  20 mg Oral Daily Kely Robert MD      atovaquone  1,500 mg Oral Daily Kathryn Cleveland MD      bisacodyl  5 mg Oral Once Trish Kline MD      cefepime  1,000 mg Intravenous Q24H Kathryn Cleveland MD 1,000 mg (09/09/24 1647)    cyclophosphamide  100 mg Oral Daily Kely Robert MD      dextromethorphan-guaiFENesin  10 mL Oral Q4H PRN Kely Robert MD      epoetin shavonne  6,000 Units Intravenous After Dialysis Tirso Montez MD      fentaNYL   Intravenous PRN Gerber Fletcher MD      fluticasone-vilanterol  1 puff Inhalation Daily Robert Gilbert MD      heparin (porcine)  500 Units Intracatheter Q1H PRN Trish Kline MD      heparin (porcine)  5,000 Units Subcutaneous Q8H [facility] Kely Robert MD      ipratropium  0.5 mg Nebulization TID Todd Dickens MD      iron polysaccharides  150 mg Oral Daily Kely Robert MD      lamoTRIgine  100 mg Oral HS Kely Robert MD      lidocaine  1 patch Topical Daily Trish Kline MD      lidocaine-epinephrine 1%-1:[id number] buffered   Infiltration PRN Gerber Fletcher MD      metoprolol succinate  50 mg Oral BID Gerber Robbins MD      midazolam    PRN Gerber Fletcher MD      montelukast  10 mg Oral Daily Kely Robert MD      pantoprazole  40 mg Oral Daily  Before Breakfast Kely Robert MD      polyethylene glycol  17 g Oral Daily Kathryn Cleveland MD      predniSONE  15 mg Oral Daily Kathryn Cleveland MD      senna-docusate sodium  2 tablet Oral HS Trish Kline MD      sevelamer  1,600 mg Oral TID With Meals Kely Robert MD      Sodium Zirconium Cyclosilicate  10 g Oral Daily Trish Kline MD      traZODone  200 mg Oral HS Kely Robert MD      trimethobenzamide  200 mg Intramuscular Q6H PRN Kely Robert MD      vancomycin   Intravenous Continuous PRN Gerber Fletcher MD      venlafaxine  225 mg Oral Daily Kely Robert MD         Lines/Drains:  Invasive Devices       Peripheral Intravenous Line  Duration             Peripheral IV 09/09/24 Proximal;Right;Ventral (anterior) Forearm <1 day              Hemodialysis Catheter  Duration             HD Permanent Double Catheter <1 day                      Telemetry:  Telemetry Orders (From admission, onward)               24 Hour Telemetry Monitoring  Continuous x 24 Hours (Telem)        Question:  Reason for 24 Hour Telemetry  Answer:  Decompensated CHF- and any one of the following: continuous diuretic infusion or total diuretic dose >200 mg daily, associated electrolyte derangement (I.e. K < 3.0), ionotropic drip (continuous infusion), hx of ventricular arrhythmia, or new EF < 35%                     Telemetry Reviewed: Normal Sinus Rhythm  Indication for Continued Telemetry Use: Acute CHF on >200 mg lasix/day or equivalent dose or with new reduced EF.              Patient Centered Rounds: I evaluated the patient without nursing staff present due to Availability  Discussions with Specialists or Other Care Team Provider: Nephrology, pulm, cardiology  Education and Discussions with Family / Patient:  Will update sister.      Today, Patient Was Seen By: Kathryn Cleveland MD    **Please Note: This note may have been constructed using a voice recognition system.**

## 2024-09-10 NOTE — ASSESSMENT & PLAN NOTE
-- Persistent issues with the HD catheter , has been evaluated by IR multiple times , good flow rates could not be achieved on the last treatment  -- low ejection fraction and apparently was getting hypotensive with dialysis  -- Her right dialysis catheter has been exchanged stripped and angioplasty  -- Status post catheter repositioning earlier this morning by IR.  Catheter worked well during today's treatment hopefully will work well on further treatments.

## 2024-09-10 NOTE — PHYSICAL THERAPY NOTE
Physical Therapy Cancellation Note       09/10/24 1143   Note Type   Note type Cancelled Session   Cancel Reasons Patient off floor/test   Additional Comments PT treatment attempted, however pt currently off unit at IR for procedure, and is planned for HD post procedure. Will hold PT treatment and f/u next day as able and appropriate.       Laura Del Rosario, PT, DPT   Available via [employer]  NPI # [id number]  [place] License - [id number]  9/10/2024

## 2024-09-10 NOTE — UTILIZATION REVIEW
"Initial Clinical Review    Admission: Date/Time/Statement:   Admission Orders (From admission, onward)       Ordered        08/30/24 2140  INPATIENT ADMISSION  Once                          Orders Placed This Encounter   Procedures    INPATIENT ADMISSION     Standing Status:   Standing     Number of Occurrences:   1     Order Specific Question:   Level of Care     Answer:   Med Surg [16]     Order Specific Question:   Estimated length of stay     Answer:   More than 2 Midnights     Order Specific Question:   Certification     Answer:   I certify that inpatient services are medically necessary for this patient for a duration of greater than two midnights. See H&P and MD Progress Notes for additional information about the patient's course of treatment.     ED Arrival Information       Expected   -    Arrival   8/30/2024 20:01    Acuity   Urgent              Means of arrival   Ambulance    Escorted by   [facility]    Service   Hospitalist    Admission type   Emergency              Arrival complaint   EMS- Asthma             Chief Complaint   Patient presents with    Asthma     Pt reports asthma attack. Hasn't taken any meds at home due to \"sleeping the past 3 days\". Ems gave 3 albuterol and 1 duo neb       Initial Presentation: 64 y.o. female with hx  anti-GBM disease, dialysis dependence, HTN, anemia, and bipolar disorder who presented from home on 8/30 due to 1 week history of acutely worsening shortness of breath especially with exertion. She reports intermittent chest tightness and a chronic cough for the past 6 months that is intermittently productive of yellow sputum. Patient also endorses generalized weakness and decreased appetite for the past 3 days. She had difficulty getting out of bed in this time and was afraid of falling. As a result of this, she admits to decreased food/water intake and not having taken her medications.Pt anuric , believes she underwent dialysis this past Tuesday, but cannot remember " if she went on Thursday.  Pt reports nausea and vomited in ED. O2 sat 89 % on RA, applied O2 @ 2L with O2 sat low 90's. Tachycardic, wheezing noted.Tachycardic, dyspneic, and using accessory muscles. Has R IJ Permacath .   Labs WBC 14.45, procal 0.77, hgb 7.1, K 5.6, creat 8.68. BNP 3000s .Elevated D dimer  CXR  : mildly pulmonary vascular congestion. No evidence of consolidation . ECG- ST .   Pt given Solu-Medol 80 mg x 1, DuoNeb, and azithromycin in ED. Admitted as Inpatient with moderate persistent asthma  w/ exacerbation . SIRS. Generalized weakness. PLan-supplemental O2 0 wean as able to keep sat >92 %. albuterol-ipratropium nebs.  IV Solu-Medrol 40 mg daily . Obtain CTA chest PE study. Obtain echo. Nephro consult for HD. PT/OT . Monitor off abx . Monitor WBC, fever curve. Telemetry. Start Lokelma   Anticipated Length of Stay/Certification Statement: Patient will be admitted on an inpatient basis with an anticipated length of stay of greater than 2 midnights secondary to asthma exacerbation, generalized weakness.        Date: 8/31    Day 2:    Nephrology consult- Plan for HD today Immunosuppression. Status post Plex for 14 days. Most recent anti-GBM level on 07/29 still more than 8. Currently on oral Cytoxan 100 mg daily. She is also on prednisone taper but currently on IV methylprednisolone for asthma exacerbation. Continue atovaquone for PJP prophylaxis. Follow results of CT chest. Hyperkalemia. Serum potassium level 5.6 this morning. Continue Lokelma 10 g daily. Dialysis with 2K bath today.  Anemia in renal insufficiency.  She is on Mircera 60 mcg every 2 weeks as outpatient.  Hemoglobin today 6.7, she will need blood transfusion.  Start Epogen 6000 units with dialysis   Medicine- continues  to endorse shortness of breath upon exertion.  Denies any chest pain.  On exam, no wheezing or crackles . Diminished breath sounds particularly at the left basal lung field  . SOB on exertion possibly multifactorial with  worsening anemia, possible pulmonary congestion/volume overload , possible asthma/COPD overlap syndrome exacerbation; rule out pulmonary embolism; rule out infectious process/pneumonia.  Touched base with nephrology, we will undergo hemodialysis today. Continue nebs, Iv steroids.  Following up on CTA PE, echocardiogram.1/2 Blood cx + for cocci in cluster, patient has permacath in situ for HD, will give one dose of IV vancomycin and follow-up final culture results . Transfuse 1 U PRBC for Hgb 6.9 today . Monitor CBC. Trend procal.     Date: 9/1   Day 3: Has surpassed a 2nd midnight with active treatments and services.  Nephro- Patient had hemodialysis yesterday. Hyperkalemia has resolved. 0.9 L ultrafiltration was achieved. System clotted 4 times during treatment. Blood flow rate was maintained at 200 mL/min with lines reversed. Treatment was terminated after fourth system clotting. IR has been consulted for evaluation of permacath with possibility of exchange.   IR consult - ESRD on HD having difficulty with catheter during dialysis. Catheter check possible intervention is desired. This is reasonable and will be arranged by the IR staff. Anticipate procedure on 9/3. Coagulation labs are elevated and will need to be corrected and ac held for anticipated procedure on 9/3   Medicine- Hgb up to 7.9 today after 1 U PRBC yesterday . Per nsg + dyspnea at rest, tachypneic. Diminished breath sounds, wheezing .+2 piting edema BLE.  Telemetry- ST. Pt anxious. IV Solumedrol d/c'ed yesterday . WBC up to 19.55 today  with tachycardia,  productive cough with yellow mucus production. CT chest revealed evidence of multifocal pneumonia . ID consult placed today.Continue telemetry. CBC, BMP in am .             ED Triage Vitals   Temperature Pulse Respirations Blood Pressure SpO2 Pain Score   08/30/24 2006 08/30/24 2004 08/30/24 2004 08/30/24 2004 08/30/24 2004 08/30/24 2233   98.3 °F (36.8 °C) (!) 125 18 128/69 (!) 89 % 4     Weight  (last 2 days)       Date/Time Weight    09/10/24 0544 104 (230.38)    09/09/24 0600 101 (223.11)    09/08/24 0500 102 (224.21)            Vital Signs (last 3 days)       Date/Time Temp Pulse Resp BP MAP (mmHg) SpO2 Calculated FIO2 (%) - Nasal Cannula Nasal Cannula O2 Flow Rate (L/min) O2 Device Patient Position - Orthostatic VS Dema Coma Scale Score Pain    09/10/24 08:32:46 98.3 °F (36.8 °C) 72 -- 110/68 82 92 % -- -- -- -- -- --    09/10/24 0737 -- -- -- -- -- 95 % -- -- None (Room air) -- -- --    09/10/24 0608 97.4 °F (36.3 °C) 72 17 116/71 86 99 % -- -- -- Lying -- --    09/09/24 21:09:06 -- 80 -- 124/74 91 98 % -- -- -- -- -- --    09/09/24 1943 -- -- -- -- -- 97 % -- -- None (Room air) -- -- --    09/09/24 1930 -- -- -- -- -- 98 % -- -- None (Room air) -- 15 No Pain    09/09/24 15:08:15 98.4 °F (36.9 °C) 83 20 110/60 77 97 % -- -- -- -- -- --    09/09/24 1339 -- -- -- -- -- 92 % -- -- None (Room air) -- -- --    09/09/24 0800 -- -- -- -- -- -- -- -- -- -- -- No Pain    09/09/24 0736 -- -- -- -- -- 97 % -- -- None (Room air) -- -- --    09/09/24 07:34:58 -- 73 20 106/66 79 99 % -- -- -- Lying -- --    09/08/24 2100 97.3 °F (36.3 °C) -- -- -- -- 97 % -- -- None (Room air) -- 15 No Pain    09/08/24 20:58:42 97.3 °F (36.3 °C) 78 18 120/70 87 97 % -- -- -- -- -- --    09/08/24 1939 -- -- -- -- -- 97 % -- -- -- -- -- --    09/08/24 1604 98.3 °F (36.8 °C) 77 20 113/75 -- 95 % -- -- -- -- -- --    09/08/24 1600 -- 73 20 106/70 82 93 % -- -- -- -- -- --    09/08/24 1554 -- 71 20 123/72 89 93 % -- -- -- -- -- --    09/08/24 1543 97.6 °F (36.4 °C) 79 20 120/64 83 93 % -- -- -- Lying -- --    09/08/24 1329 -- -- -- -- -- 97 % -- -- -- -- -- --    09/08/24 0847 -- 73 -- 110/66 -- -- -- -- -- -- -- --    09/08/24 0815 -- -- -- -- -- 95 % 28 2 L/min Nasal cannula -- -- --    09/08/24 0800 -- -- -- -- -- -- -- -- -- -- 15 --    09/08/24 07:21:14 97.7 °F (36.5 °C) 70 -- 94/60 71 90 % -- -- -- -- -- --    09/08/24 0415 --  -- -- -- -- -- -- -- -- -- 15 --    09/07/24 21:25:26 98.7 °F (37.1 °C) 79 -- 122/75 91 92 % -- -- -- -- -- --    09/07/24 17:10:13 -- 95 -- 113/70 84 93 % -- -- -- -- -- --    09/07/24 1600 -- -- -- -- -- -- -- -- -- -- 15 No Pain    09/07/24 15:07:38 -- 88 18 120/69 86 98 % -- -- -- -- -- --    09/07/24 1458 -- -- -- -- -- 95 % -- -- -- -- -- --    09/07/24 1400 -- -- -- -- -- -- -- -- -- -- -- No Pain    09/07/24 1210 98.4 °F (36.9 °C) 88 16 135/82 -- -- -- -- -- Lying -- --    09/07/24 1200 -- 91 16 128/77 -- -- -- -- -- Lying -- --    09/07/24 1130 -- 93 16 131/85 -- -- -- -- -- Lying -- --    09/07/24 1100 -- 83 16 115/83 -- -- -- -- -- Lying -- --    09/07/24 1030 -- 94 16 131/87 -- -- -- -- -- Lying -- --    09/07/24 1000 -- 86 16 133/84 -- -- -- -- -- Lying -- --    09/07/24 0930 -- 87 16 119/77 -- -- -- -- -- Lying -- --    09/07/24 0900 -- 88 16 102/76 -- -- -- -- -- Lying -- --    09/07/24 0830 98.2 °F (36.8 °C) 89 16 119/74 -- -- -- -- -- Lying -- --    09/07/24 0800 -- -- -- -- -- -- -- -- -- -- 15 --    09/07/24 07:42:09 98.1 °F (36.7 °C) 86 -- 129/80 96 94 % -- -- -- -- -- --    09/07/24 07:41:53 98.1 °F (36.7 °C) 86 -- 129/80 96 98 % -- -- -- -- -- --    09/07/24 0724 -- -- -- -- -- 95 % -- -- -- -- -- --              Pertinent Labs/Diagnostic Test Results:   Radiology:  XR chest 1 view portable   ED Interpretation by Alejandro Johnston PA-C (08/30 2112)   Consolidation right lower lobe suspicious for pneumonia      Final Interpretation by Devyn Silva MD (08/31 1314)         1. New opacification lateral left lung base may reflect atelectasis or pneumonia. Probable small left effusion.   2. Mild cardiomegaly with mild vascular and interstitial prominence suggesting some degree of volume overload.            Workstation performed: [id number]         CTA chest pe study    (8/31/24 )    No pulmonary embolism.   Since July 11, 2022 there is persistent tree-in-bud nodularity in the lungs suggestive of  a widespread infectious bronchiolitis that may be secondary to an atypical mycobacterium. Follicular bronchiolitis and acute hypersensitivity pneumonitis could also   be in the differential. Follow-up chest CT in 3 months after treatment for infectious bronchiolitis is advised.   New mild patchy bilateral multifocal pneumonia.   Pulmonary hypertension   New moderate bilateral pleural effusions.     IR tunneled dialysis catheter check/change/reposition/angioplasty    (Results Pending)     Cardiology:   8/30 ECG- ED read-   ECG rate:  123     ECG rate assessment: normal    Rhythm:     Rhythm: sinus tachycardia    Ectopy:     Ectopy: none    QRS:     QRS axis:  Normal     QRS intervals:  Normal   Conduction:     Conduction: abnormal      Abnormal conduction comment:  Intraventricular conduction block   ST segments:     ST segments:  Normal   T waves:     T waves: normal   Echo complete w/ contrast if indicated   Final Result by Ty Reese MD (09/02 0944)   Addendum (preliminary) 1 of 1 by Ty Reese MD (09/02 0944)        Left Ventricle: Left ventricular cavity size is mildly dilated. Wall    thickness is mildly increased. The left ventricular ejection fraction is    25%. Systolic function is severely reduced. There is severe global    hypokinesis.     Right Ventricle: Right ventricular cavity size is mildly dilated.    Systolic function is mildly reduced.     Left Atrium: The atrium is mildly dilated.     Right Atrium: The atrium is mildly dilated.     Aortic Valve: There is mild regurgitation.     Mitral Valve: There is moderate to severe regurgitation.     Tricuspid Valve: There is moderate regurgitation. The right ventricular    systolic pressure is moderately elevated. The estimated right ventricular    systolic pressure is 48.00 mmHg.     Pericardium: There is a small pericardial effusion anterior to the    heart.            ECG 12 lead   Final Result by Ty Reese MD (09/04 0902)   Sinus tachycardia    Non-specific intra-ventricular conduction block   Cannot rule out Anteroseptal infarct (cited on or before 30-AUG-2024)   Abnormal ECG   When compared with ECG of 11-AUG-2024 00:23,   No significant change was found   Confirmed by Ty Reese ([id number]) on 9/4/2024 9:02:28 AM        GI:  No orders to display             Results from last 7 days   Lab Units 09/10/24  0538 09/09/24  0521 09/08/24  0515 09/07/24  0545 09/06/24  0609   WBC Thousand/uL 10.41* 10.15 11.86* 9.08 9.45   HEMOGLOBIN g/dL 8.3* 8.1* 8.5* 8.2* 8.5*   HEMATOCRIT % 27.2* 26.6* 27.6* 26.5* 27.5*   PLATELETS Thousands/uL 206 182 206 191 213         Results from last 7 days   Lab Units 09/10/24  0538 09/09/24  0521 09/08/24  0515 09/07/24  0545 09/06/24  0609   SODIUM mmol/L 141 140 139 136 138   POTASSIUM mmol/L 4.1 4.0 4.2 5.4* 5.1   CHLORIDE mmol/L 101 100 100 98 99   CO2 mmol/L 30 30 29 29 29   ANION GAP mmol/L 10 10 10 9 10   BUN mg/dL 63* 48* 32* 44* 52*   CREATININE mg/dL 9.17* 7.57* 5.73* 7.95* 8.84*   EGFR ml/min/1.73sq m 4 5 7 4 4   CALCIUM mg/dL 9.5 9.4 9.6 9.3 9.7   MAGNESIUM mg/dL  --  2.3  --   --   --    PHOSPHORUS mg/dL  --  5.3*  --   --   --      Results from last 7 days   Lab Units 09/07/24  0545 09/06/24  0609 09/05/24  0525   AST U/L 16 10* 10*   ALT U/L 13 16 20   ALK PHOS U/L 72 75 72   TOTAL PROTEIN g/dL 5.7* 5.7* 5.4*   ALBUMIN g/dL 3.4* 3.4* 3.2*   TOTAL BILIRUBIN mg/dL 0.50 0.45 0.48     Results from last 7 days   Lab Units 09/04/24  1605   POC GLUCOSE mg/dl 138     Results from last 7 days   Lab Units 09/10/24  0538 09/09/24  0521 09/08/24  0515 09/07/24  0545 09/06/24  0609 09/05/24  0525 09/04/24  0450   GLUCOSE RANDOM mg/dL 97 106 112 130 127 92 84                                                                                        Results from last 7 days   Lab Units 09/05/24  1321   HEP B S AG  Non-reactive   HEP C AB  Non-reactive   HEP B C IGM  Non-reactive   HEP B C TOTAL AB  Non-reactive                                                ED Treatment-Medication Administration from 08/30/2024 2000 to 08/30/2024 2218         Date/Time Order Dose Route Action     08/30/2024 2007 ipratropium-albuterol (FOR EMS ONLY) (DUO-NEB) 0.5-2.5 mg/3 mL inhalation solution 3 mL 0 mL Does not apply Given to EMS     08/30/2024 2007 albuterol (FOR EMS ONLY) (2.5 mg/3 mL) 0.083 % inhalation solution 7.5 mg 0 mg Does not apply Given to EMS     08/30/2024 2023 methylPREDNISolone sodium succinate (Solu-MEDROL) injection 80 mg 80 mg Intravenous Given     08/30/2024 2040 ipratropium-albuterol (DUO-NEB) 0.5-2.5 mg/3 mL inhalation solution 3 mL 3 mL Nebulization Given     08/30/2024 2108 azithromycin (ZITHROMAX) 500 mg in sodium chloride 0.9% 250mL IVPB 500 mg 500 mg Intravenous New Bag     08/30/2024 2208 trimethobenzamide (TIGAN) IM injection 200 mg 200 mg Intramuscular Given            Past Medical History:   Diagnosis Date    Asthma     Chronic pain     Hypertension     Renal disorder     Sepsis (HCC) 07/10/2024     Present on Admission:   Bipolar 1 disorder ([facility])   Primary hypertension   Dyslipidemia   Asthma without acute exacerbation   Anemia   (Resolved) SIRS (systemic inflammatory response syndrome) ([facility])   Shortness of breath   (Resolved) Elevated transaminase level   Generalized weakness   Cardiomyopathy ([facility])   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 female  Admission Orders:  Scheduled Medications:  albuterol, 2.5 mg, Nebulization, TID  atorvastatin, 20 mg, Oral, Daily  atovaquone, 1,500 mg, Oral, Daily  cefepime, 1,000 mg, Intravenous, Q24H  cyclophosphamide, 100 mg, Oral, Daily  fluticasone-vilanterol, 1 puff, Inhalation, Daily  heparin (porcine), 2,000 Units, Intravenous, Once  heparin (porcine), 5,000 Units, Subcutaneous, Q8H JACK  ipratropium, 0.5 mg,  Nebulization, TID  iron polysaccharides, 150 mg, Oral, Daily  lamoTRIgine, 100 mg, Oral, HS  lidocaine, 1 patch, Topical, Daily  metoprolol succinate, 50 mg, Oral, BID  montelukast, 10 mg, Oral, Daily  pantoprazole, 40 mg, Oral, Daily Before Breakfast  polyethylene glycol, 17 g, Oral, Daily  predniSONE, 15 mg, Oral, Daily  senna-docusate sodium, 1 tablet, Oral, HS  sevelamer, 1,600 mg, Oral, TID With Meals  Sodium Zirconium Cyclosilicate, 10 g, Oral, Daily  traZODone, 200 mg, Oral, HS  venlafaxine, 225 mg, Oral, Daily    azithromycin (ZITHROMAX) 500 mg in sodium chloride 0.9% 250mL IVPB 500 mg  Dose: 500 mg  Freq: Once Route: IV  Last Dose: Stopped (08/30/24 2209)  Start: 08/30/24 2100 End: 08/30/24 2209  vancomycin (VANCOCIN) 1,750 mg in sodium chloride 0.9 % 500 mL IVPB  Dose: 25 mg/kg  Weight Dosing Info: 74.6 kg (Adjusted)  Freq: Once Route: IV  Last Dose: 1,750 mg (08/31/24 2117)  Start: 08/31/24 2000 End: 08/31/24 2317  methylPREDNISolone sodium succinate (Solu-MEDROL) injection 40 mg  Dose: 40 mg  Freq: Daily Route: IV  Start: 08/31/24 0900 End: 08/31/24 1102   Hydrocod Abdulaziz-Chlorphe Abdulaziz ER (TUSSIONEX) ER suspension 5 mL  Dose: 5 mL  Freq: Once Route: PO  Start: 08/31/24 0130 End: 08/31/24 0205    Continuous IV Infusions:     PRN Meds:  acetaminophen, 650 mg, Oral, Q6H PRN  albuterol, 2 puff, Inhalation, Q4H PRN x1 8/31   dextromethorphan-guaiFENesin, 10 mL, Oral, Q4H PRN x2 8/31   epoetin shavonne, 6,000 Units, Intravenous, After Dialysis x1 8/31  trimethobenzamide, 200 mg, Intramuscular, Q6H PRN     HD TTS   Renal lo phos diet, 2 GM Na, FR 1800 ml    SCD  OOB as asha     IP CONSULT TO NEPHROLOGY  IP CONSULT TO CASE MANAGEMENT  INPATIENT CONSULT TO IR  IP CONSULT TO INFECTIOUS DISEASES  IP CONSULT TO CARDIOLOGY  IP CONSULT TO PULMONOLOGY  INPATIENT CONSULT TO IR  INPATIENT CONSULT TO IR    Network Utilization Review Department  ATTENTION: Please call with any questions or concerns to [phone] and carefully  listen to the prompts so that you are directed to the right person. All voicemails are confidential.   For Discharge needs, contact Care Management DC Support Team at [phone] opt. 2  Send all requests for admission clinical reviews, approved or denied determinations and any other requests to dedicated fax number below belonging to the campus where the patient is receiving treatment. List of dedicated fax numbers for the Facilities:  FACILITY NAME UR FAX NUMBER   ADMISSION DENIALS (Administrative/Medical Necessity) [phone]   DISCHARGE SUPPORT TEAM (NETWORK) [phone]   PARENT CHILD HEALTH (Maternity/NICU/Pediatrics) [phone]   [facility] [phone]   [facility] [phone]   [facility] [phone]   [facility] [phone]   [facility] [phone]   [facility] [phone]   [facility] [phone]   [facility] [phone]   [facility] [phone]   [facility] [phone]   [facility] [phone]   [facility] [phone]

## 2024-09-11 ENCOUNTER — APPOINTMENT (INPATIENT)
Dept: GASTROENTEROLOGY | Facility: HOSPITAL | Age: 64
DRG: 286 | End: 2024-09-11
Payer: COMMERCIAL

## 2024-09-11 PROBLEM — F39 UNSPECIFIED MOOD (AFFECTIVE) DISORDER (HCC): Status: ACTIVE | Noted: 2024-09-11

## 2024-09-11 LAB
ANION GAP SERPL CALCULATED.3IONS-SCNC: 9 MMOL/L (ref 4–13)
BUN SERPL-MCNC: 37 MG/DL (ref 5–25)
CALCIUM SERPL-MCNC: 9.2 MG/DL (ref 8.4–10.2)
CHLORIDE SERPL-SCNC: 96 MMOL/L (ref 96–108)
CO2 SERPL-SCNC: 31 MMOL/L (ref 21–32)
CREAT SERPL-MCNC: 5.94 MG/DL (ref 0.6–1.3)
ERYTHROCYTE [DISTWIDTH] IN BLOOD BY AUTOMATED COUNT: 18.9 % (ref 11.6–15.1)
GFR SERPL CREATININE-BSD FRML MDRD: 6 ML/MIN/1.73SQ M
GLUCOSE SERPL-MCNC: 96 MG/DL (ref 65–140)
HCT VFR BLD AUTO: 26 % (ref 34.8–46.1)
HGB BLD-MCNC: 8.2 G/DL (ref 11.5–15.4)
MACROPHAGES NFR FLD: 94 %
MACROPHAGES NFR FLD: 99 %
MCH RBC QN AUTO: 29.9 PG (ref 26.8–34.3)
MCHC RBC AUTO-ENTMCNC: 31.5 G/DL (ref 31.4–37.4)
MCV RBC AUTO: 95 FL (ref 82–98)
NEUTS SEG NFR BLD AUTO: 1 %
NEUTS SEG NFR BLD AUTO: 6 %
PLATELET # BLD AUTO: 212 THOUSANDS/UL (ref 149–390)
PMV BLD AUTO: 10.7 FL (ref 8.9–12.7)
POTASSIUM SERPL-SCNC: 4.3 MMOL/L (ref 3.5–5.3)
RBC # BLD AUTO: 2.74 MILLION/UL (ref 3.81–5.12)
SODIUM SERPL-SCNC: 136 MMOL/L (ref 135–147)
TOTAL CELLS COUNTED SPEC: 100
TOTAL CELLS COUNTED SPEC: 100
WBC # BLD AUTO: 12.24 THOUSAND/UL (ref 4.31–10.16)

## 2024-09-11 PROCEDURE — 99233 SBSQ HOSP IP/OBS HIGH 50: CPT | Performed by: STUDENT IN AN ORGANIZED HEALTH CARE EDUCATION/TRAINING PROGRAM

## 2024-09-11 PROCEDURE — 87116 MYCOBACTERIA CULTURE: CPT | Performed by: STUDENT IN AN ORGANIZED HEALTH CARE EDUCATION/TRAINING PROGRAM

## 2024-09-11 PROCEDURE — 87102 FUNGUS ISOLATION CULTURE: CPT | Performed by: STUDENT IN AN ORGANIZED HEALTH CARE EDUCATION/TRAINING PROGRAM

## 2024-09-11 PROCEDURE — 31624 DX BRONCHOSCOPE/LAVAGE: CPT | Performed by: STUDENT IN AN ORGANIZED HEALTH CARE EDUCATION/TRAINING PROGRAM

## 2024-09-11 PROCEDURE — 99232 SBSQ HOSP IP/OBS MODERATE 35: CPT | Performed by: INTERNAL MEDICINE

## 2024-09-11 PROCEDURE — 0B948ZX DRAINAGE OF RIGHT UPPER LOBE BRONCHUS, VIA NATURAL OR ARTIFICIAL OPENING ENDOSCOPIC, DIAGNOSTIC: ICD-10-PCS | Performed by: STUDENT IN AN ORGANIZED HEALTH CARE EDUCATION/TRAINING PROGRAM

## 2024-09-11 PROCEDURE — 99222 1ST HOSP IP/OBS MODERATE 55: CPT | Performed by: PSYCHIATRY & NEUROLOGY

## 2024-09-11 PROCEDURE — 87106 FUNGI IDENTIFICATION YEAST: CPT | Performed by: STUDENT IN AN ORGANIZED HEALTH CARE EDUCATION/TRAINING PROGRAM

## 2024-09-11 PROCEDURE — 87206 SMEAR FLUORESCENT/ACID STAI: CPT | Performed by: STUDENT IN AN ORGANIZED HEALTH CARE EDUCATION/TRAINING PROGRAM

## 2024-09-11 PROCEDURE — 80048 BASIC METABOLIC PNL TOTAL CA: CPT

## 2024-09-11 PROCEDURE — 85027 COMPLETE CBC AUTOMATED: CPT | Performed by: STUDENT IN AN ORGANIZED HEALTH CARE EDUCATION/TRAINING PROGRAM

## 2024-09-11 PROCEDURE — 87205 SMEAR GRAM STAIN: CPT | Performed by: STUDENT IN AN ORGANIZED HEALTH CARE EDUCATION/TRAINING PROGRAM

## 2024-09-11 PROCEDURE — 88305 TISSUE EXAM BY PATHOLOGIST: CPT | Performed by: STUDENT IN AN ORGANIZED HEALTH CARE EDUCATION/TRAINING PROGRAM

## 2024-09-11 PROCEDURE — 99153 MOD SED SAME PHYS/QHP EA: CPT | Performed by: STUDENT IN AN ORGANIZED HEALTH CARE EDUCATION/TRAINING PROGRAM

## 2024-09-11 PROCEDURE — 87305 ASPERGILLUS AG IA: CPT | Performed by: STUDENT IN AN ORGANIZED HEALTH CARE EDUCATION/TRAINING PROGRAM

## 2024-09-11 PROCEDURE — 94760 N-INVAS EAR/PLS OXIMETRY 1: CPT

## 2024-09-11 PROCEDURE — 89051 BODY FLUID CELL COUNT: CPT | Performed by: STUDENT IN AN ORGANIZED HEALTH CARE EDUCATION/TRAINING PROGRAM

## 2024-09-11 PROCEDURE — 0B958ZX DRAINAGE OF RIGHT MIDDLE LOBE BRONCHUS, VIA NATURAL OR ARTIFICIAL OPENING ENDOSCOPIC, DIAGNOSTIC: ICD-10-PCS | Performed by: STUDENT IN AN ORGANIZED HEALTH CARE EDUCATION/TRAINING PROGRAM

## 2024-09-11 PROCEDURE — 99152 MOD SED SAME PHYS/QHP 5/>YRS: CPT | Performed by: STUDENT IN AN ORGANIZED HEALTH CARE EDUCATION/TRAINING PROGRAM

## 2024-09-11 PROCEDURE — 88112 CYTOPATH CELL ENHANCE TECH: CPT | Performed by: STUDENT IN AN ORGANIZED HEALTH CARE EDUCATION/TRAINING PROGRAM

## 2024-09-11 PROCEDURE — 87798 DETECT AGENT NOS DNA AMP: CPT | Performed by: STUDENT IN AN ORGANIZED HEALTH CARE EDUCATION/TRAINING PROGRAM

## 2024-09-11 PROCEDURE — 87252 VIRUS INOCULATION TISSUE: CPT | Performed by: STUDENT IN AN ORGANIZED HEALTH CARE EDUCATION/TRAINING PROGRAM

## 2024-09-11 PROCEDURE — 87070 CULTURE OTHR SPECIMN AEROBIC: CPT | Performed by: STUDENT IN AN ORGANIZED HEALTH CARE EDUCATION/TRAINING PROGRAM

## 2024-09-11 PROCEDURE — 88312 SPECIAL STAINS GROUP 1: CPT | Performed by: STUDENT IN AN ORGANIZED HEALTH CARE EDUCATION/TRAINING PROGRAM

## 2024-09-11 PROCEDURE — 94640 AIRWAY INHALATION TREATMENT: CPT

## 2024-09-11 RX ORDER — EPINEPHRINE 1 MG/ML
1 INJECTION, SOLUTION, CONCENTRATE INTRAVENOUS ONCE
Status: DISCONTINUED | OUTPATIENT
Start: 2024-09-11 | End: 2024-09-11

## 2024-09-11 RX ORDER — MIDAZOLAM HYDROCHLORIDE 2 MG/2ML
4 INJECTION, SOLUTION INTRAMUSCULAR; INTRAVENOUS ONCE
Status: COMPLETED | OUTPATIENT
Start: 2024-09-11 | End: 2024-09-11

## 2024-09-11 RX ORDER — LIDOCAINE HYDROCHLORIDE 10 MG/ML
10 INJECTION, SOLUTION EPIDURAL; INFILTRATION; INTRACAUDAL; PERINEURAL ONCE
Status: COMPLETED | OUTPATIENT
Start: 2024-09-11 | End: 2024-09-11

## 2024-09-11 RX ORDER — LIDOCAINE HYDROCHLORIDE 20 MG/ML
INJECTION, SOLUTION EPIDURAL; INFILTRATION; INTRACAUDAL; PERINEURAL
Status: COMPLETED
Start: 2024-09-11 | End: 2024-09-11

## 2024-09-11 RX ORDER — LIDOCAINE 50 MG/G
1 PATCH TOPICAL ONCE
Status: COMPLETED | OUTPATIENT
Start: 2024-09-11 | End: 2024-09-12

## 2024-09-11 RX ORDER — FENTANYL CITRATE 50 UG/ML
100 INJECTION, SOLUTION INTRAMUSCULAR; INTRAVENOUS ONCE
Status: COMPLETED | OUTPATIENT
Start: 2024-09-11 | End: 2024-09-11

## 2024-09-11 RX ORDER — PROPOFOL 10 MG/ML
160 INJECTION, EMULSION INTRAVENOUS ONCE
Status: COMPLETED | OUTPATIENT
Start: 2024-09-11 | End: 2024-09-11

## 2024-09-11 RX ORDER — PROPOFOL 10 MG/ML
INJECTION, EMULSION INTRAVENOUS
Status: COMPLETED
Start: 2024-09-11 | End: 2024-09-11

## 2024-09-11 RX ORDER — LIDOCAINE HYDROCHLORIDE 10 MG/ML
INJECTION, SOLUTION EPIDURAL; INFILTRATION; INTRACAUDAL; PERINEURAL
Status: COMPLETED
Start: 2024-09-11 | End: 2024-09-11

## 2024-09-11 RX ORDER — EPINEPHRINE 1 MG/ML
INJECTION, SOLUTION, CONCENTRATE INTRAVENOUS
Status: DISCONTINUED
Start: 2024-09-11 | End: 2024-09-11 | Stop reason: WASHOUT

## 2024-09-11 RX ORDER — LIDOCAINE HYDROCHLORIDE 20 MG/ML
10 INJECTION, SOLUTION EPIDURAL; INFILTRATION; INTRACAUDAL; PERINEURAL ONCE
Status: COMPLETED | OUTPATIENT
Start: 2024-09-11 | End: 2024-09-11

## 2024-09-11 RX ORDER — FENTANYL CITRATE 50 UG/ML
25 INJECTION, SOLUTION INTRAMUSCULAR; INTRAVENOUS ONCE
Status: COMPLETED | OUTPATIENT
Start: 2024-09-11 | End: 2024-09-11

## 2024-09-11 RX ADMIN — IPRATROPIUM BROMIDE 0.5 MG: 0.5 SOLUTION RESPIRATORY (INHALATION) at 13:46

## 2024-09-11 RX ADMIN — ALBUTEROL SULFATE 2.5 MG: 2.5 SOLUTION RESPIRATORY (INHALATION) at 19:36

## 2024-09-11 RX ADMIN — FLUTICASONE FUROATE AND VILANTEROL TRIFENATATE 1 PUFF: 200; 25 POWDER RESPIRATORY (INHALATION) at 07:23

## 2024-09-11 RX ADMIN — LIDOCAINE HYDROCHLORIDE 5 ML: 20 INJECTION, SOLUTION EPIDURAL; INFILTRATION; INTRACAUDAL; PERINEURAL at 08:11

## 2024-09-11 RX ADMIN — TOPICAL ANESTHETIC 2 SPRAY: 200 SPRAY DENTAL; PERIODONTAL at 10:12

## 2024-09-11 RX ADMIN — LIDOCAINE HYDROCHLORIDE 10 ML: 10 INJECTION, SOLUTION EPIDURAL; INFILTRATION; INTRACAUDAL; PERINEURAL at 09:30

## 2024-09-11 RX ADMIN — FENTANYL CITRATE 100 MCG: 50 INJECTION INTRAMUSCULAR; INTRAVENOUS at 09:00

## 2024-09-11 RX ADMIN — MIDAZOLAM HYDROCHLORIDE 4 MG: 1 INJECTION, SOLUTION INTRAMUSCULAR; INTRAVENOUS at 09:00

## 2024-09-11 RX ADMIN — FENTANYL CITRATE 25 MCG: 50 INJECTION INTRAMUSCULAR; INTRAVENOUS at 09:30

## 2024-09-11 RX ADMIN — TRAZODONE HYDROCHLORIDE 200 MG: 100 TABLET ORAL at 21:31

## 2024-09-11 RX ADMIN — PROPOFOL 160 MG: 10 INJECTION, EMULSION INTRAVENOUS at 10:15

## 2024-09-11 RX ADMIN — ALBUTEROL SULFATE 2 PUFF: 90 AEROSOL, METERED RESPIRATORY (INHALATION) at 07:23

## 2024-09-11 RX ADMIN — EPINEPHRINE 1 MG: 1 INJECTION, SOLUTION, CONCENTRATE INTRAVENOUS at 09:00

## 2024-09-11 RX ADMIN — ALBUTEROL SULFATE 2.5 MG: 2.5 SOLUTION RESPIRATORY (INHALATION) at 13:46

## 2024-09-11 RX ADMIN — SEVELAMER HYDROCHLORIDE 1600 MG: 800 TABLET ORAL at 17:21

## 2024-09-11 RX ADMIN — HEPARIN SODIUM 5000 UNITS: 5000 INJECTION INTRAVENOUS; SUBCUTANEOUS at 17:21

## 2024-09-11 RX ADMIN — METOPROLOL SUCCINATE 50 MG: 50 TABLET, EXTENDED RELEASE ORAL at 17:21

## 2024-09-11 RX ADMIN — SENNOSIDES AND DOCUSATE SODIUM 2 TABLET: 8.6; 5 TABLET ORAL at 21:31

## 2024-09-11 RX ADMIN — IPRATROPIUM BROMIDE 0.5 MG: 0.5 SOLUTION RESPIRATORY (INHALATION) at 08:36

## 2024-09-11 RX ADMIN — ALBUTEROL SULFATE 2.5 MG: 2.5 SOLUTION RESPIRATORY (INHALATION) at 08:36

## 2024-09-11 RX ADMIN — LIDOCAINE 1 PATCH: 50 PATCH CUTANEOUS at 20:41

## 2024-09-11 RX ADMIN — IPRATROPIUM BROMIDE 0.5 MG: 0.5 SOLUTION RESPIRATORY (INHALATION) at 19:36

## 2024-09-11 RX ADMIN — TRIMETHOBENZAMIDE HYDROCHLORIDE 200 MG: 100 INJECTION INTRAMUSCULAR at 08:23

## 2024-09-11 RX ADMIN — LIDOCAINE HYDROCHLORIDE 5 ML: 20 INJECTION, SOLUTION EPIDURAL; INFILTRATION; INTRACAUDAL at 08:11

## 2024-09-11 RX ADMIN — LAMOTRIGINE 100 MG: 100 TABLET ORAL at 21:31

## 2024-09-11 NOTE — ASSESSMENT & PLAN NOTE
-CT chest PE study revealed no evidence of PE. There is persistent tree-in-bud nodularity in the lungs suggestive of a widespread infectious bronchiolitis that may be secondary to an atypical mycobacterium. Follicular bronchiolitis and acute hypersensitivity pneumonitis could also be in the differential.  New mild patchy bilateral multifocal pneumonia  - Sputum culture: 4+ Pseudomonas   - ID: Chronic colonization of pseudomonas, rather than acute infection. Finished Cefepime x7 d course ended 9/10    - Cardiology: Cleared for bronchoscopy.  - Pulm: 9/11 Bronchoscopy under sedation  - Nephrology: Abnormal chest imaging, possible contributing factors to her catheter dysfunction    Improvement in coughing and sputum production symptoms with addition of abx, suspect patient presentation partially contributed from bronchitis secondary to pseudomonas.     Plan:   - 9/11 Bronchoscopy

## 2024-09-11 NOTE — ASSESSMENT & PLAN NOTE
CLINE: Negative PE, BNP 3019, CT chest-bilateral pleural effusion, tree-in-bud nodularity    Multifactorial, contributing factors of volume overload, chronic anemia, underlying COPD and asthma.     Clinically improving with hemodialysis, antibiotics    Plan  Multiple specialist following, see other plans for other problem

## 2024-09-11 NOTE — PROCEDURES
Procedural Sedation    Date/Time: 9/11/2024 9:01 AM    Performed by: Luke Montez MD  Authorized by: Luke Montez MD    Immediate pre-procedure details:     Reassessment: Patient reassessed immediately prior to procedure      Reviewed: vital signs and NPO status      Verified: bag valve mask available, emergency equipment available, intubation equipment available, IV patency confirmed, oxygen available and suction available    Procedure details (see MAR for exact dosages):     Sedation start time:  9/11/2024 9:07 AM    Preoxygenation:  Nasal cannula    Sedation:  Propofol    Analgesia:  Fentanyl    Intra-procedure monitoring:  Blood pressure monitoring, cardiac monitor, continuous pulse oximetry and frequent vital sign checks    Intra-procedure events: none      Intra-procedure management:  Airway suctioning and supplemental oxygen    Sedation end time:  9/11/2024 9:37 AM    Total sedation time (minutes):  30  Post-procedure details:     Post-sedation assessment completed:  9/11/2024 10:07 AM    Attendance: Constant attendance by certified staff until patient recovered      Recovery: Patient returned to pre-procedure baseline      Post-sedation assessments completed and reviewed: airway patency and respiratory function      Patient is stable for discharge or admission: yes      Patient tolerance:  Tolerated well, no immediate complications  Comments:      Sedation required for bronchoscopy  30 minutes of monitored sedation.  Sedation administered by myself and ICU nurse

## 2024-09-11 NOTE — ASSESSMENT & PLAN NOTE
At the time of consultation 9/11/24, patient is stable for discharge from Behavioral Health service perspective. She does not meet criteria for inpatient psychiatric care. She does continue to remain with symptoms of depression and anxiety in the setting of life stressors, however there are no safety concerns at the present moment for a Behavioral Health perspective and patient will follow up outpatient with Dr. Rosales for medication optimization. Behavioral Health service signing off. Please contact the SecureChat role for the Behavioral Health service with any questions/concerns.    Continue Effexor  mg daily for symptoms of depression and anxiety  Continue Lamictal 100 mg daily at bedtime for mood stability  Continue trazodone 200 mg at bedtime for insomnia

## 2024-09-11 NOTE — CASE MANAGEMENT
Case Management Progress Note    Patient name Ngozi Beard  Location ICU 05/ICU 05 MRN [id number]  : 1960 Date 2024       LOS (days): 12  Geometric Mean LOS (GMLOS) (days): 4.4  Days to GMLOS:-7.2        OBJECTIVE:        Current admission status: Inpatient  Preferred Pharmacy:   [id number] - [place], [place] - [address]  [address] [address]  Phone: [phone] Fax: [phone]    [facility] - [place], [place] - [address] [facility]  [address]  [address]  [facility] [address]  Phone: [phone] Fax: [phone]    Primary Care Provider: Meenakshi Balbuena MD    Primary Insurance: [employer] [facility]  Secondary Insurance: [facility]    PROGRESS NOTE:    Weekly Care Management Length of Stay Review     Current LOS: 12 Days    Most Recent Labs:     Lab Results   Component Value Date/Time    WBC 12.24 (H) 2024 06:23 AM    HGB 8.2 (L) 2024 06:23 AM    HCT 26.0 (L) 2024 06:23 AM     2024 06:23 AM    SODIUM 136 2024 06:23 AM    K 4.3 2024 06:23 AM    CL 96 2024 06:23 AM    CO2 31 2024 06:23 AM    BUN 37 (H) 2024 06:23 AM    CREATININE 5.94 (H) 2024 06:23 AM    GLUC 96 2024 06:23 AM       Most Recent Vitals:   Vitals:    24 1100   BP: 108/60   Pulse: 76   Resp: 16   Temp: 98 °F (36.7 °C)   SpO2: 94%        Identified Barriers to Discharge/Discharge Goals/Care Management Interventions:  shortness of breath, bronc scheduled for today, Needs Level 2 done due to past psych admissions. Sent to [facility].     Intended Discharge Disposition: LTC at [facility]    Expected Discharge Date:  D

## 2024-09-11 NOTE — ASSESSMENT & PLAN NOTE
-- Hgb 8.2 today, below goal but stable  --Continue RISHI with dialysis  -- Trend CBC  --Transfuse for Hgb <7.0  -- Management per primary team

## 2024-09-11 NOTE — CASE MANAGEMENT
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) 2024     Generalized weakness 2024     Shortness of breath 2024     Rash 2024     Anemia 2024     Thrombocytopenia ([facility]) 2024     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     Abnormality of ascending aorta 2024     Postmenopausal 2024     Encounter for screening mammogram for malignant neoplasm of breast 2024     Allergic rhinitis 2024     Persistent cough 2024     Urge incontinence of urine 2024     Exercise intolerance 2024     Family history of coronary artery bypass graft surgery 2024     Urge urinary incontinence 2024     Female stress incontinence 2024     Paranoid schizophrenia ([facility]) 2023     Asthma without acute exacerbation 2023     Asthma due to seasonal allergies 2023     Bipolar 1 disorder ([facility]) 2022     Primary hypertension 2022     Dyslipidemia 2022       LOS (days): 12  Geometric Mean LOS (GMLOS) (days): 4.4  Days to GMLOS:-7.3     OBJECTIVE:  Risk of Unplanned Readmission Score: 42.23         Current admission status: Inpatient   Preferred Pharmacy:   [id number]  [place] - [address] [address]  Phone: [phone] Fax: [phone]    [facility] - [place], [place] - [address] [facility]  [address]  [address]  Phone: [phone] Fax: [phone]    Primary Care Provider: Meenakshi Balbuena MD    Primary Insurance: [facility] [employer] [facility]  Secondary Insurance: [facility]    DISCHARGE DETAILS:    Pt is a target and needs to be optioned for SNF placement. Clinical documentation re-submitted via email to Kay Mcbride ([email or web address]) at [facility] office.

## 2024-09-11 NOTE — PROGRESS NOTES
Progress Note - Nephrology   Name: Ngozi Beard 64 y.o. female I MRN: [id number]  Unit/Bed#: ICU 05 I Date of Admission: 8/30/2024   Date of Service: 9/11/2024 I Hospital Day: 12     Reason for Consult: ARF dialysis dependent    64-year-old female with newly diagnosed anti-GBM disease initiated on dialysis 7/16/2024, asthma, HTN, bipolar disorder p/w CP, SOB.  Recent admission with similar presentation.  Nephrology consulted for management of dialysis.  Assessment & Plan  Shortness of breath  -- CT without PE, + bilateral pleural effusions, tree-in-bud nodularity, RUL groundglass and infiltrate RML  -- pulmonary on board.    --s/p bedside bronchoscopy this am  --Continue supportive care  --Management per pulmonary  Primary hypertension  -- Blood pressure acceptable and well-controlled.  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  --Access: Right IJ permcath, repositioned by IR 9/10/2024. access worked well 9/10 for dialysis treatment.  Will monitor success with this current catheter.  Our plan is that if the catheter continues to have issues ongoing consider transitioning to urgent start PD.  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  Bronchoscopy just completed.  Patient without complaints, slightly groggy.  Remains anuric.  Catheter worked appropriately after catheter exchange and fibrin sheath stripping by IR yesterday.  VSS    A complete review of systems was performed. Pertinent positives and negatives noted in the HPI. Otherwise the review of systems was negative.  OBJECTIVE:  Current Weight: Weight - Scale: 104 kg (229 lb 8 oz)  Vitals:    09/11/24 0930 09/11/24 0945 09/11/24 1000 09/11/24 1100   BP: 112/56 113/57 114/61 108/60   BP Location:    Left arm   Pulse: 77  77 76   Resp:    16   Temp:    98 °F (36.7 °C)   TempSrc:    Oral   SpO2: 98%  99% 94%   Weight:       Height:           Intake/Output Summary (Last 24 hours) at 9/11/2024 1217  Last data filed at 9/11/2024 0600  Gross per 24 hour   Intake 1460 ml   Output 1000 ml   Net 460 ml       General:  Awake, alert, appears comfortable and in no acute distress.  Bronchoscopy just completed  Skin:  No rash, warm, good skin turgor   Eyes:  PERRL, EOMI, sclerae nonicteric.  no periorbital edema   ENT:  Moist mucous membranes  Neck:  Trachea midline, symmetric.  No JVD.  Chest:  Clear to auscultation bilaterally without wheezes, crackles or rhonchi  CVS:  Regular rate and rhythm without murmur, gallop or rub.  S1 and S2 identified and normal.  No S3, S4.   Abdomen:  Soft, nontender, nondistended without masses.  Normal bowel sounds x 4 quadrants.  No bruit.  Extremities:  Warm, pink, motor and sensory intact and well perfused.  No cyanosis, pallor.  trace BLE edema.  Neuro:  Awake, alert, oriented x3.  Grossly intact  Psych:  Appropriate affect.  Mentating  appropriately.  Normal mental status exam  Access: + R IJV PermCath in place, site clear      Medications:    Current Facility-Administered Medications:     acetaminophen (TYLENOL) tablet 650 mg, 650 mg, Oral, Q6H PRN, Gill Stevens MD    albuterol (PROVENTIL HFA,VENTOLIN HFA) inhaler 2 puff, 2 puff, Inhalation, Q4H PRN, Gill Stevens MD, 2 puff at 09/11/24 0723    albuterol inhalation solution 2.5 mg, 2.5 mg, Nebulization, TID, Gill Stevens MD, 2.5 mg at 09/11/24 0836    atorvastatin (LIPITOR) tablet 20 mg, 20 mg, Oral, Daily, Gill Stevens MD, 20 mg at 09/10/24 1323    atovaquone (MEPRON) oral suspension 1,500 mg, 1,500 mg, Oral, Daily, Gill Stevens MD, 1,500 mg at 09/10/24 1325    bisacodyl (DULCOLAX) EC tablet 5 mg, 5 mg, Oral, Once, Gill Stevens MD    cyclophosphamide (CYTOXAN) capsule 100 mg, 100 mg, Oral, Daily, Gill Stevens MD, 100 mg at 09/10/24 1325    dextromethorphan-guaiFENesin (ROBITUSSIN DM) oral syrup 10 mL, 10 mL, Oral, Q4H PRN, Gill Stevens MD, 10 mL at 09/08/24 1926    epoetin shavonne (EPOGEN,PROCRIT) injection 6,000 Units, 6,000 Units, Intravenous, After Dialysis, Gill Stevens MD, 6,000 Units at 09/10/24 1542    fentaNYL injection, , Intravenous, PRN, Gerber Fletcher MD, 25 mcg at 09/10/24 1122    fluticasone-vilanterol 200-25 mcg/actuation 1 puff, 1 puff, Inhalation, Daily, Gill Stevens MD, 1 puff at 09/11/24 0723    [Transfer Hold] heparin (porcine) injection 500 Units, 500 Units, Intracatheter, Q1H PRN, Trish Kline MD, 500 Units at 09/10/24 1459    heparin (porcine) subcutaneous injection 5,000 Units, 5,000 Units, Subcutaneous, Q8H JACK, Gill Stevens MD, 5,000 Units at 09/10/24 1643    ipratropium (ATROVENT) 0.02 % inhalation solution 0.5 mg, 0.5 mg, Nebulization, TID, Gill Stevens MD, 0.5 mg at 09/11/24 0836    iron polysaccharides (FERREX) capsule 150 mg, 150 mg, Oral, Daily, Gill Stevens MD, 150 mg at 09/10/24 1323    lamoTRIgine  (LaMICtal) tablet 100 mg, 100 mg, Oral, HS, Gill Stevens MD, 100 mg at 09/10/24 2101    lidocaine (LIDODERM) 5 % patch 1 patch, 1 patch, Topical, Daily, Gill Stevens MD, 1 patch at 09/10/24 1020    lidocaine-epinephrine 1%-1:046202 buffered, , Infiltration, PRN, Gerber Fletcher MD, 5 mL at 09/10/24 1132    metoprolol succinate (TOPROL-XL) 24 hr tablet 50 mg, 50 mg, Oral, BID, Gill Stevens MD, 50 mg at 09/10/24 1735    midazolam (VERSED) injection, , , PRN, Gerber Fletcher MD, 0.5 mg at 09/10/24 1122    montelukast (SINGULAIR) tablet 10 mg, 10 mg, Oral, Daily, Gill Stevens MD, 10 mg at 09/10/24 1323    pantoprazole (PROTONIX) EC tablet 40 mg, 40 mg, Oral, Daily Before Breakfast, Gill Stevens MD, 40 mg at 09/10/24 1012    polyethylene glycol (MIRALAX) packet 17 g, 17 g, Oral, Daily, Gill Stevens MD, 17 g at 09/07/24 1232    predniSONE tablet 15 mg, 15 mg, Oral, Daily, Gill Stevens MD, 15 mg at 09/10/24 1323    senna-docusate sodium (SENOKOT S) 8.6-50 mg per tablet 2 tablet, 2 tablet, Oral, HS, Gill Stevens MD, 2 tablet at 09/10/24 2101    sevelamer (RENAGEL) tablet 1,600 mg, 1,600 mg, Oral, TID With Meals, Gill Stevens MD, 1,600 mg at 09/10/24 1736    Sodium Zirconium Cyclosilicate (Lokelma) 10 g, 10 g, Oral, Daily, Gill Stevens MD, 10 g at 09/01/24 0811    traZODone (DESYREL) tablet 200 mg, 200 mg, Oral, HS, Gill Stevens MD, 200 mg at 09/10/24 2101    trimethobenzamide (TIGAN) IM injection 200 mg, 200 mg, Intramuscular, Q6H PRN, Gill Stevens MD, 200 mg at 09/11/24 0823    vancomycin (VANCOCIN) IVPB (premix in dextrose), , Intravenous, Continuous PRN, Gerber Fletcher MD, 1,000 mg at 09/10/24 1109    venlafaxine (EFFEXOR-XR) 24 hr capsule 225 mg, 225 mg, Oral, Daily, Gill Stevens MD, 225 mg at 09/10/24 1323    Laboratory Results:  Results from last 7 days   Lab Units 09/11/24  0623 09/10/24  0538 09/09/24  0521 09/08/24  0515 09/07/24  0545 09/06/24  0609  09/05/24  0525   WBC Thousand/uL 12.24* 10.41* 10.15 11.86* 9.08 9.45 10.00   HEMOGLOBIN g/dL 8.2* 8.3* 8.1* 8.5* 8.2* 8.5* 8.5*   HEMATOCRIT % 26.0* 27.2* 26.6* 27.6* 26.5* 27.5* 27.7*   PLATELETS Thousands/uL 212 206 182 206 191 213 216   SODIUM mmol/L 136 141 140 139 136 138 140   POTASSIUM mmol/L 4.3 4.1 4.0 4.2 5.4* 5.1 4.2   CHLORIDE mmol/L 96 101 100 100 98 99 100   CO2 mmol/L 31 30 30 29 29 29 32   BUN mg/dL 37* 63* 48* 32* 44* 52* 40*   CREATININE mg/dL 5.94* 9.17* 7.57* 5.73* 7.95* 8.84* 7.83*   CALCIUM mg/dL 9.2 9.5 9.4 9.6 9.3 9.7 9.4   MAGNESIUM mg/dL  --   --  2.3  --   --   --   --    PHOSPHORUS mg/dL  --   --  5.3*  --   --   --   --        I have personally reviewed the blood work as stated above and in my note.  I have personally reviewed internal Medicine, co-consultants and previous nephrology notes.

## 2024-09-11 NOTE — ASSESSMENT & PLAN NOTE
-- Continue cyclophosphamide and prednisone taper as detailed  --Completed plasmapheresis in August  --Remains dialysis dependent, TTS at [facility]  --Hemodialysis initiation on July 16, 2024  --s/p bronchoscopy 9/11/2024 due to ongoing pulmonary issues.  --Access: Right IJ permcath, repositioned by IR 9/10/2024. access worked well 9/10 for dialysis treatment.  Will monitor success with this current catheter.  Our plan is that if the catheter continues to have issues ongoing consider transitioning to urgent start PD.  -- Next treatment 9/12/24

## 2024-09-11 NOTE — PROGRESS NOTES
"Progress Note - Pulmonary   Ngozi Beard 64 y.o. female MRN: [id number]  Unit/Bed#: S -01 Encounter: [id number]      Assessment/Plan:  Patient is a 64-year-old female with past medical history of moderate persistent asthma, pulmonary hypertension, recently diagnosed anti-GBM disease, and ESRD on HD who presents for worsening shortness of breath.     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  #ESRD on HD  #Moderate persistent asthma without acute exacerbation  #Pulmonary Hypertension       Plan:  -Plan for bronchoscopy with BAL to rule out diffuse alveolar hemorrhage vs. Atypical infection. Original plan for bronchoscopy in endoscopy suite however decision was made to do procedure under conscious sedation in the ICU due to severely reduced EF.  -Consent obtained  -Continue to monitor O2 saturations and maintain O2 saturation >89%  -Recommend cardiomyopathy evaluation with cardiology and likely ischemic workup  -Continue albuterol/atrovent nebs TID, breo daily (in place of home advair), singulair daily  -Continue to encourage incentive spirometry, OOB as tolerated.  -Will arrange for outpatient pulmonary follow-up.    Subjective:   Patient evaluated this morning. Plan for bronchoscopy with BAL to rule out atypical infection vs. Diffuse alveolar hemorrhage. No acute concerns or questions at this time.     Objective:    Vitals: Blood pressure 115/64, pulse 80, temperature 98.2 °F (36.8 °C), resp. rate 16, height 5' 3\" (1.6 m), weight 104 kg (229 lb 8 oz), SpO2 95%., RA, Body mass index is 40.65 kg/m².      Intake/Output Summary (Last 24 hours) at 9/11/2024 6504  Last data filed at 9/11/2024 0600  Gross per 24 " hour   Intake 1460 ml   Output 1000 ml   Net 460 ml         Physical Exam  Vitals and nursing note reviewed.   Constitutional:       General: She is not in acute distress.     Appearance: She is obese.   HENT:      Head: Normocephalic and atraumatic.      Nose: Nose normal.      Mouth/Throat:      Pharynx: Oropharynx is clear.   Eyes:      Conjunctiva/sclera: Conjunctivae normal.   Cardiovascular:      Rate and Rhythm: Normal rate.   Pulmonary:      Effort: Pulmonary effort is normal. No respiratory distress.      Breath sounds: Decreased breath sounds and rhonchi (diffuse bilaterally) present. No wheezing.   Abdominal:      Palpations: Abdomen is soft.   Musculoskeletal:         General: No swelling. Normal range of motion.      Cervical back: Normal range of motion and neck supple.   Skin:     General: Skin is warm and dry.      Capillary Refill: Capillary refill takes less than 2 seconds.   Neurological:      General: No focal deficit present.      Mental Status: She is alert and oriented to person, place, and time.   Psychiatric:         Mood and Affect: Mood normal.     Labs:   Results from last 7 days   Lab Units 09/11/24  0623 09/10/24  0538 09/09/24  0521   WBC Thousand/uL 12.24* 10.41* 10.15   HEMOGLOBIN g/dL 8.2* 8.3* 8.1*   HEMATOCRIT % 26.0* 27.2* 26.6*   PLATELETS Thousands/uL 212 206 182      Results from last 7 days   Lab Units 09/11/24  0623 09/10/24  0538 09/09/24  0521 09/08/24  0515 09/07/24  0545 09/06/24  0609 09/05/24  0525   POTASSIUM mmol/L 4.3 4.1 4.0   < > 5.4* 5.1 4.2   CHLORIDE mmol/L 96 101 100   < > 98 99 100   CO2 mmol/L 31 30 30   < > 29 29 32   BUN mg/dL 37* 63* 48*   < > 44* 52* 40*   CREATININE mg/dL 5.94* 9.17* 7.57*   < > 7.95* 8.84* 7.83*   CALCIUM mg/dL 9.2 9.5 9.4   < > 9.3 9.7 9.4   ALK PHOS U/L  --   --   --   --  72 75 72   ALT U/L  --   --   --   --  13 16 20   AST U/L  --   --   --   --  16 10* 10*    < > = values in this interval not displayed.     Results from last 7  "days   Lab Units 09/09/24  0521   MAGNESIUM mg/dL 2.3     Results from last 7 days   Lab Units 09/09/24  0521   PHOSPHORUS mg/dL 5.3*                No results found for: \"TROPONINI\"    Procalcitonin: 1.00, 0.77     Flu/COVID/RSV PCR: Negative     Culture Data: MRSA negative, BC 1/2 + MRSE     Urinary antigens: Need collected      D-Dimer: 2.18     BNP: 3019    Microbiology:  Microbiology Results (last 21 days)       Collected Updated Procedure Result Status Patient Facility Result Comment      09/02/2024 1804 09/03/2024 0754 Sputum culture and Gram stain [[id number]]   (Abnormal)   Expectorated Sputum    Preliminary result [facility]  Component Value   Gram Stain Result Rare Epithelial cells per low power field  [P]     No polys seen  [P]     Rare Gram positive cocci in pairs  [P]     Rare Gram variable rods  [P]                09/02/2024 1427 09/03/2024 0000 Respiratory Panel 2.1(RP2)with COVID19 [[id number]]   Nasopharyngeal Swab    Final result [facility]  Component Value   Adenovirus Not Detected   Bordetella parapertussis Not Detected   Bordetella pertussis Not Detected   Chlamydia pneumoniae Not Detected   SARS-CoV-2 Not Detected   Coronavirus 229E Not Detected   Coronavirus HKU1 Not Detected   Coronavirus NL63 Not Detected   Coronavirus OC43 Not Detected   Human Metapneumovirus Not Detected   Rhino/Enterovirus Not Detected   Influenza A Not Detected   Influenza B Not Detected   Mycoplasma pneumoniae Not Detected   Parainfluenza 1 Not Detected   Parainfluenza 2 Not Detected   Parainfluenza 3 Not Detected   Parainfluenza 4 Not Detected   Respiratory Syncytial Virus Not Detected            08/31/2024 0043 09/01/2024 1005 MRSA culture [[id number]]   Nares from Nose    Final result [facility]  Component Value   MRSA Culture Only No Methicillin Resistant Staphlyococcus aureus (MRSA) isolated               08/30/2024 2039 09/02/2024 2301 " Blood culture #1 [[id number]]   Blood from Line, Venous    Preliminary result [facility]  Component Value   Blood Culture No Growth at 72 hrs.  [P]                08/30/2024 2039 09/02/2024 0911 Blood culture #2 [[id number]]    (Abnormal)   Blood from Arm, Right    Final result [facility] Susceptibility testing will not be performed as this organism, when isolated from a single set of blood cultures, represents probable skin izzy contamination. Please call the Microbiology Laboratory within 5 days at [phone] if further workup is required. Component Value   Blood Culture Staphylococcus epidermidis   Gram Stain Result Gram positive cocci in clusters    Refer to Blood Culture Identification Panel results    This is an appended report. These results have been appended to a previously preliminary verified report.               08/30/2024 2039 09/02/2024 0911 Blood Culture Identification Panel [[id number]]    (Abnormal)   Blood from Arm, Right    Final result [facility] Film Array panel tests for 11 gram positive organisms, 15 gram negative organisms, 7 yeast species and 10 resistance genes.  Component Value   Staphylococcus epidermidis Detected   mecA/C (Methicillin-resistance gene) Detected    This organism is a coagulase-negative Staphylococcus    If only 1 set of blood cultures is positive, this may represent a contaminant.  Consider holding antibiotics or repeating cultures prior to starting antibiotics.      If >1 one set of blood cultures is positive, vancomycin is the preferred therapy.                 08/30/2024 2025 08/30/2024 2112 FLU/RSV/COVID - if FLU/RSV clinically relevant [[id number]]    Nares from Nose    Final result [facility] This test has been performed using the CoV-2/Flu/RSV plus assay on the [employer] Gene[employer]pert platform. This test has been validated by the  and verified by the  performing laboratory.    This test is designed to amplify and detect the following: nucleocapsid (N), envelope (E), and RNA-dependent RNA polymerase (RdRP) genes of the SARS-CoV-2 genome; matrix (M), basic polymerase (PB2), and acidic protein (PA) segments of the influenza A genome; matrix (M) and non-structural protein (NS) segments of the influenza B genome, and the nucleocapsid genes of RSV A and RSV B.    Positive results are indicative of the presence of Flu A, Flu B, RSV, and/or SARS-CoV-2 RNA. Positive results for SARS-CoV-2 or suspected novel influenza should be reported to state, local, or federal health departments according to local reporting requirements.     All results should be assessed in conjunction with clinical presentation and other laboratory markers for clinical management.    FOR PEDIATRIC PATIENTS - copy/paste COVID Guidelines URL to browser: [email or web address]     Component Value   SARS-CoV-2 Negative    INFLUENZA A PCR Negative    INFLUENZA B PCR Negative    RSV PCR Negative                       Imaging and other studies: I have personally reviewed pertinent films in PACS    CTA Chest PE 8/31/2024:   No pulmonary embolism. Since July 11, 2022 there is persistent tree-in-bud nodularity in the lungs suggestive of a widespread infectious bronchiolitis that may be secondary to an atypical mycobacterium. Follicular bronchiolitis and acute hypersensitivity pneumonitis could also be in the differential. Follow-up chest CT in 3 months after treatment for infectious bronchiolitis is advised. New mild patchy bilateral multifocal pneumonia. Pulmonary hypertension. New moderate bilateral pleural effusions.     Chest x-ray 8/30/2024:  New opacification lateral left lung base may reflect atelectasis or pneumonia. Probable small left effusion. Mild cardiomegaly with mild vascular and interstitial prominence suggesting some degree of volume  overload.     EKG, Pathology, and Other Studies: I have personally reviewed pertinent reports.                  Echocardiogram, 8/31/2024:  Left Ventricle: Left ventricular cavity size is mildly dilated. Wall thickness is mildly increased. The left ventricular ejection fraction is 25%. Systolic function is severely reduced. There is severe global hypokinesis.  Right Ventricle: Right ventricular cavity size mildly dilated. Systolic function mildly reduced.  Left Atrium: Atrium is mildly dilated.  Right Atrium: Atrium is mildly dilated.  Aortic Valve: Mild regurgitation.  Mitral Valve: Moderate to severe regurgitation.  Tricuspid Valve: Moderate regurgitation. Right ventricular systolic pressure is moderately elevated. The estimated RVSP is 48.00 mmHg.  Pericardium: Small pericardial effusion anterior to the heart.    Gill Stevens  Pulmonary & Critical Care Medicine Fellow PGY-4  [facility]
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
## 2024-09-11 NOTE — PROGRESS NOTES
Progress Note - Hospitalist   Name: Ngozi Beard 64 y.o. female I MRN: [id number]  Unit/Bed#: ICU 05 I Date of Admission: 8/30/2024   Date of Service: 9/11/2024 I Hospital Day: 12    Assessment & Plan  Shortness of breath  CLINE: Negative PE, BNP 3019, CT chest-bilateral pleural effusion, tree-in-bud nodularity    Multifactorial, contributing factors of volume overload, chronic anemia, underlying COPD and asthma.     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 S/p PLEX.  Patient is anuric and dialysis-dependent  Tues/Thurs/Sat schedule  Access: Right IJ PermCath, Cath reevaluation  by IR on 9/3, no issues during HD. 9/4, issues with clotting, will needs re-evaluation @9.5. Requires Re-evaluation 9/6 IR, Fluid poor returns on 9/5. 9/6-bedside readjustment was made by IR, reclotted after 1.5 hours, Cathflo dwelling overnight. 9/7 - HD without issues . 9/8 -cannot achieve good flow.  9/9-IR procedure unavailable, facilitated conversation to IR and nephrology regarding plans for dialysis.  9/10 - catheter exchanged, no issue with hemodialysis    Plan:  Hemodialysis schedule per nephro   continue PTA cyclophosphamide  Continue PTA sevalemer  Continue PTA atovaquone for PJP prophylaxis  Started Lokelma per nephro  Continue prednisone taper from outpatient nephrology prior to this admission  15 mg starting September 7 to September 20  12.5 mg starting September 21 to October 4  10 mg starting October 5 to October 18  7.5 mg starting October 19 to November 2  5 mg daily starting November 3  Multifocal pneumonia  -CT chest PE study revealed no evidence of PE. There is persistent tree-in-bud nodularity in the lungs suggestive of a widespread infectious bronchiolitis that may be secondary  to an atypical mycobacterium. Follicular bronchiolitis and acute hypersensitivity pneumonitis could also be in the differential.  New mild patchy bilateral multifocal pneumonia  - Sputum culture: 4+ Pseudomonas   - ID: Chronic colonization of pseudomonas, rather than acute infection. Finished Cefepime x7 d course ended 9/10    - Cardiology: Cleared for bronchoscopy.  - Pulm: 9/11 Bronchoscopy under sedation  - Nephrology: Abnormal chest imaging, possible contributing factors to her catheter dysfunction    Improvement in coughing and sputum production symptoms with addition of abx, suspect patient presentation partially contributed from bronchitis secondary to pseudomonas.     Plan:   - 9/11 Bronchoscopy      Cardiomyopathy (HCC)  August 31-EF of 25% with global hypokinesis.  No prior echo for comparison.    Suspect nonischemic cardiomyopathy from inflammatory/rheumatologic etiology     GDMT recommended, pt started on Toprol 50 mg daily, Losartan 25 mg daily  9/4 - Nifedipine held per Cardiology  9/9 Losartan held per nephro   Will need Cardiac MRI, likely an outpatient procedure.  Ischemic work up with with left cardiac cath in o/p settings      Asthma without acute exacerbation  Mild Bilateral wheezing was noted in the lower lung base upon initial presentation.  Does not require frequent inhaler use as an outpatient setting.  Low suspicion for exacerbation for the clinical symptoms presented during this hospital on admission    Plan  Continue albuterol-ipratropium nebs  Generalized weakness  Multifactorial, respiratory failure secondary to chronic pulmonary infection, volume overload, chronic anemia, chronic deconditioning from being ill    Plan  PT/OT evaluation apprecaited  Anemia  Recent Labs     09/09/24  0521 09/10/24  0538 09/11/24  0623   HGB 8.1* 8.3* 8.2*      Baseline Hgb 7-8  Etiology: component of iron deficiency and renal disease.     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 Sequential Compression Devices Ordered.    Mobility:   Basic Mobility Inpatient Raw Score: 16  JH-HLM Goal: 5: Stand one or more mins  JH-HLM Achieved: 1: Laying in bed  JH-HLM Goal NOT achieved. Continue with multidisciplinary rounding and encourage appropriate mobility to improve upon JH-HLM goals.    Patient Centered Rounds: I performed bedside rounds with nursing staff today.   Discussions with Specialists or Other Care Team Provider: Nursing    Education and Discussions with Family / Patient:  Will update sister.     Current Length of Stay: 12 day(s)  Current Patient Status: Inpatient   Certification Statement: The patient will continue to require additional inpatient hospital stay due to Pending psych evaluation  Disposition:  Expected date of discharge: pending psych evaluation  Dispo destination: [facility]  Indwelling drains/lines: Tunnel Cath  DME needs: -na  HH needs: n/a   F/U appts: Nephrology/ Cardiology cardiac mri, cath  Preferred pharmacy: on file  Refills: na  Transportation: facility    Code Status: Level 1 - Full Code    Subjective   Overnight: No acute events over night     Complaints: No complaints.   Endorses improvement in shortness of breath started hemodialysis.    Re examined after bronchoscopy, no complaints of chest pain, shortness of breath     Patient denies Headache, Fever, Chills, Chest Pain,  Shortness of  breath,  Abdominal Pain,  Swellings prior to the transfer to ICU    Diet: Diet Renal; Lo Phosphorus; Yes; Fluid Restriction 1800 ML; No   Bowel regimen:   Last BM Date: 24       Objective     Vitals:   Temp (24hrs), Av.3 °F (36.8 °C), Min:98 °F (36.7 °C), Max:98.8 °F (37.1 °C)    Temp:  [98 °F (36.7 °C)-98.8 °F (37.1 °C)] 98 °F (36.7 °C)  HR:  [64-92] 76  Resp:  [16-23] 16  BP: ()/(48-79) 108/60  SpO2:  [88 %-100 %] 94 %  Body mass index is 40.65 kg/m².     Input and Output Summary (last 24 hours):     Intake/Output Summary (Last 24 hours) at 2024 1122  Last data filed at 2024 0600  Gross per 24 hour   Intake 1460 ml   Output 1000 ml   Net 460 ml       Physical Exam  Vitals reviewed.   Constitutional:       Appearance: Normal appearance.   HENT:      Mouth/Throat:      Mouth: Mucous membranes are moist.   Cardiovascular:      Rate and Rhythm: Normal rate and regular rhythm.      Pulses: Normal pulses.      Heart sounds: Normal heart sounds.      Comments: Port in right upper chest, dressing clean, no surrounding erythema  Pulmonary:      Effort: Pulmonary effort is normal.      Breath sounds: Normal breath sounds.   Abdominal:      General: There is no distension.      Palpations: Abdomen is soft.      Tenderness: There is no rebound.   Skin:     General: Skin is warm.      Capillary Refill: Capillary refill takes less than 2 seconds.   Neurological:      Mental Status: She is alert.          Lines/Drains:  Lines/Drains/Airways       Active Status       Name Placement date Placement time Site Days    HD Permanent Double Catheter 09/10/24  1142  Internal jugular  less than 1                      Telemetry:  Telemetry Orders (From admission, onward)               24 Hour Telemetry Monitoring  Continuous x 24 Hours (Telem)        Question:  Reason for 24 Hour Telemetry  Answer:  Decompensated CHF- and any one of the following: continuous diuretic infusion or total diuretic dose >200 mg daily,  associated electrolyte derangement (I.e. K < 3.0), ionotropic drip (continuous infusion), hx of ventricular arrhythmia, or new EF < 35%                     Telemetry Reviewed: Normal Sinus Rhythm  Indication for Continued Telemetry Use: Acute CHF on >200 mg lasix/day or equivalent dose or with new reduced EF.                Lab Results: I have reviewed the following results:   Results from last 7 days   Lab Units 09/11/24  0623   WBC Thousand/uL 12.24*   HEMOGLOBIN g/dL 8.2*   HEMATOCRIT % 26.0*   PLATELETS Thousands/uL 212     Results from last 7 days   Lab Units 09/11/24  0623 09/08/24  0515 09/07/24  0545   SODIUM mmol/L 136   < > 136   POTASSIUM mmol/L 4.3   < > 5.4*   CHLORIDE mmol/L 96   < > 98   CO2 mmol/L 31   < > 29   BUN mg/dL 37*   < > 44*   CREATININE mg/dL 5.94*   < > 7.95*   ANION GAP mmol/L 9   < > 9   CALCIUM mg/dL 9.2   < > 9.3   ALBUMIN g/dL  --   --  3.4*   TOTAL BILIRUBIN mg/dL  --   --  0.50   ALK PHOS U/L  --   --  72   ALT U/L  --   --  13   AST U/L  --   --  16   GLUCOSE RANDOM mg/dL 96   < > 130    < > = values in this interval not displayed.         Results from last 7 days   Lab Units 09/04/24  1605   POC GLUCOSE mg/dl 138               Recent Cultures (last 7 days):         Imaging Review: No pertinent imaging studies reviewed.  Other Studies: No additional pertinent studies reviewed.    Last 24 Hours Medication List:     Current Facility-Administered Medications:     acetaminophen (TYLENOL) tablet 650 mg, Q6H PRN    albuterol (PROVENTIL HFA,VENTOLIN HFA) inhaler 2 puff, Q4H PRN    albuterol inhalation solution 2.5 mg, TID    atorvastatin (LIPITOR) tablet 20 mg, Daily    atovaquone (MEPRON) oral suspension 1,500 mg, Daily    bisacodyl (DULCOLAX) EC tablet 5 mg, Once    cyclophosphamide (CYTOXAN) capsule 100 mg, Daily    dextromethorphan-guaiFENesin (ROBITUSSIN DM) oral syrup 10 mL, Q4H PRN    epoetin shavonne (EPOGEN,PROCRIT) injection 6,000 Units, After Dialysis    fentaNYL injection, PRN     fluticasone-vilanterol 200-25 mcg/actuation 1 puff, Daily    [Transfer Hold] heparin (porcine) injection 500 Units, Q1H PRN    heparin (porcine) subcutaneous injection 5,000 Units, Q8H JACK    ipratropium (ATROVENT) 0.02 % inhalation solution 0.5 mg, TID    iron polysaccharides (FERREX) capsule 150 mg, Daily    lamoTRIgine (LaMICtal) tablet 100 mg, HS    lidocaine (LIDODERM) 5 % patch 1 patch, Daily    lidocaine-epinephrine 1%-1:123558 buffered, PRN    metoprolol succinate (TOPROL-XL) 24 hr tablet 50 mg, BID    midazolam (VERSED) injection, PRN    montelukast (SINGULAIR) tablet 10 mg, Daily    pantoprazole (PROTONIX) EC tablet 40 mg, Daily Before Breakfast    polyethylene glycol (MIRALAX) packet 17 g, Daily    predniSONE tablet 15 mg, Daily    senna-docusate sodium (SENOKOT S) 8.6-50 mg per tablet 2 tablet, HS    sevelamer (RENAGEL) tablet 1,600 mg, TID With Meals    Sodium Zirconium Cyclosilicate (Lokelma) 10 g, Daily    traZODone (DESYREL) tablet 200 mg, HS    trimethobenzamide (TIGAN) IM injection 200 mg, Q6H PRN    vancomycin (VANCOCIN) IVPB (premix in dextrose), Continuous PRN    venlafaxine (EFFEXOR-XR) 24 hr capsule 225 mg, Daily    Administrative Statements   Today, Patient Was Seen By: Kathryn Cleveland MD  I have spent a total time of 35 minutes in caring for this patient on the day of the visit/encounter including Diagnostic results, Patient and family education, Impressions, Counseling / Coordination of care, Documenting in the medical record, Reviewing / ordering tests, medicine, procedures  , Obtaining or reviewing history  , and Communicating with other healthcare professionals .    **Please Note: This note may have been constructed using a voice recognition system.**

## 2024-09-11 NOTE — ASSESSMENT & PLAN NOTE
-- CT without PE, + bilateral pleural effusions, tree-in-bud nodularity, RUL groundglass and infiltrate RML  -- pulmonary on board.    --s/p bedside bronchoscopy this am  --Continue supportive care  --Management per pulmonary

## 2024-09-11 NOTE — ASSESSMENT & PLAN NOTE
Recent Labs     09/09/24  0521 09/10/24  0538 09/11/24  0623   HGB 8.1* 8.3* 8.2*      Baseline Hgb 7-8  Etiology: component of iron deficiency and renal disease.     Plan  Monitor Hgb, transfuse for < 7  Continue PTA Ferrex

## 2024-09-11 NOTE — ASSESSMENT & PLAN NOTE
-- Blood pressure acceptable and well-controlled.  -- Current medications: Toprol-XL 50 mg twice daily  --Continue current regimen  --Continue to monitor

## 2024-09-11 NOTE — PHYSICAL THERAPY NOTE
Physical Therapy Cancellation Note     09/11/24 0810   Note Type   Note Type Cancelled Session   Cancel Reasons Medical status  (PT treatment attempted for updated auth notes per request of CM. However pt currently transferring to ICU for bronchoscopy. Will hold at this time and f/u later in day/next day as able and appropriate.)       Laura Del Rosario, PT, DPT   Available via [employer]  NPI # [id number]  [place] License - [id number]  9/11/2024

## 2024-09-11 NOTE — RESPIRATORY THERAPY NOTE
Bedside bronch done with Dr. Melida RN and fellow. Patient was given 2% 5 ml lidocaine nebulizer prior to procedure. Patient coughing up phlem and feeling SOB. Patient was given scheduled albuterol and Atrovent nebulizer. After nebulizer patient was sprayed with hurricane (lidocaine spray). Patient was then placed on nasal cannula 4L. Bite block in place. 10 ml of lidocaine instilled at wendy (in intervals of (2 cc, 3 cc, and 3 cc and 2  cc in RLB). Patient during procedure became hypoxic in the 70s and was placed on HFNC 60L/100%. Patient also required simple mask be placed over top HFNC at 15L. 120 cc of saline instilled in RUL and 120 cc in RML for BAL with 15 and 30ml in trap. 30 cc for RUL and 35cc for RML. Samples ordered and walked to lab. Post procedure patient remains on HFNC, weaned down to 40L/40%.

## 2024-09-11 NOTE — ASSESSMENT & PLAN NOTE
Admission in July 2024 for ANGELICA. 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 S/p PLEX.  Patient is anuric and dialysis-dependent  Tues/Thurs/Sat schedule  Access: Right IJ PermCath, Cath reevaluation  by IR on 9/3, no issues during HD. 9/4, issues with clotting, will needs re-evaluation @9.5. Requires Re-evaluation 9/6 IR, Fluid poor returns on 9/5. 9/6-bedside readjustment was made by IR, reclotted after 1.5 hours, Cathflo dwelling overnight. 9/7 - HD without issues . 9/8 -cannot achieve good flow.  9/9-IR procedure unavailable, facilitated conversation to IR and nephrology regarding plans for dialysis.  9/10 - catheter exchanged, no issue with hemodialysis    Plan:  Hemodialysis schedule per nephro   continue PTA cyclophosphamide  Continue PTA sevalemer  Continue PTA atovaquone for PJP prophylaxis  Started Lokelma per nephro  Continue prednisone taper from outpatient nephrology prior to this admission  15 mg starting September 7 to September 20  12.5 mg starting September 21 to October 4  10 mg starting October 5 to October 18  7.5 mg starting October 19 to November 2  5 mg daily starting November 3

## 2024-09-11 NOTE — OCCUPATIONAL THERAPY NOTE
Occupational Therapy Cancellation Note        Patient Name: Ngozi Beard  Today's Date: 9/11/2024 09/11/24 0801   Note Type   Note type Cancelled Session   Cancel Reasons Medical status   Additional Comments Pt with active OT orders, tx attempted. Pt currently transferring to ICU for bronchoscopy. Will cancel and f/u as appropriate     Margie Shipman, OT

## 2024-09-11 NOTE — PROGRESS NOTES
Progress Note - Infectious Disease   Ngozi Beard 64 y.o. female MRN: [id number]  Unit/Bed#: ICU 05 Encounter: [id number]      Impression/Recommendations:  1.   Pseudomonas respiratory infection.  Clinical and radiological picture is more consistent with bronchitis than pneumonia.  Patient's dyspnea is already much improved with aggressive fluid removal.  However, she has persistent cough.  Given no immediate plan for bronchoscopy, antibiotic was initiated for likely Pseudomonas bronchitis.  Given high risk for fluoroquinolone toxicity, patient completed 7-day course of IV cefepime.  Observe off further antibiotic.     2.  Pneumonitis versus pneumonia.  Patient's respiratory symptoms are much improved admission.  Due to initial improvement of respiratory status, bronchoscopy was not done.  However, due to fluctuating respiratory status, bronchoscopy was finally done earlier today.  There was no evidence of airway inflammation, of note, as in above, patient just completed 7-day course of IV cefepime yesterday.  No further antibiotic as in above.  Follow-up on BAL studies.     3.  Bacteremia, with growth of MRSE in only 1 out of 2 admission blood cultures.  Patient has no fever and is not systemically ill, making true bacteremia not likely clinically.  Although patient does have permacath in place, with growth in only 1 culture sent, this is still most likely contaminated blood draw.  Patient had been on IV vancomycin but this was discontinued.  She remains clinically well off it.  No antibiotic needed for this indication.     3.  Leukocytosis.  Most likely steroid effect.  WBC fluctuating, currently normal.     4.  Advanced CKD, on HD.  Functional difficulty of permacath noted.    HD per nephrology.     5.  Recently diagnosed rapidly progressing anti-GBM disease.  Patient is on Cytoxan and prednisone since diagnosis.  Continue outpatient Cytoxan/steroid.  Continue atovaquone PCP prophylaxis.     Discussed with  patient in detail regarding the above plan.     Antibiotics:  Off antibiotic     Subjective:  Minimal dyspnea at rest.  Cough is improved, still mildly productive.  Temperature stays down.  No chills.  Patient is tolerating antibiotic well.  No nausea, vomiting or diarrhea.     Objective:  Vitals:  Temp:  [98 °F (36.7 °C)-98.8 °F (37.1 °C)] 98 °F (36.7 °C)  HR:  [68-92] 76  Resp:  [16-23] 16  BP: ()/(48-76) 108/60  SpO2:  [88 %-99 %] 94 %  Temp (24hrs), Av.3 °F (36.8 °C), Min:98 °F (36.7 °C), Max:98.8 °F (37.1 °C)  Current: Temperature: 98 °F (36.7 °C)    Physical Exam:     General: Awake, alert, cooperative, no distress.   Neck:  Supple. No mass.  No lymphadenopathy.   Lungs: Expansion symmetric, no rales, no wheezing, respirations unlabored.   Heart:  Regular rate and rhythm, S1 and S2 normal, no murmur.   Abdomen: Soft, nondistended, non-tender, bowel sounds active all four quadrants, no masses, no organomegaly.   Extremities: Trace leg edema. No erythema/warmth. No ulcer. Nontender to palpation.   Skin:  No rash.   Neuro: Moves all extremities.     Invasive Devices       Peripheral Intravenous Line  Duration             Peripheral IV 24 Proximal;Right;Ventral (anterior) Forearm 1 day    Peripheral IV 24 Right Antecubital <1 day              Hemodialysis Catheter  Duration             HD Permanent Double Catheter 1 day                    Labs studies:   I have personally reviewed pertinent labs.  Results from last 7 days   Lab Units 24  0623 09/10/24  0538 24  0521 24  0515 24  0545 24  0609 24  0525   POTASSIUM mmol/L 4.3 4.1 4.0   < > 5.4* 5.1 4.2   CHLORIDE mmol/L 96 101 100   < > 98 99 100   CO2 mmol/L 31 30 30   < > 29 29 32   BUN mg/dL 37* 63* 48*   < > 44* 52* 40*   CREATININE mg/dL 5.94* 9.17* 7.57*   < > 7.95* 8.84* 7.83*   EGFR ml/min/1.73sq m 6 4 5   < > 4 4 4   CALCIUM mg/dL 9.2 9.5 9.4   < > 9.3 9.7 9.4   AST U/L  --   --   --   --  16 10* 10*    ALT U/L  --   --   --   --  13 16 20   ALK PHOS U/L  --   --   --   --  72 75 72    < > = values in this interval not displayed.     Results from last 7 days   Lab Units 09/11/24  0623 09/10/24  0538 09/09/24  0521   WBC Thousand/uL 12.24* 10.41* 10.15   HEMOGLOBIN g/dL 8.2* 8.3* 8.1*   PLATELETS Thousands/uL 212 206 182           Imaging Studies:   I have personally reviewed pertinent imaging study reports and images in PACS.    EKG, Pathology, and Other Studies:   I have personally reviewed pertinent reports.

## 2024-09-11 NOTE — INTERVAL H&P NOTE
H&P reviewed. After examining the patient I find no changes in the patients condition since the H&P had been written.    Vitals:    09/11/24 0814   BP: 122/76   Pulse: 77   Resp: 22   Temp:    SpO2: 94%     Patient signed consent  Risk and benefits were discussed of procedure.  Anesthesia declined doing procedure under general anesthesia therefore we will perform under conscious sedation  Will need to move patient to the ICU to do procedure  N.p.o., proceed with procedure  Nebulized lidocaine, topical lidocaine, along with Versed and fentanyl    Luke Montez MD  Pulmonary and Critical Care Medicine

## 2024-09-12 ENCOUNTER — APPOINTMENT (INPATIENT)
Dept: DIALYSIS | Facility: HOSPITAL | Age: 64
DRG: 286 | End: 2024-09-12
Payer: COMMERCIAL

## 2024-09-12 LAB
ANION GAP SERPL CALCULATED.3IONS-SCNC: 7 MMOL/L (ref 4–13)
BUN SERPL-MCNC: 49 MG/DL (ref 5–25)
CALCIUM SERPL-MCNC: 9.3 MG/DL (ref 8.4–10.2)
CHLORIDE SERPL-SCNC: 97 MMOL/L (ref 96–108)
CO2 SERPL-SCNC: 33 MMOL/L (ref 21–32)
CREAT SERPL-MCNC: 7.73 MG/DL (ref 0.6–1.3)
ERYTHROCYTE [DISTWIDTH] IN BLOOD BY AUTOMATED COUNT: 18.8 % (ref 11.6–15.1)
GFR SERPL CREATININE-BSD FRML MDRD: 4 ML/MIN/1.73SQ M
GLUCOSE SERPL-MCNC: 87 MG/DL (ref 65–140)
HCT VFR BLD AUTO: 25.5 % (ref 34.8–46.1)
HGB BLD-MCNC: 7.9 G/DL (ref 11.5–15.4)
MCH RBC QN AUTO: 29.7 PG (ref 26.8–34.3)
MCHC RBC AUTO-ENTMCNC: 31 G/DL (ref 31.4–37.4)
MCV RBC AUTO: 96 FL (ref 82–98)
PLATELET # BLD AUTO: 199 THOUSANDS/UL (ref 149–390)
PMV BLD AUTO: 10.2 FL (ref 8.9–12.7)
POTASSIUM SERPL-SCNC: 4 MMOL/L (ref 3.5–5.3)
RBC # BLD AUTO: 2.66 MILLION/UL (ref 3.81–5.12)
RHODAMINE-AURAMINE STN SPEC: NORMAL
RHODAMINE-AURAMINE STN SPEC: NORMAL
SODIUM SERPL-SCNC: 137 MMOL/L (ref 135–147)
WBC # BLD AUTO: 11.86 THOUSAND/UL (ref 4.31–10.16)

## 2024-09-12 PROCEDURE — 80048 BASIC METABOLIC PNL TOTAL CA: CPT

## 2024-09-12 PROCEDURE — 97116 GAIT TRAINING THERAPY: CPT

## 2024-09-12 PROCEDURE — 85027 COMPLETE CBC AUTOMATED: CPT

## 2024-09-12 PROCEDURE — 99232 SBSQ HOSP IP/OBS MODERATE 35: CPT | Performed by: INTERNAL MEDICINE

## 2024-09-12 PROCEDURE — 94760 N-INVAS EAR/PLS OXIMETRY 1: CPT

## 2024-09-12 PROCEDURE — 97535 SELF CARE MNGMENT TRAINING: CPT

## 2024-09-12 PROCEDURE — 99232 SBSQ HOSP IP/OBS MODERATE 35: CPT | Performed by: STUDENT IN AN ORGANIZED HEALTH CARE EDUCATION/TRAINING PROGRAM

## 2024-09-12 PROCEDURE — 90935 HEMODIALYSIS ONE EVALUATION: CPT | Performed by: PHYSICIAN ASSISTANT

## 2024-09-12 PROCEDURE — 94640 AIRWAY INHALATION TREATMENT: CPT

## 2024-09-12 PROCEDURE — 97164 PT RE-EVAL EST PLAN CARE: CPT

## 2024-09-12 RX ORDER — LEVALBUTEROL INHALATION SOLUTION 1.25 MG/3ML
1.25 SOLUTION RESPIRATORY (INHALATION)
Status: DISCONTINUED | OUTPATIENT
Start: 2024-09-12 | End: 2024-09-13

## 2024-09-12 RX ORDER — ATOVAQUONE 750 MG/5ML
1500 SUSPENSION ORAL DAILY
Status: DISCONTINUED | OUTPATIENT
Start: 2024-09-13 | End: 2024-09-13

## 2024-09-12 RX ORDER — METOPROLOL SUCCINATE 50 MG/1
50 TABLET, EXTENDED RELEASE ORAL 2 TIMES DAILY
Status: CANCELLED
Start: 2024-09-12 | End: 2024-10-12

## 2024-09-12 RX ADMIN — HEPARIN SODIUM 5000 UNITS: 5000 INJECTION INTRAVENOUS; SUBCUTANEOUS at 17:42

## 2024-09-12 RX ADMIN — SEVELAMER HYDROCHLORIDE 1600 MG: 800 TABLET ORAL at 17:42

## 2024-09-12 RX ADMIN — SENNOSIDES AND DOCUSATE SODIUM 2 TABLET: 8.6; 5 TABLET ORAL at 21:10

## 2024-09-12 RX ADMIN — SODIUM ZIRCONIUM CYCLOSILICATE 10 G: 10 POWDER, FOR SUSPENSION ORAL at 13:12

## 2024-09-12 RX ADMIN — SEVELAMER HYDROCHLORIDE 1600 MG: 800 TABLET ORAL at 13:12

## 2024-09-12 RX ADMIN — IPRATROPIUM BROMIDE 0.5 MG: 0.5 SOLUTION RESPIRATORY (INHALATION) at 13:44

## 2024-09-12 RX ADMIN — HEPARIN SODIUM 5000 UNITS: 5000 INJECTION INTRAVENOUS; SUBCUTANEOUS at 01:05

## 2024-09-12 RX ADMIN — ATORVASTATIN CALCIUM 20 MG: 20 TABLET, FILM COATED ORAL at 13:11

## 2024-09-12 RX ADMIN — FLUTICASONE FUROATE AND VILANTEROL TRIFENATATE 1 PUFF: 200; 25 POWDER RESPIRATORY (INHALATION) at 13:12

## 2024-09-12 RX ADMIN — IPRATROPIUM BROMIDE 0.5 MG: 0.5 SOLUTION RESPIRATORY (INHALATION) at 19:53

## 2024-09-12 RX ADMIN — LAMOTRIGINE 100 MG: 100 TABLET ORAL at 21:10

## 2024-09-12 RX ADMIN — CYCLOPHOSPHAMIDE 100 MG: 50 CAPSULE ORAL at 13:11

## 2024-09-12 RX ADMIN — LEVALBUTEROL HYDROCHLORIDE 1.25 MG: 1.25 SOLUTION RESPIRATORY (INHALATION) at 19:53

## 2024-09-12 RX ADMIN — TRAZODONE HYDROCHLORIDE 200 MG: 100 TABLET ORAL at 21:10

## 2024-09-12 RX ADMIN — LIDOCAINE 5% 1 PATCH: 700 PATCH TOPICAL at 13:11

## 2024-09-12 RX ADMIN — IPRATROPIUM BROMIDE 0.5 MG: 0.5 SOLUTION RESPIRATORY (INHALATION) at 08:09

## 2024-09-12 RX ADMIN — ALBUTEROL SULFATE 2.5 MG: 2.5 SOLUTION RESPIRATORY (INHALATION) at 08:08

## 2024-09-12 RX ADMIN — ALBUTEROL SULFATE 2.5 MG: 2.5 SOLUTION RESPIRATORY (INHALATION) at 13:44

## 2024-09-12 NOTE — CASE MANAGEMENT
Case Management Progress Note    Patient name Ngozi Beard  Location ICU 05/ICU 05 MRN [id number]  : 1960 Date 2024       LOS (days): 13  Geometric Mean LOS (GMLOS) (days): 4.4  Days to GMLOS:-8.4        OBJECTIVE:        Current admission status: Inpatient  Preferred Pharmacy:   [facility]/pharmacy #[id number] [facility] [address] [address]  Phone: [phone] Fax: [phone]    [facility] - [place] [id number] [facility]  [address] [facility] [address] [address]  Phone: [phone] Fax: [phone]    Primary Care Provider: Meenakshi Balbuena MD    Primary Insurance: [facility]  Secondary Insurance: [facility]    PROGRESS NOTE:    CM notified by the resident that pt is ready for dc. PT/OT were able to see pt today and auth request tasked to CM dc support.    Pt's sister, Jacquelyn, also updated of same.

## 2024-09-12 NOTE — PROGRESS NOTES
Progress Note - Nephrology   Name: Ngozi Beard 64 y.o. female I MRN: [id number]  Unit/Bed#: ICU 05 I Date of Admission: 8/30/2024   Date of Service: 9/12/2024 I Hospital Day: 13     Reason for Consult: ARF dialysis dependent     64-year-old female with newly diagnosed anti-GBM disease initiated on dialysis 7/16/2024, asthma, HTN, bipolar disorder p/w CP, SOB.  Recent admission with similar presentation.  Nephrology consulted for management of dialysis.    HEMODIALYSIS PROCEDURE NOTE  The patient was seen and examined on hemodialysis.  Patient tolerating procedure with stable hemodynamics.  Discussed with dialysis nurse at bedside and we agreed to proceed with UF 2L  Time: 3.5 hours  Sodium: 135 Blood flow: 350   Dialyzer: F160 Potassium: 3K Dialysate flow: 800   Access: R IJV permcath Bicarbonate: 40 Ultrafiltration goal: 2L   Medications on HD: Epogen 6000 units       Assessment & Plan  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)  -- Continue cyclophosphamide and prednisone taper as detailed  --Completed plasmapheresis in August  --Remains dialysis dependent, TTS at [facility]  --Hemodialysis initiation on July 16, 2024  --s/p bronchoscopy 9/11/2024 due to ongoing pulmonary issues.  --Access: Right IJ permcath, repositioned by IR 9/10/2024. access worked well 9/10 for dialysis treatment.  Will monitor success with this current catheter.  Our plan is that if the catheter continues to have issues ongoing consider transitioning to urgent start PD.  --last treatment 9/10/24  --currently undergoing hemodialysis  --next treatment 9/14/2024.  Continue TTS schedule  Shortness of breath  -- CT without PE, + bilateral pleural effusions, tree-in-bud nodularity, RUL groundglass and infiltrate RML  -- pulmonary on board.    --s/p bedside bronchoscopy this am  --Continue supportive care  --Management per pulmonary  Primary hypertension  -- Blood pressure acceptable and well-controlled.  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    Plan Summary:  -continue IHD TTS  -for HD today.  Catheter reversed but working well otherwise  -next treatment 9/14/2024    SUBJECTIVE:  Pt awake, alert without new complaints.  Mild dyspnea persists.  BP stable.  Pt seen and examined on HD and tolerating treatment.  Catheter working well but needed to be reversed.  VSS    A complete review of systems was performed. Pertinent positives and negatives noted in the HPI. Otherwise the review of systems was negative.  OBJECTIVE:  Current Weight: Weight - Scale: 107 kg (235 lb 10.8 oz)  Vitals:    09/12/24 1100 09/12/24 1130 09/12/24 1200 09/12/24 1220   BP: 114/63 115/70 108/70 109/68   BP Location: Left arm Left arm Left arm    Pulse: 65 70 66 66   Resp: 18 18 18 18   Temp:       TempSrc:       SpO2:       Weight:       Height:           Intake/Output Summary (Last 24 hours) at 9/12/2024 1233  Last data filed at 9/12/2024 1220  Gross per 24 hour   Intake 880 ml   Output 2500 ml   Net -1620 ml       General:  Awake, alert, appears comfortable and in no acute distress.  Nontoxic.  Skin:  No rash,  warm, good skin turgor   Eyes:  PERRL, EOMI, sclerae nonicteric.  no periorbital edema   ENT:  Moist mucous membranes  Neck:  Trachea midline, symmetric.  No JVD.  Chest:  Clear to auscultation bilaterally without wheezes, crackles or rhonchi  CVS:  Regular rate and rhythm without murmur, gallop or rub.  S1 and S2 identified and normal.  No S3, S4.   Abdomen:  Soft, nontender, nondistended without masses.  Normal bowel sounds x 4 quadrants.  No bruit.  Extremities:  Warm, pink, motor and sensory intact and well perfused.  No cyanosis, pallor.  No BLE edema.  Neuro:  Awake, alert, oriented x3.  Grossly intact  Psych:  Appropriate affect.  Mentating appropriately.  Normal mental status exam  Access: [facility] site clear, currently being accessed for HD treatment       Medications:    Current Facility-Administered Medications:     acetaminophen (TYLENOL) tablet 650 mg, 650 mg, Oral, Q6H PRN, Gill Stevens MD    albuterol (PROVENTIL HFA,VENTOLIN HFA) inhaler 2 puff, 2 puff, Inhalation, Q4H PRN, Gill Stevens MD, 2 puff at 09/11/24 0723    albuterol inhalation solution 2.5 mg, 2.5 mg, Nebulization, TID, Gill Stevens MD, 2.5 mg at 09/12/24 0808    atorvastatin (LIPITOR) tablet 20 mg, 20 mg, Oral, Daily, Gill Stevens MD, 20 mg at 09/10/24 1323    atovaquone (MEPRON) oral suspension 1,500 mg, 1,500 mg, Oral, Daily, Gill Stevens MD, 1,500 mg at 09/10/24 1325    bisacodyl (DULCOLAX) EC tablet 5 mg, 5 mg, Oral, Once, Gill Stevens MD    cyclophosphamide (CYTOXAN) capsule 100 mg, 100 mg, Oral, Daily, Gill Stevens MD, 100 mg at 09/10/24 1325    dextromethorphan-guaiFENesin (ROBITUSSIN DM) oral syrup 10 mL, 10 mL, Oral, Q4H PRN, Gill Stevens MD, 10 mL at 09/08/24 1926    epoetin shavonne (EPOGEN,PROCRIT) injection 6,000 Units, 6,000 Units, Intravenous, After Dialysis, Gill Stevens MD, 6,000 Units at 09/10/24 1542    fentaNYL injection, , Intravenous, PRN, Gerber Fletcher MD, 25 mcg at 09/10/24  1122    fluticasone-vilanterol 200-25 mcg/actuation 1 puff, 1 puff, Inhalation, Daily, Gill Stevens MD, 1 puff at 09/11/24 0723    heparin (porcine) subcutaneous injection 5,000 Units, 5,000 Units, Subcutaneous, Q8H JACK, Gill Stevens MD, 5,000 Units at 09/12/24 0105    ipratropium (ATROVENT) 0.02 % inhalation solution 0.5 mg, 0.5 mg, Nebulization, TID, Gill Stevens MD, 0.5 mg at 09/12/24 0809    iron polysaccharides (FERREX) capsule 150 mg, 150 mg, Oral, Daily, Gill Stevens MD, 150 mg at 09/10/24 1323    lamoTRIgine (LaMICtal) tablet 100 mg, 100 mg, Oral, HS, Gill Stevens MD, 100 mg at 09/11/24 2131    lidocaine (LIDODERM) 5 % patch 1 patch, 1 patch, Topical, Daily, Gill Stevens MD, 1 patch at 09/10/24 1020    lidocaine-epinephrine 1%-1:937848 buffered, , Infiltration, PRN, Gerber Fletcher MD, 5 mL at 09/10/24 1132    metoprolol succinate (TOPROL-XL) 24 hr tablet 50 mg, 50 mg, Oral, BID, Gill Stevens MD, 50 mg at 09/11/24 1721    midazolam (VERSED) injection, , , PRN, Gerber Fletcher MD, 0.5 mg at 09/10/24 1122    montelukast (SINGULAIR) tablet 10 mg, 10 mg, Oral, Daily, Gill Stevens MD, 10 mg at 09/10/24 1323    pantoprazole (PROTONIX) EC tablet 40 mg, 40 mg, Oral, Daily Before Breakfast, Gill Stevens MD, 40 mg at 09/10/24 1012    polyethylene glycol (MIRALAX) packet 17 g, 17 g, Oral, Daily, Gill Stevens MD, 17 g at 09/07/24 1232    predniSONE tablet 15 mg, 15 mg, Oral, Daily, Gill Stevens MD, 15 mg at 09/10/24 1323    senna-docusate sodium (SENOKOT S) 8.6-50 mg per tablet 2 tablet, 2 tablet, Oral, HS, Gill Stevens MD, 2 tablet at 09/11/24 2131    sevelamer (RENAGEL) tablet 1,600 mg, 1,600 mg, Oral, TID With Meals, Gill Stevens MD, 1,600 mg at 09/11/24 1721    Sodium Zirconium Cyclosilicate (Lokelma) 10 g, 10 g, Oral, Daily, Gill Stevens MD, 10 g at 09/01/24 0811    traZODone (DESYREL) tablet 200 mg, 200 mg, Oral, HS, Gill Stevens MD, 200 mg at 09/11/24  2131    trimethobenzamide (TIGAN) IM injection 200 mg, 200 mg, Intramuscular, Q6H PRN, Gill Stevens MD, 200 mg at 09/11/24 0823    vancomycin (VANCOCIN) IVPB (premix in dextrose), , Intravenous, Continuous PRN, Gerber Fletcher MD, 1,000 mg at 09/10/24 1109    venlafaxine (EFFEXOR-XR) 24 hr capsule 225 mg, 225 mg, Oral, Daily, Gill Stevens MD, 225 mg at 09/10/24 1323    Laboratory Results:  Results from last 7 days   Lab Units 09/12/24  0458 09/11/24  0623 09/10/24  0538 09/09/24  0521 09/08/24  0515 09/07/24  0545 09/06/24  0609   WBC Thousand/uL 11.86* 12.24* 10.41* 10.15 11.86* 9.08 9.45   HEMOGLOBIN g/dL 7.9* 8.2* 8.3* 8.1* 8.5* 8.2* 8.5*   HEMATOCRIT % 25.5* 26.0* 27.2* 26.6* 27.6* 26.5* 27.5*   PLATELETS Thousands/uL 199 212 206 182 206 191 213   SODIUM mmol/L 137 136 141 140 139 136 138   POTASSIUM mmol/L 4.0 4.3 4.1 4.0 4.2 5.4* 5.1   CHLORIDE mmol/L 97 96 101 100 100 98 99   CO2 mmol/L 33* 31 30 30 29 29 29   BUN mg/dL 49* 37* 63* 48* 32* 44* 52*   CREATININE mg/dL 7.73* 5.94* 9.17* 7.57* 5.73* 7.95* 8.84*   CALCIUM mg/dL 9.3 9.2 9.5 9.4 9.6 9.3 9.7   MAGNESIUM mg/dL  --   --   --  2.3  --   --   --    PHOSPHORUS mg/dL  --   --   --  5.3*  --   --   --        I have personally reviewed the blood work as stated above and in my note.  I have personally reviewed internal Medicine, co-consultants and previous nephrology notes.

## 2024-09-12 NOTE — OCCUPATIONAL THERAPY NOTE
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 lives c roommate. HD 3x/week jonathan marley. (-) fals   Reciprocal Relationships sister, roommate   Service to Others retired   Intrinsic Gratification reports sleeping often, \"I dont do much\"   ADL   Grooming Assistance 5  Supervision/Setup   Grooming Deficit Increased time to complete;Verbal cueing;Supervision/safety   Grooming Comments washing face   LB Dressing Assistance 4  Minimal Assistance   LB Dressing Deficit Increased time to complete;Supervision/safety;Verbal cueing;Thread RLE into underwear;Thread LLE into underwear;Pull up over hips   LB Dressing Comments sitting on toilet to don/doff underwear   Bed Mobility   Supine to Sit 5  Supervision   Additional items Increased time required;Verbal cues   Sit to Supine 6  Modified independent   Additional items Increased time required   Transfers   Sit to Stand 4  Minimal assistance   Additional items Assist x 1;Increased time required;Verbal cues   Stand to Sit 4  Minimal " "assistance   Additional items Assist x 1;Increased time required;Verbal cues   Toilet transfer 4  Minimal assistance   Additional items Assist x 1;Increased time required;Verbal cues;Standard toilet  (use of grab bar)   Additional Comments use of RW. s/p standing pt's /68, s/p walking and return to supine pt's BP 99/64- RN aware   Functional Mobility   Functional Mobility 4  Minimal assistance   Additional Comments Ax1, limited distance bed > chair. Pt reports fatigue and inability to walk to/from bathroom - chair moved to bathroom door to limit distance. Bathroom door to/from otilet   Additional items Rolling walker   Subjective   Subjective \"My arm just hurts so much\"   Cognition   Overall Cognitive Status WFL   Arousal/Participation Alert;Cooperative   Attention Attends with cues to redirect   Orientation Level Oriented X4   Memory Decreased recall of precautions   Following Commands Follows one step commands without difficulty   Comments pleasant and cooperative, limited by recall and higher level deficits   Activity Tolerance   Activity Tolerance Patient tolerated treatment well   Medical Staff Made Aware PT Marianne   Assessment   Assessment Pt seen on this date for skilled OT treatment session. At start of session pt supine in bed. Pt requiring encouragement and motivation to participate in OT session. Pt with decreased functional mobility from previous session due to dizziness and pain in R arm. Pt's performance limited by low BPs. Pt benefiting from encouragement and motivation for completion of tasks. Demonstrating improved performance with LB dressing and improved functional reach. Continues to be limited by overall strength, endurance and activity tolerance. Pt would continue to benefit from skilled OT treatment sessions in order to address remaining deficits   Plan   Goal Expiration Date 09/22/24  (goals extended at this time)   OT Treatment Day 1   OT Frequency 3-5x/wk   Discharge Recommendation "   Rehab Resource Intensity Level, OT II (Moderate Resource Intensity)   AM-PAC Daily Activity Inpatient   Lower Body Dressing 3   Bathing 2   Toileting 3   Upper Body Dressing 3   Grooming 3   Eating 4   Daily Activity Raw Score 18   Daily Activity Standardized Score (Calc for Raw Score >=11) 38.66   AM-PAC Applied Cognition Inpatient   Following a Speech/Presentation 3   Understanding Ordinary Conversation 4   Taking Medications 2   Remembering Where Things Are Placed or Put Away 3   Remembering List of 4-5 Errands 2   Taking Care of Complicated Tasks 2   Applied Cognition Raw Score 16   Applied Cognition Standardized Score 35.03   End of Consult   Patient Position at End of Consult Bedside chair;Bed/Chair alarm activated;All needs within reach       Goals established on initial evaluation in order to achieve pt's goal of feeling stronger      Pt will complete UB ADLs Mod independent   for increased ADL independence within 10 days.      Pt will complete LB ADLs Mod independent   for increased ADL independence within 10 days. PROGRESSING     Pt will complete toileting Supervision  in bathroom for increased ADL independence within 10 days. PROGRESSING     Pt will demonstrate proper body mechanics to complete self-care transfers and household distance functional mobility with  Supervision and use of LRAD for increased safety and functional independence within 10 days.      Pt will demonstrate standing tolerance of 4 min with for increased activity tolerance during ADL/IADL tasks within 10 days. PROGRESSING     Pt will complete bed mobility Mod independent  for increased independence in repositioning, pressure offloading, and managing comfort. PROGRESSING     Pt will demonstrate proper body mechanics and fall prevention strategies during 100% of tx sessions for increased safety awareness during ADL/IADLs     Pt will demonstrate activity tolerance of 30 min in therapeutic tasks for increased participation in meaningful  activities upon D/C.PROGRESSING     Pt will participate in ongoing cognitive assessments to assist with safe D/C planning and supervision/assistance recommendations.      Pt will demonstrate OOB sitting tolerance of 2-4 hr/day for increased activity tolerance and engagement in leisure activities within 10 days.          The patient's raw score on the -PAC Daily Activity Inpatient Short Form is 18. A raw score of less than 19 suggests the patient may benefit from discharge to post-acute rehabilitation services. HOWEVER please refer to the recommendation of the Occupational Therapist for safe discharge planning.    This session, pt required and most appropriately benefited from skilled OT/PT co-eval due to continuous vitals monitoring, decreased activity tolerance, and unpredictable medical and/or functional status. OT and PT goals were addressed separately as seen in documentation.     Janay Yanez MS, OTR/L

## 2024-09-12 NOTE — PLAN OF CARE
Problem: PHYSICAL THERAPY ADULT  Goal: Performs mobility at highest level of function for planned discharge setting.  See evaluation for individualized goals.  Description: Treatment/Interventions: Functional transfer training, LE strengthening/ROM, Elevations, Therapeutic exercise, Endurance training, Patient/family training, Cognitive reorientation, Equipment eval/education, Bed mobility, Gait training, Compensatory technique education  Equipment Recommended: Walker       See flowsheet documentation for full assessment, interventions and recommendations.  Note: Prognosis: Fair  Problem List: Decreased strength, Decreased endurance, Impaired balance, Decreased mobility, Pain  Assessment: Ngozi Beard is a 64 y.o. Female who originally presented to [facility] on 24 due to asthma w/ current dx of unspecified mood disorder. Orders for PT eval and treat received. Comorbidities continuing to affect pt include: HTN, asthma, anemia, , cardiomyopathy. Personal factors affecting pt DC include: ambulating w/ assistive device, stairs to enter home, inability to ambulate household distances, inability to navigate level surfaces w/o external assistance, and limited home support. At baseline, pt mobilizes ind w/ SPC, and reports 0 fall(s) in the previous 6 months. Pt currently presents w/ the following deficits: impaired strength, impaired balance, decreased endurance/activity tolerance, pain affecting mobility, and gait deviations. Upon re-eval, pt requires  supervision for bed mobility, min Ax1 for transfers, min Ax1 w/ RW for ambulation. From a PT/mobility standpoint given the above findings, DC recommendation is level: II (Moderate Rehab Resource Intensity). During current admission, pt will benefit from continued skilled inpatient PT in the acute care setting in order to address the above deficits and to maximize function and mobility prior to DC from acute care.  Barriers to Discharge: Inaccessible home  environment, Decreased caregiver support (pt has 3 DILLAN, lives w/ roommate)     Rehab Resource Intensity Level, PT: II (Moderate Resource Intensity)    See flowsheet documentation for full assessment.

## 2024-09-12 NOTE — CASE MANAGEMENT
[facility] received request for authorization from Care Manager.  Authorization request submitted for: [facility]  Facility Name: [facility]  NPI:[id number]  Facility MD: Dr. Rand's    NPI: [id number]  Authorization initiated by contacting insurance:  [employer]  Via: H&CC Portal   Clinicals submitted via Portal attachment   Pending Reference #:[id number]     Care Manager notified: Mahi Vaca      Updates to authorization status will be noted in chart. Please reach out to CM for updates on any clinical information.

## 2024-09-12 NOTE — ASSESSMENT & PLAN NOTE
CLINE: Negative PE, BNP 3019, CT chest-bilateral pleural effusion, tree-in-bud nodularity    Multifactorial, contributing factors of volume overload, chronic anemia, underlying COPD and asthma.     Clinically improved post hemodialysis. Finished course of abx, will continue to monitor clinically with scheduled HD.      Plan  Multiple specialist following, see other plans for other problem

## 2024-09-12 NOTE — PROGRESS NOTES
Progress Note - Hospitalist   Name: Ngozi Beard 64 y.o. female I MRN: [id number]  Unit/Bed#: ICU 05 I Date of Admission: 8/30/2024   Date of Service: 9/12/2024 I Hospital Day: 13    Assessment & Plan  Shortness of breath  CLINE: Negative PE, BNP 3019, CT chest-bilateral pleural effusion, tree-in-bud nodularity    Multifactorial, contributing factors of volume overload, chronic anemia, underlying COPD and asthma.     Clinically improved post hemodialysis. Finished course of abx, will continue to monitor clinically with scheduled HD.      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 S/p PLEX.  Patient is anuric and dialysis-dependent  Tues/Thurs/Sat schedule  Access: Right IJ PermCath, Cath reevaluation  by IR on 9/3, no issues during HD. 9/4, issues with clotting, will needs re-evaluation @9.5. Requires Re-evaluation 9/6 IR, Fluid poor returns on 9/5. 9/6-bedside readjustment was made by IR, reclotted after 1.5 hours, [facility]flo dwelling overnight. 9/7 - HD without issues . 9/8 -cannot achieve good flow.  9/9-IR procedure unavailable, facilitated conversation to IR and nephrology regarding plans for dialysis.  9/10 - catheter exchanged, no issue with hemodialysis. 9/12 - Plans for dialysis 9/12    Plan:  Hemodialysis schedule per nephro   continue PTA cyclophosphamide  Continue PTA sevalemer  Continue PTA atovaquone for PJP prophylaxis  Started Lokelma per nephro  Continue prednisone taper from outpatient nephrology prior to this admission  15 mg starting September 7 to September 20  12.5 mg starting September 21 to October 4  10 mg starting October 5 to October 18  7.5 mg starting October 19 to November 2  5 mg daily starting November 3  Multifocal pneumonia  -CT chest PE study revealed no evidence of PE. There is persistent tree-in-bud  nodularity in the lungs suggestive of a widespread infectious bronchiolitis that may be secondary to an atypical mycobacterium. Follicular bronchiolitis and acute hypersensitivity pneumonitis could also be in the differential.  New mild patchy bilateral multifocal pneumonia  - Sputum culture: 4+ Pseudomonas   - ID: Chronic colonization of pseudomonas, rather than acute infection. Finished Cefepime x7 d course ended 9/10 .    * 9/12 Dr. Pham, Will follow up result, but no indication for keep her here for the result.   - Cardiology: Cleared for bronchoscopy.  - Pulm: 9/11 Bronchoscopy under sedation, culture sent  - Nephrology: Abnormal chest imaging, possible contributing factors to her catheter dysfunction    Improvement in coughing and sputum production symptoms with addition of abx, suspect patient presentation partially contributed from bronchitis secondary to pseudomonas.     Plan:   -Bronchoscopy 9/11, 9/12 result below  Fungal culture  Viral culture  Bronchial culture   Gram stain:   Legionella  AFB culture and stain  TB PCR  Cardiomyopathy (HCC)  August 31-EF of 25% with global hypokinesis.  No prior echo for comparison.    Suspect nonischemic cardiomyopathy from inflammatory/rheumatologic etiology     GDMT recommended, pt started on Toprol 50 mg daily, Losartan 25 mg daily  9/4 - Nifedipine held per Cardiology  9/9 Losartan held per nephro   Will need Cardiac MRI, likely an outpatient procedure.  Ischemic work up with with left cardiac cath in o/p settings      Asthma without acute exacerbation  Mild Bilateral wheezing was noted in the lower lung base upon initial presentation.  Does not require frequent inhaler use as an outpatient setting.  Low suspicion for exacerbation for the clinical symptoms presented during this hospital on admission    Plan  Continue albuterol-ipratropium nebs  Generalized weakness  Multifactorial, respiratory failure secondary to chronic pulmonary infection, volume overload, chronic  anemia, chronic deconditioning from being ill    Plan  PT/OT evaluation apprecaited  Anemia  Recent Labs     09/10/24  0538 24  0623 24  0458   HGB 8.3* 8.2* 7.9*      Baseline Hgb 7-8  Etiology: component of iron deficiency and renal disease.     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     Patient denies Headache, Fever, Chills, Chest Pain,  Shortness of breath,  Nausea, or Vomiting Abdominal Pain,     Diet: Diet Renal; Lo Phosphorus; Yes; Fluid Restriction 1800 ML; No   Bowel regimen:   Last BM Date: 24   VTE Pharmacologic Prophylaxis: VTE Score: 7 High Risk (Score >/= 5) - Pharmacological DVT Prophylaxis Ordered: heparin. Sequential Compression Devices Ordered.    Objective:    Vitals:   Temp (24hrs), Av.3 °F (36.8 °C), Min:97.8 °F (36.6 °C), Max:99.2 °F (37.3 °C)    Temp:  [97.8 °F (36.6 °C)-99.2 °F (37.3 °C)] 98.2 °F (36.8 °C)  HR:  [68-92] 68  Resp:  [16-23] 19  BP:  (102-122)/(56-76) 117/73  SpO2:  [88 %-99 %] 97 %  Input and Output Summary (last 24 hours):     Intake/Output Summary (Last 24 hours) at 9/12/2024 0611  Last data filed at 9/11/2024 2000  Gross per 24 hour   Intake 480 ml   Output --   Net 480 ml     Physical Exam:   Physical Exam  Constitutional:       General: She is not in acute distress.     Appearance: She is ill-appearing.   Cardiovascular:      Rate and Rhythm: Normal rate and regular rhythm.      Comments: No peripheral edema  Pulmonary:      Effort: Pulmonary effort is normal.      Breath sounds: Normal breath sounds.      Comments: 2L  Coarse breath sound in the lower lobe bilaterally  Abdominal:      Palpations: Abdomen is soft.   Musculoskeletal:      Cervical back: Neck supple.   Skin:     General: Skin is warm.      Capillary Refill: Capillary refill takes less than 2 seconds.   Neurological:      Mental Status: She is alert.        Additional Data:   Labs:  Results from last 7 days   Lab Units 09/12/24  0458   WBC Thousand/uL 11.86*   HEMOGLOBIN g/dL 7.9*   HEMATOCRIT % 25.5*   PLATELETS Thousands/uL 199     Results from last 7 days   Lab Units 09/12/24  0458 09/08/24  0515 09/07/24  0545   SODIUM mmol/L 137   < > 136   POTASSIUM mmol/L 4.0   < > 5.4*   CHLORIDE mmol/L 97   < > 98   CO2 mmol/L 33*   < > 29   BUN mg/dL 49*   < > 44*   CREATININE mg/dL 7.73*   < > 7.95*   ANION GAP mmol/L 7   < > 9   CALCIUM mg/dL 9.3   < > 9.3   ALBUMIN g/dL  --   --  3.4*   TOTAL BILIRUBIN mg/dL  --   --  0.50   ALK PHOS U/L  --   --  72   ALT U/L  --   --  13   AST U/L  --   --  16   GLUCOSE RANDOM mg/dL 87   < > 130    < > = values in this interval not displayed.                       Recent Cultures (last 7 days):  I have reviewed the following results: CBC, BMP  Results from last 7 days   Lab Units 09/11/24  0955 09/11/24  0954 09/11/24  0946 09/11/24  0937   GRAM STAIN RESULT  3+ Polys  No organisms seen 2+ Polys  No bacteria seen 3+ Polys  No organisms seen  1+ Polys  No organisms seen       Imaging:   Imaging Review: I have reviewed all the imaging on file  Other Studies: na  Bronchoscopy  Narrative: Table formatting from the original result was not included.  [facility]  [address]  [place] [address]  [phone]    DATE OF SERVICE:  9/11/24    PHYSICIAN(S):  Attending:   Luke Montez MD    Fellow:   Gill Stevens MD    INDICATION:  Shortness of breath, Abnormal CT of the chest    POST-OP DIAGNOSIS:  See the impression below.    PREPROCEDURE:  Standard airway preparation completed per respiratory therapy protocol.    Informed consent was obtained. Images reviewed prior to the procedure.  A   Time Out was performed.    No suspicion or identified risk for TB or other airborne infectious   disease; bronchoscopy procedure being performed for diagnostic purposes.     PROCEDURE: Bronchoscopy    DETAILS OF PROCEDURE:   Patient was taken to the procedure room where a time out was performed to   confirm correct patient and correct procedure. The patient underwent   moderate sedation, which was administered by a sedation nurse and an   intensivist. The patient's blood pressure, heart rate, level of   consciousness, oxygen and respirations were monitored throughout the   procedure. The patient experienced no blood loss. The scope was introduced   through the mouth. The procedure was not difficult. The patient tolerated   the procedure well. There were no apparent adverse events.     ANESTHESIA INFORMATION:  ASA: ASA status not filed in the log.  Anesthesia Type: Anesthesia type not filed in the log.    FINDINGS:  All observed locations appeared normal, including the pharynx, larynx,   left vocal cord, right vocal cord, upper trachea, middle trachea, lower   trachea, main wendy, left main stem, ROGER, lingula, LLL, right main stem,   RUL, bronchus intermedius, RML and RLL.  Bronchoalveolar lavage was performed x1 in the right upper  lobar bronchus   with 120 mL of saline instilled and a total return of 30 mL. The fluid   appeared serosanguinous. Additional washing - 20mL returned  Bronchoalveolar lavage was performed x1 in the right middle lobar bronchus   with 120 mL of saline instilled and a total return of 35 mL. The fluid   appeared serosanguinous. Additional washing-30mL returned  Moderate, thin and white secretions present in the pharynx and larynx;   secretions were easily removed and the airway was cleared    SPECIMENS:  ID Type Source Tests Collected by Time Destination   1 :  Lavage Lung, Right Upper Lobe Bronchoalveolar Lavage PULMONARY   CYTOLOGY, BRONCHOALVEOLAR LAVAGE (BAL) DIFFERENTIAL Luke Montez MD   9/11/2024  9:34 AM         Impression: All observed locations appeared normal, including the pharynx, larynx,   left vocal cord, right vocal cord, upper trachea, middle trachea, lower   trachea, main wendy, left main stem, ROGER, lingula, LLL, right main stem,   RUL, bronchus intermedius, RML and RLL.  Bronchoalveolar lavage was performed x2 and the fluid appeared   serosanguinous  Moderate, thin and white secretions present in the pharynx and larynx    RECOMMENDATION:  Await pathology results     Attending attestation  I was present for the entire procedure  Patient signed consent  Proceeded with procedure under conscious sedation administered by ICU   nurse Sravanthi Alexandre RN and myself.  Utilized topical lidocaine for the   wendy, nebulized lidocaine for the throat, Fentanyl Versed propofol  Airways were normal, some secretions of the right lower lobe  BAL right upper lobe  BAL right middle lobe  Postprocedure she woke up and was back to baseline    Luke Montez MD  Pulmonary and Critical Care Medicine   IR tunneled dialysis catheter check/change/reposition/angioplasty  Narrative: PROCEDURE:  1.  Injection of contrast through an existing tunneled dialysis catheter  2.  Tunneled dialysis catheter exchange with fibrin sheath  disruption from the same access    STAFF:  Gerber Fletcher M.D.    CONTRAST:  10 mL Omnipaque intravenous.    FLUOROSCOPY TIME:  6.9 minutes.    NUMBER OF IMAGES:  Multiple    COMPLICATIONS:  None.    MEDICATIONS:  Moderate sedation was monitored by radiology nursing staff.  Monitoring of conscious sedation totaled 30 minutes. See nursing records.    INDICATION:  End stage renal disease awaiting permanent access.  The existing tunneled dialysis catheter is malfunctioning.    PROCEDURE:  Contrast was injected through the existing tunneled dialysis catheter, and images were obtained.    Exchange of the right internal jugular tunneled dialysis catheter was performed over a guide wire under fluoroscopic guidance. Fibrin sheath disruption was performed using a Adarsh balloon.  A 16 [place], 28 cm. [place] catheter was placed under   fluoroscopic guidance with its tip in the right atrium. The catheter may be used immediately.    FINDINGS:  1.  Fluoroscopy showed the tunneled dialysis catheter to be retracted. The venous port tip terminated at the junction of the azygos vein and superior vena cava.  2.  Contrast injection did not identify a fibrin sheath.  3.  Final fluoroscopic image showed the new catheter to be good position.  Impression: Tunneled dialysis catheter over-the-wire exchange with fibrin sheath stripping.    Workstation performed: [id number]      Mobility:   Basic Mobility Inpatient Raw Score: 16  JH-HLM Goal: 5: Stand one or more mins  JH-HLM Achieved: 3: Sit at edge of bed  JH-HLM Goal NOT achieved. Continue with multidisciplinary rounding and encourage appropriate mobility to improve upon JH-HLM goals.    Last 24 Hours Medication List:   Current Facility-Administered Medications   Medication Dose Route Frequency Provider Last Rate    acetaminophen  650 mg Oral Q6H PRN Gill Stevens MD      albuterol  2 puff Inhalation Q4H PRN Gill Stevens MD      albuterol  2.5 mg Nebulization TID Gill Stevens  MD      atorvastatin  20 mg Oral Daily Gill Stevens MD      atovaquone  1,500 mg Oral Daily Gill Stevens MD      bisacodyl  5 mg Oral Once Gill Stevens MD      cyclophosphamide  100 mg Oral Daily Gill Stevens MD      dextromethorphan-guaiFENesin  10 mL Oral Q4H PRN Gill Stevens MD      epoetin shavonne  6,000 Units Intravenous After Dialysis Gill Stevens MD      fentaNYL   Intravenous PRN Gerber Fletcher MD      fluticasone-vilanterol  1 puff Inhalation Daily Gill Stevens MD      [Transfer Hold] heparin (porcine)  500 Units Intracatheter Q1H PRN Trish Kline MD      heparin (porcine)  5,000 Units Subcutaneous Q8H [facility] Gill Stevens MD      ipratropium  0.5 mg Nebulization TID Gill Stevens MD      iron polysaccharides  150 mg Oral Daily Gill Stevens MD      lamoTRIgine  100 mg Oral HS Gill Stevens MD      lidocaine  1 patch Topical Daily Gill Stevens MD      lidocaine  1 patch Topical Once Gauravrasta White MD      lidocaine-epinephrine 1%-1:596603 buffered   Infiltration PRN Gerber Fletcher MD      metoprolol succinate  50 mg Oral BID Gill Stevens MD      midazolam    PRN Gerber Fletcher MD      montelukast  10 mg Oral Daily Gill Stevens MD      pantoprazole  40 mg Oral Daily Before Breakfast Gill Stevens MD      polyethylene glycol  17 g Oral Daily Gill Stevens MD      predniSONE  15 mg Oral Daily Gill Stevens MD      senna-docusate sodium  2 tablet Oral HS Gill Stevens MD      sevelamer  1,600 mg Oral TID With Meals Gill Stevens MD      Sodium Zirconium Cyclosilicate  10 g Oral Daily Gill Stevens MD      traZODone  200 mg Oral HS Gill Stevens MD      trimethobenzamide  200 mg Intramuscular Q6H PRN Gill Stevens MD      vancomycin   Intravenous Continuous PRN Gerber Fletcher MD      venlafaxine  225 mg Oral Daily Gill Setvens MD         Lines/Drains:  Invasive Devices       Peripheral Intravenous Line  Duration              Peripheral IV 09/09/24 Proximal;Right;Ventral (anterior) Forearm 2 days    Peripheral IV 09/11/24 Right Antecubital <1 day              Hemodialysis Catheter  Duration             HD Permanent Double Catheter 1 day                      Telemetry:  Telemetry Orders (From admission, onward)               24 Hour Telemetry Monitoring  Continuous x 24 Hours (Telem)        Expiring   Question:  Reason for 24 Hour Telemetry  Answer:  Decompensated CHF- and any one of the following: continuous diuretic infusion or total diuretic dose >200 mg daily, associated electrolyte derangement (I.e. K < 3.0), ionotropic drip (continuous infusion), hx of ventricular arrhythmia, or new EF < 35%                     Telemetry Reviewed: Normal Sinus Rhythm  Indication for Continued Telemetry Use: No indication for continued use. Will discontinue.              Patient Centered Rounds: I performed bedside rounds with nursing staff today.  Discussions with Specialists or Other Care Team Provider: Nursing  Education and Discussions with Family / Patient:  Will update sister on phone.      Today, Patient Was Seen By: Kathryn Cleveland MD  Administrative Statements   Today, Patient Was Seen By: Kathryn Cleveland MD  I have spent a total time of 35 minutes in caring for this patient on the day of the visit/encounter including Diagnostic results, Patient and family education, Impressions, Counseling / Coordination of care, Documenting in the medical record, Reviewing / ordering tests, medicine, procedures  , Obtaining or reviewing history  , and Communicating with other healthcare professionals .    **Please Note: This note may have been constructed using a voice recognition system.**

## 2024-09-12 NOTE — PLAN OF CARE
Problem: OCCUPATIONAL THERAPY ADULT  Goal: Performs self-care activities at highest level of function for planned discharge setting.  See evaluation for individualized goals.  Description: Treatment Interventions: ADL retraining, Functional transfer training, UE strengthening/ROM, Endurance training, Patient/family training, Equipment evaluation/education, Compensatory technique education, Energy conservation, Activityengagement          See flowsheet documentation for full assessment, interventions and recommendations.   Outcome: Progressing  Note: Limitation: Decreased ADL status, Decreased UE strength, Decreased Safe judgement during ADL, Decreased endurance, Decreased high-level ADLs, Decreased self-care trans (safety awareness, LE strength, balance)  Prognosis: Good  Assessment: Pt seen on this date for skilled OT treatment session. At start of session pt supine in bed. Pt requiring encouragement and motivation to participate in OT session. Pt with decreased functional mobility from previous session due to dizziness and pain in R arm. Pt's performance limited by low BPs. Pt benefiting from encouragement and motivation for completion of tasks. Demonstrating improved performance with LB dressing and improved functional reach. Continues to be limited by overall strength, endurance and activity tolerance. Pt would continue to benefit from skilled OT treatment sessions in order to address remaining deficits     Rehab Resource Intensity Level, OT: II (Moderate Resource Intensity)

## 2024-09-12 NOTE — ASSESSMENT & PLAN NOTE
Admission in July 2024 for ANGELICA. 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 S/p PLEX.  Patient is anuric and dialysis-dependent  Tues/Thurs/Sat schedule  Access: Right IJ PermCath, Cath reevaluation  by IR on 9/3, no issues during HD. 9/4, issues with clotting, will needs re-evaluation @9.5. Requires Re-evaluation 9/6 IR, Fluid poor returns on 9/5. 9/6-bedside readjustment was made by IR, reclotted after 1.5 hours, Cathflo dwelling overnight. 9/7 - HD without issues . 9/8 -cannot achieve good flow.  9/9-IR procedure unavailable, facilitated conversation to IR and nephrology regarding plans for dialysis.  9/10 - catheter exchanged, no issue with hemodialysis. 9/12 - Plans for dialysis 9/12    Plan:  Hemodialysis schedule per nephro   continue PTA cyclophosphamide  Continue PTA sevalemer  Continue PTA atovaquone for PJP prophylaxis  Started Lokelma per nephro  Continue prednisone taper from outpatient nephrology prior to this admission  15 mg starting September 7 to September 20  12.5 mg starting September 21 to October 4  10 mg starting October 5 to October 18  7.5 mg starting October 19 to November 2  5 mg daily starting November 3

## 2024-09-12 NOTE — ASSESSMENT & PLAN NOTE
-- Persistent issues with the HD catheter , has been evaluated by IR multiple times, most recent 9/10/2024  --s/p exchange R IJV permcath and stripping of fibrin sheath (IR) 9/10/2024  --Catheter working well and hopefully will work well on further treatments.

## 2024-09-12 NOTE — UTILIZATION REVIEW
Continued Stay Review    Date: 9/6/24                           Current Patient Class: Inpatient  Current Level of Care: med surg    HPI:64 y.o. female initially admitted on 8/30/24    Shortness of breath-CT chest shows bilateral pleural effusion, tree-in-bud nodularity, patchy groundglass opacities  She noted to have low EF of 15 to 20%, currently pulmonology, cardiology, infectious disease, nephrology team is on board.  Patient undergoing hemodialysis.   Sputum culture growing Pseudomonas, discussed with ID team, to be treated with IV cefepime for 7 days through September 10.  It appears that no bronchoscopy during this admission.    Assessment/Plan:     9/6/2024 .  Patient presents with  improved cough and sputum since start of antibiotics.  Patient was not able to undergo hemodialysis yesterday and today, she is currently n.p.o. for IR catheter exchange.   On exam lungs clear.   Abnormal labs or imaging Hgb 8.5. hct 27.5.   bun 52. Creatinine 8.84.    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 Continue Cefepime.  Hemodialysis tentatively scheduled on 9/6, pending IR re- evaluation 9/6.  Continue PTA cyclophosphamide.  Continue PTA sevalemer.  Continue PTA atovaquone for PJP prophylaxis.  Continue prednisone taper from outpatient nephrology prior to this admission.  Will need cardiac MRI.   PT/OT. Monitor BP.  Start Losartan and toprol.  Continue nifedipine.     9/6/24 per IR The right sided tunneled dialysis catheter was prepped and draped in the usual sterile fashion. The suture was cut, and the catheter was pulled back while on the machine. Prior to the repositioning the catheter had alarmed twice. Afterwards, the catheter appeared to be functioning normally with no evidence of any issue. The catheter was resutured and sterilely dressed according to protocol.       SpO2 -- -- 94 %  -DI 98 %  -DI 95 %  -GP   Row Name 09/07/24 0545 09/06/24  "20:48:07 09/06/24 1957 09/06/24 1600 09/06/24 1530   Vital Signs   Temp -- 98.8 °F (37.1 °C)  -DI -- 98.1 °F (36.7 °C)  -CS --   Temp src -- -- -- Oral  -CS --   Pulse -- 84  -DI -- 85  -CS 81  -CS   Resp -- 18  -DI -- -- 18  -CS   BP -- 115/71  -DI -- -- 115/69  -CS   Oxygen Therapy   SpO2 -- 91 %  -DI 95 %  -ND 96 %  -CS 97 %  -CS   Vitals Timer   Restart Vitals Timer -- Yes  -DI -- -- Yes  -CS   Height and Weight   Weight 104 kg (229 lb 11.5 oz)  -RH -- -- -- --   Weight Method Standing scale  -RH -- -- -- --   Row Name 09/06/24 1515 09/06/24 1500 09/06/24 1430 09/06/24 1408 09/06/24 1400   Vital Signs   Pulse 88  -CS 83  -DE 82  -CS 84  -CS 85  -CS   Resp 18  -CS 18  -DE 18  -CS 18  -CS 18  -CS   /76  -/79  -/94  -/75  -/87  -CS   BP Location Right arm  -CS -- -- -- Right arm  -CS   BP Method Automatic  -CS -- -- -- Automatic  -CS   Patient Position - Orthostatic VS Lying  -CS -- -- -- Lying  -CS     09/06/24 1515 -- -- 105 kg (230 lb 13.2 oz) --   09/06/24 0500 106 kg (233 lb 0.4 oz) Standing scale -- --   09/05/24 1229 -- -- 105 kg (232 lb 5.8 oz) --   09/05/24 0553 105 kg (232 lb 5.8 oz) Standing scale -- --   09/05/24 0500 105 kg (232 lb 5.8 oz) Standing scale -- --   09/04/24 1030 105 kg (231 lb 0.7 oz) Standing scale 105 kg (231 lb 0.7 oz) --   09/04/24 0600 105 kg (232 lb 5.8 oz) Standing scale -- --   09/03/24 1455 -- -- 107 kg (235 lb 14.3 oz) --   09/03/24 0458 108 kg (237 lb 3.4 oz) Standing scale -- --   09/02/24 0600 107 kg (236 lb 5.3 oz) Standing scale -- --   09/01/24 0600 107 kg (235 lb 3.2 oz) Standing scale -- --   08/1958 -- -- 107 kg (235 lb 3.7 oz) --   08/31/24 1405 108 kg (238 lb 1.6 oz) -- -- 5' 3\" (1.6 m)   08/31/24 0727 108 kg (237 lb 7 oz) Standing scale --        Pertinent Labs/Diagnostic Results:   Radiology:  RADIOLOGY RESULTS   Final Interpretation by  (09/10 3651)      MUNDO tunneled dialysis catheter check/change/reposition/angioplasty   Final " Interpretation by Gerber Fletcher MD (09/11 0826)   Tunneled dialysis catheter over-the-wire exchange with fibrin sheath stripping.         Workstation performed: [id number]         XR chest portable   Final Interpretation by Maddie Valencia MD (09/09 0831)   No acute cardiopulmonary disease.   Dialysis catheter tip appears to be at the level of the SVC. PA and lateral views in standard position to confirm.            Workstation performed: [id number]         XR chest portable   Final Interpretation by Maddie Valencia MD (09/04 1449)   No pleural effusions identified.   Probable mild pulmonary vascular congestion.   Unchanged cardiomegaly.            Workstation performed: [id number]         IR tunneled dialysis catheter check/change/reposition/angioplasty   Final Interpretation by Graciela Bettencourt MD (09/03 1142)   1.  Pericatheter fibrin sheath stripping.   2.  Right-sided internal jugular tunneled dialysis catheter exchange, with its tip in the expected location of the right atrium. I exchanged a Titan dialysis catheter to an Kwan Split tunneled dialysis catheter.      Plan:      The catheter may be used immediately.      Workstation performed: [id number]         CTA chest pe study   Final Interpretation by Jorge Modi MD (09/01 1239)      No pulmonary embolism.      Since July 11, 2022 there is persistent tree-in-bud nodularity in the lungs suggestive of a widespread infectious bronchiolitis that may be secondary to an atypical mycobacterium. Follicular bronchiolitis and acute hypersensitivity pneumonitis could also    be in the differential. Follow-up chest CT in 3 months after treatment for infectious bronchiolitis is advised.      New mild patchy bilateral multifocal pneumonia.      Pulmonary hypertension      New moderate bilateral pleural effusions.      The study was marked in EPIC for immediate notification.      Workstation performed: [id number]         XR chest 1 view portable   ED Interpretation by  Alejandro Johnston PA-C (08/30 2112)   Consolidation right lower lobe suspicious for pneumonia      Final Interpretation by Devyn Silva MD (08/31 1314)         1. New opacification lateral left lung base may reflect atelectasis or pneumonia. Probable small left effusion.   2. Mild cardiomegaly with mild vascular and interstitial prominence suggesting some degree of volume overload.            Workstation performed: [id number]         IR tunneled dialysis catheter check/change/reposition/angioplasty    (Results Pending)     Cardiology:  Echo complete w/ contrast if indicated   Final Result by Ty Reese MD (09/02 0944)   Addendum (preliminary) 1 of 1 by yT Reese MD (09/02 0944)        Left Ventricle: Left ventricular cavity size is mildly dilated. Wall    thickness is mildly increased. The left ventricular ejection fraction is    25%. Systolic function is severely reduced. There is severe global    hypokinesis.     Right Ventricle: Right ventricular cavity size is mildly dilated.    Systolic function is mildly reduced.     Left Atrium: The atrium is mildly dilated.     Right Atrium: The atrium is mildly dilated.     Aortic Valve: There is mild regurgitation.     Mitral Valve: There is moderate to severe regurgitation.     Tricuspid Valve: There is moderate regurgitation. The right ventricular    systolic pressure is moderately elevated. The estimated right ventricular    systolic pressure is 48.00 mmHg.     Pericardium: There is a small pericardial effusion anterior to the    heart.            ECG 12 lead   Final Result by Ty Reese MD (09/04 0902)   Sinus tachycardia   Non-specific intra-ventricular conduction block   Cannot rule out Anteroseptal infarct (cited on or before 30-AUG-2024)   Abnormal ECG   When compared with ECG of 11-AUG-2024 00:23,   No significant change was found   Confirmed by Ty Reese ([id number]) on 9/4/2024 9:02:28 AM        GI:  Bronchoscopy   Final Result by Luke Montez MD  (09/11 1006)   All observed locations appeared normal, including the pharynx, larynx,    left vocal cord, right vocal cord, upper trachea, middle trachea, lower    trachea, main wendy, left main stem, ROGER, lingula, LLL, right main stem,    RUL, bronchus intermedius, RML and RLL.   Bronchoalveolar lavage was performed x2 and the fluid appeared    serosanguinous   Moderate, thin and white secretions present in the pharynx and larynx         RECOMMENDATION:   Await pathology results             Attending attestation   I was present for the entire procedure   Patient signed consent   Proceeded with procedure under conscious sedation administered by ICU    nurse Sravanthi Alexandre RN and myself.  Utilized topical lidocaine for the    wendy, nebulized lidocaine for the throat, Fentanyl Versed propofol   Airways were normal, some secretions of the right lower lobe   BAL right upper lobe   BAL right middle lobe   Postprocedure she woke up and was back to baseline      Luke Montez MD   Pulmonary and Critical Care Medicine               Results from last 7 days   Lab Units 09/06/24  0609 09/05/24  0525 09/04/24  0450 09/03/24  0510 09/02/24  0509 09/01/24  0521 08/31/24  1556 08/31/24  0755 08/31/24  0443 08/31/24  0033 08/30/24  2025   WBC Thousand/uL 9.45 10.00 9.89 12.39* 12.88* 19.55*  --   --  10.64*  --  14.45*   HEMOGLOBIN g/dL 8.5* 8.5* 8.4* 8.2* 7.8* 7.9* 7.9*   < > 6.7*  --  7.1*   HEMATOCRIT % 27.5* 27.7* 27.5* 27.0* 25.4* 24.6* 25.4*   < > 22.1*  --  23.0*   PLATELETS Thousands/uL 213 216 238 266 244 243  --   --  245  --  258   SODIUM mmol/L 138 140 139 137 139 137  --   --  138  --  139   POTASSIUM mmol/L 5.1 4.2 4.0 4.8 5.2 4.4  --   --  5.6*   < > 5.7*   CHLORIDE mmol/L 99 100 97 100 100 98  --   --  102  --  102   CO2 mmol/L 29 32 34* 26 28 28  --   --  25  --  22   BUN mg/dL 52* 40* 31* 62* 50* 40*  --   --  51*  --  44*   CREATININE mg/dL 8.84* 7.83* 6.16* 9.85* 8.64* 6.86*  --   --  8.68*  --  8.11*   CALCIUM  mg/dL 9.7 9.4 8.8 9.5 9.5 9.6  --   --  9.6  --  9.8   MAGNESIUM mg/dL  --   --   --   --   --  2.1  --   --  2.3  --  2.1   PHOSPHORUS mg/dL  --   --   --   --   --  4.7*  --   --  5.8*  --  6.1*       Results from last 7 days   Lab Units 09/07/24  0545 09/06/24  0609   AST U/L 16 10*   ALT U/L 13 16   ALK PHOS U/L 72 75   TOTAL PROTEIN g/dL 5.7* 5.7*   ALBUMIN g/dL 3.4* 3.4*   TOTAL BILIRUBIN mg/dL 0.50 0.45         Results from last 7 days   Lab Units 09/12/24  0458 09/11/24  0623 09/10/24  0538 09/09/24  0521 09/08/24  0515 09/07/24  0545 09/06/24  0609   GLUCOSE RANDOM mg/dL 87 96 97 106 112 130 127     Results from last 7 days   Lab Units 09/05/24  1321   HEP B S AG  Non-reactive   HEP C AB  Non-reactive   HEP B C IGM  Non-reactive   HEP B C TOTAL AB  Non-reactive     Results from last 7 days   Lab Units 09/11/24  0955 09/11/24  0954 09/11/24  0946 09/11/24  0937   GRAM STAIN RESULT  3+ Polys  No organisms seen 2+ Polys  No bacteria seen 3+ Polys  No organisms seen 1+ Polys  No organisms seen     Results from last 7 days   Lab Units 09/11/24  0945 09/11/24  0934   TOTAL COUNTED  100 100       09/02/24  1804 08/31/24  0043 08/30/24  2039     BLOOD CULTURE    --   --  No Growth After 5 Days.  Staphylococcus epidermidis*   SPUTUM CULTURE   4+ Growth of Pseudomonas aeruginosa*  4+ Growth of  --   --    GRAM STAIN RESULT   Rare Epithelial cells per low power field*  No polys seen*  Rare Gram positive cocci in pairs*  Rare Gram variable rods*  --  Gram positive cocci in clusters*   MRSA CULTURE ONLY    --  No Methicillin Resistant Staphlyococcus aureus (MRSA) isolated  --      Medications:   Scheduled Medications:  Current Facility-Administered Medications   Medication Dose Route Frequency Last Rate    acetaminophen  650 mg Oral Q6H PRN      albuterol  2 puff Inhalation Q4H PRN      albuterol  2.5 mg Nebulization TID      atorvastatin  20 mg Oral Daily      atovaquone  1,500 mg Oral Daily      cefepime   1,000 mg Intravenous Q24H 1,000 mg (09/05/24 2055)    cyclophosphamide  100 mg Oral Daily      dextromethorphan-guaiFENesin  10 mL Oral Q4H PRN      epoetin shavonne  6,000 Units Intravenous After Dialysis      fluticasone-vilanterol  1 puff Inhalation Daily      heparin (porcine)  2,000 Units Intravenous Once      heparin (porcine)  5,000 Units Subcutaneous Q8H JACK      Heparin Na (Pork) Lock Flsh PF  2 Units Intracatheter Once      Heparin Na (Pork) Lock Flsh PF  2.1 Units Intracatheter Once      ipratropium  0.5 mg Nebulization TID      iron polysaccharides  150 mg Oral Daily      lamoTRIgine  100 mg Oral HS      lidocaine  1 patch Topical Daily      losartan  25 mg Oral Daily      metoprolol succinate  50 mg Oral BID      montelukast  10 mg Oral Daily      pantoprazole  40 mg Oral Daily Before Breakfast      [START ON 9/7/2024] predniSONE  15 mg Oral Daily      predniSONE  20 mg Oral Daily      sevelamer  1,600 mg Oral TID With Meals      Sodium Zirconium Cyclosilicate  10 g Oral Daily      traZODone  200 mg Oral HS      trimethobenzamide  200 mg Intramuscular Q6H PRN      venlafaxine  225 mg Oral Daily         Discharge Plan:  to be determined.     Network Utilization Review Department  ATTENTION: Please call with any questions or concerns to [phone] and carefully listen to the prompts so that you are directed to the right person. All voicemails are confidential.   For Discharge needs, contact Care Management DC Support Team at [phone] opt. 2  Send all requests for admission clinical reviews, approved or denied determinations and any other requests to dedicated fax number below belonging to the campus where the patient is receiving treatment. List of dedicated fax numbers for the Facilities:  FACILITY NAME UR FAX NUMBER   ADMISSION DENIALS (Administrative/Medical Necessity) [phone]   DISCHARGE SUPPORT TEAM (NETWORK) [phone]   PARENT CHILD HEALTH (Maternity/NICU/Pediatrics) [phone]   [facility]  [facility] [phone]   [facility] [phone]   [facility] [phone]   [facility] [phone]   [facility] [phone]   [facility] [phone]   [facility] [phone]   [facility] [phone]   [facility] [phone]   [facility] [phone]   [facility] [phone]   [facility] [phone]

## 2024-09-12 NOTE — UTILIZATION REVIEW
Continued Stay Review    Date: 9/11/24                           Current Patient Class: Inpatient  Current Level of Care: ICU    HPI:64 y.o. female initially admitted on 8/30/24    Multifocal pneumonia.   Has completed course of cefepime 9/10.  Found with Cardiomyopathy, EF of 25 %  Shortness of breath - Multifactorial, contributing factors of volume overload, chronic anemia, underlying COPD and asthma. recently diagnosed anti-GBM disease, and ESRD on HD since 7/16. Persistent issues with the HD catheter , has been evaluated by IR multiple times      Assessment/Plan:     9/12/2024 .  Patient presents with  some dyspnea at rest.   Cough mildly productive and improved.  Remains anuric  On exam trace leg edema.   Abnormal labs or imaging:  bun 37.  Creatinine 5.94.   wbc 12.24.    Diagnosis/Plan    pseudomonas respiratory infection/Pneumonitis vs pneumonia/Bacteremia/advanced CKD on HD/ Recently diagnosed rapidly progressing anti-GBM disease.  Today bronch done. Status post exchange of hemodialysis catheter yesterday now functioning of this hemodialysis catheter has improved.  Continue dialysis - due tomorrow.       Procedure 9/11 - bronchoscopy   . Patient was given 2% 5 ml lidocaine nebulizer prior to procedure. Patient coughing up phlem and feeling SOB. Patient was given scheduled albuterol and Atrovent nebulizer. After nebulizer patient was sprayed with hurricane (lidocaine spray). Patient was then placed on nasal cannula 4L. Bite block in place. 10 ml of lidocaine instilled at wendy (in intervals of (2 cc, 3 cc, and 3 cc and 2 cc in RLB). Patient during procedure became hypoxic in the 70s and was placed on HFNC 60L/100%. Patient also required simple mask be placed over top HFNC at 15L. 120 cc of saline instilled in RUL and 120 cc in RML for BAL with 15 and 30ml in trap. 30 cc for RUL and 35cc for RML. Samples ordered and walked to lab. Post procedure patient remains on HFNC, weaned down to 40L/40%.     9/11/24  consult Psyche re: option.   No indication for IP psyche.  Continue current medications.  She does continue to remain with symptoms of depression and anxiety in the setting of life stressors, however there are no safety concerns at the present moment for a Behavioral Health perspective and patient will follow up outpatient with Dr. Rosales for medication optimization     Vital Signs (last 3 days)       Date/Time Temp Pulse Resp BP MAP (mmHg) SpO2 Calculated FIO2 (%) - Nasal Cannula Nasal Cannula O2 Flow Rate (L/min) O2 Device Patient Position - Orthostatic VS [place] Coma Scale Score Pain    09/12/24 1000 -- 67 18 110/63 81 -- -- -- -- Lying -- --    09/12/24 0930 -- 68 18 107/62 -- -- -- -- -- Lying -- --    09/12/24 0900 -- 68 18 113/67 -- -- -- -- -- Lying -- --    09/12/24 0850 98.5 °F (36.9 °C) 68 18 115/68 86 -- -- -- -- Lying -- --    09/12/24 0809 -- -- -- -- -- 98 % -- -- -- -- -- --    09/12/24 0800 -- -- -- -- -- -- -- -- -- -- 15 3    09/12/24 0700 98.5 °F (36.9 °C) 64 18 115/97 86 97 % -- -- Nasal cannula Lying -- --    09/12/24 0500 98.2 °F (36.8 °C) 68 -- -- -- 97 % -- -- -- -- -- --    09/12/24 0400 -- -- -- -- -- -- -- -- -- -- 15 --    09/12/24 0110 97.9 °F (36.6 °C) 73 -- 117/73 89 93 % -- -- -- -- -- --    09/12/24 0000 -- -- -- -- -- -- -- -- -- -- 15 --    09/11/24 2232 99.2 °F (37.3 °C) -- 19 114/67 85 -- -- -- -- -- -- --    09/11/24 2000 -- -- -- -- -- -- -- -- -- -- 15 9 09/11/24 1937 -- -- -- -- -- 97 % -- -- -- -- -- --    09/11/24 1910 98 °F (36.7 °C) -- 17 102/59 75 -- -- -- -- -- -- --    09/11/24 1500 97.8 °F (36.6 °C) 76 16 107/60 78 93 % -- -- Nasal cannula Lying -- --    09/11/24 1346 -- -- -- -- -- 93 % 28 2 L/min Nasal cannula -- -- --    09/11/24 1100 98 °F (36.7 °C) 76 16 108/60 78 94 % -- -- Nasal cannula Lying -- --    09/11/24 1000 -- 77 -- 114/61 83 99 % -- -- -- -- -- --    09/11/24 0945 -- -- -- 113/57 79 -- -- -- -- -- -- --    09/11/24 0930 -- 77 -- 112/56 77 98 % --  -- -- -- -- Med Not Given for Pain - for MAR use only    09/11/24 0900 -- 77 -- 115/63 84 88 % -- -- -- -- -- Med Not Given for Pain - for MAR use only    09/11/24 0855 -- -- -- 112/62 82 -- -- -- -- -- -- --    09/11/24 0850 -- -- -- 117/66 87 -- -- -- -- -- -- --    09/11/24 0845 -- -- -- 122/74 91 -- -- -- -- -- -- --    09/11/24 0837 -- -- -- -- -- 99 % 36 4 L/min Nasal cannula -- -- --    09/11/24 0814 -- 77 22 122/76 95 94 % -- -- -- -- -- --    09/11/24 0800 -- 80 19 117/74 91 92 % -- -- -- -- 15 No Pain    09/11/24 0758 98.8 °F (37.1 °C) 92 23 117/74 91 91 % -- -- None (Room air) Lying -- --    09/11/24 0000 -- -- -- -- -- -- -- -- -- -- 15 --    09/10/24 2023 -- -- -- -- -- 95 % -- -- None (Room air) -- -- --    09/10/24 2000 -- -- -- -- -- -- -- -- -- -- 15 No Pain    09/10/24 19:57:15 -- 80 -- 115/64 81 96 % -- -- -- -- -- --    09/10/24 19:56:52 -- 79 -- 115/64 81 95 % -- -- -- -- -- --    09/10/24 17:31:33 -- 81 -- 104/49 67 93 % -- -- -- -- -- --    09/10/24 1618 98.2 °F (36.8 °C) 70 16 136/74 -- -- -- -- -- Lying -- --    09/10/24 1615 -- 68 16 89/50 -- -- -- -- -- Lying -- --    09/10/24 1600 -- 72 16 92/48 -- -- -- -- -- Lying -- --    09/10/24 1530 -- 76 16 88/55 -- -- -- -- -- Lying -- --    09/10/24 1500 -- 75 16 87/60 -- -- -- -- -- Lying -- --    09/10/24 1430 -- 76 16 95/67 -- -- -- -- -- Lying -- --    09/10/24 1400 -- 71 16 107/64 -- -- -- -- -- Lying -- --    09/10/24 1330 -- 71 16 112/68 -- -- -- -- -- Lying -- --    09/10/24 1300 98 °F (36.7 °C) 69 16 114/74 -- -- -- -- -- Lying -- --    09/10/24 1145 -- 67 16 116/79 -- 99 % -- -- -- -- -- --    09/10/24 1140 -- 64 16 121/73 -- 100 % -- -- -- -- -- --    09/10/24 1135 -- 68 16 122/66 -- 100 % -- -- -- -- -- --    09/10/24 1130 -- 73 16 121/72 -- 100 % -- -- -- -- -- --    09/10/24 1125 -- 72 18 124/79 -- 100 % -- -- -- -- -- --    09/10/24 1120 -- 74 -- 120/75 -- 100 % -- -- -- -- -- --    09/10/24 1115 -- 75 -- 116/71 -- 96 % -- -- -- --  -- --    09/10/24 0900 -- -- -- -- -- 92 % -- -- None (Room air) -- 15 No Pain    09/10/24 08:32:46 98.3 °F (36.8 °C) 72 -- 110/68 82 92 % -- -- -- -- -- --    09/10/24 0737 -- -- -- -- -- 95 % -- -- None (Room air) -- -- --    09/10/24 0608 97.4 °F (36.3 °C) 72 17 116/71 86 99 % -- -- -- Lying -- --    09/09/24 21:09:06 -- 80 -- 124/74 91 98 % -- -- -- -- -- --    09/09/24 1943 -- -- -- -- -- 97 % -- -- None (Room air) -- -- --    09/09/24 1930 -- -- -- -- -- 98 % -- -- None (Room air) -- 15 No Pain    09/09/24 15:08:15 98.4 °F (36.9 °C) 83 20 110/60 77 97 % -- -- -- -- -- --    09/09/24 1339 -- -- -- -- -- 92 % -- -- None (Room air) -- -- --    09/09/24 0800 -- -- -- -- -- -- -- -- -- -- -- No Pain    09/09/24 0736 -- -- -- -- -- 97 % -- -- None (Room air) -- -- --    09/09/24 07:34:58 -- 73 20 106/66 79 99 % -- -- -- Lying -- --          Weight (last 2 days)       Date/Time Weight    09/12/24 0600 107 (235.67)    09/11/24 0600 104 (229.5)    09/10/24 0544 104 (230.38)            Pertinent Labs/Diagnostic Results:   Radiology:  RADIOLOGY RESULTS   Final Interpretation by  (09/10 [id number])      IR tunneled dialysis catheter check/change/reposition/angioplasty   Final Interpretation by Gerber Fletcher MD (09/11 0826)   Tunneled dialysis catheter over-the-wire exchange with fibrin sheath stripping.         Workstation performed: [id number]         XR chest portable   Final Interpretation by Maddie Valencia MD (09/09 0831)   No acute cardiopulmonary disease.   Dialysis catheter tip appears to be at the level of the SVC. PA and lateral views in standard position to confirm.            Workstation performed: [id number]         XR chest portable   Final Interpretation by Maddie Valencia MD (09/04 1449)   No pleural effusions identified.   Probable mild pulmonary vascular congestion.   Unchanged cardiomegaly.            Workstation performed: [id number]         IR tunneled dialysis catheter check/change/reposition/angioplasty    Final Interpretation by Graciela Bettencourt MD (09/03 1142)   1.  Pericatheter fibrin sheath stripping.   2.  Right-sided internal jugular tunneled dialysis catheter exchange, with its tip in the expected location of the right atrium. I exchanged a Titan dialysis catheter to an Kwan Split tunneled dialysis catheter.      Plan:      The catheter may be used immediately.      Workstation performed: [id number]         CTA chest pe study   Final Interpretation by Jroge Modi MD (09/01 1239)      No pulmonary embolism.      Since July 11, 2022 there is persistent tree-in-bud nodularity in the lungs suggestive of a widespread infectious bronchiolitis that may be secondary to an atypical mycobacterium. Follicular bronchiolitis and acute hypersensitivity pneumonitis could also    be in the differential. Follow-up chest CT in 3 months after treatment for infectious bronchiolitis is advised.      New mild patchy bilateral multifocal pneumonia.      Pulmonary hypertension      New moderate bilateral pleural effusions.      The study was marked in EPIC for immediate notification.      Workstation performed: [id number]         XR chest 1 view portable   ED Interpretation by Alejandro Johnston PA-C (08/30 2112)   Consolidation right lower lobe suspicious for pneumonia      Final Interpretation by Devyn Silva MD (08/31 1314)         1. New opacification lateral left lung base may reflect atelectasis or pneumonia. Probable small left effusion.   2. Mild cardiomegaly with mild vascular and interstitial prominence suggesting some degree of volume overload.            Workstation performed: [id number]         IR tunneled dialysis catheter check/change/reposition/angioplasty    (Results Pending)     Cardiology:  Echo complete w/ contrast if indicated   Final Result by Ty Reese MD (09/02 0944)   Addendum (preliminary) 1 of 1 by Ty Reese MD (09/02 0944)        Left Ventricle: Left ventricular cavity size is mildly dilated.  Wall    thickness is mildly increased. The left ventricular ejection fraction is    25%. Systolic function is severely reduced. There is severe global    hypokinesis.     Right Ventricle: Right ventricular cavity size is mildly dilated.    Systolic function is mildly reduced.     Left Atrium: The atrium is mildly dilated.     Right Atrium: The atrium is mildly dilated.     Aortic Valve: There is mild regurgitation.     Mitral Valve: There is moderate to severe regurgitation.     Tricuspid Valve: There is moderate regurgitation. The right ventricular    systolic pressure is moderately elevated. The estimated right ventricular    systolic pressure is 48.00 mmHg.     Pericardium: There is a small pericardial effusion anterior to the    heart.            ECG 12 lead   Final Result by Ty Reese MD (09/04 0902)   Sinus tachycardia   Non-specific intra-ventricular conduction block   Cannot rule out Anteroseptal infarct (cited on or before 30-AUG-2024)   Abnormal ECG   When compared with ECG of 11-AUG-2024 00:23,   No significant change was found   Confirmed by Ty Reese ([id number]) on 9/4/2024 9:02:28 AM        GI:  Bronchoscopy   Final Result by Luke Montez MD (09/11 1006)   All observed locations appeared normal, including the pharynx, larynx,    left vocal cord, right vocal cord, upper trachea, middle trachea, lower    trachea, main wendy, left main stem, ROGER, lingula, LLL, right main stem,    RUL, bronchus intermedius, RML and RLL.   Bronchoalveolar lavage was performed x2 and the fluid appeared    serosanguinous   Moderate, thin and white secretions present in the pharynx and larynx         RECOMMENDATION:   Await pathology results             Attending attestation   I was present for the entire procedure   Patient signed consent   Proceeded with procedure under conscious sedation administered by ICU    nurse Sravanthi Alexandre RN and myself.  Utilized topical lidocaine for the    wendy, nebulized lidocaine for the  throat, Fentanyl Versed propofol   Airways were normal, some secretions of the right lower lobe   BAL right upper lobe   BAL right middle lobe   Postprocedure she woke up and was back to baseline      Luke Montez MD   Pulmonary and Critical Care Medicine                 Results from last 7 days   Lab Units 09/12/24  0458 09/11/24  0623 09/10/24  0538 09/09/24  0521 09/08/24  0515   WBC Thousand/uL 11.86* 12.24* 10.41* 10.15 11.86*   HEMOGLOBIN g/dL 7.9* 8.2* 8.3* 8.1* 8.5*   HEMATOCRIT % 25.5* 26.0* 27.2* 26.6* 27.6*   PLATELETS Thousands/uL 199 212 206 182 206     Results from last 7 days   Lab Units 09/12/24  0458 09/11/24  0623 09/10/24  0538 09/09/24  0521 09/08/24  0515   SODIUM mmol/L 137 136 141 140 139   POTASSIUM mmol/L 4.0 4.3 4.1 4.0 4.2   CHLORIDE mmol/L 97 96 101 100 100   CO2 mmol/L 33* 31 30 30 29   ANION GAP mmol/L 7 9 10 10 10   BUN mg/dL 49* 37* 63* 48* 32*   CREATININE mg/dL 7.73* 5.94* 9.17* 7.57* 5.73*   EGFR ml/min/1.73sq m 4 6 4 5 7   CALCIUM mg/dL 9.3 9.2 9.5 9.4 9.6   MAGNESIUM mg/dL  --   --   --  2.3  --    PHOSPHORUS mg/dL  --   --   --  5.3*  --      Results from last 7 days   Lab Units 09/07/24  0545 09/06/24  0609   AST U/L 16 10*   ALT U/L 13 16   ALK PHOS U/L 72 75   TOTAL PROTEIN g/dL 5.7* 5.7*   ALBUMIN g/dL 3.4* 3.4*   TOTAL BILIRUBIN mg/dL 0.50 0.45     Results from last 7 days   Lab Units 09/12/24  0458 09/11/24  0623 09/10/24  0538 09/09/24  0521 09/08/24  0515 09/07/24  0545 09/06/24  0609   GLUCOSE RANDOM mg/dL 87 96 97 106 112 130 127     Results from last 7 days   Lab Units 09/05/24  1321   HEP B S AG  Non-reactive   HEP C AB  Non-reactive   HEP B C IGM  Non-reactive   HEP B C TOTAL AB  Non-reactive     Results from last 7 days   Lab Units 09/11/24  0955 09/11/24  0954 09/11/24  0946 09/11/24  0937   GRAM STAIN RESULT  3+ Polys  No organisms seen 2+ Polys  No bacteria seen 3+ Polys  No organisms seen 1+ Polys  No organisms seen     Results from last 7 days   Lab Units  09/11/24  0945 09/11/24  0934   TOTAL COUNTED  100 100         Medications:   Scheduled Medications:  albuterol, 2.5 mg, Nebulization, TID  atorvastatin, 20 mg, Oral, Daily  atovaquone, 1,500 mg, Oral, Daily  bisacodyl, 5 mg, Oral, Once  cyclophosphamide, 100 mg, Oral, Daily  fluticasone-vilanterol, 1 puff, Inhalation, Daily  heparin (porcine), 5,000 Units, Subcutaneous, Q8H JACK  ipratropium, 0.5 mg, Nebulization, TID  iron polysaccharides, 150 mg, Oral, Daily  lamoTRIgine, 100 mg, Oral, HS  lidocaine, 1 patch, Topical, Daily  metoprolol succinate, 50 mg, Oral, BID  montelukast, 10 mg, Oral, Daily  pantoprazole, 40 mg, Oral, Daily Before Breakfast  polyethylene glycol, 17 g, Oral, Daily  predniSONE, 15 mg, Oral, Daily  senna-docusate sodium, 2 tablet, Oral, HS  sevelamer, 1,600 mg, Oral, TID With Meals  Sodium Zirconium Cyclosilicate, 10 g, Oral, Daily  traZODone, 200 mg, Oral, HS  venlafaxine, 225 mg, Oral, Daily    propofol (DIPRIVAN) 200 MG/20ML bolus injection 160 mg  Dose: 160 mg  Freq: Once Route: IV  Start: 09/11/24 0930 End: 09/11/24 1015      PRN Meds:  acetaminophen, 650 mg, Oral, Q6H PRN  albuterol, 2 puff, Inhalation, Q4H PRN x 1   dextromethorphan-guaiFENesin, 10 mL, Oral, Q4H PRN  epoetin shavonne, 6,000 Units, Intravenous, After Dialysis  fentaNYL, , Intravenous, PRN  lidocaine-epinephrine 1%-1:230024 buffered, , Infiltration, PRN  midazolam, , , PRN  trimethobenzamide, 200 mg, Intramuscular, Q6H PRN  vancomycin, , Intravenous, Continuous PRN        Discharge Plan:  to be determined.     Network Utilization Review Department  ATTENTION: Please call with any questions or concerns to [phone] and carefully listen to the prompts so that you are directed to the right person. All voicemails are confidential.   For Discharge needs, contact Care Management DC Support Team at [phone] opt. [phone]  Send all requests for admission clinical reviews, approved or denied determinations and any other requests to  dedicated fax number below belonging to the campus where the patient is receiving treatment. List of dedicated fax numbers for the Facilities:  FACILITY NAME UR FAX NUMBER   ADMISSION DENIALS (Administrative/Medical Necessity) [phone]   DISCHARGE SUPPORT TEAM (NETWORK) [phone]   PARENT CHILD HEALTH (Maternity/NICU/Pediatrics) [phone]   [facility] [phone]   [facility] [phone]   [facility] [phone]   [facility] [phone]   [facility] [phone]   [facility] [phone]   [facility] [phone]   [facility] [phone]   [facility] [phone]   [facility] [phone]   [facility] [phone]   [facility] [phone]

## 2024-09-12 NOTE — PROGRESS NOTES
Progress Note - Pulmonary   Ngozi Beard 64 y.o. female MRN: [id number]  Unit/Bed#: ICU 05 Encounter: [id number]      Assessment/Plan:  Patient is a 64-year-old female with past medical history of moderate persistent asthma, pulmonary hypertension, recently diagnosed anti-GBM disease, and ESRD on HD who presents for worsening shortness of breath.     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 Tolerated procedure well. BAL revealed macrophage predominant fluid. Results non consistent with infection. Cultures no growth to date. No signs of diffuse alveolar hemorrhage.   -Currently on 2L supplemental oxygen, wean as tolerated  -Continue to monitor O2 saturations and maintain O2 saturation >89%  -Recommend cardiomyopathy evaluation with cardiology and likely ischemic workup  -Continue albuterol/atrovent nebs TID, breo daily (in place of home advair), singulair daily  -Continue to encourage incentive spirometry, OOB as tolerated.  -Outpatient pulmonary follow-up    Subjective:   Patient evaluated this morning at bedside. She underwent bronchoscopy yesterday. BAL revealed macrophage predominant fluid. Results not consistent with infection. Cultures no growth to date.     Patient is on 2L supplemental oxygen this morning. She reports some chest tightness however she did not receive her inhaler this morning. She is currently undergoing  "dialysis    Objective:    Vitals: Blood pressure 115/97, pulse 64, temperature 98.5 °F (36.9 °C), temperature source Axillary, resp. rate 18, height 5' 3\" (1.6 m), weight 107 kg (235 lb 10.8 oz), SpO2 98%., 2L, Body mass index is 41.75 kg/m².      Intake/Output Summary (Last 24 hours) at 9/12/2024 0851  Last data filed at 9/11/2024 2000  Gross per 24 hour   Intake 480 ml   Output --   Net 480 ml         Physical Exam  Vitals and nursing note reviewed.   Constitutional:       General: She is not in acute distress.     Appearance: She is obese.   HENT:      Head: Normocephalic and atraumatic.      Nose: Nose normal.      Mouth/Throat:      Pharynx: Oropharynx is clear.   Eyes:      Conjunctiva/sclera: Conjunctivae normal.   Cardiovascular:      Rate and Rhythm: Normal rate.   Pulmonary:      Effort: Pulmonary effort is normal. No respiratory distress.      Breath sounds: Decreased breath sounds and rhonchi (diffuse bilaterally) present. No wheezing.   Abdominal:      Palpations: Abdomen is soft.   Musculoskeletal:         General: No swelling. Normal range of motion.      Cervical back: Normal range of motion and neck supple.   Skin:     General: Skin is warm and dry.      Capillary Refill: Capillary refill takes less than 2 seconds.   Neurological:      General: No focal deficit present.      Mental Status: She is alert and oriented to person, place, and time.   Psychiatric:         Mood and Affect: Mood normal.     Labs:   Results from last 7 days   Lab Units 09/12/24 0458 09/11/24 0623 09/10/24  0538   WBC Thousand/uL 11.86* 12.24* 10.41*   HEMOGLOBIN g/dL 7.9* 8.2* 8.3*   HEMATOCRIT % 25.5* 26.0* 27.2*   PLATELETS Thousands/uL 199 212 206      Results from last 7 days   Lab Units 09/12/24 0458 09/11/24  0623 09/10/24  0538 09/08/24  0515 09/07/24  0545 09/06/24  0609   POTASSIUM mmol/L 4.0 4.3 4.1   < > 5.4* 5.1   CHLORIDE mmol/L 97 96 101   < > 98 99   CO2 mmol/L 33* 31 30   < > 29 29   BUN mg/dL 49* 37* 63*   " "< > 44* 52*   CREATININE mg/dL 7.73* 5.94* 9.17*   < > 7.95* 8.84*   CALCIUM mg/dL 9.3 9.2 9.5   < > 9.3 9.7   ALK PHOS U/L  --   --   --   --  72 75   ALT U/L  --   --   --   --  13 16   AST U/L  --   --   --   --  16 10*    < > = values in this interval not displayed.     Results from last 7 days   Lab Units 09/09/24  0521   MAGNESIUM mg/dL 2.3     Results from last 7 days   Lab Units 09/09/24  0521   PHOSPHORUS mg/dL 5.3*                No results found for: \"TROPONINI\"    Procalcitonin: 1.00, 0.77     Flu/COVID/RSV PCR: Negative     Culture Data: MRSA negative, BC 1/2 + MRSE     Urinary antigens: Need collected      D-Dimer: 2.18     BNP: 3019    Microbiology:  Microbiology Results (last 21 days)       Collected Updated Procedure Result Status Patient Facility Result Comment      09/02/2024 1804 09/03/2024 0754 Sputum culture and Gram stain [[id number]]   (Abnormal)   Expectorated Sputum    Preliminary result [facility]  Component Value   Gram Stain Result Rare Epithelial cells per low power field  [P]     No polys seen  [P]     Rare Gram positive cocci in pairs  [P]     Rare Gram variable rods  [P]                09/02/2024 1427 09/03/2024 0000 Respiratory Panel 2.1(RP2)with COVID19 [[id number]]   Nasopharyngeal Swab    Final result [facility]  Component Value   Adenovirus Not Detected   Bordetella parapertussis Not Detected   Bordetella pertussis Not Detected   Chlamydia pneumoniae Not Detected   SARS-CoV-2 Not Detected   Coronavirus 229E Not Detected   Coronavirus HKU1 Not Detected   Coronavirus NL63 Not Detected   Coronavirus OC43 Not Detected   Human Metapneumovirus Not Detected   Rhino/Enterovirus Not Detected   Influenza A Not Detected   Influenza B Not Detected   Mycoplasma pneumoniae Not Detected   Parainfluenza 1 Not Detected   Parainfluenza 2 Not Detected   Parainfluenza 3 Not Detected   Parainfluenza 4 Not Detected   Respiratory Syncytial Virus Not " Detected            08/31/2024 0043 09/01/2024 1005 MRSA culture [[id number]]   Nares from Nose    Final result [facility]  Component Value   MRSA Culture Only No Methicillin Resistant Staphlyococcus aureus (MRSA) isolated               08/30/2024 2039 09/02/2024 2301 Blood culture #1 [[id number]]   Blood from Line, Venous    Preliminary result [facility]  Component Value   Blood Culture No Growth at 72 hrs.  [P]                08/30/2024 2039 09/02/2024 0911 Blood culture #2 [[id number]]    (Abnormal)   Blood from Arm, Right    Final result [facility] Susceptibility testing will not be performed as this organism, when isolated from a single set of blood cultures, represents probable skin izzy contamination. Please call the Microbiology Laboratory within 5 days at [phone] if further workup is required. Component Value   Blood Culture Staphylococcus epidermidis   Gram Stain Result Gram positive cocci in clusters    Refer to Blood Culture Identification Panel results    This is an appended report. These results have been appended to a previously preliminary verified report.               08/30/2024 2039 09/02/2024 0911 Blood Culture Identification Panel [[id number]]    (Abnormal)   Blood from Arm, Right    Final result [facility] Film Array panel tests for 11 gram positive organisms, 15 gram negative organisms, 7 yeast species and 10 resistance genes.  Component Value   Staphylococcus epidermidis Detected   mecA/C (Methicillin-resistance gene) Detected    This organism is a coagulase-negative Staphylococcus    If only 1 set of blood cultures is positive, this may represent a contaminant.  Consider holding antibiotics or repeating cultures prior to starting antibiotics.      If >1 one set of blood cultures is positive, vancomycin is the preferred therapy.                 08/30/2024 2025 08/30/2024 2112  FLU/RSV/COVID - if FLU/RSV clinically relevant [[id number]]    Nares from Nose    Final result [facility] This test has been performed using the CoV-2/Flu/RSV plus assay on the [employer] GeneXpert platform. This test has been validated by the  and verified by the performing laboratory.    This test is designed to amplify and detect the following: nucleocapsid (N), envelope (E), and RNA-dependent RNA polymerase (RdRP) genes of the SARS-CoV-2 genome; matrix (M), basic polymerase (PB2), and acidic protein (PA) segments of the influenza A genome; matrix (M) and non-structural protein (NS) segments of the influenza B genome, and the nucleocapsid genes of RSV A and RSV B.    Positive results are indicative of the presence of Flu A, Flu B, RSV, and/or SARS-CoV-2 RNA. Positive results for SARS-CoV-2 or suspected novel influenza should be reported to state, local, or federal health departments according to local reporting requirements.     All results should be assessed in conjunction with clinical presentation and other laboratory markers for clinical management.    FOR PEDIATRIC PATIENTS - copy/paste COVID Guidelines URL to browser: [email or web address]     Component Value   SARS-CoV-2 Negative    INFLUENZA A PCR Negative    INFLUENZA B PCR Negative    RSV PCR Negative                       Imaging and other studies: I have personally reviewed pertinent films in PACS    CTA Chest PE 8/31/2024:   No pulmonary embolism. Since July 11, 2022 there is persistent tree-in-bud nodularity in the lungs suggestive of a widespread infectious bronchiolitis that may be secondary to an atypical mycobacterium. Follicular bronchiolitis and acute hypersensitivity pneumonitis could also be in the differential. Follow-up chest CT in 3 months after treatment for infectious bronchiolitis is advised. New mild patchy bilateral multifocal pneumonia. Pulmonary  hypertension. New moderate bilateral pleural effusions.     Chest x-ray 8/30/2024:  New opacification lateral left lung base may reflect atelectasis or pneumonia. Probable small left effusion. Mild cardiomegaly with mild vascular and interstitial prominence suggesting some degree of volume overload.     EKG, Pathology, and Other Studies: I have personally reviewed pertinent reports.                  Echocardiogram, 8/31/2024:  Left Ventricle: Left ventricular cavity size is mildly dilated. Wall thickness is mildly increased. The left ventricular ejection fraction is 25%. Systolic function is severely reduced. There is severe global hypokinesis.  Right Ventricle: Right ventricular cavity size mildly dilated. Systolic function mildly reduced.  Left Atrium: Atrium is mildly dilated.  Right Atrium: Atrium is mildly dilated.  Aortic Valve: Mild regurgitation.  Mitral Valve: Moderate to severe regurgitation.  Tricuspid Valve: Moderate regurgitation. Right ventricular systolic pressure is moderately elevated. The estimated RVSP is 48.00 mmHg.  Pericardium: Small pericardial effusion anterior to the heart.    Gill Stevens  Pulmonary & Critical Care Medicine Fellow PGY-4  [facility]

## 2024-09-12 NOTE — PLAN OF CARE
Post-Dialysis RN Treatment Note    Blood Pressure:  Pre 115/68 mm/Hg  Post 103/56 mmHg   EDW  111.5 kg    Weight:  Pre 107 kg   Post 105 kg   Mode of weight measurement: Bed Scale   Volume Removed  2000 ml    Treatment duration 210 minutes    NS given  No    Treatment shortened? No   Medications given during Rx Not Applicable   Estimated Kt/V  1.22   Access type: Permacath/TDC   Access Issues: Yes, describe: Reversed     Report called to primary nurse   Pipo Calvert RN        Goal of treatment is the removal of 2.0 kg fluid in this session.  Clearance of renal toxins and waste.  Balancing of electrolytes with the ordered bath.    Problem: METABOLIC, FLUID AND ELECTROLYTES - ADULT  Goal: Electrolytes maintained within normal limits  Description: INTERVENTIONS:  - Monitor labs and assess patient for signs and symptoms of electrolyte imbalances  - Administer electrolyte replacement as ordered  - Monitor response to electrolyte replacements, including repeat lab results as appropriate  - Instruct patient on fluid and nutrition as appropriate  Outcome: Progressing  Goal: Fluid balance maintained  Description: INTERVENTIONS:  - Monitor labs   - Monitor I/O and WT  - Instruct patient on fluid and nutrition as appropriate  - Assess for signs & symptoms of volume excess or deficit  Outcome: Progressing

## 2024-09-12 NOTE — DISCHARGE INSTR - AVS FIRST PAGE
Dear Ngozi Beard,     It was our pleasure to care for you here at [facility].  It is our hope that we were always able to exceed the expected standards for your care during your stay.  You were hospitalized due shortness of breath, malfunctioning of hemodialysis port.  For follow up as well as any medication refills, we recommend that you follow up with your primary care physician. However, at this time we provide for you here, the most important instructions / recommendations at discharge:     Notable Medication Adjustments - Here is the break down based on the recommendations from each specialists  Pulmonology     -Continue fluticasone-vilanterol (Breo) 200 -25 daily     -Continue Montelukast (Singular) 10 mg at bed time     -Continue albuterol inhaler every 4 hours as needed   Cardiology     - Continue Atorvastatin 20 mg Daily for lowering cholesterol to prevent future cardiovascular disease     - Continue Metoprolol 50 mg twice a day   Nephrology     - Continue hemodialysis as scheduled (Tuesday/Thurs/Sat)     - Continue prednisone steroid taper, for your autoimmune kidney disease        * 15 mg starting September 7 to September 20        * 12.5 mg starting September 21 to October 4        * 10 mg starting October 5 to October 18        * 7.5 mg starting October 19 to November 2        * 5 mg daily starting November 3     - Continue cyclophosphamide 100 mg daily for your autoimmune kidney disease until October 31     - Please start Lokema 10 g daily, for maintaining potassium balances in your body  Infectious disease     - Continue Atovaquone 1500 mg daily to prevent infection until October 31, because you are immunocompromised from your              medications cyclophosphamide and steroid.  Please follow-up and discuss plans for this medication around the end of October.      - Infectious disease office will continue to follow up with your lab results and arrange an appointments  if needed   Home medications  - Please stop taking nifedipine  Testing Required after Discharge -   Please repeat chest CT scan 3 months to monitor progression of infectious bronchiolitis noted during this hospitalization  Important follow up information -   Follow-up with cardiologist scheduled at September 25 to discuss medical management, and further imaging and studies regarding your heart health.  Please discuss the topics of cardiac MRI and potential heart catheterization  Please continue your hemodialysis as scheduled, and see nephrologist during the sessions and discuss further planning.  Please follow-up with pulmonology scheduled on October 3 to discuss your bronchoscopy results and future CT scan of your chest.  Please follow-up with your primary care physician within 1 week after discharge  Infectious disease physician might schedule an appointment depending on the results, they will call if appointments is needed.  Other Instructions -   It is important that you follow-up with your doctors in the upcoming weeks  - If you do not hear from our scheduling office or need additional assistance, please call our scheduling [phone]    If you feel like your illness/symptoms are worsening and needs urgent care  wait until your follow-up visit or your primary care physician cannot see you  - You can get same-day medical attention at urgent care  - If you need a more serious immediate care, please return to the ER    Please review this entire after visit summary as additional general instructions including medication list, appointments, activity, diet, any pertinent wound care, and other additional recommendations from your care team that may be provided for you.      Sincerely,     Kathryn Cleveland MD

## 2024-09-12 NOTE — PROGRESS NOTES
Progress Note - Infectious Disease   Ngozi Beard 64 y.o. female MRN: [id number]  Unit/Bed#: ICU 05 Encounter: [id number]      Impression/Recommendations:  1.   Pseudomonas respiratory infection.  Clinical and radiological picture is more consistent with bronchitis than pneumonia.  Patient completed 7-day course of cefepime.  Cough is improved.  Stable intermittent mild dyspnea.  Observe off further antibiotic.     2.  Pneumonitis versus pneumonia.  Patient's respiratory symptoms are much improved admission.  Due to initial improvement of respiratory status, bronchoscopy was not done.  However, due to fluctuating respiratory status, bronchoscopy was finally done earlier this week.  There was no evidence of airway inflammation.  Of note, as in above, patient just completed 7-day course of IV cefepime yesterday.  No further antibiotic as in above.  Follow-up on BAL studies.     3.  Bacteremia, with growth of MRSE in only 1 out of 2 admission blood cultures.  Patient has no fever and is not systemically ill, making true bacteremia not likely clinically.  Although patient does have permacath in place, with growth in only 1 culture sent, this is still most likely contaminated blood draw.  Patient had been on IV vancomycin but this was discontinued.  She remains clinically well off it.  No antibiotic needed for this indication.     3.  Leukocytosis.  Most likely steroid effect.  WBC fluctuating, currently stable.     4.  Advanced CKD, on HD.  Functional difficulty of permacath noted.    HD per nephrology.     5.  Recently diagnosed rapidly progressing anti-GBM disease.  Patient is on Cytoxan and prednisone since diagnosis.  Continue outpatient Cytoxan/steroid.  Continue atovaquone PCP prophylaxis.     Discussed with patient in detail regarding the above plan.  Discussed with Dr. Cleveland from primary service regarding continued observation off antibiotic.  He is in agreement.     Antibiotics:  Off antibiotic      Subjective:  Patient is stable.  Minimal dyspnea at rest.  Cough is improved, still mildly productive.  Temperature stays down.  No chills.  Patient is tolerating antibiotic well.  No nausea, vomiting or diarrhea.     Objective:  Vitals:  Temp:  [97.8 °F (36.6 °C)-99.2 °F (37.3 °C)] 98.5 °F (36.9 °C)  HR:  [64-76] 67  Resp:  [16-19] 18  BP: (102-117)/(59-97) 110/63  SpO2:  [93 %-98 %] 98 %  Temp (24hrs), Av.3 °F (36.8 °C), Min:97.8 °F (36.6 °C), Max:99.2 °F (37.3 °C)  Current: Temperature: 98.5 °F (36.9 °C)    Physical Exam:     General: Awake, alert, cooperative, no distress.   Neck:  Supple. No mass.  No lymphadenopathy.   Lungs: Expansion symmetric, no rales, no wheezing, respirations unlabored.   Heart:  Regular rate and rhythm, S1 and S2 normal, no murmur.   Abdomen: Soft, nondistended, non-tender, bowel sounds active all four quadrants, no masses, no organomegaly.   Extremities: Stable leg edema. No erythema/warmth. No ulcer. Nontender to palpation.   Skin:  No rash.   Neuro: Moves all extremities.     Invasive Devices       Peripheral Intravenous Line  Duration             Peripheral IV 24 Proximal;Right;Ventral (anterior) Forearm 2 days    Peripheral IV 24 Right Antecubital 1 day              Hemodialysis Catheter  Duration             HD Permanent Double Catheter 1 day                    Labs studies:   I have personally reviewed pertinent labs.  Results from last 7 days   Lab Units 24  0458 24  0623 09/10/24  0538 24  0515 24  0545 24  0609   POTASSIUM mmol/L 4.0 4.3 4.1   < > 5.4* 5.1   CHLORIDE mmol/L 97 96 101   < > 98 99   CO2 mmol/L 33* 31 30   < > 29 29   BUN mg/dL 49* 37* 63*   < > 44* 52*   CREATININE mg/dL 7.73* 5.94* 9.17*   < > 7.95* 8.84*   EGFR ml/min/1.73sq m 4 6 4   < > 4 4   CALCIUM mg/dL 9.3 9.2 9.5   < > 9.3 9.7   AST U/L  --   --   --   --  16 10*   ALT U/L  --   --   --   --  13 16   ALK PHOS U/L  --   --   --   --  72 75    < > = values  in this interval not displayed.     Results from last 7 days   Lab Units 09/12/24  0458 09/11/24  0623 09/10/24  0538   WBC Thousand/uL 11.86* 12.24* 10.41*   HEMOGLOBIN g/dL 7.9* 8.2* 8.3*   PLATELETS Thousands/uL 199 212 206     Results from last 7 days   Lab Units 09/11/24  0955 09/11/24  0954 09/11/24  0946 09/11/24  0937   GRAM STAIN RESULT  3+ Polys  No organisms seen 2+ Polys  No bacteria seen 3+ Polys  No organisms seen 1+ Polys  No organisms seen       Imaging Studies:   I have personally reviewed pertinent imaging study reports and images in PACS.    EKG, Pathology, and Other Studies:   I have personally reviewed pertinent reports.

## 2024-09-12 NOTE — ASSESSMENT & PLAN NOTE
-- Hgb 7.9 today, below goal  --Continue RISHI 6000 units with dialysis  -- Trend CBC  --Transfuse for Hgb <7.0  -- Management per primary team

## 2024-09-12 NOTE — PHYSICAL THERAPY NOTE
PHYSICAL THERAPY Re-EVALUATION NOTE          Patient Name: Ngozi Beard  Today's Date: 2024        AGE:   64 y.o.  Mrn:   [id number]  ADMIT DX:  Asthma [J45.909]  Hypoxia [R09.02]  COPD exacerbation (HCC) [J44.1]    Past Medical History:  Past Medical History:   Diagnosis Date    Asthma     Chronic pain     Hypertension     Renal disorder     Sepsis (HCC) 07/10/2024       Past Surgical History:  Past Surgical History:   Procedure Laterality Date    BACK SURGERY      COLONOSCOPY      HEMODIALYSIS ADULT  9/10/2024    IR BIOPSY KIDNEY RANDOM  2024    IR TEMPORARY DIALYSIS CATHETER PLACEMENT  7/15/2024    IR TUNNELED DIALYSIS CATHETER CHECK/CHANGE/REPOSITION/ANGIOPLASTY  2024    IR TUNNELED DIALYSIS CATHETER CHECK/CHANGE/REPOSITION/ANGIOPLASTY  9/3/2024    IR TUNNELED DIALYSIS CATHETER CHECK/CHANGE/REPOSITION/ANGIOPLASTY  9/10/2024    IR TUNNELED DIALYSIS CATHETER PLACEMENT  2024    TUBAL LIGATION       Length Of Stay: 13        PHYSICAL THERAPY Re-EVALUATION:    Patient's identity confirmed via 2 patient identifiers (full name and ) at start of session       24 1510   PT Last Visit   PT Visit Date 24   Note Type   Note type Re-Evaluation   Pain Assessment   Pain Assessment Tool 0-10   Pain Score 7   Pain Location/Orientation Orientation: Right;Location: Arm;Location: Shoulder   Pain Onset/Description Onset: Ongoing  (pt reports present for a few weeks prior to current admission)   Effect of Pain on Daily Activities limits ease of mobility, tolerance to activity   Hospital Pain Intervention(s) Repositioned;Ambulation/increased activity;Emotional support   Restrictions/Precautions   Weight Bearing Precautions Per Order No   Other Precautions Cognitive;Chair Alarm;Bed Alarm;Multiple lines;Telemetry;Fall Risk;Pain;O2  (3L O2 via NC)   Home Living   Type of Home Apartment   Home Layout One level;Performs ADLs on one level;Able to  live on main level with bedroom/bathroom  (3+1 DILLAN)   Home Equipment Cane   Additional Comments Please see initial PT eval for details   Prior Function   Level of Kinder Independent with functional mobility;Independent with ADLs;Needs assistance with IADLS  (needs assistance w/ stair negotiation)   Lives With   (roommate)   Receives Help From Family  (supportive sister)   Comments At baseline pt amb ind w/ SPC   General   Additional Pertinent History Pt seen for re-evaluation due to expiration of previously set goals and plan of care   Family/Caregiver Present No   Cognition   Arousal/Participation Cooperative   Attention Attends with cues to redirect   Orientation Level Oriented to person   Memory Decreased recall of recent events;Decreased recall of precautions   Following Commands Follows one step commands without difficulty   Comments Pt ID via name and ; pt agreeable to PT tx and mobility   Strength RLE   RLE Overall Strength   (grossly assessed w/ functional mobility, at least 3+/5)   Strength LLE   LLE Overall Strength   (grossly assessed w/ functional mobility, at least 3+/5)   Vision-Basic Assessment   Current Vision Wears glasses only for reading   Bed Mobility   Supine to Sit 5  Supervision   Additional items Assist x 1;HOB elevated;Increased time required;Verbal cues   Sit to Supine 5  Supervision   Additional items Assist x 1;HOB elevated;Increased time required;Verbal cues   Additional Comments able to maintain sitting balance at EOB w/ supervision. Pt w/ c/o dizziness w/ changes in position.  at EOB, 99 at end of session, RN updated   Transfers   Sit to Stand 4  Minimal assistance   Additional items Assist x 1;Increased time required;Verbal cues   Stand to Sit 4  Minimal assistance   Additional items Assist x 1;Armrests;Increased time required;Verbal cues   Ambulation/Elevation   Gait pattern Improper Weight shift;Wide JUAN;Decreased foot clearance;Short stride;Excessively  slow;Decreased hip extension;Decreased heel strike;Decreased toe off   Gait Assistance 4  Minimal assist   Additional items Assist x 1;Verbal cues   Assistive Device None   Distance 3'   Balance   Static Sitting Fair +   Dynamic Sitting Fair   Static Standing Poor +   Dynamic Standing Poor +   Ambulatory Poor +   Endurance Deficit   Endurance Deficit Yes   Endurance Deficit Description decreased activity tolerance   Activity Tolerance   Activity Tolerance Patient limited by fatigue   Medical Staff Made Aware Pt benefited from PT/OT care coordination w/ OT Janay due to to allow for challenge of pt's activity tolerance, PT and OT goals were addressed individually during session   Nurse Made Aware RN Nehal   Assessment   Prognosis Fair   Problem List Decreased strength;Decreased endurance;Impaired balance;Decreased mobility;Pain   Assessment Ngozi Beard is a 64 y.o. Female who originally presented to [facility] on 24 due to asthma w/ current dx of unspecified mood disorder. Orders for PT eval and treat received. Comorbidities continuing to affect pt include: HTN, asthma, anemia, , cardiomyopathy. Personal factors affecting pt DC include: ambulating w/ assistive device, stairs to enter home, inability to ambulate household distances, inability to navigate level surfaces w/o external assistance, and limited home support. At baseline, pt mobilizes ind w/ SPC, and reports 0 fall(s) in the previous 6 months. Pt currently presents w/ the following deficits: impaired strength, impaired balance, decreased endurance/activity tolerance, pain affecting mobility, and gait deviations. Upon re-eval, pt requires  supervision for bed mobility, min Ax1 for transfers, min Ax1 w/ RW for ambulation. From a PT/mobility standpoint given the above findings, DC recommendation is level: II (Moderate Rehab Resource Intensity). During current admission, pt will benefit from continued skilled inpatient PT in the acute care setting in  order to address the above deficits and to maximize function and mobility prior to DC from acute care.   Barriers to Discharge Inaccessible home environment;Decreased caregiver support  (pt has 3 DILLAN, lives w/ roommate)   Goals   Patient Goals to rest   STG Expiration Date 09/22/24   Short Term Goal #1 Pt will: perform bed mobility w/ mod I to decrease pt's burden of care and increase pt's independence w/ repositioning in bed; perform transfers w/ mod I to promote OOB mobility; ambulate at least 150' w/ LRAD and mod I to increase pt's ambulatory endurance/tolerance; negotiate 3 stair(s) w/ UE support and min Ax1 to facilitate pt returning to previous living environment; increase all balance ratings by at least 1 grade to decrease pt's risk of falls   Plan   Treatment/Interventions Functional transfer training;LE strengthening/ROM;Elevations;Therapeutic exercise;Endurance training;Patient/family training;Cognitive reorientation;Equipment eval/education;Bed mobility;Gait training;Compensatory technique education   PT Frequency 3-5x/wk   Discharge Recommendation   Rehab Resource Intensity Level, PT II (Moderate Resource Intensity)   Equipment Recommended Walker   Walker Package Recommended Wheeled walker   Change/add to Walker Package? No   AM-PAC Basic Mobility Inpatient   Turning in Flat Bed Without Bedrails 3   Lying on Back to Sitting on Edge of Flat Bed Without Bedrails 2   Moving Bed to Chair 3   Standing Up From Chair Using Arms 3   Walk in Room 2   Climb 3-5 Stairs With Railing 1   Basic Mobility Inpatient Raw Score 14   Basic Mobility Standardized Score 35.55   [facility] Highest Level Of Mobility   -[facility] Goal 4: Move to chair/commode   -[facility] Achieved 5: Stand (1 or more minutes)   Additional Treatment Session   Start Time 1525   End Time 1540   Treatment Assessment Additional PT tx provided post re-eval w/ pt agreeable to participate. Pt requesting to go into the bathroom. Pt sitting OOB in recliner chair  w/ chair brought to entrance of ICU bathroom. Using the RW, pt performed 2 ambulation trials of 6' w/ min Ax1 and a toilet transfer w/ min Ax1. Pt w ongoing reports of fatigue and weakness. Pt continues to be functioning below baseline level, and remains limited in functional mobility due to impaired balance, decreased endurance, generalized weakness, gait deviations. Pt will continue benefit from PT to promote independence w/ functional mobility and progress towards set goals. Recommend DC w/ level: II (Moderate Rehab Resource Intensity) when medically cleared.   Equipment Use RW   Additional Treatment Day 1   End of Consult   Patient Position at End of Consult Supine;All needs within reach;Bed/Chair alarm activated       The patient's AM-PAC Basic Mobility Inpatient Short Form Raw Score is 14. A Raw score of less than or equal to 16 suggests the patient may benefit from discharge to post-acute rehabilitation services. Please also refer to the recommendation of the Physical Therapist for safe discharge planning.    Pt will benefit from skilled inpatient PT during this admission in order to facilitate progress towards goals and to maximize functional independence prior to DC      DC rec: level II (Moderate Rehab Resource Intensity)        Marianne Wilson, PT, DPT  09/12/24

## 2024-09-12 NOTE — ASSESSMENT & PLAN NOTE
Recent Labs     09/10/24  0538 09/11/24  0623 09/12/24  0458   HGB 8.3* 8.2* 7.9*      Baseline Hgb 7-8  Etiology: component of iron deficiency and renal disease.     Plan  Monitor Hgb, transfuse for < 7  Continue PTA Ferrex

## 2024-09-12 NOTE — ASSESSMENT & PLAN NOTE
-CT chest PE study revealed no evidence of PE. There is persistent tree-in-bud nodularity in the lungs suggestive of a widespread infectious bronchiolitis that may be secondary to an atypical mycobacterium. Follicular bronchiolitis and acute hypersensitivity pneumonitis could also be in the differential.  New mild patchy bilateral multifocal pneumonia  - Sputum culture: 4+ Pseudomonas   - ID: Chronic colonization of pseudomonas, rather than acute infection. Finished Cefepime x7 d course ended 9/10 .    * 9/12 Dr. Pham, Will follow up result, but no indication for keep her here for the result.   - Cardiology: Cleared for bronchoscopy.  - Pulm: 9/11 Bronchoscopy under sedation, culture sent  - Nephrology: Abnormal chest imaging, possible contributing factors to her catheter dysfunction    Improvement in coughing and sputum production symptoms with addition of abx, suspect patient presentation partially contributed from bronchitis secondary to pseudomonas.     Plan:   -Bronchoscopy 9/11, 9/12 result below  Fungal culture  Viral culture  Bronchial culture   Gram stain:   Legionella  AFB culture and stain  TB PCR

## 2024-09-12 NOTE — ASSESSMENT & PLAN NOTE
-- Continue cyclophosphamide and prednisone taper as detailed  --Completed plasmapheresis in August  --Remains dialysis dependent, TTS at [facility]  --Hemodialysis initiation on July 16, 2024  --s/p bronchoscopy 9/11/2024 due to ongoing pulmonary issues.  --Access: Right IJ permcath, repositioned by IR 9/10/2024. access worked well 9/10 for dialysis treatment.  Will monitor success with this current catheter.  Our plan is that if the catheter continues to have issues ongoing consider transitioning to urgent start PD.  --last treatment 9/10/24  --currently undergoing hemodialysis  --next treatment 9/14/2024.  Continue TTS schedule

## 2024-09-13 PROBLEM — R06.02 SHORTNESS OF BREATH: Status: RESOLVED | Noted: 2024-08-11 | Resolved: 2024-09-13

## 2024-09-13 LAB
ANION GAP SERPL CALCULATED.3IONS-SCNC: 7 MMOL/L (ref 4–13)
BACTERIA BRONCH AEROBE CULT: NORMAL
BUN SERPL-MCNC: 33 MG/DL (ref 5–25)
CALCIUM SERPL-MCNC: 9.3 MG/DL (ref 8.4–10.2)
CHLORIDE SERPL-SCNC: 96 MMOL/L (ref 96–108)
CO2 SERPL-SCNC: 33 MMOL/L (ref 21–32)
CREAT SERPL-MCNC: 5.76 MG/DL (ref 0.6–1.3)
ERYTHROCYTE [DISTWIDTH] IN BLOOD BY AUTOMATED COUNT: 19.3 % (ref 11.6–15.1)
FUNGUS SPEC CULT: ABNORMAL
GFR SERPL CREATININE-BSD FRML MDRD: 7 ML/MIN/1.73SQ M
GLUCOSE SERPL-MCNC: 94 MG/DL (ref 65–140)
GRAM STN SPEC: NORMAL
HCT VFR BLD AUTO: 26.5 % (ref 34.8–46.1)
HGB BLD-MCNC: 8 G/DL (ref 11.5–15.4)
MCH RBC QN AUTO: 29.1 PG (ref 26.8–34.3)
MCHC RBC AUTO-ENTMCNC: 30.2 G/DL (ref 31.4–37.4)
MCV RBC AUTO: 96 FL (ref 82–98)
PLATELET # BLD AUTO: 208 THOUSANDS/UL (ref 149–390)
PMV BLD AUTO: 10.5 FL (ref 8.9–12.7)
POTASSIUM SERPL-SCNC: 3.8 MMOL/L (ref 3.5–5.3)
RBC # BLD AUTO: 2.75 MILLION/UL (ref 3.81–5.12)
SODIUM SERPL-SCNC: 136 MMOL/L (ref 135–147)
WBC # BLD AUTO: 8.68 THOUSAND/UL (ref 4.31–10.16)

## 2024-09-13 PROCEDURE — 99232 SBSQ HOSP IP/OBS MODERATE 35: CPT | Performed by: INTERNAL MEDICINE

## 2024-09-13 PROCEDURE — 85027 COMPLETE CBC AUTOMATED: CPT

## 2024-09-13 PROCEDURE — 80048 BASIC METABOLIC PNL TOTAL CA: CPT

## 2024-09-13 PROCEDURE — 94640 AIRWAY INHALATION TREATMENT: CPT

## 2024-09-13 PROCEDURE — 94664 DEMO&/EVAL PT USE INHALER: CPT

## 2024-09-13 PROCEDURE — 94760 N-INVAS EAR/PLS OXIMETRY 1: CPT

## 2024-09-13 RX ORDER — PREDNISONE 5 MG/1
TABLET ORAL
Status: CANCELLED
Start: 2024-09-14 | End: 2024-09-26

## 2024-09-13 RX ORDER — PREDNISONE 5 MG/1
TABLET ORAL
Status: CANCELLED
Start: 2024-09-14 | End: 2024-12-02

## 2024-09-13 RX ORDER — METOPROLOL SUCCINATE 50 MG/1
50 TABLET, EXTENDED RELEASE ORAL 2 TIMES DAILY
Status: CANCELLED
Start: 2024-09-13

## 2024-09-13 RX ORDER — ATOVAQUONE 750 MG/5ML
1500 SUSPENSION ORAL
Status: DISCONTINUED | OUTPATIENT
Start: 2024-09-13 | End: 2024-09-19 | Stop reason: HOSPADM

## 2024-09-13 RX ADMIN — METOPROLOL SUCCINATE 50 MG: 50 TABLET, EXTENDED RELEASE ORAL at 09:09

## 2024-09-13 RX ADMIN — FLUTICASONE FUROATE AND VILANTEROL TRIFENATATE 1 PUFF: 200; 25 POWDER RESPIRATORY (INHALATION) at 10:20

## 2024-09-13 RX ADMIN — SEVELAMER HYDROCHLORIDE 1600 MG: 800 TABLET ORAL at 17:35

## 2024-09-13 RX ADMIN — IPRATROPIUM BROMIDE 0.5 MG: 0.5 SOLUTION RESPIRATORY (INHALATION) at 08:24

## 2024-09-13 RX ADMIN — HEPARIN SODIUM 5000 UNITS: 5000 INJECTION INTRAVENOUS; SUBCUTANEOUS at 09:09

## 2024-09-13 RX ADMIN — SEVELAMER HYDROCHLORIDE 1600 MG: 800 TABLET ORAL at 09:21

## 2024-09-13 RX ADMIN — PANTOPRAZOLE SODIUM 40 MG: 40 TABLET, DELAYED RELEASE ORAL at 05:30

## 2024-09-13 RX ADMIN — LEVALBUTEROL HYDROCHLORIDE 1.25 MG: 1.25 SOLUTION RESPIRATORY (INHALATION) at 08:24

## 2024-09-13 RX ADMIN — SEVELAMER HYDROCHLORIDE 1600 MG: 800 TABLET ORAL at 12:11

## 2024-09-13 RX ADMIN — MONTELUKAST 10 MG: 10 TABLET, FILM COATED ORAL at 09:09

## 2024-09-13 RX ADMIN — TRAZODONE HYDROCHLORIDE 200 MG: 100 TABLET ORAL at 21:05

## 2024-09-13 RX ADMIN — VENLAFAXINE HYDROCHLORIDE 225 MG: 150 CAPSULE, EXTENDED RELEASE ORAL at 09:13

## 2024-09-13 RX ADMIN — ATOVAQUONE 1500 MG: 750 SUSPENSION ORAL at 12:43

## 2024-09-13 RX ADMIN — SENNOSIDES AND DOCUSATE SODIUM 2 TABLET: 8.6; 5 TABLET ORAL at 21:04

## 2024-09-13 RX ADMIN — ATORVASTATIN CALCIUM 20 MG: 20 TABLET, FILM COATED ORAL at 09:12

## 2024-09-13 RX ADMIN — CYCLOPHOSPHAMIDE 100 MG: 50 CAPSULE ORAL at 10:45

## 2024-09-13 RX ADMIN — LIDOCAINE 5% 1 PATCH: 700 PATCH TOPICAL at 09:08

## 2024-09-13 RX ADMIN — HEPARIN SODIUM 5000 UNITS: 5000 INJECTION INTRAVENOUS; SUBCUTANEOUS at 17:36

## 2024-09-13 RX ADMIN — LAMOTRIGINE 100 MG: 100 TABLET ORAL at 21:05

## 2024-09-13 RX ADMIN — POLYSACCHARIDE-IRON COMPLEX 150 MG: 150 CAPSULE ORAL at 09:09

## 2024-09-13 RX ADMIN — PREDNISONE 15 MG: 10 TABLET ORAL at 09:09

## 2024-09-13 RX ADMIN — METOPROLOL SUCCINATE 50 MG: 50 TABLET, EXTENDED RELEASE ORAL at 17:36

## 2024-09-13 NOTE — ASSESSMENT & PLAN NOTE
-- CT without PE, + bilateral pleural effusions, tree-in-bud nodularity, RUL groundglass and infiltrate RML  -- pulmonary on board.    --Status post bronchoscopy with negative findings  --Continue supportive care  --Management per pulmonary

## 2024-09-13 NOTE — UTILIZATION REVIEW
Continued Stay Review    Date: 9/13/24                           Current Patient Class: Inpatient  Current Level of Care: MS     HPI:64 y.o. female initially admitted on 8/30/24     Assessment/Plan: 9/13/24    Multifocal PNA . ID following :Chronic colonization of pseudomonas, rather than acute infection. Finished Cefepime x7 d course ended 9/10 . Continue atovaquone indefinitely . Pt cleared by cardiology for bronchoscopy which was completed 9/11/24 . F/U cx . So far cx from bronch show 2-3+polys, no organisms. AFB cx and statin not seen . Pt receiving HD . On 9/10 , catheter exchanged, no issue with hemodialysis. Latest HD 9/12 with no issues noted . Next session scheduled 9/14 .  K 3.8 on Lokelma . On  prednisone taper . Resp status almost back to baseline . .O2 down from2 L yesterday to RA today, O2 sat 93 % .        Vital Signs (last 3 days)       Date/Time Temp Pulse Resp BP MAP (mmHg) SpO2 Calculated FIO2 (%) - Nasal Cannula Nasal Cannula O2 Flow Rate (L/min) O2 Device Patient Position - Orthostatic VS Kristen Coma Scale Score Pain    09/13/24 0826 -- 74 20 -- -- 93 % -- -- None (Room air) -- -- --    09/13/24 07:39:54 98 °F (36.7 °C) 69 -- 106/66 79 94 % -- -- -- -- -- --    09/12/24 23:52:26 98.4 °F (36.9 °C) 94 18 92/64 73 96 % -- -- -- -- -- --    09/12/24 2100 -- -- -- -- -- -- -- -- -- -- 15 No Pain    09/12/24 1900 -- -- -- -- -- -- -- -- None (Room air) -- -- --    09/12/24 18:33:07 98.8 °F (37.1 °C) 80 -- 103/67 79 92 % -- -- -- -- -- --    09/12/24 1700 -- 79 -- 94/52 66 96 % -- -- -- -- -- --    09/12/24 1659 98.8 °F (37.1 °C) 78 -- 99/57 69 96 % -- -- -- -- -- --    09/12/24 1600 -- 90 -- 95/57 71 96 % -- -- -- -- -- --    09/12/24 1538 -- -- -- -- -- -- -- -- -- -- -- 7 09/12/24 1510 -- -- -- -- -- -- -- -- -- -- -- 7 09/12/24 1500 -- 79 -- 101/58 76 95 % -- -- -- -- -- --    09/12/24 1400 -- 75 -- 105/56 74 97 % -- -- -- -- -- --    09/12/24 1345 -- -- -- -- -- 96 % -- -- -- -- -- --     09/12/24 1300 -- 75 -- 108/61 80 96 % -- -- -- -- -- --    09/12/24 1240 98.8 °F (37.1 °C) 75 18 103/56 73 -- -- -- -- -- -- --    09/12/24 1220 -- 66 18 109/68 83 -- -- -- -- -- -- --    09/12/24 1200 -- 66 18 108/70 85 -- -- -- -- Lying -- --    09/12/24 1130 -- 70 18 115/70 -- -- -- -- -- Lying -- --    09/12/24 1100 -- 65 18 114/63 -- -- -- -- -- Lying -- --    09/12/24 1030 -- 67 18 115/60 -- -- -- -- -- Lying -- --    09/12/24 1000 -- 67 18 110/63 81 -- -- -- -- Lying -- --    09/12/24 0930 -- 68 18 107/62 -- -- -- -- -- Lying -- --    09/12/24 0900 -- 68 18 113/67 -- -- -- -- -- Lying -- --    09/12/24 0850 98.5 °F (36.9 °C) 68 18 115/68 86 -- -- -- -- Lying -- --    09/12/24 0809 -- -- -- -- -- 98 % -- -- -- -- -- --    09/12/24 0800 -- -- -- -- -- -- -- -- -- -- 15 3    09/12/24 0700 98.5 °F (36.9 °C) 64 18 115/97 86 97 % -- -- Nasal cannula Lying -- --    09/12/24 0500 98.2 °F (36.8 °C) 68 -- -- -- 97 % -- -- -- -- -- --    09/12/24 0400 -- -- -- -- -- -- -- -- -- -- 15 --    09/12/24 0110 97.9 °F (36.6 °C) 73 -- 117/73 89 93 % -- -- -- -- -- --    09/12/24 0000 -- -- -- -- -- -- -- -- -- -- 15 --    09/11/24 2232 99.2 °F (37.3 °C) -- 19 114/67 85 -- -- -- -- -- -- --    09/11/24 2000 -- -- -- -- -- -- -- -- -- -- 15 9 09/11/24 1937 -- -- -- -- -- 97 % -- -- -- -- -- --    09/11/24 1910 98 °F (36.7 °C) -- 17 102/59 75 -- -- -- -- -- -- --    09/11/24 1500 97.8 °F (36.6 °C) 76 16 107/60 78 93 % -- -- Nasal cannula Lying -- --    09/11/24 1346 -- -- -- -- -- 93 % 28 2 L/min Nasal cannula -- -- --    09/11/24 1100 98 °F (36.7 °C) 76 16 108/60 78 94 % -- -- Nasal cannula Lying -- --    09/11/24 1000 -- 77 -- 114/61 83 99 % -- -- -- -- -- --    09/11/24 0945 -- -- -- 113/57 79 -- -- -- -- -- -- --    09/11/24 0930 -- 77 -- 112/56 77 98 % -- -- -- -- -- Med Not Given for Pain - for MAR use only    09/11/24 0900 -- 77 -- 115/63 84 88 % -- -- -- -- -- Med Not Given for Pain - for MAR use only    09/11/24 0855  -- -- -- 112/62 82 -- -- -- -- -- -- --    09/11/24 0850 -- -- -- 117/66 87 -- -- -- -- -- -- --    09/11/24 0845 -- -- -- 122/74 91 -- -- -- -- -- -- --    09/11/24 0837 -- -- -- -- -- 99 % 36 4 L/min Nasal cannula -- -- --    09/11/24 0814 -- 77 22 122/76 95 94 % -- -- -- -- -- --    09/11/24 0800 -- 80 19 117/74 91 92 % -- -- -- -- 15 No Pain    09/11/24 0758 98.8 °F (37.1 °C) 92 23 117/74 91 91 % -- -- None (Room air) Lying -- --    09/11/24 0000 -- -- -- -- -- -- -- -- -- -- 15 --    09/10/24 2023 -- -- -- -- -- 95 % -- -- None (Room air) -- -- --    09/10/24 2000 -- -- -- -- -- -- -- -- -- -- 15 No Pain    09/10/24 19:57:15 -- 80 -- 115/64 81 96 % -- -- -- -- -- --    09/10/24 19:56:52 -- 79 -- 115/64 81 95 % -- -- -- -- -- --    09/10/24 17:31:33 -- 81 -- 104/49 67 93 % -- -- -- -- -- --    09/10/24 1618 98.2 °F (36.8 °C) 70 16 136/74 -- -- -- -- -- Lying -- --    09/10/24 1615 -- 68 16 89/50 -- -- -- -- -- Lying -- --    09/10/24 1600 -- 72 16 92/48 -- -- -- -- -- Lying -- --    09/10/24 1530 -- 76 16 88/55 -- -- -- -- -- Lying -- --    09/10/24 1500 -- 75 16 87/60 -- -- -- -- -- Lying -- --    09/10/24 1430 -- 76 16 95/67 -- -- -- -- -- Lying -- --    09/10/24 1400 -- 71 16 107/64 -- -- -- -- -- Lying -- --    09/10/24 1330 -- 71 16 112/68 -- -- -- -- -- Lying -- --    09/10/24 1300 98 °F (36.7 °C) 69 16 114/74 -- -- -- -- -- Lying -- --    09/10/24 1145 -- 67 16 116/79 -- 99 % -- -- -- -- -- --    09/10/24 1140 -- 64 16 121/73 -- 100 % -- -- -- -- -- --    09/10/24 1135 -- 68 16 122/66 -- 100 % -- -- -- -- -- --    09/10/24 1130 -- 73 16 121/72 -- 100 % -- -- -- -- -- --    09/10/24 1125 -- 72 18 124/79 -- 100 % -- -- -- -- -- --    09/10/24 1120 -- 74 -- 120/75 -- 100 % -- -- -- -- -- --    09/10/24 1115 -- 75 -- 116/71 -- 96 % -- -- -- -- -- --    09/10/24 0900 -- -- -- -- -- 92 % -- -- None (Room air) -- 15 No Pain    09/10/24 08:32:46 98.3 °F (36.8 °C) 72 -- 110/68 82 92 % -- -- -- -- -- --    09/10/24  0737 -- -- -- -- -- 95 % -- -- None (Room air) -- -- --    09/10/24 0608 97.4 °F (36.3 °C) 72 17 116/71 86 99 % -- -- -- Lying -- --          Weight (last 2 days)       Date/Time Weight    09/13/24 0600 106 (233.47)    09/12/24 0600 107 (235.67)    09/11/24 0600 104 (229.5)              Pertinent Labs/Diagnostic Results:   Radiology:  RADIOLOGY RESULTS   Final Interpretation by  (09/10 1235)      IR tunneled dialysis catheter check/change/reposition/angioplasty   Final Interpretation by Gerber Fletcher MD (09/11 0826)   Tunneled dialysis catheter over-the-wire exchange with fibrin sheath stripping.         Workstation performed: [id number]         XR chest portable   Final Interpretation by Maddie Valencia MD (09/09 0831)   No acute cardiopulmonary disease.   Dialysis catheter tip appears to be at the level of the SVC. PA and lateral views in standard position to confirm.            Workstation performed: [id number]         XR chest portable   Final Interpretation by Maddie Valencia MD (09/04 1449)   No pleural effusions identified.   Probable mild pulmonary vascular congestion.   Unchanged cardiomegaly.            Workstation performed: [id number]         IR tunneled dialysis catheter check/change/reposition/angioplasty   Final Interpretation by Graciela Bettencourt MD (09/03 1142)   1.  Pericatheter fibrin sheath stripping.   2.  Right-sided internal jugular tunneled dialysis catheter exchange, with its tip in the expected location of the right atrium. I exchanged a Titan dialysis catheter to an Kwan Split tunneled dialysis catheter.      Plan:      The catheter may be used immediately.      Workstation performed: [id number]         CTA chest pe study   Final Interpretation by Jorge Modi MD (09/01 1239)      No pulmonary embolism.      Since July 11, 2022 there is persistent tree-in-bud nodularity in the lungs suggestive of a widespread infectious bronchiolitis that may be secondary to an atypical mycobacterium.  Follicular bronchiolitis and acute hypersensitivity pneumonitis could also    be in the differential. Follow-up chest CT in 3 months after treatment for infectious bronchiolitis is advised.      New mild patchy bilateral multifocal pneumonia.      Pulmonary hypertension      New moderate bilateral pleural effusions.      The study was marked in EPIC for immediate notification.      Workstation performed: [id number]         XR chest 1 view portable   ED Interpretation by Alejandro Johnston PA-C (08/30 2112)   Consolidation right lower lobe suspicious for pneumonia      Final Interpretation by Devyn Silva MD (08/31 1314)         1. New opacification lateral left lung base may reflect atelectasis or pneumonia. Probable small left effusion.   2. Mild cardiomegaly with mild vascular and interstitial prominence suggesting some degree of volume overload.            Workstation performed: [id number]         IR tunneled dialysis catheter check/change/reposition/angioplasty    (Results Pending)     Cardiology:  Echo complete w/ contrast if indicated   Final Result by Ty Reese MD (09/02 0944)   Addendum (preliminary) 1 of 1 by Ty Reese MD (09/02 0944)        Left Ventricle: Left ventricular cavity size is mildly dilated. Wall    thickness is mildly increased. The left ventricular ejection fraction is    25%. Systolic function is severely reduced. There is severe global    hypokinesis.     Right Ventricle: Right ventricular cavity size is mildly dilated.    Systolic function is mildly reduced.     Left Atrium: The atrium is mildly dilated.     Right Atrium: The atrium is mildly dilated.     Aortic Valve: There is mild regurgitation.     Mitral Valve: There is moderate to severe regurgitation.     Tricuspid Valve: There is moderate regurgitation. The right ventricular    systolic pressure is moderately elevated. The estimated right ventricular    systolic pressure is 48.00 mmHg.     Pericardium: There is a small  pericardial effusion anterior to the    heart.            ECG 12 lead   Final Result by Ty Reese MD (09/04 0902)   Sinus tachycardia   Non-specific intra-ventricular conduction block   Cannot rule out Anteroseptal infarct (cited on or before 30-AUG-2024)   Abnormal ECG   When compared with ECG of 11-AUG-2024 00:23,   No significant change was found   Confirmed by Ty Reese ([id number]) on 9/4/2024 9:02:28 AM        GI:  Bronchoscopy   Final Result by Luke Montez MD (09/11 1006)   All observed locations appeared normal, including the pharynx, larynx,    left vocal cord, right vocal cord, upper trachea, middle trachea, lower    trachea, main wendy, left main stem, ROGER, lingula, LLL, right main stem,    RUL, bronchus intermedius, RML and RLL.   Bronchoalveolar lavage was performed x2 and the fluid appeared    serosanguinous   Moderate, thin and white secretions present in the pharynx and larynx         RECOMMENDATION:   Await pathology results             Attending attestation   I was present for the entire procedure   Patient signed consent   Proceeded with procedure under conscious sedation administered by ICU    nurse Sravanthi Alexandre RN and myself.  Utilized topical lidocaine for the    wendy, nebulized lidocaine for the throat, Fentanyl Versed propofol   Airways were normal, some secretions of the right lower lobe   BAL right upper lobe   BAL right middle lobe   Postprocedure she woke up and was back to baseline      Luke Montez MD   Pulmonary and Critical Care Medicine                     Results from last 7 days   Lab Units 09/13/24  0554 09/12/24  0458 09/11/24  0623 09/10/24  0538 09/09/24  0521   WBC Thousand/uL 8.68 11.86* 12.24* 10.41* 10.15   HEMOGLOBIN g/dL 8.0* 7.9* 8.2* 8.3* 8.1*   HEMATOCRIT % 26.5* 25.5* 26.0* 27.2* 26.6*   PLATELETS Thousands/uL 208 199 212 206 182         Results from last 7 days   Lab Units 09/13/24  0554 09/12/24  0458 09/11/24  0623 09/10/24  0538 09/09/24  0521   SODIUM  mmol/L 136 137 136 141 140   POTASSIUM mmol/L 3.8 4.0 4.3 4.1 4.0   CHLORIDE mmol/L 96 97 96 101 100   CO2 mmol/L 33* 33* 31 30 30   ANION GAP mmol/L 7 7 9 10 10   BUN mg/dL 33* 49* 37* 63* 48*   CREATININE mg/dL 5.76* 7.73* 5.94* 9.17* 7.57*   EGFR ml/min/1.73sq m 7 4 6 4 5   CALCIUM mg/dL 9.3 9.3 9.2 9.5 9.4   MAGNESIUM mg/dL  --   --   --   --  2.3   PHOSPHORUS mg/dL  --   --   --   --  5.3*     Results from last 7 days   Lab Units 09/07/24  0545   AST U/L 16   ALT U/L 13   ALK PHOS U/L 72   TOTAL PROTEIN g/dL 5.7*   ALBUMIN g/dL 3.4*   TOTAL BILIRUBIN mg/dL 0.50         Results from last 7 days   Lab Units 09/13/24  0554 09/12/24  0458 09/11/24  0623 09/10/24  0538 09/09/24  0521 09/08/24  0515 09/07/24  0545   GLUCOSE RANDOM mg/dL 94 87 96 97 106 112 130               Results from last 7 days   Lab Units 09/11/24  0955 09/11/24  0954 09/11/24  0946 09/11/24  0937   GRAM STAIN RESULT  3+ Polys  No organisms seen 2+ Polys  No bacteria seen 3+ Polys  No organisms seen 1+ Polys  No organisms seen     Results from last 7 days   Lab Units 09/11/24  0945 09/11/24  0934   TOTAL COUNTED  100 100               Medications:   Scheduled Medications:  atorvastatin, 20 mg, Oral, Daily  atovaquone, 1,500 mg, Oral, Daily With Breakfast  bisacodyl, 5 mg, Oral, Once  cyclophosphamide, 100 mg, Oral, Daily  fluticasone-vilanterol, 1 puff, Inhalation, Daily  heparin (porcine), 5,000 Units, Subcutaneous, Q8H JACK  iron polysaccharides, 150 mg, Oral, Daily  lamoTRIgine, 100 mg, Oral, HS  lidocaine, 1 patch, Topical, Daily  metoprolol succinate, 50 mg, Oral, BID  montelukast, 10 mg, Oral, Daily  pantoprazole, 40 mg, Oral, Daily Before Breakfast  polyethylene glycol, 17 g, Oral, Daily  predniSONE, 15 mg, Oral, Daily  senna-docusate sodium, 2 tablet, Oral, HS  sevelamer, 1,600 mg, Oral, TID With Meals  Sodium Zirconium Cyclosilicate, 10 g, Oral, Daily  traZODone, 200 mg, Oral, HS  venlafaxine, 225 mg, Oral, Daily    Levalbuterol  (XOPENEX) inhalation solution 1.25 mg  Dose: 1.25 mg  Freq: 3 times daily (RESP) Route: NEBULIZATION  Start: 09/12/24 2000 End: 09/13/24 0835  ipratropium (ATROVENT) 0.02 % inhalation solution 0.5 mg  Dose: 0.5 mg  Freq: 3 times daily (RESP) Route: NEBULIZATION  Indications of Use: ASTHMA  Start: 08/31/24 0800 End: 09/13/24 0835        Continuous IV Infusions:  vancomycin, , Intravenous, Continuous PRN      PRN Meds:  acetaminophen, 650 mg, Oral, Q6H PRN  albuterol, 2 puff, Inhalation, Q4H PRN  dextromethorphan-guaiFENesin, 10 mL, Oral, Q4H PRN  epoetin shavonne, 6,000 Units, Intravenous, After Dialysis  fentaNYL, , Intravenous, PRN  lidocaine-epinephrine 1%-1:660663 buffered, , Infiltration, PRN  midazolam, , , PRN  trimethobenzamide, 200 mg, Intramuscular, Q6H PRN  vancomycin, , Intravenous, Continuous PRN        Discharge Plan: D    Network Utilization Review Department  ATTENTION: Please call with any questions or concerns to [phone] and carefully listen to the prompts so that you are directed to the right person. All voicemails are confidential.   For Discharge needs, contact Care Management DC Support Team at [phone] opt. [phone]  Send all requests for admission clinical reviews, approved or denied determinations and any other requests to dedicated fax number below belonging to the campus where the patient is receiving treatment. List of dedicated fax numbers for the Facilities:  FACILITY NAME UR FAX NUMBER   ADMISSION DENIALS (Administrative/Medical Necessity) [phone]   DISCHARGE SUPPORT TEAM (NETWORK) [phone]   PARENT CHILD HEALTH (Maternity/NICU/Pediatrics) [phone]   General acute hospital [phone]   [facility] [phone]   [facility] [phone]   [facility] [phone]   [facility] [phone]   [facility] [phone]   [facility]  [facility] [phone]   [facility] [phone]   [facility] [phone]   [facility] [phone]   [facility] [phone]   [facility] [phone]

## 2024-09-13 NOTE — ASSESSMENT & PLAN NOTE
Admission in July 2024 for ANGELICA. 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 S/p PLEX.  Patient is anuric and dialysis-dependent  Tues/Thurs/Sat schedule  Access: Right IJ PermCath, Cath reevaluation  by IR on 9/3, no issues during HD. 9/4, issues with clotting, will needs re-evaluation @9.5. Requires Re-evaluation 9/6 IR, Fluid poor returns on 9/5. 9/6-bedside readjustment was made by IR, reclotted after 1.5 hours, Cathflo dwelling overnight. 9/7 - HD without issues . 9/8 -cannot achieve good flow.  9/9-IR procedure unavailable, facilitated conversation to IR and nephrology regarding plans for dialysis.  9/10 - catheter exchanged, no issue with hemodialysis. 9/12 - no issues noted with HD. Next session scheduled 9/14    Plan:  Hemodialysis schedule per nephro   continue PTA cyclophosphamide  Continue PTA sevalemer  Continue PTA atovaquone for PJP prophylaxis  Started Lokelma per nephro  Continue prednisone taper from outpatient nephrology prior to this admission  15 mg starting September 7 to September 20  12.5 mg starting September 21 to October 4  10 mg starting October 5 to October 18  7.5 mg starting October 19 to November 2  5 mg daily starting November 3

## 2024-09-13 NOTE — PROGRESS NOTES
Progress Note - Hospitalist   Name: Ngozi Beard 64 y.o. female I MRN: [id number]  Unit/Bed#: W -01 I Date of Admission: 8/30/2024   Date of Service: 9/13/2024 I Hospital Day: 14    Assessment & Plan  Shortness of breath  CLINE: Negative PE, BNP 3019, CT chest-bilateral pleural effusion, tree-in-bud nodularity    Multifactorial, contributing factors of volume overload, chronic anemia, underlying COPD and asthma.     Clinically improved post hemodialysis. Finished course of abx, will continue to monitor clinically with scheduled HD.      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 S/p PLEX.  Patient is anuric and dialysis-dependent  Tues/Thurs/Sat schedule  Access: Right IJ PermCath, Cath reevaluation  by IR on 9/3, no issues during HD. 9/4, issues with clotting, will needs re-evaluation @9.5. Requires Re-evaluation 9/6 IR, Fluid poor returns on 9/5. 9/6-bedside readjustment was made by IR, reclotted after 1.5 hours, Cathflo dwelling overnight. 9/7 - HD without issues . 9/8 -cannot achieve good flow.  9/9-IR procedure unavailable, facilitated conversation to IR and nephrology regarding plans for dialysis.  9/10 - catheter exchanged, no issue with hemodialysis. 9/12 - no issues noted with HD. Next session scheduled 9/14    Plan:  Hemodialysis schedule per nephro   continue PTA cyclophosphamide  Continue PTA sevalemer  Continue PTA atovaquone for PJP prophylaxis  Started Lokelma per nephro  Continue prednisone taper from outpatient nephrology prior to this admission  15 mg starting September 7 to September 20  12.5 mg starting September 21 to October 4  10 mg starting October 5 to October 18  7.5 mg starting October 19 to November 2  5 mg daily starting November 3  Multifocal pneumonia  -CT chest PE study revealed no evidence of  PE. There is persistent tree-in-bud nodularity in the lungs suggestive of a widespread infectious bronchiolitis that may be secondary to an atypical mycobacterium. Follicular bronchiolitis and acute hypersensitivity pneumonitis could also be in the differential.  New mild patchy bilateral multifocal pneumonia  - Sputum culture: 4+ Pseudomonas   - ID: Chronic colonization of pseudomonas, rather than acute infection. Finished Cefepime x7 d course ended 9/10 . Continue atovaquone indefinitely   - Cardiology: clearance for bronchoscopy  - Pulm: 9/11 Bronchoscopy under sedation, culture sent  - Nephrology: Abnormal chest imaging, possible contributing factors to her catheter dysfunction    Improvement in coughing and sputum production symptoms with addition of abx, suspect patient presentation partially contributed from bronchitis secondary to pseudomonas.     Plan:   -Bronchoscopy 9/11, 9/12 result below  Fungal culture  Viral culture  Bronchial culture   Gram stain: 2- 3+ polys, no organisms  Legionella  AFB culture and stain - not seen  TB PCR  Cardiomyopathy (HCC)  August 31-EF of 25% with global hypokinesis.  No prior echo for comparison.    Suspect nonischemic cardiomyopathy from inflammatory/rheumatologic etiology     GDMT recommended, pt started on Toprol 50 mg daily, Losartan 25 mg daily  9/4 - Nifedipine held per Cardiology  9/9 Losartan held per nephro   Will need Cardiac MRI, likely an outpatient procedure.  Ischemic work up with with left cardiac cath in o/p settings      Asthma without acute exacerbation  Mild Bilateral wheezing was noted in the lower lung base upon initial presentation.  Does not require frequent inhaler use as an outpatient setting.  Low suspicion for exacerbation for the clinical symptoms presented during this hospital on admission    Plan  Continue Breo daily  Continue albuterol q4 prn  Continue montelukast qhs  Generalized weakness  Multifactorial, respiratory failure secondary to  chronic pulmonary infection, volume overload, chronic anemia, chronic deconditioning from being ill    Plan  PT/OT evaluation apprecaited  Anemia  Recent Labs     09/11/24  0623 09/12/24  0458 09/13/24  0554   HGB 8.2* 7.9* 8.0*      Baseline Hgb 7-8  Etiology: component of iron deficiency and renal disease.     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       Unspecified mood (affective) disorder (HCC)  Please see problem bipolar 1 disorder  Complication associated with dialysis catheter  See problem and assessment and plan for above    Current Length of Stay: 14 day(s)  Current Patient Status: Inpatient   Code Status: Level 1 - Full Code      Discharge Plan:   Expected date of discharge:   Dispo destination: [facility]  Indwelling drains/lines: Tunnel Cath  DME needs: n/a  HH needs: n/a   F/U appts: Nephrology/ Cardiology cardiac mri, cath/ Pulm bronchoscopy,   Preferred pharmacy: on file  Refills:  Transportation: facility    Subjective:   Overnight: No acute events over night     Complaints: No complaints.     Patient denies Headache, Fever, Chills, Chest Pain,  Shortness of breath,  Worsening joint or muscle pain, Changes in mood, thought or behavior,    Diet: Diet Renal; Lo Phosphorus; Yes; Fluid Restriction 1800 ML; No   Bowel regimen:   Last BM Date: 09/06/24   VTE Pharmacologic Prophylaxis: VTE Score: 7 High Risk (Score >/= 5) - Pharmacological DVT Prophylaxis Ordered: heparin. Sequential Compression Devices Ordered.    Objective:    Vitals:   Temp  (24hrs), Av.6 °F (37 °C), Min:98 °F (36.7 °C), Max:98.8 °F (37.1 °C)    Temp:  [98 °F (36.7 °C)-98.8 °F (37.1 °C)] 98 °F (36.7 °C)  HR:  [65-94] 69  Resp:  [18] 18  BP: ()/(52-70) 106/66  SpO2:  [92 %-98 %] 94 %  Input and Output Summary (last 24 hours):     Intake/Output Summary (Last 24 hours) at 2024 0744  Last data filed at 2024 1857  Gross per 24 hour   Intake 1180 ml   Output 2500 ml   Net -1320 ml     Physical Exam:   Physical Exam  Constitutional:       General: She is not in acute distress.     Appearance: She is ill-appearing.   Cardiovascular:      Rate and Rhythm: Normal rate and regular rhythm.      Comments: No peripheral edema  Pulmonary:      Effort: Pulmonary effort is normal.      Breath sounds: Normal breath sounds.      Comments: 2L    Abdominal:      Palpations: Abdomen is soft.   Musculoskeletal:      Cervical back: Neck supple.   Skin:     General: Skin is warm.      Capillary Refill: Capillary refill takes less than 2 seconds.   Neurological:      Mental Status: She is alert.        Additional Data:   Labs:  Results from last 7 days   Lab Units 24  0554   WBC Thousand/uL 8.68   HEMOGLOBIN g/dL 8.0*   HEMATOCRIT % 26.5*   PLATELETS Thousands/uL 208     Results from last 7 days   Lab Units 24  0554 24  0515 24  0545   SODIUM mmol/L 136   < > 136   POTASSIUM mmol/L 3.8   < > 5.4*   CHLORIDE mmol/L 96   < > 98   CO2 mmol/L 33*   < > 29   BUN mg/dL 33*   < > 44*   CREATININE mg/dL 5.76*   < > 7.95*   ANION GAP mmol/L 7   < > 9   CALCIUM mg/dL 9.3   < > 9.3   ALBUMIN g/dL  --   --  3.4*   TOTAL BILIRUBIN mg/dL  --   --  0.50   ALK PHOS U/L  --   --  72   ALT U/L  --   --  13   AST U/L  --   --  16   GLUCOSE RANDOM mg/dL 94   < > 130    < > = values in this interval not displayed.                       Recent Cultures (last 7 days):  I have reviewed the following results: CBC, BMP  Results from last 7 days   Lab Units 24  0955 24  0954  09/11/24  0946 09/11/24  0937   GRAM STAIN RESULT  3+ Polys  No organisms seen 2+ Polys  No bacteria seen 3+ Polys  No organisms seen 1+ Polys  No organisms seen       Imaging:   Imaging Review: I have reviewed all the imaging on file  Other Studies: I have reviewed all other studies till date  Bronchoscopy  Narrative: Table formatting from the original result was not included.  [facility]  [address]  [place] [address]  [phone]    DATE OF SERVICE:  9/11/24    PHYSICIAN(S):  Attending:   Luke Montez MD    Fellow:   Gill Stevens MD    INDICATION:  Shortness of breath, Abnormal CT of the chest    POST-OP DIAGNOSIS:  See the impression below.    PREPROCEDURE:  Standard airway preparation completed per respiratory therapy protocol.    Informed consent was obtained. Images reviewed prior to the procedure.  A   Time Out was performed.    No suspicion or identified risk for TB or other airborne infectious   disease; bronchoscopy procedure being performed for diagnostic purposes.     PROCEDURE: Bronchoscopy    DETAILS OF PROCEDURE:   Patient was taken to the procedure room where a time out was performed to   confirm correct patient and correct procedure. The patient underwent   moderate sedation, which was administered by a sedation nurse and an   intensivist. The patient's blood pressure, heart rate, level of   consciousness, oxygen and respirations were monitored throughout the   procedure. The patient experienced no blood loss. The scope was introduced   through the mouth. The procedure was not difficult. The patient tolerated   the procedure well. There were no apparent adverse events.     ANESTHESIA INFORMATION:  ASA: ASA status not filed in the log.  Anesthesia Type: Anesthesia type not filed in the log.    FINDINGS:  All observed locations appeared normal, including the pharynx, larynx,   left vocal cord, right vocal cord, upper trachea, middle trachea, lower    trachea, main wendy, left main stem, ROGER, lingula, LLL, right main stem,   RUL, bronchus intermedius, RML and RLL.  Bronchoalveolar lavage was performed x1 in the right upper lobar bronchus   with 120 mL of saline instilled and a total return of 30 mL. The fluid   appeared serosanguinous. Additional washing - 20mL returned  Bronchoalveolar lavage was performed x1 in the right middle lobar bronchus   with 120 mL of saline instilled and a total return of 35 mL. The fluid   appeared serosanguinous. Additional washing-30mL returned  Moderate, thin and white secretions present in the pharynx and larynx;   secretions were easily removed and the airway was cleared    SPECIMENS:  ID Type Source Tests Collected by Time Destination   1 :  Lavage Lung, Right Upper Lobe Bronchoalveolar Lavage PULMONARY   CYTOLOGY, BRONCHOALVEOLAR LAVAGE (BAL) DIFFERENTIAL Luke Montez MD   9/11/2024  9:34 AM         Impression: All observed locations appeared normal, including the pharynx, larynx,   left vocal cord, right vocal cord, upper trachea, middle trachea, lower   trachea, main wendy, left main stem, ROGER, lingula, LLL, right main stem,   RUL, bronchus intermedius, RML and RLL.  Bronchoalveolar lavage was performed x2 and the fluid appeared   serosanguinous  Moderate, thin and white secretions present in the pharynx and larynx    RECOMMENDATION:  Await pathology results     Attending attestation  I was present for the entire procedure  Patient signed consent  Proceeded with procedure under conscious sedation administered by ICU   nurse Sravanthi Alexandre RN and myself.  Utilized topical lidocaine for the   wendy, nebulized lidocaine for the throat, Fentanyl Versed propofol  Airways were normal, some secretions of the right lower lobe  BAL right upper lobe  BAL right middle lobe  Postprocedure she woke up and was back to baseline    Luke Montez MD  Pulmonary and Critical Care Medicine   IR tunneled dialysis catheter  check/change/reposition/angioplasty  Narrative: PROCEDURE:  1.  Injection of contrast through an existing tunneled dialysis catheter  2.  Tunneled dialysis catheter exchange with fibrin sheath disruption from the same access    STAFF:  Gerber Fletcher M.D.    CONTRAST:  10 mL Omnipaque intravenous.    FLUOROSCOPY TIME:  6.9 minutes.    NUMBER OF IMAGES:  Multiple    COMPLICATIONS:  None.    MEDICATIONS:  Moderate sedation was monitored by radiology nursing staff.  Monitoring of conscious sedation totaled 30 minutes. See nursing records.    INDICATION:  End stage renal disease awaiting permanent access.  The existing tunneled dialysis catheter is malfunctioning.    PROCEDURE:  Contrast was injected through the existing tunneled dialysis catheter, and images were obtained.    Exchange of the right internal jugular tunneled dialysis catheter was performed over a guide wire under fluoroscopic guidance. Fibrin sheath disruption was performed using a Adarsh balloon.  A 16 Norwegian, 28 cm. Packwood catheter was placed under   fluoroscopic guidance with its tip in the right atrium. The catheter may be used immediately.    FINDINGS:  1.  Fluoroscopy showed the tunneled dialysis catheter to be retracted. The venous port tip terminated at the junction of the azygos vein and superior vena cava.  2.  Contrast injection did not identify a fibrin sheath.  3.  Final fluoroscopic image showed the new catheter to be good position.  Impression: Tunneled dialysis catheter over-the-wire exchange with fibrin sheath stripping.    Workstation performed: [id number]      Mobility:   Basic Mobility Inpatient Raw Score: 14  JH-HLM Goal: 4: Move to chair/commode  JH-HLM Achieved: 5: Stand (1 or more minutes)  JH-HLM Goal achieved. Continue to encourage appropriate mobility.    Last 24 Hours Medication List:   Current Facility-Administered Medications   Medication Dose Route Frequency Provider Last Rate    acetaminophen  650 mg Oral Q6H PRN  Gill Stevens MD      albuterol  2 puff Inhalation Q4H PRN Gill Stevens MD      atorvastatin  20 mg Oral Daily Gill Stevens MD      [Transfer Hold] atovaquone  1,500 mg Oral Daily Kathryn Cleveland MD      bisacodyl  5 mg Oral Once Gill Stevens MD      cyclophosphamide  100 mg Oral Daily Gill Stevens MD      dextromethorphan-guaiFENesin  10 mL Oral Q4H PRN Gill Stevens MD      epoetin shavonne  6,000 Units Intravenous After Dialysis Gill Stevens MD      fentaNYL   Intravenous PRN Gerber Fletcher MD      fluticasone-vilanterol  1 puff Inhalation Daily Gill Stevens MD      heparin (porcine)  5,000 Units Subcutaneous Q8H [facility] Gill Stevens MD      ipratropium  0.5 mg Nebulization TID Gill Stevens MD      iron polysaccharides  150 mg Oral Daily Gill Stevens MD      lamoTRIgine  100 mg Oral HS Gill Stevens MD      levalbuterol  1.25 mg Nebulization TID Jose Gudino MD      lidocaine  1 patch Topical Daily Gill Stevens MD      lidocaine-epinephrine 1%-1:313023 buffered   Infiltration PRN Gerber Fletcher MD      metoprolol succinate  50 mg Oral BID Gill Stevens MD      midazolam    PRN Gerber Fletcher MD      montelukast  10 mg Oral Daily Gill Stevens MD      pantoprazole  40 mg Oral Daily Before Breakfast Gill Stevens MD      polyethylene glycol  17 g Oral Daily Gill Stevens MD      predniSONE  15 mg Oral Daily Gill Stevens MD      senna-docusate sodium  2 tablet Oral HS Gill Stevens MD      sevelamer  1,600 mg Oral TID With Meals Gill Stevens MD      Sodium Zirconium Cyclosilicate  10 g Oral Daily Gill Stevens MD      traZODone  200 mg Oral HS Gill Stevens MD      trimethobenzamide  200 mg Intramuscular Q6H PRN Gill Stevens MD      vancomycin   Intravenous Continuous PRN Gerber Fletcher MD      venlafaxine  225 mg Oral Daily Gill Stevens MD         Lines/Drains:  Invasive Devices       Peripheral Intravenous Line  Duration              Peripheral IV 09/09/24 Proximal;Right;Ventral (anterior) Forearm 3 days    Peripheral IV 09/11/24 Right Antecubital 1 day              Hemodialysis Catheter  Duration             HD Permanent Double Catheter 2 days                          Patient Centered Rounds: I performed bedside rounds with nursing staff today.  Discussions with Specialists or Other Care Team Provider: Cardiology/Nursing/Pulm/ ID/Nephrology  Education and Discussions with Family / Patient: Will update patient's point of contact if given permission     Today, Patient Was Seen By: Kathryn Cleveland MD  Administrative Statements   Today, Patient Was Seen By: Kathryn Cleveland MD  I have spent a total time of 35 minutes in caring for this patient on the day of the visit/encounter including Diagnostic results, Patient and family education, Impressions, Counseling / Coordination of care, Documenting in the medical record, Reviewing / ordering tests, medicine, procedures  , Obtaining or reviewing history  , and Communicating with other healthcare professionals .    **Please Note: This note may have been constructed using a voice recognition system.**

## 2024-09-13 NOTE — ASSESSMENT & PLAN NOTE
-- Persistent issues with the HD catheter , has been evaluated by IR multiple times, most recent 9/10/2024  --s/p exchange R IJV permcath and stripping of fibrin sheath (IR) 9/10/2024  --Catheter working well and hopefully will work well on further treatments.  Plan for dialysis tomorrow if catheter continues to work well no strong objections to discharge from a renal standpoint.

## 2024-09-13 NOTE — ASSESSMENT & PLAN NOTE
Mild Bilateral wheezing was noted in the lower lung base upon initial presentation.  Does not require frequent inhaler use as an outpatient setting.  Low suspicion for exacerbation for the clinical symptoms presented during this hospital on admission    Plan  Continue Breo daily  Continue albuterol q4 prn  Continue montelukast qhs

## 2024-09-13 NOTE — ASSESSMENT & PLAN NOTE
Recent Labs     09/11/24  0623 09/12/24  0458 09/13/24  0554   HGB 8.2* 7.9* 8.0*      Baseline Hgb 7-8  Etiology: component of iron deficiency and renal disease.     Plan  Monitor Hgb, transfuse for < 7  Continue PTA Ferrex

## 2024-09-13 NOTE — ASSESSMENT & PLAN NOTE
-CT chest PE study revealed no evidence of PE. There is persistent tree-in-bud nodularity in the lungs suggestive of a widespread infectious bronchiolitis that may be secondary to an atypical mycobacterium. Follicular bronchiolitis and acute hypersensitivity pneumonitis could also be in the differential.  New mild patchy bilateral multifocal pneumonia  - Sputum culture: 4+ Pseudomonas   - ID: Chronic colonization of pseudomonas, rather than acute infection. Finished Cefepime x7 d course ended 9/10 . Continue atovaquone indefinitely   - Cardiology: clearance for bronchoscopy  - Pulm: 9/11 Bronchoscopy under sedation, culture sent  - Nephrology: Abnormal chest imaging, possible contributing factors to her catheter dysfunction    Improvement in coughing and sputum production symptoms with addition of abx, suspect patient presentation partially contributed from bronchitis secondary to pseudomonas.     Plan:   -Bronchoscopy 9/11, 9/12 result below  Fungal culture  Viral culture  Bronchial culture   Gram stain: 2- 3+ polys, no organisms  Legionella  AFB culture and stain - not seen  TB PCR

## 2024-09-13 NOTE — ASSESSMENT & PLAN NOTE
Continue volume control with dialysis  Start Toprol 25 mg daily  Losartan 25 mg daily (held per nephro for ultra filtration)

## 2024-09-13 NOTE — ASSESSMENT & PLAN NOTE
-- Blood pressure acceptable and well-controlled.  -- Current medications: Toprol-XL 50 mg twice daily  -- Continue to hold losartan due to acute renal failure plus blood pressures on the soft side

## 2024-09-13 NOTE — PROGRESS NOTES
Progress Note - Nephrology   Name: Ngozi Beard 64 y.o. 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  No new findings no diffuse alveolar hemorrhage  --Access: Right IJ permcath, repositioned by IR 9/10/2024. access worked well 9/10 for dialysis treatment.  Will monitor success with this current catheter.  Our plan is that if the catheter continues to have issues ongoing consider transitioning to urgent start PD.  -- Last 2 dialysis treatment were successful.  --Continuing cyclophosphamide till the end of October  --Discussed with the patient about obtaining a permanent AV fistula or graft as an outpatient.  Will have her referred to vascular surgery for an evaluation.  I will place a consult for vascular surgery as an outpatient so that she can be seen by them and have a placement set up.  Will also order vein mapping.  Shortness of breath (Resolved: 9/13/2024)  -- CT without PE, + bilateral pleural effusions, tree-in-bud nodularity, RUL groundglass and infiltrate RML  -- pulmonary on board.    --Status post bronchoscopy with negative findings  --Continue supportive care  --Management per pulmonary  Primary hypertension  -- Blood pressure acceptable and well-controlled.  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  Currently on cyclophosphamide to the end of October.  Currently on a prednisone taper  Complication associated with dialysis catheter  -- Persistent issues with the HD catheter , has been evaluated by IR multiple times, most recent 9/10/2024  --s/p exchange R IJV permcath and stripping of fibrin sheath (IR) 9/10/2024  --Catheter working well and hopefully will work well on further treatments.  Plan for dialysis tomorrow if catheter continues to work well no strong objections to discharge from a renal standpoint.    I have reviewed the nephrology recommendations including assessment and plan, with patient, internal medicine team, and cardiology, and we are in agreement with renal plan including the information outlined above. Nephrology service will follow.    History of Present Illness   Brief History of Admission - 64-year-old female with newly diagnosed anti-GBM disease initiated on dialysis 7/16/2024, asthma, HTN, bipolar disorder p/w CP, SOB. Recent admission with similar presentation. Nephrology consulted for management of dialysis.     No complaints.  Cough is appears to be improved to some degree.    Objective      Temp:  [98 °F (36.7 °C)-98.8 °F (37.1 °C)] 98 °F (36.7 °C)  HR:  [69-94] 74  Resp:  [18-20] 20  BP: ()/(52-67) 106/66  O2 Device: None (Room air)         Vitals:    09/12/24 0600 09/13/24 0600   Weight: 107 kg (235 lb 10.8 oz) 106 kg (233 lb 7.5 oz)     I/O last 24 hours:  In: 1660 [P.O.:960; I.V.:500; Other:200]  Out: 2500 [Other:2500]  Lines/Drains/Airways       Active Status       Name Placement date Placement time Site Days     HD Permanent Double Catheter 09/10/24  1142  Internal jugular  3                  Physical Exam  Vitals and nursing note reviewed.   Constitutional:       General: She is not in acute distress.     Appearance: She is well-developed. She is not diaphoretic.   HENT:      Head: Normocephalic and atraumatic.   Eyes:      General: No scleral icterus.     Pupils: Pupils are equal, round, and reactive to light.   Cardiovascular:      Rate and Rhythm: Normal rate and regular rhythm.      Heart sounds: Normal heart sounds. No murmur heard.     No friction rub. No gallop.   Pulmonary:      Effort: Pulmonary effort is normal. No respiratory distress.      Breath sounds: Normal breath sounds. No wheezing or rales.   Chest:      Chest wall: No tenderness.   Abdominal:      General: Bowel sounds are normal. There is no distension.      Palpations: Abdomen is soft.      Tenderness: There is no abdominal tenderness. There is no rebound.   Musculoskeletal:         General: Normal range of motion.      Cervical back: Normal range of motion and neck supple.   Skin:     Findings: No rash.   Neurological:      Mental Status: She is alert and oriented to person, place, and time.        Medications:    Current Facility-Administered Medications:     acetaminophen (TYLENOL) tablet 650 mg, 650 mg, Oral, Q6H PRN, Gill Stevens MD    albuterol (PROVENTIL HFA,VENTOLIN HFA) inhaler 2 puff, 2 puff, Inhalation, Q4H PRN, Gill Stevens MD, 2 puff at 09/11/24 0723    atorvastatin (LIPITOR) tablet 20 mg, 20 mg, Oral, Daily, Gill Stevens MD, 20 mg at 09/13/24 0912    atovaquone (MEPRON) oral suspension 1,500 mg, 1,500 mg, Oral, Daily With Breakfast, Kathryn Cleveland MD    bisacodyl (DULCOLAX) EC tablet 5 mg, 5 mg, Oral, Once, Gill Stevens MD    cyclophosphamide (CYTOXAN) capsule 100 mg, 100 mg, Oral, Daily, Gill Stevens MD, 100 mg at 09/13/24 1045    dextromethorphan-guaiFENesin (ROBITUSSIN DM) oral syrup 10 mL, 10 mL, Oral, Q4H PRN,  Gill Stevens MD, 10 mL at 09/08/24 1926    epoetin shavonne (EPOGEN,PROCRIT) injection 6,000 Units, 6,000 Units, Intravenous, After Dialysis, Gill Stevens MD, 6,000 Units at 09/10/24 1542    fentaNYL injection, , Intravenous, PRN, Gerber Fletcher MD, 25 mcg at 09/10/24 1122    fluticasone-vilanterol 200-25 mcg/actuation 1 puff, 1 puff, Inhalation, Daily, Gill Stevens MD, 1 puff at 09/13/24 1020    heparin (porcine) subcutaneous injection 5,000 Units, 5,000 Units, Subcutaneous, Q8H JACK, Gill Stevens MD, 5,000 Units at 09/13/24 0909    iron polysaccharides (FERREX) capsule 150 mg, 150 mg, Oral, Daily, Gill Stevens MD, 150 mg at 09/13/24 0909    lamoTRIgine (LaMICtal) tablet 100 mg, 100 mg, Oral, HS, Gill Stevens MD, 100 mg at 09/12/24 2110    lidocaine (LIDODERM) 5 % patch 1 patch, 1 patch, Topical, Daily, Gill Stevens MD, 1 patch at 09/13/24 0908    lidocaine-epinephrine 1%-1:403455 buffered, , Infiltration, PRN, Gerber Fletcher MD, 5 mL at 09/10/24 1132    metoprolol succinate (TOPROL-XL) 24 hr tablet 50 mg, 50 mg, Oral, BID, Gill Stevens MD, 50 mg at 09/13/24 0909    midazolam (VERSED) injection, , , PRN, Gerber Fletcher MD, 0.5 mg at 09/10/24 1122    montelukast (SINGULAIR) tablet 10 mg, 10 mg, Oral, Daily, Gill Stevens MD, 10 mg at 09/13/24 0909    pantoprazole (PROTONIX) EC tablet 40 mg, 40 mg, Oral, Daily Before Breakfast, Gill Stevens MD, 40 mg at 09/13/24 0530    polyethylene glycol (MIRALAX) packet 17 g, 17 g, Oral, Daily, Gill Stevens MD, 17 g at 09/07/24 1232    predniSONE tablet 15 mg, 15 mg, Oral, Daily, Gill Stevens MD, 15 mg at 09/13/24 0909    senna-docusate sodium (SENOKOT S) 8.6-50 mg per tablet 2 tablet, 2 tablet, Oral, HS, Gill Stevens MD, 2 tablet at 09/12/24 2110    sevelamer (RENAGEL) tablet 1,600 mg, 1,600 mg, Oral, TID With Meals, Gill Stevens MD, 1,600 mg at 09/13/24 1211    Sodium Zirconium Cyclosilicate (Lokelma) 10 g, 10 g, Oral, Daily,  Gill Stevens MD, 10 g at 09/12/24 1312    traZODone (DESYREL) tablet 200 mg, 200 mg, Oral, HS, Gill Stevens MD, 200 mg at 09/12/24 2110    trimethobenzamide (TIGAN) IM injection 200 mg, 200 mg, Intramuscular, Q6H PRN, Gill Stevens MD, 200 mg at 09/11/24 0823    vancomycin (VANCOCIN) IVPB (premix in dextrose), , Intravenous, Continuous PRN, Gerber Fletcher MD, 1,000 mg at 09/10/24 1109    venlafaxine (EFFEXOR-XR) 24 hr capsule 225 mg, 225 mg, Oral, Daily, Gill Stevens MD, 225 mg at 09/13/24 0913      Lab Results: I have reviewed the following results: CBC/BMP:   .     09/13/24  0554   WBC 8.68   HGB 8.0*   HCT 26.5*      SODIUM 136   K 3.8   CL 96   CO2 33*   BUN 33*   CREATININE 5.76*   GLUC 94    , Creatinine Clearance: Estimated Creatinine Clearance: 11.2 mL/min (A) (by C-G formula based on SCr of 5.76 mg/dL (H)).  Results from last 7 days   Lab Units 09/13/24  0554 09/12/24  0458 09/11/24  0623 09/10/24  0538 09/09/24  0521 09/08/24  0515 09/07/24  0545   WBC Thousand/uL 8.68 11.86* 12.24* 10.41* 10.15 11.86* 9.08   HEMOGLOBIN g/dL 8.0* 7.9* 8.2* 8.3* 8.1* 8.5* 8.2*   HEMATOCRIT % 26.5* 25.5* 26.0* 27.2* 26.6* 27.6* 26.5*   PLATELETS Thousands/uL 208 199 212 206 182 206 191   POTASSIUM mmol/L 3.8 4.0 4.3 4.1 4.0 4.2 5.4*   CHLORIDE mmol/L 96 97 96 101 100 100 98   CO2 mmol/L 33* 33* 31 30 30 29 29   BUN mg/dL 33* 49* 37* 63* 48* 32* 44*   CREATININE mg/dL 5.76* 7.73* 5.94* 9.17* 7.57* 5.73* 7.95*   CALCIUM mg/dL 9.3 9.3 9.2 9.5 9.4 9.6 9.3   MAGNESIUM mg/dL  --   --   --   --  2.3  --   --    PHOSPHORUS mg/dL  --   --   --   --  5.3*  --   --    ALBUMIN g/dL  --   --   --   --   --   --  3.4*       Administrative Statements   I have spent a total time of 35 minutes in caring for this patient on the day of the visit/encounter including Diagnostic results, Prognosis, Risks and benefits of tx options, Instructions for management, Patient and family education, Importance of tx compliance, Risk  "factor reductions, Impressions, Counseling / Coordination of care, Documenting in the medical record, Reviewing / ordering tests, medicine, procedures  , Obtaining or reviewing history  , and Communicating with other healthcare professionals .  Portions of the record may have been created with voice recognition software. Occasional wrong word or \"sound a like\" substitutions may have occurred due to the inherent limitations of voice recognition software. Read the chart carefully and recognize, using context, where substitutions have occurred.If you have any questions, please contact the dictating provider.  "

## 2024-09-13 NOTE — PROGRESS NOTES
Progress Note - Infectious Disease   Ngozi Beard 64 y.o. female MRN: [id number]  Unit/Bed#: W -01 Encounter: [id number]      Impression/Recommendations:  1.   Pseudomonas respiratory infection.  Clinical and radiological picture is more consistent with bronchitis than pneumonia.  Patient completed 7-day course of cefepime.  Cough is improved.  Stable intermittent mild dyspnea.  Observe off further antibiotic.     2.  Pneumonitis versus pneumonia.  Patient's respiratory symptoms are much improved admission.  Due to initial improvement of respiratory status, bronchoscopy was not done.  However, due to fluctuating respiratory status, bronchoscopy was finally done earlier this week.  There was no evidence of airway inflammation.  Of note, as in above, patient just completed 7-day course of IV cefepime.  No further antibiotic as in above.  Follow-up on BAL studies.     3.  Bacteremia, with growth of MRSE in only 1 out of 2 admission blood cultures.  Patient has no fever and is not systemically ill, making true bacteremia not likely clinically.  Although patient does have permacath in place, with growth in only 1 culture sent, this is still most likely contaminated blood draw.  Patient had been on IV vancomycin but this was discontinued.  She remains clinically well off it.  No antibiotic needed for this indication.     3.  Leukocytosis.  Most likely steroid effect.  WBC fluctuating, currently stable.     4.  Advanced CKD, on HD.  Functional difficulty of permacath noted.    HD per nephrology.     5.  Recently diagnosed rapidly progressing anti-GBM disease.  Patient is on Cytoxan and prednisone since diagnosis.  Continue outpatient Cytoxan/steroid.  Continue atovaquone PCP prophylaxis.     Discussed with patient in detail regarding the above plan.  Discussed with Dr. Cleveland from primary service regarding continued observation off antibiotic and ID clearance for discharge.  He is in agreement.  As long as BAL  studies are negative, no follow-up with us is necessary.     Antibiotics:  Off antibiotic     Subjective:  Patient is stable.  Minimal dyspnea at rest, almost back to baseline.  Cough is improved, still mildly productive.  Temperature stays down.  No chills.  Patient is tolerating antibiotic well.  No nausea, vomiting or diarrhea.     Objective:  Vitals:  Temp:  [98 °F (36.7 °C)-98.8 °F (37.1 °C)] 98 °F (36.7 °C)  HR:  [65-94] 74  Resp:  [18-20] 20  BP: ()/(52-70) 106/66  SpO2:  [92 %-97 %] 93 %  Temp (24hrs), Av.6 °F (37 °C), Min:98 °F (36.7 °C), Max:98.8 °F (37.1 °C)  Current: Temperature: 98 °F (36.7 °C)    Physical Exam:     General: Awake, alert, cooperative, no distress.   Neck:  Supple. No mass.  No lymphadenopathy.   Lungs: Expansion symmetric, no rales, no wheezing, respirations unlabored.   Heart:  Regular rate and rhythm, S1 and S2 normal, no murmur.   Abdomen: Soft, nondistended, non-tender, bowel sounds active all four quadrants, no masses, no organomegaly.   Extremities: Stable mild leg edema. No erythema/warmth. No ulcer. Nontender to palpation.   Skin:  No rash.   Neuro: Moves all extremities.     Invasive Devices       Peripheral Intravenous Line  Duration             Peripheral IV 24 Proximal;Right;Ventral (anterior) Forearm 3 days    Peripheral IV 24 Right Antecubital 2 days              Hemodialysis Catheter  Duration             HD Permanent Double Catheter 2 days                    Labs studies:   I have personally reviewed pertinent labs.  Results from last 7 days   Lab Units 24  0554 24  0458 24  0623 24  0515 24  0545   POTASSIUM mmol/L 3.8 4.0 4.3   < > 5.4*   CHLORIDE mmol/L 96 97 96   < > 98   CO2 mmol/L 33* 33* 31   < > 29   BUN mg/dL 33* 49* 37*   < > 44*   CREATININE mg/dL 5.76* 7.73* 5.94*   < > 7.95*   EGFR ml/min/1.73sq m 7 4 6   < > 4   CALCIUM mg/dL 9.3 9.3 9.2   < > 9.3   AST U/L  --   --   --   --  16   ALT U/L  --   --   --    --  13   ALK PHOS U/L  --   --   --   --  72    < > = values in this interval not displayed.     Results from last 7 days   Lab Units 09/13/24  0554 09/12/24  0458 09/11/24  0623   WBC Thousand/uL 8.68 11.86* 12.24*   HEMOGLOBIN g/dL 8.0* 7.9* 8.2*   PLATELETS Thousands/uL 208 199 212     Results from last 7 days   Lab Units 09/11/24  0955 09/11/24  0954 09/11/24  0946 09/11/24  0937   GRAM STAIN RESULT  3+ Polys  No organisms seen 2+ Polys  No bacteria seen 3+ Polys  No organisms seen 1+ Polys  No organisms seen       Imaging Studies:   I have personally reviewed pertinent imaging study reports and images in PACS.    EKG, Pathology, and Other Studies:   I have personally reviewed pertinent reports.

## 2024-09-13 NOTE — ASSESSMENT & PLAN NOTE
-- RPGN secondary to biopsy-proven anti-GBM disease  -- Renal biopsy showed diffuse crescentic glomerular nephritis consistent with anti-GBM disease  --Serologies showed elevated anti-GBM antibodies  -- Remains dialysis dependent with no evidence of renal recovery  --Treated with plasmapheresis which completed in August.  Currently on cyclophosphamide to the end of October.  Currently on a prednisone taper

## 2024-09-13 NOTE — ASSESSMENT & PLAN NOTE
--Hemoglobin is stable  --Continue RISHI 6000 units with dialysis  --Transfuse for Hgb <7.0  -- Continue current management

## 2024-09-13 NOTE — ASSESSMENT & PLAN NOTE
--Continue prednisone taper currently on 15 mg daily plan to reduce to 12.5 mg daily starting next week  --Completed plasmapheresis in August  --Remains dialysis dependent, TTS at [facility]  --Hemodialysis initiation on July 16, 2024  --s/p bronchoscopy 9/11/2024.  No new findings no diffuse alveolar hemorrhage  --Access: Right IJ permcath, repositioned by IR 9/10/2024. access worked well 9/10 for dialysis treatment.  Will monitor success with this current catheter.  Our plan is that if the catheter continues to have issues ongoing consider transitioning to urgent start PD.  -- Last 2 dialysis treatment were successful.  --Continuing cyclophosphamide till the end of October  --Discussed with the patient about obtaining a permanent AV fistula or graft as an outpatient.  Will have her referred to vascular surgery for an evaluation.  I will place a consult for vascular surgery as an outpatient so that she can be seen by them and have a placement set up.  Will also order vein mapping.

## 2024-09-14 ENCOUNTER — APPOINTMENT (INPATIENT)
Dept: DIALYSIS | Facility: HOSPITAL | Age: 64
DRG: 286 | End: 2024-09-14
Payer: COMMERCIAL

## 2024-09-14 DIAGNOSIS — Z01.818 PRE-TRANSPLANT EVALUATION FOR ESRD (END STAGE RENAL DISEASE): ICD-10-CM

## 2024-09-14 DIAGNOSIS — N01.9 RPGN (RAPIDLY PROGRESSIVE GLOMERULONEPHRITIS) W/ ANTI-GBM ANTIBODIES: Primary | ICD-10-CM

## 2024-09-14 LAB
2HR DELTA HS TROPONIN: -1 NG/L
4HR DELTA HS TROPONIN: -2 NG/L
ALBUMIN SERPL BCG-MCNC: 3.6 G/DL (ref 3.5–5)
ALP SERPL-CCNC: 65 U/L (ref 34–104)
ALT SERPL W P-5'-P-CCNC: 6 U/L (ref 7–52)
ANION GAP SERPL CALCULATED.3IONS-SCNC: 10 MMOL/L (ref 4–13)
ANION GAP SERPL CALCULATED.3IONS-SCNC: 9 MMOL/L (ref 4–13)
AST SERPL W P-5'-P-CCNC: 7 U/L (ref 13–39)
ATRIAL RATE: 67 BPM
ATRIAL RATE: 68 BPM
BASOPHILS # BLD AUTO: 0.04 THOUSANDS/ΜL (ref 0–0.1)
BASOPHILS NFR BLD AUTO: 0 % (ref 0–1)
BILIRUB SERPL-MCNC: 0.47 MG/DL (ref 0.2–1)
BUN SERPL-MCNC: 23 MG/DL (ref 5–25)
BUN SERPL-MCNC: 52 MG/DL (ref 5–25)
CALCIUM SERPL-MCNC: 8.8 MG/DL (ref 8.4–10.2)
CALCIUM SERPL-MCNC: 9.8 MG/DL (ref 8.4–10.2)
CARDIAC TROPONIN I PNL SERPL HS: 20 NG/L
CARDIAC TROPONIN I PNL SERPL HS: 21 NG/L
CARDIAC TROPONIN I PNL SERPL HS: 22 NG/L
CHLORIDE SERPL-SCNC: 92 MMOL/L (ref 96–108)
CHLORIDE SERPL-SCNC: 98 MMOL/L (ref 96–108)
CO2 SERPL-SCNC: 31 MMOL/L (ref 21–32)
CO2 SERPL-SCNC: 33 MMOL/L (ref 21–32)
CREAT SERPL-MCNC: 4.13 MG/DL (ref 0.6–1.3)
CREAT SERPL-MCNC: 7.99 MG/DL (ref 0.6–1.3)
EOSINOPHIL # BLD AUTO: 0.17 THOUSAND/ΜL (ref 0–0.61)
EOSINOPHIL NFR BLD AUTO: 2 % (ref 0–6)
ERYTHROCYTE [DISTWIDTH] IN BLOOD BY AUTOMATED COUNT: 18.7 % (ref 11.6–15.1)
ERYTHROCYTE [DISTWIDTH] IN BLOOD BY AUTOMATED COUNT: 18.8 % (ref 11.6–15.1)
GALACTOMANNAN AG SPEC IA-ACNC: 0.05 INDEX (ref 0–0.49)
GFR SERPL CREATININE-BSD FRML MDRD: 10 ML/MIN/1.73SQ M
GFR SERPL CREATININE-BSD FRML MDRD: 4 ML/MIN/1.73SQ M
GLUCOSE SERPL-MCNC: 108 MG/DL (ref 65–140)
GLUCOSE SERPL-MCNC: 123 MG/DL (ref 65–140)
HCT VFR BLD AUTO: 25.7 % (ref 34.8–46.1)
HCT VFR BLD AUTO: 26.4 % (ref 34.8–46.1)
HGB BLD-MCNC: 8.1 G/DL (ref 11.5–15.4)
HGB BLD-MCNC: 8.3 G/DL (ref 11.5–15.4)
IMM GRANULOCYTES # BLD AUTO: 0.17 THOUSAND/UL (ref 0–0.2)
IMM GRANULOCYTES NFR BLD AUTO: 2 % (ref 0–2)
LACTATE SERPL-SCNC: 1 MMOL/L (ref 0.5–2)
LYMPHOCYTES # BLD AUTO: 0.77 THOUSANDS/ΜL (ref 0.6–4.47)
LYMPHOCYTES NFR BLD AUTO: 7 % (ref 14–44)
MCH RBC QN AUTO: 29.9 PG (ref 26.8–34.3)
MCH RBC QN AUTO: 30 PG (ref 26.8–34.3)
MCHC RBC AUTO-ENTMCNC: 31.4 G/DL (ref 31.4–37.4)
MCHC RBC AUTO-ENTMCNC: 31.5 G/DL (ref 31.4–37.4)
MCV RBC AUTO: 95 FL (ref 82–98)
MCV RBC AUTO: 95 FL (ref 82–98)
MONOCYTES # BLD AUTO: 0.45 THOUSAND/ΜL (ref 0.17–1.22)
MONOCYTES NFR BLD AUTO: 4 % (ref 4–12)
NEUTROPHILS # BLD AUTO: 9.41 THOUSANDS/ΜL (ref 1.85–7.62)
NEUTS SEG NFR BLD AUTO: 85 % (ref 43–75)
NRBC BLD AUTO-RTO: 0 /100 WBCS
P AXIS: 21 DEGREES
P AXIS: 21 DEGREES
PLATELET # BLD AUTO: 212 THOUSANDS/UL (ref 149–390)
PLATELET # BLD AUTO: 214 THOUSANDS/UL (ref 149–390)
PMV BLD AUTO: 10.2 FL (ref 8.9–12.7)
PMV BLD AUTO: 10.5 FL (ref 8.9–12.7)
PNEUMOCYSTIS PCR: NEGATIVE
PNEUMOCYSTIS PCR: NEGATIVE
POTASSIUM SERPL-SCNC: 3.8 MMOL/L (ref 3.5–5.3)
POTASSIUM SERPL-SCNC: 4 MMOL/L (ref 3.5–5.3)
PR INTERVAL: 190 MS
PR INTERVAL: 192 MS
PROT SERPL-MCNC: 6.1 G/DL (ref 6.4–8.4)
QRS AXIS: -23 DEGREES
QRS AXIS: -25 DEGREES
QRSD INTERVAL: 160 MS
QRSD INTERVAL: 164 MS
QT INTERVAL: 480 MS
QT INTERVAL: 492 MS
QTC INTERVAL: 510 MS
QTC INTERVAL: 519 MS
RBC # BLD AUTO: 2.7 MILLION/UL (ref 3.81–5.12)
RBC # BLD AUTO: 2.78 MILLION/UL (ref 3.81–5.12)
SCAN RESULT: NORMAL
SODIUM SERPL-SCNC: 134 MMOL/L (ref 135–147)
SODIUM SERPL-SCNC: 139 MMOL/L (ref 135–147)
T WAVE AXIS: 138 DEGREES
T WAVE AXIS: 141 DEGREES
VENTRICULAR RATE: 67 BPM
VENTRICULAR RATE: 68 BPM
WBC # BLD AUTO: 11.01 THOUSAND/UL (ref 4.31–10.16)
WBC # BLD AUTO: 9.55 THOUSAND/UL (ref 4.31–10.16)

## 2024-09-14 PROCEDURE — 93010 ELECTROCARDIOGRAM REPORT: CPT | Performed by: INTERNAL MEDICINE

## 2024-09-14 PROCEDURE — 85025 COMPLETE CBC W/AUTO DIFF WBC: CPT | Performed by: INTERNAL MEDICINE

## 2024-09-14 PROCEDURE — 99232 SBSQ HOSP IP/OBS MODERATE 35: CPT | Performed by: INTERNAL MEDICINE

## 2024-09-14 PROCEDURE — 83605 ASSAY OF LACTIC ACID: CPT | Performed by: INTERNAL MEDICINE

## 2024-09-14 PROCEDURE — 80048 BASIC METABOLIC PNL TOTAL CA: CPT

## 2024-09-14 PROCEDURE — 84484 ASSAY OF TROPONIN QUANT: CPT

## 2024-09-14 PROCEDURE — 93005 ELECTROCARDIOGRAM TRACING: CPT

## 2024-09-14 PROCEDURE — 85027 COMPLETE CBC AUTOMATED: CPT

## 2024-09-14 PROCEDURE — 80053 COMPREHEN METABOLIC PANEL: CPT | Performed by: INTERNAL MEDICINE

## 2024-09-14 RX ADMIN — HEPARIN SODIUM 5000 UNITS: 5000 INJECTION INTRAVENOUS; SUBCUTANEOUS at 17:16

## 2024-09-14 RX ADMIN — SENNOSIDES AND DOCUSATE SODIUM 2 TABLET: 8.6; 5 TABLET ORAL at 20:54

## 2024-09-14 RX ADMIN — TRAZODONE HYDROCHLORIDE 200 MG: 100 TABLET ORAL at 20:54

## 2024-09-14 RX ADMIN — SEVELAMER HYDROCHLORIDE 1600 MG: 800 TABLET ORAL at 08:48

## 2024-09-14 RX ADMIN — VENLAFAXINE HYDROCHLORIDE 225 MG: 150 CAPSULE, EXTENDED RELEASE ORAL at 08:48

## 2024-09-14 RX ADMIN — FLUTICASONE FUROATE AND VILANTEROL TRIFENATATE 1 PUFF: 200; 25 POWDER RESPIRATORY (INHALATION) at 09:01

## 2024-09-14 RX ADMIN — ATORVASTATIN CALCIUM 20 MG: 20 TABLET, FILM COATED ORAL at 08:48

## 2024-09-14 RX ADMIN — SEVELAMER HYDROCHLORIDE 1600 MG: 800 TABLET ORAL at 17:16

## 2024-09-14 RX ADMIN — ACETAMINOPHEN 650 MG: 325 TABLET ORAL at 15:23

## 2024-09-14 RX ADMIN — ALBUTEROL SULFATE 2 PUFF: 90 AEROSOL, METERED RESPIRATORY (INHALATION) at 17:16

## 2024-09-14 RX ADMIN — HEPARIN SODIUM 5000 UNITS: 5000 INJECTION INTRAVENOUS; SUBCUTANEOUS at 08:49

## 2024-09-14 RX ADMIN — SODIUM CHLORIDE 500 ML: 0.9 INJECTION, SOLUTION INTRAVENOUS at 12:50

## 2024-09-14 RX ADMIN — ATOVAQUONE 1500 MG: 750 SUSPENSION ORAL at 08:58

## 2024-09-14 RX ADMIN — LAMOTRIGINE 100 MG: 100 TABLET ORAL at 20:54

## 2024-09-14 RX ADMIN — PANTOPRAZOLE SODIUM 40 MG: 40 TABLET, DELAYED RELEASE ORAL at 06:24

## 2024-09-14 RX ADMIN — POLYETHYLENE GLYCOL 3350 17 G: 17 POWDER, FOR SOLUTION ORAL at 08:48

## 2024-09-14 RX ADMIN — MONTELUKAST 10 MG: 10 TABLET, FILM COATED ORAL at 08:49

## 2024-09-14 RX ADMIN — PREDNISONE 15 MG: 10 TABLET ORAL at 08:48

## 2024-09-14 RX ADMIN — POLYSACCHARIDE-IRON COMPLEX 150 MG: 150 CAPSULE ORAL at 08:48

## 2024-09-14 RX ADMIN — GUAIFENESIN AND DEXTROMETHORPHAN 10 ML: 100; 10 SYRUP ORAL at 21:00

## 2024-09-14 RX ADMIN — GUAIFENESIN AND DEXTROMETHORPHAN 10 ML: 100; 10 SYRUP ORAL at 10:17

## 2024-09-14 RX ADMIN — EPOETIN ALFA 6000 UNITS: 3000 SOLUTION INTRAVENOUS; SUBCUTANEOUS at 10:20

## 2024-09-14 RX ADMIN — CYCLOPHOSPHAMIDE 100 MG: 50 CAPSULE ORAL at 08:53

## 2024-09-14 RX ADMIN — ALBUTEROL SULFATE 2 PUFF: 90 AEROSOL, METERED RESPIRATORY (INHALATION) at 20:58

## 2024-09-14 RX ADMIN — LIDOCAINE 5% 1 PATCH: 700 PATCH TOPICAL at 08:49

## 2024-09-14 RX ADMIN — TRIMETHOBENZAMIDE HYDROCHLORIDE 200 MG: 100 INJECTION INTRAMUSCULAR at 12:48

## 2024-09-14 RX ADMIN — ACETAMINOPHEN 650 MG: 325 TABLET ORAL at 10:41

## 2024-09-14 RX ADMIN — HEPARIN SODIUM 5000 UNITS: 5000 INJECTION INTRAVENOUS; SUBCUTANEOUS at 00:11

## 2024-09-14 NOTE — ASSESSMENT & PLAN NOTE
Admission in July 2024 for ANGELICA. 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 S/p PLEX.  Patient is anuric and dialysis-dependent  Tues/Thurs/Sat schedule  Access: Right IJ PermCath, Cath reevaluation  by IR on 9/3, no issues during HD. 9/4, issues with clotting, will needs re-evaluation @9.5. Requires Re-evaluation 9/6 IR, Fluid poor returns on 9/5. 9/6-bedside readjustment was made by IR, reclotted after 1.5 hours, Cathflo dwelling overnight. 9/7 - HD without issues . 9/8 -cannot achieve good flow.  9/9-IR procedure unavailable, facilitated conversation to IR and nephrology regarding plans for dialysis.  9/10 - catheter exchanged, no issue with hemodialysis. 9/12 - no issues noted with HD.   Plan:  Hemodialysis schedule per nephro   continue PTA cyclophosphamide  Continue PTA sevalemer  Continue PTA atovaquone for PJP prophylaxis  Started Lokelma per nephro  Continue prednisone taper from outpatient nephrology prior to this admission  15 mg starting September 7 to September 20  12.5 mg starting September 21 to October 4  10 mg starting October 5 to October 18  7.5 mg starting October 19 to November 2  5 mg daily starting November 3

## 2024-09-14 NOTE — QUICK NOTE
Patient developed a hypotensive episode during hemodialysis, BP 85/51, MAP 62  Endorses concurrent chest pain and nausea   Total of 1.7 L was drawn during the HD session when examined.  Patient was in moderate distress, regular rhythm, normal pulse, no new murmurs was auscultated, lungs were clear bilaterally  Patient was given total of 1 L of fluid, troponin was sent  EKG was done, no new changes when compared to her previous on file  Hemodialysis was stopped, was given Tigan, recycle blood pressure to SBP of 107, patient was then no longer symptomatic  Cardiology, nephrology was updated  Family was called, no answer, voicemail was left.

## 2024-09-14 NOTE — CASE MANAGEMENT
[facility] has received APPROVED authorization.  Insurance:   [employer]   Authorization received for: [facility]  Facility: [facility]   Authorization #:[id number]   Start of Care:9/12  Next Review Date:9/17  Continued Stay Care Coordinator:  n/a  Submit next review to: [phone]     Care Manager notified: vargas velez breanna     Please reach out to CM for updates on any clinical information.

## 2024-09-14 NOTE — ASSESSMENT & PLAN NOTE
--Continue prednisone taper currently on 15 mg daily plan to reduce to 12.5 mg daily starting Monday  --Completed plasmapheresis in August  --Remains dialysis dependent, TTS at [facility]  --Hemodialysis initiation on July 16, 2024  --s/p bronchoscopy 9/11/2024.  No new findings no diffuse alveolar hemorrhage  --Access: Right IJ permcath, repositioned by IR 9/10/2024. access worked well 9/10 for dialysis treatment.  Will monitor success with this current catheter.  Our plan is that if the catheter continues to have issues ongoing consider transitioning to urgent start PD.  --Planning for HD today  --Continuing cyclophosphamide till the end of October  --Discussed with the patient about obtaining a permanent AV fistula or graft as an outpatient.  Will have her referred to vascular surgery for an evaluation.  I will place a consult for vascular surgery as an outpatient so that she can be seen by them and have a placement set up.  Will also order vein mapping.  --Discharge planning discussed with the primary team yesterday

## 2024-09-14 NOTE — ASSESSMENT & PLAN NOTE
Recent Labs     09/13/24  0554 09/14/24  0926 09/14/24  1229   HGB 8.0* 8.1* 8.3*      Baseline Hgb 7-8  Etiology: component of iron deficiency and renal disease.     Plan  Monitor Hgb, transfuse for < 7  Continue PTA Ferrex

## 2024-09-14 NOTE — ASSESSMENT & PLAN NOTE
--Hemoglobin is stable, 8 yesterday  --Continue RISHI 6000 units with dialysis  --Transfuse for Hgb <7.0  -- Continue current management

## 2024-09-14 NOTE — PROGRESS NOTES
Progress Note - Hospitalist   Name: Ngozi Beard 64 y.o. 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 S/p PLEX.  Patient is anuric and dialysis-dependent  Tues/Thurs/Sat schedule  Access: Right IJ PermCath, Cath reevaluation  by IR on 9/3, no issues during HD. 9/4, issues with clotting, will needs re-evaluation @9.5. Requires Re-evaluation 9/6 IR, Fluid poor returns on 9/5. 9/6-bedside readjustment was made by IR, reclotted after 1.5 hours, Cathflo dwelling overnight. 9/7 - HD without issues . 9/8 -cannot achieve good flow.  9/9-IR procedure unavailable, facilitated conversation to IR and nephrology regarding plans for dialysis.  9/10 - catheter exchanged, no issue with hemodialysis. 9/12 - no issues noted with HD.   Plan:  Hemodialysis schedule per nephro   continue PTA cyclophosphamide  Continue PTA sevalemer  Continue PTA atovaquone for PJP prophylaxis  Started Lokelma per nephro  Continue prednisone taper from outpatient nephrology prior to this admission  15 mg starting September 7 to September 20  12.5 mg starting September 21 to October 4  10 mg starting October 5 to October 18  7.5 mg starting October 19 to November 2  5 mg daily starting November 3  Multifocal pneumonia  -CT chest PE study revealed no evidence of PE. There is persistent tree-in-bud nodularity in the lungs suggestive of a widespread infectious bronchiolitis that may be secondary to an atypical mycobacterium. Follicular bronchiolitis and acute hypersensitivity pneumonitis could also be in the differential.  New mild patchy bilateral multifocal pneumonia  - Sputum culture: 4+ Pseudomonas   - ID: Chronic colonization of pseudomonas, rather than acute infection. Finished Cefepime x7 d  course ended 9/10 . Continue atovaquone indefinitely   - Cardiology: clearance for bronchoscopy  - Pulm: 9/11 Bronchoscopy under sedation, culture sent  - Nephrology: Abnormal chest imaging, possible contributing factors to her catheter dysfunction    Improvement in coughing and sputum production symptoms with addition of abx, suspect patient presentation partially contributed from bronchitis secondary to pseudomonas.     Plan:   -Bronchoscopy 9/11, 9/12 result below  Fungal culture  Viral culture  Bronchial culture   Gram stain: 2- 3+ polys, no organisms  Legionella  AFB culture and stain - not seen  TB PCR  Cardiomyopathy (HCC)  August 31-EF of 25% with global hypokinesis.  No prior echo for comparison.    Suspect nonischemic cardiomyopathy from inflammatory/rheumatologic etiology     GDMT recommended, pt started on Toprol 50 mg daily, Losartan 25 mg daily  9/4 - Nifedipine held per Cardiology  9/9 Losartan held per nephro   Will need Cardiac MRI, likely an outpatient procedure.  Ischemic work up with with left cardiac cath in o/p settings      Asthma without acute exacerbation  Mild Bilateral wheezing was noted in the lower lung base upon initial presentation.  Does not require frequent inhaler use as an outpatient setting.  Low suspicion for exacerbation for the clinical symptoms presented during this hospital on admission    Plan  Continue Breo daily  Continue albuterol q4 prn  Continue montelukast qhs  Generalized weakness  Multifactorial, respiratory failure secondary to chronic pulmonary infection, volume overload, chronic anemia, chronic deconditioning from being ill    Plan  PT/OT evaluation apprecaited  Anemia  Recent Labs     09/13/24  0554 09/14/24  0926 09/14/24  1229   HGB 8.0* 8.1* 8.3*      Baseline Hgb 7-8  Etiology: component of iron deficiency and renal disease.     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     Patient denies Headache, Fever, Chills, Chest Pain,  Shortness of breath,  Cough,  Nausea, or Vomiting Abdominal Pain,  Changes in mood, thought or behavior,    Patient was reexamined after hemodialysis, please see quick note.    Diet: Diet Renal; Lo Phosphorus; Yes; Fluid Restriction 1800 ML; No   Bowel regimen:   Last BM Date: 24   VTE Pharmacologic Prophylaxis: VTE Score: 7 High Risk (Score >/= 5) - Pharmacological DVT Prophylaxis Ordered: heparin. Sequential Compression Devices Ordered.    Objective:    Vitals:   Temp (24hrs), Av.9 °F (36.6 °C), Min:97.5 °F (36.4 °C), Max:98.8 °F (37.1 °C)    Temp:  [97.5 °F (36.4 °C)-98.8 °F (37.1 °C)] 97.5 °F (36.4 °C)  HR:  [51-87] 66  Resp:  [16-25] 24  BP: ()/(50-99) 91/56  SpO2:  [89 %-100 %] 99 %  Input and Output Summary (last 24 hours):     Intake/Output Summary (Last 24 hours) at 2024 1421  Last data filed at 2024 1215  Gross per 24 hour   Intake 880 ml   Output 2500 ml   Net  -1620 ml     Physical Exam:   Physical Exam  Constitutional:       General: She is not in acute distress.     Appearance: She is ill-appearing.   Cardiovascular:      Rate and Rhythm: Normal rate and regular rhythm.      Comments: No peripheral edema  Pulmonary:      Effort: Pulmonary effort is normal.      Breath sounds: Normal breath sounds.      Comments: 2L    Abdominal:      Palpations: Abdomen is soft.   Musculoskeletal:      Cervical back: Neck supple.   Skin:     General: Skin is warm.      Capillary Refill: Capillary refill takes less than 2 seconds.   Neurological:      Mental Status: She is alert.        Additional Data:   Labs:  Results from last 7 days   Lab Units 09/14/24  1229   WBC Thousand/uL 11.01*   HEMOGLOBIN g/dL 8.3*   HEMATOCRIT % 26.4*   PLATELETS Thousands/uL 214   SEGS PCT % 85*   LYMPHO PCT % 7*   MONO PCT % 4   EOS PCT % 2     Results from last 7 days   Lab Units 09/14/24  1229   SODIUM mmol/L 134*   POTASSIUM mmol/L 3.8   CHLORIDE mmol/L 92*   CO2 mmol/L 33*   BUN mg/dL 23   CREATININE mg/dL 4.13*   ANION GAP mmol/L 9   CALCIUM mg/dL 8.8   ALBUMIN g/dL 3.6   TOTAL BILIRUBIN mg/dL 0.47   ALK PHOS U/L 65   ALT U/L 6*   AST U/L 7*   GLUCOSE RANDOM mg/dL 123                 Results from last 7 days   Lab Units 09/14/24  1229   LACTIC ACID mmol/L 1.0       Recent Cultures (last 7 days):  I have reviewed the following results: CBC, BMP, and all the lab results till date  Results from last 7 days   Lab Units 09/11/24  0955 09/11/24  0954 09/11/24  0946 09/11/24  0937   GRAM STAIN RESULT  3+ Polys  No organisms seen 2+ Polys  No bacteria seen 3+ Polys  No organisms seen 1+ Polys  No organisms seen       Imaging:   Imaging Review: I have reviewed all the imaging on file  Other Studies: I have reviewed all other studies till date  Bronchoscopy  Narrative: Table formatting from the original result was not included.  [facility]  [address] [place]  [address]  [phone]    DATE OF SERVICE:  9/11/24    PHYSICIAN(S):  Attending:   Luke Montez MD    Fellow:   Gill Stevens MD    INDICATION:  Shortness of breath, Abnormal CT of the chest    POST-OP DIAGNOSIS:  See the impression below.    PREPROCEDURE:  Standard airway preparation completed per respiratory therapy protocol.    Informed consent was obtained. Images reviewed prior to the procedure.  A   Time Out was performed.    No suspicion or identified risk for TB or other airborne infectious   disease; bronchoscopy procedure being performed for diagnostic purposes.     PROCEDURE: Bronchoscopy    DETAILS OF PROCEDURE:   Patient was taken to the procedure room where a time out was performed to   confirm correct patient and correct procedure. The patient underwent   moderate sedation, which was administered by a sedation nurse and an   intensivist. The patient's blood pressure, heart rate, level of   consciousness, oxygen and respirations were monitored throughout the   procedure. The patient experienced no blood loss. The scope was introduced   through the mouth. The procedure was not difficult. The patient tolerated   the procedure well. There were no apparent adverse events.     ANESTHESIA INFORMATION:  ASA: ASA status not filed in the log.  Anesthesia Type: Anesthesia type not filed in the log.    FINDINGS:  All observed locations appeared normal, including the pharynx, larynx,   left vocal cord, right vocal cord, upper trachea, middle trachea, lower   trachea, main wendy, left main stem, ROGER, lingula, LLL, right main stem,   RUL, bronchus intermedius, RML and RLL.  Bronchoalveolar lavage was performed x1 in the right upper lobar bronchus   with 120 mL of saline instilled and a total return of 30 mL. The fluid   appeared serosanguinous. Additional washing - 20mL returned  Bronchoalveolar lavage was performed x1 in the right middle lobar bronchus   with 120 mL of saline instilled and a total return of 35 mL.  The fluid   appeared serosanguinous. Additional washing-30mL returned  Moderate, thin and white secretions present in the pharynx and larynx;   secretions were easily removed and the airway was cleared    SPECIMENS:  ID Type Source Tests Collected by Time Destination   1 :  Lavage Lung, Right Upper Lobe Bronchoalveolar Lavage PULMONARY   CYTOLOGY, BRONCHOALVEOLAR LAVAGE (BAL) DIFFERENTIAL Luke Montez MD   9/11/2024  9:34 AM         Impression: All observed locations appeared normal, including the pharynx, larynx,   left vocal cord, right vocal cord, upper trachea, middle trachea, lower   trachea, main wendy, left main stem, ROGER, lingula, LLL, right main stem,   RUL, bronchus intermedius, RML and RLL.  Bronchoalveolar lavage was performed x2 and the fluid appeared   serosanguinous  Moderate, thin and white secretions present in the pharynx and larynx    RECOMMENDATION:  Await pathology results     Attending attestation  I was present for the entire procedure  Patient signed consent  Proceeded with procedure under conscious sedation administered by ICU   nurse Sravanthi Alexandre RN and myself.  Utilized topical lidocaine for the   wendy, nebulized lidocaine for the throat, Fentanyl Versed propofol  Airways were normal, some secretions of the right lower lobe  BAL right upper lobe  BAL right middle lobe  Postprocedure she woke up and was back to baseline    Luke Montez MD  Pulmonary and Critical Care Medicine   IR tunneled dialysis catheter check/change/reposition/angioplasty  Narrative: PROCEDURE:  1.  Injection of contrast through an existing tunneled dialysis catheter  2.  Tunneled dialysis catheter exchange with fibrin sheath disruption from the same access    STAFF:  Gerber Fletcher M.D.    CONTRAST:  10 mL Omnipaque intravenous.    FLUOROSCOPY TIME:  6.9 minutes.    NUMBER OF IMAGES:  Multiple    COMPLICATIONS:  None.    MEDICATIONS:  Moderate sedation was monitored by radiology nursing staff.  Monitoring of  conscious sedation totaled 30 minutes. See nursing records.    INDICATION:  End stage renal disease awaiting permanent access.  The existing tunneled dialysis catheter is malfunctioning.    PROCEDURE:  Contrast was injected through the existing tunneled dialysis catheter, and images were obtained.    Exchange of the right internal jugular tunneled dialysis catheter was performed over a guide wire under fluoroscopic guidance. Fibrin sheath disruption was performed using a Adarsh balloon.  A 16 English, 28 cm. Minturn catheter was placed under   fluoroscopic guidance with its tip in the right atrium. The catheter may be used immediately.    FINDINGS:  1.  Fluoroscopy showed the tunneled dialysis catheter to be retracted. The venous port tip terminated at the junction of the azygos vein and superior vena cava.  2.  Contrast injection did not identify a fibrin sheath.  3.  Final fluoroscopic image showed the new catheter to be good position.  Impression: Tunneled dialysis catheter over-the-wire exchange with fibrin sheath stripping.    Workstation performed: [id number]      Mobility:   Basic Mobility Inpatient Raw Score: 19  JH-HLM Goal: 6: Walk 10 steps or more  JH-HLM Achieved: 1: Laying in bed  JH-HLM Goal NOT achieved. Continue with multidisciplinary rounding and encourage appropriate mobility to improve upon JH-HLM goals.    Last 24 Hours Medication List:   Current Facility-Administered Medications   Medication Dose Route Frequency Provider Last Rate    acetaminophen  650 mg Oral Q6H PRN Gill Stevens MD      albuterol  2 puff Inhalation Q4H PRN Gill Stevens MD      atorvastatin  20 mg Oral Daily Gill Stevens MD      atovaquone  1,500 mg Oral Daily With Breakfast Kathryn Cleveland MD      bisacodyl  5 mg Oral Once Gill Stevens MD      cyclophosphamide  100 mg Oral Daily Gill Stevens MD      dextromethorphan-guaiFENesin  10 mL Oral Q4H PRN Gill Stevens MD      epoetin shavonne  6,000 Units Intravenous  After Dialysis Gill Stevens MD      fentaNYL   Intravenous PRN Gerber Fletcher MD      fluticasone-vilanterol  1 puff Inhalation Daily Gill Stevens MD      heparin (porcine)  5,000 Units Subcutaneous Q8H [facility] Gill Stevens MD      iron polysaccharides  150 mg Oral Daily Gill Stevens MD      lamoTRIgine  100 mg Oral HS Gill Stevens MD      lidocaine  1 patch Topical Daily Gill Stevens MD      lidocaine-epinephrine 1%-1:[id number] buffered   Infiltration PRN Gerber Fletcher MD      metoprolol succinate  50 mg Oral BID Gill Stevens MD      midazolam    PRN Gerber Fletcher MD      montelukast  10 mg Oral Daily Gill Stevens MD      pantoprazole  40 mg Oral Daily Before Breakfast Gill Stevens MD      polyethylene glycol  17 g Oral Daily Gill Stevens MD      predniSONE  15 mg Oral Daily Gill Stevens MD      senna-docusate sodium  2 tablet Oral HS Gill Stevens MD      sevelamer  1,600 mg Oral TID With Meals Gill Stevens MD      Sodium Zirconium Cyclosilicate  10 g Oral Daily Gill Stevens MD      traZODone  200 mg Oral HS Gill Setvens MD      trimethobenzamide  200 mg Intramuscular Q6H PRN Gill Stevens MD      vancomycin   Intravenous Continuous PRN Gerber Fletcher MD      venlafaxine  225 mg Oral Daily Gill Stevens MD         Lines/Drains:  Invasive Devices       Peripheral Intravenous Line  Duration             Peripheral IV 09/09/24 Proximal;Right;Ventral (anterior) Forearm 4 days    Peripheral IV 09/11/24 Right Antecubital 3 days              Hemodialysis Catheter  Duration             HD Permanent Double Catheter 4 days                      Telemetry:  Telemetry Orders (From admission, onward)               24 Hour Telemetry Monitoring  Continuous x 24 Hours (Telem)        Question:  Reason for 24 Hour Telemetry  Answer:  Decompensated CHF- and any one of the following: continuous diuretic infusion or total diuretic dose >200 mg daily, associated electrolyte  derangement (I.e. K < 3.0), ionotropic drip (continuous infusion), hx of ventricular arrhythmia, or new EF < 35%                     Telemetry Reviewed: Normal Sinus Rhythm  Indication for Continued Telemetry Use: Acute CHF on >200 mg lasix/day or equivalent dose or with new reduced EF.              Patient Centered Rounds: I performed bedside rounds with nursing staff today.  Discussions with Specialists or Other Care Team Provider: Nursing  Education and Discussions with Family / Patient: Will update patient's point of contact if given permission     Today, Patient Was Seen By: Kathryn Cleveland MD  Administrative Statements   Today, Patient Was Seen By: Kathryn Cleveland MD  I have spent a total time of 35 minutes in caring for this patient on the day of the visit/encounter including Diagnostic results, Patient and family education, Impressions, Counseling / Coordination of care, Documenting in the medical record, Reviewing / ordering tests, medicine, procedures  , Obtaining or reviewing history  , and Communicating with other healthcare professionals .    **Please Note: This note may have been constructed using a voice recognition system.**

## 2024-09-14 NOTE — ASSESSMENT & PLAN NOTE
-- Persistent issues with the HD catheter , has been evaluated by IR multiple times, most recent 9/10/2024  --s/p exchange R IJV permcath and stripping of fibrin sheath (IR) 9/10/2024  -- If no issues with dialysis today stable from the standpoint for discharge

## 2024-09-14 NOTE — CASE MANAGEMENT
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) 2024     Generalized weakness 2024     Rash 2024     Anemia 2024     Thrombocytopenia ([facility]) 2024     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     Abnormality of ascending aorta 2024     Postmenopausal 2024     Encounter for screening mammogram for malignant neoplasm of breast 2024     Allergic rhinitis 2024     Persistent cough 2024     Urge incontinence of urine 2024     Exercise intolerance 2024     Family history of coronary artery bypass graft surgery 2024     Urge urinary incontinence 2024     Female stress incontinence 2024     Paranoid schizophrenia ([facility]) 2023     Asthma without acute exacerbation 2023     Asthma due to seasonal allergies 2023     Bipolar 1 disorder ([facility]) 2022     Primary hypertension 2022     Dyslipidemia 2022       LOS (days): 15  Geometric Mean LOS (GMLOS) (days): 4.4  Days to GMLOS:-10.1     OBJECTIVE:  Risk of Unplanned Readmission Score: 37.23         Current admission status: Inpatient   Preferred Pharmacy:   [facility]/pharmacy #[id number]  [place] [address] [address]  Phone: [phone] Fax: [phone]    [facility] - [place] [address] [address] [place] [address] [address]  [address] [address]  Phone: [phone] Fax: [phone]    Primary Care Provider: Meenakshi Balbuena MD    Primary Insurance: [facility]  Secondary Insurance: [facility]    DISCHARGE DETAILS:                                                                                                               Facility Insurance Auth Number: [id number]

## 2024-09-14 NOTE — PLAN OF CARE
2.5 hours into the treatment, patient reported feeling sick to her stomach, light headedness and nausea. Took 3x to get BP 92/50. WV dropped to low 50s and patient was in distress. 200cc saline bolus given and notified the primary nurse.    5 minutes after the bolus, the BP was 133/99 but the patient still reported being sick. While on a phone call with Dr. Burns, patient reported sudden pressure chest pain 10/10. Dr. Burns eventually decided to stop the tx.    Primary providers went to bedside. Labs were drawn and bolus of 500cc saline x 5 minutes given per resident. BP did not respond significantly to the bolus and patient reported no change of symptoms.    Post-Dialysis RN Treatment Note    Blood Pressure:  Pre 106/64 mm/Hg  Post 133/99 mmHg   EDW  111.5 kg    Weight:  Pre 102.1 kg   Post deferred, patient too ill to stand   Mode of weight measurement: Standing Scale   Volume Removed  1,800 ml (bolus has been deducted)   Treatment duration 165 minutes / [id number]   NS given  Yes, 200 intra-dialysis as stated above   Treatment shortened? Yes, describe: by 45 minutes per Dr. Burns d/t patient's report of chest pain.   Medications given during Rx Epogen 6,000u IV, Robitussin 10ml PO and Tylenol 650mg PO    Estimated Kt/V  Not Applicable   Access type: Permacath/TDC   Access Issues: Yes, describe: lines reversed d/t sluggish pull-back at the arterial port.     Report called to primary nurse   Yes, in person to Kay QUEVEDO LPN      Started patient on hemodialysis treatment with UF goal of 0.5L net as tolerated per Dr. Burns x 3.5 hours x 4K bath for serum k+ of 3.8 on 9/13/24.    Problem: METABOLIC, FLUID AND ELECTROLYTES - ADULT  Goal: Electrolytes maintained within normal limits  Description: INTERVENTIONS:  - Monitor labs and assess patient for signs and symptoms of electrolyte imbalances  - Administer electrolyte replacement as ordered  - Monitor response to electrolyte replacements, including repeat lab results as  appropriate  - Instruct patient on fluid and nutrition as appropriate  Outcome: Progressing  Goal: Fluid balance maintained  Description: INTERVENTIONS:  - Monitor labs   - Monitor I/O and WT  - Instruct patient on fluid and nutrition as appropriate  - Assess for signs & symptoms of volume excess or deficit  Outcome: Progressing

## 2024-09-14 NOTE — PROGRESS NOTES
Progress Note - Nephrology   Name: Ngozi Beard 64 y.o. 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  No new findings no diffuse alveolar hemorrhage  --Access: Right IJ permcath, repositioned by IR 9/10/2024. access worked well 9/10 for dialysis treatment.  Will monitor success with this current catheter.  Our plan is that if the catheter continues to have issues ongoing consider transitioning to urgent start PD.  --Planning for HD today  --Continuing cyclophosphamide till the end of October  --Discussed with the patient about obtaining a permanent AV fistula or graft as an outpatient.  Will have her referred to vascular surgery for an evaluation.  I will place a consult for vascular surgery as an outpatient so that she can be seen by them and have a placement set up.  Will also order vein mapping.  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  Currently on cyclophosphamide to the end of October.  Currently on a prednisone taper  Complication associated with dialysis catheter  -- Persistent issues with the HD catheter , has been evaluated by IR multiple times, most recent 9/10/2024  --s/p exchange R IJV permcath and stripping of fibrin sheath (IR) 9/10/2024  -- If no issues with dialysis today stable from the standpoint for discharge    I have reviewed the nephrology recommendations including assessment and plan, with primary team, and we are in agreement with renal plan including the information outlined above. Ok for discharge from Nephrology service perspective, if no issues with dialysis today.    History of Present Illness   Brief History of Admission -  64-year-old female with newly diagnosed anti-GBM disease initiated on dialysis 7/16/2024, asthma, HTN, bipolar disorder p/w CP, SOB. Recent admission with similar presentation. Nephrology consulted for management of dialysis.     Feels tired but no new complaints.    Objective      Temp:  [97.6 °F (36.4 °C)-98.8 °F (37.1 °C)] 97.7 °F (36.5 °C)  HR:  [69-85] 69  Resp:  [16-20] 17  BP: (104-125)/(66-73) 116/73  O2 Device: None (Room air)         Vitals:    09/13/24 0600 09/14/24 0600   Weight: 106 kg (233 lb 7.5 oz) 105 kg (232 lb 9.4 oz)     I/O last 24 hours:  In: 960 [P.O.:960]  Out: -   Lines/Drains/Airways       Active Status       Name Placement date Placement time Site Days    HD Permanent Double Catheter 09/10/24  1142  Internal jugular  3                  Physical Exam  Vitals and nursing note reviewed.   Constitutional:       General: She is not in acute distress.     Appearance: She is well-developed.   HENT:      Head: Normocephalic and atraumatic.   Eyes:      General: No scleral  icterus.     Conjunctiva/sclera: Conjunctivae normal.      Pupils: Pupils are equal, round, and reactive to light.   Cardiovascular:      Rate and Rhythm: Normal rate and regular rhythm.      Heart sounds: S1 normal and S2 normal. No murmur heard.     No friction rub. No gallop.   Pulmonary:      Effort: Pulmonary effort is normal. No respiratory distress.      Breath sounds: Normal breath sounds. No wheezing or rales.   Abdominal:      General: Bowel sounds are normal.      Palpations: Abdomen is soft.      Tenderness: There is no abdominal tenderness. There is no rebound.   Musculoskeletal:         General: Normal range of motion.      Cervical back: Normal range of motion and neck supple.   Skin:     Findings: No rash.   Neurological:      Mental Status: She is alert and oriented to person, place, and time.   Psychiatric:         Behavior: Behavior normal.        Medications:    Current Facility-Administered Medications:     acetaminophen (TYLENOL) tablet 650 mg, 650 mg, Oral, Q6H PRN, Gill Stevens MD    albuterol (PROVENTIL HFA,VENTOLIN HFA) inhaler 2 puff, 2 puff, Inhalation, Q4H PRN, Gill Stevens MD, 2 puff at 09/11/24 0723    atorvastatin (LIPITOR) tablet 20 mg, 20 mg, Oral, Daily, Gill Stevens MD, 20 mg at 09/13/24 0912    atovaquone (MEPRON) oral suspension 1,500 mg, 1,500 mg, Oral, Daily With Breakfast, Kathryn Cleveland MD, 1,500 mg at 09/13/24 1243    bisacodyl (DULCOLAX) EC tablet 5 mg, 5 mg, Oral, Once, Gill Stevens MD    cyclophosphamide (CYTOXAN) capsule 100 mg, 100 mg, Oral, Daily, Gill Stevens MD, 100 mg at 09/13/24 1045    dextromethorphan-guaiFENesin (ROBITUSSIN DM) oral syrup 10 mL, 10 mL, Oral, Q4H PRN, Gill Stevens MD, 10 mL at 09/08/24 1926    epoetin shavonne (EPOGEN,PROCRIT) injection 6,000 Units, 6,000 Units, Intravenous, After Dialysis, Gill Stevens MD, 6,000 Units at 09/10/24 1542    fentaNYL injection, , Intravenous, PRN, Gerber Fletcher MD, 25 mcg at 09/10/24 1121     fluticasone-vilanterol 200-25 mcg/actuation 1 puff, 1 puff, Inhalation, Daily, Gill Stevens MD, 1 puff at 09/13/24 1020    heparin (porcine) subcutaneous injection 5,000 Units, 5,000 Units, Subcutaneous, Q8H JACK, Gill Stevens MD, 5,000 Units at 09/14/24 0011    iron polysaccharides (FERREX) capsule 150 mg, 150 mg, Oral, Daily, Gill Stevens MD, 150 mg at 09/13/24 0909    lamoTRIgine (LaMICtal) tablet 100 mg, 100 mg, Oral, HS, Gill Stevens MD, 100 mg at 09/13/24 2105    lidocaine (LIDODERM) 5 % patch 1 patch, 1 patch, Topical, Daily, Gill Stevens MD, 1 patch at 09/13/24 0908    lidocaine-epinephrine 1%-1:630385 buffered, , Infiltration, PRN, Gerber Fletcher MD, 5 mL at 09/10/24 1132    metoprolol succinate (TOPROL-XL) 24 hr tablet 50 mg, 50 mg, Oral, BID, Gill Stevens MD, 50 mg at 09/13/24 1736    midazolam (VERSED) injection, , , PRN, Gerber Fletcher MD, 0.5 mg at 09/10/24 1122    montelukast (SINGULAIR) tablet 10 mg, 10 mg, Oral, Daily, Gill Stevens MD, 10 mg at 09/13/24 0909    pantoprazole (PROTONIX) EC tablet 40 mg, 40 mg, Oral, Daily Before Breakfast, Gill Stevens MD, 40 mg at 09/14/24 0624    polyethylene glycol (MIRALAX) packet 17 g, 17 g, Oral, Daily, Gill Stevens MD, 17 g at 09/07/24 1232    predniSONE tablet 15 mg, 15 mg, Oral, Daily, Gill Stevens MD, 15 mg at 09/13/24 0909    senna-docusate sodium (SENOKOT S) 8.6-50 mg per tablet 2 tablet, 2 tablet, Oral, HS, Gill Stevens MD, 2 tablet at 09/13/24 2104    sevelamer (RENAGEL) tablet 1,600 mg, 1,600 mg, Oral, TID With Meals, Gill Stevens MD, 1,600 mg at 09/13/24 1735    Sodium Zirconium Cyclosilicate (Lokelma) 10 g, 10 g, Oral, Daily, Gill Stevens MD, 10 g at 09/12/24 1312    traZODone (DESYREL) tablet 200 mg, 200 mg, Oral, HS, Gill Stevens MD, 200 mg at 09/13/24 5810    trimethobenzamide (TIGAN) IM injection 200 mg, 200 mg, Intramuscular, Q6H PRN, Gill Stevens MD, 200 mg at 09/11/24 6866     "vancomycin (VANCOCIN) IVPB (premix in dextrose), , Intravenous, Continuous PRN, Gerber Fletcher MD, 1,000 mg at 09/10/24 1109    venlafaxine (EFFEXOR-XR) 24 hr capsule 225 mg, 225 mg, Oral, Daily, Gill Stevens MD, 225 mg at 09/13/24 0913      Lab Results: I have reviewed the following results: Creatinine Clearance: Estimated Creatinine Clearance: 11.2 mL/min (A) (by C-G formula based on SCr of 5.76 mg/dL (H)).  Results from last 7 days   Lab Units 09/13/24  0554 09/12/24  0458 09/11/24  0623 09/10/24  0538 09/09/24  0521 09/08/24  0515   WBC Thousand/uL 8.68 11.86* 12.24* 10.41* 10.15 11.86*   HEMOGLOBIN g/dL 8.0* 7.9* 8.2* 8.3* 8.1* 8.5*   HEMATOCRIT % 26.5* 25.5* 26.0* 27.2* 26.6* 27.6*   PLATELETS Thousands/uL 208 199 212 206 182 206   POTASSIUM mmol/L 3.8 4.0 4.3 4.1 4.0 4.2   CHLORIDE mmol/L 96 97 96 101 100 100   CO2 mmol/L 33* 33* 31 30 30 29   BUN mg/dL 33* 49* 37* 63* 48* 32*   CREATININE mg/dL 5.76* 7.73* 5.94* 9.17* 7.57* 5.73*   CALCIUM mg/dL 9.3 9.3 9.2 9.5 9.4 9.6   MAGNESIUM mg/dL  --   --   --   --  2.3  --    PHOSPHORUS mg/dL  --   --   --   --  5.3*  --        Administrative Statements   I have spent a total time of 20 minutes in caring for this patient on the day of the visit/encounter including Instructions for management, Counseling / Coordination of care, Documenting in the medical record, Reviewing / ordering tests, medicine, procedures  , Obtaining or reviewing history  , and Communicating with other healthcare professionals .  Portions of the record may have been created with voice recognition software. Occasional wrong word or \"sound a like\" substitutions may have occurred due to the inherent limitations of voice recognition software. Read the chart carefully and recognize, using context, where substitutions have occurred.If you have any questions, please contact the dictating provider.  "

## 2024-09-14 NOTE — ASSESSMENT & PLAN NOTE
-- RPGN secondary to biopsy-proven anti-GBM disease  -- Renal biopsy showed diffuse crescentic glomerular nephritis consistent with anti-GBM disease  --Serologies showed elevated anti-GBM antibodies  -- Remains dialysis dependent with no evidence of renal recovery --> plan for outpatient vascular surgery evaluation for fistula or graft  --Treated with plasmapheresis which completed in August.  Currently on cyclophosphamide to the end of October.  Currently on a prednisone taper

## 2024-09-14 NOTE — ASSESSMENT & PLAN NOTE
--Blood pressure under excellent control  -- Current medications: Toprol-XL 50 mg twice daily  -- Continue to hold losartan as patient's blood pressure is well-controlled and still deemed acute renal failure

## 2024-09-15 LAB
ANION GAP SERPL CALCULATED.3IONS-SCNC: 8 MMOL/L (ref 4–13)
BUN SERPL-MCNC: 39 MG/DL (ref 5–25)
CALCIUM SERPL-MCNC: 9.5 MG/DL (ref 8.4–10.2)
CHLORIDE SERPL-SCNC: 97 MMOL/L (ref 96–108)
CO2 SERPL-SCNC: 33 MMOL/L (ref 21–32)
CREAT SERPL-MCNC: 6.25 MG/DL (ref 0.6–1.3)
ERYTHROCYTE [DISTWIDTH] IN BLOOD BY AUTOMATED COUNT: 18.8 % (ref 11.6–15.1)
GFR SERPL CREATININE-BSD FRML MDRD: 6 ML/MIN/1.73SQ M
GLUCOSE SERPL-MCNC: 84 MG/DL (ref 65–140)
HCT VFR BLD AUTO: 27.3 % (ref 34.8–46.1)
HGB BLD-MCNC: 8.4 G/DL (ref 11.5–15.4)
MCH RBC QN AUTO: 29.6 PG (ref 26.8–34.3)
MCHC RBC AUTO-ENTMCNC: 30.8 G/DL (ref 31.4–37.4)
MCV RBC AUTO: 96 FL (ref 82–98)
PLATELET # BLD AUTO: 222 THOUSANDS/UL (ref 149–390)
PMV BLD AUTO: 10.6 FL (ref 8.9–12.7)
POTASSIUM SERPL-SCNC: 4.3 MMOL/L (ref 3.5–5.3)
RBC # BLD AUTO: 2.84 MILLION/UL (ref 3.81–5.12)
SODIUM SERPL-SCNC: 138 MMOL/L (ref 135–147)
WBC # BLD AUTO: 10.47 THOUSAND/UL (ref 4.31–10.16)

## 2024-09-15 PROCEDURE — 80048 BASIC METABOLIC PNL TOTAL CA: CPT

## 2024-09-15 PROCEDURE — 85027 COMPLETE CBC AUTOMATED: CPT

## 2024-09-15 PROCEDURE — 99232 SBSQ HOSP IP/OBS MODERATE 35: CPT | Performed by: INTERNAL MEDICINE

## 2024-09-15 RX ADMIN — METOPROLOL SUCCINATE 50 MG: 50 TABLET, EXTENDED RELEASE ORAL at 17:33

## 2024-09-15 RX ADMIN — SENNOSIDES AND DOCUSATE SODIUM 2 TABLET: 8.6; 5 TABLET ORAL at 21:08

## 2024-09-15 RX ADMIN — HEPARIN SODIUM 5000 UNITS: 5000 INJECTION INTRAVENOUS; SUBCUTANEOUS at 08:59

## 2024-09-15 RX ADMIN — ATOVAQUONE 1500 MG: 750 SUSPENSION ORAL at 08:58

## 2024-09-15 RX ADMIN — SEVELAMER HYDROCHLORIDE 1600 MG: 800 TABLET ORAL at 12:43

## 2024-09-15 RX ADMIN — CYCLOPHOSPHAMIDE 100 MG: 50 CAPSULE ORAL at 08:59

## 2024-09-15 RX ADMIN — METOPROLOL SUCCINATE 50 MG: 50 TABLET, EXTENDED RELEASE ORAL at 08:57

## 2024-09-15 RX ADMIN — HEPARIN SODIUM 5000 UNITS: 5000 INJECTION INTRAVENOUS; SUBCUTANEOUS at 21:08

## 2024-09-15 RX ADMIN — LAMOTRIGINE 100 MG: 100 TABLET ORAL at 21:08

## 2024-09-15 RX ADMIN — VENLAFAXINE HYDROCHLORIDE 225 MG: 150 CAPSULE, EXTENDED RELEASE ORAL at 08:56

## 2024-09-15 RX ADMIN — ALBUTEROL SULFATE 2 PUFF: 90 AEROSOL, METERED RESPIRATORY (INHALATION) at 21:10

## 2024-09-15 RX ADMIN — ATORVASTATIN CALCIUM 20 MG: 20 TABLET, FILM COATED ORAL at 08:56

## 2024-09-15 RX ADMIN — LIDOCAINE 5% 1 PATCH: 700 PATCH TOPICAL at 08:58

## 2024-09-15 RX ADMIN — POLYSACCHARIDE-IRON COMPLEX 150 MG: 150 CAPSULE ORAL at 08:57

## 2024-09-15 RX ADMIN — ALBUTEROL SULFATE 2 PUFF: 90 AEROSOL, METERED RESPIRATORY (INHALATION) at 05:40

## 2024-09-15 RX ADMIN — FLUTICASONE FUROATE AND VILANTEROL TRIFENATATE 1 PUFF: 200; 25 POWDER RESPIRATORY (INHALATION) at 08:58

## 2024-09-15 RX ADMIN — HEPARIN SODIUM 5000 UNITS: 5000 INJECTION INTRAVENOUS; SUBCUTANEOUS at 17:33

## 2024-09-15 RX ADMIN — SEVELAMER HYDROCHLORIDE 1600 MG: 800 TABLET ORAL at 08:57

## 2024-09-15 RX ADMIN — PREDNISONE 15 MG: 10 TABLET ORAL at 08:57

## 2024-09-15 RX ADMIN — SEVELAMER HYDROCHLORIDE 1600 MG: 800 TABLET ORAL at 17:33

## 2024-09-15 RX ADMIN — TRAZODONE HYDROCHLORIDE 200 MG: 100 TABLET ORAL at 21:08

## 2024-09-15 RX ADMIN — MONTELUKAST 10 MG: 10 TABLET, FILM COATED ORAL at 08:57

## 2024-09-15 RX ADMIN — PANTOPRAZOLE SODIUM 40 MG: 40 TABLET, DELAYED RELEASE ORAL at 05:25

## 2024-09-15 NOTE — ASSESSMENT & PLAN NOTE
Multifactorial, respiratory failure secondary to chronic pulmonary infection, volume overload, chronic anemia, chronic deconditioning from being ill  PT OT level II

## 2024-09-15 NOTE — PROGRESS NOTES
Progress Note - Hospitalist   Name: Ngozi Beard 64 y.o. 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 S/p PLEX.  Patient is anuric and dialysis-dependent  Tues/Thurs/Sat schedule  Access: Right IJ PermCath, Cath reevaluation  by IR on 9/3, no issues during HD. 9/4, issues with clotting, will needs re-evaluation @9.5. Requires Re-evaluation 9/6 IR, Fluid poor returns on 9/5. 9/6-bedside readjustment was made by IR, reclotted after 1.5 hours, Cathflo dwelling overnight. 9/7 - HD without issues . 9/8 -cannot achieve good flow.  9/9-IR procedure unavailable, facilitated conversation to IR and nephrology regarding plans for dialysis.  9/10 - catheter exchanged, no issue with hemodialysis. 9/12 - no issues noted with HD.   On prednisone 15 mg today   Plan:  Hemodialysis schedule per nephro   continue PTA cyclophosphamide  Continue PTA sevalemer  Continue PTA atovaquone for PJP prophylaxis  Started Lokelma per nephro  Continue prednisone taper from outpatient nephrology prior to this admission: plan to reduce to 12.5 mg daily starting Monday   Multifocal pneumonia  -CT chest PE study revealed no evidence of PE. There is persistent tree-in-bud nodularity in the lungs suggestive of a widespread infectious bronchiolitis that may be secondary to an atypical mycobacterium. Follicular bronchiolitis and acute hypersensitivity pneumonitis could also be in the differential.  New mild patchy bilateral multifocal pneumonia  - Sputum culture: 4+ Pseudomonas   - ID: Chronic colonization of pseudomonas, rather than acute infection. Finished Cefepime x7 d course ended 9/10 . Continue atovaquone indefinitely   - Cardiology: clearance for bronchoscopy  - Pulm: 9/11 Bronchoscopy  under sedation, culture sent  - Nephrology: Abnormal chest imaging, possible contributing factors to her catheter dysfunction    Improvement in coughing and sputum production symptoms with addition of abx, suspect patient presentation partially contributed from bronchitis secondary to pseudomonas.     Bronchoscopy results so far: Gram stain: 2- 3+ polys, no organisms,Pneuocysis PCR negative, Viral culture preliminary results are negative, AFB culture and stain - not seen  Plan:   -Bronchoscopy results can follow up on these remaining results  follow up on   Fungal culture  Bronchial culture   Legionella  TB PCR    Cardiomyopathy (HCC)  August 31-EF of 25% with global hypokinesis.  No prior echo for comparison.    Suspect nonischemic cardiomyopathy from inflammatory/rheumatologic etiology   Plan   GDMT recommended, continue Toprol 50 mg daily, Losartan 25 mg daily  9/4 - Nifedipine held per Cardiology  9/9 Losartan held per nephro   Will need Cardiac MRI, likely an outpatient procedure.  Ischemic work up with with left cardiac cath in o/p settings      Asthma without acute exacerbation  Mild Bilateral wheezing was noted in the lower lung base upon initial presentation.  Does not require frequent inhaler use as an outpatient setting.  Low suspicion for exacerbation for the clinical symptoms presented during this hospital on admission    Plan  Continue Breo daily  Continue albuterol q4 prn  Continue montelukast qhs  Generalized weakness  Multifactorial, respiratory failure secondary to chronic pulmonary infection, volume overload, chronic anemia, chronic deconditioning from being ill  PT OT level II    Anemia  Recent Labs     09/14/24  0926 09/14/24  1229 09/15/24  0537   HGB 8.1* 8.3* 8.4*      Baseline Hgb 7-8  Etiology: component of iron deficiency and renal disease.     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 Sequential Compression Devices Ordered.    Mobility:   Basic Mobility Inpatient Raw Score: 19  JH-HLM Goal: 6: Walk 10 steps or more  JH-HLM Achieved: 2: Bed activities/Dependent transfer  JH-HLM Goal achieved. Continue to encourage appropriate mobility.    Patient Centered Rounds: I performed bedside rounds with nursing staff today.   Discussions with Specialists or Other Care Team Provider: cardiology, interventional radiology, pulmonlogy, infectious disease, nephrology, behavioral health     Education and Discussions with Family / Patient: Attempted to update  (sister) via phone. Unable to contact.    Current Length of Stay: 16 day(s)  Current Patient Status: Inpatient   Certification Statement: The patient will continue to require additional inpatient hospital stay due to home O2 evaluation and pulse oximetry test Also awaiting lifevest delivery  Discharge Plan: Anticipate discharge in 24-48 hrs to rehab facility. [place]     Code Status: Level 1 - Full Code    Subjective   No acute overnight events.  Patient reported chest tightness that is diffuse.  Shortness of breath evident in the morning not while sleeping at night.  Patient sits up to help alleviate shortness of breath.  Reports that Breo does not help manage shortness of breath.  Orts  chronic cough white in color that is intermittent.  Denies dysuria, constipation, diarrhea, fever, chills, palpitations, abdominal pain, headache, or changes in vision.    Objective     Vitals:   Temp (24hrs), Av.1 °F (36.7 °C), Min:97.8 °F (36.6 °C), Max:98.6 °F (37 °C)    Temp:  [97.8 °F (36.6 °C)-98.6 °F (37 °C)] 98.2 °F (36.8 °C)  HR:  [72-96] 85  Resp:  [16-19] 16  BP: ()/(60-76) 118/76  SpO2:  [90 %-97 %] 90 %  Body mass index is 39.87 kg/m².     Input and Output Summary (last 24 hours):     Intake/Output Summary (Last 24 hours) at 9/15/2024 1756  Last data filed at 9/15/2024 0949  Gross per 24 hour   Intake 480 ml   Output --   Net 480 ml       Physical Exam   Constitutional:       General: She is not in acute distress.     Appearance: She is well-developed. She is obese.   HENT:      Head: Normocephalic and atraumatic.   Eyes:      General: No scleral icterus.     Conjunctiva/sclera: Conjunctivae normal.      Pupils: Pupils are equal, round, and reactive to light.   Cardiovascular:      Rate and Rhythm: Normal rate and regular rhythm.      Heart sounds: S1 normal and S2 normal. No murmur heard.     On inspection, right perm cath appreciated.   Pulmonary:      Effort: Pulmonary effort is normal. No respiratory distress.      Breath sounds: Normal breath sounds. No wheezing or rales.   Abdominal:      General: Bowel sounds are normal.      Palpations: Abdomen is soft.      Tenderness: There is no abdominal tenderness. There is no rebound.   Musculoskeletal:         General: Normal range of motion.      Cervical back: Normal range of motion and neck supple.   Skin:     Findings: No rash.   Neurological:      Mental Status: She is alert and oriented to person, place, and time.   Psychiatric:         Behavior: Behavior normal.   Lines/Drains:  Lines/Drains/Airways       Active Status       Name Placement date Placement time Site Days    HD Permanent Double Catheter 09/10/24  1142  Internal jugular  5                       Telemetry:  Telemetry Orders (From admission, onward)               24 Hour Telemetry Monitoring  Continuous x 24 Hours (Telem)           Question:  Reason for 24 Hour Telemetry  Answer:  Decompensated CHF- and any one of the following: continuous diuretic infusion or total diuretic dose >200 mg daily, associated electrolyte derangement (I.e. K < 3.0), ionotropic drip (continuous infusion), hx of ventricular arrhythmia, or new EF < 35%                     Telemetry Reviewed: one episode of nonsustained VT HR of 248   Indication for Continued Telemetry Use: heart failure                Lab Results: I have reviewed the following results:    Results from last 7 days   Lab Units 09/15/24  0537 24  1229   WBC Thousand/uL 10.47* 11.01*   HEMOGLOBIN g/dL 8.4* 8.3*   HEMATOCRIT % 27.3* 26.4*   PLATELETS Thousands/uL 222 214   SEGS PCT %  --  85*   LYMPHO PCT %  --  7*   MONO PCT %  --  4   EOS PCT %  --  2     Results from last 7 days   Lab Units 09/15/24  0537 24  1229   SODIUM mmol/L 138 134*   POTASSIUM mmol/L 4.3 3.8   CHLORIDE mmol/L 97 92*   CO2 mmol/L 33* 33*   BUN mg/dL 39* 23   CREATININE mg/dL 6.25* 4.13*   ANION GAP mmol/L 8 9   CALCIUM mg/dL 9.5 8.8   ALBUMIN g/dL  --  3.6   TOTAL BILIRUBIN mg/dL  --  0.47   ALK PHOS U/L  --  65   ALT U/L  --  6*   AST U/L  --  7*   GLUCOSE RANDOM mg/dL 84 123                 Results from last 7 days   Lab Units 24  1229   LACTIC ACID mmol/L 1.0       Recent Cultures (last 7 days):   Results from last 7 days   Lab Units 24  0955 24  0954 24  0946 24  0937   GRAM STAIN RESULT  3+ Polys  No organisms seen 2+ Polys  No bacteria seen 3+ Polys  No organisms seen 1+ Polys  No organisms seen       Imaging Review: chest xray   Other Studies: EKG was reviewed.     Last 24 Hours Medication List:     Current Facility-Administered Medications:     acetaminophen (TYLENOL) tablet 650 mg, Q6H PRN    albuterol (PROVENTIL  HFA,VENTOLIN HFA) inhaler 2 puff, Q4H PRN    atorvastatin (LIPITOR) tablet 20 mg, Daily    atovaquone (MEPRON) oral suspension 1,500 mg, Daily With Breakfast    bisacodyl (DULCOLAX) EC tablet 5 mg, Once    cyclophosphamide (CYTOXAN) capsule 100 mg, Daily    dextromethorphan-guaiFENesin (ROBITUSSIN DM) oral syrup 10 mL, Q4H PRN    epoetin shavonne (EPOGEN,PROCRIT) injection 6,000 Units, After Dialysis    fentaNYL injection, PRN    fluticasone-vilanterol 200-25 mcg/actuation 1 puff, Daily    heparin (porcine) subcutaneous injection 5,000 Units, Q8H JACK    iron polysaccharides (FERREX) capsule 150 mg, Daily    lamoTRIgine (LaMICtal) tablet 100 mg, HS    lidocaine (LIDODERM) 5 % patch 1 patch, Daily    lidocaine-epinephrine 1%-1:930828 buffered, PRN    metoprolol succinate (TOPROL-XL) 24 hr tablet 50 mg, BID    midazolam (VERSED) injection, PRN    montelukast (SINGULAIR) tablet 10 mg, Daily    pantoprazole (PROTONIX) EC tablet 40 mg, Daily Before Breakfast    polyethylene glycol (MIRALAX) packet 17 g, Daily    predniSONE tablet 15 mg, Daily    senna-docusate sodium (SENOKOT S) 8.6-50 mg per tablet 2 tablet, HS    sevelamer (RENAGEL) tablet 1,600 mg, TID With Meals    Sodium Zirconium Cyclosilicate (Lokelma) 10 g, Daily    traZODone (DESYREL) tablet 200 mg, HS    trimethobenzamide (TIGAN) IM injection 200 mg, Q6H PRN    vancomycin (VANCOCIN) IVPB (premix in dextrose), Continuous PRN    venlafaxine (EFFEXOR-XR) 24 hr capsule 225 mg, Daily    Administrative Statements   Today, Patient Was Seen By: Daya Curry MD  I have spent a total time of 30 minutes in caring for this patient on the day of the visit/encounter including Risks and benefits of tx options and Reviewing / ordering tests, medicine, procedures  .    **Please Note: This note may have been constructed using a voice recognition system.**

## 2024-09-15 NOTE — ASSESSMENT & PLAN NOTE
August 31-EF of 25% with global hypokinesis.  No prior echo for comparison.    Suspect nonischemic cardiomyopathy from inflammatory/rheumatologic etiology   Plan   GDMT recommended, continue Toprol 50 mg daily, Losartan 25 mg daily  9/4 - Nifedipine held per Cardiology  9/9 Losartan held per nephro   Will need Cardiac MRI, likely an outpatient procedure.  Ischemic work up with with left cardiac cath in o/p settings

## 2024-09-15 NOTE — ASSESSMENT & PLAN NOTE
--Developed intradialytic hypotension and blood pressure improved after fluid bolus.  Blood pressures have remained stable  -- Current medications: Toprol-XL 50 mg twice daily  -- Continue to hold losartan  --Blood pressure improved

## 2024-09-15 NOTE — ASSESSMENT & PLAN NOTE
-- RPGN secondary to biopsy-proven anti-GBM disease  -- Renal biopsy showed diffuse crescentic glomerular nephritis consistent with anti-GBM disease  --Serologies showed elevated anti-GBM antibodies  -- Remains dialysis dependent with no evidence of renal recovery --> plan for outpatient vascular surgery evaluation for fistula or graft  --Treated with plasmapheresis which completed in August.  Currently on cyclophosphamide to the end of October.  Currently on a prednisone taper  --Plan for outpatient access placement with fistula or graft

## 2024-09-15 NOTE — ASSESSMENT & PLAN NOTE
Admission in July 2024 for ANGELICA. 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 S/p PLEX.  Patient is anuric and dialysis-dependent  Tues/Thurs/Sat schedule  Access: Right IJ PermCath, Cath reevaluation  by IR on 9/3, no issues during HD. 9/4, issues with clotting, will needs re-evaluation @9.5. Requires Re-evaluation 9/6 IR, Fluid poor returns on 9/5. 9/6-bedside readjustment was made by IR, reclotted after 1.5 hours, Cathflo dwelling overnight. 9/7 - HD without issues . 9/8 -cannot achieve good flow.  9/9-IR procedure unavailable, facilitated conversation to IR and nephrology regarding plans for dialysis.  9/10 - catheter exchanged, no issue with hemodialysis. 9/12 - no issues noted with HD.   On prednisone 15 mg today   Plan:  Hemodialysis schedule per nephro   continue PTA cyclophosphamide  Continue PTA sevalemer  Continue PTA atovaquone for PJP prophylaxis  Started Lokelma per nephro  Continue prednisone taper from outpatient nephrology prior to this admission: plan to reduce to 12.5 mg daily starting Monday

## 2024-09-15 NOTE — ASSESSMENT & PLAN NOTE
--Continue prednisone taper currently on 15 mg daily plan to reduce to 12.5 mg daily starting Monday  --Completed plasmapheresis in August  --Remains dialysis dependent, TTS at [facility]  --Hemodialysis initiation on July 16, 2024  --s/p bronchoscopy 9/11/2024.  No new findings no diffuse alveolar hemorrhage  --Access: Right IJ permcath, repositioned by IR 9/10/2024. access worked well 9/10 for dialysis treatment.  Will monitor success with this current catheter.  Our plan is that if the catheter continues to have issues ongoing consider transitioning to urgent start PD.  --Underwent hemodialysis yesterday.  No issues with the HD catheter.  Dialysis was stopped early due to hypotension and chest discomfort.  She completed majority of the treatment.  --Continuing cyclophosphamide till the end of October  --I have placed outpatient consultation with vascular surgery and vein mapping to be done when she is discharged  --Plan for next dialysis on Tuesday if she remains inpatient

## 2024-09-15 NOTE — ASSESSMENT & PLAN NOTE
--Hemoglobin stable at 8.4 today  --Continue RISHI 6000 units with dialysis  --Transfuse for Hgb <7.0  -- Continue current management

## 2024-09-15 NOTE — ASSESSMENT & PLAN NOTE
Recent Labs     09/14/24  0926 09/14/24  1229 09/15/24  0537   HGB 8.1* 8.3* 8.4*      Baseline Hgb 7-8  Etiology: component of iron deficiency and renal disease.     Plan  Monitor Hgb, transfuse for < 7  Continue PTA Ferrex

## 2024-09-15 NOTE — ASSESSMENT & PLAN NOTE
-CT chest PE study revealed no evidence of PE. There is persistent tree-in-bud nodularity in the lungs suggestive of a widespread infectious bronchiolitis that may be secondary to an atypical mycobacterium. Follicular bronchiolitis and acute hypersensitivity pneumonitis could also be in the differential.  New mild patchy bilateral multifocal pneumonia  - Sputum culture: 4+ Pseudomonas   - ID: Chronic colonization of pseudomonas, rather than acute infection. Finished Cefepime x7 d course ended 9/10 . Continue atovaquone indefinitely   - Cardiology: clearance for bronchoscopy  - Pulm: 9/11 Bronchoscopy under sedation, culture sent  - Nephrology: Abnormal chest imaging, possible contributing factors to her catheter dysfunction    Improvement in coughing and sputum production symptoms with addition of abx, suspect patient presentation partially contributed from bronchitis secondary to pseudomonas.     Bronchoscopy results so far: Gram stain: 2- 3+ polys, no organisms,Pneuocysis PCR negative, Viral culture preliminary results are negative, AFB culture and stain - not seen  Plan:   -Bronchoscopy results can follow up on these remaining results  follow up on   Fungal culture  Bronchial culture   Legionella  TB PCR

## 2024-09-15 NOTE — ASSESSMENT & PLAN NOTE
-- Persistent issues with the HD catheter , has been evaluated by IR multiple times, most recent 9/10/2024  --s/p exchange R IJV permcath and stripping of fibrin sheath (IR) 9/10/2024  -- No further issues with the HD catheter at this time.

## 2024-09-15 NOTE — PROGRESS NOTES
Progress Note - Nephrology   Name: Ngozi Beard 64 y.o. 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  No new findings no diffuse alveolar hemorrhage  --Access: Right IJ permcath, repositioned by IR 9/10/2024. access worked well 9/10 for dialysis treatment.  Will monitor success with this current catheter.  Our plan is that if the catheter continues to have issues ongoing consider transitioning to urgent start PD.  --Underwent hemodialysis yesterday.  No issues with the HD catheter.  Dialysis was stopped early due to hypotension and chest discomfort.  She completed majority of the treatment.  --Continuing cyclophosphamide till the end of October  --I have placed outpatient consultation with vascular surgery and vein mapping to be done when she is discharged  --Plan for next dialysis on Tuesday if she remains inpatient  Primary hypertension  --Developed intradialytic hypotension and blood pressure improved after fluid bolus.  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  Currently on cyclophosphamide to the end of October.  Currently on a prednisone taper  --Plan for outpatient access placement with fistula or graft  Complication associated with dialysis catheter  -- Persistent issues with the HD catheter , has been evaluated by IR multiple times, most recent 9/10/2024  --s/p exchange R IJV permcath and stripping of fibrin sheath (IR) 9/10/2024  -- No further issues with the HD catheter at this time.    I have reviewed the nephrology recommendations including assessment and plan, with patient, and we are in agreement with renal plan including the information outlined above. Ok for discharge from Nephrology service perspective.  As long as she is stable from a hemodynamic stable and her blood pressures have been quite stable and she is clinically feeling better.  She would like to talk to the primary team about home oxygen.  The wise plan for next dialysis on Tuesday    History of Present Illness   Brief History of Admission - 64-year-old female with newly diagnosed anti-GBM disease initiated on dialysis 7/16/2024, asthma, HTN, bipolar disorder p/w CP, SOB. Recent admission with similar presentation. Nephrology consulted for management of dialysis.     Had chest discomfort and hypotension on dialysis which was stopped early.  No EKG changes.  Blood pressure improved after fluid bolus and dialysis stopped.  No issues with HD catheter yesterday.  Clinically she is feeling much better no chest pain blood pressure has been quite stable    Objective      Temp:  [97.5 °F (36.4 °C)-98.7 °F (37.1 °C)] 97.8 °F (36.6 °C)  HR:  [51-87] 72  Resp:  [16-25] 18  BP: ()/(50-99) 108/66  O2 Device: Nasal cannula         Vitals:    09/14/24 0600 09/14/24 0915   Weight: 105 kg (232 lb 9.4 oz) 102 kg (225 lb 1.4 oz)      I/O last 24 hours:  In: 880 [P.O.:180; I.V.:500; Other:200]  Out: 2500 [Other:2500]  Lines/Drains/Airways       Active Status       Name Placement date Placement time Site Days    HD Permanent Double Catheter 09/10/24  1142  Internal jugular  4                  Physical Exam  Vitals and nursing note reviewed.   Constitutional:       General: She is not in acute distress.     Appearance: She is well-developed. She is obese.   HENT:      Head: Normocephalic and atraumatic.   Eyes:      General: No scleral icterus.     Conjunctiva/sclera: Conjunctivae normal.      Pupils: Pupils are equal, round, and reactive to light.   Cardiovascular:      Rate and Rhythm: Normal rate and regular rhythm.      Heart sounds: S1 normal and S2 normal. No murmur heard.     No friction rub. No gallop.   Pulmonary:      Effort: Pulmonary effort is normal. No respiratory distress.      Breath sounds: Normal breath sounds. No wheezing or rales.   Abdominal:      General: Bowel sounds are normal.      Palpations: Abdomen is soft.      Tenderness: There is no abdominal tenderness. There is no rebound.   Musculoskeletal:         General: Normal range of motion.      Cervical back: Normal range of motion and neck supple.   Skin:     Findings: No rash.   Neurological:      Mental Status: She is alert and oriented to person, place, and time.   Psychiatric:         Behavior: Behavior normal.        Medications:    Current Facility-Administered Medications:     acetaminophen (TYLENOL) tablet 650 mg, 650 mg, Oral, Q6H PRN, Gill Stevens MD, 650 mg at 09/14/24 1523    albuterol (PROVENTIL HFA,VENTOLIN HFA) inhaler 2 puff, 2 puff, Inhalation, Q4H PRN, Gill Stevens MD, 2 puff at 09/15/24 0540    atorvastatin (LIPITOR) tablet 20 mg, 20 mg, Oral, Daily, Gill Stevens MD, 20 mg at 09/14/24 0848    atovaquone (MEPRON) oral suspension 1,500 mg, 1,500 mg, Oral, Daily With Breakfast, Kathryn Cleveland MD, 1,500 mg at 09/14/24 0858    bisacodyl  (DULCOLAX) EC tablet 5 mg, 5 mg, Oral, Once, Gill Stevens MD    cyclophosphamide (CYTOXAN) capsule 100 mg, 100 mg, Oral, Daily, Gill Stevens MD, 100 mg at 09/14/24 0853    dextromethorphan-guaiFENesin (ROBITUSSIN DM) oral syrup 10 mL, 10 mL, Oral, Q4H PRN, Gill Stevens MD, 10 mL at 09/14/24 2100    epoetin shavonne (EPOGEN,PROCRIT) injection 6,000 Units, 6,000 Units, Intravenous, After Dialysis, Gill Stevens MD, 6,000 Units at 09/14/24 1020    fentaNYL injection, , Intravenous, PRN, Gerber Fletcher MD, 25 mcg at 09/10/24 1122    fluticasone-vilanterol 200-25 mcg/actuation 1 puff, 1 puff, Inhalation, Daily, Gill Stevens MD, 1 puff at 09/14/24 0901    heparin (porcine) subcutaneous injection 5,000 Units, 5,000 Units, Subcutaneous, Q8H JACK, Gill Stevens MD, 5,000 Units at 09/14/24 1716    iron polysaccharides (FERREX) capsule 150 mg, 150 mg, Oral, Daily, Gill Stevens MD, 150 mg at 09/14/24 0848    lamoTRIgine (LaMICtal) tablet 100 mg, 100 mg, Oral, HS, Gill Stevens MD, 100 mg at 09/14/24 2054    lidocaine (LIDODERM) 5 % patch 1 patch, 1 patch, Topical, Daily, Gill Stevens MD, 1 patch at 09/14/24 0849    lidocaine-epinephrine 1%-1:031496 buffered, , Infiltration, PRN, Gerber Fletcher MD, 5 mL at 09/10/24 1132    metoprolol succinate (TOPROL-XL) 24 hr tablet 50 mg, 50 mg, Oral, BID, Gill Stevens MD, 50 mg at 09/13/24 1736    midazolam (VERSED) injection, , , PRN, Gerber Fletcher MD, 0.5 mg at 09/10/24 1122    montelukast (SINGULAIR) tablet 10 mg, 10 mg, Oral, Daily, Gill Stevens MD, 10 mg at 09/14/24 0849    pantoprazole (PROTONIX) EC tablet 40 mg, 40 mg, Oral, Daily Before Breakfast, Gill Stevens MD, 40 mg at 09/15/24 0525    polyethylene glycol (MIRALAX) packet 17 g, 17 g, Oral, Daily, Gill Stevens MD, 17 g at 09/14/24 0848    predniSONE tablet 15 mg, 15 mg, Oral, Daily, Gill Stevens MD, 15 mg at 09/14/24 0848    senna-docusate sodium (SENOKOT S) 8.6-50 mg per tablet  2 tablet, 2 tablet, Oral, HS, Gill Stevens MD, 2 tablet at 09/14/24 2054    sevelamer (RENAGEL) tablet 1,600 mg, 1,600 mg, Oral, TID With Meals, Gill Stevens MD, 1,600 mg at 09/14/24 1716    Sodium Zirconium Cyclosilicate (Lokelma) 10 g, 10 g, Oral, Daily, Gill Stevens MD, 10 g at 09/12/24 1312    traZODone (DESYREL) tablet 200 mg, 200 mg, Oral, HS, Gill Stevens MD, 200 mg at 09/14/24 2054    trimethobenzamide (TIGAN) IM injection 200 mg, 200 mg, Intramuscular, Q6H PRN, Gill Stevens MD, 200 mg at 09/14/24 1248    vancomycin (VANCOCIN) IVPB (premix in dextrose), , Intravenous, Continuous PRN, Gerber Fletcher MD, 1,000 mg at 09/10/24 1109    venlafaxine (EFFEXOR-XR) 24 hr capsule 225 mg, 225 mg, Oral, Daily, Gill Stevens MD, 225 mg at 09/14/24 0848      Lab Results: I have reviewed the following results: CBC/BMP:   .     09/15/24  0537   WBC 10.47*   HGB 8.4*   HCT 27.3*      SODIUM 138   K 4.3   CL 97   CO2 33*   BUN 39*   CREATININE 6.25*   GLUC 84      Results from last 7 days   Lab Units 09/15/24  0537 09/14/24  1229 09/14/24  0926 09/13/24  0554 09/12/24  0458 09/11/24  0623 09/10/24  0538 09/09/24  0521   WBC Thousand/uL 10.47* 11.01* 9.55 8.68 11.86* 12.24* 10.41* 10.15   HEMOGLOBIN g/dL 8.4* 8.3* 8.1* 8.0* 7.9* 8.2* 8.3* 8.1*   HEMATOCRIT % 27.3* 26.4* 25.7* 26.5* 25.5* 26.0* 27.2* 26.6*   PLATELETS Thousands/uL 222 214 212 208 199 212 206 182   POTASSIUM mmol/L 4.3 3.8 4.0 3.8 4.0 4.3 4.1 4.0   CHLORIDE mmol/L 97 92* 98 96 97 96 101 100   CO2 mmol/L 33* 33* 31 33* 33* 31 30 30   BUN mg/dL 39* 23 52* 33* 49* 37* 63* 48*   CREATININE mg/dL 6.25* 4.13* 7.99* 5.76* 7.73* 5.94* 9.17* 7.57*   CALCIUM mg/dL 9.5 8.8 9.8 9.3 9.3 9.2 9.5 9.4   MAGNESIUM mg/dL  --   --   --   --   --   --   --  2.3   PHOSPHORUS mg/dL  --   --   --   --   --   --   --  5.3*   ALBUMIN g/dL  --  3.6  --   --   --   --   --   --        Administrative Statements   I have spent a total time of 35 minutes in  "caring for this patient on the day of the visit/encounter including Impressions, Counseling / Coordination of care, Documenting in the medical record, Reviewing / ordering tests, medicine, procedures  , Obtaining or reviewing history  , and Communicating with other healthcare professionals .  Portions of the record may have been created with voice recognition software. Occasional wrong word or \"sound a like\" substitutions may have occurred due to the inherent limitations of voice recognition software. Read the chart carefully and recognize, using context, where substitutions have occurred.If you have any questions, please contact the dictating provider.  "

## 2024-09-16 PROBLEM — R07.9 CHEST PAIN: Status: ACTIVE | Noted: 2024-09-16

## 2024-09-16 LAB
ANION GAP SERPL CALCULATED.3IONS-SCNC: 10 MMOL/L (ref 4–13)
BASOPHILS # BLD AUTO: 0.06 THOUSANDS/ΜL (ref 0–0.1)
BASOPHILS NFR BLD AUTO: 1 % (ref 0–1)
BUN SERPL-MCNC: 56 MG/DL (ref 5–25)
CALCIUM SERPL-MCNC: 9.8 MG/DL (ref 8.4–10.2)
CHLORIDE SERPL-SCNC: 100 MMOL/L (ref 96–108)
CO2 SERPL-SCNC: 29 MMOL/L (ref 21–32)
CREAT SERPL-MCNC: 8.41 MG/DL (ref 0.6–1.3)
EOSINOPHIL # BLD AUTO: 0.17 THOUSAND/ΜL (ref 0–0.61)
EOSINOPHIL NFR BLD AUTO: 2 % (ref 0–6)
ERYTHROCYTE [DISTWIDTH] IN BLOOD BY AUTOMATED COUNT: 18.4 % (ref 11.6–15.1)
FUNGUS SPEC CULT: NORMAL
GFR SERPL CREATININE-BSD FRML MDRD: 4 ML/MIN/1.73SQ M
GLUCOSE SERPL-MCNC: 86 MG/DL (ref 65–140)
HCT VFR BLD AUTO: 26.9 % (ref 34.8–46.1)
HGB BLD-MCNC: 8.2 G/DL (ref 11.5–15.4)
IMM GRANULOCYTES # BLD AUTO: 0.12 THOUSAND/UL (ref 0–0.2)
IMM GRANULOCYTES NFR BLD AUTO: 1 % (ref 0–2)
LYMPHOCYTES # BLD AUTO: 1.4 THOUSANDS/ΜL (ref 0.6–4.47)
LYMPHOCYTES NFR BLD AUTO: 16 % (ref 14–44)
MCH RBC QN AUTO: 29.5 PG (ref 26.8–34.3)
MCHC RBC AUTO-ENTMCNC: 30.5 G/DL (ref 31.4–37.4)
MCV RBC AUTO: 97 FL (ref 82–98)
MONOCYTES # BLD AUTO: 0.85 THOUSAND/ΜL (ref 0.17–1.22)
MONOCYTES NFR BLD AUTO: 10 % (ref 4–12)
NEUTROPHILS # BLD AUTO: 6.29 THOUSANDS/ΜL (ref 1.85–7.62)
NEUTS SEG NFR BLD AUTO: 70 % (ref 43–75)
NRBC BLD AUTO-RTO: 0 /100 WBCS
PLATELET # BLD AUTO: 214 THOUSANDS/UL (ref 149–390)
PMV BLD AUTO: 10.2 FL (ref 8.9–12.7)
POTASSIUM SERPL-SCNC: 4.7 MMOL/L (ref 3.5–5.3)
RBC # BLD AUTO: 2.78 MILLION/UL (ref 3.81–5.12)
SODIUM SERPL-SCNC: 139 MMOL/L (ref 135–147)
WBC # BLD AUTO: 8.89 THOUSAND/UL (ref 4.31–10.16)

## 2024-09-16 PROCEDURE — 99232 SBSQ HOSP IP/OBS MODERATE 35: CPT | Performed by: INTERNAL MEDICINE

## 2024-09-16 PROCEDURE — 97110 THERAPEUTIC EXERCISES: CPT

## 2024-09-16 PROCEDURE — 85025 COMPLETE CBC W/AUTO DIFF WBC: CPT

## 2024-09-16 PROCEDURE — 97116 GAIT TRAINING THERAPY: CPT

## 2024-09-16 PROCEDURE — 88312 SPECIAL STAINS GROUP 1: CPT | Performed by: STUDENT IN AN ORGANIZED HEALTH CARE EDUCATION/TRAINING PROGRAM

## 2024-09-16 PROCEDURE — 80048 BASIC METABOLIC PNL TOTAL CA: CPT

## 2024-09-16 PROCEDURE — 88305 TISSUE EXAM BY PATHOLOGIST: CPT | Performed by: STUDENT IN AN ORGANIZED HEALTH CARE EDUCATION/TRAINING PROGRAM

## 2024-09-16 PROCEDURE — 88112 CYTOPATH CELL ENHANCE TECH: CPT | Performed by: STUDENT IN AN ORGANIZED HEALTH CARE EDUCATION/TRAINING PROGRAM

## 2024-09-16 RX ORDER — CLOPIDOGREL BISULFATE 75 MG/1
600 TABLET ORAL ONCE
Status: COMPLETED | OUTPATIENT
Start: 2024-09-17 | End: 2024-09-17

## 2024-09-16 RX ORDER — ASPIRIN 81 MG/1
324 TABLET, CHEWABLE ORAL ONCE
Status: CANCELLED | OUTPATIENT
Start: 2024-09-16 | End: 2024-09-16

## 2024-09-16 RX ADMIN — GUAIFENESIN AND DEXTROMETHORPHAN 10 ML: 100; 10 SYRUP ORAL at 21:00

## 2024-09-16 RX ADMIN — PANTOPRAZOLE SODIUM 40 MG: 40 TABLET, DELAYED RELEASE ORAL at 05:31

## 2024-09-16 RX ADMIN — VENLAFAXINE HYDROCHLORIDE 225 MG: 150 CAPSULE, EXTENDED RELEASE ORAL at 08:58

## 2024-09-16 RX ADMIN — HEPARIN SODIUM 5000 UNITS: 5000 INJECTION INTRAVENOUS; SUBCUTANEOUS at 16:03

## 2024-09-16 RX ADMIN — METOPROLOL SUCCINATE 50 MG: 50 TABLET, EXTENDED RELEASE ORAL at 08:58

## 2024-09-16 RX ADMIN — ALBUTEROL SULFATE 2 PUFF: 90 AEROSOL, METERED RESPIRATORY (INHALATION) at 07:48

## 2024-09-16 RX ADMIN — FLUTICASONE FUROATE AND VILANTEROL TRIFENATATE 1 PUFF: 200; 25 POWDER RESPIRATORY (INHALATION) at 09:01

## 2024-09-16 RX ADMIN — LAMOTRIGINE 100 MG: 100 TABLET ORAL at 21:00

## 2024-09-16 RX ADMIN — TRAZODONE HYDROCHLORIDE 200 MG: 100 TABLET ORAL at 21:00

## 2024-09-16 RX ADMIN — METOPROLOL SUCCINATE 50 MG: 50 TABLET, EXTENDED RELEASE ORAL at 18:44

## 2024-09-16 RX ADMIN — ATOVAQUONE 1500 MG: 750 SUSPENSION ORAL at 09:00

## 2024-09-16 RX ADMIN — HEPARIN SODIUM 5000 UNITS: 5000 INJECTION INTRAVENOUS; SUBCUTANEOUS at 08:58

## 2024-09-16 RX ADMIN — PREDNISONE 15 MG: 10 TABLET ORAL at 08:58

## 2024-09-16 RX ADMIN — ATORVASTATIN CALCIUM 20 MG: 20 TABLET, FILM COATED ORAL at 08:58

## 2024-09-16 RX ADMIN — SEVELAMER HYDROCHLORIDE 1600 MG: 800 TABLET ORAL at 16:03

## 2024-09-16 RX ADMIN — SEVELAMER HYDROCHLORIDE 1600 MG: 800 TABLET ORAL at 08:58

## 2024-09-16 RX ADMIN — ACETAMINOPHEN 650 MG: 325 TABLET ORAL at 21:00

## 2024-09-16 RX ADMIN — POLYSACCHARIDE-IRON COMPLEX 150 MG: 150 CAPSULE ORAL at 08:58

## 2024-09-16 RX ADMIN — CYCLOPHOSPHAMIDE 100 MG: 50 CAPSULE ORAL at 09:00

## 2024-09-16 RX ADMIN — MONTELUKAST 10 MG: 10 TABLET, FILM COATED ORAL at 08:58

## 2024-09-16 RX ADMIN — SEVELAMER HYDROCHLORIDE 1600 MG: 800 TABLET ORAL at 12:32

## 2024-09-16 NOTE — CASE MANAGEMENT
Case Management Progress Note    Patient name Ngozi Beard  Location S /S -01 MRN [id number]  : 1960 Date 2024       LOS (days): 17  Geometric Mean LOS (GMLOS) (days): 8.8  Days to GMLOS:-7.9        OBJECTIVE:        Current admission status: Inpatient  Preferred Pharmacy:   [id number] - [place], [place] - [address]  [address] [address]  Phone: [phone] Fax: [phone]    [facility] - [place], [place] - [address]  [address]  [address]  [place] [address]  Phone: [phone] Fax: [phone]    Primary Care Provider: Meenakshi Balbuena MD    Primary Insurance: [employer] [facility]  Secondary Insurance: [facility]    PROGRESS NOTE:  CM notified this AM that patient will now be requiring a Life Vest at d/c (Zoll 4000).     CM notified Germania from [facility] admissions of above. Germania confirming with nursing admin that patient can admit with Life Vest- but nursing will need to be trained on DME before patient arrives (patient can admit back as early as tomorrow).     Germania also requesting CM confirm with [facility], (where patient completes HD) they can accommodate to DME as well. Call made to [facility]- spoke with Travon who states this is fine.     Patient likely will need new insurance auth as next review date is tomorrow. Therapy aware of need for updated notes.     CM to follow up as able to continue with dcp.

## 2024-09-16 NOTE — PHYSICAL THERAPY NOTE
PHYSICAL THERAPY NOTE          Patient Name: Ngozi Beard  Today's Date: 9/16/2024 09/16/24 1453   PT Last Visit   PT Visit Date 09/16/24   Note Type   Note Type Treatment for insurance authorization   Pain Assessment   Pain Assessment Tool 0-10   Pain Score No Pain   Patient's Stated Pain Goal No pain   Hospital Pain Intervention(s) Repositioned;Ambulation/increased activity;Rest   Multiple Pain Sites No   Pain Rating: FLACC (Rest) - Face 0   Pain Rating: FLACC (Rest) - Legs 0   Pain Rating: FLACC (Rest) - Activity 0   Pain Rating: FLACC (Rest) - Cry 0   Pain Rating: FLACC (Rest) - Consolability 0   Score: FLACC (Rest) 0   Restrictions/Precautions   Weight Bearing Precautions Per Order No   Other Precautions Chair Alarm;Bed Alarm;Cognitive;O2;Fall Risk;Pain;Telemetry;Multiple lines  (2L NC 02)   General   Chart Reviewed Yes   Response to Previous Treatment Patient reporting fatigue but able to participate.   Family/Caregiver Present No   Cognition   Overall Cognitive Status WFL   Arousal/Participation Alert;Responsive;Cooperative   Attention Attends with cues to redirect   Orientation Level Oriented X4   Memory Decreased recall of precautions   Following Commands Follows one step commands without difficulty   Comments pt was pleasant and cooperative throughout PT intervention.   Subjective   Subjective pt was agreeable to participate in PT intervention and stated no pain pre/post tx session   Bed Mobility   Supine to Sit 6  Modified independent   Additional items Assist x 1;Increased time required;HOB elevated;Bedrails   Sit to Supine 6  Modified independent   Additional items Assist x 1;HOB elevated;Bedrails;Increased time required   Additional Comments pt was able to sit EOB w/o LOB >8 minutes while performing TE activities in order to increase static/dynamic sitting balance   Transfers   Sit to Stand 5  Supervision    Additional items Assist x 1;Increased time required;Verbal cues   Stand to Sit 5  Supervision   Additional items Assist x 1;Increased time required;Verbal cues   Stand pivot Unable to assess   Additional Comments pt was able to complete 3 STS in todays tx session to and from RW with /s, VC's for hand placement and no LOB   Ambulation/Elevation   Gait pattern Improper Weight shift;Decreased foot clearance;Foward flexed;Short stride;Excessively slow;Decreased heel strike;Decreased toe off;Decreased hip extension   Gait Assistance 4  Minimal assist  (CGA)   Additional items Assist x 1;Verbal cues   Assistive Device Rolling walker   Distance 70'x1 RW   Stair Management Assistance Not tested   Ambulation/Elevation Additional Comments pt was limited with activity tolerance and ambulation distance due to fatigue as pt required seated rest breaks in [facility] tx session   Balance   Static Sitting Fair +   Dynamic Sitting Fair   Static Standing Fair -   Dynamic Standing Poor +   Ambulatory Poor +  (CGA w/ RW)   Endurance Deficit   Endurance Deficit Yes   Endurance Deficit Description decreased activity tolerance and ambulation distance   Activity Tolerance   Activity Tolerance Patient limited by fatigue   Nurse Made Aware Spoke to RN   Exercises   Quad Sets Supine;15 reps;AROM;Bilateral   Hip Abduction Sitting;20 reps;AROM;Bilateral   Hip Adduction Sitting;20 reps;AROM;Bilateral   Knee AROM Long Arc Quad Sitting;15 reps;AROM;Bilateral   Ankle Pumps Sitting;20 reps;AROM;Bilateral   Marching Sitting;15 reps;AROM;Bilateral   Assessment   Prognosis Fair   Problem List Decreased strength;Decreased endurance;Impaired balance;Decreased mobility;Pain   Assessment pt began tx session lying supine in the bed as pt was agreeable to participate in PT intervention. Pt to focus on bed mobility, transfer training, TE activities, posture/balance with gait, increasing pt activity tolerance/endurance. In comparison to previous tx session  progress was noted in todays tx session with bed mobility, funcitonal transfers and ambulation distance. pt was mod i for all bed moibility, /s for all funcitonal transfers to and from RW as pt required VC's for hand placement while ascending to RW and descending back to seated EOB. pt was able to increase ambulation distance to 70'x1 RW with CGA to min Ax1 for safety. Additional was not possible due to fatigue as pt required a seated therapeutic rest break at EOB. pt was able to sit EOB >8 minutes while performing TE activities w/o LOB in order to increase static/dynamic sitting balance. pt continues to be funcitoning below her baseline line level and would benefit from continued skilled PT intervention in order to address deficits listed above. Post tx session pt in bed with call bell and all pt needs met. Continue to recommend Dc w/ level 2 moderate rehab resource intensity when medically cleared   Goals   Patient Goals none stated   STG Expiration Date 09/22/24   PT Treatment Day 3   Plan   Treatment/Interventions Functional transfer training;LE strengthening/ROM;Elevations;Therapeutic exercise;Endurance training;Cognitive reorientation;Patient/family training;Equipment eval/education;Bed mobility;Gait training;Spoke to nursing   Progress Slow progress, decreased activity tolerance   PT Frequency 3-5x/wk   Discharge Recommendation   Rehab Resource Intensity Level, PT II (Moderate Resource Intensity)   Equipment Recommended Walker   Walker Package Recommended Wheeled walker   Change/add to Walker Package? No   AM-PAC Basic Mobility Inpatient   Turning in Flat Bed Without Bedrails 3   Lying on Back to Sitting on Edge of Flat Bed Without Bedrails 3   Moving Bed to Chair 3   Standing Up From Chair Using Arms 3   Walk in Room 3   Climb 3-5 Stairs With Railing 2   Basic Mobility Inpatient Raw Score 17   Basic Mobility Standardized Score 39.67   [facility] Highest Level Of Mobility   -[facility] Goal 5: Stand one or more  mins   JH-HLM Achieved 7: Walk 25 feet or more   Education   Education Provided Mobility training;Assistive device   Patient Demonstrates acceptance/verbal understanding   End of Consult   Patient Position at End of Consult Supine;Bed/Chair alarm activated;All needs within reach   The patient's AM-PAC Basic Mobility Inpatient Short Form Raw Score is 17. A Raw score of greater than 16 suggests the patient may benefit from discharge to home. Please also refer to the recommendation of the Physical Therapist for safe discharge planning.    Javon Brown

## 2024-09-16 NOTE — ASSESSMENT & PLAN NOTE
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  Currently on cyclophosphamide to the end of October.  Prednisone being tapered  -- Dialysis dependent.  No evidence of renal recovery.  Outpatient plan for follow-up for access placement

## 2024-09-16 NOTE — ASSESSMENT & PLAN NOTE
-- Awaiting input from cardiology  -- According to patient no prior episodes on dialysis  -- Patient denies chest pain but states she feels slightly tight because of asthma.

## 2024-09-16 NOTE — INCIDENTAL FINDINGS
The following findings require follow up:  Radiographic finding   CTA chest pe study    Result Date: 9/1/2024  Impression: No pulmonary embolism. Since July 11, 2022 there is persistent tree-in-bud nodularity in the lungs suggestive of a widespread infectious bronchiolitis that may be secondary to an atypical mycobacterium. Follicular bronchiolitis and acute hypersensitivity pneumonitis could also  be in the differential. Follow-up chest CT in 3 months after treatment for infectious bronchiolitis is advised. New mild patchy bilateral multifocal pneumonia. Pulmonary hypertension New moderate bilateral pleural effusions. The study was marked in EPIC for immediate notification. Workstation performed: [id number]      Follow up required: Yes   Follow up should be done within 3 month(s)    Please notify the following clinician to assist with the follow up:   Dr. Atkins     Incidental finding results were discussed with the Patient and Patient's POA by Robert Gilbert MD on 09/16/24.   They expressed understanding and all questions answered.

## 2024-09-16 NOTE — PLAN OF CARE
Problem: Nutrition/Hydration-ADULT  Goal: Nutrient/Hydration intake appropriate for improving, restoring or maintaining nutritional needs  Description: Monitor and assess patient's nutrition/hydration status for malnutrition. Collaborate with interdisciplinary team and initiate plan and interventions as ordered.  Monitor patient's weight and dietary intake as ordered or per policy. Utilize nutrition screening tool and intervene as necessary. Determine patient's food preferences and provide high-protein, high-caloric foods as appropriate.     INTERVENTIONS:  - Monitor oral intake, urinary output, labs, and treatment plans  - Assess nutrition and hydration status and recommend course of action  - Evaluate amount of meals eaten  - Assist patient with eating if necessary   - Allow adequate time for meals  - Recommend/ encourage appropriate diets, oral nutritional supplements, and vitamin/mineral supplements  - Order, calculate, and assess calorie counts as needed  - Recommend, monitor, and adjust tube feedings and TPN/PPN based on assessed needs  - Assess need for intravenous fluids  - Provide specific nutrition/hydration education as appropriate  - Include patient/family/caregiver in decisions related to nutrition  Outcome: Progressing     Problem: Prexisting or High Potential for Compromised Skin Integrity  Goal: Skin integrity is maintained or improved  Description: INTERVENTIONS:  - Identify patients at risk for skin breakdown  - Assess and monitor skin integrity  - Assess and monitor nutrition and hydration status  - Monitor labs   - Assess for incontinence   - Turn and reposition patient  - Assist with mobility/ambulation  - Relieve pressure over bony prominences  - Avoid friction and shearing  - Provide appropriate hygiene as needed including keeping skin clean and dry  - Evaluate need for skin moisturizer/barrier cream  - Collaborate with interdisciplinary team   - Patient/family teaching  - Consider wound  care consult   Outcome: Progressing     Problem: CARDIOVASCULAR - ADULT  Goal: Maintains optimal cardiac output and hemodynamic stability  Description: INTERVENTIONS:  - Monitor I/O, vital signs and rhythm  - Monitor for S/S and trends of decreased cardiac output  - Administer and titrate ordered vasoactive medications to optimize hemodynamic stability  - Assess quality of pulses, skin color and temperature  - Assess for signs of decreased coronary artery perfusion  - Instruct patient to report change in severity of symptoms  Outcome: Progressing

## 2024-09-16 NOTE — PROGRESS NOTES
Progress Note - Nephrology   Name: Ngozi Beard 64 y.o. 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  On steroid taper.  On cyclophosphamide until the end of October  --Remains dialysis dependent, TTS at [facility]  --Hemodialysis initiation on July 16, 2024  --s/p bronchoscopy 9/11/2024.  No new findings no diffuse alveolar hemorrhage  --Access: Right IJ permcath, repositioned by IR 9/10/2024. access worked well 9/10 for dialysis treatment.  Will monitor success with this current catheter.  Our plan is that if the catheter continues to have issues ongoing consider transitioning to urgent start PD.  --Last hemodialysis treatment Saturday.  Had episode of hypotension and chest pain.  Being evaluated by cardiology.  New onset cardiomyopathy with ejection fraction 25%  --Follow-up with vascular surgery after discharge for access placement.  Vein mapping ordered by Dr. Burns  --Plan for next dialysis on Tuesday if she remains inpatient  Primary hypertension  --Developed intradialytic hypotension and blood pressure improved after fluid bolus.  Blood pressures have remained stable  -- Current medications: Toprol-XL 50 mg twice daily  -- Continue to hold losartan  --Blood pressure acceptable although remaining in the low 100s with peak blood pressure in the 120s.  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  Currently on cyclophosphamide to the end of October.  Prednisone being tapered  -- Dialysis dependent.  No evidence of renal recovery.  Outpatient plan for follow-up for access placement  Complication associated with dialysis catheter  -- Persistent issues with the HD catheter , has been evaluated by IR multiple times, most recent 9/10/2024  --s/p exchange R IJV permcath and stripping of fibrin sheath (IR) 9/10/2024  -- Problem seems to be resolved.  No current issues  Chest pain  -- Awaiting input from cardiology  -- According to patient no prior episodes on dialysis  -- Patient denies chest pain but states she feels slightly tight because of asthma.  Cardiomyopathy (HCC)  -- Newly diagnosed cardiomyopathy with ejection fraction 25%  -- Cardiology following  -- Discussion regarding possible need for LifeVest    I have reviewed the nephrology recommendations including dialysis tomorrow, with patient/Dr. Estrada/primary, and we are in agreement with renal plan including the information outlined above. Ok for discharge from Nephrology service perspective when cleared by cardiology.    History of Present Illness   Brief History of Admission -64-year-old female who presented with increased dyspnea on exertion for 1 to 2 weeks.  Also a chronic cough.  On admission CT with patchy infiltrates and diffuse tree-in-bud nodularities.  Recent start of hemodialysis due to anti-GBM disease.  Nephrology consulted for management    Patient states her chest does not hurt today/no pain but feels tight because of her asthma.  No other complaints offered.    Objective      Temp:  [97.8 °F (36.6 °C)-98.4 °F (36.9 °C)] 98.4 °F (36.9 °C)  HR:  [67-85] 67  Resp:  [16-18] 18  BP: (104-125)/(64-76) 125/71  O2 Device: Nasal cannula         Vitals:    09/14/24 0915 09/16/24 0600   Weight: 102 kg (225 lb 1.4 oz) 102 kg (224 lb 6.9  oz)     I/O last 24 hours:  In: 720 [P.O.:720]  Out: -   Lines/Drains/Airways       Active Status       Name Placement date Placement time Site Days    HD Permanent Double Catheter 09/10/24  1142  Internal jugular  6                  Physical Exam  Vitals and nursing note reviewed.   Constitutional:       General: She is not in acute distress.     Appearance: She is well-developed. She is not ill-appearing.   HENT:      Head: Normocephalic and atraumatic.   Eyes:      Conjunctiva/sclera: Conjunctivae normal.   Cardiovascular:      Rate and Rhythm: Normal rate and regular rhythm.      Heart sounds: Murmur heard.   Pulmonary:      Effort: Pulmonary effort is normal. No respiratory distress.      Breath sounds: Normal breath sounds.   Abdominal:      Palpations: Abdomen is soft.      Tenderness: There is no abdominal tenderness.   Musculoskeletal:         General: No swelling.      Cervical back: Neck supple. No rigidity or tenderness.   Skin:     General: Skin is warm and dry.      Capillary Refill: Capillary refill takes less than 2 seconds.   Neurological:      Mental Status: She is alert.   Psychiatric:         Mood and Affect: Mood normal.        Medications:    Current Facility-Administered Medications:     acetaminophen (TYLENOL) tablet 650 mg, 650 mg, Oral, Q6H PRN, Gill Stevens MD, 650 mg at 09/14/24 1523    albuterol (PROVENTIL HFA,VENTOLIN HFA) inhaler 2 puff, 2 puff, Inhalation, Q4H PRN, Gill Stevens MD, 2 puff at 09/16/24 0748    atorvastatin (LIPITOR) tablet 20 mg, 20 mg, Oral, Daily, Gill Stevens MD, 20 mg at 09/16/24 0858    atovaquone (MEPRON) oral suspension 1,500 mg, 1,500 mg, Oral, Daily With Breakfast, Kathryn Cleveland MD, 1,500 mg at 09/16/24 0900    bisacodyl (DULCOLAX) EC tablet 5 mg, 5 mg, Oral, Once, Gill Stevens MD    cyclophosphamide (CYTOXAN) capsule 100 mg, 100 mg, Oral, Daily, Gill Stevens MD, 100 mg at 09/16/24 0900    dextromethorphan-guaiFENesin (ROBITUSSIN DM) oral  syrup 10 mL, 10 mL, Oral, Q4H PRN, Gill Stevens MD, 10 mL at 09/14/24 2100    epoetin shavonne (EPOGEN,PROCRIT) injection 6,000 Units, 6,000 Units, Intravenous, After Dialysis, Gill Stevens MD, 6,000 Units at 09/14/24 1020    fentaNYL injection, , Intravenous, PRN, Gerber Fletcher MD, 25 mcg at 09/10/24 1122    fluticasone-vilanterol 200-25 mcg/actuation 1 puff, 1 puff, Inhalation, Daily, Gill Stevens MD, 1 puff at 09/16/24 0901    heparin (porcine) subcutaneous injection 5,000 Units, 5,000 Units, Subcutaneous, Q8H JACK, Gill Stevens MD, 5,000 Units at 09/16/24 0858    iron polysaccharides (FERREX) capsule 150 mg, 150 mg, Oral, Daily, Gill Stevens MD, 150 mg at 09/16/24 0858    lamoTRIgine (LaMICtal) tablet 100 mg, 100 mg, Oral, HS, Gill Stevens MD, 100 mg at 09/15/24 2108    lidocaine (LIDODERM) 5 % patch 1 patch, 1 patch, Topical, Daily, Gill Stevens MD, 1 patch at 09/15/24 0858    lidocaine-epinephrine 1%-1:109688 buffered, , Infiltration, PRN, Gerber Fletcher MD, 5 mL at 09/10/24 1132    metoprolol succinate (TOPROL-XL) 24 hr tablet 50 mg, 50 mg, Oral, BID, Gill Stevens MD, 50 mg at 09/16/24 0858    midazolam (VERSED) injection, , , PRN, Gerber Fletcher MD, 0.5 mg at 09/10/24 1122    montelukast (SINGULAIR) tablet 10 mg, 10 mg, Oral, Daily, Gill Stevens MD, 10 mg at 09/16/24 0858    pantoprazole (PROTONIX) EC tablet 40 mg, 40 mg, Oral, Daily Before Breakfast, Gill Stevens MD, 40 mg at 09/16/24 0531    polyethylene glycol (MIRALAX) packet 17 g, 17 g, Oral, Daily, Gill Stevens MD, 17 g at 09/14/24 0848    predniSONE tablet 15 mg, 15 mg, Oral, Daily, Gill Stevens MD, 15 mg at 09/16/24 0858    senna-docusate sodium (SENOKOT S) 8.6-50 mg per tablet 2 tablet, 2 tablet, Oral, HS, Gill Stevens MD, 2 tablet at 09/15/24 2105    sevelamer (RENAGEL) tablet 1,600 mg, 1,600 mg, Oral, TID With Meals, Gill Stevens MD, 1,600 mg at 09/16/24 1232    Sodium Zirconium Cyclosilicate  "(Lokelma) 10 g, 10 g, Oral, Daily, Gill Stevens MD, 10 g at 09/12/24 1312    traZODone (DESYREL) tablet 200 mg, 200 mg, Oral, HS, Gill Stveens MD, 200 mg at 09/15/24 2108    trimethobenzamide (TIGAN) IM injection 200 mg, 200 mg, Intramuscular, Q6H PRN, Gill Stevens MD, 200 mg at 09/14/24 1248    vancomycin (VANCOCIN) IVPB (premix in dextrose), , Intravenous, Continuous PRN, Gerber Fletcher MD, 1,000 mg at 09/10/24 1109    venlafaxine (EFFEXOR-XR) 24 hr capsule 225 mg, 225 mg, Oral, Daily, Gill Stevens MD, 225 mg at 09/16/24 0858      Lab Results: I have reviewed the following results:   Results from last 7 days   Lab Units 09/16/24  0532 09/15/24  0537 09/14/24  1229 09/14/24  0926 09/13/24  0554 09/12/24  0458 09/11/24  0623   WBC Thousand/uL 8.89 10.47* 11.01* 9.55 8.68 11.86* 12.24*   HEMOGLOBIN g/dL 8.2* 8.4* 8.3* 8.1* 8.0* 7.9* 8.2*   HEMATOCRIT % 26.9* 27.3* 26.4* 25.7* 26.5* 25.5* 26.0*   PLATELETS Thousands/uL 214 222 214 212 208 199 212   POTASSIUM mmol/L 4.7 4.3 3.8 4.0 3.8 4.0 4.3   CHLORIDE mmol/L 100 97 92* 98 96 97 96   CO2 mmol/L 29 33* 33* 31 33* 33* 31   BUN mg/dL 56* 39* 23 52* 33* 49* 37*   CREATININE mg/dL 8.41* 6.25* 4.13* 7.99* 5.76* 7.73* 5.94*   CALCIUM mg/dL 9.8 9.5 8.8 9.8 9.3 9.3 9.2   ALBUMIN g/dL  --   --  3.6  --   --   --   --        Administrative Statements     Portions of the record may have been created with voice recognition software. Occasional wrong word or \"sound a like\" substitutions may have occurred due to the inherent limitations of voice recognition software. Read the chart carefully and recognize, using context, where substitutions have occurred.If you have any questions, please contact the dictating provider.  "

## 2024-09-16 NOTE — ASSESSMENT & PLAN NOTE
-- Persistent issues with the HD catheter , has been evaluated by IR multiple times, most recent 9/10/2024  --s/p exchange R IJV permcath and stripping of fibrin sheath (IR) 9/10/2024  -- Problem seems to be resolved.  No current issues

## 2024-09-16 NOTE — QUICK NOTE
Made aware by case management that patient will not be returning to rehab facility until tomorrow after hemodialysis.  Therefore we will plan for inpatient cardiac catheterization tomorrow to define her coronary anatomy in the setting of her new cardiomyopathy.    She has an allergy listed to aspirin. Upon questioning, this was only to a full-strength 324 mg aspirin. She previously tolerated 81 mg aspirin tablets.  She does not recall the severity of her reaction to full-strength aspirin but believes it was just a rash without any facial/oral swelling or respiratory issues.  Case discussed briefly with interventional cardiology team. Will load with Plavix pre catheterization.

## 2024-09-16 NOTE — PROGRESS NOTES
Progress Note - Infectious Disease   Ngozi Beard 64 y.o. female MRN: [id number]  Unit/Bed#: S -01 Encounter: [id number]      Impression/Recommendations:  1.   Pseudomonas respiratory infection.  Clinical and radiological picture is more consistent with bronchitis than pneumonia.  Patient completed 7-day course of cefepime.  Cough is improved.  Stable intermittent mild dyspnea.  Observe off further antibiotic.     2.  Pneumonitis versus pneumonia.  Patient's respiratory symptoms are much improved admission.  Due to initial improvement of respiratory status, bronchoscopy was not done.  However, due to fluctuating respiratory status, bronchoscopy was finally done earlier this week.  There was no evidence of airway inflammation.  BAL culture only has growth of yeast thus far, likely airway colonization.  Aspergillus galactomannan negative.  Of note, as in above, patient just completed 7-day course of IV cefepime.  No further antibiotic as in above.  Follow-up on BAL final studies.     3.  Bacteremia, with growth of MRSE in only 1 out of 2 admission blood cultures.  Patient has no fever and is not systemically ill, making true bacteremia not likely clinically.  Although patient does have permacath in place, with growth in only 1 culture sent, this is still most likely contaminated blood draw.  Patient had been on IV vancomycin but this was discontinued.  She remains clinically well off it.  No antibiotic needed for this indication.     3.  Leukocytosis.  Most likely steroid effect.  WBC fluctuating, currently stable.     4.  Advanced CKD, on HD.  Functional difficulty of permacath noted.    HD per nephrology.     5.  Recently diagnosed rapidly progressing anti-GBM disease.  Patient is on Cytoxan and prednisone since diagnosis.  Continue outpatient Cytoxan/steroid.  Continue atovaquone PCP prophylaxis.     Discussed with patient in detail regarding the above plan.  Discussed with Dr. Cleveland from primary service  earlier regarding BAL result so far and continued observation of antibiotic/antifungal.  He is in agreement.     Antibiotics:  Off antibiotic     Subjective:  Patient feels well.  Minimal dyspnea at rest.  Cough also minimal.  Temperature stays down.  No chills.  Patient is tolerating antibiotic well.  No nausea, vomiting or diarrhea.     Objective:  Vitals:  Temp:  [97.8 °F (36.6 °C)-98.4 °F (36.9 °C)] 98.4 °F (36.9 °C)  HR:  [68-85] 68  Resp:  [16-18] 18  BP: (104-123)/(64-76) 104/65  SpO2:  [90 %-98 %] 94 %  Temp (24hrs), Av.1 °F (36.7 °C), Min:97.8 °F (36.6 °C), Max:98.4 °F (36.9 °C)  Current: Temperature: 98.4 °F (36.9 °C)    Physical Exam:     General: Awake, alert, cooperative, no distress.   Neck:  Supple. No mass.  No lymphadenopathy.   Lungs: Expansion symmetric, no rales, no wheezing, respirations unlabored.   Heart:  Regular rate and rhythm, S1 and S2 normal, no murmur.   Abdomen: Soft, nondistended, non-tender, bowel sounds active all four quadrants, no masses, no organomegaly.   Extremities: Stable mild leg edema. No erythema/warmth. No ulcer. Nontender to palpation.   Skin:  No rash.   Neuro: Moves all extremities.     Invasive Devices       Peripheral Intravenous Line  Duration             Peripheral IV 09/15/24 Left Antecubital 1 day              Hemodialysis Catheter  Duration             HD Permanent Double Catheter 5 days                    Labs studies:   I have personally reviewed pertinent labs.  Results from last 7 days   Lab Units 24  0532 09/15/24  0537 24  1229   POTASSIUM mmol/L 4.7 4.3 3.8   CHLORIDE mmol/L 100 97 92*   CO2 mmol/L 29 33* 33*   BUN mg/dL 56* 39* 23   CREATININE mg/dL 8.41* 6.25* 4.13*   EGFR ml/min/1.73sq m 4 6 10   CALCIUM mg/dL 9.8 9.5 8.8   AST U/L  --   --  7*   ALT U/L  --   --  6*   ALK PHOS U/L  --   --  65     Results from last 7 days   Lab Units 24  0532 09/15/24  0537 24  1229   WBC Thousand/uL 8.89 10.47* 11.01*   HEMOGLOBIN g/dL  8.2* 8.4* 8.3*   PLATELETS Thousands/uL 214 222 214     Results from last 7 days   Lab Units 09/11/24  0955 09/11/24  0954 09/11/24  0946 09/11/24  0937   GRAM STAIN RESULT  3+ Polys  No organisms seen 2+ Polys  No bacteria seen 3+ Polys  No organisms seen 1+ Polys  No organisms seen       Imaging Studies:   I have personally reviewed pertinent imaging study reports and images in PACS.    EKG, Pathology, and Other Studies:   I have personally reviewed pertinent reports.

## 2024-09-16 NOTE — ASSESSMENT & PLAN NOTE
--Developed intradialytic hypotension and blood pressure improved after fluid bolus.  Blood pressures have remained stable  -- Current medications: Toprol-XL 50 mg twice daily  -- Continue to hold losartan  --Blood pressure acceptable although remaining in the low 100s with peak blood pressure in the 120s.  -- Hypotensive episode on dialysis Saturday

## 2024-09-16 NOTE — ASSESSMENT & PLAN NOTE
-CT chest PE study revealed no evidence of PE. There is persistent tree-in-bud nodularity in the lungs suggestive of a widespread infectious bronchiolitis that may be secondary to an atypical mycobacterium. Follicular bronchiolitis and acute hypersensitivity pneumonitis could also be in the differential.  New mild patchy bilateral multifocal pneumonia  - Sputum culture: 4+ Pseudomonas   - ID: Chronic colonization of pseudomonas, rather than acute infection. Finished Cefepime x7 d course ended 9/10 . Continue atovaquone indefinitely   - Cardiology: clearance for bronchoscopy  - Pulm: 9/11 Bronchoscopy under sedation, culture sent  - Nephrology: Abnormal chest imaging, possible contributing factors to her catheter dysfunction    Improvement in coughing and sputum production symptoms with addition of abx, suspect patient presentation partially contributed from bronchitis secondary to pseudomonas.     Bronchoscopy results so far: Gram stain: 2- 3+ polys, no organisms,Pneuocysis PCR negative, Viral culture preliminary results are negative, AFB culture and stain - not seen  Plan:   -Bronchoscopy results can follow up on these remaining results  follow up on  - as of 9/16   Fungal culture - 2 + yeast  Bronchial culture - 1-2+ mixed izzy  Legionella  TB PCR

## 2024-09-16 NOTE — ASSESSMENT & PLAN NOTE
Admission in July 2024 for ANGELICA. 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 S/p PLEX.  Patient is anuric and dialysis-dependent  Tues/Thurs/Sat schedule  Access: Right IJ PermCath, Cath reevaluation  by IR on 9/3, no issues during HD. 9/4, issues with clotting, will needs re-evaluation @9.5. Requires Re-evaluation 9/6 IR, Fluid poor returns on 9/5. 9/6-bedside readjustment was made by IR, reclotted after 1.5 hours, Cathflo dwelling overnight. 9/7 - HD without issues . 9/8 -cannot achieve good flow.  9/9-IR procedure unavailable, facilitated conversation to IR and nephrology regarding plans for dialysis.  9/10 - catheter exchanged, no issue with hemodialysis. 9/12 - no issues noted with HD.   On prednisone 15 mg   15 mg starting September 7 to September 20  12.5 mg starting September 21 to October 4  10 mg starting October 5 to October 18  7.5 mg starting October 19 to November 2  5 mg daily starting November 3  Plan:  Hemodialysis schedule per nephro   continue PTA cyclophosphamide  Continue PTA sevalemer  Continue PTA atovaquone for PJP prophylaxis  Started Lokelma per nephro  Continue prednisone taper from outpatient nephrology prior to this admission

## 2024-09-16 NOTE — ASSESSMENT & PLAN NOTE
Occurred during HD on 9/14 with associated hypotension, SBP into the 80s. Responsive to IVF. No further CP since.  Hs troponin negative x 3  ECG without acute changes- NSR with LBBB

## 2024-09-16 NOTE — PROGRESS NOTES
Progress Note - Cardiology   Name: Ngozi Beard 64 y.o. female I MRN: [id number]  Unit/Bed#: S -01 I Date of Admission: 8/30/2024   Date of Service: 9/16/2024 I Hospital Day: 17     Assessment & Plan  Cardiomyopathy (HCC)  Newly diagnosed this admission although no prior echo on file for comparison  Overall appears well compensated on exam today and volume status is managed by dialysis  Unclear etiology but will need further testing as an outpatient including LHC and possibly CMR  GDMT- Toprol XL 50 mg BID. Losartan held per nephrology  Primary hypertension  Toprol XL 50 mg BID  Hypotensive during HD on 9/14, responsive to IVF. Stable over past 24 hours.  Dyslipidemia  , , HDL 31, LDL 79 in May 2024. On atorvastatin 20 mg daily  Anemia  Hemoglobin as low as 6.7 on 8/31 s/p 1 unit PRBCs. Now up to 8.2 which is within recent baseline.  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 ID following.  Chest pain  Occurred during HD on 9/14 with associated hypotension, SBP into the 80s. Responsive to IVF. No further CP since.  Hs troponin negative x 3  ECG without acute changes- NSR with LBBB    Plan/discussion  Suspect chest pain from Saturday to be non cardiac from hypotension. Hs troponin negative and ECG nonischemic  Continue GDMT for new cardiomyopathy with Toprol XL 50 mg BID. Losartan remains on hold per nephrology's recommendations.  Life[facility]est at discharge for LVEF 25%- order form completed today  No arrhythmias seen on telemetry  She will need eventual LHC for further work up of her cardiomyopathy. She is currently stable for d/c planning to [facility]. If she is unable to be discharged today (due to bed or LifeVest availability), then we will plan to do LHC tomorrow. Otherwise, this can be completed at a later date.   She has follow up scheduled  "24    Subjective  Review of Systems   Constitutional: Negative for chills, malaise/fatigue and weight gain.   Cardiovascular:  Positive for dyspnea on exertion. Negative for chest pain, leg swelling, orthopnea, palpitations and syncope.   Respiratory:  Negative for cough, shortness of breath, sleep disturbances due to breathing and sputum production.    Endocrine: Negative.    Hematologic/Lymphatic: Negative.    Gastrointestinal:  Negative for bloating, nausea and vomiting.   Genitourinary: Negative.    Neurological:  Negative for dizziness, light-headedness and weakness.   Psychiatric/Behavioral:  Negative for altered mental status.    All other systems reviewed and are negative.    Objective:   Vitals: Blood pressure 104/65, pulse 68, temperature 98.4 °F (36.9 °C), temperature source Oral, resp. rate 18, height 5' 3\" (1.6 m), weight 102 kg (224 lb 6.9 oz), SpO2 94%., Body mass index is 39.76 kg/m².,     Systolic (24hrs), Av , Min:104 , Max:123     Diastolic (24hrs), Av, Min:64, Max:76      Intake/Output Summary (Last 24 hours) at 2024 0904  Last data filed at 9/15/2024 1831  Gross per 24 hour   Intake 720 ml   Output --   Net 720 ml     Wt Readings from Last 3 Encounters:   24 102 kg (224 lb 6.9 oz)   08/10/24 118 kg (259 lb 0.7 oz)   24 112 kg (247 lb 5.7 oz)     Telemetry Review: No significant arrhythmias seen on telemetry review. NSR with LBBB    EKG personally reviewed by JEB Wan.  NSR with LBBB     Physical Exam  Vitals reviewed.   Constitutional:       General: She is not in acute distress.     Appearance: She is obese.   Neck:      Vascular: No hepatojugular reflux or JVD.   Cardiovascular:      Rate and Rhythm: Normal rate and regular rhythm.      Heart sounds: Normal heart sounds. No murmur heard.     No friction rub. No gallop.   Pulmonary:      Effort: Pulmonary effort is normal. No respiratory distress.      Breath sounds: No rales.   Abdominal:      " General: Bowel sounds are normal. There is no distension.      Palpations: Abdomen is soft.      Tenderness: There is no abdominal tenderness.   Musculoskeletal:         General: No tenderness. Normal range of motion.      Cervical back: Neck supple.      Right lower leg: No edema.      Left lower leg: No edema.   Skin:     General: Skin is warm and dry.      Capillary Refill: Capillary refill takes 2 to 3 seconds.      Findings: No erythema.   Neurological:      Mental Status: She is alert and oriented to person, place, and time.   Psychiatric:         Mood and Affect: Mood normal.         LABORATORY RESULTS      CBC with diff:   Results from last 7 days   Lab Units 09/16/24  0532 09/15/24  0537 09/14/24  1229 09/14/24  0926 09/13/24  0554 09/12/24  0458 09/11/24  0623   WBC Thousand/uL 8.89 10.47* 11.01* 9.55 8.68 11.86* 12.24*   HEMOGLOBIN g/dL 8.2* 8.4* 8.3* 8.1* 8.0* 7.9* 8.2*   HEMATOCRIT % 26.9* 27.3* 26.4* 25.7* 26.5* 25.5* 26.0*   MCV fL 97 96 95 95 96 96 95   PLATELETS Thousands/uL 214 222 214 212 208 199 212   RBC Million/uL 2.78* 2.84* 2.78* 2.70* 2.75* 2.66* 2.74*   MCH pg 29.5 29.6 29.9 30.0 29.1 29.7 29.9   MCHC g/dL 30.5* 30.8* 31.4 31.5 30.2* 31.0* 31.5   RDW % 18.4* 18.8* 18.7* 18.8* 19.3* 18.8* 18.9*   MPV fL 10.2 10.6 10.5 10.2 10.5 10.2 10.7   NRBC AUTO /100 WBCs 0  --  0  --   --   --   --      CMP:  Results from last 7 days   Lab Units 09/16/24  0532 09/15/24  0537 09/14/24  1229 09/14/24  0926 09/13/24  0554 09/12/24  0458 09/11/24  0623   POTASSIUM mmol/L 4.7 4.3 3.8 4.0 3.8 4.0 4.3   CHLORIDE mmol/L 100 97 92* 98 96 97 96   CO2 mmol/L 29 33* 33* 31 33* 33* 31   BUN mg/dL 56* 39* 23 52* 33* 49* 37*   CREATININE mg/dL 8.41* 6.25* 4.13* 7.99* 5.76* 7.73* 5.94*   CALCIUM mg/dL 9.8 9.5 8.8 9.8 9.3 9.3 9.2   AST U/L  --   --  7*  --   --   --   --    ALT U/L  --   --  6*  --   --   --   --    ALK PHOS U/L  --   --  65  --   --   --   --    EGFR ml/min/1.73sq m 4 6 10 4 7 4 6     BMP:  Results  "from last 7 days   Lab Units 09/16/24  0532 09/15/24  0537 09/14/24  1229 09/14/24  0926 09/13/24  0554 09/12/24  0458 09/11/24  0623   POTASSIUM mmol/L 4.7 4.3 3.8 4.0 3.8 4.0 4.3   CHLORIDE mmol/L 100 97 92* 98 96 97 96   CO2 mmol/L 29 33* 33* 31 33* 33* 31   BUN mg/dL 56* 39* 23 52* 33* 49* 37*   CREATININE mg/dL 8.41* 6.25* 4.13* 7.99* 5.76* 7.73* 5.94*   CALCIUM mg/dL 9.8 9.5 8.8 9.8 9.3 9.3 9.2     Lab Results   Component Value Date    CREATININE 8.41 (H) 09/16/2024    CREATININE 6.25 (H) 09/15/2024    CREATININE 4.13 (H) 09/14/2024     Lipid Profile:   No results found for: \"CHOL\"  Lab Results   Component Value Date    HDL 31 (L) 05/09/2024    HDL 46 11/26/2016    HDL 48 06/28/2016     Lab Results   Component Value Date    LDLCALC 79 05/09/2024    LDLCALC 138 (H) 11/26/2016    LDLCALC 145 (H) 06/28/2016     Lab Results   Component Value Date    TRIG 129 05/09/2024    TRIG 143 11/26/2016    TRIG 117 06/28/2016       Meds/Allergies   all current active meds have been reviewed and current meds:   Current Facility-Administered Medications:     acetaminophen (TYLENOL) tablet 650 mg, Q6H PRN    albuterol (PROVENTIL HFA,VENTOLIN HFA) inhaler 2 puff, Q4H PRN    atorvastatin (LIPITOR) tablet 20 mg, Daily    atovaquone (MEPRON) oral suspension 1,500 mg, Daily With Breakfast    bisacodyl (DULCOLAX) EC tablet 5 mg, Once    cyclophosphamide (CYTOXAN) capsule 100 mg, Daily    dextromethorphan-guaiFENesin (ROBITUSSIN DM) oral syrup 10 mL, Q4H PRN    epoetin shavonne (EPOGEN,PROCRIT) injection 6,000 Units, After Dialysis    fentaNYL injection, PRN    fluticasone-vilanterol 200-25 mcg/actuation 1 puff, Daily    heparin (porcine) subcutaneous injection 5,000 Units, Q8H [facility]    iron polysaccharides (FERREX) capsule 150 mg, Daily    lamoTRIgine (LaMICtal) tablet 100 mg, HS    lidocaine (LIDODERM) 5 % patch 1 patch, Daily    lidocaine-epinephrine 1%-1:299050 buffered, PRN    metoprolol succinate (TOPROL-XL) 24 hr tablet 50 mg, BID    " midazolam (VERSED) injection, PRN    montelukast (SINGULAIR) tablet 10 mg, Daily    pantoprazole (PROTONIX) EC tablet 40 mg, Daily Before Breakfast    polyethylene glycol (MIRALAX) packet 17 g, Daily    predniSONE tablet 15 mg, Daily    senna-docusate sodium (SENOKOT S) 8.6-50 mg per tablet 2 tablet, HS    sevelamer (RENAGEL) tablet 1,600 mg, TID With Meals    Sodium Zirconium Cyclosilicate (Lokelma) 10 g, Daily    traZODone (DESYREL) tablet 200 mg, HS    trimethobenzamide (TIGAN) IM injection 200 mg, Q6H PRN    vancomycin (VANCOCIN) IVPB (premix in dextrose), Continuous PRN    venlafaxine (EFFEXOR-XR) 24 hr capsule 225 mg, Daily    Medications Prior to Admission:     albuterol (PROVENTIL HFA,VENTOLIN HFA) 90 mcg/act inhaler    atorvastatin (LIPITOR) 20 mg tablet    [] atovaquone (MEPRON) 750 mg/5 mL suspension    calcium carbonate (OYSTER SHELL,OSCAL) 500 mg    cyclophosphamide (CYTOXAN) 50 mg capsule    lamoTRIgine (LaMICtal) 100 mg tablet    montelukast (SINGULAIR) 10 mg tablet    NIFEdipine (PROCARDIA XL) 30 mg 24 hr tablet    ondansetron (ZOFRAN) 4 mg tablet    pantoprazole (PROTONIX) 40 mg tablet    predniSONE 2.5 mg tablet    senna (SENOKOT) 8.6 mg    sevelamer (RENAGEL) 800 mg tablet    traZODone (DESYREL) 100 mg tablet    venlafaxine (EFFEXOR-XR) 150 mg 24 hr capsule    venlafaxine (EFFEXOR-XR) 75 mg 24 hr capsule    Advair -21 MCG/ACT inhaler    iron polysaccharides (FERREX) 150 mg capsule    vancomycin,     Counseling / Coordination of Care  Total floor / unit time spent today 20 minutes.  Greater than 50% of total time was spent with the patient and / or family counseling and / or coordination of care.      ** Please Note: Dragon 360 Dictation voice to text software may have been used in the creation of this document. **

## 2024-09-16 NOTE — ASSESSMENT & PLAN NOTE
Newly diagnosed this admission although no prior echo on file for comparison  Overall appears well compensated on exam today and volume status is managed by dialysis  Unclear etiology but will need further testing as an outpatient including LHC and possibly CMR  GDMT- Toprol XL 50 mg BID. Losartan held per nephrology

## 2024-09-16 NOTE — PLAN OF CARE
Problem: PHYSICAL THERAPY ADULT  Goal: Performs mobility at highest level of function for planned discharge setting.  See evaluation for individualized goals.  Description: Treatment/Interventions: Functional transfer training, LE strengthening/ROM, Elevations, Therapeutic exercise, Endurance training, Patient/family training, Cognitive reorientation, Equipment eval/education, Bed mobility, Gait training, Compensatory technique education  Equipment Recommended: Walker       See flowsheet documentation for full assessment, interventions and recommendations.  Outcome: Progressing  Note: Prognosis: Fair  Problem List: Decreased strength, Decreased endurance, Impaired balance, Decreased mobility, Pain  Assessment: pt began tx session lying supine in the bed as pt was agreeable to participate in PT intervention. Pt to focus on bed mobility, transfer training, TE activities, posture/balance with gait, increasing pt activity tolerance/endurance. In comparison to previous tx session progress was noted in todays tx session with bed mobility, funcitonal transfers and ambulation distance. pt was mod i for all bed moibility, /s for all funcitonal transfers to and from RW as pt required VC's for hand placement while ascending to RW and descending back to seated EOB. pt was able to increase ambulation distance to 70'x1 RW with CGA to min Ax1 for safety. Additional was not possible due to fatigue as pt required a seated therapeutic rest break at EOB. pt was able to sit EOB >8 minutes while performing TE activities w/o LOB in order to increase static/dynamic sitting balance. pt continues to be funcitoning below her baseline line level and would benefit from continued skilled PT intervention in order to address deficits listed above. Post tx session pt in bed with call bell and all pt needs met. Continue to recommend Dc w/ level 2 moderate rehab resource intensity when medically cleared  Barriers to Discharge: Inaccessible home  environment, Decreased caregiver support (pt has 3 DILLAN, lives w/ roommate)     Rehab Resource Intensity Level, PT: II (Moderate Resource Intensity)    See flowsheet documentation for full assessment.

## 2024-09-16 NOTE — ASSESSMENT & PLAN NOTE
August 31-EF of 25% with global hypokinesis.  No prior echo for comparison.    Suspect nonischemic cardiomyopathy from inflammatory/rheumatologic etiology   Plan   GDMT recommended, continue Toprol 50 mg daily, Losartan 25 mg daily  9/4 - Nifedipine held per Cardiology  9/9 Losartan held per nephro   Will need Cardiac MRI, likely an outpatient procedure.  Ischemic work up with with left cardiac cath in o/p settings  Dc with lifevest

## 2024-09-16 NOTE — PROGRESS NOTES
Progress Note - Hospitalist   Name: Ngozi Beard 64 y.o. 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 S/p PLEX.  Patient is anuric and dialysis-dependent  Tues/Thurs/Sat schedule  Access: Right IJ PermCath, Cath reevaluation  by IR on 9/3, no issues during HD. 9/4, issues with clotting, will needs re-evaluation @9.5. Requires Re-evaluation 9/6 IR, Fluid poor returns on 9/5. 9/6-bedside readjustment was made by IR, reclotted after 1.5 hours, Cathflo dwelling overnight. 9/7 - HD without issues . 9/8 -cannot achieve good flow.  9/9-IR procedure unavailable, facilitated conversation to IR and nephrology regarding plans for dialysis.  9/10 - catheter exchanged, no issue with hemodialysis. 9/12 - no issues noted with HD.   On prednisone 15 mg   15 mg starting September 7 to September 20  12.5 mg starting September 21 to October 4  10 mg starting October 5 to October 18  7.5 mg starting October 19 to November 2  5 mg daily starting November 3  Plan:  Hemodialysis schedule per nephro   continue PTA cyclophosphamide  Continue PTA sevalemer  Continue PTA atovaquone for PJP prophylaxis  Started Lokelma per nephro  Continue prednisone taper from outpatient nephrology prior to this admission  Multifocal pneumonia  -CT chest PE study revealed no evidence of PE. There is persistent tree-in-bud nodularity in the lungs suggestive of a widespread infectious bronchiolitis that may be secondary to an atypical mycobacterium. Follicular bronchiolitis and acute hypersensitivity pneumonitis could also be in the differential.  New mild patchy bilateral multifocal pneumonia  - Sputum culture: 4+ Pseudomonas   - ID: Chronic colonization of pseudomonas, rather than acute infection.  Finished Cefepime x7 d course ended 9/10 . Continue atovaquone indefinitely   - Cardiology: clearance for bronchoscopy  - Pulm: 9/11 Bronchoscopy under sedation, culture sent  - Nephrology: Abnormal chest imaging, possible contributing factors to her catheter dysfunction    Improvement in coughing and sputum production symptoms with addition of abx, suspect patient presentation partially contributed from bronchitis secondary to pseudomonas.     Bronchoscopy results so far: Gram stain: 2- 3+ polys, no organisms,Pneuocysis PCR negative, Viral culture preliminary results are negative, AFB culture and stain - not seen  Plan:   -Bronchoscopy results can follow up on these remaining results  follow up on  - as of 9/16   Fungal culture - 2 + yeast  Bronchial culture - 1-2+ mixed izzy  Legionella  TB PCR    Cardiomyopathy (HCC)  August 31-EF of 25% with global hypokinesis.  No prior echo for comparison.    Suspect nonischemic cardiomyopathy from inflammatory/rheumatologic etiology   Plan   GDMT recommended, continue Toprol 50 mg daily, Losartan 25 mg daily  9/4 - Nifedipine held per Cardiology  9/9 Losartan held per nephro   Will need Cardiac MRI, likely an outpatient procedure.  Ischemic work up with with left cardiac cath in o/p settings  Dc with lifevest      Asthma without acute exacerbation  Mild Bilateral wheezing was noted in the lower lung base upon initial presentation.  Does not require frequent inhaler use as an outpatient setting.  Low suspicion for exacerbation for the clinical symptoms presented during this hospital on admission    Plan  Continue Breo daily  Continue albuterol q4 prn  Continue montelukast qhs  Generalized weakness  Multifactorial, respiratory failure secondary to chronic pulmonary infection, volume overload, chronic anemia, chronic deconditioning from being ill  PT OT level II    Anemia  Recent Labs     09/14/24  0926 09/14/24  1229 09/15/24  0537   HGB 8.1* 8.3* 8.4*      Baseline Hgb  7-8  Etiology: component of iron deficiency and renal disease.     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       Unspecified mood (affective) disorder (HCC)  Please see problem bipolar 1 disorder  Complication associated with dialysis catheter  See problem and assessment and plan for above  Current Length of Stay: 17 day(s)  Current Patient Status: Inpatient   Code Status: Level 1 - Full Code      Discharge Plan:   Expected date of discharge:   Dispo destination: [facility]  Indwelling drains/lines: Tunnel Cath  DME needs: life vest  HH needs: n/a   F/U appts: Nephrology/ Cardiology cardiac mri, cath/ Pulm bronchoscopy,   Preferred pharmacy: on file  Refills:  Transportation: facility    Subjective:   Overnight: No acute events over night     Complaints: No complaints.     Patient denies Headache, Fever, Chills, Chest Pain,  Shortness of breath,  Nausea, or Vomiting Abdominal Pain,  Diarrhea,  Worsening joint or muscle pain, Changes in mood, thought or behavior,    Diet: Diet Renal; Lo Phosphorus; Yes; Fluid Restriction 1800 ML; No   Bowel regimen:   Last BM Date: 24   VTE Pharmacologic Prophylaxis: VTE Score: 7 High Risk (Score >/= 5) - Pharmacological DVT Prophylaxis Ordered: heparin. Sequential Compression Devices Ordered.    Objective:    Vitals:   Temp (24hrs), Av.9 °F (36.6 °C), Min:97.8 °F (36.6 °C), Max:98.2 °F (36.8 °C)    Temp:  [97.8 °F (36.6 °C)-98.2 °F (36.8 °C)] 97.8 °F (36.6 °C)  HR:  [68-96] 68  Resp:  [16-18] 16  BP: ()/(60-76)  111/64  SpO2:  [90 %-98 %] 98 %  Input and Output Summary (last 24 hours):     Intake/Output Summary (Last 24 hours) at 9/16/2024 0537  Last data filed at 9/15/2024 1831  Gross per 24 hour   Intake 720 ml   Output --   Net 720 ml     Physical Exam:   Physical Exam  Constitutional:       General: She is not in acute distress.     Appearance: She is ill-appearing.   Cardiovascular:      Rate and Rhythm: Normal rate and regular rhythm.      Comments: No peripheral edema  Pulmonary:      Effort: Pulmonary effort is normal.      Breath sounds: Normal breath sounds.      Comments: 2L    Abdominal:      Palpations: Abdomen is soft.   Musculoskeletal:      Cervical back: Neck supple.   Skin:     General: Skin is warm.      Capillary Refill: Capillary refill takes less than 2 seconds.   Neurological:      Mental Status: She is alert.        Additional Data:   Labs:  Results from last 7 days   Lab Units 09/15/24  0537 09/14/24  1229   WBC Thousand/uL 10.47* 11.01*   HEMOGLOBIN g/dL 8.4* 8.3*   HEMATOCRIT % 27.3* 26.4*   PLATELETS Thousands/uL 222 214   SEGS PCT %  --  85*   LYMPHO PCT %  --  7*   MONO PCT %  --  4   EOS PCT %  --  2     Results from last 7 days   Lab Units 09/15/24  0537 09/14/24  1229   SODIUM mmol/L 138 134*   POTASSIUM mmol/L 4.3 3.8   CHLORIDE mmol/L 97 92*   CO2 mmol/L 33* 33*   BUN mg/dL 39* 23   CREATININE mg/dL 6.25* 4.13*   ANION GAP mmol/L 8 9   CALCIUM mg/dL 9.5 8.8   ALBUMIN g/dL  --  3.6   TOTAL BILIRUBIN mg/dL  --  0.47   ALK PHOS U/L  --  65   ALT U/L  --  6*   AST U/L  --  7*   GLUCOSE RANDOM mg/dL 84 123                 Results from last 7 days   Lab Units 09/14/24  1229   LACTIC ACID mmol/L 1.0       Recent Cultures (last 7 days):  I have reviewed the following results: CBC, BMP, and all the lab results till date  Results from last 7 days   Lab Units 09/11/24  0955 09/11/24  0954 09/11/24  0946 09/11/24  0937   GRAM STAIN RESULT  3+ Polys  No organisms seen 2+ Polys  No bacteria seen 3+  Polys  No organisms seen 1+ Polys  No organisms seen       Imaging:   Imaging Review: I have reviewed all the imaging on file  Other Studies: I have reviewed all other studies till date  Bronchoscopy  Narrative: Table formatting from the original result was not included.  [facility]  [address]  [place] [address]  [phone]    DATE OF SERVICE:  9/11/24    PHYSICIAN(S):  Attending:   Luke Montez MD    Fellow:   Gill Stevens MD    INDICATION:  Shortness of breath, Abnormal CT of the chest    POST-OP DIAGNOSIS:  See the impression below.    PREPROCEDURE:  Standard airway preparation completed per respiratory therapy protocol.    Informed consent was obtained. Images reviewed prior to the procedure.  A   Time Out was performed.    No suspicion or identified risk for TB or other airborne infectious   disease; bronchoscopy procedure being performed for diagnostic purposes.     PROCEDURE: Bronchoscopy    DETAILS OF PROCEDURE:   Patient was taken to the procedure room where a time out was performed to   confirm correct patient and correct procedure. The patient underwent   moderate sedation, which was administered by a sedation nurse and an   intensivist. The patient's blood pressure, heart rate, level of   consciousness, oxygen and respirations were monitored throughout the   procedure. The patient experienced no blood loss. The scope was introduced   through the mouth. The procedure was not difficult. The patient tolerated   the procedure well. There were no apparent adverse events.     ANESTHESIA INFORMATION:  ASA: ASA status not filed in the log.  Anesthesia Type: Anesthesia type not filed in the log.    FINDINGS:  All observed locations appeared normal, including the pharynx, larynx,   left vocal cord, right vocal cord, upper trachea, middle trachea, lower   trachea, main wendy, left main stem, ROGER, lingula, LLL, right main stem,   RUL, bronchus intermedius, RML and  RLL.  Bronchoalveolar lavage was performed x1 in the right upper lobar bronchus   with 120 mL of saline instilled and a total return of 30 mL. The fluid   appeared serosanguinous. Additional washing - 20mL returned  Bronchoalveolar lavage was performed x1 in the right middle lobar bronchus   with 120 mL of saline instilled and a total return of 35 mL. The fluid   appeared serosanguinous. Additional washing-30mL returned  Moderate, thin and white secretions present in the pharynx and larynx;   secretions were easily removed and the airway was cleared    SPECIMENS:  ID Type Source Tests Collected by Time Destination   1 :  Lavage Lung, Right Upper Lobe Bronchoalveolar Lavage PULMONARY   CYTOLOGY, BRONCHOALVEOLAR LAVAGE (BAL) DIFFERENTIAL Luke Montez MD   9/11/2024  9:34 AM         Impression: All observed locations appeared normal, including the pharynx, larynx,   left vocal cord, right vocal cord, upper trachea, middle trachea, lower   trachea, main wendy, left main stem, ROGER, lingula, LLL, right main stem,   RUL, bronchus intermedius, RML and RLL.  Bronchoalveolar lavage was performed x2 and the fluid appeared   serosanguinous  Moderate, thin and white secretions present in the pharynx and larynx    RECOMMENDATION:  Await pathology results     Attending attestation  I was present for the entire procedure  Patient signed consent  Proceeded with procedure under conscious sedation administered by ICU   nurse Sravanthi Alexandre RN and myself.  Utilized topical lidocaine for the   wendy, nebulized lidocaine for the throat, Fentanyl Versed propofol  Airways were normal, some secretions of the right lower lobe  BAL right upper lobe  BAL right middle lobe  Postprocedure she woke up and was back to baseline    Luke Montez MD  Pulmonary and Critical Care Medicine   IR tunneled dialysis catheter check/change/reposition/angioplasty  Narrative: PROCEDURE:  1.  Injection of contrast through an existing tunneled dialysis catheter  2.   Tunneled dialysis catheter exchange with fibrin sheath disruption from the same access    STAFF:  Gerber Fletcher M.D.    CONTRAST:  10 mL Omnipaque intravenous.    FLUOROSCOPY TIME:  6.9 minutes.    NUMBER OF IMAGES:  Multiple    COMPLICATIONS:  None.    MEDICATIONS:  Moderate sedation was monitored by radiology nursing staff.  Monitoring of conscious sedation totaled 30 minutes. See nursing records.    INDICATION:  End stage renal disease awaiting permanent access.  The existing tunneled dialysis catheter is malfunctioning.    PROCEDURE:  Contrast was injected through the existing tunneled dialysis catheter, and images were obtained.    Exchange of the right internal jugular tunneled dialysis catheter was performed over a guide wire under fluoroscopic guidance. Fibrin sheath disruption was performed using a Adarsh balloon.  A 16 Filipino, 28 cm. [place] catheter was placed under   fluoroscopic guidance with its tip in the right atrium. The catheter may be used immediately.    FINDINGS:  1.  Fluoroscopy showed the tunneled dialysis catheter to be retracted. The venous port tip terminated at the junction of the azygos vein and superior vena cava.  2.  Contrast injection did not identify a fibrin sheath.  3.  Final fluoroscopic image showed the new catheter to be good position.  Impression: Tunneled dialysis catheter over-the-wire exchange with fibrin sheath stripping.    Workstation performed: [id number]      Mobility:   Basic Mobility Inpatient Raw Score: 19  JH-HLM Goal: 6: Walk 10 steps or more  JH-HLM Achieved: 2: Bed activities/Dependent transfer  JH-HLM Goal NOT achieved. Continue with multidisciplinary rounding and encourage appropriate mobility to improve upon JH-HLM goals.    Last 24 Hours Medication List:   Current Facility-Administered Medications   Medication Dose Route Frequency Provider Last Rate    acetaminophen  650 mg Oral Q6H PRN Gill Stevens MD      albuterol  2 puff Inhalation Q4H PRN  Gill Stevens MD      atorvastatin  20 mg Oral Daily Gill Stevens MD      atovaquone  1,500 mg Oral Daily With Breakfast Kathryn Cleveland MD      bisacodyl  5 mg Oral Once Gill Stevens MD      cyclophosphamide  100 mg Oral Daily Gill Stevens MD      dextromethorphan-guaiFENesin  10 mL Oral Q4H PRN Gill Stevens MD      epoetin shavonne  6,000 Units Intravenous After Dialysis Gill Stevens MD      fentaNYL   Intravenous PRN Gerber Fletcher MD      fluticasone-vilanterol  1 puff Inhalation Daily Gill Stevens MD      heparin (porcine)  5,000 Units Subcutaneous Q8H JACK Gill Stevens MD      iron polysaccharides  150 mg Oral Daily Gill Stevens MD      lamoTRIgine  100 mg Oral HS Gill Stevens MD      lidocaine  1 patch Topical Daily Gill Stevens MD      lidocaine-epinephrine 1%-1:[id number] buffered   Infiltration PRN Gerber Fletcher MD      metoprolol succinate  50 mg Oral BID Gill Stevens MD      midazolam    PRN Gerber Fletcher MD      montelukast  10 mg Oral Daily Gill Stevens MD      pantoprazole  40 mg Oral Daily Before Breakfast Gill Stevens MD      polyethylene glycol  17 g Oral Daily Gill Stevens MD      predniSONE  15 mg Oral Daily Gill Stevens MD      senna-docusate sodium  2 tablet Oral HS Gill Stevens MD      sevelamer  1,600 mg Oral TID With Meals Gill Stevens MD      Sodium Zirconium Cyclosilicate  10 g Oral Daily Gill Stevens MD      traZODone  200 mg Oral HS Gill Stevens MD      trimethobenzamide  200 mg Intramuscular Q6H PRN Gill Stevens MD      vancomycin   Intravenous Continuous PRN Gerber Fletcher MD      venlafaxine  225 mg Oral Daily Gill Stevens MD         Lines/Drains:  Invasive Devices       Peripheral Intravenous Line  Duration             Peripheral IV 09/15/24 Left Antecubital <1 day              Hemodialysis Catheter  Duration             HD Permanent Double Catheter 5 days                      Telemetry:  Telemetry Orders  (From admission, onward)               24 Hour Telemetry Monitoring  Continuous x 24 Hours (Telem)           Question:  Reason for 24 Hour Telemetry  Answer:  Decompensated CHF- and any one of the following: continuous diuretic infusion or total diuretic dose >200 mg daily, associated electrolyte derangement (I.e. K < 3.0), ionotropic drip (continuous infusion), hx of ventricular arrhythmia, or new EF < 35%                     Telemetry Reviewed: Normal Sinus Rhythm  Indication for Continued Telemetry Use: Acute CHF on >200 mg lasix/day or equivalent dose or with new reduced EF.              Patient Centered Rounds: I performed bedside rounds with nursing staff today.  Discussions with Specialists or Other Care Team Provider: cardiology/nephrology/id  Education and Discussions with Family / Patient: Will update patient's point of contact if given permission     Today, Patient Was Seen By: Kathryn Cleveland MD  Administrative Statements   Today, Patient Was Seen By: Ktahryn Cleveland MD  I have spent a total time of 35 minutes in caring for this patient on the day of the visit/encounter including Diagnostic results, Patient and family education, Impressions, Counseling / Coordination of care, Documenting in the medical record, Reviewing / ordering tests, medicine, procedures  , Obtaining or reviewing history  , and Communicating with other healthcare professionals .    **Please Note: This note may have been constructed using a voice recognition system.**

## 2024-09-16 NOTE — ASSESSMENT & PLAN NOTE
-- Regarding anti-GBM disease: Status post plasmapheresis, completed August 2024.  On steroid taper.  On cyclophosphamide until the end of October  --Remains dialysis dependent, TTS at [facility]  --Hemodialysis initiation on July 16, 2024  --s/p bronchoscopy 9/11/2024.  No new findings no diffuse alveolar hemorrhage  --Access: Right IJ permcath, repositioned by IR 9/10/2024. access worked well 9/10 for dialysis treatment.  Will monitor success with this current catheter.  Our plan is that if the catheter continues to have issues ongoing consider transitioning to urgent start PD.  --Last hemodialysis treatment Saturday.  Had episode of hypotension and chest pain.  Being evaluated by cardiology.  New onset cardiomyopathy with ejection fraction 25%  --Follow-up with vascular surgery after discharge for access placement.  Vein mapping ordered by Dr. Burns  --Plan for next dialysis on Tuesday if she remains inpatient

## 2024-09-16 NOTE — ASSESSMENT & PLAN NOTE
-- Newly diagnosed cardiomyopathy with ejection fraction 25%  -- Cardiology following  -- Discussion regarding possible need for LifeVest

## 2024-09-17 ENCOUNTER — APPOINTMENT (INPATIENT)
Dept: DIALYSIS | Facility: HOSPITAL | Age: 64
DRG: 286 | End: 2024-09-17
Payer: COMMERCIAL

## 2024-09-17 PROBLEM — E87.5 HYPERKALEMIA: Status: ACTIVE | Noted: 2024-09-17

## 2024-09-17 LAB
ANION GAP SERPL CALCULATED.3IONS-SCNC: 11 MMOL/L (ref 4–13)
BUN SERPL-MCNC: 72 MG/DL (ref 5–25)
CALCIUM SERPL-MCNC: 9.5 MG/DL (ref 8.4–10.2)
CHLORIDE SERPL-SCNC: 100 MMOL/L (ref 96–108)
CO2 SERPL-SCNC: 27 MMOL/L (ref 21–32)
CREAT SERPL-MCNC: 10.23 MG/DL (ref 0.6–1.3)
ERYTHROCYTE [DISTWIDTH] IN BLOOD BY AUTOMATED COUNT: 18.1 % (ref 11.6–15.1)
GFR SERPL CREATININE-BSD FRML MDRD: 3 ML/MIN/1.73SQ M
GLUCOSE SERPL-MCNC: 81 MG/DL (ref 65–140)
HCT VFR BLD AUTO: 26.3 % (ref 34.8–46.1)
HGB BLD-MCNC: 8 G/DL (ref 11.5–15.4)
MCH RBC QN AUTO: 30 PG (ref 26.8–34.3)
MCHC RBC AUTO-ENTMCNC: 30.4 G/DL (ref 31.4–37.4)
MCV RBC AUTO: 99 FL (ref 82–98)
MYCOBACTERIUM SPEC CULT: NORMAL
MYCOBACTERIUM SPEC CULT: NORMAL
PLATELET # BLD AUTO: 220 THOUSANDS/UL (ref 149–390)
PMV BLD AUTO: 10.1 FL (ref 8.9–12.7)
POTASSIUM SERPL-SCNC: 5.2 MMOL/L (ref 3.5–5.3)
RBC # BLD AUTO: 2.67 MILLION/UL (ref 3.81–5.12)
RHODAMINE-AURAMINE STN SPEC: NORMAL
RHODAMINE-AURAMINE STN SPEC: NORMAL
SODIUM SERPL-SCNC: 138 MMOL/L (ref 135–147)
WBC # BLD AUTO: 8.34 THOUSAND/UL (ref 4.31–10.16)

## 2024-09-17 PROCEDURE — 99153 MOD SED SAME PHYS/QHP EA: CPT | Performed by: INTERNAL MEDICINE

## 2024-09-17 PROCEDURE — 99152 MOD SED SAME PHYS/QHP 5/>YRS: CPT | Performed by: INTERNAL MEDICINE

## 2024-09-17 PROCEDURE — 99232 SBSQ HOSP IP/OBS MODERATE 35: CPT | Performed by: INTERNAL MEDICINE

## 2024-09-17 PROCEDURE — 80048 BASIC METABOLIC PNL TOTAL CA: CPT

## 2024-09-17 PROCEDURE — C1769 GUIDE WIRE: HCPCS | Performed by: INTERNAL MEDICINE

## 2024-09-17 PROCEDURE — C1894 INTRO/SHEATH, NON-LASER: HCPCS | Performed by: INTERNAL MEDICINE

## 2024-09-17 PROCEDURE — 93454 CORONARY ARTERY ANGIO S&I: CPT | Performed by: INTERNAL MEDICINE

## 2024-09-17 PROCEDURE — 85027 COMPLETE CBC AUTOMATED: CPT

## 2024-09-17 PROCEDURE — 93005 ELECTROCARDIOGRAM TRACING: CPT

## 2024-09-17 PROCEDURE — 93458 L HRT ARTERY/VENTRICLE ANGIO: CPT | Performed by: INTERNAL MEDICINE

## 2024-09-17 PROCEDURE — B2111ZZ FLUOROSCOPY OF MULTIPLE CORONARY ARTERIES USING LOW OSMOLAR CONTRAST: ICD-10-PCS | Performed by: INTERNAL MEDICINE

## 2024-09-17 PROCEDURE — 97535 SELF CARE MNGMENT TRAINING: CPT

## 2024-09-17 RX ORDER — HEPARIN SODIUM 1000 [USP'U]/ML
2000 INJECTION, SOLUTION INTRAVENOUS; SUBCUTANEOUS
Status: DISCONTINUED | OUTPATIENT
Start: 2024-09-17 | End: 2024-09-19 | Stop reason: HOSPADM

## 2024-09-17 RX ORDER — VERAPAMIL HYDROCHLORIDE 2.5 MG/ML
INJECTION, SOLUTION INTRAVENOUS CODE/TRAUMA/SEDATION MEDICATION
Status: DISCONTINUED | OUTPATIENT
Start: 2024-09-17 | End: 2024-09-17 | Stop reason: HOSPADM

## 2024-09-17 RX ORDER — HEPARIN SODIUM 1000 [USP'U]/ML
INJECTION, SOLUTION INTRAVENOUS; SUBCUTANEOUS CODE/TRAUMA/SEDATION MEDICATION
Status: DISCONTINUED | OUTPATIENT
Start: 2024-09-17 | End: 2024-09-17 | Stop reason: HOSPADM

## 2024-09-17 RX ORDER — ASPIRIN 81 MG/1
TABLET, CHEWABLE ORAL CODE/TRAUMA/SEDATION MEDICATION
Status: DISCONTINUED | OUTPATIENT
Start: 2024-09-17 | End: 2024-09-17 | Stop reason: HOSPADM

## 2024-09-17 RX ORDER — LIDOCAINE HYDROCHLORIDE 10 MG/ML
INJECTION, SOLUTION EPIDURAL; INFILTRATION; INTRACAUDAL; PERINEURAL CODE/TRAUMA/SEDATION MEDICATION
Status: DISCONTINUED | OUTPATIENT
Start: 2024-09-17 | End: 2024-09-17 | Stop reason: HOSPADM

## 2024-09-17 RX ORDER — CLOPIDOGREL BISULFATE 75 MG/1
600 TABLET ORAL ONCE
Status: DISCONTINUED | OUTPATIENT
Start: 2024-09-17 | End: 2024-09-17

## 2024-09-17 RX ORDER — ONDANSETRON 2 MG/ML
INJECTION INTRAMUSCULAR; INTRAVENOUS CODE/TRAUMA/SEDATION MEDICATION
Status: DISCONTINUED | OUTPATIENT
Start: 2024-09-17 | End: 2024-09-17 | Stop reason: HOSPADM

## 2024-09-17 RX ORDER — FENTANYL CITRATE 50 UG/ML
INJECTION, SOLUTION INTRAMUSCULAR; INTRAVENOUS CODE/TRAUMA/SEDATION MEDICATION
Status: DISCONTINUED | OUTPATIENT
Start: 2024-09-17 | End: 2024-09-17 | Stop reason: HOSPADM

## 2024-09-17 RX ORDER — MIDAZOLAM HYDROCHLORIDE 2 MG/2ML
INJECTION, SOLUTION INTRAMUSCULAR; INTRAVENOUS CODE/TRAUMA/SEDATION MEDICATION
Status: DISCONTINUED | OUTPATIENT
Start: 2024-09-17 | End: 2024-09-17 | Stop reason: HOSPADM

## 2024-09-17 RX ORDER — NITROGLYCERIN 20 MG/100ML
INJECTION INTRAVENOUS CODE/TRAUMA/SEDATION MEDICATION
Status: DISCONTINUED | OUTPATIENT
Start: 2024-09-17 | End: 2024-09-17 | Stop reason: HOSPADM

## 2024-09-17 RX ADMIN — TRAZODONE HYDROCHLORIDE 200 MG: 100 TABLET ORAL at 21:12

## 2024-09-17 RX ADMIN — LIDOCAINE 5% 1 PATCH: 700 PATCH TOPICAL at 08:03

## 2024-09-17 RX ADMIN — SEVELAMER HYDROCHLORIDE 1600 MG: 800 TABLET ORAL at 12:52

## 2024-09-17 RX ADMIN — FLUTICASONE FUROATE AND VILANTEROL TRIFENATATE 1 PUFF: 200; 25 POWDER RESPIRATORY (INHALATION) at 09:40

## 2024-09-17 RX ADMIN — ATORVASTATIN CALCIUM 20 MG: 20 TABLET, FILM COATED ORAL at 13:33

## 2024-09-17 RX ADMIN — ALBUTEROL SULFATE 2 PUFF: 90 AEROSOL, METERED RESPIRATORY (INHALATION) at 08:10

## 2024-09-17 RX ADMIN — ALTEPLASE 2 MG: 2.2 INJECTION, POWDER, LYOPHILIZED, FOR SOLUTION INTRAVENOUS at 16:33

## 2024-09-17 RX ADMIN — SENNOSIDES AND DOCUSATE SODIUM 2 TABLET: 8.6; 5 TABLET ORAL at 21:12

## 2024-09-17 RX ADMIN — PANTOPRAZOLE SODIUM 40 MG: 40 TABLET, DELAYED RELEASE ORAL at 08:02

## 2024-09-17 RX ADMIN — POLYSACCHARIDE-IRON COMPLEX 150 MG: 150 CAPSULE ORAL at 13:33

## 2024-09-17 RX ADMIN — SEVELAMER HYDROCHLORIDE 1600 MG: 800 TABLET ORAL at 17:46

## 2024-09-17 RX ADMIN — VENLAFAXINE HYDROCHLORIDE 225 MG: 150 CAPSULE, EXTENDED RELEASE ORAL at 13:33

## 2024-09-17 RX ADMIN — CLOPIDOGREL BISULFATE 600 MG: 75 TABLET ORAL at 08:02

## 2024-09-17 RX ADMIN — LAMOTRIGINE 100 MG: 100 TABLET ORAL at 21:12

## 2024-09-17 RX ADMIN — HEPARIN SODIUM 5000 UNITS: 5000 INJECTION INTRAVENOUS; SUBCUTANEOUS at 15:46

## 2024-09-17 RX ADMIN — HEPARIN SODIUM 2000 UNITS: 1000 INJECTION INTRAVENOUS; SUBCUTANEOUS at 14:58

## 2024-09-17 RX ADMIN — CYCLOPHOSPHAMIDE 100 MG: 50 CAPSULE ORAL at 13:45

## 2024-09-17 RX ADMIN — MONTELUKAST 10 MG: 10 TABLET, FILM COATED ORAL at 13:33

## 2024-09-17 RX ADMIN — ACETAMINOPHEN 650 MG: 325 TABLET ORAL at 21:12

## 2024-09-17 RX ADMIN — ACETAMINOPHEN 650 MG: 325 TABLET ORAL at 12:52

## 2024-09-17 RX ADMIN — PREDNISONE 15 MG: 10 TABLET ORAL at 08:02

## 2024-09-17 RX ADMIN — HEPARIN SODIUM 5000 UNITS: 5000 INJECTION INTRAVENOUS; SUBCUTANEOUS at 08:02

## 2024-09-17 RX ADMIN — METOPROLOL SUCCINATE 50 MG: 50 TABLET, EXTENDED RELEASE ORAL at 17:46

## 2024-09-17 NOTE — ASSESSMENT & PLAN NOTE
August 31-EF of 25% with global hypokinesis.  No prior echo for comparison.    Heart Cath 9/17 - no evidence of obstructive CAD. Nonischemic cardiomyopathy    .    Plan   GDMT recommended, continue Toprol 50 mg daily, Losartan 25 mg daily  9/4 - Nifedipine held per Cardiology  9/9 Losartan held per nephro   Will need Cardiac MRI  Dc with lifevest

## 2024-09-17 NOTE — PLAN OF CARE
Problem: OCCUPATIONAL THERAPY ADULT  Goal: Performs self-care activities at highest level of function for planned discharge setting.  See evaluation for individualized goals.  Description: Treatment Interventions: ADL retraining, Functional transfer training, UE strengthening/ROM, Endurance training, Patient/family training, Equipment evaluation/education, Compensatory technique education, Energy conservation, Activityengagement          See flowsheet documentation for full assessment, interventions and recommendations.   Outcome: Progressing  Note: Limitation: Decreased ADL status, Decreased UE strength, Decreased Safe judgement during ADL, Decreased endurance, Decreased high-level ADLs, Decreased self-care trans (safety awareness, LE strength, balance)  Prognosis: Good  Assessment: Patient seen for OT treatment on 9/17/2024 s/p admission for Dependence on renal dialysis (HCC) Patient agreeable to OT session. Patient participated in bathing/showering, dressing , personal hygiene/grooming , self-care transfers, and functional mobility with intervention focus on challenging activity tolerance, maximizing functional independence during ADL tasks. Ngozi Beard is showing improvements in transfers and functional mobility but is continuing to perform below baseline due to the following deficits: decreased balance , decreased standing tolerance for self care tasks , and impaired safety awareness . Personal factors continuing to impact D/C include: decreased emotional regulation and coping skills. From OT standpoint, patient would benefit from skilled intervention to maximize independence with ADLs and functional mobility. Goals remain appropriate, continue POC. At this time, recommending D/C to: Level 2: moderate resource intensity     Rehab Resource Intensity Level, OT: II (Moderate Resource Intensity)     Margie Shipman, OT

## 2024-09-17 NOTE — ASSESSMENT & PLAN NOTE
Recent Labs     09/15/24  0537 09/16/24  0532 09/17/24  0614   HGB 8.4* 8.2* 8.0*      Baseline Hgb 7-8  Etiology: component of iron deficiency and renal disease.     Plan  Monitor Hgb, transfuse for < 7  Continue PTA Ferrex

## 2024-09-17 NOTE — PHYSICAL THERAPY NOTE
Physical Therapy Cancellation Note       09/17/24 1118   PT Last Visit   PT Visit Date 09/17/24   Note Type   Note Type Cancelled Session   Cancel Reasons Patient off floor/test  (Pt off floor, at cath lab. Will continue to follow as appropriate and able.)     Kimberly Wadsworth, PTA

## 2024-09-17 NOTE — ASSESSMENT & PLAN NOTE
-CT chest PE study revealed no evidence of PE. There is persistent tree-in-bud nodularity in the lungs suggestive of a widespread infectious bronchiolitis that may be secondary to an atypical mycobacterium. Follicular bronchiolitis and acute hypersensitivity pneumonitis could also be in the differential.  New mild patchy bilateral multifocal pneumonia  - Sputum culture: 4+ Pseudomonas   - ID: Chronic colonization of pseudomonas, rather than acute infection. Finished Cefepime x7 d course ended 9/10 . Continue atovaquone indefinitely   - Cardiology: clearance for bronchoscopy  - Pulm: 9/11 Bronchoscopy under sedation, culture sent  - Nephrology: Abnormal chest imaging, possible contributing factors to her catheter dysfunction    Improvement in coughing and sputum production symptoms with addition of abx, suspect patient presentation partially contributed from bronchitis secondary to pseudomonas.     Bronchoscopy results so far: Gram stain: 2- 3+ polys, no organisms,Pneuocysis PCR negative, Viral culture preliminary results are negative, AFB culture and stain - not seen. Bronchial culture and gram stain - mixed resp izzy. Fungal culture - no growth to date.  Plan:   -Bronchoscopy results can follow up on these remaining results  follow up on  - as of 9/16   Legionella - in process  TB PCR

## 2024-09-17 NOTE — ASSESSMENT & PLAN NOTE
EF 25% with mild LV dilation. New diagnosis. Cardiac cath today without obstructive CAD. Nonischemic cardiomyopathy. Can consider outpatient cardiac MRI in the future if eGFR is not prohibitive.  Right wrist site stable with TR band in place.  -Continue titration of GDMT  -Continue metoprolol succinate 50 mg BID  -Not on ARB due to hypotension during dialysis --> resume ARB on non-HD if okay per Nephrology   -Discussed class and MARIA INES considerations for LifeVest, which is lower level for nonischemic CM --> she would like to continue with a LifeVest  -Follow up as scheduled on 9/25

## 2024-09-17 NOTE — PROGRESS NOTES
Progress Note - Hospitalist   Name: Ngozi Beard 64 y.o. 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 S/p PLEX.  Patient is anuric and dialysis-dependent  Tues/Thurs/Sat schedule  Access: Right IJ PermCath, Cath reevaluation  by IR on 9/3, no issues during HD. 9/4, issues with clotting, will needs re-evaluation @9.5. Requires Re-evaluation 9/6 IR, Fluid poor returns on 9/5. 9/6-bedside readjustment was made by IR, reclotted after 1.5 hours, [facility]flo dwelling overnight. 9/7 - HD without issues . 9/8 -cannot achieve good flow.  9/9-IR procedure unavailable, facilitated conversation to IR and nephrology regarding plans for dialysis.  9/10 - catheter exchanged, no issue with hemodialysis. 9/12 - no issues noted with HD. 9/14 - HD complicated by symptomatic hypotension with concurrent chest pain, session was cut short, cardiac work up with troponin and no interval changes on EKG. 9/17 - planned repeat HD    Plan:  Hemodialysis schedule per nephro   continue PTA cyclophosphamide  Continue PTA sevalemer  Continue PTA atovaquone for PJP prophylaxis  Started Lokelma per nephro  Continue prednisone taper from outpatient nephrology prior to this admission  On prednisone 15 mg   15 mg starting September 7 to September 20  12.5 mg starting September 21 to October 4  10 mg starting October 5 to October 18  7.5 mg starting October 19 to November 2  5 mg daily starting November 3  Multifocal pneumonia  -CT chest PE study revealed no evidence of PE. There is persistent tree-in-bud nodularity in the lungs suggestive of a widespread infectious bronchiolitis that may be secondary to an atypical mycobacterium. Follicular bronchiolitis and acute hypersensitivity pneumonitis  could also be in the differential.  New mild patchy bilateral multifocal pneumonia  - Sputum culture: 4+ Pseudomonas   - ID: Chronic colonization of pseudomonas, rather than acute infection. Finished Cefepime x7 d course ended 9/10 . Continue atovaquone indefinitely   - Cardiology: clearance for bronchoscopy  - Pulm: 9/11 Bronchoscopy under sedation, culture sent  - Nephrology: Abnormal chest imaging, possible contributing factors to her catheter dysfunction    Improvement in coughing and sputum production symptoms with addition of abx, suspect patient presentation partially contributed from bronchitis secondary to pseudomonas.     Bronchoscopy results so far: Gram stain: 2- 3+ polys, no organisms,Pneuocysis PCR negative, Viral culture preliminary results are negative, AFB culture and stain - not seen. Bronchial culture and gram stain - mixed resp izzy. Fungal culture - no growth to date.  Plan:   -Bronchoscopy results can follow up on these remaining results  follow up on  - as of 9/16   Legionella - in process  TB PCR    Cardiomyopathy (HCC)  August 31-EF of 25% with global hypokinesis.  No prior echo for comparison.    Heart Cath 9/17 - no evidence of obstructive CAD. Nonischemic cardiomyopathy    .    Plan   GDMT recommended, continue Toprol 50 mg daily, Losartan 25 mg daily  9/4 - Nifedipine held per Cardiology  9/9 Losartan held per nephro   Will need Cardiac MRI  Dc with lifevest      Asthma without acute exacerbation  Mild Bilateral wheezing was noted in the lower lung base upon initial presentation.  Does not require frequent inhaler use as an outpatient setting.  Low suspicion for exacerbation for the clinical symptoms presented during this hospital on admission    Plan  Continue Breo daily  Continue albuterol q4 prn  Continue montelukast qhs  Generalized weakness  Multifactorial, respiratory failure secondary to chronic pulmonary infection, volume overload, chronic anemia, chronic deconditioning from  being ill  PT OT level II    Anemia  Recent Labs     09/15/24  0537 09/16/24  0532 09/17/24  0614   HGB 8.4* 8.2* 8.0*      Baseline Hgb 7-8  Etiology: component of iron deficiency and renal disease.     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  Hyperkalemia  C/w Lokelma   C/w HD per schedule      Current Length of Stay: 18 day(s)  Current Patient Status: Inpatient   Code Status: Level 1 - Full Code      Discharge Plan: Discharge Plan:   Expected date of discharge:   Dispo destination: [facility]  Indwelling drains/lines: Tunnel Cath  DME needs: n/a  HH needs: n/a   F/U appts: Nephrology/ Cardiology cardiac mri, cath/ Pulm bronchoscopy,   Preferred pharmacy: on file  Refills:  Transportation: facility    Subjective:   Overnight: No acute events over night     Seen after heart catheterization.   Complaints: Complaint of mild shortness of shortness of breath.    Patient denies Headache, Fever, Chills, Chest Pain,  Nausea, or Vomiting Abdominal Pain,  Swellings Changes in mood, thought or behavior,    Diet: Diet Cardiac   Bowel regimen:   Last BM Date: 09/16/24   VTE Pharmacologic  Prophylaxis: VTE Score: 7 High Risk (Score >/= 5) - Pharmacological DVT Prophylaxis Ordered: heparin. Sequential Compression Devices Ordered.    Objective:    Vitals:   Temp (24hrs), Av.1 °F (36.7 °C), Min:97.8 °F (36.6 °C), Max:98.5 °F (36.9 °C)    Temp:  [97.8 °F (36.6 °C)-98.5 °F (36.9 °C)] 97.8 °F (36.6 °C)  HR:  [59-91] 68  Resp:  [16-18] 16  BP: ()/(56-73) 123/73  SpO2:  [93 %-98 %] 93 %  Input and Output Summary (last 24 hours):     Intake/Output Summary (Last 24 hours) at 2024 1347  Last data filed at 2024 1315  Gross per 24 hour   Intake 480 ml   Output 0 ml   Net 480 ml     Physical Exam:   Physical Exam  Constitutional:       General: She is not in acute distress.     Appearance: She is ill-appearing.   Cardiovascular:      Rate and Rhythm: Normal rate and regular rhythm.      Comments: No peripheral edema  Pulmonary:      Effort: Pulmonary effort is normal.      Breath sounds: Normal breath sounds.      Comments: 2L    Abdominal:      Palpations: Abdomen is soft.   Musculoskeletal:      Cervical back: Neck supple.   Skin:     General: Skin is warm.      Capillary Refill: Capillary refill takes less than 2 seconds.   Neurological:      Mental Status: She is alert.        Additional Data:   Labs:  Results from last 7 days   Lab Units 24  0614 24  0532   WBC Thousand/uL 8.34 8.89   HEMOGLOBIN g/dL 8.0* 8.2*   HEMATOCRIT % 26.3* 26.9*   PLATELETS Thousands/uL 220 214   SEGS PCT %  --  70   LYMPHO PCT %  --  16   MONO PCT %  --  10   EOS PCT %  --  2     Results from last 7 days   Lab Units 24  0614 09/15/24  0537 24  1229   SODIUM mmol/L 138   < > 134*   POTASSIUM mmol/L 5.2   < > 3.8   CHLORIDE mmol/L 100   < > 92*   CO2 mmol/L 27   < > 33*   BUN mg/dL 72*   < > 23   CREATININE mg/dL 10.23*   < > 4.13*   ANION GAP mmol/L 11   < > 9   CALCIUM mg/dL 9.5   < > 8.8   ALBUMIN g/dL  --   --  3.6   TOTAL BILIRUBIN mg/dL  --   --  0.47   ALK PHOS U/L  --   --  65   ALT  U/L  --   --  6*   AST U/L  --   --  7*   GLUCOSE RANDOM mg/dL 81   < > 123    < > = values in this interval not displayed.                 Results from last 7 days   Lab Units 09/14/24  1229   LACTIC ACID mmol/L 1.0       Recent Cultures (last 7 days):  I have reviewed the following results: CBC, BMP, and all the lab results till date  Results from last 7 days   Lab Units 09/11/24  0955 09/11/24  0954 09/11/24  0946 09/11/24  0937   GRAM STAIN RESULT  3+ Polys  No organisms seen 2+ Polys  No bacteria seen 3+ Polys  No organisms seen 1+ Polys  No organisms seen       Imaging:   Imaging Review: I have reviewed all the imaging on file  Other Studies: I have reviewed all other studies till date  Cardiac catheterization  Nl epicardial Coronary arteries  Mildly elevated LVEDP  Large PL vein present      Mobility:   Basic Mobility Inpatient Raw Score: 17  -HLM Goal: 5: Stand one or more mins  JH-HLM Achieved: 3: Sit at edge of bed  JH-HLM Goal NOT achieved. Continue with multidisciplinary rounding and encourage appropriate mobility to improve upon -HLM goals.    Last 24 Hours Medication List:   Current Facility-Administered Medications   Medication Dose Route Frequency Provider Last Rate    acetaminophen  650 mg Oral Q6H PRN Robert Gilbert MD      albuterol  2 puff Inhalation Q4H PRN Robert Gilbert MD      atorvastatin  20 mg Oral Daily Robert Gilbert MD      atovaquone  1,500 mg Oral Daily With Breakfast Robert Gilbert MD      bisacodyl  5 mg Oral Once Robert Gilbert MD      cyclophosphamide  100 mg Oral Daily Robert Gilbert MD      dextromethorphan-guaiFENesin  10 mL Oral Q4H PRN Robert Gilbert MD      epoetin shavonne  6,000 Units Intravenous After Dialysis Robert Gilbert MD      fluticasone-vilanterol  1 puff Inhalation Daily Robert Gilbert MD      heparin (porcine)  5,000 Units Subcutaneous Q8H [facility] Robert Gilbert  MD      iron polysaccharides  150 mg Oral Daily Robert Gilbert MD      lamoTRIgine  100 mg Oral HS Robert Gilbert MD      lidocaine  1 patch Topical Daily Robert Gilbert MD      metoprolol succinate  50 mg Oral BID Robert Gilbert MD      montelukast  10 mg Oral Daily Robert Gilbert MD      pantoprazole  40 mg Oral Daily Before Breakfast Robert Gilbert MD      polyethylene glycol  17 g Oral Daily Robert Gilbert MD      predniSONE  15 mg Oral Daily Robert Gilbert MD      senna-docusate sodium  2 tablet Oral HS Robert Gilbert MD      sevelamer  1,600 mg Oral TID With Meals Robert Gilbert MD      Sodium Zirconium Cyclosilicate  10 g Oral Daily Robert Gilbert MD      traZODone  200 mg Oral HS Robert Gilbert MD      trimethobenzamide  200 mg Intramuscular Q6H PRN Robert Gilbert MD      vancomycin   Intravenous Continuous PRN Gerber Fletcher MD      venlafaxine  225 mg Oral Daily Robert Gilbert MD         Lines/Drains:  Invasive Devices       Peripheral Intravenous Line  Duration             Peripheral IV 24 Dorsal (posterior);Left Hand <1 day              Hemodialysis Catheter  Duration             HD Permanent Double Catheter 7 days                      Telemetry:  Telemetry Orders (From admission, onward)               24 Hour Telemetry Monitoring  Continuous x 24 Hours (Telem)           Question:  Reason for 24 Hour Telemetry  Answer:  Decompensated CHF- and any one of the following: continuous diuretic infusion or total diuretic dose >200 mg daily, associated electrolyte derangement (I.e. K < 3.0), ionotropic drip (continuous infusion), hx of ventricular arrhythmia, or new EF < 35%                     Telemetry Reviewed: Normal Sinus Rhythm  Indication for Continued Telemetry Use: Acute CHF on >200 mg lasix/day or equivalent dose or with new reduced EF.               Patient Centered Rounds: I performed bedside rounds with nursing staff today.  Discussions with Specialists or Other Care Team Provider: Nursing  Education and Discussions with Family / Patient: Will update patient's point of contact if given permission     Today, Patient Was Seen By: Kathryn Cleveland MD  Administrative Statements   Today, Patient Was Seen By: Kathryn Cleveland MD  I have spent a total time of 35 minutes in caring for this patient on the day of the visit/encounter including Diagnostic results, Patient and family education, Impressions, Counseling / Coordination of care, Documenting in the medical record, Reviewing / ordering tests, medicine, procedures  , Obtaining or reviewing history  , and Communicating with other healthcare professionals .    **Please Note: This note may have been constructed using a voice recognition system.**

## 2024-09-17 NOTE — PLAN OF CARE
Post-Dialysis RN Treatment Note    Blood Pressure:  Pre 112/73 mm/Hg  Post 114/74 mmHg   EDW  111.5 kg    Weight:  Pre 102.5 kg   Post 102.5 kg   Mode of weight measurement: Bed Scale   Volume Removed  0 ml    Treatment duration 30 minutes    NS given  No    Treatment shortened? Yes, describe: 180 minutes due to access not functioning   Medications given during Rx Heparin and Activase   Estimated Kt/V  None Reported   Access type: Permacath/TDC   Access Issues: Yes, describe: No return from arterial port and sluggish return from venous port     Report called to primary nurse   Yes India Mac      Activase withdrawn at 1800 with some difficulty.  Extremely difficult to withdraw from arterial limb and very sluggish withdraw from venous limb.    Current hemodialysis plan of care is to remove a total of 2500 ml of fluid over a 3 1/2 hour treatment for a net of 2 liters as tolerated.  Monitor vital signs every 15 minutes while on treatment for patient safety.  Utilize a 2 K+ bath for serum potassium of 5.2 to maintain electrolyte balance.  Report received from India Mac.  Plan reviewed with Dr ENOC Estrada.        Problem: METABOLIC, FLUID AND ELECTROLYTES - ADULT  Goal: Electrolytes maintained within normal limits  Description: INTERVENTIONS:  - Monitor labs and assess patient for signs and symptoms of electrolyte imbalances  - Administer electrolyte replacement as ordered  - Monitor response to electrolyte replacements, including repeat lab results as appropriate  - Instruct patient on fluid and nutrition as appropriate  Outcome: Progressing  Goal: Fluid balance maintained  Description: INTERVENTIONS:  - Monitor labs   - Monitor I/O and WT  - Instruct patient on fluid and nutrition as appropriate  - Assess for signs & symptoms of volume excess or deficit  Outcome: Progressing

## 2024-09-17 NOTE — PROGRESS NOTES
Progress Note - Infectious Disease   Ngozi Beard 64 y.o. female MRN: [id number]  Unit/Bed#: S -01 Encounter: [id number]      Impression/Recommendations:  1.   Pseudomonas respiratory infection.  Clinical and radiological picture is more consistent with bronchitis than pneumonia.  Patient completed 7-day course of cefepime.  Cough is improved.  Stable intermittent mild dyspnea.  Observe off further antibiotic.     2.  Pneumonitis versus pneumonia.  Patient's respiratory symptoms are much improved admission.  Due to initial improvement of respiratory status, bronchoscopy was not done.  However, due to fluctuating respiratory status, bronchoscopy was finally done earlier this week.  There was no evidence of airway inflammation.  BAL culture only has growth of yeast thus far, likely airway colonization.  Aspergillus galactomannan negative.  Of note, as in above, patient just completed 7-day course of IV cefepime.  No further antibiotic as in above.  Follow-up on BAL final studies.     3.  Bacteremia, with growth of MRSE in only 1 out of 2 admission blood cultures.  Patient has no fever and is not systemically ill, making true bacteremia not likely clinically.  Although patient does have permacath in place, with growth in only 1 culture sent, this is still most likely contaminated blood draw.  Patient had been on IV vancomycin but this was discontinued.  She remains clinically well off it.  No antibiotic needed for this indication.     3.  Leukocytosis.  Most likely steroid effect.  WBC fluctuating, currently stable.     4.  Advanced CKD, on HD.  Functional difficulty of permacath noted.    HD per nephrology.     5.  Recently diagnosed rapidly progressing anti-GBM disease.  Patient is on Cytoxan and prednisone since diagnosis.  Continue outpatient Cytoxan/steroid.  Continue atovaquone PCP prophylaxis.     6.  Newly diagnosed cardiomyopathy.  Plan for LifeVest noted.  Cardiology follow-up.    Discussed with  patient in detail regarding the above plan.     Antibiotics:  Off antibiotic     Subjective:  Patient feels well.  Minimal dyspnea at rest.  Cough also minimal.  Temperature stays down.  No chills.  Patient is tolerating antibiotic well.  No nausea, vomiting or diarrhea.     Objective:  Vitals:  Temp:  [97.8 °F (36.6 °C)-98.5 °F (36.9 °C)] 97.8 °F (36.6 °C)  HR:  [66-91] 66  Resp:  [16-18] 16  BP: ()/(56-71) 114/70  SpO2:  [94 %-97 %] 95 %  Temp (24hrs), Av.1 °F (36.7 °C), Min:97.8 °F (36.6 °C), Max:98.5 °F (36.9 °C)  Current: Temperature: 97.8 °F (36.6 °C)    Physical Exam:     General: Awake, alert, cooperative, no distress.   Neck:  Supple. No mass.  No lymphadenopathy.   Lungs: Expansion symmetric, no rales, no wheezing, respirations unlabored.   Heart:  Regular rate and rhythm, S1 and S2 normal, no murmur.   Abdomen: Soft, nondistended, non-tender, bowel sounds active all four quadrants, no masses, no organomegaly.   Extremities: No edema. No erythema/warmth. No ulcer. Nontender to palpation.   Skin:  No rash.   Neuro: Moves all extremities.     Invasive Devices       Peripheral Intravenous Line  Duration             Peripheral IV 09/15/24 Left Antecubital 2 days              Hemodialysis Catheter  Duration             HD Permanent Double Catheter 6 days                    Labs studies:   I have personally reviewed pertinent labs.  Results from last 7 days   Lab Units 24  0614 24  0532 09/15/24  0537 24  1229   POTASSIUM mmol/L 5.2 4.7 4.3 3.8   CHLORIDE mmol/L 100 100 97 92*   CO2 mmol/L 27 29 33* 33*   BUN mg/dL 72* 56* 39* 23   CREATININE mg/dL 10.23* 8.41* 6.25* 4.13*   EGFR ml/min/1.73sq m 3 4 6 10   CALCIUM mg/dL 9.5 9.8 9.5 8.8   AST U/L  --   --   --  7*   ALT U/L  --   --   --  6*   ALK PHOS U/L  --   --   --  65     Results from last 7 days   Lab Units 24  0614 24  0532 09/15/24  0537   WBC Thousand/uL 8.34 8.89 10.47*   HEMOGLOBIN g/dL 8.0* 8.2* 8.4*    PLATELETS Thousands/uL 220 214 222     Results from last 7 days   Lab Units 09/11/24  0955 09/11/24  0954 09/11/24  0946 09/11/24  0937   GRAM STAIN RESULT  3+ Polys  No organisms seen 2+ Polys  No bacteria seen 3+ Polys  No organisms seen 1+ Polys  No organisms seen       Imaging Studies:   I have personally reviewed pertinent imaging study reports and images in PACS.    EKG, Pathology, and Other Studies:   I have personally reviewed pertinent reports.

## 2024-09-17 NOTE — ASSESSMENT & PLAN NOTE
Admission in July 2024 for ANGELICA. 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 S/p PLEX.  Patient is anuric and dialysis-dependent  Tues/Thurs/Sat schedule  Access: Right IJ PermCath, Cath reevaluation  by IR on 9/3, no issues during HD. 9/4, issues with clotting, will needs re-evaluation @9.5. Requires Re-evaluation 9/6 IR, Fluid poor returns on 9/5. 9/6-bedside readjustment was made by IR, reclotted after 1.5 hours, Cathflo dwelling overnight. 9/7 - HD without issues . 9/8 -cannot achieve good flow.  9/9-IR procedure unavailable, facilitated conversation to IR and nephrology regarding plans for dialysis.  9/10 - catheter exchanged, no issue with hemodialysis. 9/12 - no issues noted with HD. 9/14 - HD complicated by symptomatic hypotension with concurrent chest pain, session was cut short, cardiac work up with troponin and no interval changes on EKG. 9/17 - planned repeat HD    Plan:  Hemodialysis schedule per nephro   continue PTA cyclophosphamide  Continue PTA sevalemer  Continue PTA atovaquone for PJP prophylaxis  Started Lokelma per nephro  Continue prednisone taper from outpatient nephrology prior to this admission  On prednisone 15 mg   15 mg starting September 7 to September 20  12.5 mg starting September 21 to October 4  10 mg starting October 5 to October 18  7.5 mg starting October 19 to November 2  5 mg daily starting November 3

## 2024-09-17 NOTE — ASSESSMENT & PLAN NOTE
-status post plasma exchange, Cytoxan and prednisone. Is on Cytoxan and prednisone currently on atovaquone for prophylaxis. Most recent anti-GBM level end of July is still elevated. Is on prednisone taper as outpatient. But did receive IV methylprednisolone this admission for possible asthma exacerbation.  - Status post bronchoscopy this admission.  - Initially was treated for pneumonia versus pneumonitis regarding shortness of breath and was also found to have newly depressed EF

## 2024-09-17 NOTE — PROGRESS NOTES
Progress Note - Nephrology   Name: Ngozi Beard 64 y.o. 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  Was started on dialysis July 11, 2024  - -recent admission with malfunctioning right IJ PermCath. PermCath exchanged last on August 12. And repositioning 9/10  - Will need close vascular surgery follow-up for outpatient planning for access placement     - Access-right IJ PermCath  - Given access issues, potential plan for urgent start PD if patient continues to have any issues with dialysis catheter     Plan  - Dialysis 9/17  - Reviewed case  with primary team resident we are in agreement for dialysis today  - Utilizing hypoallergenic filter-though it is unclear if the patient had a true dialyzer reaction as the patient received 2 intermittent treatments of dialysis 2 weeks ago with regular filter without issue    Update-catheter with issues.  Has not worked more than 30 minutes.  Poor flows.  I spoke with IR attending and cardiology attending.  We will attempt Cathflo.  Patient had cardiac cath today through radial site.  Still has TR band.  Cardiology is okay with cath flow dwell.  I spoke with our HD nurse for 1 hour dwell of Cathflo.  Plan for HD tomorrow.  Primary hypertension  -Monitor with dialysis.  Marginal at times.  Holding losartan for now  Anemia  -- Received transfusion on August 31.  On RISHI.    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 Is on Cytoxan and prednisone currently on atovaquone for prophylaxis. Most recent anti-GBM level end of July is still elevated. Is on prednisone taper as outpatient. But did receive  IV methylprednisolone this admission for possible asthma exacerbation.  - Status post bronchoscopy this admission.  - Initially was treated for pneumonia versus pneumonitis regarding shortness of breath and was also found to have newly depressed EF  Complication associated with dialysis catheter  -- See discussion above.  Chest pain  --  cardiac catheterization earlier today  Cardiomyopathy (HCC)  -- Newly diagnosed EF 25%  - LifeVest management per primary team  Hyperkalemia  -Borderline elevated 9/17.  Continue Lokelma.  Dialysis today.    I have reviewed the nephrology recommendations including dialysis today, with primary team resident, and we are in agreement with renal plan including the information outlined above.     History of Present Illness   Brief History of Admission - 64-year-old female with a past medical history of newly diagnosed anti-GBM disease initiated on dialysis July 16, 2024, asthma, hypertension, bipolar disorder who initially presents with chest pain and shortness of breath. Recent admission with similar presentation. Nephrology is on board for dialysis management     Patient seen postcardiac catheterization.  Denies complaints currently.  Blood pressures 10 7-1 14 systolic.  Afebrile.  On 2 L nasal cannula.    Objective      Temp:  [97.8 °F (36.6 °C)-98.5 °F (36.9 °C)] 97.8 °F (36.6 °C)  HR:  [59-91] 59  Resp:  [16-18] 16  BP: ()/(56-73) 110/72  O2 Device: Nasal cannula         Vitals:    09/16/24 0600 09/17/24 0600   Weight: 102 kg (224 lb 6.9 oz) 102 kg (225 lb 5 oz)     I/O last 24 hours:  In: 240 [P.O.:240]  Out: 0   Lines/Drains/Airways       Active Status       Name Placement date Placement time Site Days    HD Permanent Double Catheter 09/10/24  1142  Internal jugular  7                  Physical Exam General: NAD  Skin: no rash  Eyes: anicteric sclera  ENT: moist mucous membrane  Neck: supple  Chest: CTA b/l, no ronchii, no wheeze, no rubs, no rales  CVS: s1s2, no murmur, no  gallop, no rub  Abdomen: soft, nontender, nl sounds  Extremities: no edema LE b/l, tunneled dialysis catheter site without erythema or drainage  : no sherwood  Neuro: AAOX3  Psych: normal affect    Medications:    Current Facility-Administered Medications:     [Transfer Hold] acetaminophen (TYLENOL) tablet 650 mg, 650 mg, Oral, Q6H PRN, Gill Stevens MD, 650 mg at 09/16/24 2100    [Transfer Hold] albuterol (PROVENTIL HFA,VENTOLIN HFA) inhaler 2 puff, 2 puff, Inhalation, Q4H PRN, Gill Stevens MD, 2 puff at 09/17/24 0810    [Transfer Hold] atorvastatin (LIPITOR) tablet 20 mg, 20 mg, Oral, Daily, Gill Stevens MD, 20 mg at 09/16/24 0858    [Transfer Hold] atovaquone (MEPRON) oral suspension 1,500 mg, 1,500 mg, Oral, Daily With Breakfast, Kathryn Cleveland MD, 1,500 mg at 09/16/24 0900    [Transfer Hold] bisacodyl (DULCOLAX) EC tablet 5 mg, 5 mg, Oral, Once, Gill Stevens MD    [Transfer Hold] cyclophosphamide (CYTOXAN) capsule 100 mg, 100 mg, Oral, Daily, Gill Stevens MD, 100 mg at 09/16/24 0900    [Transfer Hold] dextromethorphan-guaiFENesin (ROBITUSSIN DM) oral syrup 10 mL, 10 mL, Oral, Q4H PRN, Gill Stevens MD, 10 mL at 09/16/24 2100    [Transfer Hold] epoetin shavonne (EPOGEN,PROCRIT) injection 6,000 Units, 6,000 Units, Intravenous, After Dialysis, Gill Stevens MD, 6,000 Units at 09/14/24 1020    [Transfer Hold] fluticasone-vilanterol 200-25 mcg/actuation 1 puff, 1 puff, Inhalation, Daily, Gill Stevens MD, 1 puff at 09/17/24 0940    [Transfer Hold] heparin (porcine) subcutaneous injection 5,000 Units, 5,000 Units, Subcutaneous, Q8H JACK, Gill Stevens MD, 5,000 Units at 09/17/24 0802    [Transfer Hold] iron polysaccharides (FERREX) capsule 150 mg, 150 mg, Oral, Daily, Gill Stevens MD, 150 mg at 09/16/24 0858    [Transfer Hold] lamoTRIgine (LaMICtal) tablet 100 mg, 100 mg, Oral, HS, Gill Stevens MD, 100 mg at 09/16/24 2100    [Transfer Hold] lidocaine (LIDODERM) 5 % patch 1 patch, 1  patch, Topical, Daily, Gill Stevens MD, 1 patch at 09/17/24 0803    [Transfer Hold] metoprolol succinate (TOPROL-XL) 24 hr tablet 50 mg, 50 mg, Oral, BID, Gill Stevens MD, 50 mg at 09/16/24 1844    [Transfer Hold] montelukast (SINGULAIR) tablet 10 mg, 10 mg, Oral, Daily, Gill Stevens MD, 10 mg at 09/16/24 0858    [Transfer Hold] pantoprazole (PROTONIX) EC tablet 40 mg, 40 mg, Oral, Daily Before Breakfast, Gill Stevens MD, 40 mg at 09/17/24 0802    [Transfer Hold] polyethylene glycol (MIRALAX) packet 17 g, 17 g, Oral, Daily, Gill Stevens MD, 17 g at 09/14/24 0848    [Transfer Hold] predniSONE tablet 15 mg, 15 mg, Oral, Daily, Gill Stevens MD, 15 mg at 09/17/24 0802    [Transfer Hold] senna-docusate sodium (SENOKOT S) 8.6-50 mg per tablet 2 tablet, 2 tablet, Oral, HS, Gill Stevens MD, 2 tablet at 09/15/24 2108    [Transfer Hold] sevelamer (RENAGEL) tablet 1,600 mg, 1,600 mg, Oral, TID With Meals, Gill Stevens MD, 1,600 mg at 09/16/24 1603    [Transfer Hold] Sodium Zirconium Cyclosilicate (Lokelma) 10 g, 10 g, Oral, Daily, Gill Stevens MD, 10 g at 09/12/24 1312    [Transfer Hold] traZODone (DESYREL) tablet 200 mg, 200 mg, Oral, HS, Gill Stevens MD, 200 mg at 09/16/24 2100    [Transfer Hold] trimethobenzamide (TIGAN) IM injection 200 mg, 200 mg, Intramuscular, Q6H PRN, Gill Stevens MD, 200 mg at 09/14/24 1248    vancomycin (VANCOCIN) IVPB (premix in dextrose), , Intravenous, Continuous PRN, Gerber Fletcher MD, 1,000 mg at 09/10/24 1109    [Transfer Hold] venlafaxine (EFFEXOR-XR) 24 hr capsule 225 mg, 225 mg, Oral, Daily, Gill Stevens MD, 225 mg at 09/16/24 0858      Lab Results: I have reviewed the following results:   Results from last 7 days   Lab Units 09/17/24  0614 09/16/24  0532 09/15/24  0537 09/14/24  1229 09/14/24  0926 09/13/24  0554 09/12/24  0458   WBC Thousand/uL 8.34 8.89 10.47* 11.01* 9.55 8.68 11.86*   HEMOGLOBIN g/dL 8.0* 8.2* 8.4* 8.3* 8.1* 8.0* 7.9*  "  HEMATOCRIT % 26.3* 26.9* 27.3* 26.4* 25.7* 26.5* 25.5*   PLATELETS Thousands/uL 220 214 222 214 212 208 199   POTASSIUM mmol/L 5.2 4.7 4.3 3.8 4.0 3.8 4.0   CHLORIDE mmol/L 100 100 97 92* 98 96 97   CO2 mmol/L 27 29 33* 33* 31 33* 33*   BUN mg/dL 72* 56* 39* 23 52* 33* 49*   CREATININE mg/dL 10.23* 8.41* 6.25* 4.13* 7.99* 5.76* 7.73*   CALCIUM mg/dL 9.5 9.8 9.5 8.8 9.8 9.3 9.3   ALBUMIN g/dL  --   --   --  3.6  --   --   --        Administrative Statements     Portions of the record may have been created with voice recognition software. Occasional wrong word or \"sound a like\" substitutions may have occurred due to the inherent limitations of voice recognition software. Read the chart carefully and recognize, using context, where substitutions have occurred.If you have any questions, please contact the dictating provider.  "

## 2024-09-17 NOTE — ASSESSMENT & PLAN NOTE
-acute kidney failure with rapidly progressive GN secondary to biopsy-proven anti-GBM disease-outpatient dialysis unit [facility] schedule.  Was started on dialysis July 11, 2024  - -recent admission with malfunctioning right IJ PermCath. PermCath exchanged last on August 12. And repositioning 9/10  - Will need close vascular surgery follow-up for outpatient planning for access placement     - Access-right IJ PermCath  - Given access issues, potential plan for urgent start PD if patient continues to have any issues with dialysis catheter     Plan  - Dialysis 9/17  - Reviewed case  with primary team resident we are in agreement for dialysis today  - Utilizing hypoallergenic filter-though it is unclear if the patient had a true dialyzer reaction as the patient received 2 intermittent treatments of dialysis 2 weeks ago with regular filter without issue    Update-catheter with issues.  Has not worked more than 30 minutes.  Poor flows.  I spoke with IR attending and cardiology attending.  We will attempt Cathflo.  Patient had cardiac cath today through radial site.  Still has TR band.  Cardiology is okay with cath flow dwell.  I spoke with our HD nurse for 1 hour dwell of Cathflo.  Plan for HD tomorrow.

## 2024-09-17 NOTE — ASSESSMENT & PLAN NOTE
[facility]. HD via [facility].Treated with plasmapheresis. Now on cyclophosphamide.

## 2024-09-17 NOTE — OCCUPATIONAL THERAPY NOTE
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    Anemia    Generalized weakness    Multifocal pneumonia    Cardiomyopathy (HCC)    Complication associated with dialysis catheter    Unspecified mood (affective) disorder (HCC)    Chest pain            09/17/24 0935   OT Last Visit   OT Visit Date 09/17/24   Note Type   Note Type Treatment for insurance authorization   Pain Assessment   Pain Assessment Tool 0-10   Pain Score 8   Pain Location/Orientation Orientation: Right;Location: Shoulder   Pain Onset/Description Onset: Ongoing;Frequency: Intermittent  (c AROM and reaching tasks)   Effect of Pain on Daily Activities UB ADLs, comfort,   Hospital Pain Intervention(s) Repositioned;Ambulation/increased activity;Medication (See MAR)   Multiple Pain Sites No   Restrictions/Precautions   Weight Bearing Precautions Per Order No   Other Precautions Chair Alarm;Bed Alarm;O2;Fall Risk;Pain;Cognitive;Telemetry  (1L O2 , however on RA throughout session and maintained SpO2 94-95%)   Lifestyle   Autonomy PTA, pt is (I) c ADLs, A with IADLs, mod (I) c SPC. lives c roommate. HD 3x/week c LYNN marley. (-) fals   Reciprocal Relationships sister, roommate   Service to Others retired   Intrinsic Gratification reports sleeping often, \"I dont do much\"   ADL   Where Assessed Sitting at sink  (vs standing at sink)   Grooming Assistance 5  Supervision/Setup   Grooming Deficit Increased time to complete;Supervision/safety;Verbal cueing;Setup  (oral are completed standing at sink. washes face seated at sink.)   UB Bathing Assistance 4  Minimal Assistance   UB Bathing Deficit Increased time to " complete;Supervision/safety;Verbal cueing;Setup  (A for back. completed in sitting)   UB Bathing Comments expresses pain in R shoulder during reaching tasks   LB Bathing Assistance 4  Minimal Assistance   LB Bathing Deficit Increased time to complete;Supervision/safety;Verbal cueing;Setup  (declines completing tripp/posterior area, washes upper and lower legs.)   UB Dressing Assistance 5  Supervision/Setup   UB Dressing Deficit Increased time to complete;Supervision/safety;Verbal cueing;Setup  (dons/doffs gown)   LB Dressing Assistance   (declines doffing underwear or socks despite encouragement to trial)   Toileting Assistance    (declines need to void at this time.)   Functional Standing Tolerance   Time 1 min   Activity oral care at sink   Comments flexed forward, WBing through forearms.   Bed Mobility   Supine to Sit 5  Supervision   Additional items HOB elevated;Increased time required   Sit to Supine 5  Supervision   Additional items Increased time required;HOB elevated   Transfers   Sit to Stand 5  Supervision   Additional items Assist x 1;Increased time required;Verbal cues   Stand to Sit 5  Supervision   Additional items Assist x 1;Increased time required;Verbal cues   Functional Mobility   Functional Mobility 5  Supervision   Additional Comments household distances within room, bathroom x 2 trials. No LOB. min ARROYO, SpO2 maintained 94-95% on RA. Impulsive at times, cues for self pacing   Additional items Rolling walker   Subjective   Subjective pt irritable throughout session, requires encouragement for participation and education re: importance of OOB mobility. Agreeable following education.   Cognition   Overall Cognitive Status WFL   Arousal/Participation Alert   Attention Within functional limits   Orientation Level Oriented to person;Oriented to place;Oriented to situation   Memory Decreased recall of precautions   Following Commands Follows one step commands without difficulty   Comments Poor emotional  regulation throughout session. Impulsive, decreased safety awareness   Activity Tolerance   Activity Tolerance Patient tolerated treatment well   Medical Staff Made Aware JEANCARLOS Summers. RN [place]   Assessment   Assessment Patient seen for OT treatment on 9/17/2024 s/p admission for Dependence on renal dialysis (HCC) Patient agreeable to OT session. Patient participated in bathing/showering, dressing , personal hygiene/grooming , self-care transfers, and functional mobility with intervention focus on challenging activity tolerance, maximizing functional independence during ADL tasks. Ngozi Beard is showing improvements in transfers and functional mobility but is continuing to perform below baseline due to the following deficits: decreased balance , decreased standing tolerance for self care tasks , and impaired safety awareness . Personal factors continuing to impact D/C include: decreased emotional regulation and coping skills. From OT standpoint, patient would benefit from skilled intervention to maximize independence with ADLs and functional mobility. Goals remain appropriate, continue POC. At this time, recommending D/C to: Level 2: moderate resource intensity   Plan   Treatment Interventions ADL retraining;Functional transfer training;Endurance training;Patient/family training;Equipment evaluation/education;Activityengagement   Goal Expiration Date 09/22/24   OT Treatment Day 1   OT Frequency 3-5x/wk   Discharge Recommendation   Rehab Resource Intensity Level, OT II (Moderate Resource Intensity)   AM-PAC Daily Activity Inpatient   Lower Body Dressing 3   Bathing 3   Toileting 3   Upper Body Dressing 3   Grooming 3   Eating 4   Daily Activity Raw Score 19   Daily Activity Standardized Score (Calc for Raw Score >=11) 40.22        Goals established on initial evaluation in order to achieve pt's goal of feeling stronger      Pt will complete UB ADLs Mod independent   for increased ADL independence within 10 days.       Pt will complete LB ADLs Mod independent   for increased ADL independence within 10 days. PROGRESSING     Pt will complete toileting Supervision  in bathroom for increased ADL independence within 10 days. PROGRESSING     Pt will demonstrate proper body mechanics to complete self-care transfers and household distance functional mobility with  Supervision and use of LRAD for increased safety and functional independence within 10 days. MET- progress to mod (I)      Pt will demonstrate standing tolerance of 4 min with for increased activity tolerance during ADL/IADL tasks within 10 days. PROGRESSING     Pt will complete bed mobility Mod independent  for increased independence in repositioning, pressure offloading, and managing comfort. PROGRESSING     Pt will demonstrate proper body mechanics and fall prevention strategies during 100% of tx sessions for increased safety awareness during ADL/IADLs     Pt will demonstrate activity tolerance of 30 min in therapeutic tasks for increased participation in meaningful activities upon D/C.PROGRESSING     Pt will participate in ongoing cognitive assessments to assist with safe D/C planning and supervision/assistance recommendations.      Pt will demonstrate OOB sitting tolerance of 2-4 hr/day for increased activity tolerance and engagement in leisure activities within 10 days.       The patient's raw score on the -PAC Daily Activity Inpatient Short Form is 19. A raw score of greater than or equal to 19 suggests the patient may benefit from discharge to home. Please refer to the recommendation of the Occupational Therapist for safe discharge planning.       Margie Shipman OT

## 2024-09-17 NOTE — ASSESSMENT & PLAN NOTE
Troponin -wnl   EKG 9/14 - left bundle branch block, normal sinus rhythm, no interval changes     Likely multifactorial, being hypotensive during HD session and underlying anxiety order.

## 2024-09-17 NOTE — PROGRESS NOTES
Progress Note - Cardiology   Name: Ngozi Beard 64 y.o. female I MRN: [id number]  Unit/Bed#: S -01 I Date of Admission: 8/30/2024   Date of Service: 9/17/2024 I Hospital Day: 18     Assessment & Plan  Cardiomyopathy (HCC)  EF 25% with mild LV dilation. New diagnosis. Cardiac cath today without obstructive CAD. Nonischemic cardiomyopathy. Can consider outpatient cardiac MRI in the future if eGFR is not prohibitive.  Right wrist site stable with TR band in place.  -Continue titration of GDMT  -Continue metoprolol succinate 50 mg BID  -Not on ARB due to hypotension during dialysis --> resume ARB on non-HD if okay per Nephrology   -Discussed class and MARIA INES considerations for LifeVest, which is lower level for nonischemic CM --> she would like to continue with a LifeVest  -Follow up as scheduled on 9/25  Primary hypertension  Losartan held for hypotension during dialysis. Consider starting on non-HD days   Acute renal failure on dialysis (HCC)  [facility]. HD via [facility] Permacath.Treated with plasmapheresis. Now on cyclophosphamide.     Cardiology will sign off at this time and be available as needed.     History of Present Illness   Interval history: Patient seen & examined post-cath.    Subjective:   Objective      Temp:  [97.8 °F (36.6 °C)-98.5 °F (36.9 °C)] 97.8 °F (36.6 °C)  HR:  [59-91] 68  Resp:  [16-18] 16  BP: ()/(56-73) 123/73  O2 Device: Nasal cannula   Vitals:    09/16/24 0600 09/17/24 0600   Weight: 102 kg (224 lb 6.9 oz) 102 kg (225 lb 5 oz)     Orthostatic Blood Pressures      Flowsheet Row Most Recent Value   Blood Pressure 123/73 filed at 09/17/2024 1315   Patient Position - Orthostatic VS Lying filed at 09/15/2024 0738             I/O last 24 hours:  In: 480 [P.O.:480]  Out: 0   Lines/Drains/Airways       Active Status       Name Placement date Placement time Site Days    HD Permanent Double Catheter 09/10/24  1142  Internal jugular  7                  Physical Exam  Vitals reviewed.    Constitutional:       General: She is not in acute distress.     Appearance: Normal appearance. She is well-developed. She is obese. She is not toxic-appearing.   HENT:      Head: Normocephalic and atraumatic.   Eyes:      General: No scleral icterus.     Extraocular Movements: Extraocular movements intact.   Cardiovascular:      Rate and Rhythm: Normal rate and regular rhythm.      Pulses: Normal pulses.      Heart sounds: Normal heart sounds. No murmur heard.     No gallop.   Pulmonary:      Effort: Pulmonary effort is normal. No respiratory distress.      Breath sounds: Normal breath sounds. No wheezing or rales.   Abdominal:      General: There is no distension.      Palpations: Abdomen is soft.      Tenderness: There is no abdominal tenderness. There is no guarding.   Musculoskeletal:         General: No swelling.      Right lower leg: No edema.      Left lower leg: No edema.   Skin:     General: Skin is warm and dry.      Capillary Refill: Capillary refill takes less than 2 seconds.   Neurological:      Mental Status: She is alert.          Lab Results: I have reviewed the following results: CBC/BMP:   .     09/17/24  0614   WBC 8.34   HGB 8.0*   HCT 26.3*      SODIUM 138   K 5.2      CO2 27   BUN 72*   CREATININE 10.23*   GLUC 81    , Creatinine Clearance: Estimated Creatinine Clearance: 6.2 mL/min (A) (by C-G formula based on SCr of 10.23 mg/dL (H)).  Imaging Review: Reviewed radiology reports from this admission including: cardiac cath, echocardiogram, ECG.

## 2024-09-18 ENCOUNTER — APPOINTMENT (INPATIENT)
Dept: DIALYSIS | Facility: HOSPITAL | Age: 64
DRG: 286 | End: 2024-09-18
Payer: COMMERCIAL

## 2024-09-18 LAB
ANION GAP SERPL CALCULATED.3IONS-SCNC: 13 MMOL/L (ref 4–13)
ATRIAL RATE: 68 BPM
BUN SERPL-MCNC: 79 MG/DL (ref 5–25)
CALCIUM SERPL-MCNC: 9.1 MG/DL (ref 8.4–10.2)
CHLORIDE SERPL-SCNC: 99 MMOL/L (ref 96–108)
CO2 SERPL-SCNC: 23 MMOL/L (ref 21–32)
CREAT SERPL-MCNC: 10.72 MG/DL (ref 0.6–1.3)
ERYTHROCYTE [DISTWIDTH] IN BLOOD BY AUTOMATED COUNT: 18.1 % (ref 11.6–15.1)
FUNGUS SPEC CULT: ABNORMAL
GFR SERPL CREATININE-BSD FRML MDRD: 3 ML/MIN/1.73SQ M
GLUCOSE SERPL-MCNC: 72 MG/DL (ref 65–140)
HCT VFR BLD AUTO: 25.7 % (ref 34.8–46.1)
HGB BLD-MCNC: 8.2 G/DL (ref 11.5–15.4)
MCH RBC QN AUTO: 30.7 PG (ref 26.8–34.3)
MCHC RBC AUTO-ENTMCNC: 31.9 G/DL (ref 31.4–37.4)
MCV RBC AUTO: 96 FL (ref 82–98)
P AXIS: 7 DEGREES
PLATELET # BLD AUTO: 225 THOUSANDS/UL (ref 149–390)
PMV BLD AUTO: 10.2 FL (ref 8.9–12.7)
POTASSIUM SERPL-SCNC: 5.9 MMOL/L (ref 3.5–5.3)
PR INTERVAL: 208 MS
QRS AXIS: -24 DEGREES
QRSD INTERVAL: 162 MS
QT INTERVAL: 456 MS
QTC INTERVAL: 484 MS
RBC # BLD AUTO: 2.67 MILLION/UL (ref 3.81–5.12)
SODIUM SERPL-SCNC: 135 MMOL/L (ref 135–147)
T WAVE AXIS: 123 DEGREES
VENTRICULAR RATE: 68 BPM
WBC # BLD AUTO: 8.89 THOUSAND/UL (ref 4.31–10.16)

## 2024-09-18 PROCEDURE — 99232 SBSQ HOSP IP/OBS MODERATE 35: CPT | Performed by: INTERNAL MEDICINE

## 2024-09-18 PROCEDURE — 93010 ELECTROCARDIOGRAM REPORT: CPT | Performed by: INTERNAL MEDICINE

## 2024-09-18 PROCEDURE — 85027 COMPLETE CBC AUTOMATED: CPT

## 2024-09-18 PROCEDURE — 97530 THERAPEUTIC ACTIVITIES: CPT

## 2024-09-18 PROCEDURE — 80048 BASIC METABOLIC PNL TOTAL CA: CPT

## 2024-09-18 PROCEDURE — 90935 HEMODIALYSIS ONE EVALUATION: CPT | Performed by: INTERNAL MEDICINE

## 2024-09-18 PROCEDURE — 97110 THERAPEUTIC EXERCISES: CPT

## 2024-09-18 RX ORDER — HEPARIN SODIUM 1000 [USP'U]/ML
2100 INJECTION, SOLUTION INTRAVENOUS; SUBCUTANEOUS
Status: DISCONTINUED | OUTPATIENT
Start: 2024-09-18 | End: 2024-09-19 | Stop reason: HOSPADM

## 2024-09-18 RX ORDER — HEPARIN SODIUM 1000 [USP'U]/ML
2200 INJECTION, SOLUTION INTRAVENOUS; SUBCUTANEOUS
Status: DISCONTINUED | OUTPATIENT
Start: 2024-09-18 | End: 2024-09-19 | Stop reason: HOSPADM

## 2024-09-18 RX ADMIN — VENLAFAXINE HYDROCHLORIDE 225 MG: 150 CAPSULE, EXTENDED RELEASE ORAL at 13:27

## 2024-09-18 RX ADMIN — TRIMETHOBENZAMIDE HYDROCHLORIDE 200 MG: 100 INJECTION INTRAMUSCULAR at 12:03

## 2024-09-18 RX ADMIN — ATORVASTATIN CALCIUM 20 MG: 20 TABLET, FILM COATED ORAL at 13:27

## 2024-09-18 RX ADMIN — ATOVAQUONE 1500 MG: 750 SUSPENSION ORAL at 13:26

## 2024-09-18 RX ADMIN — POLYETHYLENE GLYCOL 3350 17 G: 17 POWDER, FOR SOLUTION ORAL at 13:26

## 2024-09-18 RX ADMIN — TRIMETHOBENZAMIDE HYDROCHLORIDE 200 MG: 100 INJECTION INTRAMUSCULAR at 18:10

## 2024-09-18 RX ADMIN — CYCLOPHOSPHAMIDE 100 MG: 50 CAPSULE ORAL at 13:29

## 2024-09-18 RX ADMIN — MONTELUKAST 10 MG: 10 TABLET, FILM COATED ORAL at 13:27

## 2024-09-18 RX ADMIN — LAMOTRIGINE 100 MG: 100 TABLET ORAL at 21:30

## 2024-09-18 RX ADMIN — POLYSACCHARIDE-IRON COMPLEX 150 MG: 150 CAPSULE ORAL at 13:26

## 2024-09-18 RX ADMIN — SODIUM ZIRCONIUM CYCLOSILICATE 10 G: 10 POWDER, FOR SUSPENSION ORAL at 13:26

## 2024-09-18 RX ADMIN — SEVELAMER HYDROCHLORIDE 1600 MG: 800 TABLET ORAL at 13:27

## 2024-09-18 RX ADMIN — SENNOSIDES AND DOCUSATE SODIUM 2 TABLET: 8.6; 5 TABLET ORAL at 21:30

## 2024-09-18 RX ADMIN — TRAZODONE HYDROCHLORIDE 200 MG: 100 TABLET ORAL at 21:31

## 2024-09-18 RX ADMIN — LIDOCAINE 5% 1 PATCH: 700 PATCH TOPICAL at 08:46

## 2024-09-18 RX ADMIN — SEVELAMER HYDROCHLORIDE 1600 MG: 800 TABLET ORAL at 18:09

## 2024-09-18 RX ADMIN — METOPROLOL SUCCINATE 50 MG: 50 TABLET, EXTENDED RELEASE ORAL at 18:09

## 2024-09-18 RX ADMIN — PREDNISONE 15 MG: 10 TABLET ORAL at 13:27

## 2024-09-18 RX ADMIN — ALBUTEROL SULFATE 2 PUFF: 90 AEROSOL, METERED RESPIRATORY (INHALATION) at 08:40

## 2024-09-18 RX ADMIN — FLUTICASONE FUROATE AND VILANTEROL TRIFENATATE 1 PUFF: 200; 25 POWDER RESPIRATORY (INHALATION) at 08:40

## 2024-09-18 RX ADMIN — HEPARIN SODIUM 2000 UNITS: 1000 INJECTION INTRAVENOUS; SUBCUTANEOUS at 09:42

## 2024-09-18 RX ADMIN — PANTOPRAZOLE SODIUM 40 MG: 40 TABLET, DELAYED RELEASE ORAL at 05:58

## 2024-09-18 NOTE — PHYSICAL THERAPY NOTE
PHYSICAL THERAPY NOTE          Patient Name: Ngozi Beard  Today's Date: 9/18/2024 09/18/24 1433   PT Last Visit   PT Visit Date 09/18/24   Note Type   Note Type Treatment   Pain Assessment   Pain Assessment Tool 0-10   Pain Score No Pain   Patient's Stated Pain Goal No pain   Hospital Pain Intervention(s) Repositioned;Ambulation/increased activity;Rest   Multiple Pain Sites No   Pain Rating: FLACC (Rest) - Face 0   Pain Rating: FLACC (Rest) - Legs 0   Pain Rating: FLACC (Rest) - Activity 0   Pain Rating: FLACC (Rest) - Cry 0   Pain Rating: FLACC (Rest) - Consolability 0   Score: FLACC (Rest) 0   Restrictions/Precautions   Weight Bearing Precautions Per Order No   Other Precautions Cognitive;Chair Alarm;Bed Alarm;Fall Risk;Pain;Telemetry  (room air Sp02 >93% throughout PT intervention)   General   Chart Reviewed Yes   Response to Previous Treatment Patient reporting fatigue but able to participate.;Other (Comment)  (pt reports generalized weakness)   Cognition   Overall Cognitive Status Unable to assess   Arousal/Participation Alert;Responsive;Cooperative  (pt self limiting in [facility] tx sesison and required encouragement  to participate in PT intervention)   Attention Within functional limits   Orientation Level Oriented X4   Memory Decreased recall of precautions   Following Commands Follows one step commands without difficulty   Comments pt required encoiuargement and motivation to participate in PT intervention as pt was self limiting in [facility] tx session   Subjective   Subjective pt stated nmo pain and was agreeable to participate in PT intervention   Bed Mobility   Supine to Sit 6  Modified independent   Additional items Assist x 1;HOB elevated;Bedrails;Increased time required   Sit to Supine 6  Modified independent   Additional items Assist x 1;Increased time required;Verbal cues   Additional Comments pt utilized  bed rail in order to complete a supine<>sit EOB transfer and to return back to supine post tx session   Transfers   Sit to Stand 5  Supervision   Additional items Assist x 1;Increased time required;Verbal cues  (from EOB to RW with VC's for hand placement)   Stand to Sit 5  Supervision   Additional items Assist x 1;Increased time required;Verbal cues   Stand pivot Unable to assess   Additional Comments pt continues to require Vc's for hand placement while ascending to RW and descending back to seated EOB   Ambulation/Elevation   Gait pattern Improper Weight shift;Decreased foot clearance;Short stride;Excessively slow;Decreased heel strike;Decreased hip extension;Decreased toe off;Forward Flexion   Gait Assistance 4  Minimal assist   Additional items Assist x 1;Verbal cues   Assistive Device Rolling walker   Distance 30'x1 RW   Stair Management Assistance Not tested   Ambulation/Elevation Additional Comments pt self limiting with ambulation as pt refused to ambulate further as pt stated she is weak and cant do any more   Balance   Static Sitting Fair +   Dynamic Sitting Fair   Static Standing Fair -   Dynamic Standing Poor +   Ambulatory Poor +  (w/ RW)   Endurance Deficit   Endurance Deficit Yes   Endurance Deficit Description limited activity tolerance and ambulation distance   Activity Tolerance   Activity Tolerance Patient limited by fatigue;Other (Comment)  (generalized weakness and self limiting)   Nurse Made Aware Spoke to RN   Exercises   Hip Abduction Sitting;15 reps;Bilateral   Hip Adduction Sitting;15 reps;AROM;Bilateral  (pillow squeezes)   Knee AROM Long Arc Quad Sitting;15 reps;AROM;Bilateral   Ankle Pumps Sitting;20 reps;AROM;Bilateral   Marching Sitting;10 reps;AROM;Bilateral   Assessment   Prognosis Fair   Problem List Decreased strength;Decreased endurance;Impaired balance;Decreased mobility   Assessment pt began tx session lying supine in the bed as pt required encouragement and motivation in order  to participate in PT intervention. Progress was noted with bed mobility as pt was able to complete a supine<>sit EOB transfer with mod I and return to supine post tx session with mod I, no LOB. pt continues to remain consistant for requiring /s and VC's for hand placement for all functional transfers to and from RW. pt did not demonstrate good recall from previous tx sessions on hand placement for safety and balance. pt was self limiting in todays tx session with ambulation as pt ambulated 30'x1 RW with min Ax1 to CGA for safety. pt refused further ambulation trials. pt was agreeable to participate in TE activities while seated EOB. pt completed TE at EOB with AROM, no increases in pain and no LOB while strengthening bilateral LE's and increasing static/dynamic sitting balance. Post tx pt in bed with call bell and all pt needs met. Continue to recommend Dc w/ level 2 moderate rehab resource intensity when medically cleared.   Goals   Patient Goals to get back to bed   STG Expiration Date 09/22/24   PT Treatment Day 4   Plan   Treatment/Interventions Functional transfer training;LE strengthening/ROM;Therapeutic exercise;Endurance training;Patient/family training;Equipment eval/education;Bed mobility;Gait training;Spoke to nursing;Cognitive reorientation   Progress Slow progress, decreased activity tolerance   PT Frequency 3-5x/wk   Discharge Recommendation   Rehab Resource Intensity Level, PT II (Moderate Resource Intensity)   Equipment Recommended Walker   Walker Package Recommended Wheeled walker   Change/add to Walker Package? No   AM-PAC Basic Mobility Inpatient   Turning in Flat Bed Without Bedrails 4   Lying on Back to Sitting on Edge of Flat Bed Without Bedrails 4   Moving Bed to Chair 3   Standing Up From Chair Using Arms 3   Walk in Room 3   Climb 3-5 Stairs With Railing 2   Basic Mobility Inpatient Raw Score 19   Basic Mobility Standardized Score 42.48   [facility] Highest Level Of Mobility   -[facility] Goal  6: Walk 10 steps or more   -HLM Achieved 7: Walk 25 feet or more   Education   Education Provided Mobility training;Assistive device   Patient Demonstrates acceptance/verbal understanding   End of Consult   Patient Position at End of Consult Supine;Bed/Chair alarm activated;All needs within reach   The patient's AM-PAC Basic Mobility Inpatient Short Form Raw Score is 19. A Raw score of greater than 16 suggests the patient may benefit from discharge to home. Please also refer to the recommendation of the Physical Therapist for safe discharge planning.    Pt continues to be limited with activity tolerance, functional mobility and ambulation distance as pt was not able to ambulate house hold distances w/o requiring seated rest breaks due to fatigue and generalized weakness. Pt would benefit from continued skilled PT intervention in order to address pt deficits listed above   Javon Brown

## 2024-09-18 NOTE — PROGRESS NOTES
Progress Note - Nephrology   Name: Ngozi Beard 64 y.o. 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  Was started on dialysis July 11, 2024  - -recent admission with malfunctioning right IJ PermCath. PermCath exchanged last on August 12. And repositioning 9/10  - Will need close vascular surgery follow-up for outpatient planning for access placement     - Access-right IJ PermCath  - Given access issues, potential plan for urgent start PD if patient continues to have any issues with dialysis catheter    9/17/2024 - HD attempted but catheter flow issues. We gave cathflo. Had 30 mins of treatment  9/18/2024 - had HD make up session. Catheter working but sluggish arterial flow. K 5.9, HD today. 2 K diet. Cont lokelma     Plan  - Dialysis 9/18 - seen and examined on HD - diacap pro. Na 138, bicarb 40. UF 1-2 L as asha  - next HD tomorrow (TTS)  - will plan to hep lock dialysis catheter (poor data in literature on efficacy but given sig catheter issues, we are limited in further options)  - reviewed heparin lock plans with outpt HD RN Carley. They are aware and will aspirate heparin out of lock prior to initiating HD  - reviewed case with inpt HD RN  - Utilizing hypoallergenic filter-though it is unclear if the patient had a true dialyzer reaction as the patient received 2 intermittent treatments of dialysis 2 weeks ago with regular filter without issue  - hold RISHI today. Likely rstart tomorrow  Primary hypertension  -Monitor with dialysis.  Marginal at times.  Holding losartan for now  Anemia  -- Received transfusion on August 31.  On RISHI.    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 Is on Cytoxan and prednisone currently on atovaquone for prophylaxis. Most recent anti-GBM level end of July is still elevated. Is on prednisone taper as outpatient. But did receive IV methylprednisolone this admission for possible asthma exacerbation.  - Status post bronchoscopy this admission.  - Initially was treated for pneumonia versus pneumonitis regarding shortness of breath and was also found to have newly depressed EF  Complication associated with dialysis catheter  -- See discussion above.  Chest pain  --  cardiac catheterization completed 9/17  Cardiomyopathy (HCC)  -- Newly diagnosed EF 25%  - Life[facility]est management per primary team  Hyperkalemia  -HD 9/18  Acute renal failure on dialysis (HCC)  -- Secondary to biopsy-proven anti-GBM disease.  Treated with cyclophosphamide prednisone and plasmapheresis but no renal recovery as of yet.  -- Continue dialysis TTS where she gets at [facility]      History of Present Illness   Brief History of Admission - 64-year-old female with a past medical history of newly diagnosed anti-GBM disease initiated on dialysis July 16, 2024, asthma, hypertension, bipolar disorder who initially presents with chest pain and shortness of breath. Recent admission with similar presentation. Nephrology is on board for dialysis management     Pt seen on dialysis and tolerating. Seen early AM also. No complaints.     Objective      Temp:  [97.2 °F (36.2 °C)-97.8 °F (36.6 °C)] 97.8 °F (36.6 °C)  HR:  [59-84] 65  Resp:  [17-20] 18  BP: (101-123)/(43-74) 103/43  O2 Device: None (Room air)         Vitals:    09/18/24 0559 09/18/24 0915   Weight: 103 kg (227 lb 4.7 oz) 103 kg (227 lb 4.7 oz)     I/O last 24 hours:  In: 1120 [P.O.:420; I.V.:700]  Out: 0   Lines/Drains/Airways       Active Status       Name Placement date Placement time Site Days    HD Permanent Double Catheter 09/10/24  1142  Internal jugular  7                   Physical Exam General: NAD  Skin: no rash  Eyes: anicteric sclera  ENT: moist mucous membrane  Neck: supple  Chest: CTA b/l, no ronchii, no wheeze, no rubs, no rales  CVS: s1s2, no murmur, no gallop, no rub  Abdomen: soft, nontender, nl sounds  Extremities: no edema LE b/l  : no sherwood  Neuro: AAOX3  Psych: normal affect    Medications:    Current Facility-Administered Medications:     acetaminophen (TYLENOL) tablet 650 mg, 650 mg, Oral, Q6H PRN, Robert Gilbert MD, 650 mg at 09/17/24 2112    albuterol (PROVENTIL HFA,VENTOLIN HFA) inhaler 2 puff, 2 puff, Inhalation, Q4H PRN, Robert Gilbert MD, 2 puff at 09/18/24 0840    atorvastatin (LIPITOR) tablet 20 mg, 20 mg, Oral, Daily, Robert Gilbert MD, 20 mg at 09/17/24 1333    atovaquone (MEPRON) oral suspension 1,500 mg, 1,500 mg, Oral, Daily With Breakfast, Robert Gilbert MD, 1,500 mg at 09/16/24 0900    bisacodyl (DULCOLAX) EC tablet 5 mg, 5 mg, Oral, Once, Robert Gilbert MD    cyclophosphamide (CYTOXAN) capsule 100 mg, 100 mg, Oral, Daily, Robert Gilbert MD, 100 mg at 09/17/24 1345    dextromethorphan-guaiFENesin (ROBITUSSIN DM) oral syrup 10 mL, 10 mL, Oral, Q4H PRN, Robert Gilbert MD, 10 mL at 09/16/24 2100    epoetin shavonne (EPOGEN,PROCRIT) injection 6,000 Units, 6,000 Units, Intravenous, After Dialysis, Robert Gilbert MD, 6,000 Units at 09/14/24 1020    fluticasone-vilanterol 200-25 mcg/actuation 1 puff, 1 puff, Inhalation, Daily, Robert Gilbert MD, 1 puff at 09/18/24 0840    heparin (porcine) injection 2,000 Units, 2,000 Units, Intravenous, Before Dialysis, Vane Estrada MD, 2,000 Units at 09/18/24 0942    heparin (porcine) injection 2,100 Units, 2,100 Units, Intravenous, After Dialysis, Vane Estrada MD    heparin (porcine) injection 2,200 Units, 2,200 Units, Intravenous, After Dialysis, Vane Estrada MD    heparin (porcine) subcutaneous injection  5,000 Units, 5,000 Units, Subcutaneous, Q8H JACK, Robert Gilbert MD, 5,000 Units at 09/17/24 1546    iron polysaccharides (FERREX) capsule 150 mg, 150 mg, Oral, Daily, Robert Gilbert MD, 150 mg at 09/17/24 1333    lamoTRIgine (LaMICtal) tablet 100 mg, 100 mg, Oral, HS, Robert Gilbert MD, 100 mg at 09/17/24 2112    lidocaine (LIDODERM) 5 % patch 1 patch, 1 patch, Topical, Daily, Robert Gilbert MD, 1 patch at 09/18/24 0846    metoprolol succinate (TOPROL-XL) 24 hr tablet 50 mg, 50 mg, Oral, BID, Robert Gilbert MD, 50 mg at 09/17/24 1746    montelukast (SINGULAIR) tablet 10 mg, 10 mg, Oral, Daily, Robert Gilbert MD, 10 mg at 09/17/24 1333    pantoprazole (PROTONIX) EC tablet 40 mg, 40 mg, Oral, Daily Before Breakfast, Robert Gilbert MD, 40 mg at 09/18/24 0558    polyethylene glycol (MIRALAX) packet 17 g, 17 g, Oral, Daily, Robert Gilbert MD, 17 g at 09/14/24 0848    predniSONE tablet 15 mg, 15 mg, Oral, Daily, Robert Gilbert MD, 15 mg at 09/17/24 0802    senna-docusate sodium (SENOKOT S) 8.6-50 mg per tablet 2 tablet, 2 tablet, Oral, HS, Robert Gilbert MD, 2 tablet at 09/17/24 2112    sevelamer (RENAGEL) tablet 1,600 mg, 1,600 mg, Oral, TID With Meals, Robert Gilbert MD, 1,600 mg at 09/17/24 1746    Sodium Zirconium Cyclosilicate (Lokelma) 10 g, 10 g, Oral, Daily, Robert Gilbert MD, 10 g at 09/12/24 1312    traZODone (DESYREL) tablet 200 mg, 200 mg, Oral, HS, Robert Gilbert MD, 200 mg at 09/17/24 2112    trimethobenzamide (TIGAN) IM injection 200 mg, 200 mg, Intramuscular, Q6H PRN, Robert Gilbert MD, 200 mg at 09/14/24 1248    vancomycin (VANCOCIN) IVPB (premix in dextrose), , Intravenous, Continuous PRN, Gerber Fletcher MD, 1,000 mg at 09/10/24 1109    venlafaxine (EFFEXOR-XR) 24 hr capsule 225 mg, 225 mg, Oral, Daily, Robert Gilbert MD, 225 mg at 09/17/24 1333      Lab  "Results: I have reviewed the following results:   Results from last 7 days   Lab Units 09/18/24  0655 09/18/24  0558 09/17/24  0614 09/16/24  0532 09/15/24  0537 09/14/24  1229 09/14/24  0926 09/13/24  0554   WBC Thousand/uL 8.89  --  8.34 8.89 10.47* 11.01* 9.55 8.68   HEMOGLOBIN g/dL 8.2*  --  8.0* 8.2* 8.4* 8.3* 8.1* 8.0*   HEMATOCRIT % 25.7*  --  26.3* 26.9* 27.3* 26.4* 25.7* 26.5*   PLATELETS Thousands/uL 225  --  220 214 222 214 212 208   POTASSIUM mmol/L  --  5.9* 5.2 4.7 4.3 3.8 4.0 3.8   CHLORIDE mmol/L  --  99 100 100 97 92* 98 96   CO2 mmol/L  --  23 27 29 33* 33* 31 33*   BUN mg/dL  --  79* 72* 56* 39* 23 52* 33*   CREATININE mg/dL  --  10.72* 10.23* 8.41* 6.25* 4.13* 7.99* 5.76*   CALCIUM mg/dL  --  9.1 9.5 9.8 9.5 8.8 9.8 9.3   ALBUMIN g/dL  --   --   --   --   --  3.6  --   --        Administrative Statements     Portions of the record may have been created with voice recognition software. Occasional wrong word or \"sound a like\" substitutions may have occurred due to the inherent limitations of voice recognition software. Read the chart carefully and recognize, using context, where substitutions have occurred.If you have any questions, please contact the dictating provider.  "

## 2024-09-18 NOTE — PROGRESS NOTES
Progress Note - Hospitalist   Name: Ngozi Beard 64 y.o. 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 S/p PLEX.  Patient is anuric and dialysis-dependent  Tues/Thurs/Sat schedule  Access: Right IJ PermCath, Cath reevaluation  by IR on 9/3, no issues during HD. 9/4, issues with clotting, will needs re-evaluation @9.5. Requires Re-evaluation 9/6 IR, Fluid poor returns on 9/5. 9/6-bedside readjustment was made by IR, reclotted after 1.5 hours, Cathflo dwelling overnight. 9/7 - HD without issues . 9/8 -cannot achieve good flow.  9/9-IR procedure unavailable, facilitated conversation to IR and nephrology regarding plans for dialysis.  9/10 - catheter exchanged, no issue with hemodialysis. 9/12 - no issues noted with HD. 9/14 - HD complicated by symptomatic hypotension with concurrent chest pain, session was cut short, cardiac work up with troponin and no interval changes on EKG. 9/17 - HD poor return after 30 min, cathflo dweling overnight reattempt hemodialysis 9/18 - sluggish arterial flow. Will attempt heparin lock and reattempts 9/19    Plan:  Hemodialysis schedule per nephro   continue PTA cyclophosphamide  Continue PTA sevalemer  Continue PTA atovaquone for PJP prophylaxis  Started Lokelma per nephro  Continue prednisone taper from outpatient nephrology prior to this admission  On prednisone 15 mg   15 mg starting September 7 to September 20  12.5 mg starting September 21 to October 4  10 mg starting October 5 to October 18  7.5 mg starting October 19 to November 2  5 mg daily starting November 3    Multifocal pneumonia  -CT chest PE study revealed no evidence of PE. There is persistent tree-in-bud nodularity in the lungs suggestive of a widespread  infectious bronchiolitis that may be secondary to an atypical mycobacterium. Follicular bronchiolitis and acute hypersensitivity pneumonitis could also be in the differential.  New mild patchy bilateral multifocal pneumonia  - Sputum culture: 4+ Pseudomonas   - ID: Chronic colonization of pseudomonas, rather than acute infection. Finished Cefepime x7 d course ended 9/10 . Continue atovaquone indefinitely   - Cardiology: clearance for bronchoscopy  - Pulm: 9/11 Bronchoscopy under sedation, culture sent  - Nephrology: Abnormal chest imaging, possible contributing factors to her catheter dysfunction    Improvement in coughing and sputum production symptoms with addition of abx, suspect patient presentation partially contributed from bronchitis secondary to pseudomonas.     Bronchoscopy results so far: Gram stain: 2- 3+ polys, no organisms,Pneuocysis PCR negative, Viral culture preliminary results are negative, AFB culture and stain - not seen. Bronchial culture and gram stain - mixed resp izzy. Fungal culture - no growth to date.  Plan:   -Bronchoscopy results can follow up on these remaining results  follow up on  - as of 9/16   Legionella - in process  TB PCR - pending    Cardiomyopathy (HCC)  August 31-EF of 25% with global hypokinesis.  No prior echo for comparison.    Heart Cath 9/17 - no evidence of obstructive CAD. Nonischemic cardiomyopathy    .    Plan   GDMT recommended, continue Toprol 50 mg daily, Losartan 25 mg daily  9/4 - Nifedipine held per Cardiology  9/9 Losartan held per nephro   Will need Cardiac MRI  Dc with lifevest      Asthma without acute exacerbation  Mild Bilateral wheezing was noted in the lower lung base upon initial presentation.  Does not require frequent inhaler use as an outpatient setting.  Low suspicion for exacerbation for the clinical symptoms presented during this hospital on admission    Plan  Continue Breo daily  Continue albuterol q4 prn  Continue montelukast qhs  Generalized  weakness  Multifactorial, respiratory failure secondary to chronic pulmonary infection, volume overload, chronic anemia, chronic deconditioning from being ill  PT OT level II    Anemia  Recent Labs     09/16/24  0532 09/17/24  0614   HGB 8.2* 8.0*      Baseline Hgb 7-8  Etiology: component of iron deficiency and renal disease.     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      Unspecified mood (affective) disorder (HCC)  Please see problem bipolar 1 disorder  Complication associated with dialysis catheter  See problem and assessment and plan for above  Chest pain  Troponin -wnl   EKG 9/14 - left bundle branch block, normal sinus rhythm, no interval changes     Likely multifactorial, being hypotensive during HD session and underlying anxiety order.  Hyperkalemia  C/w Lokelma   C/w HD per schedule      Acute renal failure on dialysis (HCC)    Current Length of Stay: 19 day(s)  Current Patient Status: Inpatient   Code Status: Level 1 - Full Code      Discharge Plan: Discharge Plan:   Expected date of discharge:   Dispo destination: [facility]  Indwelling drains/lines: Tunnel Cath  DME needs: n/a  HH needs: n/a   F/U appts: Nephrology/ Cardiology cardiac mri, cath/ Pulm bronchoscopy,   Preferred pharmacy: on file  Refills:  Transportation: facility    Subjective:   Overnight: No acute events over night     Complaints: No complaints.     Patient denies Headache, Fever, Chills, Chest Pain, Shortness of breath, Nausea, or Vomiting, Abdominal Pain, Diarrhea, Swellings,  new or worsening anxiety or depression    Diet: Diet Cardiac   Bowel regimen:   Last BM Date: 24   VTE Pharmacologic Prophylaxis: VTE Score: 7 High Risk (Score >/= 5) - Pharmacological DVT Prophylaxis Ordered: heparin. Sequential Compression Devices Ordered.    Objective:    Vitals:   Temp (24hrs), Av.7 °F (36.5 °C), Min:97.6 °F (36.4 °C), Max:97.8 °F (36.6 °C)    Temp:  [97.6 °F (36.4 °C)-97.8 °F (36.6 °C)] 97.6 °F (36.4 °C)  HR:  [59-84] 66  Resp:  [16-18] 18  BP: (101-123)/(57-74) 114/67  SpO2:  [93 %-99 %] 96 %  Input and Output Summary (last 24 hours):     Intake/Output Summary (Last 24 hours) at 2024 0622  Last data filed at 2024 1545  Gross per 24 hour   Intake 740 ml   Output 0 ml   Net 740 ml     Physical Exam:   Physical Exam  Constitutional:       General: She is not in acute distress.     Appearance: She is ill-appearing.   Cardiovascular:      Rate and Rhythm: Normal rate and regular rhythm.      Comments: No peripheral edema  Pulmonary:      Effort: Pulmonary effort is normal.      Breath sounds: Normal breath sounds.      Comments: 2L    Abdominal:      Palpations: Abdomen is soft.   Musculoskeletal:      Cervical back: Neck supple.   Skin:     General: Skin is warm.      Capillary Refill: Capillary refill takes less than 2 seconds.   Neurological:      Mental Status: She is alert.        Additional Data:   Labs:  Results from last 7 days   Lab Units 24  0614 24  0532   WBC Thousand/uL 8.34 8.89   HEMOGLOBIN g/dL 8.0* 8.2*   HEMATOCRIT % 26.3* 26.9*   PLATELETS Thousands/uL 220 214   SEGS PCT %  --  70   LYMPHO PCT %  --  16   MONO PCT %  --  10   EOS PCT %  --  2     Results from last 7 days   Lab Units 24  0614 09/15/24  0537 24  1229   SODIUM mmol/L 138   < > 134*   POTASSIUM mmol/L 5.2   < > 3.8   CHLORIDE mmol/L 100   < > 92*   CO2 mmol/L 27   < > 33*   BUN mg/dL 72*   < > 23   CREATININE mg/dL 10.23*   < > 4.13*   ANION GAP mmol/L 11   < > 9   CALCIUM  mg/dL 9.5   < > 8.8   ALBUMIN g/dL  --   --  3.6   TOTAL BILIRUBIN mg/dL  --   --  0.47   ALK PHOS U/L  --   --  65   ALT U/L  --   --  6*   AST U/L  --   --  7*   GLUCOSE RANDOM mg/dL 81   < > 123    < > = values in this interval not displayed.                 Results from last 7 days   Lab Units 09/14/24  1229   LACTIC ACID mmol/L 1.0       Recent Cultures (last 7 days):  I have reviewed the following results: CBC, BMP, and all the lab results till date  Results from last 7 days   Lab Units 09/11/24  0955 09/11/24  0954 09/11/24  0946 09/11/24  0937   GRAM STAIN RESULT  3+ Polys  No organisms seen 2+ Polys  No bacteria seen 3+ Polys  No organisms seen 1+ Polys  No organisms seen       Imaging:   Imaging Review: I have reviewed all the imaging on file  Other Studies: I have reviewed all other studies till date  Cardiac catheterization  Nl epicardial Coronary arteries  Mildly elevated LVEDP  Large PL vein present      Mobility:   Basic Mobility Inpatient Raw Score: 17  -HLM Goal: 5: Stand one or more mins  JH-HLM Achieved: 3: Sit at edge of bed  -HLM Goal NOT achieved. Continue with multidisciplinary rounding and encourage appropriate mobility to improve upon -HLM goals.    Last 24 Hours Medication List:   Current Facility-Administered Medications   Medication Dose Route Frequency Provider Last Rate    acetaminophen  650 mg Oral Q6H PRN Robert Gilbert MD      albuterol  2 puff Inhalation Q4H PRN Robert Gilbert MD      atorvastatin  20 mg Oral Daily Robert Gilbert MD      atovaquone  1,500 mg Oral Daily With Breakfast Robert Gilbert MD      bisacodyl  5 mg Oral Once Robert Gilbert MD      cyclophosphamide  100 mg Oral Daily Robert Gilbert MD      dextromethorphan-guaiFENesin  10 mL Oral Q4H PRN Robert Gilbert MD      epoetin shavonne  6,000 Units Intravenous After Dialysis Robert Gilbert MD      fluticasone-vilanterol  1 puff  Inhalation Daily Robert Gilbert MD      heparin (porcine)  2,000 Units Intravenous Before Dialysis Vane Estrada MD      heparin (porcine)  5,000 Units Subcutaneous Q8H [facility] Robert Gilbert MD      iron polysaccharides  150 mg Oral Daily Robert Gilbert MD      lamoTRIgine  100 mg Oral HS Robert Gilbert MD      lidocaine  1 patch Topical Daily Robert Gilbert MD      metoprolol succinate  50 mg Oral BID Robert Gilbert MD      montelukast  10 mg Oral Daily Robert Gilbert MD      pantoprazole  40 mg Oral Daily Before Breakfast Robert Gilbert MD      polyethylene glycol  17 g Oral Daily Robert Gilbert MD      predniSONE  15 mg Oral Daily Robert Gilbert MD      senna-docusate sodium  2 tablet Oral HS Robert Gilbert MD      sevelamer  1,600 mg Oral TID With Meals Robert Gilbert MD      Sodium Zirconium Cyclosilicate  10 g Oral Daily Robert Gilbert MD      traZODone  200 mg Oral HS Robert Gilbert MD      trimethobenzamide  200 mg Intramuscular Q6H PRN Robert Gilbert MD      vancomycin   Intravenous Continuous PRN Gerber Fletcher MD      venlafaxine  225 mg Oral Daily Robert Gilbert MD         Lines/Drains:  Invasive Devices       Peripheral Intravenous Line  Duration             Peripheral IV 09/17/24 Dorsal (posterior);Left Hand <1 day              Hemodialysis Catheter  Duration             HD Permanent Double Catheter 7 days                      Telemetry:  Telemetry Orders (From admission, onward)               24 Hour Telemetry Monitoring  Continuous x 24 Hours (Telem)        Question:  Reason for 24 Hour Telemetry  Answer:  Decompensated CHF- and any one of the following: continuous diuretic infusion or total diuretic dose >200 mg daily, associated electrolyte derangement (I.e. K < 3.0), ionotropic drip (continuous infusion), hx of ventricular arrhythmia,  or new EF < 35%                     Telemetry Reviewed: Normal Sinus Rhythm  Indication for Continued Telemetry Use: Acute CHF on >200 mg lasix/day or equivalent dose or with new reduced EF.              Patient Centered Rounds: I performed bedside rounds with nursing staff today.  Discussions with Specialists or Other Care Team Provider: Nephrology/Cardiology  Education and Discussions with Family / Patient: Will update patient's point of contact if given permission     Today, Patient Was Seen By: Kathryn Cleveland MD  Administrative Statements   Today, Patient Was Seen By: Kathryn Cleveland MD  I have spent a total time of 35 minutes in caring for this patient on the day of the visit/encounter including Diagnostic results, Patient and family education, Impressions, Counseling / Coordination of care, Documenting in the medical record, Reviewing / ordering tests, medicine, procedures  , Obtaining or reviewing history  , and Communicating with other healthcare professionals .    **Please Note: This note may have been constructed using a voice recognition system.**

## 2024-09-18 NOTE — ASSESSMENT & PLAN NOTE
-CT chest PE study revealed no evidence of PE. There is persistent tree-in-bud nodularity in the lungs suggestive of a widespread infectious bronchiolitis that may be secondary to an atypical mycobacterium. Follicular bronchiolitis and acute hypersensitivity pneumonitis could also be in the differential.  New mild patchy bilateral multifocal pneumonia  - Sputum culture: 4+ Pseudomonas   - ID: Chronic colonization of pseudomonas, rather than acute infection. Finished Cefepime x7 d course ended 9/10 . Continue atovaquone indefinitely   - Cardiology: clearance for bronchoscopy  - Pulm: 9/11 Bronchoscopy under sedation, culture sent  - Nephrology: Abnormal chest imaging, possible contributing factors to her catheter dysfunction    Improvement in coughing and sputum production symptoms with addition of abx, suspect patient presentation partially contributed from bronchitis secondary to pseudomonas.     Bronchoscopy results so far: Gram stain: 2- 3+ polys, no organisms,Pneuocysis PCR negative, Viral culture preliminary results are negative, AFB culture and stain - not seen. Bronchial culture and gram stain - mixed resp izzy. Fungal culture - no growth to date.  Plan:   -Bronchoscopy results can follow up on these remaining results  follow up on  - as of 9/16   Legionella - in process  TB PCR - pending

## 2024-09-18 NOTE — PLAN OF CARE
TX plan reviewed with Dr ENOC Estrada and he is agreeable to the following: Goal is to UF 1-2 L on a 2 K+ bath for a morning serum K+ of 5.9, 210 min TX. Discussed high K+ with the pt, she has been eating oranges. Advised to make better choices for K+ safe foods. Discussed potential for cardiac complications of both high and low K+. AS per Dr SLAVA Estrada, hold Epogen today. Pt tolerated TX well. As per Dr ENOC Estrada, lock CVC limbs with heparin after saline flush. See PRN order. To be done at future appointments as well.     Problem: METABOLIC, FLUID AND ELECTROLYTES - ADULT  Goal: Electrolytes maintained within normal limits  Description: INTERVENTIONS:  - Monitor labs and assess patient for signs and symptoms of electrolyte imbalances  - Administer electrolyte replacement as ordered  - Monitor response to electrolyte replacements, including repeat lab results as appropriate  - Instruct patient on fluid and nutrition as appropriate  Outcome: Progressing  Goal: Fluid balance maintained  Description: INTERVENTIONS:  - Monitor labs   - Monitor I/O and WT  - Instruct patient on fluid and nutrition as appropriate  - Assess for signs & symptoms of volume excess or deficit  Outcome: Progressing     Post-Dialysis RN Treatment Note    Blood Pressure:  Pre 111/55 mm/Hg  Post 108/63 mmHg   EDW  111.5 kg    Weight:  Pre 103.1 kg   Post 102.1 kg   Mode of weight measurement: Standing Scale   Volume Removed  1000 ml net   Treatment duration 210 minutes    NS given  No    Treatment shortened? No   Medications given during Rx Heparin 2000 units bolus, 21oo arterial limb and 2200 venous limb as locks   Estimated Kt/V  None Reported   Access type: Permacath/TDC   Access Issues: No    Report called to primary nurse   Yes, RN Candy

## 2024-09-18 NOTE — PLAN OF CARE
Problem: PHYSICAL THERAPY ADULT  Goal: Performs mobility at highest level of function for planned discharge setting.  See evaluation for individualized goals.  Description: Treatment/Interventions: Functional transfer training, LE strengthening/ROM, Elevations, Therapeutic exercise, Endurance training, Patient/family training, Cognitive reorientation, Equipment eval/education, Bed mobility, Gait training, Compensatory technique education  Equipment Recommended: Walker       See flowsheet documentation for full assessment, interventions and recommendations.  Outcome: Progressing  Note: Prognosis: Fair  Problem List: Decreased strength, Decreased endurance, Impaired balance, Decreased mobility  Assessment: pt began tx session lying supine in the bed as pt required encouragement and motivation in order to participate in PT intervention. Progress was noted with bed mobility as pt was able to complete a supine<>sit EOB transfer with mod I and return to supine post tx session with mod I, no LOB. pt continues to remain consistant for requiring /s and VC's for hand placement for all functional transfers to and from RW. pt did not demonstrate good recall from previous tx sessions on hand placement for safety and balance. pt was self limiting in todays tx session with ambulation as pt ambulated 30'x1 RW with min Ax1 to CGA for safety. pt refused further ambulation trials. pt was agreeable to participate in TE activities while seated EOB. pt completed TE at EOB with AROM, no increases in pain and no LOB while strengthening bilateral LE's and increasing static/dynamic sitting balance. Post tx pt in bed with call bell and all pt needs met. Continue to recommend Dc w/ level 2 moderate rehab resource intensity when medically cleared.  Barriers to Discharge: Inaccessible home environment, Decreased caregiver support (pt has 3 DILLAN, lives w/ roommate)     Rehab Resource Intensity Level, PT: II (Moderate Resource Intensity)    See  flowsheet documentation for full assessment.

## 2024-09-18 NOTE — PROGRESS NOTES
Progress Note - Infectious Disease   Ngozi Beard 64 y.o. female MRN: [id number]  Unit/Bed#: S -01 Encounter: [id number]      Impression/Recommendations:  1.   Pseudomonas respiratory infection.  Clinical and radiological picture is more consistent with bronchitis than pneumonia.  Patient completed 7-day course of cefepime.  Cough is improved.  Stable intermittent mild dyspnea.  Observe off further antibiotic.     2.  Pneumonitis versus pneumonia.  Patient's respiratory symptoms are much improved admission.  Due to initial improvement of respiratory status, bronchoscopy was not done.  However, due to fluctuating respiratory status, bronchoscopy was finally done earlier this week.  There was no evidence of airway inflammation.  BAL culture only has growth of Candida thus far, likely airway colonization.  PCP PCR negative.  Aspergillus galactomannan negative.  Of note, as in above, patient just completed 7-day course of IV cefepime.  No further antibiotic as in above.  Follow-up on BAL AFB and fungal cultures.     3.  Bacteremia, with growth of MRSE in only 1 out of 2 admission blood cultures.  Patient has no fever and is not systemically ill, making true bacteremia not likely clinically.  Although patient does have permacath in place, with growth in only 1 culture sent, this is still most likely contaminated blood draw.  Patient had been on IV vancomycin but this was discontinued.  She remains clinically well off it.  No antibiotic needed for this indication.     3.  Leukocytosis.  Most likely steroid effect.  WBC fluctuating, currently stable.     4.  Advanced CKD, on HD.  Functional difficulty of permacath noted.    HD per nephrology.     5.  Recently diagnosed rapidly progressing anti-GBM disease.  Patient is on Cytoxan and prednisone since diagnosis.  Continue outpatient Cytoxan/steroid.  Continue atovaquone PCP prophylaxis.     6.  Newly diagnosed cardiomyopathy.  Plan for LifeVest noted.  Cardiology  follow-up.     Discussed with patient in detail regarding the above plan.  Okay for discharge from ID viewpoint.  Unless BAL cultures have growth, follow-up with us as needed.    Without any evidence of further active infection, I will sign off.  Thank you for the consultation.  Please do not hesitate to reconsult us for any further questions or issues.     Antibiotics:  Off antibiotic     Subjective:  Patient feels well.  No dyspnea at rest.  Cough also minimal.  Temperature stays down.  No chills.  Patient is tolerating antibiotic well.  No nausea, vomiting or diarrhea.     Objective:  Vitals:  Temp:  [97.2 °F (36.2 °C)-97.8 °F (36.6 °C)] 97.8 °F (36.6 °C)  HR:  [59-84] 60  Resp:  [17-20] 18  BP: (101-123)/(43-74) 107/63  SpO2:  [93 %-99 %] 97 %  Temp (24hrs), Av.6 °F (36.4 °C), Min:97.2 °F (36.2 °C), Max:97.8 °F (36.6 °C)  Current: Temperature: 97.8 °F (36.6 °C)    Physical Exam:     General: Awake, alert, cooperative, no distress.   Neck:  Supple. No mass.  No lymphadenopathy.   Lungs: Expansion symmetric, no rales, no wheezing, respirations unlabored.   Heart:  Regular rate and rhythm, S1 and S2 normal, no murmur.   Abdomen: Soft, nondistended, non-tender, bowel sounds active all four quadrants, no masses, no organomegaly.   Extremities: Stable mild leg edema. No erythema/warmth. No ulcer. Nontender to palpation.   Skin:  No rash.   Neuro: Moves all extremities.     Invasive Devices       Peripheral Intravenous Line  Duration             Peripheral IV 24 Dorsal (posterior);Left Hand <1 day              Hemodialysis Catheter  Duration             HD Permanent Double Catheter 7 days                    Labs studies:   I have personally reviewed pertinent labs.  Results from last 7 days   Lab Units 24  0558 24  0614 24  0532 09/15/24  0537 24  1229   POTASSIUM mmol/L 5.9* 5.2 4.7   < > 3.8   CHLORIDE mmol/L 99 100 100   < > 92*   CO2 mmol/L 23 27 29   < > 33*   BUN mg/dL 79* 72*  56*   < > 23   CREATININE mg/dL 10.72* 10.23* 8.41*   < > 4.13*   EGFR ml/min/1.73sq m 3 3 4   < > 10   CALCIUM mg/dL 9.1 9.5 9.8   < > 8.8   AST U/L  --   --   --   --  7*   ALT U/L  --   --   --   --  6*   ALK PHOS U/L  --   --   --   --  65    < > = values in this interval not displayed.     Results from last 7 days   Lab Units 09/18/24  0655 09/17/24  0614 09/16/24  0532   WBC Thousand/uL 8.89 8.34 8.89   HEMOGLOBIN g/dL 8.2* 8.0* 8.2*   PLATELETS Thousands/uL 225 220 214           Imaging Studies:   I have personally reviewed pertinent imaging study reports and images in PACS.    EKG, Pathology, and Other Studies:   I have personally reviewed pertinent reports.

## 2024-09-18 NOTE — CASE MANAGEMENT
Case Management Progress Note    Patient name Ngozi Beard  Location S /S -01 MRN [id number]  : 1960 Date 2024       LOS (days): 19  Geometric Mean LOS (GMLOS) (days): 8.8  Days to GMLOS:-9.8        OBJECTIVE:        Current admission status: Inpatient  Preferred Pharmacy:   [id number] - [place], [place] - [address]  [address] [address]  Phone: [phone] Fax: [phone]    [facility] [address] [facility]  [address]  [facility] [address]  Phone: [phone] Fax: [phone]    Primary Care Provider: Meenakshi Balbuena MD    Primary Insurance: [employer] [facility]  Secondary Insurance: [facility]    PROGRESS NOTE:    Weekly Care Management Length of Stay Review     Current LOS: 19 Days    Most Recent Labs:     Lab Results   Component Value Date/Time    WBC 8.89 2024 06:55 AM    HGB 8.2 (L) 2024 06:55 AM    HCT 25.7 (L) 2024 06:55 AM     2024 06:55 AM    SODIUM 135 2024 05:58 AM    K 5.9 (H) 2024 05:58 AM    CL 99 2024 05:58 AM    CO2 23 2024 05:58 AM    BUN 79 (H) 2024 05:58 AM    CREATININE 10.72 (H) 2024 05:58 AM    GLUC 72 2024 05:58 AM       Most Recent Vitals:   Vitals:    24 1200   BP: 109/68   Pulse: 64   Resp: 18   Temp:    SpO2:         Identified Barriers to Discharge/Discharge Goals/Care Management Interventions: COPD, s/p bronch, needs cardiac cath after LifeVest ordered here at hospital.     Intended Discharge Disposition: [place]    Expected Discharge Date:  24-48hrs

## 2024-09-18 NOTE — CASE MANAGEMENT
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)   Patient Active Problem List    Diagnosis Date Noted Date Diagnosed    Hyperkalemia 2024     Chest pain 2024     Unspecified mood (affective) disorder ([facility]) 2024     Acute renal failure on dialysis (HCC) 2024     Complication associated with dialysis catheter 2024     Cardiomyopathy ([facility]) 2024     Multifocal pneumonia 2024     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 ([facility]) 2024     Generalized weakness 2024     Rash 2024     Anemia 2024     Thrombocytopenia ([facility]) 2024     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     Abnormality of ascending aorta 2024     Postmenopausal 2024     Encounter for screening mammogram for malignant neoplasm of breast 2024     Allergic rhinitis 2024     Persistent cough 2024     Urge incontinence of urine 2024     Exercise intolerance 2024     Family history of coronary artery bypass graft surgery 2024     Urge urinary incontinence 2024     Female stress incontinence 2024     Paranoid schizophrenia ([facility]) 2023     Asthma without acute exacerbation 2023     Asthma due to seasonal allergies 2023     Bipolar 1 disorder ([facility]) 2022     Primary hypertension 2022     Dyslipidemia 2022       LOS (days): 19  Geometric Mean LOS (GMLOS) (days): 8.8  Days to GMLOS:-9.9     OBJECTIVE:  Risk of Unplanned Readmission Score: 42.86         Current admission status: Inpatient   Preferred Pharmacy:   [facility]/pharmacy #[id number]  [place] - [address] [address]  Phone: [phone]  Fax: [phone]    [facility] - [place], [place] - [address]  [address]  [address]  [facility] [address]  Phone: [phone] Fax: [phone]    Primary Care Provider: Meenakshi Balbuena MD    Primary Insurance: [employer] [facility]  Secondary Insurance: [facility]    DISCHARGE DETAILS:      Additional Comments: Insurance auth tasked to CM DC Support.

## 2024-09-18 NOTE — PHYSICAL THERAPY NOTE
Physical Therapy Cancellation Note       09/18/24 0914   Note Type   Note Type Cancelled Session   Cancel Reasons Patient off floor/hemodialysis  (PT treatment attempted per CM request for insurance auth. Pt currently undergoing bedside HD. Will hold PT treatment and f/u later in PM as able and appropriate.)       Laura Del Rosario, PT, DPT   Available via See[employer]  NPI # [id number]  [place] License - [id number]  9/18/2024

## 2024-09-18 NOTE — ASSESSMENT & PLAN NOTE
Admission in July 2024 for ANGELICA. 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 S/p PLEX.  Patient is anuric and dialysis-dependent  Tues/Thurs/Sat schedule  Access: Right IJ PermCath, Cath reevaluation  by IR on 9/3, no issues during HD. 9/4, issues with clotting, will needs re-evaluation @9.5. Requires Re-evaluation 9/6 IR, Fluid poor returns on 9/5. 9/6-bedside readjustment was made by IR, reclotted after 1.5 hours, Cathflo dwelling overnight. 9/7 - HD without issues . 9/8 -cannot achieve good flow.  9/9-IR procedure unavailable, facilitated conversation to IR and nephrology regarding plans for dialysis.  9/10 - catheter exchanged, no issue with hemodialysis. 9/12 - no issues noted with HD. 9/14 - HD complicated by symptomatic hypotension with concurrent chest pain, session was cut short, cardiac work up with troponin and no interval changes on EKG. 9/17 - HD poor return after 30 min, cathflo dweling overnight reattempt hemodialysis 9/18 - sluggish arterial flow. Will attempt heparin lock and reattempts 9/19    Plan:  Hemodialysis schedule per nephro   continue PTA cyclophosphamide  Continue PTA sevalemer  Continue PTA atovaquone for PJP prophylaxis  Started Lokelma per nephro  Continue prednisone taper from outpatient nephrology prior to this admission  On prednisone 15 mg   15 mg starting September 7 to September 20  12.5 mg starting September 21 to October 4  10 mg starting October 5 to October 18  7.5 mg starting October 19 to November 2  5 mg daily starting November 3

## 2024-09-18 NOTE — CASE MANAGEMENT
[facility] received request for authorization from Care Manager.  Authorization request submitted for: SNF  Facility Name: [facility] NPI: [id number]  Facility MD: Violet Weston NPI: [id number]  Authorization initiated by contacting insurance: [employer]   Via: H&CC Portal   Clinicals submitted via Portal attachment   Pending Reference #: [id number]     Care Manager notified: Melina Joshi     Updates to authorization status will be noted in chart. Please reach out to CM for updates on any clinical information.

## 2024-09-18 NOTE — ASSESSMENT & PLAN NOTE
Recent Labs     09/16/24  0532 09/17/24  0614   HGB 8.2* 8.0*      Baseline Hgb 7-8  Etiology: component of iron deficiency and renal disease.     Plan  Monitor Hgb, transfuse for < 7  Continue PTA Ferrex

## 2024-09-18 NOTE — ASSESSMENT & PLAN NOTE
-- Secondary to biopsy-proven anti-GBM disease.  Treated with cyclophosphamide prednisone and plasmapheresis but no renal recovery as of yet.  -- Continue dialysis TTS where she gets at [facility]

## 2024-09-18 NOTE — ASSESSMENT & PLAN NOTE
-acute kidney failure with rapidly progressive GN secondary to biopsy-proven anti-GBM disease-outpatient dialysis unit [facility] schedule.  Was started on dialysis July 11, 2024  - -recent admission with malfunctioning right IJ PermCath. PermCath exchanged last on August 12. And repositioning 9/10  - Will need close vascular surgery follow-up for outpatient planning for access placement     - Access-right IJ PermCath  - Given access issues, potential plan for urgent start PD if patient continues to have any issues with dialysis catheter    9/17/2024 - HD attempted but catheter flow issues. We gave cathflo. Had 30 mins of treatment  9/18/2024 - had HD make up session. Catheter working but sluggish arterial flow. K 5.9, HD today. 2 K diet. Cont lokelma     Plan  - Dialysis 9/18 - seen and examined on HD - diacap pro. Na 138, bicarb 40. UF 1-2 L as asha  - next HD tomorrow (TTS)  - will plan to hep lock dialysis catheter (poor data in literature on efficacy but given sig catheter issues, we are limited in further options)  - reviewed heparin lock plans with outpt HD RN Carley. They are aware and will aspirate heparin out of lock prior to initiating HD  - reviewed case with inpt HD RN  - Utilizing hypoallergenic filter-though it is unclear if the patient had a true dialyzer reaction as the patient received 2 intermittent treatments of dialysis 2 weeks ago with regular filter without issue  - hold RISHI today. Likely rstart tomorrow

## 2024-09-19 ENCOUNTER — APPOINTMENT (INPATIENT)
Dept: DIALYSIS | Facility: HOSPITAL | Age: 64
DRG: 286 | End: 2024-09-19
Attending: INTERNAL MEDICINE
Payer: COMMERCIAL

## 2024-09-19 VITALS
BODY MASS INDEX: 39.73 KG/M2 | HEART RATE: 72 BPM | RESPIRATION RATE: 18 BRPM | SYSTOLIC BLOOD PRESSURE: 122 MMHG | HEIGHT: 63 IN | WEIGHT: 224.21 LBS | OXYGEN SATURATION: 91 % | TEMPERATURE: 98.4 F | DIASTOLIC BLOOD PRESSURE: 63 MMHG

## 2024-09-19 LAB
ANION GAP SERPL CALCULATED.3IONS-SCNC: 9 MMOL/L (ref 4–13)
ATRIAL RATE: 66 BPM
BUN SERPL-MCNC: 42 MG/DL (ref 5–25)
CALCIUM SERPL-MCNC: 9.5 MG/DL (ref 8.4–10.2)
CHLORIDE SERPL-SCNC: 95 MMOL/L (ref 96–108)
CO2 SERPL-SCNC: 34 MMOL/L (ref 21–32)
CREAT SERPL-MCNC: 7.2 MG/DL (ref 0.6–1.3)
ERYTHROCYTE [DISTWIDTH] IN BLOOD BY AUTOMATED COUNT: 18.1 % (ref 11.6–15.1)
GFR SERPL CREATININE-BSD FRML MDRD: 5 ML/MIN/1.73SQ M
GLUCOSE SERPL-MCNC: 121 MG/DL (ref 65–140)
HCT VFR BLD AUTO: 25.6 % (ref 34.8–46.1)
HGB BLD-MCNC: 8.1 G/DL (ref 11.5–15.4)
MCH RBC QN AUTO: 30.1 PG (ref 26.8–34.3)
MCHC RBC AUTO-ENTMCNC: 31.6 G/DL (ref 31.4–37.4)
MCV RBC AUTO: 95 FL (ref 82–98)
PLATELET # BLD AUTO: 227 THOUSANDS/UL (ref 149–390)
PMV BLD AUTO: 10 FL (ref 8.9–12.7)
POTASSIUM SERPL-SCNC: 4.1 MMOL/L (ref 3.5–5.3)
PR INTERVAL: 178 MS
QRS AXIS: -27 DEGREES
QRSD INTERVAL: 160 MS
QT INTERVAL: 436 MS
QTC INTERVAL: 457 MS
RBC # BLD AUTO: 2.69 MILLION/UL (ref 3.81–5.12)
SCAN RESULT: NORMAL
SODIUM SERPL-SCNC: 138 MMOL/L (ref 135–147)
T WAVE AXIS: 152 DEGREES
VENTRICULAR RATE: 66 BPM
WBC # BLD AUTO: 7.34 THOUSAND/UL (ref 4.31–10.16)

## 2024-09-19 PROCEDURE — 85027 COMPLETE CBC AUTOMATED: CPT

## 2024-09-19 PROCEDURE — 80048 BASIC METABOLIC PNL TOTAL CA: CPT

## 2024-09-19 PROCEDURE — 99232 SBSQ HOSP IP/OBS MODERATE 35: CPT | Performed by: INTERNAL MEDICINE

## 2024-09-19 PROCEDURE — 99239 HOSP IP/OBS DSCHRG MGMT >30: CPT | Performed by: INTERNAL MEDICINE

## 2024-09-19 PROCEDURE — 93010 ELECTROCARDIOGRAM REPORT: CPT | Performed by: INTERNAL MEDICINE

## 2024-09-19 PROCEDURE — 93005 ELECTROCARDIOGRAM TRACING: CPT

## 2024-09-19 RX ORDER — ATOVAQUONE 750 MG/5ML
1500 SUSPENSION ORAL DAILY
Qty: 300 ML | Refills: 0 | Status: ON HOLD | OUTPATIENT
Start: 2024-09-19 | End: 2024-10-19

## 2024-09-19 RX ORDER — PREDNISONE 2.5 MG/1
TABLET ORAL
Status: ON HOLD
Start: 2024-09-20 | End: 2024-12-03

## 2024-09-19 RX ORDER — METOPROLOL SUCCINATE 50 MG/1
50 TABLET, EXTENDED RELEASE ORAL 2 TIMES DAILY
Start: 2024-09-19 | End: 2024-09-23

## 2024-09-19 RX ADMIN — POLYSACCHARIDE-IRON COMPLEX 150 MG: 150 CAPSULE ORAL at 13:29

## 2024-09-19 RX ADMIN — ALBUTEROL SULFATE 2 PUFF: 90 AEROSOL, METERED RESPIRATORY (INHALATION) at 13:37

## 2024-09-19 RX ADMIN — HEPARIN SODIUM 2000 UNITS: 1000 INJECTION INTRAVENOUS; SUBCUTANEOUS at 09:14

## 2024-09-19 RX ADMIN — HEPARIN SODIUM 2100 UNITS: 1000 INJECTION INTRAVENOUS; SUBCUTANEOUS at 12:35

## 2024-09-19 RX ADMIN — CYCLOPHOSPHAMIDE 100 MG: 50 CAPSULE ORAL at 13:32

## 2024-09-19 RX ADMIN — SEVELAMER HYDROCHLORIDE 1600 MG: 800 TABLET ORAL at 13:28

## 2024-09-19 RX ADMIN — LIDOCAINE 5% 1 PATCH: 700 PATCH TOPICAL at 13:39

## 2024-09-19 RX ADMIN — PANTOPRAZOLE SODIUM 40 MG: 40 TABLET, DELAYED RELEASE ORAL at 13:34

## 2024-09-19 RX ADMIN — HEPARIN SODIUM 5000 UNITS: 5000 INJECTION INTRAVENOUS; SUBCUTANEOUS at 00:00

## 2024-09-19 RX ADMIN — ATOVAQUONE 1500 MG: 750 SUSPENSION ORAL at 13:33

## 2024-09-19 RX ADMIN — PREDNISONE 15 MG: 10 TABLET ORAL at 13:28

## 2024-09-19 RX ADMIN — METOPROLOL SUCCINATE 50 MG: 50 TABLET, EXTENDED RELEASE ORAL at 17:08

## 2024-09-19 RX ADMIN — SEVELAMER HYDROCHLORIDE 1600 MG: 800 TABLET ORAL at 17:08

## 2024-09-19 RX ADMIN — ATORVASTATIN CALCIUM 20 MG: 20 TABLET, FILM COATED ORAL at 13:28

## 2024-09-19 RX ADMIN — MONTELUKAST 10 MG: 10 TABLET, FILM COATED ORAL at 13:28

## 2024-09-19 RX ADMIN — EPOETIN ALFA 6000 UNITS: 3000 SOLUTION INTRAVENOUS; SUBCUTANEOUS at 12:33

## 2024-09-19 RX ADMIN — FLUTICASONE FUROATE AND VILANTEROL TRIFENATATE 1 PUFF: 200; 25 POWDER RESPIRATORY (INHALATION) at 13:35

## 2024-09-19 RX ADMIN — VENLAFAXINE HYDROCHLORIDE 225 MG: 150 CAPSULE, EXTENDED RELEASE ORAL at 13:27

## 2024-09-19 NOTE — CASE MANAGEMENT
[facility] has received APPROVED authorization.  Insurance: [employer]   Received via [employer] Portal.   Authorization received for: SNF  Facility: [facility]  Authorization #: [id number]  H&CC Ref#: [id number]  Start of Care: 09/18  Next Review Date: 09/23  Submit next review to: [employer] Portal / F#: [phone]    Care Manager notified:    Please reach out to CM for updates on any clinical information.

## 2024-09-19 NOTE — DISCHARGE SUMMARY
Discharge Summary - Hospitalist   Name: Ngozi Beard 64 y.o. 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 S/p PLEX.  Patient is anuric and dialysis-dependent  Tues/Thurs/Sat schedule  Access: Right IJ PermCath, Cath reevaluation  by IR on 9/3, no issues during HD. 9/4, issues with clotting, will needs re-evaluation @9.5. Requires Re-evaluation 9/6 IR, Fluid poor returns on 9/5. 9/6-bedside readjustment was made by IR, reclotted after 1.5 hours, Cathflo dwelling overnight. 9/7 - HD without issues . 9/8 -cannot achieve good flow.  9/9-IR procedure unavailable, facilitated conversation to IR and nephrology regarding plans for dialysis.  9/10 - catheter exchanged, no issue with hemodialysis. 9/12 - no issues noted with HD. 9/14 - HD complicated by symptomatic hypotension with concurrent chest pain, session was cut short, cardiac work up with troponin and no interval changes on EKG. 9/17 - HD poor return after 30 min, cathflo dweling overnight reattempt hemodialysis 9/18 - sluggish arterial flow. Will attempt heparin lock and reattempts 9/19 am. 9/19 HD- without issues    Plan:  Upon discharge - please continue to follow up nephrology  Hemodialysis schedule per nephro   continue PTA cyclophosphamide  Continue PTA sevalemer  Continue PTA atovaquone for PJP prophylaxis  Started Lokelma per nephro  Continue prednisone taper from outpatient nephrology prior to this admission  On prednisone 15 mg   15 mg starting September 7 to September 20  12.5 mg starting September 21 to October 4  10 mg starting October 5 to October 18  7.5 mg starting October 19 to November 2  5 mg daily starting November 3    Multifocal pneumonia  -CT chest PE study revealed no evidence of  PE. There is persistent tree-in-bud nodularity in the lungs suggestive of a widespread infectious bronchiolitis that may be secondary to an atypical mycobacterium. Follicular bronchiolitis and acute hypersensitivity pneumonitis could also be in the differential.  New mild patchy bilateral multifocal pneumonia  - Sputum culture: 4+ Pseudomonas   - ID: Chronic colonization of pseudomonas, rather than acute infection. Finished Cefepime x7 d course ended 9/10 . Continue atovaquone indefinitely   - Cardiology: clearance for bronchoscopy  - Pulm: 9/11 Bronchoscopy under sedation, culture sent  - Nephrology: Abnormal chest imaging, possible contributing factors to her catheter dysfunction    Improvement in coughing and sputum production symptoms with addition of abx, suspect patient presentation partially contributed from bronchitis secondary to pseudomonas.     Bronchoscopy results so far: Gram stain: 2- 3+ polys, no organisms,Pneuocysis PCR negative, Viral culture preliminary results are negative, AFB culture and stain - not seen. Bronchial culture and gram stain - mixed resp izzy. Fungal culture - no growth to date. TB PCR - negative  Plan:   -Bronchoscopy results can follow up on these remaining results  follow up on  - as of 9/16   Legionella - in process    Plan   Upon discharge please continue to follow with PCP and pulmonology    Cardiomyopathy (HCC)  August 31-EF of 25% with global hypokinesis.  No prior echo for comparison.    Heart Cath 9/17 - no evidence of obstructive CAD. Nonischemic cardiomyopathy    .    Plan   Upon discharge please continue to take Toprol 50 mg twice a day  Please follow-up with your cardiologist and discuss about cardiac MRI    Asthma without acute exacerbation  Mild Bilateral wheezing was noted in the lower lung base upon initial presentation.  Does not require frequent inhaler use as an outpatient setting.  Low suspicion for exacerbation for the clinical symptoms presented during this  hospital on admission    Plan  Continue Breo daily  Continue albuterol q4 prn  Continue montelukast qhs  Generalized weakness  Multifactorial, respiratory failure secondary to chronic pulmonary infection, volume overload, chronic anemia, chronic deconditioning from being ill  PT OT level II    Anemia  Recent Labs     09/17/24  0614 09/18/24  0655 09/19/24  0911   HGB 8.0* 8.2* 8.1*      Baseline Hgb 7-8  Etiology: component of iron deficiency and renal disease.     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      Unspecified mood (affective) disorder (HCC)  Please see problem bipolar 1 disorder  Complication associated with dialysis catheter  See problem and assessment and plan for above  Chest pain  Troponin -wnl   EKG 9/14 - left bundle branch block, normal sinus rhythm, no interval changes     Likely multifactorial, being hypotensive during HD session and underlying anxiety order.  Hyperkalemia  C/w Lokelma   C/w HD per schedule      Acute renal failure on dialysis (HCC)       Medical Problems       Resolved Problems  Date Reviewed: 9/8/2024            Resolved    Morbid obesity with BMI of 45.0-49.9, adult ([facility]) 9/2/2024     Resolved by  Kathryn Cleveland MD    Shortness of breath 9/13/2024     Resolved by  Ata Burns MD    Hyperkalemia 9/1/2024     Resolved by  Todd Dickens MD    SIRS (systemic inflammatory response syndrome) ([facility]) 9/5/2024     Resolved by  Kathryn Cleveland MD    Elevated  transaminase level 9/5/2024     Resolved by  Kathryn Cleveland MD        Discharging Physician / Practitioner: Kathryn Cleveland MD  PCP: Meenakshi Balbuena MD  Admission Date:   Admission Orders (From admission, onward)       Ordered        08/30/24 2140  INPATIENT ADMISSION  Once                          Discharge Date: 09/19/24    Consultations During Hospital Stay:  Nephrology  Cardiology  Interventional radiology  Pulmonology  Infectious disease    Procedures Performed:   Bronchoscopy      Significant Findings / Test Results:   Echo - EF 25 %  Heart Cath 9/17 - no evidence of obstructive CAD. Nonischemic cardiomyopathy    Incidental Findings:   Multifocal pneumonia   I reviewed the above mentioned incidental findings with the patient and/or family and they expressed understanding.    Test Results Pending at Discharge (will require follow up):   None     Outpatient Tests Requested:  Cardiac MRI     Complications:  right IJ PermCath poor return requiring multiple readjustment    Reason for Admission: shortness of breath    Hospital Course:   Ngozi Beard is a 64 y.o. female patient who originally presented to the hospital on 8/30/2024 due to shortness of breath    Hospital Course: This is a 64-year-old presented upon to this admission for shortness of breath that has been ongoing for 2 weeks.  Has been progressively worsening within the past week.  With clarification , patient missed 1 session of dialysis.  Upon first evaluation, patient presented with tachycardia, and requiring 2 L oxygen to maintain saturation.  She received a DuoNeb and Solu-Medrol 80 mg.  She was found to have leukocytosis and elevated Pro-Bennett and D-dimer. Her hemoglobin was found to be in the 6.9, she was then given 1 unit of blood. Chest x-ray demonstrated vascular and interstitial markings indicative of volume overload. Subsequently she was ordered to have CTA scan.  Her CTA PE demonstrated tree-in-bud nodularity suggestive of bronchiolitis. Her echo  found to have EF of 25%.  She underwent hemodialysis and any noted mild improvement of her symptoms.  She was then evaluated by cardiology, nephrology and pulmonology.  Her sputum culture returned positive for Pseudomonas.  The results was thought to be chronic colonization.  Upon discussion, patient was initiated with a course of cefepime x 7 days.  Her hospitalization was complicated by malfunctioning of her HD port, requiring multiple adjustment from the interventional radiologist.  Pulmonology decided to proceed with bronchoscopy to investigate possible underlying causes for her port malfunctioning with her ongoing coughing. Bronchoscopy results was unremarkable. Patient remained stable after the procedure. Patient was monitored during hospitalization. Barriers for her discharge was her port frequent malfunctioning.     Upon discharge patient will need to follow-up with cardiology for future cardiac imaging and potential heart catheterization for newly found EF of 25%.  Patient will need to follow-up with pulmonology to discuss pending bronchoscopy results and repeat CT imaging in 3 months  Patient will need to follow-up with nephrology during her scheduled hemodialysis session.    Please see above list of diagnoses and related plan for additional information.     Condition at Discharge: fair    Discharge Day Visit / Exam:   * Please refer to separate progress note for these details *    Discussion with Family:  Will update family member.     Discharge instructions/Information to patient and family:   See after visit summary for information provided to patient and family.      Provisions for Follow-Up Care:  See after visit summary for information related to follow-up care and any pertinent home health orders.      Mobility at time of Discharge:   Basic Mobility Inpatient Raw Score: 19  JH-HLM Goal: 6: Walk 10 steps or more  JH-HLM Achieved: 6: Walk 10 steps or more  HLM Goal achieved. Continue to encourage  appropriate mobility.     Disposition:   Other Skilled Nursing Facility at [facility]    Planned Readmission: none    Discharge Medications:  See after visit summary for reconciled discharge medications provided to patient and/or family.      Administrative Statements   Discharge Statement:  I have spent a total time of 45 minutes in caring for this patient on the day of the visit/encounter. >30 minutes of time was spent on: Counseling / Coordination of care, Documenting in the medical record, and Communicating with other healthcare professionals .    **Please Note: This note may have been constructed using a voice recognition system**

## 2024-09-19 NOTE — ASSESSMENT & PLAN NOTE
-HD 9/18   - no labs 9/19 per pt request to not have needle stick. We can draw on HD if pt agrees. Cont HD

## 2024-09-19 NOTE — ASSESSMENT & PLAN NOTE
-CT chest PE study revealed no evidence of PE. There is persistent tree-in-bud nodularity in the lungs suggestive of a widespread infectious bronchiolitis that may be secondary to an atypical mycobacterium. Follicular bronchiolitis and acute hypersensitivity pneumonitis could also be in the differential.  New mild patchy bilateral multifocal pneumonia  - Sputum culture: 4+ Pseudomonas   - ID: Chronic colonization of pseudomonas, rather than acute infection. Finished Cefepime x7 d course ended 9/10 . Continue atovaquone indefinitely   - Cardiology: clearance for bronchoscopy  - Pulm: 9/11 Bronchoscopy under sedation, culture sent  - Nephrology: Abnormal chest imaging, possible contributing factors to her catheter dysfunction    Improvement in coughing and sputum production symptoms with addition of abx, suspect patient presentation partially contributed from bronchitis secondary to pseudomonas.     Bronchoscopy results so far: Gram stain: 2- 3+ polys, no organisms,Pneuocysis PCR negative, Viral culture preliminary results are negative, AFB culture and stain - not seen. Bronchial culture and gram stain - mixed resp izzy. Fungal culture - no growth to date. TB PCR - negative  Plan:   -Bronchoscopy results can follow up on these remaining results  follow up on  - as of 9/16   Legionella - in process

## 2024-09-19 NOTE — CASE MANAGEMENT
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)   Patient Active Problem List    Diagnosis Date Noted Date Diagnosed    Hyperkalemia 2024     Chest pain 2024     Unspecified mood (affective) disorder ([facility]) 2024     Acute renal failure on dialysis (HCC) 2024     Complication associated with dialysis catheter 2024     Cardiomyopathy ([facility]) 2024     Multifocal pneumonia 2024     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 ([facility]) 2024     Generalized weakness 2024     Rash 2024     Anemia 2024     Thrombocytopenia ([facility]) 2024     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     Abnormality of ascending aorta 2024     Postmenopausal 2024     Encounter for screening mammogram for malignant neoplasm of breast 2024     Allergic rhinitis 2024     Persistent cough 2024     Urge incontinence of urine 2024     Exercise intolerance 2024     Family history of coronary artery bypass graft surgery 2024     Urge urinary incontinence 2024     Female stress incontinence 2024     Paranoid schizophrenia ([facility]) 2023     Asthma without acute exacerbation 2023     Asthma due to seasonal allergies 2023     Bipolar 1 disorder ([facility]) 2022     Primary hypertension 2022     Dyslipidemia 2022       LOS (days): 20  Geometric Mean LOS (GMLOS) (days): 8.8  Days to GMLOS:-10.9     OBJECTIVE:  Risk of Unplanned Readmission Score: 42.68         Current admission status: Inpatient   Preferred Pharmacy:   [facility]/pharmacy #[id number]  [place] - [address] [address]  Phone: [phone]  Fax: [phone]    [facility] - [place], [place] - [address]  [address]  [address] [place] [address]  Phone: [phone] Fax: [phone]    Primary Care Provider: Meenakshi Balbuena MD    Primary Insurance: [facility]  Secondary Insurance: [facility]    DISCHARGE DETAILS:                                                                                                               Facility Insurance Auth Number: [id number]

## 2024-09-19 NOTE — ASSESSMENT & PLAN NOTE
-acute kidney failure with rapidly progressive GN secondary to biopsy-proven anti-GBM disease-outpatient dialysis unit [facility] TTS schedule.  Was started on dialysis July 11, 2024  - -recent admission with malfunctioning right IJ PermCath. PermCath exchanged last on August 12. And repositioning 9/10  - Will need close vascular surgery follow-up for outpatient planning for access placement     - Access-right IJ PermCath  - Given access issues, potential plan for urgent start PD if patient continues to have any issues with dialysis catheter    9/17/2024 - HD attempted but catheter flow issues. We gave cathflo. Had 30 mins of treatment  9/18/2024 - had HD make up session. Catheter working but sluggish arterial flow. K 5.9, HD today. 2 K diet. Cont lokelma     Plan  - dialysis today -   - HD TTS  - will plan to hep lock dialysis catheter   - reviewed heparin lock plans with outpt HD RN Carley on 9/18. They are aware and will aspirate heparin out of lock prior to initiating HD  - Utilizing hypoallergenic filter-though it is unclear if the patient had a true dialyzer reaction as the patient received 2 intermittent treatments of dialysis 2 weeks ago with regular filter without issue

## 2024-09-19 NOTE — NURSING NOTE
Pt refused blood work this morning and removed her IV. At this time she is refusing to have it replaced.

## 2024-09-19 NOTE — PROGRESS NOTES
Progress Note - Hospitalist   Name: Ngozi Beard 64 y.o. 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 S/p PLEX.  Patient is anuric and dialysis-dependent  Tues/Thurs/Sat schedule  Access: Right IJ PermCath, Cath reevaluation  by IR on 9/3, no issues during HD. 9/4, issues with clotting, will needs re-evaluation @9.5. Requires Re-evaluation 9/6 IR, Fluid poor returns on 9/5. 9/6-bedside readjustment was made by IR, reclotted after 1.5 hours, Cathflo dwelling overnight. 9/7 - HD without issues . 9/8 -cannot achieve good flow.  9/9-IR procedure unavailable, facilitated conversation to IR and nephrology regarding plans for dialysis.  9/10 - catheter exchanged, no issue with hemodialysis. 9/12 - no issues noted with HD. 9/14 - HD complicated by symptomatic hypotension with concurrent chest pain, session was cut short, cardiac work up with troponin and no interval changes on EKG. 9/17 - HD poor return after 30 min, cathflo dweling overnight reattempt hemodialysis 9/18 - sluggish arterial flow. Will attempt heparin lock and reattempts 9/19 am. 9/19 HD- without issues    Plan:  Hemodialysis schedule per nephro   continue PTA cyclophosphamide  Continue PTA sevalemer  Continue PTA atovaquone for PJP prophylaxis  Started Lokelma per nephro  Continue prednisone taper from outpatient nephrology prior to this admission  On prednisone 15 mg   15 mg starting September 7 to September 20  12.5 mg starting September 21 to October 4  10 mg starting October 5 to October 18  7.5 mg starting October 19 to November 2  5 mg daily starting November 3    Multifocal pneumonia  -CT chest PE study revealed no evidence of PE. There is persistent tree-in-bud nodularity in the lungs  suggestive of a widespread infectious bronchiolitis that may be secondary to an atypical mycobacterium. Follicular bronchiolitis and acute hypersensitivity pneumonitis could also be in the differential.  New mild patchy bilateral multifocal pneumonia  - Sputum culture: 4+ Pseudomonas   - ID: Chronic colonization of pseudomonas, rather than acute infection. Finished Cefepime x7 d course ended 9/10 . Continue atovaquone indefinitely   - Cardiology: clearance for bronchoscopy  - Pulm: 9/11 Bronchoscopy under sedation, culture sent  - Nephrology: Abnormal chest imaging, possible contributing factors to her catheter dysfunction    Improvement in coughing and sputum production symptoms with addition of abx, suspect patient presentation partially contributed from bronchitis secondary to pseudomonas.     Bronchoscopy results so far: Gram stain: 2- 3+ polys, no organisms,Pneuocysis PCR negative, Viral culture preliminary results are negative, AFB culture and stain - not seen. Bronchial culture and gram stain - mixed resp izzy. Fungal culture - no growth to date. TB PCR - negative  Plan:   -Bronchoscopy results can follow up on these remaining results  follow up on  - as of 9/16   Legionella - in process    Cardiomyopathy (HCC)  August 31-EF of 25% with global hypokinesis.  No prior echo for comparison.    Heart Cath 9/17 - no evidence of obstructive CAD. Nonischemic cardiomyopathy    .    Plan   GDMT recommended, continue Toprol 50 mg daily, Losartan 25 mg daily  9/4 - Nifedipine held per Cardiology  9/9 Losartan held per nephro   Will need Cardiac MRI  Dc with [facility]      Asthma without acute exacerbation  Mild Bilateral wheezing was noted in the lower lung base upon initial presentation.  Does not require frequent inhaler use as an outpatient setting.  Low suspicion for exacerbation for the clinical symptoms presented during this hospital on admission    Plan  Continue Breo daily  Continue albuterol q4 prn  Continue  montelukast qhs  Generalized weakness  Multifactorial, respiratory failure secondary to chronic pulmonary infection, volume overload, chronic anemia, chronic deconditioning from being ill  PT OT level II    Anemia  Recent Labs     09/17/24  0614 09/18/24  0655   HGB 8.0* 8.2*      Baseline Hgb 7-8  Etiology: component of iron deficiency and renal disease.     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      Unspecified mood (affective) disorder (HCC)  Please see problem bipolar 1 disorder  Complication associated with dialysis catheter  See problem and assessment and plan for above  Chest pain  Troponin -wnl   EKG 9/14 - left bundle branch block, normal sinus rhythm, no interval changes     Likely multifactorial, being hypotensive during HD session and underlying anxiety order.  Hyperkalemia  C/w Lokelma   C/w HD per schedule      Acute renal failure on dialysis (HCC)    Current Length of Stay: 20 day(s)  Current Patient Status: Inpatient   Code Status: Level 1 - Full Code      Discharge Plan: Discharge Plan:   Expected date of discharge:   Dispo destination: [facility]  Indwelling drains/lines: Tunnel Cath  DME needs: n/a  HH needs: n/a   F/U appts: Nephrology/ Cardiology cardiac mri, cath/ Pulm bronchoscopy,   Preferred pharmacy: on file  Refills:  Transportation: facility    Subjective:   Overnight: No acute events over night     Complaints: No complaints.     Patient denies Headache, Fever, Chills, Chest Pain, Shortness of breath, Nausea, or Vomiting,  Abdominal Pain, Diarrhea, Swellings, new or worsening anxiety or depression    Diet: Diet Renal; Potassium 2 GM; Yes; Fluid Restriction 1800 ML; No   Bowel regimen:   Last BM Date: 24   VTE Pharmacologic Prophylaxis: VTE Score: 7 High Risk (Score >/= 5) - Pharmacological DVT Prophylaxis Ordered: heparin. Sequential Compression Devices Ordered.    Objective:    Vitals:   Temp (24hrs), Av.6 °F (36.4 °C), Min:97.2 °F (36.2 °C), Max:97.9 °F (36.6 °C)    Temp:  [97.2 °F (36.2 °C)-97.9 °F (36.6 °C)] 97.9 °F (36.6 °C)  HR:  [60-72] 70  Resp:  [18-20] 18  BP: (100-122)/(43-68) 100/59  SpO2:  [94 %-97 %] 95 %  Input and Output Summary (last 24 hours):     Intake/Output Summary (Last 24 hours) at 2024 0651  Last data filed at 2024 0600  Gross per 24 hour   Intake 1020 ml   Output 1500 ml   Net -480 ml     Physical Exam:   Physical Exam  Constitutional:       General: She is not in acute distress.     Appearance: She is ill-appearing.   Cardiovascular:      Rate and Rhythm: Normal rate and regular rhythm.      Comments: No peripheral edema  Pulmonary:      Effort: Pulmonary effort is normal.      Breath sounds: Normal breath sounds.      Comments: RA    Abdominal:      Palpations: Abdomen is soft.   Musculoskeletal:      Cervical back: Neck supple.   Skin:     General: Skin is warm.      Capillary Refill: Capillary refill takes less than 2 seconds.   Neurological:      Mental Status: She is alert.        Additional Data:   Labs:  Results from last 7 days   Lab Units 24  0655 24  0614 24  0532   WBC Thousand/uL 8.89   < > 8.89   HEMOGLOBIN g/dL 8.2*   < > 8.2*   HEMATOCRIT % 25.7*   < > 26.9*   PLATELETS Thousands/uL 225   < > 214   SEGS PCT %  --   --  70   LYMPHO PCT %  --   --  16   MONO PCT %  --   --  10   EOS PCT %  --   --  2    < > = values in this interval not displayed.     Results from last 7 days   Lab Units 24  0558 09/15/24  0537 24  1229   SODIUM mmol/L 135   < >  134*   POTASSIUM mmol/L 5.9*   < > 3.8   CHLORIDE mmol/L 99   < > 92*   CO2 mmol/L 23   < > 33*   BUN mg/dL 79*   < > 23   CREATININE mg/dL 10.72*   < > 4.13*   ANION GAP mmol/L 13   < > 9   CALCIUM mg/dL 9.1   < > 8.8   ALBUMIN g/dL  --   --  3.6   TOTAL BILIRUBIN mg/dL  --   --  0.47   ALK PHOS U/L  --   --  65   ALT U/L  --   --  6*   AST U/L  --   --  7*   GLUCOSE RANDOM mg/dL 72   < > 123    < > = values in this interval not displayed.                 Results from last 7 days   Lab Units 09/14/24  1229   LACTIC ACID mmol/L 1.0       Recent Cultures (last 7 days):  I have reviewed the following results: CBC, BMP, and all the lab results till date        Imaging:   Imaging Review: I have reviewed all the imaging on file  Other Studies: I have reviewed all other studies till date  Cardiac catheterization  Nl epicardial Coronary arteries  Mildly elevated LVEDP  Large PL vein present      Mobility:   Basic Mobility Inpatient Raw Score: 19  -[facility] Goal: 6: Walk 10 steps or more  -[facility] Achieved: 6: Walk 10 steps or more      Last 24 Hours Medication List:   Current Facility-Administered Medications   Medication Dose Route Frequency Provider Last Rate    acetaminophen  650 mg Oral Q6H PRN Robert Gilbert MD      albuterol  2 puff Inhalation Q4H PRN Robert Gilbert MD      atorvastatin  20 mg Oral Daily Robert Gilbert MD      atovaquone  1,500 mg Oral Daily With Breakfast Robert Gilbert MD      bisacodyl  5 mg Oral Once Robert Gilbert MD      cyclophosphamide  100 mg Oral Daily Robert Gilbert MD      dextromethorphan-guaiFENesin  10 mL Oral Q4H PRN Robert Gilbert MD      epoetin shavonne  6,000 Units Intravenous After Dialysis Robert Gilbert MD      fluticasone-vilanterol  1 puff Inhalation Daily Robert Gilbert MD      heparin (porcine)  2,000 Units Intravenous Before Dialysis Vane Estrada MD      heparin (porcine)  2,100 Units  Intravenous After Dialysis Vane Estrada MD      heparin (porcine)  2,200 Units Intravenous After Dialysis Vane Estrada MD      heparin (porcine)  5,000 Units Subcutaneous Q8H [facility] Robert Gilbert MD      iron polysaccharides  150 mg Oral Daily Robert Gilbert MD      lamoTRIgine  100 mg Oral HS Robert Gilbert MD      lidocaine  1 patch Topical Daily Robert Gilbert MD      metoprolol succinate  50 mg Oral BID Robert Gilbert MD      montelukast  10 mg Oral Daily Robert Gilbert MD      pantoprazole  40 mg Oral Daily Before Breakfast Robert Gilbert MD      polyethylene glycol  17 g Oral Daily Robert Gilbert MD      predniSONE  15 mg Oral Daily Robert Gilbert MD      senna-docusate sodium  2 tablet Oral HS Robert Gilbert MD      sevelamer  1,600 mg Oral TID With Meals Robert Gilbert MD      Sodium Zirconium Cyclosilicate  10 g Oral Daily Robert Gilbert MD      traZODone  200 mg Oral HS Robert Gilbert MD      trimethobenzamide  200 mg Intramuscular Q6H PRN Robert Gilbert MD      vancomycin   Intravenous Continuous PRN Gerber Fletcher MD      venlafaxine  225 mg Oral Daily Robert Gilbert MD         Lines/Drains:  Invasive Devices       Peripheral Intravenous Line  Duration             Peripheral IV 09/17/24 Dorsal (posterior);Left Hand 1 day              Hemodialysis Catheter  Duration             HD Permanent Double Catheter 8 days                      Telemetry:  Telemetry Orders (From admission, onward)               24 Hour Telemetry Monitoring  Continuous x 24 Hours (Telem)        Question:  Reason for 24 Hour Telemetry  Answer:  Decompensated CHF- and any one of the following: continuous diuretic infusion or total diuretic dose >200 mg daily, associated electrolyte derangement (I.e. K < 3.0), ionotropic drip (continuous infusion), hx of ventricular  arrhythmia, or new EF < 35%                     Telemetry Reviewed: Normal Sinus Rhythm  Indication for Continued Telemetry Use: Acute CHF on >200 mg lasix/day or equivalent dose or with new reduced EF.              Patient Centered Rounds: I performed bedside rounds with nursing staff today.  Discussions with Specialists or Other Care Team Provider: Nursing  Education and Discussions with Family / Patient: Will update patient's point of contact if given permission     Today, Patient Was Seen By: Kathryn Cleveland MD  Administrative Statements   Today, Patient Was Seen By: Kathryn Cleveland MD  I have spent a total time of 35 minutes in caring for this patient on the day of the visit/encounter including Diagnostic results, Patient and family education, Impressions, Counseling / Coordination of care, Documenting in the medical record, Reviewing / ordering tests, medicine, procedures  , Obtaining or reviewing history  , and Communicating with other healthcare professionals .    **Please Note: This note may have been constructed using a voice recognition system.**

## 2024-09-19 NOTE — ASSESSMENT & PLAN NOTE
August 31-EF of 25% with global hypokinesis.  No prior echo for comparison.    Heart Cath 9/17 - no evidence of obstructive CAD. Nonischemic cardiomyopathy    .    Plan   Upon discharge please continue to take Toprol 50 mg twice a day  Please follow-up with your cardiologist and discuss about cardiac MRI

## 2024-09-19 NOTE — CASE MANAGEMENT
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)   Patient Active Problem List    Diagnosis Date Noted Date Diagnosed    Hyperkalemia 2024     Chest pain 2024     Unspecified mood (affective) disorder ([facility]) 2024     Acute renal failure on dialysis (HCC) 2024     Complication associated with dialysis catheter 2024     Cardiomyopathy ([facility]) 2024     Multifocal pneumonia 2024     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 ([facility]) 2024     Generalized weakness 2024     Rash 2024     Anemia 2024     Thrombocytopenia ([facility]) 2024     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     Abnormality of ascending aorta 2024     Postmenopausal 2024     Encounter for screening mammogram for malignant neoplasm of breast 2024     Allergic rhinitis 2024     Persistent cough 2024     Urge incontinence of urine 2024     Exercise intolerance 2024     Family history of coronary artery bypass graft surgery 2024     Urge urinary incontinence 2024     Female stress incontinence 2024     Paranoid schizophrenia ([facility]) 2023     Asthma without acute exacerbation 2023     Asthma due to seasonal allergies 2023     Bipolar 1 disorder ([facility]) 2022     Primary hypertension 2022     Dyslipidemia 2022       LOS (days): 20  Geometric Mean LOS (GMLOS) (days): 8.8  Days to GMLOS:-10.9     OBJECTIVE:  Risk of Unplanned Readmission Score: 42.68         Current admission status: Inpatient   Preferred Pharmacy:   [facility]/pharmacy #[id number]  [place] - [address] [address]  Phone: [phone]  Fax: [phone]    [facility] - [place], [place] - [address]  [address]  [address]  [facility] [address]  Phone: [phone] Fax: [phone]    Primary Care Provider: Meenakshi Balbuena MD    Primary Insurance: [employer] [facility]  Secondary Insurance: [facility]    DISCHARGE DETAILS:      Treatment Team Recommendation: Short Term Rehab  Discharge Destination Plan:: Short Term Rehab       IMM Given (Date):: 09/19/24  IMM Given to:: Patient      Accepting Facility Name, City & State : [facility]  Receiving Facility/Agency Phone Number: [phone]  Facility/Agency Fax Number: [phone]         Per SHARITAIM, Pt is medically ready for discharge. Approved Insurance auth received.     Pt is aware and in agreement with her discharge to [facility].   left for Pts sister, Jacquelny informing her of the same.  Nessa marley requested in roundtrip.     RN and SLIM provider aware.

## 2024-09-19 NOTE — ASSESSMENT & PLAN NOTE
Admission in July 2024 for ANGELICA. 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 S/p PLEX.  Patient is anuric and dialysis-dependent  Tues/Thurs/Sat schedule  Access: Right IJ PermCath, Cath reevaluation  by IR on 9/3, no issues during HD. 9/4, issues with clotting, will needs re-evaluation @9.5. Requires Re-evaluation 9/6 IR, Fluid poor returns on 9/5. 9/6-bedside readjustment was made by IR, reclotted after 1.5 hours, Cathflo dwelling overnight. 9/7 - HD without issues . 9/8 -cannot achieve good flow.  9/9-IR procedure unavailable, facilitated conversation to IR and nephrology regarding plans for dialysis.  9/10 - catheter exchanged, no issue with hemodialysis. 9/12 - no issues noted with HD. 9/14 - HD complicated by symptomatic hypotension with concurrent chest pain, session was cut short, cardiac work up with troponin and no interval changes on EKG. 9/17 - HD poor return after 30 min, cathflo dweling overnight reattempt hemodialysis 9/18 - sluggish arterial flow. Will attempt heparin lock and reattempts 9/19 am. 9/19 HD- without issues    Plan:  Upon discharge - please continue to follow up nephrology  Hemodialysis schedule per nephro   continue PTA cyclophosphamide  Continue PTA sevalemer  Continue PTA atovaquone for PJP prophylaxis  Started Lokelma per nephro  Continue prednisone taper from outpatient nephrology prior to this admission  On prednisone 15 mg   15 mg starting September 7 to September 20  12.5 mg starting September 21 to October 4  10 mg starting October 5 to October 18  7.5 mg starting October 19 to November 2  5 mg daily starting November 3

## 2024-09-19 NOTE — ASSESSMENT & PLAN NOTE
-CT chest PE study revealed no evidence of PE. There is persistent tree-in-bud nodularity in the lungs suggestive of a widespread infectious bronchiolitis that may be secondary to an atypical mycobacterium. Follicular bronchiolitis and acute hypersensitivity pneumonitis could also be in the differential.  New mild patchy bilateral multifocal pneumonia  - Sputum culture: 4+ Pseudomonas   - ID: Chronic colonization of pseudomonas, rather than acute infection. Finished Cefepime x7 d course ended 9/10 . Continue atovaquone indefinitely   - Cardiology: clearance for bronchoscopy  - Pulm: 9/11 Bronchoscopy under sedation, culture sent  - Nephrology: Abnormal chest imaging, possible contributing factors to her catheter dysfunction    Improvement in coughing and sputum production symptoms with addition of abx, suspect patient presentation partially contributed from bronchitis secondary to pseudomonas.     Bronchoscopy results so far: Gram stain: 2- 3+ polys, no organisms,Pneuocysis PCR negative, Viral culture preliminary results are negative, AFB culture and stain - not seen. Bronchial culture and gram stain - mixed resp izzy. Fungal culture - no growth to date. TB PCR - negative  Plan:   -Bronchoscopy results can follow up on these remaining results  follow up on  - as of 9/16   Legionella - in process    Plan   Upon discharge please continue to follow with PCP and pulmonology

## 2024-09-19 NOTE — ASSESSMENT & PLAN NOTE
Recent Labs     09/17/24  0614 09/18/24  0655   HGB 8.0* 8.2*      Baseline Hgb 7-8  Etiology: component of iron deficiency and renal disease.     Plan  Monitor Hgb, transfuse for < 7  Continue PTA Ferrex

## 2024-09-19 NOTE — ASSESSMENT & PLAN NOTE
-- Secondary to biopsy-proven anti-GBM disease.  Treated with cyclophosphamide prednisone and plasmapheresis but no renal recovery as of yet.  -- Continue dialysis TTS where she gets at [facility]

## 2024-09-19 NOTE — ASSESSMENT & PLAN NOTE
Continue volume control with dialysis  Start Toprol 25 mg daily    Plan   Upon discharge please continue to follow-up with nephrology and family medicine to discuss about management

## 2024-09-19 NOTE — PLAN OF CARE
Post-Dialysis RN Treatment Note    Blood Pressure:  Pre:  114/65 mm/Hg  Post: 122/63 mmHg   EDW:  102.0 kg    Weight:  Pre:  102.0 kg   Post:  100.0 kg   Mode of weight measurement: Standing Scale   Volume Removed:  2000 ml    Treatment duration:  210 minutes    NS given:  No    Treatment shortened? No   Medications given during Rx:  Epogen and Heparin   Estimated Kt/V:  Not Applicable   Access type:  Permacath/TDC   Access Issues:  Yes, describe: Lines reversed     Report called to primary nurse:   Candy Foss    Problem: METABOLIC, FLUID AND ELECTROLYTES - ADULT  Goal: Electrolytes maintained within normal limits  Description: INTERVENTIONS:  - Monitor labs and assess patient for signs and symptoms of electrolyte imbalances  - Administer electrolyte replacement as ordered  - Monitor response to electrolyte replacements, including repeat lab results as appropriate  - Instruct patient on fluid and nutrition as appropriate  Outcome: Progressing  Goal: Fluid balance maintained  Description: INTERVENTIONS:  - Monitor labs   - Monitor I/O and WT  - Instruct patient on fluid and nutrition as appropriate  - Assess for signs & symptoms of volume excess or deficit  Outcome: Progressing     Problem: HEMATOLOGIC - ADULT  Goal: Maintains hematologic stability  Description: INTERVENTIONS  - Assess for signs and symptoms of bleeding or hemorrhage  - Monitor labs  - Administer supportive blood products/factors as ordered and appropriate  Outcome: Progressing

## 2024-09-19 NOTE — ASSESSMENT & PLAN NOTE
Admission in July 2024 for ANGELICA. 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 S/p PLEX.  Patient is anuric and dialysis-dependent  Tues/Thurs/Sat schedule  Access: Right IJ PermCath, Cath reevaluation  by IR on 9/3, no issues during HD. 9/4, issues with clotting, will needs re-evaluation @9.5. Requires Re-evaluation 9/6 IR, Fluid poor returns on 9/5. 9/6-bedside readjustment was made by IR, reclotted after 1.5 hours, Cathflo dwelling overnight. 9/7 - HD without issues . 9/8 -cannot achieve good flow.  9/9-IR procedure unavailable, facilitated conversation to IR and nephrology regarding plans for dialysis.  9/10 - catheter exchanged, no issue with hemodialysis. 9/12 - no issues noted with HD. 9/14 - HD complicated by symptomatic hypotension with concurrent chest pain, session was cut short, cardiac work up with troponin and no interval changes on EKG. 9/17 - HD poor return after 30 min, cathflo dweling overnight reattempt hemodialysis 9/18 - sluggish arterial flow. Will attempt heparin lock and reattempts 9/19 am. 9/19 HD- without issues    Plan:  Hemodialysis schedule per nephro   continue PTA cyclophosphamide  Continue PTA sevalemer  Continue PTA atovaquone for PJP prophylaxis  Started Lokelma per nephro  Continue prednisone taper from outpatient nephrology prior to this admission  On prednisone 15 mg   15 mg starting September 7 to September 20  12.5 mg starting September 21 to October 4  10 mg starting October 5 to October 18  7.5 mg starting October 19 to November 2  5 mg daily starting November 3

## 2024-09-19 NOTE — ASSESSMENT & PLAN NOTE
Recent Labs     09/17/24  0614 09/18/24  0655 09/19/24  0911   HGB 8.0* 8.2* 8.1*      Baseline Hgb 7-8  Etiology: component of iron deficiency and renal disease.     Plan  Upon discharge, please continue to follow-up with nephrology

## 2024-09-20 NOTE — UTILIZATION REVIEW
NOTIFICATION OF ADMISSION DISCHARGE   This is a Notification of Discharge from [facility]. Please be advised that this patient has been discharge from our facility. Below you will find the admission and discharge date and time including the patient’s disposition.   UTILIZATION REVIEW CONTACT:  Kelli Fabian  Utilization   Network Utilization Review Department  Phone: [phone] x carefully listen to the prompts. All voicemails are confidential.  Email: [email or web address]     ADMISSION INFORMATION  PRESENTATION DATE: 8/30/2024  8:01 PM  OBERVATION ADMISSION DATE: N/A  INPATIENT ADMISSION DATE: 8/30/24  9:40 PM   DISCHARGE DATE: 9/19/2024  5:54 PM   [facility]    Network Utilization Review Department  ATTENTION: Please call with any questions or concerns to [phone] and carefully listen to the prompts so that you are directed to the right person. All voicemails are confidential.   For Discharge needs, contact Care Management DC Support Team at [phone] opt. [phone]  Send all requests for admission clinical reviews, approved or denied determinations and any other requests to dedicated fax number below belonging to the campus where the patient is receiving treatment. List of dedicated fax numbers for the Facilities:  FACILITY NAME UR FAX NUMBER   ADMISSION DENIALS (Administrative/Medical Necessity) [phone]   DISCHARGE SUPPORT TEAM ([facility]) [phone]   PARENT CHILD HEALTH (Maternity/NICU/Pediatrics) [phone]   [facility] [phone]   [facility] [phone]   [facility] [phone]   [facility] [phone]   [facility] [phone]   [facility] [phone]   [facility] [phone]   [facility]  [phone]   [facility] [phone]   Critical access hospital [phone]   [facility] [phone]   [facility] [phone]

## 2024-09-22 DIAGNOSIS — I10 ESSENTIAL (PRIMARY) HYPERTENSION: ICD-10-CM

## 2024-09-23 ENCOUNTER — TELEPHONE (OUTPATIENT)
Age: 64
End: 2024-09-23

## 2024-09-23 ENCOUNTER — TELEPHONE (OUTPATIENT)
Dept: VASCULAR SURGERY | Facility: CLINIC | Age: 64
End: 2024-09-23

## 2024-09-23 ENCOUNTER — PATIENT OUTREACH (OUTPATIENT)
Dept: CASE MANAGEMENT | Facility: OTHER | Age: 64
End: 2024-09-23

## 2024-09-23 PROBLEM — Z99.2 STAGE 5 CHRONIC KIDNEY DISEASE ON CHRONIC DIALYSIS (HCC): Status: ACTIVE | Noted: 2024-09-23

## 2024-09-23 PROBLEM — N18.6 STAGE 5 CHRONIC KIDNEY DISEASE ON CHRONIC DIALYSIS (HCC): Status: ACTIVE | Noted: 2024-09-23

## 2024-09-23 PROBLEM — I50.20 HFREF (HEART FAILURE WITH REDUCED EJECTION FRACTION) (HCC): Status: ACTIVE | Noted: 2024-09-23

## 2024-09-23 LAB — FUNGUS SPEC CULT: NORMAL

## 2024-09-23 RX ORDER — METOPROLOL TARTRATE 50 MG
50 TABLET ORAL 2 TIMES DAILY
Qty: 180 TABLET | Refills: 1 | Status: SHIPPED | OUTPATIENT
Start: 2024-09-23 | End: 2024-09-25

## 2024-09-23 NOTE — PROGRESS NOTES
Outpatient care management referral via HRR report 9/20/24. Discharged to [facility] 9/19/24. Email sent to facility to inform them I will be following the patient during their skilled stay.  This Admin Coordinator will continue to monitor via chart review.

## 2024-09-23 NOTE — ASSESSMENT & PLAN NOTE
Lab Results   Component Value Date    LDLCALC 79 05/09/2024   -stable on Atoravastatin 20 mg daily

## 2024-09-23 NOTE — ASSESSMENT & PLAN NOTE
Wt Readings from Last 3 Encounters:   09/19/24 102 kg (224 lb 3.3 oz)   08/10/24 118 kg (259 lb 0.7 oz)   08/04/24 112 kg (247 lb 5.7 oz)     -newly diagnosed  -etiology unclear. No CAD. Multifocal PNA on admit ?stress myopathy. Possible contribution from dyssynchrony from underlying LBBB  -warm, compensated on exam. Continue beta blocker as below. Volume management via HD.   -consideration for CMR to further evaluate etiology.  -continue 2 g na diet with 2 L fluid restriction.   -daily standing weights.     -[facility] 9/17/24: no obstructive CAD      Neurohormonal Blockade:  --Beta-Blocker: Metop succinate  50 mg BID  --ACEi, ARB or ARNi:  --Aldosterone Receptor Blocker:  --Diuretic:NA    Sudden Cardiac Death Risk Reduction:  --ICD: Wearing LifeVest    Electrical Resynchronization:  --Candidacy for BiV device:    Advanced Therapies:  --Inotrope:  --LVAD/Transplant Candidacy:

## 2024-09-23 NOTE — ASSESSMENT & PLAN NOTE
Lab Results   Component Value Date    EGFR 5 09/19/2024    EGFR 3 09/18/2024    EGFR 3 09/17/2024    CREATININE 7.20 (H) 09/19/2024    CREATININE 10.72 (H) 09/18/2024    CREATININE 10.23 (H) 09/17/2024   -HD T/Th/Sat

## 2024-09-23 NOTE — ASSESSMENT & PLAN NOTE
Lab Results   Component Value Date    EGFR 5 09/19/2024    EGFR 3 09/18/2024    EGFR 3 09/17/2024    CREATININE 7.20 (H) 09/19/2024    CREATININE 10.72 (H) 09/18/2024    CREATININE 10.23 (H) 09/17/2024     Lab Results   Component Value Date    HGB 8.1 (L) 09/19/2024

## 2024-09-23 NOTE — PROGRESS NOTES
Heart Failure Outpatient Progress Note - Ngozi Beard 64 y.o. 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 ([facility]) 08/31/2024    Generalized weakness 08/31/2024    Rash 08/01/2024    Anemia 07/27/2024    Thrombocytopenia ([facility]) 07/27/2024    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 07/19/2024    Abnormality of ascending aorta 05/01/2024    Postmenopausal 05/01/2024    Encounter for screening mammogram for malignant neoplasm of breast 05/01/2024    Allergic rhinitis 05/01/2024    Persistent cough 05/01/2024    Urge incontinence of urine 05/01/2024    Exercise intolerance 05/01/2024    Family history of coronary artery bypass graft surgery 05/01/2024    Urge urinary incontinence 05/01/2024    Female stress incontinence 05/01/2024    Paranoid schizophrenia ([facility]) 02/23/2023    Asthma without acute exacerbation 02/23/2023    Asthma due to seasonal allergies 02/23/2023    Bipolar 1 disorder ([facility]) 01/12/2022    Primary hypertension 01/12/2022    Dyslipidemia 01/12/2022     Assessment & Plan  HFrEF (heart failure with reduced ejection fraction) ([facility])  Wt Readings from Last 3 Encounters:   09/19/24 102 kg (224 lb 3.3 oz)   08/10/24 118 kg (259 lb 0.7 oz)   08/04/24 112 kg (247 lb 5.7 oz)     -newly diagnosed  -etiology unclear. No CAD. Multifocal PNA on admit ?stress myopathy. Possible contribution from dyssynchrony from underlying LBBB  -warm, compensated on exam. Continue beta blocker as below. Volume management via HD.   -consideration for CMR to further evaluate  etiology.  -continue 2 g na diet with 2 L fluid restriction.   -daily standing weights.     -[facility] 9/17/24: no obstructive CAD      Neurohormonal Blockade:  --Beta-Blocker: Metop succinate  50 mg BID  --ACEi, ARB or ARNi:  --Aldosterone Receptor Blocker:  --Diuretic:NA    Sudden Cardiac Death Risk Reduction:  --ICD: Wearing LifeVest    Electrical Resynchronization:  --Candidacy for BiV device:    Advanced Therapies:  --Inotrope:  --LVAD/Transplant Candidacy:    Primary hypertension  -controlled on regimen as above.   Multifocal pneumonia  -resolving.   Stage 5 chronic kidney disease on chronic dialysis ([facility])  Lab Results   Component Value Date    EGFR 5 09/19/2024    EGFR 3 09/18/2024    EGFR 3 09/17/2024    CREATININE 7.20 (H) 09/19/2024    CREATININE 10.72 (H) 09/18/2024    CREATININE 10.23 (H) 09/17/2024   -HD T/Th/Sat  Anemia due to chronic kidney disease, on chronic dialysis ([facility])  Lab Results   Component Value Date    EGFR 5 09/19/2024    EGFR 3 09/18/2024    EGFR 3 09/17/2024    CREATININE 7.20 (H) 09/19/2024    CREATININE 10.72 (H) 09/18/2024    CREATININE 10.23 (H) 09/17/2024     Lab Results   Component Value Date    HGB 8.1 (L) 09/19/2024       Dyslipidemia  Lab Results   Component Value Date    LDLCALC 79 05/09/2024   -stable on Atoravastatin 20 mg daily    Paranoid schizophrenia (HCC)      HPI:   64-year-old female with past medical history as described above who presents for hospital follow-up.  Recently had prolonged admission at the [facility] from 8/30/2024 through 9/19/2024.  Presented with worsening shortness of breath x 2 weeks after missing only 1 session of dialysis.  Also found to be anemic with hemoglobin of 6.9 requiring transfusion with 1 unit of PRBCs.  Diagnosed with multifocal pneumonia that came back positive for Pseudomonas.  An echocardiogram was done which showed a new drop in her LV function with an EF of 25%.  No other prior studies were available for comparison.  She underwent  left heart cath which showed no obstructive disease.  Discharged home only on metoprolol tartrate 50 mg twice daily.  No other GDMT prescribed presumably due to persistent hypotension during dialysis.Feels ok today. Still tired from prolonged admission. Tolerating HD sessions without difficulty. Gets a little SOB with PT but otherwise no other subjective cardiovascular symptoms. Denies orthopnea or PND. No CP, palpitations, Lh/dizziness or syncope.     Past Medical History:   Diagnosis Date    Asthma     Chronic pain     Hypertension     Renal disorder     Sepsis (HCC) 07/10/2024    Shortness of breath 08/11/2024       12 point ROS negative other than that stated in HPI    Allergies   Allergen Reactions    Penicillins Hives     Reaction Date: 24May2005; Annotation - [id number]: meena mcdonald    Amoxicillin Rash    Aspirin Rash    Bactrim [Sulfamethoxazole-Trimethoprim] Rash    Ibuprofen Rash    Morphine Rash    Oxycodone Rash    Valacyclovir Rash     .    Current Outpatient Medications:     Advair -21 MCG/ACT inhaler, Inhale 2 puffs 2 (two) times a day Rinse mouth after use., Disp: 12 g, Rfl: 0    albuterol (PROVENTIL HFA,VENTOLIN HFA) 90 mcg/act inhaler, INHALE 2 PUFFS BY MOUTH EVERY 4 HOURS AS NEEDED FOR SHORTNESS OF BREATH, Disp: 18 g, Rfl: 5    atorvastatin (LIPITOR) 20 mg tablet, TAKE 1 TABLET BY MOUTH EVERY DAY, Disp: 90 tablet, Rfl: 1    atovaquone (MEPRON) 750 mg/5 mL suspension, Take 10 mL (1,500 mg total) by mouth daily, Disp: 300 mL, Rfl: 0    calcium carbonate (OYSTER SHELL,OSCAL) 500 mg, Take 1 tablet by mouth daily with breakfast, Disp: 30 tablet, Rfl: 0    cyclophosphamide (CYTOXAN) 50 mg capsule, Take 2 capsules (100 mg total) by mouth daily, Disp: 60 capsule, Rfl: 0    iron polysaccharides (FERREX) 150 mg capsule, Take 1 capsule (150 mg total) by mouth daily, Disp: 30 capsule, Rfl: 0    lamoTRIgine (LaMICtal) 100 mg tablet, Take 100 mg by mouth daily at bedtime, Disp: , Rfl:     metoprolol tartrate  (LOPRESSOR) 50 mg tablet, TAKE 1 TABLET BY MOUTH TWICE A DAY, Disp: 180 tablet, Rfl: 1    montelukast (SINGULAIR) 10 mg tablet, TAKE 1 TABLET BY MOUTH EVERY DAY, Disp: 90 tablet, Rfl: 1    ondansetron (ZOFRAN) 4 mg tablet, Take 1 tablet (4 mg total) by mouth every 8 (eight) hours as needed for nausea or vomiting, Disp: 20 tablet, Rfl: 0    pantoprazole (PROTONIX) 40 mg tablet, Take 1 tablet (40 mg total) by mouth daily before breakfast, Disp: 30 tablet, Rfl: 0    predniSONE 2.5 mg tablet, Take 6 tablets (15 mg total) by mouth daily for 1 day, THEN 5 tablets (12.5 mg total) daily for 14 days, THEN 4 tablets (10 mg total) daily for 14 days, THEN 3 tablets (7.5 mg total) daily for 15 days, THEN 2 tablets (5 mg total) daily., Disp: , Rfl:     senna (SENOKOT) 8.6 mg, Take 2 tablets (17.2 mg total) by mouth 2 (two) times a day Use as needed constipation, Disp: 30 tablet, Rfl: 0    sevelamer (RENAGEL) 800 mg tablet, Take 2 tablets (1,600 mg total) by mouth 3 (three) times a day with meals, Disp: 90 tablet, Rfl: 0    Sodium Zirconium Cyclosilicate (Lokelma) 10 g, Take 1 packet (10 g total) by mouth daily, Disp: , Rfl:     traZODone (DESYREL) 100 mg tablet, Take 200 mg by mouth daily at bedtime, Disp: , Rfl:     venlafaxine (EFFEXOR-XR) 150 mg 24 hr capsule, TAKE 1 CAPSULE BY MOUTH DAILY WITH BREAKFAST., Disp: 30 capsule, Rfl: 0    venlafaxine (EFFEXOR-XR) 75 mg 24 hr capsule, Take 75 mg by mouth daily Pt states she takes 150mg and 75mg effexor. Daily. For a totoal of 225mg, Disp: , Rfl:     Social History     Socioeconomic History    Marital status: /Civil Union     Spouse name: Not on file    Number of children: Not on file    Years of education: Not on file    Highest education level: Not on file   Occupational History    Not on file   Tobacco Use    Smoking status: Former     Current packs/day: 0.00     Average packs/day: 1 pack/day for 15.0 years (15.0 ttl pk-yrs)     Types: Cigarettes     Start date: 1973      Quit date: 1988     Years since quittin.7    Smokeless tobacco: Never   Vaping Use    Vaping status: Never Used   Substance and Sexual Activity    Alcohol use: No     Comment: hX:Occas.     Drug use: No    Sexual activity: Not Currently   Other Topics Concern    Not on file   Social History Narrative    Most recent tobacco use screenin2018    Alcohol intake: Occasional    Last modified by [id number]    2019,      Social Determinants of Health     Financial Resource Strain: Not on file   Food Insecurity: No Food Insecurity (2024)    Hunger Vital Sign     Worried About Running Out of Food in the Last Year: Never true     Ran Out of Food in the Last Year: Never true   Transportation Needs: No Transportation Needs (2024)    PRAPARE - Transportation     Lack of Transportation (Medical): No     Lack of Transportation (Non-Medical): No   Physical Activity: Not on file   Stress: Not on file   Social Connections: Unknown (2023)    [facility]    Social Connection and Isolation Panel [NHANES]     Frequency of Communication with Friends and Family: Patient declined     Frequency of Social Gatherings with Friends and Family: Patient declined     Attends Jew Services: Patient declined     Active Member of Clubs or Organizations: Patient declined     Attends Club or Organization Meetings: Patient declined     Marital Status:    Intimate Partner Violence: Unknown (1/3/2023)    [facility]    Intimate Partner Violence     Within the last year, have you been afraid of your partner, ex-partner or family member?: Not on file     Within the last year, have you been humiliated or emotionally abused in other ways by your partner, ex-partner or family member?: Not on file     Within the last year, have you been kicked, hit, slapped, or otherwise physically hurt by your partner,  "ex-partner or family member?: Not on file     Within the last year, have you been raped or forced to have any kind of sexual activity by your partner, ex-partner or family member?: Not on file   Housing Stability: Low Risk  (9/2/2024)    Housing Stability Vital Sign     Unable to Pay for Housing in the Last Year: No     Number of Times Moved in the Last Year: 0     Homeless in the Last Year: No       Family History   Problem Relation Age of Onset    Diabetes Mother     Hypertension Mother     Lung cancer Mother     Colon cancer Mother     Colon cancer Father     Hypertension Sister     Multiple sclerosis Sister     Hypothyroidism Maternal Aunt        Physical Exam:    Vitals:/72 (BP Location: Left arm, Patient Position: Sitting, Cuff Size: Standard)   Pulse 80   Ht 5' 3\" (1.6 m)   Wt 103 kg (226 lb 11.2 oz)   SpO2 98%   BMI 40.16 kg/m²     Wt Readings from Last 3 Encounters:   09/19/24 102 kg (224 lb 3.3 oz)   08/10/24 118 kg (259 lb 0.7 oz)   08/04/24 112 kg (247 lb 5.7 oz)       GEN: Ngozi Beard appears well, alert and oriented x 3, pleasant and cooperative   HEENT: pupils equal, round, and reactive to light; extraocular muscles intact  NECK: supple, no carotid bruits   HEART: regular rhythm, normal S1 and S2, no murmurs, clicks, gallops or rubs, JVP is flat   LUNGS: clear to auscultation bilaterally; no wheezes, rales, or rhonchi   ABDOMEN: normal bowel sounds, soft, no tenderness, no distention  EXTREMITIES: peripheral pulses normal; no clubbing, cyanosis, or edema  NEURO: no focal findings   SKIN: normal without suspicious lesions on exposed skin    Labs & Results:  Lab Results   Component Value Date    SODIUM 138 09/19/2024    K 4.1 09/19/2024    CL 95 (L) 09/19/2024    CO2 34 (H) 09/19/2024    AGAP 9 09/19/2024    BUN 42 (H) 09/19/2024    CREATININE 7.20 (H) 09/19/2024    GLUC 121 09/19/2024    GLUF 101 (H) 07/09/2024    CALCIUM 9.5 09/19/2024    AST 7 (L) 09/14/2024    ALT 6 (L) 09/14/2024 "    ALKPHOS 65 09/14/2024    TP 6.1 (L) 09/14/2024    TBILI 0.47 09/14/2024    EGFR 5 09/19/2024     Lab Results   Component Value Date    WBC 7.34 09/19/2024    HGB 8.1 (L) 09/19/2024    HCT 25.6 (L) 09/19/2024    MCV 95 09/19/2024     09/19/2024     Lab Results   Component Value Date    BNP 3,019 (H) 08/30/2024      Lab Results   Component Value Date    CFG0EKOJAQMC 1.704 01/03/2023     Lab Results   Component Value Date    LDLCALC 79 05/09/2024     Lab Results   Component Value Date    HGBA1C 6.6 (H) 07/09/2024         EKG personally reviewed by JEB Butler.   No results found for this visit on 09/25/24.     Counseling / Coordination of Care  Total face to face time spent with patient 20 minutes.  An additional 10 minutes was spent for chart/data review and visit preparation.       Thank you for the opportunity to participate in the care of this patient.    JEB Butler

## 2024-09-23 NOTE — TELEPHONE ENCOUNTER
LVM with pt to call me back ASAP.         Thanks    Erasmo JALLOH    From: Lissette Rivas <[email or web address]  Sent: Friday, September 20, 2024 10:48 AM  To: Jessie Gonzalez <[email or web address]>; Kerwin England <[email or web address]>; Shira Galeas <[email or web address]>; Lindsey Ibrahim <[email or web address]>; Drew Lennon <[email or web address]>; Sandoval Richmond <[email or web address]>  Cc: Uzma Guallpa <[email or web address]>; KIRA SEVER <[email or web address]>; Mahsa Quevedo <[email or web address]>; Jorge Luis Alfaro <[email or web address]>; Devon Yanez <[email or web address]  Subject: [EXTERNAL] Masoud Toribio pt CS needs appointments please         WARNING! Based upon the critical issue with [employer] targeting Healthcare systems ALL attachments and links from this email should be heavily scrutinized.    Hi Schedulers,         Masoud Toribio pt, Ngozi Beard, is ready to start dialysis access planning.    Nephrologist - Dr. Ata Burns placed an order in [facility] for vein mapping and vascular consult on 9/14.    HD schedule is TTS - 2nd shift with chair time approximately 10-2p.    Please contact pt to schedule these appointments.         Pt details:    Ngozi Beard    64 year old female    1960         [address] [place] [address]  [address] [facility] [address] [phone] (M)         Thank you,    Lissette

## 2024-09-23 NOTE — TELEPHONE ENCOUNTER
Emma from [facility] calling she said patient is there for short term rehab and she asks if patient is to be on a fluid restriction.  She reports she is a dialysis patient, she will call dialysis center.

## 2024-09-24 ENCOUNTER — PREP FOR PROCEDURE (OUTPATIENT)
Dept: INTERVENTIONAL RADIOLOGY/VASCULAR | Facility: CLINIC | Age: 64
End: 2024-09-24

## 2024-09-24 DIAGNOSIS — N18.6 ESRD (END STAGE RENAL DISEASE) (HCC): Primary | ICD-10-CM

## 2024-09-25 ENCOUNTER — TELEPHONE (OUTPATIENT)
Age: 64
End: 2024-09-25

## 2024-09-25 ENCOUNTER — TELEPHONE (OUTPATIENT)
Dept: RADIOLOGY | Facility: HOSPITAL | Age: 64
End: 2024-09-25

## 2024-09-25 ENCOUNTER — OFFICE VISIT (OUTPATIENT)
Dept: CARDIOLOGY CLINIC | Facility: CLINIC | Age: 64
End: 2024-09-25
Payer: COMMERCIAL

## 2024-09-25 VITALS
SYSTOLIC BLOOD PRESSURE: 114 MMHG | DIASTOLIC BLOOD PRESSURE: 72 MMHG | HEIGHT: 63 IN | BODY MASS INDEX: 40.17 KG/M2 | WEIGHT: 226.7 LBS | HEART RATE: 80 BPM | OXYGEN SATURATION: 98 %

## 2024-09-25 DIAGNOSIS — F20.0 PARANOID SCHIZOPHRENIA (HCC): ICD-10-CM

## 2024-09-25 DIAGNOSIS — N18.6 STAGE 5 CHRONIC KIDNEY DISEASE ON CHRONIC DIALYSIS (HCC): ICD-10-CM

## 2024-09-25 DIAGNOSIS — Z99.2 ANEMIA DUE TO CHRONIC KIDNEY DISEASE, ON CHRONIC DIALYSIS (HCC): ICD-10-CM

## 2024-09-25 DIAGNOSIS — D63.1 ANEMIA DUE TO CHRONIC KIDNEY DISEASE, ON CHRONIC DIALYSIS (HCC): ICD-10-CM

## 2024-09-25 DIAGNOSIS — I50.20 HFREF (HEART FAILURE WITH REDUCED EJECTION FRACTION) (HCC): Primary | ICD-10-CM

## 2024-09-25 DIAGNOSIS — N18.6 ANEMIA DUE TO CHRONIC KIDNEY DISEASE, ON CHRONIC DIALYSIS (HCC): ICD-10-CM

## 2024-09-25 DIAGNOSIS — I10 PRIMARY HYPERTENSION: ICD-10-CM

## 2024-09-25 DIAGNOSIS — J18.9 MULTIFOCAL PNEUMONIA: ICD-10-CM

## 2024-09-25 DIAGNOSIS — Z99.2 STAGE 5 CHRONIC KIDNEY DISEASE ON CHRONIC DIALYSIS (HCC): ICD-10-CM

## 2024-09-25 DIAGNOSIS — E78.5 DYSLIPIDEMIA: ICD-10-CM

## 2024-09-25 PROCEDURE — 99214 OFFICE O/P EST MOD 30 MIN: CPT | Performed by: NURSE PRACTITIONER

## 2024-09-25 RX ORDER — METOPROLOL SUCCINATE 50 MG/1
50 TABLET, EXTENDED RELEASE ORAL 2 TIMES DAILY
Status: ON HOLD | COMMUNITY

## 2024-09-25 NOTE — TELEPHONE ENCOUNTER
Deandre calling to r/s pt's apt as she has dialysis on 10/3. Before I could reschedule, phone line disconnected. No avail apts with provider until nov, offer next avail with fellow in [place]

## 2024-09-25 NOTE — TELEPHONE ENCOUNTER
Patient called back to let us know that she cannot arrive earlier to her visit today but she will be in as scheduled for today. Thanks    The left ankle is better. OK  To go home.

## 2024-09-25 NOTE — PATIENT INSTRUCTIONS
Weigh yourself daily  If you gain 3 lbs in one day or 5 lbs in one week, please call the office at [phone] and ask for a nurse or the heart failure nurse  Keep your sodium intake to <2 grams, (2000 mg) per day, and fluids <2 Liters (2000 ml) per day. This is around 6-7, 8 oz glasses of fluid per day

## 2024-09-25 NOTE — PRE-PROCEDURE INSTRUCTIONS
Pre-procedure Instructions for Interventional Radiology  [address] [address]  INTERVENTIONAL RADIOLOGY [phone]    You are scheduled for a/an Permacath Exchange possible Intervention.    On Friday 9/27/24.    Your tentative arrival time is 9:30 AM.  [facility] will notify you the day before your procedure with the exact arrival time and the location to arrive.    To prepare for your procedure:  Please arrange for someone to drive you home after the procedure and stay with you until the next morning if you are instructed to do so.  This is typically for patients receiving some type of sedative or anesthetic for the procedure.  DO NOT EAT OR DRINK ANYTHING after midnight on the evening before your procedure including candy & gum.  ONLY SIPS OF WATER with your medications are allowed on the morning of your procedure.  TAKE ALL OF YOUR REGULAR MEDICATIONS THE MORNING OF YOUR PROCEDURE with sips of water!  We may call you to stop some of your blood sugar, blood pressure and blood thinning medications depending on the procedure.  Please take all of these medications unless we instruct you to stop them.  If you have an allergy to x-ray dye, please contact Interventional Radiology for an x-ray dye preparation which usually consists of an oral steroid and Benadryl.    The day of your procedure:  Bring a list of the medications you take at home.  Bring medications you take for breathing problems (such as inhalers), medications for chest pain, or both.  Bring a case for your glasses or contacts.  Bring your insurance card and a form of photo ID.  Please leave all valuables such as credit cards and jewelry at home.  Report to the admitting office to the left of the registration desk in the main lobby at the [facility], Entrance B.  You will then be directed to the Short Stay Center.  While your procedure is being performed, your family may wait in the Radiology Waiting  Room on the 1st floor in Radiology.  if they need to leave, they may provide a number to be called following the procedure.   Be prepared to stay overnight just in case. Sometimes procedures will indicate the need for further observation or treatment.   If you are scheduled for a follow-up visit with the Interventional Radiologist after your procedure, you will be called with a date and time.    Special Instructions (Medications to stop taking before your procedure etc.):  Van transport from [facility]. Above reviewed with Emma PIÑA at [facility] ([phone]).

## 2024-09-27 ENCOUNTER — HOSPITAL ENCOUNTER (INPATIENT)
Dept: RADIOLOGY | Facility: HOSPITAL | Age: 64
LOS: 6 days | Discharge: NURSING, REHAB OR LONG-TERM CARE | End: 2024-10-03
Attending: INTERNAL MEDICINE | Admitting: FAMILY MEDICINE
Payer: COMMERCIAL

## 2024-09-27 DIAGNOSIS — N18.6 STAGE 5 CHRONIC KIDNEY DISEASE ON CHRONIC DIALYSIS (HCC): ICD-10-CM

## 2024-09-27 DIAGNOSIS — I82.90 THROMBUS: Primary | ICD-10-CM

## 2024-09-27 DIAGNOSIS — Z99.2 STAGE 5 CHRONIC KIDNEY DISEASE ON CHRONIC DIALYSIS (HCC): ICD-10-CM

## 2024-09-27 DIAGNOSIS — T82.9XXA COMPLICATION ASSOCIATED WITH DIALYSIS CATHETER: ICD-10-CM

## 2024-09-27 DIAGNOSIS — N18.6 ESRD (END STAGE RENAL DISEASE) (HCC): ICD-10-CM

## 2024-09-27 LAB
APTT PPP: 20 SECONDS (ref 23–34)
ERYTHROCYTE [DISTWIDTH] IN BLOOD BY AUTOMATED COUNT: 18.8 % (ref 11.6–15.1)
HCT VFR BLD AUTO: 31.8 % (ref 34.8–46.1)
HGB BLD-MCNC: 9.9 G/DL (ref 11.5–15.4)
INR PPP: 0.96 (ref 0.85–1.19)
MCH RBC QN AUTO: 31.4 PG (ref 26.8–34.3)
MCHC RBC AUTO-ENTMCNC: 31.1 G/DL (ref 31.4–37.4)
MCV RBC AUTO: 101 FL (ref 82–98)
PLATELET # BLD AUTO: 289 THOUSANDS/UL (ref 149–390)
PMV BLD AUTO: 10.3 FL (ref 8.9–12.7)
PROTHROMBIN TIME: 13.1 SECONDS (ref 12.3–15)
RBC # BLD AUTO: 3.15 MILLION/UL (ref 3.81–5.12)
WBC # BLD AUTO: 7.77 THOUSAND/UL (ref 4.31–10.16)

## 2024-09-27 PROCEDURE — 36581 REPLACE TUNNELED CV CATH: CPT | Performed by: RADIOLOGY

## 2024-09-27 PROCEDURE — C1750 CATH, HEMODIALYSIS,LONG-TERM: HCPCS

## 2024-09-27 PROCEDURE — 85027 COMPLETE CBC AUTOMATED: CPT | Performed by: FAMILY MEDICINE

## 2024-09-27 PROCEDURE — 37799 UNLISTED PX VASCULAR SURGERY: CPT | Performed by: RADIOLOGY

## 2024-09-27 PROCEDURE — 99223 1ST HOSP IP/OBS HIGH 75: CPT | Performed by: FAMILY MEDICINE

## 2024-09-27 PROCEDURE — 77001 FLUOROGUIDE FOR VEIN DEVICE: CPT

## 2024-09-27 PROCEDURE — NC001 PR NO CHARGE: Performed by: RADIOLOGY

## 2024-09-27 PROCEDURE — 87081 CULTURE SCREEN ONLY: CPT | Performed by: INTERNAL MEDICINE

## 2024-09-27 PROCEDURE — 85730 THROMBOPLASTIN TIME PARTIAL: CPT | Performed by: FAMILY MEDICINE

## 2024-09-27 PROCEDURE — 85610 PROTHROMBIN TIME: CPT | Performed by: FAMILY MEDICINE

## 2024-09-27 PROCEDURE — 77001 FLUOROGUIDE FOR VEIN DEVICE: CPT | Performed by: RADIOLOGY

## 2024-09-27 PROCEDURE — C1769 GUIDE WIRE: HCPCS

## 2024-09-27 PROCEDURE — 02HV33Z INSERTION OF INFUSION DEVICE INTO SUPERIOR VENA CAVA, PERCUTANEOUS APPROACH: ICD-10-PCS | Performed by: RADIOLOGY

## 2024-09-27 RX ORDER — SEVELAMER HYDROCHLORIDE 800 MG/1
1600 TABLET, FILM COATED ORAL
Status: DISCONTINUED | OUTPATIENT
Start: 2024-09-27 | End: 2024-10-03 | Stop reason: HOSPADM

## 2024-09-27 RX ORDER — HEPARIN SODIUM 1000 [USP'U]/ML
8000 INJECTION, SOLUTION INTRAVENOUS; SUBCUTANEOUS EVERY 6 HOURS PRN
Status: DISCONTINUED | OUTPATIENT
Start: 2024-09-27 | End: 2024-10-01

## 2024-09-27 RX ORDER — METOPROLOL SUCCINATE 50 MG/1
50 TABLET, EXTENDED RELEASE ORAL 2 TIMES DAILY
Status: DISCONTINUED | OUTPATIENT
Start: 2024-09-27 | End: 2024-10-03 | Stop reason: HOSPADM

## 2024-09-27 RX ORDER — TRAZODONE HYDROCHLORIDE 100 MG/1
100 TABLET ORAL
Status: DISCONTINUED | OUTPATIENT
Start: 2024-09-27 | End: 2024-09-28

## 2024-09-27 RX ORDER — ACETAMINOPHEN 325 MG/1
650 TABLET ORAL EVERY 4 HOURS PRN
Status: DISCONTINUED | OUTPATIENT
Start: 2024-09-27 | End: 2024-10-03 | Stop reason: HOSPADM

## 2024-09-27 RX ORDER — CEFAZOLIN SODIUM 2 G/50ML
SOLUTION INTRAVENOUS
Status: COMPLETED | OUTPATIENT
Start: 2024-09-27 | End: 2024-09-27

## 2024-09-27 RX ORDER — IRON POLYSACCHARIDE COMPLEX 150 MG
150 CAPSULE ORAL DAILY
Status: DISCONTINUED | OUTPATIENT
Start: 2024-09-28 | End: 2024-10-03 | Stop reason: HOSPADM

## 2024-09-27 RX ORDER — MIDAZOLAM HYDROCHLORIDE 2 MG/2ML
INJECTION, SOLUTION INTRAMUSCULAR; INTRAVENOUS AS NEEDED
Status: COMPLETED | OUTPATIENT
Start: 2024-09-27 | End: 2024-09-27

## 2024-09-27 RX ORDER — PREDNISONE 5 MG/1
5 TABLET ORAL DAILY
Status: DISCONTINUED | OUTPATIENT
Start: 2024-11-03 | End: 2024-10-03 | Stop reason: HOSPADM

## 2024-09-27 RX ORDER — MONTELUKAST SODIUM 10 MG/1
10 TABLET ORAL DAILY
Status: DISCONTINUED | OUTPATIENT
Start: 2024-09-28 | End: 2024-10-03 | Stop reason: HOSPADM

## 2024-09-27 RX ORDER — VENLAFAXINE HYDROCHLORIDE 150 MG/1
150 CAPSULE, EXTENDED RELEASE ORAL DAILY
Status: DISCONTINUED | OUTPATIENT
Start: 2024-09-28 | End: 2024-10-03 | Stop reason: HOSPADM

## 2024-09-27 RX ORDER — ATORVASTATIN CALCIUM 20 MG/1
20 TABLET, FILM COATED ORAL DAILY
Status: DISCONTINUED | OUTPATIENT
Start: 2024-09-28 | End: 2024-10-03 | Stop reason: HOSPADM

## 2024-09-27 RX ORDER — PANTOPRAZOLE SODIUM 40 MG/1
40 TABLET, DELAYED RELEASE ORAL
Status: DISCONTINUED | OUTPATIENT
Start: 2024-09-28 | End: 2024-10-03 | Stop reason: HOSPADM

## 2024-09-27 RX ORDER — HEPARIN SODIUM 1000 [USP'U]/ML
8000 INJECTION, SOLUTION INTRAVENOUS; SUBCUTANEOUS ONCE
Status: COMPLETED | OUTPATIENT
Start: 2024-09-27 | End: 2024-09-27

## 2024-09-27 RX ORDER — CALCIUM CARBONATE 500(1250)
1 TABLET ORAL
Status: DISCONTINUED | OUTPATIENT
Start: 2024-09-28 | End: 2024-10-03 | Stop reason: HOSPADM

## 2024-09-27 RX ORDER — FLUTICASONE FUROATE AND VILANTEROL 100; 25 UG/1; UG/1
1 POWDER RESPIRATORY (INHALATION)
Status: DISCONTINUED | OUTPATIENT
Start: 2024-09-28 | End: 2024-10-03 | Stop reason: HOSPADM

## 2024-09-27 RX ORDER — PREDNISONE 10 MG/1
10 TABLET ORAL DAILY
Status: DISCONTINUED | OUTPATIENT
Start: 2024-10-05 | End: 2024-10-03 | Stop reason: HOSPADM

## 2024-09-27 RX ORDER — ONDANSETRON 4 MG/1
4 TABLET, ORALLY DISINTEGRATING ORAL EVERY 4 HOURS PRN
Status: DISCONTINUED | OUTPATIENT
Start: 2024-09-27 | End: 2024-10-03 | Stop reason: HOSPADM

## 2024-09-27 RX ORDER — ATOVAQUONE 750 MG/5ML
1500 SUSPENSION ORAL DAILY
Status: DISCONTINUED | OUTPATIENT
Start: 2024-09-28 | End: 2024-10-03 | Stop reason: HOSPADM

## 2024-09-27 RX ORDER — CYCLOPHOSPHAMIDE 50 MG/1
100 CAPSULE ORAL DAILY
Status: DISCONTINUED | OUTPATIENT
Start: 2024-09-28 | End: 2024-10-03 | Stop reason: HOSPADM

## 2024-09-27 RX ORDER — VENLAFAXINE HYDROCHLORIDE 75 MG/1
75 CAPSULE, EXTENDED RELEASE ORAL DAILY
Status: DISCONTINUED | OUTPATIENT
Start: 2024-09-28 | End: 2024-10-03 | Stop reason: HOSPADM

## 2024-09-27 RX ORDER — LANOLIN ALCOHOL/MO/W.PET/CERES
6 CREAM (GRAM) TOPICAL
Status: DISCONTINUED | OUTPATIENT
Start: 2024-09-27 | End: 2024-10-03 | Stop reason: HOSPADM

## 2024-09-27 RX ORDER — HEPARIN SODIUM 10000 [USP'U]/100ML
3-30 INJECTION, SOLUTION INTRAVENOUS
Status: DISCONTINUED | OUTPATIENT
Start: 2024-09-27 | End: 2024-10-01

## 2024-09-27 RX ORDER — FENTANYL CITRATE 50 UG/ML
INJECTION, SOLUTION INTRAMUSCULAR; INTRAVENOUS AS NEEDED
Status: COMPLETED | OUTPATIENT
Start: 2024-09-27 | End: 2024-09-27

## 2024-09-27 RX ORDER — LAMOTRIGINE 100 MG/1
100 TABLET ORAL
Status: DISCONTINUED | OUTPATIENT
Start: 2024-09-27 | End: 2024-10-03 | Stop reason: HOSPADM

## 2024-09-27 RX ORDER — SENNOSIDES 8.6 MG
17.2 TABLET ORAL 2 TIMES DAILY
Status: DISCONTINUED | OUTPATIENT
Start: 2024-09-27 | End: 2024-10-03 | Stop reason: HOSPADM

## 2024-09-27 RX ORDER — HEPARIN SODIUM 1000 [USP'U]/ML
4000 INJECTION, SOLUTION INTRAVENOUS; SUBCUTANEOUS EVERY 6 HOURS PRN
Status: DISCONTINUED | OUTPATIENT
Start: 2024-09-27 | End: 2024-10-01

## 2024-09-27 RX ADMIN — ACETAMINOPHEN 650 MG: 325 TABLET ORAL at 19:47

## 2024-09-27 RX ADMIN — TRAZODONE HYDROCHLORIDE 100 MG: 100 TABLET ORAL at 21:32

## 2024-09-27 RX ADMIN — HEPARIN SODIUM 18 UNITS/KG/HR: 10000 INJECTION, SOLUTION INTRAVENOUS at 17:28

## 2024-09-27 RX ADMIN — LAMOTRIGINE 100 MG: 100 TABLET ORAL at 21:32

## 2024-09-27 RX ADMIN — Medication 20 ML: at 11:18

## 2024-09-27 RX ADMIN — METOPROLOL SUCCINATE 50 MG: 50 TABLET, EXTENDED RELEASE ORAL at 21:32

## 2024-09-27 RX ADMIN — Medication 6 MG: at 23:15

## 2024-09-27 RX ADMIN — HEPARIN SODIUM 8000 UNITS: 1000 INJECTION INTRAVENOUS; SUBCUTANEOUS at 17:28

## 2024-09-27 RX ADMIN — IOHEXOL 45 ML: 300 INJECTION, SOLUTION INTRAVENOUS at 11:37

## 2024-09-27 RX ADMIN — CEFAZOLIN SODIUM 2000 MG: 2 SOLUTION INTRAVENOUS at 11:21

## 2024-09-27 RX ADMIN — MIDAZOLAM 0.5 MG: 1 INJECTION INTRAMUSCULAR; INTRAVENOUS at 11:15

## 2024-09-27 RX ADMIN — SEVELAMER HYDROCHLORIDE 1600 MG: 800 TABLET ORAL at 17:27

## 2024-09-27 RX ADMIN — FENTANYL CITRATE 50 MCG: 50 INJECTION INTRAMUSCULAR; INTRAVENOUS at 11:15

## 2024-09-27 NOTE — ASSESSMENT & PLAN NOTE
Wt Readings from Last 3 Encounters:   09/27/24 100 kg (220 lb 12.8 oz)   09/25/24 103 kg (226 lb 11.2 oz)   09/19/24 102 kg (224 lb 3.3 oz)     -Cardiac catheter 9/17/2024 without occlusion  -Echo on 8/30/2024 with EF 25% with severe global hypokinesis, moderate to severe mitral valve regurgitation, moderate tricuspid regurgitation, estimated right ventricular systolic pressure of 48 mmHg, small pericardial effusion anterior to chest wall  -Recently provided with a LifeVest    PLAN  -Telemetry  -Continue with LifeVest

## 2024-09-27 NOTE — H&P
"H&P - Hospitalist   Name: Ngozi Beard 64 y.o. female I MRN: [id number]  [facility] I Date of Admission: 9/27/2024   Date of Service: 9/27/2024 I Hospital Day: 0     Assessment & Plan  Complication associated with dialysis catheter  -In the OR on 9/27/2024 during routine permacath exchange with possible intervention (IR tunneled dialysis catheter check/change/reposition/angioplasty ) IR found a nonocclusive thrombus in the superior vena cava surrounding the catheter tip with a catheter tip embedded within the thrombus.  The catheter was exchanged for a shorter catheter and was repositioned  -Recommendation per IR include \"admission for IV anticoagulation.  - If the dialysis catheter is functional transition to oral anticoagulation and follow-up CT venogram of the chest in 3 months. If the catheter is not functional then CT venogram of the chest during admission with possible interventional options.    PLAN  -IV heparin drip  -Continue with dialysis and follow-up regarding functionality of catheter   Bipolar 1 disorder (HCC)  -Home medications include venlafaxine 150 mg and 75 mg in the morning, trazodone 200 mg daily, Lamictal 100 mg    PLAN  -Continue home medications  Primary hypertension  -Home medication of metoprolol succinate 50 mg daily    PLAN  -Continue home medication  Dyslipidemia  Atorvastatin 20 mg daily    PLAN  -Continue home medication  Asthma without acute exacerbation  Home medication includes albuterol as needed, montelukast 10 mg daily, Advair twice daily    PLAN  -Continue home medications  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  -Biopsy confirmed and therefore requiring dialysis  HFrEF (heart failure with reduced ejection fraction) ([facility])  Wt Readings from Last 3 Encounters:   09/27/24 100 kg (220 lb 12.8 oz)   09/25/24 103 kg (226 lb 11.2 oz)   09/19/24 102 kg (224 lb 3.3 oz)     -Cardiac catheter 9/17/2024 without occlusion  -Echo on 8/30/2024 with EF " Quality 131: Pain Assessment And Follow-Up: Pain assessment NOT documented as being performed, documentation the patient is not eligible for a pain assessment using a standardized tool 25% with severe global hypokinesis, moderate to severe mitral valve regurgitation, moderate tricuspid regurgitation, estimated right ventricular systolic pressure of 48 mmHg, small pericardial effusion anterior to chest wall  -Recently provided with a LifeVest    PLAN  -Telemetry  -Continue with LifeVest  Stage 5 chronic kidney disease on chronic dialysis (HCC)  Lab Results   Component Value Date    EGFR 5 09/19/2024    EGFR 3 09/18/2024    EGFR 3 09/17/2024    CREATININE 7.20 (H) 09/19/2024    CREATININE 10.72 (H) 09/18/2024    CREATININE 10.23 (H) 09/17/2024     -Secondary to RPGN with anti-GBM antibodies  -Began receiving dialysis in July 2024     PLAN  -Nephrology consult    VTE Pharmacologic Prophylaxis: VTE Score: 9 High Risk (Score >/= 5) - Pharmacological DVT Prophylaxis Ordered: heparin drip. Sequential Compression Devices Ordered.  Code Status: Level 1 - Full Code  Discussion with family: Updated  (daughter) at bedside.    Anticipated Length of Stay: Patient will be admitted on an inpatient basis with an anticipated length of stay of greater than 2 midnights secondary to IR and nephrology consultation for nonocclusive thrombus of SVC associated with permacath.    History of Present Illness   Chief Complaint: Routine permacath exchange    Ngozi Beard is a 64 y.o. female with a PMH of end-stage renal disease secondary to rapid progressive glomerulonephritis currently stage V, nonocclusive thrombus of superior vena cava associated with permacath tip, status post removal and replacement of catheter by IR who presents in order to receive IV anticoagulation and have close monitoring of dialysis catheter functionality.  At this time is not clear why the thrombus developed.    Review of Systems   Constitutional:  Negative for chills and fever.   HENT:  Negative for congestion and sore throat.    Eyes:  Negative for visual disturbance.   Respiratory:  Negative for wheezing.    Cardiovascular:   Quality 402: Tobacco Use And Help With Quitting Among Adolescents: Patient screened for tobacco and never smoked "Positive for leg swelling. Negative for chest pain.   Gastrointestinal:  Negative for abdominal pain, blood in stool and nausea.   Endocrine: Negative for cold intolerance and heat intolerance.   Genitourinary:  Negative for dysuria and hematuria.   Musculoskeletal:  Negative for gait problem.   Skin:  Negative for rash.   Allergic/Immunologic: Negative for environmental allergies.   Neurological:  Negative for syncope.   Hematological:  Does not bruise/bleed easily.   Psychiatric/Behavioral:  Negative for behavioral problems.        I have reviewed the patient's PMH, PSH, Social History, Family History, Meds, and Allergies  Social History:  Marital Status: /Civil Union   Occupation: Retired  Patient Pre-hospital Living Situation: Apartment  Patient Pre-hospital Level of Mobility: Walks with cane  Patient Pre-hospital Diet Restrictions: None    Objective     Vitals:   Blood Pressure: 111/93 (09/27/24 1907)  Pulse: 74 (09/27/24 1907)  Temperature: 98.8 °F (37.1 °C) (09/27/24 1907)  Temp Source: Axillary (09/27/24 1907)  Respirations: 17 (09/27/24 1907)  Height: 5' 3\" (160 cm) (09/27/24 0928)  Weight - Scale: 100 kg (220 lb 12.8 oz) (09/27/24 0928)  SpO2: 97 % (09/27/24 1907)    Physical Exam  Constitutional:       General: She is not in acute distress.     Appearance: She is well-developed. She is not diaphoretic.   HENT:      Head: Normocephalic and atraumatic.      Right Ear: External ear normal.      Left Ear: External ear normal.      Nose: Nose normal.      Mouth/Throat:      Mouth: Oropharynx is clear and moist.   Eyes:      General:         Right eye: No discharge.         Left eye: No discharge.      Extraocular Movements: EOM normal.      Conjunctiva/sclera: Conjunctivae normal.      Pupils: Pupils are equal, round, and reactive to light.   Neck:      Thyroid: No thyromegaly.      Vascular: No JVD.      Trachea: No tracheal deviation.   Cardiovascular:      Rate and Rhythm: Normal rate and regular " Quality 431: Preventive Care And Screening: Unhealthy Alcohol Use - Screening: Patient screened for unhealthy alcohol use using a single question and scores less than 2 times per year Quality 110: Preventive Care And Screening: Influenza Immunization: Influenza Immunization Administered during Influenza season rhythm.      Heart sounds: Normal heart sounds. No murmur heard.     No friction rub. No gallop.   Pulmonary:      Effort: Pulmonary effort is normal. No respiratory distress.      Breath sounds: Normal breath sounds. No wheezing.   Chest:      Chest wall: No tenderness.   Abdominal:      General: Bowel sounds are normal. There is no distension.      Palpations: Abdomen is soft.      Tenderness: There is no abdominal tenderness. There is no rebound.   Musculoskeletal:         General: No tenderness or edema. Normal range of motion.      Cervical back: Normal range of motion and neck supple.   Skin:     General: Skin is warm and dry.      Findings: No erythema or rash.   Neurological:      Mental Status: She is alert and oriented to person, place, and time.      Coordination: Coordination normal.      Deep Tendon Reflexes: Reflexes are normal and symmetric.   Psychiatric:         Mood and Affect: Mood and affect normal.         Behavior: Behavior normal.         Thought Content: Thought content normal.         Lines/Drains:  Lines/Drains/Airways       Active Status       Name Placement date Placement time Site Days    HD Permanent Double Catheter 09/10/24  1142  Internal jugular  17                        Additional Data:   Lab Results: I have reviewed the following results: none  Results from last 7 days   Lab Units 09/27/24  1648   WBC Thousand/uL 7.77   HEMOGLOBIN g/dL 9.9*   HEMATOCRIT % 31.8*   PLATELETS Thousands/uL 289         Results from last 7 days   Lab Units 09/27/24  1648   INR  0.96         Lab Results   Component Value Date    HGBA1C 6.6 (H) 07/09/2024    HGBA1C 5.9 11/26/2016    HGBA1C 6.0 (H) 01/18/2016           Imaging Review: Reviewed radiology reports from this admission including: IR tunneled dialysis catheter check.  Other Studies: No additional pertinent studies reviewed.    Administrative Statements   I have spent a total time of 55 minutes in caring for this patient on the day of the  Detail Level: Detailed visit/encounter including Diagnostic results, Prognosis, Risks and benefits of tx options, Instructions for management, Patient and family education, Importance of tx compliance, Risk factor reductions, Impressions, Counseling / Coordination of care, Documenting in the medical record, Reviewing / ordering tests, medicine, procedures  , Obtaining or reviewing history  , and Communicating with other healthcare professionals .    ** Please Note: This note has been constructed using a voice recognition system. **

## 2024-09-27 NOTE — ASSESSMENT & PLAN NOTE
Lab Results   Component Value Date    EGFR 5 09/19/2024    EGFR 3 09/18/2024    EGFR 3 09/17/2024    CREATININE 7.20 (H) 09/19/2024    CREATININE 10.72 (H) 09/18/2024    CREATININE 10.23 (H) 09/17/2024     -Secondary to RPGN with anti-GBM antibodies  -Began receiving dialysis in July 2024     PLAN  -Nephrology consult

## 2024-09-27 NOTE — SEDATION DOCUMENTATION
Tunneled HD catheter exchanged performed by Dr Estrada without complication. Patient tolerated procedure well. 50mcg of fentanyl and 0.5mg versed administered. Report called to short stay. IR Procedure Bedrest Start Time is 1130.

## 2024-09-27 NOTE — ASSESSMENT & PLAN NOTE
Home medication includes albuterol as needed, montelukast 10 mg daily, Advair twice daily    PLAN  -Continue home medications

## 2024-09-27 NOTE — ASSESSMENT & PLAN NOTE
-Home medications include venlafaxine 150 mg and 75 mg in the morning, trazodone 200 mg daily, Lamictal 100 mg    PLAN  -Continue home medications

## 2024-09-27 NOTE — BRIEF OP NOTE (RAD/CATH)
INTERVENTIONAL RADIOLOGY PROCEDURE NOTE    Date: 9/27/2024    Procedure:   Procedure Summary       Date: 09/27/24 Room / Location: [facility] Interventional Radiology    Anesthesia Start:  Anesthesia Stop:     Procedure: IR TUNNELED DIALYSIS CATHETER CHECK/CHANGE/REPOSITION/ANGIOPLASTY Diagnosis:       ESRD (end stage renal disease) (HCC)      (malfunctioning TDC)    Scheduled Providers:  Responsible Provider:     Anesthesia Type: Not recorded ASA Status: Not recorded            Preoperative diagnosis:   1. ESRD (end stage renal disease) (HCC)         Postoperative diagnosis: Same.    Surgeon: Vicente Estrada MD     Assistant: None. No qualified resident was available.    Blood loss: Minimal    Specimens: None    Findings: Patient presents today for evaluation of malfunction of dialysis catheter.  Uncertain exactly what the issue has been though she presents today through same-day surgery for evaluation and possible intervention.    Catheter injection today shows moderate amount of nonocclusive thrombus in the SVC surrounding the catheter tip and probably within the mid SVC as well.  The amount of thrombus appears significant enough to warrant anticoagulation.  The catheter tip is embedded within this thrombus.  The catheter was exchanged for shorter catheter which was repositioned in the proximal to mid SVC following angioplasty of a long fibrin sheath.  There was good blood return from both ports however uncertain how functional this will be for hemodialysis.    My recommendation would be for admission for IV anticoagulation.  If the dialysis catheter is functional, I would recommend transition to oral anticoagulation and follow-up CT venogram of the chest in about 3 months.  If the catheter is not functional, then I would recommend CT venogram of the chest during this admission so we can consider interventional options.    Complications: None immediate.    Anesthesia: conscious sedation

## 2024-09-27 NOTE — ASSESSMENT & PLAN NOTE
"-In the OR on 9/27/2024 during routine permacath exchange with possible intervention (IR tunneled dialysis catheter check/change/reposition/angioplasty ) IR found a nonocclusive thrombus in the superior vena cava surrounding the catheter tip with a catheter tip embedded within the thrombus.  The catheter was exchanged for a shorter catheter and was repositioned  -Recommendation per IR include \"admission for IV anticoagulation.  - If the dialysis catheter is functional transition to oral anticoagulation and follow-up CT venogram of the chest in 3 months. If the catheter is not functional then CT venogram of the chest during admission with possible interventional options.    PLAN  -IV heparin drip  -Continue with dialysis and follow-up regarding functionality of catheter   "

## 2024-09-28 ENCOUNTER — APPOINTMENT (INPATIENT)
Dept: RADIOLOGY | Facility: HOSPITAL | Age: 64
End: 2024-09-28
Payer: COMMERCIAL

## 2024-09-28 PROBLEM — D63.1 ANEMIA DUE TO CHRONIC KIDNEY DISEASE, ON CHRONIC DIALYSIS (HCC): Status: ACTIVE | Noted: 2024-07-27

## 2024-09-28 PROBLEM — N18.6 ANEMIA DUE TO CHRONIC KIDNEY DISEASE, ON CHRONIC DIALYSIS (HCC): Status: ACTIVE | Noted: 2024-07-27

## 2024-09-28 PROBLEM — Z99.2 ANEMIA DUE TO CHRONIC KIDNEY DISEASE, ON CHRONIC DIALYSIS (HCC): Status: ACTIVE | Noted: 2024-07-27

## 2024-09-28 LAB
ALBUMIN SERPL BCG-MCNC: 3.7 G/DL (ref 3.5–5)
ALP SERPL-CCNC: 58 U/L (ref 34–104)
ALT SERPL W P-5'-P-CCNC: 3 U/L (ref 7–52)
ANION GAP SERPL CALCULATED.3IONS-SCNC: 15 MMOL/L (ref 4–13)
APTT PPP: 142 SECONDS (ref 23–34)
APTT PPP: 57 SECONDS (ref 23–34)
APTT PPP: 76 SECONDS (ref 23–34)
AST SERPL W P-5'-P-CCNC: 9 U/L (ref 13–39)
BASOPHILS # BLD AUTO: 0.05 THOUSANDS/ΜL (ref 0–0.1)
BASOPHILS NFR BLD AUTO: 1 % (ref 0–1)
BILIRUB SERPL-MCNC: 0.38 MG/DL (ref 0.2–1)
BUN SERPL-MCNC: 65 MG/DL (ref 5–25)
CALCIUM SERPL-MCNC: 9.1 MG/DL (ref 8.4–10.2)
CHLORIDE SERPL-SCNC: 99 MMOL/L (ref 96–108)
CO2 SERPL-SCNC: 25 MMOL/L (ref 21–32)
CREAT SERPL-MCNC: 11.18 MG/DL (ref 0.6–1.3)
EOSINOPHIL # BLD AUTO: 0.25 THOUSAND/ΜL (ref 0–0.61)
EOSINOPHIL NFR BLD AUTO: 3 % (ref 0–6)
ERYTHROCYTE [DISTWIDTH] IN BLOOD BY AUTOMATED COUNT: 18.5 % (ref 11.6–15.1)
GFR SERPL CREATININE-BSD FRML MDRD: 3 ML/MIN/1.73SQ M
GLUCOSE SERPL-MCNC: 107 MG/DL (ref 65–140)
HCT VFR BLD AUTO: 28.5 % (ref 34.8–46.1)
HGB BLD-MCNC: 8.8 G/DL (ref 11.5–15.4)
IMM GRANULOCYTES # BLD AUTO: 0.13 THOUSAND/UL (ref 0–0.2)
IMM GRANULOCYTES NFR BLD AUTO: 2 % (ref 0–2)
LYMPHOCYTES # BLD AUTO: 1.11 THOUSANDS/ΜL (ref 0.6–4.47)
LYMPHOCYTES NFR BLD AUTO: 15 % (ref 14–44)
MAGNESIUM SERPL-MCNC: 2.1 MG/DL (ref 1.9–2.7)
MCH RBC QN AUTO: 30.8 PG (ref 26.8–34.3)
MCHC RBC AUTO-ENTMCNC: 30.9 G/DL (ref 31.4–37.4)
MCV RBC AUTO: 100 FL (ref 82–98)
MONOCYTES # BLD AUTO: 0.64 THOUSAND/ΜL (ref 0.17–1.22)
MONOCYTES NFR BLD AUTO: 9 % (ref 4–12)
MRSA NOSE QL CULT: NORMAL
NEUTROPHILS # BLD AUTO: 5.23 THOUSANDS/ΜL (ref 1.85–7.62)
NEUTS SEG NFR BLD AUTO: 70 % (ref 43–75)
NRBC BLD AUTO-RTO: 0 /100 WBCS
PHOSPHATE SERPL-MCNC: 8.5 MG/DL (ref 2.3–4.1)
PLATELET # BLD AUTO: 250 THOUSANDS/UL (ref 149–390)
PMV BLD AUTO: 9.9 FL (ref 8.9–12.7)
POTASSIUM SERPL-SCNC: 4.6 MMOL/L (ref 3.5–5.3)
PROT SERPL-MCNC: 5.6 G/DL (ref 6.4–8.4)
RBC # BLD AUTO: 2.86 MILLION/UL (ref 3.81–5.12)
SODIUM SERPL-SCNC: 139 MMOL/L (ref 135–147)
WBC # BLD AUTO: 7.41 THOUSAND/UL (ref 4.31–10.16)

## 2024-09-28 PROCEDURE — 83735 ASSAY OF MAGNESIUM: CPT | Performed by: FAMILY MEDICINE

## 2024-09-28 PROCEDURE — 80053 COMPREHEN METABOLIC PANEL: CPT | Performed by: FAMILY MEDICINE

## 2024-09-28 PROCEDURE — 85730 THROMBOPLASTIN TIME PARTIAL: CPT | Performed by: INTERNAL MEDICINE

## 2024-09-28 PROCEDURE — 84100 ASSAY OF PHOSPHORUS: CPT | Performed by: FAMILY MEDICINE

## 2024-09-28 PROCEDURE — 71275 CT ANGIOGRAPHY CHEST: CPT

## 2024-09-28 PROCEDURE — 85025 COMPLETE CBC W/AUTO DIFF WBC: CPT | Performed by: FAMILY MEDICINE

## 2024-09-28 PROCEDURE — 0JH63XZ INSERTION OF TUNNELED VASCULAR ACCESS DEVICE INTO CHEST SUBCUTANEOUS TISSUE AND FASCIA, PERCUTANEOUS APPROACH: ICD-10-PCS | Performed by: RADIOLOGY

## 2024-09-28 PROCEDURE — 99232 SBSQ HOSP IP/OBS MODERATE 35: CPT | Performed by: INTERNAL MEDICINE

## 2024-09-28 PROCEDURE — 99222 1ST HOSP IP/OBS MODERATE 55: CPT | Performed by: INTERNAL MEDICINE

## 2024-09-28 PROCEDURE — NC001 PR NO CHARGE: Performed by: RADIOLOGY

## 2024-09-28 RX ORDER — TRAZODONE HYDROCHLORIDE 100 MG/1
200 TABLET ORAL
Status: DISCONTINUED | OUTPATIENT
Start: 2024-09-28 | End: 2024-10-03 | Stop reason: HOSPADM

## 2024-09-28 RX ORDER — PROCHLORPERAZINE MALEATE 5 MG
5 TABLET ORAL EVERY 6 HOURS PRN
Status: DISCONTINUED | OUTPATIENT
Start: 2024-09-28 | End: 2024-09-30

## 2024-09-28 RX ADMIN — VENLAFAXINE HYDROCHLORIDE 150 MG: 150 CAPSULE, EXTENDED RELEASE ORAL at 08:19

## 2024-09-28 RX ADMIN — PANTOPRAZOLE SODIUM 40 MG: 40 TABLET, DELAYED RELEASE ORAL at 06:54

## 2024-09-28 RX ADMIN — HEPARIN SODIUM 17 UNITS/KG/HR: 10000 INJECTION, SOLUTION INTRAVENOUS at 23:38

## 2024-09-28 RX ADMIN — SEVELAMER HYDROCHLORIDE 1600 MG: 800 TABLET ORAL at 17:15

## 2024-09-28 RX ADMIN — VENLAFAXINE HYDROCHLORIDE 75 MG: 75 CAPSULE, EXTENDED RELEASE ORAL at 08:19

## 2024-09-28 RX ADMIN — MONTELUKAST 10 MG: 10 TABLET, FILM COATED ORAL at 08:18

## 2024-09-28 RX ADMIN — ATORVASTATIN CALCIUM 20 MG: 20 TABLET, FILM COATED ORAL at 08:18

## 2024-09-28 RX ADMIN — SENNOSIDES 17.2 MG: 8.6 TABLET, FILM COATED ORAL at 17:15

## 2024-09-28 RX ADMIN — IOHEXOL 85 ML: 350 INJECTION, SOLUTION INTRAVENOUS at 14:37

## 2024-09-28 RX ADMIN — SENNOSIDES 17.2 MG: 8.6 TABLET, FILM COATED ORAL at 08:18

## 2024-09-28 RX ADMIN — POLYSACCHARIDE-IRON COMPLEX 150 MG: 150 CAPSULE ORAL at 08:18

## 2024-09-28 RX ADMIN — FLUTICASONE FUROATE AND VILANTEROL TRIFENATATE 1 PUFF: 100; 25 POWDER RESPIRATORY (INHALATION) at 08:22

## 2024-09-28 RX ADMIN — PROCHLORPERAZINE MALEATE 5 MG: 5 TABLET ORAL at 15:57

## 2024-09-28 RX ADMIN — HEPARIN SODIUM 4000 UNITS: 1000 INJECTION INTRAVENOUS; SUBCUTANEOUS at 17:20

## 2024-09-28 RX ADMIN — HEPARIN SODIUM 15 UNITS/KG/HR: 10000 INJECTION, SOLUTION INTRAVENOUS at 08:06

## 2024-09-28 RX ADMIN — METOPROLOL SUCCINATE 50 MG: 50 TABLET, EXTENDED RELEASE ORAL at 20:00

## 2024-09-28 RX ADMIN — ATOVAQUONE 1500 MG: 750 SUSPENSION ORAL at 08:19

## 2024-09-28 RX ADMIN — PREDNISONE 12.5 MG: 2.5 TABLET ORAL at 08:19

## 2024-09-28 RX ADMIN — LAMOTRIGINE 100 MG: 100 TABLET ORAL at 21:32

## 2024-09-28 RX ADMIN — SEVELAMER HYDROCHLORIDE 1600 MG: 800 TABLET ORAL at 08:18

## 2024-09-28 RX ADMIN — Medication 1 TABLET: at 08:18

## 2024-09-28 RX ADMIN — ONDANSETRON 4 MG: 4 TABLET, ORALLY DISINTEGRATING ORAL at 13:43

## 2024-09-28 RX ADMIN — SEVELAMER HYDROCHLORIDE 1600 MG: 800 TABLET ORAL at 11:46

## 2024-09-28 RX ADMIN — Medication 6 MG: at 21:32

## 2024-09-28 RX ADMIN — TRAZODONE HYDROCHLORIDE 200 MG: 100 TABLET ORAL at 21:32

## 2024-09-28 NOTE — CASE MANAGEMENT
Case Management Discharge Planning Note    Patient name Ngozi Beard  [facility] MRN [id number]  : 1960 Date 2024       Current Admission Date: 2024  Current Admission Diagnosis:Complication associated with dialysis catheter   Patient Active Problem List    Diagnosis Date Noted Date Diagnosed    HFrEF (heart failure with reduced ejection fraction) ([facility]) 2024     Stage 5 chronic kidney disease on chronic dialysis ([facility]) 2024     Hyperkalemia 2024     Chest pain 2024     Unspecified mood (affective) disorder ([facility]) 2024     Acute renal failure on dialysis ([facility]) 2024     Complication associated with dialysis catheter 2024     Cardiomyopathy ([facility]) 2024     Multifocal pneumonia 2024     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 ([facility]) 2024     Generalized weakness 2024     Rash 2024     Anemia due to chronic kidney disease, on chronic dialysis ([facility]) 2024     Thrombocytopenia ([facility]) 2024     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     Abnormality of ascending aorta 2024     Postmenopausal 2024     Encounter for screening mammogram for malignant neoplasm of breast 2024     Allergic rhinitis 2024     Persistent cough 2024     Urge incontinence of urine 2024     Exercise intolerance 2024     Family history of coronary artery bypass graft surgery 2024     Urge urinary incontinence 2024     Female stress incontinence 2024     Paranoid schizophrenia ([facility]) 2023     Asthma without acute exacerbation 2023     Asthma due to seasonal allergies 2023     Bipolar 1 disorder ([facility]) 2022     Primary hypertension 2022     Dyslipidemia 2022       LOS (days): 1  Geometric Mean LOS (GMLOS) (days):   Days to GMLOS:     OBJECTIVE:  Risk of Unplanned Readmission Score: 39.48          Current admission status: Inpatient   Preferred Pharmacy:   [id number] - [place] - [address]  [address] [place] [address]  Phone: [phone] Fax: [phone]    [facility] - [place] [id number]  [address]  [place] [address]  Phone: [phone] Fax: [phone]    Primary Care Provider: Meenakshi Balbuena MD    Primary Insurance: [employer] [facility]  Secondary Insurance: [facility]    DISCHARGE DETAILS:    Additional Comments: CM attempted to meet with pt at bedside to complete initial assessment however pt was with medical team, CM department to follow.

## 2024-09-28 NOTE — ASSESSMENT & PLAN NOTE
-In the OR on 9/27/2024 during routine permacath exchange with possible intervention (IR tunneled dialysis catheter check/change/reposition/angioplasty ) IR found a nonocclusive thrombus in the superior vena cava surrounding the catheter tip with a catheter tip embedded within the thrombus.  The catheter was exchanged for a shorter catheter and was repositioned  -Remains occluded unable to pull blood this a.m.  -Nephrology consulted interventional radiology who recommended CT venogram chest prior to further exchange, ordered  Continue heparin

## 2024-09-28 NOTE — ASSESSMENT & PLAN NOTE
Wt Readings from Last 3 Encounters:   09/27/24 101 kg (223 lb)   09/25/24 103 kg (226 lb 11.2 oz)   09/19/24 102 kg (224 lb 3.3 oz)     Plan to continue UF during dialysis to keep her at her reported dry weight 100 kg

## 2024-09-28 NOTE — ASSESSMENT & PLAN NOTE
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  On dialysis since 7/11/2024.  Currently on HD TTS at [facility].  Admitted for catheter malfunctioning status post battery change done by IR on 9/27.  Unfortunately this morning unable to pull blood from any port at the inpatient dialysis unit.  IR was notified

## 2024-09-28 NOTE — CONSULTS
Consultation - Nephrology   Ngozi ARNIE Beard 64 y.o. female MRN: [id number]  Unit/Bed#: [facility] 522-01 Encounter: [id number]    ASSESSMENT and PLAN:  Assessment & Plan  Stage 5 chronic kidney disease on chronic dialysis ([facility])  Lab Results   Component Value Date    EGFR 3 09/28/2024    EGFR 5 09/19/2024    EGFR 3 09/18/2024    CREATININE 11.18 (H) 09/28/2024    CREATININE 7.20 (H) 09/19/2024    CREATININE 10.72 (H) 09/18/2024   Patient with ANGELICA secondary to RPGN due to biopsy-proven anti-GBM disease.  On dialysis since 7/11/2024.  Currently on HD TTS at [facility].  Admitted for catheter malfunctioning status post battery change done by IR on 9/27.  Unfortunately this morning unable to pull blood from any port at the inpatient dialysis unit.  IR was notified  Complication associated with dialysis catheter  Status post PermCath exchange by IR on 9/27.  Unfortunately this morning unable to pull blood from any port.  IR was notified, will need new line    Primary hypertension  Blood pressure in the lower side.  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)  Wt Readings from Last 3 Encounters:   09/27/24 101 kg (223 lb)   09/25/24 103 kg (226 lb 11.2 oz)   09/19/24 102 kg (224 lb 3.3 oz)     Plan to continue UF during dialysis to keep her at her reported dry weight 100 kg       Anemia due to chronic kidney disease, on chronic dialysis ([facility])  Lab Results   Component Value Date    EGFR 3 09/28/2024    EGFR 5 09/19/2024    EGFR 3 09/18/2024    CREATININE 11.18 (H) 09/28/2024    CREATININE 7.20 (H) 09/19/2024    CREATININE 10.72 (H) 09/18/2024   Hemoglobin 8.8 this morning, monitor H&H        SUMMARY OF RECOMMENDATIONS:  Patient seen and examined at the inpatient hemodialysis unit.  PermCath was changed by IR on 9/27.  Unfortunately unable to pull blood from any port this morning.  I  have contacted IR, patient needs a new line.  IR plan to do a catheter exchange on Monday.  Labs this morning okay, patient is currently on room air, no urgent indication for dialysis at this moment.  Discussed with patient about plan, she is okay waiting until Monday for dialysis after catheter recent change.        HISTORY OF PRESENT ILLNESS:  Requesting Physician: Blaine Prieto MD  Reason for Consult: ESRD on HD    Ngozi Beard is a 64 y.o. female who was admitted to [facility] after presenting with catheter malfunctioning status post exchange on 9/27, patient was admitted for IV anticoagulation after. A renal consultation is requested today for assistance in the management of ESRD on HD.  Patient with history of ESRD currently on HD TTS at [facility] hypertension, asthma, who presents to the hospital due to permacatheter malfunction status post catheter exchange done by IR on 9/27, IR recommends admission for IV anticoagulation after and see her Totally functional today.  Nephrology was consulted for continuation of hemodialysis.  Patient was seen this morning in the inpatient hemodialysis unit, unfortunately able to pull blood from any HD port.  Patient reports currently feeling okay but reports some shortness of breath overnight, no chest pain, abdominal pain, no nausea, no vomiting.      PAST MEDICAL HISTORY:  Past Medical History:   Diagnosis Date    Asthma     Chronic pain     Hypertension     Renal disorder     Sepsis (HCC) 07/10/2024    Shortness of breath 08/11/2024       PAST SURGICAL HISTORY:  Past Surgical History:   Procedure Laterality Date    BACK SURGERY      CARDIAC CATHETERIZATION Left 9/17/2024    Procedure: Cardiac Left Heart Cath;  Surgeon: Harris Paul MD;  Location: [facility] CARDIAC [facility]ATH [facility];  Service: Cardiology    COLONOSCOPY      HEMODIALYSIS ADULT  9/10/2024    IR BIOPSY KIDNEY RANDOM  7/17/2024    IR TEMPORARY DIALYSIS CATHETER PLACEMENT  7/15/2024    IR TUNNELED  DIALYSIS CATHETER CHECK/CHANGE/REPOSITION/ANGIOPLASTY  2024    IR TUNNELED DIALYSIS CATHETER CHECK/CHANGE/REPOSITION/ANGIOPLASTY  9/3/2024    IR TUNNELED DIALYSIS CATHETER CHECK/CHANGE/REPOSITION/ANGIOPLASTY  9/10/2024    IR TUNNELED DIALYSIS CATHETER PLACEMENT  2024    TUBAL LIGATION         SOCIAL HISTORY:  Social History     Substance and Sexual Activity   Alcohol Use Never     Social History     Substance and Sexual Activity   Drug Use No     Social History     Tobacco Use   Smoking Status Former    Current packs/day: 0.00    Average packs/day: 1 pack/day for 15.0 years (15.0 ttl pk-yrs)    Types: Cigarettes    Start date:     Quit date:     Years since quittin.7   Smokeless Tobacco Never       FAMILY HISTORY:  Family History   Problem Relation Age of Onset    Diabetes Mother     Hypertension Mother     Lung cancer Mother     Colon cancer Mother     Colon cancer Father     Hypertension Sister     Multiple sclerosis Sister     Hypothyroidism Maternal Aunt        ALLERGIES:  Allergies   Allergen Reactions    Penicillins Hives     Reaction Date: 2005; Annotation - [id number]: meena mcdonald    Amoxicillin Rash    Aspirin Rash    Bactrim [Sulfamethoxazole-Trimethoprim] Rash    Ibuprofen Rash    Morphine Rash    Oxycodone Rash    Valacyclovir Rash       MEDICATIONS:    Current Facility-Administered Medications:     acetaminophen (TYLENOL) tablet 650 mg, 650 mg, Oral, Q4H PRN, Drew Norwood MD, 650 mg at 24    atorvastatin (LIPITOR) tablet 20 mg, 20 mg, Oral, Daily, Drew Norwood MD, 20 mg at 24    atovaquone (MEPRON) oral suspension 1,500 mg, 1,500 mg, Oral, Daily, Drew Norwood MD, 1,500 mg at 24    calcium carbonate (OYSTER SHELL,OSCAL) 500 mg tablet 1 tablet, 1 tablet, Oral, Daily With Breakfast, Drew Norwood MD, 1 tablet at 24    cyclophosphamide (CYTOXAN) capsule 100 mg, 100 mg, Oral, Daily, Drew Norwood MD    Fluticasone  Furoate-Vilanterol 100-25 mcg/actuation 1 puff, 1 puff, Inhalation, Daily, Drew Norwood MD, 1 puff at 09/28/24 0822    heparin (porcine) 25,000 units in 0.45% NaCl 250 mL infusion (premix), 3-30 Units/kg/hr (Order-Specific), Intravenous, Titrated, Drew Norwood MD, Last Rate: 15 mL/hr at 09/28/24 0806, 15 Units/kg/hr at 09/28/24 0806    heparin (porcine) injection 4,000 Units, 4,000 Units, Intravenous, Q6H PRN, Drew Norwood MD    heparin (porcine) injection 8,000 Units, 8,000 Units, Intravenous, Q6H PRN, Drew Norwood MD    iron polysaccharides (FERREX) capsule 150 mg, 150 mg, Oral, Daily, Drew Norwood MD, 150 mg at 09/28/24 0818    lamoTRIgine (LaMICtal) tablet 100 mg, 100 mg, Oral, HS, Drew Norwood MD, 100 mg at 09/27/24 2132    melatonin tablet 6 mg, 6 mg, Oral, HS, Sheila Reddy, CRNP, 6 mg at 09/27/24 2315    metoprolol succinate (TOPROL-XL) 24 hr tablet 50 mg, 50 mg, Oral, BID, Drew Norwood MD, 50 mg at 09/27/24 2132    montelukast (SINGULAIR) tablet 10 mg, 10 mg, Oral, Daily, Drew Norwood MD, 10 mg at 09/28/24 0818    ondansetron (ZOFRAN-ODT) dispersible tablet 4 mg, 4 mg, Oral, Q4H PRN, Drew Norwood MD    pantoprazole (PROTONIX) EC tablet 40 mg, 40 mg, Oral, Daily Before Breakfast, Drew Norwood MD, 40 mg at 09/28/24 0654    predniSONE tablet 12.5 mg, 12.5 mg, Oral, Daily, 12.5 mg at 09/28/24 0819 **FOLLOWED BY** [START ON 10/5/2024] predniSONE tablet 10 mg, 10 mg, Oral, Daily **FOLLOWED BY** [START ON 10/19/2024] predniSONE tablet 7.5 mg, 7.5 mg, Oral, Daily **FOLLOWED BY** [START ON 11/3/2024] predniSONE tablet 5 mg, 5 mg, Oral, Daily, Drew Norwood MD    senna (SENOKOT) tablet 17.2 mg, 17.2 mg, Oral, BID, Drew Norwood MD, 17.2 mg at 09/28/24 0818    sevelamer (RENAGEL) tablet 1,600 mg, 1,600 mg, Oral, TID With Meals, Drew Norwood MD, 1,600 mg at 09/28/24 0818    Sodium Zirconium Cyclosilicate (Lokelma) 10 g, 10 g, Oral, Daily, Drew Norwood MD    traZODone (DESYREL)  tablet 100 mg, 100 mg, Oral, HS, Drew Norwood MD, 100 mg at 09/27/24 2132    venlafaxine (EFFEXOR-XR) 24 hr capsule 150 mg, 150 mg, Oral, Daily, Drew Norwood MD, 150 mg at 09/28/24 0819    venlafaxine (EFFEXOR-XR) 24 hr capsule 75 mg, 75 mg, Oral, Daily, Drew Norwood MD, 75 mg at 09/28/24 0819    REVIEW OF SYSTEMS:  All the systems were reviewed and were negative except as documented on the HPI.    PHYSICAL EXAM:  Current Weight: Weight - Scale: 101 kg (223 lb)  First Weight: Weight - Scale: 100 kg (220 lb 12.8 oz)  Vitals:    09/27/24 2236 09/27/24 2300 09/28/24 0100 09/28/24 0746   BP: 99/51   96/57   BP Location: Right arm   Right arm   Pulse: 72 67 66 71   Resp: 18   15   Temp: 98.4 °F (36.9 °C)   98.3 °F (36.8 °C)   TempSrc: Axillary   Oral   SpO2: 94% 95% 91% 98%   Weight:       Height:           Intake/Output Summary (Last 24 hours) at 9/28/2024 0906  Last data filed at 9/28/2024 0746  Gross per 24 hour   Intake 420 ml   Output --   Net 420 ml       General: conscious, cooperative, in not acute distress  Eyes: conjunctivae pink, anicteric sclerae  ENT: lips and mucous membranes moist  Neck: supple, no JVD  Chest: clear breath sounds bilateral, no crackles, ronchus or wheezings  CVS: distinct S1 & S2, normal rate, regular rhythm  Abdomen: soft, non-tender, non-distended, normoactive bowel sounds  Extremities: no edema of both legs  Skin: no rash  Neuro: awake, alert, oriented  PermCath exchanged by IR on 9/27, unable to pull blood from any port      Invasive Devices:        Lab Results:   Results from last 7 days   Lab Units 09/28/24  0806 09/27/24  1648   WBC Thousand/uL 7.41 7.77   HEMOGLOBIN g/dL 8.8* 9.9*   HEMATOCRIT % 28.5* 31.8*   PLATELETS Thousands/uL 250 289   SODIUM mmol/L 139  --    POTASSIUM mmol/L 4.6  --    CHLORIDE mmol/L 99  --    CO2 mmol/L 25  --    BUN mg/dL 65*  --    CREATININE mg/dL 11.18*  --    CALCIUM mg/dL 9.1  --    MAGNESIUM mg/dL 2.1  --    PHOSPHORUS mg/dL 8.5*  --   "  ALK PHOS U/L 58  --    ALT U/L 3*  --    AST U/L 9*  --            Portions of the record may have been created with voice recognition software. Occasional wrong word or \"sound a like\" substitutions may have occurred due to the inherent limitations of voice recognition software. Read the chart carefully and recognize, using context, where substitutions have occurred.If you have any questions, please contact the dictating provider.  "

## 2024-09-28 NOTE — ASSESSMENT & PLAN NOTE
Status post PermCath exchange by IR on 9/27.  Unfortunately this morning unable to pull blood from any port.  IR was notified, will need new line

## 2024-09-28 NOTE — CONSULTS
e-Consult (IPC)  - Interventional Radiology  Ngozi Beard 64 y.o. female MRN: [id number]  Unit/Bed#: [facility] 522-01 Encounter: [id number]          Interventional Radiology has been consulted to evaluate Ngozi Beard    Inpatient Consult to IR  Consult performed by: Tres Cervantes MD  Consult ordered by: Lester Davis MD        09/28/24    Assessment/Recommendation:   64 year old female with history of ESRD dialyzing through right IJV tunneled dialysis catheter.  Dialysis catheter found to be malfunctioning s/p last exchange on 9/27/2024.  SVC was noted to contain thrombus and catheter tip present within the thrombus.  Catheter was exchanged with tip repositioned in the proximal to mid SVC.  Recommendation made to start patient on anticoagulation after last catheter exchange.    - Recommend obtaining CT venogram of chest to assess for anatomic causes of catheter malfunction.  - Patient may need repeat catheter exchange vs further intervention depending on results of CTV chest.        5-10 minutes, >50% of the total time devoted to medical consultative verbal/EMR discussion between providers. Written report will be generated in the EMR.     Thank you for allowing Interventional Radiology to participate in the care of Ngozi Beard. Please don't hesitate to call or TigerText us with any questions.     Tres Cervantes MD

## 2024-09-28 NOTE — ASSESSMENT & PLAN NOTE
Wt Readings from Last 3 Encounters:   09/27/24 101 kg (223 lb)   09/25/24 103 kg (226 lb 11.2 oz)   09/19/24 102 kg (224 lb 3.3 oz)     Clinically euvolemic fluid balance through dialysis

## 2024-09-28 NOTE — ASSESSMENT & PLAN NOTE
Lab Results   Component Value Date    EGFR 3 09/28/2024    EGFR 5 09/19/2024    EGFR 3 09/18/2024    CREATININE 11.18 (H) 09/28/2024    CREATININE 7.20 (H) 09/19/2024    CREATININE 10.72 (H) 09/18/2024   Hemoglobin 8.8 this morning, monitor H&H

## 2024-09-28 NOTE — HEMODIALYSIS
Unable to obtain blood return from either port of CVC.  Dr Davis notified and aware.  No treatment today.  To be seen in IR Monday and treatment Monday afternoon.

## 2024-09-28 NOTE — ASSESSMENT & PLAN NOTE
Lab Results   Component Value Date    EGFR 3 09/28/2024    EGFR 5 09/19/2024    EGFR 3 09/18/2024    CREATININE 11.18 (H) 09/28/2024    CREATININE 7.20 (H) 09/19/2024    CREATININE 10.72 (H) 09/18/2024     Nephrology following  Daily CBC while admitted and on heparin

## 2024-09-28 NOTE — ASSESSMENT & PLAN NOTE
Lab Results   Component Value Date    EGFR 3 09/28/2024    EGFR 5 09/19/2024    EGFR 3 09/18/2024    CREATININE 11.18 (H) 09/28/2024    CREATININE 7.20 (H) 09/19/2024    CREATININE 10.72 (H) 09/18/2024     Dialysis per nephrology

## 2024-09-28 NOTE — PROGRESS NOTES
Progress Note - Internal Medicine   Name: Ngozi Beard 64 y.o. female I MRN: [id number]  Unit/Bed#: [facility] 522-01 I Date of Admission: 9/27/2024   Date of Service: 9/28/2024 I Hospital Day: 1    Assessment & Plan  Complication associated with dialysis catheter  -In the OR on 9/27/2024 during routine permacath exchange with possible intervention (IR tunneled dialysis catheter check/change/reposition/angioplasty ) IR found a nonocclusive thrombus in the superior vena cava surrounding the catheter tip with a catheter tip embedded within the thrombus.  The catheter was exchanged for a shorter catheter and was repositioned  -Remains occluded unable to pull blood this a.m.  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  Nephrology following  Anemia due to chronic kidney disease, on chronic dialysis ([facility])  Lab Results   Component Value Date    EGFR 3 09/28/2024    EGFR 5 09/19/2024    EGFR 3 09/18/2024    CREATININE 11.18 (H) 09/28/2024    CREATININE 7.20 (H) 09/19/2024    CREATININE 10.72 (H) 09/18/2024     Nephrology following  Daily CBC while admitted and on heparin  HFrEF (heart failure with reduced ejection fraction) ([facility])  Wt Readings from Last 3 Encounters:   09/27/24 101 kg (223 lb)   09/25/24 103 kg (226 lb 11.2 oz)   09/19/24 102 kg (224 lb 3.3 oz)     Clinically euvolemic fluid balance through dialysis      Stage 5 chronic kidney disease on chronic dialysis ([facility])  Lab Results   Component Value Date    EGFR 3 09/28/2024    EGFR 5 09/19/2024    EGFR 3 09/18/2024    CREATININE 11.18 (H) 09/28/2024    CREATININE 7.20 (H) 09/19/2024    CREATININE  10.72 (H) 2024     Dialysis per nephrology    Disposition: Remain in hospital due to malfunctioning dialysis catheter        History of Present Illness   Patient seen and examined. No acute events overnight.  Patient states she is feeling generally fine but does have some nausea that she experiences when missing dialysis.    Objective     Vitals:    24 1000 24 1100 24 1105 24 1518   BP:   118/70 127/71   BP Location:   Right arm Left arm   Pulse: 86 71 76 78   Resp:   16 20   Temp:   97.8 °F (36.6 °C) 97.5 °F (36.4 °C)   TempSrc:   Oral Oral   SpO2: 93% (!) 89% 91% 90%   Weight:       Height:          Temperature:   Temp (24hrs), Av.2 °F (36.8 °C), Min:97.5 °F (36.4 °C), Max:98.8 °F (37.1 °C)    Temperature: 97.5 °F (36.4 °C)  Intake & Output:  I/O          0701   0700  0701   0700  0701   0700    P.O.  300 120    I.V. (mL/kg)   281.1 (2.8)    Total Intake(mL/kg)  300 (3) 401.1 (4)    Urine (mL/kg/hr)   0 (0)    Total Output   0    Net  +300 +401.1                 Weights:   IBW (Ideal Body Weight): 52.4 kg    Body mass index is 39.5 kg/m².  Weight (last 2 days)       Date/Time Weight    24 19:44:50 101 (223)    24 0928 100 (220.8)          Physical Exam  Constitutional:       General: She is not in acute distress.     Comments: Middle-age female chronically ill   HENT:      Mouth/Throat:      Mouth: Mucous membranes are moist.   Cardiovascular:      Rate and Rhythm: Normal rate and regular rhythm.   Pulmonary:      Effort: Pulmonary effort is normal. No respiratory distress.      Breath sounds: No wheezing.   Abdominal:      General: Abdomen is flat.      Palpations: Abdomen is soft.   Neurological:      General: No focal deficit present.      Mental Status: She is oriented to person, place, and time.           Lab Results: I have reviewed the following results:   Recent Labs     24  1648 24  2328 24  0806 24  1531    WBC 7.77  --  7.41  --    HGB 9.9*  --  8.8*  --    HCT 31.8*  --  28.5*  --      --  250  --    SODIUM  --   --  139  --    K  --   --  4.6  --    CL  --   --  99  --    CO2  --   --  25  --    BUN  --   --  65*  --    CREATININE  --   --  11.18*  --    GLUC  --   --  107  --    MG  --   --  2.1  --    PHOS  --   --  8.5*  --    AST  --   --  9*  --    ALT  --   --  3*  --    ALB  --   --  3.7  --    TBILI  --   --  0.38  --    ALKPHOS  --   --  58  --    PTT 20*   < > 76* 57*   INR 0.96  --   --   --     < > = values in this interval not displayed.     Imaging Review: No pertinent imaging studies reviewed.  Other Studies: No additional pertinent studies reviewed.    Currently Ordered Meds:   Current Facility-Administered Medications:     acetaminophen (TYLENOL) tablet 650 mg, Q4H PRN    atorvastatin (LIPITOR) tablet 20 mg, Daily    atovaquone (MEPRON) oral suspension 1,500 mg, Daily    calcium carbonate (OYSTER SHELL,OSCAL) 500 mg tablet 1 tablet, Daily With Breakfast    cyclophosphamide (CYTOXAN) capsule 100 mg, Daily    Fluticasone Furoate-Vilanterol 100-25 mcg/actuation 1 puff, Daily    heparin (porcine) 25,000 units in 0.45% NaCl 250 mL infusion (premix), Titrated, Last Rate: 15 Units/kg/hr (09/28/24 0806)    heparin (porcine) injection 4,000 Units, Q6H PRN    heparin (porcine) injection 8,000 Units, Q6H PRN    iron polysaccharides (FERREX) capsule 150 mg, Daily    lamoTRIgine (LaMICtal) tablet 100 mg, HS    melatonin tablet 6 mg, HS    metoprolol succinate (TOPROL-XL) 24 hr tablet 50 mg, BID    montelukast (SINGULAIR) tablet 10 mg, Daily    ondansetron (ZOFRAN-ODT) dispersible tablet 4 mg, Q4H PRN    pantoprazole (PROTONIX) EC tablet 40 mg, Daily Before Breakfast    predniSONE tablet 12.5 mg, Daily **FOLLOWED BY** [START ON 10/5/2024] predniSONE tablet 10 mg, Daily **FOLLOWED BY** [START ON 10/19/2024] predniSONE tablet 7.5 mg, Daily **FOLLOWED BY** [START ON 11/3/2024] predniSONE tablet 5 mg,  Daily    prochlorperazine (COMPAZINE) tablet 5 mg, Q6H PRN    senna (SENOKOT) tablet 17.2 mg, BID    sevelamer (RENAGEL) tablet 1,600 mg, TID With Meals    Sodium Zirconium Cyclosilicate (Lokelma) 10 g, Daily    traZODone (DESYREL) tablet 100 mg, HS    venlafaxine (EFFEXOR-XR) 24 hr capsule 150 mg, Daily    venlafaxine (EFFEXOR-XR) 24 hr capsule 75 mg, Daily  VTE Pharmacologic Prophylaxis: Heparin    Portions of the record may have been created with voice recognition software.

## 2024-09-29 LAB
ANION GAP SERPL CALCULATED.3IONS-SCNC: 13 MMOL/L (ref 4–13)
APTT PPP: 156 SECONDS (ref 23–34)
APTT PPP: 73 SECONDS (ref 23–34)
APTT PPP: 75 SECONDS (ref 23–34)
BUN SERPL-MCNC: 77 MG/DL (ref 5–25)
CALCIUM SERPL-MCNC: 9.6 MG/DL (ref 8.4–10.2)
CHLORIDE SERPL-SCNC: 99 MMOL/L (ref 96–108)
CO2 SERPL-SCNC: 26 MMOL/L (ref 21–32)
CREAT SERPL-MCNC: 12.96 MG/DL (ref 0.6–1.3)
GFR SERPL CREATININE-BSD FRML MDRD: 2 ML/MIN/1.73SQ M
GLUCOSE SERPL-MCNC: 124 MG/DL (ref 65–140)
POTASSIUM SERPL-SCNC: 5.9 MMOL/L (ref 3.5–5.3)
SODIUM SERPL-SCNC: 138 MMOL/L (ref 135–147)

## 2024-09-29 PROCEDURE — 85730 THROMBOPLASTIN TIME PARTIAL: CPT | Performed by: INTERNAL MEDICINE

## 2024-09-29 PROCEDURE — 80048 BASIC METABOLIC PNL TOTAL CA: CPT | Performed by: INTERNAL MEDICINE

## 2024-09-29 PROCEDURE — 99232 SBSQ HOSP IP/OBS MODERATE 35: CPT | Performed by: INTERNAL MEDICINE

## 2024-09-29 RX ORDER — ALBUTEROL SULFATE 90 UG/1
2 INHALANT RESPIRATORY (INHALATION) EVERY 4 HOURS PRN
Status: DISCONTINUED | OUTPATIENT
Start: 2024-09-29 | End: 2024-10-03 | Stop reason: HOSPADM

## 2024-09-29 RX ADMIN — METOPROLOL SUCCINATE 50 MG: 50 TABLET, EXTENDED RELEASE ORAL at 21:34

## 2024-09-29 RX ADMIN — VENLAFAXINE HYDROCHLORIDE 75 MG: 75 CAPSULE, EXTENDED RELEASE ORAL at 08:10

## 2024-09-29 RX ADMIN — VENLAFAXINE HYDROCHLORIDE 150 MG: 150 CAPSULE, EXTENDED RELEASE ORAL at 08:09

## 2024-09-29 RX ADMIN — SEVELAMER HYDROCHLORIDE 1600 MG: 800 TABLET ORAL at 08:09

## 2024-09-29 RX ADMIN — MONTELUKAST 10 MG: 10 TABLET, FILM COATED ORAL at 08:09

## 2024-09-29 RX ADMIN — TRAZODONE HYDROCHLORIDE 200 MG: 100 TABLET ORAL at 21:34

## 2024-09-29 RX ADMIN — ACETAMINOPHEN 650 MG: 325 TABLET ORAL at 09:28

## 2024-09-29 RX ADMIN — SEVELAMER HYDROCHLORIDE 1600 MG: 800 TABLET ORAL at 16:42

## 2024-09-29 RX ADMIN — SENNOSIDES 17.2 MG: 8.6 TABLET, FILM COATED ORAL at 08:09

## 2024-09-29 RX ADMIN — ATOVAQUONE 1500 MG: 750 SUSPENSION ORAL at 08:10

## 2024-09-29 RX ADMIN — ATORVASTATIN CALCIUM 20 MG: 20 TABLET, FILM COATED ORAL at 08:09

## 2024-09-29 RX ADMIN — METOPROLOL SUCCINATE 50 MG: 50 TABLET, EXTENDED RELEASE ORAL at 08:09

## 2024-09-29 RX ADMIN — Medication 1 TABLET: at 08:09

## 2024-09-29 RX ADMIN — SENNOSIDES 17.2 MG: 8.6 TABLET, FILM COATED ORAL at 17:11

## 2024-09-29 RX ADMIN — HEPARIN SODIUM 14 UNITS/KG/HR: 10000 INJECTION, SOLUTION INTRAVENOUS at 15:54

## 2024-09-29 RX ADMIN — POLYSACCHARIDE-IRON COMPLEX 150 MG: 150 CAPSULE ORAL at 08:09

## 2024-09-29 RX ADMIN — PREDNISONE 12.5 MG: 2.5 TABLET ORAL at 08:09

## 2024-09-29 RX ADMIN — Medication 6 MG: at 21:34

## 2024-09-29 RX ADMIN — SEVELAMER HYDROCHLORIDE 1600 MG: 800 TABLET ORAL at 11:41

## 2024-09-29 RX ADMIN — FLUTICASONE FUROATE AND VILANTEROL TRIFENATATE 1 PUFF: 100; 25 POWDER RESPIRATORY (INHALATION) at 08:10

## 2024-09-29 RX ADMIN — ALBUTEROL SULFATE 2 PUFF: 90 AEROSOL, METERED RESPIRATORY (INHALATION) at 16:42

## 2024-09-29 RX ADMIN — LAMOTRIGINE 100 MG: 100 TABLET ORAL at 21:34

## 2024-09-29 RX ADMIN — PANTOPRAZOLE SODIUM 40 MG: 40 TABLET, DELAYED RELEASE ORAL at 06:00

## 2024-09-29 RX ADMIN — CYCLOPHOSPHAMIDE 100 MG: 50 CAPSULE ORAL at 08:09

## 2024-09-29 NOTE — PLAN OF CARE
Problem: PAIN - ADULT  Goal: Verbalizes/displays adequate comfort level or baseline comfort level  Description: Interventions:  - Encourage patient to monitor pain and request assistance  - Assess pain using appropriate pain scale  - Administer analgesics based on type and severity of pain and evaluate response  - Implement non-pharmacological measures as appropriate and evaluate response  - Consider cultural and social influences on pain and pain management  - Notify physician/advanced practitioner if interventions unsuccessful or patient reports new pain  Outcome: Progressing     Problem: INFECTION - ADULT  Goal: Absence or prevention of progression during hospitalization  Description: INTERVENTIONS:  - Assess and monitor for signs and symptoms of infection  - Monitor lab/diagnostic results  - Monitor all insertion sites, i.e. indwelling lines, tubes, and drains  - Monitor endotracheal if appropriate and nasal secretions for changes in amount and color  - [place] appropriate cooling/warming therapies per order  - Administer medications as ordered  - Instruct and encourage patient and family to use good hand hygiene technique  - Identify and instruct in appropriate isolation precautions for identified infection/condition  Outcome: Progressing  Goal: Absence of fever/infection during neutropenic period  Description: INTERVENTIONS:  - Monitor WBC    Outcome: Progressing     Problem: SAFETY ADULT  Goal: Patient will remain free of falls  Description: INTERVENTIONS:  - Educate patient/family on patient safety including physical limitations  - Instruct patient to call for assistance with activity   - Consult OT/PT to assist with strengthening/mobility   - Keep Call bell within reach  - Keep bed low and locked with side rails adjusted as appropriate  - Keep care items and personal belongings within reach  - Initiate and maintain comfort rounds  - Make Fall Risk Sign visible to staff  - Apply yellow socks and bracelet  for high fall risk patients  - Consider moving patient to room near nurses station  Outcome: Progressing  Goal: Maintain or return to baseline ADL function  Description: INTERVENTIONS:  -  Assess patient's ability to carry out ADLs; assess patient's baseline for ADL function and identify physical deficits which impact ability to perform ADLs (bathing, care of mouth/teeth, toileting, grooming, dressing, etc.)  - Assess/evaluate cause of self-care deficits   - Assess range of motion  - Assess patient's mobility; develop plan if impaired  - Assess patient's need for assistive devices and provide as appropriate  - Encourage maximum independence but intervene and supervise when necessary  - Involve family in performance of ADLs  - Assess for home care needs following discharge   - Consider OT consult to assist with ADL evaluation and planning for discharge  - Provide patient education as appropriate  Outcome: Progressing  Goal: Maintains/Returns to pre admission functional level  Description: INTERVENTIONS:  - Perform AM-PAC 6 Click Basic Mobility/ Daily Activity assessment daily.  - Set and communicate daily mobility goal to care team and patient/family/caregiver.   - Collaborate with rehabilitation services on mobility goals if consulted  Problem: Knowledge Deficit  Goal: Patient/family/caregiver demonstrates understanding of disease process, treatment plan, medications, and discharge instructions  Description: Complete learning assessment and assess knowledge base.  Interventions:  - Provide teaching at level of understanding  - Provide teaching via preferred learning methods  Outcome: Progressing     Problem: Prexisting or High Potential for Compromised Skin Integrity  Goal: Skin integrity is maintained or improved  Description: INTERVENTIONS:  - Identify patients at risk for skin breakdown  - Assess and monitor skin integrity  - Assess and monitor nutrition and hydration status  - Monitor labs   - Assess for  incontinence   - Turn and reposition patient  - Assist with mobility/ambulation  - Relieve pressure over bony prominences  - Avoid friction and shearing  - Provide appropriate hygiene as needed including keeping skin clean and dry  - Evaluate need for skin moisturizer/barrier cream  - Collaborate with interdisciplinary team   - Patient/family teaching  - Consider wound care consult   Outcome: Progressing     - Out of bed for toileting  - Record patient progress and toleration of activity level   Outcome: Progressing

## 2024-09-29 NOTE — ASSESSMENT & PLAN NOTE
Lab Results   Component Value Date    EGFR 2 09/29/2024    EGFR 3 09/28/2024    EGFR 5 09/19/2024    CREATININE 12.96 (H) 09/29/2024    CREATININE 11.18 (H) 09/28/2024    CREATININE 7.20 (H) 09/19/2024     Nephrology following  Daily CBC while admitted and on heparin

## 2024-09-29 NOTE — ASSESSMENT & PLAN NOTE
Lab Results   Component Value Date    EGFR 2 09/29/2024    EGFR 3 09/28/2024    EGFR 5 09/19/2024    CREATININE 12.96 (H) 09/29/2024    CREATININE 11.18 (H) 09/28/2024    CREATININE 7.20 (H) 09/19/2024     Dialysis per nephrology plan for 9/30 after catheter exchange

## 2024-09-29 NOTE — CASE MANAGEMENT
Case Management Assessment & Discharge Planning Note    Patient name Ngozi Beard  Location [facility] 522/[facility] 522-01 MRN [id number]  : 1960 Date 2024       Current Admission Date: 2024  Current Admission Diagnosis:Complication associated with dialysis catheter   Patient Active Problem List    Diagnosis Date Noted Date Diagnosed    HFrEF (heart failure with reduced ejection fraction) ([facility]) 2024     Stage 5 chronic kidney disease on chronic dialysis ([facility]) 2024     Hyperkalemia 2024     Chest pain 2024     Unspecified mood (affective) disorder ([facility]) 2024     Acute renal failure on dialysis ([facility]) 2024     Complication associated with dialysis catheter 2024     Cardiomyopathy (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) 2024     Generalized weakness 2024     Rash 2024     Anemia due to chronic kidney disease, on chronic dialysis ([facility]) 2024     Thrombocytopenia (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) 2023     Asthma without acute exacerbation 2023     Asthma due to seasonal allergies 2023     Bipolar 1 disorder ([facility]) 2022     Primary hypertension 2022     Dyslipidemia 2022       LOS (days): 2  Geometric Mean LOS (GMLOS) (days):   Days to GMLOS:     OBJECTIVE:  PATIENT READMITTED TO HOSPITAL  Risk  of Unplanned Readmission Score: 44.29         Current admission status: Inpatient       Preferred Pharmacy:   [id number] - [place] - [address]  [address] [address]  Phone: [phone] Fax: [phone]    [facility] - [place], [place] - [address]  [address]  [address]  [place] [place] [facility] [address]  Phone: [phone] Fax: [phone]    Primary Care Provider: Meenakshi Balbuena MD    Primary Insurance: Vessel  Secondary Insurance: [employer] [facility]    ASSESSMENT:  Active Health Care Proxies       AdriannashareeGustavoJacquelyn [facility] Care Representative - Sister   Primary Phone: [phone] (Mobile)                 Advance Directives  Does patient have a Health Care POA?: No  Was patient offered paperwork?: Yes (declined)  Does patient currently have a Health Care decision maker?: Yes, please see Health Care Proxy section  Does patient have Advance Directives?: Yes  Advance Directives: Living will  Primary Contact: sisterer, Christine Labadie         Readmission Root Cause  30 Day Readmission: Yes  During previous admission, was a post-acute recommendation made?: Yes  What post-acute resources were offered?: [facility] ([facility])  Patient was readmitted due to: OR for permacath exchange. Thrombus found.  Action Plan: IV heparin    Patient Information  Admitted from:: Facility ([facility])  Mental Status: Alert  During Assessment patient was accompanied by: Not accompanied during assessment  Assessment information provided by:: Patient  Primary Caregiver: Other (Comment) (resides at [facility] since last admission)  Caregiver's Relationship to Patient:: Facility Staff  Support Systems: Family members, Friend  What city do you live in?: [place]  Home entry access options. Select all that apply.: Elevator  Type of Current Residence: Facility ([facility])  Living Arrangements: Lives in Facility  Is  patient a ?: No    Activities of Daily Living Prior to Admission  Functional Status: Assistance  Completes ADLs independently?: Yes  Ambulates independently?: Yes  Does patient use assisted devices?: Yes  Assisted Devices (DME) used: Straight Cane  Does patient have a history of Outpatient Therapy (PT/OT)?: No  Does the patient have a history of Short-Term Rehab?: Yes ([facility]. approved last admission for STR but pt is now at [facility] for LT)  Does patient have a history of HHC?: No  Does patient currently have HHC?: No         Patient Information Continued  Income Source: Pension/USP  Does patient have prescription coverage?: Yes  Does patient receive dialysis treatments?: Yes (TTS [address])  Does patient have a history of substance abuse?: No  Does patient have a history of Mental Health Diagnosis?: Yes  Is patient receiving treatment for mental health?: Yes (Bipolar)  Has patient received inpatient treatment related to mental health in the last 2 years?: No         Means of Transportation  Means of Transport to [facility]:: Family transport      Social Determinants of Health (SDOH)      Flowsheet Row Most Recent Value   Housing Stability    In the last 12 months, was there a time when you were not able to pay the mortgage or rent on time? N   In the past 12 months, how many times have you moved where you were living? 1   At any time in the past 12 months, were you homeless or living in a shelter (including now)? N   Transportation Needs    In the past 12 months, has lack of transportation kept you from medical appointments or from getting medications? no   In the past 12 months, has lack of transportation kept you from meetings, work, or from getting things needed for daily living? No   Food Insecurity    Within the past 12 months, you worried that your food would run out before you got the money to buy more. Never true   Within the past 12 months, the food you bought just didn't last and  you didn't have money to get more. Never true   Utilities    In the past 12 months has the electric, gas, oil, or water company threatened to shut off services in your home? No            DISCHARGE DETAILS:    Discharge planning discussed with:: patient        CM contacted family/caregiver?: No- see comments (declined)             Contacts  Patient Contacts: patient/self  Contact Method: In Person  Reason/Outcome: Continuity of Care, Discharge Planning, Emergency Contact    Requested Home Health Care         Is the patient interested in HHC at discharge?: No    DME Referral Provided  Referral made for DME?: No    Other Referral/Resources/Interventions Provided:  Interventions: Short Term Rehab    Would you like to participate in our [facility] service program?  : No - Declined    Additional Comments: CM met with pt to introduce CM role, obtain baseline assessment information and discuss DCP. Pt reports that she is a now residing at [facility]. Last admission she was approved for STR. She requires assist w/ ADLs and amb w/ SC. Will follow for DC needs

## 2024-09-29 NOTE — NURSING NOTE
Patient is refusing to have blood work drawn at this time.  Pt stated to leave me alone and nobody lets me f**nicky sleep.  I explained to her that I already discussed with her the need for blood work at 2030.  Patient was agreeable at that time then and the need for blood work.   Education was given now about the importance of being on a heparin drip and needing blood work.  Patient stated she didn't care.  Education was given on the risk for another blood clot, stroke, or bleeding out.  Patient stated I don't care.    ROLANDA Carmen made aware via secure chat.   Will try to attempt blood work at a later time. Nella agreeable to this plan.

## 2024-09-29 NOTE — PROGRESS NOTES
NEPHROLOGY PROGRESS NOTE   Ngozi Beard 64 y.o. 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  On dialysis since 7/11/2024.  Currently on HD TTS at [facility].  Admitted for catheter malfunctioning status post battery change done by IR on 9/27.  Unfortunately PermCath did not work while attempting dialysis on 9/28.  IR planning to exchange catheter tomorrow  Complication associated with dialysis catheter  Status post PermCath exchange by IR on 9/27.  Unfortunately unable to to be used during dialysis on 9/28.  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    HFrEF (heart failure with reduced ejection fraction) ([facility])  Wt Readings from Last 3 Encounters:   09/27/24 101 kg (223 lb)   09/25/24 103 kg (226 lb 11.2 oz)   09/19/24 102 kg (224 lb 3.3 oz)     Plan to continue UF during dialysis once dialysis access is functional to keep her at her reported dry weight 100 kg       Anemia due to chronic kidney disease, on chronic dialysis ([facility])  Lab Results   Component Value Date    EGFR 3 09/28/2024    EGFR 5 09/19/2024    EGFR 3 09/18/2024    CREATININE 11.18 (H) 09/28/2024    CREATININE 7.20 (H) 09/19/2024    CREATININE 10.72 (H) 09/18/2024   Hemoglobin 8.8 on 9/28, monitor H&H      PLAN SUMMARY:  CT venogram as per IR recommendation prior to exchange  Awaiting IR exchange of PermCath by IR and  then dialysis after.  Continue anticoagulation    SUBJECTIVE:  Patient seen and examined, reports some occasional on and off breathing problems but she attributes to her asthma, denies any chest pain, no nausea, no vomiting, no aminal pain    OBJECTIVE:  Current Weight: Weight - Scale: 101 kg (223 lb)  Vitals:    09/29/24 1053   BP: 109/64   Pulse: 68   Resp: 17   Temp: 97.7 °F (36.5 °C)   SpO2: 97%       Intake/Output Summary (Last 24 hours) at 9/29/2024 1119  Last data filed at 9/29/2024 0805  Gross per 24 hour   Intake 1226.69 ml   Output 0 ml   Net 1226.69 ml       General: conscious, cooperative, in not acute distress  Eyes: conjunctivae pink, anicteric sclerae  ENT: lips and mucous membranes moist, on oxygen via nasal cannula  Neck: supple, no JVD  Chest: clear breath sounds bilateral, no crackles, ronchus or wheezings  CVS: distinct S1 & S2, normal rate, regular rhythm  Abdomen: soft, non-tender, non-distended, normoactive bowel sounds  Extremities: no edema of both legs  Skin: no rash  Neuro: awake, alert, oriented  Right IJ permacath in place      Medications:    Current Facility-Administered Medications:     acetaminophen (TYLENOL) tablet 650 mg, 650 mg, Oral, Q4H PRN, Drew Norwood MD, 650 mg at 09/29/24 0928    atorvastatin (LIPITOR) tablet 20 mg, 20 mg, Oral, Daily, Drew Norwood MD, 20 mg at 09/29/24 0809    atovaquone (MEPRON) oral suspension 1,500 mg, 1,500 mg, Oral, Daily, Drew Norwood MD, 1,500 mg at 09/29/24 0810    calcium carbonate (OYSTER SHELL,OSCAL) 500 mg tablet 1 tablet, 1 tablet, Oral, Daily With Breakfast, Drew Norwood MD, 1 tablet at 09/29/24 0809    cyclophosphamide (CYTOXAN) capsule 100 mg, 100 mg, Oral, Daily, Drew Norwood MD, 100 mg at 09/29/24 0809    Fluticasone Furoate-Vilanterol 100-25 mcg/actuation 1 puff, 1 puff, Inhalation, Daily, Drew Norwood MD, 1 puff at 09/29/24 0810    heparin (porcine) 25,000 units in 0.45% NaCl 250 mL infusion (premix), 3-30 Units/kg/hr  (Order-Specific), Intravenous, Titrated, Drew Norwood MD, Last Rate: 14 mL/hr at 09/29/24 0917, 14 Units/kg/hr at 09/29/24 0917    heparin (porcine) injection 4,000 Units, 4,000 Units, Intravenous, Q6H PRN, Drew Norwood MD, 4,000 Units at 09/28/24 1720    heparin (porcine) injection 8,000 Units, 8,000 Units, Intravenous, Q6H PRN, Drew Norwood MD    iron polysaccharides (FERREX) capsule 150 mg, 150 mg, Oral, Daily, Drew Norwood MD, 150 mg at 09/29/24 0809    lamoTRIgine (LaMICtal) tablet 100 mg, 100 mg, Oral, HS, Drew Norwood MD, 100 mg at 09/28/24 2132    melatonin tablet 6 mg, 6 mg, Oral, HS, Sheila Reddy, SHAYANNP, 6 mg at 09/28/24 2132    metoprolol succinate (TOPROL-XL) 24 hr tablet 50 mg, 50 mg, Oral, BID, Drew Norwood MD, 50 mg at 09/29/24 0809    montelukast (SINGULAIR) tablet 10 mg, 10 mg, Oral, Daily, Drew Norwood MD, 10 mg at 09/29/24 0809    ondansetron (ZOFRAN-ODT) dispersible tablet 4 mg, 4 mg, Oral, Q4H PRN, Drew Norwood MD, 4 mg at 09/28/24 1343    pantoprazole (PROTONIX) EC tablet 40 mg, 40 mg, Oral, Daily Before Breakfast, Drew Norwood MD, 40 mg at 09/29/24 0600    predniSONE tablet 12.5 mg, 12.5 mg, Oral, Daily, 12.5 mg at 09/29/24 0809 **FOLLOWED BY** [START ON 10/5/2024] predniSONE tablet 10 mg, 10 mg, Oral, Daily **FOLLOWED BY** [START ON 10/19/2024] predniSONE tablet 7.5 mg, 7.5 mg, Oral, Daily **FOLLOWED BY** [START ON 11/3/2024] predniSONE tablet 5 mg, 5 mg, Oral, Daily, Drew Norwood MD    prochlorperazine (COMPAZINE) tablet 5 mg, 5 mg, Oral, Q6H PRN, Blaine Prieto MD, 5 mg at 09/28/24 1557    senna (SENOKOT) tablet 17.2 mg, 17.2 mg, Oral, BID, Drew Norwood MD, 17.2 mg at 09/29/24 0809    sevelamer (RENAGEL) tablet 1,600 mg, 1,600 mg, Oral, TID With Meals, Drew Norowod MD, 1,600 mg at 09/29/24 0809    Sodium Zirconium Cyclosilicate (Lokelma) 10 g, 10 g, Oral, Daily, Drew Norwood MD    traZODone (DESYREL) tablet 200 mg, 200 mg, Oral, HS, Blaine Prieto MD, 200  "mg at 09/28/24 2132    venlafaxine (EFFEXOR-XR) 24 hr capsule 150 mg, 150 mg, Oral, Daily, Drew Norwood MD, 150 mg at 09/29/24 0809    venlafaxine (EFFEXOR-XR) 24 hr capsule 75 mg, 75 mg, Oral, Daily, Drew Norwood MD, 75 mg at 09/29/24 0810    Invasive Devices:        Lab Results:   Results from last 7 days   Lab Units 09/28/24  0806 09/27/24  1648   WBC Thousand/uL 7.41 7.77   HEMOGLOBIN g/dL 8.8* 9.9*   HEMATOCRIT % 28.5* 31.8*   PLATELETS Thousands/uL 250 289   SODIUM mmol/L 139  --    POTASSIUM mmol/L 4.6  --    CHLORIDE mmol/L 99  --    CO2 mmol/L 25  --    BUN mg/dL 65*  --    CREATININE mg/dL 11.18*  --    CALCIUM mg/dL 9.1  --    MAGNESIUM mg/dL 2.1  --    PHOSPHORUS mg/dL 8.5*  --    ALK PHOS U/L 58  --    ALT U/L 3*  --    AST U/L 9*  --            Portions of the record may have been created with voice recognition software. Occasional wrong word or \"sound a like\" substitutions may have occurred due to the inherent limitations of voice recognition software. Read the chart carefully and recognize, using context, where substitutions have occurred.If you have any questions, please contact the dictating provider.  "

## 2024-09-29 NOTE — PROGRESS NOTES
Patient is refusing her dose Lokelma. Educated patient on her high potassium level and how this medication is designed to help lower it. She still adamantly refuses. MD from [facility].

## 2024-09-29 NOTE — ASSESSMENT & PLAN NOTE
Secondary to patient being unable to receive dialysis  Extra dose of Lokelma given continue daily Lokelma (noted patient refused on mornings of 9/28 and 9/29 per MAR)  Nephrology aware  Plan for dialysis tomorrow

## 2024-09-29 NOTE — ASSESSMENT & PLAN NOTE
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  On dialysis since 7/11/2024.  Currently on HD TTS at [facility].  Admitted for catheter malfunctioning status post battery change done by IR on 9/27.  Unfortunately PermCath did not work while attempting dialysis on 9/28.  IR planning to exchange catheter tomorrow

## 2024-09-29 NOTE — PROGRESS NOTES
Progress Note - Internal Medicine   Name: Ngozi Beard 64 y.o. female I MRN: [id number]  Unit/Bed#: [facility] 522-01 I Date of Admission: 9/27/2024   Date of Service: 9/29/2024 I Hospital Day: 2    Assessment & Plan  Complication associated with dialysis catheter  -In the OR on 9/27/2024 during routine permacath exchange with possible intervention (IR tunneled dialysis catheter check/change/reposition/angioplasty ) IR found a nonocclusive thrombus in the superior vena cava surrounding the catheter tip with a catheter tip embedded within the thrombus.  The catheter was exchanged for a shorter catheter and was repositioned  -Remains occluded unable to pull blood this a.m.  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  Nephrology following  Anemia due to chronic kidney disease, on chronic dialysis ([facility])  Lab Results   Component Value Date    EGFR 2 09/29/2024    EGFR 3 09/28/2024    EGFR 5 09/19/2024    CREATININE 12.96 (H) 09/29/2024    CREATININE 11.18 (H) 09/28/2024    CREATININE 7.20 (H) 09/19/2024     Nephrology following  Daily CBC while admitted and on heparin  HFrEF (heart failure with reduced ejection fraction) ([facility])  Wt Readings from Last 3 Encounters:   09/27/24 101 kg (223 lb)   09/25/24 103 kg (226 lb 11.2 oz)   09/19/24 102 kg (224 lb 3.3 oz)     Clinically euvolemic fluid balance through dialysis      Stage 5 chronic kidney disease on chronic dialysis ([facility])  Lab Results   Component Value Date    EGFR 2 09/29/2024    EGFR 3 09/28/2024    EGFR 5 09/19/2024    CREATININE 12.96 (H) 09/29/2024    CREATININE 11.18 (H) 09/28/2024    CREATININE  7.20 (H) 2024     Dialysis per nephrology plan for  after catheter exchange  Hyperkalemia  Secondary to patient being unable to receive dialysis  Extra dose of Lokelma given continue daily Lokelma (noted patient refused on mornings of  and  per MAR)  Nephrology aware  Plan for dialysis tomorrow    Disposition: Remain in hospital due to malfunctioning dialysis catheter        History of Present Illness   Patient seen and examined. No acute events overnight.  Patient states she is feeling generally fine but does have some nausea that she experiences when missing dialysis.    Objective     Vitals:    24 0556 24 0741 24 1053 24 1554   BP: 111/73 114/70 109/64 113/63   BP Location:  Left arm Left arm Left arm   Pulse: 74 79 68 71   Resp:  16 17 17   Temp:  98.4 °F (36.9 °C) 97.7 °F (36.5 °C) 98.4 °F (36.9 °C)   TempSrc:  Oral Oral Oral   SpO2: 94% 97% 97% 97%   Weight:       Height:          Temperature:   Temp (24hrs), Av.1 °F (36.7 °C), Min:97.7 °F (36.5 °C), Max:98.4 °F (36.9 °C)    Temperature: 98.4 °F (36.9 °C)  Intake & Output:  I/O          0701   0700  0701   0700  0701   0700    P.O.  300 120    I.V. (mL/kg)   281.1 (2.8)    Total Intake(mL/kg)  300 (3) 401.1 (4)    Urine (mL/kg/hr)   0 (0)    Total Output   0    Net  +300 +401.1                 Weights:   IBW (Ideal Body Weight): 52.4 kg    Body mass index is 39.5 kg/m².  Weight (last 2 days)       Date/Time Weight    24 19:44:50 101 (223)    24 0928 100 (220.8)          Physical Exam  Constitutional:       General: She is not in acute distress.     Appearance: She is obese.      Comments: Middle-age female chronically ill   HENT:      Mouth/Throat:      Mouth: Mucous membranes are moist.   Cardiovascular:      Rate and Rhythm: Normal rate and regular rhythm.   Pulmonary:      Effort: Pulmonary effort is normal. No respiratory distress.      Breath sounds: No wheezing.    Abdominal:      General: Abdomen is flat.      Palpations: Abdomen is soft.   Neurological:      General: No focal deficit present.      Mental Status: She is oriented to person, place, and time.           Lab Results: I have reviewed the following results:   Recent Labs     09/27/24  1648 09/27/24  2328 09/28/24  0806 09/28/24  1531 09/29/24  1412   WBC 7.77  --  7.41  --   --    HGB 9.9*  --  8.8*  --   --    HCT 31.8*  --  28.5*  --   --      --  250  --   --    SODIUM  --    < > 139  --  138   K  --    < > 4.6  --  5.9*   CL  --    < > 99  --  99   CO2  --    < > 25  --  26   BUN  --    < > 65*  --  77*   CREATININE  --    < > 11.18*  --  12.96*   GLUC  --    < > 107  --  124   MG  --   --  2.1  --   --    PHOS  --   --  8.5*  --   --    AST  --   --  9*  --   --    ALT  --   --  3*  --   --    ALB  --   --  3.7  --   --    TBILI  --   --  0.38  --   --    ALKPHOS  --   --  58  --   --    PTT 20*   < > 76*   < > 73*   INR 0.96  --   --   --   --     < > = values in this interval not displayed.     Imaging Review: No pertinent imaging studies reviewed.  Other Studies: No additional pertinent studies reviewed.    Currently Ordered Meds:   Current Facility-Administered Medications:     acetaminophen (TYLENOL) tablet 650 mg, Q4H PRN    albuterol (PROVENTIL HFA,VENTOLIN HFA) inhaler 2 puff, Q4H PRN    atorvastatin (LIPITOR) tablet 20 mg, Daily    atovaquone (MEPRON) oral suspension 1,500 mg, Daily    calcium carbonate (OYSTER SHELL,OSCAL) 500 mg tablet 1 tablet, Daily With Breakfast    cyclophosphamide (CYTOXAN) capsule 100 mg, Daily    Fluticasone Furoate-Vilanterol 100-25 mcg/actuation 1 puff, Daily    heparin (porcine) 25,000 units in 0.45% NaCl 250 mL infusion (premix), Titrated, Last Rate: 14 Units/kg/hr (09/29/24 4021)    heparin (porcine) injection 4,000 Units, Q6H PRN    heparin (porcine) injection 8,000 Units, Q6H PRN    iron polysaccharides (FERREX) capsule 150 mg, Daily    lamoTRIgine (LaMICtal)  tablet 100 mg, HS    melatonin tablet 6 mg, HS    metoprolol succinate (TOPROL-XL) 24 hr tablet 50 mg, BID    montelukast (SINGULAIR) tablet 10 mg, Daily    ondansetron (ZOFRAN-ODT) dispersible tablet 4 mg, Q4H PRN    pantoprazole (PROTONIX) EC tablet 40 mg, Daily Before Breakfast    predniSONE tablet 12.5 mg, Daily **FOLLOWED BY** [START ON 10/5/2024] predniSONE tablet 10 mg, Daily **FOLLOWED BY** [START ON 10/19/2024] predniSONE tablet 7.5 mg, Daily **FOLLOWED BY** [START ON 11/3/2024] predniSONE tablet 5 mg, Daily    prochlorperazine (COMPAZINE) tablet 5 mg, Q6H PRN    senna (SENOKOT) tablet 17.2 mg, BID    sevelamer (RENAGEL) tablet 1,600 mg, TID With Meals    Sodium Zirconium Cyclosilicate (Lokelma) 10 g, Daily    Sodium Zirconium Cyclosilicate (Lokelma) 10 g, Once    traZODone (DESYREL) tablet 200 mg, HS    venlafaxine (EFFEXOR-XR) 24 hr capsule 150 mg, Daily    venlafaxine (EFFEXOR-XR) 24 hr capsule 75 mg, Daily  VTE Pharmacologic Prophylaxis: Heparin    Portions of the record may have been created with voice recognition software.

## 2024-09-29 NOTE — ASSESSMENT & PLAN NOTE
Lab Results   Component Value Date    EGFR 3 09/28/2024    EGFR 5 09/19/2024    EGFR 3 09/18/2024    CREATININE 11.18 (H) 09/28/2024    CREATININE 7.20 (H) 09/19/2024    CREATININE 10.72 (H) 09/18/2024   Hemoglobin 8.8 on 9/28, monitor H&H

## 2024-09-29 NOTE — PLAN OF CARE
Problem: PAIN - ADULT  Goal: Verbalizes/displays adequate comfort level or baseline comfort level  Description: Interventions:  - Encourage patient to monitor pain and request assistance  - Assess pain using appropriate pain scale  - Administer analgesics based on type and severity of pain and evaluate response  - Implement non-pharmacological measures as appropriate and evaluate response  - Consider cultural and social influences on pain and pain management  - Notify physician/advanced practitioner if interventions unsuccessful or patient reports new pain  9/29/2024 1950 by Mable Coello RN  Outcome: Progressing  9/29/2024 1556 by Mable Coello RN  Outcome: Progressing     Problem: INFECTION - ADULT  Goal: Absence or prevention of progression during hospitalization  Description: INTERVENTIONS:  - Assess and monitor for signs and symptoms of infection  - Monitor lab/diagnostic results  - Monitor all insertion sites, i.e. indwelling lines, tubes, and drains  - Monitor endotracheal if appropriate and nasal secretions for changes in amount and color  - [place] appropriate cooling/warming therapies per order  - Administer medications as ordered  - Instruct and encourage patient and family to use good hand hygiene technique  - Identify and instruct in appropriate isolation precautions for identified infection/condition  9/29/2024 1950 by Mable Coello RN  Outcome: Progressing  9/29/2024 1556 by Mable Coello RN  Outcome: Progressing  Goal: Absence of fever/infection during neutropenic period  Description: INTERVENTIONS:  - Monitor WBC    9/29/2024 1950 by Mable Coello RN  Outcome: Progressing  9/29/2024 1556 by Mable Coello RN  Outcome: Progressing     Problem: SAFETY ADULT  Goal: Patient will remain free of falls  Description: INTERVENTIONS:  - Educate patient/family on patient safety including physical limitations  - Instruct patient to call for assistance with activity   - Consult  OT/PT to assist with strengthening/mobility   - Keep Call bell within reach  - Keep bed low and locked with side rails adjusted as appropriate  - Keep care items and personal belongings within reach  - Initiate and maintain comfort rounds  - Make Fall Risk Sign visible to staff  - Apply yellow socks and bracelet for high fall risk patients  - Consider moving patient to room near nurses station  9/29/2024 1950 by Mable Coello RN  Outcome: Progressing  9/29/2024 1556 by Mable Coello RN  Outcome: Progressing  Goal: Maintain or return to baseline ADL function  Description: INTERVENTIONS:  -  Assess patient's ability to carry out ADLs; assess patient's baseline for ADL function and identify physical deficits which impact ability to perform ADLs (bathing, care of mouth/teeth, toileting, grooming, dressing, etc.)  - Assess/evaluate cause of self-care deficits   - Assess range of motion  - Assess patient's mobility; develop plan if impaired  - Assess patient's need for assistive devices and provide as appropriate  - Encourage maximum independence but intervene and supervise when necessary  - Involve family in performance of ADLs  - Assess for home care needs following discharge   - Consider OT consult to assist with ADL evaluation and planning for discharge  - Provide patient education as appropriate  9/29/2024 1950 by Mable Coello RN  Outcome: Progressing  9/29/2024 1556 by Mable Coello RN  Outcome: Progressing  Goal: Maintains/Returns to pre admission functional level  Description: INTERVENTIONS:  - Perform AM-PAC 6 Click Basic Mobility/ Daily Activity assessment daily.  - Set and communicate daily mobility goal to care team and patient/family/caregiver.   - Collaborate with rehabilitation services on mobility goals if consulted  - Record patient progress and toleration of activity level   9/29/2024 1950 by Mable Coello RN  Outcome: Progressing  9/29/2024 1556 by Mable Coello  RN  Outcome: Progressing

## 2024-09-29 NOTE — ASSESSMENT & PLAN NOTE
Status post PermCath exchange by IR on 9/27.  Unfortunately unable to to be used during dialysis on 9/28.  IR was notified, plan for PermCath exchange, IR requested a CT venogram prior

## 2024-09-29 NOTE — ASSESSMENT & PLAN NOTE
Wt Readings from Last 3 Encounters:   09/27/24 101 kg (223 lb)   09/25/24 103 kg (226 lb 11.2 oz)   09/19/24 102 kg (224 lb 3.3 oz)     Plan to continue UF during dialysis once dialysis access is functional to keep her at her reported dry weight 100 kg

## 2024-09-30 ENCOUNTER — PATIENT OUTREACH (OUTPATIENT)
Dept: CASE MANAGEMENT | Facility: OTHER | Age: 64
End: 2024-09-30

## 2024-09-30 ENCOUNTER — APPOINTMENT (INPATIENT)
Dept: DIALYSIS | Facility: HOSPITAL | Age: 64
End: 2024-09-30
Attending: INTERNAL MEDICINE
Payer: COMMERCIAL

## 2024-09-30 LAB
ANION GAP SERPL CALCULATED.3IONS-SCNC: 15 MMOL/L (ref 4–13)
APTT PPP: 73 SECONDS (ref 23–34)
BUN SERPL-MCNC: 80 MG/DL (ref 5–25)
CALCIUM SERPL-MCNC: 9.5 MG/DL (ref 8.4–10.2)
CHLORIDE SERPL-SCNC: 100 MMOL/L (ref 96–108)
CO2 SERPL-SCNC: 24 MMOL/L (ref 21–32)
CREAT SERPL-MCNC: 13.69 MG/DL (ref 0.6–1.3)
ERYTHROCYTE [DISTWIDTH] IN BLOOD BY AUTOMATED COUNT: 17.6 % (ref 11.6–15.1)
FUNGUS SPEC CULT: NORMAL
GFR SERPL CREATININE-BSD FRML MDRD: 2 ML/MIN/1.73SQ M
GLUCOSE SERPL-MCNC: 86 MG/DL (ref 65–140)
HCT VFR BLD AUTO: 25.5 % (ref 34.8–46.1)
HGB BLD-MCNC: 8.1 G/DL (ref 11.5–15.4)
MCH RBC QN AUTO: 31.4 PG (ref 26.8–34.3)
MCHC RBC AUTO-ENTMCNC: 31.8 G/DL (ref 31.4–37.4)
MCV RBC AUTO: 99 FL (ref 82–98)
PLATELET # BLD AUTO: 225 THOUSANDS/UL (ref 149–390)
PMV BLD AUTO: 9.9 FL (ref 8.9–12.7)
POTASSIUM SERPL-SCNC: 5.5 MMOL/L (ref 3.5–5.3)
RBC # BLD AUTO: 2.58 MILLION/UL (ref 3.81–5.12)
SODIUM SERPL-SCNC: 139 MMOL/L (ref 135–147)
WBC # BLD AUTO: 8.2 THOUSAND/UL (ref 4.31–10.16)

## 2024-09-30 PROCEDURE — 80048 BASIC METABOLIC PNL TOTAL CA: CPT | Performed by: INTERNAL MEDICINE

## 2024-09-30 PROCEDURE — 77001 FLUOROGUIDE FOR VEIN DEVICE: CPT | Performed by: RADIOLOGY

## 2024-09-30 PROCEDURE — 5A1D70Z PERFORMANCE OF URINARY FILTRATION, INTERMITTENT, LESS THAN 6 HOURS PER DAY: ICD-10-PCS | Performed by: INTERNAL MEDICINE

## 2024-09-30 PROCEDURE — 85730 THROMBOPLASTIN TIME PARTIAL: CPT | Performed by: INTERNAL MEDICINE

## 2024-09-30 PROCEDURE — 99152 MOD SED SAME PHYS/QHP 5/>YRS: CPT | Performed by: RADIOLOGY

## 2024-09-30 PROCEDURE — 36558 INSERT TUNNELED CV CATH: CPT | Performed by: RADIOLOGY

## 2024-09-30 PROCEDURE — 85027 COMPLETE CBC AUTOMATED: CPT | Performed by: INTERNAL MEDICINE

## 2024-09-30 PROCEDURE — 99232 SBSQ HOSP IP/OBS MODERATE 35: CPT | Performed by: INTERNAL MEDICINE

## 2024-09-30 PROCEDURE — 90935 HEMODIALYSIS ONE EVALUATION: CPT | Performed by: INTERNAL MEDICINE

## 2024-09-30 PROCEDURE — 76937 US GUIDE VASCULAR ACCESS: CPT | Performed by: RADIOLOGY

## 2024-09-30 RX ORDER — PROCHLORPERAZINE MALEATE 5 MG
5 TABLET ORAL EVERY 6 HOURS PRN
Status: DISCONTINUED | OUTPATIENT
Start: 2024-09-30 | End: 2024-10-01

## 2024-09-30 RX ORDER — HYDROCODONE BITARTRATE AND ACETAMINOPHEN 5; 325 MG/1; MG/1
1 TABLET ORAL EVERY 4 HOURS PRN
Status: COMPLETED | OUTPATIENT
Start: 2024-09-30 | End: 2024-10-03

## 2024-09-30 RX ORDER — MIDAZOLAM HYDROCHLORIDE 2 MG/2ML
INJECTION, SOLUTION INTRAMUSCULAR; INTRAVENOUS AS NEEDED
Status: COMPLETED | OUTPATIENT
Start: 2024-09-30 | End: 2024-09-30

## 2024-09-30 RX ORDER — CEFAZOLIN SODIUM 1 G/50ML
SOLUTION INTRAVENOUS
Status: COMPLETED | OUTPATIENT
Start: 2024-09-30 | End: 2024-09-30

## 2024-09-30 RX ORDER — FENTANYL CITRATE 50 UG/ML
INJECTION, SOLUTION INTRAMUSCULAR; INTRAVENOUS AS NEEDED
Status: COMPLETED | OUTPATIENT
Start: 2024-09-30 | End: 2024-09-30

## 2024-09-30 RX ADMIN — ATOVAQUONE 1500 MG: 750 SUSPENSION ORAL at 18:03

## 2024-09-30 RX ADMIN — CEFAZOLIN SODIUM 2000 MG: 1 SOLUTION INTRAVENOUS at 11:39

## 2024-09-30 RX ADMIN — PANTOPRAZOLE SODIUM 40 MG: 40 TABLET, DELAYED RELEASE ORAL at 06:06

## 2024-09-30 RX ADMIN — HYDROCODONE BITARTRATE AND ACETAMINOPHEN 1 TABLET: 5; 325 TABLET ORAL at 21:05

## 2024-09-30 RX ADMIN — METOPROLOL SUCCINATE 50 MG: 50 TABLET, EXTENDED RELEASE ORAL at 21:13

## 2024-09-30 RX ADMIN — HEPARIN SODIUM 14 UNITS/KG/HR: 10000 INJECTION, SOLUTION INTRAVENOUS at 09:42

## 2024-09-30 RX ADMIN — FENTANYL CITRATE 50 MCG: 50 INJECTION INTRAMUSCULAR; INTRAVENOUS at 11:49

## 2024-09-30 RX ADMIN — FENTANYL CITRATE 25 MCG: 50 INJECTION INTRAMUSCULAR; INTRAVENOUS at 12:03

## 2024-09-30 RX ADMIN — ALBUTEROL SULFATE 2 PUFF: 90 AEROSOL, METERED RESPIRATORY (INHALATION) at 09:36

## 2024-09-30 RX ADMIN — CYCLOPHOSPHAMIDE 100 MG: 50 CAPSULE ORAL at 18:11

## 2024-09-30 RX ADMIN — Medication 6 MG: at 21:05

## 2024-09-30 RX ADMIN — ACETAMINOPHEN 650 MG: 325 TABLET ORAL at 18:02

## 2024-09-30 RX ADMIN — FLUTICASONE FUROATE AND VILANTEROL TRIFENATATE 1 PUFF: 100; 25 POWDER RESPIRATORY (INHALATION) at 09:35

## 2024-09-30 RX ADMIN — Medication 20 ML: at 11:55

## 2024-09-30 RX ADMIN — ATORVASTATIN CALCIUM 20 MG: 20 TABLET, FILM COATED ORAL at 18:08

## 2024-09-30 RX ADMIN — TRAZODONE HYDROCHLORIDE 200 MG: 100 TABLET ORAL at 21:05

## 2024-09-30 RX ADMIN — FENTANYL CITRATE 50 MCG: 50 INJECTION INTRAMUSCULAR; INTRAVENOUS at 11:53

## 2024-09-30 RX ADMIN — LAMOTRIGINE 100 MG: 100 TABLET ORAL at 21:04

## 2024-09-30 RX ADMIN — MIDAZOLAM 1 MG: 1 INJECTION INTRAMUSCULAR; INTRAVENOUS at 11:49

## 2024-09-30 RX ADMIN — SENNOSIDES 17.2 MG: 8.6 TABLET, FILM COATED ORAL at 18:02

## 2024-09-30 RX ADMIN — PREDNISONE 12.5 MG: 2.5 TABLET ORAL at 18:01

## 2024-09-30 RX ADMIN — MONTELUKAST 10 MG: 10 TABLET, FILM COATED ORAL at 18:08

## 2024-09-30 RX ADMIN — MIDAZOLAM 1 MG: 1 INJECTION INTRAMUSCULAR; INTRAVENOUS at 11:53

## 2024-09-30 RX ADMIN — MIDAZOLAM 0.5 MG: 1 INJECTION INTRAMUSCULAR; INTRAVENOUS at 12:03

## 2024-09-30 NOTE — PLAN OF CARE
Target UF Goal 3 L as tolerated. Patient dialyzing for  3.5 hours  on 2 K bath for serum K of  5.5  per protocol. Treatment plan reviewed with Nephrology.   Problem: METABOLIC, FLUID AND ELECTROLYTES - ADULT  Goal: Electrolytes maintained within normal limits  Description: INTERVENTIONS:  - Monitor labs and assess patient for signs and symptoms of electrolyte imbalances  - Administer electrolyte replacement as ordered  - Monitor response to electrolyte replacements, including repeat lab results as appropriate  - Instruct patient on fluid and nutrition as appropriate  Outcome: Progressing  Goal: Fluid balance maintained  Description: INTERVENTIONS:  - Monitor labs   - Monitor I/O and WT  - Instruct patient on fluid and nutrition as appropriate  - Assess for signs & symptoms of volume excess or deficit  Outcome: Progressing   Post-Dialysis RN Treatment Note    Blood Pressure:  Pre 120/78 mm/Hg  Post 132/85 mmHg   EDW  100 kg    Weight:  Pre 104.4 kg   Post 101.4 kg   Mode of weight measurement: Standing Scale   Volume Removed  3000 ml    Treatment duration 210 minutes    NS given  No    Treatment shortened? No   Medications given during Rx None Reported   Estimated Kt/V  0.93   Access type: Permacath/TDC   Access Issues: No    Report called to primary nurse   Yes Rashida Duran RN

## 2024-09-30 NOTE — PROGRESS NOTES
Patient:    MRN:  [id number]    Sola Request ID:  [id number]    Level of care reserved:  Skilled Nursing Facility    Partner Reserved:  [facility], [place] [address] [phone]    Clinical needs requested:    Geography searched:  10 miles around [address]    Start of Service:    Request sent:  11:33am EDT on 9/30/2024 by Rashawn Villagran    Partner reserved:  1:16pm EDT on 9/30/2024 by Rashawn Villagran    Choice list shared:  1:16pm EDT on 9/30/2024 by Rashawn Villgaran

## 2024-09-30 NOTE — UTILIZATION REVIEW
Initial Clinical Review    Admission: Date/Time/Statement:   Admission Orders (From admission, onward)       Ordered        09/27/24 1613  Inpatient Admission  Once                          Orders Placed This Encounter   Procedures    Inpatient Admission     Standing Status:   Standing     Number of Occurrences:   1     Order Specific Question:   Level of Care     Answer:   Med Surg [16]     Order Specific Question:   Estimated length of stay     Answer:   More than 2 Midnights     Order Specific Question:   Certification     Answer:   I certify that inpatient services are medically necessary for this patient for a duration of greater than two midnights. See H&P and MD Progress Notes for additional information about the patient's course of treatment.     ED Arrival Information       Patient not seen in ED                       No chief complaint on file.      Initial Presentation: 64 y.o. 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 Direct admitted as Inpatient s/p removal and replacement of dialysis catheter by IR  in order to receive IV anticoagulation and have close monitoring of dialysis catheter functionality.    Pt was in IR on 09/27 for outine permacath exchange with possible intervention, IR found a nonocclusive thrombus in the superior vena cava surrounding the catheter tip with a catheter tip embedded within the thrombus. The catheter was exchanged for a shorter catheter and was repositioned. IR recommending admission for IV anticoagulation.   Plan: IR and nephrology consult. IV heparin drip. Continue with dialysis and follow-up regarding functionality of catheter. If the catheter is not functional then CT venogram of the chest during admission with possible interventional options.     Anticipated Length of  Stay/Certification Statement:  Patient will be admitted on an inpatient basis with an anticipated length of stay of greater than 2 midnights secondary to IR and nephrology consultation for nonocclusive thrombus of SVC associated with permacath.       Date: 09/28   Day 2:   Nephrology Consult: CKD stage 5 on chronic dialysis, HD tx started on 07/11//2024, currently on OP HD sched TTS. S/p catheter change by IR on 09/27 d/t cath malfunction. Noted unable to pull blood from any ports this am at the IP dialysis unit. Per IR, will need a new catheter. IR plan to do a cath change on Monday. no urgent indication for dialysis at this moment.     IR Consult:  Recommend obtaining CT venogram of chest to assess for anatomic causes of catheter malfunction. Patient may need repeat catheter exchange vs further intervention depending on results of CTV chest    IM Notes: No acute events ovn. Pt reports some nausea that she experiences when missing dialysis. Catheter remains  occluded unable to pull blood this a.m. Nephrology consulted IR who recommended CT venogram chest prior to further exchange, ordered. Continue heparin     Date: 09/29  Day 3: Has surpassed a 2nd midnight with active treatments and services.  Pt reports some occasional on and off breathing problems but she attributes to her asthma. Pending CT venogram. Awaiting IR exchange of PermCath by IR and then dialysis after. Continue anticoagulation. Cont home inhalers. Daily CBC while on Heparin. Cont daily Lokelma.    ED Treatment-Medication Administration - No Administrations Displayed (No Start Event Found)       None            Scheduled Medications:  atorvastatin, 20 mg, Oral, Daily  atovaquone, 1,500 mg, Oral, Daily  calcium carbonate, 1 tablet, Oral, Daily With Breakfast  cyclophosphamide, 100 mg, Oral, Daily  Fluticasone Furoate-Vilanterol, 1 puff, Inhalation, Daily  iron polysaccharides, 150 mg, Oral, Daily  lamoTRIgine, 100 mg, Oral, HS  melatonin, 6 mg, Oral,  HS  metoprolol succinate, 50 mg, Oral, BID  montelukast, 10 mg, Oral, Daily  pantoprazole, 40 mg, Oral, Daily Before Breakfast  predniSONE, 12.5 mg, Oral, Daily   Followed by  [START ON 10/5/2024] predniSONE, 10 mg, Oral, Daily   Followed by  [START ON 10/19/2024] predniSONE, 7.5 mg, Oral, Daily   Followed by  [START ON 11/3/2024] predniSONE, 5 mg, Oral, Daily  senna, 17.2 mg, Oral, BID  sevelamer, 1,600 mg, Oral, TID With Meals  Sodium Zirconium Cyclosilicate, 10 g, Oral, Daily  Sodium Zirconium Cyclosilicate, 10 g, Oral, Once  traZODone, 200 mg, Oral, HS  venlafaxine, 150 mg, Oral, Daily  venlafaxine, 75 mg, Oral, Daily      Continuous IV Infusions:  heparin (porcine), 3-30 Units/kg/hr (Order-Specific), Intravenous, Titrated      PRN Meds:  acetaminophen, 650 mg, Oral, Q4H PRN 09/27 x 1, 09/29 x 1  albuterol, 2 puff, Inhalation, Q4H PRN 09/29 x 1  heparin (porcine), 4,000 Units, Intravenous, Q6H PRN 09/28 x 1  heparin (porcine), 8,000 Units, Intravenous, Q6H PRN  ondansetron, 4 mg, Oral, Q4H PRN 09/28 x 1  prochlorperazine, 5 mg, Oral, Q6H PRN 09/28 x 1      ED Triage Vitals   Temperature Pulse Respirations Blood Pressure SpO2 Pain Score   09/27/24 0928 09/27/24 0928 09/27/24 0928 09/27/24 0928 09/27/24 0928 09/27/24 1159   97.8 °F (36.6 °C) 72 16 119/64 98 % No Pain     Weight (last 2 days)       None            Vital Signs (last 3 days)       Date/Time Temp Pulse Resp BP MAP (mmHg) SpO2 FiO2 (%) Calculated FIO2 (%) - Nasal Cannula Nasal Cannula O2 Flow Rate (L/min) O2 Device Patient Position - Orthostatic VS [place] Coma Scale Score Pain    09/29/24 22:26:15 98.7 °F (37.1 °C) 73 16 134/73 93 96 % -- -- -- -- Lying -- --    09/29/24 1950 -- -- -- -- -- -- -- -- -- -- -- 15 --    09/29/24 1948 -- -- -- -- -- -- -- -- -- -- -- -- No Pain    09/29/24 18:55:22 98.4 °F (36.9 °C) 79 17 113/68 83 90 % -- -- -- -- Lying -- --    09/29/24 1556 -- -- -- -- -- -- -- -- -- -- -- 15 --    09/29/24 15:54:57 98.4 °F (36.9 °C)  71 17 113/63 80 97 % -- -- -- -- Lying -- --    09/29/24 1053 97.7 °F (36.5 °C) 68 17 109/64 79 97 % -- 28 2 L/min Nasal cannula Lying -- --    09/29/24 0928 -- -- -- -- -- -- -- -- -- -- -- -- 8    09/29/24 0800 -- -- -- -- -- -- -- -- -- -- -- 15 --    09/29/24 07:41:03 98.4 °F (36.9 °C) 79 16 114/70 85 97 % -- 28 2 L/min Nasal cannula Sitting -- No Pain    09/29/24 05:56:03 -- 74 -- 111/73 86 94 % -- -- -- -- -- -- --    09/28/24 22:14:08 97.9 °F (36.6 °C) 70 17 113/74 87 95 % -- -- -- Nasal cannula Lying -- --    09/28/24 1940 -- -- -- -- -- -- -- 28 2 L/min Nasal cannula -- 15 --    09/28/24 19:22:39 98 °F (36.7 °C) 78 17 115/69 84 97 % -- -- -- None (Room air) Lying -- No Pain    09/28/24 15:18:37 97.5 °F (36.4 °C) 78 20 127/71 90 90 % -- -- -- None (Room air) Lying -- --    09/28/24 11:05:02 97.8 °F (36.6 °C) 76 16 118/70 86 91 % -- -- -- None (Room air) Sitting -- No Pain    09/28/24 1100 -- 71 -- -- -- 89 % -- -- -- -- -- -- --    09/28/24 1000 -- 86 -- -- -- 93 % -- -- -- -- -- -- --    09/28/24 0815 -- -- -- -- -- -- -- -- -- -- -- 15 No Pain    09/28/24 07:46:40 98.3 °F (36.8 °C) 71 15 96/57 70 98 % -- -- -- None (Room air) Lying -- --    09/28/24 0100 -- 66 -- -- -- 91 % -- -- -- None (Room air) -- -- --    09/27/24 2300 -- 67 -- -- -- 95 % -- -- -- -- -- -- --    09/27/24 22:36:56 98.4 °F (36.9 °C) 72 18 99/51 67 94 % -- -- -- None (Room air) Lying -- --    09/27/24 21:31:26 -- 78 -- 113/69 84 92 % -- -- -- -- -- -- --    09/27/24 2100 -- 76 -- -- -- 94 % -- -- -- -- -- -- --    09/27/24 1950 -- -- -- -- -- -- -- -- -- -- -- 15 --    09/27/24 1949 -- -- -- -- -- -- -- -- -- -- -- -- 7    09/27/24 1947 -- -- -- -- -- -- -- -- -- -- -- -- 7 09/27/24 19:44:50 98.1 °F (36.7 °C) 74 18 108/67 81 98 % -- -- -- -- -- -- --    09/27/24 19:07:46 98.8 °F (37.1 °C) 74 17 111/93 99 97 % -- -- -- -- Lying -- --    09/27/24 15:56:30 97.8 °F (36.6 °C) 76 18 114/69 84 97 % -- -- -- -- -- -- --    09/27/24 1430 -- --  -- -- -- -- -- -- -- -- -- -- No Pain    09/27/24 1400 -- -- -- -- -- -- -- -- -- -- -- -- No Pain    09/27/24 1330 -- -- -- -- -- -- -- -- -- -- -- -- No Pain    09/27/24 1300 -- -- -- -- -- -- -- -- -- -- -- -- No Pain    09/27/24 1230 -- -- -- -- -- -- -- -- -- -- -- -- No Pain    09/27/24 1200 -- -- -- -- -- -- -- -- -- -- -- -- No Pain    09/27/24 1159 97.4 °F (36.3 °C) 76 17 121/62 -- 94 % -- -- -- None (Room air) -- -- No Pain    09/27/24 1133 -- 66 16 121/59 -- 96 % 21 -- -- None (Room air) -- -- --    09/27/24 1128 -- 66 16 117/62 -- 94 % 21 -- -- -- -- -- --    09/27/24 1123 -- 94 16 110/61 -- 100 % 21 -- -- -- -- -- --    09/27/24 1118 -- 66 16 117/64 -- 97 % 21 -- -- -- -- -- --    09/27/24 1113 -- 64 16 118/61 -- 100 % 21 -- -- -- -- -- --    09/27/24 1108 -- 65 16 122/60 -- 100 % 21 -- -- -- -- -- --    09/27/24 1103 -- 64 18 109/68 -- 99 % 21 28 2 L/min -- -- -- --    09/27/24 1100 -- 64 18 115/63 -- 99 % 21 28 2 L/min Nasal cannula -- -- --    09/27/24 10:30:22 -- 69 18 -- -- 95 % -- -- -- None (Room air) -- -- --    09/27/24 0928 97.8 °F (36.6 °C) 72 16 119/64 83 98 % -- -- -- None (Room air) Lying -- --              Pertinent Labs/Diagnostic Test Results:   Radiology:  CT VENOGRAM CHEST w/ contrast   Final Interpretation by Mehreen Oswald MD (09/29 2140)      Findings consistent with fibrin sheath/thrombus surrounding the distal tip of patient's hemodialysis catheter      Additional small filling defect suspected within the distal SVC immediately proximal to the cavoatrial junction      Resolving tree-in-bud nodularity at the right upper lobe         Workstation performed: [id number]         IR tunneled dialysis catheter check/change/reposition/angioplasty    (Results Pending)   IR tunneled dialysis catheter check/change/reposition/angioplasty    (Results Pending)     Cardiology:  No orders to display     GI:  No orders to display           Results from last 7 days   Lab Units 09/30/24  7551  09/28/24  0806 09/27/24  1648   WBC Thousand/uL 8.20 7.41 7.77   HEMOGLOBIN g/dL 8.1* 8.8* 9.9*   HEMATOCRIT % 25.5* 28.5* 31.8*   PLATELETS Thousands/uL 225 250 289   TOTAL NEUT ABS Thousands/µL  --  5.23  --          Results from last 7 days   Lab Units 09/30/24  0434 09/29/24  1412 09/28/24  0806   SODIUM mmol/L 139 138 139   POTASSIUM mmol/L 5.5* 5.9* 4.6   CHLORIDE mmol/L 100 99 99   CO2 mmol/L 24 26 25   ANION GAP mmol/L 15* 13 15*   BUN mg/dL 80* 77* 65*   CREATININE mg/dL 13.69* 12.96* 11.18*   EGFR ml/min/1.73sq m 2 2 3   CALCIUM mg/dL 9.5 9.6 9.1   MAGNESIUM mg/dL  --   --  2.1   PHOSPHORUS mg/dL  --   --  8.5*     Results from last 7 days   Lab Units 09/28/24  0806   AST U/L 9*   ALT U/L 3*   ALK PHOS U/L 58   TOTAL PROTEIN g/dL 5.6*   ALBUMIN g/dL 3.7   TOTAL BILIRUBIN mg/dL 0.38         Results from last 7 days   Lab Units 09/30/24  0434 09/29/24  1412 09/28/24  0806   GLUCOSE RANDOM mg/dL 86 124 107           Results from last 7 days   Lab Units 09/30/24  0434 09/29/24  2225 09/29/24  1412 09/27/24  2328 09/27/24  1648   PROTIME seconds  --   --   --   --  13.1   INR   --   --   --   --  0.96   PTT seconds 73* 75* 73*   < > 20*    < > = values in this interval not displayed.           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 female    Network Utilization Review Department  ATTENTION: Please call with any questions or concerns to [phone] and carefully listen to the prompts so  that you are directed to the right person. All voicemails are confidential.   For Discharge needs, contact Care Management DC Support Team at [phone] opt. 2  Send all requests for admission clinical reviews, approved or denied determinations and any other requests to dedicated fax number below belonging to the campus where the patient is receiving treatment. List of dedicated fax numbers for the Facilities:  FACILITY NAME UR FAX NUMBER   ADMISSION DENIALS (Administrative/Medical Necessity) [phone]   DISCHARGE SUPPORT TEAM (NETWORK) [phone]   PARENT CHILD HEALTH (Maternity/NICU/Pediatrics) [phone]   [facility] [phone]   [facility] [phone]   [facility] [phone]   [facility] [phone]   [facility] [phone]   [facility] [phone]   [facility] [phone]   [facility] [phone]   [facility] [phone]   [facility] [phone]   [facility] [phone]   [facility] [phone]

## 2024-09-30 NOTE — ASSESSMENT & PLAN NOTE
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 Prior baseline creatinine 0.8-0.9 in May 2024  On dialysis since 7/11/2024.  Currently on HD TTS at [facility]  HD today as unable to have treatment Saturday due to CVC malfunction

## 2024-09-30 NOTE — ASSESSMENT & PLAN NOTE
Admitted for catheter malfunctioning status post CVC exchange in IR on 9/27   CV still did not work 9/28 at dialysis  Getting catheter exchange again today  Status post CT venogram given recurrence of CVC issues  Will discuss with vascular and IR --consider placement of AVG versus endovascular AVF while admitted given recurrent access issues and readmission

## 2024-09-30 NOTE — ASSESSMENT & PLAN NOTE
Lab Results   Component Value Date    EGFR 2 09/30/2024    EGFR 2 09/29/2024    EGFR 3 09/28/2024    CREATININE 13.69 (H) 09/30/2024    CREATININE 12.96 (H) 09/29/2024    CREATININE 11.18 (H) 09/28/2024     Nephrology following  Daily CBC while admitted and on heparin

## 2024-09-30 NOTE — PROCEDURES
Central Line    Date/Time: 9/27/2024 12:26 PM    Performed by: Vicente Estrada MD  Authorized by: Vicente Estrada MD    Patient location:  IR  Universal protocol:     Procedure explained and questions answered to patient or proxy's satisfaction: yes      Relevant documents present and verified: yes      Immediately prior to procedure, a time out was called: yes      Patient identity confirmed:  Verbally with patient, arm band and provided demographic data  Pre-procedure details:     Hand hygiene: Hand hygiene performed prior to insertion      Sterile barrier technique: All elements of maximal sterile technique followed      Skin preparation:  ChloraPrep    Skin preparation agent: Skin preparation agent completely dried prior to procedure    Indications:     Central line indications: dialysis    Sedation:     Sedation type:  Moderate (conscious) sedation  Anesthesia (see MAR for exact dosages):     Anesthesia method:  Local infiltration    Local anesthetic:  Lidocaine 1% WITH epi  Procedure details:     Location:  Left internal jugular    Vessel type: vein      Approach: percutaneous technique used      Catheter type:  Double lumen    Catheter size:  15.5 Fr    Ultrasound guidance: yes      Ultrasound image availability:  Images available in PACS and still images obtained    Sterile ultrasound techniques: Sterile gel and sterile probe covers were used      Successful placement: yes      Catheter tip vessel location: atriocaval junction    Post-procedure details:     Post-procedure:  Dressing applied and line sutured    Assessment:  Blood return through all ports and placement verified by x-ray    Post-procedure complications: none      Patient tolerance of procedure:  Tolerated well, no immediate complications

## 2024-09-30 NOTE — PROGRESS NOTES
ADT alert received the patient admitted 9/27/24 to [facility]. This Admin Coordinator will continue to monitor via chart review.

## 2024-09-30 NOTE — UTILIZATION REVIEW
"NOTIFICATION OF INPATIENT ADMISSION   AUTHORIZATION REQUEST   SERVICING FACILITY:   [facility]  Address: [address] [place] [address]  Tax ID: [id number]  NPI: [id number] ATTENDING PROVIDER:  Attending Name and NPI#: Blaine Prieto Md [[id number]]  Address: [address] [place] [address]  Phone: [phone]   ADMISSION INFORMATION:  Place of Service: Inpatient [facility]  Place of Service Code: 21  Inpatient Admission Date/Time: 9/27/24  4:13 PM  Discharge Date/Time: No discharge date for patient encounter.  Admitting Diagnosis Code/Description:  ESRD (end stage renal disease) ([facility]) [N18.6]     UTILIZATION REVIEW CONTACT:  Liane Heller"Lynn\"Darian Utilization   Network Utilization Review Department  Phone: [phone]  Fax: [phone]  Email: [email or web address]  Contact for approvals/pending authorizations, clinical reviews, and discharge.     PHYSICIAN ADVISORY SERVICES:  Medical Necessity Denial & [id number] Review  Phone: [phone]  Fax: [phone]  Email: [email or web address]     DISCHARGE SUPPORT TEAM:  For Patients Discharge Needs & Updates  Phone: [phone] opt. 2 Fax: [phone]  Email: [email or web address]     "

## 2024-09-30 NOTE — PROGRESS NOTES
NEPHROLOGY PROGRESS NOTE   Ngozi Beard 64 y.o. female MRN: [id number]  Unit/Bed#: [facility] 522-01 Encounter: [id number]      ASSESSMENT/PLAN:  Assessment & Plan  Acute renal failure on dialysis (HCC)  ANGELICA secondary to RPGN due to biopsy-proven anti-GBM disease. Prior baseline creatinine 0.8-0.9 in May 2024  On dialysis since 7/11/2024.  Currently on HD TTS at [facility]  HD today as unable to have treatment Saturday due to CVC malfunction  Complication associated with dialysis catheter  Admitted for catheter malfunctioning status post CVC exchange in IR on 9/27   CV still did not work 9/28 at dialysis  Getting catheter exchange again today  Status post CT venogram given recurrence of CVC issues  Will discuss with vascular and IR --consider placement of AVG versus endovascular AVF while admitted given recurrent access issues and readmission  Primary hypertension  Blood pressure is stable  Continue current regimen  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  On Cytoxan and prednisone  HFrEF (heart failure with reduced ejection fraction) ([facility])  Monitor with UF on dialysis  Currently euvolemic  Anemia due to chronic kidney disease, on chronic dialysis ([facility])  Hemoglobin 8.1  Continue oral iron  Hyperkalemia  Potassium 5.5  Plan for dialysis today with 2K bath  Continue Lokelma 10 g daily  Low potassium diet when diet is resumed    Plan Summary:   IR today for CVC exchange  HD today  Consider inpatient AV access placement to help prevent readmissions and recurrent IR visits    SUBJECTIVE:  Patient frustrated with ongoing CVC issues.  Having some shortness of breath.      OBJECTIVE:  Current Weight: Weight - Scale: 101 kg (223 lb)  Vitals:    09/30/24 0743   BP: 115/69   Pulse: 64   Resp: 18   Temp: 98 °F (36.7 °C)   SpO2: 98%       Intake/Output Summary (Last 24 hours) at 9/30/2024 1003  Last data filed at 9/29/2024 2042  Gross per 24 hour   Intake 712.17 ml   Output 0 ml   Net 712.17 ml      General:  appears comfortable and in no acute distress   Skin:  No rash  Eyes:  Sclerae anicteric, no periorbital edema   ENT:  Moist mucous membranes  Neck:  Trachea midline, symmetric   Chest:  Clear to auscultation bilaterally with no wheezes, rales or rhonchi  CVS:  Regular rate and rhythm  Abdomen:  Soft, nontender, nondistended  Neuro:  Awake and alert  Psych:  Appropriate affect  Extremities: no lower extremity edema       Medications:  Scheduled Meds:  Current Facility-Administered Medications   Medication Dose Route Frequency Provider Last Rate    acetaminophen  650 mg Oral Q4H PRN Drew Norwood MD      albuterol  2 puff Inhalation Q4H PRN Blaine Prieto MD      atorvastatin  20 mg Oral Daily Drew Norwood MD      atovaquone  1,500 mg Oral Daily Drew Norwood MD      calcium carbonate  1 tablet Oral Daily With Breakfast Drew Norwood MD      cyclophosphamide  100 mg Oral Daily Drew Norwood MD      Fluticasone Furoate-Vilanterol  1 puff Inhalation Daily Drew Norwood MD      heparin (porcine)  3-30 Units/kg/hr (Order-Specific) Intravenous Titrated Drew Norwood MD 14 Units/kg/hr (09/30/24 0942)    heparin (porcine)  4,000 Units Intravenous Q6H PRN Drew Norwood MD      heparin (porcine)  8,000 Units Intravenous Q6H PRN Drew Norwood MD      iron polysaccharides  150 mg Oral Daily Drew Norwood MD      lamoTRIgine  100 mg Oral HS Drew Norwood MD      melatonin  6 mg Oral HS JEB Greenberg      metoprolol succinate  50 mg Oral BID Drew Norwood MD      montelukast  10 mg Oral Daily Drew Norwood MD      ondansetron  4 mg Oral Q4H PRN Drew Norwood MD      pantoprazole  40 mg Oral Daily Before Breakfast Drew Norwood MD      predniSONE  12.5 mg Oral Daily Drew Norwood MD      Followed by    [START ON 10/5/2024] predniSONE  10 mg Oral Daily Drew Norwood MD      Followed by    [START ON 10/19/2024] predniSONE  7.5 mg Oral Daily Drew Norwood MD      Followed by     [START ON 11/3/2024] predniSONE  5 mg Oral Daily Drew Norwood MD      prochlorperazine  5 mg Oral Q6H PRN Blaine Prieto MD      senna  17.2 mg Oral BID Drew Norwood MD      sevelamer  1,600 mg Oral TID With Meals Drew Norwood MD      Sodium Zirconium Cyclosilicate  10 g Oral Daily Drew Norwood MD      Sodium Zirconium Cyclosilicate  10 g Oral Once Blaine Prieto MD      traZODone  200 mg Oral HS Blaine Prieto MD      venlafaxine  150 mg Oral Daily Drew Norwood MD      venlafaxine  75 mg Oral Daily Drew Norwood MD         PRN Meds:.  acetaminophen    albuterol    heparin (porcine)    heparin (porcine)    ondansetron    prochlorperazine    Continuous Infusions:heparin (porcine), 3-30 Units/kg/hr (Order-Specific), Last Rate: 14 Units/kg/hr (09/30/24 0942)        Laboratory Results:  Results from last 7 days   Lab Units 09/30/24  0434 09/29/24  1412 09/28/24  0806 09/27/24  1648   WBC Thousand/uL 8.20  --  7.41 7.77   HEMOGLOBIN g/dL 8.1*  --  8.8* 9.9*   HEMATOCRIT % 25.5*  --  28.5* 31.8*   PLATELETS Thousands/uL 225  --  250 289   SODIUM mmol/L 139 138 139  --    POTASSIUM mmol/L 5.5* 5.9* 4.6  --    CHLORIDE mmol/L 100 99 99  --    CO2 mmol/L 24 26 25  --    BUN mg/dL 80* 77* 65*  --    CREATININE mg/dL 13.69* 12.96* 11.18*  --    CALCIUM mg/dL 9.5 9.6 9.1  --    MAGNESIUM mg/dL  --   --  2.1  --    PHOSPHORUS mg/dL  --   --  8.5*  --

## 2024-09-30 NOTE — PROGRESS NOTES
Progress Note - Internal Medicine   Name: Ngozi Beard 64 y.o. female I MRN: [id number]  Unit/Bed#: [facility] 522-01 I Date of Admission: 9/27/2024   Date of Service: 9/30/2024 I Hospital Day: 3    Assessment & Plan  Complication associated with dialysis catheter  -In the OR on 9/27/2024 during routine permacath exchange with possible intervention (IR tunneled dialysis catheter check/change/reposition/angioplasty ) IR found a nonocclusive thrombus in the superior vena cava surrounding the catheter tip with a catheter tip embedded within the thrombus.  The catheter was exchanged for a shorter catheter and was repositioned  -Remains occluded unable to pull blood this a.m.  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  Nephrology following  Anemia due to chronic kidney disease, on chronic dialysis ([facility])  Lab Results   Component Value Date    EGFR 2 09/30/2024    EGFR 2 09/29/2024    EGFR 3 09/28/2024    CREATININE 13.69 (H) 09/30/2024    CREATININE 12.96 (H) 09/29/2024    CREATININE 11.18 (H) 09/28/2024     Nephrology following  Daily CBC while admitted and on heparin  HFrEF (heart failure with reduced ejection fraction) ([facility])  Wt Readings from Last 3 Encounters:   09/27/24 101 kg (223 lb)   09/25/24 103 kg (226 lb 11.2 oz)   09/19/24 102 kg (224 lb 3.3 oz)     Clinically euvolemic fluid balance through dialysis      Hyperkalemia  Secondary to patient being unable to receive dialysis  Underwent dialysis 9/30  Acute renal failure on  dialysis (HCC)  Currently on dialysis nephrology following    Disposition: Remain in hospital for hypercoagulable workup, dialysis tomorrow      History of Present Illness   Patient seen and examined while undergoing dialysis. Feels generally fine. Willing to remain in     Objective     Vitals:    24 1530 24 1600 24 1630 24 1700   BP: 131/79 130/65 133/83 132/82   Pulse: 58 56 62 60   Resp: 19 20 21 20   Temp:       TempSrc:       SpO2:       Weight:       Height:          Temperature:   Temp (24hrs), Av.1 °F (36.7 °C), Min:97.5 °F (36.4 °C), Max:98.7 °F (37.1 °C)    Temperature: 97.7 °F (36.5 °C)  Intake & Output:  I/O          0701   0700  0701   07 07 0700    P.O.  300 120    I.V. (mL/kg)   281.1 (2.8)    Total Intake(mL/kg)  300 (3) 401.1 (4)    Urine (mL/kg/hr)   0 (0)    Total Output   0    Net  +300 +401.1                 Weights:   IBW (Ideal Body Weight): 52.4 kg    Body mass index is 39.5 kg/m².  Weight (last 2 days)       None          Physical Exam  Constitutional:       General: She is not in acute distress.     Appearance: She is obese.      Comments: Middle-age female chronically ill   HENT:      Mouth/Throat:      Mouth: Mucous membranes are moist.   Cardiovascular:      Rate and Rhythm: Normal rate and regular rhythm.   Pulmonary:      Effort: Pulmonary effort is normal. No respiratory distress.      Breath sounds: No wheezing.   Abdominal:      General: Abdomen is flat.      Palpations: Abdomen is soft.   Neurological:      General: No focal deficit present.      Mental Status: She is oriented to person, place, and time.         Lab Results: I have reviewed the following results:   Recent Labs     24  0806 24  1531 24  0434   WBC 7.41  --  8.20   HGB 8.8*  --  8.1*   HCT 28.5*  --  25.5*     --  225   SODIUM 139   < > 139   K 4.6   < > 5.5*   CL 99   < > 100   CO2 25   < > 24   BUN 65*   < > 80*   CREATININE  11.18*   < > 13.69*   GLUC 107   < > 86   MG 2.1  --   --    PHOS 8.5*  --   --    AST 9*  --   --    ALT 3*  --   --    ALB 3.7  --   --    TBILI 0.38  --   --    ALKPHOS 58  --   --    PTT 76*   < > 73*    < > = values in this interval not displayed.     Imaging Review: No pertinent imaging studies reviewed.  Other Studies: No additional pertinent studies reviewed.    Currently Ordered Meds:   Current Facility-Administered Medications:   •  acetaminophen (TYLENOL) tablet 650 mg, Q4H PRN  •  albuterol (PROVENTIL HFA,VENTOLIN HFA) inhaler 2 puff, Q4H PRN  •  atorvastatin (LIPITOR) tablet 20 mg, Daily  •  atovaquone (MEPRON) oral suspension 1,500 mg, Daily  •  calcium carbonate (OYSTER SHELL,OSCAL) 500 mg tablet 1 tablet, Daily With Breakfast  •  cyclophosphamide (CYTOXAN) capsule 100 mg, Daily  •  Fluticasone Furoate-Vilanterol 100-25 mcg/actuation 1 puff, Daily  •  heparin (porcine) 25,000 units in 0.45% NaCl 250 mL infusion (premix), Titrated, Last Rate: 14 Units/kg/hr (09/30/24 0942)  •  heparin (porcine) injection 4,000 Units, Q6H PRN  •  heparin (porcine) injection 8,000 Units, Q6H PRN  •  iron polysaccharides (FERREX) capsule 150 mg, Daily  •  lamoTRIgine (LaMICtal) tablet 100 mg, HS  •  melatonin tablet 6 mg, HS  •  metoprolol succinate (TOPROL-XL) 24 hr tablet 50 mg, BID  •  montelukast (SINGULAIR) tablet 10 mg, Daily  •  ondansetron (ZOFRAN-ODT) dispersible tablet 4 mg, Q4H PRN  •  pantoprazole (PROTONIX) EC tablet 40 mg, Daily Before Breakfast  •  predniSONE tablet 12.5 mg, Daily **FOLLOWED BY** [START ON 10/5/2024] predniSONE tablet 10 mg, Daily **FOLLOWED BY** [START ON 10/19/2024] predniSONE tablet 7.5 mg, Daily **FOLLOWED BY** [START ON 11/3/2024] predniSONE tablet 5 mg, Daily  •  prochlorperazine (COMPAZINE) tablet 5 mg, Q6H PRN  •  senna (SENOKOT) tablet 17.2 mg, BID  •  sevelamer (RENAGEL) tablet 1,600 mg, TID With Meals  •  Sodium Zirconium Cyclosilicate (Lokelma) 10 g, Daily  •  Sodium Zirconium  Cyclosilicate (Lokelma) 10 g, Once  •  traZODone (DESYREL) tablet 200 mg, HS  •  venlafaxine (EFFEXOR-XR) 24 hr capsule 150 mg, Daily  •  venlafaxine (EFFEXOR-XR) 24 hr capsule 75 mg, Daily  VTE Pharmacologic Prophylaxis: Heparin    Portions of the record may have been created with voice recognition software.

## 2024-09-30 NOTE — ASSESSMENT & PLAN NOTE
-In the OR on 9/27/2024 during routine permacath exchange with possible intervention (IR tunneled dialysis catheter check/change/reposition/angioplasty ) IR found a nonocclusive thrombus in the superior vena cava surrounding the catheter tip with a catheter tip embedded within the thrombus.  The catheter was exchanged for a shorter catheter and was repositioned  -Remains occluded unable to pull blood this a.m.  -Continue heparin  -Underwent successful catheter change 9/30 and hemodialysis  -9/30: send APC resistance assay, awaiting results to determine further anticoagulation workup/or anticoagulation is warranted, if positive would request hematology consult

## 2024-09-30 NOTE — NURSING NOTE
"Pt refusing to take any pills until she is able to eat.   Addendum : Pt also refusing chlorhexidine bath prior to IR procedure and HD treatment. Pt educated about high risk of infection which can be detrimental to overall health. Pt states \" I don't care\" .  "

## 2024-09-30 NOTE — SEDATION DOCUMENTATION
"Left tunneled HD permacath line placement and right permacath removal performed by Dr Estrada without complication. Patient tolerated procedure well. Sedation administered 125mcg fentanyl and 2.5 mg versed. IR Procedure Bedrest Start Time is 1230. Patient refused dialysis session following line placement, stating she will vomit if she does not eat lunch prior to HD. Refused PRN ondansetron stating \"I'm not nauseous now but I will be if I don't eat\" Spoke to HD nurse and they are OK with doing dialysis after patient has lunch. Report called to primary nurse Rashida.  "

## 2024-09-30 NOTE — PROGRESS NOTES
"H&P reviewed. There have been no interval changes since the time the H&P was written.    /58   Pulse 65   Temp 97.5 °F (36.4 °C)   Resp 18   Ht 5' 3\" (1.6 m)   Wt 101 kg (223 lb)   SpO2 97%   BMI 39.50 kg/m²     Prior imaging was reviewed.  We replaced right permacath with fibrin sheath stripping Friday is reportedly nonfunctional.  She has some nonocclusive thrombus at the cavoatrial junction and within the SVC.  CT venogram was reviewed, the amount of thrombus is not particularly impressive by CTV but likely partially accounts for difficulties with catheter flow.  She has had multiple right sided catheter exchanges with fibrin sheath stripping which has not been durable.  We discussed placement of a new catheter on the left side to circumvent the fibrin sheath completely.  She is agreeable.  Right permacath to be removed after new access.    Informed written consent was obtained.    Vicente Estrada MD   "

## 2024-09-30 NOTE — ASSESSMENT & PLAN NOTE
Potassium 5.5  Plan for dialysis today with 2K bath  Continue Lokelma 10 g daily  Low potassium diet when diet is resumed

## 2024-10-01 ENCOUNTER — APPOINTMENT (INPATIENT)
Dept: DIALYSIS | Facility: HOSPITAL | Age: 64
End: 2024-10-01
Attending: INTERNAL MEDICINE
Payer: COMMERCIAL

## 2024-10-01 PROBLEM — I82.90 THROMBUS: Status: ACTIVE | Noted: 2024-10-01

## 2024-10-01 PROBLEM — J18.9 MULTIFOCAL PNEUMONIA: Status: RESOLVED | Noted: 2024-09-01 | Resolved: 2024-10-01

## 2024-10-01 LAB
ANION GAP SERPL CALCULATED.3IONS-SCNC: 10 MMOL/L (ref 4–13)
APTT PPP: 109 SECONDS (ref 23–34)
APTT PPP: 41 SECONDS (ref 23–34)
APTT PPP: 57 SECONDS (ref 23–34)
BUN SERPL-MCNC: 35 MG/DL (ref 5–25)
CALCIUM SERPL-MCNC: 8.9 MG/DL (ref 8.4–10.2)
CHLORIDE SERPL-SCNC: 100 MMOL/L (ref 96–108)
CO2 SERPL-SCNC: 27 MMOL/L (ref 21–32)
CREAT SERPL-MCNC: 7.36 MG/DL (ref 0.6–1.3)
GFR SERPL CREATININE-BSD FRML MDRD: 5 ML/MIN/1.73SQ M
GLUCOSE SERPL-MCNC: 103 MG/DL (ref 65–140)
MYCOBACTERIUM SPEC CULT: NORMAL
MYCOBACTERIUM SPEC CULT: NORMAL
POTASSIUM SERPL-SCNC: 5.7 MMOL/L (ref 3.5–5.3)
RHODAMINE-AURAMINE STN SPEC: NORMAL
RHODAMINE-AURAMINE STN SPEC: NORMAL
SODIUM SERPL-SCNC: 137 MMOL/L (ref 135–147)

## 2024-10-01 PROCEDURE — 99232 SBSQ HOSP IP/OBS MODERATE 35: CPT | Performed by: INTERNAL MEDICINE

## 2024-10-01 PROCEDURE — 85730 THROMBOPLASTIN TIME PARTIAL: CPT | Performed by: INTERNAL MEDICINE

## 2024-10-01 PROCEDURE — 80048 BASIC METABOLIC PNL TOTAL CA: CPT | Performed by: INTERNAL MEDICINE

## 2024-10-01 PROCEDURE — 90935 HEMODIALYSIS ONE EVALUATION: CPT | Performed by: INTERNAL MEDICINE

## 2024-10-01 PROCEDURE — 5A1D70Z PERFORMANCE OF URINARY FILTRATION, INTERMITTENT, LESS THAN 6 HOURS PER DAY: ICD-10-PCS | Performed by: INTERNAL MEDICINE

## 2024-10-01 RX ORDER — WARFARIN SODIUM 5 MG/1
5 TABLET ORAL
Status: DISCONTINUED | OUTPATIENT
Start: 2024-10-01 | End: 2024-10-01

## 2024-10-01 RX ORDER — RIVAROXABAN 15 MG-20MG
1 KIT ORAL ONCE
Qty: 1 EACH | Refills: 0 | Status: SHIPPED | OUTPATIENT
Start: 2024-10-01 | End: 2024-10-03

## 2024-10-01 RX ORDER — PROCHLORPERAZINE MALEATE 5 MG
5 TABLET ORAL EVERY 6 HOURS PRN
Status: DISCONTINUED | OUTPATIENT
Start: 2024-10-01 | End: 2024-10-03 | Stop reason: HOSPADM

## 2024-10-01 RX ADMIN — ALBUTEROL SULFATE 2 PUFF: 90 AEROSOL, METERED RESPIRATORY (INHALATION) at 06:10

## 2024-10-01 RX ADMIN — RIVAROXABAN 15 MG: 15 TABLET, FILM COATED ORAL at 17:39

## 2024-10-01 RX ADMIN — SENNOSIDES 17.2 MG: 8.6 TABLET, FILM COATED ORAL at 12:30

## 2024-10-01 RX ADMIN — HYDROCODONE BITARTRATE AND ACETAMINOPHEN 1 TABLET: 5; 325 TABLET ORAL at 04:36

## 2024-10-01 RX ADMIN — PROCHLORPERAZINE MALEATE 5 MG: 5 TABLET ORAL at 17:45

## 2024-10-01 RX ADMIN — SEVELAMER HYDROCHLORIDE 1600 MG: 800 TABLET ORAL at 07:00

## 2024-10-01 RX ADMIN — PANTOPRAZOLE SODIUM 40 MG: 40 TABLET, DELAYED RELEASE ORAL at 05:40

## 2024-10-01 RX ADMIN — SEVELAMER HYDROCHLORIDE 1600 MG: 800 TABLET ORAL at 17:37

## 2024-10-01 RX ADMIN — SENNOSIDES 17.2 MG: 8.6 TABLET, FILM COATED ORAL at 17:36

## 2024-10-01 RX ADMIN — ATOVAQUONE 1500 MG: 750 SUSPENSION ORAL at 08:00

## 2024-10-01 RX ADMIN — SEVELAMER HYDROCHLORIDE 1600 MG: 800 TABLET ORAL at 12:00

## 2024-10-01 RX ADMIN — TRAZODONE HYDROCHLORIDE 200 MG: 100 TABLET ORAL at 21:51

## 2024-10-01 RX ADMIN — VENLAFAXINE HYDROCHLORIDE 150 MG: 150 CAPSULE, EXTENDED RELEASE ORAL at 17:44

## 2024-10-01 RX ADMIN — CYCLOPHOSPHAMIDE 100 MG: 50 CAPSULE ORAL at 08:00

## 2024-10-01 RX ADMIN — Medication 6 MG: at 21:52

## 2024-10-01 RX ADMIN — PREDNISONE 12.5 MG: 2.5 TABLET ORAL at 17:44

## 2024-10-01 RX ADMIN — HEPARIN SODIUM 14 UNITS/KG/HR: 10000 INJECTION, SOLUTION INTRAVENOUS at 04:33

## 2024-10-01 RX ADMIN — HEPARIN SODIUM 4000 UNITS: 1000 INJECTION INTRAVENOUS; SUBCUTANEOUS at 05:41

## 2024-10-01 RX ADMIN — LAMOTRIGINE 100 MG: 100 TABLET ORAL at 21:53

## 2024-10-01 NOTE — CASE MANAGEMENT
Case Management Discharge Planning Note    Patient name Ngozi Beard  Location /-01 MRN [id number]  : 1960 Date 10/1/2024       Current Admission Date: 2024  Current Admission Diagnosis:Complication associated with dialysis catheter   Patient Active Problem List    Diagnosis Date Noted Date Diagnosed    SVC thrombus 10/01/2024     HFrEF (heart failure with reduced ejection fraction) ([facility]) 2024     Hyperkalemia 2024     Chest pain 2024     Unspecified mood (affective) disorder ([facility]) 2024     Acute renal failure on dialysis ([facility]) 2024     Complication associated with dialysis catheter 2024     Cardiomyopathy ([facility]) 2024     Multifocal pneumonia 2024     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 ([facility]) 2024     Generalized weakness 2024     Rash 2024     Anemia due to chronic kidney disease, on chronic dialysis ([facility]) 2024     Thrombocytopenia (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) 2023     Asthma without acute exacerbation 2023     Asthma due to seasonal allergies 2023     Bipolar 1 disorder ([facility]) 2022     Primary hypertension 2022     Dyslipidemia 2022       LOS (days): 4  Geometric Mean LOS (GMLOS) (days): 5.1  Days to GMLOS:1.2     OBJECTIVE:  Risk of Unplanned Readmission Score: 42.87         Current admission status: Inpatient    Preferred Pharmacy:   [id number] - [place] - [address]  [address] [place] [address]  Phone: [phone] Fax: [phone]    [facility] - [place], [place] - [address]  [address]  [address]  [facility] [address]  Phone: [phone] Fax: [phone]    [facility] - [place] - [address]  [address]  [place] [address]  Phone: [phone] Fax: [phone]    Primary Care Provider: Meenakshi Balbuena MD    Primary Insurance: [employer] [facility]  Secondary Insurance: [facility]    DISCHARGE DETAILS:    Other Referral/Resources/Interventions Provided:  Interventions: Facility Return, SNF, Short Term Rehab  Referral Comments: CM team following for discharge coordination. Pt is now a LTC resident at [facility], was receiving rehab prior to admission. [facility] would like pt to re-start STR upon return. Pt will require PT/OT evaluations and insurance auth prior to transfer. CM informed SLIM of same and will continue to follow.    eliquis starter pack sent to [facility] [place] pharmacy for price check. Cost for Eliquis through insurance $904.71. SLIM updated.     Received update from [facility]- insurance updated in pharmacy system, cost for Xarelto will be $4.60     Treatment Team Recommendation: Short Term Rehab, SNF  Discharge Destination Plan:: Short Term Rehab, SNF

## 2024-10-01 NOTE — HEMODIALYSIS
Post-Dialysis RN Treatment Note    Blood Pressure:  Pre 120/80 mm/Hg  Post 127/84 mmHg   EDW  100 kg    Weight:  Pre 100 kg   Post 99 kg   Mode of weight measurement: Bed Scale   Volume Removed  1000 ml    Treatment duration 210 minutes    NS given  No    Treatment shortened? No   Medications given during Rx None Reported   Estimated Kt/V  1.30   Access type: Permacath/TDC   Access Issues: Yes, describe: drainage from access, from below dressing     Report called to primary nurse   Yes Kari PIÑA

## 2024-10-01 NOTE — PLAN OF CARE
Pt on HD treatment for 3.5 hours with a UF goal of 0.5 L as tolerated. Pt on a 2 k 2.5 triston bath for a potassium of 5.7 on 10/01/24.   Problem: METABOLIC, FLUID AND ELECTROLYTES - ADULT  Goal: Electrolytes maintained within normal limits  Description: INTERVENTIONS:  - Monitor labs and assess patient for signs and symptoms of electrolyte imbalances  - Administer electrolyte replacement as ordered  - Monitor response to electrolyte replacements, including repeat lab results as appropriate  - Instruct patient on fluid and nutrition as appropriate  Outcome: Progressing  Goal: Fluid balance maintained  Description: INTERVENTIONS:  - Monitor labs   - Monitor I/O and WT  - Instruct patient on fluid and nutrition as appropriate  - Assess for signs & symptoms of volume excess or deficit  Outcome: Progressing

## 2024-10-01 NOTE — ASSESSMENT & PLAN NOTE
Lab Results   Component Value Date    EGFR 5 10/01/2024    EGFR 2 09/30/2024    EGFR 2 09/29/2024    CREATININE 7.36 (H) 10/01/2024    CREATININE 13.69 (H) 09/30/2024    CREATININE 12.96 (H) 09/29/2024     Hgb at relative baseline of 8  Continue iron

## 2024-10-01 NOTE — ASSESSMENT & PLAN NOTE
Home medications include venlafaxine 150 mg and 75 mg in the morning, trazodone 200 mg daily, Lamictal 100 mg  continue

## 2024-10-01 NOTE — PROGRESS NOTES
"This RN went into room at change of shift to introduce self and assess the patient. This RN stated to patient \" I am going to listen to your lungs.\" This RN placed stethoscope on patients chest and patient grabbed RNs hand and threw it to the side. This RN told patient that the behavior is unacceptable and patient needs to treat others with respect. Patient started to scream curse words to this RN. Patient states \"nobody is going to tell me what the F to do,\" along with other statements cursing at this RN. This RN left room and told charge RN about the patients behaviors. Charge RN, Kenny Abernathy, went into patients room to address the inappropriate behaviors. Patient continued to scream and use profanity towards charge RN and not redirectable. Bed alarm set and call bell in reach.  "

## 2024-10-01 NOTE — ASSESSMENT & PLAN NOTE
Wt Readings from Last 3 Encounters:   09/27/24 101 kg (223 lb)   09/25/24 103 kg (226 lb 11.2 oz)   09/19/24 102 kg (224 lb 3.3 oz)     Cardiac catheterization 9/17/2024 without occlusion  Echo on 8/30/2024 with EF 25% with severe global hypokinesis, moderate to severe mitral valve regurgitation, moderate tricuspid regurgitation, estimated right ventricular systolic pressure of 48 mmHg, small pericardial effusion anterior to chest wall  Recently provided with a LifeVest

## 2024-10-01 NOTE — PLAN OF CARE
Problem: PAIN - ADULT  Goal: Verbalizes/displays adequate comfort level or baseline comfort level  Description: Interventions:  - Encourage patient to monitor pain and request assistance  - Assess pain using appropriate pain scale  - Administer analgesics based on type and severity of pain and evaluate response  - Implement non-pharmacological measures as appropriate and evaluate response  - Consider cultural and social influences on pain and pain management  - Notify physician/advanced practitioner if interventions unsuccessful or patient reports new pain  Outcome: Progressing     Problem: INFECTION - ADULT  Goal: Absence or prevention of progression during hospitalization  Description: INTERVENTIONS:  - Assess and monitor for signs and symptoms of infection  - Monitor lab/diagnostic results  - Monitor all insertion sites, i.e. indwelling lines, tubes, and drains  - Monitor endotracheal if appropriate and nasal secretions for changes in amount and color  - [place] appropriate cooling/warming therapies per order  - Administer medications as ordered  - Instruct and encourage patient and family to use good hand hygiene technique  - Identify and instruct in appropriate isolation precautions for identified infection/condition  Outcome: Progressing     Problem: SAFETY ADULT  Goal: Patient will remain free of falls  Description: INTERVENTIONS:  - Educate patient/family on patient safety including physical limitations  - Instruct patient to call for assistance with activity   - Consult OT/PT to assist with strengthening/mobility   - Keep Call bell within reach  - Keep bed low and locked with side rails adjusted as appropriate  - Keep care items and personal belongings within reach  - Initiate and maintain comfort rounds  - Make Fall Risk Sign visible to staff  - Apply yellow socks and bracelet for high fall risk patients  - Consider moving patient to room near nurses station  Outcome: Progressing     Problem: DISCHARGE  PLANNING  Goal: Discharge to home or other facility with appropriate resources  Description: INTERVENTIONS:  - Identify barriers to discharge w/patient and caregiver  - Arrange for needed discharge resources and transportation as appropriate  - Identify discharge learning needs (meds, wound care, etc.)  - Arrange for interpretive services to assist at discharge as needed  - Refer to Case Management Department for coordinating discharge planning if the patient needs post-hospital services based on physician/advanced practitioner order or complex needs related to functional status, cognitive ability, or social support system  Outcome: Progressing     Problem: Knowledge Deficit  Goal: Patient/family/caregiver demonstrates understanding of disease process, treatment plan, medications, and discharge instructions  Description: Complete learning assessment and assess knowledge base.  Interventions:  - Provide teaching at level of understanding  - Provide teaching via preferred learning methods  Outcome: Progressing

## 2024-10-01 NOTE — ASSESSMENT & PLAN NOTE
See primary plan above  D/w renal, ok to place on Eliquis, will price check  Will send referral to hematology outpatient given reported hx of sister with some sort of clotting disorder

## 2024-10-01 NOTE — ASSESSMENT & PLAN NOTE
"Presented to IR for malfunction of permcath. Catheter injection showed \"moderate amount of nonocclusive thrombus in the SVC surrounding the catheter tip and probably within the mid SVC as well. The amount of thrombus appears significant enough to warrant anticoagulation. The catheter tip is embedded within this thrombus. The catheter was exchanged for shorter catheter which was repositioned \"  Was admitted to [facility] for IV heparin and CT venogram (9/28) which showed fibrin sheath/thrombus surrounding distal tip of HD catheter, small filling defect within the distal SVC immediately proximal to cavoatrial junction.   On 9/30 returned to IR for removal of cath and placement of L sided placement of cath  Will need vein mapping and AV access outpatient  Remains on IV heparin, d/w renal, ok to transition to Eliquis however $900/month. Will check Xarelto however may need to bridge to warfarin.   "

## 2024-10-01 NOTE — ASSESSMENT & PLAN NOTE
Biopsy-proven anti-GBM disease. Remains dialysis dependent since 7/11/2024.   Currently on p.o. Cytoxan and tapering prednisone  On HD TTS at [facility]

## 2024-10-01 NOTE — PROGRESS NOTES
"Progress Note - Hospitalist   Name: Ngozi Beard 64 y.o. female I MRN: [id number]  Unit/Bed#: -01 I Date of Admission: 9/27/2024   Date of Service: 10/1/2024 I Hospital Day: 4    Assessment & Plan  Complication associated with dialysis catheter  Presented to IR for malfunction of permcath. Catheter injection showed \"moderate amount of nonocclusive thrombus in the SVC surrounding the catheter tip and probably within the mid SVC as well. The amount of thrombus appears significant enough to warrant anticoagulation. The catheter tip is embedded within this thrombus. The catheter was exchanged for shorter catheter which was repositioned \"  Was admitted to [facility] for IV heparin and CT venogram (9/28) which showed fibrin sheath/thrombus surrounding distal tip of HD catheter, small filling defect within the distal SVC immediately proximal to cavoatrial junction.   On 9/30 returned to IR for removal of cath and placement of L sided placement of cath  Will need vein mapping and AV access outpatient  Remains on IV heparin, d/w renal, ok to transition to Eliquis however $900/month. Will check Xarelto however may need to bridge to warfarin.   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 Remains dialysis dependent since 7/11/2024.   Currently on p.o. Cytoxan and tapering prednisone  On HD TTS at [facility]  HFrEF (heart failure with reduced ejection fraction) ([facility])  Wt Readings from Last 3 Encounters:   09/27/24 101 kg (223 lb)   09/25/24 103 kg (226 lb 11.2 oz)   09/19/24 102 kg (224 lb 3.3 oz)     Cardiac catheterization 9/17/2024 without " occlusion  Echo on 8/30/2024 with EF 25% with severe global hypokinesis, moderate to severe mitral valve regurgitation, moderate tricuspid regurgitation, estimated right ventricular systolic pressure of 48 mmHg, small pericardial effusion anterior to chest wall  Recently provided with a LifeVest  Anemia due to chronic kidney disease, on chronic dialysis (HCC)  Lab Results   Component Value Date    EGFR 5 10/01/2024    EGFR 2 09/30/2024    EGFR 2 09/29/2024    CREATININE 7.36 (H) 10/01/2024    CREATININE 13.69 (H) 09/30/2024    CREATININE 12.96 (H) 09/29/2024     Hgb at relative baseline of 8  Continue iron  Acute renal failure on dialysis (HCC)  See plan above  Hyperkalemia    SVC thrombus  See primary plan above  D/w renal, ok to place on Eliquis, will price check  Will send referral to hematology outpatient given reported hx of sister with some sort of clotting disorder     VTE Pharmacologic Prophylaxis: VTE Score: 9 Moderate Risk (Score 3-4) - Pharmacological DVT Prophylaxis Ordered: heparin drip.    Mobility:   Basic Mobility Inpatient Raw Score: 19  JH-HLM Goal: 6: Walk 10 steps or more  JH-HLM Achieved: 4: Move to chair/commode  JH-HLM Goal NOT achieved. Continue with multidisciplinary rounding and encourage appropriate mobility to improve upon JH-HLM goals.    Patient Centered Rounds: I performed bedside rounds with nursing staff today.   Discussions with Specialists or Other Care Team Provider: [facility] nephrology     Education and Discussions with Family / Patient: Patient declined call to .     Current Length of Stay: 4 day(s)  Current Patient Status: Inpatient   Certification Statement: The patient will continue to require additional inpatient hospital stay due to as above   Discharge Plan: Anticipate discharge in 24-48 hrs to long term resident of [facility], also needs PT/OT to return     Code Status: Level 1 - Full Code    Subjective   Doing okay. No complaints currently. Had some bleeding from  site earlier.     Objective     Vitals:   Temp (24hrs), Av °F (36.7 °C), Min:97.5 °F (36.4 °C), Max:98.3 °F (36.8 °C)    Temp:  [97.5 °F (36.4 °C)-98.3 °F (36.8 °C)] 98.2 °F (36.8 °C)  HR:  [55-89] 65  Resp:  [14-23] 14  BP: (109-136)/(65-94) 127/84  SpO2:  [93 %-98 %] 98 %  Body mass index is 39.5 kg/m².     Input and Output Summary (last 24 hours):     Intake/Output Summary (Last 24 hours) at 10/1/2024 1234  Last data filed at 10/1/2024 1205  Gross per 24 hour   Intake 1544.23 ml   Output 5004 ml   Net -3459.77 ml       Physical Exam  Vitals and nursing note reviewed.   Constitutional:       General: She is not in acute distress.     Appearance: She is obese.      Comments: L sided chest wall line noted    Cardiovascular:      Rate and Rhythm: Normal rate.   Pulmonary:      Effort: No respiratory distress.   Abdominal:      General: Bowel sounds are normal. There is no distension.      Palpations: Abdomen is soft.   Neurological:      Mental Status: She is alert and oriented to person, place, and time.   Psychiatric:         Mood and Affect: Mood normal.          Lines/Drains:  Lines/Drains/Airways       Active Status       Name Placement date Placement time Site Days    CVC Central Lines 24 Double 24  1226  --  4    HD Permanent Double Catheter 24  1207  Internal jugular  1                    Central Line:  Goal for removal:  for HD          Telemetry:  Telemetry Orders (From admission, onward)               LifeVest Patient: Continuous Telemetry Monitoring during hospitalization (non-expiring)  Continuous LifeVest Telemetry Monitoring        References:    LifeVest Policy                     Telemetry Reviewed:  reviewed   Indication for Continued Telemetry Use: Lifevest (remains on tele entire hospital stay)               Lab Results: I have reviewed the following results:    Results from last 7 days   Lab Units 24  0434 24  0806   WBC Thousand/uL 8.20 7.41   HEMOGLOBIN g/dL  8.1* 8.8*   HEMATOCRIT % 25.5* 28.5*   PLATELETS Thousands/uL 225 250   SEGS PCT %  --  70   LYMPHO PCT %  --  15   MONO PCT %  --  9   EOS PCT %  --  3     Results from last 7 days   Lab Units 10/01/24  0438 09/29/24  1412 09/28/24  0806   SODIUM mmol/L 137   < > 139   POTASSIUM mmol/L 5.7*   < > 4.6   CHLORIDE mmol/L 100   < > 99   CO2 mmol/L 27   < > 25   BUN mg/dL 35*   < > 65*   CREATININE mg/dL 7.36*   < > 11.18*   ANION GAP mmol/L 10   < > 15*   CALCIUM mg/dL 8.9   < > 9.1   ALBUMIN g/dL  --   --  3.7   TOTAL BILIRUBIN mg/dL  --   --  0.38   ALK PHOS U/L  --   --  58   ALT U/L  --   --  3*   AST U/L  --   --  9*   GLUCOSE RANDOM mg/dL 103   < > 107    < > = values in this interval not displayed.     Results from last 7 days   Lab Units 09/27/24  1648   INR  0.96                   Recent Cultures (last 7 days):         Imaging Review: No pertinent imaging studies reviewed.  Other Studies: No additional pertinent studies reviewed.    Last 24 Hours Medication List:     Current Facility-Administered Medications:     acetaminophen (TYLENOL) tablet 650 mg, Q4H PRN    albuterol (PROVENTIL HFA,VENTOLIN HFA) inhaler 2 puff, Q4H PRN    atorvastatin (LIPITOR) tablet 20 mg, Daily    atovaquone (MEPRON) oral suspension 1,500 mg, Daily    calcium carbonate (OYSTER SHELL,OSCAL) 500 mg tablet 1 tablet, Daily With Breakfast    cyclophosphamide (CYTOXAN) capsule 100 mg, Daily    Fluticasone Furoate-Vilanterol 100-25 mcg/actuation 1 puff, Daily    heparin (porcine) 25,000 units in 0.45% NaCl 250 mL infusion (premix), Titrated, Last Rate: 16 Units/kg/hr (10/01/24 0543)    heparin (porcine) injection 4,000 Units, Q6H PRN    heparin (porcine) injection 8,000 Units, Q6H PRN    HYDROcodone-acetaminophen (NORCO) 5-325 mg per tablet 1 tablet, Q4H PRN    iron polysaccharides (FERREX) capsule 150 mg, Daily    lamoTRIgine (LaMICtal) tablet 100 mg, HS    melatonin tablet 6 mg, HS    metoprolol succinate (TOPROL-XL) 24 hr tablet 50 mg,  BID    montelukast (SINGULAIR) tablet 10 mg, Daily    ondansetron (ZOFRAN-ODT) dispersible tablet 4 mg, Q4H PRN    pantoprazole (PROTONIX) EC tablet 40 mg, Daily Before Breakfast    predniSONE tablet 12.5 mg, Daily **FOLLOWED BY** [START ON 10/5/2024] predniSONE tablet 10 mg, Daily **FOLLOWED BY** [START ON 10/19/2024] predniSONE tablet 7.5 mg, Daily **FOLLOWED BY** [START ON 11/3/2024] predniSONE tablet 5 mg, Daily    prochlorperazine (COMPAZINE) tablet 5 mg, Q6H PRN    senna (SENOKOT) tablet 17.2 mg, BID    sevelamer (RENAGEL) tablet 1,600 mg, TID With Meals    [START ON 10/2/2024] Sodium Zirconium Cyclosilicate (Lokelma) 5 g, Once per day on Monday Wednesday Friday    traZODone (DESYREL) tablet 200 mg, HS    venlafaxine (EFFEXOR-XR) 24 hr capsule 150 mg, Daily    venlafaxine (EFFEXOR-XR) 24 hr capsule 75 mg, Daily    Administrative Statements   Today, Patient Was Seen By: Uzma Clements PA-C      **Please Note: This note may have been constructed using a voice recognition system.**

## 2024-10-01 NOTE — QUICK NOTE
Interventional Radiology Quick Note    Ngozi Beard   24  MRN: [id number]    65 yo female with ESRD on HD s/p IR left IJ tunneled dialysis catheter placement on 24.    10/1/24     Notified by Dialysis RN patient with bleeding around left IJ tunneled dialysis catheter insertion site. Catheter otherwise is working well with no issues during dialysis.    On exam, patient with small amount of oozing on dressing. She is currently on heparin gtt with last aPTT 109 at 11:41.   Dressing changed by dialysis RN. Will re-evaluate in afternoon. Notified IR Attending.     On re-evaluation 3 hours later, patient with only a trace amount of oozing on dressing. Will continue to monitor and re-assess again in am.     JEB Gaffney  Interventional Radiology

## 2024-10-01 NOTE — PROGRESS NOTES
NEPHROLOGY PROGRESS NOTE   Ngozi Beard 64 y.o. 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  Now remains dialysis dependent since 7/11/2024.  -Currently on TTS schedule at [facility].  Patient tolerated dialysis well yesterday.  Plan for HD again today to maintain TTS schedule.    -Outpatient dry weight 100 kg.  Today pre HD weight 100 kg.  UF removal up to 1 L to challenge dry weight.    I saw and examined patient during hemodialysis treatment at 9:43 AM on 10/1/2024. The patient was receiving hemodialysis for treatment of ANGELICA. I also reviewed vital signs, intake and output, lab results and recent events, and agree with dialysis order.  Tolerating HD. BP acceptable     Access, recent history of recurrent CVC malfunction issues, now permacath was changed to left side by IR this admission.    -Currently tolerating dialysis well with adequate blood flow via permacath.    -Patient clearly refuses to consider PD in the future.  -Will need vein mapping and AV access placement ASAP when discharged from the hospital.     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 Cytoxan and tapering prednisone.     Anemia in acute illness, hemoglobin remains lower, transfuse as needed for hemoglobin less than 7.  -Prior iron saturation 11% with ferritin 103.  Repeat iron studies.     Persistent hyperkalemia, 2K bath on dialysis today.  Strict low potassium diet recommended.  Noted patient has been refusing Lokelma.  Discussed in detail with patient.  She is now agreeable to take this.  Will change Lokelma 5 g on nondialysis days 3 times a week.     CT venogram this admission shows fibrin sheath/thrombus surrounding distal tip of HD catheter, small filling defect within the distal SVC immediately proximal to cavoatrial junction.  Currently on  heparin drip.  Anticoagulation as per primary team.    SUBJECTIVE:  Patient seen and examined at bedside.  Denies any nausea or vomiting.  Denies any worsening shortness of breath.    OBJECTIVE:  Current Weight: Weight - Scale: 101 kg (223 lb)  Vitals:    10/01/24 0930   BP: 120/81   Pulse: 60   Resp: 14   Temp:    SpO2:        Intake/Output Summary (Last 24 hours) at 10/1/2024 0943  Last data filed at 10/1/2024 0830  Gross per 24 hour   Intake 1064.23 ml   Output 3504 ml   Net -2439.77 ml     Wt Readings from Last 3 Encounters:   09/27/24 101 kg (223 lb)   09/25/24 103 kg (226 lb 11.2 oz)   09/19/24 102 kg (224 lb 3.3 oz)     Temp Readings from Last 3 Encounters:   10/01/24 98.2 °F (36.8 °C)   09/19/24 98.4 °F (36.9 °C)   08/14/24 99.2 °F (37.3 °C) (Oral)     BP Readings from Last 3 Encounters:   10/01/24 120/81   09/25/24 114/72   09/19/24 122/63     Pulse Readings from Last 3 Encounters:   10/01/24 60   09/25/24 80   09/19/24 72        Physical Examination:  Eyes: Mild conjunctival pallor present  Neck: No obvious lymphadenopathy appreciated  Respiratory: Bilateral air entry present  CVS: Trace edema in legs  GI: Soft, nondistended  CNS: Active, alert, follows commands  Skin: No new rash  Musculoskeletal: No obvious new gross deformity noted    Medications:    Current Facility-Administered Medications:     acetaminophen (TYLENOL) tablet 650 mg, 650 mg, Oral, Q4H PRN, Drew Norwood MD, 650 mg at 09/30/24 1802    albuterol (PROVENTIL HFA,VENTOLIN HFA) inhaler 2 puff, 2 puff, Inhalation, Q4H PRN, Blaine Prieto MD, 2 puff at 10/01/24 0610    atorvastatin (LIPITOR) tablet 20 mg, 20 mg, Oral, Daily, Drew Norwood MD, 20 mg at 09/30/24 1808    atovaquone (MEPRON) oral suspension 1,500 mg, 1,500 mg, Oral, Daily, Drew Norwood MD, 1,500 mg at 09/30/24 1803    calcium carbonate (OYSTER SHELL,OSCAL) 500 mg tablet 1 tablet, 1 tablet, Oral, Daily With Breakfast, Drew Norwood MD, 1 tablet at 09/29/24 0809     cyclophosphamide (CYTOXAN) capsule 100 mg, 100 mg, Oral, Daily, Drew Norwood MD, 100 mg at 09/30/24 1811    Fluticasone Furoate-Vilanterol 100-25 mcg/actuation 1 puff, 1 puff, Inhalation, Daily, Drew Norwood MD, 1 puff at 09/30/24 0935    heparin (porcine) 25,000 units in 0.45% NaCl 250 mL infusion (premix), 3-30 Units/kg/hr (Order-Specific), Intravenous, Titrated, Drew Norwood MD, Last Rate: 16 mL/hr at 10/01/24 0543, 16 Units/kg/hr at 10/01/24 0543    heparin (porcine) injection 4,000 Units, 4,000 Units, Intravenous, Q6H PRN, Drew Norwood MD, 4,000 Units at 10/01/24 0541    heparin (porcine) injection 8,000 Units, 8,000 Units, Intravenous, Q6H PRN, Drew Norwood MD    HYDROcodone-acetaminophen (NORCO) 5-325 mg per tablet 1 tablet, 1 tablet, Oral, Q4H PRN, Amie Carmen PA-C, 1 tablet at 10/01/24 0436    iron polysaccharides (FERREX) capsule 150 mg, 150 mg, Oral, Daily, Drew Norwood MD, 150 mg at 09/29/24 0809    lamoTRIgine (LaMICtal) tablet 100 mg, 100 mg, Oral, HS, Drew Norwood MD, 100 mg at 09/30/24 2104    melatonin tablet 6 mg, 6 mg, Oral, HS, JEB Greenberg, 6 mg at 09/30/24 2105    metoprolol succinate (TOPROL-XL) 24 hr tablet 50 mg, 50 mg, Oral, BID, Drew Norwood MD, 50 mg at 09/30/24 2113    montelukast (SINGULAIR) tablet 10 mg, 10 mg, Oral, Daily, Drew Norwood MD, 10 mg at 09/30/24 1808    ondansetron (ZOFRAN-ODT) dispersible tablet 4 mg, 4 mg, Oral, Q4H PRN, Drew Norwood MD, 4 mg at 09/28/24 1343    pantoprazole (PROTONIX) EC tablet 40 mg, 40 mg, Oral, Daily Before Breakfast, Drew Norwood MD, 40 mg at 10/01/24 0540    predniSONE tablet 12.5 mg, 12.5 mg, Oral, Daily, 12.5 mg at 09/30/24 1801 **FOLLOWED BY** [START ON 10/5/2024] predniSONE tablet 10 mg, 10 mg, Oral, Daily **FOLLOWED BY** [START ON 10/19/2024] predniSONE tablet 7.5 mg, 7.5 mg, Oral, Daily **FOLLOWED BY** [START ON 11/3/2024] predniSONE tablet 5 mg, 5 mg, Oral, Daily, Drew Norwood MD     prochlorperazine (COMPAZINE) tablet 5 mg, 5 mg, Oral, Q6H PRN, Jair Webera, DO    senna (SENOKOT) tablet 17.2 mg, 17.2 mg, Oral, BID, Drew Norwood MD, 17.2 mg at 09/30/24 1802    sevelamer (RENAGEL) tablet 1,600 mg, 1,600 mg, Oral, TID With Meals, Drew Norwood MD, 1,600 mg at 09/29/24 1642    Sodium Zirconium Cyclosilicate (Lokelma) 10 g, 10 g, Oral, Daily, Drew Norwood MD    Sodium Zirconium Cyclosilicate (Lokelma) 10 g, 10 g, Oral, Once, Blaine Prieto MD    traZODone (DESYREL) tablet 200 mg, 200 mg, Oral, HS, Blaine Prieto MD, 200 mg at 09/30/24 2105    venlafaxine (EFFEXOR-XR) 24 hr capsule 150 mg, 150 mg, Oral, Daily, Drew Norwood MD, 150 mg at 09/29/24 0809    venlafaxine (EFFEXOR-XR) 24 hr capsule 75 mg, 75 mg, Oral, Daily, Drew Norwood MD, 75 mg at 09/29/24 0810    Laboratory Results:  Results from last 7 days   Lab Units 10/01/24  0438 09/30/24  0434 09/29/24  1412 09/28/24  0806 09/27/24  1648   WBC Thousand/uL  --  8.20  --  7.41 7.77   HEMOGLOBIN g/dL  --  8.1*  --  8.8* 9.9*   HEMATOCRIT %  --  25.5*  --  28.5* 31.8*   PLATELETS Thousands/uL  --  225  --  250 289   SODIUM mmol/L 137 139 138 139  --    POTASSIUM mmol/L 5.7* 5.5* 5.9* 4.6  --    CHLORIDE mmol/L 100 100 99 99  --    CO2 mmol/L 27 24 26 25  --    BUN mg/dL 35* 80* 77* 65*  --    CREATININE mg/dL 7.36* 13.69* 12.96* 11.18*  --    CALCIUM mg/dL 8.9 9.5 9.6 9.1  --    MAGNESIUM mg/dL  --   --   --  2.1  --    PHOSPHORUS mg/dL  --   --   --  8.5*  --        CT VENOGRAM CHEST w/ contrast   Final Result by Mehreen Oswald MD (09/29 2140)      Findings consistent with fibrin sheath/thrombus surrounding the distal tip of patient's hemodialysis catheter      Additional small filling defect suspected within the distal SVC immediately proximal to the cavoatrial junction      Resolving tree-in-bud nodularity at the right upper lobe         Workstation performed: [id number]         IR tunneled dialysis catheter  "check/change/reposition/angioplasty   Final Result by Vicente Estrada MD (10/01 0836)   Impression:   Nonocclusive thrombus in the SVC at the cavoatrial junction for which patient is to be admitted for short-term anticoagulation      Catheter exchange and repositioning with fibrin sheath angioplasty         Workstation performed: [id number]         IR tunneled dialysis catheter check/change/reposition/angioplasty    (Results Pending)       Portions of the record may have been created with voice recognition software. Occasional wrong word or \"sound a like\" substitutions may have occurred due to the inherent limitations of voice recognition software. Read the chart carefully and recognize, using context, where substitutions have occurred.    "

## 2024-10-02 ENCOUNTER — PATIENT OUTREACH (OUTPATIENT)
Dept: CASE MANAGEMENT | Facility: OTHER | Age: 64
End: 2024-10-02

## 2024-10-02 LAB
ANION GAP SERPL CALCULATED.3IONS-SCNC: 9 MMOL/L (ref 4–13)
BUN SERPL-MCNC: 24 MG/DL (ref 5–25)
CALCIUM SERPL-MCNC: 9.4 MG/DL (ref 8.4–10.2)
CHLORIDE SERPL-SCNC: 99 MMOL/L (ref 96–108)
CO2 SERPL-SCNC: 28 MMOL/L (ref 21–32)
CREAT SERPL-MCNC: 5.09 MG/DL (ref 0.6–1.3)
GFR SERPL CREATININE-BSD FRML MDRD: 8 ML/MIN/1.73SQ M
GLUCOSE SERPL-MCNC: 132 MG/DL (ref 65–140)
GLUCOSE SERPL-MCNC: 84 MG/DL (ref 65–140)
HCT VFR BLD AUTO: 30.2 % (ref 34.8–46.1)
HGB BLD-MCNC: 9.5 G/DL (ref 11.5–15.4)
INR PPP: 1.39 (ref 0.85–1.19)
POTASSIUM SERPL-SCNC: 5.4 MMOL/L (ref 3.5–5.3)
PROTHROMBIN TIME: 17.3 SECONDS (ref 12.3–15)
SARS-COV-2 RNA RESP QL NAA+PROBE: NEGATIVE
SODIUM SERPL-SCNC: 136 MMOL/L (ref 135–147)

## 2024-10-02 PROCEDURE — 97167 OT EVAL HIGH COMPLEX 60 MIN: CPT

## 2024-10-02 PROCEDURE — 85610 PROTHROMBIN TIME: CPT | Performed by: INTERNAL MEDICINE

## 2024-10-02 PROCEDURE — 99232 SBSQ HOSP IP/OBS MODERATE 35: CPT | Performed by: INTERNAL MEDICINE

## 2024-10-02 PROCEDURE — 82948 REAGENT STRIP/BLOOD GLUCOSE: CPT

## 2024-10-02 PROCEDURE — 85018 HEMOGLOBIN: CPT | Performed by: INTERNAL MEDICINE

## 2024-10-02 PROCEDURE — 85014 HEMATOCRIT: CPT | Performed by: INTERNAL MEDICINE

## 2024-10-02 PROCEDURE — 97163 PT EVAL HIGH COMPLEX 45 MIN: CPT

## 2024-10-02 PROCEDURE — 80048 BASIC METABOLIC PNL TOTAL CA: CPT | Performed by: INTERNAL MEDICINE

## 2024-10-02 PROCEDURE — 87635 SARS-COV-2 COVID-19 AMP PRB: CPT | Performed by: INTERNAL MEDICINE

## 2024-10-02 RX ADMIN — ATOVAQUONE 1500 MG: 750 SUSPENSION ORAL at 08:34

## 2024-10-02 RX ADMIN — MONTELUKAST 10 MG: 10 TABLET, FILM COATED ORAL at 08:32

## 2024-10-02 RX ADMIN — HYDROCODONE BITARTRATE AND ACETAMINOPHEN 1 TABLET: 5; 325 TABLET ORAL at 06:09

## 2024-10-02 RX ADMIN — VENLAFAXINE HYDROCHLORIDE 75 MG: 75 CAPSULE, EXTENDED RELEASE ORAL at 08:34

## 2024-10-02 RX ADMIN — SENNOSIDES 17.2 MG: 8.6 TABLET, FILM COATED ORAL at 17:57

## 2024-10-02 RX ADMIN — SEVELAMER HYDROCHLORIDE 1600 MG: 800 TABLET ORAL at 08:34

## 2024-10-02 RX ADMIN — CYCLOPHOSPHAMIDE 100 MG: 50 CAPSULE ORAL at 08:33

## 2024-10-02 RX ADMIN — SEVELAMER HYDROCHLORIDE 1600 MG: 800 TABLET ORAL at 17:58

## 2024-10-02 RX ADMIN — VENLAFAXINE HYDROCHLORIDE 150 MG: 150 CAPSULE, EXTENDED RELEASE ORAL at 08:35

## 2024-10-02 RX ADMIN — APIXABAN 10 MG: 5 TABLET, FILM COATED ORAL at 17:57

## 2024-10-02 RX ADMIN — LAMOTRIGINE 100 MG: 100 TABLET ORAL at 22:36

## 2024-10-02 RX ADMIN — PANTOPRAZOLE SODIUM 40 MG: 40 TABLET, DELAYED RELEASE ORAL at 06:10

## 2024-10-02 RX ADMIN — SODIUM ZIRCONIUM CYCLOSILICATE 5 G: 10 POWDER, FOR SUSPENSION ORAL at 08:33

## 2024-10-02 RX ADMIN — PREDNISONE 12.5 MG: 2.5 TABLET ORAL at 08:34

## 2024-10-02 RX ADMIN — METOPROLOL SUCCINATE 50 MG: 50 TABLET, EXTENDED RELEASE ORAL at 20:53

## 2024-10-02 RX ADMIN — SENNOSIDES 17.2 MG: 8.6 TABLET, FILM COATED ORAL at 08:32

## 2024-10-02 RX ADMIN — RIVAROXABAN 15 MG: 15 TABLET, FILM COATED ORAL at 08:33

## 2024-10-02 RX ADMIN — TRAZODONE HYDROCHLORIDE 200 MG: 100 TABLET ORAL at 22:36

## 2024-10-02 RX ADMIN — METOPROLOL SUCCINATE 50 MG: 50 TABLET, EXTENDED RELEASE ORAL at 08:36

## 2024-10-02 RX ADMIN — FLUTICASONE FUROATE AND VILANTEROL TRIFENATATE 1 PUFF: 100; 25 POWDER RESPIRATORY (INHALATION) at 08:34

## 2024-10-02 RX ADMIN — Medication 6 MG: at 22:36

## 2024-10-02 RX ADMIN — ATORVASTATIN CALCIUM 20 MG: 20 TABLET, FILM COATED ORAL at 08:32

## 2024-10-02 NOTE — PROGRESS NOTES
"Progress Note - Hospitalist   Name: Ngozi Beard 64 y.o. female I MRN: [id number]  Unit/Bed#: -01 I Date of Admission: 9/27/2024   Date of Service: 10/2/2024 I Hospital Day: 5    Assessment & Plan  Complication associated with dialysis catheter  Presented to IR for malfunction of permcath. Catheter injection showed \"moderate amount of nonocclusive thrombus in the SVC surrounding the catheter tip and probably within the mid SVC as well. The amount of thrombus appears significant enough to warrant anticoagulation. The catheter tip is embedded within this thrombus. The catheter was exchanged for shorter catheter which was repositioned \"  Was admitted to [facility] for IV heparin and CT venogram (9/28) which showed fibrin sheath/thrombus surrounding distal tip of HD catheter, small filling defect within the distal SVC immediately proximal to cavoatrial junction.   On 9/30 returned to IR for removal of cath and placement of L sided placement of cath  Will need vein mapping and AV access outpatient  D/w renal, ok to transition to PO AC, switched to Xarelto (15 mg BID x 21 days then 20 mg QD thereafter) for discharge  Should f/u hematology   Bipolar 1 disorder (HCC)  Home medications include venlafaxine 150 mg and 75 mg in the morning, trazodone 200 mg daily, Lamictal 100 mg  Continue  Primary hypertension  Home medication of metoprolol succinate 50 mg daily  Continue  Dyslipidemia  Continue home statin  Asthma without acute exacerbation  Continue home meds  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 Remains dialysis dependent since 7/11/2024.   Currently on p.o. Cytoxan and tapering prednisone  On HD TTS at [facility]  HFrEF (heart failure with reduced ejection fraction) ([facility])  Wt Readings from Last 3 Encounters:   09/27/24 101 kg (223 lb)   09/25/24 103 kg (226 lb 11.2 oz)   09/19/24 102 kg (224 lb 3.3 oz)     Cardiac catheterization 9/17/2024 without " occlusion  Echo on 8/30/2024 with EF 25% with severe global hypokinesis, moderate to severe mitral valve regurgitation, moderate tricuspid regurgitation, estimated right ventricular systolic pressure of 48 mmHg, small pericardial effusion anterior to chest wall  Recently provided with a LifeVest  Anemia due to chronic kidney disease, on chronic dialysis (HCC)  Lab Results   Component Value Date    EGFR 8 10/02/2024    EGFR 5 10/01/2024    EGFR 2 09/30/2024    CREATININE 5.09 (H) 10/02/2024    CREATININE 7.36 (H) 10/01/2024    CREATININE 13.69 (H) 09/30/2024     Hgb at relative baseline of 8  Continue iron  Acute renal failure on dialysis (HCC)  See plan above  Hyperkalemia    SVC thrombus  See primary plan above  D/w renal, ok to place on PO AC  Will send referral to hematology outpatient given reported hx of sister with some sort of clotting disorder     VTE Pharmacologic Prophylaxis: VTE Score: 9 Moderate Risk (Score 3-4) - Pharmacological DVT Prophylaxis Ordered: rivaroxaban (Xarelto).    Mobility:   Basic Mobility Inpatient Raw Score: 20  JH-HLM Goal: 6: Walk 10 steps or more  JH-HLM Achieved: 6: Walk 10 steps or more  JH-HLM Goal achieved. Continue to encourage appropriate mobility.    Patient Centered Rounds: I performed bedside rounds with nursing staff today.   Discussions with Specialists or Other Care Team Provider: d/w CM    Education and Discussions with Family / Patient: Patient declined call to .     Current Length of Stay: 5 day(s)  Current Patient Status: Inpatient   Certification Statement: The patient will continue to require additional inpatient hospital stay due to pending insurance authorization back to rehab   Discharge Plan: Anticipate discharge later today or tomorrow to [facility]     Code Status: Level 1 - Full Code    Subjective   Doing okay today. No issues or complaints. Agreeable to discharge.    Objective :  Temp:  [97.8 °F (36.6 °C)-99.5 °F (37.5 °C)] 97.8 °F (36.6  °C)  HR:  [60-94] 72  BP: (101-136)/(60-94) 101/60  Resp:  [14-16] 16  SpO2:  [93 %-95 %] 95 %  O2 Device: None (Room air)    Body mass index is 39.5 kg/m².     Input and Output Summary (last 24 hours):     Intake/Output Summary (Last 24 hours) at 10/2/2024 0947  Last data filed at 10/1/2024 1205  Gross per 24 hour   Intake 300 ml   Output 1500 ml   Net -1200 ml       Physical Exam  Vitals and nursing note reviewed.   Constitutional:       General: She is not in acute distress.     Comments: Obese, on RA    Cardiovascular:      Rate and Rhythm: Normal rate and regular rhythm.      Comments: L sided chest wall permcath noted  Pulmonary:      Effort: No respiratory distress.   Abdominal:      General: Bowel sounds are normal. There is no distension.      Palpations: Abdomen is soft.   Musculoskeletal:      Right lower leg: No edema.      Left lower leg: No edema.   Skin:     Coloration: Skin is pale.   Neurological:      Mental Status: She is alert and oriented to person, place, and time.   Psychiatric:         Mood and Affect: Mood normal.           Lines/Drains:  Lines/Drains/Airways       Active Status       Name Placement date Placement time Site Days    CVC Central Lines 09/27/24 Double 09/27/24  1226  --  4    HD Permanent Double Catheter 09/30/24  1207  Internal jugular  1                    Central Line:  Goal for removal:  HD cath          Telemetry:  Telemetry Orders (From admission, onward)               LifeVest Patient: Continuous Telemetry Monitoring during hospitalization (non-expiring)  Continuous LifeVest Telemetry Monitoring        References:    LifeVest Policy                     Telemetry Reviewed: reviewed   Indication for Continued Telemetry Use: Lifevest (remains on tele entire hospital stay)               Lab Results: I have reviewed the following results:   Results from last 7 days   Lab Units 10/02/24  0741 09/30/24  0434 09/28/24  0806   WBC Thousand/uL  --  8.20 7.41   HEMOGLOBIN g/dL 9.5*  8.1* 8.8*   HEMATOCRIT % 30.2* 25.5* 28.5*   PLATELETS Thousands/uL  --  225 250   SEGS PCT %  --   --  70   LYMPHO PCT %  --   --  15   MONO PCT %  --   --  9   EOS PCT %  --   --  3     Results from last 7 days   Lab Units 10/02/24  0659 09/29/24  1412 09/28/24  0806   SODIUM mmol/L 136   < > 139   POTASSIUM mmol/L 5.4*   < > 4.6   CHLORIDE mmol/L 99   < > 99   CO2 mmol/L 28   < > 25   BUN mg/dL 24   < > 65*   CREATININE mg/dL 5.09*   < > 11.18*   ANION GAP mmol/L 9   < > 15*   CALCIUM mg/dL 9.4   < > 9.1   ALBUMIN g/dL  --   --  3.7   TOTAL BILIRUBIN mg/dL  --   --  0.38   ALK PHOS U/L  --   --  58   ALT U/L  --   --  3*   AST U/L  --   --  9*   GLUCOSE RANDOM mg/dL 84   < > 107    < > = values in this interval not displayed.     Results from last 7 days   Lab Units 09/27/24  1648   INR  0.96                   Recent Cultures (last 7 days):         Imaging Results Review: No pertinent imaging studies reviewed.  Other Study Results Review: No additional pertinent studies reviewed.    Last 24 Hours Medication List:     Current Facility-Administered Medications:     acetaminophen (TYLENOL) tablet 650 mg, Q4H PRN    albuterol (PROVENTIL HFA,VENTOLIN HFA) inhaler 2 puff, Q4H PRN    atorvastatin (LIPITOR) tablet 20 mg, Daily    atovaquone (MEPRON) oral suspension 1,500 mg, Daily    calcium carbonate (OYSTER SHELL,OSCAL) 500 mg tablet 1 tablet, Daily With Breakfast    cyclophosphamide (CYTOXAN) capsule 100 mg, Daily    Fluticasone Furoate-Vilanterol 100-25 mcg/actuation 1 puff, Daily    HYDROcodone-acetaminophen (NORCO) 5-325 mg per tablet 1 tablet, Q4H PRN    iron polysaccharides (FERREX) capsule 150 mg, Daily    lamoTRIgine (LaMICtal) tablet 100 mg, HS    melatonin tablet 6 mg, HS    metoprolol succinate (TOPROL-XL) 24 hr tablet 50 mg, BID    montelukast (SINGULAIR) tablet 10 mg, Daily    ondansetron (ZOFRAN-ODT) dispersible tablet 4 mg, Q4H PRN    pantoprazole (PROTONIX) EC tablet 40 mg, Daily Before Breakfast     predniSONE tablet 12.5 mg, Daily **FOLLOWED BY** [START ON 10/5/2024] predniSONE tablet 10 mg, Daily **FOLLOWED BY** [START ON 10/19/2024] predniSONE tablet 7.5 mg, Daily **FOLLOWED BY** [START ON 11/3/2024] predniSONE tablet 5 mg, Daily    prochlorperazine (COMPAZINE) tablet 5 mg, Q6H PRN    rivaroxaban (XARELTO) tablet 15 mg, BID With Meals    senna (SENOKOT) tablet 17.2 mg, BID    sevelamer (RENAGEL) tablet 1,600 mg, TID With Meals    Sodium Zirconium Cyclosilicate (Lokelma) 5 g, Once per day on Monday Wednesday Friday    traZODone (DESYREL) tablet 200 mg, HS    venlafaxine (EFFEXOR-XR) 24 hr capsule 150 mg, Daily    venlafaxine (EFFEXOR-XR) 24 hr capsule 75 mg, Daily    Administrative Statements   Today, Patient Was Seen By: Uzma Clements PA-C      **Please Note: This note may have been constructed using a voice recognition system.**

## 2024-10-02 NOTE — PLAN OF CARE
Problem: OCCUPATIONAL THERAPY ADULT  Goal: Performs self-care activities at highest level of function for planned discharge setting.  See evaluation for individualized goals.  Description: Treatment Interventions: ADL retraining, Functional transfer training, UE strengthening/ROM, Endurance training, Patient/family training, Equipment evaluation/education, Compensatory technique education, Continued evaluation, Activityengagement, Energy conservation          See flowsheet documentation for full assessment, interventions and recommendations.   Outcome: Progressing  Note: Limitation: Decreased ADL status, Decreased endurance, Decreased high-level ADLs  Prognosis: Good  Assessment: Pt is a 64 y.o. female seen for OT evaluation s/p admission to [facility] on 9/27/2024 due to Complication associated with dialysis catheter, nonocclusive thrombus found in superior vena cava. Pt  has a past medical history of Asthma, Chronic pain, Hypertension, Renal disorder, Sepsis (HCC), and Shortness of breath.  Pt with active OT evaluation/treatment and activity orders. Pt reports recently admitted to [facility]) and receiving therapy. PTA, Pt was I w/ ADL and functional mobility, had assist w/ IADL, was not driving and was using RW at baseline. Pt agreeable and willing to participate in OT evaluation. Pt was greeted and left supine w/ alarm activated and all needs within reach. During evaluation, pt is S bed mobility, UB ADLs; Min A STS, functional mobility, LB ADLs. Pt required VC for safety and increased recovery time . Pt currently presents with impairments in the following categories -difficulty performing ADLS and difficulty performing IADLS  activity tolerance, endurance, standing balance/tolerance, and sitting balance/tolerance. These impairments, as well as pt's fatigue, SOB, pain, and risk for falls  limit pt's ability to safely engage in all baseline areas of occupation, includingeating, grooming, bathing,  dressing, toileting, functional mobility/transfers, community mobility, social participation , and leisure activities . The patient's raw score on the AM-PAC Daily Activity Inpatient Short Form is 19. A raw score of greater than or equal to 19 suggests the patient may benefit from discharge to home, though recommend return to facility w/ rehab/increased support. Please refer to the recommendation of the Occupational Therapist for safe discharge planning. Pt would benefit from continued acute OT services throughout hospital course in order to maximize Pt's independence and overall occupational performance. Plan for OT interventions 2-3x per week. From OT standpoint,  recommend Level II (Moderate Resource Intensity) upon d/c when pt medically stable to d/c from acute care. Will continue to follow.     Rehab Resource Intensity Level, OT: II (Moderate Resource Intensity)

## 2024-10-02 NOTE — ASSESSMENT & PLAN NOTE
Lab Results   Component Value Date    EGFR 8 10/02/2024    EGFR 5 10/01/2024    EGFR 2 09/30/2024    CREATININE 5.09 (H) 10/02/2024    CREATININE 7.36 (H) 10/01/2024    CREATININE 13.69 (H) 09/30/2024     Hgb at relative baseline of 8  Continue iron

## 2024-10-02 NOTE — CASE MANAGEMENT
[place] [facility] received request for authorization from Care Manager.  Authorization request submitted for: [facility]  [facility]  NPI: [id number]  Facility MD:  Dr. Rand   NPI: [id number]  Authorization initiated by contacting insurance:  [employer]  Via: H&CC Portal   Clinicals submitted via Portal attachment   Pending Reference #:  [id number]     Care Manager notified: Meredith Johnson    Updates to authorization status will be noted in chart. Please reach out to CM for updates on any clinical information.

## 2024-10-02 NOTE — PHYSICAL THERAPY NOTE
Physical Therapy Evaluation    Patient's Name: Ngozi Beard    Admitting Diagnosis  ESRD (end stage renal disease) ([facility]) [N18.6]    Problem List  Patient Active Problem List   Diagnosis    Bipolar 1 disorder (HCC)    Primary hypertension    Dyslipidemia    Paranoid schizophrenia (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)    Complication associated with dialysis catheter    Unspecified mood (affective) disorder ([facility])    Chest pain    Hyperkalemia    HFrEF (heart failure with reduced ejection fraction) ([facility])    SVC thrombus       Past Medical History  Past Medical History:   Diagnosis Date    Asthma     Chronic pain     Hypertension     Renal disorder     Sepsis ([facility]) 07/10/2024    Shortness of breath 08/11/2024       Past Surgical History  Past Surgical History:   Procedure Laterality Date    BACK SURGERY      CARDIAC CATHETERIZATION Left 9/17/2024    Procedure: Cardiac Left Heart Cath;  Surgeon: Harris Paul MD;  Location: [facility];  Service: Cardiology    COLONOSCOPY      HEMODIALYSIS ADULT  9/10/2024    IR BIOPSY KIDNEY RANDOM  7/17/2024    IR TEMPORARY DIALYSIS CATHETER PLACEMENT  7/15/2024    IR TUNNELED DIALYSIS CATHETER CHECK/CHANGE/REPOSITION/ANGIOPLASTY  8/12/2024    IR TUNNELED DIALYSIS CATHETER  CHECK/CHANGE/REPOSITION/ANGIOPLASTY  9/3/2024    IR TUNNELED DIALYSIS CATHETER CHECK/CHANGE/REPOSITION/ANGIOPLASTY  9/10/2024    IR TUNNELED DIALYSIS CATHETER CHECK/CHANGE/REPOSITION/ANGIOPLASTY  9/27/2024    IR TUNNELED DIALYSIS CATHETER CHECK/CHANGE/REPOSITION/ANGIOPLASTY  9/30/2024    IR TUNNELED DIALYSIS CATHETER PLACEMENT  7/22/2024    TUBAL LIGATION            10/02/24 0835   PT Last Visit   PT Visit Date 10/02/24   Note Type   Note type Evaluation   Pain Assessment   Pain Assessment Tool 0-10   Pain Score 5   Pain Location/Orientation Location: Generalized;Location: Knee   Patient's Stated Pain Goal No pain   Hospital Pain Intervention(s) Ambulation/increased activity   Restrictions/Precautions   Weight Bearing Precautions Per Order No   Braces or Orthoses   (states she typically wears a neoprine brace L  knee but presently not here)   Other Precautions Multiple lines;Telemetry;Fall Risk;Pain   Home Living   Type of Home SNF   Additional Comments Recently admitted to [facility].  Was receiving therapy- plan is for pt to stay long term, but is also ? presently receiving rehab.  per pt, ambulates w/ RW, indep self care.   Prior Function   Level of [place] Independent with ADLs;Independent with functional mobility   Falls in the last 6 months 1 to 4   General   Family/Caregiver Present No   Cognition   Overall Cognitive Status WFL   Arousal/Participation Responsive   Orientation Level Oriented X4   Memory Unable to assess   Following Commands Follows one step commands without difficulty   Subjective   Subjective states she feels OK.  notes some knee pain   RLE Assessment   RLE Assessment   (strength grossly 4-/5, assessed w/ mobility)   LLE Assessment   LLE Assessment   (strength grossly 4-/5- assessed w/ mobility)   Coordination   Movements are Fluid and Coordinated 1   Bed Mobility   Supine to Sit 5  Supervision   Additional items Assist x 1;Increased time required;Verbal cues   Sit to Supine 5   Supervision   Additional items Assist x 1;Increased time required;Verbal cues   Transfers   Sit to Stand 4  Minimal assistance   Additional items Assist x 1;Increased time required   Stand to Sit 4  Minimal assistance   Additional items Assist x 1   Ambulation/Elevation   Gait pattern   (slow, short step length, antalgic, forward flexion)   Gait Assistance 4  Minimal assist   Additional items Assist x 1   Assistive Device Rolling walker   Distance 80'x1 w/ standing rest   Balance   Static Sitting Normal   Dynamic Sitting Good   Static Standing Fair -   Dynamic Standing Fair -   Ambulatory Fair -   Endurance Deficit   Endurance Deficit Yes   Endurance Deficit Description fatigue, weakness, pain   Activity Tolerance   Activity Tolerance Patient tolerated treatment well;Patient limited by pain;Patient limited by fatigue   Nurse Made Aware yes   Assessment   Prognosis Good   Problem List Decreased strength;Impaired balance;Decreased endurance;Decreased mobility;Pain   Assessment Pt seen for physical therapy evaluation.  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  Pt admitted for [facility].  Due to acute medical issues, acute procedure, pain, fall risk, note unstable clinical picture.  PT consulted to assess post op mobility, d/c needs.  Pt will benefit from skilled PT to correct for the above problems.  When stable, agree w/ return to snf for continued level II (moderate) post acute care therapy needs w/ plan to transition to long term care post same.   Goals   Patient Goals to go back to rehab   STG Expiration Date 10/16/24   Short Term Goal #1 1-2 wks: bed mob and transfers w/ indep, standing balance to good/nornal w/ device, ambulate 200 ft w. RW and S/.mod  I, increase B LE strength by 1/2 -1 grade   PT Treatment Day 0   Plan   Treatment/Interventions Functional transfer training;LE strengthening/ROM;Therapeutic exercise;Endurance training;Patient/family training;Equipment eval/education;Gait training;Bed mobility   PT Frequency 2-3x/wk   Discharge Recommendation   Rehab Resource Intensity Level, PT II (Moderate Resource Intensity)   AM-PAC Basic Mobility Inpatient   Turning in Flat Bed Without Bedrails 4   Lying on Back to Sitting on Edge of Flat Bed Without Bedrails 4   Moving Bed to Chair 3   Standing Up From Chair Using Arms 3   Walk in Room 3   Climb 3-5 Stairs With Railing 1   Basic Mobility Inpatient Raw Score 18   Basic Mobility Standardized Score 41.05   [facility] Highest Level Of Mobility   -HLM Goal 6: Walk 10 steps or more   JH-HLM Achieved 7: Walk 25 feet or more         Yoan Kaplan PT, DPT, CSRS

## 2024-10-02 NOTE — PLAN OF CARE
Problem: PAIN - ADULT  Goal: Verbalizes/displays adequate comfort level or baseline comfort level  Description: Interventions:  - Encourage patient to monitor pain and request assistance  - Assess pain using appropriate pain scale  - Administer analgesics based on type and severity of pain and evaluate response  - Implement non-pharmacological measures as appropriate and evaluate response  - Consider cultural and social influences on pain and pain management  - Notify physician/advanced practitioner if interventions unsuccessful or patient reports new pain  Outcome: Progressing     Problem: INFECTION - ADULT  Goal: Absence or prevention of progression during hospitalization  Description: INTERVENTIONS:  - Assess and monitor for signs and symptoms of infection  - Monitor lab/diagnostic results  - Monitor all insertion sites, i.e. indwelling lines, tubes, and drains  - Monitor endotracheal if appropriate and nasal secretions for changes in amount and color  - [place] appropriate cooling/warming therapies per order  - Administer medications as ordered  - Instruct and encourage patient and family to use good hand hygiene technique  - Identify and instruct in appropriate isolation precautions for identified infection/condition  Outcome: Progressing  Goal: Absence of fever/infection during neutropenic period  Description: INTERVENTIONS:  - Monitor WBC    Outcome: Progressing

## 2024-10-02 NOTE — PROGRESS NOTES
Chart review complete the patient admitted 9/27/24 to [facility]. This Admin Coordinator will continue to monitor via chart review.

## 2024-10-02 NOTE — QUICK NOTE
Interventional Radiology Quick Note    Ngozi Beard   24  MRN: [id number]     63 yo female with ESRD on HD s/p IR left IJ tunneled dialysis catheter placement on 24.    10/1/24 Noted to have oozing from left IJ tunneled dialysis catheter insertion site. Patient on heparin gtt with aPTT 109.  Dressing changed and site rechecked in afternoon with trace amount of oozing on dressing.     10/2/24 Left IJ dialysis catheter checked. Patient transitioned to Eliquis today.  Hgb stable 8.1 --> 9.5 today. INR 1.39. Small amount of sanguinous fluid on dressing since exam yesterday. Patient next dialysis scheduled for tomorrow    Please reach out to IR with questions or concerns.     JEB Gaffney  Interventional Radiology

## 2024-10-02 NOTE — ASSESSMENT & PLAN NOTE
See primary plan above  D/w renal, ok to place on PO AC  Will send referral to hematology outpatient given reported hx of sister with some sort of clotting disorder

## 2024-10-02 NOTE — ASSESSMENT & PLAN NOTE
"Presented to IR for malfunction of permcath. Catheter injection showed \"moderate amount of nonocclusive thrombus in the SVC surrounding the catheter tip and probably within the mid SVC as well. The amount of thrombus appears significant enough to warrant anticoagulation. The catheter tip is embedded within this thrombus. The catheter was exchanged for shorter catheter which was repositioned \"  Was admitted to [facility] for IV heparin and CT venogram (9/28) which showed fibrin sheath/thrombus surrounding distal tip of HD catheter, small filling defect within the distal SVC immediately proximal to cavoatrial junction.   On 9/30 returned to IR for removal of cath and placement of L sided placement of cath  Will need vein mapping and AV access outpatient  D/w renal, ok to transition to PO AC, switched to Xarelto (15 mg BID x 21 days then 20 mg QD thereafter) for discharge  Should f/u hematology   "

## 2024-10-02 NOTE — CASE MANAGEMENT
Case Management Discharge Planning Note    Patient name Ngozi Beard  Location /-01 MRN [id number]  : 1960 Date 10/2/2024       Current Admission Date: 2024  Current Admission Diagnosis:Complication associated with dialysis catheter   Patient Active Problem List    Diagnosis Date Noted Date Diagnosed    SVC thrombus 10/01/2024     HFrEF (heart failure with reduced ejection fraction) ([facility]) 2024     Hyperkalemia 2024     Chest pain 2024     Unspecified mood (affective) disorder ([facility]) 2024     Acute renal failure on dialysis ([facility]) 2024     Complication associated with dialysis catheter 2024     Cardiomyopathy ([facility]) 2024     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 ([facility]) 2024     Generalized weakness 2024     Rash 2024     Anemia due to chronic kidney disease, on chronic dialysis ([facility]) 2024     Thrombocytopenia ([facility]) 2024     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     Abnormality of ascending aorta 2024     Postmenopausal 2024     Encounter for screening mammogram for malignant neoplasm of breast 2024     Allergic rhinitis 2024     Persistent cough 2024     Urge incontinence of urine 2024     Exercise intolerance 2024     Family history of coronary artery bypass graft surgery 2024     Urge urinary incontinence 2024     Female stress incontinence 2024     Paranoid schizophrenia ([facility]) 2023     Asthma without acute exacerbation 2023     Asthma due to seasonal allergies 2023     Bipolar 1 disorder ([facility]) 2022     Primary hypertension 2022     Dyslipidemia 2022       LOS (days): 5  Geometric Mean LOS (GMLOS) (days): 5.1  Days to GMLOS:0.2     OBJECTIVE:  Risk of Unplanned Readmission Score: 42.71         Current admission status: Inpatient   Preferred Pharmacy:   [id number]  - [place] - [address]  [address]  [place] [address]  Phone: [phone] Fax: [phone]    [facility] - [place], [place] - [address]  [address]  [address]  [facility] [address]  Phone: [phone] Fax: [phone]    [facility] - [place] - [address]  [address]  [place] [address]  Phone: [phone] Fax: [phone]    Primary Care Provider: Meenakshi Balbuena MD    [facility]  Secondary Insurance: [facility]    DISCHARGE DETAILS:    Preauth requested via DC support team for [place]    COVID requested.  Covid test normal-[facility] aware    [facility] uses [facility] for transports

## 2024-10-02 NOTE — PLAN OF CARE
Problem: PHYSICAL THERAPY ADULT  Goal: Performs mobility at highest level of function for planned discharge setting.  See evaluation for individualized goals.  Description: Treatment/Interventions: Functional transfer training, LE strengthening/ROM, Therapeutic exercise, Endurance training, Patient/family training, Equipment eval/education, Gait training, Bed mobility          See flowsheet documentation for full assessment, interventions and recommendations.  Note: Prognosis: Good  Problem List: Decreased strength, Impaired balance, Decreased endurance, Decreased mobility, Pain  Assessment: Pt seen for physical therapy evaluation.  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  Pt admitted for [facility] care.  Due to acute medical issues, acute procedure, pain, fall risk, note unstable clinical picture.  PT consulted to assess post op mobility, d/c needs.  Pt will benefit from skilled PT to correct for the above problems.  When stable, agree w/ return to snf for continued level II (moderate) post acute care therapy needs w/ plan to transition to long term care post same.        Rehab Resource Intensity Level, PT: II (Moderate Resource Intensity)    See flowsheet documentation for full assessment.

## 2024-10-02 NOTE — PLAN OF CARE
Problem: PAIN - ADULT  Goal: Verbalizes/displays adequate comfort level or baseline comfort level  Description: Interventions:  - Encourage patient to monitor pain and request assistance  - Assess pain using appropriate pain scale  - Administer analgesics based on type and severity of pain and evaluate response  - Implement non-pharmacological measures as appropriate and evaluate response  - Consider cultural and social influences on pain and pain management  - Notify physician/advanced practitioner if interventions unsuccessful or patient reports new pain  Outcome: Progressing     Problem: INFECTION - ADULT  Goal: Absence or prevention of progression during hospitalization  Description: INTERVENTIONS:  - Assess and monitor for signs and symptoms of infection  - Monitor lab/diagnostic results  - Monitor all insertion sites, i.e. indwelling lines, tubes, and drains  - Monitor endotracheal if appropriate and nasal secretions for changes in amount and color  - [place] appropriate cooling/warming therapies per order  - Administer medications as ordered  - Instruct and encourage patient and family to use good hand hygiene technique  - Identify and instruct in appropriate isolation precautions for identified infection/condition  Outcome: Progressing     Problem: SAFETY ADULT  Goal: Patient will remain free of falls  Description: INTERVENTIONS:  - Educate patient/family on patient safety including physical limitations  - Instruct patient to call for assistance with activity   - Consult OT/PT to assist with strengthening/mobility   - Keep Call bell within reach  - Keep bed low and locked with side rails adjusted as appropriate  - Keep care items and personal belongings within reach  - Initiate and maintain comfort rounds  - Make Fall Risk Sign visible to staff  - Offer Toileting every 2 Hours, in advance of need  - Initiate/Maintain bed/chair alarm  - Obtain necessary fall risk management equipment: slippers  - Apply  yellow socks and bracelet for high fall risk patients  - Consider moving patient to room near nurses station  Outcome: Progressing     Problem: DISCHARGE PLANNING  Goal: Discharge to home or other facility with appropriate resources  Description: INTERVENTIONS:  - Identify barriers to discharge w/patient and caregiver  - Arrange for needed discharge resources and transportation as appropriate  - Identify discharge learning needs (meds, wound care, etc.)  - Arrange for interpretive services to assist at discharge as needed  - Refer to Case Management Department for coordinating discharge planning if the patient needs post-hospital services based on physician/advanced practitioner order or complex needs related to functional status, cognitive ability, or social support system  Outcome: Progressing     Problem: Knowledge Deficit  Goal: Patient/family/caregiver demonstrates understanding of disease process, treatment plan, medications, and discharge instructions  Description: Complete learning assessment and assess knowledge base.  Interventions:  - Provide teaching at level of understanding  - Provide teaching via preferred learning methods  Outcome: Progressing

## 2024-10-02 NOTE — OCCUPATIONAL THERAPY NOTE
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    Anemia due to chronic kidney disease, on chronic dialysis (HCC)    Acute renal failure on dialysis (HCC)    Hyperkalemia    HFrEF (heart failure with reduced ejection fraction) ([facility])    SVC thrombus    Past Medical History  Past Medical History:   Diagnosis Date    Asthma     Chronic pain     Hypertension     Renal disorder     Sepsis (HCC) 07/10/2024    Shortness of breath 08/11/2024     Past Surgical History  Past Surgical History:   Procedure Laterality Date    BACK SURGERY      CARDIAC CATHETERIZATION Left 9/17/2024    Procedure: Cardiac Left Heart Cath;  Surgeon: Harris Paul MD;  Location: [facility];  Service: Cardiology    COLONOSCOPY      HEMODIALYSIS ADULT  9/10/2024    IR BIOPSY KIDNEY RANDOM  7/17/2024    IR TEMPORARY DIALYSIS CATHETER PLACEMENT  7/15/2024    IR TUNNELED DIALYSIS CATHETER CHECK/CHANGE/REPOSITION/ANGIOPLASTY  8/12/2024    IR TUNNELED DIALYSIS CATHETER CHECK/CHANGE/REPOSITION/ANGIOPLASTY  9/3/2024    IR TUNNELED DIALYSIS CATHETER CHECK/CHANGE/REPOSITION/ANGIOPLASTY  9/10/2024    IR TUNNELED DIALYSIS CATHETER CHECK/CHANGE/REPOSITION/ANGIOPLASTY  9/27/2024    IR TUNNELED DIALYSIS CATHETER CHECK/CHANGE/REPOSITION/ANGIOPLASTY  9/30/2024    IR TUNNELED DIALYSIS CATHETER PLACEMENT  7/22/2024    TUBAL LIGATION           10/02/24 0836   OT Last Visit   OT Visit Date 10/02/24   Note Type   Note type Evaluation   Pain Assessment   Pain Assessment Tool 0-10   Pain Score 5   Pain Location/Orientation Location: Generalized   Patient's Stated Pain Goal No pain   Hospital Pain Intervention(s) Repositioned;Ambulation/increased  "activity   Restrictions/Precautions   Weight Bearing Precautions Per Order No   Braces or Orthoses   (neoprine brace L knee but not present at hospital)   Other Precautions Multiple lines;Fall Risk;Pain;Telemetry   Home Living   Type of Home SNF   Home Layout One level;Performs ADLs on one level   Bathroom Shower/Tub Walk-in shower   Bathroom Toilet Standard   Home Equipment Walker   Additional Comments Pt recently admitted to [facility] and was receiving therapy w/ possible plan to stay long term. Pt reports using RW and wears knee brace to ambulate.   Prior Function   Level of [place] Independent with ADLs;Independent with functional mobility;Needs assistance with IADLS   Lives With Facility staff   Receives Help From Family   IADLs Family/Friend/Other provides transportation;Family/Friend/Other provides meals;Family/Friend/Other provides medication management   Falls in the last 6 months 1 to 4   Vocational Retired   Comments Pt reports I w/ ADL, functional mobility. Pt walks to shower and bathes indpendently. Pt reports assist from facility staff w/ IADLs. (-) .   Lifestyle   Autonomy PTA, Pt reports I w/ ADLs, functional mobility. A w/ IADLs   Reciprocal Relationships Facility staff, family   Service to Others Retired   Intrinsic Gratification none stated   General   Family/Caregiver Present No   Subjective   Subjective \"my knee is starting to bother me without the brace\"   ADL   Where Assessed Edge of bed   Eating Assistance 7  Independent   Grooming Assistance 5  Supervision/Setup   UB Bathing Assistance 5  Supervision/Setup   LB Bathing Assistance 4  Minimal Assistance   UB Dressing Assistance 5  Supervision/Setup   LB Dressing Assistance 4  Minimal Assistance   Toileting Assistance  5  Supervision/Setup   Functional Assistance 5  Supervision/Setup   Bed Mobility   Supine to Sit 5  Supervision   Additional items Assist x 1;Increased time required;Verbal cues   Sit to Supine 5  Supervision "   Additional items Assist x 1;Increased time required;Verbal cues   Additional Comments Pt greeted and left supine in bed w/ all needs within reach   Transfers   Sit to Stand 4  Minimal assistance   Additional items Assist x 1;Increased time required   Stand to Sit 4  Minimal assistance   Additional items Assist x 1;Increased time required   Additional Comments w/ RW   Functional Mobility   Functional Mobility 4  Minimal assistance   Additional Comments Ax1. Pt performs short household distance mobility in hallway w/ RW. Pt c/o of knee pain due to not having knee brace present in hospital   Additional items Rolling walker   Balance   Static Sitting Normal   Dynamic Sitting Good   Static Standing Fair -   Dynamic Standing Fair -   Ambulatory Fair -   Activity Tolerance   Activity Tolerance Patient tolerated treatment well;Patient limited by pain;Patient limited by fatigue   Medical Staff Made Aware PT Yoan   Nurse Made Aware RN cleared   RUE Assessment   RUE Assessment WFL   LUE Assessment   LUE Assessment WFL   Hand Function   Gross Motor Coordination Functional   Fine Motor Coordination Functional   Sensation   Light Touch No apparent deficits   Cognition   Overall Cognitive Status WFL   Arousal/Participation Alert;Responsive;Cooperative   Attention Within functional limits   Orientation Level Oriented X4   Memory Within functional limits   Following Commands Follows one step commands without difficulty   Comments Pt is pleasant and cooperative.   Assessment   Limitation Decreased ADL status;Decreased endurance;Decreased high-level ADLs   Prognosis Good   Assessment Pt is a 64 y.o. female seen for OT evaluation s/p admission to [facility] on 9/27/2024 due to Complication associated with dialysis catheter, nonocclusive thrombus found in superior vena cava. Pt  has a past medical history of Asthma, Chronic pain, Hypertension, Renal disorder, Sepsis (HCC), and Shortness of breath.  Pt with active OT  evaluation/treatment and activity orders. Pt reports recently admitted to [facility] (SNF) and receiving therapy. PTA, Pt was I w/ ADL and functional mobility, had assist w/ IADL, was not driving and was using RW at baseline. Pt agreeable and willing to participate in OT evaluation. Pt was greeted and left supine w/ alarm activated and all needs within reach. During evaluation, pt is S bed mobility, UB ADLs; Min A STS, functional mobility, LB ADLs. Pt required VC for safety and increased recovery time . Pt currently presents with impairments in the following categories -difficulty performing ADLS and difficulty performing IADLS  activity tolerance, endurance, standing balance/tolerance, and sitting balance/tolerance. These impairments, as well as pt's fatigue, SOB, pain, and risk for falls  limit pt's ability to safely engage in all baseline areas of occupation, includingeating, grooming, bathing, dressing, toileting, functional mobility/transfers, community mobility, social participation , and leisure activities . The patient's raw score on the AM-PAC Daily Activity Inpatient Short Form is 19. A raw score of greater than or equal to 19 suggests the patient may benefit from discharge to home, though recommend return to facility w/ rehab/increased support. Please refer to the recommendation of the Occupational Therapist for safe discharge planning. Pt would benefit from continued acute OT services throughout hospital course in order to maximize Pt's independence and overall occupational performance. Plan for OT interventions 2-3x per week. From OT standpoint,  recommend Level II (Moderate Resource Intensity) upon d/c when pt medically stable to d/c from acute care. Will continue to follow.   Goals   Patient Goals to go back to rehab   LTG Time Frame 10-14   Long Term Goal See goals below   Plan   Treatment Interventions ADL retraining;Functional transfer training;UE strengthening/ROM;Endurance training;Patient/family  training;Equipment evaluation/education;Compensatory technique education;Continued evaluation;Activityengagement;Energy conservation   Goal Expiration Date 10/16/24   OT Treatment Day 0   OT Frequency 2-3x/wk   Discharge Recommendation   Rehab Resource Intensity Level, OT II (Moderate Resource Intensity)   AM-PAC Daily Activity Inpatient   Lower Body Dressing 3   Bathing 3   Toileting 3   Upper Body Dressing 3   Grooming 3   Eating 4   Daily Activity Raw Score 19   Daily Activity Standardized Score (Calc for Raw Score >=11) 40.22   AM-PAC Applied Cognition Inpatient   Following a Speech/Presentation 4   Understanding Ordinary Conversation 4   Taking Medications 4   Remembering Where Things Are Placed or Put Away 4   Remembering List of 4-5 Errands 4   Taking Care of Complicated Tasks 4   Applied Cognition Raw Score 24   Applied Cognition Standardized Score 62.21   End of Consult   Education Provided Yes   Patient Position at End of Consult Supine;All needs within reach   Nurse Communication Nurse aware of consult     OT Goals:     - Pt will be Supervision with LB ADL by the time of discharge.    - Pt will be Mod I with UB ADL by the time of discharge.    - Pt will complete toileting routine (transfers, hygiene, and clothing management) with Mod I to maximize independence and return to prior level of function.    - Pt will complete bed mobility supine >< sit w/ Mod I to maximize independence and return home.    - Pt will transfer to bed, chair, and toilet w/ Mod I using AD / DME as needed to maximize independence and reduce burden of care.     - Pt will ambulate household distances w/ Mod I using least restrictive device to maximize independence and return home.     - Pt will increase activity tolerance (and sitting tolerance) by eating all meals OOB in the chair.     - Pt will increase standing tolerance to 15 minutes to maximize independence w/ grooming tasks standing at the sink.     - Pt will tolerate therapeutic  activities for greater than 30 minutes in order to increase tolerance for functional activities.     - Pt will participate in ongoing OT evaluation of cognitive skills to assist with safe d/c planning/recommendations.       Mirela Caputo, SEBAS, OTR/L

## 2024-10-02 NOTE — PROGRESS NOTES
NEPHROLOGY PROGRESS NOTE   Ngozi Beard 64 y.o. 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  Now remains dialysis dependent since 7/11/2024.  -Currently on TTS schedule at [facility].  Patient tolerated dialysis well yesterday.  Next HD tomorrow.  -Outpatient dry weight 100 kg.  Post HD weight 99 kg  -Monitor for renal recovery.  No significant urine output.     Access, recent history of recurrent CVC malfunction issues, now permacath was changed to left side by IR this admission.    -Currently tolerating dialysis well with adequate blood flow via permacath.    -Patient clearly refuses to consider PD in the future.  -Will need vein mapping and AV access placement ASAP when discharged from the hospital.     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 Cytoxan and tapering prednisone.     Anemia in acute illness, hemoglobin remains lower, transfuse as needed for hemoglobin less than 7.  -Prior iron saturation 11% with ferritin 103.  Repeat iron studies.     Persistent hyperkalemia, status post dialysis yesterday.  Serum potassium 5.4 today although moderately hemolyzed specimen.   -BMP in AM. Strict low potassium diet recommended.  -Continue Lokelma 5 g on nondialysis days.       CT venogram this admission shows fibrin sheath/thrombus surrounding distal tip of HD catheter, small filling defect within the distal SVC immediately proximal to cavoatrial junction.  Currently on heparin drip.  Anticoagulation as per primary team.  -IR report from 9/27/2024 shows nonocclusive thrombus in the SVC at the cavoatrial junction.  -Would recommend to avoid Xarelto and dialysis patient, may to consider warfarin    Discussed above plan in detail with primary team regarding changing Xarelto to warfarin, plan to continue dialysis tomorrow  and they agree with above recommendations.      SUBJECTIVE:  Patient seen and examined at bedside.    Denies any worsening shortness of breath  OBJECTIVE:  Current Weight: Weight - Scale: 101 kg (223 lb)  Vitals:    10/02/24 0801   BP: 101/60   Pulse: 72   Resp:    Temp: 97.8 °F (36.6 °C)   SpO2: 95%       Intake/Output Summary (Last 24 hours) at 10/2/2024 1139  Last data filed at 10/1/2024 1205  Gross per 24 hour   Intake 300 ml   Output 1500 ml   Net -1200 ml     Wt Readings from Last 3 Encounters:   09/27/24 101 kg (223 lb)   09/25/24 103 kg (226 lb 11.2 oz)   09/19/24 102 kg (224 lb 3.3 oz)     Temp Readings from Last 3 Encounters:   10/02/24 97.8 °F (36.6 °C)   09/19/24 98.4 °F (36.9 °C)   08/14/24 99.2 °F (37.3 °C) (Oral)     BP Readings from Last 3 Encounters:   10/02/24 101/60   09/25/24 114/72   09/19/24 122/63     Pulse Readings from Last 3 Encounters:   10/02/24 72   09/25/24 80   09/19/24 72        Physical Examination:  Eyes: Mild conjunctival pallor present  Neck: No obvious lymphadenopathy appreciated  Respiratory: Bilateral air entry present  CVS: No significant edema  GI: Soft, nondistended  CNS: Active, alert, follows commands  Skin: No new rash  Musculoskeletal: No obvious new gross deformity noted    Medications:    Current Facility-Administered Medications:     acetaminophen (TYLENOL) tablet 650 mg, 650 mg, Oral, Q4H PRN, Drew Norwood MD, 650 mg at 09/30/24 1802    albuterol (PROVENTIL HFA,VENTOLIN HFA) inhaler 2 puff, 2 puff, Inhalation, Q4H PRN, Blaine Prieto MD, 2 puff at 10/01/24 0610    atorvastatin (LIPITOR) tablet 20 mg, 20 mg, Oral, Daily, Drew Norwood MD, 20 mg at 10/02/24 0832    atovaquone (MEPRON) oral suspension 1,500 mg, 1,500 mg, Oral, Daily, Drew Norwood MD, 1,500 mg at 10/02/24 0834    calcium carbonate (OYSTER SHELL,OSCAL) 500 mg tablet 1 tablet, 1 tablet, Oral, Daily With Breakfast, Drew Norwood MD, 1 tablet at 09/29/24 0809    cyclophosphamide (CYTOXAN) capsule 100  mg, 100 mg, Oral, Daily, Drew Norwood MD, 100 mg at 10/02/24 0833    Fluticasone Furoate-Vilanterol 100-25 mcg/actuation 1 puff, 1 puff, Inhalation, Daily, Drew Norwood MD, 1 puff at 10/02/24 0834    HYDROcodone-acetaminophen (NORCO) 5-325 mg per tablet 1 tablet, 1 tablet, Oral, Q4H PRN, Amie Carmen PA-C, 1 tablet at 10/02/24 0609    iron polysaccharides (FERREX) capsule 150 mg, 150 mg, Oral, Daily, Drew Norwood MD, 150 mg at 09/29/24 0809    lamoTRIgine (LaMICtal) tablet 100 mg, 100 mg, Oral, HS, Drew Norwood MD, 100 mg at 10/01/24 2153    melatonin tablet 6 mg, 6 mg, Oral, HS, Sheila Reddy, SHAYANNP, 6 mg at 10/01/24 2152    metoprolol succinate (TOPROL-XL) 24 hr tablet 50 mg, 50 mg, Oral, BID, Drew Norwood MD, 50 mg at 10/02/24 0836    montelukast (SINGULAIR) tablet 10 mg, 10 mg, Oral, Daily, Drew Norwood MD, 10 mg at 10/02/24 0832    ondansetron (ZOFRAN-ODT) dispersible tablet 4 mg, 4 mg, Oral, Q4H PRN, Drew Norwood MD, 4 mg at 09/28/24 1343    pantoprazole (PROTONIX) EC tablet 40 mg, 40 mg, Oral, Daily Before Breakfast, Drew Norwood MD, 40 mg at 10/02/24 0610    predniSONE tablet 12.5 mg, 12.5 mg, Oral, Daily, 12.5 mg at 10/02/24 0834 **FOLLOWED BY** [START ON 10/5/2024] predniSONE tablet 10 mg, 10 mg, Oral, Daily **FOLLOWED BY** [START ON 10/19/2024] predniSONE tablet 7.5 mg, 7.5 mg, Oral, Daily **FOLLOWED BY** [START ON 11/3/2024] predniSONE tablet 5 mg, 5 mg, Oral, Daily, Drew Norwood MD    prochlorperazine (COMPAZINE) tablet 5 mg, 5 mg, Oral, Q6H PRN, Jair Costello DO, 5 mg at 10/01/24 1745    rivaroxaban (XARELTO) tablet 15 mg, 15 mg, Oral, BID With Meals, Uzma Clements PA-C, 15 mg at 10/02/24 0833    senna (SENOKOT) tablet 17.2 mg, 17.2 mg, Oral, BID, Drew Norwood MD, 17.2 mg at 10/02/24 0832    sevelamer (RENAGEL) tablet 1,600 mg, 1,600 mg, Oral, TID With Meals, Drew Norwood MD, 1,600 mg at 10/02/24 0834    Sodium Zirconium Cyclosilicate (Lokelma) 5 g, 5 g, Oral, Once  per day on Monday Wednesday Friday, Joe Todd MD, 5 g at 10/02/24 0833    traZODone (DESYREL) tablet 200 mg, 200 mg, Oral, HS, Blaine Prieto MD, 200 mg at 10/01/24 2151    venlafaxine (EFFEXOR-XR) 24 hr capsule 150 mg, 150 mg, Oral, Daily, Drew Norwood MD, 150 mg at 10/02/24 0835    venlafaxine (EFFEXOR-XR) 24 hr capsule 75 mg, 75 mg, Oral, Daily, Drew Norwood MD, 75 mg at 10/02/24 0834    Laboratory Results:  Results from last 7 days   Lab Units 10/02/24  0741 10/02/24  0659 10/01/24  0438 09/30/24  0434 09/29/24  1412 09/28/24  0806 09/27/24  1648   WBC Thousand/uL  --   --   --  8.20  --  7.41 7.77   HEMOGLOBIN g/dL 9.5*  --   --  8.1*  --  8.8* 9.9*   HEMATOCRIT % 30.2*  --   --  25.5*  --  28.5* 31.8*   PLATELETS Thousands/uL  --   --   --  225  --  250 289   SODIUM mmol/L  --  136 137 139 138 139  --    POTASSIUM mmol/L  --  5.4* 5.7* 5.5* 5.9* 4.6  --    CHLORIDE mmol/L  --  99 100 100 99 99  --    CO2 mmol/L  --  28 27 24 26 25  --    BUN mg/dL  --  24 35* 80* 77* 65*  --    CREATININE mg/dL  --  5.09* 7.36* 13.69* 12.96* 11.18*  --    CALCIUM mg/dL  --  9.4 8.9 9.5 9.6 9.1  --    MAGNESIUM mg/dL  --   --   --   --   --  2.1  --    PHOSPHORUS mg/dL  --   --   --   --   --  8.5*  --        IR tunneled dialysis catheter check/change/reposition/angioplasty   Final Result by Vicente Estrada MD (10/01 1512)   Impression: Successful image guided placement of tunneled central venous hemodialysis catheter         Workstation performed: [id number]         CT VENOGRAM CHEST w/ contrast   Final Result by Mehreen Oswald MD (09/29 2140)      Findings consistent with fibrin sheath/thrombus surrounding the distal tip of patient's hemodialysis catheter      Additional small filling defect suspected within the distal SVC immediately proximal to the cavoatrial junction      Resolving tree-in-bud nodularity at the right upper lobe         Workstation performed: [id number]         IR tunneled dialysis catheter  "check/change/reposition/angioplasty   Final Result by Vicente Estrada MD (10/01 0836)   Impression:   Nonocclusive thrombus in the SVC at the cavoatrial junction for which patient is to be admitted for short-term anticoagulation      Catheter exchange and repositioning with fibrin sheath angioplasty         Workstation performed: [id number]             Portions of the record may have been created with voice recognition software. Occasional wrong word or \"sound a like\" substitutions may have occurred due to the inherent limitations of voice recognition software. Read the chart carefully and recognize, using context, where substitutions have occurred.    "

## 2024-10-03 ENCOUNTER — PATIENT OUTREACH (OUTPATIENT)
Dept: CASE MANAGEMENT | Facility: OTHER | Age: 64
End: 2024-10-03

## 2024-10-03 ENCOUNTER — APPOINTMENT (INPATIENT)
Dept: DIALYSIS | Facility: HOSPITAL | Age: 64
End: 2024-10-03
Attending: INTERNAL MEDICINE
Payer: COMMERCIAL

## 2024-10-03 VITALS
TEMPERATURE: 97.5 F | OXYGEN SATURATION: 92 % | HEART RATE: 73 BPM | SYSTOLIC BLOOD PRESSURE: 116 MMHG | WEIGHT: 223 LBS | DIASTOLIC BLOOD PRESSURE: 76 MMHG | RESPIRATION RATE: 20 BRPM | BODY MASS INDEX: 39.51 KG/M2 | HEIGHT: 63 IN

## 2024-10-03 LAB
ANION GAP SERPL CALCULATED.3IONS-SCNC: 12 MMOL/L (ref 4–13)
BUN SERPL-MCNC: 42 MG/DL (ref 5–25)
CALCIUM SERPL-MCNC: 9.8 MG/DL (ref 8.4–10.2)
CHLORIDE SERPL-SCNC: 99 MMOL/L (ref 96–108)
CO2 SERPL-SCNC: 29 MMOL/L (ref 21–32)
CREAT SERPL-MCNC: 6.98 MG/DL (ref 0.6–1.3)
FERRITIN SERPL-MCNC: 347 NG/ML (ref 11–307)
GFR SERPL CREATININE-BSD FRML MDRD: 5 ML/MIN/1.73SQ M
GLUCOSE SERPL-MCNC: 77 MG/DL (ref 65–140)
HBV CORE AB SER QL: NORMAL
HBV CORE IGM SER QL: NORMAL
HBV SURFACE AB SER-ACNC: <3 MIU/ML
HBV SURFACE AG SER QL: NORMAL
HCV AB SER QL: NORMAL
IRON SATN MFR SERPL: 41 % (ref 15–50)
IRON SERPL-MCNC: 91 UG/DL (ref 50–212)
POTASSIUM SERPL-SCNC: 4.3 MMOL/L (ref 3.5–5.3)
SODIUM SERPL-SCNC: 140 MMOL/L (ref 135–147)
TIBC SERPL-MCNC: 222 UG/DL (ref 250–450)
UIBC SERPL-MCNC: 131 UG/DL (ref 155–355)

## 2024-10-03 PROCEDURE — 86704 HEP B CORE ANTIBODY TOTAL: CPT | Performed by: INTERNAL MEDICINE

## 2024-10-03 PROCEDURE — 87340 HEPATITIS B SURFACE AG IA: CPT | Performed by: INTERNAL MEDICINE

## 2024-10-03 PROCEDURE — 82728 ASSAY OF FERRITIN: CPT | Performed by: INTERNAL MEDICINE

## 2024-10-03 PROCEDURE — 80048 BASIC METABOLIC PNL TOTAL CA: CPT | Performed by: INTERNAL MEDICINE

## 2024-10-03 PROCEDURE — 83550 IRON BINDING TEST: CPT | Performed by: INTERNAL MEDICINE

## 2024-10-03 PROCEDURE — 99239 HOSP IP/OBS DSCHRG MGMT >30: CPT | Performed by: INTERNAL MEDICINE

## 2024-10-03 PROCEDURE — 83540 ASSAY OF IRON: CPT | Performed by: INTERNAL MEDICINE

## 2024-10-03 PROCEDURE — 86706 HEP B SURFACE ANTIBODY: CPT | Performed by: INTERNAL MEDICINE

## 2024-10-03 PROCEDURE — 90935 HEMODIALYSIS ONE EVALUATION: CPT | Performed by: INTERNAL MEDICINE

## 2024-10-03 PROCEDURE — 86705 HEP B CORE ANTIBODY IGM: CPT | Performed by: INTERNAL MEDICINE

## 2024-10-03 PROCEDURE — 86803 HEPATITIS C AB TEST: CPT | Performed by: INTERNAL MEDICINE

## 2024-10-03 RX ORDER — PREDNISONE 2.5 MG/1
TABLET ORAL
Start: 2024-10-03 | End: 2024-12-03

## 2024-10-03 RX ADMIN — POLYSACCHARIDE-IRON COMPLEX 150 MG: 150 CAPSULE ORAL at 08:06

## 2024-10-03 RX ADMIN — ATORVASTATIN CALCIUM 20 MG: 20 TABLET, FILM COATED ORAL at 08:01

## 2024-10-03 RX ADMIN — PREDNISONE 12.5 MG: 2.5 TABLET ORAL at 08:07

## 2024-10-03 RX ADMIN — PROCHLORPERAZINE MALEATE 5 MG: 5 TABLET ORAL at 08:02

## 2024-10-03 RX ADMIN — VENLAFAXINE HYDROCHLORIDE 150 MG: 150 CAPSULE, EXTENDED RELEASE ORAL at 08:08

## 2024-10-03 RX ADMIN — CYCLOPHOSPHAMIDE 100 MG: 50 CAPSULE ORAL at 08:05

## 2024-10-03 RX ADMIN — SEVELAMER HYDROCHLORIDE 1600 MG: 800 TABLET ORAL at 08:02

## 2024-10-03 RX ADMIN — MONTELUKAST 10 MG: 10 TABLET, FILM COATED ORAL at 08:01

## 2024-10-03 RX ADMIN — FLUTICASONE FUROATE AND VILANTEROL TRIFENATATE 1 PUFF: 100; 25 POWDER RESPIRATORY (INHALATION) at 08:00

## 2024-10-03 RX ADMIN — PANTOPRAZOLE SODIUM 40 MG: 40 TABLET, DELAYED RELEASE ORAL at 05:26

## 2024-10-03 RX ADMIN — HYDROCODONE BITARTRATE AND ACETAMINOPHEN 1 TABLET: 5; 325 TABLET ORAL at 05:28

## 2024-10-03 RX ADMIN — ALBUTEROL SULFATE 2 PUFF: 90 AEROSOL, METERED RESPIRATORY (INHALATION) at 08:00

## 2024-10-03 RX ADMIN — APIXABAN 10 MG: 5 TABLET, FILM COATED ORAL at 08:01

## 2024-10-03 RX ADMIN — VENLAFAXINE HYDROCHLORIDE 75 MG: 75 CAPSULE, EXTENDED RELEASE ORAL at 08:10

## 2024-10-03 RX ADMIN — SENNOSIDES 17.2 MG: 8.6 TABLET, FILM COATED ORAL at 08:01

## 2024-10-03 RX ADMIN — ATOVAQUONE 1500 MG: 750 SUSPENSION ORAL at 08:04

## 2024-10-03 RX ADMIN — Medication 1 TABLET: at 08:02

## 2024-10-03 NOTE — DISCHARGE SUMMARY
"Discharge Summary - Hospitalist   Name: Ngozi Beard 64 y.o. female I MRN: [id number]  Unit/Bed#: -01 I Date of Admission: 9/27/2024   Date of Service: 10/3/2024 I Hospital Day: 6     Assessment & Plan  Complication associated with dialysis catheter  Presented to IR for malfunction of permcath. Catheter injection showed \"moderate amount of nonocclusive thrombus in the SVC surrounding the catheter tip and probably within the mid SVC as well. The amount of thrombus appears significant enough to warrant anticoagulation. The catheter tip is embedded within this thrombus. The catheter was exchanged for shorter catheter which was repositioned \"  Was admitted to [facility] for IV heparin and CT venogram (9/28) which showed fibrin sheath/thrombus surrounding distal tip of HD catheter, small filling defect within the distal SVC immediately proximal to cavoatrial junction.   On 9/30 returned to IR for removal of cath and placement of L sided placement of cath  Will need vein mapping and AV access outpatient  D/w renal, ok to transition to PO AC, switched to Eliquis (cleared with renal, 10 mg BID x 7 days then starting on 10/8 will be on 5 mg BID) for discharge  Should f/u hematology   Bipolar 1 disorder (HCC)  Home medications include venlafaxine 150 mg and 75 mg in the morning, trazodone 200 mg daily, Lamictal 100 mg  Continue  Primary hypertension  Home medication of metoprolol succinate 50 mg daily  Continue  Dyslipidemia  Continue home statin  Asthma without acute exacerbation  Continue home meds  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 Remains dialysis dependent since 7/11/2024.   Currently on p.o. Cytoxan and tapering prednisone  On HD TTS at [facility]  HFrEF (heart failure with reduced ejection fraction) ([facility])  Wt Readings from Last 3 Encounters:   09/27/24 101 kg (223 lb)   09/25/24 103 kg (226 lb 11.2 oz)   09/19/24 102 kg (224 lb 3.3 oz)     Cardiac " catheterization 9/17/2024 without occlusion  Echo on 8/30/2024 with EF 25% with severe global hypokinesis, moderate to severe mitral valve regurgitation, moderate tricuspid regurgitation, estimated right ventricular systolic pressure of 48 mmHg, small pericardial effusion anterior to chest wall  Recently provided with a LifeVest, continue for dc   Anemia due to chronic kidney disease, on chronic dialysis (HCC)  Lab Results   Component Value Date    EGFR 5 10/03/2024    EGFR 8 10/02/2024    EGFR 5 10/01/2024    CREATININE 6.98 (H) 10/03/2024    CREATININE 5.09 (H) 10/02/2024    CREATININE 7.36 (H) 10/01/2024     Hgb at relative baseline of 8  Continue iron  Acute renal failure on dialysis (HCC)  See plan above  Hyperkalemia    SVC thrombus  See primary plan above  D/w renal, ok to place on PO AC  Will send referral to hematology outpatient given reported hx of sister with some sort of clotting disorder      Discharging Physician / Practitioner: Uzma Clements PA-C  PCP: Meenakshi Balbuena MD  Admission Date:   Admission Orders (From admission, onward)       Ordered        09/27/24 1613  Inpatient Admission  Once                          Discharge Date: 10/03/24    Disposition:      Other: Return to [facility]       Reason for Admission: malfunction of HD cath     Discharge Diagnoses:     Please see assessment and plan section above for further details regarding discharge diagnoses.     Medical Problems       Resolved Problems  Date Reviewed: 10/3/2024   None         Consultations During Hospital Stay:  Nephrology  IR    Procedures Performed:   IR tunneled dialysis catheter check/change 9/30: Impression: Successful image guided placement of tunneled central venous hemodialysis catheter   CT venogram Chest w contrast 9/28: Findings consistent with fibrin sheath/thrombus surrounding the distal tip of patient's hemodialysis catheter. Additional small filling defect suspected within the distal SVC immediately proximal to the  cavoatrial junction. Resolving tree-in-bud nodularity at the right upper lobe  IR tunneled dialysis catheter check/change 9/27: Nonocclusive thrombus in the SVC at the cavoatrial junction for which patient is to be admitted for short-term anticoagulation. Catheter exchange and repositioning with fibrin sheath angioplasty  COVID :negative  MRSA nasal culture: negative    Medication Adjustments and Discharge Medications:  Summary of Medication Adjustments made as a result of this hospitalization:   Added Eliquis 10 mg BID x 7 days then on 10/8 start 5 mg BID  Medication Dosing Tapers - Please refer to Discharge Medication List for details on any medication dosing tapers (if applicable to patient).  Medications being temporarily held (include recommended restart time): NA  Discharge Medication List: See after visit summary for reconciled discharge medications.     Wound Care Recommendations:  When applicable, please see wound care section of After Visit Summary.    Diet Recommendations at Discharge:  Diet -        Diet Orders   (From admission, onward)                 Start     Ordered    09/30/24 1236  Diet Renal; Potassium 2 GM; No; No  Diet effective now        References:    Adult Nutrition Support Algorithm    RD Therapeutic Diet Order Protocol   Question Answer Comment   Diet Type Renal    Renal Potassium 2 GM    Should patient have a fluid restriction? No    Should patient have a protein modifier? No    RD to adjust diet per protocol? Yes        09/30/24 1235                    Instructions for any Catheters / Lines Present at Discharge (including removal date, if applicable): NA    Significant Findings / Test Results:   See above    Incidental Findings:   See above     Test Results Pending at Discharge (will require follow up):   none     Outpatient Tests Requested:  F/u with hematology/oncology  F/u PCP  F/u nephrology    Complications:  none     Hospital Course:     Ngozi Beard is a 64 y.o. female  "patient who originally presented to the hospital on 9/27/2024 due to IR for malfunction of permcath. Catheter injection showed \"moderate amount of nonocclusive thrombus in the SVC surrounding the catheter tip and probably within the mid SVC as well. The amount of thrombus appears significant enough to warrant anticoagulation. The catheter tip is embedded within this thrombus. The catheter was exchanged for shorter catheter which was repositioned \"  Was admitted to [facility] for IV heparin and CT venogram (9/28) which showed fibrin sheath/thrombus surrounding distal tip of HD catheter, small filling defect within the distal SVC immediately proximal to cavoatrial junction.   On 9/30 returned to IR for removal of cath and placement of L sided placement of cath  Will need vein mapping and AV access outpatient  D/w renal, ok to transition to PO AC, switched to Eliquis (cleared with renal, 10 mg BID x 7 days then starting on 10/8 will be on 5 mg BID) for discharge  Should f/u hematology   Stable for return to [facility].     Condition at Discharge: stable     Discharge Day Visit / Exam:     Subjective:  seen on HD. No complaints. Feels well.   Vitals: Blood Pressure: 100/64 (10/03/24 0900)  Pulse: 60 (10/03/24 0900)  Temperature: 97.7 °F (36.5 °C) (10/03/24 0809)  Temp Source: Oral (10/03/24 0809)  Respirations: 19 (10/03/24 0900)  Height: 5' 3\" (160 cm) (09/27/24 1944)  Weight - Scale: 101 kg (223 lb) (09/27/24 1944)  SpO2: 92 % (10/03/24 0734)  Exam:   Physical Exam  Vitals and nursing note reviewed.   Constitutional:       Appearance: She is obese.      Comments: On RA    Cardiovascular:      Rate and Rhythm: Normal rate.      Comments: L sided chest wall permcath noted   Pulmonary:      Effort: No respiratory distress.   Abdominal:      General: Bowel sounds are normal. There is no distension.      Palpations: Abdomen is soft.   Musculoskeletal:      Right lower leg: No edema.      Left lower leg: No edema.   Skin:     " Coloration: Skin is pale.   Neurological:      Mental Status: She is alert and oriented to person, place, and time.   Psychiatric:         Mood and Affect: Mood normal.       (  Discussion with Family: declined family update     Goals of Care Discussions:  Code Status at Discharge: Level 1 - Full Code      Discharge instructions/Information to patient and family:   See after visit summary section titled Discharge Instructions for information provided to patient and family.      Planned Readmission: no      Discharge Statement:  I spent 35 minutes discharging the patient. This time was spent on the day of discharge. I had direct contact with the patient on the day of discharge. Greater than 50% of the total time was spent examining patient, answering all patient questions, arranging and discussing plan of care with patient as well as directly providing post-discharge instructions.  Additional time then spent on discharge activities.    ** Please Note: This note has been constructed using a voice recognition system **

## 2024-10-03 NOTE — CASE MANAGEMENT
DE Support [place] has received APPROVED authorization.  Insurance:  [employer]  Received from [employer]   Authorization received for: [facility]  Facility: [facility]  Authorization #: [id number] (Plan Auth ID [id number])  Start of Care: 10/3   Next Review Date: 10/7  Continued Stay Care Coordinator: not provided  Submit next review to: [employer] Portal or Fax# [phone]    Care Manager notified: Juliet GAITAN     Please reach out to CM for updates on any clinical information.

## 2024-10-03 NOTE — PLAN OF CARE
Target UF Goal  2-2.5  L as tolerated. Patient dialyzing for  3.5 hours  on 3 K bath for serum K of  4.3  per protocol. Treatment plan reviewed with Nephrology.   Problem: METABOLIC, FLUID AND ELECTROLYTES - ADULT  Goal: Electrolytes maintained within normal limits  Description: INTERVENTIONS:  - Monitor labs and assess patient for signs and symptoms of electrolyte imbalances  - Administer electrolyte replacement as ordered  - Monitor response to electrolyte replacements, including repeat lab results as appropriate  - Instruct patient on fluid and nutrition as appropriate  Outcome: Progressing  Goal: Fluid balance maintained  Description: INTERVENTIONS:  - Monitor labs   - Monitor I/O and WT  - Instruct patient on fluid and nutrition as appropriate  - Assess for signs & symptoms of volume excess or deficit  Outcome: Progressing   Post-Dialysis RN Treatment Note    Blood Pressure:  Pre 114/68 mm/Hg  Post 116/76 mmHg   EDW  100 kg    Weight:  Pre 102.1 kg   Post 101.6 kg   Mode of weight measurement: Standing Scale   Volume Removed  375 ml    Treatment duration 210 minutes    NS given  Yes, 300 ml NSS given due to hypotension. Dr. Todd was aware    Treatment shortened? No   Medications given during Rx None Reported   Estimated Kt/V  1.37   Access type: Permacath/TDC   Access Issues: No    Report called to primary nurse   Yes Odilia Martinez RN

## 2024-10-03 NOTE — ASSESSMENT & PLAN NOTE
Lab Results   Component Value Date    EGFR 5 10/03/2024    EGFR 8 10/02/2024    EGFR 5 10/01/2024    CREATININE 6.98 (H) 10/03/2024    CREATININE 5.09 (H) 10/02/2024    CREATININE 7.36 (H) 10/01/2024     Hgb at relative baseline of 8  Continue iron

## 2024-10-03 NOTE — PLAN OF CARE
Problem: PAIN - ADULT  Goal: Verbalizes/displays adequate comfort level or baseline comfort level  Description: Interventions:  - Encourage patient to monitor pain and request assistance  - Assess pain using appropriate pain scale  - Administer analgesics based on type and severity of pain and evaluate response  - Implement non-pharmacological measures as appropriate and evaluate response  - Consider cultural and social influences on pain and pain management  - Notify physician/advanced practitioner if interventions unsuccessful or patient reports new pain  Outcome: Progressing     Problem: INFECTION - ADULT  Goal: Absence or prevention of progression during hospitalization  Description: INTERVENTIONS:  - Assess and monitor for signs and symptoms of infection  - Monitor lab/diagnostic results  - Monitor all insertion sites, i.e. indwelling lines, tubes, and drains  - Monitor endotracheal if appropriate and nasal secretions for changes in amount and color  - [place] appropriate cooling/warming therapies per order  - Administer medications as ordered  - Instruct and encourage patient and family to use good hand hygiene technique  - Identify and instruct in appropriate isolation precautions for identified infection/condition  Outcome: Progressing     Problem: SAFETY ADULT  Goal: Patient will remain free of falls  Description: INTERVENTIONS:  - Educate patient/family on patient safety including physical limitations  - Instruct patient to call for assistance with activity   - Consult OT/PT to assist with strengthening/mobility   - Keep Call bell within reach  - Keep bed low and locked with side rails adjusted as appropriate  - Keep care items and personal belongings within reach  - Initiate and maintain comfort rounds  - Make Fall Risk Sign visible to staff  - Offer Toileting every 2 Hours, in advance of need  - Initiate/Maintain bed/chair alarm  - Obtain necessary fall risk management equipment: slippers, walker  -  Apply yellow socks and bracelet for high fall risk patients  - Consider moving patient to room near nurses station  Outcome: Progressing     Problem: DISCHARGE PLANNING  Goal: Discharge to home or other facility with appropriate resources  Description: INTERVENTIONS:  - Identify barriers to discharge w/patient and caregiver  - Arrange for needed discharge resources and transportation as appropriate  - Identify discharge learning needs (meds, wound care, etc.)  - Arrange for interpretive services to assist at discharge as needed  - Refer to Case Management Department for coordinating discharge planning if the patient needs post-hospital services based on physician/advanced practitioner order or complex needs related to functional status, cognitive ability, or social support system  Outcome: Progressing     Problem: Knowledge Deficit  Goal: Patient/family/caregiver demonstrates understanding of disease process, treatment plan, medications, and discharge instructions  Description: Complete learning assessment and assess knowledge base.  Interventions:  - Provide teaching at level of understanding  - Provide teaching via preferred learning methods  Outcome: Progressing

## 2024-10-03 NOTE — PLAN OF CARE
Target UF Goal  2-2.5  L as tolerated. Patient dialyzing for  3.5 hours  on 3 K bath for serum K of  4.3  per protocol. Treatment plan reviewed with Nephrology.   Problem: METABOLIC, FLUID AND ELECTROLYTES - ADULT  Goal: Electrolytes maintained within normal limits  Description: INTERVENTIONS:  - Monitor labs and assess patient for signs and symptoms of electrolyte imbalances  - Administer electrolyte replacement as ordered  - Monitor response to electrolyte replacements, including repeat lab results as appropriate  - Instruct patient on fluid and nutrition as appropriate  Outcome: Progressing  Goal: Fluid balance maintained  Description: INTERVENTIONS:  - Monitor labs   - Monitor I/O and WT  - Instruct patient on fluid and nutrition as appropriate  - Assess for signs & symptoms of volume excess or deficit  Outcome: Progressing

## 2024-10-03 NOTE — ASSESSMENT & PLAN NOTE
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 Prior baseline creatinine 0.8-0.9 in May 2024  On dialysis since 7/11/2024.  Currently on HD TTS at [facility]  HD today as unable to have treatment Saturday due to CVC malfunction

## 2024-10-03 NOTE — CASE MANAGEMENT
Case Management Discharge Planning Note    Patient name Ngozi Beard  Location /-01 MRN [id number]  : 1960 Date 10/3/2024       Current Admission Date: 2024  Current Admission Diagnosis:Complication associated with dialysis catheter   Patient Active Problem List    Diagnosis Date Noted Date Diagnosed    SVC thrombus 10/01/2024     HFrEF (heart failure with reduced ejection fraction) ([facility]) 2024     Hyperkalemia 2024     Chest pain 2024     Unspecified mood (affective) disorder ([facility]) 2024     Acute renal failure on dialysis ([facility]) 2024     Complication associated with dialysis catheter 2024     Cardiomyopathy ([facility]) 2024     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 ([facility]) 2024     Generalized weakness 2024     Rash 2024     Anemia due to chronic kidney disease, on chronic dialysis ([facility]) 2024     Thrombocytopenia ([facility]) 2024     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     Abnormality of ascending aorta 2024     Postmenopausal 2024     Encounter for screening mammogram for malignant neoplasm of breast 2024     Allergic rhinitis 2024     Persistent cough 2024     Urge incontinence of urine 2024     Exercise intolerance 2024     Family history of coronary artery bypass graft surgery 2024     Urge urinary incontinence 2024     Female stress incontinence 2024     Paranoid schizophrenia ([facility]) 2023     Asthma without acute exacerbation 2023     Asthma due to seasonal allergies 2023     Bipolar 1 disorder ([facility]) 2022     Primary hypertension 2022     Dyslipidemia 2022       LOS (days): 6  Geometric Mean LOS (GMLOS) (days): 5.1  Days to GMLOS:-0.6     OBJECTIVE:  Risk of Unplanned Readmission Score: 46.61         Current admission status: Inpatient   Preferred Pharmacy:   [id number] [place] - [address] [address] [facility]  [place] [address]  Phone: [phone] Fax: [phone]    [facility] - [place], [place] - [facility] [facility] [address]  [facility] [address]  Phone: [phone] Fax: [phone]    [facility] - [place] - [address]  [address] [facility] [address]  [place] [address]  Phone: [phone] Fax: [phone]    Primary Care Provider: Meenakshi Balbuena MD    [facility]  Secondary Insurance: [facility]    DISCHARGE DETAILS:                                                                                                               Facility Insurance Auth Number: [id number] (Plan Auth ID [id number])

## 2024-10-03 NOTE — ASSESSMENT & PLAN NOTE
"Presented to IR for malfunction of permcath. Catheter injection showed \"moderate amount of nonocclusive thrombus in the SVC surrounding the catheter tip and probably within the mid SVC as well. The amount of thrombus appears significant enough to warrant anticoagulation. The catheter tip is embedded within this thrombus. The catheter was exchanged for shorter catheter which was repositioned \"  Was admitted to [facility] for IV heparin and CT venogram (9/28) which showed fibrin sheath/thrombus surrounding distal tip of HD catheter, small filling defect within the distal SVC immediately proximal to cavoatrial junction.   On 9/30 returned to IR for removal of cath and placement of L sided placement of cath  Will need vein mapping and AV access outpatient  D/w renal, ok to transition to PO AC, switched to Eliquis (cleared with renal, 10 mg BID x 7 days then starting on 10/8 will be on 5 mg BID) for discharge  Should f/u hematology   "

## 2024-10-03 NOTE — PROGRESS NOTES
NEPHROLOGY PROGRESS NOTE   Ngozi Beard 64 y.o. female MRN: [id number]  Unit/Bed#: -01 Encounter: [id number]  Reason for Consult: ANGELICA    ASSESSMENT AND PLAN:  ANGELICA, prior baseline creatinine 0.8-0.9 in May 2024  -ANGELICA secondary to RPGN due to biopsy-proven anti-GBM disease.  Now remains dialysis dependent since 7/11/2024.  -Currently on TTS schedule at [facility].  Patient tolerated dialysis well yesterday.  Next HD tomorrow.  -Outpatient dry weight 100 kg. Today pre HD weight 102.1 kg.  -So far no signs of renal recovery. No significant urine output.    I saw and examined patient during hemodialysis treatment at 10:47 AM on 10/3/2024. The patient was receiving hemodialysis for treatment of ANGELICA. I also reviewed vital signs, intake and output, lab results and recent events, and agree with dialysis order.  Tolerating HD.     Access, recent history of recurrent CVC malfunction issues, now permacath was changed to left side by IR this admission.    -Currently tolerating dialysis well  -Patient clearly refuses to consider PD in the future.  -Will need vein mapping and AV access placement ASAP when discharged from the hospital.  -Save nondominant arm.     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 Cytoxan and tapering prednisone.     Anemia in acute illness, hemoglobin remains lower but relatively stable, transfuse as needed for hemoglobin less than 7.  -Iron saturation 41% with ferritin 347 this admission.     Hyperkalemia, serum potassium improving 4.3.  Continue to monitor with dialysis.   -BMP in AM. Strict low potassium diet recommended.  -Continue Lokelma 5 g on nondialysis days.       CT venogram this admission shows fibrin sheath/thrombus surrounding distal tip of HD catheter, small filling defect within the distal SVC immediately proximal to cavoatrial junction.  Currently on heparin drip.  Anticoagulation as per primary team.  -IR report from 9/27/2024 shows nonocclusive thrombus in the SVC at  the cavoatrial junction.  -Currently on Eliquis as per primary team.    Discussed above plan in detail with primary team regarding plan for dialysis today and they agree with above recommendations.      SUBJECTIVE:  Patient seen and examined at bedside.  Denies any worsening shortness of breath, denies nausea or vomiting    OBJECTIVE:  Current Weight: Weight - Scale: 101 kg (223 lb)  Vitals:    10/03/24 1030   BP: 115/76   Pulse: 65   Resp: 20   Temp:    SpO2:        Intake/Output Summary (Last 24 hours) at 10/3/2024 1044  Last data filed at 10/3/2024 1015  Gross per 24 hour   Intake 960 ml   Output --   Net 960 ml     Wt Readings from Last 3 Encounters:   09/27/24 101 kg (223 lb)   09/25/24 103 kg (226 lb 11.2 oz)   09/19/24 102 kg (224 lb 3.3 oz)     Temp Readings from Last 3 Encounters:   10/03/24 97.7 °F (36.5 °C) (Oral)   09/19/24 98.4 °F (36.9 °C)   08/14/24 99.2 °F (37.3 °C) (Oral)     BP Readings from Last 3 Encounters:   10/03/24 115/76   09/25/24 114/72   09/19/24 122/63     Pulse Readings from Last 3 Encounters:   10/03/24 65   09/25/24 80   09/19/24 72        Physical Examination:  Eyes: Mild conjunctival pallor present  Neck: No obvious lymphadenopathy appreciated  Respiratory: Bilateral air entry present  CVS: No significant edema  GI: Soft, nondistended  CNS: Active, alert, follows commands  Skin: No new rash  Musculoskeletal: No obvious new gross deformity noted    Medications:    Current Facility-Administered Medications:     acetaminophen (TYLENOL) tablet 650 mg, 650 mg, Oral, Q4H PRN, Drew Norwood MD, 650 mg at 09/30/24 1802    albuterol (PROVENTIL HFA,VENTOLIN HFA) inhaler 2 puff, 2 puff, Inhalation, Q4H PRN, Blaine Prieto MD, 2 puff at 10/03/24 0800    apixaban (ELIQUIS) tablet 10 mg, 10 mg, Oral, BID, Uzma Clements PA-C, 10 mg at 10/03/24 0801    atorvastatin (LIPITOR) tablet 20 mg, 20 mg, Oral, Daily, Drew Norwood MD, 20 mg at 10/03/24 0801    atovaquone (MEPRON) oral suspension 1,500 mg,  1,500 mg, Oral, Daily, Drew Norwood MD, 1,500 mg at 10/03/24 0804    calcium carbonate (OYSTER SHELL,OSCAL) 500 mg tablet 1 tablet, 1 tablet, Oral, Daily With Breakfast, Drew Norwood MD, 1 tablet at 10/03/24 0802    cyclophosphamide (CYTOXAN) capsule 100 mg, 100 mg, Oral, Daily, Drew Norwood MD, 100 mg at 10/03/24 0805    Fluticasone Furoate-Vilanterol 100-25 mcg/actuation 1 puff, 1 puff, Inhalation, Daily, Drew Norwood MD, 1 puff at 10/03/24 0800    iron polysaccharides (FERREX) capsule 150 mg, 150 mg, Oral, Daily, Drew Norwood MD, 150 mg at 10/03/24 0806    lamoTRIgine (LaMICtal) tablet 100 mg, 100 mg, Oral, HS, Drew Norwood MD, 100 mg at 10/02/24 2236    melatonin tablet 6 mg, 6 mg, Oral, HS, JEB Greenberg, 6 mg at 10/02/24 2236    metoprolol succinate (TOPROL-XL) 24 hr tablet 50 mg, 50 mg, Oral, BID, Drew Norwood MD, 50 mg at 10/02/24 2053    montelukast (SINGULAIR) tablet 10 mg, 10 mg, Oral, Daily, Drew Norwood MD, 10 mg at 10/03/24 0801    ondansetron (ZOFRAN-ODT) dispersible tablet 4 mg, 4 mg, Oral, Q4H PRN, Drew Norwood MD, 4 mg at 09/28/24 1343    pantoprazole (PROTONIX) EC tablet 40 mg, 40 mg, Oral, Daily Before Breakfast, rDew Norwood MD, 40 mg at 10/03/24 0526    predniSONE tablet 12.5 mg, 12.5 mg, Oral, Daily, 12.5 mg at 10/03/24 0807 **FOLLOWED BY** [START ON 10/5/2024] predniSONE tablet 10 mg, 10 mg, Oral, Daily **FOLLOWED BY** [START ON 10/19/2024] predniSONE tablet 7.5 mg, 7.5 mg, Oral, Daily **FOLLOWED BY** [START ON 11/3/2024] predniSONE tablet 5 mg, 5 mg, Oral, Daily, Drew Norwood MD    prochlorperazine (COMPAZINE) tablet 5 mg, 5 mg, Oral, Q6H PRN, Jair Costello DO, 5 mg at 10/03/24 0802    senna (SENOKOT) tablet 17.2 mg, 17.2 mg, Oral, BID, Drew Norwood MD, 17.2 mg at 10/03/24 0801    sevelamer (RENAGEL) tablet 1,600 mg, 1,600 mg, Oral, TID With Meals, Drew Norwood MD, 1,600 mg at 10/03/24 0802    Sodium Zirconium Cyclosilicate (Lokelma) 5 g, 5 g,  Oral, Once per day on Monday Wednesday Friday, Joe Todd MD, 5 g at 10/02/24 0833    traZODone (DESYREL) tablet 200 mg, 200 mg, Oral, HS, Blaine Prieto MD, 200 mg at 10/02/24 2236    venlafaxine (EFFEXOR-XR) 24 hr capsule 150 mg, 150 mg, Oral, Daily, Drew Norwood MD, 150 mg at 10/03/24 0808    venlafaxine (EFFEXOR-XR) 24 hr capsule 75 mg, 75 mg, Oral, Daily, Drew Norwood MD, 75 mg at 10/03/24 0810    Laboratory Results:  Results from last 7 days   Lab Units 10/03/24  0538 10/02/24  0741 10/02/24  0659 10/01/24  0438 09/30/24  0434 09/29/24  1412 09/28/24  0806 09/27/24  1648   WBC Thousand/uL  --   --   --   --  8.20  --  7.41 7.77   HEMOGLOBIN g/dL  --  9.5*  --   --  8.1*  --  8.8* 9.9*   HEMATOCRIT %  --  30.2*  --   --  25.5*  --  28.5* 31.8*   PLATELETS Thousands/uL  --   --   --   --  225  --  250 289   SODIUM mmol/L 140  --  136 137 139 138 139  --    POTASSIUM mmol/L 4.3  --  5.4* 5.7* 5.5* 5.9* 4.6  --    CHLORIDE mmol/L 99  --  99 100 100 99 99  --    CO2 mmol/L 29 -- 28 27 24 26 25  --    BUN mg/dL 42*  --  24 35* 80* 77* 65*  --    CREATININE mg/dL 6.98*  --  5.09* 7.36* 13.69* 12.96* 11.18*  --    CALCIUM mg/dL 9.8  --  9.4 8.9 9.5 9.6 9.1  --    MAGNESIUM mg/dL  --   --   --   --   --   --  2.1  --    PHOSPHORUS mg/dL  --   --   --   --   --   --  8.5*  --        IR tunneled dialysis catheter check/change/reposition/angioplasty   Final Result by Vicente Estrada MD (10/01 1512)   Impression: Successful image guided placement of tunneled central venous hemodialysis catheter         Workstation performed: [id number]         CT VENOGRAM CHEST w/ contrast   Final Result by Mehreen Oswald MD (09/29 2140)      Findings consistent with fibrin sheath/thrombus surrounding the distal tip of patient's hemodialysis catheter      Additional small filling defect suspected within the distal SVC immediately proximal to the cavoatrial junction      Resolving tree-in-bud nodularity at the right upper lobe  "        Workstation performed: [id number]         IR tunneled dialysis catheter check/change/reposition/angioplasty   Final Result by Vicente Estrada MD (10/01 0836)   Impression:   Nonocclusive thrombus in the SVC at the cavoatrial junction for which patient is to be admitted for short-term anticoagulation      Catheter exchange and repositioning with fibrin sheath angioplasty         Workstation performed: [id number]             Portions of the record may have been created with voice recognition software. Occasional wrong word or \"sound a like\" substitutions may have occurred due to the inherent limitations of voice recognition software. Read the chart carefully and recognize, using context, where substitutions have occurred.    "

## 2024-10-03 NOTE — PROGRESS NOTES
ADT alert received the patient discharged 10/3/24 to [facility] for STR. This Admin Coordinator will continue to monitor via chart review.

## 2024-10-03 NOTE — ASSESSMENT & PLAN NOTE
Wt Readings from Last 3 Encounters:   09/27/24 101 kg (223 lb)   09/25/24 103 kg (226 lb 11.2 oz)   09/19/24 102 kg (224 lb 3.3 oz)     Cardiac catheterization 9/17/2024 without occlusion  Echo on 8/30/2024 with EF 25% with severe global hypokinesis, moderate to severe mitral valve regurgitation, moderate tricuspid regurgitation, estimated right ventricular systolic pressure of 48 mmHg, small pericardial effusion anterior to chest wall  Recently provided with a LifeVest, continue for dc

## 2024-10-03 NOTE — ASSESSMENT & PLAN NOTE
Biopsy-proven anti-GBM disease. Remains dialysis dependent since 7/11/2024.   Currently on p.o. Cytoxan and tapering prednisone  On HD TTS at [facility]

## 2024-10-03 NOTE — CASE MANAGEMENT
Case Management Discharge Planning Note    Patient name Ngozi Beard  Location /-01 MRN [id number]  : 1960 Date 10/3/2024       Current Admission Date: 2024  Current Admission Diagnosis:Complication associated with dialysis catheter   Patient Active Problem List    Diagnosis Date Noted Date Diagnosed    SVC thrombus 10/01/2024     HFrEF (heart failure with reduced ejection fraction) ([facility]) 2024     Hyperkalemia 2024     Chest pain 2024     Unspecified mood (affective) disorder ([facility]) 2024     Acute renal failure on dialysis ([facility]) 2024     Complication associated with dialysis catheter 2024     Cardiomyopathy ([facility]) 2024     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 ([facility]) 2024     Generalized weakness 2024     Rash 2024     Anemia due to chronic kidney disease, on chronic dialysis ([facility]) 2024     Thrombocytopenia ([facility]) 2024     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     Abnormality of ascending aorta 2024     Postmenopausal 2024     Encounter for screening mammogram for malignant neoplasm of breast 2024     Allergic rhinitis 2024     Persistent cough 2024     Urge incontinence of urine 2024     Exercise intolerance 2024     Family history of coronary artery bypass graft surgery 2024     Urge urinary incontinence 2024     Female stress incontinence 2024     Paranoid schizophrenia ([facility]) 2023     Asthma without acute exacerbation 2023     Asthma due to seasonal allergies 2023     Bipolar 1 disorder ([facility]) 2022     Primary hypertension 2022     Dyslipidemia 2022       LOS (days): 6  Geometric Mean LOS (GMLOS) (days): 5.1  Days to GMLOS:-0.7     OBJECTIVE:  Risk of Unplanned Readmission Score: 46.61         Current admission status: Inpatient   Preferred Pharmacy:   [id number] [place] - [address] [facility]  [address] [facility] [address]  Phone: [phone] Fax: [phone]    [facility] - [place], [place] - [address] [facility]  [address]  [address]  [facility] [address]  Phone: [phone] Fax: [phone]    [facility] - [place] - [address]  [address]  [place] [address]  Phone: [phone] Fax: [phone]    Primary Care Provider: Meenakshi Balbuena MD    Primary Insurance: [employer] [facility]  Secondary Insurance: [employer] [facility]    DISCHARGE DETAILS:    Discharge planning discussed with:: Pt's Jacquelyn barrientos  Freedom of Choice: Yes  Comments - Freedom of Choice: Discussed FOC  CM contacted family/caregiver?: Yes (Pt's Jacquelyn barrientos)  Were Treatment Team discharge recommendations reviewed with patient/caregiver?: Yes  Did patient/caregiver verbalize understanding of patient care needs?: N/A- going to facility  Were patient/caregiver advised of the risks associated with not following Treatment Team discharge recommendations?: Yes    Contacts  Patient Contacts: Jacquelynsister  Relationship to Patient:: Family  Contact Method: In Person  Reason/Outcome: Continuity of Care, Discharge Planning    Requested Home Health Care         Is the patient interested in HHC at discharge?: No    DME Referral Provided  Referral made for DME?: No    Other Referral/Resources/Interventions Provided:  Interventions: Transportation  Referral Comments: Pt is medically stable to return to [facility] for STR. Auth received. Pt currently on dialysis. Transportation set up via [employer] @ 1400. Awaiting ride confirmation.         Treatment Team Recommendation: Short Term Rehab  Discharge Destination Plan:: Short Term Rehab  Transport at Discharge : Wheelchair van        Transported by (Company and Unit #): [facility]  ETA of  Transport (Date): 10/03/24  ETA of Transport (Time): 1400

## 2024-10-04 ENCOUNTER — TELEPHONE (OUTPATIENT)
Age: 64
End: 2024-10-04

## 2024-10-04 NOTE — TELEPHONE ENCOUNTER
Deandre called stating pt has an appt 10/9 with [employer], and 10/18 w/ Usha and asking if she would need both?  I advised that can be determined at the appt on Wednesday.

## 2024-10-07 LAB — FUNGUS SPEC CULT: NORMAL

## 2024-10-08 NOTE — PROGRESS NOTES
Heart Failure Outpatient Visit    Ngozi Beard 64 y.o. female   MRN: [id number]  Encounter: [id number]    Assessment:  Patient Active Problem List    Diagnosis Date Noted    SVC thrombus 10/01/2024    HFrEF (heart failure with reduced ejection fraction) (Formerly [facility]) 09/23/2024    Hyperkalemia 09/17/2024    Chest pain 09/16/2024    Unspecified mood (affective) disorder (Formerly [facility]) 09/11/2024    Acute renal failure on dialysis (Formerly [facility]) 09/09/2024    Complication associated with dialysis catheter 09/09/2024    Cardiomyopathy (Formerly [facility]) 09/02/2024    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 (Formerly [facility]) 08/31/2024    Generalized weakness 08/31/2024    Rash 08/01/2024    Anemia due to chronic kidney disease, on chronic dialysis (Formerly [facility]) 07/27/2024    Thrombocytopenia (Formerly [facility]) 07/27/2024    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 07/19/2024    Abnormality of ascending aorta 05/01/2024    Postmenopausal 05/01/2024    Encounter for screening mammogram for malignant neoplasm of breast 05/01/2024    Allergic rhinitis 05/01/2024    Persistent cough 05/01/2024    Urge incontinence of urine 05/01/2024    Exercise intolerance 05/01/2024    Family history of coronary artery bypass graft surgery 05/01/2024    Urge urinary incontinence 05/01/2024    Female stress incontinence 05/01/2024    Paranoid schizophrenia (Formerly [facility]) 02/23/2023    Asthma without acute exacerbation 02/23/2023    Asthma due to seasonal allergies 02/23/2023    Bipolar 1 disorder (Formerly [facility]) 01/12/2022    Primary hypertension 01/12/2022    Dyslipidemia 01/12/2022       Today's Plan:  Weight is up, but not significantly volume up on exam. HD tomorrow.   RTC 2 weeks with HF AP. 4 weeks with Dr. Suarez.   Consider losartan on non-HD days - coordinate with nephrology on this.   cMRI would be a consideration for future - would need to coordinate with nephro.  2g sodium restriction, 2L fluid restriction, daily weights.     Plan:  HFrEF (heart failure with reduced ejection  fraction) (Formerly [facility])      Wt Readings from Last 3 Encounters:   09/19/24 102 kg (224 lb 3.3 oz)   08/10/24 118 kg (259 lb 0.7 oz)   08/04/24 112 kg (247 lb 5.7 oz)      -newly diagnosed  -etiology unclear. No CAD. Multifocal PNA on admit ?stress myopathy. Possible contribution from dyssynchrony from underlying LBBB. Possible contribution from rheumatologic/inflammatory component.   -warm, compensated on exam. Continue beta blocker as below. Volume management via HD.   -consideration for CMR to further evaluate etiology.  -continue 2 g na diet with 2 L fluid restriction.   -daily standing weights.      -[facility] 9/17/24: no obstructive CAD     Neurohormonal Blockade:  --Beta-Blocker: Metoprolol succinate  50 mg BID  --ACEi, ARB or ARNi: consider restarting losartan on non-HD days   --Aldosterone Receptor Blocker:  --Diuretic:NA     Sudden Cardiac Death Risk Reduction:  --ICD: Wearing LifeVest     Electrical Resynchronization:  --Candidacy for BiV device:     Advanced Therapies:  --Inotrope:  --LVAD/Transplant Candidacy:     Primary hypertension  -controlled on regimen as above.   Multifocal pneumonia  -resolving.   Stage 5 chronic kidney disease on chronic dialysis (Former[facility])        Lab Results   Component Value Date     EGFR 5 09/19/2024     EGFR 3 09/18/2024     EGFR 3 09/17/2024     CREATININE 7.20 (H) 09/19/2024     CREATININE 10.72 (H) 09/18/2024     CREATININE 10.23 (H) 09/17/2024   -HD T/Th/Sat  Anemia due to chronic kidney disease, on chronic dialysis (Formerly [facility])        Lab Results   Component Value Date     EGFR 5 09/19/2024     EGFR 3 09/18/2024     EGFR 3 09/17/2024     CREATININE 7.20 (H) 09/19/2024     CREATININE 10.72 (H) 09/18/2024     CREATININE 10.23 (H) 09/17/2024            Lab Results   Component Value Date     HGB 8.1 (L) 09/19/2024         Dyslipidemia        Lab Results   Component Value Date     LDLCALC 79 05/09/2024   -stable on Atoravastatin 20 mg daily     Paranoid schizophrenia (Formerly [facility])        HPI:   9/25/24 with  TH: 64-year-old female with past medical history as described above who presents for hospital follow-up.  Recently had prolonged admission at the [facility] from 8/30/2024 through 9/19/2024.  Presented with worsening shortness of breath x 2 weeks after missing only 1 session of dialysis.  Also found to be anemic with hemoglobin of 6.9 requiring transfusion with 1 unit of PRBCs.  Diagnosed with multifocal pneumonia that came back positive for Pseudomonas.  An echocardiogram was done which showed a new drop in her LV function with an EF of 25%.  No other prior studies were available for comparison.  She underwent left heart cath which showed no obstructive disease.  Discharged home only on metoprolol tartrate 50 mg twice daily.  No other GDMT prescribed presumably due to persistent hypotension during dialysis.Feels ok today. Still tired from prolonged admission. Tolerating HD sessions without difficulty. Gets a little SOB with PT but otherwise no other subjective cardiovascular symptoms. Denies orthopnea or PND. No CP, palpitations, Lh/dizziness or syncope.    Admitted to [facility] from 9/27-10/3 for dialysis catheter issue.     10/9/24: Presents today for follow up.  Feels well, does endorse more frequent snacking with weight gain, but denies shortness of breath, chest pain, dizziness, PND, orthopnea.  On HD Tuesday, Thursday, Saturday.  Does not make urine.    Past Medical History:   Diagnosis Date    Asthma     Chronic pain     Hypertension     Renal disorder     Sepsis (HCC) 07/10/2024    Shortness of breath 08/11/2024     Review of Systems   Constitutional:  Positive for unexpected weight change. Negative for chills and fever.   HENT:  Negative for ear pain and sore throat.    Eyes:  Negative for pain and visual disturbance.   Respiratory:  Negative for cough and shortness of breath.    Cardiovascular:  Negative for chest pain, palpitations and leg swelling.   Gastrointestinal:  Negative for abdominal pain and  vomiting.   Genitourinary:  Negative for dysuria and hematuria.   Musculoskeletal:  Negative for arthralgias and back pain.   Skin:  Negative for color change and rash.   Neurological:  Negative for seizures and syncope.   All other systems reviewed and are negative.  14-point ROS completed and negative except as stated above and/or in the HPI.      Allergies   Allergen Reactions    Penicillins Hives     Reaction Date: [id number]; Annotation - [id number]: meena mcdonald    Amoxicillin Rash    Aspirin Rash    Bactrim [Sulfamethoxazole-Trimethoprim] Rash    Ibuprofen Rash    Morphine Rash    Oxycodone Rash    Valacyclovir Rash       Current Outpatient Medications:     Advair -21 MCG/ACT inhaler, Inhale 2 puffs 2 (two) times a day Rinse mouth after use., Disp: 12 g, Rfl: 0    albuterol (PROVENTIL HFA,VENTOLIN HFA) 90 mcg/act inhaler, INHALE 2 PUFFS BY MOUTH EVERY 4 HOURS AS NEEDED FOR SHORTNESS OF BREATH, Disp: 18 g, Rfl: 5    apixaban (Eliquis) 5 mg, Take 1 tablet (5 mg total) by mouth 2 (two) times a day Do not start before October 8, 2024., Disp: , Rfl:     atorvastatin (LIPITOR) 20 mg tablet, TAKE 1 TABLET BY MOUTH EVERY DAY, Disp: 90 tablet, Rfl: 1    atovaquone (MEPRON) 750 mg/5 mL suspension, Take 10 mL (1,500 mg total) by mouth daily, Disp: 300 mL, Rfl: 0    calcium carbonate (OYSTER SHELL,OSCAL) 500 mg, Take 1 tablet by mouth daily with breakfast, Disp: 30 tablet, Rfl: 0    cyclophosphamide (CYTOXAN) 50 mg capsule, Take 2 capsules (100 mg total) by mouth daily, Disp: 60 capsule, Rfl: 0    iron polysaccharides (FERREX) 150 mg capsule, Take 1 capsule (150 mg total) by mouth daily, Disp: 30 capsule, Rfl: 0    lamoTRIgine (LaMICtal) 100 mg tablet, Take 100 mg by mouth daily at bedtime, Disp: , Rfl:     metoprolol succinate (TOPROL-XL) 50 mg 24 hr tablet, Take 50 mg by mouth 2 (two) times a day, Disp: , Rfl:     montelukast (SINGULAIR) 10 mg tablet, TAKE 1 TABLET BY MOUTH EVERY DAY, Disp: 90 tablet, Rfl: 1     ondansetron (ZOFRAN) 4 mg tablet, Take 1 tablet (4 mg total) by mouth every 8 (eight) hours as needed for nausea or vomiting, Disp: 20 tablet, Rfl: 0    pantoprazole (PROTONIX) 40 mg tablet, Take 1 tablet (40 mg total) by mouth daily before breakfast, Disp: 30 tablet, Rfl: 0    predniSONE 2.5 mg tablet, Take 5 tablets (12.5 mg total) by mouth daily for 2 days, THEN 4 tablets (10 mg total) daily for 14 days, THEN 3 tablets (7.5 mg total) daily for 15 days, THEN 2 tablets (5 mg total) daily., Disp: , Rfl:     senna (SENOKOT) 8.6 mg, Take 2 tablets (17.2 mg total) by mouth 2 (two) times a day Use as needed constipation, Disp: 30 tablet, Rfl: 0    sevelamer (RENAGEL) 800 mg tablet, Take 2 tablets (1,600 mg total) by mouth 3 (three) times a day with meals, Disp: 90 tablet, Rfl: 0    Sodium Zirconium Cyclosilicate (Lokelma) 10 g, Take 1 packet (10 g total) by mouth daily, Disp: , Rfl:     traZODone (DESYREL) 100 mg tablet, Take 100 mg by mouth daily at bedtime, Disp: , Rfl:     venlafaxine (EFFEXOR-XR) 150 mg 24 hr capsule, TAKE 1 CAPSULE BY MOUTH DAILY WITH BREAKFAST., Disp: 30 capsule, Rfl: 0    venlafaxine (EFFEXOR-XR) 75 mg 24 hr capsule, Take 75 mg by mouth daily Pt states she takes 150mg and 75mg effexor. Daily. For a totoal of 225mg, Disp: , Rfl:     apixaban (ELIQUIS) 5 mg, Take 2 tablets (10 mg total) by mouth 2 (two) times a day for 10 doses (Patient not taking: Reported on 10/9/2024), Disp: , Rfl:     Social History     Socioeconomic History    Marital status: /Civil Union     Spouse name: Not on file    Number of children: Not on file    Years of education: Not on file    Highest education level: Not on file   Occupational History    Not on file   Tobacco Use    Smoking status: Former     Current packs/day: 0.00     Average packs/day: 1 pack/day for 15.0 years (15.0 ttl pk-yrs)     Types: Cigarettes     Start date:      Quit date:      Years since quittin.7    Smokeless tobacco: Never    Vaping Use    Vaping status: Never Used   Substance and Sexual Activity    Alcohol use: Never    Drug use: No    Sexual activity: Not Currently   Other Topics Concern    Not on file   Social History Narrative    Most recent tobacco use screenin2018    Alcohol intake: Occasional    Last modified by [id number]    2019,      Social Determinants of Health     Financial Resource Strain: Not on file   Food Insecurity: No Food Insecurity (2024)    Hunger Vital Sign     Worried About Running Out of Food in the Last Year: Never true     Ran Out of Food in the Last Year: Never true   Transportation Needs: No Transportation Needs (2024)    PRAPARE - Transportation     Lack of Transportation (Medical): No     Lack of Transportation (Non-Medical): No   Physical Activity: Not on file   Stress: Not on file   Social Connections: Unknown (2023)    [facility]    Social Connection and Isolation Panel [NHANES]     Frequency of Communication with Friends and Family: Patient declined     Frequency of Social Gatherings with Friends and Family: Patient declined     Attends Gnosticist Services: Patient declined     Active Member of Clubs or Organizations: Patient declined     Attends Club or Organization Meetings: Patient declined     Marital Status:    Intimate Partner Violence: Unknown (1/3/2023)    [facility]    Intimate Partner Violence     Within the last year, have you been afraid of your partner, ex-partner or family member?: Not on file     Within the last year, have you been humiliated or emotionally abused in other ways by your partner, ex-partner or family member?: Not on file     Within the last year, have you been kicked, hit, slapped, or otherwise physically hurt by your partner, ex-partner or family member?: Not on file     Within the last year, have you been raped or forced  "to have any kind of sexual activity by your partner, ex-partner or family member?: Not on file   Housing Stability: Low Risk  (9/29/2024)    Housing Stability Vital Sign     Unable to Pay for Housing in the Last Year: No     Number of Times Moved in the Last Year: 1     Homeless in the Last Year: No     Family History   Problem Relation Age of Onset    Diabetes Mother     Hypertension Mother     Lung cancer Mother     Colon cancer Mother     Colon cancer Father     Hypertension Sister     Multiple sclerosis Sister     Hypothyroidism Maternal Aunt        Vitals:  Blood pressure 104/68, pulse 82, height 5' 3\" (1.6 m), weight 108 kg (237 lb 8 oz), SpO2 95%.  Body mass index is 42.07 kg/m².  Wt Readings from Last 10 Encounters:   10/09/24 108 kg (237 lb 8 oz)   09/27/24 101 kg (223 lb)   09/25/24 103 kg (226 lb 11.2 oz)   09/19/24 102 kg (224 lb 3.3 oz)   08/10/24 118 kg (259 lb 0.7 oz)   08/04/24 112 kg (247 lb 5.7 oz)   07/10/24 109 kg (241 lb)   07/01/24 111 kg (244 lb 6.4 oz)   06/26/24 109 kg (241 lb)   05/01/24 113 kg (249 lb)     Vitals:    10/09/24 1234   BP: 104/68   BP Location: Right arm   Patient Position: Sitting   Cuff Size: Large   Pulse: 82   SpO2: 95%   Weight: 108 kg (237 lb 8 oz)   Height: 5' 3\" (1.6 m)       Physical Exam  Constitutional:       Appearance: Normal appearance.   HENT:      Head: Normocephalic.      Nose: Nose normal.      Mouth/Throat:      Mouth: Mucous membranes are moist.   Eyes:      Conjunctiva/sclera: Conjunctivae normal.   Cardiovascular:      Rate and Rhythm: Normal rate and regular rhythm.      Comments: BLLE edema, nonpitting   Pulmonary:      Effort: Pulmonary effort is normal.      Breath sounds: Normal breath sounds.   Musculoskeletal:      Cervical back: Neck supple.   Skin:     General: Skin is warm and dry.   Neurological:      General: No focal deficit present.      Mental Status: She is alert and oriented to person, place, and time.   Psychiatric:         Mood and " "Affect: Mood normal.         Behavior: Behavior normal.         Labs & Results:  Lab Results   Component Value Date    WBC 8.20 09/30/2024    HGB 9.5 (L) 10/02/2024    HCT 30.2 (L) 10/02/2024    MCV 99 (H) 09/30/2024     09/30/2024     Lab Results   Component Value Date    SODIUM 140 10/03/2024    K 4.3 10/03/2024    CL 99 10/03/2024    CO2 29 10/03/2024    BUN 42 (H) 10/03/2024    CREATININE 6.98 (H) 10/03/2024    GLUC 77 10/03/2024    CALCIUM 9.8 10/03/2024     Lab Results   Component Value Date    INR 1.39 (H) 10/02/2024    INR 0.96 09/27/2024    INR 1.99 (H) 08/30/2024    PROTIME 17.3 (H) 10/02/2024    PROTIME 13.1 09/27/2024    PROTIME 23.3 (H) 08/30/2024     Lab Results   Component Value Date    BNP 3,019 (H) 08/30/2024      No results found for: \"NTBNP\"       Thank you for the opportunity to participate in the care of this patient.    Deepti Francis PA-C   "

## 2024-10-09 ENCOUNTER — OFFICE VISIT (OUTPATIENT)
Dept: CARDIOLOGY CLINIC | Facility: CLINIC | Age: 64
End: 2024-10-09
Payer: COMMERCIAL

## 2024-10-09 ENCOUNTER — PATIENT OUTREACH (OUTPATIENT)
Dept: CASE MANAGEMENT | Facility: OTHER | Age: 64
End: 2024-10-09

## 2024-10-09 VITALS
HEART RATE: 82 BPM | WEIGHT: 237.5 LBS | BODY MASS INDEX: 42.08 KG/M2 | HEIGHT: 63 IN | SYSTOLIC BLOOD PRESSURE: 104 MMHG | OXYGEN SATURATION: 95 % | DIASTOLIC BLOOD PRESSURE: 68 MMHG

## 2024-10-09 DIAGNOSIS — N18.6 STAGE 5 CHRONIC KIDNEY DISEASE ON CHRONIC DIALYSIS (HCC): ICD-10-CM

## 2024-10-09 DIAGNOSIS — I42.9 CARDIOMYOPATHY, UNSPECIFIED TYPE (HCC): ICD-10-CM

## 2024-10-09 DIAGNOSIS — I10 PRIMARY HYPERTENSION: ICD-10-CM

## 2024-10-09 DIAGNOSIS — I50.20 HFREF (HEART FAILURE WITH REDUCED EJECTION FRACTION) (HCC): Primary | ICD-10-CM

## 2024-10-09 DIAGNOSIS — Z99.2 STAGE 5 CHRONIC KIDNEY DISEASE ON CHRONIC DIALYSIS (HCC): ICD-10-CM

## 2024-10-09 PROCEDURE — 99214 OFFICE O/P EST MOD 30 MIN: CPT | Performed by: PHYSICIAN ASSISTANT

## 2024-10-09 NOTE — PATIENT INSTRUCTIONS
Please weigh yourself every day (after emptying your bladder) and keep a detailed log of weights.   Contact the Heart Failure program at [phone] if you gain 3+ lbs overnight or 5+ lbs in 5-7 days.  Limit daily sodium/salt intake to 2000 mg daily to prevent fluid retention.  Avoid canned foods, fast food/Chinese food, and processed meats (hot dogs, lunch meat, and sausage etc.). Caution with condiments.  Limit fluid intake to 2000 mL or 2 liters (about 60-65 ounces) daily.  Avoid electrolyte replacement drinks (such as Gatorade, Pedialyte, Propel, Liquid IV, etc.).  Bring complete list of medications and log of daily weights to your follow-up appointment.

## 2024-10-10 ENCOUNTER — TELEPHONE (OUTPATIENT)
Dept: INTERVENTIONAL RADIOLOGY/VASCULAR | Facility: CLINIC | Age: 64
End: 2024-10-10

## 2024-10-14 ENCOUNTER — OFFICE VISIT (OUTPATIENT)
Dept: PULMONOLOGY | Facility: CLINIC | Age: 64
End: 2024-10-14
Payer: COMMERCIAL

## 2024-10-14 VITALS
DIASTOLIC BLOOD PRESSURE: 72 MMHG | HEART RATE: 80 BPM | SYSTOLIC BLOOD PRESSURE: 124 MMHG | OXYGEN SATURATION: 95 % | TEMPERATURE: 97 F

## 2024-10-14 DIAGNOSIS — J45.40 MODERATE PERSISTENT ASTHMA WITHOUT COMPLICATION: Primary | ICD-10-CM

## 2024-10-14 LAB — FUNGUS SPEC CULT: NORMAL

## 2024-10-14 PROCEDURE — 99213 OFFICE O/P EST LOW 20 MIN: CPT | Performed by: INTERNAL MEDICINE

## 2024-10-14 PROCEDURE — G2211 COMPLEX E/M VISIT ADD ON: HCPCS | Performed by: INTERNAL MEDICINE

## 2024-10-14 RX ORDER — ALBUTEROL SULFATE 0.83 MG/ML
2.5 SOLUTION RESPIRATORY (INHALATION) EVERY 6 HOURS PRN
Qty: 375 ML | Refills: 5 | Status: SHIPPED | OUTPATIENT
Start: 2024-10-14

## 2024-10-14 RX ORDER — ALBUTEROL SULFATE 0.83 MG/ML
2.5 SOLUTION RESPIRATORY (INHALATION) EVERY 6 HOURS PRN
Start: 2024-10-14 | End: 2024-10-14

## 2024-10-14 NOTE — PROGRESS NOTES
Pulmonary   Ngozi Beard 64 y.o. female MRN: [id number]   Encounter: [id number]      Assessment/Plan:  She is a 64-year-old female with past medical history of CKD on HD, asthma without exacerbation coming in after 2 bouts of pneumonia in the past 3 months.  Most recently with pneumonia, she was treated with 7 days of cefepime.  Since then she has continued to have a slight cough with light yellow sputum but no blood.  She continues to have shortness of breath at rest and walking.  She is not currently on oxygen.    1. Moderate persistent asthma without complication  Plan:  Continue with inhaler regimen  Encourage activity as tolerated  If symptoms continue to worsen, patient understands to call the office sooner than scheduled appointment.    Patient may follow up in 6 months or sooner as necessary.     Orders:  No orders of the defined types were placed in this encounter.      Subjective:   Patient is a 64-year-old female with past medical history of CKD on HD, asthma without exacerbation, bipolar, HTN, glomerulonephritis anti-GBM, HFrEF with ejection fraction 25% demonstrated global hypokinesis.  Remote 14-pack-year smoking history when she was between 14 to 28 years old.  Patient does report being exposed to dust and mold when living in an old apartment for the past 2 years.  She now currently resides at [facility] home.  Occupational exposure is mostly significant for her working in a factory packaging twice.  Asthma triggers are cold temperatures and high humidity.  The patient states that her asthma is currently well-controlled.    Inhaler Regimen:  Currently on Advair 4 times a day  Albuterol, which patient usually takes 3 times a week.    Remainder of review of systems negative except as described in HPI.      The following portions of the patient's history were reviewed and updated as appropriate: allergies, current medications, past family history, past medical history, past social history, past  surgical history and problem list.     Objective:   Vitals: Blood pressure 124/72, pulse 80, temperature (!) 97 °F (36.1 °C), SpO2 95%., , There is no height or weight on file to calculate BMI.    Physical Exam  Gen: NAD, awake, alert, oriented x 3, no acute distress  HEENT: Mucous membranes moist, no oral lesions, no thrush  NECK: No accessory muscle use, JVP not elevated  Cardiac: RRR, single S1, single S2, no murmurs, no rubs, no gallops  Lungs: Clear to auscultation  Abdomen: normoactive bowel sounds, soft nontender, nondistended, no rebound or rigidity, no guarding,   Extremities: no cyanosis, no clubbing, mild bilateral edema  MSK:  Strength equal in all extremities  Derm:  No rashes/lesions noted  Neuro:  Appropriate mood/affect    Labs: I have personally reviewed pertinent lab results.  Lab Results   Component Value Date    WBC 8.20 09/30/2024    HGB 9.5 (L) 10/02/2024     09/30/2024     Lab Results   Component Value Date    CREATININE 6.98 (H) 10/03/2024        Imaging and other studies: Results Review Statement: I personally reviewed the following image studies in PACS and associated radiology reports: chest xray and CT chest. My interpretation of the radiology images/reports is:  .    My interpretation:  Improved, bilateral pleural effusions no longer present.

## 2024-10-16 ENCOUNTER — HOSPITAL ENCOUNTER (OUTPATIENT)
Facility: HOSPITAL | Age: 64
Discharge: HOME | End: 2024-10-16
Attending: FAMILY MEDICINE
Payer: COMMERCIAL

## 2024-10-16 DIAGNOSIS — Z12.31 ENCOUNTER FOR SCREENING MAMMOGRAM FOR MALIGNANT NEOPLASM OF BREAST: ICD-10-CM

## 2024-10-16 PROBLEM — I42.8 NONISCHEMIC CARDIOMYOPATHY (HCC): Chronic | Status: ACTIVE | Noted: 2024-09-17

## 2024-10-16 PROBLEM — I10 PRIMARY HYPERTENSION: Chronic | Status: ACTIVE | Noted: 2022-01-12

## 2024-10-16 PROBLEM — E87.5 HYPERKALEMIA: Status: RESOLVED | Noted: 2024-09-17 | Resolved: 2024-10-16

## 2024-10-16 PROBLEM — I50.22 CHRONIC HFREF (HEART FAILURE WITH REDUCED EJECTION FRACTION) (HCC): Chronic | Status: ACTIVE | Noted: 2024-08-31

## 2024-10-16 PROBLEM — R07.9 CHEST PAIN: Status: RESOLVED | Noted: 2024-09-16 | Resolved: 2024-10-16

## 2024-10-16 PROBLEM — E78.5 DYSLIPIDEMIA: Chronic | Status: ACTIVE | Noted: 2022-01-12

## 2024-10-16 PROCEDURE — 77063 BREAST TOMOSYNTHESIS BI: CPT

## 2024-10-16 PROCEDURE — 77067 SCR MAMMO BI INCL CAD: CPT

## 2024-10-16 NOTE — PROGRESS NOTES
"Advanced Heart Failure / Pulmonary Hypertension Outpatient Visit    Ngozi Beard 64 y.o. female   MRN: [id number]  Encounter: [id number]    Assessment:  Patient Active Problem List    Diagnosis Date Noted    Moderate persistent asthma without complication 10/14/2024    SVC thrombus 10/01/2024    Nonischemic cardiomyopathy (HCC) 09/17/2024    Unspecified mood (affective) disorder ([facility]) 09/11/2024    Acute renal failure on dialysis (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) 08/31/2024    Rash 08/01/2024    Anemia due to chronic kidney disease, on chronic dialysis ([facility]) 07/27/2024    Thrombocytopenia (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) 02/23/2023    Asthma without acute exacerbation 02/23/2023    Asthma due to seasonal allergies 02/23/2023    Bipolar 1 disorder ([facility]) 01/12/2022    Primary hypertension 01/12/2022    Dyslipidemia 01/12/2022       Today's Plan:  Continue current medication.   Will reach out to patient's nephrology team about possibility of starting ARB for HFrEF.   Consider cardiac MRI.   Volume management per nephrology via HD.    Plan:  Chronic HFrEF; LVEF 25%   Etiology: nonischemic. \"Multifocal PNA on admit ?stress myopathy. Possible contribution from " "dyssynchrony from underlying LBBB. Possible contribution from rheumatologic/inflammatory component.\"   TTE 08/31/2024: LVEF 25%. Mildly reduced RVSF. Mild CINDY. Mild AI. Moderate to severe MR. Moderate TR. Small anterior pericardial effusion.   [facility] 09/17/2024: \"no obstructive CAD.\"   Weight of 224 lbs on 10/09. Today, weighs 237 lbs.     Guideline-Directed Medical Therapy:  --Beta Blocker: metoprolol succinate 50 mg q12 hours.  --ARNi / ACEi / ARB: No.  --Aldosterone Antagonist: No.   --SGLT2 Inhibitor: No.     Volume Management:  --Diuretic: N/A; does not make urine. Volume management per nephrology via HD.    Sudden Cardiac Death Risk Reduction:  --LVEF 25%. Wearing LifeVest.   --Candidacy for BiV device: LBBB with  ms.     Advanced Therapies: Will continue to monitor. ?MitraClip.    End-stage renal disease on hemodialysis   Started HD in 07/2024. Continues on [facility] schedule.   Follows with Dr. Burns as outpatient.    Hypertension  Hyperlipidemia  SVC thrombus  Paranoid schizophrenia    HPI:   Ngozi Beard is a 64-year-old woman with a PMH as above who presents to the office for follow-up.    10/09/2024 with GD: \"Presents today for follow up.  Feels well, does endorse more frequent snacking with weight gain, but denies shortness of breath, chest pain, dizziness, PND, orthopnea. On HD Tuesday, Thursday, Saturday.  Does not make urine.\"    10/18/2024: Patient presents for follow-up. Feeling well today. Weight up an additional 13 lbs since last visit.  Is to go for HD tomorrow. Denies lightheadedness, dizziness, SOB at rest, PND, and orthopnea. Does endorse mild worsening of LE swelling abdominal distention on nondialysis days.  Wearing LifeVest; no issues.    Past Medical History:   Diagnosis Date    Asthma     Chronic pain     Hypertension     Renal disorder     Sepsis (HCC) 07/10/2024    Shortness of breath 08/11/2024       Review of Systems   Constitutional:  Negative for activity change, appetite " change, fatigue and unexpected weight change.   Respiratory:  Negative for cough, chest tightness and shortness of breath.    Cardiovascular:  Positive for leg swelling. Negative for chest pain and palpitations.   Gastrointestinal:  Negative for abdominal pain.   Genitourinary:  Negative for decreased urine volume, dysuria and urgency.   Musculoskeletal: Negative.    Skin: Negative.    Neurological:  Negative for dizziness, syncope, weakness and light-headedness.   Psychiatric/Behavioral:  Negative for confusion and sleep disturbance. The patient is not nervous/anxious.        Allergies   Allergen Reactions    Penicillins Hives     Reaction Date: [id number]; Annotation - 04Sep2012: meena rn    Amoxicillin Rash    Aspirin Rash    Bactrim [Sulfamethoxazole-Trimethoprim] Rash    Ibuprofen Rash    Morphine Rash    Oxycodone Rash    Valacyclovir Rash         Current Outpatient Medications:     Advair -21 MCG/ACT inhaler, Inhale 2 puffs 2 (two) times a day Rinse mouth after use., Disp: 12 g, Rfl: 0    albuterol (2.5 mg/3 mL) 0.083 % nebulizer solution, Take 3 mL (2.5 mg total) by nebulization every 6 (six) hours as needed for wheezing or shortness of breath, Disp: 375 mL, Rfl: 5    albuterol (PROVENTIL HFA,VENTOLIN HFA) 90 mcg/act inhaler, INHALE 2 PUFFS BY MOUTH EVERY 4 HOURS AS NEEDED FOR SHORTNESS OF BREATH, Disp: 18 g, Rfl: 5    apixaban (Eliquis) 5 mg, Take 1 tablet (5 mg total) by mouth 2 (two) times a day Do not start before October 8, 2024., Disp: , Rfl:     atorvastatin (LIPITOR) 20 mg tablet, TAKE 1 TABLET BY MOUTH EVERY DAY, Disp: 90 tablet, Rfl: 1    atovaquone (MEPRON) 750 mg/5 mL suspension, Take 10 mL (1,500 mg total) by mouth daily, Disp: 300 mL, Rfl: 0    calcium carbonate (OYSTER SHELL,OSCAL) 500 mg, Take 1 tablet by mouth daily with breakfast, Disp: 30 tablet, Rfl: 0    cyclophosphamide (CYTOXAN) 50 mg capsule, Take 2 capsules (100 mg total) by mouth daily, Disp: 60 capsule, Rfl: 0    lamoTRIgine  (LaMICtal) 100 mg tablet, Take 100 mg by mouth daily at bedtime, Disp: , Rfl:     Melatonin 5 MG TABS, Take by mouth daily at bedtime, Disp: , Rfl:     metoprolol succinate (TOPROL-XL) 50 mg 24 hr tablet, Take 50 mg by mouth 2 (two) times a day, Disp: , Rfl:     montelukast (SINGULAIR) 10 mg tablet, TAKE 1 TABLET BY MOUTH EVERY DAY, Disp: 90 tablet, Rfl: 1    ondansetron (ZOFRAN) 4 mg tablet, Take 1 tablet (4 mg total) by mouth every 8 (eight) hours as needed for nausea or vomiting, Disp: 20 tablet, Rfl: 0    pantoprazole (PROTONIX) 40 mg tablet, Take 1 tablet (40 mg total) by mouth daily before breakfast, Disp: 30 tablet, Rfl: 0    predniSONE 2.5 mg tablet, Take 5 tablets (12.5 mg total) by mouth daily for 2 days, THEN 4 tablets (10 mg total) daily for 14 days, THEN 3 tablets (7.5 mg total) daily for 15 days, THEN 2 tablets (5 mg total) daily., Disp: , Rfl:     senna (SENOKOT) 8.6 mg, Take 2 tablets (17.2 mg total) by mouth 2 (two) times a day Use as needed constipation, Disp: 30 tablet, Rfl: 0    sevelamer (RENAGEL) 800 mg tablet, Take 2 tablets (1,600 mg total) by mouth 3 (three) times a day with meals, Disp: 90 tablet, Rfl: 0    Sodium Zirconium Cyclosilicate (Lokelma) 10 g, Take 1 packet (10 g total) by mouth daily, Disp: , Rfl:     traZODone (DESYREL) 100 mg tablet, Take 100 mg by mouth daily at bedtime, Disp: , Rfl:     venlafaxine (EFFEXOR-XR) 150 mg 24 hr capsule, TAKE 1 CAPSULE BY MOUTH DAILY WITH BREAKFAST., Disp: 30 capsule, Rfl: 0    venlafaxine (EFFEXOR-XR) 75 mg 24 hr capsule, Take 75 mg by mouth daily Pt states she takes 150mg and 75mg effexor. Daily. For a totoal of 225mg, Disp: , Rfl:     iron polysaccharides (FERREX) 150 mg capsule, Take 1 capsule (150 mg total) by mouth daily (Patient not taking: Reported on 10/18/2024), Disp: 30 capsule, Rfl: 0    Social History     Socioeconomic History    Marital status: /Civil Union     Spouse name: Not on file    Number of children: Not on file     Years of education: Not on file    Highest education level: Not on file   Occupational History    Not on file   Tobacco Use    Smoking status: Former     Current packs/day: 0.00     Average packs/day: 1 pack/day for 15.0 years (15.0 ttl pk-yrs)     Types: Cigarettes     Start date:      Quit date:      Years since quittin.8    Smokeless tobacco: Never   Vaping Use    Vaping status: Never Used   Substance and Sexual Activity    Alcohol use: Never    Drug use: No    Sexual activity: Not Currently   Other Topics Concern    Not on file   Social History Narrative    Most recent tobacco use screenin2018    Alcohol intake: Occasional    Last modified by [id number]    2019,      Social Determinants of Health     Financial Resource Strain: Not on file   Food Insecurity: No Food Insecurity (2024)    Hunger Vital Sign     Worried About Running Out of Food in the Last Year: Never true     Ran Out of Food in the Last Year: Never true   Transportation Needs: No Transportation Needs (2024)    PRAPARE - Transportation     Lack of Transportation (Medical): No     Lack of Transportation (Non-Medical): No   Physical Activity: Not on file   Stress: Not on file   Social Connections: Unknown (2023)    Received from [facility], [facility]    Social Connection and Isolation Panel [NHANES]     Frequency of Communication with Friends and Family: Patient declined     Frequency of Social Gatherings with Friends and Family: Patient declined     Attends Baptism Services: Patient declined     Active Member of Clubs or Organizations: Patient declined     Attends Club or Organization Meetings: Patient declined     Marital Status:    Intimate Partner Violence: Unknown (1/3/2023)    Received from [facility], [facility]    Intimate Partner Violence     Within the last year, have you been afraid of your partner, ex-partner or family  "member?: Not on file     Within the last year, have you been humiliated or emotionally abused in other ways by your partner, ex-partner or family member?: Not on file     Within the last year, have you been kicked, hit, slapped, or otherwise physically hurt by your partner, ex-partner or family member?: Not on file     Within the last year, have you been raped or forced to have any kind of sexual activity by your partner, ex-partner or family member?: Not on file   Housing Stability: Low Risk  (9/29/2024)    Housing Stability Vital Sign     Unable to Pay for Housing in the Last Year: No     Number of Times Moved in the Last Year: 1     Homeless in the Last Year: No     Family History   Problem Relation Age of Onset    Diabetes Mother     Hypertension Mother     Lung cancer Mother     Hypertension Sister     Multiple sclerosis Sister     Thyroid cancer Sister     Breast cancer Maternal Aunt     Hypothyroidism Maternal Aunt        Vitals:   Blood pressure 108/50, pulse 94, height 5' 3\" (1.6 m), weight 108 kg (237 lb 8 oz), SpO2 98%.    Wt Readings from Last 10 Encounters:   10/18/24 108 kg (237 lb 8 oz)   10/09/24 108 kg (237 lb 8 oz)   09/27/24 101 kg (223 lb)   09/25/24 103 kg (226 lb 11.2 oz)   09/19/24 102 kg (224 lb 3.3 oz)   08/10/24 118 kg (259 lb 0.7 oz)   08/04/24 112 kg (247 lb 5.7 oz)   07/10/24 109 kg (241 lb)   07/01/24 111 kg (244 lb 6.4 oz)   06/26/24 109 kg (241 lb)     Vitals:    10/18/24 1029   BP: 108/50   BP Location: Right arm   Patient Position: Sitting   Cuff Size: Large   Pulse: 94   SpO2: 98%   Weight: 108 kg (237 lb 8 oz)   Height: 5' 3\" (1.6 m)       Physical Exam  Vitals reviewed.   Constitutional:       General: She is awake. She is not in acute distress.     Appearance: Normal appearance. She is well-developed and overweight. She is not toxic-appearing or diaphoretic.      Comments: Seated in wheelchair   HENT:      Head: Normocephalic.      Nose: Nose normal.      Mouth/Throat:      " Mouth: Mucous membranes are moist.   Eyes:      General: No scleral icterus.     Conjunctiva/sclera: Conjunctivae normal.   Neck:      Vascular: JVD present.   Cardiovascular:      Rate and Rhythm: Normal rate and regular rhythm.      Heart sounds: Murmur heard.      Comments: Wearing LifeVest  Pulmonary:      Effort: Pulmonary effort is normal. No tachypnea, bradypnea or respiratory distress.      Breath sounds: Normal air entry. No decreased air movement. No wheezing or rhonchi.   Abdominal:      General: There is distension.      Palpations: Abdomen is soft.   Musculoskeletal:      Cervical back: Neck supple.      Right lower leg: Pitting Edema present.      Left lower leg: Pitting Edema present.   Skin:     General: Skin is warm and dry.      Coloration: Skin is pale. Skin is not jaundiced.   Neurological:      Mental Status: She is alert and oriented to person, place, and time.   Psychiatric:         Attention and Perception: Attention normal.         Mood and Affect: Mood and affect normal.         Speech: Speech normal.         Behavior: Behavior normal. Behavior is cooperative.         Thought Content: Thought content normal.       Labs & Results:  Lab Results   Component Value Date    WBC 8.20 09/30/2024    HGB 9.5 (L) 10/02/2024    HCT 30.2 (L) 10/02/2024    MCV 99 (H) 09/30/2024     09/30/2024     Lab Results   Component Value Date    SODIUM 140 10/03/2024    K 4.3 10/03/2024    CL 99 10/03/2024    CO2 29 10/03/2024    BUN 42 (H) 10/03/2024    CREATININE 6.98 (H) 10/03/2024    GLUC 77 10/03/2024    CALCIUM 9.8 10/03/2024     Lab Results   Component Value Date    INR 1.39 (H) 10/02/2024    INR 0.96 09/27/2024    INR 1.99 (H) 08/30/2024    PROTIME 17.3 (H) 10/02/2024    PROTIME 13.1 09/27/2024    PROTIME 23.3 (H) 08/30/2024     Lab Results   Component Value Date    BNP 3,019 (H) 08/30/2024      Usha Vizcaino PA-C

## 2024-10-17 LAB
DME PARACHUTE DELIVERY DATE ACTUAL: NORMAL
DME PARACHUTE DELIVERY DATE REQUESTED: NORMAL
DME PARACHUTE ITEM DESCRIPTION: NORMAL
DME PARACHUTE ORDER STATUS: NORMAL
DME PARACHUTE SUPPLIER NAME: NORMAL
DME PARACHUTE SUPPLIER PHONE: NORMAL

## 2024-10-18 ENCOUNTER — OFFICE VISIT (OUTPATIENT)
Dept: CARDIOLOGY CLINIC | Facility: CLINIC | Age: 64
End: 2024-10-18
Payer: COMMERCIAL

## 2024-10-18 VITALS
OXYGEN SATURATION: 98 % | HEIGHT: 63 IN | DIASTOLIC BLOOD PRESSURE: 50 MMHG | WEIGHT: 237.5 LBS | HEART RATE: 94 BPM | BODY MASS INDEX: 42.08 KG/M2 | SYSTOLIC BLOOD PRESSURE: 108 MMHG

## 2024-10-18 DIAGNOSIS — N18.6 END-STAGE RENAL DISEASE ON HEMODIALYSIS (HCC): ICD-10-CM

## 2024-10-18 DIAGNOSIS — Z99.2 END-STAGE RENAL DISEASE ON HEMODIALYSIS (HCC): ICD-10-CM

## 2024-10-18 DIAGNOSIS — I50.22 CHRONIC HFREF (HEART FAILURE WITH REDUCED EJECTION FRACTION) (HCC): Primary | Chronic | ICD-10-CM

## 2024-10-18 PROCEDURE — 99214 OFFICE O/P EST MOD 30 MIN: CPT | Performed by: PHYSICIAN ASSISTANT

## 2024-10-18 NOTE — PATIENT INSTRUCTIONS
Please weigh yourself every day (after emptying your bladder) and keep a detailed log of weights.   Contact the Heart Failure program at [phone] if you gain 3+ lbs overnight or 5+ lbs in 5-7 days.  Limit daily sodium/salt intake to 2000 mg daily to prevent fluid retention.  Avoid canned foods, fast food/Chinese food, and processed meats (hot dogs, lunch meat, and sausage etc.). Caution with condiments.  Limit fluid intake to 2000 mL or 2 liters (about 60-65 ounces) daily.  Avoid electrolyte replacement drinks (such as Gatorade, Pedialyte, Propel, Liquid IV, etc.).  Bring complete list of medications and log of daily weights to your follow-up appointment.

## 2024-10-21 ENCOUNTER — PATIENT OUTREACH (OUTPATIENT)
Dept: CASE MANAGEMENT | Facility: OTHER | Age: 64
End: 2024-10-21

## 2024-10-21 NOTE — PROGRESS NOTES
Update received the patient transitioned 10/10/24 to LTC at [facility]. I have removed myself from the care team, updated the Care Coordination note, and closed the episode.

## 2024-10-23 ENCOUNTER — HOSPITAL ENCOUNTER (OUTPATIENT)
Dept: NON INVASIVE DIAGNOSTICS | Facility: CLINIC | Age: 64
Discharge: HOME | End: 2024-10-23
Payer: COMMERCIAL

## 2024-10-23 DIAGNOSIS — N01.9 RPGN (RAPIDLY PROGRESSIVE GLOMERULONEPHRITIS) W/ ANTI-GBM ANTIBODIES: ICD-10-CM

## 2024-10-23 DIAGNOSIS — Z01.818 PRE-TRANSPLANT EVALUATION FOR ESRD (END STAGE RENAL DISEASE): ICD-10-CM

## 2024-10-23 PROCEDURE — 93985 DUP-SCAN HEMO COMPL BI STD: CPT | Performed by: SURGERY

## 2024-10-23 PROCEDURE — 93985 DUP-SCAN HEMO COMPL BI STD: CPT

## 2024-10-28 ENCOUNTER — RA CDI HCC (OUTPATIENT)
Dept: OTHER | Facility: HOSPITAL | Age: 64
End: 2024-10-28

## 2024-10-30 ENCOUNTER — TELEPHONE (OUTPATIENT)
Dept: INTERVENTIONAL RADIOLOGY/VASCULAR | Facility: CLINIC | Age: 64
End: 2024-10-30

## 2024-11-26 ENCOUNTER — TELEPHONE (OUTPATIENT)
Dept: INTERVENTIONAL RADIOLOGY/VASCULAR | Facility: CLINIC | Age: 64
End: 2024-11-26

## 2024-12-16 NOTE — PROGRESS NOTES
Heart Failure Consult Note - Ngozi Beard 64 y.o. 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) 08/31/2024    Generalized weakness 08/31/2024    Chronic HFrEF (heart failure with reduced ejection fraction) ([facility]) 08/31/2024    Rash 08/01/2024    Anemia due to chronic kidney disease, on chronic dialysis ([facility]) 07/27/2024    Thrombocytopenia ([facility]) 07/27/2024    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 07/19/2024    Abnormality of ascending aorta 05/01/2024    Postmenopausal 05/01/2024    Encounter for screening mammogram for malignant neoplasm of breast 05/01/2024    Allergic rhinitis 05/01/2024    Persistent cough 05/01/2024    Urge incontinence of urine 05/01/2024    Exercise intolerance 05/01/2024    Family history of coronary artery bypass graft surgery 05/01/2024    Urge urinary incontinence 05/01/2024    Female stress incontinence 05/01/2024    Paranoid schizophrenia ([facility]) 02/23/2023    Asthma without acute exacerbation 02/23/2023    Asthma due to seasonal allergies 02/23/2023    Bipolar 1 disorder ([facility]) 01/12/2022    Primary hypertension 01/12/2022    Dyslipidemia 01/12/2022       # Chronic HFrEF, Stage C  Etiology: NICM, multifocal PNA on admit, LHC negative. LBBB    Weight: 237 lbs  BNP:     Studies- personally reviewed by me  EKG- SR, LBBB (new in 2024)    LHC 9/17/24: no obstructive CAD    Echo 8/31/24  LVEF: 25%  LVIDd: 5.5 cm  RV: mildly reduced  Mild AI  MR: moderate to severe   PASP: 48 mmHg  RVOT:   Other: small anterior pericardial  effusion    Neurohormonal Blockade:  --Beta-Blocker:metoprolol succinate 50 mg BID  --ACEi, ARB or ARNi:  --Aldosterone Receptor Blocker:  --SGLT2-I:   --Diuretic:    Sudden Cardiac Death Risk Reduction:  --ICD: LifeVest    Electrical Resynchronization:  --Candidacy for BiV device: LBBB 160 msec    Advanced Therapies (if appropriate):  --Inotrope:  --LVAD/Transplant Candidacy:    # ESRD on HD  Started HD 7/2024  # anemia of CKD  # Hypotension  # Hyperlipidemia- atorvastatin 20 mg   # SVC thrombus  # paranoid schizphrenia      Today's Plan:  Follow up echo at some point to see if EF down due to acute illness- will order  Has LifeVest-  she stopped wearing it  Metoprolol 50 mg BID  BP too low for further GDMT  Lower extremity edema, does not urinate, on HD  Can wrap legs with ACE bandages  --2g sodium diet  Weights daily    HPI:       65 yo female following up for HFrEF. She had a prolonged hospitalization at [facility] 8/30- 9/19/24 with worsening shortness of breath after missing an HD appt. Also found to be anemic with HgB down to 6.9 s/p transfusion. Ultimately diagnosed with multilobar PNA. Echo done showed EF down to 25%. [facility] with no obstructive CAD.     Currently at [facility], she is not happy there,  passed away from pancreatic cancer. Just got over bronchitis. She stopped wearing her LifeVest    Past Medical History:   Diagnosis Date    Asthma     Chronic pain     Hypertension     Renal disorder     Sepsis (HCC) 07/10/2024    Shortness of breath 08/11/2024     Review of Systems   Constitutional:  Negative for activity change, appetite change, fatigue and unexpected weight change.   HENT:  Negative for congestion and nosebleeds.    Eyes: Negative.    Respiratory:  Negative for cough, chest tightness and shortness of breath.    Cardiovascular:  Negative for chest pain, palpitations and leg swelling.   Gastrointestinal:  Negative for abdominal distention.   Endocrine: Negative.    Genitourinary:  Negative.    Musculoskeletal: Negative.    Skin: Negative.    Neurological:  Negative for dizziness, syncope and weakness.   Hematological: Negative.    Psychiatric/Behavioral: Negative.           Allergies   Allergen Reactions    Penicillins Hives     Reaction Date: 24May2005; Annotation - 04Sep2012: meena mcdonald    Amoxicillin Rash    Aspirin Rash    Bactrim [Sulfamethoxazole-Trimethoprim] Rash    Ibuprofen Rash    Morphine Rash    Oxycodone Rash    Valacyclovir Rash     .    Current Outpatient Medications:     Advair -21 MCG/ACT inhaler, Inhale 2 puffs 2 (two) times a day Rinse mouth after use., Disp: 12 g, Rfl: 0    albuterol (2.5 mg/3 mL) 0.083 % nebulizer solution, Take 3 mL (2.5 mg total) by nebulization every 6 (six) hours as needed for wheezing or shortness of breath, Disp: 375 mL, Rfl: 5    albuterol (PROVENTIL HFA,VENTOLIN HFA) 90 mcg/act inhaler, INHALE 2 PUFFS BY MOUTH EVERY 4 HOURS AS NEEDED FOR SHORTNESS OF BREATH, Disp: 18 g, Rfl: 5    apixaban (Eliquis) 5 mg, Take 1 tablet (5 mg total) by mouth 2 (two) times a day Do not start before October 8, 2024., Disp: , Rfl:     atorvastatin (LIPITOR) 20 mg tablet, TAKE 1 TABLET BY MOUTH EVERY DAY, Disp: 90 tablet, Rfl: 1    calcium carbonate (OYSTER SHELL,OSCAL) 500 mg, Take 1 tablet by mouth daily with breakfast, Disp: 30 tablet, Rfl: 0    cyclophosphamide (CYTOXAN) 50 mg capsule, Take 2 capsules (100 mg total) by mouth daily, Disp: 60 capsule, Rfl: 0    iron polysaccharides (FERREX) 150 mg capsule, Take 1 capsule (150 mg total) by mouth daily (Patient not taking: Reported on 10/18/2024), Disp: 30 capsule, Rfl: 0    lamoTRIgine (LaMICtal) 100 mg tablet, Take 100 mg by mouth daily at bedtime, Disp: , Rfl:     Melatonin 5 MG TABS, Take by mouth daily at bedtime, Disp: , Rfl:     metoprolol succinate (TOPROL-XL) 50 mg 24 hr tablet, Take 50 mg by mouth 2 (two) times a day, Disp: , Rfl:     montelukast (SINGULAIR) 10 mg tablet, TAKE 1 TABLET BY MOUTH EVERY DAY,  Disp: 90 tablet, Rfl: 1    ondansetron (ZOFRAN) 4 mg tablet, Take 1 tablet (4 mg total) by mouth every 8 (eight) hours as needed for nausea or vomiting, Disp: 20 tablet, Rfl: 0    pantoprazole (PROTONIX) 40 mg tablet, Take 1 tablet (40 mg total) by mouth daily before breakfast, Disp: 30 tablet, Rfl: 0    senna (SENOKOT) 8.6 mg, Take 2 tablets (17.2 mg total) by mouth 2 (two) times a day Use as needed constipation, Disp: 30 tablet, Rfl: 0    sevelamer (RENAGEL) 800 mg tablet, Take 2 tablets (1,600 mg total) by mouth 3 (three) times a day with meals, Disp: 90 tablet, Rfl: 0    traZODone (DESYREL) 100 mg tablet, Take 100 mg by mouth daily at bedtime, Disp: , Rfl:     venlafaxine (EFFEXOR-XR) 150 mg 24 hr capsule, TAKE 1 CAPSULE BY MOUTH DAILY WITH BREAKFAST., Disp: 30 capsule, Rfl: 0    venlafaxine (EFFEXOR-XR) 75 mg 24 hr capsule, Take 75 mg by mouth daily Pt states she takes 150mg and 75mg effexor. Daily. For a totoal of 225mg, Disp: , Rfl:     Social History     Socioeconomic History    Marital status: /Civil Union     Spouse name: Not on file    Number of children: Not on file    Years of education: Not on file    Highest education level: Not on file   Occupational History    Not on file   Tobacco Use    Smoking status: Former     Current packs/day: 0.00     Average packs/day: 1 pack/day for 15.0 years (15.0 ttl pk-yrs)     Types: Cigarettes     Start date:      Quit date:      Years since quittin.9    Smokeless tobacco: Never   Vaping Use    Vaping status: Never Used   Substance and Sexual Activity    Alcohol use: Never    Drug use: No    Sexual activity: Not Currently   Other Topics Concern    Not on file   Social History Narrative    Most recent tobacco use screenin2018    Alcohol intake: Occasional    Last modified by [id number]    2019,      Social Drivers of Health     Financial Resource Strain: Not on file   Food Insecurity: No Food Insecurity (2024)    Nursing  - Inadequate Food Risk Classification     Worried About Running Out of Food in the Last Year: Never true     Ran Out of Food in the Last Year: Never true     Ran Out of Food in the Last Year: Not on file   Transportation Needs: No Transportation Needs (9/29/2024)    PRAPARE - Transportation     Lack of Transportation (Medical): No     Lack of Transportation (Non-Medical): No   Physical Activity: Not on file   Stress: Not on file   Social Connections: Unknown (1/6/2023)    [facility]    Social Connection and Isolation Panel [NHANES]     Frequency of Communication with Friends and Family: Patient declined     Frequency of Social Gatherings with Friends and Family: Patient declined     Attends Christianity Services: Patient declined     Active Member of Clubs or Organizations: Patient declined     Attends Club or Organization Meetings: Patient declined     Marital Status:    Intimate Partner Violence: Unknown (1/3/2023)    [facility], [facility]    Intimate Partner Violence     Within the last year, have you been afraid of your partner, ex-partner or family member?: Not on file     Within the last year, have you been humiliated or emotionally abused in other ways by your partner, ex-partner or family member?: Not on file     Within the last year, have you been kicked, hit, slapped, or otherwise physically hurt by your partner, ex-partner or family member?: Not on file     Within the last year, have you been raped or forced to have any kind of sexual activity by your partner, ex-partner or family member?: Not on file   Housing Stability: Low Risk  (9/29/2024)    Housing Stability Vital Sign     Unable to Pay for Housing in the Last Year: No     Number of Times Moved in the Last Year: 1     Homeless in the Last Year: No       Family History   Problem Relation Age of Onset    Diabetes Mother     Hypertension Mother     Lung  "cancer Mother     Hypertension Sister     Multiple sclerosis Sister     Thyroid cancer Sister     Breast cancer Maternal Aunt     Hypothyroidism Maternal Aunt        Physical Exam:    Vitals: There were no vitals taken for this visit., There is no height or weight on file to calculate BMI.,   Wt Readings from Last 3 Encounters:   10/18/24 108 kg (237 lb 8 oz)   10/09/24 108 kg (237 lb 8 oz)   09/27/24 101 kg (223 lb)       Physical Exam    Labs & Results:    Lab Results   Component Value Date    WBC 8.20 09/30/2024    HGB 9.5 (L) 10/02/2024    HCT 30.2 (L) 10/02/2024    MCV 99 (H) 09/30/2024     09/30/2024     Lab Results   Component Value Date    SODIUM 140 10/03/2024    K 4.3 10/03/2024    CL 99 10/03/2024    CO2 29 10/03/2024    BUN 42 (H) 10/03/2024    CREATININE 6.98 (H) 10/03/2024    GLUC 77 10/03/2024    CALCIUM 9.8 10/03/2024     No results found for: \"NTBNP\"   Lab Results   Component Value Date    CHOLESTEROL 136 05/09/2024    CHOLESTEROL 213 (H) 11/26/2016    CHOLESTEROL 216 (H) 06/28/2016     Lab Results   Component Value Date    HDL 31 (L) 05/09/2024    HDL 46 11/26/2016    HDL 48 06/28/2016     Lab Results   Component Value Date    TRIG 129 05/09/2024    TRIG 143 11/26/2016    TRIG 117 06/28/2016     Lab Results   Component Value Date    NONHDLC 105 05/09/2024       EKG personally reviewed by Tim Syed DO.     Counseling / Coordination of Care  I have spent a total time of 45 minutes on 12/16/24 in caring for this patient including Diagnostic results, Prognosis, Risks and benefits of tx options, Instructions for management, Impressions, Counseling / Coordination of care, Documenting in the medical record, and Reviewing / ordering tests, medicine, procedures  .      Thank you for the opportunity to participate in the care of this patient.    TIM SYED D.O.  DIRECTOR OF HEART FAILURE/ PULMONARY HYPERTENSION  MEDICAL DIRECTOR OF LVAD PROGRAM  [facility]    "

## 2024-12-18 ENCOUNTER — OFFICE VISIT (OUTPATIENT)
Dept: CARDIOLOGY CLINIC | Facility: CLINIC | Age: 64
End: 2024-12-18
Payer: COMMERCIAL

## 2024-12-18 VITALS — HEART RATE: 87 BPM | DIASTOLIC BLOOD PRESSURE: 62 MMHG | SYSTOLIC BLOOD PRESSURE: 90 MMHG | OXYGEN SATURATION: 95 %

## 2024-12-18 DIAGNOSIS — I42.8 NONISCHEMIC CARDIOMYOPATHY (HCC): Primary | Chronic | ICD-10-CM

## 2024-12-18 DIAGNOSIS — I10 PRIMARY HYPERTENSION: Chronic | ICD-10-CM

## 2024-12-18 DIAGNOSIS — Q25.40 ABNORMALITY OF ASCENDING AORTA: ICD-10-CM

## 2024-12-18 DIAGNOSIS — I50.22 CHRONIC HFREF (HEART FAILURE WITH REDUCED EJECTION FRACTION) (HCC): Chronic | ICD-10-CM

## 2024-12-18 PROCEDURE — 99204 OFFICE O/P NEW MOD 45 MIN: CPT | Performed by: INTERNAL MEDICINE

## 2024-12-18 RX ORDER — PREDNISONE 5 MG/1
5 TABLET ORAL DAILY
COMMUNITY

## 2024-12-18 RX ORDER — DOXYCYCLINE HYCLATE 100 MG
TABLET ORAL
COMMUNITY
Start: 2024-12-04

## 2024-12-18 RX ORDER — LORATADINE 10 MG/1
10 TABLET, ORALLY DISINTEGRATING ORAL DAILY
COMMUNITY

## 2024-12-18 RX ORDER — SODIUM ZIRCONIUM CYCLOSILICATE 10 G/10G
POWDER, FOR SUSPENSION ORAL
COMMUNITY
Start: 2024-11-29 | End: 2024-12-19 | Stop reason: SDUPTHER

## 2024-12-19 DIAGNOSIS — E87.5 HYPERKALEMIA: Primary | ICD-10-CM

## 2024-12-19 RX ORDER — SODIUM ZIRCONIUM CYCLOSILICATE 10 G/10G
10 POWDER, FOR SUSPENSION ORAL EVERY OTHER DAY
Qty: 15 PACKET | Refills: 2 | Status: SHIPPED | OUTPATIENT
Start: 2024-12-19

## 2025-01-07 DIAGNOSIS — N01.9 RAPIDLY PROGRESSIVE GLOMERULONEPHRITIS WITH ANTI-GBM ANTIBODIES: Primary | ICD-10-CM

## 2025-01-07 RX ORDER — PREDNISONE 2.5 MG/1
2.5 TABLET ORAL DAILY
Qty: 27 TABLET | Refills: 0 | Status: SHIPPED | OUTPATIENT
Start: 2025-02-01 | End: 2025-02-28

## 2025-01-07 NOTE — PROGRESS NOTES
Nephrology    Completed cyclophosphamide oral.    Will be deemed end-stage renal disease    Prednisone currently 5 mg daily.  Plan to continue for the rest of the month and do 2.5 mg for the month of February and then stop I did discuss this with the patient.

## 2025-01-27 DIAGNOSIS — Z99.2 ESRD (END STAGE RENAL DISEASE) ON DIALYSIS (HCC): ICD-10-CM

## 2025-01-27 DIAGNOSIS — Z99.2 DEPENDENCE ON RENAL DIALYSIS (HCC): Primary | ICD-10-CM

## 2025-01-27 DIAGNOSIS — N18.6 ESRD (END STAGE RENAL DISEASE) ON DIALYSIS (HCC): ICD-10-CM

## 2025-01-29 ENCOUNTER — TELEMEDICINE (OUTPATIENT)
Dept: INTERVENTIONAL RADIOLOGY/VASCULAR | Facility: CLINIC | Age: 65
End: 2025-01-29
Payer: COMMERCIAL

## 2025-01-29 DIAGNOSIS — N18.6 ESRD (END STAGE RENAL DISEASE) (HCC): Primary | ICD-10-CM

## 2025-01-29 PROCEDURE — 99213 OFFICE O/P EST LOW 20 MIN: CPT | Performed by: STUDENT IN AN ORGANIZED HEALTH CARE EDUCATION/TRAINING PROGRAM

## 2025-01-29 NOTE — PROGRESS NOTES
Virtual Brief Visit  Name: Ngozi Beard      : 1960      MRN: [id number]  Encounter Provider: Gerber Fletcher MD  Encounter Date: 2025   Encounter department: [facility] INTERVENTIONAL RADIOLOGY [place]    This Visit is being completed by telephone. The Patient is located at Home and in the following state in which I hold an active license [place]    The patient was identified by name and date of birth. Ngozi Beard was informed that this is a telemedicine visit and that the visit is being conducted through Telephone.  My office door was closed. No one else was in the room.  She acknowledged consent and understanding of privacy and security of the video platform. The patient has agreed to participate and understands they can discontinue the visit at any time.    Patient is aware this is a billable service.     :  Assessment & Plan    64-year-old female with a past medical history of end-stage renal disease as well as congestive heart failure, hypertension and schizophrenia who presents as a new patient visit to discuss possible endovascular fistula creation.    Endovascular fistula creation is a relatively new technology allowing for creation a new point for access.  Multiple studies have confirmed its utility in durability.  In the NEAT trial, 87% of patients were physiologicaly suitable for dialysis (as defined by a mean brachial artery flow above 900 mL/minute, and a cephalic vein outflow diameter of 5 mm).  Endovascular arteriovenous fistula functional use was 64% participants who received dialysis.  Twelve month primary and cumulative patency were 69% and 84%, respectively.  Mean time to 2 needle cannulation was 111.8 days in baseline dialysis patients and 32.4 days in baseline non-dialysis dependent participants after they inintated dialysis (NEAT trial, Vicky et. Al., American Journal for Kidney Disease, 2017).    In the EASE trial, target flow rates >500 ml/min and cannulation vessel  diameters > 4 mm were achieved in 91% of patients by the 90 day follow up, with successful cannulation being achieved in 78% of patients by 90 days, with mean time to cannulation of 43 +/- 14 days (EASE Study, Betina et. Al., Annals Vascular Surgery, 2019)     I explained to her the process of the creation, including the need for moderate sedation, the expected same-day discharge, as well as the risks, benefits, and alternatives to the procedure.  He would like to proceed.     I did explain to her that the creation of these fistulae require close interval follow-up, including the need for multiple subsequent ultrasounds, possible additional coil embolization, and/or possible additional angioplasty.  I explained that in for a subset of patients, he may require additional superficialization of his basilic vein given his basilic vein dominance on ultrasound.  This would not be considered a failure of the creation, but rather, the 2nd step of a basilic vein transposition.    I do have significant concerns over a fistula creation in her, namely revolving around her advanced heart failure.  She clearly would be at high risk for any type of fistula creation, endovascular fistula included, with respect to exacerbation of her heart failure and/or development of steal.  Also, given her elevated BMI, she may ultimately require a transposition in the future.    Given these concerns, I have reached out to both my vascular surgery and cardiology colleagues for further clarification of her risk stratification.     Thank you for allowing Interventional Radiology to participate in the care of Ngozi Beard. Please don't hesitate to call, text, email, or TigerText with any questions.     Gerber Fletcher MD  Interventional Radiologist  [facility]  Personal Cell: [phone]  [email or web address]      History of Present Illness   64-year-old female with a past medical history of end-stage renal disease  as well as congestive heart failure, hypertension and schizophrenia who presents as a new patient visit to discuss possible endovascular fistula creation.    The patient transitioned to end-stage renal disease in the summer of last year.  She has been dialyzed through a tunneled dialysis catheter with that time, with multiple instances of catheter dysfunction.  She unfortunately has had several hospital admissions resulting in delayed care in obtaining an access.  She heard about endovascular fistula creation and would like to move forward with this access type potentially.    She does have a significant history of congestive heart failure.  She has a recent echocardiogram documenting an ejection fraction of 25%.  She previously was wearing a LifeVest however stopped wearing it as it interfered with her life.    She is right-handed.  The intention would be to create the access in the left arm.  She has no known surgeries in her left hand, arm, shoulder, or chest.  She does not have a pacemaker.    She is maintained on Eliquis.  She receives dialysis on Tuesdays, Thursdays, and Saturdays in [place].    Visit Time  Total Visit Duration: 15 minutes.

## 2025-02-14 ENCOUNTER — TRANSCRIBE ORDERS (OUTPATIENT)
Dept: RADIOLOGY | Facility: HOSPITAL | Age: 65
End: 2025-02-14

## 2025-02-14 DIAGNOSIS — N18.9 CHRONIC KIDNEY DISEASE, UNSPECIFIED CKD STAGE: Primary | ICD-10-CM

## 2025-02-17 ENCOUNTER — PREP FOR PROCEDURE (OUTPATIENT)
Dept: INTERVENTIONAL RADIOLOGY/VASCULAR | Facility: CLINIC | Age: 65
End: 2025-02-17

## 2025-02-17 DIAGNOSIS — N18.6 ESRD (END STAGE RENAL DISEASE) (HCC): Primary | ICD-10-CM

## 2025-02-19 ENCOUNTER — TELEPHONE (OUTPATIENT)
Dept: RADIOLOGY | Facility: HOSPITAL | Age: 65
End: 2025-02-19

## 2025-02-19 NOTE — TELEPHONE ENCOUNTER
PT is resident at [facility], all transport is set up through [facility]. Called [facility] ambulance services to schedule pts TDC check/change appt. [facility] doesn't have opening to transport until after 3/26. PT has Dialysis T,T,S so only day we could schedule was 3/28.

## 2025-02-25 ENCOUNTER — TELEPHONE (OUTPATIENT)
Age: 65
End: 2025-02-25

## 2025-02-25 NOTE — TELEPHONE ENCOUNTER
Josef NP from [facility] called in asking to speak with Dr. Burns.       Josef mentioned that the patient is under Dr Burns's care for dialysis. The patient has elected for comfort care and possibly hospice care. She no longer wants dialysis care. Josef wanted to make Dr. Burns aware that she wants her dialysis cather removed and she wanted to touch base with him regarding her meds. Josef is not her regular provider and Dr. Burns said it may be ok to discontinue some of her meds if shes going to be in comfort care.    Tel Call Back: [phone]

## 2025-02-26 ENCOUNTER — PREP FOR PROCEDURE (OUTPATIENT)
Dept: INTERVENTIONAL RADIOLOGY/VASCULAR | Facility: CLINIC | Age: 65
End: 2025-02-26

## 2025-02-26 ENCOUNTER — TELEPHONE (OUTPATIENT)
Dept: RADIOLOGY | Facility: HOSPITAL | Age: 65
End: 2025-02-26

## 2025-02-26 DIAGNOSIS — N18.6 ESRD (END STAGE RENAL DISEASE) ON DIALYSIS (HCC): Primary | ICD-10-CM

## 2025-02-26 DIAGNOSIS — N18.6 ESRD (END STAGE RENAL DISEASE) (HCC): Primary | ICD-10-CM

## 2025-02-26 DIAGNOSIS — Z99.2 ESRD (END STAGE RENAL DISEASE) ON DIALYSIS (HCC): Primary | ICD-10-CM

## 2025-02-26 NOTE — TELEPHONE ENCOUNTER
PT stopped having dialysis done and will be having her TDC removed. Will work on getting her scheduled once we receive the order

## 2025-07-02 NOTE — ASSESSMENT & PLAN NOTE
PATIENT RETURNED PHONE CALL. I INFORMED HER DUE TO THE ABNORMALITIES SHOWN ON HER MAMMOGRAM, IT IS ADVISED BY DR. IVERSON THAT SHE HAVE A LYMPH NODE BIOPSY COMPLETED TO FURTHER ASSESS THE FINDINGS. I INFORMED HER THE IR DEPARTMENT AT [facility] WILL BE CONTACTING HER TO SCHEDULE THE BIOPSY THEN ONCE THE BIOPSY IS SCHEDULED, WE WILL MOVE HER FOLLOW-UP APPT FORWARD. I INFORMED HER THAT IT TYPICALLY TAKES AROUND 7-10 DAYS FOR THE BIOPSY RESULTS TO BE COMPLETED THEREFORE WE WILL MOVE HER FOLLOW-UP APPT ONCE THE BIOPSY IS SCHEDULED TO AVOID MULTIPLE RESCHEDULING DATES. SHE V/U. PT ASKED WHAT THE BIOPSY WILL SHOW. I INFORMED HER THAT IT WILL GIVE FURTHER INSIGHT REGARDING WHAT THE ABNORMALITY ON THE MAMMOGRAM IS. SHE CONFIRMED. I ADVISED FOR HER TO LET US KNOW IF SHE HAS ANY QUESTIONS OR CONCERNS. SHE CONFIRMED. NO FURTHER QUESTIONS AT THIS TIME.    Recent Labs     09/14/24  0926 09/14/24  1229 09/15/24  0537   HGB 8.1* 8.3* 8.4*      Baseline Hgb 7-8  Etiology: component of iron deficiency and renal disease.     Plan  Monitor Hgb, transfuse for < 7  Continue PTA Ferrex

## (undated) DEVICE — DGW .035 FC J3MM 260CM TEF: Brand: EMERALD

## (undated) DEVICE — TR BAND RADIAL ARTERY COMPRESSION DEVICE: Brand: TR BAND

## (undated) DEVICE — GUIDEWIRE WHOLEY HI TORQUE INTERM MOD J .035 145CM

## (undated) DEVICE — RADIFOCUS OPTITORQUE ANGIOGRAPHIC CATHETER: Brand: OPTITORQUE

## (undated) DEVICE — CATH DIAG 5FR .045 100CM FR4

## (undated) DEVICE — GLIDESHEATH SLENDER STAINLESS STEEL KIT: Brand: GLIDESHEATH SLENDER